# Patient Record
Sex: FEMALE | Race: WHITE | NOT HISPANIC OR LATINO | Employment: OTHER | URBAN - METROPOLITAN AREA
[De-identification: names, ages, dates, MRNs, and addresses within clinical notes are randomized per-mention and may not be internally consistent; named-entity substitution may affect disease eponyms.]

---

## 2023-09-02 PROBLEM — N81.4 CYSTOCELE WITH PROLAPSE: Status: ACTIVE | Noted: 2023-09-02

## 2023-09-02 PROBLEM — M54.50 ACUTE LOW BACK PAIN: Status: ACTIVE | Noted: 2023-09-02

## 2023-09-02 PROBLEM — R07.89 TIGHT CHEST: Status: ACTIVE | Noted: 2023-09-02

## 2023-09-02 PROBLEM — H53.9 DISORDER OF VISION: Status: ACTIVE | Noted: 2023-09-02

## 2023-09-02 PROBLEM — I10 HYPERTENSION: Status: ACTIVE | Noted: 2023-09-02

## 2023-09-02 PROBLEM — R06.00 DYSPNEA: Status: ACTIVE | Noted: 2023-09-02

## 2023-09-02 PROBLEM — R53.1 WEAKNESS: Status: ACTIVE | Noted: 2023-09-02

## 2023-09-02 PROBLEM — R39.15 URINARY URGENCY: Status: ACTIVE | Noted: 2023-09-02

## 2023-09-02 PROBLEM — N81.10 VAGINAL PROLAPSE: Status: ACTIVE | Noted: 2023-09-02

## 2023-09-02 PROBLEM — I73.9 CLAUDICATION (CMS-HCC): Status: ACTIVE | Noted: 2023-09-02

## 2023-09-02 PROBLEM — I25.10 ARTERIOSCLEROSIS OF CORONARY ARTERY: Status: ACTIVE | Noted: 2023-09-02

## 2023-09-02 PROBLEM — J44.1 ACUTE EXACERBATION OF CHRONIC OBSTRUCTIVE AIRWAYS DISEASE (MULTI): Status: ACTIVE | Noted: 2023-09-02

## 2023-09-02 PROBLEM — I83.893 VARICOSE VEINS OF BOTH LEGS WITH EDEMA: Status: ACTIVE | Noted: 2023-09-02

## 2023-09-02 PROBLEM — I73.9 PVD (PERIPHERAL VASCULAR DISEASE) (CMS-HCC): Status: ACTIVE | Noted: 2023-09-02

## 2023-09-02 PROBLEM — M51.36 LUMBAR DEGENERATIVE DISC DISEASE: Status: ACTIVE | Noted: 2023-09-02

## 2023-09-02 PROBLEM — G45.9 TRANSIENT ISCHEMIC ATTACK: Status: ACTIVE | Noted: 2023-09-02

## 2023-09-02 PROBLEM — I25.2 HISTORY OF MYOCARDIAL INFARCTION: Status: ACTIVE | Noted: 2023-09-02

## 2023-09-02 PROBLEM — R53.1 ASTHENIA: Status: ACTIVE | Noted: 2023-09-02

## 2023-09-02 PROBLEM — R56.9 SEIZURE (MULTI): Status: ACTIVE | Noted: 2023-09-02

## 2023-09-02 PROBLEM — R07.9 CHEST PAIN: Status: ACTIVE | Noted: 2023-09-02

## 2023-09-02 PROBLEM — H43.819 VITREOUS DEGENERATION: Status: ACTIVE | Noted: 2023-09-02

## 2023-09-02 PROBLEM — H43.399 FLOATERS IN VISUAL FIELD: Status: ACTIVE | Noted: 2023-09-02

## 2023-09-02 PROBLEM — R53.82 CHRONIC FATIGUE: Status: ACTIVE | Noted: 2023-09-02

## 2023-09-02 PROBLEM — R43.2 DYSGEUSIA: Status: ACTIVE | Noted: 2023-09-02

## 2023-09-02 PROBLEM — I21.3 ACUTE ST SEGMENT ELEVATION MYOCARDIAL INFARCTION (MULTI): Status: ACTIVE | Noted: 2023-09-02

## 2023-09-02 PROBLEM — M79.606 PAIN OF LOWER EXTREMITY: Status: ACTIVE | Noted: 2023-09-02

## 2023-09-02 PROBLEM — I65.22 CAROTID STENOSIS, LEFT: Status: ACTIVE | Noted: 2023-09-02

## 2023-09-02 PROBLEM — I50.32 CHRONIC DIASTOLIC HEART FAILURE (MULTI): Status: ACTIVE | Noted: 2023-09-02

## 2023-09-02 PROBLEM — Z86.73 HISTORY OF CEREBROVASCULAR ACCIDENT: Status: ACTIVE | Noted: 2023-09-02

## 2023-09-02 PROBLEM — K21.9 GASTROESOPHAGEAL REFLUX DISEASE: Status: ACTIVE | Noted: 2023-09-02

## 2023-09-02 PROBLEM — I20.9 ANGINA PECTORIS (CMS-HCC): Status: ACTIVE | Noted: 2023-09-02

## 2023-09-02 PROBLEM — M54.9 BACKACHE: Status: ACTIVE | Noted: 2023-09-02

## 2023-09-02 PROBLEM — M51.369 LUMBAR DEGENERATIVE DISC DISEASE: Status: ACTIVE | Noted: 2023-09-02

## 2023-09-02 PROBLEM — R55 SYNCOPE AND COLLAPSE: Status: ACTIVE | Noted: 2023-09-02
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
## 2023-09-02 PROBLEM — H04.129 TEAR FILM INSUFFICIENCY: Status: ACTIVE | Noted: 2023-09-02

## 2023-09-02 PROBLEM — I50.23 ACUTE ON CHRONIC SYSTOLIC HEART FAILURE (MULTI): Status: ACTIVE | Noted: 2023-09-02

## 2023-09-02 PROBLEM — K92.2 GASTROINTESTINAL HEMORRHAGE: Status: ACTIVE | Noted: 2023-09-02

## 2023-09-02 PROBLEM — H11.159 PINGUECULA: Status: ACTIVE | Noted: 2023-09-02

## 2023-09-02 PROBLEM — E86.0 DEHYDRATION: Status: ACTIVE | Noted: 2023-09-02

## 2023-09-02 PROBLEM — E03.9 HYPOTHYROIDISM (ACQUIRED): Status: ACTIVE | Noted: 2023-09-02

## 2023-09-02 PROBLEM — R04.0 EPISTAXIS: Status: ACTIVE | Noted: 2023-09-02

## 2023-09-02 PROBLEM — R29.810 FACIAL WEAKNESS: Status: ACTIVE | Noted: 2023-09-02

## 2023-09-02 PROBLEM — I95.9 LOW BLOOD PRESSURE: Status: ACTIVE | Noted: 2023-09-02

## 2023-09-02 PROBLEM — I25.5 ISCHEMIC CARDIOMYOPATHY: Status: ACTIVE | Noted: 2023-09-02

## 2023-09-02 PROBLEM — Z86.73 HISTORY OF STROKE WITHOUT RESIDUAL DEFICITS: Status: ACTIVE | Noted: 2023-09-02

## 2023-09-02 PROBLEM — N32.81 OAB (OVERACTIVE BLADDER): Status: ACTIVE | Noted: 2023-09-02

## 2023-09-02 PROBLEM — J44.9 CHRONIC OBSTRUCTIVE PULMONARY DISEASE (MULTI): Status: ACTIVE | Noted: 2023-09-02

## 2023-09-02 PROBLEM — E78.2 MIXED HYPERLIPIDEMIA: Status: ACTIVE | Noted: 2023-09-02

## 2023-09-02 PROBLEM — N17.9 ACUTE RENAL FAILURE SYNDROME (CMS-HCC): Status: ACTIVE | Noted: 2023-09-02

## 2023-09-02 PROBLEM — H53.461 RIGHT HOMONYMOUS HEMIANOPSIA: Status: ACTIVE | Noted: 2023-09-02

## 2023-09-02 PROBLEM — R51.9 HEADACHE: Status: ACTIVE | Noted: 2023-09-02

## 2023-09-02 PROBLEM — D50.0 BLOOD LOSS ANEMIA: Status: ACTIVE | Noted: 2023-09-02

## 2023-09-02 PROBLEM — H02.831 DERMATOCHALASIS OF RIGHT UPPER EYELID: Status: ACTIVE | Noted: 2023-09-02

## 2023-09-02 PROBLEM — R55 NEAR SYNCOPE: Status: ACTIVE | Noted: 2023-09-02

## 2023-09-02 PROBLEM — I63.40 CEREBROVASCULAR ACCIDENT (CVA) DUE TO EMBOLISM OF CEREBRAL ARTERY (MULTI): Status: ACTIVE | Noted: 2023-09-02

## 2023-09-02 PROBLEM — M51.27 LUMBAGO-SCIATICA DUE TO DISPLACEMENT OF LUMBAR INTERVERTEBRAL DISC: Status: ACTIVE | Noted: 2023-09-02

## 2023-09-02 PROBLEM — H02.834 DERMATOCHALASIS OF LEFT UPPER EYELID: Status: ACTIVE | Noted: 2023-09-02

## 2023-09-02 PROBLEM — I10 BENIGN ESSENTIAL HYPERTENSION: Status: ACTIVE | Noted: 2023-09-02

## 2023-09-02 PROBLEM — N18.30 CHRONIC KIDNEY DISEASE, STAGE 3 (MULTI): Status: ACTIVE | Noted: 2023-09-02

## 2023-09-02 PROBLEM — I63.9: Status: ACTIVE | Noted: 2023-09-02

## 2023-09-02 PROBLEM — I71.40 ABDOMINAL AORTIC ANEURYSM (AAA) WITHOUT RUPTURE (CMS-HCC): Status: ACTIVE | Noted: 2023-09-02

## 2023-09-02 PROBLEM — R94.31 ELECTROCARDIOGRAM ABNORMAL: Status: ACTIVE | Noted: 2023-09-02

## 2023-09-02 PROBLEM — I63.81 LACUNAR INFARCT, ACUTE (MULTI): Status: ACTIVE | Noted: 2023-09-02

## 2023-09-02 PROBLEM — Z95.5 STENTED CORONARY ARTERY: Status: ACTIVE | Noted: 2023-09-02

## 2023-09-02 PROBLEM — Z96.1 ARTIFICIAL LENS PRESENT: Status: ACTIVE | Noted: 2023-09-02

## 2023-09-02 PROBLEM — Z95.5 HISTORY OF CORONARY ARTERY STENT PLACEMENT: Status: ACTIVE | Noted: 2023-09-02

## 2023-09-02 PROBLEM — I63.9 CEREBROVASCULAR ACCIDENT (MULTI): Status: ACTIVE | Noted: 2023-09-02

## 2023-09-02 PROBLEM — I50.32 CHRONIC HEART FAILURE WITH PRESERVED EJECTION FRACTION (MULTI): Status: ACTIVE | Noted: 2023-09-02

## 2023-09-02 RX ORDER — LEVOTHYROXINE SODIUM 125 UG/1
TABLET ORAL
COMMUNITY
End: 2023-10-12 | Stop reason: ALTCHOICE

## 2023-09-02 RX ORDER — ALBUTEROL SULFATE 90 UG/1
1 AEROSOL, METERED RESPIRATORY (INHALATION) EVERY 4 HOURS PRN
COMMUNITY
Start: 2021-05-28

## 2023-09-02 RX ORDER — FUROSEMIDE 40 MG/1
TABLET ORAL
COMMUNITY
End: 2023-10-12 | Stop reason: ALTCHOICE

## 2023-09-02 RX ORDER — FLUTICASONE PROPIONATE 50 MCG
SPRAY, SUSPENSION (ML) NASAL
COMMUNITY
End: 2023-11-07 | Stop reason: WASHOUT

## 2023-09-02 RX ORDER — CARVEDILOL 25 MG/1
TABLET ORAL
COMMUNITY
End: 2023-10-07 | Stop reason: SDUPTHER

## 2023-09-02 RX ORDER — LIDOCAINE 50 MG/G
PATCH TOPICAL
COMMUNITY
Start: 2022-06-15 | End: 2023-10-10 | Stop reason: HOSPADM

## 2023-09-02 RX ORDER — LEVOTHYROXINE SODIUM 100 UG/1
TABLET ORAL
COMMUNITY
Start: 2021-05-23 | End: 2023-10-07 | Stop reason: SDUPTHER

## 2023-09-02 RX ORDER — ATORVASTATIN CALCIUM 10 MG/1
TABLET, FILM COATED ORAL
COMMUNITY
End: 2023-10-07 | Stop reason: SDUPTHER

## 2023-09-02 RX ORDER — ASPIRIN 81 MG/1
81 TABLET ORAL ONCE
COMMUNITY
End: 2024-01-02 | Stop reason: WASHOUT

## 2023-09-02 RX ORDER — LORATADINE 10 MG/1
10 TABLET ORAL DAILY
COMMUNITY

## 2023-09-02 RX ORDER — PANTOPRAZOLE SODIUM 40 MG/1
TABLET, DELAYED RELEASE ORAL
COMMUNITY
Start: 2021-07-01 | End: 2023-10-10 | Stop reason: HOSPADM

## 2023-09-02 RX ORDER — MULTIVIT-MIN/IRON/FOLIC ACID/K 18-600-40
1 CAPSULE ORAL DAILY
COMMUNITY

## 2023-09-02 RX ORDER — NITROGLYCERIN 0.4 MG/1
0.4 TABLET SUBLINGUAL EVERY 5 MIN PRN
COMMUNITY
Start: 2021-07-02

## 2023-09-02 RX ORDER — VIT A/VIT C/VIT E/ZINC/COPPER 2148-113
1 TABLET ORAL EVERY 12 HOURS
COMMUNITY
End: 2024-02-08 | Stop reason: WASHOUT

## 2023-09-02 RX ORDER — ATORVASTATIN CALCIUM 40 MG/1
40 TABLET, FILM COATED ORAL EVERY 24 HOURS
COMMUNITY
Start: 2021-07-19

## 2023-09-02 RX ORDER — LANOLIN ALCOHOL/MO/W.PET/CERES
CREAM (GRAM) TOPICAL
COMMUNITY
Start: 2023-10-12 | End: 2024-02-08 | Stop reason: WASHOUT

## 2023-09-02 RX ORDER — TIZANIDINE 2 MG/1
TABLET ORAL
COMMUNITY
End: 2023-10-10 | Stop reason: HOSPADM

## 2023-09-02 RX ORDER — CARVEDILOL 12.5 MG/1
TABLET ORAL
COMMUNITY
Start: 2021-04-14 | End: 2023-10-12 | Stop reason: ALTCHOICE

## 2023-10-07 ENCOUNTER — APPOINTMENT (OUTPATIENT)
Dept: RADIOLOGY | Facility: HOSPITAL | Age: 83
DRG: 280 | End: 2023-10-07
Payer: MEDICARE

## 2023-10-07 ENCOUNTER — HOSPITAL ENCOUNTER (INPATIENT)
Facility: HOSPITAL | Age: 83
LOS: 3 days | Discharge: HOME HEALTH | DRG: 280 | End: 2023-10-10
Attending: STUDENT IN AN ORGANIZED HEALTH CARE EDUCATION/TRAINING PROGRAM | Admitting: HOSPITALIST
Payer: MEDICARE

## 2023-10-07 DIAGNOSIS — R01.2 OTHER CARDIAC SOUNDS: ICD-10-CM

## 2023-10-07 DIAGNOSIS — E03.9 ACQUIRED HYPOTHYROIDISM: ICD-10-CM

## 2023-10-07 DIAGNOSIS — R53.1 ASTHENIA: ICD-10-CM

## 2023-10-07 DIAGNOSIS — I50.32 CHRONIC DIASTOLIC HEART FAILURE (MULTI): ICD-10-CM

## 2023-10-07 DIAGNOSIS — I10 HYPERTENSION, UNSPECIFIED TYPE: ICD-10-CM

## 2023-10-07 DIAGNOSIS — J96.01 ACUTE RESPIRATORY FAILURE WITH HYPOXIA (MULTI): Primary | ICD-10-CM

## 2023-10-07 DIAGNOSIS — R53.1 WEAKNESS: ICD-10-CM

## 2023-10-07 DIAGNOSIS — J81.0 ACUTE PULMONARY EDEMA (MULTI): ICD-10-CM

## 2023-10-07 LAB
ALBUMIN SERPL-MCNC: 4.1 G/DL (ref 3.5–5)
ALP BLD-CCNC: 125 U/L (ref 35–125)
ALT SERPL-CCNC: 103 U/L (ref 5–40)
ANION GAP SERPL CALC-SCNC: 14 MMOL/L
AST SERPL-CCNC: 85 U/L (ref 5–40)
BILIRUB SERPL-MCNC: 1 MG/DL (ref 0.1–1.2)
BUN SERPL-MCNC: 22 MG/DL (ref 8–25)
CALCIUM SERPL-MCNC: 8.7 MG/DL (ref 8.5–10.4)
CHLORIDE SERPL-SCNC: 104 MMOL/L (ref 97–107)
CO2 SERPL-SCNC: 29 MMOL/L (ref 24–31)
CREAT SERPL-MCNC: 0.9 MG/DL (ref 0.4–1.6)
ERYTHROCYTE [DISTWIDTH] IN BLOOD BY AUTOMATED COUNT: 14.1 % (ref 11.5–14.5)
GFR SERPL CREATININE-BSD FRML MDRD: 63 ML/MIN/1.73M*2
GLUCOSE SERPL-MCNC: 220 MG/DL (ref 65–99)
HCT VFR BLD AUTO: 43.6 % (ref 36–46)
HGB BLD-MCNC: 13.7 G/DL (ref 12–16)
LACTATE BLDV-SCNC: 2.3 MMOL/L (ref 0.4–2)
MCH RBC QN AUTO: 29.4 PG (ref 26–34)
MCHC RBC AUTO-ENTMCNC: 31.4 G/DL (ref 32–36)
MCV RBC AUTO: 94 FL (ref 80–100)
NRBC BLD-RTO: 0 /100 WBCS (ref 0–0)
NT-PROBNP SERPL-MCNC: 6316 PG/ML (ref 0–624)
PLATELET # BLD AUTO: 196 X10*3/UL (ref 150–450)
PMV BLD AUTO: 11.6 FL (ref 7.5–11.5)
POTASSIUM SERPL-SCNC: 3.5 MMOL/L (ref 3.4–5.1)
PROT SERPL-MCNC: 6.5 G/DL (ref 5.9–7.9)
RBC # BLD AUTO: 4.66 X10*6/UL (ref 4–5.2)
SODIUM SERPL-SCNC: 147 MMOL/L (ref 133–145)
TROPONIN T SERPL-MCNC: 156 NG/L
TROPONIN T SERPL-MCNC: 88 NG/L
TROPONIN T SERPL-MCNC: 99 NG/L
WBC # BLD AUTO: 13.6 X10*3/UL (ref 4.4–11.3)

## 2023-10-07 PROCEDURE — 5A09357 ASSISTANCE WITH RESPIRATORY VENTILATION, LESS THAN 24 CONSECUTIVE HOURS, CONTINUOUS POSITIVE AIRWAY PRESSURE: ICD-10-PCS | Performed by: INTERNAL MEDICINE

## 2023-10-07 PROCEDURE — 2500000004 HC RX 250 GENERAL PHARMACY W/ HCPCS (ALT 636 FOR OP/ED): Performed by: STUDENT IN AN ORGANIZED HEALTH CARE EDUCATION/TRAINING PROGRAM

## 2023-10-07 PROCEDURE — 2500000001 HC RX 250 WO HCPCS SELF ADMINISTERED DRUGS (ALT 637 FOR MEDICARE OP): Performed by: HOSPITALIST

## 2023-10-07 PROCEDURE — 96374 THER/PROPH/DIAG INJ IV PUSH: CPT

## 2023-10-07 PROCEDURE — 85027 COMPLETE CBC AUTOMATED: CPT | Performed by: STUDENT IN AN ORGANIZED HEALTH CARE EDUCATION/TRAINING PROGRAM

## 2023-10-07 PROCEDURE — 84484 ASSAY OF TROPONIN QUANT: CPT | Performed by: STUDENT IN AN ORGANIZED HEALTH CARE EDUCATION/TRAINING PROGRAM

## 2023-10-07 PROCEDURE — 80053 COMPREHEN METABOLIC PANEL: CPT | Performed by: STUDENT IN AN ORGANIZED HEALTH CARE EDUCATION/TRAINING PROGRAM

## 2023-10-07 PROCEDURE — 36415 COLL VENOUS BLD VENIPUNCTURE: CPT | Performed by: STUDENT IN AN ORGANIZED HEALTH CARE EDUCATION/TRAINING PROGRAM

## 2023-10-07 PROCEDURE — 2020000001 HC ICU ROOM DAILY

## 2023-10-07 PROCEDURE — 71045 X-RAY EXAM CHEST 1 VIEW: CPT

## 2023-10-07 PROCEDURE — 2500000004 HC RX 250 GENERAL PHARMACY W/ HCPCS (ALT 636 FOR OP/ED): Performed by: HOSPITALIST

## 2023-10-07 PROCEDURE — 83880 ASSAY OF NATRIURETIC PEPTIDE: CPT | Performed by: STUDENT IN AN ORGANIZED HEALTH CARE EDUCATION/TRAINING PROGRAM

## 2023-10-07 PROCEDURE — 87040 BLOOD CULTURE FOR BACTERIA: CPT | Mod: CMCLAB,WESLAB | Performed by: STUDENT IN AN ORGANIZED HEALTH CARE EDUCATION/TRAINING PROGRAM

## 2023-10-07 PROCEDURE — 82435 ASSAY OF BLOOD CHLORIDE: CPT | Performed by: HOSPITALIST

## 2023-10-07 PROCEDURE — 99291 CRITICAL CARE FIRST HOUR: CPT | Mod: 25 | Performed by: STUDENT IN AN ORGANIZED HEALTH CARE EDUCATION/TRAINING PROGRAM

## 2023-10-07 PROCEDURE — 93010 ELECTROCARDIOGRAM REPORT: CPT | Performed by: INTERNAL MEDICINE

## 2023-10-07 PROCEDURE — 83605 ASSAY OF LACTIC ACID: CPT | Performed by: STUDENT IN AN ORGANIZED HEALTH CARE EDUCATION/TRAINING PROGRAM

## 2023-10-07 PROCEDURE — 94660 CPAP INITIATION&MGMT: CPT

## 2023-10-07 RX ORDER — LOSARTAN POTASSIUM 25 MG/1
25 TABLET ORAL DAILY
COMMUNITY
End: 2023-10-10 | Stop reason: HOSPADM

## 2023-10-07 RX ORDER — ACETAMINOPHEN 160 MG/5ML
650 SOLUTION ORAL EVERY 6 HOURS PRN
Status: DISCONTINUED | OUTPATIENT
Start: 2023-10-07 | End: 2023-10-10

## 2023-10-07 RX ORDER — ATORVASTATIN CALCIUM 40 MG/1
40 TABLET, FILM COATED ORAL NIGHTLY
Status: DISCONTINUED | OUTPATIENT
Start: 2023-10-07 | End: 2023-10-10 | Stop reason: HOSPADM

## 2023-10-07 RX ORDER — POTASSIUM CHLORIDE 20 MEQ/1
40 TABLET, EXTENDED RELEASE ORAL ONCE
Status: COMPLETED | OUTPATIENT
Start: 2023-10-07 | End: 2023-10-07

## 2023-10-07 RX ORDER — ENOXAPARIN SODIUM 100 MG/ML
40 INJECTION SUBCUTANEOUS EVERY 24 HOURS
Status: DISCONTINUED | OUTPATIENT
Start: 2023-10-07 | End: 2023-10-10 | Stop reason: HOSPADM

## 2023-10-07 RX ORDER — LOSARTAN POTASSIUM 25 MG/1
25 TABLET ORAL DAILY
Status: DISCONTINUED | OUTPATIENT
Start: 2023-10-07 | End: 2023-10-10

## 2023-10-07 RX ORDER — FUROSEMIDE 10 MG/ML
40 INJECTION INTRAMUSCULAR; INTRAVENOUS 2 TIMES DAILY
Status: DISCONTINUED | OUTPATIENT
Start: 2023-10-07 | End: 2023-10-09

## 2023-10-07 RX ORDER — ACETAMINOPHEN 650 MG/1
650 SUPPOSITORY RECTAL EVERY 6 HOURS PRN
Status: DISCONTINUED | OUTPATIENT
Start: 2023-10-07 | End: 2023-10-10

## 2023-10-07 RX ORDER — PANTOPRAZOLE SODIUM 40 MG/1
40 TABLET, DELAYED RELEASE ORAL
Status: DISCONTINUED | OUTPATIENT
Start: 2023-10-08 | End: 2023-10-10 | Stop reason: HOSPADM

## 2023-10-07 RX ORDER — FUROSEMIDE 10 MG/ML
60 INJECTION INTRAMUSCULAR; INTRAVENOUS ONCE
Status: COMPLETED | OUTPATIENT
Start: 2023-10-07 | End: 2023-10-07

## 2023-10-07 RX ORDER — ASPIRIN 81 MG/1
81 TABLET ORAL ONCE
Status: COMPLETED | OUTPATIENT
Start: 2023-10-07 | End: 2023-10-07

## 2023-10-07 RX ORDER — ACETAMINOPHEN 325 MG/1
650 TABLET ORAL EVERY 6 HOURS PRN
Status: DISCONTINUED | OUTPATIENT
Start: 2023-10-07 | End: 2023-10-10 | Stop reason: HOSPADM

## 2023-10-07 RX ORDER — DOCUSATE SODIUM 100 MG/1
100 CAPSULE, LIQUID FILLED ORAL 2 TIMES DAILY
Status: DISCONTINUED | OUTPATIENT
Start: 2023-10-07 | End: 2023-10-10 | Stop reason: HOSPADM

## 2023-10-07 RX ORDER — LEVOTHYROXINE SODIUM 100 UG/1
100 TABLET ORAL
Status: DISCONTINUED | OUTPATIENT
Start: 2023-10-08 | End: 2023-10-10 | Stop reason: HOSPADM

## 2023-10-07 RX ORDER — TIZANIDINE 2 MG/1
2 TABLET ORAL EVERY 8 HOURS PRN
Status: DISCONTINUED | OUTPATIENT
Start: 2023-10-07 | End: 2023-10-10 | Stop reason: HOSPADM

## 2023-10-07 RX ORDER — CARVEDILOL 12.5 MG/1
12.5 TABLET ORAL
Status: DISCONTINUED | OUTPATIENT
Start: 2023-10-07 | End: 2023-10-10

## 2023-10-07 RX ADMIN — CARVEDILOL 12.5 MG: 12.5 TABLET, FILM COATED ORAL at 18:15

## 2023-10-07 RX ADMIN — Medication: at 20:40

## 2023-10-07 RX ADMIN — POTASSIUM CHLORIDE 40 MEQ: 1500 TABLET, EXTENDED RELEASE ORAL at 18:16

## 2023-10-07 RX ADMIN — FUROSEMIDE 40 MG: 10 INJECTION, SOLUTION INTRAMUSCULAR; INTRAVENOUS at 18:16

## 2023-10-07 RX ADMIN — LOSARTAN POTASSIUM 25 MG: 25 TABLET, FILM COATED ORAL at 18:16

## 2023-10-07 RX ADMIN — Medication 40 %: at 14:00

## 2023-10-07 RX ADMIN — ENOXAPARIN SODIUM 40 MG: 40 INJECTION SUBCUTANEOUS at 18:15

## 2023-10-07 RX ADMIN — FUROSEMIDE 60 MG: 10 INJECTION, SOLUTION INTRAMUSCULAR; INTRAVENOUS at 14:07

## 2023-10-07 RX ADMIN — ASPIRIN 81 MG: 81 TABLET, COATED ORAL at 18:14

## 2023-10-07 RX ADMIN — Medication: at 17:40

## 2023-10-07 SDOH — SOCIAL STABILITY: SOCIAL INSECURITY: ABUSE: ADULT

## 2023-10-07 SDOH — SOCIAL STABILITY: SOCIAL INSECURITY: HAVE YOU HAD THOUGHTS OF HARMING ANYONE ELSE?: NO

## 2023-10-07 SDOH — SOCIAL STABILITY: SOCIAL INSECURITY: ARE YOU OR HAVE YOU BEEN THREATENED OR ABUSED PHYSICALLY, EMOTIONALLY, OR SEXUALLY BY ANYONE?: NO

## 2023-10-07 SDOH — SOCIAL STABILITY: SOCIAL INSECURITY: DOES ANYONE TRY TO KEEP YOU FROM HAVING/CONTACTING OTHER FRIENDS OR DOING THINGS OUTSIDE YOUR HOME?: NO

## 2023-10-07 SDOH — SOCIAL STABILITY: SOCIAL INSECURITY: DO YOU FEEL ANYONE HAS EXPLOITED OR TAKEN ADVANTAGE OF YOU FINANCIALLY OR OF YOUR PERSONAL PROPERTY?: NO

## 2023-10-07 SDOH — SOCIAL STABILITY: SOCIAL INSECURITY: ARE THERE ANY APPARENT SIGNS OF INJURIES/BEHAVIORS THAT COULD BE RELATED TO ABUSE/NEGLECT?: NO

## 2023-10-07 SDOH — SOCIAL STABILITY: SOCIAL INSECURITY: DO YOU FEEL UNSAFE GOING BACK TO THE PLACE WHERE YOU ARE LIVING?: NO

## 2023-10-07 SDOH — SOCIAL STABILITY: SOCIAL INSECURITY: HAS ANYONE EVER THREATENED TO HURT YOUR FAMILY OR YOUR PETS?: NO

## 2023-10-07 ASSESSMENT — COLUMBIA-SUICIDE SEVERITY RATING SCALE - C-SSRS
1. IN THE PAST MONTH, HAVE YOU WISHED YOU WERE DEAD OR WISHED YOU COULD GO TO SLEEP AND NOT WAKE UP?: NO
6. HAVE YOU EVER DONE ANYTHING, STARTED TO DO ANYTHING, OR PREPARED TO DO ANYTHING TO END YOUR LIFE?: NO
2. HAVE YOU ACTUALLY HAD ANY THOUGHTS OF KILLING YOURSELF?: NO
2. HAVE YOU ACTUALLY HAD ANY THOUGHTS OF KILLING YOURSELF?: NO
6. HAVE YOU EVER DONE ANYTHING, STARTED TO DO ANYTHING, OR PREPARED TO DO ANYTHING TO END YOUR LIFE?: NO
1. IN THE PAST MONTH, HAVE YOU WISHED YOU WERE DEAD OR WISHED YOU COULD GO TO SLEEP AND NOT WAKE UP?: NO

## 2023-10-07 ASSESSMENT — ACTIVITIES OF DAILY LIVING (ADL)
FEEDING YOURSELF: INDEPENDENT
JUDGMENT_ADEQUATE_SAFELY_COMPLETE_DAILY_ACTIVITIES: YES
FEEDING YOURSELF: INDEPENDENT
GROOMING: NEEDS ASSISTANCE
BATHING: NEEDS ASSISTANCE
DRESSING YOURSELF: NEEDS ASSISTANCE
HEARING - RIGHT EAR: FUNCTIONAL
BATHING: NEEDS ASSISTANCE
JUDGMENT_ADEQUATE_SAFELY_COMPLETE_DAILY_ACTIVITIES: YES
PATIENT'S MEMORY ADEQUATE TO SAFELY COMPLETE DAILY ACTIVITIES?: YES
TOILETING: NEEDS ASSISTANCE
WALKS IN HOME: UNABLE TO ASSESS
HEARING - LEFT EAR: FUNCTIONAL
DRESSING YOURSELF: NEEDS ASSISTANCE
HEARING - LEFT EAR: FUNCTIONAL
ASSISTIVE_DEVICE: OTHER (COMMENT)
WALKS IN HOME: UNABLE TO ASSESS
HEARING - RIGHT EAR: FUNCTIONAL
GROOMING: NEEDS ASSISTANCE

## 2023-10-07 ASSESSMENT — COGNITIVE AND FUNCTIONAL STATUS - GENERAL
CLIMB 3 TO 5 STEPS WITH RAILING: A LOT
TOILETING: A LOT
WALKING IN HOSPITAL ROOM: A LOT
MOVING TO AND FROM BED TO CHAIR: A LOT
STANDING UP FROM CHAIR USING ARMS: A LITTLE
PATIENT BASELINE BEDBOUND: NO
DRESSING REGULAR UPPER BODY CLOTHING: A LOT
MOBILITY SCORE: 17
DAILY ACTIVITIY SCORE: 17
DRESSING REGULAR LOWER BODY CLOTHING: A LOT
PATIENT BASELINE BEDBOUND: NO
HELP NEEDED FOR BATHING: A LITTLE

## 2023-10-07 ASSESSMENT — LIFESTYLE VARIABLES
PRESCIPTION_ABUSE_PAST_12_MONTHS: NO
HOW OFTEN DO YOU HAVE A DRINK CONTAINING ALCOHOL: NEVER
AUDIT-C TOTAL SCORE: 0
HAVE YOU EVER FELT YOU SHOULD CUT DOWN ON YOUR DRINKING: NO
SUBSTANCE_ABUSE_PAST_12_MONTHS: NO
HAVE PEOPLE ANNOYED YOU BY CRITICIZING YOUR DRINKING: NO
EVER HAD A DRINK FIRST THING IN THE MORNING TO STEADY YOUR NERVES TO GET RID OF A HANGOVER: NO
EVER FELT BAD OR GUILTY ABOUT YOUR DRINKING: NO
SKIP TO QUESTIONS 9-10: 1
HOW MANY STANDARD DRINKS CONTAINING ALCOHOL DO YOU HAVE ON A TYPICAL DAY: PATIENT DOES NOT DRINK
AUDIT-C TOTAL SCORE: 0
HOW OFTEN DO YOU HAVE 6 OR MORE DRINKS ON ONE OCCASION: NEVER

## 2023-10-07 ASSESSMENT — PAIN SCALES - GENERAL
PAINLEVEL_OUTOF10: 0 - NO PAIN
PAINLEVEL_OUTOF10: 0 - NO PAIN

## 2023-10-07 ASSESSMENT — PATIENT HEALTH QUESTIONNAIRE - PHQ9
1. LITTLE INTEREST OR PLEASURE IN DOING THINGS: NOT AT ALL
2. FEELING DOWN, DEPRESSED OR HOPELESS: NOT AT ALL
SUM OF ALL RESPONSES TO PHQ9 QUESTIONS 1 & 2: 0

## 2023-10-07 ASSESSMENT — PAIN - FUNCTIONAL ASSESSMENT
PAIN_FUNCTIONAL_ASSESSMENT: 0-10
PAIN_FUNCTIONAL_ASSESSMENT: 0-10

## 2023-10-07 NOTE — ED PROCEDURE NOTE
Procedure  Critical Care    Performed by: Frank Lockhart DO  Authorized by: Frank Lockhart DO    Critical care provider statement:     Critical care time (minutes):  35    Critical care time was exclusive of:  Separately billable procedures and treating other patients    Critical care was necessary to treat or prevent imminent or life-threatening deterioration of the following conditions:  Respiratory failure    Critical care was time spent personally by me on the following activities:  Ordering and performing treatments and interventions, evaluation of patient's response to treatment, examination of patient, ordering and review of laboratory studies, ordering and review of radiographic studies and re-evaluation of patient's condition    Care discussed with: admitting provider                 Frank Lockhart DO  10/07/23 9274

## 2023-10-07 NOTE — ED NOTES
Jenny Marilu (Daughter) - [phone] please call with updates     Krupa Huerta RN  10/07/23 4084

## 2023-10-07 NOTE — ED PROVIDER NOTES
HPI   Chief Complaint   Patient presents with    Shortness of Breath     Pt feels like she couldn't breath       This is an 83-year-old female with past medical history of CHF, HTN, HLD, hypothyroidism, CVA, CKD 3 presenting the ED for evaluation of shortness of breath.  She states that her symptoms started about a day and a half ago and progressively worsened until today, EMS states that she was tachypneic, with intercostal retractions and head-bobbing, 1 word conversational dyspnea on their arrival.  She was placed on CPAP, given 2 DuoNebs as well as IV magnesium on the way to the ED, she does have a documented history of COPD but denies any known history of lung disease.  She does have a history of heart failure.  She denies any fevers, productive cough.  She does admit some chest tightness associated with her symptoms today but denies chest pain, diaphoresis, syncope or near syncope.  Denies palpitations.    REVIEW OF SYSTEMS:   Gen.:  denies fevers, chills  ENT: denies sore throat, nasal congestion  Cardiac: admits chest tightness  Pulmonary: admits shortness of breath  Neuro: denies headache  GI: denies abdominal pain, nausea, vomiting, diarrhea  Musculoskeletal: admits bilateral leg swelling  Skin: denies rash or abrasions  Review of systems is otherwise negative unless stated above or in history of present illness.                          [place] Coma Scale Score: 15                  Patient History   Past Medical History:   Diagnosis Date    Personal history of other diseases of the circulatory system 07/02/2021    History of hypertension    Personal history of other diseases of the respiratory system 07/02/2021    History of chronic obstructive lung disease    Personal history of other endocrine, nutritional and metabolic disease 07/02/2021    History of thyroid disorder     Past Surgical History:   Procedure Laterality Date    MR HEAD ANGIO WO IV CONTRAST  2/24/2017    MR HEAD ANGIO WO IV CONTRAST [facility]PATIENT LEGAC[facility]    MR HEAD ANGIO WO IV CONTRAST  8/11/2017    MR HEAD ANGIO WO IV CONTRAST [facility]    MR HEAD ANGIO WO IV CONTRAST  2/10/2019    MR HEAD ANGIO WO IV CONTRAST [facility]PATIENT [facility]GAC[facility]     Family History   Problem Relation Name Age of Onset    Hyperthyroidism Mother          Treated with radioactive iodine    Hypertension Mother      Diabetes Father's Sister      Hyperthyroidism Sibling       Social History     Tobacco Use    Smoking status: Not on file    Smokeless tobacco: Not on file   Substance Use Topics    Alcohol use: Not on file    Drug use: Not on file       Physical Exam   ED Triage Vitals   Temp Heart Rate Resp BP   10/07/23 1358 10/07/23 1358 10/07/23 1358 10/07/23 1358   36.9 °C (98.4 °F) (!) 117 (!) 40 (!) 209/99      SpO2 Temp Source Heart Rate Source Patient Position   10/07/23 1358 10/07/23 1358 10/07/23 1358 10/07/23 1358   94 % Temporal Monitor Sitting      BP Location FiO2 (%)     10/07/23 1358 10/07/23 1400     Right arm 40 %       Physical Exam  General: well developed, well nourished elderly female who is awake and alert cornerstones for, tachypneic with mild intercostal retractions  HENT: normocephalic, atraumatic.  CV: Tachycardic, regular rhythm, no murmur, no gallops, or rubs. radial and dorsalis pedis pulses +2/4 bilaterally  Resp: Rales bilaterally, mild intercostal retractions, patient is tachypneic.  BiPAP in place for respiratory support.  GI: abdomen soft, nontender without rigidity or guarding, no peritoneal signs, abdomen is nondistended, no masses palpated  MSK: strength +5/5 to upper and lower extremities bilaterally, 1+ pitting edema to lower legs bilaterally.  Psych: appropriate mood and affect, cooperative with exam  Skin: warm, dry, without evidence of rash or abrasions  Labs Reviewed   CBC - Abnormal       Result Value    WBC 13.6 (*)     nRBC 0.0      RBC 4.66      Hemoglobin 13.7      Hematocrit 43.6      MCV 94      MCH 29.4      MCHC 31.4 (*)      RDW 14.1      Platelets 196      MPV 11.6 (*)    COMPREHENSIVE METABOLIC PANEL - Abnormal    Glucose 220 (*)     Sodium 147 (*)     Potassium 3.5      Chloride 104      Bicarbonate 29      Urea Nitrogen 22      Creatinine 0.90      eGFR 63      Calcium 8.7      Albumin 4.1      Alkaline Phosphatase 125      Total Protein 6.5      AST 85 (*)     Bilirubin, Total 1.0       (*)     Anion Gap 14     N-TERMINAL PROBNP - Abnormal    PROBNP 6,316 (*)     Narrative:     Reference ranges are based on clinical submission data. These ranges represent the 95th percentile of normal cut-off points. As NT Pro- BNP values approach 1000 pg/ml, clinical symptoms are more likely associated with CHF.   SERIAL TROPONIN, INITIAL ([facility]) - Abnormal    Troponin T, High Sensitivity 88 (*)    BLOOD GAS LACTIC ACID, VENOUS - Abnormal    POCT Lactate, Venous 2.3 (*)    BLOOD CULTURE   BLOOD CULTURE   TROPONIN T SERIES, HIGH SENSITIVITY (0, 2 HR, 6 HR)    Narrative:     The following orders were created for panel order Troponin T Series, High Sensitivity (0, 2HR, 6HR).  Procedure                               Abnormality         Status                     ---------                               -----------         ------                     Serial Troponin, Initial...[[id number]]  Abnormal            Final result               Serial Troponin, 2 Hour ...[[id number]]                      In process                 Serial Troponin, 6 Hour ...[[id number]]                                                   Please view results for these tests on the individual orders.   SERIAL TROPONIN,  2 HOUR ([facility])   SERIAL TROPONIN, 6 HOUR ([facility])   BLOOD GAS LACTIC ACID, VENOUS       ED Course & MDM   ED Course as of 10/07/23 1649   Sat Oct 07, 2023   1442 PROBNP(!): 6,316 [NT]   1453 Patient feels better on reassessment on BIPAP and has improved work of breathing, improved tachypnea. She is saturating 95% on BIPAP now. Workup consistent with CHF and bedside  ultrasound does show many diffuse B-lines consistent with pulmonary edema.  [NT]   1534 /86, heart rate 98, patient feels much more comfortable to still tachypneic in the high 20s to low 30s on BiPAP 16/8, 40% oxygen.  She will need admitted to the ICU given her continued BiPAP dependence although she is much improved from her initial presentation. [NT]      ED Course User Index  [NT] Frank Lockhart DO         Diagnoses as of 10/07/23 1649   Acute respiratory failure with hypoxia (CMS/HCC)   Acute pulmonary edema (CMS/HCC)       Medical Decision Making  PMH: CHF, HTN, HLD, hypothyroidism, CVA, CKD 3   History obtained: directly from patient   Social factors affecting disposition: none    Patient sent mild respiratory distress arrival, she is tachypneic with heart rate in the 120s, sepsis labs ordered although bedside ultrasound does show profuse B-lines in all lung fields consistent with pulmonary edema.  Blood pressure is 209/99 on arrival.  She was given 60 mg of IV Lasix, placed on BiPAP with significant improvement of her work of breathing.  Blood pressure improved as well to 140/86.  She still tachypneic.  Patient has mildly elevated troponin level, no ongoing chest pain, no acute ischemic changes on EKG, no evidence of STEMI.  I suspect this is likely due to her active CHF exacerbation.  BNP was significantly elevated.  She does have mild leukocytosis and lactate of 2.3.  Presentation is most consistent with CHF exacerbation, I have lower suspicion for pneumonia given her history and clinical findings at bedside and a on her work-up.  She was admitted to the hospital for further medical management on BiPAP for treatment of her respiratory failure secondary to CHF/pulmonary edema.    EKG: Sinus tachycardia with rate of 121 beats minute, left axis deviation.  QTc 474 ms, WA interval 146.  Voltage criteria for LVH.  No ST elevation or depression, no acute ischemic pattern.  No  STEMI.    Procedure  Procedures     Frank Lockhart,   10/07/23 0028

## 2023-10-07 NOTE — H&P
"History Of Present Illness  Anitha Welch is a 83 y.o. female presenting with shortness of breath.  Patient answer some questions but she also repeatedly tells me that she \"cannot talk\" because of the BiPAP mask.  She says her mouth is dry.  She says her breathing is much better but not completely.  Denies pain.  Refuses to answer many of my questions.    Patient came in via EMS.  She apparently told them she had 1-1/2 days of shortness of breath that was progressive as well as leg swelling.  EMS found her to be in respiratory distress using accessory muscles.  They placed her on CPAP and her pulse ox was on 90% when she arrived.  Initial blood pressure was 200/90.  In the emergency room she was transitioned to BiPAP and her pulse ox stayed around 90% and she remains tachypneic.  Her work of breathing is lessens.  She was given IV Lasix 60 mg.     Past Medical History  She has a past medical history of Anemia, CHF (congestive heart failure) (CMS/Formerly [facility]), DVT (deep venous thrombosis) (CMS/Formerly [facility]), Heart disease, Hypertension, Personal history of other diseases of the circulatory system (07/02/2021), Personal history of other diseases of the respiratory system (07/02/2021), and Personal history of other endocrine, nutritional and metabolic disease (07/02/2021).    Surgical History  She has a past surgical history that includes MR angio head wo IV contrast (02/24/2017); MR angio head wo IV contrast (08/11/2017); MR angio head wo IV contrast (02/10/2019); Cholecystectomy; Tonsillectomy; pr arthrp acetblr/prox fem prostc agrft/algrft; Thyroidectomy, partial; and Coronary stent placement.     Social History  She reports that she quit smoking about 8 years ago. Her smoking use included cigarettes. She does not have any smokeless tobacco history on file. No history on file for alcohol use and drug use.    Family History  Family History   Problem Relation Name Age of Onset    Hyperthyroidism Mother          Treated with " radioactive iodine    Hypertension Mother      Diabetes Father's Sister      Hyperthyroidism Sibling          Allergies  Amoxicillin, Ciprofloxacin, Iodinated contrast media, Diphenhydramine, Lisinopril, and Other    Review of Systems  Unable to assess, patient refuses to answer many questions due to her BiPAP mask  Physical Exam  General: alert, no diaphoresis   HENT: mucous membranes moist, external ears normal, no rhinorrhea   Eyes: no icterus or injection, no discharge   Lungs: Managed in tachypneic with bibasilar rales   Heart: Regular and tachycardic, +1 LE edema BL   GI: abdomen soft, nontender, nondistended, BS present   MSK: no joint effusion or deformity   Skin: no rashes, erythema, or ecchymosis   Neuro: grossly normal cognition, motor strength, sensation.      Last Recorded Vitals  /74   Pulse 93   Temp 36.9 °C (98.4 °F) (Temporal)   Resp 26   Wt 61.8 kg (136 lb 3.9 oz)   SpO2 92%     Relevant Results      Results for orders placed or performed during the hospital encounter of 10/07/23 (from the past 24 hour(s))   CBC   Result Value Ref Range    WBC 13.6 (H) 4.4 - 11.3 x10*3/uL    nRBC 0.0 0.0 - 0.0 /100 WBCs    RBC 4.66 4.00 - 5.20 x10*6/uL    Hemoglobin 13.7 12.0 - 16.0 g/dL    Hematocrit 43.6 36.0 - 46.0 %    MCV 94 80 - 100 fL    MCH 29.4 26.0 - 34.0 pg    MCHC 31.4 (L) 32.0 - 36.0 g/dL    RDW 14.1 11.5 - 14.5 %    Platelets 196 150 - 450 x10*3/uL    MPV 11.6 (H) 7.5 - 11.5 fL   Comprehensive metabolic panel   Result Value Ref Range    Glucose 220 (H) 65 - 99 mg/dL    Sodium 147 (H) 133 - 145 mmol/L    Potassium 3.5 3.4 - 5.1 mmol/L    Chloride 104 97 - 107 mmol/L    Bicarbonate 29 24 - 31 mmol/L    Urea Nitrogen 22 8 - 25 mg/dL    Creatinine 0.90 0.40 - 1.60 mg/dL    eGFR 63 >60 mL/min/1.73m*2    Calcium 8.7 8.5 - 10.4 mg/dL    Albumin 4.1 3.5 - 5.0 g/dL    Alkaline Phosphatase 125 35 - 125 U/L    Total Protein 6.5 5.9 - 7.9 g/dL    AST 85 (H) 5 - 40 U/L    Bilirubin, Total 1.0 0.1 - 1.2  mg/dL     (H) 5 - 40 U/L    Anion Gap 14 <=19 mmol/L   NT Pro-BNP   Result Value Ref Range    PROBNP 6,316 (H) 0 - 624 pg/mL   Serial Troponin, Initial ([facility])   Result Value Ref Range    Troponin T, High Sensitivity 88 (H) <=15 ng/L   BLOOD GAS LACTIC ACID, VENOUS   Result Value Ref Range    POCT Lactate, Venous 2.3 (H) 0.4 - 2.0 mmol/L   Serial Troponin, 2 Hour ([facility])   Result Value Ref Range    Troponin T, High Sensitivity 99 (H) <=15 ng/L          Assessment/Plan   Principal Problem:    Acute respiratory failure with hypoxia (CMS/HCC)  Active Problems:    Acute on chronic systolic heart failure (CMS/HCC)    Benign essential hypertension    Mixed hyperlipidemia    Acquired hypothyroidism      Acute respiratory failure with hypoxia  -X-ray consistent with pulmonary edema, proBNP elevated to 6000, symptoms consistent with acute on chronic systolic heart failure.  Patient was admitted in February 2023 with the same problem.  At that time her proBNP was only 1100.  He was requiring 4 L of nasal cannula at that time.  - stat abg requested-- discussed with RT  -Echo ordered  -Consult pulm and cardiology  -Admit to ICU.  If ABG is okay, okay with transitioning to nasal cannula and seeing if patient tolerates.  If she remains short of breath will need to go back on BiPAP.  We will keep n.p.o. for right now.  -Continue IV Lasix twice daily  - continue beta blocker and losartan    NSTEMI  -Likely type II from respiratory distress.  Troponins went from 80-90.  We will cycle third one    Hypernatremia  -Mild.  Will monitor as we will be diuresing her.    Elevated ALT and AST  -Suspect from vascular congestion from acute heart failure.  Will monitor with morning labs.    Leukocytosis  -Suspect reactive.  The patient does not have signs or symptoms of pneumonia.  Will monitor off antibiotics for right now.    Hypertension  -Initially presenting with hypertensive emergency.  Between BiPAP and diuresis, blood pressure is  coming down well.  Continue patient's home beta-blocker    DVT ppx    Cct; 35 min         Shannan Whitley DO

## 2023-10-08 LAB
ALBUMIN SERPL-MCNC: 3.6 G/DL (ref 3.5–5)
ALP BLD-CCNC: 102 U/L (ref 35–125)
ALT SERPL-CCNC: 90 U/L (ref 5–40)
ANION GAP SERPL CALC-SCNC: 14 MMOL/L
ANION GAP SERPL CALC-SCNC: 15 MMOL/L
AST SERPL-CCNC: 49 U/L (ref 5–40)
BASE EXCESS BLDA CALC-SCNC: 13.6 MMOL/L (ref -2–3)
BILIRUB DIRECT SERPL-MCNC: 0.2 MG/DL (ref 0–0.2)
BILIRUB SERPL-MCNC: 0.8 MG/DL (ref 0.1–1.2)
BODY TEMPERATURE: ABNORMAL
BUN SERPL-MCNC: 25 MG/DL (ref 8–25)
BUN SERPL-MCNC: 27 MG/DL (ref 8–25)
CA-I BLDA-SCNC: 1.06 MMOL/L (ref 1.1–1.33)
CALCIUM SERPL-MCNC: 8.4 MG/DL (ref 8.5–10.4)
CALCIUM SERPL-MCNC: 8.7 MG/DL (ref 8.5–10.4)
CHLORIDE BLDA-SCNC: 103 MMOL/L (ref 98–107)
CHLORIDE SERPL-SCNC: 104 MMOL/L (ref 97–107)
CHLORIDE SERPL-SCNC: 107 MMOL/L (ref 97–107)
CO2 SERPL-SCNC: 32 MMOL/L (ref 24–31)
CO2 SERPL-SCNC: 34 MMOL/L (ref 24–31)
CREAT SERPL-MCNC: 0.9 MG/DL (ref 0.4–1.6)
CREAT SERPL-MCNC: 1 MG/DL (ref 0.4–1.6)
EPAP CMH2O: 8 CM H2O
ERYTHROCYTE [DISTWIDTH] IN BLOOD BY AUTOMATED COUNT: 14.2 % (ref 11.5–14.5)
GFR SERPL CREATININE-BSD FRML MDRD: 56 ML/MIN/1.73M*2
GFR SERPL CREATININE-BSD FRML MDRD: 63 ML/MIN/1.73M*2
GLUCOSE BLD MANUAL STRIP-MCNC: 144 MG/DL (ref 74–99)
GLUCOSE BLDA-MCNC: 135 MG/DL (ref 74–99)
GLUCOSE SERPL-MCNC: 131 MG/DL (ref 65–99)
GLUCOSE SERPL-MCNC: 179 MG/DL (ref 65–99)
HCO3 BLDA-SCNC: 39 MMOL/L (ref 22–26)
HCT VFR BLD AUTO: 39.8 % (ref 36–46)
HGB BLD-MCNC: 12.5 G/DL (ref 12–16)
INHALED O2 CONCENTRATION: 40 %
IPAP CMH2O: 16 CM H2O
LACTATE BLDA-SCNC: 0.8 MMOL/L (ref 0.4–2)
MAGNESIUM SERPL-MCNC: 2 MG/DL (ref 1.6–3.1)
MCH RBC QN AUTO: 29.1 PG (ref 26–34)
MCHC RBC AUTO-ENTMCNC: 31.4 G/DL (ref 32–36)
MCV RBC AUTO: 93 FL (ref 80–100)
NRBC BLD-RTO: 0 /100 WBCS (ref 0–0)
OXYHGB MFR BLDA: 96.1 % (ref 94–98)
PH BLDA: 7.5 PH (ref 7.38–7.42)
PHOSPHATE SERPL-MCNC: 4.7 MG/DL (ref 2.5–4.5)
PLATELET # BLD AUTO: 117 X10*3/UL (ref 150–450)
PMV BLD AUTO: 11.4 FL (ref 7.5–11.5)
PO2 BLDA: 90 MM HG (ref 85–95)
POTASSIUM BLDA-SCNC: 3 MMOL/L (ref 3.5–5.3)
POTASSIUM SERPL-SCNC: 3.3 MMOL/L (ref 3.4–5.1)
POTASSIUM SERPL-SCNC: 3.4 MMOL/L (ref 3.4–5.1)
PROT SERPL-MCNC: 5.9 G/DL (ref 5.9–7.9)
RBC # BLD AUTO: 4.3 X10*6/UL (ref 4–5.2)
SAO2 % BLDA: 98 % (ref 94–100)
SODIUM BLDA-SCNC: 142 MMOL/L (ref 136–145)
SODIUM SERPL-SCNC: 150 MMOL/L (ref 133–145)
SODIUM SERPL-SCNC: 156 MMOL/L (ref 133–145)
TSH SERPL DL<=0.05 MIU/L-ACNC: 0.65 MIU/L (ref 0.27–4.2)
VENTILATOR MODE: ABNORMAL
VENTILATOR RATE: 16 BPM
WBC # BLD AUTO: 5.6 X10*3/UL (ref 4.4–11.3)

## 2023-10-08 PROCEDURE — 84443 ASSAY THYROID STIM HORMONE: CPT | Performed by: HOSPITALIST

## 2023-10-08 PROCEDURE — 99253 IP/OBS CNSLTJ NEW/EST LOW 45: CPT | Performed by: NURSE PRACTITIONER

## 2023-10-08 PROCEDURE — 80053 COMPREHEN METABOLIC PANEL: CPT | Performed by: HOSPITALIST

## 2023-10-08 PROCEDURE — 36415 COLL VENOUS BLD VENIPUNCTURE: CPT | Performed by: HOSPITALIST

## 2023-10-08 PROCEDURE — 2500000002 HC RX 250 W HCPCS SELF ADMINISTERED DRUGS (ALT 637 FOR MEDICARE OP, ALT 636 FOR OP/ED): Performed by: HOSPITALIST

## 2023-10-08 PROCEDURE — 96372 THER/PROPH/DIAG INJ SC/IM: CPT | Performed by: HOSPITALIST

## 2023-10-08 PROCEDURE — 83735 ASSAY OF MAGNESIUM: CPT | Performed by: HOSPITALIST

## 2023-10-08 PROCEDURE — 2500000004 HC RX 250 GENERAL PHARMACY W/ HCPCS (ALT 636 FOR OP/ED): Performed by: HOSPITALIST

## 2023-10-08 PROCEDURE — 94660 CPAP INITIATION&MGMT: CPT

## 2023-10-08 PROCEDURE — 84100 ASSAY OF PHOSPHORUS: CPT | Performed by: HOSPITALIST

## 2023-10-08 PROCEDURE — 94760 N-INVAS EAR/PLS OXIMETRY 1: CPT

## 2023-10-08 PROCEDURE — 85027 COMPLETE CBC AUTOMATED: CPT | Performed by: HOSPITALIST

## 2023-10-08 PROCEDURE — 80048 BASIC METABOLIC PNL TOTAL CA: CPT | Mod: CCI | Performed by: HOSPITALIST

## 2023-10-08 PROCEDURE — 94640 AIRWAY INHALATION TREATMENT: CPT

## 2023-10-08 PROCEDURE — 2060000001 HC INTERMEDIATE ICU ROOM DAILY

## 2023-10-08 PROCEDURE — 82947 ASSAY GLUCOSE BLOOD QUANT: CPT

## 2023-10-08 PROCEDURE — 2500000001 HC RX 250 WO HCPCS SELF ADMINISTERED DRUGS (ALT 637 FOR MEDICARE OP): Performed by: HOSPITALIST

## 2023-10-08 PROCEDURE — 94664 DEMO&/EVAL PT USE INHALER: CPT

## 2023-10-08 PROCEDURE — 93010 ELECTROCARDIOGRAM REPORT: CPT | Performed by: INTERNAL MEDICINE

## 2023-10-08 RX ORDER — IPRATROPIUM BROMIDE AND ALBUTEROL SULFATE 2.5; .5 MG/3ML; MG/3ML
3 SOLUTION RESPIRATORY (INHALATION) 4 TIMES DAILY PRN
Status: DISCONTINUED | OUTPATIENT
Start: 2023-10-08 | End: 2023-10-10 | Stop reason: HOSPADM

## 2023-10-08 RX ORDER — LORAZEPAM 2 MG/ML
0.5 INJECTION INTRAMUSCULAR ONCE
Status: COMPLETED | OUTPATIENT
Start: 2023-10-08 | End: 2023-10-08

## 2023-10-08 RX ORDER — DIPHENHYDRAMINE HCL 25 MG
25 CAPSULE ORAL EVERY 6 HOURS PRN
Status: CANCELLED | OUTPATIENT
Start: 2023-10-08

## 2023-10-08 RX ORDER — DEXMEDETOMIDINE HYDROCHLORIDE 4 UG/ML
INJECTION, SOLUTION INTRAVENOUS
Status: CANCELLED | OUTPATIENT
Start: 2023-10-08

## 2023-10-08 RX ADMIN — DOCUSATE SODIUM 100 MG: 100 CAPSULE, LIQUID FILLED ORAL at 08:31

## 2023-10-08 RX ADMIN — CARVEDILOL 12.5 MG: 12.5 TABLET, FILM COATED ORAL at 18:48

## 2023-10-08 RX ADMIN — ATORVASTATIN CALCIUM 40 MG: 40 TABLET, FILM COATED ORAL at 20:28

## 2023-10-08 RX ADMIN — LEVOTHYROXINE SODIUM 100 MCG: 0.1 TABLET ORAL at 06:11

## 2023-10-08 RX ADMIN — Medication: at 03:46

## 2023-10-08 RX ADMIN — CARVEDILOL 12.5 MG: 12.5 TABLET, FILM COATED ORAL at 08:32

## 2023-10-08 RX ADMIN — Medication: at 08:00

## 2023-10-08 RX ADMIN — DOCUSATE SODIUM 100 MG: 100 CAPSULE, LIQUID FILLED ORAL at 20:27

## 2023-10-08 RX ADMIN — Medication: at 19:29

## 2023-10-08 RX ADMIN — Medication: at 00:05

## 2023-10-08 RX ADMIN — IPRATROPIUM BROMIDE AND ALBUTEROL SULFATE 3 ML: 2.5; .5 SOLUTION RESPIRATORY (INHALATION) at 08:50

## 2023-10-08 RX ADMIN — LOSARTAN POTASSIUM 25 MG: 25 TABLET, FILM COATED ORAL at 08:31

## 2023-10-08 RX ADMIN — LORAZEPAM 0.5 MG: 2 INJECTION INTRAMUSCULAR; INTRAVENOUS at 13:31

## 2023-10-08 RX ADMIN — PANTOPRAZOLE SODIUM 40 MG: 40 TABLET, DELAYED RELEASE ORAL at 06:11

## 2023-10-08 RX ADMIN — ENOXAPARIN SODIUM 40 MG: 40 INJECTION SUBCUTANEOUS at 18:48

## 2023-10-08 RX ADMIN — Medication: at 00:00

## 2023-10-08 SDOH — SOCIAL STABILITY: SOCIAL INSECURITY: WERE YOU ABLE TO COMPLETE ALL THE BEHAVIORAL HEALTH SCREENINGS?: YES

## 2023-10-08 SDOH — SOCIAL STABILITY: SOCIAL INSECURITY: ARE THERE ANY APPARENT SIGNS OF INJURIES/BEHAVIORS THAT COULD BE RELATED TO ABUSE/NEGLECT?: NO

## 2023-10-08 SDOH — SOCIAL STABILITY: SOCIAL INSECURITY: DO YOU FEEL ANYONE HAS EXPLOITED OR TAKEN ADVANTAGE OF YOU FINANCIALLY OR OF YOUR PERSONAL PROPERTY?: NO

## 2023-10-08 SDOH — SOCIAL STABILITY: SOCIAL INSECURITY: HAS ANYONE EVER THREATENED TO HURT YOUR FAMILY OR YOUR PETS?: NO

## 2023-10-08 SDOH — SOCIAL STABILITY: SOCIAL INSECURITY: DO YOU FEEL UNSAFE GOING BACK TO THE PLACE WHERE YOU ARE LIVING?: NO

## 2023-10-08 SDOH — SOCIAL STABILITY: SOCIAL INSECURITY: DOES ANYONE TRY TO KEEP YOU FROM HAVING/CONTACTING OTHER FRIENDS OR DOING THINGS OUTSIDE YOUR HOME?: NO

## 2023-10-08 SDOH — SOCIAL STABILITY: SOCIAL INSECURITY: ABUSE: ADULT

## 2023-10-08 SDOH — SOCIAL STABILITY: SOCIAL INSECURITY: HAVE YOU HAD THOUGHTS OF HARMING ANYONE ELSE?: NO

## 2023-10-08 SDOH — SOCIAL STABILITY: SOCIAL INSECURITY: ARE YOU OR HAVE YOU BEEN THREATENED OR ABUSED PHYSICALLY, EMOTIONALLY, OR SEXUALLY BY ANYONE?: NO

## 2023-10-08 ASSESSMENT — PATIENT HEALTH QUESTIONNAIRE - PHQ9
2. FEELING DOWN, DEPRESSED OR HOPELESS: NOT AT ALL
SUM OF ALL RESPONSES TO PHQ9 QUESTIONS 1 & 2: 0
1. LITTLE INTEREST OR PLEASURE IN DOING THINGS: NOT AT ALL

## 2023-10-08 ASSESSMENT — PAIN - FUNCTIONAL ASSESSMENT
PAIN_FUNCTIONAL_ASSESSMENT: FLACC (FACE, LEGS, ACTIVITY, CRY, CONSOLABILITY)
PAIN_FUNCTIONAL_ASSESSMENT: CPOT (CRITICAL CARE PAIN OBSERVATION TOOL)
PAIN_FUNCTIONAL_ASSESSMENT: 0-10
PAIN_FUNCTIONAL_ASSESSMENT: FLACC (FACE, LEGS, ACTIVITY, CRY, CONSOLABILITY)

## 2023-10-08 ASSESSMENT — ENCOUNTER SYMPTOMS
SHORTNESS OF BREATH: 1
FATIGUE: 1

## 2023-10-08 ASSESSMENT — ACTIVITIES OF DAILY LIVING (ADL)
ADEQUATE_TO_COMPLETE_ADL: YES
PATIENT'S MEMORY ADEQUATE TO SAFELY COMPLETE DAILY ACTIVITIES?: YES
HEARING - RIGHT EAR: FUNCTIONAL
BATHING: INDEPENDENT
TOILETING: INDEPENDENT
HEARING - LEFT EAR: FUNCTIONAL
GROOMING: INDEPENDENT
FEEDING YOURSELF: INDEPENDENT
WALKS IN HOME: INDEPENDENT
DRESSING YOURSELF: INDEPENDENT

## 2023-10-08 ASSESSMENT — PAIN SCALES - GENERAL
PAINLEVEL_OUTOF10: 0 - NO PAIN

## 2023-10-08 ASSESSMENT — LIFESTYLE VARIABLES
HOW MANY STANDARD DRINKS CONTAINING ALCOHOL DO YOU HAVE ON A TYPICAL DAY: PATIENT DOES NOT DRINK
HOW OFTEN DO YOU HAVE 6 OR MORE DRINKS ON ONE OCCASION: NEVER
HOW OFTEN DO YOU HAVE A DRINK CONTAINING ALCOHOL: NEVER
AUDIT-C TOTAL SCORE: 0
SUBSTANCE_ABUSE_PAST_12_MONTHS: NO
SKIP TO QUESTIONS 9-10: 1
AUDIT-C TOTAL SCORE: 0
PRESCIPTION_ABUSE_PAST_12_MONTHS: NO

## 2023-10-08 NOTE — NURSING NOTE
"Pt went to bathroom, pt became SOB on 3L NC.  Pt. Assisted back to bed.  Pt. Clear like she may \"pass out\".  Pt. /85, O2 sat of 95%.   RT called, pt. Placed on bipap at this time.  EKG run, read SR with PAC's.  Hospitalist contacted and notified.  Pt. Status upgraded back to ICU.  "

## 2023-10-08 NOTE — CONSULTS
Inpatient consult to Cardiology  Consult performed by: FELA Engle-CNP  Consult ordered by: Shannan Whitley DO  Reason for consult: Congestive heart failure          Reason For Consult  Congestive heart failure    History Of Present Illness  Anitha Welch is a 83 y.o. female presenting with congestive heart failure.  Current with Dr. Foss.  Patient states that her shortness of breath began on day of admission.  Significant acute respiratory change.  Presented to the emergency department with respiratory distress.  Found have a systolic blood pressure over 200.  EKG is sinus tachycardia without acute ST elevation or T wave inversion with evidence of LVH. creatinine 1.0 potassium 3.3.  Sodium 156.  Glucose 131.  Hemoglobin 12.5.  Chest x-ray with evidence of very mild vascular congestion.  proBNP 6316.  High-sensitivity opponent values of 88, 99, and 156.  Patient received IV furosemide as well as oral aspirin in the emergency department and was admitted on telemetry for further testing treatment     Past Medical History  HTN  HLD  COPD  Tobacco Hx  H/o LLE DVT not on OAC - patient reports that the left lower extremity is slightly larger than the right lower extremity for years  OA / DDD  S/p Partial Colectomy   Hypothyroidism  Infrarenal AAA  RLS  H/o Colitis  PVD  CVA  Carotid Stenosis - being monitored by Dr. Mai  Chronic Diastolic CHF  Systolic heart failure  Brain Tumor (states benign) with subsequent left blurry vision  CAD s/p Stent  Intermittent Thrombocytopenia.    Surgical History  She has a past surgical history that includes MR angio head wo IV contrast (02/24/2017); MR angio head wo IV contrast (08/11/2017); MR angio head wo IV contrast (02/10/2019); Cholecystectomy; Tonsillectomy; pr arthrp acetblr/prox fem prostc agrft/algrft; Thyroidectomy, partial; and Coronary stent placement.     Social History  She reports that she quit smoking about 8 years ago. Her smoking use included  cigarettes. She does not have any smokeless tobacco history on file. No history on file for alcohol use and drug use.    Family History  Family History   Problem Relation Name Age of Onset    Hyperthyroidism Mother          Treated with radioactive iodine    Hypertension Mother      Diabetes Father's Sister      Hyperthyroidism Sibling          Allergies  Amoxicillin, Ciprofloxacin, Iodinated contrast media, Diphenhydramine, Lisinopril, and Other    Review of Systems   Constitutional:  Positive for fatigue.   Respiratory:  Positive for shortness of breath.    Cardiovascular:  Negative for chest pain and leg swelling.   All other systems reviewed and are negative.       Physical Exam  Vitals and nursing note reviewed.   Constitutional:       General: She is not in acute distress.     Appearance: She is not ill-appearing or toxic-appearing.   HENT:      Head: Normocephalic and atraumatic.      Mouth/Throat:      Mouth: Mucous membranes are moist.   Eyes:      Pupils: Pupils are equal, round, and reactive to light.   Cardiovascular:      Rate and Rhythm: Normal rate.      Heart sounds: No murmur heard.     No friction rub. No gallop.   Pulmonary:      Effort: Pulmonary effort is normal.      Breath sounds: Normal breath sounds. No wheezing, rhonchi or rales.      Comments: Breathing comfortably nasal cannula oxygen.  No significant conversational dyspnea.  No pain with deep inspiration  Abdominal:      General: Abdomen is flat. Bowel sounds are normal.      Palpations: Abdomen is soft.   Musculoskeletal:         General: No swelling.      Cervical back: Normal range of motion.      Right lower leg: No edema.      Left lower leg: No edema.   Skin:     General: Skin is warm and dry.      Capillary Refill: Capillary refill takes less than 2 seconds.   Neurological:      Mental Status: She is alert and oriented to person, place, and time.   Psychiatric:         Mood and Affect: Mood normal.         Behavior: Behavior  "normal.         Thought Content: Thought content normal.         Judgment: Judgment normal.          Last Recorded Vitals  Blood pressure 136/74, pulse 79, temperature 36.6 °C (97.9 °F), temperature source Temporal, resp. rate 26, height 1.626 m (5' 4\"), weight 62.9 kg (138 lb 10.7 oz), SpO2 97 %.    Relevant Results  Results for orders placed or performed during the hospital encounter of 10/07/23 (from the past 24 hour(s))   CBC   Result Value Ref Range    WBC 13.6 (H) 4.4 - 11.3 x10*3/uL    nRBC 0.0 0.0 - 0.0 /100 WBCs    RBC 4.66 4.00 - 5.20 x10*6/uL    Hemoglobin 13.7 12.0 - 16.0 g/dL    Hematocrit 43.6 36.0 - 46.0 %    MCV 94 80 - 100 fL    MCH 29.4 26.0 - 34.0 pg    MCHC 31.4 (L) 32.0 - 36.0 g/dL    RDW 14.1 11.5 - 14.5 %    Platelets 196 150 - 450 x10*3/uL    MPV 11.6 (H) 7.5 - 11.5 fL   Comprehensive metabolic panel   Result Value Ref Range    Glucose 220 (H) 65 - 99 mg/dL    Sodium 147 (H) 133 - 145 mmol/L    Potassium 3.5 3.4 - 5.1 mmol/L    Chloride 104 97 - 107 mmol/L    Bicarbonate 29 24 - 31 mmol/L    Urea Nitrogen 22 8 - 25 mg/dL    Creatinine 0.90 0.40 - 1.60 mg/dL    eGFR 63 >60 mL/min/1.73m*2    Calcium 8.7 8.5 - 10.4 mg/dL    Albumin 4.1 3.5 - 5.0 g/dL    Alkaline Phosphatase 125 35 - 125 U/L    Total Protein 6.5 5.9 - 7.9 g/dL    AST 85 (H) 5 - 40 U/L    Bilirubin, Total 1.0 0.1 - 1.2 mg/dL     (H) 5 - 40 U/L    Anion Gap 14 <=19 mmol/L   NT Pro-BNP   Result Value Ref Range    PROBNP 6,316 (H) 0 - 624 pg/mL   Serial Troponin, Initial ([facility])   Result Value Ref Range    Troponin T, High Sensitivity 88 (H) <=15 ng/L   BLOOD GAS LACTIC ACID, VENOUS   Result Value Ref Range    POCT Lactate, Venous 2.3 (H) 0.4 - 2.0 mmol/L   Serial Troponin, 2 Hour ([facility])   Result Value Ref Range    Troponin T, High Sensitivity 99 (H) <=15 ng/L   Blood Gas Arterial Full Panel   Result Value Ref Range    Patient Temperature      FiO2 40 %    Ventilator Mode BiPAP     Ventilator Rate 16 bpm    Ipap CMH2O 16.0 cm " H2O    Epap CMH2O 8.0 cm H2O   Serial Troponin, 6 Hour ([facility])   Result Value Ref Range    Troponin T, High Sensitivity 156 (H) <=15 ng/L   POCT GLUCOSE   Result Value Ref Range    POCT Glucose 144 (H) 74 - 99 mg/dL   CBC   Result Value Ref Range    WBC 5.6 4.4 - 11.3 x10*3/uL    nRBC 0.0 0.0 - 0.0 /100 WBCs    RBC 4.30 4.00 - 5.20 x10*6/uL    Hemoglobin 12.5 12.0 - 16.0 g/dL    Hematocrit 39.8 36.0 - 46.0 %    MCV 93 80 - 100 fL    MCH 29.1 26.0 - 34.0 pg    MCHC 31.4 (L) 32.0 - 36.0 g/dL    RDW 14.2 11.5 - 14.5 %    Platelets 117 (L) 150 - 450 x10*3/uL    MPV 11.4 7.5 - 11.5 fL   Magnesium   Result Value Ref Range    Magnesium 2.00 1.60 - 3.10 mg/dL   TSH   Result Value Ref Range    Thyroid Stimulating Hormone 0.65 0.27 - 4.20 mIU/L   Hepatic function panel   Result Value Ref Range    AST 49 (H) 5 - 40 U/L    ALT 90 (H) 5 - 40 U/L    Alkaline Phosphatase 102 35 - 125 U/L    Bilirubin, Total 0.8 0.1 - 1.2 mg/dL    Bilirubin, Direct 0.2 0.0 - 0.2 mg/dL    Total Protein 5.9 5.9 - 7.9 g/dL    Albumin 3.6 3.5 - 5.0 g/dL   Phosphorus   Result Value Ref Range    Phosphorus 4.7 (H) 2.5 - 4.5 mg/dL   Basic Metabolic Panel   Result Value Ref Range    Glucose 131 (H) 65 - 99 mg/dL    Sodium 156 (H) 133 - 145 mmol/L    Potassium 3.3 (L) 3.4 - 5.1 mmol/L    Chloride 107 97 - 107 mmol/L    Bicarbonate 34 (H) 24 - 31 mmol/L    Urea Nitrogen 25 8 - 25 mg/dL    Creatinine 1.00 0.40 - 1.60 mg/dL    eGFR 56 (L) >60 mL/min/1.73m*2    Calcium 8.4 (L) 8.5 - 10.4 mg/dL    Anion Gap 15 <=19 mmol/L           Assessment/Plan     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  Has a known history of diastolic heart failure, as well as a echocardiogram in February of this year which revealed reduced ejection fraction of 40 to 45% as well as COPD.  prior to admission did not  require home O2.  Received BiPAP as well as IV furosemide with significant improvement in symptoms.   shortly after arrival in the ICU patient was transition from BiPAP to nasal cannula oxygen.  She has remained stable from that point.  She continues in a sinus rhythm on telemetry with without significant ectopy.  Her blood pressure is stable with most recent 136/74.  Negative approximately 850 mL over the past 24 hours fluid status.  She is chest pain-free.  We will check echocardiogram.  Diuretics have been placed on hold as patient appears predominant euvolemic with no peripheral edema or Jvd, and she has a new hyponatremia with sodium of 156.  Did have mildly elevated troponins from 88 to a peak of 156.  EKG is nonischemic.  Believe this to be relative to a type II myocardial infarction secondary to tachycardia as well as acute respiratory distress.  Overall patient is improving.  She is asked to be discharged home today.  I have informed the patient that we intend to keep her 1 day further to manage her hyponatremia as well as continue to monitor her cardiac status.  She is agreeable with this.  Will have echocardiogram the morning.  Possible discharge to home tomorrow 10/9/2023.  We will follow with you.    I spent 45 minutes in the professional and overall care of this patient.

## 2023-10-08 NOTE — NURSING NOTE
Dr. Rae and hospitalist in to see pt.  Pt. On 3L NC, O2 sat of 95%.  Pt. Sitting out of bed eating breakfast, denies any needs at this time.

## 2023-10-08 NOTE — CONSULTS
Reason For Consult  Acute respiratory failure.  ICU management    History Of Present Illness  Anitha Welch is a 83 y.o. female presenting with Acute respiratory failure with hypoxia (CMS/Formerly [facility]).  Past medical history of congestive heart failure hypertension presented ER with shortness of breath.  States that her symptoms began the day of admission.  She was on CPAP on arrival.  Initial blood pressure was 200/90.  Transition to BiPAP in the ED.  Given Lasix in the ER.  proBNP was 6000 chest x-ray shows signs of congestion but no overt pulm edema     Past Medical History  She has a past medical history of Anemia, CHF (congestive heart failure) (CMS/HCC), DVT (deep venous thrombosis) (CMS/Formerly [facility]), Heart disease, Hypertension, Personal history of other diseases of the circulatory system (07/02/2021), Personal history of other diseases of the respiratory system (07/02/2021), and Personal history of other endocrine, nutritional and metabolic disease (07/02/2021).    Surgical History  She has a past surgical history that includes MR angio head wo IV contrast (02/24/2017); MR angio head wo IV contrast (08/11/2017); MR angio head wo IV contrast (02/10/2019); Cholecystectomy; Tonsillectomy; pr arthrp acetblr/prox fem prostc agrft/algrft; Thyroidectomy, partial; and Coronary stent placement.     Social History  She reports that she quit smoking about 8 years ago. Her smoking use included cigarettes. She does not have any smokeless tobacco history on file. No history on file for alcohol use and drug use.    Family History  Family History   Problem Relation Name Age of Onset    Hyperthyroidism Mother          Treated with radioactive iodine    Hypertension Mother      Diabetes Father's Sister      Hyperthyroidism Sibling          Allergies  Amoxicillin, Ciprofloxacin, Iodinated contrast media, Diphenhydramine, Lisinopril, and Other    Review of Systems   All other systems reviewed and are negative.        Last Recorded  "Vitals  Blood pressure 136/74, pulse 79, temperature 36.6 °C (97.9 °F), temperature source Temporal, resp. rate 26, height 1.626 m (5' 4\"), weight 62.9 kg (138 lb 10.7 oz), SpO2 97 %.    Intake/Output    Intake/Output Summary (Last 24 hours) at 10/8/2023 0757  Last data filed at 10/8/2023 0600  Gross per 24 hour   Intake 50 ml   Output 900 ml   Net -850 ml       Physical Exam  Vitals reviewed.   Constitutional:       General: She is awake.   HENT:      Head: Normocephalic and atraumatic.   Eyes:      Extraocular Movements: Extraocular movements intact.      Pupils: Pupils are equal, round, and reactive to light.   Cardiovascular:      Rate and Rhythm: Normal rate and regular rhythm.      Pulses: Normal pulses.      Heart sounds: Normal heart sounds.   Pulmonary:      Effort: Pulmonary effort is normal.      Breath sounds: Normal breath sounds. Decreased air movement present.   Abdominal:      General: Abdomen is flat. Bowel sounds are normal.      Palpations: Abdomen is soft.   Musculoskeletal:         General: Normal range of motion.      Cervical back: Normal range of motion and neck supple.   Skin:     General: Skin is warm and dry.   Neurological:      General: No focal deficit present.      Mental Status: She is alert and oriented to person, place, and time. Mental status is at baseline.   Psychiatric:         Attention and Perception: Attention and perception normal.         Behavior: Behavior normal.         Oxygen Therapy  SpO2: 97 %        FiO2 (%):  [0 %-40 %] 0 %    Lines and Tubes:  Peripheral IV 10/07/23 20 G Proximal;Right Forearm (Active)   Placement Date/Time: 10/07/23 1407   Size (Gauge): 20 G  Orientation: Proximal;Right  Location: Forearm   Number of days: 0       Urethral Catheter (Active)   Placement Date: 10/07/23     Number of days: 1         Scheduled medications  atorvastatin, 40 mg, oral, Nightly  carvedilol, 12.5 mg, oral, BID with meals  docusate sodium, 100 mg, oral, BID  enoxaparin, 40 " mg, subcutaneous, q24h  furosemide, 40 mg, intravenous, BID  levothyroxine, 100 mcg, oral, Daily  losartan, 25 mg, oral, Daily  oxygen, , inhalation, q8h  pantoprazole, 40 mg, oral, Daily before breakfast      Continuous medications     PRN medications  PRN medications: acetaminophen **OR** acetaminophen **OR** acetaminophen, oxygen, tiZANidine    Relevant Results  Results from last 7 days   Lab Units 10/08/23  0438 10/07/23  1402   WBC AUTO x10*3/uL 5.6 13.6*   HEMOGLOBIN g/dL 12.5 13.7   HEMATOCRIT % 39.8 43.6   PLATELETS AUTO x10*3/uL 117* 196      Results from last 7 days   Lab Units 10/08/23  0438 10/07/23  1402   SODIUM mmol/L 156* 147*   POTASSIUM mmol/L 3.3* 3.5   CHLORIDE mmol/L 107 104   CO2 mmol/L 34* 29   BUN mg/dL 25 22   CREATININE mg/dL 1.00 0.90   GLUCOSE mg/dL 131* 220*   CALCIUM mg/dL 8.4* 8.7          XR chest 1 view 10/07/2023    Narrative  Interpreted By:  Artem Edmonds,  STUDY:  XR CHEST 1 VIEW; 10/7/2023 3:13 pm    INDICATION:  Signs/Symptoms:short of breath.    COMPARISON:  03/02/2023    ACCESSION NUMBER(S):  [id number]    ORDERING CLINICIAN:  MARILU QUINTANILLA    TECHNIQUE:  1 view of the chest was performed.    FINDINGS:  No significant consolidation. There is some mild ground-glass  opacities in the lung bases which could be due to atelectasis. There  is suggestion of mild congestive change however no overt pulmonary  edema. No pleural effusion. No pneumothorax.  The cardiomediastinal  silhouette is mildly enlarged.    Impression  Mild cardiomegaly. Congestive changes without overt pulmonary edema.  No definitive acute cardiopulmonary disease.    Signed by: Artem Edmonds 10/7/2023 4:53 PM  Dictation workstation:   [id number]       Assessment/Plan   Principal Problem:    Acute respiratory failure with hypoxia (CMS/HCC)  Active Problems:    Acute on chronic systolic heart failure (CMS/HCC)    Benign essential hypertension    Mixed hyperlipidemia    Acquired  hypothyroidism    Stable nasal cannula off  Off BiPAP  Negative fluid balance 850 mL started.  Sodium increased significantly this morning.  Repeat pending to confirm her sodium is 156  Hold diuresis  Echo pending  Continue beta-blocker  Lovenox Protonix    Thank you for consultation      Matti Rae MD  [facility]

## 2023-10-08 NOTE — NURSING NOTE
Pt. Climbed out of bed to use bathroom, ripped out IV.  Pt. Not complying with assistance at this time.  Bed alarm placed.

## 2023-10-08 NOTE — CARE PLAN
Problem: Respiratory  Goal: Clear secretions with interventions this shift  Outcome: Progressing  Goal: Minimize anxiety/maximize coping throughout shift  Outcome: Progressing  Goal: Minimal/no exertional discomfort or dyspnea this shift  Outcome: Progressing  Goal: No signs of respiratory distress (eg. Use of accessory muscles. Peds grunting)  Outcome: Progressing  Goal: Verbalize decreased shortness of breath this shift  Outcome: Progressing  Goal: Wean oxygen to maintain O2 saturation per order/standard this shift  Outcome: Progressing

## 2023-10-08 NOTE — PROGRESS NOTES
Anitha Welch is a 83 y.o. female on day 1 of admission presenting with Acute respiratory failure with hypoxia (CMS/HCC).      Subjective   Patient on 3L NC today. She says that she continues to feel congested. Denies any pain but says still short of breath. Says she is much more comfortable now that she is on NC instead of BIPAP.         Objective     Last Recorded Vitals  /74   Pulse 79   Temp 36.6 °C (97.9 °F) (Temporal)   Resp 26   Wt 62.9 kg (138 lb 10.7 oz)   SpO2 97%   Intake/Output last 3 Shifts:    Intake/Output Summary (Last 24 hours) at 10/8/2023 0826  Last data filed at 10/8/2023 0600  Gross per 24 hour   Intake 50 ml   Output 900 ml   Net -850 ml       Admission Weight  Weight: 61.8 kg (136 lb 3.9 oz) (10/07/23 1358)    Daily Weight  10/08/23 : 62.9 kg (138 lb 10.7 oz)    Image Results  XR chest 1 view  Narrative: Interpreted By:  Artem Edmonds,   STUDY:  XR CHEST 1 VIEW; 10/7/2023 3:13 pm      INDICATION:  Signs/Symptoms:short of breath.      COMPARISON:  03/02/2023      ACCESSION NUMBER(S):  [id number]      ORDERING CLINICIAN:  MARILU QUINTANILLA      TECHNIQUE:  1 view of the chest was performed.      FINDINGS:  No significant consolidation. There is some mild ground-glass  opacities in the lung bases which could be due to atelectasis. There  is suggestion of mild congestive change however no overt pulmonary  edema. No pleural effusion. No pneumothorax.  The cardiomediastinal  silhouette is mildly enlarged.      Impression: Mild cardiomegaly. Congestive changes without overt pulmonary edema.  No definitive acute cardiopulmonary disease.      Signed by: Artem Edmonds 10/7/2023 4:53 PM  Dictation workstation:   [id number]      Physical Exam  General: alert, no diaphoresis   Lungs: bibasilar rales   Heart: RRR, no LE edema BL   GI: abdomen soft, nontender, nondistended, BS present   MSK: no joint effusion or deformity   Skin: no rashes, erythema, or ecchymosis   Neuro: grossly  normal cognition, motor strength, sensation      Relevant Results      Scheduled medications  atorvastatin, 40 mg, oral, Nightly  carvedilol, 12.5 mg, oral, BID with meals  docusate sodium, 100 mg, oral, BID  enoxaparin, 40 mg, subcutaneous, q24h  [Held by provider] furosemide, 40 mg, intravenous, BID  levothyroxine, 100 mcg, oral, Daily  losartan, 25 mg, oral, Daily  oxygen, , inhalation, q8h  pantoprazole, 40 mg, oral, Daily before breakfast      Continuous medications     PRN medications  PRN medications: acetaminophen **OR** acetaminophen **OR** acetaminophen, ipratropium-albuteroL, oxygen, tiZANidine    Results for orders placed or performed during the hospital encounter of 10/07/23 (from the past 24 hour(s))   CBC   Result Value Ref Range    WBC 13.6 (H) 4.4 - 11.3 x10*3/uL    nRBC 0.0 0.0 - 0.0 /100 WBCs    RBC 4.66 4.00 - 5.20 x10*6/uL    Hemoglobin 13.7 12.0 - 16.0 g/dL    Hematocrit 43.6 36.0 - 46.0 %    MCV 94 80 - 100 fL    MCH 29.4 26.0 - 34.0 pg    MCHC 31.4 (L) 32.0 - 36.0 g/dL    RDW 14.1 11.5 - 14.5 %    Platelets 196 150 - 450 x10*3/uL    MPV 11.6 (H) 7.5 - 11.5 fL   Comprehensive metabolic panel   Result Value Ref Range    Glucose 220 (H) 65 - 99 mg/dL    Sodium 147 (H) 133 - 145 mmol/L    Potassium 3.5 3.4 - 5.1 mmol/L    Chloride 104 97 - 107 mmol/L    Bicarbonate 29 24 - 31 mmol/L    Urea Nitrogen 22 8 - 25 mg/dL    Creatinine 0.90 0.40 - 1.60 mg/dL    eGFR 63 >60 mL/min/1.73m*2    Calcium 8.7 8.5 - 10.4 mg/dL    Albumin 4.1 3.5 - 5.0 g/dL    Alkaline Phosphatase 125 35 - 125 U/L    Total Protein 6.5 5.9 - 7.9 g/dL    AST 85 (H) 5 - 40 U/L    Bilirubin, Total 1.0 0.1 - 1.2 mg/dL     (H) 5 - 40 U/L    Anion Gap 14 <=19 mmol/L   NT Pro-BNP   Result Value Ref Range    PROBNP 6,316 (H) 0 - 624 pg/mL   Serial Troponin, Initial ([facility])   Result Value Ref Range    Troponin T, High Sensitivity 88 (H) <=15 ng/L   BLOOD GAS LACTIC ACID, VENOUS   Result Value Ref Range    POCT Lactate, Venous 2.3  (H) 0.4 - 2.0 mmol/L   Serial Troponin, 2 Hour ([facility])   Result Value Ref Range    Troponin T, High Sensitivity 99 (H) <=15 ng/L   Blood Gas Arterial Full Panel   Result Value Ref Range    Patient Temperature      FiO2 40 %    Ventilator Mode BiPAP     Ventilator Rate 16 bpm    Ipap CMH2O 16.0 cm H2O    Epap CMH2O 8.0 cm H2O   Serial Troponin, 6 Hour ([facility])   Result Value Ref Range    Troponin T, High Sensitivity 156 (H) <=15 ng/L   POCT GLUCOSE   Result Value Ref Range    POCT Glucose 144 (H) 74 - 99 mg/dL   CBC   Result Value Ref Range    WBC 5.6 4.4 - 11.3 x10*3/uL    nRBC 0.0 0.0 - 0.0 /100 WBCs    RBC 4.30 4.00 - 5.20 x10*6/uL    Hemoglobin 12.5 12.0 - 16.0 g/dL    Hematocrit 39.8 36.0 - 46.0 %    MCV 93 80 - 100 fL    MCH 29.1 26.0 - 34.0 pg    MCHC 31.4 (L) 32.0 - 36.0 g/dL    RDW 14.2 11.5 - 14.5 %    Platelets 117 (L) 150 - 450 x10*3/uL    MPV 11.4 7.5 - 11.5 fL   Magnesium   Result Value Ref Range    Magnesium 2.00 1.60 - 3.10 mg/dL   TSH   Result Value Ref Range    Thyroid Stimulating Hormone 0.65 0.27 - 4.20 mIU/L   Hepatic function panel   Result Value Ref Range    AST 49 (H) 5 - 40 U/L    ALT 90 (H) 5 - 40 U/L    Alkaline Phosphatase 102 35 - 125 U/L    Bilirubin, Total 0.8 0.1 - 1.2 mg/dL    Bilirubin, Direct 0.2 0.0 - 0.2 mg/dL    Total Protein 5.9 5.9 - 7.9 g/dL    Albumin 3.6 3.5 - 5.0 g/dL   Phosphorus   Result Value Ref Range    Phosphorus 4.7 (H) 2.5 - 4.5 mg/dL   Basic Metabolic Panel   Result Value Ref Range    Glucose 131 (H) 65 - 99 mg/dL    Sodium 156 (H) 133 - 145 mmol/L    Potassium 3.3 (L) 3.4 - 5.1 mmol/L    Chloride 107 97 - 107 mmol/L    Bicarbonate 34 (H) 24 - 31 mmol/L    Urea Nitrogen 25 8 - 25 mg/dL    Creatinine 1.00 0.40 - 1.60 mg/dL    eGFR 56 (L) >60 mL/min/1.73m*2    Calcium 8.4 (L) 8.5 - 10.4 mg/dL    Anion Gap 15 <=19 mmol/L                Assessment/Plan   This patient currently has cardiac telemetry ordered; if you would like to modify or discontinue the telemetry order,  click here to go to the orders activity to modify/discontinue the order.            Principal Problem:    Acute respiratory failure with hypoxia (CMS/HCC)  Active Problems:    Acute on chronic systolic heart failure (CMS/HCC)    Benign essential hypertension    Mixed hyperlipidemia    Acquired hypothyroidism    Acute respiratory failure with hypoxia  Acute on chronic systolic heart failure  -X-ray consistent with pulmonary edema, proBNP elevated to 6000, symptoms consistent with acute on chronic systolic heart failure.  Patient was admitted in February 2023 with the same problem.  At that time her proBNP was only 1100.  He was requiring 4 L of nasal cannula at that time.  - stat abg requested and done, although appears results were never released to the system  -Echo ordered  -Consult pulm and cardiology. Discussed with both  -now on NC though still short of breath. Okay for SDU.   -lasix on hold as her sodium level has worsened, will recheck prior to giving more  - continue beta blocker and losartan  -will start duonebs per patient's request     NSTEMI  -Likely type II from respiratory distress.  Troponins went from 80-90.      Hypernatremia  -worsened today. Rechecking to ensure validity. If sodium is truly this high, we may need to ask nephrology for assistance with diuresis     Elevated ALT and AST  -Suspect from vascular congestion from acute heart failure.  Will monitor with morning labs.     Leukocytosis  -resolved     Hypertension  -Initially presenting with hypertensive emergency.  Between BiPAP and diuresis, blood pressure is coming down well.  Continue patient's home beta-blocker                 Shannan Whitley DO      Addendum: 1253 pm  Patient had an episode of shortness of breath.  Associated chest pain.  Blood pressure increased to systolic 150.  He was placed back on BiPAP.  Starting to feel better and chest pain is resolving.  Sheldon sodium came back at 150.  She still remains in a fluid  overloaded state.  We will consult nephrology to help us with diuretic management.

## 2023-10-08 NOTE — NURSING NOTE
Pt. Refusing to keep bipap on.  RT removing bipap at this time.  Pt. Anxious, stating she will go home if she has to wear it, and the bipap is going to kill her. Hospitalist notified and anxiety medication requested.

## 2023-10-09 ENCOUNTER — APPOINTMENT (OUTPATIENT)
Dept: CARDIOLOGY | Facility: HOSPITAL | Age: 83
DRG: 280 | End: 2023-10-09
Payer: MEDICARE

## 2023-10-09 ENCOUNTER — APPOINTMENT (OUTPATIENT)
Dept: RADIOLOGY | Facility: HOSPITAL | Age: 83
DRG: 280 | End: 2023-10-09
Payer: MEDICARE

## 2023-10-09 LAB
ALBUMIN SERPL-MCNC: 3.4 G/DL (ref 3.5–5)
ANION GAP SERPL CALC-SCNC: 10 MMOL/L
BUN SERPL-MCNC: 28 MG/DL (ref 8–25)
CALCIUM SERPL-MCNC: 8.8 MG/DL (ref 8.5–10.4)
CHLORIDE SERPL-SCNC: 105 MMOL/L (ref 97–107)
CO2 SERPL-SCNC: 34 MMOL/L (ref 24–31)
CREAT SERPL-MCNC: 0.9 MG/DL (ref 0.4–1.6)
EJECTION FRACTION APICAL 4 CHAMBER: 42.7
ERYTHROCYTE [DISTWIDTH] IN BLOOD BY AUTOMATED COUNT: 14.4 % (ref 11.5–14.5)
GFR SERPL CREATININE-BSD FRML MDRD: 63 ML/MIN/1.73M*2
GLUCOSE SERPL-MCNC: 117 MG/DL (ref 65–99)
HCT VFR BLD AUTO: 39.4 % (ref 36–46)
HGB BLD-MCNC: 12.4 G/DL (ref 12–16)
MCH RBC QN AUTO: 29.8 PG (ref 26–34)
MCHC RBC AUTO-ENTMCNC: 31.5 G/DL (ref 32–36)
MCV RBC AUTO: 95 FL (ref 80–100)
NRBC BLD-RTO: 0 /100 WBCS (ref 0–0)
PHOSPHATE SERPL-MCNC: 3.3 MG/DL (ref 2.5–4.5)
PLATELET # BLD AUTO: 122 X10*3/UL (ref 150–450)
PMV BLD AUTO: 11.3 FL (ref 7.5–11.5)
POTASSIUM SERPL-SCNC: 3.4 MMOL/L (ref 3.4–5.1)
RBC # BLD AUTO: 4.16 X10*6/UL (ref 4–5.2)
SODIUM SERPL-SCNC: 149 MMOL/L (ref 133–145)
WBC # BLD AUTO: 9 X10*3/UL (ref 4.4–11.3)

## 2023-10-09 PROCEDURE — 85027 COMPLETE CBC AUTOMATED: CPT | Performed by: HOSPITALIST

## 2023-10-09 PROCEDURE — 2500000001 HC RX 250 WO HCPCS SELF ADMINISTERED DRUGS (ALT 637 FOR MEDICARE OP): Performed by: HOSPITALIST

## 2023-10-09 PROCEDURE — 36415 COLL VENOUS BLD VENIPUNCTURE: CPT | Performed by: HOSPITALIST

## 2023-10-09 PROCEDURE — 97166 OT EVAL MOD COMPLEX 45 MIN: CPT | Mod: GO

## 2023-10-09 PROCEDURE — 99291 CRITICAL CARE FIRST HOUR: CPT | Performed by: NURSE PRACTITIONER

## 2023-10-09 PROCEDURE — 93306 TTE W/DOPPLER COMPLETE: CPT | Performed by: INTERNAL MEDICINE

## 2023-10-09 PROCEDURE — 97162 PT EVAL MOD COMPLEX 30 MIN: CPT | Mod: GP

## 2023-10-09 PROCEDURE — 93306 TTE W/DOPPLER COMPLETE: CPT

## 2023-10-09 PROCEDURE — 97116 GAIT TRAINING THERAPY: CPT | Mod: GP

## 2023-10-09 PROCEDURE — 2060000001 HC INTERMEDIATE ICU ROOM DAILY

## 2023-10-09 PROCEDURE — 94660 CPAP INITIATION&MGMT: CPT

## 2023-10-09 PROCEDURE — 2500000004 HC RX 250 GENERAL PHARMACY W/ HCPCS (ALT 636 FOR OP/ED): Performed by: HOSPITALIST

## 2023-10-09 PROCEDURE — 97530 THERAPEUTIC ACTIVITIES: CPT | Mod: GO

## 2023-10-09 PROCEDURE — 96372 THER/PROPH/DIAG INJ SC/IM: CPT | Performed by: HOSPITALIST

## 2023-10-09 PROCEDURE — 36415 COLL VENOUS BLD VENIPUNCTURE: CPT | Performed by: INTERNAL MEDICINE

## 2023-10-09 PROCEDURE — 71045 X-RAY EXAM CHEST 1 VIEW: CPT

## 2023-10-09 PROCEDURE — 80069 RENAL FUNCTION PANEL: CPT | Performed by: HOSPITALIST

## 2023-10-09 RX ADMIN — ATORVASTATIN CALCIUM 40 MG: 40 TABLET, FILM COATED ORAL at 21:32

## 2023-10-09 RX ADMIN — DOCUSATE SODIUM 100 MG: 100 CAPSULE, LIQUID FILLED ORAL at 09:19

## 2023-10-09 RX ADMIN — PANTOPRAZOLE SODIUM 40 MG: 40 TABLET, DELAYED RELEASE ORAL at 06:18

## 2023-10-09 RX ADMIN — Medication: at 16:00

## 2023-10-09 RX ADMIN — CARVEDILOL 12.5 MG: 12.5 TABLET, FILM COATED ORAL at 09:17

## 2023-10-09 RX ADMIN — Medication: at 08:00

## 2023-10-09 RX ADMIN — DOCUSATE SODIUM 100 MG: 100 CAPSULE, LIQUID FILLED ORAL at 21:32

## 2023-10-09 RX ADMIN — CARVEDILOL 12.5 MG: 12.5 TABLET, FILM COATED ORAL at 17:37

## 2023-10-09 RX ADMIN — LOSARTAN POTASSIUM 25 MG: 25 TABLET, FILM COATED ORAL at 09:19

## 2023-10-09 RX ADMIN — Medication: at 00:00

## 2023-10-09 RX ADMIN — LEVOTHYROXINE SODIUM 100 MCG: 0.1 TABLET ORAL at 06:18

## 2023-10-09 RX ADMIN — ENOXAPARIN SODIUM 40 MG: 40 INJECTION SUBCUTANEOUS at 17:37

## 2023-10-09 ASSESSMENT — COGNITIVE AND FUNCTIONAL STATUS - GENERAL
DRESSING REGULAR UPPER BODY CLOTHING: A LITTLE
WALKING IN HOSPITAL ROOM: A LITTLE
DRESSING REGULAR LOWER BODY CLOTHING: A LITTLE
HELP NEEDED FOR BATHING: A LOT
PERSONAL GROOMING: A LITTLE
DAILY ACTIVITIY SCORE: 17
STANDING UP FROM CHAIR USING ARMS: A LITTLE
DAILY ACTIVITIY SCORE: 21
DRESSING REGULAR LOWER BODY CLOTHING: A LOT
CLIMB 3 TO 5 STEPS WITH RAILING: A LITTLE
MOBILITY SCORE: 16
CLIMB 3 TO 5 STEPS WITH RAILING: A LOT
MOVING TO AND FROM BED TO CHAIR: A LITTLE
HELP NEEDED FOR BATHING: A LITTLE
MOBILITY SCORE: 22
TOILETING: A LITTLE
WALKING IN HOSPITAL ROOM: A LOT
MOVING FROM LYING ON BACK TO SITTING ON SIDE OF FLAT BED WITH BEDRAILS: A LITTLE
TURNING FROM BACK TO SIDE WHILE IN FLAT BAD: A LITTLE
TOILETING: A LITTLE

## 2023-10-09 ASSESSMENT — PAIN - FUNCTIONAL ASSESSMENT
PAIN_FUNCTIONAL_ASSESSMENT: FLACC (FACE, LEGS, ACTIVITY, CRY, CONSOLABILITY)
PAIN_FUNCTIONAL_ASSESSMENT: FLACC (FACE, LEGS, ACTIVITY, CRY, CONSOLABILITY)
PAIN_FUNCTIONAL_ASSESSMENT: 0-10

## 2023-10-09 ASSESSMENT — PAIN SCALES - GENERAL
PAINLEVEL_OUTOF10: 0 - NO PAIN
PAINLEVEL_OUTOF10: 2
PAINLEVEL_OUTOF10: 2

## 2023-10-09 ASSESSMENT — ACTIVITIES OF DAILY LIVING (ADL)
BATHING_ASSISTANCE: MINIMAL
ADL_ASSISTANCE: INDEPENDENT
ADL_ASSISTANCE: INDEPENDENT

## 2023-10-09 NOTE — CARE PLAN
Problem: Bathing  Goal: LTG - Patient will utilize adaptive techniques to bathe body mod ind  Outcome: Progressing     Problem: Dressings Lower Extremities  Goal: LTG - Patient will utilize adaptive techniques/equipment to dress lower body mod ind  10/9/2023 1511 by Nata Morocho OT  Outcome: Progressing  10/9/2023 1510 by Nata Morocho OT  Outcome: Progressing     Problem: Dressing Upper Extremities  Goal: LTG - Patient will utilize adaptive techniques/equipment to dress upper body indep  10/9/2023 1511 by Ntaa Morocho OT  Outcome: Progressing  10/9/2023 1510 by Nata Morocho OT  Outcome: Progressing     Problem: Grooming  Goal: LTG - Patient will utilize adaptive techniques/equipment to complete daily grooming activities indep  10/9/2023 1511 by Nata Morocho OT  Outcome: Progressing  10/9/2023 1510 by Nata Morocho OT  Outcome: Progressing     Problem: Mobility  Goal: LTG - Patient will ambulate household distance indep  10/9/2023 1511 by Nata Morocho OT  Outcome: Progressing  10/9/2023 1510 by Nata Morocho OT  Outcome: Progressing     Problem: Toileting  Goal: LTG - Patient will demonstrate use of appropriate intervention for safe toileting mod ind  10/9/2023 1511 by Nata Morocho OT  Outcome: Progressing  10/9/2023 1510 by Nata Morocho OT  Outcome: Progressing     Problem: Transfers  Goal: LTG - Patient will demonstrate safe transfer techniques mod ind  10/9/2023 1511 by Nata Morocho OT  Outcome: Progressing  10/9/2023 1510 by Nata Morocho OT  Outcome: Progressing

## 2023-10-09 NOTE — PROGRESS NOTES
Anitha Welch is a 83 y.o. female on day 2 of admission presenting with Acute respiratory failure with hypoxia (CMS/HCC).      Subjective   Patient on 3L NC today. She says that she continues to feel congested. Denies any pain but says still short    Stable overnight    Objective     Last Recorded Vitals  /71   Pulse 89   Temp 36.4 °C (97.5 °F) (Temporal)   Resp (!) 34   Wt 62.9 kg (138 lb 10.7 oz)   SpO2 94%   Intake/Output last 3 Shifts:  No intake or output data in the 24 hours ending 10/09/23 1139      Admission Weight  Weight: 61.8 kg (136 lb 3.9 oz) (10/07/23 1358)    Daily Weight  10/08/23 : 62.9 kg (138 lb 10.7 oz)    Image Results  XR chest 1 view  Narrative: Interpreted By:  Artem Edmonds,   STUDY:  XR CHEST 1 VIEW; 10/7/2023 3:13 pm      INDICATION:  Signs/Symptoms:short of breath.      COMPARISON:  03/02/2023      ACCESSION NUMBER(S):  [id number]      ORDERING CLINICIAN:  MARILU QUINTANILLA      TECHNIQUE:  1 view of the chest was performed.      FINDINGS:  No significant consolidation. There is some mild ground-glass  opacities in the lung bases which could be due to atelectasis. There  is suggestion of mild congestive change however no overt pulmonary  edema. No pleural effusion. No pneumothorax.  The cardiomediastinal  silhouette is mildly enlarged.      Impression: Mild cardiomegaly. Congestive changes without overt pulmonary edema.  No definitive acute cardiopulmonary disease.      Signed by: Artem Edmonds 10/7/2023 4:53 PM  Dictation workstation:   [id number]      Physical Exam  General: alert, no diaphoresis   Lungs: bibasilar rales   Heart: RRR, no LE edema BL   GI: abdomen soft, nontender, nondistended, BS present   MSK: no joint effusion or deformity   Skin: no rashes, erythema, or ecchymosis   Neuro: grossly normal cognition, motor strength, sensation      Relevant Results    Results for orders placed or performed during the hospital encounter of 10/07/23 (from the past  24 hour(s))   Renal Function Panel   Result Value Ref Range    Glucose 117 (H) 65 - 99 mg/dL    Sodium 149 (H) 133 - 145 mmol/L    Potassium 3.4 3.4 - 5.1 mmol/L    Chloride 105 97 - 107 mmol/L    Bicarbonate 34 (H) 24 - 31 mmol/L    Urea Nitrogen 28 (H) 8 - 25 mg/dL    Creatinine 0.90 0.40 - 1.60 mg/dL    eGFR 63 >60 mL/min/1.73m*2    Calcium 8.8 8.5 - 10.4 mg/dL    Phosphorus 3.3 2.5 - 4.5 mg/dL    Albumin 3.4 (L) 3.5 - 5.0 g/dL    Anion Gap 10 <=19 mmol/L   CBC   Result Value Ref Range    WBC 9.0 4.4 - 11.3 x10*3/uL    nRBC 0.0 0.0 - 0.0 /100 WBCs    RBC 4.16 4.00 - 5.20 x10*6/uL    Hemoglobin 12.4 12.0 - 16.0 g/dL    Hematocrit 39.4 36.0 - 46.0 %    MCV 95 80 - 100 fL    MCH 29.8 26.0 - 34.0 pg    MCHC 31.5 (L) 32.0 - 36.0 g/dL    RDW 14.4 11.5 - 14.5 %    Platelets 122 (L) 150 - 450 x10*3/uL    MPV 11.3 7.5 - 11.5 fL   Transthoracic Echo (TTE) Complete   Result Value Ref Range    BSA 1.69 m2       Scheduled medications  atorvastatin, 40 mg, oral, Nightly  carvedilol, 12.5 mg, oral, BID with meals  docusate sodium, 100 mg, oral, BID  enoxaparin, 40 mg, subcutaneous, q24h  [Held by provider] furosemide, 40 mg, intravenous, BID  levothyroxine, 100 mcg, oral, Daily  losartan, 25 mg, oral, Daily  oxygen, , inhalation, q8h  pantoprazole, 40 mg, oral, Daily before breakfast      Continuous medications     PRN medications  PRN medications: acetaminophen **OR** acetaminophen **OR** acetaminophen, ipratropium-albuteroL, oxygen, tiZANidine    Results for orders placed or performed during the hospital encounter of 10/07/23 (from the past 24 hour(s))   Renal Function Panel   Result Value Ref Range    Glucose 117 (H) 65 - 99 mg/dL    Sodium 149 (H) 133 - 145 mmol/L    Potassium 3.4 3.4 - 5.1 mmol/L    Chloride 105 97 - 107 mmol/L    Bicarbonate 34 (H) 24 - 31 mmol/L    Urea Nitrogen 28 (H) 8 - 25 mg/dL    Creatinine 0.90 0.40 - 1.60 mg/dL    eGFR 63 >60 mL/min/1.73m*2    Calcium 8.8 8.5 - 10.4 mg/dL    Phosphorus 3.3 2.5 -  4.5 mg/dL    Albumin 3.4 (L) 3.5 - 5.0 g/dL    Anion Gap 10 <=19 mmol/L   CBC   Result Value Ref Range    WBC 9.0 4.4 - 11.3 x10*3/uL    nRBC 0.0 0.0 - 0.0 /100 WBCs    RBC 4.16 4.00 - 5.20 x10*6/uL    Hemoglobin 12.4 12.0 - 16.0 g/dL    Hematocrit 39.4 36.0 - 46.0 %    MCV 95 80 - 100 fL    MCH 29.8 26.0 - 34.0 pg    MCHC 31.5 (L) 32.0 - 36.0 g/dL    RDW 14.4 11.5 - 14.5 %    Platelets 122 (L) 150 - 450 x10*3/uL    MPV 11.3 7.5 - 11.5 fL   Transthoracic Echo (TTE) Complete   Result Value Ref Range    BSA 1.69 m2                Assessment/Plan   This patient currently has cardiac telemetry ordered; if you would like to modify or discontinue the telemetry order, click here to go to the orders activity to modify/discontinue the order.            Principal Problem:    Acute respiratory failure with hypoxia (CMS/HCC)  Active Problems:    Acute on chronic systolic heart failure (CMS/HCC)    Benign essential hypertension    Mixed hyperlipidemia    Acquired hypothyroidism    Acute respiratory failure with hypoxia  Acute on chronic systolic heart failure  -X-ray consistent with pulmonary edema, proBNP elevated to 6000, symptoms consistent with acute on chronic systolic heart failure.  Patient was admitted in February 2023 with the same problem.  At that time her proBNP was only 1100.  He was requiring 4 L of nasal cannula at that time.  - stat abg requested and done, although appears results were never released to the system  -Echo ordered  -Consult pulm and cardiology. Discussed with both  -now on NC though still short of breath. Okay for SDU.   -lasix on hold as her sodium level has worsened, will recheck prior to giving more  - continue beta blocker and losartan  -will start duonebs per patient's request     NSTEMI  -Likely type II from respiratory distress.  Troponins went from 80-90.      Hypernatremia  -worsened today. Rechecking to ensure validity. If sodium is truly this high, we may need to ask nephrology for  assistance with diuresis     Elevated ALT and AST  -Suspect from vascular congestion from acute heart failure.  Will monitor with morning labs.     Leukocytosis  -resolved     Hypertension  -Initially presenting with hypertensive emergency.  Between BiPAP and diuresis, blood pressure is coming down well.  Continue patient's home beta-blocker                 Braeden Arteaga MD      Addendum: 1253 pm  Patient had an episode of shortness of breath.  Associated chest pain.  Blood pressure increased to systolic 150.  He was placed back on BiPAP.  Starting to feel better and chest pain is resolving.  Sheldon sodium came back at 150.  She still remains in a fluid overloaded state.  We will consult nephrology to help us with diuretic management.      10/9  Await Nephrology, Pulmonary, Cardiology recommendations

## 2023-10-09 NOTE — PROGRESS NOTES
Occupational Therapy    Evaluation    Patient Name: Anitha Welch  MRN: [id number]  Today's Date: 10/9/2023  Time Calculation  Start Time: 1400  Stop Time: 1425  Time Calculation (min): 25 min    Assessment  IP OT Assessment  OT Assessment: OT order received, chart reviewed, evaluation completed. Pt demonstrated   deficits in functional mobility, strength, cognition, and ADL function and would benefit from continued skilled OT services.  Prognosis: Good  Barriers to Discharge:  (Spouse is elderly (90) and son has medical issues)  Medical Staff Made Aware: Yes  Plan:  Treatment Interventions: ADL retraining, Functional transfer training, UE strengthening/ROM, Endurance training, Patient/family training, Cognitive reorientation  OT Frequency: 4 times per week  OT Discharge Recommendations: Moderate intensity level of continued care    Subjective     General:  General  Reason for Referral: Acute respiratory failure with hypoxia  Referred By: [place]  Past Medical History Relevant to Rehab: Anemia, CHF, DVT, heart disease, thyroidectomy  Family/Caregiver Present: Yes (daughter in room)  Co-Treatment: PT  Co-Treatment Reason: ICU eval  Prior to Session Communication: Bedside nurse  Patient Position Received: Bed, 2 rail up, Alarm on  Preferred Learning Style: verbal, written  General Comment: Pt is an 84 yo woman admit from home with c/o SOB.  Precautions:  Medical Precautions: Fall precautions  Vital Signs:  Heart Rate: 76  Heart Rate Source: Monitor  SpO2: 97 %  BP: 120/64  Pain:  Pain Assessment  Pain Assessment: 0-10  Pain Score: 2  Pain Type: Acute pain  Pain Location: Wrist  Pain Orientation: Left    Objective   Cognition:  Overall Cognitive Status: Impaired  Arousal/Alertness: Appropriate responses to stimuli  Orientation Level: Oriented X4  Following Commands: Follows one step commands with repetition  Safety Judgment: Decreased awareness of need for assistance  Problem Solving: Assistance required to identify errors  made  Attention: Within Functional Limits  Memory: Exceptions to WFL  Short-Term Memory: Impaired  Problem Solving: Exceptions to WFL (easily overwhelmed)  Safety/Judgement: Exceptions to WFL           Home Living:  Type of Home: House  Lives With: Spouse, Adult children  Home Adaptive Equipment:  (rollator)  Home Layout: Two level  Home Access: Stairs to enter with rails  Entrance Stairs-Rails: Right  Entrance Stairs-Number of Steps: 2  Bathroom Shower/Tub: Walk-in shower  Bathroom Toilet: Standard  Bathroom Equipment: Grab bars in shower, Built-in shower seat   Prior Function:  Level of [place]: Independent with ADLs and functional transfers  Receives Help From: Family  ADL Assistance: Independent  Homemaking Assistance:  (son completes most IADLs)  Ambulatory Assistance: Independent  Vocational: Retired  IADL History:  Homemaking Responsibilities:  (son completes most IADLS)  Current License: No  Mode of Transportation: Family  ADL:  Eating Assistance: Independent  Grooming Assistance: Other (Comment)  Grooming Deficit: Setup  Bathing Assistance: Minimal  Bathing Deficit: Steadying, Increased time to complete   UE Dressing Assistance: Minimal  UE Dressing Deficit: Thread RUE, Thread LUE  LE Dressing Assistance: Minimal  LE Dressing Deficit: Don/doff R sock, Don/doff L sock  Toileting Assistance with Device: Total  Toileting Deficit: Urinary Catheter  Activity Tolerance:  Endurance: Tolerates less than 10 min exercise, no significant change in vital signs  Bed Mobility/Transfers: Bed Mobility  Bed Mobility: Yes (Pt performed bed mobility with close S HOB elevated and use of rail)   and Transfers  Transfer: Yes (Pt stood from bed with min A and hand held assist. Pt performed functional mobility short household distance pt had LOB several times requiring min-mod A to correct. Pt returned to chair in room, transferred to chair min A ,alarm on chair needs in reach)  Modalities:     IADL's:   Homemaking  Responsibilities:  (son completes most IADLS)  Current License: No  Mode of Transportation: Family  Vision: Vision - Basic Assessment  Current Vision: No visual deficits  Sensation:  Light Touch: No apparent deficits  Sharp/Dull: No apparent deficits  Stereognosis: No apparent deficits  Proprioception: No apparent deficits  Strength:  Strength Comments: mild overall deficits  Perception:  Inattention/Neglect: Appears intact  Initiation: Appears intact  Motor Planning: Appears intact  Perseveration: Not present  Coordination:  Movements are Fluid and Coordinated: Yes   Hand Function:  Hand Function  Gross Grasp: Functional  Coordination: Functional  Extremities: RUE   RUE : Within Functional Limits and LUE   LUE: Within Functional Limits    Outcome Measures: [facility] Daily Activity  Putting on and taking off regular lower body clothing: A lot  Bathing (including washing, rinsing, drying): A lot  Putting on and taking off regular upper body clothing: A little  Toileting, which includes using toilet, bedpan or urinal: A little  Taking care of personal grooming such as brushing teeth: A little  Eating Meals: None  Daily Activity - Total Score: 17      Education Documentation  ADL Training, taught by Nata Morocho OT at 10/9/2023  3:14 PM.  Learner: Patient  Readiness: Acceptance  Method: Explanation  Response: Verbalizes Understanding  Comment: Educated on OT POC    Education Comments  No comments found.      Goals:   Encounter Problems       Encounter Problems (Active)       Bathing       LTG - Patient will utilize adaptive techniques to bathe body mod ind (Progressing)       Start:  10/09/23    Expected End:  10/27/23               Dressing Upper Extremities       LTG - Patient will utilize adaptive techniques/equipment to dress upper body indep (Progressing)       Start:  10/09/23    Expected End:  10/27/23               Dressings Lower Extremities       LTG - Patient will utilize adaptive techniques/equipment to  dress lower body mod ind (Progressing)       Start:  10/09/23    Expected End:  10/27/23               Grooming       LTG - Patient will utilize adaptive techniques/equipment to complete daily grooming activities indep (Progressing)       Start:  10/09/23    Expected End:  10/27/23               Mobility       LTG - Patient will ambulate household distance indep (Progressing)       Start:  10/09/23    Expected End:  10/27/23               Toileting       LTG - Patient will demonstrate use of appropriate intervention for safe toileting mod ind (Progressing)       Start:  10/09/23    Expected End:  10/27/23               Transfers       LTG - Patient will demonstrate safe transfer techniques mod ind (Progressing)       Start:  10/09/23    Expected End:  10/27/23

## 2023-10-09 NOTE — PROGRESS NOTES
Spiritual Care Visit    Clinical Encounter Type  Visited With: Patient  Routine Visit: Introduction  Continue Visiting: Yes         Values/Beliefs  Spiritual Requests During Hospitalization: Anointing & Communion    Sacramental Encounters  Communion: Patient wants communion  Communion Given Indicator: Yes  Sacrament of Sick-Anointing: Anointed  Donnell Quintanilla

## 2023-10-09 NOTE — PROGRESS NOTES
10/09/23 1255   Discharge Planning   Living Arrangements Spouse/significant other;Children   Support Systems Spouse/significant other;Children   Type of Residence Private residence   Number of Stairs to Enter Residence 1   Who is requesting discharge planning? Provider   Home or Post Acute Services Other (Comment)  (TBD)   Patient expects to be discharged to: TBD

## 2023-10-09 NOTE — CARE PLAN
The patient's goals for the shift include wean oxygen as tolerated, up with physical therapy.    The clinical goals for the shift include Maintain oxygenation status    Over the shift, the patient did not make progress toward the following goals. Barriers to progression include ***. Recommendations to address these barriers include ***.

## 2023-10-09 NOTE — PROGRESS NOTES
TCC spoke to patient's son, Jose, on the phone. Assessment complete. Patient has not had any falls. Patient does not use an assistive device. Patient wears oxygen, Jose unsure how much she wears and the supplier. At this time there is not a safe discharge plan in place.       Katalina Verma RN

## 2023-10-09 NOTE — PROGRESS NOTES
"Anitha Welch is a 83 y.o. female on day 2 of admission presenting with Acute respiratory failure with hypoxia (CMS/HCC).    Subjective   Follow-up respiratory failure .  off BiPAP.  Did not wear BiPAP last night  Appears stable nasal cannula this morning       Objective     Vital Signs  Blood pressure 130/71, pulse 89, temperature 36.4 °C (97.5 °F), temperature source Temporal, resp. rate (!) 34, height 1.626 m (5' 4\"), weight 62.9 kg (138 lb 10.7 oz), SpO2 94 %.  Oxygen Therapy  SpO2: 94 %             Intake/Output previous 24 hours:  No intake or output data in the 24 hours ending 10/09/23 1018    Physical Exam  Vitals reviewed.   Constitutional:       General: She is awake.   HENT:      Head: Normocephalic and atraumatic.   Eyes:      Extraocular Movements: Extraocular movements intact.      Pupils: Pupils are equal, round, and reactive to light.   Cardiovascular:      Rate and Rhythm: Normal rate and regular rhythm.      Pulses: Normal pulses.      Heart sounds: Normal heart sounds.   Pulmonary:      Effort: Pulmonary effort is normal.      Breath sounds: Normal breath sounds. Decreased air movement present.   Abdominal:      General: Abdomen is flat. Bowel sounds are normal.      Palpations: Abdomen is soft.   Skin:     General: Skin is warm and dry.   Neurological:      General: No focal deficit present.      Mental Status: She is alert    Lines and Tubes:  Peripheral IV 10/08/23 22 G Distal;Ventral;Left Wrist (Active)   Placement Date/Time: 10/08/23 1426   Hand Hygiene Completed: Yes  Size (Gauge): 22 G  Orientation: Distal;Ventral;Left  Location: Wrist  Site Prep: Alcohol  Placed by: Priya Fernandez RN  Insertion attempts: 1  Patient Tolerance: Tolerated well   Number of days: 0       Urethral Catheter (Active)   Placement Date: 10/07/23     Number of days: 2         Scheduled medications  atorvastatin, 40 mg, oral, Nightly  carvedilol, 12.5 mg, oral, BID with meals  docusate sodium, 100 mg, oral, " BID  enoxaparin, 40 mg, subcutaneous, q24h  [Held by provider] furosemide, 40 mg, intravenous, BID  levothyroxine, 100 mcg, oral, Daily  losartan, 25 mg, oral, Daily  oxygen, , inhalation, q8h  pantoprazole, 40 mg, oral, Daily before breakfast      Continuous medications     PRN medications  PRN medications: acetaminophen **OR** acetaminophen **OR** acetaminophen, ipratropium-albuteroL, oxygen, tiZANidine    Relevant Results  Results from last 7 days   Lab Units 10/09/23  0418 10/08/23  0438 10/07/23  1402   WBC AUTO x10*3/uL 9.0 5.6 13.6*   HEMOGLOBIN g/dL 12.4 12.5 13.7   HEMATOCRIT % 39.4 39.8 43.6   PLATELETS AUTO x10*3/uL 122* 117* 196      Results from last 7 days   Lab Units 10/09/23  0418 10/08/23  0853 10/08/23  0438   SODIUM mmol/L 149* 150* 156*   POTASSIUM mmol/L 3.4 3.4 3.3*   CHLORIDE mmol/L 105 104 107   CO2 mmol/L 34* 32* 34*   BUN mg/dL 28* 27* 25   CREATININE mg/dL 0.90 0.90 1.00   GLUCOSE mg/dL 117* 179* 131*   CALCIUM mg/dL 8.8 8.7 8.4*      Results from last 7 days   Lab Units 10/07/23  1735   POCT PH, ARTERIAL pH 7.50*   POCT PO2, ARTERIAL mm Hg 90   POCT HCO3 CALCULATED, ARTERIAL mmol/L 39.0*   POCT BASE EXCESS, ARTERIAL mmol/L 13.6*     XR chest 1 view 10/07/2023    Narrative  Interpreted By:  Artem Edmonds,  STUDY:  XR CHEST 1 VIEW; 10/7/2023 3:13 pm    INDICATION:  Signs/Symptoms:short of breath.    COMPARISON:  03/02/2023    ACCESSION NUMBER(S):  [id number]    ORDERING CLINICIAN:  MARILU QUINTANILLA    TECHNIQUE:  1 view of the chest was performed.    FINDINGS:  No significant consolidation. There is some mild ground-glass  opacities in the lung bases which could be due to atelectasis. There  is suggestion of mild congestive change however no overt pulmonary  edema. No pleural effusion. No pneumothorax.  The cardiomediastinal  silhouette is mildly enlarged.    Impression  Mild cardiomegaly. Congestive changes without overt pulmonary edema.  No definitive acute cardiopulmonary  disease.    Signed by: Artem Edmonds 10/7/2023 4:53 PM  Dictation workstation:   [id number]        Assessment/Plan   Principal Problem:    Acute respiratory failure with hypoxia (CMS/HCC)  Active Problems:    Acute on chronic systolic heart failure (CMS/HCC)    Benign essential hypertension    Mixed hyperlipidemia    Acquired hypothyroidism    Stable nasal cannula   Off BiPAP  I's and O's not recorded past 24 hours.  Sodium stable at 149  Continue to hold diuresis  Echo report pending  Continue beta-blocker  Lovenox Protonix       Matti Rae MD  [facility]

## 2023-10-09 NOTE — CONSULTS
.Reason For Consult   hypernatremia    History Of Present Illness  Anitha Welch is a 83 y.o. female  who initially presented to the emergency room because of increased shortness of breath acute respiratory failure the patient was placed on BiPAP initially diagnosed with congestive heart failure chest x-ray showed pulm edema so the patient was started on diuretics mainly IV Lasix I was asked to see the patient because of developing hypernatremia the patient is feeling better she is less short of breath on nasal cannula no nausea vomiting or diarrhea.     Review of Systems  Review of Systems    10 point review of system was done all negative except was positive for the history of present illness  Past Medical History  She has a past medical history of Anemia, CHF (congestive heart failure) (CMS/HCC), DVT (deep venous thrombosis) (CMS/HCC), Heart disease, Hypertension, Personal history of other diseases of the circulatory system (07/02/2021), Personal history of other diseases of the respiratory system (07/02/2021), and Personal history of other endocrine, nutritional and metabolic disease (07/02/2021).    Surgical History  She has a past surgical history that includes MR angio head wo IV contrast (02/24/2017); MR angio head wo IV contrast (08/11/2017); MR angio head wo IV contrast (02/10/2019); Cholecystectomy; Tonsillectomy; pr arthrp acetblr/prox fem prostc agrft/algrft; Thyroidectomy, partial; and Coronary stent placement.     Social History  She reports that she quit smoking about 8 years ago. Her smoking use included cigarettes. She does not have any smokeless tobacco history on file. No history on file for alcohol use and drug use.    Family History  Family History   Problem Relation Name Age of Onset    Hyperthyroidism Mother          Treated with radioactive iodine    Hypertension Mother      Diabetes Father's Sister      Hyperthyroidism Sibling          Current Facility-Administered Medications:      acetaminophen (Tylenol) tablet 650 mg, 650 mg, oral, q6h PRN **OR** acetaminophen (Tylenol) oral liquid 650 mg, 650 mg, nasogastric tube, q6h PRN **OR** acetaminophen (Tylenol) suppository 650 mg, 650 mg, rectal, q6h PRN, Shannan Whitley DO    atorvastatin (Lipitor) tablet 40 mg, 40 mg, oral, Nightly, Shannan Whitley DO, 40 mg at 10/08/23 2028    carvedilol (Coreg) tablet 12.5 mg, 12.5 mg, oral, BID with meals, Shannan Whitley DO, 12.5 mg at 10/09/23 0917    docusate sodium (Colace) capsule 100 mg, 100 mg, oral, BID, Shannan Whitley DO, 100 mg at 10/09/23 0919    enoxaparin (Lovenox) syringe 40 mg, 40 mg, subcutaneous, q24h, Shannan Whitley DO, 40 mg at 10/08/23 1848    ipratropium-albuteroL (Duo-Neb) 0.5-2.5 mg/3 mL nebulizer solution 3 mL, 3 mL, nebulization, 4x daily PRN, Shannan Whitley DO, 3 mL at 10/08/23 0850    levothyroxine (Synthroid, Levoxyl) tablet 100 mcg, 100 mcg, oral, Daily, Shannan Whitley DO, 100 mcg at 10/09/23 0618    losartan (Cozaar) tablet 25 mg, 25 mg, oral, Daily, Shannan Whitley DO, 25 mg at 10/09/23 0919    oxygen (O2) therapy, , inhalation, Continuous PRN - O2/gases, Shannan Whitley DO, Start at 10/08/23 1929    oxygen (O2) therapy, , inhalation, q8h, Shannan Whitley DO, Start at 10/09/23 1600    pantoprazole (ProtoNix) EC tablet 40 mg, 40 mg, oral, Daily before breakfast, Shannan Whitley DO, 40 mg at 10/09/23 0618    tiZANidine (Zanaflex) tablet 2 mg, 2 mg, oral, q8h PRN, Shannan Whitley,    Allergies  Amoxicillin, Ciprofloxacin, Iodinated contrast media, Diphenhydramine, Lisinopril, and Other         Physical Exam  Physical Exam  Constitutional:       General: She is not in acute distress.     Appearance: She is not toxic-appearing.   HENT:      Head: Normocephalic and atraumatic.   Eyes:      Extraocular Movements: Extraocular movements intact.      Pupils: Pupils are equal, round, and reactive to light.   Neck:      Vascular: No carotid  bruit.   Cardiovascular:      Rate and Rhythm: Normal rate and regular rhythm.   Pulmonary:      Effort: No respiratory distress.      Breath sounds: No stridor. Wheezing present. No rhonchi or rales.      Comments:  diminished breath sounds  Chest:      Chest wall: No tenderness.   Abdominal:      General: There is no distension.      Palpations: There is no mass.      Tenderness: There is no abdominal tenderness. There is no right CVA tenderness, left CVA tenderness or guarding.      Hernia: No hernia is present.   Musculoskeletal:         General: No swelling or tenderness.      Cervical back: No rigidity.      Right lower leg: No edema.      Left lower leg: No edema.   Lymphadenopathy:      Cervical: No cervical adenopathy.   Skin:     General: Skin is warm and dry.      Coloration: Skin is not jaundiced or pale.      Findings: No bruising or erythema.   Neurological:      General: No focal deficit present.      Mental Status: She is alert and oriented to person, place, and time.   Psychiatric:         Mood and Affect: Mood normal.         Behavior: Behavior normal.              I&O 24HR    Intake/Output Summary (Last 24 hours) at 10/9/2023 1723  Last data filed at 10/9/2023 1513  Gross per 24 hour   Intake --   Output 301 ml   Net -301 ml       Vitals 24HR  Heart Rate:  []   Temp:  [36 °C (96.8 °F)-37 °C (98.6 °F)]   Resp:  [15-41]   BP: (110-151)/()   Weight:  [62.1 kg (136 lb 14.5 oz)]   SpO2:  [94 %-100 %]         Relevant Results        Results for orders placed or performed during the hospital encounter of 10/07/23 (from the past 96 hour(s))   CBC   Result Value Ref Range    WBC 13.6 (H) 4.4 - 11.3 x10*3/uL    nRBC 0.0 0.0 - 0.0 /100 WBCs    RBC 4.66 4.00 - 5.20 x10*6/uL    Hemoglobin 13.7 12.0 - 16.0 g/dL    Hematocrit 43.6 36.0 - 46.0 %    MCV 94 80 - 100 fL    MCH 29.4 26.0 - 34.0 pg    MCHC 31.4 (L) 32.0 - 36.0 g/dL    RDW 14.1 11.5 - 14.5 %    Platelets 196 150 - 450 x10*3/uL    MPV 11.6  (H) 7.5 - 11.5 fL   Comprehensive metabolic panel   Result Value Ref Range    Glucose 220 (H) 65 - 99 mg/dL    Sodium 147 (H) 133 - 145 mmol/L    Potassium 3.5 3.4 - 5.1 mmol/L    Chloride 104 97 - 107 mmol/L    Bicarbonate 29 24 - 31 mmol/L    Urea Nitrogen 22 8 - 25 mg/dL    Creatinine 0.90 0.40 - 1.60 mg/dL    eGFR 63 >60 mL/min/1.73m*2    Calcium 8.7 8.5 - 10.4 mg/dL    Albumin 4.1 3.5 - 5.0 g/dL    Alkaline Phosphatase 125 35 - 125 U/L    Total Protein 6.5 5.9 - 7.9 g/dL    AST 85 (H) 5 - 40 U/L    Bilirubin, Total 1.0 0.1 - 1.2 mg/dL     (H) 5 - 40 U/L    Anion Gap 14 <=19 mmol/L   NT Pro-BNP   Result Value Ref Range    PROBNP 6,316 (H) 0 - 624 pg/mL   Serial Troponin, Initial ([facility])   Result Value Ref Range    Troponin T, High Sensitivity 88 (H) <=15 ng/L   BLOOD GAS LACTIC ACID, VENOUS   Result Value Ref Range    POCT Lactate, Venous 2.3 (H) 0.4 - 2.0 mmol/L   Serial Troponin, 2 Hour ([facility])   Result Value Ref Range    Troponin T, High Sensitivity 99 (H) <=15 ng/L   Blood Culture    Specimen: Peripheral Arterial Puncture; Blood culture   Result Value Ref Range    Blood Culture Loaded on Instrument - Culture in progress    Blood Culture    Specimen: Peripheral Arterial Puncture; Blood culture   Result Value Ref Range    Blood Culture Loaded on Instrument - Culture in progress    Blood Gas Arterial Full Panel   Result Value Ref Range    POCT pH, Arterial 7.50 (H) 7.38 - 7.42 pH    POCT pO2, Arterial 90 85 - 95 mm Hg    POCT SO2, Arterial 98 94 - 100 %    POCT Oxy Hemoglobin, Arterial 96.1 94.0 - 98.0 %    POCT Sodium, Arterial 142 136 - 145 mmol/L    POCT Potassium, Arterial 3.0 (L) 3.5 - 5.3 mmol/L    POCT Chloride, Arterial 103 98 - 107 mmol/L    POCT Ionized Calcium, Arterial 1.06 (L) 1.10 - 1.33 mmol/L    POCT Glucose, Arterial 135 (H) 74 - 99 mg/dL    POCT Lactate, Arterial 0.8 0.4 - 2.0 mmol/L    POCT Base Excess, Arterial 13.6 (H) -2.0 - 3.0 mmol/L    POCT HCO3 Calculated, Arterial 39.0 (H) 22.0 -  26.0 mmol/L    Patient Temperature      FiO2 40 %    Ventilator Mode BiPAP     Ventilator Rate 16 bpm    Ipap CMH2O 16.0 cm H2O    Epap CMH2O 8.0 cm H2O   Serial Troponin, 6 Hour ([facility])   Result Value Ref Range    Troponin T, High Sensitivity 156 (H) <=15 ng/L   POCT GLUCOSE   Result Value Ref Range    POCT Glucose 144 (H) 74 - 99 mg/dL   CBC   Result Value Ref Range    WBC 5.6 4.4 - 11.3 x10*3/uL    nRBC 0.0 0.0 - 0.0 /100 WBCs    RBC 4.30 4.00 - 5.20 x10*6/uL    Hemoglobin 12.5 12.0 - 16.0 g/dL    Hematocrit 39.8 36.0 - 46.0 %    MCV 93 80 - 100 fL    MCH 29.1 26.0 - 34.0 pg    MCHC 31.4 (L) 32.0 - 36.0 g/dL    RDW 14.2 11.5 - 14.5 %    Platelets 117 (L) 150 - 450 x10*3/uL    MPV 11.4 7.5 - 11.5 fL   Magnesium   Result Value Ref Range    Magnesium 2.00 1.60 - 3.10 mg/dL   TSH   Result Value Ref Range    Thyroid Stimulating Hormone 0.65 0.27 - 4.20 mIU/L   Hepatic function panel   Result Value Ref Range    AST 49 (H) 5 - 40 U/L    ALT 90 (H) 5 - 40 U/L    Alkaline Phosphatase 102 35 - 125 U/L    Bilirubin, Total 0.8 0.1 - 1.2 mg/dL    Bilirubin, Direct 0.2 0.0 - 0.2 mg/dL    Total Protein 5.9 5.9 - 7.9 g/dL    Albumin 3.6 3.5 - 5.0 g/dL   Phosphorus   Result Value Ref Range    Phosphorus 4.7 (H) 2.5 - 4.5 mg/dL   Basic Metabolic Panel   Result Value Ref Range    Glucose 131 (H) 65 - 99 mg/dL    Sodium 156 (H) 133 - 145 mmol/L    Potassium 3.3 (L) 3.4 - 5.1 mmol/L    Chloride 107 97 - 107 mmol/L    Bicarbonate 34 (H) 24 - 31 mmol/L    Urea Nitrogen 25 8 - 25 mg/dL    Creatinine 1.00 0.40 - 1.60 mg/dL    eGFR 56 (L) >60 mL/min/1.73m*2    Calcium 8.4 (L) 8.5 - 10.4 mg/dL    Anion Gap 15 <=19 mmol/L   Basic Metabolic Panel   Result Value Ref Range    Glucose 179 (H) 65 - 99 mg/dL    Sodium 150 (H) 133 - 145 mmol/L    Potassium 3.4 3.4 - 5.1 mmol/L    Chloride 104 97 - 107 mmol/L    Bicarbonate 32 (H) 24 - 31 mmol/L    Urea Nitrogen 27 (H) 8 - 25 mg/dL    Creatinine 0.90 0.40 - 1.60 mg/dL    eGFR 63 >60 mL/min/1.73m*2     Calcium 8.7 8.5 - 10.4 mg/dL    Anion Gap 14 <=19 mmol/L   Renal Function Panel   Result Value Ref Range    Glucose 117 (H) 65 - 99 mg/dL    Sodium 149 (H) 133 - 145 mmol/L    Potassium 3.4 3.4 - 5.1 mmol/L    Chloride 105 97 - 107 mmol/L    Bicarbonate 34 (H) 24 - 31 mmol/L    Urea Nitrogen 28 (H) 8 - 25 mg/dL    Creatinine 0.90 0.40 - 1.60 mg/dL    eGFR 63 >60 mL/min/1.73m*2    Calcium 8.8 8.5 - 10.4 mg/dL    Phosphorus 3.3 2.5 - 4.5 mg/dL    Albumin 3.4 (L) 3.5 - 5.0 g/dL    Anion Gap 10 <=19 mmol/L   CBC   Result Value Ref Range    WBC 9.0 4.4 - 11.3 x10*3/uL    nRBC 0.0 0.0 - 0.0 /100 WBCs    RBC 4.16 4.00 - 5.20 x10*6/uL    Hemoglobin 12.4 12.0 - 16.0 g/dL    Hematocrit 39.4 36.0 - 46.0 %    MCV 95 80 - 100 fL    MCH 29.8 26.0 - 34.0 pg    MCHC 31.5 (L) 32.0 - 36.0 g/dL    RDW 14.4 11.5 - 14.5 %    Platelets 122 (L) 150 - 450 x10*3/uL    MPV 11.3 7.5 - 11.5 fL   Transthoracic Echo (TTE) Complete   Result Value Ref Range    LV A4C EF 42.7           Assessment/Plan     Transthoracic Echo (TTE) Complete    Result Date: 10/9/2023           [place] [address]            Phone [phone] TRANSTHORACIC ECHOCARDIOGRAM REPORT  Patient Name:     RUDOLPH Carr Physician:  [id number] Derrick Gallardo DO Study Date:       10/9/2023          Ordering Provider:  [id number] JAMES WATTS MRN/PID:          [id number]           Fellow: Accession#:       [id number]       Nurse: Date of           1940 / 83      Sonographer:        Eber Horvath RDCS Birth/Age:        years Gender:           F                  Additional Staff: Height:           162.00 cm          Admit Date: Weight:           62.99 kg           Admission Status:   Inpatient - Routine BSA:              1.67 m2            Department          [facility] ICU                                      Location: Blood Pressure: 132 /55 mmHg Study Type:     TRANSTHORACIC ECHO (TTE) COMPLETE Diagnosis/ICD: Other cardiac sounds-R01.2 Indication:    Murmur / Respiratory failure CPT Codes:     Echo Complete w Full Doppler-[id number] Patient History: Pertinent History: CAD, Chest Pain, CHF, CVA, Dyspnea, HTN, Hyperlipidemia,                    Murmur, LE Edema and Syncope. Renal dx III, MI, PCI, PVD,                    AAA. Study Detail: The following Echo studies were performed: 2D, M-Mode, Doppler and               color flow.  PHYSICIAN INTERPRETATION: Left Ventricle: Left ventricular systolic function is moderately decreased, with an estimated ejection fraction of 40-45%. There are multiple wall motion abnormalities. The left ventricular cavity size is mildly dilated. There is mild concentric left ventricular hypertrophy. Spectral Doppler shows a normal pattern of left ventricular diastolic filling. Inferior wall moderate hypokinesis and apical hypokinesis. Left Atrium: The left atrium is mildly dilated. Right Ventricle: The right ventricle is normal in size. There is normal right ventricular global systolic function. Right Atrium: The right atrium is normal in size. Aortic Valve: The aortic valve appears abnormal. There is moderate aortic valve cusp calcification. There is mild aortic valve regurgitation. The peak instantaneous gradient of the aortic valve is 7.4 mmHg. The mean gradient of the aortic valve is 3.0 mmHg. Mitral Valve: The mitral valve is abnormal. There is moderate to severe mitral valve regurgitation which is posteriorly directed. Tricuspid Valve: The tricuspid valve is structurally normal. There is mild tricuspid regurgitation. The Doppler estimated RVSP is moderately elevated at 44.7 mmHg. Pulmonic Valve: The pulmonic valve is structurally normal. There is no indication of pulmonic valve regurgitation. Pericardium: There is no pericardial effusion noted. Aorta: The aortic root is normal.  CONCLUSIONS:  1. Left ventricular systolic function is moderately  decreased with a 40-45% estimated ejection fraction.  2. Inferior wall moderate hypokinesis and apical hypokinesis.  3. Moderate to severe mitral valve regurgitation.  4. Moderately elevated right ventricular systolic pressure.  5. Aortic valve appears abnormal.  6. There is moderate aortic valve cusp calcification.  7. Mild aortic valve regurgitation.  8. There are multiple wall motion abnormalities. QUANTITATIVE DATA SUMMARY: 2D MEASUREMENTS:                           Normal Ranges: LAs:           4.00 cm    (2.7-4.0cm) IVSd:          1.18 cm    (0.6-1.1cm) LVPWd:         1.12 cm    (0.6-1.1cm) LVIDd:         6.02 cm    (3.9-5.9cm) LVIDs:         4.69 cm LV Mass Index: 178.5 g/m2 LV % FS        22.1 % LA VOLUME:                             Normal Ranges: LA Volume Index: 68.3 ml/m2 RA VOLUME BY A/L METHOD:                       Normal Ranges: RA Area A4C: 21.0 cm2 M-MODE MEASUREMENTS:                  Normal Ranges: Ao Root: 2.90 cm (2.0-3.7cm) LAs:     4.40 cm (2.7-4.0cm) AORTA MEASUREMENTS:                    Normal Ranges: Asc Ao, d: 3.20 cm (2.1-3.4cm) LV SYSTOLIC FUNCTION BY 2D PLANIMETRY (MOD):                     Normal Ranges: EF-A4C View: 42.7 % (>=55%) EF-A2C View: 44.3 % EF-Biplane:  42.9 % LV DIASTOLIC FUNCTION:                        Normal Ranges: MV Peak E:    1.05 m/s (0.7-1.2 m/s) MV Peak A:    0.97 m/s (0.42-0.7 m/s) E/A Ratio:    1.08     (1.0-2.2) MV e'         0.04 m/s (>8.0) MV lateral e' 0.04 m/s MV medial e'  0.04 m/s E/e' Ratio:   26.25    (<8.0) MITRAL VALVE:                        Normal Ranges: MV Vmax:    6.03 m/s   (<=1.3m/s) MV peak P.4 mmHg (<5mmHg) MV mean P.0 mmHg  (<48mmHg) MV DT:      116 msec   (150-240msec) MITRAL INSUFFICIENCY:                           Normal Ranges: PISA Radius:  0.9 cm MR Vmax:      34.70 cm/s MR Alias Jamal: 38.5 cm/s MR Flow Rt:   195.94 ml/s MR EROA:      5.65 cm2 AORTIC VALVE:                                   Normal Ranges: AoV Vmax:                 1.36 m/s (<=1.7m/s) AoV Peak P.4 mmHg (<20mmHg) AoV Mean PG:             3.0 mmHg (1.7-11.5mmHg) LVOT Max Jamal:            0.98 m/s (<=1.1m/s) AoV VTI:                 23.10 cm (18-25cm) LVOT VTI:                18.40 cm LVOT Diameter:           2.00 cm  (1.8-2.4cm) AoV Area, VTI:           2.50 cm2 (2.5-5.5cm2) AoV Area,Vmax:           2.26 cm2 (2.5-4.5cm2) AoV Dimensionless Index: 0.80  RIGHT VENTRICLE: RV Basal 4.35 cm RV Mid   3.10 cm RV Major 6.4 cm TAPSE:   25.4 mm RV s'    0.11 m/s TRICUSPID VALVE/RVSP:                             Normal Ranges: Peak TR Velocity: 3.03 m/s RV Syst Pressure: 44.7 mmHg (< 30mmHg) IVC Diam:         2.51 cm PULMONIC VALVE:                      Normal Ranges: PV Max Jamal: 0.8 m/s  (0.6-0.9m/s) PV Max P.7 mmHg  [id number] Derrick Gallardo DO Electronically signed on 10/9/2023 at 4:59:06 PM  ** Final **     XR chest 1 view    Result Date: 10/9/2023  Interpreted By:  Jose Rodriguez, STUDY: XR CHEST 1 VIEW; 10/9/2023 11:49 am   INDICATION: CLINICAL INFORMATION: Signs/Symptoms:resp failure. Hypoxia   COMPARISON: 10/07/2023   ACCESSION NUMBER(S): [id number]   ORDERING CLINICIAN: DANNI AN   TECHNIQUE: Portable chest.   FINDINGS: The cardiac silhouette is quite prominent suggesting cardiomegaly. The pulmonary vasculature is slightly indistinct suggesting mild pulmonary vascular congestion.  Small bilateral effusions are present. Right basilar atelectasis is present. No alveolar consolidation is noted. Surgical clips are identified at the base of the neck bilaterally.       There is partial clearing of the findings of CHF when compared to the previous study from 10/07/2023.   MACRO: none   Signed by: Jose Rodriguez 10/9/2023 11:58 AM Dictation workstation:   [id number]     Impression:  1. hyponatremia secondary to diuretics  and dehydration.   2.pulmonary edema  3. acute respiratory failure     Recommendations:     like to discontinue the diuretics for the time  being, no need for IV fluids at this point encourage oral intake, will recheck another sodium tomorrow morning.     thank you for your consultation        Rossana Gallagher

## 2023-10-09 NOTE — PROGRESS NOTES
"Anitha Welch is a 83 y.o. female on day 2 of admission presenting with Acute respiratory failure with hypoxia (CMS/HCC).    Subjective   Patient is alert and oriented x2-3 with some confusion about recent details, denies complaints chest pain or pressure, palpitations or feeling of rapid heart rate.  Breathing comfortably on nasal cannula oxygen.        Objective     Physical Exam  Vitals and nursing note reviewed.   Constitutional:       General: She is not in acute distress.     Appearance: She is not ill-appearing or toxic-appearing.   HENT:      Head: Normocephalic and atraumatic.      Nose: Nose normal.      Mouth/Throat:      Mouth: Mucous membranes are moist.   Cardiovascular:      Rate and Rhythm: Normal rate.      Heart sounds: No murmur heard.     No friction rub. No gallop.   Pulmonary:      Effort: Pulmonary effort is normal.      Breath sounds: Normal breath sounds. No wheezing, rhonchi or rales.      Comments: Breathing comfortably nasal cannula oxygen at 2 L.  No conversational dyspnea.  No tachypnea.  No pain with deep inspiration  Abdominal:      General: Abdomen is flat.      Palpations: Abdomen is soft.   Musculoskeletal:         General: No swelling.      Cervical back: Rigidity present.      Right lower leg: No edema.      Left lower leg: No edema.   Skin:     General: Skin is warm and dry.      Capillary Refill: Capillary refill takes less than 2 seconds.   Neurological:      Mental Status: She is alert. Mental status is at baseline.         Last Recorded Vitals  Blood pressure 130/71, pulse 89, temperature 36.4 °C (97.5 °F), temperature source Temporal, resp. rate (!) 34, height 1.626 m (5' 4\"), weight 62.9 kg (138 lb 10.7 oz), SpO2 94 %.  Intake/Output last 3 Shifts:  I/O last 3 completed shifts:  In: 50 (0.8 mL/kg) [P.O.:50]  Out: 900 (14.3 mL/kg) [Urine:900 (0.4 mL/kg/hr)]  Weight: 62.9 kg     Relevant Results  Results for orders placed or performed during the hospital encounter of " 10/07/23 (from the past 24 hour(s))   Renal Function Panel   Result Value Ref Range    Glucose 117 (H) 65 - 99 mg/dL    Sodium 149 (H) 133 - 145 mmol/L    Potassium 3.4 3.4 - 5.1 mmol/L    Chloride 105 97 - 107 mmol/L    Bicarbonate 34 (H) 24 - 31 mmol/L    Urea Nitrogen 28 (H) 8 - 25 mg/dL    Creatinine 0.90 0.40 - 1.60 mg/dL    eGFR 63 >60 mL/min/1.73m*2    Calcium 8.8 8.5 - 10.4 mg/dL    Phosphorus 3.3 2.5 - 4.5 mg/dL    Albumin 3.4 (L) 3.5 - 5.0 g/dL    Anion Gap 10 <=19 mmol/L   CBC   Result Value Ref Range    WBC 9.0 4.4 - 11.3 x10*3/uL    nRBC 0.0 0.0 - 0.0 /100 WBCs    RBC 4.16 4.00 - 5.20 x10*6/uL    Hemoglobin 12.4 12.0 - 16.0 g/dL    Hematocrit 39.4 36.0 - 46.0 %    MCV 95 80 - 100 fL    MCH 29.8 26.0 - 34.0 pg    MCHC 31.5 (L) 32.0 - 36.0 g/dL    RDW 14.4 11.5 - 14.5 %    Platelets 122 (L) 150 - 450 x10*3/uL    MPV 11.3 7.5 - 11.5 fL   Transthoracic Echo (TTE) Complete   Result Value Ref Range    BSA 1.69 m2           Assessment/Plan   Principal Problem:    Acute respiratory failure with hypoxia (CMS/HCC)  Active Problems:    Acute on chronic systolic heart failure (CMS/HCC)    Benign essential hypertension    Mixed hyperlipidemia    Acquired hypothyroidism    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  Has a known history of diastolic heart failure, as well as a echocardiogram in February of this year which revealed reduced ejection fraction of 40 to 45% as well as COPD.  prior to admission did not require home O2.  Received BiPAP as well as IV furosemide with significant improvement in symptoms.   shortly after arrival in the ICU patient was transition from BiPAP to nasal cannula oxygen.  She has remained stable from that point.  She continues in a sinus rhythm on telemetry with without significant ectopy.  Her blood pressure is  stable with most recent 136/74.  Negative approximately 850 mL over the past 24 hours fluid status.  She is chest pain-free.  We will check echocardiogram.  Diuretics have been placed on hold as patient appears predominant euvolemic with no peripheral edema or Jvd, and she has a new hyponatremia with sodium of 156.  Did have mildly elevated troponins from 88 to a peak of 156.  EKG is nonischemic.  Believe this to be relative to a type II myocardial infarction secondary to tachycardia as well as acute respiratory distress.  Overall patient is improving.  She is asked to be discharged home today.  I have informed the patient that we intend to keep her 1 day further to manage her hyponatremia as well as continue to monitor her cardiac status.  She is agreeable with this.  Will have echocardiogram the morning.  Possible discharge to home tomorrow 10/9/2023.  We will follow with you.    10/9: Stable overnight.  Denies complaints chest pain or pressure, palpitations with rapid heart rate.  Is breathing comfortably nasal cannula oxygen at 2 L.  No significant JVD or peripheral edema.  IV diuresis was discontinued as patient was euvolemic as well as secondary to a acute hyponatremia with a sodium of 156 yesterday.  Has improved to 149 today.  Blood pressure stable at 130/71.  Remains in a sinus rhythm on telemetry without significant ectopy.  She will go for an echocardiogram today.  Barring significant findings from echocardiogram patient will be considered stable from a cardiac perspective for discharge to home once cleared by pulmonary.  Patient should follow-up with Dr. Foss in the outpatient setting in the next 2 to 3 weeks.        I spent 30 minutes in the professional and overall care of this patient.      [place], FELA-CNP

## 2023-10-09 NOTE — CARE PLAN
Problem: PT Problem  Goal: Safety  Description: The patient will complete functional mobility with minimal Vcs for safety awareness.   10/9/2023 1609 by Josie Be, PT  Outcome: Progressing  10/9/2023 1604 by Josie Be, PT  Outcome: Progressing  Goal: Ambulation  Description: The patient will be able to ambulate at a mod indep level for >50ftx1 with LRAD.   10/9/2023 1609 by Josie Be, PT  Outcome: Progressing  10/9/2023 1604 by Josie Be, PT  Outcome: Progressing  Goal: Transfers  Description: The patient will be able to complete safe transfer of sit <> stand with minimal VC at a mod indep level.    10/9/2023 1609 by Josie Be, PT  Outcome: Progressing  10/9/2023 1604 by Josie Be, PT  Outcome: Progressing  Goal: Bed Mobility  Description: The patient will be able to complete bed mobility at a mod indep level with use of bed rails.    10/9/2023 1609 by Josie Be, PT  Outcome: Progressing  10/9/2023 1604 by Josie Be, PT  Outcome: Progressing     Problem: PT Problem  Goal: Steps  Description: The patient will be able to ascend/descend 2 and 8 steps with use of 1 rail at a mod indep level by DC.   10/9/2023 1609 by Josie Be, PT  Outcome: Not Progressing  10/9/2023 1604 by Josie Be, PT  Outcome: Not Progressing

## 2023-10-09 NOTE — PROGRESS NOTES
Physical Therapy    Physical Therapy Evaluation    Patient Name: Anitha Welch  MRN: [id number]  Today's Date: 10/9/2023   Time Calculation  Start Time: 1405  Stop Time: 1435  Time Calculation (min): 30 min    Assessment/Plan   PT Assessment  PT Assessment Results: Decreased strength, Decreased endurance, Impaired balance, Decreased mobility, Decreased coordination, Impaired judgement, Decreased safety awareness, Pain  Rehab Prognosis: Good  Evaluation/Treatment Tolerance: Patient tolerated treatment well  Medical Staff Made Aware: Yes  Strengths: Ability to acquire knowledge, Access to adaptive/assistive products, Attitude of self  Barriers to Participation: Comorbidities, Coping skills, Insight into problems  End of Session Communication: Bedside nurse  Assessment Comment: The patient is an 83 y.o. female admitted to the hospital for increasing SOB. The pt currently requires minAx1-2 for transfers and ambulation with HHA. The patient has experienced a functional decline and would continue to benefit from skilled PT services to address deficits in strength, balance, transfers, ambulation, endurance, and safety awareness. The patient would benefit from moderate intensity rehab services on DC.  End of Session Patient Position: Up in chair, Alarm on  IP OR SWING BED PT PLAN  Inpatient or Swing Bed: Inpatient  PT Plan  Treatment/Interventions: Bed mobility, Transfer training, Gait training, Stair training, Balance training, Strengthening, Endurance training, Therapeutic exercise, Therapeutic activity  PT Plan: Skilled PT  PT Frequency: 5 times per week  PT Discharge Recommendations: Moderate intensity level of continued care  PT Recommended Transfer Status: Assist x1      Subjective   General Visit Information:  General  Reason for Referral: Acute respiratory failure with hypoxia  Referred By: Dr. Arteaga  Past Medical History Relevant to Rehab: Anemia, CHF, DVT, heart disease, thyroidectomy  Family/Caregiver Present: Yes  (dtr present)  Co-Treatment: OT  Co-Treatment Reason: ICU eval  Prior to Session Communication: Bedside nurse  Patient Position Received: Bed, 2 rail up, Alarm on  Preferred Learning Style: verbal, written  General Comment: Pt is an 84 yo woman admit from home with c/o SOB.  Home Living:  Home Living  Type of Home: House  Lives With: Spouse, Adult children  Home Adaptive Equipment:  (rollator)  Home Layout: Two level  Home Access: Stairs to enter with rails  Entrance Stairs-Rails: Right  Entrance Stairs-Number of Steps: 2  Bathroom Shower/Tub: Walk-in shower  Bathroom Toilet: Standard  Bathroom Equipment: Grab bars in shower, Built-in shower seat  Prior Level of Function:  Prior Function Per Pt/Caregiver Report  Level of [place]: Independent with ADLs and functional transfers  Receives Help From: Family  ADL Assistance: Independent  Homemaking Assistance:  (son assists)  Ambulatory Assistance: Independent  Vocational: Retired  Precautions:  Precautions  Medical Precautions: Fall precautions  Precautions Comment: VC for safety awareness; bed/chair alarm donned  Vital Signs:  Vital Signs  Heart Rate: 76  Heart Rate Source: Monitor  SpO2: 97 %  BP: 120/64  MAP (mmHg): 81  Patient Position: Sitting    Objective   Pain:  Pain Assessment  Pain Assessment: 0-10  Pain Score: 2  Pain Type: Acute pain  Pain Location: Wrist  Pain Orientation: Left  Cognition:  Cognition  Overall Cognitive Status: Impaired  Arousal/Alertness: Appropriate responses to stimuli  Orientation Level: Oriented X4  Following Commands: Follows one step commands with repetition  Safety Judgment: Decreased awareness of need for assistance    General Assessments:  General Observation  General Observation: Mild confusion throughout eval; dtr present.     Activity Tolerance  Endurance: Tolerates less than 10 min exercise, no significant change in vital signs    Sensation  Sensation Comment: No apparent deficits    Postural Control  Posture Comment:  Rounded shoulders; forward head    Static Sitting Balance  Static Sitting-Balance Support: No upper extremity supported  Static Sitting-Level of Assistance: Contact guard  Static Sitting-Comment/Number of Minutes: >5    Static Standing Balance  Static Standing-Balance Support: Bilateral upper extremity supported  Static Standing-Level of Assistance: Minimum assistance  Static Standing-Comment/Number of Minutes: approx. 2 min with HHA  Functional Assessments:  Bed Mobility  Bed Mobility: Yes  Bed Mobility 1  Bed Mobility 1: Supine to sitting  Level of Assistance 1: Close supervision  Bed Mobility Comments 1: VC for safe sequencing    Transfers  Transfer: Yes  Transfer 1  Transfer From 1: Bed to  Transfer to 1: Stand  Technique 1: Sit to stand  Transfer Level of Assistance 1: Minimum assistance, Arm in arm assistance  Trials/Comments 1: VC for safe sequencing  Transfers 2  Transfer From 2: Stand to  Transfer to 2: Sit  Technique 2: Stand to sit  Transfer Level of Assistance 2: Minimum assistance, Arm in arm assistance    Ambulation/Gait Training  Ambulation/Gait Training Performed: Yes  Ambulation/Gait Training 1  Surface 1: Level tile  Device 1:  (HHA)  Assistance 1: Minimum assistance  Quality of Gait 1: Narrow base of support  Comments/Distance (ft) 1: MinAx2 for ambulation of 10ftx2 with HHA. Forward flexed posture, narrow JAZIEL, and shuffled-like gait with minimal toe clearance. VC for safety awareness during all standing activity.  Extremity/Trunk Assessments:  RLE   RLE : Within Functional Limits  LLE   LLE : Within Functional Limits  Outcome Measures:  [facility] Basic Mobility  Turning from your back to your side while in a flat bed without using bedrails: A little  Moving from lying on your back to sitting on the side of a flat bed without using bedrails: A little  Moving to and from bed to chair (including a wheelchair): A little  Standing up from a chair using your arms (e.g. wheelchair or bedside chair): A  little  To walk in hospital room: A lot  Climbing 3-5 steps with railing: A lot  Basic Mobility - Total Score: 16    Encounter Problems       Encounter Problems (Active)       Mobility       LTG - Patient will ambulate household distance indep (Progressing)       Start:  10/09/23    Expected End:  10/27/23               PT Problem       Safety (Progressing)       Start:  10/09/23    Expected End:  11/09/23       The patient will complete functional mobility with minimal Vcs for safety awareness.          Ambulation (Progressing)       Start:  10/09/23    Expected End:  11/09/23       The patient will be able to ambulate at a mod indep level for >50ftx1 with LRAD.          Transfers (Progressing)       Start:  10/09/23    Expected End:  11/09/23       The patient will be able to complete safe transfer of sit <> stand with minimal VC at a mod indep level.           Steps (Not Progressing)       Start:  10/09/23    Expected End:  11/09/23       The patient will be able to ascend/descend 2 and 8 steps with use of 1 rail at a mod indep level by DC.          Bed Mobility (Progressing)       Start:  10/09/23    Expected End:  11/09/23       The patient will be able to complete bed mobility at a mod indep level with use of bed rails.              Pain - Adult          Transfers       LTG - Patient will demonstrate safe transfer techniques mod ind (Progressing)       Start:  10/09/23    Expected End:  10/27/23                   Education Documentation  Precautions, taught by Josie Be PT at 10/9/2023  4:05 PM.  Learner: Patient  Readiness: Acceptance  Method: Explanation  Response: Verbalizes Understanding    Mobility Training, taught by Josie Be PT at 10/9/2023  4:05 PM.  Learner: Patient  Readiness: Acceptance  Method: Explanation  Response: Verbalizes Understanding    Education Comments  No comments found.

## 2023-10-09 NOTE — CARE PLAN
The patient's goals for the shift include wean oxygen as tolerated/increase activity level.    The clinical goals for the shift include maintain oxygen saturation/increase physical activity.     Over the shift, the patient did make progress toward the following goals. Barriers to progression include periods of confusion. Recommendations to address these barriers include maintain patient safety using bed/chair alarms/frequent rounding.

## 2023-10-10 ENCOUNTER — HOSPITAL ENCOUNTER (OUTPATIENT)
Dept: CARDIOLOGY | Facility: HOSPITAL | Age: 83
Discharge: HOME | DRG: 280 | End: 2023-10-10
Payer: MEDICARE

## 2023-10-10 ENCOUNTER — HOME HEALTH ADMISSION (OUTPATIENT)
Dept: HOME HEALTH SERVICES | Facility: HOME HEALTH | Age: 83
End: 2023-10-10
Payer: MEDICARE

## 2023-10-10 VITALS
HEIGHT: 64 IN | RESPIRATION RATE: 16 BRPM | SYSTOLIC BLOOD PRESSURE: 115 MMHG | HEART RATE: 77 BPM | OXYGEN SATURATION: 96 % | WEIGHT: 140.43 LBS | DIASTOLIC BLOOD PRESSURE: 53 MMHG | TEMPERATURE: 97.9 F | BODY MASS INDEX: 23.98 KG/M2

## 2023-10-10 LAB
ALBUMIN SERPL-MCNC: 3.3 G/DL (ref 3.5–5)
ANION GAP SERPL CALC-SCNC: 10 MMOL/L
ATRIAL RATE: 121 BPM
BUN SERPL-MCNC: 24 MG/DL (ref 8–25)
CALCIUM SERPL-MCNC: 8.4 MG/DL (ref 8.5–10.4)
CHLORIDE SERPL-SCNC: 105 MMOL/L (ref 97–107)
CO2 SERPL-SCNC: 33 MMOL/L (ref 24–31)
CREAT SERPL-MCNC: 0.9 MG/DL (ref 0.4–1.6)
ERYTHROCYTE [DISTWIDTH] IN BLOOD BY AUTOMATED COUNT: 14.6 % (ref 11.5–14.5)
GFR SERPL CREATININE-BSD FRML MDRD: 63 ML/MIN/1.73M*2
GLUCOSE SERPL-MCNC: 110 MG/DL (ref 65–99)
HCT VFR BLD AUTO: 36.6 % (ref 36–46)
HGB BLD-MCNC: 11.2 G/DL (ref 12–16)
MCH RBC QN AUTO: 29.1 PG (ref 26–34)
MCHC RBC AUTO-ENTMCNC: 30.6 G/DL (ref 32–36)
MCV RBC AUTO: 95 FL (ref 80–100)
NRBC BLD-RTO: 0 /100 WBCS (ref 0–0)
P AXIS: 88 DEGREES
P OFFSET: 195 MS
P ONSET: 134 MS
PHOSPHATE SERPL-MCNC: 2.7 MG/DL (ref 2.5–4.5)
PLATELET # BLD AUTO: 130 X10*3/UL (ref 150–450)
PMV BLD AUTO: 11.9 FL (ref 7.5–11.5)
POTASSIUM SERPL-SCNC: 3.5 MMOL/L (ref 3.4–5.1)
PR INTERVAL: 146 MS
Q ONSET: 207 MS
QRS COUNT: 20 BEATS
QRS DURATION: 108 MS
QT INTERVAL: 334 MS
QTC CALCULATION(BAZETT): 474 MS
QTC FREDERICIA: 421 MS
R AXIS: -54 DEGREES
RBC # BLD AUTO: 3.85 X10*6/UL (ref 4–5.2)
SODIUM SERPL-SCNC: 148 MMOL/L (ref 133–145)
T AXIS: 80 DEGREES
T OFFSET: 374 MS
VENTRICULAR RATE: 121 BPM
WBC # BLD AUTO: 6 X10*3/UL (ref 4.4–11.3)

## 2023-10-10 PROCEDURE — 2500000004 HC RX 250 GENERAL PHARMACY W/ HCPCS (ALT 636 FOR OP/ED): Performed by: INTERNAL MEDICINE

## 2023-10-10 PROCEDURE — 80069 RENAL FUNCTION PANEL: CPT | Performed by: HOSPITALIST

## 2023-10-10 PROCEDURE — 36415 COLL VENOUS BLD VENIPUNCTURE: CPT | Performed by: HOSPITALIST

## 2023-10-10 PROCEDURE — 97530 THERAPEUTIC ACTIVITIES: CPT | Mod: GP

## 2023-10-10 PROCEDURE — 2500000001 HC RX 250 WO HCPCS SELF ADMINISTERED DRUGS (ALT 637 FOR MEDICARE OP): Performed by: INTERNAL MEDICINE

## 2023-10-10 PROCEDURE — 93005 ELECTROCARDIOGRAM TRACING: CPT

## 2023-10-10 PROCEDURE — 97116 GAIT TRAINING THERAPY: CPT | Mod: GP

## 2023-10-10 PROCEDURE — 36415 COLL VENOUS BLD VENIPUNCTURE: CPT | Performed by: INTERNAL MEDICINE

## 2023-10-10 PROCEDURE — 85027 COMPLETE CBC AUTOMATED: CPT | Performed by: HOSPITALIST

## 2023-10-10 RX ORDER — LEVOTHYROXINE SODIUM 100 UG/1
100 TABLET ORAL
Start: 2023-10-11 | End: 2024-02-16 | Stop reason: HOSPADM

## 2023-10-10 RX ORDER — PANTOPRAZOLE SODIUM 40 MG/1
40 TABLET, DELAYED RELEASE ORAL
Start: 2023-10-11 | End: 2023-11-27

## 2023-10-10 RX ORDER — LOSARTAN POTASSIUM 25 MG/1
25 TABLET ORAL DAILY
Start: 2023-10-11 | End: 2023-11-27 | Stop reason: HOSPADM

## 2023-10-10 RX ORDER — TIZANIDINE 2 MG/1
2 TABLET ORAL EVERY 8 HOURS PRN
Qty: 30 TABLET | Refills: 0 | Status: SHIPPED | OUTPATIENT
Start: 2023-10-10

## 2023-10-10 RX ORDER — LOSARTAN POTASSIUM 25 MG/1
25 TABLET ORAL DAILY
Status: DISCONTINUED | OUTPATIENT
Start: 2023-10-10 | End: 2023-10-10 | Stop reason: HOSPADM

## 2023-10-10 RX ORDER — CARVEDILOL 12.5 MG/1
12.5 TABLET ORAL
Status: DISCONTINUED | OUTPATIENT
Start: 2023-10-10 | End: 2023-10-10 | Stop reason: HOSPADM

## 2023-10-10 RX ADMIN — LEVOTHYROXINE SODIUM 100 MCG: 0.1 TABLET ORAL at 06:06

## 2023-10-10 RX ADMIN — Medication 2 L/MIN: at 15:47

## 2023-10-10 RX ADMIN — PANTOPRAZOLE SODIUM 40 MG: 40 TABLET, DELAYED RELEASE ORAL at 06:06

## 2023-10-10 RX ADMIN — DOCUSATE SODIUM 100 MG: 100 CAPSULE, LIQUID FILLED ORAL at 09:59

## 2023-10-10 RX ADMIN — LOSARTAN POTASSIUM 25 MG: 25 TABLET, FILM COATED ORAL at 09:59

## 2023-10-10 RX ADMIN — Medication 3 L/MIN: at 00:37

## 2023-10-10 RX ADMIN — CARVEDILOL 12.5 MG: 12.5 TABLET, FILM COATED ORAL at 09:59

## 2023-10-10 RX ADMIN — Medication 2 L/MIN: at 08:33

## 2023-10-10 SDOH — ECONOMIC STABILITY: FOOD INSECURITY: WITHIN THE PAST 12 MONTHS, THE FOOD YOU BOUGHT JUST DIDN'T LAST AND YOU DIDN'T HAVE MONEY TO GET MORE.: NEVER TRUE

## 2023-10-10 SDOH — ECONOMIC STABILITY: INCOME INSECURITY: HOW HARD IS IT FOR YOU TO PAY FOR THE VERY BASICS LIKE FOOD, HOUSING, MEDICAL CARE, AND HEATING?: NOT HARD AT ALL

## 2023-10-10 SDOH — ECONOMIC STABILITY: HOUSING INSECURITY
IN THE LAST 12 MONTHS, WAS THERE A TIME WHEN YOU DID NOT HAVE A STEADY PLACE TO SLEEP OR SLEPT IN A SHELTER (INCLUDING NOW)?: NO

## 2023-10-10 SDOH — SOCIAL STABILITY: SOCIAL NETWORK: HOW OFTEN DO YOU ATTEND CHURCH OR RELIGIOUS SERVICES?: NEVER

## 2023-10-10 SDOH — HEALTH STABILITY: PHYSICAL HEALTH: ON AVERAGE, HOW MANY MINUTES DO YOU ENGAGE IN EXERCISE AT THIS LEVEL?: 0 MIN

## 2023-10-10 SDOH — SOCIAL STABILITY: SOCIAL NETWORK: HOW OFTEN DO YOU GET TOGETHER WITH FRIENDS OR RELATIVES?: MORE THAN THREE TIMES A WEEK

## 2023-10-10 SDOH — SOCIAL STABILITY: SOCIAL INSECURITY: WITHIN THE LAST YEAR, HAVE YOU BEEN HUMILIATED OR EMOTIONALLY ABUSED IN OTHER WAYS BY YOUR PARTNER OR EX-PARTNER?: NO

## 2023-10-10 SDOH — SOCIAL STABILITY: SOCIAL INSECURITY
WITHIN THE LAST YEAR, HAVE YOU BEEN KICKED, HIT, SLAPPED, OR OTHERWISE PHYSICALLY HURT BY YOUR PARTNER OR EX-PARTNER?: NO

## 2023-10-10 SDOH — SOCIAL STABILITY: SOCIAL NETWORK
IN A TYPICAL WEEK, HOW MANY TIMES DO YOU TALK ON THE PHONE WITH FAMILY, FRIENDS, OR NEIGHBORS?: MORE THAN THREE TIMES A WEEK

## 2023-10-10 SDOH — ECONOMIC STABILITY: TRANSPORTATION INSECURITY
IN THE PAST 12 MONTHS, HAS LACK OF TRANSPORTATION KEPT YOU FROM MEETINGS, WORK, OR FROM GETTING THINGS NEEDED FOR DAILY LIVING?: NO

## 2023-10-10 SDOH — ECONOMIC STABILITY: INCOME INSECURITY: IN THE PAST 12 MONTHS, HAS THE ELECTRIC, GAS, OIL, OR WATER COMPANY THREATENED TO SHUT OFF SERVICE IN YOUR HOME?: NO

## 2023-10-10 SDOH — SOCIAL STABILITY: SOCIAL NETWORK
DO YOU BELONG TO ANY CLUBS OR ORGANIZATIONS SUCH AS CHURCH GROUPS UNIONS, FRATERNAL OR ATHLETIC GROUPS, OR SCHOOL GROUPS?: NO

## 2023-10-10 SDOH — ECONOMIC STABILITY: INCOME INSECURITY: IN THE LAST 12 MONTHS, WAS THERE A TIME WHEN YOU WERE NOT ABLE TO PAY THE MORTGAGE OR RENT ON TIME?: NO

## 2023-10-10 SDOH — SOCIAL STABILITY: SOCIAL INSECURITY: WITHIN THE LAST YEAR, HAVE YOU BEEN AFRAID OF YOUR PARTNER OR EX-PARTNER?: NO

## 2023-10-10 SDOH — SOCIAL STABILITY: SOCIAL INSECURITY
WITHIN THE LAST YEAR, HAVE TO BEEN RAPED OR FORCED TO HAVE ANY KIND OF SEXUAL ACTIVITY BY YOUR PARTNER OR EX-PARTNER?: NO

## 2023-10-10 SDOH — HEALTH STABILITY: MENTAL HEALTH: HOW OFTEN DO YOU HAVE 6 OR MORE DRINKS ON ONE OCCASION?: NEVER

## 2023-10-10 SDOH — HEALTH STABILITY: MENTAL HEALTH: HOW OFTEN DO YOU HAVE A DRINK CONTAINING ALCOHOL?: NEVER

## 2023-10-10 SDOH — SOCIAL STABILITY: SOCIAL NETWORK: ARE YOU MARRIED, WIDOWED, DIVORCED, SEPARATED, NEVER MARRIED, OR LIVING WITH A PARTNER?: MARRIED

## 2023-10-10 SDOH — HEALTH STABILITY: MENTAL HEALTH
STRESS IS WHEN SOMEONE FEELS TENSE, NERVOUS, ANXIOUS, OR CAN'T SLEEP AT NIGHT BECAUSE THEIR MIND IS TROUBLED. HOW STRESSED ARE YOU?: ONLY A LITTLE

## 2023-10-10 SDOH — SOCIAL STABILITY: SOCIAL NETWORK: HOW OFTEN DO YOU ATTENT MEETINGS OF THE CLUB OR ORGANIZATION YOU BELONG TO?: NEVER

## 2023-10-10 SDOH — ECONOMIC STABILITY: FOOD INSECURITY: WITHIN THE PAST 12 MONTHS, YOU WORRIED THAT YOUR FOOD WOULD RUN OUT BEFORE YOU GOT MONEY TO BUY MORE.: NEVER TRUE

## 2023-10-10 SDOH — HEALTH STABILITY: MENTAL HEALTH: HOW MANY STANDARD DRINKS CONTAINING ALCOHOL DO YOU HAVE ON A TYPICAL DAY?: PATIENT DOES NOT DRINK

## 2023-10-10 SDOH — HEALTH STABILITY: PHYSICAL HEALTH: ON AVERAGE, HOW MANY DAYS PER WEEK DO YOU ENGAGE IN MODERATE TO STRENUOUS EXERCISE (LIKE A BRISK WALK)?: 0 DAYS

## 2023-10-10 SDOH — ECONOMIC STABILITY: HOUSING INSECURITY: IN THE LAST 12 MONTHS, HOW MANY PLACES HAVE YOU LIVED?: 1

## 2023-10-10 SDOH — ECONOMIC STABILITY: TRANSPORTATION INSECURITY
IN THE PAST 12 MONTHS, HAS THE LACK OF TRANSPORTATION KEPT YOU FROM MEDICAL APPOINTMENTS OR FROM GETTING MEDICATIONS?: NO

## 2023-10-10 ASSESSMENT — PAIN SCALES - GENERAL
PAINLEVEL_OUTOF10: 0 - NO PAIN

## 2023-10-10 ASSESSMENT — COGNITIVE AND FUNCTIONAL STATUS - GENERAL
CLIMB 3 TO 5 STEPS WITH RAILING: A LITTLE
MOBILITY SCORE: 22
MOVING TO AND FROM BED TO CHAIR: A LITTLE
TURNING FROM BACK TO SIDE WHILE IN FLAT BAD: A LITTLE
MOBILITY SCORE: 19
DRESSING REGULAR LOWER BODY CLOTHING: A LITTLE
CLIMB 3 TO 5 STEPS WITH RAILING: A LOT
DAILY ACTIVITIY SCORE: 21
TOILETING: A LITTLE
WALKING IN HOSPITAL ROOM: A LITTLE
WALKING IN HOSPITAL ROOM: A LITTLE
HELP NEEDED FOR BATHING: A LITTLE

## 2023-10-10 ASSESSMENT — LIFESTYLE VARIABLES
AUDIT-C TOTAL SCORE: 0
SKIP TO QUESTIONS 9-10: 1

## 2023-10-10 ASSESSMENT — PAIN - FUNCTIONAL ASSESSMENT
PAIN_FUNCTIONAL_ASSESSMENT: 0-10

## 2023-10-10 NOTE — NURSING NOTE
Patient discharged. Iv out, tele off, o2 tank and belongings sent with family. HHC and oxygen set up for discharge.

## 2023-10-10 NOTE — PROGRESS NOTES
Anitha Díaz is a 83 y.o. female on day 3 of admission presenting with Acute respiratory failure with hypoxia (CMS/HCC).      Subjective   Unhappy about transfer out of ICU but wants to go home to attend to dying son.    Objective     Last Recorded Vitals  /64 (BP Location: Right arm, Patient Position: Lying)   Pulse 79   Temp 36.7 °C (98.1 °F) (Oral)   Resp 16   Wt 63.7 kg (140 lb 6.9 oz)   SpO2 96%   Intake/Output last 3 Shifts:    Intake/Output Summary (Last 24 hours) at 10/10/2023 1124  Last data filed at 10/10/2023 0600  Gross per 24 hour   Intake 462 ml   Output 801 ml   Net -339 ml         Admission Weight  Weight: 61.8 kg (136 lb 3.9 oz) (10/07/23 1358)    Daily Weight  10/10/23 : 63.7 kg (140 lb 6.9 oz)    Image Results  Transthoracic Echo (TTE) [place] [address]             Phone [phone]    TRANSTHORACIC ECHOCARDIOGRAM REPORT       Patient Name:     ANITHA DÍAZ     Reading Physician:  [id number] Derrick Gallardo DO  Study Date:       10/9/2023          Ordering Provider:  [id number] JAMES WATTS  MRN/PID:          [id number]           Fellow:  Accession#:       [id number]       Nurse:  Date of           1940 / 83      Sonographer:        Eber Horvath RDCS  Birth/Age:        years  Gender:           F                  Additional Staff:  Height:           162.00 cm          Admit Date:  Weight:           62.99 kg           Admission Status:   Inpatient - Routine  BSA:              1.67 m2            Department          [facility] ICU                                       Location:  Blood Pressure: 132 /55 mmHg    Study Type:    TRANSTHORACIC ECHO (TTE) COMPLETE  Diagnosis/ICD: Other cardiac sounds-R01.2  Indication:    Murmur / Respiratory failure  CPT Codes:     Echo Complete w Full Doppler-[id number]    Patient History:  Pertinent History: CAD, Chest Pain, CHF, CVA,  Dyspnea, HTN, Hyperlipidemia,                     Murmur, LE Edema and Syncope. Renal dx III, MI, PCI, PVD,                     AAA.    Study Detail: The following Echo studies were performed: 2D, M-Mode, Doppler and                color flow.       PHYSICIAN INTERPRETATION:  Left Ventricle: Left ventricular systolic function is moderately decreased, with an estimated ejection fraction of 40-45%. There are multiple wall motion abnormalities. The left ventricular cavity size is mildly dilated. There is mild concentric left ventricular hypertrophy. Spectral Doppler shows a normal pattern of left ventricular diastolic filling. Inferior wall moderate hypokinesis and apical hypokinesis.  Left Atrium: The left atrium is mildly dilated.  Right Ventricle: The right ventricle is normal in size. There is normal right ventricular global systolic function.  Right Atrium: The right atrium is normal in size.  Aortic Valve: The aortic valve appears abnormal. There is moderate aortic valve cusp calcification. There is mild aortic valve regurgitation. The peak instantaneous gradient of the aortic valve is 7.4 mmHg. The mean gradient of the aortic valve is 3.0 mmHg.  Mitral Valve: The mitral valve is abnormal. There is moderate to severe mitral valve regurgitation which is posteriorly directed.  Tricuspid Valve: The tricuspid valve is structurally normal. There is mild tricuspid regurgitation. The Doppler estimated RVSP is moderately elevated at 44.7 mmHg.  Pulmonic Valve: The pulmonic valve is structurally normal. There is no indication of pulmonic valve regurgitation.  Pericardium: There is no pericardial effusion noted.  Aorta: The aortic root is normal.       CONCLUSIONS:   1. Left ventricular systolic function is moderately decreased with a 40-45% estimated ejection fraction.   2. Inferior wall moderate hypokinesis and apical hypokinesis.   3. Moderate to severe mitral valve regurgitation.   4. Moderately elevated right  ventricular systolic pressure.   5. Aortic valve appears abnormal.   6. There is moderate aortic valve cusp calcification.   7. Mild aortic valve regurgitation.   8. There are multiple wall motion abnormalities.    QUANTITATIVE DATA SUMMARY:  2D MEASUREMENTS:                            Normal Ranges:  LAs:           4.00 cm    (2.7-4.0cm)  IVSd:          1.18 cm    (0.6-1.1cm)  LVPWd:         1.12 cm    (0.6-1.1cm)  LVIDd:         6.02 cm    (3.9-5.9cm)  LVIDs:         4.69 cm  LV Mass Index: 178.5 g/m2  LV % FS        22.1 %    LA VOLUME:                              Normal Ranges:  LA Volume Index: 68.3 ml/m2    RA VOLUME BY A/L METHOD:                        Normal Ranges:  RA Area A4C: 21.0 cm2    M-MODE MEASUREMENTS:                   Normal Ranges:  Ao Root: 2.90 cm (2.0-3.7cm)  LAs:     4.40 cm (2.7-4.0cm)    AORTA MEASUREMENTS:                     Normal Ranges:  Asc Ao, d: 3.20 cm (2.1-3.4cm)    LV SYSTOLIC FUNCTION BY 2D PLANIMETRY (MOD):                      Normal Ranges:  EF-A4C View: 42.7 % (>=55%)  EF-A2C View: 44.3 %  EF-Biplane:  42.9 %    LV DIASTOLIC FUNCTION:                         Normal Ranges:  MV Peak E:    1.05 m/s (0.7-1.2 m/s)  MV Peak A:    0.97 m/s (0.42-0.7 m/s)  E/A Ratio:    1.08     (1.0-2.2)  MV e'         0.04 m/s (>8.0)  MV lateral e' 0.04 m/s  MV medial e'  0.04 m/s  E/e' Ratio:   26.25    (<8.0)    MITRAL VALVE:                         Normal Ranges:  MV Vmax:    6.03 m/s   (<=1.3m/s)  MV peak P.4 mmHg (<5mmHg)  MV mean P.0 mmHg  (<48mmHg)  MV DT:      116 msec   (150-240msec)    MITRAL INSUFFICIENCY:                            Normal Ranges:  PISA Radius:  0.9 cm  MR Vmax:      34.70 cm/s  MR Alias Jamal: 38.5 cm/s  MR Flow Rt:   195.94 ml/s  MR EROA:      5.65 cm2    AORTIC VALVE:                                    Normal Ranges:  AoV Vmax:                1.36 m/s (<=1.7m/s)  AoV Peak P.4 mmHg (<20mmHg)  AoV Mean PG:             3.0 mmHg  (1.7-11.5mmHg)  LVOT Max Jamal:            0.98 m/s (<=1.1m/s)  AoV VTI:                 23.10 cm (18-25cm)  LVOT VTI:                18.40 cm  LVOT Diameter:           2.00 cm  (1.8-2.4cm)  AoV Area, VTI:           2.50 cm2 (2.5-5.5cm2)  AoV Area,Vmax:           2.26 cm2 (2.5-4.5cm2)  AoV Dimensionless Index: 0.80       RIGHT VENTRICLE:  RV Basal 4.35 cm  RV Mid   3.10 cm  RV Major 6.4 cm  TAPSE:   25.4 mm  RV s'    0.11 m/s    TRICUSPID VALVE/RVSP:                              Normal Ranges:  Peak TR Velocity: 3.03 m/s  RV Syst Pressure: 44.7 mmHg (< 30mmHg)  IVC Diam:         2.51 cm    PULMONIC VALVE:                       Normal Ranges:  PV Max Jamal: 0.8 m/s  (0.6-0.9m/s)  PV Max P.7 mmHg       [id number] Derrick Gallardo DO  Electronically signed on 10/9/2023 at 4:59:06 PM       ** Final **  XR chest 1 view  Narrative: Interpreted By:  Jose Rodriguez,   STUDY:  XR CHEST 1 VIEW; 10/9/2023 11:49 am      INDICATION:  CLINICAL INFORMATION: Signs/Symptoms:resp failure. Hypoxia      COMPARISON:  10/07/2023      ACCESSION NUMBER(S):  [id number]      ORDERING CLINICIAN:  DANNI AN      TECHNIQUE:  Portable chest.      FINDINGS:  The cardiac silhouette is quite prominent suggesting cardiomegaly.  The pulmonary vasculature is slightly indistinct suggesting mild  pulmonary vascular congestion.  Small bilateral effusions are  present. Right basilar atelectasis is present. No alveolar  consolidation is noted. Surgical clips are identified at the base of  the neck bilaterally.      Impression: There is partial clearing of the findings of CHF when compared to the  previous study from 10/07/2023.      MACRO:  none      Signed by: Jose Rodriguez 10/9/2023 11:58 AM  Dictation workstation:   [id number]      Physical Exam  General: alert, no diaphoresis   Lungs: bibasilar rales   Heart: RRR, no LE edema BL   GI: abdomen soft, nontender, nondistended, BS present   MSK: no joint effusion or deformity   Skin: no rashes, erythema, or  ecchymosis   Neuro: grossly normal cognition, motor strength, sensation      Relevant Results    Results for orders placed or performed during the hospital encounter of 10/07/23 (from the past 24 hour(s))   Renal Function Panel   Result Value Ref Range    Glucose 110 (H) 65 - 99 mg/dL    Sodium 148 (H) 133 - 145 mmol/L    Potassium 3.5 3.4 - 5.1 mmol/L    Chloride 105 97 - 107 mmol/L    Bicarbonate 33 (H) 24 - 31 mmol/L    Urea Nitrogen 24 8 - 25 mg/dL    Creatinine 0.90 0.40 - 1.60 mg/dL    eGFR 63 >60 mL/min/1.73m*2    Calcium 8.4 (L) 8.5 - 10.4 mg/dL    Phosphorus 2.7 2.5 - 4.5 mg/dL    Albumin 3.3 (L) 3.5 - 5.0 g/dL    Anion Gap 10 <=19 mmol/L   CBC   Result Value Ref Range    WBC 6.0 4.4 - 11.3 x10*3/uL    nRBC 0.0 0.0 - 0.0 /100 WBCs    RBC 3.85 (L) 4.00 - 5.20 x10*6/uL    Hemoglobin 11.2 (L) 12.0 - 16.0 g/dL    Hematocrit 36.6 36.0 - 46.0 %    MCV 95 80 - 100 fL    MCH 29.1 26.0 - 34.0 pg    MCHC 30.6 (L) 32.0 - 36.0 g/dL    RDW 14.6 (H) 11.5 - 14.5 %    Platelets 130 (L) 150 - 450 x10*3/uL    MPV 11.9 (H) 7.5 - 11.5 fL       Scheduled medications  atorvastatin, 40 mg, oral, Nightly  carvedilol, 12.5 mg, oral, BID with meals  docusate sodium, 100 mg, oral, BID  enoxaparin, 40 mg, subcutaneous, q24h  levothyroxine, 100 mcg, oral, Daily  losartan, 25 mg, oral, Daily  oxygen, , inhalation, q8h  pantoprazole, 40 mg, oral, Daily before breakfast      Continuous medications     PRN medications  PRN medications: acetaminophen **OR** [DISCONTINUED] acetaminophen **OR** [DISCONTINUED] acetaminophen, ipratropium-albuteroL, oxygen, tiZANidine    Results for orders placed or performed during the hospital encounter of 10/07/23 (from the past 24 hour(s))   Renal Function Panel   Result Value Ref Range    Glucose 110 (H) 65 - 99 mg/dL    Sodium 148 (H) 133 - 145 mmol/L    Potassium 3.5 3.4 - 5.1 mmol/L    Chloride 105 97 - 107 mmol/L    Bicarbonate 33 (H) 24 - 31 mmol/L    Urea Nitrogen 24 8 - 25 mg/dL    Creatinine 0.90  0.40 - 1.60 mg/dL    eGFR 63 >60 mL/min/1.73m*2    Calcium 8.4 (L) 8.5 - 10.4 mg/dL    Phosphorus 2.7 2.5 - 4.5 mg/dL    Albumin 3.3 (L) 3.5 - 5.0 g/dL    Anion Gap 10 <=19 mmol/L   CBC   Result Value Ref Range    WBC 6.0 4.4 - 11.3 x10*3/uL    nRBC 0.0 0.0 - 0.0 /100 WBCs    RBC 3.85 (L) 4.00 - 5.20 x10*6/uL    Hemoglobin 11.2 (L) 12.0 - 16.0 g/dL    Hematocrit 36.6 36.0 - 46.0 %    MCV 95 80 - 100 fL    MCH 29.1 26.0 - 34.0 pg    MCHC 30.6 (L) 32.0 - 36.0 g/dL    RDW 14.6 (H) 11.5 - 14.5 %    Platelets 130 (L) 150 - 450 x10*3/uL    MPV 11.9 (H) 7.5 - 11.5 fL                Assessment/Plan   This patient currently has cardiac telemetry ordered; if you would like to modify or discontinue the telemetry order, click here to go to the orders activity to modify/discontinue the order.            Principal Problem:    Acute respiratory failure with hypoxia (CMS/HCC)  Active Problems:    Acute on chronic systolic heart failure (CMS/HCC)    Benign essential hypertension    Mixed hyperlipidemia    Acquired hypothyroidism    Acute respiratory failure with hypoxia  Acute on chronic systolic heart failure  -X-ray consistent with pulmonary edema, proBNP elevated to 6000, symptoms consistent with acute on chronic systolic heart failure.  Patient was admitted in February 2023 with the same problem.  At that time her proBNP was only 1100.  He was requiring 4 L of nasal cannula at that time.  - stat abg requested and done, although appears results were never released to the system  -Echo ordered  -Consult pulm and cardiology. Discussed with both  -now on NC though still short of breath. Okay for SDU.   -lasix on hold as her sodium level has worsened, will recheck prior to giving more  - continue beta blocker and losartan  -will start duonebs per patient's request     NSTEMI  -Likely type II from respiratory distress.  Troponins went from 80-90.      Hypernatremia  -worsened today. Rechecking to ensure validity. If sodium is truly  this high, we may need to ask nephrology for assistance with diuresis     Elevated ALT and AST  -Suspect from vascular congestion from acute heart failure.  Will monitor with morning labs.     Leukocytosis  -resolved     Hypertension  -Initially presenting with hypertensive emergency.  Between BiPAP and diuresis, blood pressure is coming down well.  Continue patient's home beta-blocker                 Braeden Arteaga MD      Addendum: 1253 pm  Patient had an episode of shortness of breath.  Associated chest pain.  Blood pressure increased to systolic 150.  He was placed back on BiPAP.  Starting to feel better and chest pain is resolving.  Sheldon sodium came back at 150.  She still remains in a fluid overloaded state.  We will consult nephrology to help us with diuretic management.      10/9  Await Nephrology, Pulmonary, Cardiology recommendations    10/10  Discharge planning  Home O2 certification for discharge

## 2023-10-10 NOTE — CARE PLAN
Problem: PT Problem  Goal: Steps  Description: The patient will be able to ascend/descend 2 and 8 steps with use of 1 rail at a mod indep level by DC.   Outcome: Not Progressing     Problem: PT Problem  Goal: Safety  Description: The patient will complete functional mobility with minimal Vcs for safety awareness.   Outcome: Progressing  Goal: Ambulation  Description: The patient will be able to ambulate at a mod indep level for >50ftx1 with LRAD.   Outcome: Progressing  Goal: Transfers  Description: The patient will be able to complete safe transfer of sit <> stand with minimal VC at a mod indep level.    Outcome: Progressing  Goal: Bed Mobility  Description: The patient will be able to complete bed mobility at a mod indep level with use of bed rails.    Outcome: Progressing     Problem: PT Problem  Goal: Steps  Description: The patient will be able to ascend/descend 2 and 8 steps with use of 1 rail at a mod indep level by DC.   Outcome: Not Progressing

## 2023-10-10 NOTE — PROGRESS NOTES
Spiritual Care Visit    Clinical Encounter Type  Visited With: Patient  Routine Visit: Follow-up  Continue Visiting: Yes         Values/Beliefs  Spiritual Requests During Hospitalization: Communion today    Sacramental Encounters  Communion: Patient wants communion  Communion Given Indicator: Yes     Donnell Quintanilla

## 2023-10-10 NOTE — PROGRESS NOTES
"Anitha Welch is a 83 y.o. female on day 3 of admission presenting with Acute respiratory failure with hypoxia (CMS/HCC).    Subjective   ***       Objective     Physical Exam    Last Recorded Vitals  Blood pressure 131/64, pulse 79, temperature 36.7 °C (98.1 °F), temperature source Oral, resp. rate 16, height 1.626 m (5' 4.02\"), weight 63.7 kg (140 lb 6.9 oz), SpO2 96 %.  Intake/Output last 3 Shifts:  I/O last 3 completed shifts:  In: 702 (11 mL/kg) [P.O.:702]  Out: 801 (12.6 mL/kg) [Urine:800 (0.3 mL/kg/hr); Stool:1]  Weight: 63.7 kg     Relevant Results  {If you would like to pull in Medications, type .meds     If you would like to pull in Lab results for the last 24 hours, type .[id number]    If you would like to pull in Imaging results, type .[id number] :99}    {Link to Stroke Scoring tools - Link :99}     This patient currently has cardiac telemetry ordered; if you would like to modify or discontinue the telemetry order, click here to go to the orders activity to modify/discontinue the order.                 Assessment/Plan   Principal Problem:    Acute respiratory failure with hypoxia (CMS/HCC)  Active Problems:    Acute on chronic systolic heart failure (CMS/HCC)    Benign essential hypertension    Mixed hyperlipidemia    Acquired hypothyroidism    ***     {This patient does not have an ACP note on file for this encounter, please fill one out - Advance Care Planning Activity :99}      Angelica Laguerre RN      "

## 2023-10-10 NOTE — PROGRESS NOTES
Anitha Welch is a 83 y.o. female on day 3 of admission presenting with Acute respiratory failure with hypoxia (CMS/HCC).    Provided a SNF list.  Patient refuses SNF but will do C for PT assist.     Would like [facility] - will inform Dr Arteaga.      Angelica Laguerre RN

## 2023-10-10 NOTE — PROGRESS NOTES
Home O2 Certification per verbal order from Dr. Arteaga:    On RA at rest, pt's SPO2 dropped to 86% and she was placed on NC 2L which increased her SPO2   To 93%.     During exertion on RA, pt's SPO2 remained at 93% or greater, no complications.     Jos Doyle, RRT

## 2023-10-10 NOTE — NURSING NOTE
Report called to SDU nurse Cara. Patient will be transported to SDU bed 1a. Patient updated on move to another unit. All belongings accounted.

## 2023-10-10 NOTE — CARE PLAN
The patient's goals for the shift include proper rest for the shift    The clinical goals for the shift include to get home to her son    Over the shift, the patient did not make progress toward the following goals. Barriers to progression include oxygen demands. Recommendations to address these barriers include continue use of incentive spirometry and wean oxygen as ordered/appropriate.

## 2023-10-10 NOTE — PROGRESS NOTES
Acute Assessment   10/10/23 1053   Discharge Planning   Living Arrangements Spouse/significant other   Support Systems Spouse/significant other;Children;Friends/neighbors   Assistance Needed friend comes to assist x2 days   Type of Residence Private residence   Number of Stairs to Enter Residence 2   Number of Stairs Within Residence 2   Do you have animals or pets at home? No   Who is requesting discharge planning? Patient   Home or Post Acute Services In home services   Type of Home Care Services Home OT;Home PT   Patient expects to be discharged to: home   Does the patient need discharge transport arranged? No     PCP  Dr Dupree  Pharm    [facility] on [address]

## 2023-10-10 NOTE — DISCHARGE SUMMARY
Discharge Diagnosis  Acute respiratory failure with hypoxia (CMS/HCC)    Issues Requiring Follow-Up  Home o2    Discharge Meds     Your medication list        CHANGE how you take these medications        Instructions Last Dose Given Next Dose Due   levothyroxine 125 mcg tablet  Commonly known as: Synthroid, Levoxyl  What changed: Another medication with the same name was added. Make sure you understand how and when to take each.           levothyroxine 100 mcg tablet  Commonly known as: Synthroid, Levoxyl  Start taking on: October 11, 2023  What changed: You were already taking a medication with the same name, and this prescription was added. Make sure you understand how and when to take each.      Take 1 tablet (100 mcg) by mouth early in the morning.. Do not start before October 11, 2023.       pantoprazole 40 mg EC tablet  Commonly known as: ProtoNix  Start taking on: October 11, 2023  What changed: See the new instructions.      Take 1 tablet (40 mg) by mouth once daily in the morning. Take before meals. Do not crush, chew, or split. Do not start before October 11, 2023.       tiZANidine 2 mg tablet  Commonly known as: Zanaflex  What changed: See the new instructions.      Take 1 tablet (2 mg) by mouth every 8 hours if needed for muscle spasms.              CONTINUE taking these medications        Instructions Last Dose Given Next Dose Due   albuterol 90 mcg/actuation inhaler           aspirin 81 mg EC tablet           atorvastatin 40 mg tablet  Commonly known as: Lipitor           carvedilol 12.5 mg tablet  Commonly known as: Coreg           fluticasone 50 mcg/actuation nasal spray  Commonly known as: Flonase           furosemide 40 mg tablet  Commonly known as: Lasix           loratadine 10 mg tablet  Commonly known as: Claritin           losartan 25 mg tablet  Commonly known as: Cozaar  Start taking on: October 11, 2023      Take 1 tablet (25 mg) by mouth once daily. Do not start before October 11, 2023.        Multi Complete with Iron tablet  Generic drug: multivitamin with minerals           nitroglycerin 0.4 mg SL tablet  Commonly known as: Nitrostat           PreserVision AREDS 2,148 mcg-113 mg-45 mg-17.4mg tablet  Generic drug: vitamins A,C,E-zinc-copper           Vitamin B-12 1,000 mcg tablet  Generic drug: cyanocobalamin                  STOP taking these medications      lidocaine 5 % patch  Commonly known as: Lidoderm                  Where to Get Your Medications        You can get these medications from any pharmacy    Bring a paper prescription for each of these medications  tiZANidine 2 mg tablet       Information about where to get these medications is not yet available    Ask your nurse or doctor about these medications  levothyroxine 100 mcg tablet  losartan 25 mg tablet  pantoprazole 40 mg EC tablet         Test Results Pending At Discharge  Pending Labs       Order Current Status    Blood Culture Preliminary result    Blood Culture Preliminary result            Hospital Course  Improvement with Cardiology and Pulmonary management  Refuses SNF  Qualifies for Home O2  Requests early discharge to be with dying son.    Pertinent Physical Exam At Time of Discharge  Physical Exam    Outpatient Follow-Up  Future Appointments   Date Time Provider Department Center   11/16/2023  8:30 AM FELA Devine-CNP [id number] [facility]   12/8/2023  9:30 AM Tuan Foss DO [id number] [facility]         Braeden Arteaga MD

## 2023-10-10 NOTE — PROGRESS NOTES
Physical Therapy    Physical Therapy Treatment    Patient Name: Anitha Welch  MRN: [id number]  Today's Date: 10/10/2023  Time Calculation  Start Time: 1010  Stop Time: 1035  Time Calculation (min): 25 min       Assessment/Plan   PT Assessment  PT Assessment Results: Decreased strength, Decreased endurance, Impaired balance, Decreased mobility, Decreased coordination, Impaired judgement, Decreased safety awareness, Pain  Rehab Prognosis: Good  Evaluation/Treatment Tolerance: Patient tolerated treatment well  Medical Staff Made Aware: Yes  Strengths: Ability to acquire knowledge, Access to adaptive/assistive products, Attitude of self  Barriers to Participation: Insight into problems  End of Session Communication: Bedside nurse  Assessment Comment: The patient is an 83 y.o. female admitted to the hospital for increasing SOB. The pt currently requires minAx1-2 for transfers and ambulation with HHA. The patient has experienced a functional decline and would continue to benefit from skilled PT services to address deficits in strength, balance, transfers, ambulation, endurance, and safety awareness. The patient would benefit from moderate intensity rehab services on DC.  End of Session Patient Position: Bed, 2 rail up, Alarm off, not on at start of session  PT Plan  Inpatient/Swing Bed or Outpatient: Inpatient  PT Plan  Treatment/Interventions: Bed mobility, Transfer training, Gait training, Stair training, Balance training, Strengthening, Endurance training, Therapeutic exercise, Therapeutic activity  PT Plan: Skilled PT  PT Frequency: 5 times per week  PT Discharge Recommendations: Moderate intensity level of continued care  PT Recommended Transfer Status: Stand by assist      General Visit Information:   PT  Visit  PT Received On: 10/10/23  Response to Previous Treatment: Patient with no complaints from previous session.  General  Reason for Referral: Acute respiratory failure with hypoxia  Referred By: Dr. Chandler Hennessy  Medical History Relevant to Rehab: Anemia, CHF, DVT, heart disease, thyroidectomy  Missed Visit: No  Family/Caregiver Present: No  Prior to Session Communication: Bedside nurse  Patient Position Received: Bed, 2 rail up, Alarm off, not on at start of session  Preferred Learning Style: verbal, written  General Comment: Pt very upset with being in small room, anxious, fixated on going home.    Subjective   Precautions:  Precautions  Medical Precautions: Fall precautions  Precautions Comment: VC for safety awareness with medical lines.    Objective   Pain:  Pain Assessment  Pain Assessment: 0-10  Pain Score: 0 - No pain  Cognition:  Cognition  Arousal/Alertness: Appropriate responses to stimuli  Orientation Level: Oriented X4  Safety Judgment: Decreased awareness of need for assistance  Problem Solving: Assistance required to identify errors made  Insight: Moderate (Pt with decreased awareness of medical needs at this time.)    Activity Tolerance:  Activity Tolerance  Endurance: Tolerates less than 10 min exercise, no significant change in vital signs  Rate of Perceived Exertion (RPE): Moderate  Treatments:  Therapeutic Activity  Therapeutic Activity Performed: Yes  Therapeutic Activity 1: Therapeutic breathing for safety and comfort. (Spo2 2LNC: decreased to 86% with ambulation; recovered to 92% with seated break and therapeutic breathing over 1-2minutes time. Education provided for energy conservation techniques as needed.)    Bed Mobility  Bed Mobility: Yes  Bed Mobility 1  Bed Mobility 1: Supine to sitting  Level of Assistance 1: Close supervision  Bed Mobility 2  Bed Mobility  2: Sitting to supine  Level of Assistance 2: Close supervision    Ambulation/Gait Training  Ambulation/Gait Training Performed: Yes  Ambulation/Gait Training 1  Surface 1: Level tile  Device 1: No device  Assistance 1: Minimum assistance  Quality of Gait 1: Narrow base of support  Comments/Distance (ft) 1: Timothy to steady intermittently.  Decreased ezekiel, step length and foot clearance, mild forward flexed posture and bilat lateral sway.  Transfers  Transfer: Yes  Transfer 1  Transfer From 1: Bed to  Transfer to 1: Stand  Technique 1: Sit to stand  Transfer Level of Assistance 1: Close supervision  Transfers 2  Transfer From 2: Stand to  Transfer to 2: Sit  Technique 2: Stand to sit  Transfer Level of Assistance 2: Close supervision    Stairs  Stairs: No    Outcome Measures:  [facility] Basic Mobility  Turning from your back to your side while in a flat bed without using bedrails: None  Moving from lying on your back to sitting on the side of a flat bed without using bedrails: A little  Moving to and from bed to chair (including a wheelchair): A little  Standing up from a chair using your arms (e.g. wheelchair or bedside chair): None  To walk in hospital room: A little  Climbing 3-5 steps with railing: A lot  Basic Mobility - Total Score: 19    Education Documentation  Handouts, taught by Jia Gill PTA at 10/10/2023 12:43 PM.  Learner: Patient  Readiness: Nonacceptance  Method: Explanation  Response: Needs Reinforcement    Precautions, taught by Jia Gill PTA at 10/10/2023 12:43 PM.  Learner: Patient  Readiness: Nonacceptance  Method: Explanation  Response: Needs Reinforcement    Body Mechanics, taught by Jia Gill PTA at 10/10/2023 12:43 PM.  Learner: Patient  Readiness: Nonacceptance  Method: Explanation  Response: Needs Reinforcement    Home Exercise Program, taught by Jia Gill PTA at 10/10/2023 12:43 PM.  Learner: Patient  Readiness: Nonacceptance  Method: Explanation  Response: Needs Reinforcement    Mobility Training, taught by Jia Gill PTA at 10/10/2023 12:43 PM.  Learner: Patient  Readiness: Nonacceptance  Method: Explanation  Response: Needs Reinforcement    Education Comments  No comments found.        OP EDUCATION:  Education  Individual(s) Educated: Patient  Education Provided: Body Mechanics, Fall Risk,  Home Safety, Physiology  Patient/Caregiver Demonstrated Understanding: no    Encounter Problems       Encounter Problems (Active)       Mobility       LTG - Patient will ambulate household distance indep (Progressing)       Start:  10/09/23    Expected End:  10/27/23               PT Problem       Safety (Progressing)       Start:  10/09/23    Expected End:  11/09/23       The patient will complete functional mobility with minimal Vcs for safety awareness.          Ambulation (Progressing)       Start:  10/09/23    Expected End:  11/09/23       The patient will be able to ambulate at a mod indep level for >50ftx1 with LRAD.          Transfers (Progressing)       Start:  10/09/23    Expected End:  11/09/23       The patient will be able to complete safe transfer of sit <> stand with minimal VC at a mod indep level.           Steps (Not Progressing)       Start:  10/09/23    Expected End:  11/09/23       The patient will be able to ascend/descend 2 and 8 steps with use of 1 rail at a mod indep level by DC.          Bed Mobility (Progressing)       Start:  10/09/23    Expected End:  11/09/23       The patient will be able to complete bed mobility at a mod indep level with use of bed rails.              Pain - Adult          Safety       LTG - Patient will demonstrate safety requirements appropriate to situation/environment       Start:  10/09/23            STG - Patient uses call light consistently to request assistance with transfers       Start:  10/09/23               Transfers       LTG - Patient will demonstrate safe transfer techniques mod ind (Progressing)       Start:  10/09/23    Expected End:  10/27/23

## 2023-10-10 NOTE — PROGRESS NOTES
"Anitha Welch is a 83 y.o. female on day 3 of admission presenting with Acute respiratory failure with hypoxia (CMS/HCC).    Subjective   Resting comfortable on 2lpm  Follow up resp failure and pulmonary edema       Objective     Vital Signs  Blood pressure 115/53, pulse 77, temperature 36.6 °C (97.9 °F), temperature source Oral, resp. rate 16, height 1.626 m (5' 4.02\"), weight 63.7 kg (140 lb 6.9 oz), SpO2 98 %.  Oxygen Therapy  SpO2: 98 %  Oxygen Therapy: Supplemental oxygen (3)  O2 Delivery Method: Nasal cannula       Intake/Output previous 24 hours:    Intake/Output Summary (Last 24 hours) at 10/10/2023 1344  Last data filed at 10/10/2023 0600  Gross per 24 hour   Intake 462 ml   Output 501 ml   Net -39 ml       Physical Exam  Vitals reviewed.   Constitutional:       General: She is awake.   HENT:      Head: Normocephalic and atraumatic.   Eyes:      Extraocular Movements: Extraocular movements intact.      Pupils: Pupils are equal, round, and reactive to light.   Cardiovascular:      Rate and Rhythm: Normal rate and regular rhythm.      Pulses: Normal pulses.      Heart sounds: Normal heart sounds.   Pulmonary:      Effort: Pulmonary effort is normal.      Breath sounds: Normal breath sounds. Decreased air movement present.   Abdominal:      General: Abdomen is flat. Bowel sounds are normal.      Palpations: Abdomen is soft.   Skin:     General: Skin is warm and dry.   Neurological:      General: No focal deficit present.      Mental Status: She is alert    Lines and Tubes:         Scheduled medications  atorvastatin, 40 mg, oral, Nightly  carvedilol, 12.5 mg, oral, BID with meals  docusate sodium, 100 mg, oral, BID  enoxaparin, 40 mg, subcutaneous, q24h  levothyroxine, 100 mcg, oral, Daily  losartan, 25 mg, oral, Daily  oxygen, , inhalation, q8h  pantoprazole, 40 mg, oral, Daily before breakfast      Continuous medications     PRN medications  PRN medications: acetaminophen **OR** [DISCONTINUED] acetaminophen " **OR** [DISCONTINUED] acetaminophen, ipratropium-albuteroL, oxygen, tiZANidine    Relevant Results  Results from last 7 days   Lab Units 10/10/23  0438 10/09/23  0418 10/08/23  0438   WBC AUTO x10*3/uL 6.0 9.0 5.6   HEMOGLOBIN g/dL 11.2* 12.4 12.5   HEMATOCRIT % 36.6 39.4 39.8   PLATELETS AUTO x10*3/uL 130* 122* 117*      Results from last 7 days   Lab Units 10/10/23  0438 10/09/23  0418 10/08/23  0853   SODIUM mmol/L 148* 149* 150*   POTASSIUM mmol/L 3.5 3.4 3.4   CHLORIDE mmol/L 105 105 104   CO2 mmol/L 33* 34* 32*   BUN mg/dL 24 28* 27*   CREATININE mg/dL 0.90 0.90 0.90   GLUCOSE mg/dL 110* 117* 179*   CALCIUM mg/dL 8.4* 8.8 8.7      Results from last 7 days   Lab Units 10/07/23  1735   POCT PH, ARTERIAL pH 7.50*   POCT PO2, ARTERIAL mm Hg 90   POCT HCO3 CALCULATED, ARTERIAL mmol/L 39.0*   POCT BASE EXCESS, ARTERIAL mmol/L 13.6*     XR chest 1 view 10/09/2023    Narrative  Interpreted By:  Jose Rodriguez,  STUDY:  XR CHEST 1 VIEW; 10/9/2023 11:49 am    INDICATION:  CLINICAL INFORMATION: Signs/Symptoms:resp failure. Hypoxia    COMPARISON:  10/07/2023    ACCESSION NUMBER(S):  [id number]    ORDERING CLINICIAN:  DANNI AN    TECHNIQUE:  Portable chest.    FINDINGS:  The cardiac silhouette is quite prominent suggesting cardiomegaly.  The pulmonary vasculature is slightly indistinct suggesting mild  pulmonary vascular congestion.  Small bilateral effusions are  present. Right basilar atelectasis is present. No alveolar  consolidation is noted. Surgical clips are identified at the base of  the neck bilaterally.    Impression  There is partial clearing of the findings of CHF when compared to the  previous study from 10/07/2023.    MACRO:  none    Signed by: Jose Rodriguze 10/9/2023 11:58 AM  Dictation workstation:   [id number]        Assessment/Plan   Principal Problem:    Acute respiratory failure with hypoxia (CMS/HCC)  Active Problems:    Acute on chronic systolic heart failure (CMS/HCC)    Benign essential  hypertension    Mixed hyperlipidemia    Acquired hypothyroidism    Stable nasal cannula   Off BiPAP    Sodium improving 148  Chest xray improving  Stable on 2lpm    Echo with EF 40-45%  Mod to severe MR  Mod elevation of RVSP    Continue beta-blocker  Lovenox Protonix    Stable for dc from a pulmonary standpoint      Matti Rae MD  [facility]

## 2023-10-12 ENCOUNTER — HOME CARE VISIT (OUTPATIENT)
Dept: HOME HEALTH SERVICES | Facility: HOME HEALTH | Age: 83
End: 2023-10-12
Payer: MEDICARE

## 2023-10-12 VITALS
DIASTOLIC BLOOD PRESSURE: 80 MMHG | OXYGEN SATURATION: 97 % | HEART RATE: 89 BPM | TEMPERATURE: 97.5 F | RESPIRATION RATE: 18 BRPM | SYSTOLIC BLOOD PRESSURE: 130 MMHG

## 2023-10-12 LAB
BACTERIA BLD CULT: NORMAL
BACTERIA BLD CULT: NORMAL

## 2023-10-12 PROCEDURE — 1090000002 HH PPS REVENUE DEBIT

## 2023-10-12 PROCEDURE — 169592 NO-PAY CLAIM PROCEDURE

## 2023-10-12 PROCEDURE — G0299 HHS/HOSPICE OF RN EA 15 MIN: HCPCS | Mod: HHH

## 2023-10-12 PROCEDURE — 1090000001 HH PPS REVENUE CREDIT

## 2023-10-12 PROCEDURE — 0023 HH SOC

## 2023-10-12 SDOH — ECONOMIC STABILITY: HOUSING INSECURITY: EVIDENCE OF SMOKING MATERIAL: 0

## 2023-10-12 SDOH — HEALTH STABILITY: MENTAL HEALTH: SMOKING IN HOME: 0

## 2023-10-12 ASSESSMENT — ENCOUNTER SYMPTOMS
PAIN: 1
LOWEST PAIN SEVERITY IN PAST 24 HOURS: 3/10
APPETITE LEVEL: GOOD
SUBJECTIVE PAIN PROGRESSION: UNCHANGED
LAST BOWEL MOVEMENT: 66759
PAIN SEVERITY GOAL: 0/10
DYSPNEA ACTIVITY LEVEL: AFTER AMBULATING LESS THAN 10 FT
PERSON REPORTING PAIN: PATIENT
DYSPNEA ON EXERTION: 1
LOWER EXTREMITY EDEMA: 1
HIGHEST PAIN SEVERITY IN PAST 24 HOURS: 5/10
SHORTNESS OF BREATH: 1
MUSCLE WEAKNESS: 1
FATIGUES EASILY: 1
PAIN LOCATION: LEFT LEG

## 2023-10-12 ASSESSMENT — ACTIVITIES OF DAILY LIVING (ADL)
AMBULATION ASSISTANCE: 1
BATHING_CURRENT_FUNCTION: STAND BY ASSIST
ENTERING_EXITING_HOME: NEEDS ASSISTANCE
BATHING_CURRENT_FUNCTION: SUPERVISION
DRESSING_LB_CURRENT_FUNCTION: STAND BY ASSIST
DRESSING_LB_CURRENT_FUNCTION: SUPERVISION
AMBULATION ASSISTANCE: STAND BY ASSIST
OASIS_M1830: 03
BATHING ASSESSED: 1

## 2023-10-13 ENCOUNTER — DOCUMENTATION (OUTPATIENT)
Dept: CARDIAC REHAB | Facility: HOSPITAL | Age: 83
End: 2023-10-13
Payer: MEDICARE

## 2023-10-13 ENCOUNTER — HOME CARE VISIT (OUTPATIENT)
Dept: HOME HEALTH SERVICES | Facility: HOME HEALTH | Age: 83
End: 2023-10-13
Payer: MEDICARE

## 2023-10-13 VITALS — HEART RATE: 75 BPM

## 2023-10-13 PROCEDURE — 1090000001 HH PPS REVENUE CREDIT

## 2023-10-13 PROCEDURE — 1090000002 HH PPS REVENUE DEBIT

## 2023-10-13 PROCEDURE — G0151 HHCP-SERV OF PT,EA 15 MIN: HCPCS | Mod: HHH

## 2023-10-13 SDOH — HEALTH STABILITY: PHYSICAL HEALTH: EXERCISE ACTIVITIES SETS: 1

## 2023-10-13 SDOH — HEALTH STABILITY: PHYSICAL HEALTH: EXERCISE TYPE: SEATED

## 2023-10-13 SDOH — HEALTH STABILITY: PHYSICAL HEALTH: PHYSICAL EXERCISE: SITTING

## 2023-10-13 SDOH — HEALTH STABILITY: PHYSICAL HEALTH: EXERCISE ACTIVITY: MARCHING

## 2023-10-13 SDOH — HEALTH STABILITY: PHYSICAL HEALTH

## 2023-10-13 SDOH — HEALTH STABILITY: PHYSICAL HEALTH: PHYSICAL EXERCISE: 20

## 2023-10-13 SDOH — HEALTH STABILITY: MENTAL HEALTH: SMOKING IN HOME: 0

## 2023-10-13 SDOH — ECONOMIC STABILITY: HOUSING INSECURITY: EVIDENCE OF SMOKING MATERIAL: 0

## 2023-10-13 SDOH — HEALTH STABILITY: PHYSICAL HEALTH
EXERCISE COMMENTS: ANKLE PUMPS  FULL ARC QUADS  SEATED HIP FLEXION OR MARCHING      FOR UP TO 20 REPETITIONS, 3 SESSIONS PER DAY.   PATIENT GIVEN WRITTEN COPY.

## 2023-10-13 SDOH — HEALTH STABILITY: PHYSICAL HEALTH: EXERCISE ACTIVITY: ANKLE PUMPS

## 2023-10-13 SDOH — HEALTH STABILITY: PHYSICAL HEALTH: EXERCISE ACTIVITY: FULL ARC QUADS

## 2023-10-13 ASSESSMENT — ENCOUNTER SYMPTOMS
LOSS OF SENSATION IN FEET: 1
PAIN LOCATION - PAIN DURATION: HOURS
PAIN: 1
PERSON REPORTING PAIN: PATIENT
PAIN LOCATION - RELIEVING FACTORS: REST
PAIN LOCATION - PAIN QUALITY: ACHING
LOWEST PAIN SEVERITY IN PAST 24 HOURS: 0/10
SUBJECTIVE PAIN PROGRESSION: GRADUALLY IMPROVING
PAIN LOCATION - EXACERBATING FACTORS: UPRIGHT ACTIVITY
PAIN LOCATION - PAIN FREQUENCY: INTERMITTENT
MUSCLE WEAKNESS: 1
PAIN LOCATION: LEFT LEG
PAIN SEVERITY GOAL: 0/10
DEPRESSION: 0
PAIN LOCATION - PAIN SEVERITY: 2/10
OCCASIONAL FEELINGS OF UNSTEADINESS: 1
HIGHEST PAIN SEVERITY IN PAST 24 HOURS: 2/10

## 2023-10-13 ASSESSMENT — ACTIVITIES OF DAILY LIVING (ADL)
AMBULATION_DISTANCE/DURATION_TOLERATED: 75
AMBULATION ASSISTANCE: 1
CURRENT_FUNCTION: ONE PERSON
AMBULATION ASSISTANCE ON FLAT SURFACES: 1
AMBULATION ASSISTANCE: ONE PERSON

## 2023-10-13 NOTE — HOME HEALTH
No falls in past 12 months.  Hospitalized last Monday or so.  3 things that affected my breathing.   Concentrator is new from one month ago.  Now constant.   Son is dying of brain cancer.    Resting pulse ox.  96 per cent.  75 BPM.    After walking about 75 feet, pulse ox taken, found to be 86 per cent, 84 bpm.  within a few minutes it was 93 per cent, 77 BPM.

## 2023-10-14 PROCEDURE — 1090000001 HH PPS REVENUE CREDIT

## 2023-10-14 PROCEDURE — 1090000002 HH PPS REVENUE DEBIT

## 2023-10-15 PROCEDURE — 1090000001 HH PPS REVENUE CREDIT

## 2023-10-15 PROCEDURE — 1090000002 HH PPS REVENUE DEBIT

## 2023-10-15 RX ORDER — FLUTICASONE FUROATE, UMECLIDINIUM BROMIDE AND VILANTEROL TRIFENATATE 100; 62.5; 25 UG/1; UG/1; UG/1
1 POWDER RESPIRATORY (INHALATION) DAILY
COMMUNITY
End: 2023-11-07 | Stop reason: WASHOUT

## 2023-10-16 ENCOUNTER — HOME CARE VISIT (OUTPATIENT)
Dept: HOME HEALTH SERVICES | Facility: HOME HEALTH | Age: 83
End: 2023-10-16
Payer: MEDICARE

## 2023-10-16 ENCOUNTER — TELEPHONE (OUTPATIENT)
Dept: PRIMARY CARE | Facility: CLINIC | Age: 83
End: 2023-10-16
Payer: MEDICARE

## 2023-10-16 VITALS
HEART RATE: 74 BPM | OXYGEN SATURATION: 98 % | DIASTOLIC BLOOD PRESSURE: 80 MMHG | TEMPERATURE: 96.2 F | RESPIRATION RATE: 18 BRPM | SYSTOLIC BLOOD PRESSURE: 128 MMHG

## 2023-10-16 VITALS
HEART RATE: 93 BPM | TEMPERATURE: 97.7 F | SYSTOLIC BLOOD PRESSURE: 140 MMHG | OXYGEN SATURATION: 94 % | DIASTOLIC BLOOD PRESSURE: 60 MMHG | RESPIRATION RATE: 18 BRPM

## 2023-10-16 PROCEDURE — G0157 HHC PT ASSISTANT EA 15: HCPCS | Mod: HHH

## 2023-10-16 PROCEDURE — G0299 HHS/HOSPICE OF RN EA 15 MIN: HCPCS | Mod: HHH

## 2023-10-16 PROCEDURE — 1090000001 HH PPS REVENUE CREDIT

## 2023-10-16 PROCEDURE — 1090000002 HH PPS REVENUE DEBIT

## 2023-10-16 SDOH — ECONOMIC STABILITY: HOUSING INSECURITY: HOME SAFETY: PATIENT IS ON 2 LITERS OF OXYGEN CONTINUOUS

## 2023-10-16 SDOH — HEALTH STABILITY: MENTAL HEALTH: SMOKING IN HOME: 0

## 2023-10-16 SDOH — ECONOMIC STABILITY: HOUSING INSECURITY: EVIDENCE OF SMOKING MATERIAL: 0

## 2023-10-16 SDOH — HEALTH STABILITY: PHYSICAL HEALTH
EXERCISE COMMENTS: STANDING THER EX 10 REPS B LE WITH UE SUPPORT:  HEEL RAISES  HIP ABDUCTION  HIP EXTENSION  HAMSTRING CURLS  MARCHING  MINI SQUATS

## 2023-10-16 SDOH — ECONOMIC STABILITY: GENERAL

## 2023-10-16 ASSESSMENT — ACTIVITIES OF DAILY LIVING (ADL)
GROOMING_CURRENT_FUNCTION: SUPERVISION
AMBULATION ASSISTANCE ON FLAT SURFACES: 1
GROOMING_CURRENT_FUNCTION: STAND BY ASSIST
AMBULATION_DISTANCE/DURATION_TOLERATED: 80 FEET
MONEY MANAGEMENT (EXPENSES/BILLS): NEEDS ASSISTANCE
GROOMING ASSESSED: 1
DRESSING_LB_CURRENT_FUNCTION: STAND BY ASSIST
DRESSING_LB_CURRENT_FUNCTION: SUPERVISION

## 2023-10-16 ASSESSMENT — ENCOUNTER SYMPTOMS
APPETITE LEVEL: GOOD
MUSCLE WEAKNESS: 1
FORGETFULNESS: 1
DENIES PAIN: 1
DESCRIPTION OF MEMORY LOSS: SHORT TERM
LAST BOWEL MOVEMENT: 66763
LOWER EXTREMITY EDEMA: 1
FATIGUE: 1

## 2023-10-16 NOTE — TELEPHONE ENCOUNTER
Call placed to patient number as listed, appointment confirmed w/ Anitha via phone.  COVID Monitoring completed.

## 2023-10-17 ENCOUNTER — APPOINTMENT (OUTPATIENT)
Dept: PRIMARY CARE | Facility: CLINIC | Age: 83
End: 2023-10-17
Payer: MEDICARE

## 2023-10-17 PROCEDURE — 1090000002 HH PPS REVENUE DEBIT

## 2023-10-17 PROCEDURE — 1090000001 HH PPS REVENUE CREDIT

## 2023-10-18 ENCOUNTER — HOME CARE VISIT (OUTPATIENT)
Dept: HOME HEALTH SERVICES | Facility: HOME HEALTH | Age: 83
End: 2023-10-18
Payer: MEDICARE

## 2023-10-18 ENCOUNTER — HOSPITAL ENCOUNTER (OUTPATIENT)
Dept: CARDIOLOGY | Facility: HOSPITAL | Age: 83
Discharge: HOME | End: 2023-10-18
Payer: MEDICARE

## 2023-10-18 VITALS
SYSTOLIC BLOOD PRESSURE: 142 MMHG | OXYGEN SATURATION: 90 % | DIASTOLIC BLOOD PRESSURE: 54 MMHG | TEMPERATURE: 98.2 F | HEART RATE: 84 BPM

## 2023-10-18 LAB
ATRIAL RATE: 87 BPM
P AXIS: 72 DEGREES
P OFFSET: 193 MS
P ONSET: 127 MS
PR INTERVAL: 164 MS
Q ONSET: 209 MS
QRS COUNT: 15 BEATS
QRS DURATION: 94 MS
QT INTERVAL: 416 MS
QTC CALCULATION(BAZETT): 500 MS
QTC FREDERICIA: 470 MS
R AXIS: -36 DEGREES
T AXIS: -78 DEGREES
T OFFSET: 417 MS
VENTRICULAR RATE: 87 BPM

## 2023-10-18 PROCEDURE — 1090000002 HH PPS REVENUE DEBIT

## 2023-10-18 PROCEDURE — 1090000001 HH PPS REVENUE CREDIT

## 2023-10-18 PROCEDURE — 93005 ELECTROCARDIOGRAM TRACING: CPT

## 2023-10-18 PROCEDURE — G0152 HHCP-SERV OF OT,EA 15 MIN: HCPCS | Mod: HHH

## 2023-10-18 SDOH — HEALTH STABILITY: MENTAL HEALTH: SMOKING IN HOME: 0

## 2023-10-18 SDOH — ECONOMIC STABILITY: HOUSING INSECURITY: EVIDENCE OF SMOKING MATERIAL: 0

## 2023-10-18 ASSESSMENT — ACTIVITIES OF DAILY LIVING (ADL)
BATHING_CURRENT_FUNCTION: STAND BY ASSIST
DRESSING_UB_CURRENT_FUNCTION: STAND BY ASSIST
PREPARING MEALS: NEEDS ASSISTANCE
DRESSING_LB_CURRENT_FUNCTION: STAND BY ASSIST
LAUNDRY ASSESSED: 1
BATHING ASSESSED: 1
LAUNDRY: NEEDS ASSISTANCE

## 2023-10-18 ASSESSMENT — ENCOUNTER SYMPTOMS: DENIES PAIN: 1

## 2023-10-19 ENCOUNTER — TELEPHONE (OUTPATIENT)
Dept: PRIMARY CARE | Facility: CLINIC | Age: 83
End: 2023-10-19
Payer: MEDICARE

## 2023-10-19 ENCOUNTER — HOME CARE VISIT (OUTPATIENT)
Dept: HOME HEALTH SERVICES | Facility: HOME HEALTH | Age: 83
End: 2023-10-19
Payer: MEDICARE

## 2023-10-19 VITALS
OXYGEN SATURATION: 95 % | BODY MASS INDEX: 23.5 KG/M2 | WEIGHT: 137 LBS | SYSTOLIC BLOOD PRESSURE: 130 MMHG | DIASTOLIC BLOOD PRESSURE: 80 MMHG | TEMPERATURE: 96.7 F

## 2023-10-19 VITALS
OXYGEN SATURATION: 93 % | HEART RATE: 73 BPM | TEMPERATURE: 96.7 F | SYSTOLIC BLOOD PRESSURE: 122 MMHG | DIASTOLIC BLOOD PRESSURE: 74 MMHG | RESPIRATION RATE: 18 BRPM

## 2023-10-19 PROCEDURE — 1090000002 HH PPS REVENUE DEBIT

## 2023-10-19 PROCEDURE — G0300 HHS/HOSPICE OF LPN EA 15 MIN: HCPCS | Mod: HHH

## 2023-10-19 PROCEDURE — G0157 HHC PT ASSISTANT EA 15: HCPCS | Mod: HHH

## 2023-10-19 PROCEDURE — 1090000001 HH PPS REVENUE CREDIT

## 2023-10-19 SDOH — ECONOMIC STABILITY: HOUSING INSECURITY: EVIDENCE OF SMOKING MATERIAL: 0

## 2023-10-19 SDOH — HEALTH STABILITY: MENTAL HEALTH: SMOKING IN HOME: 0

## 2023-10-19 ASSESSMENT — ENCOUNTER SYMPTOMS
BOWEL PATTERN NORMAL: 1
STOOL FREQUENCY: DAILY
FORGETFULNESS: 1
CHANGE IN APPETITE: UNCHANGED
PERSON REPORTING PAIN: PATIENT
LOWER EXTREMITY EDEMA: 1
FATIGUE: 1
APPETITE LEVEL: FAIR
LAST BOWEL MOVEMENT: 66765
DENIES PAIN: 1

## 2023-10-19 NOTE — TELEPHONE ENCOUNTER
Paty, a nurse at  home care, states that she went to visit pt today at her home. Paty state that pt's left leg has been swollen, which has been worse lately in the last week. Paty states that pt feels that the leg is slightly painful, but it is not red or warm to the touch. Paty states that pt's right leg is normal. Paty would like to know what should pt do?

## 2023-10-20 PROCEDURE — 1090000002 HH PPS REVENUE DEBIT

## 2023-10-20 PROCEDURE — 1090000001 HH PPS REVENUE CREDIT

## 2023-10-20 NOTE — TELEPHONE ENCOUNTER
Left voice message for Paty to notify her that I will call pt to scheduled an appt.   Called and spoke with pt, who will be coming in on Tuesday, Oct. 24th, at 11:15am with Dr. Longoria. Pt would like to know if there is anything she needs to do in the meantime? Pt states that her right leg is still swollen, with some pinkness in color, but no pain.

## 2023-10-20 NOTE — TELEPHONE ENCOUNTER
Called and spoke with pt, who state that she will try to go tomorrow to urgent care tomorrow as her  is currently at work, and he is the only one that can take her. Pt states that she feels okay right now and wait for her .

## 2023-10-20 NOTE — TELEPHONE ENCOUNTER
Rachel Baeza, I think if we can't see her until Tuesday, she should be seen at urgent care. I really can't assess the leg without seeing it and I think she needs a provider to take a look at it. Thanks!

## 2023-10-21 PROCEDURE — 1090000001 HH PPS REVENUE CREDIT

## 2023-10-21 PROCEDURE — 1090000002 HH PPS REVENUE DEBIT

## 2023-10-22 PROCEDURE — 1090000001 HH PPS REVENUE CREDIT

## 2023-10-22 PROCEDURE — 1090000002 HH PPS REVENUE DEBIT

## 2023-10-23 ENCOUNTER — HOME CARE VISIT (OUTPATIENT)
Dept: HOME HEALTH SERVICES | Facility: HOME HEALTH | Age: 83
End: 2023-10-23
Payer: MEDICARE

## 2023-10-23 PROCEDURE — 1090000001 HH PPS REVENUE CREDIT

## 2023-10-23 PROCEDURE — 1090000002 HH PPS REVENUE DEBIT

## 2023-10-24 ENCOUNTER — APPOINTMENT (OUTPATIENT)
Dept: PRIMARY CARE | Facility: CLINIC | Age: 83
End: 2023-10-24
Payer: MEDICARE

## 2023-10-24 PROCEDURE — 1090000002 HH PPS REVENUE DEBIT

## 2023-10-24 PROCEDURE — 1090000001 HH PPS REVENUE CREDIT

## 2023-10-25 ENCOUNTER — HOME CARE VISIT (OUTPATIENT)
Dept: HOME HEALTH SERVICES | Facility: HOME HEALTH | Age: 83
End: 2023-10-25
Payer: MEDICARE

## 2023-10-25 PROCEDURE — 1090000001 HH PPS REVENUE CREDIT

## 2023-10-25 PROCEDURE — 1090000002 HH PPS REVENUE DEBIT

## 2023-10-26 ENCOUNTER — APPOINTMENT (OUTPATIENT)
Dept: HOME HEALTH SERVICES | Facility: HOME HEALTH | Age: 83
End: 2023-10-26
Payer: MEDICARE

## 2023-10-26 ENCOUNTER — HOME CARE VISIT (OUTPATIENT)
Dept: HOME HEALTH SERVICES | Facility: HOME HEALTH | Age: 83
End: 2023-10-26
Payer: MEDICARE

## 2023-10-26 PROCEDURE — 1090000002 HH PPS REVENUE DEBIT

## 2023-10-26 PROCEDURE — 1090000001 HH PPS REVENUE CREDIT

## 2023-10-27 ENCOUNTER — HOME CARE VISIT (OUTPATIENT)
Dept: HOME HEALTH SERVICES | Facility: HOME HEALTH | Age: 83
End: 2023-10-27
Payer: MEDICARE

## 2023-10-27 ENCOUNTER — TELEPHONE (OUTPATIENT)
Dept: PRIMARY CARE | Facility: CLINIC | Age: 83
End: 2023-10-27
Payer: MEDICARE

## 2023-10-27 PROCEDURE — 1090000001 HH PPS REVENUE CREDIT

## 2023-10-27 PROCEDURE — 1090000002 HH PPS REVENUE DEBIT

## 2023-10-28 PROCEDURE — 1090000002 HH PPS REVENUE DEBIT

## 2023-10-28 PROCEDURE — 1090000001 HH PPS REVENUE CREDIT

## 2023-10-29 PROBLEM — I65.23 BILATERAL CAROTID ARTERY STENOSIS: Status: ACTIVE | Noted: 2023-10-29

## 2023-10-29 PROCEDURE — 1090000001 HH PPS REVENUE CREDIT

## 2023-10-29 PROCEDURE — 1090000002 HH PPS REVENUE DEBIT

## 2023-10-29 PROCEDURE — G0180 MD CERTIFICATION HHA PATIENT: HCPCS | Performed by: FAMILY MEDICINE

## 2023-10-30 ENCOUNTER — OFFICE VISIT (OUTPATIENT)
Dept: PRIMARY CARE | Facility: CLINIC | Age: 83
End: 2023-10-30
Payer: MEDICARE

## 2023-10-30 ENCOUNTER — APPOINTMENT (OUTPATIENT)
Dept: HOME HEALTH SERVICES | Facility: HOME HEALTH | Age: 83
End: 2023-10-30
Payer: MEDICARE

## 2023-10-30 VITALS
OXYGEN SATURATION: 95 % | DIASTOLIC BLOOD PRESSURE: 62 MMHG | RESPIRATION RATE: 16 BRPM | HEIGHT: 65 IN | BODY MASS INDEX: 23.32 KG/M2 | SYSTOLIC BLOOD PRESSURE: 126 MMHG | TEMPERATURE: 97.3 F | WEIGHT: 140 LBS | HEART RATE: 80 BPM

## 2023-10-30 DIAGNOSIS — J44.9 CHRONIC OBSTRUCTIVE PULMONARY DISEASE, UNSPECIFIED COPD TYPE (MULTI): ICD-10-CM

## 2023-10-30 DIAGNOSIS — J96.21 ACUTE ON CHRONIC RESPIRATORY FAILURE WITH HYPOXIA (MULTI): Primary | ICD-10-CM

## 2023-10-30 DIAGNOSIS — R53.1 WEAKNESS: ICD-10-CM

## 2023-10-30 DIAGNOSIS — M79.662 PAIN AND SWELLING OF LEFT LOWER LEG: ICD-10-CM

## 2023-10-30 DIAGNOSIS — I50.9 ACUTE ON CHRONIC CONGESTIVE HEART FAILURE, UNSPECIFIED HEART FAILURE TYPE (MULTI): ICD-10-CM

## 2023-10-30 DIAGNOSIS — M79.89 PAIN AND SWELLING OF LEFT LOWER LEG: ICD-10-CM

## 2023-10-30 PROBLEM — G25.81 RLS (RESTLESS LEGS SYNDROME): Status: ACTIVE | Noted: 2023-10-30

## 2023-10-30 PROCEDURE — 1036F TOBACCO NON-USER: CPT | Performed by: NURSE PRACTITIONER

## 2023-10-30 PROCEDURE — 1125F AMNT PAIN NOTED PAIN PRSNT: CPT | Performed by: NURSE PRACTITIONER

## 2023-10-30 PROCEDURE — 1159F MED LIST DOCD IN RCRD: CPT | Performed by: NURSE PRACTITIONER

## 2023-10-30 PROCEDURE — 3074F SYST BP LT 130 MM HG: CPT | Performed by: NURSE PRACTITIONER

## 2023-10-30 PROCEDURE — 1160F RVW MEDS BY RX/DR IN RCRD: CPT | Performed by: NURSE PRACTITIONER

## 2023-10-30 PROCEDURE — 3078F DIAST BP <80 MM HG: CPT | Performed by: NURSE PRACTITIONER

## 2023-10-30 PROCEDURE — 1111F DSCHRG MED/CURRENT MED MERGE: CPT | Performed by: NURSE PRACTITIONER

## 2023-10-30 PROCEDURE — 1090000002 HH PPS REVENUE DEBIT

## 2023-10-30 PROCEDURE — 99349 HOME/RES VST EST MOD MDM 40: CPT | Performed by: NURSE PRACTITIONER

## 2023-10-30 PROCEDURE — 1090000001 HH PPS REVENUE CREDIT

## 2023-10-30 RX ORDER — FLUTICASONE FUROATE, UMECLIDINIUM BROMIDE AND VILANTEROL TRIFENATATE 100; 62.5; 25 UG/1; UG/1; UG/1
1 POWDER RESPIRATORY (INHALATION) DAILY
Qty: 1 EACH | Refills: 3 | Status: SHIPPED | OUTPATIENT
Start: 2023-10-30

## 2023-10-30 ASSESSMENT — ENCOUNTER SYMPTOMS
ABDOMINAL PAIN: 0
CHILLS: 0
CHEST TIGHTNESS: 1
DIARRHEA: 0
DIFFICULTY URINATING: 0
VOMITING: 0
NERVOUS/ANXIOUS: 1
APPETITE CHANGE: 0
NAUSEA: 0
COUGH: 0
LIGHT-HEADEDNESS: 0
FEVER: 0
DIZZINESS: 0
CONSTIPATION: 0
ENDOCRINE COMMENTS: POSITIVE FOR THYROID DISEASE
TROUBLE SWALLOWING: 0
SHORTNESS OF BREATH: 1
UNEXPECTED WEIGHT CHANGE: 0
PALPITATIONS: 0
WHEEZING: 0

## 2023-10-30 ASSESSMENT — PAIN SCALES - GENERAL: PAINLEVEL: 4

## 2023-10-30 NOTE — PROGRESS NOTES
Subjective   Patient ID: Anitha Welch is a 83 y.o. female who presents for Hospital Follow-up (Acute on chronic respiratory failure, acute on chronic systolic CHF).    Visit for 82 y/o female seen today in private home, accompanied by spouse Jesús and son Jose for Hospital follow up. She is sitting on couch this afternoon. Her left leg is propped up today on a stool. She reports that her leg has been swollen for the past 1-2 weeks and is worsening. Patient is alert, oriented, able to answer simple questions regarding her health. Her family does supplement HPI as needed. Patients live with her spouse and son. She does not drive and rarely leaves the house. Her son is managing all of her medications. Patient is ambulatory, short distances around the house. She has been sleeping downstairs on the couch because her oxygen concentrator is downstairs. 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 Denies recent fall or injury. Patients son is preparing all meals in the home. Patient denies appetite changes, signs of weight loss, abdominal pain, nausea, vomiting. She denies any current bowel or bladder concerns. She reports difficulty sleeping. Has had increased stress and anxiety as her son recently passed away and all of the family stayed at her house. His  was last Saturday and this has been very difficult for patient. She reports that she has had worsening left leg swelling with pain for 1-2 weeks but she was so focused on everything with her son she has been unable to be seen.     She was recently hospitalized at [facility] from 10/7-10/10 with acute on chronic respiratory failure, acute on chronic CHF exacerbation. She reports that her shortness of breath began on day of admission. She was found to have a systolic blood pressure over 200. EKG showed sinus tachycardia without acute ST elevation or T wave inversion with evidence of LVH. Chest x-ray with evidence of very mild vascular congestion. proBNP 6316.  She was put on bipap, then transitioned to oxygen via NC. She was discharged home early to be with her son who was dying. She is using her oxygen consistently now. Reports that her concentrator started beeping this morning and she has had to shut it off and turn it back on about every 5 minutes. She is uncertain why. She does not feel like she is getting enough air. Denies fever, chills, chest pain, palpitations. Denies cough or wheezing.     Home Visit:          Medically necessary due to: Illness or condition that results in activity lmitation or restriction that impacts the ability to leave home such as:, Illness or condition that results in activity lmitation or restriction that impacts the ability to leave home such as:, unsteady gait/poor balance.         Current Outpatient Medications:     albuterol 90 mcg/actuation inhaler, Inhale 1 puff every 4 hours if needed., Disp: , Rfl:     aspirin 81 mg EC tablet, Take 81 mg by mouth 1 time., Disp: , Rfl:     atorvastatin (Lipitor) 40 mg tablet, once every 24 hours., Disp: , Rfl:     carvedilol (Coreg) 12.5 mg tablet, Take 12.5 mg by mouth 2 times a day with meals. Indications: high blood pressure, Disp: , Rfl:     cyanocobalamin (Vitamin B-12) 1,000 mcg tablet, , Disp: , Rfl:     empagliflozin (Jardiance) 10 mg, Take 1 tablet (10 mg) by mouth once daily., Disp: 90 tablet, Rfl: 3    fluticasone (Flonase) 50 mcg/actuation nasal spray, , Disp: , Rfl:     furosemide (Lasix) 20 mg tablet, Take 40 mg by mouth once daily. Indications: visible water retention, Disp: , Rfl:     levothyroxine (Synthroid, Levoxyl) 100 mcg tablet, Take 1 tablet (100 mcg) by mouth early in the morning.. Do not start before October 11, 2023., Disp: , Rfl:     loratadine (Claritin) 10 mg tablet, Take by mouth., Disp: , Rfl:     losartan (Cozaar) 25 mg tablet, Take 1 tablet (25 mg) by mouth once daily. Do not start before October 11, 2023., Disp: , Rfl:     multivitamin-iron-folic acid (Multi  "Complete with Iron)  mg-mcg tablet tablet, Take by mouth., Disp: , Rfl:     nitroglycerin (Nitrostat) 0.4 mg SL tablet, , Disp: , Rfl:     oxygen (O2) gas therapy, Inhale 2 L/min continuously. Indications: sob, Disp: , Rfl:     pantoprazole (ProtoNix) 40 mg EC tablet, Take 1 tablet (40 mg) by mouth once daily in the morning. Take before meals. Do not crush, chew, or split. Do not start before October 11, 2023., Disp: , Rfl:     tiZANidine (Zanaflex) 2 mg tablet, Take 1 tablet (2 mg) by mouth every 8 hours if needed for muscle spasms., Disp: 30 tablet, Rfl: 0    Trelegy Ellipta 100-62.5-25 mcg blister with device, Inhale 1 puff once daily., Disp: 1 each, Rfl: 3    vitamins A,C,E-zinc-copper (PreserVision AREDS) 2,148 mcg-113 mg-45 mg-17.4mg tablet, every 12 hours., Disp: , Rfl:      Review of Systems   Constitutional:  Negative for appetite change, chills, fever and unexpected weight change.   HENT:  Negative for trouble swallowing.    Respiratory:  Positive for chest tightness (intermittent) and shortness of breath. Negative for cough and wheezing.    Cardiovascular:  Positive for leg swelling (left). Negative for chest pain and palpitations.   Gastrointestinal:  Negative for abdominal pain, constipation, diarrhea, nausea and vomiting.   Endocrine:        Positive for thyroid disease   Genitourinary:  Negative for difficulty urinating.   Musculoskeletal:  Positive for gait problem.   Neurological:  Negative for dizziness and light-headedness.        Positive for prior stroke, left sided neuropathy   Psychiatric/Behavioral:  The patient is nervous/anxious.      Objective   /62 (BP Location: Left arm, Patient Position: Sitting, BP Cuff Size: Adult)   Pulse 80   Temp 36.3 °C (97.3 °F) (Temporal)   Resp 16   Ht 1.651 m (5' 5\")   Wt 63.5 kg (140 lb)   SpO2 95%   BMI 23.30 kg/m²     Physical Exam  Constitutional:       General: She is not in acute distress.     Appearance: Normal appearance.      " Comments: Sitting on couch, left leg elevated   HENT:      Head: Normocephalic and atraumatic.      Nose: Nose normal.      Mouth/Throat:      Mouth: Mucous membranes are moist.      Pharynx: Oropharynx is clear.   Eyes:      Pupils: Pupils are equal, round, and reactive to light.   Cardiovascular:      Rate and Rhythm: Normal rate and regular rhythm.      Heart sounds: No murmur heard.     No friction rub. No gallop.   Pulmonary:      Effort: Pulmonary effort is normal. No respiratory distress.      Breath sounds: Normal breath sounds. No wheezing, rhonchi or rales.      Comments: Oxygen 2L NC  Abdominal:      General: Bowel sounds are normal. There is no distension.      Palpations: Abdomen is soft.      Tenderness: There is no abdominal tenderness.   Musculoskeletal:      Cervical back: Neck supple.      Right lower le+ Edema present.      Left lower le+ Edema present.   Skin:     General: Skin is warm and dry.   Neurological:      Mental Status: She is alert. Mental status is at baseline.   Psychiatric:         Mood and Affect: Mood normal.         Behavior: Behavior normal.       Assessment/Plan   Diagnoses and all orders for this visit:  Acute on chronic respiratory failure with hypoxia (CMS/HCC)  Comments:  s/p hospitalization with improvement. Continue with oxygen continously  Orders:  -     Trelegy Ellipta 100-62.5-25 mcg blister with device; Inhale 1 puff once daily.  Acute on chronic congestive heart failure, unspecified heart failure type (CMS/HCC)  Comments:  chronic, stable. Continue Furosemide. Monitor weight daily  Orders:  -     empagliflozin (Jardiance) 10 mg; Take 1 tablet (10 mg) by mouth once daily.  Chronic obstructive pulmonary disease, unspecified COPD type (CMS/HCC)  Comments:  chronic, stable, continue oxygen, Trelegy inhaler, albuterol as needed  Orders:  -     Trelegy Ellipta 100-62.5-25 mcg blister with device; Inhale 1 puff once daily.  Pain and swelling of left lower  leg  Comments:  acute, worsening per pt. Will get venous doppler of LLE to be done in patients home  Orders:  -     Lower extremity venous duplex left; Future  Weakness  Comments:  chronic, continue PT/OT services for increased strength and conditioning    Patient delicately stable. This provider looked at patients oxygen concentrator.The concentrator was on but the oxygen was not turned up. Spo2 was initially 2L NC. I turned the oxygen up to 2L NC and the concentrator did stop beeping. Spo2 increased to 95% via NC. Pt reported feeling better. She is scheduled to see PCP on 11/20. Will follow up with pt in 6 weeks as she is high risk for rehospitalization     Milana Hernadez, APRN-CNP

## 2023-10-31 ENCOUNTER — HOME CARE VISIT (OUTPATIENT)
Dept: HOME HEALTH SERVICES | Facility: HOME HEALTH | Age: 83
End: 2023-10-31
Payer: MEDICARE

## 2023-10-31 VITALS
SYSTOLIC BLOOD PRESSURE: 130 MMHG | TEMPERATURE: 97.2 F | DIASTOLIC BLOOD PRESSURE: 85 MMHG | RESPIRATION RATE: 18 BRPM | OXYGEN SATURATION: 92 % | HEART RATE: 70 BPM

## 2023-10-31 PROCEDURE — 1090000002 HH PPS REVENUE DEBIT

## 2023-10-31 PROCEDURE — 1090000001 HH PPS REVENUE CREDIT

## 2023-10-31 PROCEDURE — G0299 HHS/HOSPICE OF RN EA 15 MIN: HCPCS | Mod: HHH

## 2023-10-31 SDOH — ECONOMIC STABILITY: GENERAL

## 2023-10-31 ASSESSMENT — ENCOUNTER SYMPTOMS
APPETITE LEVEL: FAIR
LOWER EXTREMITY EDEMA: 1
MUSCLE WEAKNESS: 1
SHORTNESS OF BREATH: 1
DESCRIPTION OF MEMORY LOSS: SHORT TERM
DYSPNEA ACTIVITY LEVEL: AFTER AMBULATING 10 - 20 FT
CHANGE IN APPETITE: DECREASED
FORGETFULNESS: 1

## 2023-10-31 ASSESSMENT — ACTIVITIES OF DAILY LIVING (ADL)
LAUNDRY: NEEDS ASSISTANCE
BATHING_CURRENT_FUNCTION: STAND BY ASSIST
GROOMING_CURRENT_FUNCTION: STAND BY ASSIST
DRESSING_LB_CURRENT_FUNCTION: STAND BY ASSIST
BATHING ASSESSED: 1
GROOMING ASSESSED: 1
HOUSEKEEPING ASSESSED: 1
LIGHT HOUSEKEEPING: NEEDS ASSISTANCE
AMBULATION ASSISTANCE: STAND BY ASSIST
PREPARING MEALS: NEEDS ASSISTANCE
MONEY MANAGEMENT (EXPENSES/BILLS): NEEDS ASSISTANCE
LAUNDRY ASSESSED: 1
AMBULATION ASSISTANCE: 1

## 2023-11-01 ENCOUNTER — HOME CARE VISIT (OUTPATIENT)
Dept: HOME HEALTH SERVICES | Facility: HOME HEALTH | Age: 83
End: 2023-11-01
Payer: MEDICARE

## 2023-11-01 VITALS
SYSTOLIC BLOOD PRESSURE: 136 MMHG | TEMPERATURE: 98.8 F | OXYGEN SATURATION: 97 % | HEART RATE: 79 BPM | DIASTOLIC BLOOD PRESSURE: 80 MMHG

## 2023-11-01 PROCEDURE — 1090000001 HH PPS REVENUE CREDIT

## 2023-11-01 PROCEDURE — G0152 HHCP-SERV OF OT,EA 15 MIN: HCPCS | Mod: HHH

## 2023-11-01 PROCEDURE — 1090000002 HH PPS REVENUE DEBIT

## 2023-11-01 SDOH — HEALTH STABILITY: MENTAL HEALTH: SMOKING IN HOME: 0

## 2023-11-01 ASSESSMENT — ENCOUNTER SYMPTOMS
LOWEST PAIN SEVERITY IN PAST 24 HOURS: 4/10
SUBJECTIVE PAIN PROGRESSION: UNCHANGED
PAIN LOCATION - PAIN SEVERITY: 4/10
PAIN LOCATION: LEFT KNEE
PAIN SEVERITY GOAL: 0/10
PAIN: 1
PERSON REPORTING PAIN: PATIENT

## 2023-11-02 ENCOUNTER — HOME CARE VISIT (OUTPATIENT)
Dept: HOME HEALTH SERVICES | Facility: HOME HEALTH | Age: 83
End: 2023-11-02
Payer: MEDICARE

## 2023-11-02 VITALS
SYSTOLIC BLOOD PRESSURE: 100 MMHG | DIASTOLIC BLOOD PRESSURE: 80 MMHG | OXYGEN SATURATION: 96 % | RESPIRATION RATE: 28 BRPM | TEMPERATURE: 97.2 F | HEART RATE: 60 BPM

## 2023-11-02 PROCEDURE — 1090000001 HH PPS REVENUE CREDIT

## 2023-11-02 PROCEDURE — 1090000002 HH PPS REVENUE DEBIT

## 2023-11-02 PROCEDURE — G0300 HHS/HOSPICE OF LPN EA 15 MIN: HCPCS | Mod: HHH

## 2023-11-02 ASSESSMENT — PAIN SCALES - PAIN ASSESSMENT IN ADVANCED DEMENTIA (PAINAD)
CONSOLABILITY: 0 - NO NEED TO CONSOLE.
NEGVOCALIZATION: 0
FACIALEXPRESSION: 0 - SMILING OR INEXPRESSIVE.
FACIALEXPRESSION: 0
BODYLANGUAGE: 0
BODYLANGUAGE: 0 - RELAXED.
TOTALSCORE: 0
BREATHING: 0
CONSOLABILITY: 0
NEGVOCALIZATION: 0 - NONE.

## 2023-11-02 ASSESSMENT — ENCOUNTER SYMPTOMS
PAIN LOCATION: LEFT LEG
PAIN LOCATION - RELIEVING FACTORS: MEDICATION
LOWER EXTREMITY EDEMA: 1
LAST BOWEL MOVEMENT: 66778
SUBJECTIVE PAIN PROGRESSION: UNCHANGED
PAIN: 1
APPETITE LEVEL: FAIR
PAIN LOCATION - PAIN QUALITY: THROBBING

## 2023-11-03 ENCOUNTER — HOME CARE VISIT (OUTPATIENT)
Dept: HOME HEALTH SERVICES | Facility: HOME HEALTH | Age: 83
End: 2023-11-03
Payer: MEDICARE

## 2023-11-03 PROCEDURE — 1090000001 HH PPS REVENUE CREDIT

## 2023-11-03 PROCEDURE — 1090000002 HH PPS REVENUE DEBIT

## 2023-11-04 PROCEDURE — 1090000001 HH PPS REVENUE CREDIT

## 2023-11-04 PROCEDURE — 1090000002 HH PPS REVENUE DEBIT

## 2023-11-05 PROCEDURE — 1090000002 HH PPS REVENUE DEBIT

## 2023-11-05 PROCEDURE — 1090000001 HH PPS REVENUE CREDIT

## 2023-11-06 ENCOUNTER — HOME CARE VISIT (OUTPATIENT)
Dept: HOME HEALTH SERVICES | Facility: HOME HEALTH | Age: 83
End: 2023-11-06
Payer: MEDICARE

## 2023-11-06 VITALS
HEART RATE: 74 BPM | DIASTOLIC BLOOD PRESSURE: 82 MMHG | SYSTOLIC BLOOD PRESSURE: 132 MMHG | RESPIRATION RATE: 20 BRPM | TEMPERATURE: 97.2 F | OXYGEN SATURATION: 97 %

## 2023-11-06 PROCEDURE — 1090000001 HH PPS REVENUE CREDIT

## 2023-11-06 PROCEDURE — 1090000002 HH PPS REVENUE DEBIT

## 2023-11-06 PROCEDURE — G0157 HHC PT ASSISTANT EA 15: HCPCS | Mod: HHH

## 2023-11-06 ASSESSMENT — ENCOUNTER SYMPTOMS
PAIN LOCATION - PAIN SEVERITY: 3/10
PAIN LOCATION - PAIN FREQUENCY: INTERMITTENT
PAIN SEVERITY GOAL: 0/10
PAIN LOCATION - PAIN QUALITY: SHARP
SUBJECTIVE PAIN PROGRESSION: GRADUALLY WORSENING
HIGHEST PAIN SEVERITY IN PAST 24 HOURS: 3/10
LOWEST PAIN SEVERITY IN PAST 24 HOURS: 1/10
PERSON REPORTING PAIN: PATIENT
PAIN LOCATION - EXACERBATING FACTORS: ACTIVITY
PAIN LOCATION - PAIN DURATION: INTERMITTENT
PAIN: 1
PAIN LOCATION: CHEST

## 2023-11-07 ENCOUNTER — HOME CARE VISIT (OUTPATIENT)
Dept: HOME HEALTH SERVICES | Facility: HOME HEALTH | Age: 83
End: 2023-11-07
Payer: MEDICARE

## 2023-11-07 ENCOUNTER — OFFICE VISIT (OUTPATIENT)
Dept: PRIMARY CARE | Facility: CLINIC | Age: 83
End: 2023-11-07
Payer: MEDICARE

## 2023-11-07 VITALS
DIASTOLIC BLOOD PRESSURE: 60 MMHG | HEART RATE: 83 BPM | SYSTOLIC BLOOD PRESSURE: 118 MMHG | OXYGEN SATURATION: 95 % | RESPIRATION RATE: 18 BRPM | TEMPERATURE: 96.5 F

## 2023-11-07 VITALS
HEART RATE: 89 BPM | DIASTOLIC BLOOD PRESSURE: 82 MMHG | SYSTOLIC BLOOD PRESSURE: 150 MMHG | TEMPERATURE: 99.2 F | OXYGEN SATURATION: 93 %

## 2023-11-07 VITALS
HEIGHT: 65 IN | DIASTOLIC BLOOD PRESSURE: 88 MMHG | BODY MASS INDEX: 23.32 KG/M2 | WEIGHT: 140 LBS | SYSTOLIC BLOOD PRESSURE: 138 MMHG | OXYGEN SATURATION: 93 % | HEART RATE: 81 BPM

## 2023-11-07 DIAGNOSIS — J96.01 ACUTE RESPIRATORY FAILURE WITH HYPOXIA (MULTI): Primary | ICD-10-CM

## 2023-11-07 DIAGNOSIS — I50.23 ACUTE ON CHRONIC SYSTOLIC HEART FAILURE (MULTI): ICD-10-CM

## 2023-11-07 DIAGNOSIS — R60.0 LEG EDEMA, LEFT: ICD-10-CM

## 2023-11-07 PROCEDURE — 1090000001 HH PPS REVENUE CREDIT

## 2023-11-07 PROCEDURE — 3074F SYST BP LT 130 MM HG: CPT | Performed by: FAMILY MEDICINE

## 2023-11-07 PROCEDURE — 1160F RVW MEDS BY RX/DR IN RCRD: CPT | Performed by: FAMILY MEDICINE

## 2023-11-07 PROCEDURE — 99214 OFFICE O/P EST MOD 30 MIN: CPT | Performed by: FAMILY MEDICINE

## 2023-11-07 PROCEDURE — 1090000002 HH PPS REVENUE DEBIT

## 2023-11-07 PROCEDURE — 3078F DIAST BP <80 MM HG: CPT | Performed by: FAMILY MEDICINE

## 2023-11-07 PROCEDURE — 1125F AMNT PAIN NOTED PAIN PRSNT: CPT | Performed by: FAMILY MEDICINE

## 2023-11-07 PROCEDURE — G0299 HHS/HOSPICE OF RN EA 15 MIN: HCPCS | Mod: HHH

## 2023-11-07 PROCEDURE — G0152 HHCP-SERV OF OT,EA 15 MIN: HCPCS | Mod: HHH

## 2023-11-07 PROCEDURE — 1036F TOBACCO NON-USER: CPT | Performed by: FAMILY MEDICINE

## 2023-11-07 PROCEDURE — 1159F MED LIST DOCD IN RCRD: CPT | Performed by: FAMILY MEDICINE

## 2023-11-07 PROCEDURE — 1111F DSCHRG MED/CURRENT MED MERGE: CPT | Performed by: FAMILY MEDICINE

## 2023-11-07 SDOH — ECONOMIC STABILITY: HOUSING INSECURITY: HOME SAFETY: THEY CAN REINFORCE SAFETY.

## 2023-11-07 SDOH — ECONOMIC STABILITY: HOUSING INSECURITY
HOME SAFETY: PT HAS A SHORT TUBING FOR O2 NEBULIZER, IT DOES NOT REACH TO THE SECOND FLOOR. PT REPORTS SHE AMBULATES TO THE SECOND FLOOR AT LEAST 2X A DAY. PT REPORTS TODAY SHE GOT VERY DIZZY ON THE STEPS. INFORMED PT TO NOT GO TO THE SECOND FLOOR SINCE SHE DOES

## 2023-11-07 SDOH — ECONOMIC STABILITY: GENERAL

## 2023-11-07 SDOH — ECONOMIC STABILITY: HOUSING INSECURITY
HOME SAFETY: NOT HAVE SUPPLIMENTAL O2 ON THE UPSTAIRS. INSTRUCTED PT TO USE THE PORTABLE O2 AND INSTRUCTED HER IN HOW TO WEAR THE STRAP CROSS BODY TO KEEP HER ARMS FREE FOR RAILINGS. PT HAS DECREASED STM, PT'S SPOUSE AND SON WERE INSTRUCTED ON THIS SUGGESTION SO

## 2023-11-07 SDOH — HEALTH STABILITY: MENTAL HEALTH: SMOKING IN HOME: 0

## 2023-11-07 SDOH — ECONOMIC STABILITY: HOUSING INSECURITY: EVIDENCE OF SMOKING MATERIAL: 0

## 2023-11-07 ASSESSMENT — ENCOUNTER SYMPTOMS
ABDOMINAL DISTENTION: 0
SHORTNESS OF BREATH: 1
LOWEST PAIN SEVERITY IN PAST 24 HOURS: 0/10
CHANGE IN APPETITE: DECREASED
ABDOMINAL PAIN: 0
PAIN SEVERITY GOAL: 0/10
STRIDOR: 0
DYSPNEA ACTIVITY LEVEL: AFTER AMBULATING LESS THAN 10 FT
WHEEZING: 0
FORGETFULNESS: 1
DIAPHORESIS: 0
DENIES PAIN: 1
SHORTNESS OF BREATH: 1
CHILLS: 0
APPETITE CHANGE: 1
APPETITE LEVEL: FAIR
PALPITATIONS: 0
MUSCLE WEAKNESS: 1
PAIN LOCATION - PAIN SEVERITY: 4/10
HIGHEST PAIN SEVERITY IN PAST 24 HOURS: 4/10
CHEST TIGHTNESS: 0
COUGH: 1
PAIN LOCATION: LEFT FOOT
COUGH: 1
FATIGUE: 1
DEPRESSION: 1
LOWER EXTREMITY EDEMA: 1
ACTIVITY CHANGE: 1
CHOKING: 0
DESCRIPTION OF MEMORY LOSS: SHORT TERM
PAIN: 1
FATIGUE: 0
DYSPHORIC MOOD: 0

## 2023-11-07 ASSESSMENT — ACTIVITIES OF DAILY LIVING (ADL)
DRESSING_LB_CURRENT_FUNCTION: STAND BY ASSIST
AMBULATION ASSISTANCE: STAND BY ASSIST
AMBULATION ASSISTANCE: 1
LAUNDRY: NEEDS ASSISTANCE
LAUNDRY ASSESSED: 1
MONEY MANAGEMENT (EXPENSES/BILLS): NEEDS ASSISTANCE

## 2023-11-07 ASSESSMENT — PATIENT HEALTH QUESTIONNAIRE - PHQ9
1. LITTLE INTEREST OR PLEASURE IN DOING THINGS: SEVERAL DAYS
2. FEELING DOWN, DEPRESSED OR HOPELESS: NOT AT ALL
10. IF YOU CHECKED OFF ANY PROBLEMS, HOW DIFFICULT HAVE THESE PROBLEMS MADE IT FOR YOU TO DO YOUR WORK, TAKE CARE OF THINGS AT HOME, OR GET ALONG WITH OTHER PEOPLE: SOMEWHAT DIFFICULT
1. LITTLE INTEREST OR PLEASURE IN DOING THINGS: NOT AT ALL
SUM OF ALL RESPONSES TO PHQ9 QUESTIONS 1 AND 2: 0
SUM OF ALL RESPONSES TO PHQ9 QUESTIONS 1 & 2: 2
2. FEELING DOWN, DEPRESSED OR HOPELESS: SEVERAL DAYS

## 2023-11-07 ASSESSMENT — PAIN SCALES - GENERAL: PAINLEVEL: 4

## 2023-11-07 NOTE — PROGRESS NOTES
[facility]: [facility]  E/M EVALUATION    Anitha Welch is a 83 y.o. female who presents for Follow-up and Medication Problem (Patient said the RX for her Jardiance is too expensive, would like to discuss different options/dmw/Patient also said the Trelegy is expensive, would like to discuss/dmw).    Subjective   Pt admitted for SOB, h/o of HF.  Pt was ultimately discharged home to spend time with her dying son.  She reports breathing is stable.  Left leg is swollen, non painful.  DVT us neg.  Did follow up with pulmonary.  She would like a different pulmonologist as she feels Dr Rae did not explain anything to them.  She states she was no approved for a portable concentrator.  She is on 2L o2.  She states she was supposed to do a walk test, would still like to get it done.        Review of Systems   Constitutional:  Positive for activity change and appetite change. Negative for chills, diaphoresis and fatigue.   Respiratory:  Positive for cough and shortness of breath. Negative for choking, chest tightness, wheezing and stridor.    Cardiovascular:  Positive for leg swelling. Negative for chest pain and palpitations.   Gastrointestinal:  Negative for abdominal distention and abdominal pain.   Psychiatric/Behavioral:  Negative for dysphoric mood.         Grief but not depressed       Objective   Vitals:    23 1553   BP: 138/88   Pulse: 81   SpO2: 93%     Physical Exam  Constitutional:       Comments: In wheelchair, NAD   Cardiovascular:      Rate and Rhythm: Normal rate and regular rhythm.      Heart sounds: No murmur heard.     Comments: Varicosities. No skin breakdown.    Pulmonary:      Breath sounds: Decreased air movement present.   Musculoskeletal:      Right lower le+ Edema present.      Left lower leg: 3+ Edema present.     Patient ID: Anitha Welch is a 83 y.o. female.    Procedures  Cholesterol   Date Value Ref Range Status   2023 190 133 - 200 MG/DL Final  "    Triglycerides   Date Value Ref Range Status   02/21/2023 64 40 - 150 MG/DL Final     HDL   Date Value Ref Range Status   02/21/2023 52 >50 MG/DL Final     Comment:     National Cholesterol Education Program(NCFP)guidelines:   <40 mg/dl:Low HDL-cholesterol(major risk factor for CHD)   >60 mg/dl:High HDL-cholesterol(\"negative\"risk factor for CHD)   HDL-cholesterol is affected by a number of factors,e.g.,smoking,  exercise,hormones,sex and age.       LDL Calculated   Date Value Ref Range Status   02/21/2023 125 65 - 130 MG/DL Final     Cholesterol/HDL Ratio   Date Value Ref Range Status   02/21/2023 3.7 RATIO Final     Comment:     According to the American Heart Association, the goal is to maintain   the total cholesterol/HDL ratio at 5-to-1 or lower with an optimum   ratio of 3.5-to-1.  Performed at [address] [address]       Glucose   Date Value Ref Range Status   10/10/2023 110 (H) 65 - 99 mg/dL Final     Sodium   Date Value Ref Range Status   10/10/2023 148 (H) 133 - 145 mmol/L Final     Potassium   Date Value Ref Range Status   10/10/2023 3.5 3.4 - 5.1 mmol/L Final     ALT   Date Value Ref Range Status   10/08/2023 90 (H) 5 - 40 U/L Final     eGFR   Date Value Ref Range Status   10/10/2023 63 >60 mL/min/1.73m*2 Final     Comment:     Calculations of estimated GFR are performed using the 2021 CKD-EPI Study Refit equation without the race variable for the IDMS-Traceable creatinine methods.  https://jasn.asnjournals.org/content/early/2021/09/22/ASN.4671786778     INR   Date Value Ref Range Status   01/24/2020 1.0 0.86 - 1.16 Final     Comment:     INR Therapeutic Range: 2.0-3.5  Performed at [address] [address]       Hemoglobin A1C   Date Value Ref Range Status   01/09/2019 5.5 4.0 - 6.0 % Final     Comment:     Hemoglobin A1C levels are related to mean blood glucose during the   preceding 2-3 months. The relationship table below may be used as a   general guide. " Each 1% increase in HGB A1C is a reflection of an   increase in mean glucose of approximately 30 mg/dl.   Reference: Diabetes Care, volume 29, supplement 1 Jan. 2006                        HGB A1C ................. Approx. Mean Glucose   _______________________________________________   6%   ...............................  120 mg/dl   7%   ...............................  150 mg/dl   8%   ...............................  180 mg/dl   9%   ...............................  210 mg/dl   10%  ...............................  240 mg/dl  Performed at [address] [address]       Thyroid Stimulating Hormone   Date Value Ref Range Status   10/08/2023 0.65 0.27 - 4.20 mIU/L Final       Assessment/Plan      Patient Active Problem List   Diagnosis    Epistaxis    Abdominal aortic aneurysm (AAA) without rupture (CMS/HCC)    Acute low back pain    Acute on chronic systolic heart failure (CMS/HCC)    Acute renal failure syndrome (CMS/[facility])    Acute ST segment elevation myocardial infarction (CMS/[facility])    Arteriosclerosis of coronary artery    Artificial lens present    Benign essential hypertension    Blood loss anemia    Carotid stenosis, left    Cerebrovascular accident (CVA) involving left cerebral hemisphere (CMS/[facility])    Cerebrovascular accident (CVA) due to embolism of cerebral artery (CMS/HCC)    Cerebrovascular accident (CMS/HCC)    Transient ischemic attack    Angina pectoris (CMS/[facility])    Backache    Chest pain    Tight chest    Chronic diastolic heart failure (CMS/HCC)    Chronic heart failure with preserved ejection fraction (CMS/[facility])    Chronic kidney disease, stage 3 (CMS/HCC)    Acute exacerbation of chronic obstructive airways disease (CMS/HCC)    Chronic obstructive pulmonary disease (CMS/HCC)    Claudication (CMS/HCC)    Cystocele with prolapse    Vaginal prolapse    Dehydration    Dermatochalasis of left upper eyelid    Dermatochalasis of right upper eyelid    Disorder of vision    Dysgeusia     Dyspnea    Electrocardiogram abnormal    Facial weakness    Fall    Gastroesophageal reflux disease    Gastrointestinal hemorrhage    Headache    History of coronary artery stent placement    History of myocardial infarction    History of stroke without residual deficits    History of cerebrovascular accident    Hypothyroidism (acquired)    Ischemic cardiomyopathy    Lacunar infarct, acute (CMS/HCC)    Lumbago-sciatica due to displacement of lumbar intervertebral disc    Lumbar degenerative disc disease    Mixed hyperlipidemia    OAB (overactive bladder)    Pain of lower extremity    Pinguecula    Hypertension    Low blood pressure    Near syncope    PVD (peripheral vascular disease) (CMS/HCC)    Right homonymous hemianopsia    Seizure (CMS/HCC)    Stented coronary artery    Syncope and collapse    Tear film insufficiency    Urinary urgency    Varicose veins of both legs with edema    Floaters in visual field    Vitreous degeneration    Asthenia    Chronic fatigue    Weakness    Acute respiratory failure with hypoxia (CMS/HCC)    Acquired hypothyroidism    Bilateral carotid artery stenosis    RLS (restless legs syndrome)       Diagnoses and all orders for this visit:  Acute respiratory failure with hypoxia (CMS/HCC)  -     Referral to Pulmonology; Future  -     Pulmonary function testing (Ancillary Performed); Future  Leg edema, left  Acute on chronic systolic heart failure (CMS/HCC)    Left leg unna boot applied, advised leg elevation, remove in 3 days,  15-20 compression sock ( ideal 20-30.  )  The patient was encouraged to ensure that any/all documentation is accurate and up to date, and that our office be provided a copy in the event that anything changes.         Gee Dupree MD

## 2023-11-08 ENCOUNTER — HOME CARE VISIT (OUTPATIENT)
Dept: HOME HEALTH SERVICES | Facility: HOME HEALTH | Age: 83
End: 2023-11-08
Payer: MEDICARE

## 2023-11-08 VITALS — HEART RATE: 98 BPM | OXYGEN SATURATION: 82 %

## 2023-11-08 PROCEDURE — 1090000002 HH PPS REVENUE DEBIT

## 2023-11-08 PROCEDURE — 1090000001 HH PPS REVENUE CREDIT

## 2023-11-08 SDOH — HEALTH STABILITY: PHYSICAL HEALTH
EXERCISE COMMENTS: PULSE OX READINGS TAKEN AT REST:  98 PER CENT, 84 BPM.  AFTER WALKING ABOUT 200 FEET WITHOUT OXYGEN CANNULA IN PLACE (SINCE THE LINE WAS TOO SHORT TO DO SO), PULSE OX READING WAS 82 PER CENT, 95 BPM.  DISCUSSED THAT PATIENT WOULD BENEFIT FROM SUPPLEM

## 2023-11-08 SDOH — HEALTH STABILITY: PHYSICAL HEALTH
EXERCISE COMMENTS: ENTAL OXYGEN FOR ALL UPRIGHT ACTIVITY.  I DID MENTION THAT THE PATIENT AND HER HUSBAND CAN CERTAINLY REASSESS HER ABILITY ON A REGULAR BASIS, SAY WEEKLY, BY PERFORMING THE ABOVE ACTIVITY.

## 2023-11-08 ASSESSMENT — ENCOUNTER SYMPTOMS
DENIES PAIN: 1
PERSON REPORTING PAIN: PATIENT

## 2023-11-08 ASSESSMENT — ACTIVITIES OF DAILY LIVING (ADL)
AMBULATION_DISTANCE/DURATION_TOLERATED: 200
AMBULATION ASSISTANCE ON FLAT SURFACES: 1

## 2023-11-09 ENCOUNTER — HOME CARE VISIT (OUTPATIENT)
Dept: HOME HEALTH SERVICES | Facility: HOME HEALTH | Age: 83
End: 2023-11-09
Payer: MEDICARE

## 2023-11-09 VITALS
OXYGEN SATURATION: 93 % | DIASTOLIC BLOOD PRESSURE: 72 MMHG | HEART RATE: 88 BPM | TEMPERATURE: 98.4 F | SYSTOLIC BLOOD PRESSURE: 140 MMHG

## 2023-11-09 PROCEDURE — G0152 HHCP-SERV OF OT,EA 15 MIN: HCPCS | Mod: HHH

## 2023-11-09 PROCEDURE — 1090000002 HH PPS REVENUE DEBIT

## 2023-11-09 PROCEDURE — 1090000001 HH PPS REVENUE CREDIT

## 2023-11-09 ASSESSMENT — ENCOUNTER SYMPTOMS: DENIES PAIN: 1

## 2023-11-10 PROCEDURE — 1090000002 HH PPS REVENUE DEBIT

## 2023-11-10 PROCEDURE — 1090000001 HH PPS REVENUE CREDIT

## 2023-11-11 PROCEDURE — 1090000001 HH PPS REVENUE CREDIT

## 2023-11-11 PROCEDURE — 1090000002 HH PPS REVENUE DEBIT

## 2023-11-12 PROCEDURE — 1090000001 HH PPS REVENUE CREDIT

## 2023-11-12 PROCEDURE — 1090000002 HH PPS REVENUE DEBIT

## 2023-11-13 PROCEDURE — 1090000002 HH PPS REVENUE DEBIT

## 2023-11-13 PROCEDURE — 1090000001 HH PPS REVENUE CREDIT

## 2023-11-14 ENCOUNTER — HOME CARE VISIT (OUTPATIENT)
Dept: HOME HEALTH SERVICES | Facility: HOME HEALTH | Age: 83
End: 2023-11-14

## 2023-11-14 VITALS
TEMPERATURE: 97.8 F | SYSTOLIC BLOOD PRESSURE: 88 MMHG | HEART RATE: 74 BPM | OXYGEN SATURATION: 95 % | DIASTOLIC BLOOD PRESSURE: 60 MMHG

## 2023-11-14 PROCEDURE — 0023 HH SOC

## 2023-11-14 PROCEDURE — 1090000002 HH PPS REVENUE DEBIT

## 2023-11-14 PROCEDURE — G0152 HHCP-SERV OF OT,EA 15 MIN: HCPCS | Mod: HHH

## 2023-11-14 PROCEDURE — 1090000001 HH PPS REVENUE CREDIT

## 2023-11-14 SDOH — ECONOMIC STABILITY: HOUSING INSECURITY
HOME SAFETY: PT'S SPOUSE ORDERED THE LONGER TUBING, GREEN TO SHOW UP FOR ON LIGHT FLOOR. PT'S SPOUSE IS FOLLOWING THROUGH WITH KEEPING THE O2 ON WHEN WALKING AROUND THE HOME. LONGER TUBING REACHES INTO BATHROOM AND KITCHEN. IT DOESNOT REACH TO SECOND FLOOR WHERE

## 2023-11-14 SDOH — ECONOMIC STABILITY: HOUSING INSECURITY: HOME SAFETY: PT GOES FOR CLOTHES AND TO CHANGE.

## 2023-11-14 ASSESSMENT — ENCOUNTER SYMPTOMS: DENIES PAIN: 1

## 2023-11-15 ENCOUNTER — HOSPITAL ENCOUNTER (OUTPATIENT)
Dept: RESPIRATORY THERAPY | Facility: HOSPITAL | Age: 83
Setting detail: THERAPIES SERIES
Discharge: HOME | End: 2023-11-15
Payer: MEDICARE

## 2023-11-15 ENCOUNTER — HOME CARE VISIT (OUTPATIENT)
Dept: HOME HEALTH SERVICES | Facility: HOME HEALTH | Age: 83
End: 2023-11-15
Payer: MEDICARE

## 2023-11-15 VITALS
RESPIRATION RATE: 18 BRPM | SYSTOLIC BLOOD PRESSURE: 140 MMHG | OXYGEN SATURATION: 94 % | DIASTOLIC BLOOD PRESSURE: 60 MMHG | HEART RATE: 73 BPM

## 2023-11-15 DIAGNOSIS — J96.01 ACUTE RESPIRATORY FAILURE WITH HYPOXIA (MULTI): ICD-10-CM

## 2023-11-15 PROCEDURE — G0299 HHS/HOSPICE OF RN EA 15 MIN: HCPCS | Mod: HHH

## 2023-11-15 PROCEDURE — 1090000002 HH PPS REVENUE DEBIT

## 2023-11-15 PROCEDURE — 1090000001 HH PPS REVENUE CREDIT

## 2023-11-15 PROCEDURE — 94618 PULMONARY STRESS TESTING: CPT

## 2023-11-15 SDOH — ECONOMIC STABILITY: HOUSING INSECURITY: EVIDENCE OF SMOKING MATERIAL: 0

## 2023-11-15 SDOH — HEALTH STABILITY: MENTAL HEALTH: SMOKING IN HOME: 0

## 2023-11-15 ASSESSMENT — ENCOUNTER SYMPTOMS
FORGETFULNESS: 1
DESCRIPTION OF MEMORY LOSS: SHORT TERM
DENIES PAIN: 1
LOWER EXTREMITY EDEMA: 1
CHANGE IN APPETITE: UNCHANGED
APPETITE LEVEL: GOOD

## 2023-11-16 ENCOUNTER — TELEPHONE (OUTPATIENT)
Dept: PRIMARY CARE | Facility: CLINIC | Age: 83
End: 2023-11-16

## 2023-11-16 ENCOUNTER — APPOINTMENT (OUTPATIENT)
Dept: PRIMARY CARE | Facility: CLINIC | Age: 83
End: 2023-11-16
Payer: MEDICARE

## 2023-11-16 ENCOUNTER — HOME CARE VISIT (OUTPATIENT)
Dept: HOME HEALTH SERVICES | Facility: HOME HEALTH | Age: 83
End: 2023-11-16
Payer: MEDICARE

## 2023-11-16 VITALS
OXYGEN SATURATION: 96 % | DIASTOLIC BLOOD PRESSURE: 72 MMHG | HEART RATE: 73 BPM | SYSTOLIC BLOOD PRESSURE: 114 MMHG | TEMPERATURE: 98.3 F

## 2023-11-16 DIAGNOSIS — J96.21 ACUTE ON CHRONIC RESPIRATORY FAILURE WITH HYPOXIA (MULTI): Primary | ICD-10-CM

## 2023-11-16 PROCEDURE — 1090000001 HH PPS REVENUE CREDIT

## 2023-11-16 PROCEDURE — G0152 HHCP-SERV OF OT,EA 15 MIN: HCPCS | Mod: HHH

## 2023-11-16 PROCEDURE — 1090000002 HH PPS REVENUE DEBIT

## 2023-11-16 ASSESSMENT — ENCOUNTER SYMPTOMS
DENIES PAIN: 1
PERSON REPORTING PAIN: PATIENT

## 2023-11-16 ASSESSMENT — ACTIVITIES OF DAILY LIVING (ADL)
HOME_HEALTH_OASIS: 00
OASIS_M1830: 00

## 2023-11-20 ENCOUNTER — APPOINTMENT (OUTPATIENT)
Dept: PRIMARY CARE | Facility: CLINIC | Age: 83
End: 2023-11-20
Payer: MEDICARE

## 2023-11-21 ENCOUNTER — APPOINTMENT (OUTPATIENT)
Dept: RADIOLOGY | Facility: HOSPITAL | Age: 83
DRG: 291 | End: 2023-11-21
Payer: MEDICARE

## 2023-11-21 ENCOUNTER — HOSPITAL ENCOUNTER (INPATIENT)
Facility: HOSPITAL | Age: 83
LOS: 5 days | Discharge: HOME | DRG: 291 | End: 2023-11-27
Attending: STUDENT IN AN ORGANIZED HEALTH CARE EDUCATION/TRAINING PROGRAM | Admitting: INTERNAL MEDICINE
Payer: MEDICARE

## 2023-11-21 DIAGNOSIS — Z74.09 IMPAIRED MOBILITY: ICD-10-CM

## 2023-11-21 DIAGNOSIS — I34.0 SEVERE MITRAL VALVE REGURGITATION: ICD-10-CM

## 2023-11-21 DIAGNOSIS — M79.605 PAIN IN BOTH LOWER EXTREMITIES: ICD-10-CM

## 2023-11-21 DIAGNOSIS — R06.02 SHORTNESS OF BREATH: ICD-10-CM

## 2023-11-21 DIAGNOSIS — I82.499 DEEP VEIN THROMBOSIS (DVT) OF OTHER VEIN OF LOWER EXTREMITY, UNSPECIFIED CHRONICITY, UNSPECIFIED LATERALITY (MULTI): ICD-10-CM

## 2023-11-21 DIAGNOSIS — R06.02 SOB (SHORTNESS OF BREATH): ICD-10-CM

## 2023-11-21 DIAGNOSIS — I25.5 ISCHEMIC CARDIOMYOPATHY: ICD-10-CM

## 2023-11-21 DIAGNOSIS — J18.9 PNEUMONIA OF BOTH LOWER LOBES DUE TO INFECTIOUS ORGANISM: ICD-10-CM

## 2023-11-21 DIAGNOSIS — J90 PLEURAL EFFUSION: ICD-10-CM

## 2023-11-21 DIAGNOSIS — R60.0 LOCALIZED EDEMA: ICD-10-CM

## 2023-11-21 DIAGNOSIS — I10 PRIMARY HYPERTENSION: ICD-10-CM

## 2023-11-21 DIAGNOSIS — I50.20 HFREF (HEART FAILURE WITH REDUCED EJECTION FRACTION) (MULTI): ICD-10-CM

## 2023-11-21 DIAGNOSIS — I50.9 HEART FAILURE (MULTI): Primary | ICD-10-CM

## 2023-11-21 DIAGNOSIS — M79.604 PAIN IN BOTH LOWER EXTREMITIES: ICD-10-CM

## 2023-11-21 DIAGNOSIS — I50.23 ACUTE ON CHRONIC SYSTOLIC HEART FAILURE (MULTI): ICD-10-CM

## 2023-11-21 LAB
ALBUMIN SERPL-MCNC: 3.5 G/DL (ref 3.5–5)
ALP BLD-CCNC: 97 U/L (ref 35–125)
ALT SERPL-CCNC: 28 U/L (ref 5–40)
ANION GAP SERPL CALC-SCNC: 8 MMOL/L
AST SERPL-CCNC: 24 U/L (ref 5–40)
BASOPHILS # BLD AUTO: 0.02 X10*3/UL (ref 0–0.1)
BASOPHILS NFR BLD AUTO: 0.2 %
BILIRUB SERPL-MCNC: 1.5 MG/DL (ref 0.1–1.2)
BUN SERPL-MCNC: 20 MG/DL (ref 8–25)
CALCIUM SERPL-MCNC: 8.6 MG/DL (ref 8.5–10.4)
CHLORIDE SERPL-SCNC: 103 MMOL/L (ref 97–107)
CO2 SERPL-SCNC: 34 MMOL/L (ref 24–31)
CREAT SERPL-MCNC: 0.8 MG/DL (ref 0.4–1.6)
EOSINOPHIL # BLD AUTO: 0.02 X10*3/UL (ref 0–0.4)
EOSINOPHIL NFR BLD AUTO: 0.2 %
ERYTHROCYTE [DISTWIDTH] IN BLOOD BY AUTOMATED COUNT: 14.9 % (ref 11.5–14.5)
FLUAV RNA RESP QL NAA+PROBE: NOT DETECTED
FLUBV RNA RESP QL NAA+PROBE: NOT DETECTED
GFR SERPL CREATININE-BSD FRML MDRD: 73 ML/MIN/1.73M*2
GLUCOSE SERPL-MCNC: 126 MG/DL (ref 65–99)
HCT VFR BLD AUTO: 41.9 % (ref 36–46)
HGB BLD-MCNC: 13.2 G/DL (ref 12–16)
IMM GRANULOCYTES # BLD AUTO: 0.05 X10*3/UL (ref 0–0.5)
IMM GRANULOCYTES NFR BLD AUTO: 0.6 % (ref 0–0.9)
LYMPHOCYTES # BLD AUTO: 0.73 X10*3/UL (ref 0.8–3)
LYMPHOCYTES NFR BLD AUTO: 9.1 %
MCH RBC QN AUTO: 29.7 PG (ref 26–34)
MCHC RBC AUTO-ENTMCNC: 31.5 G/DL (ref 32–36)
MCV RBC AUTO: 94 FL (ref 80–100)
MONOCYTES # BLD AUTO: 0.67 X10*3/UL (ref 0.05–0.8)
MONOCYTES NFR BLD AUTO: 8.4 %
NEUTROPHILS # BLD AUTO: 6.53 X10*3/UL (ref 1.6–5.5)
NEUTROPHILS NFR BLD AUTO: 81.5 %
NRBC BLD-RTO: 0 /100 WBCS (ref 0–0)
NT-PROBNP SERPL-MCNC: ABNORMAL PG/ML (ref 0–624)
PLATELET # BLD AUTO: 145 X10*3/UL (ref 150–450)
POTASSIUM SERPL-SCNC: 3.9 MMOL/L (ref 3.4–5.1)
PROT SERPL-MCNC: 5.7 G/DL (ref 5.9–7.9)
RBC # BLD AUTO: 4.45 X10*6/UL (ref 4–5.2)
SARS-COV-2 RNA RESP QL NAA+PROBE: NOT DETECTED
SODIUM SERPL-SCNC: 145 MMOL/L (ref 133–145)
TROPONIN T SERPL-MCNC: 32 NG/L
TROPONIN T SERPL-MCNC: 36 NG/L
WBC # BLD AUTO: 8 X10*3/UL (ref 4.4–11.3)

## 2023-11-21 PROCEDURE — 2500000004 HC RX 250 GENERAL PHARMACY W/ HCPCS (ALT 636 FOR OP/ED): Performed by: STUDENT IN AN ORGANIZED HEALTH CARE EDUCATION/TRAINING PROGRAM

## 2023-11-21 PROCEDURE — 80053 COMPREHEN METABOLIC PANEL: CPT | Performed by: PHYSICIAN ASSISTANT

## 2023-11-21 PROCEDURE — 71045 X-RAY EXAM CHEST 1 VIEW: CPT | Mod: FY

## 2023-11-21 PROCEDURE — 93010 ELECTROCARDIOGRAM REPORT: CPT | Performed by: INTERNAL MEDICINE

## 2023-11-21 PROCEDURE — 36415 COLL VENOUS BLD VENIPUNCTURE: CPT | Performed by: PHYSICIAN ASSISTANT

## 2023-11-21 PROCEDURE — G0378 HOSPITAL OBSERVATION PER HR: HCPCS

## 2023-11-21 PROCEDURE — 85025 COMPLETE CBC W/AUTO DIFF WBC: CPT | Performed by: PHYSICIAN ASSISTANT

## 2023-11-21 PROCEDURE — 87636 SARSCOV2 & INF A&B AMP PRB: CPT | Performed by: PHYSICIAN ASSISTANT

## 2023-11-21 PROCEDURE — 96365 THER/PROPH/DIAG IV INF INIT: CPT

## 2023-11-21 PROCEDURE — 83880 ASSAY OF NATRIURETIC PEPTIDE: CPT | Performed by: PHYSICIAN ASSISTANT

## 2023-11-21 PROCEDURE — 84484 ASSAY OF TROPONIN QUANT: CPT | Performed by: PHYSICIAN ASSISTANT

## 2023-11-21 PROCEDURE — 94762 N-INVAS EAR/PLS OXIMTRY CONT: CPT

## 2023-11-21 PROCEDURE — 2500000004 HC RX 250 GENERAL PHARMACY W/ HCPCS (ALT 636 FOR OP/ED): Performed by: INTERNAL MEDICINE

## 2023-11-21 PROCEDURE — 2500000005 HC RX 250 GENERAL PHARMACY W/O HCPCS: Performed by: INTERNAL MEDICINE

## 2023-11-21 PROCEDURE — 94760 N-INVAS EAR/PLS OXIMETRY 1: CPT

## 2023-11-21 PROCEDURE — 2500000002 HC RX 250 W HCPCS SELF ADMINISTERED DRUGS (ALT 637 FOR MEDICARE OP, ALT 636 FOR OP/ED): Performed by: PHYSICIAN ASSISTANT

## 2023-11-21 PROCEDURE — 99285 EMERGENCY DEPT VISIT HI MDM: CPT | Mod: 25 | Performed by: STUDENT IN AN ORGANIZED HEALTH CARE EDUCATION/TRAINING PROGRAM

## 2023-11-21 RX ORDER — IPRATROPIUM BROMIDE AND ALBUTEROL SULFATE 2.5; .5 MG/3ML; MG/3ML
3 SOLUTION RESPIRATORY (INHALATION) ONCE
Status: COMPLETED | OUTPATIENT
Start: 2023-11-21 | End: 2023-11-21

## 2023-11-21 RX ORDER — FUROSEMIDE 10 MG/ML
20 INJECTION INTRAMUSCULAR; INTRAVENOUS 2 TIMES DAILY
Status: DISCONTINUED | OUTPATIENT
Start: 2023-11-21 | End: 2023-11-22

## 2023-11-21 RX ADMIN — AZITHROMYCIN MONOHYDRATE 500 MG: 500 INJECTION, POWDER, LYOPHILIZED, FOR SOLUTION INTRAVENOUS at 20:48

## 2023-11-21 RX ADMIN — FUROSEMIDE 20 MG: 10 INJECTION, SOLUTION INTRAMUSCULAR; INTRAVENOUS at 22:31

## 2023-11-21 RX ADMIN — Medication: at 23:00

## 2023-11-21 RX ADMIN — IPRATROPIUM BROMIDE AND ALBUTEROL SULFATE 3 ML: 2.5; .5 SOLUTION RESPIRATORY (INHALATION) at 20:49

## 2023-11-21 SDOH — SOCIAL STABILITY: SOCIAL NETWORK: HOW OFTEN DO YOU ATTENT MEETINGS OF THE CLUB OR ORGANIZATION YOU BELONG TO?: NEVER

## 2023-11-21 SDOH — SOCIAL STABILITY: SOCIAL INSECURITY: ABUSE: ADULT

## 2023-11-21 SDOH — SOCIAL STABILITY: SOCIAL INSECURITY: HAS ANYONE EVER THREATENED TO HURT YOUR FAMILY OR YOUR PETS?: NO

## 2023-11-21 SDOH — ECONOMIC STABILITY: FOOD INSECURITY: WITHIN THE PAST 12 MONTHS, THE FOOD YOU BOUGHT JUST DIDN'T LAST AND YOU DIDN'T HAVE MONEY TO GET MORE.: NEVER TRUE

## 2023-11-21 SDOH — SOCIAL STABILITY: SOCIAL INSECURITY: DO YOU FEEL ANYONE HAS EXPLOITED OR TAKEN ADVANTAGE OF YOU FINANCIALLY OR OF YOUR PERSONAL PROPERTY?: NO

## 2023-11-21 SDOH — SOCIAL STABILITY: SOCIAL NETWORK: HOW OFTEN DO YOU ATTEND CHURCH OR RELIGIOUS SERVICES?: NEVER

## 2023-11-21 SDOH — SOCIAL STABILITY: SOCIAL INSECURITY: WITHIN THE LAST YEAR, HAVE YOU BEEN HUMILIATED OR EMOTIONALLY ABUSED IN OTHER WAYS BY YOUR PARTNER OR EX-PARTNER?: NO

## 2023-11-21 SDOH — SOCIAL STABILITY: SOCIAL INSECURITY: DOES ANYONE TRY TO KEEP YOU FROM HAVING/CONTACTING OTHER FRIENDS OR DOING THINGS OUTSIDE YOUR HOME?: NO

## 2023-11-21 SDOH — SOCIAL STABILITY: SOCIAL INSECURITY: ARE YOU OR HAVE YOU BEEN THREATENED OR ABUSED PHYSICALLY, EMOTIONALLY, OR SEXUALLY BY ANYONE?: NO

## 2023-11-21 SDOH — HEALTH STABILITY: PHYSICAL HEALTH: ON AVERAGE, HOW MANY DAYS PER WEEK DO YOU ENGAGE IN MODERATE TO STRENUOUS EXERCISE (LIKE A BRISK WALK)?: 0 DAYS

## 2023-11-21 SDOH — SOCIAL STABILITY: SOCIAL INSECURITY: DO YOU FEEL UNSAFE GOING BACK TO THE PLACE WHERE YOU ARE LIVING?: NO

## 2023-11-21 SDOH — ECONOMIC STABILITY: INCOME INSECURITY: IN THE PAST 12 MONTHS, HAS THE ELECTRIC, GAS, OIL, OR WATER COMPANY THREATENED TO SHUT OFF SERVICE IN YOUR HOME?: NO

## 2023-11-21 SDOH — SOCIAL STABILITY: SOCIAL INSECURITY: WITHIN THE LAST YEAR, HAVE YOU BEEN AFRAID OF YOUR PARTNER OR EX-PARTNER?: NO

## 2023-11-21 SDOH — SOCIAL STABILITY: SOCIAL NETWORK: HOW OFTEN DO YOU GET TOGETHER WITH FRIENDS OR RELATIVES?: MORE THAN THREE TIMES A WEEK

## 2023-11-21 SDOH — HEALTH STABILITY: MENTAL HEALTH: EXPERIENCED ANY OF THE FOLLOWING LIFE EVENTS: DEATH OF FAMILY/FRIEND;WITNESS TO INJURY TO OR DEATH OF SOMEONE ELSE

## 2023-11-21 SDOH — SOCIAL STABILITY: SOCIAL INSECURITY: HAVE YOU HAD THOUGHTS OF HARMING ANYONE ELSE?: NO

## 2023-11-21 SDOH — ECONOMIC STABILITY: FOOD INSECURITY: WITHIN THE PAST 12 MONTHS, YOU WORRIED THAT YOUR FOOD WOULD RUN OUT BEFORE YOU GOT MONEY TO BUY MORE.: NEVER TRUE

## 2023-11-21 SDOH — SOCIAL STABILITY: SOCIAL INSECURITY: ARE THERE ANY APPARENT SIGNS OF INJURIES/BEHAVIORS THAT COULD BE RELATED TO ABUSE/NEGLECT?: NO

## 2023-11-21 SDOH — SOCIAL STABILITY: SOCIAL NETWORK: ARE YOU MARRIED, WIDOWED, DIVORCED, SEPARATED, NEVER MARRIED, OR LIVING WITH A PARTNER?: MARRIED

## 2023-11-21 SDOH — HEALTH STABILITY: MENTAL HEALTH
STRESS IS WHEN SOMEONE FEELS TENSE, NERVOUS, ANXIOUS, OR CAN'T SLEEP AT NIGHT BECAUSE THEIR MIND IS TROUBLED. HOW STRESSED ARE YOU?: NOT AT ALL

## 2023-11-21 SDOH — HEALTH STABILITY: PHYSICAL HEALTH: ON AVERAGE, HOW MANY MINUTES DO YOU ENGAGE IN EXERCISE AT THIS LEVEL?: 0 MIN

## 2023-11-21 SDOH — SOCIAL STABILITY: SOCIAL INSECURITY: WERE YOU ABLE TO COMPLETE ALL THE BEHAVIORAL HEALTH SCREENINGS?: YES

## 2023-11-21 ASSESSMENT — PAIN SCALES - GENERAL
PAINLEVEL_OUTOF10: 0 - NO PAIN

## 2023-11-21 ASSESSMENT — COLUMBIA-SUICIDE SEVERITY RATING SCALE - C-SSRS
1. IN THE PAST MONTH, HAVE YOU WISHED YOU WERE DEAD OR WISHED YOU COULD GO TO SLEEP AND NOT WAKE UP?: NO
1. IN THE PAST MONTH, HAVE YOU WISHED YOU WERE DEAD OR WISHED YOU COULD GO TO SLEEP AND NOT WAKE UP?: NO
6. HAVE YOU EVER DONE ANYTHING, STARTED TO DO ANYTHING, OR PREPARED TO DO ANYTHING TO END YOUR LIFE?: NO
2. HAVE YOU ACTUALLY HAD ANY THOUGHTS OF KILLING YOURSELF?: NO
6. HAVE YOU EVER DONE ANYTHING, STARTED TO DO ANYTHING, OR PREPARED TO DO ANYTHING TO END YOUR LIFE?: NO
2. HAVE YOU ACTUALLY HAD ANY THOUGHTS OF KILLING YOURSELF?: NO

## 2023-11-21 ASSESSMENT — COGNITIVE AND FUNCTIONAL STATUS - GENERAL
DAILY ACTIVITIY SCORE: 24
STANDING UP FROM CHAIR USING ARMS: A LITTLE
CLIMB 3 TO 5 STEPS WITH RAILING: A LOT
MOBILITY SCORE: 19
MOVING TO AND FROM BED TO CHAIR: A LITTLE
PATIENT BASELINE BEDBOUND: NO
WALKING IN HOSPITAL ROOM: A LITTLE

## 2023-11-21 ASSESSMENT — LIFESTYLE VARIABLES
AUDIT-C TOTAL SCORE: 0
HOW MANY STANDARD DRINKS CONTAINING ALCOHOL DO YOU HAVE ON A TYPICAL DAY: PATIENT DOES NOT DRINK
AUDIT-C TOTAL SCORE: 0
HOW OFTEN DO YOU HAVE A DRINK CONTAINING ALCOHOL: NEVER
HOW OFTEN DO YOU HAVE 6 OR MORE DRINKS ON ONE OCCASION: NEVER
SKIP TO QUESTIONS 9-10: 1
SUBSTANCE_ABUSE_PAST_12_MONTHS: NO
PRESCIPTION_ABUSE_PAST_12_MONTHS: NO

## 2023-11-21 ASSESSMENT — ACTIVITIES OF DAILY LIVING (ADL)
WALKS IN HOME: INDEPENDENT
FEEDING YOURSELF: INDEPENDENT
HEARING - LEFT EAR: FUNCTIONAL
PATIENT'S MEMORY ADEQUATE TO SAFELY COMPLETE DAILY ACTIVITIES?: YES
TOILETING: INDEPENDENT
HEARING - RIGHT EAR: FUNCTIONAL
BATHING: INDEPENDENT
DRESSING YOURSELF: INDEPENDENT
LACK_OF_TRANSPORTATION: NO
JUDGMENT_ADEQUATE_SAFELY_COMPLETE_DAILY_ACTIVITIES: YES
GROOMING: INDEPENDENT
ADEQUATE_TO_COMPLETE_ADL: YES

## 2023-11-21 ASSESSMENT — PAIN DESCRIPTION - PROGRESSION: CLINICAL_PROGRESSION: NOT CHANGED

## 2023-11-21 ASSESSMENT — PAIN - FUNCTIONAL ASSESSMENT: PAIN_FUNCTIONAL_ASSESSMENT: 0-10

## 2023-11-21 ASSESSMENT — PATIENT HEALTH QUESTIONNAIRE - PHQ9
SUM OF ALL RESPONSES TO PHQ9 QUESTIONS 1 & 2: 4
1. LITTLE INTEREST OR PLEASURE IN DOING THINGS: MORE THAN HALF THE DAYS
2. FEELING DOWN, DEPRESSED OR HOPELESS: MORE THAN HALF THE DAYS

## 2023-11-21 NOTE — Clinical Note
ED UM Review Complete - Patient Meets Inpatient Criteria  @[email or web address]@

## 2023-11-22 ENCOUNTER — APPOINTMENT (OUTPATIENT)
Dept: CARDIOLOGY | Facility: HOSPITAL | Age: 83
DRG: 291 | End: 2023-11-22
Payer: MEDICARE

## 2023-11-22 ENCOUNTER — PATIENT OUTREACH (OUTPATIENT)
Dept: CASE MANAGEMENT | Facility: HOSPITAL | Age: 83
End: 2023-11-22

## 2023-11-22 ENCOUNTER — APPOINTMENT (OUTPATIENT)
Dept: RADIOLOGY | Facility: HOSPITAL | Age: 83
DRG: 291 | End: 2023-11-22
Payer: MEDICARE

## 2023-11-22 PROBLEM — I50.9 HEART FAILURE (MULTI): Status: ACTIVE | Noted: 2023-11-22

## 2023-11-22 LAB
ANION GAP SERPL CALC-SCNC: 8 MMOL/L
ANION GAP SERPL CALC-SCNC: 8 MMOL/L
ATRIAL RATE: 96 BPM
BUN SERPL-MCNC: 18 MG/DL (ref 8–25)
BUN SERPL-MCNC: 23 MG/DL (ref 8–25)
CALCIUM SERPL-MCNC: 8.3 MG/DL (ref 8.5–10.4)
CALCIUM SERPL-MCNC: 8.9 MG/DL (ref 8.5–10.4)
CHLORIDE SERPL-SCNC: 103 MMOL/L (ref 97–107)
CHLORIDE SERPL-SCNC: 104 MMOL/L (ref 97–107)
CO2 SERPL-SCNC: 34 MMOL/L (ref 24–31)
CO2 SERPL-SCNC: 36 MMOL/L (ref 24–31)
CREAT SERPL-MCNC: 0.7 MG/DL (ref 0.4–1.6)
CREAT SERPL-MCNC: 1.1 MG/DL (ref 0.4–1.6)
ERYTHROCYTE [DISTWIDTH] IN BLOOD BY AUTOMATED COUNT: 14.7 % (ref 11.5–14.5)
GFR SERPL CREATININE-BSD FRML MDRD: 50 ML/MIN/1.73M*2
GFR SERPL CREATININE-BSD FRML MDRD: 85 ML/MIN/1.73M*2
GLUCOSE SERPL-MCNC: 142 MG/DL (ref 65–99)
GLUCOSE SERPL-MCNC: 93 MG/DL (ref 65–99)
HCT VFR BLD AUTO: 38.1 % (ref 36–46)
HGB BLD-MCNC: 11.9 G/DL (ref 12–16)
MAGNESIUM SERPL-MCNC: 2.1 MG/DL (ref 1.6–3.1)
MAGNESIUM SERPL-MCNC: 2.3 MG/DL (ref 1.6–3.1)
MCH RBC QN AUTO: 29.4 PG (ref 26–34)
MCHC RBC AUTO-ENTMCNC: 31.2 G/DL (ref 32–36)
MCV RBC AUTO: 94 FL (ref 80–100)
NRBC BLD-RTO: 0 /100 WBCS (ref 0–0)
P AXIS: 56 DEGREES
P OFFSET: 193 MS
P ONSET: 138 MS
PLATELET # BLD AUTO: 134 X10*3/UL (ref 150–450)
POTASSIUM SERPL-SCNC: 3 MMOL/L (ref 3.4–5.1)
POTASSIUM SERPL-SCNC: 3.8 MMOL/L (ref 3.4–5.1)
PR INTERVAL: 144 MS
Q ONSET: 210 MS
QRS COUNT: 16 BEATS
QRS DURATION: 102 MS
QT INTERVAL: 376 MS
QTC CALCULATION(BAZETT): 475 MS
QTC FREDERICIA: 439 MS
R AXIS: -30 DEGREES
RBC # BLD AUTO: 4.05 X10*6/UL (ref 4–5.2)
SODIUM SERPL-SCNC: 146 MMOL/L (ref 133–145)
SODIUM SERPL-SCNC: 147 MMOL/L (ref 133–145)
T AXIS: 11 DEGREES
T OFFSET: 398 MS
TROPONIN T SERPL-MCNC: 34 NG/L
VENTRICULAR RATE: 96 BPM
WBC # BLD AUTO: 5.8 X10*3/UL (ref 4.4–11.3)

## 2023-11-22 PROCEDURE — 99222 1ST HOSP IP/OBS MODERATE 55: CPT | Performed by: INTERNAL MEDICINE

## 2023-11-22 PROCEDURE — 2500000004 HC RX 250 GENERAL PHARMACY W/ HCPCS (ALT 636 FOR OP/ED): Performed by: INTERNAL MEDICINE

## 2023-11-22 PROCEDURE — 99223 1ST HOSP IP/OBS HIGH 75: CPT | Performed by: NURSE PRACTITIONER

## 2023-11-22 PROCEDURE — 2500000001 HC RX 250 WO HCPCS SELF ADMINISTERED DRUGS (ALT 637 FOR MEDICARE OP): Performed by: INTERNAL MEDICINE

## 2023-11-22 PROCEDURE — 83735 ASSAY OF MAGNESIUM: CPT | Performed by: INTERNAL MEDICINE

## 2023-11-22 PROCEDURE — 36415 COLL VENOUS BLD VENIPUNCTURE: CPT | Performed by: PHYSICIAN ASSISTANT

## 2023-11-22 PROCEDURE — 9420000001 HC RT PATIENT EDUCATION 5 MIN

## 2023-11-22 PROCEDURE — 97162 PT EVAL MOD COMPLEX 30 MIN: CPT | Mod: GP

## 2023-11-22 PROCEDURE — 2500000002 HC RX 250 W HCPCS SELF ADMINISTERED DRUGS (ALT 637 FOR MEDICARE OP, ALT 636 FOR OP/ED): Performed by: INTERNAL MEDICINE

## 2023-11-22 PROCEDURE — 96372 THER/PROPH/DIAG INJ SC/IM: CPT | Performed by: INTERNAL MEDICINE

## 2023-11-22 PROCEDURE — 80048 BASIC METABOLIC PNL TOTAL CA: CPT | Performed by: INTERNAL MEDICINE

## 2023-11-22 PROCEDURE — 97165 OT EVAL LOW COMPLEX 30 MIN: CPT | Mod: GO

## 2023-11-22 PROCEDURE — 84484 ASSAY OF TROPONIN QUANT: CPT | Performed by: PHYSICIAN ASSISTANT

## 2023-11-22 PROCEDURE — 1200000002 HC GENERAL ROOM WITH TELEMETRY DAILY

## 2023-11-22 PROCEDURE — 94640 AIRWAY INHALATION TREATMENT: CPT

## 2023-11-22 PROCEDURE — 94762 N-INVAS EAR/PLS OXIMTRY CONT: CPT

## 2023-11-22 PROCEDURE — 93005 ELECTROCARDIOGRAM TRACING: CPT

## 2023-11-22 PROCEDURE — 36415 COLL VENOUS BLD VENIPUNCTURE: CPT | Performed by: INTERNAL MEDICINE

## 2023-11-22 PROCEDURE — 71046 X-RAY EXAM CHEST 2 VIEWS: CPT | Mod: FY

## 2023-11-22 PROCEDURE — 85027 COMPLETE CBC AUTOMATED: CPT | Performed by: INTERNAL MEDICINE

## 2023-11-22 RX ORDER — FLUTICASONE FUROATE AND VILANTEROL 100; 25 UG/1; UG/1
1 POWDER RESPIRATORY (INHALATION)
Status: DISCONTINUED | OUTPATIENT
Start: 2023-11-22 | End: 2023-11-24

## 2023-11-22 RX ORDER — LOSARTAN POTASSIUM 50 MG/1
50 TABLET ORAL DAILY
Status: DISCONTINUED | OUTPATIENT
Start: 2023-11-23 | End: 2023-11-25

## 2023-11-22 RX ORDER — ACETAMINOPHEN 650 MG/1
650 SUPPOSITORY RECTAL EVERY 4 HOURS PRN
Status: DISCONTINUED | OUTPATIENT
Start: 2023-11-22 | End: 2023-11-27 | Stop reason: HOSPADM

## 2023-11-22 RX ORDER — MULTIVIT-MIN/IRON FUM/FOLIC AC 7.5 MG-4
1 TABLET ORAL DAILY
Status: DISCONTINUED | OUTPATIENT
Start: 2023-11-22 | End: 2023-11-27 | Stop reason: HOSPADM

## 2023-11-22 RX ORDER — PANTOPRAZOLE SODIUM 40 MG/1
40 TABLET, DELAYED RELEASE ORAL
Status: DISCONTINUED | OUTPATIENT
Start: 2023-11-22 | End: 2023-11-27 | Stop reason: HOSPADM

## 2023-11-22 RX ORDER — MAGNESIUM SULFATE HEPTAHYDRATE 40 MG/ML
2 INJECTION, SOLUTION INTRAVENOUS ONCE
Status: COMPLETED | OUTPATIENT
Start: 2023-11-22 | End: 2023-11-22

## 2023-11-22 RX ORDER — ASPIRIN 81 MG/1
81 TABLET ORAL ONCE
Status: DISCONTINUED | OUTPATIENT
Start: 2023-11-22 | End: 2023-11-27 | Stop reason: HOSPADM

## 2023-11-22 RX ORDER — LOSARTAN POTASSIUM 25 MG/1
25 TABLET ORAL DAILY
Status: DISCONTINUED | OUTPATIENT
Start: 2023-11-22 | End: 2023-11-22

## 2023-11-22 RX ORDER — ACETAMINOPHEN 160 MG/5ML
650 SOLUTION ORAL EVERY 4 HOURS PRN
Status: DISCONTINUED | OUTPATIENT
Start: 2023-11-22 | End: 2023-11-27 | Stop reason: HOSPADM

## 2023-11-22 RX ORDER — ATORVASTATIN CALCIUM 40 MG/1
40 TABLET, FILM COATED ORAL DAILY
Status: DISCONTINUED | OUTPATIENT
Start: 2023-11-22 | End: 2023-11-27 | Stop reason: HOSPADM

## 2023-11-22 RX ORDER — GUAIFENESIN/DEXTROMETHORPHAN 100-10MG/5
5 SYRUP ORAL EVERY 4 HOURS PRN
Status: DISCONTINUED | OUTPATIENT
Start: 2023-11-22 | End: 2023-11-27 | Stop reason: HOSPADM

## 2023-11-22 RX ORDER — ONDANSETRON HYDROCHLORIDE 2 MG/ML
4 INJECTION, SOLUTION INTRAVENOUS EVERY 8 HOURS PRN
Status: DISCONTINUED | OUTPATIENT
Start: 2023-11-22 | End: 2023-11-27 | Stop reason: HOSPADM

## 2023-11-22 RX ORDER — POLYETHYLENE GLYCOL 3350 17 G/17G
17 POWDER, FOR SOLUTION ORAL DAILY PRN
Status: DISCONTINUED | OUTPATIENT
Start: 2023-11-22 | End: 2023-11-27 | Stop reason: HOSPADM

## 2023-11-22 RX ORDER — FUROSEMIDE 10 MG/ML
40 INJECTION INTRAMUSCULAR; INTRAVENOUS 2 TIMES DAILY
Status: DISCONTINUED | OUTPATIENT
Start: 2023-11-22 | End: 2023-11-23

## 2023-11-22 RX ORDER — SPIRONOLACTONE 25 MG/1
12.5 TABLET ORAL DAILY
Status: DISCONTINUED | OUTPATIENT
Start: 2023-11-22 | End: 2023-11-27 | Stop reason: HOSPADM

## 2023-11-22 RX ORDER — ACETAMINOPHEN 325 MG/1
650 TABLET ORAL EVERY 4 HOURS PRN
Status: DISCONTINUED | OUTPATIENT
Start: 2023-11-22 | End: 2023-11-27 | Stop reason: HOSPADM

## 2023-11-22 RX ORDER — ALBUTEROL SULFATE 0.83 MG/ML
2.5 SOLUTION RESPIRATORY (INHALATION) EVERY 4 HOURS PRN
Status: DISCONTINUED | OUTPATIENT
Start: 2023-11-22 | End: 2023-11-24

## 2023-11-22 RX ORDER — HEPARIN SODIUM 5000 [USP'U]/ML
5000 INJECTION, SOLUTION INTRAVENOUS; SUBCUTANEOUS EVERY 8 HOURS SCHEDULED
Status: DISCONTINUED | OUTPATIENT
Start: 2023-11-22 | End: 2023-11-23

## 2023-11-22 RX ORDER — GUAIFENESIN 600 MG/1
600 TABLET, EXTENDED RELEASE ORAL EVERY 12 HOURS PRN
Status: DISCONTINUED | OUTPATIENT
Start: 2023-11-22 | End: 2023-11-27 | Stop reason: HOSPADM

## 2023-11-22 RX ORDER — LEVOTHYROXINE SODIUM 100 UG/1
100 TABLET ORAL
Status: DISCONTINUED | OUTPATIENT
Start: 2023-11-22 | End: 2023-11-27 | Stop reason: HOSPADM

## 2023-11-22 RX ORDER — CARVEDILOL 12.5 MG/1
12.5 TABLET ORAL
Status: DISCONTINUED | OUTPATIENT
Start: 2023-11-22 | End: 2023-11-25

## 2023-11-22 RX ORDER — ONDANSETRON 4 MG/1
4 TABLET, ORALLY DISINTEGRATING ORAL EVERY 8 HOURS PRN
Status: DISCONTINUED | OUTPATIENT
Start: 2023-11-22 | End: 2023-11-27 | Stop reason: HOSPADM

## 2023-11-22 RX ORDER — POTASSIUM CHLORIDE 20 MEQ/1
40 TABLET, EXTENDED RELEASE ORAL ONCE
Status: COMPLETED | OUTPATIENT
Start: 2023-11-22 | End: 2023-11-22

## 2023-11-22 RX ADMIN — FLUTICASONE FUROATE AND VILANTEROL TRIFENATATE 1 PUFF: 100; 25 POWDER RESPIRATORY (INHALATION) at 07:14

## 2023-11-22 RX ADMIN — FUROSEMIDE 40 MG: 10 INJECTION, SOLUTION INTRAMUSCULAR; INTRAVENOUS at 17:36

## 2023-11-22 RX ADMIN — FUROSEMIDE 20 MG: 10 INJECTION, SOLUTION INTRAMUSCULAR; INTRAVENOUS at 05:05

## 2023-11-22 RX ADMIN — HEPARIN SODIUM 5000 UNITS: 5000 INJECTION, SOLUTION INTRAVENOUS; SUBCUTANEOUS at 05:05

## 2023-11-22 RX ADMIN — SPIRONOLACTONE 12.5 MG: 25 TABLET ORAL at 11:47

## 2023-11-22 RX ADMIN — LOSARTAN POTASSIUM 25 MG: 25 TABLET, FILM COATED ORAL at 08:17

## 2023-11-22 RX ADMIN — POTASSIUM CHLORIDE 40 MEQ: 1500 TABLET, EXTENDED RELEASE ORAL at 06:42

## 2023-11-22 RX ADMIN — MULTIPLE VITAMINS W/ MINERALS TAB 1 TABLET: TAB at 08:17

## 2023-11-22 RX ADMIN — ATORVASTATIN CALCIUM 40 MG: 40 TABLET, FILM COATED ORAL at 08:17

## 2023-11-22 RX ADMIN — ACETAMINOPHEN 650 MG: 325 TABLET ORAL at 05:05

## 2023-11-22 RX ADMIN — LEVOTHYROXINE SODIUM 100 MCG: 0.1 TABLET ORAL at 06:20

## 2023-11-22 RX ADMIN — HEPARIN SODIUM 5000 UNITS: 5000 INJECTION, SOLUTION INTRAVENOUS; SUBCUTANEOUS at 15:07

## 2023-11-22 RX ADMIN — CARVEDILOL 12.5 MG: 12.5 TABLET, FILM COATED ORAL at 08:17

## 2023-11-22 RX ADMIN — HEPARIN SODIUM 5000 UNITS: 5000 INJECTION, SOLUTION INTRAVENOUS; SUBCUTANEOUS at 21:59

## 2023-11-22 RX ADMIN — TIOTROPIUM BROMIDE INHALATION SPRAY 2 PUFF: 3.12 SPRAY, METERED RESPIRATORY (INHALATION) at 07:15

## 2023-11-22 RX ADMIN — CARVEDILOL 12.5 MG: 12.5 TABLET, FILM COATED ORAL at 17:13

## 2023-11-22 RX ADMIN — MAGNESIUM SULFATE HEPTAHYDRATE 2 G: 40 INJECTION, SOLUTION INTRAVENOUS at 17:37

## 2023-11-22 RX ADMIN — PANTOPRAZOLE SODIUM 40 MG: 40 TABLET, DELAYED RELEASE ORAL at 06:19

## 2023-11-22 SDOH — SOCIAL STABILITY: SOCIAL NETWORK: HOW OFTEN DO YOU GET TOGETHER WITH FRIENDS OR RELATIVES?: TWICE A WEEK

## 2023-11-22 SDOH — ECONOMIC STABILITY: INCOME INSECURITY: IN THE LAST 12 MONTHS, WAS THERE A TIME WHEN YOU WERE NOT ABLE TO PAY THE MORTGAGE OR RENT ON TIME?: NO

## 2023-11-22 SDOH — HEALTH STABILITY: PHYSICAL HEALTH: ON AVERAGE, HOW MANY MINUTES DO YOU ENGAGE IN EXERCISE AT THIS LEVEL?: 20 MIN

## 2023-11-22 SDOH — ECONOMIC STABILITY: INCOME INSECURITY: IN THE PAST 12 MONTHS, HAS THE ELECTRIC, GAS, OIL, OR WATER COMPANY THREATENED TO SHUT OFF SERVICE IN YOUR HOME?: NO

## 2023-11-22 SDOH — HEALTH STABILITY: PHYSICAL HEALTH: ON AVERAGE, HOW MANY DAYS PER WEEK DO YOU ENGAGE IN MODERATE TO STRENUOUS EXERCISE (LIKE A BRISK WALK)?: 3 DAYS

## 2023-11-22 SDOH — SOCIAL STABILITY: SOCIAL NETWORK: ARE YOU MARRIED, WIDOWED, DIVORCED, SEPARATED, NEVER MARRIED, OR LIVING WITH A PARTNER?: MARRIED

## 2023-11-22 SDOH — SOCIAL STABILITY: SOCIAL INSECURITY: WITHIN THE LAST YEAR, HAVE YOU BEEN HUMILIATED OR EMOTIONALLY ABUSED IN OTHER WAYS BY YOUR PARTNER OR EX-PARTNER?: NO

## 2023-11-22 SDOH — SOCIAL STABILITY: SOCIAL INSECURITY: WITHIN THE LAST YEAR, HAVE YOU BEEN AFRAID OF YOUR PARTNER OR EX-PARTNER?: NO

## 2023-11-22 SDOH — ECONOMIC STABILITY: INCOME INSECURITY: HOW HARD IS IT FOR YOU TO PAY FOR THE VERY BASICS LIKE FOOD, HOUSING, MEDICAL CARE, AND HEATING?: NOT HARD AT ALL

## 2023-11-22 SDOH — ECONOMIC STABILITY: FOOD INSECURITY: WITHIN THE PAST 12 MONTHS, THE FOOD YOU BOUGHT JUST DIDN'T LAST AND YOU DIDN'T HAVE MONEY TO GET MORE.: NEVER TRUE

## 2023-11-22 SDOH — HEALTH STABILITY: MENTAL HEALTH: HOW MANY STANDARD DRINKS CONTAINING ALCOHOL DO YOU HAVE ON A TYPICAL DAY?: PATIENT DOES NOT DRINK

## 2023-11-22 SDOH — SOCIAL STABILITY: SOCIAL NETWORK: IN A TYPICAL WEEK, HOW MANY TIMES DO YOU TALK ON THE PHONE WITH FAMILY, FRIENDS, OR NEIGHBORS?: ONCE A WEEK

## 2023-11-22 SDOH — ECONOMIC STABILITY: FOOD INSECURITY: WITHIN THE PAST 12 MONTHS, YOU WORRIED THAT YOUR FOOD WOULD RUN OUT BEFORE YOU GOT MONEY TO BUY MORE.: NEVER TRUE

## 2023-11-22 SDOH — SOCIAL STABILITY: SOCIAL NETWORK: HOW OFTEN DO YOU ATTEND CHURCH OR RELIGIOUS SERVICES?: NEVER

## 2023-11-22 SDOH — SOCIAL STABILITY: SOCIAL NETWORK: HOW OFTEN DO YOU ATTENT MEETINGS OF THE CLUB OR ORGANIZATION YOU BELONG TO?: NEVER

## 2023-11-22 ASSESSMENT — COGNITIVE AND FUNCTIONAL STATUS - GENERAL
DAILY ACTIVITIY SCORE: 17
TOILETING: A LOT
HELP NEEDED FOR BATHING: A LITTLE
STANDING UP FROM CHAIR USING ARMS: A LITTLE
HELP NEEDED FOR BATHING: A LITTLE
EATING MEALS: A LITTLE
MOVING TO AND FROM BED TO CHAIR: A LITTLE
CLIMB 3 TO 5 STEPS WITH RAILING: A LOT
DRESSING REGULAR LOWER BODY CLOTHING: A LITTLE
TOILETING: A LOT
WALKING IN HOSPITAL ROOM: A LITTLE
EATING MEALS: A LITTLE
TOILETING: A LOT
DAILY ACTIVITIY SCORE: 17
WALKING IN HOSPITAL ROOM: A LITTLE
TURNING FROM BACK TO SIDE WHILE IN FLAT BAD: A LITTLE
MOVING TO AND FROM BED TO CHAIR: A LITTLE
HELP NEEDED FOR BATHING: A LITTLE
DRESSING REGULAR LOWER BODY CLOTHING: A LITTLE
EATING MEALS: A LITTLE
DRESSING REGULAR LOWER BODY CLOTHING: A LITTLE
MOBILITY SCORE: 17
MOBILITY SCORE: 19
DRESSING REGULAR UPPER BODY CLOTHING: A LITTLE
DAILY ACTIVITIY SCORE: 17
CLIMB 3 TO 5 STEPS WITH RAILING: A LOT
PERSONAL GROOMING: A LITTLE
DRESSING REGULAR UPPER BODY CLOTHING: A LITTLE
MOVING TO AND FROM BED TO CHAIR: A LITTLE
CLIMB 3 TO 5 STEPS WITH RAILING: A LOT
MOBILITY SCORE: 19
STANDING UP FROM CHAIR USING ARMS: A LITTLE
PERSONAL GROOMING: A LITTLE
PERSONAL GROOMING: A LITTLE
WALKING IN HOSPITAL ROOM: A LITTLE
MOVING FROM LYING ON BACK TO SITTING ON SIDE OF FLAT BED WITH BEDRAILS: A LITTLE
STANDING UP FROM CHAIR USING ARMS: A LITTLE
DRESSING REGULAR UPPER BODY CLOTHING: A LITTLE

## 2023-11-22 ASSESSMENT — ACTIVITIES OF DAILY LIVING (ADL)
BATHING_ASSISTANCE: MINIMAL
ADL_ASSISTANCE: INDEPENDENT
ADL_ASSISTANCE: INDEPENDENT

## 2023-11-22 ASSESSMENT — PAIN - FUNCTIONAL ASSESSMENT
PAIN_FUNCTIONAL_ASSESSMENT: 0-10

## 2023-11-22 ASSESSMENT — PAIN SCALES - GENERAL
PAINLEVEL_OUTOF10: 0 - NO PAIN

## 2023-11-22 NOTE — ED PROVIDER NOTES
HPI   Chief Complaint   Patient presents with    Shortness of Breath     84 Y/O female presents to the ED via EMS for shortness of breath. The patient normally wears 2L of O2 at all times at home but today when EMS arrived she was in the lower 80's on oxygen.  They placed her on 10 NRB and she recovered to 98%, Titrated to 3L NC upon arrival, patinet 97%.  AOLx3         This is an 83-year-old female with a past medical history of CHF, HTN, HLD, hypothyroidism, CVA, CKD 3, CAD, DM, COPD who presents emergency department with complaints of shortness of breath x 1 day.  Patient states that her shortness of breath started yesterday.  She typically wears 2 L of oxygen at home.  She states that she did not feel like eating today because of shortness of breath.  She denies recent sick contacts.  She denies fevers or chills.  She states over the day, her shortness of breath has increased.  She states it is hard to talk in full sentences because of shortness of breath.        She has a history of chronic leg edema.  She had a ultrasound on November 1, 2023 which showed no DVT.    Her primary care doctor is Dr. Gee Dupree    Patient was at [facility] on October 7, 2023.  She was admitted for acute respiratory failure with hypoxia.  She was discharged on October 10, 2023.    Please see HPI for pertinent positive and negative ROS.                   [place] Coma Scale Score: 15         NIH Stroke Scale: 0          Patient History   Past Medical History:   Diagnosis Date    Anemia     CHF (congestive heart failure) (CMS/HCC)     CVA (cerebral vascular accident) (CMS/HCC)     Deep vein thrombosis (DVT) of calf (CMS/HCC)     left    Diverticulosis     DVT (deep venous thrombosis) (CMS/HCC)     Family history of breast cancer     maternal    Heart disease     History of degenerative disc disease     Hyperlipidemia     Hypertension     Hypothyroidism     Meralgia paresthetica     Osteoarthritis     Osteopenia 2010    hip - DEXA     Personal history of other diseases of the circulatory system     History of hypertension    Personal history of other diseases of the respiratory system     History of chronic obstructive lung disease    Personal history of other endocrine, nutritional and metabolic disease     History of thyroid disorder    Plantar fasciitis     Sciatica     Spinal stenosis     ST elevation (STEMI) myocardial infarction (CMS/HCC)      Past Surgical History:   Procedure Laterality Date    ADENOIDECTOMY      CARDIAC CATHETERIZATION  10/2019    CATARACT EXTRACTION Bilateral 2012    CHOLECYSTECTOMY  1996    laparoscopic    COLONOSCOPY      COLONOSCOPY  10/2018    Dr. Rodriguez    CORONARY STENT PLACEMENT      CYST REMOVAL Right 2013    Excision of Sebaceous cyst right axilla    DILATION AND CURETTAGE OF UTERUS      KNEE ARTHROPLASTY      MR HEAD ANGIO WO IV CONTRAST  2017    MR HEAD ANGIO WO IV CONTRAST [facility]    MR HEAD ANGIO WO IV CONTRAST  2017    MR HEAD ANGIO WO IV CONTRAST [facility]    MR HEAD ANGIO WO IV CONTRAST  02/10/2019    MR HEAD ANGIO WO IV CONTRAST [facility]    OTHER SURGICAL HISTORY  2019    Stent synergy    OTHER SURGICAL HISTORY  2019    Stent synergy    KS ARTHRP ACETBLR/PROX FEM PROSTC AGRFT/ALGRFT      THYROIDECTOMY, PARTIAL Bilateral 2013    subtotal thyroidectomy    TONSILLECTOMY      TOTAL HIP ARTHROPLASTY Right     UPPER GASTROINTESTINAL ENDOSCOPY  2019    Dr. Galan     Family History   Problem Relation Name Age of Onset    Hyperthyroidism Mother          Treated with radioactive iodine    Hypertension Mother      Diabetes Father's Sister      Hyperthyroidism Sibling       Social History     Tobacco Use    Smoking status: Former     Types: Cigarettes     Quit date:      Years since quittin.8     Passive exposure: Never    Smokeless tobacco: Never   Substance Use Topics    Alcohol use: Yes     Comment: monthly or less     Drug use: Never       Physical Exam   ED Triage Vitals   Temp Pulse Resp BP   -- -- -- --      SpO2 Temp src Heart Rate Source Patient Position   -- -- -- --      BP Location FiO2 (%)     -- --       Physical Exam  GENERAL APPEARANCE: Awake and alert.  Patient is talking in full sentences, however, she is short of breath with talking.  She is leaning upright in bed.  VITAL SIGNS: As per the nurses' triage record.  HEENT: Normocephalic, atraumatic. Extraocular muscles are intact. Conjunctiva are pink. Negative scleral icterus. Mucous membranes are dry. Tongue in the midline. Oropharynx clear, uvula midline.   NECK: Soft, nontender and supple, full gross ROM, no meningeal signs.  CHEST: Nontender to palpation.  Diminished breath sounds diffusely bilaterally.  Symmetric rise and fall of chest wall.    HEART: Clear S1 and S2. Regular rate and rhythm. No murmurs appreciated on auscultation.    ABDOMEN: Soft, nontender, nondistended, positive bowel sounds, no palpable masses.  MUSCULOSKELETAL: Edema of left calf and ankle with pitting.  No overlying erythema.  Right calf and ankle without edema or erythema.  Full gross active range of motion.   NEUROLOGICAL: Awake, alert and oriented x 3. Motor power intact in the upper and lower extremities. Sensation is intact to light touch in the upper and lower extremities. Patient answering questions appropriately.   IMMUNOLOGICAL: No lymphatic streaking noted  DERMATOLOGIC: Cool and dry extremities.  She does have broken capillary over her left dorsal aspect of the foot.  She does have swelling of her left calf compared to right calf.  PYSCH: Cooperative with appropriate mood and affect.  ED Course & [facility]   ED Course as of 11/21/23 2117   Tue Nov 21, 2023 2003 EKG presented to me at 8:03 PM     EKG as interpreted by me shows normal sinus rhythm at a rate of 96 bpm, no ST changes to suggest ischemia, left axis deviation.   [DH]   2114 On reassessment, patient is on 2 L nasal  cannula.  Her SPO2 is 93 to 94%.  Heart rate 90.   She is agreeable to overnight observation.  Discussed case with Dr. Jessica.  He will be the admitting physician.  He states to hold Lasix for now.  He will give Lasix on the floor.  He was informed we gave DuoNeb and started azithromycin due to chest x-ray findings. [SC]      ED Course User Index  [DH] Jose Rubio MD  [SC] Nanda Tariq PA-C         Diagnoses as of 11/21/23 2117   Shortness of breath   Pleural effusion   Pneumonia of both lower lobes due to infectious organism       Medical Decision Making  Parts of this chart have been completed using voice recognition software. Please excuse any errors of transcription.  My thought process and reason for plan has been formulated from the time that I saw the patient until the time of disposition and is not specific to one specific moment during their visit and furthermore my MDM encompasses this entire chart and not only this text box.      HPI: Detailed above.    Exam: A medically appropriate exam performed, outlined above, given the known history and presentation.    History obtained from: Patient    EKG: See my supervising physician EKG interpretation    Social Determinants of Health considered during this visit: Lives at home    Medications given during visit:  Medications   azithromycin (Zithromax) in dextrose 5 % in water (D5W) 250 mL  mg (500 mg intravenous New Bag 11/21/23 2048)   ipratropium-albuteroL (Duo-Neb) 0.5-2.5 mg/3 mL nebulizer solution 3 mL (3 mL nebulization Given 11/21/23 2049)        Diagnostic/tests  Labs Reviewed   CBC WITH AUTO DIFFERENTIAL - Abnormal       Result Value    WBC 8.0      nRBC 0.0      RBC 4.45      Hemoglobin 13.2      Hematocrit 41.9      MCV 94      MCH 29.7      MCHC 31.5 (*)     RDW 14.9 (*)     Platelets 145 (*)     Neutrophils % 81.5      Immature Granulocytes %, Automated 0.6      Lymphocytes % 9.1      Monocytes % 8.4      Eosinophils % 0.2      Basophils %  0.2      Neutrophils Absolute 6.53 (*)     Immature Granulocytes Absolute, Automated 0.05      Lymphocytes Absolute 0.73 (*)     Monocytes Absolute 0.67      Eosinophils Absolute 0.02      Basophils Absolute 0.02     COMPREHENSIVE METABOLIC PANEL - Abnormal    Glucose 126 (*)     Sodium 145      Potassium 3.9      Chloride 103      Bicarbonate 34 (*)     Urea Nitrogen 20      Creatinine 0.80      eGFR 73      Calcium 8.6      Albumin 3.5      Alkaline Phosphatase 97      Total Protein 5.7 (*)     AST 24      Bilirubin, Total 1.5 (*)     ALT 28      Anion Gap 8     N-TERMINAL PROBNP - Abnormal    PROBNP 10,735 (*)     Narrative:     Reference ranges are based on clinical submission data. These ranges represent the 95th percentile of normal cut-off points. As NT Pro- BNP values approach 1000 pg/ml, clinical symptoms are more likely associated with CHF.   SERIAL TROPONIN, INITIAL ([facility]) - Abnormal    Troponin T, High Sensitivity 32 (*)    SARS-COV-2 PCR, SYMPTOMATIC - Normal    Coronavirus 2019, PCR Not Detected      Narrative:     This assay has received FDA Emergency Use Authorization (EUA) and is only authorized for the duration of time that circumstances exist to justify the authorization of the emergency use of in vitro diagnostic tests for the detection of SARS-CoV-2 virus and/or diagnosis of COVID-19 infection under section 564(b)(1) of the Act, 21 U.S.C. 360bbb-3(b)(1). This assay is an in vitro diagnostic nucleic acid amplification test for the qualitative detection of SARS-CoV-2 from nasopharyngeal specimens and has been validated for use at [facility]. Negative results do not preclude COVID-19 infections and should not be used as the sole basis for diagnosis, treatment, or other management decisions.     INFLUENZA A AND B PCR - Normal    Flu A Result Not Detected      Flu B Result Not Detected      Narrative:     This assay is an in vitro diagnostic multiplex nucleic acid  amplification test for the detection and discrimination of Influenza A & B from nasopharyngeal specimens, and has been validated for use at [facility]. Negative results do not preclude Influenza A/B infections, and should not be used as the sole basis for diagnosis, treatment, or other management decisions. If Influenza A/B and RSV PCR results are negative, testing for Parainfluenza virus, Adenovirus and Metapneumovirus is routinely performed for [facility] pediatric oncology and intensive care inpatients, and is available on other patients by placing an add-on request.   TROPONIN T SERIES, HIGH SENSITIVITY (0, 2 HR, 6 HR)    Narrative:     The following orders were created for panel order Troponin T Series, High Sensitivity (0, 2HR, 6HR).  Procedure                               Abnormality         Status                     ---------                               -----------         ------                     Serial Troponin, Initial...[[id number]]  Abnormal            Final result               Serial Troponin, 2 Hour ...[[id number]]                                                   Please view results for these tests on the individual orders.   SERIAL TROPONIN,  2 HOUR ([facility])      XR chest 1 view   Final Result   Mild cardiomegaly. Mild bibasilar airspace opacities   left-greater-than-right with a small-moderate left pleural effusion   and probable minimal right pleural effusion. Left lower lobe airspace   opacity most likely atelectasis although pneumonia should be   clinically excluded.        Signed by: Artem Edmonds 11/21/2023 8:14 PM   Dictation workstation:   [id number]           Considerations/further MDM:  Patient was seen in conjucntion with my supervising physician,  Dr. Rubio. Please refer to his note.      The patient was evaluated in the emergency department and an etiology requiring emergent treatment or admission to hospital was identified.  Considered and evaluated for acute  congestive heart failure, acute bronchitis, acute  pneumonia, 19, influenza, acute coronary syndrome, among others.  Due to chest x-ray findings of opacities with effusion in the setting of increased shortness of breath over 24 hours, patient will be admitted for further management.  Second troponin pending at time of admission.  The patient/family was counseled on clinical expression, expectations, and plan along with recommendations for admission.  All questions were answered and involved parties were understanding and agreeable to course of treatment.  Case was discussed with admitting physician, Dr. Jessica.  Bed type ED treatment and further ED work-up decided by joint decision making with admitting team and any consultants.  Patient stable for admission per my assessment and further management of patient will be deferred to the inpatient setting.      Procedure  Procedures     Nanda Tariq PA-C  11/21/23 2113

## 2023-11-22 NOTE — PROGRESS NOTES
Anitha Welch is a 83 y.o. female on day 0 of admission presenting with Acute on chronic systolic heart failure (CMS/HCC).    Subjective   83-year-old  female admitted with acute decompensated systolic congestive heart failure echocardiogram performed October 2023 showed moderate impairment with an ejection fraction of 40 to 45% but with moderately severe to severe mitral regurgitation chest x-ray showing cephalization consistent with pulmonary edema and a moderate size left pleural effusion she normally wears 2 L nasal cannula is requiring 5 L nasal cannula currently he was initiated on IV diuretic therapy theology has been consulted as well.  She is overall a fair to poor historian I suspect an underlying level of dementia.       Objective     Physical Exam  Vitals and nursing note reviewed.   Constitutional:       Appearance: Normal appearance.   HENT:      Head: Normocephalic and atraumatic.      Mouth/Throat:      Mouth: Mucous membranes are moist.   Eyes:      Extraocular Movements: Extraocular movements intact.      Pupils: Pupils are equal, round, and reactive to light.   Neck:      Comments: Jugular venous distention  Cardiovascular:      Rate and Rhythm: Normal rate and regular rhythm.      Pulses: Normal pulses.      Heart sounds: Murmur heard.      Comments: S3-4 gallop 2/6 to 3/6 mitral regurgitation  Pulmonary:      Breath sounds: Rales present.   Abdominal:      Palpations: Abdomen is soft.   Musculoskeletal:         General: Normal range of motion.      Cervical back: Neck supple.   Skin:     General: Skin is warm.   Neurological:      General: No focal deficit present.      Mental Status: She is alert and oriented to person, place, and time. Mental status is at baseline.      Comments: Poor historian SPECT underlying vascular dementia   Psychiatric:         Mood and Affect: Mood normal.         Last Recorded Vitals  Blood pressure 166/87, pulse 87, temperature 36.6 °C (97.9 °F),  "temperature source Oral, resp. rate 18, height 1.651 m (5' 5\"), weight 64.2 kg (141 lb 8.6 oz), SpO2 (!) 89 %.  Intake/Output last 3 Shifts:  I/O last 3 completed shifts:  In: 370 (5.8 mL/kg) [P.O.:120; IV Piggyback:250]  Out: 900 (14 mL/kg) [Urine:900 (0.4 mL/kg/hr)]  Weight: 64.2 kg     Relevant Results         Scheduled medications  aspirin, 81 mg, oral, Once  atorvastatin, 40 mg, oral, Daily  carvedilol, 12.5 mg, oral, BID with meals  tiotropium, 2 Inhalation, inhalation, Daily   And  fluticasone furoate-vilanteroL, 1 puff, inhalation, Daily  furosemide, 20 mg, intravenous, BID  heparin (porcine), 5,000 Units, subcutaneous, q8h GARDENIA  levothyroxine, 100 mcg, oral, Daily  losartan, 25 mg, oral, Daily  multivitamin with minerals, 1 tablet, oral, Daily  pantoprazole, 40 mg, oral, Daily before breakfast      Continuous medications  oxygen,       PRN medications  PRN medications: acetaminophen **OR** acetaminophen **OR** acetaminophen, albuterol, benzocaine-menthol, dextromethorphan-guaifenesin, guaiFENesin, ondansetron ODT **OR** ondansetron, polyethylene glycol  Results for orders placed or performed during the hospital encounter of 11/21/23 (from the past 24 hour(s))   SARS-CoV-2 RT PCR   Result Value Ref Range    Coronavirus 2019, PCR Not Detected Not Detected   Influenza A, and B PCR   Result Value Ref Range    Flu A Result Not Detected Not Detected    Flu B Result Not Detected Not Detected   CBC and Auto Differential   Result Value Ref Range    WBC 8.0 4.4 - 11.3 x10*3/uL    nRBC 0.0 0.0 - 0.0 /100 WBCs    RBC 4.45 4.00 - 5.20 x10*6/uL    Hemoglobin 13.2 12.0 - 16.0 g/dL    Hematocrit 41.9 36.0 - 46.0 %    MCV 94 80 - 100 fL    MCH 29.7 26.0 - 34.0 pg    MCHC 31.5 (L) 32.0 - 36.0 g/dL    RDW 14.9 (H) 11.5 - 14.5 %    Platelets 145 (L) 150 - 450 x10*3/uL    Neutrophils % 81.5 40.0 - 80.0 %    Immature Granulocytes %, Automated 0.6 0.0 - 0.9 %    Lymphocytes % 9.1 13.0 - 44.0 %    Monocytes % 8.4 2.0 - 10.0 %    " Eosinophils % 0.2 0.0 - 6.0 %    Basophils % 0.2 0.0 - 2.0 %    Neutrophils Absolute 6.53 (H) 1.60 - 5.50 x10*3/uL    Immature Granulocytes Absolute, Automated 0.05 0.00 - 0.50 x10*3/uL    Lymphocytes Absolute 0.73 (L) 0.80 - 3.00 x10*3/uL    Monocytes Absolute 0.67 0.05 - 0.80 x10*3/uL    Eosinophils Absolute 0.02 0.00 - 0.40 x10*3/uL    Basophils Absolute 0.02 0.00 - 0.10 x10*3/uL   Comprehensive metabolic panel   Result Value Ref Range    Glucose 126 (H) 65 - 99 mg/dL    Sodium 145 133 - 145 mmol/L    Potassium 3.9 3.4 - 5.1 mmol/L    Chloride 103 97 - 107 mmol/L    Bicarbonate 34 (H) 24 - 31 mmol/L    Urea Nitrogen 20 8 - 25 mg/dL    Creatinine 0.80 0.40 - 1.60 mg/dL    eGFR 73 >60 mL/min/1.73m*2    Calcium 8.6 8.5 - 10.4 mg/dL    Albumin 3.5 3.5 - 5.0 g/dL    Alkaline Phosphatase 97 35 - 125 U/L    Total Protein 5.7 (L) 5.9 - 7.9 g/dL    AST 24 5 - 40 U/L    Bilirubin, Total 1.5 (H) 0.1 - 1.2 mg/dL    ALT 28 5 - 40 U/L    Anion Gap 8 <=19 mmol/L   NT Pro-BNP   Result Value Ref Range    PROBNP 10,735 (H) 0 - 624 pg/mL   Serial Troponin, Initial ([facility])   Result Value Ref Range    Troponin T, High Sensitivity 32 (H) <=15 ng/L   Serial Troponin, 2 Hour ([facility])   Result Value Ref Range    Troponin T, High Sensitivity 36 (H) <=15 ng/L   Serial Troponin, 6 Hour ([facility])   Result Value Ref Range    Troponin T, High Sensitivity 34 (H) <=15 ng/L   CBC   Result Value Ref Range    WBC 5.8 4.4 - 11.3 x10*3/uL    nRBC 0.0 0.0 - 0.0 /100 WBCs    RBC 4.05 4.00 - 5.20 x10*6/uL    Hemoglobin 11.9 (L) 12.0 - 16.0 g/dL    Hematocrit 38.1 36.0 - 46.0 %    MCV 94 80 - 100 fL    MCH 29.4 26.0 - 34.0 pg    MCHC 31.2 (L) 32.0 - 36.0 g/dL    RDW 14.7 (H) 11.5 - 14.5 %    Platelets 134 (L) 150 - 450 x10*3/uL   Basic metabolic panel   Result Value Ref Range    Glucose 93 65 - 99 mg/dL    Sodium 146 (H) 133 - 145 mmol/L    Potassium 3.0 (L) 3.4 - 5.1 mmol/L    Chloride 104 97 - 107 mmol/L    Bicarbonate 34 (H) 24 - 31 mmol/L    Urea Nitrogen  18 8 - 25 mg/dL    Creatinine 0.70 0.40 - 1.60 mg/dL    eGFR 85 >60 mL/min/1.73m*2    Calcium 8.3 (L) 8.5 - 10.4 mg/dL    Anion Gap 8 <=19 mmol/L          Echo 10/2023  CONCLUSIONS:<BR> 1. Left ventricular systolic function is moderately decreased   with a 40-45% estimated ejection fraction.<BR> 2. Inferior wall moderate   hypokinesis and apical hypokinesis.<BR> 3. Moderate to severe mitral valve   regurgitation.<BR> 4. Moderately elevated right ventricular systolic   pressure.<BR> 5. Aortic valve appears abnormal.<BR> 6. There is moderate aortic   valve cusp calcification.<BR> 7. Mild aortic valve regurgitation.<BR> 8. There   are multiple wall motion abnormalities               Assessment/Plan   Principal Problem:    Acute on chronic systolic heart failure (CMS/HCC)  Active Problems:    Chronic obstructive pulmonary disease (CMS/HCC)    History of coronary artery stent placement    History of myocardial infarction    History of stroke without residual deficits    History of cerebrovascular accident    Hypothyroidism (acquired)    HFrEF (heart failure with reduced ejection fraction) (CMS/HCC)    Severe mitral valve regurgitation    Adjust diuretic therapy awaiting cardiology input begin cardiac rehab oxygen as tolerated PT OT assessment the patient's age and comorbidities is uncertain if the patient is a candidate for mitral valve repair possibly catheter directed.  Palliative medicine is consulted as well       I spent 40 minutes in the professional and overall care of this patient.      Phillip Isabel DO

## 2023-11-22 NOTE — NURSING NOTE
Called to patients room for low spo2 by RN. Patient spo2 on continuous monitor showing 92% however patient was on 3Lpm earlier today when admitted to 302 and the spo2 was 98%. The nurse had increased her oxygen to 5Lpm and saturation now 92% I talked to nurse about the lasix order thinking patient may need additional lasix. Nurse to notify hospitalist

## 2023-11-22 NOTE — CARE PLAN
The patient's goals for the shift include pain free    The clinical goals for the shift include improve breathing    Over the shift, the patient did not make progress toward the following goals. Barriers to progression include. Recommendations to address these barriers include   Problem: Fall/Injury  Goal: Not fall by end of shift  Outcome: Progressing  Goal: Be free from injury by end of the shift  Outcome: Progressing  Goal: Verbalize understanding of personal risk factors for fall in the hospital  Outcome: Progressing  Goal: Verbalize understanding of risk factor reduction measures to prevent injury from fall in the home  Outcome: Progressing  Goal: Use assistive devices by end of the shift  Outcome: Progressing  Goal: Pace activities to prevent fatigue by end of the shift  Outcome: Progressing     Problem: Heart Failure  Goal: Improved gas exchange this shift  Outcome: Progressing  Goal: Improved urinary output this shift  Outcome: Progressing  Goal: Reduction in peripheral edema within 24 hours  Outcome: Progressing  Goal: Report improvement of dyspnea/breathlessness this shift  Outcome: Progressing  Goal: Weight from fluid excess reduced over 2-3 days, then stabilize  Outcome: Progressing  Goal: Increase self care and/or family involvement in 24 hours  Outcome: Progressing     Problem: Pain  Goal: Takes deep breaths with improved pain control throughout the shift  Outcome: Progressing  Goal: Turns in bed with improved pain control throughout the shift  Outcome: Progressing  Goal: Performs ADL's with improved pain control throughout shift  Outcome: Progressing  Goal: Participates in PT with improved pain control throughout the shift  Outcome: Progressing  Goal: Free from acute confusion related to pain meds throughout the shift  Outcome: Progressing   .

## 2023-11-22 NOTE — CONSULTS
Consults  History Of Present Illness:    Anitha Welch is a 83 y.o. female presenting with shortness of breath and conversational dyspnea that has been worse over the last 24 hours prior to admission.  Just hospitalized in October 2023 with respiratory failure and congestive heart failure.  She wears chronic O2 at 2 L/min at home which was just started in October 2023.  In the emergency room on this admission she was placed on 3 L of oxygen with O2 sats improving from the 80s to 97%.  She has chronic leg edema with duplex ultrasound that were negative in October 2023.  Her chest x-ray showed a moderate left-sided pleural effusion with mild right pleural effusion and left lower lobe atelectasis versus infiltrate.    Her echocardiogram October 9, 2023 showed mild to moderate overall systolic dysfunction with left ventricular ejection fraction 40 to 45%, as well as localized apical and inferior wall hypokinesis.  He had moderately severe mitral regurgitation and mild aortic regurgitation and moderate pulmonary hypertension with a PA pressure of 45 mmHg    This hospitalization she has menstruated hypertension as she did in October 2023.    Medications at home include carvedilol, losartan, furosemide.    Her lab work showed sodium of 146, potassium of 3.0 which is being supplemented, BUN and creatinine of 18/0.7 with a GFR of 85 and her troponin levels are mildly elevated and flat pattern of 32, 36, 34 that is not consistent with an acute coronary syndrome.     Last Recorded Vitals:  Vitals:    11/22/23 0723 11/22/23 0739 11/22/23 0817 11/22/23 0822   BP: 166/87      BP Location: Left arm      Patient Position: Sitting      Pulse: 87      Resp: 18  16    Temp: 36.6 °C (97.9 °F)      TempSrc: Oral      SpO2: (!) 89% (!) 84%  93%   Weight:       Height:           Last Labs:  CBC - 11/22/2023:  4:16 AM  5.8 11.9 134    38.1      CMP - 11/22/2023:  4:16 AM  8.3 5.7 24 --- 1.5   2.7 3.5 28 97      PTT - No results in last  year.  _   _ _     Hemoglobin A1C   Date/Time Value Ref Range Status   01/09/2019 08:05 PM 5.5 4.0 - 6.0 % Final     Comment:     Hemoglobin A1C levels are related to mean blood glucose during the   preceding 2-3 months. The relationship table below may be used as a   general guide. Each 1% increase in HGB A1C is a reflection of an   increase in mean glucose of approximately 30 mg/dl.   Reference: Diabetes Care, volume 29, supplement 1 Jan. 2006                        HGB A1C ................. Approx. Mean Glucose   _______________________________________________   6%   ...............................  120 mg/dl   7%   ...............................  150 mg/dl   8%   ...............................  180 mg/dl   9%   ...............................  210 mg/dl   10%  ...............................  240 mg/dl  Performed at [address] [address]     05/07/2018 10:43 AM 5.5 4.0 - 6.0 % Final     Comment:     Hemoglobin A1C levels are related to mean blood glucose during the   preceding 2-3 months. The relationship table below may be used as a   general guide. Each 1% increase in HGB A1C is a reflection of an   increase in mean glucose of approximately 30 mg/dl.   Reference: Diabetes Care, volume 29, supplement 1 Jan. 2006                        HGB A1C ................. Approx. Mean Glucose   _______________________________________________   6%   ...............................  120 mg/dl   7%   ...............................  150 mg/dl   8%   ...............................  180 mg/dl   9%   ...............................  210 mg/dl   10%  ...............................  240 mg/dl  Performed at [place] [address] [address] [facility] [address]       LDL Calculated   Date/Time Value Ref Range Status   02/21/2023 04:20  65 - 130 MG/DL Final   06/15/2022 08:39 AM 92 65 - 130 MG/DL Final   11/24/2020 11:08 AM 86 65 - 130 MG/DL Final      Last I/O:  I/O last 3 completed shifts:  In: 370 (5.8  "mL/kg) [P.O.:120; IV Piggyback:250]  Out: 900 (14 mL/kg) [Urine:900 (0.4 mL/kg/hr)]  Weight: 64.2 kg     Past Cardiology Tests (Last 3 Years):  EKG:  Electrocardiogram, 12-lead PRN ACS symptoms 10/18/2023      ECG 12 lead 10/10/2023    Echo:  Transthoracic Echo (TTE) Complete 10/9/2023.  Her left ventricular ejection fraction was 40 to 45% with moderately severe mitral regurgitation and a pulmonary artery systolic pressure of 45 mmHg    Ejection Fractions:  No results found for: \"EF\"  Cath:  No results found for this or any previous visit from the past 1095 days.    Stress Test:  No results found for this or any previous visit from the past 1095 days.    Cardiac Imaging:  No results found for this or any previous visit from the past 1095 days.      Past Medical History:  She has a past medical history of Anemia, CHF (congestive heart failure) (CMS/[facility]), CVA (cerebral vascular accident) (CMS/HCC), Deep vein thrombosis (DVT) of calf (CMS/HCC), Diverticulosis, DVT (deep venous thrombosis) (CMS/HCC), Family history of breast cancer, Heart disease, History of degenerative disc disease, Hyperlipidemia, Hypertension, Hypothyroidism, Meralgia paresthetica, Osteoarthritis, Osteopenia (2010), Personal history of other diseases of the circulatory system, Personal history of other diseases of the respiratory system, Personal history of other endocrine, nutritional and metabolic disease, Plantar fasciitis, Sciatica, Spinal stenosis, and ST elevation (STEMI) myocardial infarction (CMS/[facility]).    Past Surgical History:  She has a past surgical history that includes MR angio head wo IV contrast (02/24/2017); MR angio head wo IV contrast (08/11/2017); MR angio head wo IV contrast (02/10/2019); Cholecystectomy (1996); Tonsillectomy; pr arthrp acetblr/prox fem prostc agrft/algrft; Thyroidectomy, partial (Bilateral, 04/17/2013); Coronary stent placement; Knee Arthroplasty; Total hip arthroplasty (Right, 2006); Dilation and curettage of " uterus; Colonoscopy (2010); Other surgical history (01/09/2019); Upper gastrointestinal endoscopy (03/2019); Cardiac catheterization (10/2019); Cataract extraction (Bilateral, 11/13/2012); Adenoidectomy; Cyst Removal (Right, 06/24/2013); Colonoscopy (10/2018); and Other surgical history (01/09/2019).      Social History:  She reports that she quit smoking about 8 years ago. Her smoking use included cigarettes. She has never been exposed to tobacco smoke. She has never used smokeless tobacco. She reports current alcohol use. She reports that she does not use drugs.    Family History:  Family History   Problem Relation Name Age of Onset    Hyperthyroidism Mother          Treated with radioactive iodine    Hypertension Mother      Diabetes Father's Sister      Hyperthyroidism Sibling          Allergies:  Amoxicillin, Iodinated contrast media, Ciprofloxacin, Influenza virus vaccines, Diphenhydramine, Lisinopril, and Other    Inpatient Medications:  Scheduled medications   Medication Dose Route Frequency    aspirin  81 mg oral Once    atorvastatin  40 mg oral Daily    carvedilol  12.5 mg oral BID with meals    tiotropium  2 Inhalation inhalation Daily    And    fluticasone furoate-vilanteroL  1 puff inhalation Daily    furosemide  40 mg intravenous BID    heparin (porcine)  5,000 Units subcutaneous q8h GARDENIA    levothyroxine  100 mcg oral Daily    losartan  25 mg oral Daily    multivitamin with minerals  1 tablet oral Daily    pantoprazole  40 mg oral Daily before breakfast     PRN medications   Medication    acetaminophen    Or    acetaminophen    Or    acetaminophen    albuterol    benzocaine-menthol    dextromethorphan-guaifenesin    guaiFENesin    ondansetron ODT    Or    ondansetron    polyethylene glycol     Continuous Medications   Medication Dose Last Rate    oxygen         Outpatient Medications:  Current Outpatient Medications   Medication Instructions    albuterol 90 mcg/actuation inhaler 1 puff, inhalation,  Every 4 hours PRN    aspirin 81 mg, oral, Once    atorvastatin (Lipitor) 40 mg tablet Every 24 hours    carvedilol (COREG) 12.5 mg, oral, 2 times daily with meals    cyanocobalamin (Vitamin B-12) 1,000 mcg tablet     empagliflozin (JARDIANCE) 10 mg, oral, Daily    furosemide (LASIX) 40 mg, oral, Daily    levothyroxine (SYNTHROID, LEVOXYL) 100 mcg, oral, Daily (0630)    loratadine (Claritin) 10 mg tablet oral    losartan (COZAAR) 25 mg, oral, Daily    multivitamin-iron-folic acid (Multi Complete with Iron)  mg-mcg tablet tablet oral    nitroglycerin (Nitrostat) 0.4 mg SL tablet     oxygen (O2) 2 L/min, inhalation, Continuous    pantoprazole (PROTONIX) 40 mg, oral, Daily before breakfast, Do not crush, chew, or split.    tiZANidine (ZANAFLEX) 2 mg, oral, Every 8 hours PRN    Trelegy Ellipta 100-62.5-25 mcg blister with device 1 puff, inhalation, Daily    vitamins A,C,E-zinc-copper (PreserVision AREDS) 2,148 mcg-113 mg-45 mg-17.4mg tablet Every 12 hours       Physical Exam:  Neck:  Normal jugular venous pressure, carotid upstrokes brisk with no bruits  Lungs: Left base is dull.  Wearing O2 by nasal cannula  heart:  Regular rate and rhythm normal S1 and S2, crescendo systolic ejection murmur at her apex, no gallops rubs or clicks, PMI normal, no RV lift  Abdomen:  Soft, good bowel sounds, nontender, no hepatosplenomegaly, no pulsatile mass or bruits.  Extremities:  Good radial, femoral, pedal pulses with mild left greater than right pretibial edema  Neurological:  No focal sensory or motor deficits, pupils equal and reactive     Assessment/Plan     Acute and chronic systolic heart failure.  She is already on carvedilol and losartan and we can increase her losartan dose.  She may also benefit from low-dose spironolactone and she will remain on Jardiance.  We will convert her to oral furosemide when euvolemic  Left pleural effusion.  Consider thoracentesis if significant effusion persists.  COPD with chronic O2  therapy  Aspiratory failure  History of myocardial infarction  History of previous coronary stent    11/22: As noted in the above assessment we will focus on optimizing medical management of her heart failure.  She is elderly and frail and may not be a good candidate for MitraClip procedure in the setting of her moderately severe mitral regurgitation, and after hospital discharge she will follow-up with her cardiologist, Dr. Foss.    Code Status:  Full Code    I spent 35 minutes in the professional and overall care of this patient.        Derrick Gallardo, DO

## 2023-11-22 NOTE — PROGRESS NOTES
TCSW met FTF with pt to complete assessment.  Pt admitted to [facility] for heart failure.  Pt states she resides home with her spouse and son in a 2 level home.  Pt resides omn the 1st level and denies utilizing any assistive devcies.  Pt states she is independent with her ADL's.  Pt states her son does the grocery shopping and cooks and cleans.  Pt states she has 2 steps outside the house and 12 steps inside the home.  Pt verifies her pharmacy as [facility] and kristie PCP is Dr Dupree.  Pt denies having any skilled needs upon discharge.

## 2023-11-22 NOTE — PROGRESS NOTES
Physical Therapy    Physical Therapy Evaluation    Patient Name: Anitha Welch  MRN: [id number]  Today's Date: 11/22/2023   Time Calculation  Start Time: 0857  Stop Time: 0916  Time Calculation (min): 19 min    Assessment/Plan   PT Assessment  PT Assessment Results: Decreased strength, Decreased range of motion, Decreased mobility, Impaired balance, Decreased endurance, Decreased cognition  Rehab Prognosis: Good  Evaluation/Treatment Tolerance: Patient tolerated treatment well  Medical Staff Made Aware: Yes  End of Session Communication: Bedside nurse  End of Session Patient Position: Up in chair, Alarm on  IP OR SWING BED PT PLAN  Inpatient or Swing Bed: Inpatient  PT Plan  Treatment/Interventions: Bed mobility, Transfer training, Gait training, Strengthening, Endurance training, Range of motion, Therapeutic exercise, Therapeutic activity, Home exercise program  PT Plan: Skilled PT  PT Frequency: 4 times per week  PT Discharge Recommendations: Low intensity level of continued care, 24 hr supervision due to cognition  PT Recommended Transfer Status: Assist x1  PT - OK to Discharge: Yes      Subjective   General Visit Information:  General  Reason for Referral: Impaired Mobility  Referred By: Dr. Rondon  Family/Caregiver Present: No  Prior to Session Communication: Bedside nurse  Patient Position Received: Up in chair, Alarm on  Preferred Learning Style: verbal  General Comment: 83 year old female admits with Acute on Chronic HF and COPD  Home Living:  Home Living  Type of Home: House  Lives With: Spouse, Adult children  Home Adaptive Equipment: Cane  Home Layout: Multi-level, Able to live on main level with bedroom/bathroom  Home Access: Level entry  Bathroom Shower/Tub: Walk-in shower  Bathroom Toilet: Standard  Bathroom Equipment: Grab bars in shower, Shower chair with back  Prior Level of Function:  Prior Function Per Pt/Caregiver Report  Level of [place]: Independent with ADLs and functional transfers,  Independent with homemaking with ambulation  Receives Help From: Family  ADL Assistance: Independent  Homemaking Assistance: Needs assistance  Ambulatory Assistance: Independent (No AD)  Precautions:  Precautions  Medical Precautions: Fall precautions  Vital Signs:     4L NC--SPO2 98-99% throughout session including with ambulation in room. RN aware  Objective   Pain:  Pain Assessment  Pain Assessment: 0-10  Pain Score: 0 - No pain  Cognition:  Cognition  Overall Cognitive Status: Impaired  Orientation Level: Oriented X4  Memory: Exceptions to WFL  Short-Term Memory: Impaired  Safety/Judgement: Exceptions to WFL  Insight: Moderate  Impulsive: Moderately  Processing Speed: Delayed    General Assessments:  Activity Tolerance  Endurance: Decreased tolerance for upright activites    Sensation  Light Touch: No apparent deficits  Functional Assessments:  Transfers  Transfer: Yes  Transfer 1  Transfer From 1: Chair with arms to  Transfer to 1: Stand  Technique 1: Sit to stand  Transfer Device 1:  (NO ad)  Transfer Level of Assistance 1: Close supervision  Trials/Comments 1: CS for safety    Ambulation/Gait Training  Ambulation/Gait Training Performed: Yes  Ambulation/Gait Training 1  Surface 1: Level tile  Device 1: No device  Assistance 1: Close supervision  Quality of Gait 1:  (slow, kyphotic, mildly unsteady)  Comments/Distance (ft) 1: 30  Extremity/Trunk Assessments:  RLE   RLE : Exceptions to WFL  Strength RLE  RLE Overall Strength: Deficits  R Hip Flexion: 4/5  R Knee Flexion: 4/5  R Knee Extension: 4/5  LLE   LLE : Exceptions to WFL  Strength LLE  LLE Overall Strength: Deficits  L Hip Flexion: 4-/5  L Knee Flexion: 4-/5  L Knee Extension: 4-/5  Outcome Measures:  [facility] Basic Mobility  Turning from your back to your side while in a flat bed without using bedrails: A little  Moving from lying on your back to sitting on the side of a flat bed without using bedrails: A little  Moving to and from bed to chair (including  a wheelchair): A little  Standing up from a chair using your arms (e.g. wheelchair or bedside chair): A little  To walk in hospital room: A little  Climbing 3-5 steps with railing: A lot  Basic Mobility - Total Score: 17    Encounter Problems       Encounter Problems (Active)       Balance       Standing Balance (Progressing)       Start:  11/22/23    Expected End:  12/06/23       Pt will demonstrate good static standing balance to promote safe participation with out of bed activity, transfers, and mobility              Mobility       Ambulation (Progressing)       Start:  11/22/23    Expected End:  12/06/23       Pt will ambulate 50' modified independent assist with LRD to promote safe home mobility              Safety       Safe Mobility Techniques (Progressing)       Start:  11/22/23    Expected End:  12/06/23       Pt will correctly identify and demonstrate safe mobility techniques to reduce their risks for falls during their acute care stay               Transfers       Supine to sit (Progressing)       Start:  11/22/23    Expected End:  12/06/23       Pt will transfer supine to sitting at edge of bed with modified independent assist to promote acute care out of bed activity           Sit to stand (Progressing)       Start:  11/22/23    Expected End:  12/06/23       Pt will transfer sit to standing position with modified independent assist and LRD to promote safe out of bed activity                  Education Documentation  Mobility Training, taught by Uli Mccarthy, PT at 11/22/2023  9:53 AM.  Learner: Patient  Readiness: Acceptance  Method: Explanation  Response: Verbalizes Understanding, Needs Reinforcement  Comment: Safe mobility techniques    Education Comments  No comments found.

## 2023-11-22 NOTE — H&P
History Of Present Illness        Anitha Welch is a 83 y.o. female presenting with Dyspnea.      Patient presented by EMS for shortness of breath.  Per ED records patient only wears 2 L supplemental oxygen via nasal cannula continuous at home.  Developed dyspnea and called EMS.  EMS noted the patient was saturating low 80s despite using her baseline oxygen.  She was placed on nonrebreather at 10 L and she recovered 90%.  In the ED she was titrated down to 3 L nasal cannula.  She saturated at at 97% in the ED.  Symptoms started for 1 day.      She states that she did not feel like eating today because of shortness of breath.  She denies recent sick contacts.  She denies fevers or chills.  She states over the day, her shortness of breath has increased.  She states it is hard to talk in full sentences because of shortness of breath.     She has a history of chronic leg edema.  She had a ultrasound on November 1, 2023 which showed no DVT.     Her primary care doctor is Dr. Gee Dupree     Patient was at [facility] [place] operated by [facility] on October 7, 2023.  She was admitted for acute respiratory failure with hypoxia. She was discharged on October 10, 2023.      Today's ED diagnostic work-up was noted for normal WBC count of 8.0.  Her H&H was stable at 13.2/41.9.  Her platelet count was little low at 145.  Glucose elevated 126.  Patient's blood chemistry noted to be within normal limits except for slightly low with a bicarbonate level of 34.  Bilirubin was 1.5.  Patient's proBNP was elevated 10,735.  Patient's troponin was elevated 32.  Chest x-ray was obtained in the ED and this was noted for mild cardiomegaly.  Mild bibasilar airspace opacities left greater than right with a small moderate left pleural effusion and probable minimal right pleural effusion.  Left lower lobe airspace opacity most likely atelectasis although pneumonia should be clinically excluded.  Off note a 2D echocardiogram from October 9, 2023 was noted for an LV systolic  function which was moderately decreased with a 4045% ejection fraction.  Inferior wall moderate hypokinesis and apical hypokinesis.  Moderate to severe mitral valve regurgitation.  Moderately elevated right ventricular systolic pressure.  Aortic valve appears abnormal.  There is moderate aortic valve cusp calcification.  Mild aortic valve regurgitation.  Multiple wall motion abnormalities.  Pulmonary artery systolic pressure was quantified as 45 mmHg.      Past Medical History        Past Medical History:   Diagnosis Date    Anemia     CHF (congestive heart failure) (CMS/HCC)     CVA (cerebral vascular accident) (CMS/HCC)     Deep vein thrombosis (DVT) of calf (CMS/HCC)     left    Diverticulosis     DVT (deep venous thrombosis) (CMS/HCC)     Family history of breast cancer     maternal    Heart disease     History of degenerative disc disease     Hyperlipidemia     Hypertension     Hypothyroidism     Meralgia paresthetica     Osteoarthritis     Osteopenia 2010    hip - DEXA    Personal history of other diseases of the circulatory system     History of hypertension    Personal history of other diseases of the respiratory system     History of chronic obstructive lung disease    Personal history of other endocrine, nutritional and metabolic disease     History of thyroid disorder    Plantar fasciitis     Sciatica     Spinal stenosis     ST elevation (STEMI) myocardial infarction (CMS/[facility])        Surgical History        Past Surgical History:   Procedure Laterality Date    ADENOIDECTOMY      CARDIAC CATHETERIZATION  10/2019    CATARACT EXTRACTION Bilateral 11/13/2012    CHOLECYSTECTOMY  1996    laparoscopic    COLONOSCOPY  2010    COLONOSCOPY  10/2018    Dr. Rodriguez    CORONARY STENT PLACEMENT      CYST REMOVAL Right 06/24/2013    Excision of Sebaceous cyst right axilla    DILATION AND CURETTAGE OF UTERUS      KNEE ARTHROPLASTY      MR HEAD ANGIO WO IV CONTRAST  02/24/2017    MR HEAD ANGIO WO IV CONTRAST [facility]PATIENT  LEGACY    MR HEAD ANGIO WO IV CONTRAST  08/11/2017    MR HEAD ANGIO WO IV CONTRAST [facility]    MR HEAD ANGIO WO IV CONTRAST  02/10/2019    MR HEAD ANGIO WO IV CONTRAST [facility]GAC[facility]    OTHER SURGICAL HISTORY  01/09/2019    Stent synergy    OTHER SURGICAL HISTORY  01/09/2019    Stent synergy    SC ARTHRP ACETBLR/PROX FEM PROSTC AGRFT/ALGRFT      THYROIDECTOMY, PARTIAL Bilateral 04/17/2013    subtotal thyroidectomy    TONSILLECTOMY      TOTAL HIP ARTHROPLASTY Right 2006    UPPER GASTROINTESTINAL ENDOSCOPY  03/2019    Dr. Galan        Social History        She reports that she quit smoking about 8 years ago. Her smoking use included cigarettes. She has never been exposed to tobacco smoke. She has never used smokeless tobacco. She reports current alcohol use. She reports that she does not use drugs.        Family History        Family History   Problem Relation Name Age of Onset    Hyperthyroidism Mother          Treated with radioactive iodine    Hypertension Mother      Diabetes Father's Sister      Hyperthyroidism Sibling          Allergies      Amoxicillin, Iodinated contrast media, Ciprofloxacin, Influenza virus vaccines, Diphenhydramine, Lisinopril, and Other          Review of Systems      General: No change in weight. No weakenss. No Fevers/Chills/Night Sweats   Skin: No skin/hair/nail changes. No rashes or sores.  Head:  No trauma. No Headache/nasuea/vomitting.   Eyes: No visual changes. No tearing. No itching.   Ears: No hearing loss. No tinnitus. No vertigo. No discharge.  Nose, Sinuses: No rhinorrhea, No nasal congestion. No epistaxis.  Mouth, Throat, Neck: No bleeding gums, hoarseness, sore throat or swollen neck  Cardiac: No palpitations. No MILLER. No PND. No Orthopnea.   Respiratory: Yes Shortness of Breath. No wheezing. No cough. No hemoptysis.   GI: No nausea/vomiting. No indigestion. No diarrhea. No constipation.   Extremities: No numbness or tingling. No paresthesias. LLE  "Swelling  Urinary: No change in urinary frequency. No change in hesitancy. No hematuria. No incontinence.           Physical Exam         Constitutional:  Pleasant  Eyes: PERRL, EOMI,   ENMT: mucous membranes moist  Head/Neck: Neck supple, No JVD,   Respiratory/Thorax: Diminished Breath Sounds left Lung base   Cardiovascular: Regular, rate and rhythm, no murmurs  Gastrointestinal: Soft, non-distended, +BS.  Extremities: peripheral pulses intact; B/L edema. LLE > RLE.  Neurological: Alert and Oriented x 3; no focal deficits; gross motor and sensation intact; CN II-XII intact. No asterixis.  Psychological: Appropriate mood and behavior  Skin: No lesions, No rashes.        Last Recorded Vitals  Blood pressure 152/87, pulse 91, temperature 36.8 °C (98.2 °F), temperature source Oral, resp. rate (!) 27, height 1.651 m (5' 5\"), weight 65.8 kg (145 lb), SpO2 97 %.    Relevant Results    Lab Results   Component Value Date    WBC 8.0 11/21/2023    HGB 13.2 11/21/2023    HCT 41.9 11/21/2023    MCV 94 11/21/2023     (L) 11/21/2023       Lab Results   Component Value Date    GLUCOSE 126 (H) 11/21/2023    CALCIUM 8.6 11/21/2023     11/21/2023    K 3.9 11/21/2023    CO2 34 (H) 11/21/2023     11/21/2023    BUN 20 11/21/2023    CREATININE 0.80 11/21/2023       Lab Results   Component Value Date    HGBA1C 5.5 01/09/2019         CT head wo IV contrast    Result Date: 3/2/2023  PROCEDURE:         BRAIN WO CONTRAST - ICT  3000 REASON FOR EXAM: Headache, new or worsening RESULT: MRN: [id number] Patient Name: RUDOLPH DÍAZ STUDY: BRAIN WO CONTRAST;  3/2/2023 4:33 pm INDICATION: Headache, new or worsening.  . Left arm pain. COMPARISON: CT brain 11/18/2021. ACCESSION NUMBER(S): [id number] ORDERING CLINICIAN: VALENTINE LADD TECHNIQUE: CT axial images through the Brain were obtained without contrast. FINDINGS: There is no mass effect or acute intracranial hemorrhage. Remote left occipital lobe infarct with encephalomalacia. There " is a remote right thalamic infarct. The ventricles appear normal. Gray-white differentiation is maintained. There is cerebral atrophy with extensive nonspecific changes throughout the white matter. The visualized paranasal sinuses appear clear. Orbits unremarkable IMPRESSION: No acute intracranial abnormalities. Cerebral atrophy, chronic microvascular changes of the white matter. Remote left occipital lobe infarct and remote right thalamic infarct noted Dictation workstation:   [id number] This exam is available in DICOM format to non-affiliated healthcare facilities on a secure media free searchable basis with prior patient authorization.  The patient exposure is reported to a radiation dose index registry.  All CT examinations are performed with one or more of the following dose reduction techniques: Automated Exposure Control, Adjustment of mA and/or KV according to patient size, or use of iterative reconstruction techniques. Original Interpreting Physician:   TANMAY GILES MD Original Transcribed by/Date: MMODAL Mar  2 2023  3:33P Original Electronically Signed by/Date: TANMAY GILES MD Mar  2 2023  4:38P Addendum Interpreting Physician: Addendum Transcribed by/Date: NO ADDENDUM Addendum Electronically Signed by/Date:      Scheduled medications  azithromycin, 500 mg, intravenous, Once      Continuous medications     PRN medications      Assessment/Plan   Principal Problem:    Acute on chronic systolic heart failure (CMS/HCC)  Active Problems:    Chronic obstructive pulmonary disease (CMS/HCC)    History of coronary artery stent placement    History of myocardial infarction    History of stroke without residual deficits    History of cerebrovascular accident    Hypothyroidism (acquired)    HFrEF (heart failure with reduced ejection fraction) (CMS/HCC)         Anitha Welch is a 83 y.o. female presenting with Dyspnea.  Patient admitted for further evaluation and management.      1) Acute on Chronic Respiratory  Failure w/Dyspnea/Orthopnea 2/2 HFrEF (LVEF of 45%) 2/2 Dietary Non-compliance:    Admit patient to Telemetry Services.   Will administer diuretics Lasix 20 mg IVP Q 12  Holding home Oral Lasix 20 mg PO Q daily   Cardiology Consult placed. Appreciate Recs   Low Sodium Diet  Fluid Restriction < 1.5 Liter/day  Daily Weights  Strict I's + O's  Patient wears 2 L O2 at baseline   Wean oxygen to baseline as tolerated      2) Dyspnea 2/2 Presumed Severe Mitral Valve Regurgitation and Presumed Pulmonary Arterial HTN (PASP is 45 mmHg):    Mangement as above  Cardiology evaluation requested       3) Moderate Left-sided Pleural Effusion:    Will repeat CXR (2-view) in the AM post IV Lasix  Patient may require Left-sided thoracentesis via IR if no improvement  Doubt Pneumonia and clinically non-febrile and no Leukocytosis       4) H/o CAD/CVA/DMT2    Continue with home Asa therapy and Statin therapy   Monitor glucose levels     5) H/o COPD:    Continue with home Bronchodilator MDI PRN  Continue with home ICS    6) H/o Hypothyroidism:    Continue with home levothyroxine dose 100 mcg PO Q Daily    7) H/o HTN:    Continue with home Losartan dose  Continue with home Coreg dose    8) GI + DVT PPX:    Home PPI PO  Heparin subcutaneous    DISPO:    PT/OT Evaluation  Patient declined SNF last hospitalization           The patient has been Instructed by me upon admission to follow-up with their PCP upon discharge to obtain repeat blood work/CXR and to maintain close medical follow-up for their current disease process.        This Dictation was Transcribed using a Nuance Dragon Voice Recognition System Device (with Compatible Computer + Software) and as such may contain Grammatical Errors and Unintentional Typing Misprints.          I spent 34 minutes in the professional and overall care of this patient.      Jone Jessica MD/MPH

## 2023-11-22 NOTE — PROGRESS NOTES
Occupational Therapy    Evaluation    Patient Name: Anitha Welch  MRN: [id number]  Today's Date: 11/22/2023  Time Calculation  Start Time: 0739  Stop Time: 0755  Time Calculation (min): 16 min    Assessment  IP OT Assessment  OT Assessment: Patient is an 83 year old female admitted with acute on chronic systolic heart failure. Patient is presenting with a decline in strength, balance, activity tolerance, and function, resulting in an increased need for assistance with ADL/IADL tasks. Skilled OT to address the above deficits and increase patient's safety and independence with daily tasks.  Prognosis: Good  Barriers to Discharge: None  Evaluation/Treatment Tolerance: Treatment limited secondary to medical complications (Comment)  End of Session Communication: Bedside nurse  End of Session Patient Position: Up in chair, Alarm on  Plan:  Treatment Interventions: ADL retraining, Functional transfer training, UE strengthening/ROM, Endurance training, Patient/family training, Neuromuscular reeducation, Compensatory technique education  OT Frequency: 2 times per week  OT Discharge Recommendations: Low intensity level of continued care, 24 hr supervision due to cognition  OT Recommended Transfer Status: Minimal assist, Assist of 1  OT - OK to Discharge: Yes    Subjective   Current Problem:  1. Shortness of breath        2. Pleural effusion        3. Pneumonia of both lower lobes due to infectious organism          General:  General  Reason for Referral: decline in ADLs  Referred By: Dr. Isabel  Past Medical History Relevant to Rehab: Patient is an 83 year old female who presented to the ED with c/o of SOB. Patient admitted with acute on chronic systolic heart failure.  Prior to Session Communication: Bedside nurse  Patient Position Received: Bed, 3 rail up  Preferred Learning Style: verbal  General Comment: Patient cleared by nursing for therapy. Patient in bed upon arrival and agreeable to  participate  Precautions:  Medical Precautions: Fall precautions, Oxygen therapy device and L/min (5L O2 via NC)  Vital Signs:  SpO2: (!) 84 % (patient recovers to 91% while seated in the chair)  Pain:  Pain Assessment  Pain Assessment: 0-10  Pain Score: 0 - No pain    Objective   Cognition:  Overall Cognitive Status: Within Functional Limits     Home Living:  Type of Home: House  Lives With: Spouse, Adult children (son)  Home Adaptive Equipment: Cane  Home Layout: Multi-level, Able to live on main level with bedroom/bathroom  Home Access: Level entry  Bathroom Shower/Tub: Walk-in shower  Bathroom Toilet: Standard  Bathroom Equipment: Grab bars in shower, Shower chair with back   Prior Function:  Level of [place]: Independent with ADLs and functional transfers, Needs assistance with homemaking  Receives Help From: Family (son)  ADL Assistance: Independent  Homemaking Assistance: Needs assistance  Ambulatory Assistance: Independent  Vocational: Retired  Prior Function Comments: patient's son completes IADLs  IADL History:  Homemaking Responsibilities: No  IADL Comments: son completes  ADL:  Eating Assistance: Stand by  Eating Deficit: Setup (seated with items within reach)  Grooming Assistance: Minimal  Grooming Deficit: Setup, Supervision/safety, Increased time to complete (per clinical judgement)  Bathing Assistance: Minimal  Bathing Deficit: Steadying, Increased time to complete  (per clinical judgement)  UE Dressing Assistance: Minimal  UE Dressing Deficit: Thread RUE, Thread LUE, Pull around back, Setup, Supervision/safety, Increased time to complete (per clinical judgement)  LE Dressing Assistance: Minimal  LE Dressing Deficit: Don/doff R sock, Don/doff L sock (per clinical judgement)  Toileting Assistance with Device: Total  Toileting Deficit: Perineal hygiene (pure wick)  Activity Tolerance:  Endurance: Decreased tolerance for upright activites  Bed Mobility/Transfers: Bed Mobility  Bed Mobility:  Yes  Bed Mobility 1  Bed Mobility 1: Supine to sitting  Level of Assistance 1: Close supervision  Bed Mobility Comments 1: head of bed elevated, use of bed rails. increased time    Transfers  Transfer: Yes  Transfer 1  Transfer From 1: Bed to  Transfer to 1: Chair with arms  Technique 1: Stand pivot  Transfer Device 1:  (hand held)  Transfer Level of Assistance 1: Hand held assistance, Minimum assistance  Trials/Comments 1: patient;s O2 at 84%. education on PLB. patient recovers to 91% on 5L O2 via NC    IADL's:   Homemaking Responsibilities: No  IADL Comments: son completes    Sensation:  Sensation Comment: patient reports chronic numb/tingling in L UE and L LE  Strength:  Strength Comments: B UE at least >/= 3/5 grossly. observed functionally    Extremities: RUE   RUE : Within Functional Limits (observed functionally) and LUE   LUE: Within Functional Limits (observed functionally)    Outcome Measures: [facility] Daily Activity  Putting on and taking off regular lower body clothing: A little  Bathing (including washing, rinsing, drying): A little  Putting on and taking off regular upper body clothing: A little  Toileting, which includes using toilet, bedpan or urinal: A lot  Taking care of personal grooming such as brushing teeth: A little  Eating Meals: A little  Daily Activity - Total Score: 17      Education Documentation  Body Mechanics, taught by Nisha Liz OT at 11/22/2023  8:55 AM.  Learner: Patient  Readiness: Acceptance  Method: Demonstration, Explanation  Response: Needs Reinforcement    Precautions, taught by Nisha Liz OT at 11/22/2023  8:55 AM.  Learner: Patient  Readiness: Acceptance  Method: Demonstration, Explanation  Response: Needs Reinforcement    Education Comments  No comments found.      Goals:   Encounter Problems       Encounter Problems (Active)       OT Goals       ADLs (Progressing)       Start:  11/22/23    Expected End:  12/07/23       Patient will complete ADL tasks with  [place]eves, in order to increase patient's safety and independence with self-care tasks.         Functional Transfers (Progressing)       Start:  11/22/23    Expected End:  12/07/23       Patient will complete functional transfers with Reeves in order to increase safety and independence with daily tasks.         B UE Strengthening (Progressing)       Start:  11/22/23    Expected End:  12/07/23       Patient will increase B UE strength to 4+/5 for functional transfers.         Functional Mobility (Progressing)       Start:  11/22/23    Expected End:  12/07/23       Patient will demonstrate the ability to complete item retrieval and functional mobility with Reeves in order to increase patient's safety and independence with daily tasks.

## 2023-11-22 NOTE — CONSULTS
"Inpatient consult to Palliative Care  Consult performed by: Destiny Arreaga, APRN-CNP  Consult ordered by: Phillip Isabel DO      Patient Location [facility]    Reason For Consult  Reason for Consult: communication / medical decision making    Physician Requesting Consult Dr. Isabel     History Of Present Illness  Anitha Welch is a 83 y.o. female with past medical history of  HFrEF and severe MR  is presenting with shortness of breath. She  normally wears 2 liters of oxygen at home continuously but in ED spO2 in 80's, improved with NRB and HFNC 10L. Covid and flu negative. CXR cardiomegaly, airspace opacities left > right, moderate left pleural effusion. ECHO 40-45% EF, hypokinesis of inferior wall and apex, severe MR, elevated RVSP. Labs notable in ED - plt 145, WBC 8.0, Bili 1.5, Trop 32, BNP 10,735. She was diuresed in ED IV furosemide. Cardiology was consulted.     She has been weaned to 5 liters of O2 and is seen on stepdown floor. She states her breathing feels better than yesterday, but knows \"my heart is bad\". She denies pain, nausea/vomiting, constipation/diarrhea, voiding w/o issue.       Past Medical History  She has a past medical history of Anemia, CHF (congestive heart failure) (CMS/HCC), CVA (cerebral vascular accident) (CMS/HCC), Deep vein thrombosis (DVT) of calf (CMS/HCC), Diverticulosis, DVT (deep venous thrombosis) (CMS/HCC), Family history of breast cancer, Heart disease, History of degenerative disc disease, Hyperlipidemia, Hypertension, Hypothyroidism, Meralgia paresthetica, Osteoarthritis, Osteopenia (2010), Personal history of other diseases of the circulatory system, Personal history of other diseases of the respiratory system, Personal history of other endocrine, nutritional and metabolic disease, Plantar fasciitis, Sciatica, Spinal stenosis, and ST elevation (STEMI) myocardial infarction (CMS/HCC).    Surgical History  She has a past surgical history that includes MR angio head " wo IV contrast (02/24/2017); MR angio head wo IV contrast (08/11/2017); MR angio head wo IV contrast (02/10/2019); Cholecystectomy (1996); Tonsillectomy; pr arthrp acetblr/prox fem prostc agrft/algrft; Thyroidectomy, partial (Bilateral, 04/17/2013); Coronary stent placement; Knee Arthroplasty; Total hip arthroplasty (Right, 2006); Dilation and curettage of uterus; Colonoscopy (2010); Other surgical history (01/09/2019); Upper gastrointestinal endoscopy (03/2019); Cardiac catheterization (10/2019); Cataract extraction (Bilateral, 11/13/2012); Adenoidectomy; Cyst Removal (Right, 06/24/2013); Colonoscopy (10/2018); and Other surgical history (01/09/2019).     Social History  She reports that she quit smoking about 8 years ago. Her smoking use included cigarettes. She has never been exposed to tobacco smoke. She has never used smokeless tobacco. She reports current alcohol use. She reports that she does not use drugs.    Caregiving/Caregiver Support  Does the patient require assistance in some or all components of his care, including coordination of medical care? Yes  If Yes, which person serves that role?  son Jose  Caregiver emotional or practical needs:  unknown     Family History  Family History   Problem Relation Name Age of Onset    Hyperthyroidism Mother          Treated with radioactive iodine    Hypertension Mother      Diabetes Father's Sister      Hyperthyroidism Sibling         Allergies  Amoxicillin, Iodinated contrast media, Ciprofloxacin, Influenza virus vaccines, Diphenhydramine, Lisinopril, and Other    Scheduled medications  aspirin, 81 mg, oral, Once  atorvastatin, 40 mg, oral, Daily  carvedilol, 12.5 mg, oral, BID with meals  tiotropium, 2 Inhalation, inhalation, Daily   And  fluticasone furoate-vilanteroL, 1 puff, inhalation, Daily  furosemide, 40 mg, intravenous, BID  heparin (porcine), 5,000 Units, subcutaneous, q8h GARDENIA  levothyroxine, 100 mcg, oral, Daily  [START ON 11/23/2023] losartan, 50 mg,  oral, Daily  multivitamin with minerals, 1 tablet, oral, Daily  pantoprazole, 40 mg, oral, Daily before breakfast  spironolactone, 12.5 mg, oral, Daily      Continuous medications  oxygen,       PRN medications  PRN medications: acetaminophen **OR** acetaminophen **OR** acetaminophen, albuterol, benzocaine-menthol, dextromethorphan-guaifenesin, guaiFENesin, ondansetron ODT **OR** ondansetron, polyethylene glycol     Relevant Results  See HPI  Results for orders placed or performed during the hospital encounter of 11/21/23 (from the past 24 hour(s))   SARS-CoV-2 RT PCR   Result Value Ref Range    Coronavirus 2019, PCR Not Detected Not Detected   Influenza A, and B PCR   Result Value Ref Range    Flu A Result Not Detected Not Detected    Flu B Result Not Detected Not Detected   CBC and Auto Differential   Result Value Ref Range    WBC 8.0 4.4 - 11.3 x10*3/uL    nRBC 0.0 0.0 - 0.0 /100 WBCs    RBC 4.45 4.00 - 5.20 x10*6/uL    Hemoglobin 13.2 12.0 - 16.0 g/dL    Hematocrit 41.9 36.0 - 46.0 %    MCV 94 80 - 100 fL    MCH 29.7 26.0 - 34.0 pg    MCHC 31.5 (L) 32.0 - 36.0 g/dL    RDW 14.9 (H) 11.5 - 14.5 %    Platelets 145 (L) 150 - 450 x10*3/uL    Neutrophils % 81.5 40.0 - 80.0 %    Immature Granulocytes %, Automated 0.6 0.0 - 0.9 %    Lymphocytes % 9.1 13.0 - 44.0 %    Monocytes % 8.4 2.0 - 10.0 %    Eosinophils % 0.2 0.0 - 6.0 %    Basophils % 0.2 0.0 - 2.0 %    Neutrophils Absolute 6.53 (H) 1.60 - 5.50 x10*3/uL    Immature Granulocytes Absolute, Automated 0.05 0.00 - 0.50 x10*3/uL    Lymphocytes Absolute 0.73 (L) 0.80 - 3.00 x10*3/uL    Monocytes Absolute 0.67 0.05 - 0.80 x10*3/uL    Eosinophils Absolute 0.02 0.00 - 0.40 x10*3/uL    Basophils Absolute 0.02 0.00 - 0.10 x10*3/uL   Comprehensive metabolic panel   Result Value Ref Range    Glucose 126 (H) 65 - 99 mg/dL    Sodium 145 133 - 145 mmol/L    Potassium 3.9 3.4 - 5.1 mmol/L    Chloride 103 97 - 107 mmol/L    Bicarbonate 34 (H) 24 - 31 mmol/L    Urea Nitrogen 20 8 -  25 mg/dL    Creatinine 0.80 0.40 - 1.60 mg/dL    eGFR 73 >60 mL/min/1.73m*2    Calcium 8.6 8.5 - 10.4 mg/dL    Albumin 3.5 3.5 - 5.0 g/dL    Alkaline Phosphatase 97 35 - 125 U/L    Total Protein 5.7 (L) 5.9 - 7.9 g/dL    AST 24 5 - 40 U/L    Bilirubin, Total 1.5 (H) 0.1 - 1.2 mg/dL    ALT 28 5 - 40 U/L    Anion Gap 8 <=19 mmol/L   NT Pro-BNP   Result Value Ref Range    PROBNP 10,735 (H) 0 - 624 pg/mL   Serial Troponin, Initial ([facility])   Result Value Ref Range    Troponin T, High Sensitivity 32 (H) <=15 ng/L   Serial Troponin, 2 Hour ([facility])   Result Value Ref Range    Troponin T, High Sensitivity 36 (H) <=15 ng/L   Serial Troponin, 6 Hour ([facility])   Result Value Ref Range    Troponin T, High Sensitivity 34 (H) <=15 ng/L   CBC   Result Value Ref Range    WBC 5.8 4.4 - 11.3 x10*3/uL    nRBC 0.0 0.0 - 0.0 /100 WBCs    RBC 4.05 4.00 - 5.20 x10*6/uL    Hemoglobin 11.9 (L) 12.0 - 16.0 g/dL    Hematocrit 38.1 36.0 - 46.0 %    MCV 94 80 - 100 fL    MCH 29.4 26.0 - 34.0 pg    MCHC 31.2 (L) 32.0 - 36.0 g/dL    RDW 14.7 (H) 11.5 - 14.5 %    Platelets 134 (L) 150 - 450 x10*3/uL   Basic metabolic panel   Result Value Ref Range    Glucose 93 65 - 99 mg/dL    Sodium 146 (H) 133 - 145 mmol/L    Potassium 3.0 (L) 3.4 - 5.1 mmol/L    Chloride 104 97 - 107 mmol/L    Bicarbonate 34 (H) 24 - 31 mmol/L    Urea Nitrogen 18 8 - 25 mg/dL    Creatinine 0.70 0.40 - 1.60 mg/dL    eGFR 85 >60 mL/min/1.73m*2    Calcium 8.3 (L) 8.5 - 10.4 mg/dL    Anion Gap 8 <=19 mmol/L   ECG 12 lead   Result Value Ref Range    Ventricular Rate 96 BPM    Atrial Rate 96 BPM    NM Interval 144 ms    QRS Duration 102 ms    QT Interval 376 ms    QTC Calculation(Bazett) 475 ms    P Axis 56 degrees    R Axis -30 degrees    T Axis 11 degrees    QRS Count 16 beats    Q Onset 210 ms    P Onset 138 ms    P Offset 193 ms    T Offset 398 ms    QTC Fredericia 439 ms      [unfilled]     Review of Systems    Functional Status  Activities of Daily Living:  Basic ADLs: (I=  "independent, A= assistance, D= dependent)  Bathing: A, Dressing: A, Toileting: A, Transferring: A, Continence: I, Feeding: I    [place] Index:    Instrumental ADLs: (I= independent, A= assistance, D= dependent)  Ability to use phone: I, Shopping: D, Cooking: D, Housekeeping: D, Laundry: D, Transportation: D, Medications: D, Handle Finances: D   Bar Scale:        Physical Exam    Last Recorded Vitals  Blood pressure 123/53, pulse 76, temperature 36.7 °C (98.1 °F), temperature source Oral, resp. rate 19, height 1.651 m (5' 5\"), weight 64.2 kg (141 lb 8.6 oz), SpO2 97 %.      PALLIATIVE MEDICINE PALLIATIVE PERFORMANCE SCALE     Palliative Performance Scale % (PPS) 40-50%   Oral Intake Moderately reduced (> mouthfuls) (1.0)   Edema Present (1.0)   Dyspnea at Rest Present (3.5)   Delirium Absent (0)   Palliative Prognostic Index (PPI) Total Score 4.5-6   Note: The scores from each prognostic domain are added.  A score of 0 to 2.0 was associated with a median survival of 90 days; score of 2.1 to 4.0 is 61 days, and score of >4.0 is 12 days.        Assessment/Plan   IMP:    Acute on chronic systolic heart failure - diuresis, BB, ARB, spironolactone, cardiology following  Severe mitral regurgitation - see above. Per cardiology notes, may not be good candidate for valve repair but will follow up as outpatient with Dr. Foss  Palliative care - see below    Palliative Care Encounter  Pt is a capable decision maker.  Spouse Jesús \"Bill\"  is hc-POA, first alternate agent is son Saurabh (per pt he is ) and 2nd alternate agent is daughter Tia. There are 3 living children, patient would like son Jose, who lives with them to be involved in decision making and has asked that he be the person we communicate with as her  is also not a great state of health and 90 years old. I did leave a  for Jose Welch and have not received a call back yet. Will place order to  to assist with updating the hc-POA to include " Jose Welch.  Reviewed code status and goals of care with the patient at the bedside. Reviewed current code status and differences between types of DNR. Reviewed risks/benefits of CPR and intubation. She very clearly stated that she knows her health is poor and if she has a treatable condition, would like to have more time, but no CPR. She would be open to intubation only if short term for procedure, but otherwise would not want this.   Code status updated in Epic to DNR-CCA-DNI, if needs intubation for mitral valve procedure this can be revoked.    Code status:   DNR-CCA-DNI, ICU is OK  Patient/proxy preference for information  Prefers full information    Provider estimate of survival: 1-6 months  Patient Prognostic Awareness: Yes - congruent with provider estimation    Is the patient hospice-eligible?   Yes  Was a discussion held re hospice services?   no  Was a decision made re hospice services?  NA    Goals of Care  Would like to follow up with cardiology as outpatient to review options for mitral valve repair.  Symptom Management  Pain: denies  Medications recommended for pain?  NA  Tiredness: yes  Nausea: denies  Depression: denies  Anxiety: calm  Drowsiness: no  Appetite: reduced  Wellbeing: poor  Dyspnea: yes  Intervention recommended for dyspnea?  yes  Other: n/a  Intervention recommended for constipation?  NA    Thank you for this consultation. We will follow.    I spent 90 minutes in the professional and overall care of this patient.    Dr. Isabel updated.    Destiny Arreaga, APRN-CNP

## 2023-11-22 NOTE — CONSULTS
"Nutrition Assessment Note  Met with pt due to diagnosis of CHF. Pt currently on a cardiac diet with a fluid restriction. Pt reports her appetite is per usual, and that she, \"doesn't eat very much.\" Pt wasn't happy that she had gained weight due to fluid retention, but stated that she would just like to maintain her weight. Pt agreeable to Magic Cup BID. Will follow.    Reason for Hospital Admission:  Anitha Welch is a 83 y.o. female who is admitted for Dyspnea and acute on chronic systolic heart failure.    Nutrition Assessment:  Reason for Assessment  Reason for Assessment: Dietitian discretion (CHF day 1)   has a past medical history of Anemia, CHF (congestive heart failure) (CMS/HCC), CVA (cerebral vascular accident) (CMS/HCC), Deep vein thrombosis (DVT) of calf (CMS/HCC), Diverticulosis, DVT (deep venous thrombosis) (CMS/HCC), Family history of breast cancer, Heart disease, History of degenerative disc disease, Hyperlipidemia, Hypertension, Hypothyroidism, Meralgia paresthetica, Osteoarthritis, Osteopenia (2010), Personal history of other diseases of the circulatory system, Personal history of other diseases of the respiratory system, Personal history of other endocrine, nutritional and metabolic disease, Plantar fasciitis, Sciatica, Spinal stenosis, and ST elevation (STEMI) myocardial infarction (CMS/formerly [facility]).     Allergies   Allergen Reactions    Amoxicillin Anaphylaxis and Shortness of breath    Iodinated Contrast Media Dizziness and Other     Increased BP    Increase BP, dizziness    Ciprofloxacin Rash and Swelling     Decreased BP    Influenza Virus Vaccines Other     \"sick\" for 24 hours afterwards    Diphenhydramine Unknown    Lisinopril Rash and Swelling    Other Itching     Fluzone inj high dose        Lab Results   Component Value Date    WBC 5.8 11/22/2023    HGB 11.9 (L) 11/22/2023    HCT 38.1 11/22/2023     (L) 11/22/2023    CHOL 190 02/21/2023    TRIG 64 02/21/2023    HDL 52 02/21/2023    ALT " 28 11/21/2023    AST 24 11/21/2023     (H) 11/22/2023    K 3.0 (L) 11/22/2023     11/22/2023    CREATININE 0.70 11/22/2023    BUN 18 11/22/2023    CO2 34 (H) 11/22/2023    TSH 0.65 10/08/2023    INR 1.0 01/24/2020    HGBA1C 5.5 01/09/2019       Current Facility-Administered Medications:     acetaminophen (Tylenol) tablet 650 mg, 650 mg, oral, q4h PRN, 650 mg at 11/22/23 0505 **OR** acetaminophen (Tylenol) oral liquid 650 mg, 650 mg, nasogastric tube, q4h PRN **OR** acetaminophen (Tylenol) suppository 650 mg, 650 mg, rectal, q4h PRN, Jone Jessica MD    albuterol 2.5 mg /3 mL (0.083 %) nebulizer solution 2.5 mg, 2.5 mg, nebulization, q4h PRN, Jone Jessica MD    aspirin EC tablet 81 mg, 81 mg, oral, Once, Jone Jessica MD    atorvastatin (Lipitor) tablet 40 mg, 40 mg, oral, Daily, Jone Jessica MD, 40 mg at 11/22/23 0817    benzocaine-menthol (Cepastat Sore Throat) 15-3.6 mg lozenge 1 lozenge, 1 lozenge, Mouth/Throat, q2h PRN, Jone Jessica MD    carvedilol (Coreg) tablet 12.5 mg, 12.5 mg, oral, BID with meals, Jone Jessica MD, 12.5 mg at 11/22/23 0817    dextromethorphan-guaifenesin (Robitussin DM)  mg/5 mL oral liquid 5 mL, 5 mL, oral, q4h PRN, Jone Jessica MD    tiotropium (Spiriva Respimat) 2.5 mcg/actuation inhaler 2 puff, 2 Inhalation, inhalation, Daily, 2 puff at 11/22/23 0715 **AND** fluticasone furoate-vilanteroL (Breo Ellipta) 100-25 mcg/dose inhaler 1 puff, 1 puff, inhalation, Daily, Jone Jessica MD, 1 puff at 11/22/23 0714    furosemide (Lasix) injection 40 mg, 40 mg, intravenous, BID, Phillip Isabel DO    guaiFENesin (Mucinex) 12 hr tablet 600 mg, 600 mg, oral, q12h PRN, Jone Jessica MD    heparin (porcine) injection 5,000 Units, 5,000 Units, subcutaneous, q8h GARDENIA, Jone Jessica MD, 5,000 Units at 11/22/23 0505    levothyroxine (Synthroid, Levoxyl) tablet 100 mcg, 100 mcg, oral, Daily, Jone Jessica MD, 100 mcg at 11/22/23 0620    [START ON 11/23/2023]  "losartan (Cozaar) tablet 50 mg, 50 mg, oral, Daily, Derrick Gallardo DO    multivitamin with minerals 1 tablet, 1 tablet, oral, Daily, Jone Jessica MD, 1 tablet at 11/22/23 0817    ondansetron ODT (Zofran-ODT) disintegrating tablet 4 mg, 4 mg, oral, q8h PRN **OR** ondansetron (Zofran) injection 4 mg, 4 mg, intravenous, q8h PRN, Jone Jessica MD    oxygen (O2) therapy, , inhalation, Continuous, Jone Jessica MD, Start at 11/21/23 2300    pantoprazole (ProtoNix) EC tablet 40 mg, 40 mg, oral, Daily before breakfast, Jone Jessica MD, 40 mg at 11/22/23 0619    polyethylene glycol (Glycolax, Miralax) packet 17 g, 17 g, oral, Daily PRN, Jone Jessica MD    spironolactone (Aldactone) tablet 12.5 mg, 12.5 mg, oral, Daily, Derrick Gallardo DO, 12.5 mg at 11/22/23 1147    Dietary Orders (From admission, onward)       Start     Ordered    11/22/23 1220  Oral nutritional supplements  Until discontinued        Comments: vanilla   Question Answer Comment   Deliver with Lunch    Deliver with Dinner    Select supplement: Magic Cup        11/22/23 1220    11/22/23 0132  Adult diet Cardiac; 70 gm fat; 2 - 3 grams Sodium; 1800 mL fluid  Diet effective now        Question Answer Comment   Diet type Cardiac    Fat restriction: 70 gm fat    Sodium restriction: 2 - 3 grams Sodium    Dietary fluid restriction / 24h: 1800 mL fluid        11/22/23 0131    11/21/23 2326  May Participate in Room Service  Once        Question:  .  Answer:  Yes    11/21/23 2326                  History:  Food and Nutrient History  Energy Intake: Fair 50-75 %  Food and Nutrient History: Pt reports that she does not eat a lot. She says that she does eat 3 meals a day, however.    Anthropometrics:  Ht: 165.1 cm (5' 5\"), Wt: 64.2 kg (141 lb 8.6 oz), BMI: 23.55    Weight Change  Weight History / % Weight Change: Pt denies any weight loss, but notes her UBW to be 135. Pt unsure of how long it took to gain the ~5# from her UBW. This difference in wt is about " 5%. Pt states that she would like to maintain her wt and not lose or gain any.  Interpretation of Weight Loss: Other (see comment) (Pt was unsure of time frame she gained ~5#)  Significant Weight Gain: Fluid related    IBW/kg (Dietitian Calculated): 56.82 kg  Percent of IBW: 113 %     Nutrition Prescription  Estimated Energy Needs  Total Energy Estimated Needs (kCal): 1605 kCal  Total Estimated Energy Need per Day (kCal/kg): 25 kCal/kg  Method for Estimating Needs: actual wt    Estimated Protein Needs  Total Protein Estimated Needs (g): 77 g  Total Protein Estimated Needs (g/kg): 1.2 g/kg  Method for Estimating Needs: actual wt    Estimated Fluid Needs  Method for Estimating Needs: per fluid restriction of 1800 mL    Nutrition Focused Physical Findings:   Subcutaneous Fat Loss  Orbital Fat Pads: Mild-Moderate (slight dark circles and slight hollowing)  Buccal Fat Pads: Mild-Moderate (flat cheeks, minimal bounce)    Muscle Wasting  Temporalis: Mild-Moderate (slight depression)  Pectoralis (Clavicular Region): Mild-Moderate (some protrusion of clavicle)  Interosseous: Mild-Moderate (slightly depressed area between thumb and forefinger)    Edema  Edema:  (11/21 Noted in H&P)    Nutrition Diagnosis:  Malnutrition Diagnosis  Patient has Malnutrition Diagnosis: Yes  Diagnosis Status: New  Malnutrition Diagnosis: Moderate malnutrition related to chronic disease or condition  As Evidenced by: fat and muscle deficits    Nutrition Interventions/Recommendations:     Food and/or Nutrient Delivery Interventions  Interventions: Meals and snacks, Medical food supplement    Meals and Snacks: Mineral-modified diet, Fat-modified diet, Fluid-modified diet  Goal: diet as ordered    Medical Food Supplement: Commercial food  Goal: Magic Cup BID    Nutrition Monitoring/Evaluation:  Food and Nutrient Related History  Energy Intake: Estimated energy intake  Criteria: Pt to tolerate diet >/= 75% of PO intake    Amount of Food: Medical food  intake, Estimated amout of food  Criteria: Pt to tolerate supplement >/= 75% of PO intake    RD Recommendations:  Magic Cup BID    Follow Up  Time Spent (min): 35 minutes  Last Date of Nutrition Visit: 11/22/23  Nutrition Follow-Up Needed?: 3-5 days  Follow up Comment: 11/27/2023

## 2023-11-22 NOTE — PROGRESS NOTES
Spiritual Care Visit    Clinical Encounter Type  Visited With: Patient  Routine Visit: Introduction  Continue Visiting: Yes         Values/Beliefs  Spiritual Requests During Hospitalization: Anointing & Communion today    Sacramental Encounters  Communion: Patient wants communion  Communion Given Indicator: Yes  Sacrament of Sick-Anointing: Anointed     Donnell Quintanilla

## 2023-11-22 NOTE — CARE PLAN
The patient's goals for the shift include pain free    The clinical goals for the shift include improve breathing    Over the shift, the patient did not make progress toward the following goals. Barriers to progression include none. Recommendations to address these barriers include none.

## 2023-11-22 NOTE — PROGRESS NOTES
Met with pt @ bedside for information exchange & CHF education. Discussed HF Nurse Navigator role/HF program. Pt agreeable to receiving CHF education & being followed after discharge for CHF management. Pt was recently discharged from [facility] (10/10) Dx Resp. Failure & CHF. Active with [facility] program, Milana Hernadez CNP.  Pt reports she was not aware of CHF Dx. Follows up with her PCP regularly, Dr Dupree. Currently does not see Cardiologist. She does not perform daily weights,tries to follow low Na diet, takes medications as directed & unaware of HF flare up symptoms to report to MD.  Provided CHF booklet/folder & reviewed. Educated on CHF,EF,managing CHF & HF flare up symptoms to call MD with. Stressed importance of performing daily weights/record, following 2000 mg diet, taking medications as directed & following up with PCP & Cardiologist. She verbalized understanding of CHF education. I will continue to follow while IP & after discharge for CHF management.

## 2023-11-23 ENCOUNTER — APPOINTMENT (OUTPATIENT)
Dept: RADIOLOGY | Facility: HOSPITAL | Age: 83
DRG: 291 | End: 2023-11-23
Payer: MEDICARE

## 2023-11-23 DIAGNOSIS — R53.1 ASTHENIA: ICD-10-CM

## 2023-11-23 DIAGNOSIS — I50.23 ACUTE ON CHRONIC SYSTOLIC HEART FAILURE (MULTI): Primary | ICD-10-CM

## 2023-11-23 LAB
ALBUMIN SERPL-MCNC: 3.4 G/DL (ref 3.5–5)
ALP BLD-CCNC: 93 U/L (ref 35–125)
ALT SERPL-CCNC: 18 U/L (ref 5–40)
ANION GAP SERPL CALC-SCNC: 5 MMOL/L
AST SERPL-CCNC: 13 U/L (ref 5–40)
BILIRUB SERPL-MCNC: 1 MG/DL (ref 0.1–1.2)
BUN SERPL-MCNC: 23 MG/DL (ref 8–25)
CALCIUM SERPL-MCNC: 8.5 MG/DL (ref 8.5–10.4)
CHLORIDE SERPL-SCNC: 105 MMOL/L (ref 97–107)
CO2 SERPL-SCNC: 36 MMOL/L (ref 24–31)
CREAT SERPL-MCNC: 0.9 MG/DL (ref 0.4–1.6)
GFR SERPL CREATININE-BSD FRML MDRD: 63 ML/MIN/1.73M*2
GLUCOSE SERPL-MCNC: 123 MG/DL (ref 65–99)
POTASSIUM SERPL-SCNC: 3.8 MMOL/L (ref 3.4–5.1)
PROT SERPL-MCNC: 5.4 G/DL (ref 5.9–7.9)
SODIUM SERPL-SCNC: 146 MMOL/L (ref 133–145)

## 2023-11-23 PROCEDURE — 36415 COLL VENOUS BLD VENIPUNCTURE: CPT | Performed by: INTERNAL MEDICINE

## 2023-11-23 PROCEDURE — 99233 SBSQ HOSP IP/OBS HIGH 50: CPT | Performed by: NURSE PRACTITIONER

## 2023-11-23 PROCEDURE — 2500000001 HC RX 250 WO HCPCS SELF ADMINISTERED DRUGS (ALT 637 FOR MEDICARE OP): Performed by: INTERNAL MEDICINE

## 2023-11-23 PROCEDURE — 94640 AIRWAY INHALATION TREATMENT: CPT

## 2023-11-23 PROCEDURE — 2500000004 HC RX 250 GENERAL PHARMACY W/ HCPCS (ALT 636 FOR OP/ED): Performed by: INTERNAL MEDICINE

## 2023-11-23 PROCEDURE — 96372 THER/PROPH/DIAG INJ SC/IM: CPT | Performed by: INTERNAL MEDICINE

## 2023-11-23 PROCEDURE — 1200000002 HC GENERAL ROOM WITH TELEMETRY DAILY

## 2023-11-23 PROCEDURE — 9420000001 HC RT PATIENT EDUCATION 5 MIN

## 2023-11-23 PROCEDURE — 80053 COMPREHEN METABOLIC PANEL: CPT | Performed by: INTERNAL MEDICINE

## 2023-11-23 PROCEDURE — 99232 SBSQ HOSP IP/OBS MODERATE 35: CPT | Performed by: INTERNAL MEDICINE

## 2023-11-23 PROCEDURE — 93970 EXTREMITY STUDY: CPT

## 2023-11-23 PROCEDURE — 94760 N-INVAS EAR/PLS OXIMETRY 1: CPT

## 2023-11-23 RX ORDER — MORPHINE SULFATE 2 MG/ML
1 INJECTION, SOLUTION INTRAMUSCULAR; INTRAVENOUS
Status: DISCONTINUED | OUTPATIENT
Start: 2023-11-23 | End: 2023-11-27 | Stop reason: HOSPADM

## 2023-11-23 RX ORDER — TORSEMIDE 20 MG/1
20 TABLET ORAL DAILY
Status: DISCONTINUED | OUTPATIENT
Start: 2023-11-23 | End: 2023-11-27 | Stop reason: HOSPADM

## 2023-11-23 RX ADMIN — MULTIPLE VITAMINS W/ MINERALS TAB 1 TABLET: TAB at 09:53

## 2023-11-23 RX ADMIN — LOSARTAN POTASSIUM 50 MG: 50 TABLET, FILM COATED ORAL at 09:00

## 2023-11-23 RX ADMIN — CARVEDILOL 12.5 MG: 12.5 TABLET, FILM COATED ORAL at 09:53

## 2023-11-23 RX ADMIN — TIOTROPIUM BROMIDE INHALATION SPRAY 2 PUFF: 3.12 SPRAY, METERED RESPIRATORY (INHALATION) at 07:37

## 2023-11-23 RX ADMIN — SPIRONOLACTONE 12.5 MG: 25 TABLET ORAL at 09:53

## 2023-11-23 RX ADMIN — FLUTICASONE FUROATE AND VILANTEROL TRIFENATATE 1 PUFF: 100; 25 POWDER RESPIRATORY (INHALATION) at 07:37

## 2023-11-23 RX ADMIN — HEPARIN SODIUM 5000 UNITS: 5000 INJECTION, SOLUTION INTRAVENOUS; SUBCUTANEOUS at 13:10

## 2023-11-23 RX ADMIN — PANTOPRAZOLE SODIUM 40 MG: 40 TABLET, DELAYED RELEASE ORAL at 06:28

## 2023-11-23 RX ADMIN — LEVOTHYROXINE SODIUM 100 MCG: 0.1 TABLET ORAL at 06:39

## 2023-11-23 RX ADMIN — CARVEDILOL 12.5 MG: 12.5 TABLET, FILM COATED ORAL at 16:58

## 2023-11-23 RX ADMIN — HEPARIN SODIUM 5000 UNITS: 5000 INJECTION, SOLUTION INTRAVENOUS; SUBCUTANEOUS at 06:28

## 2023-11-23 RX ADMIN — FUROSEMIDE 40 MG: 10 INJECTION, SOLUTION INTRAMUSCULAR; INTRAVENOUS at 06:28

## 2023-11-23 RX ADMIN — TORSEMIDE 20 MG: 20 TABLET ORAL at 09:53

## 2023-11-23 RX ADMIN — APIXABAN 10 MG: 5 TABLET, FILM COATED ORAL at 20:45

## 2023-11-23 ASSESSMENT — PAIN SCALES - PAIN ASSESSMENT IN ADVANCED DEMENTIA (PAINAD): BREATHING: NORMAL

## 2023-11-23 ASSESSMENT — COGNITIVE AND FUNCTIONAL STATUS - GENERAL
MOVING TO AND FROM BED TO CHAIR: A LITTLE
STANDING UP FROM CHAIR USING ARMS: A LITTLE
WALKING IN HOSPITAL ROOM: A LITTLE
HELP NEEDED FOR BATHING: A LITTLE
DRESSING REGULAR UPPER BODY CLOTHING: A LITTLE
MOVING FROM LYING ON BACK TO SITTING ON SIDE OF FLAT BED WITH BEDRAILS: A LITTLE
MOVING TO AND FROM BED TO CHAIR: A LITTLE
DRESSING REGULAR UPPER BODY CLOTHING: A LITTLE
STANDING UP FROM CHAIR USING ARMS: A LITTLE
TOILETING: A LITTLE
CLIMB 3 TO 5 STEPS WITH RAILING: A LOT
DRESSING REGULAR LOWER BODY CLOTHING: A LITTLE
HELP NEEDED FOR BATHING: A LITTLE
TURNING FROM BACK TO SIDE WHILE IN FLAT BAD: A LITTLE
DAILY ACTIVITIY SCORE: 19
TOILETING: A LITTLE
WALKING IN HOSPITAL ROOM: A LITTLE
DRESSING REGULAR LOWER BODY CLOTHING: A LITTLE
MOVING FROM LYING ON BACK TO SITTING ON SIDE OF FLAT BED WITH BEDRAILS: A LITTLE
MOBILITY SCORE: 17
CLIMB 3 TO 5 STEPS WITH RAILING: A LOT
PERSONAL GROOMING: A LITTLE
DAILY ACTIVITIY SCORE: 19
PERSONAL GROOMING: A LITTLE
TURNING FROM BACK TO SIDE WHILE IN FLAT BAD: A LITTLE
MOBILITY SCORE: 17

## 2023-11-23 ASSESSMENT — PAIN SCALES - GENERAL
PAINLEVEL_OUTOF10: 0 - NO PAIN

## 2023-11-23 ASSESSMENT — PAIN SCALES - WONG BAKER: WONGBAKER_NUMERICALRESPONSE: NO HURT

## 2023-11-23 NOTE — PROGRESS NOTES
"Anitha Welch is a 83 y.o. female on day 1 of admission presenting with Acute on chronic systolic heart failure (CMS/HCC).    Subjective   Mild conversational dyspnea.  Saturating well on 2 L nasal cannula       Objective     Physical Exam   Gen: NAD   Neck: no JVD, carotid upstroke is brisk and without delay   Heart: rrr, s1s2+ no mrg   Lungs: Diminished at the bases bilateral   Ext: warm trace edema  Last Recorded Vitals  Blood pressure 153/74, pulse 86, temperature 36.4 °C (97.5 °F), temperature source Oral, resp. rate 18, height 1.651 m (5' 5\"), weight 64.2 kg (141 lb 8.6 oz), SpO2 99 %.  Intake/Output last 3 Shifts:  I/O last 3 completed shifts:  In: 620 (9.7 mL/kg) [P.O.:320; I.V.:50 (0.8 mL/kg); IV Piggyback:250]  Out: 2200 (34.3 mL/kg) [Urine:2200 (1 mL/kg/hr)]  Weight: 64.2 kg       Assessment/Plan   Principal Problem:    Acute on chronic systolic heart failure (CMS/HCC)  Active Problems:    Chronic obstructive pulmonary disease (CMS/HCC)    History of coronary artery stent placement    History of myocardial infarction    History of stroke without residual deficits    History of cerebrovascular accident    Hypothyroidism (acquired)    HFrEF (heart failure with reduced ejection fraction) (CMS/HCC)    Severe mitral valve regurgitation    Heart failure (CMS/HCC)    11/22: As noted in the above assessment we will focus on optimizing medical management of her heart failure.  She is elderly and frail and may not be a good candidate for MitraClip procedure in the setting of her moderately severe mitral regurgitation, and after hospital discharge she will follow-up with her cardiologist, Dr. Foss.    11/23: We will continue with current guideline directed medical therapy for heart failure.  We will continue IV Lasix for at least 1 more day with tentative plans to convert to oral Lasix tomorrow.  We can have further discussions regarding MitraClip candidacy in my office as an outpatient.       I spent 35 minutes " in the professional and overall care of this patient.      Tuan Foss, DO

## 2023-11-23 NOTE — PROGRESS NOTES
"Anitha Welch is a 83 y.o. female on day 1 of admission presenting with Acute on chronic systolic heart failure (CMS/HCC).    Subjective   Seen and examined good net diuresis O2 saturations are 98% on 5 L we will reduce her to 2 L to her usual baseline and reassess transition from IV Lasix to oral torsemide with possible discharge tomorrow       Objective     Physical Exam  Vitals and nursing note reviewed.   Constitutional:       Appearance: Normal appearance.   HENT:      Head: Normocephalic and atraumatic.      Mouth/Throat:      Mouth: Mucous membranes are moist.   Eyes:      Extraocular Movements: Extraocular movements intact.      Pupils: Pupils are equal, round, and reactive to light.   Cardiovascular:      Rate and Rhythm: Normal rate and regular rhythm.      Pulses: Normal pulses.      Heart sounds: Normal heart sounds.   Pulmonary:      Effort: Pulmonary effort is normal.      Breath sounds: Normal breath sounds.   Abdominal:      General: Abdomen is flat.      Palpations: Abdomen is soft.   Musculoskeletal:         General: Normal range of motion.      Cervical back: Normal range of motion and neck supple.   Skin:     General: Skin is warm.      Capillary Refill: Capillary refill takes less than 2 seconds.   Neurological:      General: No focal deficit present.      Mental Status: She is alert and oriented to person, place, and time. Mental status is at baseline.   Psychiatric:         Mood and Affect: Mood normal.         Last Recorded Vitals  Blood pressure 153/74, pulse 86, temperature 36.4 °C (97.5 °F), temperature source Oral, resp. rate 18, height 1.651 m (5' 5\"), weight 64.2 kg (141 lb 8.6 oz), SpO2 99 %.  Intake/Output last 3 Shifts:  I/O last 3 completed shifts:  In: 620 (9.7 mL/kg) [P.O.:320; I.V.:50 (0.8 mL/kg); IV Piggyback:250]  Out: 2200 (34.3 mL/kg) [Urine:2200 (1 mL/kg/hr)]  Weight: 64.2 kg     Relevant Results         Scheduled medications  aspirin, 81 mg, oral, Once  atorvastatin, 40 " mg, oral, Daily  carvedilol, 12.5 mg, oral, BID with meals  tiotropium, 2 Inhalation, inhalation, Daily   And  fluticasone furoate-vilanteroL, 1 puff, inhalation, Daily  heparin (porcine), 5,000 Units, subcutaneous, q8h G[place]  levothyroxine, 100 mcg, oral, Daily  losartan, 50 mg, oral, Daily  multivitamin with minerals, 1 tablet, oral, Daily  pantoprazole, 40 mg, oral, Daily before breakfast  spironolactone, 12.5 mg, oral, Daily  torsemide, 20 mg, oral, Daily      Continuous medications  oxygen,       PRN medications  PRN medications: acetaminophen **OR** acetaminophen **OR** acetaminophen, albuterol, benzocaine-menthol, dextromethorphan-guaifenesin, guaiFENesin, ondansetron ODT **OR** ondansetron, oxygen, polyethylene glycol  Results for orders placed or performed during the hospital encounter of 11/21/23 (from the past 24 hour(s))   ECG 12 lead   Result Value Ref Range    Ventricular Rate 96 BPM    Atrial Rate 96 BPM    VT Interval 144 ms    QRS Duration 102 ms    QT Interval 376 ms    QTC Calculation(Bazett) 475 ms    P Axis 56 degrees    R Axis -30 degrees    T Axis 11 degrees    QRS Count 16 beats    Q Onset 210 ms    P Onset 138 ms    P Offset 193 ms    T Offset 398 ms    QTC Fredericia 439 ms   Magnesium   Result Value Ref Range    Magnesium 2.10 1.60 - 3.10 mg/dL   Basic metabolic panel   Result Value Ref Range    Glucose 142 (H) 65 - 99 mg/dL    Sodium 147 (H) 133 - 145 mmol/L    Potassium 3.8 3.4 - 5.1 mmol/L    Chloride 103 97 - 107 mmol/L    Bicarbonate 36 (H) 24 - 31 mmol/L    Urea Nitrogen 23 8 - 25 mg/dL    Creatinine 1.10 0.40 - 1.60 mg/dL    eGFR 50 (L) >60 mL/min/1.73m*2    Calcium 8.9 8.5 - 10.4 mg/dL    Anion Gap 8 <=19 mmol/L   Magnesium   Result Value Ref Range    Magnesium 2.30 1.60 - 3.10 mg/dL   Comprehensive Metabolic Panel   Result Value Ref Range    Glucose 123 (H) 65 - 99 mg/dL    Sodium 146 (H) 133 - 145 mmol/L    Potassium 3.8 3.4 - 5.1 mmol/L    Chloride 105 97 - 107 mmol/L     Bicarbonate 36 (H) 24 - 31 mmol/L    Urea Nitrogen 23 8 - 25 mg/dL    Creatinine 0.90 0.40 - 1.60 mg/dL    eGFR 63 >60 mL/min/1.73m*2    Calcium 8.5 8.5 - 10.4 mg/dL    Albumin 3.4 (L) 3.5 - 5.0 g/dL    Alkaline Phosphatase 93 35 - 125 U/L    Total Protein 5.4 (L) 5.9 - 7.9 g/dL    AST 13 5 - 40 U/L    Bilirubin, Total 1.0 0.1 - 1.2 mg/dL    ALT 18 5 - 40 U/L    Anion Gap 5 <=19 mmol/L                    Malnutrition Diagnosis Status: New  Malnutrition Diagnosis: Moderate malnutrition related to chronic disease or condition  As Evidenced by: fat and muscle deficits  I agree with the dietitian's malnutrition diagnosis.      Assessment/Plan   Principal Problem:    Acute on chronic systolic heart failure (CMS/HCC)  Active Problems:    Chronic obstructive pulmonary disease (CMS/HCC)    History of coronary artery stent placement    History of myocardial infarction    History of stroke without residual deficits    History of cerebrovascular accident    Hypothyroidism (acquired)    HFrEF (heart failure with reduced ejection fraction) (CMS/HCC)    Severe mitral valve regurgitation    Heart failure (CMS/HCC)    De-escalate from IV Lasix to oral torsemide reduce O2 to 2 L nasal cannula patient's baseline monitor O2 saturation increase PT OT anticipating discharge tomorrow if she can maintain euvolemia     Ultrasound lower extremity positive for DVT per phone call with radiologist at 1635.  Initiate Eliquis therapy  I spent 40 minutes in the professional and overall care of this patient.      Phillip Isabel DO

## 2023-11-23 NOTE — PROGRESS NOTES
Anitha Welch is a 83 y.o. female on day 1 of admission presenting with Acute on chronic systolic heart failure (CMS/HCC).    Subjective   Symptoms (0 - 10, Best to Worst)  [place] Symptom Assessment System  Pain Score: 0 - No pain  Vacillating 6-8 midsternal chest pain no radiation associated shortness of breath no diaphoresis no shoulder pain no jaw pain no back pain.  Visibly uncomfortable somewhat short of breath patient coughing mostly nonproductive.    Denies headache dizziness lightheadedness no fever chills no nausea or vomiting no abdominal discomfort.  Voiding per purewick.    Objective     Physical Exam  Vitals and nursing note reviewed.   Constitutional:       General: She is not in acute distress.     Appearance: She is ill-appearing. She is not toxic-appearing or diaphoretic.      Comments: Sitting up in bed some shortness of breath complaints of chest pain and leg pain  Visibly uncomfortable   HENT:      Head: Normocephalic and atraumatic.      Nose: No congestion or rhinorrhea.      Mouth/Throat:      Mouth: Mucous membranes are moist.      Pharynx: Oropharynx is clear. No oropharyngeal exudate or posterior oropharyngeal erythema.   Eyes:      General: No scleral icterus.        Right eye: No discharge.         Left eye: No discharge.      Extraocular Movements: Extraocular movements intact.   Neck:      Vascular: No carotid bruit.      Comments: Decreased range of movement  No obvious JVD sitting upright  Trachea appears to be midline  Cardiovascular:      Rate and Rhythm: Normal rate.      Pulses: Normal pulses.      Heart sounds: Murmur (Murmur of mitral regurgitation) heard.      No friction rub. No gallop.      Comments: Slightly irregular  Mitral murmur present  Pulmonary:      Breath sounds: Rales present. No wheezing or rhonchi.      Comments: Short of breath at rest  Chest:      Chest wall: Tenderness present.   Abdominal:      General: There is no distension.      Tenderness: There is no  "abdominal tenderness. There is no right CVA tenderness, left CVA tenderness, guarding or rebound.   Genitourinary:     Comments: Pure wick present, functioning  Musculoskeletal:         General: Swelling and tenderness present. No deformity or signs of injury.      Cervical back: Neck supple. Tenderness (Submandibular and submental adenopathy, no cervical adenopathy) present. No rigidity.      Right lower leg: Edema present.      Left lower leg: Edema present.      Comments: Trace edema right lower extremity slightly more edema left lower extremity  Tenderness and warmth on the left lower extremity  Positive Homans' sign left lower extremity   Lymphadenopathy:      Cervical: No cervical adenopathy.   Skin:     Capillary Refill: Capillary refill takes less than 2 seconds.      Coloration: Skin is not jaundiced.      Findings: No bruising or erythema.   Neurological:      General: No focal deficit present.      Mental Status: She is alert. Mental status is at baseline.      Cranial Nerves: No cranial nerve deficit.      Motor: Weakness present.   Psychiatric:         Behavior: Behavior normal.         Thought Content: Thought content normal.         Judgment: Judgment normal.      Comments: Some anxiety but she attributes this more to her chest pain shortness of breath and generalized discomfort she becameTearful when telling me about her son passing away recently.           Last Recorded Vitals  Blood pressure 153/74, pulse 86, temperature 36.4 °C (97.5 °F), temperature source Oral, resp. rate 18, height 1.651 m (5' 5\"), weight 64.2 kg (141 lb 8.6 oz), SpO2 99 %.  Intake/Output last 3 Shifts:  I/O last 3 completed shifts:  In: 620 (9.7 mL/kg) [P.O.:320; I.V.:50 (0.8 mL/kg); IV Piggyback:250]  Out: 2200 (34.3 mL/kg) [Urine:2200 (1 mL/kg/hr)]  Weight: 64.2 kg     Relevant Results  Results for orders placed or performed during the hospital encounter of 11/21/23 (from the past 24 hour(s))   ECG 12 lead   Result Value Ref " Range    Ventricular Rate 96 BPM    Atrial Rate 96 BPM    AR Interval 144 ms    QRS Duration 102 ms    QT Interval 376 ms    QTC Calculation(Bazett) 475 ms    P Axis 56 degrees    R Axis -30 degrees    T Axis 11 degrees    QRS Count 16 beats    Q Onset 210 ms    P Onset 138 ms    P Offset 193 ms    T Offset 398 ms    QTC Fredericia 439 ms   Magnesium   Result Value Ref Range    Magnesium 2.10 1.60 - 3.10 mg/dL   Basic metabolic panel   Result Value Ref Range    Glucose 142 (H) 65 - 99 mg/dL    Sodium 147 (H) 133 - 145 mmol/L    Potassium 3.8 3.4 - 5.1 mmol/L    Chloride 103 97 - 107 mmol/L    Bicarbonate 36 (H) 24 - 31 mmol/L    Urea Nitrogen 23 8 - 25 mg/dL    Creatinine 1.10 0.40 - 1.60 mg/dL    eGFR 50 (L) >60 mL/min/1.73m*2    Calcium 8.9 8.5 - 10.4 mg/dL    Anion Gap 8 <=19 mmol/L   Magnesium   Result Value Ref Range    Magnesium 2.30 1.60 - 3.10 mg/dL   Comprehensive Metabolic Panel   Result Value Ref Range    Glucose 123 (H) 65 - 99 mg/dL    Sodium 146 (H) 133 - 145 mmol/L    Potassium 3.8 3.4 - 5.1 mmol/L    Chloride 105 97 - 107 mmol/L    Bicarbonate 36 (H) 24 - 31 mmol/L    Urea Nitrogen 23 8 - 25 mg/dL    Creatinine 0.90 0.40 - 1.60 mg/dL    eGFR 63 >60 mL/min/1.73m*2    Calcium 8.5 8.5 - 10.4 mg/dL    Albumin 3.4 (L) 3.5 - 5.0 g/dL    Alkaline Phosphatase 93 35 - 125 U/L    Total Protein 5.4 (L) 5.9 - 7.9 g/dL    AST 13 5 - 40 U/L    Bilirubin, Total 1.0 0.1 - 1.2 mg/dL    ALT 18 5 - 40 U/L    Anion Gap 5 <=19 mmol/L     ECG 12 lead    Result Date: 11/22/2023  Normal sinus rhythm Possible Left atrial enlargement Left axis deviation ST & T wave abnormality, consider lateral ischemia Abnormal ECG When compared with ECG of 21-NOV-2023 19:46, (unconfirmed) Premature atrial complexes are no longer Present Confirmed by Augustin Barakat ([id number]) on 11/22/2023 12:42:03 PM    XR chest 2 views    Result Date: 11/22/2023  Interpreted By:  Jose Nunez, STUDY: XR CHEST 2 VIEWS; 11/22/2023 10:21 am   INDICATION:  Signs/Symptoms:evaluate for left pleural effusion size.   COMPARISON: 21 November 2023.   ACCESSION NUMBER(S): [id number]   ORDERING CLINICIAN: MARCO DAVALOS   TECHNIQUE: AP erect and lateral views of the chest were performed.   FINDINGS: Left pleural effusion is small to moderate and minimally decreased in size. No pneumothorax or pneumo mediastinum. Small right pleural effusion is again seen. Compressive atelectasis at the left-greater-than-right lung base demonstrated. Coarse vascular markings demonstrated. Mild central vascular congestion demonstrated. Cardiomegaly is unchanged. Frontal view of the chest is lordotic.       Very slightly smaller size of the moderate left pleural effusion with compressive atelectasis left lung base. Small right pleural effusion again seen.   Signed by: Jose Nunez 11/22/2023 12:18 PM Dictation workstation:   [id number]    XR chest 1 view    Result Date: 11/21/2023  Interpreted By:  Artem Edmonds, STUDY: XR CHEST 1 VIEW; 11/21/2023 7:53 pm   INDICATION: Signs/Symptoms:sob.   COMPARISON: 10/09/2023   ACCESSION NUMBER(S): [id number]   ORDERING CLINICIAN: PREET MIX   TECHNIQUE: 1 view of the chest was performed.   FINDINGS: Mild bibasilar airspace opacities left-greater-than-right with a small-moderate left pleural effusion and probable minimal right pleural effusion. Left lower lobe airspace opacity most likely atelectasis although pneumonia should be clinically excluded. No pneumothorax.  The cardiomediastinal silhouette is mildly enlarged. Slight prominence of the pulmonary vasculature and interstitium could reflect congestive changes. There may be mild pulmonary interstitial edema present.       Mild cardiomegaly. Mild bibasilar airspace opacities left-greater-than-right with a small-moderate left pleural effusion and probable minimal right pleural effusion. Left lower lobe airspace opacity most likely atelectasis although pneumonia should be clinically excluded.    Signed by: Artem Edmonds 11/21/2023 8:14 PM Dictation workstation:   [id number]       Assessment/Plan   IMP:    A/C HFrEF  Severe MR  Chest pain  Lower extremity edema and pain  Anxiety   Palliative Care    Guideline directed medical treatment for her acute on chronic HFrEF  Now on p.o. diuretics, Aldactone, beta-blockers, angiotensin II receptor blockers    She is now complaining of increased shortness of breath, occasional cough  She had some swelling in the legs left more than right, right lower extremity with only a trace of edema she has some leg pain significantly more on the left compared to the right with positive Homans' sign.    Also complaining of midsternal chest pain without radiation and the shortness of breath.    As needed low-dose morphine for severe dyspnea or chest pain as a trial  Ultrasound lower extremities rule out DVT especially given the chest pain and her shortness of breath coupled with some edema and positive Homans' sign on the left lower extremity.     Wells criteria for DVT 2, moderate risk.  Overall medically complicated and quite frail (unlikely able to absorb any sort of further insult) I would prefer simply to get an ultrasound over checking a D-dimer, at the end of the day, may be too many variables and would like to know definitively that she is without DVT.    Elsie referral placed she can be followed up outpatient at home socially is a bit of a tough situation her son is taking care of her  she is in no shape to care for anybody.  Ultimate plans to follow-up with Dr. Thomason as outpatient for possible MitraClip we did, we discussed hospice which she tells me she is not ready to discuss but is open to it should her condition worsen.    Asking music therapy to see as well for nonpharmacologic management of her anxiety    Total time has been over 54 minutes, this time has not been continues   discussed in detail with Dr. Isabel.     Sagar Camara, APRN-CNP

## 2023-11-24 LAB
ALBUMIN SERPL-MCNC: 3.5 G/DL (ref 3.5–5)
ALP BLD-CCNC: 80 U/L (ref 35–125)
ALT SERPL-CCNC: 14 U/L (ref 5–40)
ANION GAP SERPL CALC-SCNC: 5 MMOL/L
AST SERPL-CCNC: 11 U/L (ref 5–40)
BILIRUB SERPL-MCNC: 1.2 MG/DL (ref 0.1–1.2)
BUN SERPL-MCNC: 19 MG/DL (ref 8–25)
CALCIUM SERPL-MCNC: 8.6 MG/DL (ref 8.5–10.4)
CHLORIDE SERPL-SCNC: 101 MMOL/L (ref 97–107)
CO2 SERPL-SCNC: 40 MMOL/L (ref 24–31)
CREAT SERPL-MCNC: 0.9 MG/DL (ref 0.4–1.6)
GFR SERPL CREATININE-BSD FRML MDRD: 63 ML/MIN/1.73M*2
GLUCOSE SERPL-MCNC: 112 MG/DL (ref 65–99)
MAGNESIUM SERPL-MCNC: 2.1 MG/DL (ref 1.6–3.1)
POTASSIUM SERPL-SCNC: 3.3 MMOL/L (ref 3.4–5.1)
PROT SERPL-MCNC: 5.7 G/DL (ref 5.9–7.9)
SODIUM SERPL-SCNC: 146 MMOL/L (ref 133–145)

## 2023-11-24 PROCEDURE — 83735 ASSAY OF MAGNESIUM: CPT | Performed by: INTERNAL MEDICINE

## 2023-11-24 PROCEDURE — 1200000002 HC GENERAL ROOM WITH TELEMETRY DAILY

## 2023-11-24 PROCEDURE — 94640 AIRWAY INHALATION TREATMENT: CPT

## 2023-11-24 PROCEDURE — 94762 N-INVAS EAR/PLS OXIMTRY CONT: CPT

## 2023-11-24 PROCEDURE — 94760 N-INVAS EAR/PLS OXIMETRY 1: CPT

## 2023-11-24 PROCEDURE — 99232 SBSQ HOSP IP/OBS MODERATE 35: CPT | Performed by: INTERNAL MEDICINE

## 2023-11-24 PROCEDURE — 97116 GAIT TRAINING THERAPY: CPT | Mod: GP

## 2023-11-24 PROCEDURE — 9420000001 HC RT PATIENT EDUCATION 5 MIN

## 2023-11-24 PROCEDURE — 2500000001 HC RX 250 WO HCPCS SELF ADMINISTERED DRUGS (ALT 637 FOR MEDICARE OP): Performed by: INTERNAL MEDICINE

## 2023-11-24 PROCEDURE — 2500000004 HC RX 250 GENERAL PHARMACY W/ HCPCS (ALT 636 FOR OP/ED): Performed by: INTERNAL MEDICINE

## 2023-11-24 PROCEDURE — 2500000005 HC RX 250 GENERAL PHARMACY W/O HCPCS: Performed by: INTERNAL MEDICINE

## 2023-11-24 PROCEDURE — 80053 COMPREHEN METABOLIC PANEL: CPT | Performed by: INTERNAL MEDICINE

## 2023-11-24 PROCEDURE — 99232 SBSQ HOSP IP/OBS MODERATE 35: CPT | Performed by: NURSE PRACTITIONER

## 2023-11-24 PROCEDURE — 94664 DEMO&/EVAL PT USE INHALER: CPT

## 2023-11-24 PROCEDURE — 97530 THERAPEUTIC ACTIVITIES: CPT | Mod: GP

## 2023-11-24 PROCEDURE — 36415 COLL VENOUS BLD VENIPUNCTURE: CPT | Performed by: INTERNAL MEDICINE

## 2023-11-24 RX ORDER — ALBUTEROL SULFATE 0.83 MG/ML
2.5 SOLUTION RESPIRATORY (INHALATION) EVERY 2 HOUR PRN
Status: DISCONTINUED | OUTPATIENT
Start: 2023-11-24 | End: 2023-11-27 | Stop reason: HOSPADM

## 2023-11-24 RX ORDER — FLUTICASONE FUROATE AND VILANTEROL 100; 25 UG/1; UG/1
1 POWDER RESPIRATORY (INHALATION)
Status: DISCONTINUED | OUTPATIENT
Start: 2023-11-24 | End: 2023-11-27 | Stop reason: HOSPADM

## 2023-11-24 RX ORDER — FUROSEMIDE 20 MG/1
40 TABLET ORAL DAILY
Qty: 180 TABLET | Refills: 3 | Status: SHIPPED | OUTPATIENT
Start: 2023-11-24 | End: 2023-11-27 | Stop reason: HOSPADM

## 2023-11-24 RX ADMIN — Medication 4 L/MIN: at 16:00

## 2023-11-24 RX ADMIN — ATORVASTATIN CALCIUM 40 MG: 40 TABLET, FILM COATED ORAL at 21:18

## 2023-11-24 RX ADMIN — PANTOPRAZOLE SODIUM 40 MG: 40 TABLET, DELAYED RELEASE ORAL at 06:31

## 2023-11-24 RX ADMIN — APIXABAN 5 MG: 5 TABLET, FILM COATED ORAL at 21:18

## 2023-11-24 RX ADMIN — CARVEDILOL 12.5 MG: 12.5 TABLET, FILM COATED ORAL at 18:29

## 2023-11-24 RX ADMIN — FLUTICASONE FUROATE AND VILANTEROL 1 PUFF: 100; 25 POWDER RESPIRATORY (INHALATION) at 08:39

## 2023-11-24 RX ADMIN — SPIRONOLACTONE 12.5 MG: 25 TABLET ORAL at 09:38

## 2023-11-24 RX ADMIN — CARVEDILOL 12.5 MG: 12.5 TABLET, FILM COATED ORAL at 09:38

## 2023-11-24 RX ADMIN — LOSARTAN POTASSIUM 50 MG: 50 TABLET, FILM COATED ORAL at 09:38

## 2023-11-24 RX ADMIN — TORSEMIDE 20 MG: 20 TABLET ORAL at 09:38

## 2023-11-24 RX ADMIN — LEVOTHYROXINE SODIUM 100 MCG: 0.1 TABLET ORAL at 06:31

## 2023-11-24 RX ADMIN — APIXABAN 5 MG: 5 TABLET, FILM COATED ORAL at 09:38

## 2023-11-24 RX ADMIN — MULTIPLE VITAMINS W/ MINERALS TAB 1 TABLET: TAB at 09:38

## 2023-11-24 RX ADMIN — Medication 4 L/MIN: at 07:00

## 2023-11-24 ASSESSMENT — COGNITIVE AND FUNCTIONAL STATUS - GENERAL
PERSONAL GROOMING: A LITTLE
MOBILITY SCORE: 17
WALKING IN HOSPITAL ROOM: A LITTLE
DAILY ACTIVITIY SCORE: 19
DRESSING REGULAR LOWER BODY CLOTHING: A LITTLE
CLIMB 3 TO 5 STEPS WITH RAILING: A LOT
CLIMB 3 TO 5 STEPS WITH RAILING: A LOT
MOVING FROM LYING ON BACK TO SITTING ON SIDE OF FLAT BED WITH BEDRAILS: A LITTLE
HELP NEEDED FOR BATHING: A LITTLE
TURNING FROM BACK TO SIDE WHILE IN FLAT BAD: A LITTLE
MOVING TO AND FROM BED TO CHAIR: A LITTLE
WALKING IN HOSPITAL ROOM: A LITTLE
TURNING FROM BACK TO SIDE WHILE IN FLAT BAD: A LITTLE
STANDING UP FROM CHAIR USING ARMS: A LITTLE
MOVING FROM LYING ON BACK TO SITTING ON SIDE OF FLAT BED WITH BEDRAILS: A LITTLE
MOBILITY SCORE: 17
TOILETING: A LITTLE
MOVING TO AND FROM BED TO CHAIR: A LITTLE
STANDING UP FROM CHAIR USING ARMS: A LITTLE
DRESSING REGULAR UPPER BODY CLOTHING: A LITTLE

## 2023-11-24 ASSESSMENT — PAIN SCALES - GENERAL
PAINLEVEL_OUTOF10: 0 - NO PAIN
PAINLEVEL_OUTOF10: 8

## 2023-11-24 ASSESSMENT — PAIN SCALES - WONG BAKER: WONGBAKER_NUMERICALRESPONSE: NO HURT

## 2023-11-24 ASSESSMENT — PAIN - FUNCTIONAL ASSESSMENT: PAIN_FUNCTIONAL_ASSESSMENT: 0-10

## 2023-11-24 NOTE — PROGRESS NOTES
"Anitha Welch is a 83 y.o. female on day 2 of admission presenting with Acute on chronic systolic heart failure (CMS/HCC).    Subjective   Seen and examined had a restful evening ultrasound revealed left lower extremity DVT Eliquis initiated good response to diuresis continue to monitor electrolytes       Objective     Physical Exam  Vitals and nursing note reviewed.   Constitutional:       Appearance: Normal appearance.   HENT:      Head: Normocephalic and atraumatic.      Mouth/Throat:      Mouth: Mucous membranes are moist.   Eyes:      Extraocular Movements: Extraocular movements intact.      Pupils: Pupils are equal, round, and reactive to light.   Cardiovascular:      Rate and Rhythm: Normal rate and regular rhythm.      Pulses: Normal pulses.      Heart sounds: Normal heart sounds.      Comments: 3/6 mitral regurgitation  Pulmonary:      Effort: Pulmonary effort is normal.      Breath sounds: Normal breath sounds.   Abdominal:      Palpations: Abdomen is soft.   Musculoskeletal:         General: Normal range of motion.      Cervical back: Normal range of motion and neck supple.   Skin:     General: Skin is warm.      Capillary Refill: Capillary refill takes less than 2 seconds.   Neurological:      General: No focal deficit present.      Mental Status: She is alert and oriented to person, place, and time. Mental status is at baseline.   Psychiatric:         Mood and Affect: Mood normal.         Last Recorded Vitals  Blood pressure 133/70, pulse 80, temperature 36.2 °C (97.2 °F), temperature source Oral, resp. rate 17, height 1.651 m (5' 5\"), weight 64.2 kg (141 lb 8.6 oz), SpO2 96 %.  Intake/Output last 3 Shifts:  I/O last 3 completed shifts:  In: 410 (6.4 mL/kg) [P.O.:360; I.V.:50 (0.8 mL/kg)]  Out: 1550 (24.1 mL/kg) [Urine:1550 (0.7 mL/kg/hr)]  Weight: 64.2 kg     Relevant Results            Scheduled medications  apixaban, 10 mg, oral, BID   Followed by  [START ON 11/30/2023] apixaban, 5 mg, oral, " BID  aspirin, 81 mg, oral, Once  atorvastatin, 40 mg, oral, Daily  carvedilol, 12.5 mg, oral, BID with meals  tiotropium, 2 Inhalation, inhalation, Daily   And  fluticasone furoate-vilanteroL, 1 puff, inhalation, Daily  levothyroxine, 100 mcg, oral, Daily  losartan, 50 mg, oral, Daily  multivitamin with minerals, 1 tablet, oral, Daily  pantoprazole, 40 mg, oral, Daily before breakfast  spironolactone, 12.5 mg, oral, Daily  torsemide, 20 mg, oral, Daily      Continuous medications  oxygen,       PRN medications  PRN medications: acetaminophen **OR** acetaminophen **OR** acetaminophen, albuterol, benzocaine-menthol, dextromethorphan-guaifenesin, guaiFENesin, morphine, ondansetron ODT **OR** ondansetron, oxygen, polyethylene glycol  Results for orders placed or performed during the hospital encounter of 11/21/23 (from the past 24 hour(s))   Comprehensive Metabolic Panel   Result Value Ref Range    Glucose 112 (H) 65 - 99 mg/dL    Sodium 146 (H) 133 - 145 mmol/L    Potassium 3.3 (L) 3.4 - 5.1 mmol/L    Chloride 101 97 - 107 mmol/L    Bicarbonate 40 (H) 24 - 31 mmol/L    Urea Nitrogen 19 8 - 25 mg/dL    Creatinine 0.90 0.40 - 1.60 mg/dL    eGFR 63 >60 mL/min/1.73m*2    Calcium 8.6 8.5 - 10.4 mg/dL    Albumin 3.5 3.5 - 5.0 g/dL    Alkaline Phosphatase 80 35 - 125 U/L    Total Protein 5.7 (L) 5.9 - 7.9 g/dL    AST 11 5 - 40 U/L    Bilirubin, Total 1.2 0.1 - 1.2 mg/dL    ALT 14 5 - 40 U/L    Anion Gap 5 <=19 mmol/L   Magnesium   Result Value Ref Range    Magnesium 2.10 1.60 - 3.10 mg/dL                 Malnutrition Diagnosis Status: New  Malnutrition Diagnosis: Moderate malnutrition related to chronic disease or condition  As Evidenced by: fat and muscle deficits  I agree with the dietitian's malnutrition diagnosis.      Assessment/Plan   Principal Problem:    Acute on chronic systolic heart failure (CMS/HCC)  Active Problems:    Chronic obstructive pulmonary disease (CMS/HCC)    History of coronary artery stent placement     History of myocardial infarction    History of stroke without residual deficits    History of cerebrovascular accident    Hypothyroidism (acquired)    HFrEF (heart failure with reduced ejection fraction) (CMS/[facility])    Severe mitral valve regurgitation    Heart failure (CMS/HCC)    Lower extremity venous duplex examination revealed left lower extremity DVT patient has been initiated on Eliquis will adjust dose relative to age and weight.  PT OT to assess the patient she is currently on 3 L nasal cannula uncertain if patient would benefit at all from a stay in rehab or if all services could be delivered at home dissipating discharge in approximately 24 to 48 hours continue to monitor electrolytes       I spent 40 minutes in the professional and overall care of this patient.      Phillip Isabel, DO

## 2023-11-24 NOTE — PROGRESS NOTES
"Anitha Welch is a 83 y.o. female on day 2 of admission presenting with Acute on chronic systolic heart failure (CMS/HCC).    Subjective   Patient states that she is feeling better this morning.  Mild cough but shortness of breath has improved significantly.       Objective     Physical Exam  Eyes:      Conjunctiva/sclera: Conjunctivae normal.   Cardiovascular:      Rate and Rhythm: Normal rate and regular rhythm.      Heart sounds:      No gallop.   Pulmonary:      Breath sounds: Normal breath sounds. No wheezing, rhonchi or rales.   Abdominal:      Palpations: Abdomen is soft.   Neurological:      General: No focal deficit present.      Mental Status: She is alert.       Constitutional:       Appearance: Not in distress.   Eyes:      Conjunctiva/sclera: Conjunctivae normal.   Neck:      Vascular: JVD normal.   Pulmonary:      Breath sounds: Normal breath sounds. No wheezing. No rhonchi. No rales.   Cardiovascular:      Normal rate. Regular rhythm.      Murmurs: There is no murmur.      No gallop.  No click. No rub.   Abdominal:      Palpations: Abdomen is soft.   Neurological:      General: No focal deficit present.      Mental Status: Alert.        Last Recorded Vitals  Blood pressure 133/70, pulse 80, temperature 36.2 °C (97.2 °F), temperature source Oral, resp. rate 17, height 1.651 m (5' 5\"), weight 64.2 kg (141 lb 8.6 oz), SpO2 96 %.  Intake/Output last 3 Shifts:  I/O last 3 completed shifts:  In: 410 (6.4 mL/kg) [P.O.:360; I.V.:50 (0.8 mL/kg)]  Out: 1550 (24.1 mL/kg) [Urine:1550 (0.7 mL/kg/hr)]  Weight: 64.2 kg     Relevant Results                              Assessment/Plan   Principal Problem:    Acute on chronic systolic heart failure (CMS/HCC)  Active Problems:    Chronic obstructive pulmonary disease (CMS/HCC)    History of coronary artery stent placement    History of myocardial infarction    History of stroke without residual deficits    History of cerebrovascular accident    Hypothyroidism " (acquired)    HFrEF (heart failure with reduced ejection fraction) (CMS/HCC)    Severe mitral valve regurgitation    Heart failure (CMS/HCC)  Left DVT    Principal Problem:    Acute on chronic systolic heart failure (CMS/HCC)  Active Problems:    Chronic obstructive pulmonary disease (CMS/HCC)    History of coronary artery stent placement    History of myocardial infarction    History of stroke without residual deficits    History of cerebrovascular accident    Hypothyroidism (acquired)    HFrEF (heart failure with reduced ejection fraction) (CMS/HCC)    Severe mitral valve regurgitation    Heart failure (CMS/HCC)    Left DVT     11/22: As noted in the above assessment we will focus on optimizing medical management of her heart failure.  She is elderly and frail and may not be a good candidate for MitraClip procedure in the setting of her moderately severe mitral regurgitation, and after hospital discharge she will follow-up with her cardiologist, Dr. Foss.     11/23: We will continue with current guideline directed medical therapy for heart failure.  We will continue IV Lasix for at least 1 more day with tentative plans to convert to oral Lasix tomorrow.  We can have further discussions regarding MitraClip candidacy in my office as an outpatient.    11/24: Clinically patient appears to be well compensated.  She has been transition from IV diuretic therapy to oral torsemide 20 mg once daily.  Had ultrasound of lower extremities performed yesterday revealing a deep vein thrombosis of the left lower extremity and is been placed on apixaban as anticoagulant.  As noted above patient does have a significant degree of a mitral valve regurgitation and Dr. Foss will be considering transcatheter [id number] repair procedure with MitraClip in the future as an outpatient.          I spent 30 minutes in the professional and overall care of this patient.      Jeison Spaulding,

## 2023-11-24 NOTE — PROGRESS NOTES
PCN was informed by FIDENCIO Velasquez at [facility] that patient is agreeable to [facility] at discharge and was receiving HHC just recently. PCN checked notes and [facility] team was set up a couple of months ago. PCN sent message to  intake Nayana Escoto asking if patient still active. Per  Nayana, patient discharged 11/16/2023 from [facility] services. PCN informed FIDENCIO Velasquez that patient will need a new internal referral placed at discharge. MSW aware.     Omer Mojica

## 2023-11-24 NOTE — CARE PLAN
Problem: Fall/Injury  Goal: Not fall by end of shift  Outcome: Progressing     Problem: Fall/Injury  Goal: Be free from injury by end of the shift  Outcome: Progressing   The patient's goals for the shift include pain free    The clinical goals for the shift include no falls    Over the shift, the patient did not make progress toward the following goals. Barriers to progression include   Problem: Fall/Injury  Goal: Verbalize understanding of risk factor reduction measures to prevent injury from fall in the home  Outcome: Progressing   . Recommendations to address these barriers include   Problem: Fall/Injury  Goal: Verbalize understanding of risk factor reduction measures to prevent injury from fall in the home  Outcome: Progressing   .

## 2023-11-24 NOTE — PROGRESS NOTES
MSW met with pt at bedside to introduce self and role of dc planning care coordination. Pt lives at home with her spouse. She reports  being IPTA with ADL's but their son helps with everything around the house. It appears a referral was placed for [facility]. MSW discuss HHC for PT/OT and provided pt with a list to make a choice. MSW also provided the pt with a private duty HHC list so pt can relieve her son with some of the work. TCC to follow up for HHC choice.    [id number] Omer informed MSW pt was active with [facility] until 11/16. Will need internal HHC order to maintain continuity of care.         Eva Bliss MSSA, LSW

## 2023-11-24 NOTE — PROGRESS NOTES
Spoke to  and son about home oxygen company.  says already has floor tank and mobile tank at home with portable concentrator ordered to arrive soon. Did not do home desaturation screen (walk study) due to supplies and orders already in place.

## 2023-11-24 NOTE — PROGRESS NOTES
Physical Therapy    Physical Therapy Treatment    Patient Name: Anitha Welch  MRN: [id number]  Today's Date: 11/24/2023  Time Calculation  Start Time: 1350  Stop Time: 1425  Time Calculation (min): 35 min       Assessment/Plan   PT Assessment  PT Assessment Results: Decreased strength, Decreased endurance, Impaired balance, Decreased safety awareness, Impaired judgement, Decreased cognition, Pain  Rehab Prognosis: Good  Evaluation/Treatment Tolerance: Patient tolerated treatment well  Medical Staff Made Aware: Yes  Strengths: Support of Caregivers  Barriers to Participation: Comorbidities  End of Session Communication: Bedside nurse  Assessment Comment: Pt cooperative, confused, still requires O2 above baseline, reports high pain level, tolerating being on feet but unsteady; recommend use of RW at home.  PT Plan  Inpatient/Swing Bed or Outpatient: Inpatient  PT Plan  Treatment/Interventions: Transfer training, Gait training, Balance training, Strengthening, Endurance training, Range of motion, Therapeutic exercise, Therapeutic activity, Home exercise program  PT Plan: Skilled PT  PT Frequency: 4 times per week  PT Discharge Recommendations: Low intensity level of continued care, 24 hr supervision due to cognition  Equipment Recommended upon Discharge: Wheeled walker  PT Recommended Transfer Status: Assist x1  PT - OK to Discharge: Yes      General Visit Information:   PT  Visit  PT Received On: 11/24/23  General  Reason for Referral: Impaired Mobility  Referred By: Dr. Rondon  Past Medical History Relevant to Rehab: CHF, DVT, CVA, HTN, spinal stenosis, MI, valvular heart disease, EF 45%  Family/Caregiver Present: Yes (spouse, son)  Prior to Session Communication: Bedside nurse  Patient Position Received: Bed, 2 rail up, Alarm on  Preferred Learning Style: verbal  General Comment: Pt confused, irritated w/ medical issues and restrictions on diet. Pt agreeable to therapy.    Subjective    Precautions:  Precautions  Medical Precautions: Fall precautions  Precautions Comment: Pt was started on Eliquis this AM for LLE DVT.  Vital Signs:  Vital Signs  Heart Rate: 81  Heart Rate Source: Monitor  SpO2: 94 % (on 4 L O2)  Patient Position: Sitting    Objective   Pain:  Pain Assessment  Pain Assessment: 0-10  Pain Score: 8  Pain Type: Chronic pain  Pain Location: Other (Comment) (Lt side)  Cognition:  Cognition  Overall Cognitive Status: Impaired  Safety/Judgement: Exceptions to WFL  Other (Comment): Pt gets fixated on topics such as diet, also has difficulty finding words to explain self.  Insight: Moderate  Impulsive: Mildly    Activity Tolerance:  Activity Tolerance  Activity Tolerance Comments: Fair, requires 4L O2 to maintain  SpO2 in mid 90's.  Treatments:  Therapeutic Activity  Therapeutic Activity Performed: Yes  Therapeutic Activity 1: Pt needing to use bathroom, rushing, unsteady, cues to direct activity, assist for removal of brief, close supervision to min susanna tto don brief due to decreased safety awareness. Pt indep w/ hygiene, supervision at sink.    Bed Mobility  Bed Mobility: Yes  Bed Mobility 1  Bed Mobility 1: Supine to sitting  Level of Assistance 1: Close supervision  Bed Mobility Comments 1: HOB elevated part way    Ambulation/Gait Training  Ambulation/Gait Training Performed: Yes  Ambulation/Gait Training 1  Surface 1: Level tile  Device 1: No device (No AD initially, unsteady, continued w/ RW)  Assistance 1: Minimum assistance, Minimal verbal cues  Comments/Distance (ft) 1: [facility] Continued w/ amb in room at 20' x 1 and 30' x 1 w/ RW, cues to stay closer to and inside RW, keep in use until ready to sit down. Pt used 4L O2 on inital amb, no SOB, SPO2 at 4L. Second trial  (30') at 2 L O2 (baseline) w/ SpO2 dropping to 87%, mild SOB, required a couple of minutes to recovery, O2 increased to 3L after the first minute then reaching 93%. Returned to 4L as  prior to session, nurse was informed of pt performance.  Transfers  Transfer: Yes  Transfer 1  Technique 1: Sit to stand, Stand to sit  Transfer Level of Assistance 1: Close supervision, Minimum assistance, Minimal verbal cues  Trials/Comments 1: Performed from bed, to/from commode and bedside chair. Required cues for safe hand placement, keeping walker close and in use until ready to sit.    Outcome Measures:  [facility] Basic Mobility  Turning from your back to your side while in a flat bed without using bedrails: A little  Moving from lying on your back to sitting on the side of a flat bed without using bedrails: A little  Moving to and from bed to chair (including a wheelchair): A little  Standing up from a chair using your arms (e.g. wheelchair or bedside chair): A little  To walk in hospital room: A little  Climbing 3-5 steps with railing: A lot  Basic Mobility - Total Score: 17    Education Documentation  Mobility Training, taught by Angelica Rodriguez, PT at 11/24/2023  3:06 PM.  Learner: Patient  Readiness: Acceptance  Method: Explanation, Demonstration  Response: Needs Reinforcement    Education Comments  No comments found.        OP EDUCATION:       Encounter Problems       Encounter Problems (Active)       Balance       Standing Balance (Progressing)       Start:  11/22/23    Expected End:  12/06/23       Pt will demonstrate good static standing balance to promote safe participation with out of bed activity, transfers, and mobility              Mobility       Ambulation (Progressing)       Start:  11/22/23    Expected End:  12/06/23       Pt will ambulate 50' modified independent assist with LRD to promote safe home mobility              Pain - Adult          Safety       Safe Mobility Techniques (Progressing)       Start:  11/22/23    Expected End:  12/06/23       Pt will correctly identify and demonstrate safe mobility techniques to reduce their risks for falls during their acute care stay                Transfers       Supine to sit (Progressing)       Start:  11/22/23    Expected End:  12/06/23       Pt will transfer supine to sitting at edge of bed with modified independent assist to promote acute care out of bed activity           Sit to stand (Progressing)       Start:  11/22/23    Expected End:  12/06/23       Pt will transfer sit to standing position with modified independent assist and LRD to promote safe out of bed activity

## 2023-11-24 NOTE — PROGRESS NOTES
Occupational Therapy    OT Treatment    Patient Name: Anitha Welch  MRN: [id number]  Today's Date: 11/24/2023       General:  General  Missed Visit: Yes  Missed Visit Reason: Other (Comment) (Conflict with another service)

## 2023-11-24 NOTE — PROGRESS NOTES
Anitha Welch is a 83 y.o. female on day 2 of admission presenting with Acute on chronic systolic heart failure (CMS/HCC).    Subjective   Symptoms (0 - 10, Best to Worst)  [place] Symptom Assessment System  Pain Score: 0 - No pain  Currently 0-2 chest pain no radiation improved breathing less short of breath  Less discomfort in the left lower extremity no pain in right lower extremity  Denied any headache dizziness lightheadedness no easy bleeding or bruising no nausea or vomiting  Asking about going home    Objective   DVT study yesterday showed DVT of the left lower extremity distal femoral vein popliteal and posterior tibial veins  Started on Eliquis  Physical Exam  Vitals and nursing note reviewed.   Constitutional:       General: No acute distress     Appearance: She is ill-appearing.  Nontoxic.     Comments: Sitting up in bed watching TV improved shortness of breath improved chest pain  HENT:      Head: Normocephalic and atraumatic.      Nose: No congestion or rhinorrhea.      Mouth/Throat:      Mouth: Mucous membranes are moist.      Pharynx: Oropharynx is clear. No oropharyngeal exudate or posterior oropharyngeal erythema.   Eyes:      General: No scleral icterus.        Right eye: No discharge.         Left eye: No discharge.      Extraocular Movements: Extraocular movements intact.   Neck:      Vascular: No carotid bruit.      Comments: No obvious JVD trachea appears to be midline  Cardiovascular:      Rate and Rhythm: Normal rate.      Pulses: Normal pulses.      Heart sounds: Soft murmur (Murmur of mitral regurgitation) heard.      No friction rub. No gallop.      Comments: Seem to be regular today  Mitral murmur present  Pulmonary:      Breath sounds: Somewhat diminished but clear today     Comments: Improved shortness of breath at rest  Chest:      Chest wall: No tenderness present today  Abdominal:      General: There is no distension.      Tenderness: There is no abdominal tenderness. There is no  "right CVA tenderness, left CVA tenderness, guarding or rebound.   Genitourinary:     Comments: Deferred  Musculoskeletal:         General: Swelling and tenderness present also which have decreased compared to yesterday     Cervical back: Neck supple. No rigidity.      Right lower leg: Trace edema     Left lower leg: Edema lightly more compared to the right .      Comments: Discomfort of the left lower extremity present but improved compared to yesterday, still with calf tenderness  Lymphadenopathy:      Cervical: No cervical adenopathy.   Skin:     Capillary Refill: Capillary refill takes less than 2 seconds.      Coloration: Skin is not jaundiced.      Findings: No bruising or erythema.   Neurological:      General: No focal deficit present.      Mental Status: She is alert. Mental status is at baseline.  Seems to be a little bit confused and forgetful today.     Cranial Nerves: No cranial nerve deficit.      Motor: Generalized weakness present.   Psychiatric:         Behavior: Behavior normal.         Thought Content: Thought content normal.         Judgment: Judgment normal.      Comments: Without anxiety today    Last Recorded Vitals  Blood pressure 133/70, pulse 80, temperature 36.2 °C (97.2 °F), temperature source Oral, resp. rate 17, height 1.651 m (5' 5\"), weight 64.2 kg (141 lb 8.6 oz), SpO2 96 %.  Intake/Output last 3 Shifts:  I/O last 3 completed shifts:  In: 410 (6.4 mL/kg) [P.O.:360; I.V.:50 (0.8 mL/kg)]  Out: 1550 (24.1 mL/kg) [Urine:1550 (0.7 mL/kg/hr)]  Weight: 64.2 kg     Relevant Results  Results for orders placed or performed during the hospital encounter of 11/21/23 (from the past 24 hour(s))   Comprehensive Metabolic Panel   Result Value Ref Range    Glucose 112 (H) 65 - 99 mg/dL    Sodium 146 (H) 133 - 145 mmol/L    Potassium 3.3 (L) 3.4 - 5.1 mmol/L    Chloride 101 97 - 107 mmol/L    Bicarbonate 40 (H) 24 - 31 mmol/L    Urea Nitrogen 19 8 - 25 mg/dL    Creatinine 0.90 0.40 - 1.60 mg/dL    eGFR " 63 >60 mL/min/1.73m*2    Calcium 8.6 8.5 - 10.4 mg/dL    Albumin 3.5 3.5 - 5.0 g/dL    Alkaline Phosphatase 80 35 - 125 U/L    Total Protein 5.7 (L) 5.9 - 7.9 g/dL    AST 11 5 - 40 U/L    Bilirubin, Total 1.2 0.1 - 1.2 mg/dL    ALT 14 5 - 40 U/L    Anion Gap 5 <=19 mmol/L   Magnesium   Result Value Ref Range    Magnesium 2.10 1.60 - 3.10 mg/dL     Lower extremity venous duplex bilateral    Result Date: 11/23/2023  Interpreted By:  Ginna Jansen, STUDY: [facility] US LOWER EXTREMITY VENOUS DUPLEX BILATERAL  11/23/2023 2:19 pm   INDICATION: 84 y/o   F with  Signs/Symptoms:Pain swelling of lower extremities and sob. LMP:  Unknown.   COMPARISON: None.   ACCESSION NUMBER(S): [id number]   ORDERING CLINICIAN: JULIO SAMANO   TECHNIQUE: Routine ultrasound of the  bilateral lower extremities was performed with duplex Doppler (color and spectral) evaluation.   Static images were obtained for remote interpretation.   FINDINGS: Heterogeneous filling defects within the distal left femoral, popliteal and posterior tibialis veins which fails to demonstrate color flow. Remaining veins within the bilateral lower extremities appear normal.   CALF VEINS:  The paired peroneal and posterior tibial calf veins are patent.       Deep venous thrombus involving the distal left femoral, popliteal and posterior tibialis veins.   MACRO: Case discussed with  by Dr. Jansen at 3:33 p.m. on 11/23/2023.   Signed by: Ginna Jansen 11/23/2023 4:52 PM Dictation workstation:   [id number]    ECG 12 lead    Result Date: 11/22/2023  Normal sinus rhythm Possible Left atrial enlargement Left axis deviation ST & T wave abnormality, consider lateral ischemia Abnormal ECG When compared with ECG of 21-NOV-2023 19:46, (unconfirmed) Premature atrial complexes are no longer Present Confirmed by Augustin Barakat ([id number]) on 11/22/2023 12:42:03 PM     Lower extremity venous duplex bilateral    Result Date: 11/23/2023  Interpreted By:  Ginna Jansen, STUDY: [facility] US LOWER  EXTREMITY VENOUS DUPLEX BILATERAL  11/23/2023 2:19 pm   INDICATION: 84 y/o   F with  Signs/Symptoms:Pain swelling of lower extremities and sob. LMP:  Unknown.   COMPARISON: None.   ACCESSION NUMBER(S): [id number]   ORDERING CLINICIAN: JULIO SAMANO   TECHNIQUE: Routine ultrasound of the  bilateral lower extremities was performed with duplex Doppler (color and spectral) evaluation.   Static images were obtained for remote interpretation.   FINDINGS: Heterogeneous filling defects within the distal left femoral, popliteal and posterior tibialis veins which fails to demonstrate color flow. Remaining veins within the bilateral lower extremities appear normal.   CALF VEINS:  The paired peroneal and posterior tibial calf veins are patent.       Deep venous thrombus involving the distal left femoral, popliteal and posterior tibialis veins.   MACRO: Case discussed with  by Dr. Jansen at 3:33 p.m. on 11/23/2023.   Signed by: Ginna Jansen 11/23/2023 4:52 PM Dictation workstation:   [id number]    ECG 12 lead    Result Date: 11/22/2023  Normal sinus rhythm Possible Left atrial enlargement Left axis deviation ST & T wave abnormality, consider lateral ischemia Abnormal ECG When compared with ECG of 21-NOV-2023 19:46, (unconfirmed) Premature atrial complexes are no longer Present Confirmed by Augustin Barakat ([id number]) on 11/22/2023 12:42:03 PM       Assessment/Plan   IMP:    A/C HFrEF  -Improved  Moderately severe MR  Chest pain-improved  Lower extremity edema and pain-improved  Anxiety improved  -Music therapy, diversion  DVT left lower extremity  -Started on apixaban  Palliative Care    Guideline directed medication for her acute on chronic systolic heart failure  Ultrasound of the lower extremities yesterday revealed DVT of the left lower extremity  Currently on Eliquis  Empiric treatment for possible PE, unable to do CT angio given her dye allergy.  Needs to follow-up with Dr. Foss for possible MitraClip    [facility]  referral has already been placed.    10:59 AM  Voicemail left for the patient's son Jose over how she is doing plan of care see if he had any questions etc..    Secure message sent to Milana Peoples CNP with housecalls.    Total time 39 minutes.    FELA Arreola-CNP

## 2023-11-25 ENCOUNTER — APPOINTMENT (OUTPATIENT)
Dept: RADIOLOGY | Facility: HOSPITAL | Age: 83
DRG: 291 | End: 2023-11-25
Payer: MEDICARE

## 2023-11-25 LAB
ALBUMIN SERPL-MCNC: 3.3 G/DL (ref 3.5–5)
ALP BLD-CCNC: 69 U/L (ref 35–125)
ALT SERPL-CCNC: 12 U/L (ref 5–40)
ANION GAP SERPL CALC-SCNC: 8 MMOL/L
AST SERPL-CCNC: 10 U/L (ref 5–40)
BILIRUB SERPL-MCNC: 1.8 MG/DL (ref 0.1–1.2)
BUN SERPL-MCNC: 20 MG/DL (ref 8–25)
CALCIUM SERPL-MCNC: 8.7 MG/DL (ref 8.5–10.4)
CHLORIDE SERPL-SCNC: 102 MMOL/L (ref 97–107)
CO2 SERPL-SCNC: 36 MMOL/L (ref 24–31)
CREAT SERPL-MCNC: 0.8 MG/DL (ref 0.4–1.6)
GFR SERPL CREATININE-BSD FRML MDRD: 73 ML/MIN/1.73M*2
GLUCOSE SERPL-MCNC: 139 MG/DL (ref 65–99)
MAGNESIUM SERPL-MCNC: 2.2 MG/DL (ref 1.6–3.1)
NT-PROBNP SERPL-MCNC: 4968 PG/ML (ref 0–624)
POTASSIUM SERPL-SCNC: 3.5 MMOL/L (ref 3.4–5.1)
PROT SERPL-MCNC: 5.4 G/DL (ref 5.9–7.9)
SODIUM SERPL-SCNC: 146 MMOL/L (ref 133–145)

## 2023-11-25 PROCEDURE — 99232 SBSQ HOSP IP/OBS MODERATE 35: CPT | Performed by: INTERNAL MEDICINE

## 2023-11-25 PROCEDURE — 2500000005 HC RX 250 GENERAL PHARMACY W/O HCPCS: Performed by: INTERNAL MEDICINE

## 2023-11-25 PROCEDURE — 94640 AIRWAY INHALATION TREATMENT: CPT

## 2023-11-25 PROCEDURE — 2500000001 HC RX 250 WO HCPCS SELF ADMINISTERED DRUGS (ALT 637 FOR MEDICARE OP): Performed by: INTERNAL MEDICINE

## 2023-11-25 PROCEDURE — 83735 ASSAY OF MAGNESIUM: CPT | Performed by: INTERNAL MEDICINE

## 2023-11-25 PROCEDURE — 36415 COLL VENOUS BLD VENIPUNCTURE: CPT | Performed by: INTERNAL MEDICINE

## 2023-11-25 PROCEDURE — 72131 CT LUMBAR SPINE W/O DYE: CPT

## 2023-11-25 PROCEDURE — 2500000004 HC RX 250 GENERAL PHARMACY W/ HCPCS (ALT 636 FOR OP/ED): Performed by: INTERNAL MEDICINE

## 2023-11-25 PROCEDURE — 9420000001 HC RT PATIENT EDUCATION 5 MIN

## 2023-11-25 PROCEDURE — 83880 ASSAY OF NATRIURETIC PEPTIDE: CPT | Performed by: INTERNAL MEDICINE

## 2023-11-25 PROCEDURE — 1200000002 HC GENERAL ROOM WITH TELEMETRY DAILY

## 2023-11-25 PROCEDURE — 80053 COMPREHEN METABOLIC PANEL: CPT | Performed by: INTERNAL MEDICINE

## 2023-11-25 PROCEDURE — 70450 CT HEAD/BRAIN W/O DYE: CPT

## 2023-11-25 RX ORDER — CARVEDILOL 25 MG/1
25 TABLET ORAL
Status: DISCONTINUED | OUTPATIENT
Start: 2023-11-25 | End: 2023-11-27 | Stop reason: HOSPADM

## 2023-11-25 RX ORDER — LOSARTAN POTASSIUM 50 MG/1
50 TABLET ORAL 2 TIMES DAILY
Status: DISCONTINUED | OUTPATIENT
Start: 2023-11-25 | End: 2023-11-27 | Stop reason: HOSPADM

## 2023-11-25 RX ADMIN — LOSARTAN POTASSIUM 50 MG: 50 TABLET, FILM COATED ORAL at 08:49

## 2023-11-25 RX ADMIN — CARVEDILOL 25 MG: 25 TABLET, FILM COATED ORAL at 17:58

## 2023-11-25 RX ADMIN — SPIRONOLACTONE 12.5 MG: 25 TABLET ORAL at 08:49

## 2023-11-25 RX ADMIN — MULTIPLE VITAMINS W/ MINERALS TAB 1 TABLET: TAB at 08:48

## 2023-11-25 RX ADMIN — ACETAMINOPHEN 650 MG: 325 TABLET ORAL at 08:53

## 2023-11-25 RX ADMIN — Medication 4 L/MIN: at 00:00

## 2023-11-25 RX ADMIN — TORSEMIDE 20 MG: 20 TABLET ORAL at 08:48

## 2023-11-25 RX ADMIN — ATORVASTATIN CALCIUM 40 MG: 40 TABLET, FILM COATED ORAL at 20:23

## 2023-11-25 RX ADMIN — CARVEDILOL 12.5 MG: 12.5 TABLET, FILM COATED ORAL at 08:49

## 2023-11-25 RX ADMIN — APIXABAN 5 MG: 5 TABLET, FILM COATED ORAL at 20:23

## 2023-11-25 RX ADMIN — LEVOTHYROXINE SODIUM 100 MCG: 0.1 TABLET ORAL at 06:32

## 2023-11-25 RX ADMIN — Medication: at 08:00

## 2023-11-25 RX ADMIN — PANTOPRAZOLE SODIUM 40 MG: 40 TABLET, DELAYED RELEASE ORAL at 06:32

## 2023-11-25 RX ADMIN — FLUTICASONE FUROATE AND VILANTEROL 1 PUFF: 100; 25 POWDER RESPIRATORY (INHALATION) at 07:49

## 2023-11-25 RX ADMIN — LOSARTAN POTASSIUM 50 MG: 50 TABLET, FILM COATED ORAL at 20:23

## 2023-11-25 RX ADMIN — Medication 2 L/MIN: at 16:00

## 2023-11-25 RX ADMIN — APIXABAN 5 MG: 5 TABLET, FILM COATED ORAL at 08:48

## 2023-11-25 ASSESSMENT — COGNITIVE AND FUNCTIONAL STATUS - GENERAL
PERSONAL GROOMING: A LITTLE
DAILY ACTIVITIY SCORE: 19
WALKING IN HOSPITAL ROOM: A LITTLE
TOILETING: A LITTLE
DRESSING REGULAR UPPER BODY CLOTHING: A LITTLE
MOVING TO AND FROM BED TO CHAIR: A LITTLE
MOBILITY SCORE: 17
WALKING IN HOSPITAL ROOM: A LITTLE
PERSONAL GROOMING: A LITTLE
HELP NEEDED FOR BATHING: A LITTLE
TURNING FROM BACK TO SIDE WHILE IN FLAT BAD: A LITTLE
CLIMB 3 TO 5 STEPS WITH RAILING: A LOT
TURNING FROM BACK TO SIDE WHILE IN FLAT BAD: A LITTLE
CLIMB 3 TO 5 STEPS WITH RAILING: A LOT
MOVING TO AND FROM BED TO CHAIR: A LITTLE
CLIMB 3 TO 5 STEPS WITH RAILING: A LITTLE
MOBILITY SCORE: 17
WALKING IN HOSPITAL ROOM: A LITTLE
MOVING FROM LYING ON BACK TO SITTING ON SIDE OF FLAT BED WITH BEDRAILS: A LITTLE
MOVING FROM LYING ON BACK TO SITTING ON SIDE OF FLAT BED WITH BEDRAILS: A LITTLE
DRESSING REGULAR LOWER BODY CLOTHING: A LITTLE
TOILETING: A LITTLE
MOBILITY SCORE: 19
DRESSING REGULAR LOWER BODY CLOTHING: A LITTLE
DRESSING REGULAR UPPER BODY CLOTHING: A LITTLE
STANDING UP FROM CHAIR USING ARMS: A LITTLE
DAILY ACTIVITIY SCORE: 19
DRESSING REGULAR UPPER BODY CLOTHING: A LITTLE
MOVING TO AND FROM BED TO CHAIR: A LITTLE
STANDING UP FROM CHAIR USING ARMS: A LITTLE
STANDING UP FROM CHAIR USING ARMS: A LITTLE
PERSONAL GROOMING: A LITTLE
HELP NEEDED FOR BATHING: A LITTLE
DRESSING REGULAR LOWER BODY CLOTHING: A LITTLE
TURNING FROM BACK TO SIDE WHILE IN FLAT BAD: A LITTLE
TOILETING: A LITTLE
HELP NEEDED FOR BATHING: A LITTLE
DAILY ACTIVITIY SCORE: 19

## 2023-11-25 ASSESSMENT — PAIN - FUNCTIONAL ASSESSMENT
PAIN_FUNCTIONAL_ASSESSMENT: FLACC (FACE, LEGS, ACTIVITY, CRY, CONSOLABILITY)
PAIN_FUNCTIONAL_ASSESSMENT: 0-10

## 2023-11-25 ASSESSMENT — PAIN SCALES - GENERAL
PAINLEVEL_OUTOF10: 0 - NO PAIN
PAINLEVEL_OUTOF10: 4
PAINLEVEL_OUTOF10: 0 - NO PAIN
PAINLEVEL_OUTOF10: 1

## 2023-11-25 NOTE — CARE PLAN
Problem: Fall/Injury  Goal: Be free from injury by end of the shift  Outcome: Progressing     Problem: Fall/Injury  Goal: Verbalize understanding of personal risk factors for fall in the hospital  Outcome: Progressing     Problem: Fall/Injury  Goal: Verbalize understanding of risk factor reduction measures to prevent injury from fall in the home  Outcome: Progressing     Problem: Fall/Injury  Goal: Use assistive devices by end of the shift  Outcome: Progressing     Problem: Fall/Injury  Goal: Pace activities to prevent fatigue by end of the shift  Outcome: Progressing   The patient's goals for the shift include pain free    The clinical goals for the shift include no falls    Pt is progressing towards these goal. No fall on this shift. Pt is resting in bed with no c/o pain at this time. Bed alarm is engaged, personal items are within reach, and call light is within reach. Hourly rounding being observed at this time.

## 2023-11-25 NOTE — PROGRESS NOTES
Anitha Welch is a 83 y.o. female on day 3 of admission presenting with Acute on chronic systolic heart failure (CMS/HCC).      Subjective   Taking over from my colleague  Patient complains of low back pain she states she fell out of bed yesterday.  Discussed this with the nurse who confirms that no imaging was done nurse also reports slight confusion probably baseline dementia per her report  Patient is not a good historian but denies any other complaints except for the back pain       Objective     Last Recorded Vitals  /64 (BP Location: Left arm, Patient Position: Lying)   Pulse 80   Temp 37 °C (98.6 °F) (Oral)   Resp 18   Wt 64.2 kg (141 lb 8.6 oz)   SpO2 94%   Intake/Output last 3 Shifts:    Intake/Output Summary (Last 24 hours) at 11/25/2023 1101  Last data filed at 11/25/2023 0800  Gross per 24 hour   Intake 200 ml   Output 350 ml   Net -150 ml       Physical Exam  Alert to self hospital follows commands  Chest bibasilar crackles no wheezes  Heart regular  Abdomen soft nontender  Extremities no edema  Neurologic exam gross nonfocal  Musculoskeletal she appears in pain while moving in the bed there is no bruising on her back  Relevant Results  Reviewed a.m. labs  Assessment/Plan     Principal Problem:    Acute on chronic systolic heart failure (CMS/HCC)  Active Problems:    Chronic obstructive pulmonary disease (CMS/HCC)    History of coronary artery stent placement    History of myocardial infarction    History of stroke without residual deficits    History of cerebrovascular accident    Hypothyroidism (acquired)    HFrEF (heart failure with reduced ejection fraction) (CMS/HCC)    Severe mitral valve regurgitation    Heart failure (CMS/HCC)      In view of reported fall in this patient Eliquis in mild confusion which may be baseline is like to repeat CT of the brain as not sure if the fall was witnessed; check CT of the lumbar spine.  Continue rest of treatment without changes for now check a.m.  labs           Jayro Lea MD

## 2023-11-25 NOTE — CARE PLAN
"The patient's goals for the shift include \"Help my back pain\"    The clinical goals for the shift include  remain safe, pain control    Over the shift, the patient did make progress toward the following goals.     Barriers to progression include forgetfulness, impulsive, fell yesterday causing back pain.  Recommendations to address these barriers include reorientation, bed, chair alarm, side rails x 4, call light in use, frequent rounding, anticipation of needs.. IE bathroom, water, meals, position for comfort, .OOB w/ supervision only, pain medication as needed    "

## 2023-11-25 NOTE — INDIVIDUALIZED OVERALL PLAN OF CARE NOTE
Chart reviewed  Apparently patient fell yesterday  CT of the brain and lumbar spine reviewed, imaging do not show any acute pathology    Patient is going to need home health care, which needs to be reordered.  [facility] I spoke via secure message with Milana Peoples CNP.    No changes from a palliative medicine perspective.  Goals of care established.  Palliative medicine available as needed going forward. please call/haiku us if concerns or questions arise

## 2023-11-25 NOTE — PROGRESS NOTES
Subjective Data:  Denies shortness of breath but feels generally unwell.  As described below.    Overnight Events:    No significant overnight events.  Objective Data:  Last Recorded Vitals:  Vitals:    11/25/23 0745 11/25/23 0751 11/25/23 0752 11/25/23 0753   BP: 141/64      BP Location: Left arm      Patient Position: Lying      Pulse: 80      Resp: 18      Temp: 37 °C (98.6 °F)      TempSrc: Oral      SpO2: 92% 94% 94% 94%   Weight:       Height:           Last Labs:  CBC - 11/22/2023:  4:16 AM  5.8 11.9 134    38.1      CMP - 11/25/2023:  4:26 AM  8.7 5.4 10 --- 1.8   2.7 3.3 12 69      PTT - No results in last year.  _   _ _     HGBA1C   Date/Time Value Ref Range Status   01/09/2019 08:05 PM 5.5 4.0 - 6.0 % Final     Comment:     Hemoglobin A1C levels are related to mean blood glucose during the   preceding 2-3 months. The relationship table below may be used as a   general guide. Each 1% increase in HGB A1C is a reflection of an   increase in mean glucose of approximately 30 mg/dl.   Reference: [employer], volume 29, supplement 1 Jan. 2006                        HGB A1C ................. Approx. Mean Glucose   _______________________________________________   6%   ...............................  120 mg/dl   7%   ...............................  150 mg/dl   8%   ...............................  180 mg/dl   9%   ...............................  210 mg/dl   10%  ...............................  240 mg/dl  Performed at [address] [address]     05/07/2018 10:43 AM 5.5 4.0 - 6.0 % Final     Comment:     Hemoglobin A1C levels are related to mean blood glucose during the   preceding 2-3 months. The relationship table below may be used as a   general guide. Each 1% increase in HGB A1C is a reflection of an   increase in mean glucose of approximately 30 mg/dl.   Reference: Diabetes [employer], volume 29, supplement 1 Jan. 2006                        HGB A1C ................. Approx. Mean  "Glucose   _______________________________________________   6%   ...............................  120 mg/dl   7%   ...............................  150 mg/dl   8%   ...............................  180 mg/dl   9%   ...............................  210 mg/dl   10%  ...............................  240 mg/dl  Performed at [address] [address]       LDLCALC   Date/Time Value Ref Range Status   02/21/2023 04:20  65 - 130 MG/DL Final   06/15/2022 08:39 AM 92 65 - 130 MG/DL Final   11/24/2020 11:08 AM 86 65 - 130 MG/DL Final      Last I/O:  I/O last 3 completed shifts:  In: - (0 mL/kg)   Out: 750 (11.7 mL/kg) [Urine:750 (0.3 mL/kg/hr)]  Weight: 64.2 kg     Past Cardiology Tests (Last 3 Years):  EKG:  ECG 12 lead 11/22/2023      Electrocardiogram, 12-lead PRN ACS symptoms 10/18/2023      ECG 12 lead 10/10/2023    Echo:  Transthoracic Echo (TTE) Complete 10/9/2023    Ejection Fractions:  No results found for: \"EF\"  Cath:  No results found for this or any previous visit from the past 1095 days.    Stress Test:  No results found for this or any previous visit from the past 1095 days.    Cardiac Imaging:  No results found for this or any previous visit from the past 1095 days.      Inpatient Medications:  Scheduled medications   Medication Dose Route Frequency    apixaban  5 mg oral q12h    aspirin  81 mg oral Once    atorvastatin  40 mg oral Daily    carvedilol  12.5 mg oral BID with meals    tiotropium  2 Inhalation inhalation Daily    And    fluticasone furoate-vilanteroL  1 puff inhalation Daily    levothyroxine  100 mcg oral Daily    losartan  50 mg oral Daily    multivitamin with minerals  1 tablet oral Daily    oxygen   inhalation q8h    pantoprazole  40 mg oral Daily before breakfast    spironolactone  12.5 mg oral Daily    torsemide  20 mg oral Daily     PRN medications   Medication    acetaminophen    Or    acetaminophen    Or    acetaminophen    albuterol    benzocaine-menthol    " dextromethorphan-guaifenesin    guaiFENesin    morphine    ondansetron ODT    Or    ondansetron    oxygen    polyethylene glycol     Continuous Medications   Medication Dose Last Rate       Physical Exam:  Upper elderly white female lying in bed no respiratory distress on low-flow O2 nasal cannula  Chest with mild thoracic kyphosis shallow air movement breath sounds clear diminished  Cardiac rhythm is regular with occasional premature beats.  Minimal peripheral edema.      Assessment/Plan   Principal Problem:    Acute on chronic systolic heart failure (CMS/HCC)  Active Problems:    Chronic obstructive pulmonary disease (CMS/HCC)    History of coronary artery stent placement    History of myocardial infarction    History of stroke without residual deficits    History of cerebrovascular accident    Hypothyroidism (acquired)    HFrEF (heart failure with reduced ejection fraction) (CMS/HCC)    Severe mitral valve regurgitation    Heart failure (CMS/HCC)    Left DVT     11/22: As noted in the above assessment we will focus on optimizing medical management of her heart failure.  She is elderly and frail and may not be a good candidate for MitraClip procedure in the setting of her moderately severe mitral regurgitation, and after hospital discharge she will follow-up with her cardiologist, Dr. Foss.     11/23: We will continue with current guideline directed medical therapy for heart failure.  We will continue IV Lasix for at least 1 more day with tentative plans to convert to oral Lasix tomorrow.  We can have further discussions regarding MitraClip candidacy in my office as an outpatient.     11/24: Clinically patient appears to be well compensated.  She has been transition from IV diuretic therapy to oral torsemide 20 mg once daily.  Had ultrasound of lower extremities performed yesterday revealing a deep vein thrombosis of the left lower extremity and is been placed on apixaban as anticoagulant.  As noted above  patient does have a significant degree of a mitral valve regurgitation and Dr. Foss will be considering transcatheter [id number] repair procedure with MitraClip in the future as an outpatient.     11/25: The patient is lying in bed appearing comfortable although stating that she feels generally unwell.  She evidently experienced a fall in the room yesterday.  Chest exhibits diminished breath sounds relatively clear.  O2 saturations are 93% on room air.  She has been transition from IV Lasix to torsemide 20 mg daily.  Will check repeat chest x-ray tomorrow.  She had a lower extremity ultrasound performed yesterday which was positive for DVT involving the left distal femoral popliteal and posterior tibial veins and she was started on Eliquis 5 mg twice daily.  Renal parameters relatively stable creatinine of 0.80 potassium of 3.5.  The proBNP has decreased from [phone].  Magnesium level is 2.20.  Telemetry monitor shows sinus rhythm with PACs.  Systolic blood pressures are in the 140 mmHg range and will uptitrate the dosages of both the losartan and carvedilol.  Peripheral IV 11/22/23 22 G Right;Anterior Forearm (Active)   Site Assessment Clean;Dry;Ecchymotic;Intact 11/25/23 0800   Dressing Status Clean;Dry;Occlusive 11/25/23 0800   Number of days: 3       Code Status:  DNR and No Intubation    I spent 20 minutes in the professional and overall care of this patient.        Dat Jasmine MD

## 2023-11-26 LAB
ALBUMIN SERPL-MCNC: 3.1 G/DL (ref 3.5–5)
ALP BLD-CCNC: 65 U/L (ref 35–125)
ALT SERPL-CCNC: 11 U/L (ref 5–40)
ANION GAP SERPL CALC-SCNC: 6 MMOL/L
AST SERPL-CCNC: 10 U/L (ref 5–40)
BASOPHILS # BLD AUTO: 0.02 X10*3/UL (ref 0–0.1)
BASOPHILS NFR BLD AUTO: 0.2 %
BILIRUB SERPL-MCNC: 1.4 MG/DL (ref 0.1–1.2)
BUN SERPL-MCNC: 20 MG/DL (ref 8–25)
CALCIUM SERPL-MCNC: 8.9 MG/DL (ref 8.5–10.4)
CHLORIDE SERPL-SCNC: 102 MMOL/L (ref 97–107)
CO2 SERPL-SCNC: 37 MMOL/L (ref 24–31)
CREAT SERPL-MCNC: 0.9 MG/DL (ref 0.4–1.6)
EOSINOPHIL # BLD AUTO: 0.01 X10*3/UL (ref 0–0.4)
EOSINOPHIL NFR BLD AUTO: 0.1 %
ERYTHROCYTE [DISTWIDTH] IN BLOOD BY AUTOMATED COUNT: 14.8 % (ref 11.5–14.5)
GFR SERPL CREATININE-BSD FRML MDRD: 63 ML/MIN/1.73M*2
GLUCOSE SERPL-MCNC: 113 MG/DL (ref 65–99)
HCT VFR BLD AUTO: 38.8 % (ref 36–46)
HGB BLD-MCNC: 12 G/DL (ref 12–16)
IMM GRANULOCYTES # BLD AUTO: 0.04 X10*3/UL (ref 0–0.5)
IMM GRANULOCYTES NFR BLD AUTO: 0.4 % (ref 0–0.9)
LYMPHOCYTES # BLD AUTO: 0.88 X10*3/UL (ref 0.8–3)
LYMPHOCYTES NFR BLD AUTO: 9.6 %
MAGNESIUM SERPL-MCNC: 2.2 MG/DL (ref 1.6–3.1)
MCH RBC QN AUTO: 29.6 PG (ref 26–34)
MCHC RBC AUTO-ENTMCNC: 30.9 G/DL (ref 32–36)
MCV RBC AUTO: 96 FL (ref 80–100)
MONOCYTES # BLD AUTO: 0.92 X10*3/UL (ref 0.05–0.8)
MONOCYTES NFR BLD AUTO: 10.1 %
NEUTROPHILS # BLD AUTO: 7.27 X10*3/UL (ref 1.6–5.5)
NEUTROPHILS NFR BLD AUTO: 79.6 %
NRBC BLD-RTO: 0 /100 WBCS (ref 0–0)
PLATELET # BLD AUTO: 119 X10*3/UL (ref 150–450)
POTASSIUM SERPL-SCNC: 3.6 MMOL/L (ref 3.4–5.1)
PROT SERPL-MCNC: 5.8 G/DL (ref 5.9–7.9)
RBC # BLD AUTO: 4.06 X10*6/UL (ref 4–5.2)
SODIUM SERPL-SCNC: 145 MMOL/L (ref 133–145)
WBC # BLD AUTO: 9.1 X10*3/UL (ref 4.4–11.3)

## 2023-11-26 PROCEDURE — 2500000001 HC RX 250 WO HCPCS SELF ADMINISTERED DRUGS (ALT 637 FOR MEDICARE OP): Performed by: INTERNAL MEDICINE

## 2023-11-26 PROCEDURE — 94640 AIRWAY INHALATION TREATMENT: CPT

## 2023-11-26 PROCEDURE — 80053 COMPREHEN METABOLIC PANEL: CPT | Performed by: INTERNAL MEDICINE

## 2023-11-26 PROCEDURE — 1200000002 HC GENERAL ROOM WITH TELEMETRY DAILY

## 2023-11-26 PROCEDURE — 2500000004 HC RX 250 GENERAL PHARMACY W/ HCPCS (ALT 636 FOR OP/ED): Performed by: INTERNAL MEDICINE

## 2023-11-26 PROCEDURE — 85025 COMPLETE CBC W/AUTO DIFF WBC: CPT | Performed by: INTERNAL MEDICINE

## 2023-11-26 PROCEDURE — 36415 COLL VENOUS BLD VENIPUNCTURE: CPT | Performed by: INTERNAL MEDICINE

## 2023-11-26 PROCEDURE — 97110 THERAPEUTIC EXERCISES: CPT | Mod: GP,CQ

## 2023-11-26 PROCEDURE — 2500000005 HC RX 250 GENERAL PHARMACY W/O HCPCS: Performed by: INTERNAL MEDICINE

## 2023-11-26 PROCEDURE — 99232 SBSQ HOSP IP/OBS MODERATE 35: CPT | Performed by: INTERNAL MEDICINE

## 2023-11-26 PROCEDURE — 83735 ASSAY OF MAGNESIUM: CPT | Performed by: INTERNAL MEDICINE

## 2023-11-26 PROCEDURE — 97116 GAIT TRAINING THERAPY: CPT | Mod: GP,CQ

## 2023-11-26 RX ADMIN — MULTIPLE VITAMINS W/ MINERALS TAB 1 TABLET: TAB at 08:34

## 2023-11-26 RX ADMIN — SPIRONOLACTONE 12.5 MG: 25 TABLET ORAL at 08:34

## 2023-11-26 RX ADMIN — Medication: at 08:00

## 2023-11-26 RX ADMIN — TORSEMIDE 20 MG: 20 TABLET ORAL at 08:34

## 2023-11-26 RX ADMIN — CARVEDILOL 25 MG: 25 TABLET, FILM COATED ORAL at 17:11

## 2023-11-26 RX ADMIN — APIXABAN 5 MG: 5 TABLET, FILM COATED ORAL at 08:34

## 2023-11-26 RX ADMIN — LEVOTHYROXINE SODIUM 100 MCG: 0.1 TABLET ORAL at 06:19

## 2023-11-26 RX ADMIN — ATORVASTATIN CALCIUM 40 MG: 40 TABLET, FILM COATED ORAL at 20:42

## 2023-11-26 RX ADMIN — FLUTICASONE FUROATE AND VILANTEROL 1 PUFF: 100; 25 POWDER RESPIRATORY (INHALATION) at 08:03

## 2023-11-26 RX ADMIN — CARVEDILOL 25 MG: 25 TABLET, FILM COATED ORAL at 08:34

## 2023-11-26 RX ADMIN — PANTOPRAZOLE SODIUM 40 MG: 40 TABLET, DELAYED RELEASE ORAL at 06:19

## 2023-11-26 RX ADMIN — APIXABAN 5 MG: 5 TABLET, FILM COATED ORAL at 20:42

## 2023-11-26 RX ADMIN — LOSARTAN POTASSIUM 50 MG: 50 TABLET, FILM COATED ORAL at 08:34

## 2023-11-26 RX ADMIN — LOSARTAN POTASSIUM 50 MG: 50 TABLET, FILM COATED ORAL at 20:42

## 2023-11-26 RX ADMIN — BENZOCAINE AND MENTHOL 1 LOZENGE: 15; 3.6 LOZENGE ORAL at 19:32

## 2023-11-26 ASSESSMENT — COGNITIVE AND FUNCTIONAL STATUS - GENERAL
TURNING FROM BACK TO SIDE WHILE IN FLAT BAD: A LITTLE
TURNING FROM BACK TO SIDE WHILE IN FLAT BAD: A LITTLE
MOVING TO AND FROM BED TO CHAIR: A LITTLE
MOBILITY SCORE: 19
WALKING IN HOSPITAL ROOM: A LITTLE
PERSONAL GROOMING: A LOT
CLIMB 3 TO 5 STEPS WITH RAILING: A LOT
TOILETING: A LITTLE
DRESSING REGULAR LOWER BODY CLOTHING: A LITTLE
HELP NEEDED FOR BATHING: A LOT
MOBILITY SCORE: 19
WALKING IN HOSPITAL ROOM: A LITTLE
CLIMB 3 TO 5 STEPS WITH RAILING: A LOT
DAILY ACTIVITIY SCORE: 17
DRESSING REGULAR UPPER BODY CLOTHING: A LITTLE
MOVING TO AND FROM BED TO CHAIR: A LITTLE

## 2023-11-26 ASSESSMENT — PAIN SCALES - GENERAL
PAINLEVEL_OUTOF10: 0 - NO PAIN

## 2023-11-26 ASSESSMENT — PAIN SCALES - WONG BAKER: WONGBAKER_NUMERICALRESPONSE: NO HURT

## 2023-11-26 NOTE — PROGRESS NOTES
"Anitha Welch is a 83 y.o. female on day 4 of admission presenting with Acute on chronic systolic heart failure (CMS/HCC).    Subjective   Doing okay today, questioning she may be able to go home.       Objective     Physical Exam  Generally no acute distress  HEENT PERRL  Cardiovascular S1-S2 heard no murmurs rubs gallops  Lungs slight basilar crackles otherwise diminished but clear  Abdomen nontender nondistended bowel sounds present  Extremities no clubbing cyanosis edema  Last Recorded Vitals  Blood pressure (!) 123/41, pulse 78, temperature 36.9 °C (98.4 °F), temperature source Oral, resp. rate 18, height 1.651 m (5' 5\"), weight 64.2 kg (141 lb 8.6 oz), SpO2 98 %.  Intake/Output last 3 Shifts:  I/O last 3 completed shifts:  In: 880 (13.7 mL/kg) [P.O.:880]  Out: 575 (9 mL/kg) [Urine:575 (0.2 mL/kg/hr)]  Weight: 64.2 kg     Relevant Results         Scheduled medications  apixaban, 5 mg, oral, q12h  aspirin, 81 mg, oral, Once  atorvastatin, 40 mg, oral, Daily  carvedilol, 25 mg, oral, BID with meals  tiotropium, 2 Inhalation, inhalation, Daily   And  fluticasone furoate-vilanteroL, 1 puff, inhalation, Daily  levothyroxine, 100 mcg, oral, Daily  losartan, 50 mg, oral, BID  multivitamin with minerals, 1 tablet, oral, Daily  oxygen, , inhalation, q8h  pantoprazole, 40 mg, oral, Daily before breakfast  spironolactone, 12.5 mg, oral, Daily  torsemide, 20 mg, oral, Daily      Continuous medications     PRN medications  PRN medications: acetaminophen **OR** acetaminophen **OR** acetaminophen, albuterol, benzocaine-menthol, dextromethorphan-guaifenesin, guaiFENesin, morphine, ondansetron ODT **OR** ondansetron, oxygen, polyethylene glycol  Results for orders placed or performed during the hospital encounter of 11/21/23 (from the past 24 hour(s))   Comprehensive Metabolic Panel   Result Value Ref Range    Glucose 113 (H) 65 - 99 mg/dL    Sodium 145 133 - 145 mmol/L    Potassium 3.6 3.4 - 5.1 mmol/L    Chloride 102 97 - " 107 mmol/L    Bicarbonate 37 (H) 24 - 31 mmol/L    Urea Nitrogen 20 8 - 25 mg/dL    Creatinine 0.90 0.40 - 1.60 mg/dL    eGFR 63 >60 mL/min/1.73m*2    Calcium 8.9 8.5 - 10.4 mg/dL    Albumin 3.1 (L) 3.5 - 5.0 g/dL    Alkaline Phosphatase 65 35 - 125 U/L    Total Protein 5.8 (L) 5.9 - 7.9 g/dL    AST 10 5 - 40 U/L    Bilirubin, Total 1.4 (H) 0.1 - 1.2 mg/dL    ALT 11 5 - 40 U/L    Anion Gap 6 <=19 mmol/L   Magnesium   Result Value Ref Range    Magnesium 2.20 1.60 - 3.10 mg/dL   CBC and Auto Differential   Result Value Ref Range    WBC 9.1 4.4 - 11.3 x10*3/uL    nRBC 0.0 0.0 - 0.0 /100 WBCs    RBC 4.06 4.00 - 5.20 x10*6/uL    Hemoglobin 12.0 12.0 - 16.0 g/dL    Hematocrit 38.8 36.0 - 46.0 %    MCV 96 80 - 100 fL    MCH 29.6 26.0 - 34.0 pg    MCHC 30.9 (L) 32.0 - 36.0 g/dL    RDW 14.8 (H) 11.5 - 14.5 %    Platelets 119 (L) 150 - 450 x10*3/uL    Neutrophils % 79.6 40.0 - 80.0 %    Immature Granulocytes %, Automated 0.4 0.0 - 0.9 %    Lymphocytes % 9.6 13.0 - 44.0 %    Monocytes % 10.1 2.0 - 10.0 %    Eosinophils % 0.1 0.0 - 6.0 %    Basophils % 0.2 0.0 - 2.0 %    Neutrophils Absolute 7.27 (H) 1.60 - 5.50 x10*3/uL    Immature Granulocytes Absolute, Automated 0.04 0.00 - 0.50 x10*3/uL    Lymphocytes Absolute 0.88 0.80 - 3.00 x10*3/uL    Monocytes Absolute 0.92 (H) 0.05 - 0.80 x10*3/uL    Eosinophils Absolute 0.01 0.00 - 0.40 x10*3/uL    Basophils Absolute 0.02 0.00 - 0.10 x10*3/uL                    Malnutrition Diagnosis Status: New  Malnutrition Diagnosis: Moderate malnutrition related to chronic disease or condition  As Evidenced by: fat and muscle deficits  I agree with the dietitian's malnutrition diagnosis.      Assessment/Plan   Principal Problem:  1.  Acute on chronic systolic heart failure (CMS/HCC)  -Appreciate cardiology input, continue to monitor, response to medication management has been successful at this point.  Discussed with the patient that hopeful discharge home the next 24 hours to follow-up with her  cardiologist regarding mitral valve clipping.    2.  DVT  -Continue with apixaban             I spent 25 minutes in the professional and overall care of this patient.      Milind Matthew MD

## 2023-11-26 NOTE — CARE PLAN
The patient's goals for the shift include pain free    The clinical goals for the shift include safety    Over the shift, the patient did not make progress toward the following goals. Barriers to progression include weakness. Recommendations to address these barriers include activity with safety.

## 2023-11-26 NOTE — CARE PLAN
Problem: Fall/Injury  Goal: Verbalize understanding of risk factor reduction measures to prevent injury from fall in the home  Outcome: Progressing     Problem: Heart Failure  Goal: Improved urinary output this shift  Outcome: Progressing  Goal: Increase self care and/or family involvement in 24 hours  Outcome: Progressing     Problem: Pain  Goal: Performs ADL's with improved pain control throughout shift  Outcome: Progressing     Problem: Discharge Planning  Goal: Discharge to home or other facility with appropriate resources  Outcome: Progressing     Problem: Chronic Conditions and Co-morbidities  Goal: Patient's chronic conditions and co-morbidity symptoms are monitored and maintained or improved  Outcome: Progressing

## 2023-11-26 NOTE — PROGRESS NOTES
Physical Therapy    Physical Therapy Treatment    Patient Name: Anitha Welch  MRN: [id number]  Today's Date: 11/26/2023  Time Calculation  Start Time: 1036  Stop Time: 1100  Time Calculation (min): 24 min       Assessment/Plan   PT Assessment  PT Assessment Results: Decreased strength, Decreased endurance, Impaired balance, Decreased safety awareness, Impaired judgement, Decreased cognition, Pain  Rehab Prognosis: Good  Evaluation/Treatment Tolerance: Patient limited by fatigue  End of Session Communication: Bedside nurse  End of Session Patient Position: Up in chair, Alarm on  PT Plan  Inpatient/Swing Bed or Outpatient: Inpatient  PT Plan  Treatment/Interventions: Bed mobility, Transfer training, Gait training, Balance training, Strengthening, Endurance training, Range of motion, Therapeutic exercise, Therapeutic activity  PT Plan: Skilled PT  PT Frequency: 4 times per week  PT Discharge Recommendations: Low intensity level of continued care  Equipment Recommended upon Discharge: Wheeled walker  PT Recommended Transfer Status: Assist x1  PT - OK to Discharge: Yes      General Visit Information:   PT  Visit  PT Received On: 11/26/23  Response to Previous Treatment: Patient with no complaints from previous session.  General  Reason for Referral: Impaired Mobility  Referred By: Dr. Rondon  Past Medical History Relevant to Rehab: CHF, DVT, CVA, HTN, spinal stenosis, MI, valvular heart disease, EF 45%  Missed Visit: No  Prior to Session Communication: Bedside nurse  Patient Position Received: Up in chair, Alarm on  Preferred Learning Style: verbal    Subjective   Precautions:  Precautions  Medical Precautions: Fall precautions, Oxygen therapy device and L/min    Objective   Pain:  Pain Assessment  Pain Score: 0 - No pain  Cognition:  Cognition  Orientation Level: Disoriented to time  Impulsive: Moderately    Activity Tolerance:  Activity Tolerance  Endurance: Tolerates 10 - 20 min exercise with multiple  "rests  Treatments:  Therapeutic Exercise  Therapeutic Exercise Performed: Yes  Therapeutic Exercise Activity 1: Bilat AP 20x  Therapeutic Exercise Activity 2: Bilat LAQ 15x  Therapeutic Exercise Activity 3: Bilat Hip Flex 15x  Therapeutic Exercise Activity 4: Bilat Glut Sets 15x    Bed Mobility  Bed Mobility: No    Ambulation/Gait Training  Ambulation/Gait Training Performed: Yes  Ambulation/Gait Training 1  Surface 1: Level tile  Device 1: Rolling walker  Assistance 1: Minimum assistance, Minimal verbal cues  Quality of Gait 1: Decreased step length, Forward flexed posture  Comments/Distance (ft) 1: 75\"x2  Transfers  Transfer: Yes  Transfer 1  Transfer From 1: Chair with arms to  Transfer to 1: Stand, Sit, Chair with arms  Technique 1: Sit to stand, Stand to sit  Transfer Device 1: Walker  Transfer Level of Assistance 1: Close supervision, Minimum assistance, Minimal verbal cues  Trials/Comments 1: Cueing for hand placment and AD managment.    Stairs  Stairs: No    Outcome Measures:  [facility] Basic Mobility  Turning from your back to your side while in a flat bed without using bedrails: None  Moving from lying on your back to sitting on the side of a flat bed without using bedrails: A little  Moving to and from bed to chair (including a wheelchair): A little  Standing up from a chair using your arms (e.g. wheelchair or bedside chair): None  To walk in hospital room: A little  Climbing 3-5 steps with railing: A lot  Basic Mobility - Total Score: 19    Education Documentation  Handouts, taught by Jia Gill PTA at 11/26/2023  2:22 PM.  Learner: Patient  Readiness: Acceptance  Method: Explanation, Demonstration  Response: Verbalizes Understanding, Needs Reinforcement    Precautions, taught by Jia Gill PTA at 11/26/2023  2:22 PM.  Learner: Patient  Readiness: Acceptance  Method: Explanation, Demonstration  Response: Verbalizes Understanding, Needs Reinforcement    Body Mechanics, taught by Jia Gill" WALLACE at 11/26/2023  2:22 PM.  Learner: Patient  Readiness: Acceptance  Method: Explanation, Demonstration  Response: Verbalizes Understanding, Needs Reinforcement    Home Exercise Program, taught by Jia Gill PTA at 11/26/2023  2:22 PM.  Learner: Patient  Readiness: Acceptance  Method: Explanation, Demonstration  Response: Verbalizes Understanding, Needs Reinforcement    Mobility Training, taught by Jia Gill PTA at 11/26/2023  2:22 PM.  Learner: Patient  Readiness: Acceptance  Method: Explanation, Demonstration  Response: Verbalizes Understanding, Needs Reinforcement    Education Comments  No comments found.        OP EDUCATION:  Outpatient Education  Individual(s) Educated: Patient  Education Provided: Body Mechanics, Fall Risk, Posture  Patient Response to Education: Patient/Caregiver Verbalized Understanding of Information  Education Comment: Needs reinforcement    Encounter Problems       Encounter Problems (Active)       Balance       Standing Balance (Progressing)       Start:  11/22/23    Expected End:  12/06/23       Pt will demonstrate good static standing balance to promote safe participation with out of bed activity, transfers, and mobility              Mobility       Ambulation (Progressing)       Start:  11/22/23    Expected End:  12/06/23       Pt will ambulate 50' modified independent assist with LRD to promote safe home mobility              Safety       Safe Mobility Techniques (Progressing)       Start:  11/22/23    Expected End:  12/06/23       Pt will correctly identify and demonstrate safe mobility techniques to reduce their risks for falls during their acute care stay               Transfers       Supine to sit (Progressing)       Start:  11/22/23    Expected End:  12/06/23       Pt will transfer supine to sitting at edge of bed with modified independent assist to promote acute care out of bed activity           Sit to stand (Progressing)       Start:  11/22/23    Expected End:   12/06/23       Pt will transfer sit to standing position with modified independent assist and LRD to promote safe out of bed activity

## 2023-11-26 NOTE — PROGRESS NOTES
Subjective Data:  Patient without any new complaints breathing comfortable    Overnight Events:    No significant new overnight events.     Objective Data:  Last Recorded Vitals:  Vitals:    11/26/23 0748 11/26/23 0800 11/26/23 0805 11/26/23 0806   BP: 148/66      BP Location: Left arm      Patient Position: Sitting      Pulse: 86      Resp: 16      Temp: 36.9 °C (98.4 °F)      TempSrc: Oral      SpO2: 96% 96% 96% 96%   Weight:       Height:           Last Labs:  CBC - 11/26/2023:  4:36 AM  9.1 12.0 119    38.8      CMP - 11/26/2023:  4:35 AM  8.9 5.8 10 --- 1.4   2.7 3.1 11 65      PTT - No results in last year.  _   _ _     HGBA1C   Date/Time Value Ref Range Status   01/09/2019 08:05 PM 5.5 4.0 - 6.0 % Final     Comment:     Hemoglobin A1C levels are related to mean blood glucose during the   preceding 2-3 months. The relationship table below may be used as a   general guide. Each 1% increase in HGB A1C is a reflection of an   increase in mean glucose of approximately 30 mg/dl.   Reference: Diabetes Care, volume 29, supplement 1 Jan. 2006                        HGB A1C ................. Approx. Mean Glucose   _______________________________________________   6%   ...............................  120 mg/dl   7%   ...............................  150 mg/dl   8%   ...............................  180 mg/dl   9%   ...............................  210 mg/dl   10%  ...............................  240 mg/dl  Performed at [address] [address]     05/07/2018 10:43 AM 5.5 4.0 - 6.0 % Final     Comment:     Hemoglobin A1C levels are related to mean blood glucose during the   preceding 2-3 months. The relationship table below may be used as a   general guide. Each 1% increase in HGB A1C is a reflection of an   increase in mean glucose of approximately 30 mg/dl.   Reference: Diabetes Care, volume 29, supplement 1 Jan. 2006                        HGB A1C ................. Approx. Mean Glucose    "_______________________________________________   6%   ...............................  120 mg/dl   7%   ...............................  150 mg/dl   8%   ...............................  180 mg/dl   9%   ...............................  210 mg/dl   10%  ...............................  240 mg/dl  Performed at [address] [address]       LDLCALC   Date/Time Value Ref Range Status   02/21/2023 04:20  65 - 130 MG/DL Final   06/15/2022 08:39 AM 92 65 - 130 MG/DL Final   11/24/2020 11:08 AM 86 65 - 130 MG/DL Final      Last I/O:  I/O last 3 completed shifts:  In: 880 (13.7 mL/kg) [P.O.:880]  Out: 575 (9 mL/kg) [Urine:575 (0.2 mL/kg/hr)]  Weight: 64.2 kg     Past Cardiology Tests (Last 3 Years):  EKG:  ECG 12 lead 11/22/2023      Electrocardiogram, 12-lead PRN ACS symptoms 10/18/2023      ECG 12 lead 10/10/2023    Echo:  Transthoracic Echo (TTE) Complete 10/9/2023    Ejection Fractions:  No results found for: \"EF\"  Cath:  No results found for this or any previous visit from the past 1095 days.    Stress Test:  No results found for this or any previous visit from the past 1095 days.    Cardiac Imaging:  No results found for this or any previous visit from the past 1095 days.      Inpatient Medications:  Scheduled medications   Medication Dose Route Frequency    apixaban  5 mg oral q12h    aspirin  81 mg oral Once    atorvastatin  40 mg oral Daily    carvedilol  25 mg oral BID with meals    tiotropium  2 Inhalation inhalation Daily    And    fluticasone furoate-vilanteroL  1 puff inhalation Daily    levothyroxine  100 mcg oral Daily    losartan  50 mg oral BID    multivitamin with minerals  1 tablet oral Daily    oxygen   inhalation q8h    pantoprazole  40 mg oral Daily before breakfast    spironolactone  12.5 mg oral Daily    torsemide  20 mg oral Daily     PRN medications   Medication    acetaminophen    Or    acetaminophen    Or    acetaminophen    albuterol    benzocaine-menthol    " dextromethorphan-guaifenesin    guaiFENesin    morphine    ondansetron ODT    Or    ondansetron    oxygen    polyethylene glycol     Continuous Medications   Medication Dose Last Rate       Physical Exam:  Upper elderly white female lying in bed no respiratory distress on low-flow O2 nasal cannula  Chest with mild thoracic kyphosis shallow air movement breath sounds clear diminished  Cardiac rhythm is regular with occasional premature beats.  Minimal peripheral edema.      Assessment/Plan   Principal Problem:    Acute on chronic systolic heart failure (CMS/HCC)  Active Problems:    Chronic obstructive pulmonary disease (CMS/HCC)    History of coronary artery stent placement    History of myocardial infarction    History of stroke without residual deficits    History of cerebrovascular accident    Hypothyroidism (acquired)    HFrEF (heart failure with reduced ejection fraction) (CMS/HCC)    Severe mitral valve regurgitation    Heart failure (CMS/HCC)    Left DVT     11/22: As noted in the above assessment we will focus on optimizing medical management of her heart failure.  She is elderly and frail and may not be a good candidate for MitraClip procedure in the setting of her moderately severe mitral regurgitation, and after hospital discharge she will follow-up with her cardiologist, Dr. Foss.     11/23: We will continue with current guideline directed medical therapy for heart failure.  We will continue IV Lasix for at least 1 more day with tentative plans to convert to oral Lasix tomorrow.  We can have further discussions regarding MitraClip candidacy in my office as an outpatient.     11/24: Clinically patient appears to be well compensated.  She has been transition from IV diuretic therapy to oral torsemide 20 mg once daily.  Had ultrasound of lower extremities performed yesterday revealing a deep vein thrombosis of the left lower extremity and is been placed on apixaban as anticoagulant.  As noted above  patient does have a significant degree of a mitral valve regurgitation and Dr. Foss will be considering transcatheter [id number] repair procedure with MitraClip in the future as an outpatient.     11/25: The patient is lying in bed appearing comfortable although stating that she feels generally unwell.  She evidently experienced a fall in the room yesterday.  Chest exhibits diminished breath sounds relatively clear.  O2 saturations are 93% on room air.  She has been transition from IV Lasix to torsemide 20 mg daily.  Will check repeat chest x-ray tomorrow.  She had a lower extremity ultrasound performed yesterday which was positive for DVT involving the left distal femoral popliteal and posterior tibial veins and she was started on Eliquis 5 mg twice daily.  Renal parameters relatively stable creatinine of 0.80 potassium of 3.5.  The proBNP has decreased from [phone].  Magnesium level is 2.20.  Telemetry monitor shows sinus rhythm with PACs.  Systolic blood pressures are in the 140 mmHg range and will uptitrate the dosages of both the losartan and carvedilol.    11/26: Patient appears clinically improved sitting at edge of bed no respiratory distress on low-flow O2 nasal cannula.  Input and output relatively even yesterday.  Patient tolerating uptitrated dosages of the carvedilol and losartan.  Systolic blood pressures have been greater than 120 mmHg.  Renal parameters are stable creatinine of 0.90 potassium 3.6.  Patient currently on torsemide 20 mg daily.  CBC in normal range.  Will most likely be ready for discharge tomorrow and then will be evaluated as an outpatient for possible eligibility for mitral valve clip.  Will check repeat chest x-ray in the morning.  Of note the patient did have a fall in her room yesterday and a head CT was done that showed no acute abnormality.  CT scan of lumbar spine was negative for fracture or spondylolisthesis with moderate multilevel lumbar spondylosis.  Peripheral IV  11/22/23 22 G Right;Anterior Forearm (Active)   Site Assessment Clean;Dry;Intact 11/26/23 0800   Dressing Status Dry;Clean 11/26/23 0800   Number of days: 4       Code Status:  DNR and No Intubation    I spent 20 minutes in the professional and overall care of this patient.        Dat Jasmine MD

## 2023-11-26 NOTE — NURSING NOTE
Assumed care of pt, pt resting in bed, bed alarm on, no needs at this time, call light and possessions within reach

## 2023-11-27 ENCOUNTER — APPOINTMENT (OUTPATIENT)
Dept: RADIOLOGY | Facility: HOSPITAL | Age: 83
DRG: 291 | End: 2023-11-27
Payer: MEDICARE

## 2023-11-27 VITALS
HEIGHT: 65 IN | RESPIRATION RATE: 18 BRPM | WEIGHT: 141.54 LBS | TEMPERATURE: 97.3 F | SYSTOLIC BLOOD PRESSURE: 124 MMHG | HEART RATE: 79 BPM | BODY MASS INDEX: 23.58 KG/M2 | OXYGEN SATURATION: 80 % | DIASTOLIC BLOOD PRESSURE: 48 MMHG

## 2023-11-27 LAB
ALBUMIN SERPL-MCNC: 2.9 G/DL (ref 3.5–5)
ALP BLD-CCNC: 67 U/L (ref 35–125)
ALT SERPL-CCNC: 12 U/L (ref 5–40)
ANION GAP SERPL CALC-SCNC: 10 MMOL/L
AST SERPL-CCNC: 11 U/L (ref 5–40)
BASOPHILS # BLD AUTO: 0.02 X10*3/UL (ref 0–0.1)
BASOPHILS NFR BLD AUTO: 0.3 %
BILIRUB SERPL-MCNC: 1.1 MG/DL (ref 0.1–1.2)
BUN SERPL-MCNC: 22 MG/DL (ref 8–25)
CALCIUM SERPL-MCNC: 8.6 MG/DL (ref 8.5–10.4)
CHLORIDE SERPL-SCNC: 101 MMOL/L (ref 97–107)
CO2 SERPL-SCNC: 33 MMOL/L (ref 24–31)
CREAT SERPL-MCNC: 0.9 MG/DL (ref 0.4–1.6)
EOSINOPHIL # BLD AUTO: 0.03 X10*3/UL (ref 0–0.4)
EOSINOPHIL NFR BLD AUTO: 0.4 %
ERYTHROCYTE [DISTWIDTH] IN BLOOD BY AUTOMATED COUNT: 14.8 % (ref 11.5–14.5)
GFR SERPL CREATININE-BSD FRML MDRD: 63 ML/MIN/1.73M*2
GLUCOSE SERPL-MCNC: 129 MG/DL (ref 65–99)
HCT VFR BLD AUTO: 36.8 % (ref 36–46)
HGB BLD-MCNC: 11.3 G/DL (ref 12–16)
IMM GRANULOCYTES # BLD AUTO: 0.05 X10*3/UL (ref 0–0.5)
IMM GRANULOCYTES NFR BLD AUTO: 0.6 % (ref 0–0.9)
LYMPHOCYTES # BLD AUTO: 0.88 X10*3/UL (ref 0.8–3)
LYMPHOCYTES NFR BLD AUTO: 11.4 %
MCH RBC QN AUTO: 29.7 PG (ref 26–34)
MCHC RBC AUTO-ENTMCNC: 30.7 G/DL (ref 32–36)
MCV RBC AUTO: 97 FL (ref 80–100)
MONOCYTES # BLD AUTO: 0.88 X10*3/UL (ref 0.05–0.8)
MONOCYTES NFR BLD AUTO: 11.4 %
NEUTROPHILS # BLD AUTO: 5.84 X10*3/UL (ref 1.6–5.5)
NEUTROPHILS NFR BLD AUTO: 75.9 %
NRBC BLD-RTO: 0 /100 WBCS (ref 0–0)
PLATELET # BLD AUTO: 131 X10*3/UL (ref 150–450)
POTASSIUM SERPL-SCNC: 3.5 MMOL/L (ref 3.4–5.1)
PROT SERPL-MCNC: 5.6 G/DL (ref 5.9–7.9)
RBC # BLD AUTO: 3.81 X10*6/UL (ref 4–5.2)
RBC MORPH BLD: NORMAL
SODIUM SERPL-SCNC: 144 MMOL/L (ref 133–145)
WBC # BLD AUTO: 7.7 X10*3/UL (ref 4.4–11.3)

## 2023-11-27 PROCEDURE — 94640 AIRWAY INHALATION TREATMENT: CPT

## 2023-11-27 PROCEDURE — 2500000001 HC RX 250 WO HCPCS SELF ADMINISTERED DRUGS (ALT 637 FOR MEDICARE OP): Performed by: INTERNAL MEDICINE

## 2023-11-27 PROCEDURE — 99232 SBSQ HOSP IP/OBS MODERATE 35: CPT | Performed by: INTERNAL MEDICINE

## 2023-11-27 PROCEDURE — 2500000005 HC RX 250 GENERAL PHARMACY W/O HCPCS: Performed by: INTERNAL MEDICINE

## 2023-11-27 PROCEDURE — 9420000001 HC RT PATIENT EDUCATION 5 MIN

## 2023-11-27 PROCEDURE — 97110 THERAPEUTIC EXERCISES: CPT | Mod: GP,CQ

## 2023-11-27 PROCEDURE — 36415 COLL VENOUS BLD VENIPUNCTURE: CPT | Performed by: INTERNAL MEDICINE

## 2023-11-27 PROCEDURE — 97116 GAIT TRAINING THERAPY: CPT | Mod: GP,CQ

## 2023-11-27 PROCEDURE — 80053 COMPREHEN METABOLIC PANEL: CPT | Performed by: INTERNAL MEDICINE

## 2023-11-27 PROCEDURE — 2500000004 HC RX 250 GENERAL PHARMACY W/ HCPCS (ALT 636 FOR OP/ED): Performed by: INTERNAL MEDICINE

## 2023-11-27 PROCEDURE — 94762 N-INVAS EAR/PLS OXIMTRY CONT: CPT

## 2023-11-27 PROCEDURE — 71045 X-RAY EXAM CHEST 1 VIEW: CPT

## 2023-11-27 PROCEDURE — 97110 THERAPEUTIC EXERCISES: CPT | Mod: GO

## 2023-11-27 PROCEDURE — 85025 COMPLETE CBC W/AUTO DIFF WBC: CPT | Performed by: INTERNAL MEDICINE

## 2023-11-27 RX ORDER — TORSEMIDE 20 MG/1
20 TABLET ORAL DAILY
Qty: 30 TABLET | Refills: 0 | Status: SHIPPED | OUTPATIENT
Start: 2023-11-27 | End: 2024-01-02 | Stop reason: SDUPTHER

## 2023-11-27 RX ORDER — PANTOPRAZOLE SODIUM 40 MG/1
40 TABLET, DELAYED RELEASE ORAL 2 TIMES DAILY
Qty: 180 TABLET | Refills: 4 | Status: SHIPPED | OUTPATIENT
Start: 2023-11-27

## 2023-11-27 RX ORDER — LOSARTAN POTASSIUM 50 MG/1
50 TABLET ORAL 2 TIMES DAILY
Qty: 60 TABLET | Refills: 0 | Status: SHIPPED | OUTPATIENT
Start: 2023-11-27 | End: 2024-04-26 | Stop reason: HOSPADM

## 2023-11-27 RX ORDER — CARVEDILOL 25 MG/1
25 TABLET ORAL
Qty: 60 TABLET | Refills: 0 | Status: SHIPPED | OUTPATIENT
Start: 2023-11-27 | End: 2024-04-15 | Stop reason: WASHOUT

## 2023-11-27 RX ORDER — SPIRONOLACTONE 25 MG/1
12.5 TABLET ORAL DAILY
Qty: 15 TABLET | Refills: 0 | Status: SHIPPED | OUTPATIENT
Start: 2023-11-27 | End: 2024-01-02 | Stop reason: SDUPTHER

## 2023-11-27 RX ADMIN — FLUTICASONE FUROATE AND VILANTEROL 1 PUFF: 100; 25 POWDER RESPIRATORY (INHALATION) at 08:01

## 2023-11-27 RX ADMIN — SPIRONOLACTONE 12.5 MG: 25 TABLET ORAL at 08:59

## 2023-11-27 RX ADMIN — Medication 3 L/MIN: at 08:00

## 2023-11-27 RX ADMIN — PANTOPRAZOLE SODIUM 40 MG: 40 TABLET, DELAYED RELEASE ORAL at 06:15

## 2023-11-27 RX ADMIN — MULTIPLE VITAMINS W/ MINERALS TAB 1 TABLET: TAB at 08:59

## 2023-11-27 RX ADMIN — APIXABAN 5 MG: 5 TABLET, FILM COATED ORAL at 09:00

## 2023-11-27 RX ADMIN — LEVOTHYROXINE SODIUM 100 MCG: 0.1 TABLET ORAL at 06:15

## 2023-11-27 RX ADMIN — TORSEMIDE 20 MG: 20 TABLET ORAL at 08:59

## 2023-11-27 ASSESSMENT — COGNITIVE AND FUNCTIONAL STATUS - GENERAL
DAILY ACTIVITIY SCORE: 19
TOILETING: A LITTLE
PERSONAL GROOMING: A LITTLE
MOBILITY SCORE: 22
HELP NEEDED FOR BATHING: A LITTLE
DAILY ACTIVITIY SCORE: 19
WALKING IN HOSPITAL ROOM: A LITTLE
DRESSING REGULAR UPPER BODY CLOTHING: A LITTLE
STANDING UP FROM CHAIR USING ARMS: A LITTLE
DRESSING REGULAR LOWER BODY CLOTHING: A LITTLE
DRESSING REGULAR UPPER BODY CLOTHING: A LITTLE
DRESSING REGULAR LOWER BODY CLOTHING: A LITTLE
CLIMB 3 TO 5 STEPS WITH RAILING: A LITTLE
CLIMB 3 TO 5 STEPS WITH RAILING: A LITTLE
MOBILITY SCORE: 18
MOVING TO AND FROM BED TO CHAIR: A LITTLE
TOILETING: A LITTLE
TURNING FROM BACK TO SIDE WHILE IN FLAT BAD: A LITTLE
MOVING FROM LYING ON BACK TO SITTING ON SIDE OF FLAT BED WITH BEDRAILS: A LITTLE
HELP NEEDED FOR BATHING: A LITTLE
WALKING IN HOSPITAL ROOM: A LITTLE
PERSONAL GROOMING: A LITTLE

## 2023-11-27 ASSESSMENT — PAIN SCALES - GENERAL
PAINLEVEL_OUTOF10: 0 - NO PAIN

## 2023-11-27 ASSESSMENT — PAIN - FUNCTIONAL ASSESSMENT
PAIN_FUNCTIONAL_ASSESSMENT: 0-10
PAIN_FUNCTIONAL_ASSESSMENT: 0-10

## 2023-11-27 NOTE — PROGRESS NOTES
Occupational Therapy    Occupational Therapy Treatment    Name: Anitha Welch  MRN: [id number]  : 1940  Date: 23  Time Calculation  Start Time: 1012  Stop Time: 1025  Time Calculation (min): 13 min    Assessment:  Prognosis: Good  Barriers to Discharge: None  End of Session Communication: Bedside nurse  End of Session Patient Position: Bed, 3 rail up (PT arriving to patient's room)  Plan:  Treatment Interventions: ADL retraining, UE strengthening/ROM, Endurance training, Patient/family training, Compensatory technique education  OT Frequency: 2 times per week  OT Discharge Recommendations: Low intensity level of continued care, 24 hr supervision due to cognition  OT Recommended Transfer Status: Stand by assist, Assist of 1  OT - OK to Discharge: Yes    Subjective   Previous Visit Info:  OT Last Visit  OT Received On: 23  General:  General  Prior to Session Communication: Bedside nurse  Patient Position Received: Bed, 2 rail up  Preferred Learning Style: verbal  General Comment: Patient cleared by nursing for therapy. In bed upon arrival and agreeable to participate  Precautions:  Medical Precautions: Fall precautions, Oxygen therapy device and L/min (3L O2 via NC)  Vitals:  Vital Signs  SpO2: (!) 80 % (patient on room air upon arrival at 80%. patient educated to don O2 that was set at 3L, PLB and she recovers to 90%)  Pain Assessment:  Pain Assessment  Pain Assessment: 0-10  Pain Score: 0 - No pain     Objective   Activities of Daily Living: Grooming  Grooming Comments: patient reports she completed all ADLs earlier    Functional Standing Tolerance:  Functional Standing Tolerance  Time: ~2 minutes  Activity: functional mobility  Functional Standing Tolerance Comments: with use of 2WW, CGA for safety < household distances  Bed Mobility/Transfers: Bed Mobility  Bed Mobility: Yes  Bed Mobility 1  Bed Mobility 1: Supine to sitting  Level of Assistance 1: Close supervision  Bed Mobility Comments 1: head  of bed elevated  Bed Mobility 2  Bed Mobility  2: Sitting to supine  Level of Assistance 2: Close supervision  Bed Mobility Comments 2: head of bed elevated    Transfers  Transfer: Yes  Transfer 1  Transfer From 1: Bed to  Transfer to 1: Stand  Technique 1: Sit to stand  Transfer Device 1: Walker  Transfer Level of Assistance 1: Close supervision, Minimum assistance, Minimal verbal cues  Trials/Comments 1: x3 trials    Therapy/Activity: Therapeutic Exercise  Therapeutic Exercise Performed: Yes  Therapeutic Exercise Activity 1: seated EOB, patient completes 1x15 B UE exercises including bicep curls, chest press, shoulder flex/ext, shoulder horizontal abduction/adduction, forearm supination/pronation, wrist flex/ext. Fair form and tolerance noted. completed to increase B UE strength and endurance for daily tasks    Therapeutic Activity  Therapeutic Activity Performed: Yes  Therapeutic Activity 1: patient completes functional mobility < household distances using 2WW with close supervision for safety. she is seated EOB post functional mobility and O2 is reading 87% on 3L O2 via NC. education on PLB and patient recovers to 90%    Outcome Measures:  [facility] Daily Activity  Putting on and taking off regular lower body clothing: A little  Bathing (including washing, rinsing, drying): A little  Putting on and taking off regular upper body clothing: A little  Toileting, which includes using toilet, bedpan or urinal: A little  Taking care of personal grooming such as brushing teeth: A little  Eating Meals: None  Daily Activity - Total Score: 19    Education Documentation  Body Mechanics, taught by Nisha Liz OT at 11/27/2023 11:20 AM.  Learner: Patient  Readiness: Acceptance  Method: Explanation, Demonstration  Response: Needs Reinforcement, Verbalizes Understanding    Precautions, taught by Nisha Liz OT at 11/27/2023 11:20 AM.  Learner: Patient  Readiness: Acceptance  Method: Explanation, Demonstration  Response:  Needs Reinforcement, Verbalizes Understanding    Education Comments  No comments found.      Goals:  Encounter Problems       Encounter Problems (Active)       OT Goals       ADLs (Progressing)       Start:  11/22/23    Expected End:  12/07/23       Patient will complete ADL tasks with [place], in order to increase patient's safety and independence with self-care tasks.         Functional Transfers (Progressing)       Start:  11/22/23    Expected End:  12/07/23       Patient will complete functional transfers with [place] in order to increase safety and independence with daily tasks.         B UE Strengthening (Progressing)       Start:  11/22/23    Expected End:  12/07/23       Patient will increase B UE strength to 4+/5 for functional transfers.         Functional Mobility (Progressing)       Start:  11/22/23    Expected End:  12/07/23       Patient will demonstrate the ability to complete item retrieval and functional mobility with [place] in order to increase patient's safety and independence with daily tasks.

## 2023-11-27 NOTE — PROGRESS NOTES
Subjective Data:  Breathing comfortably    Overnight Events:    No significant new overnight events.     Objective Data:  Last Recorded Vitals:  Vitals:    11/26/23 2100 11/26/23 2300 11/27/23 0800 11/27/23 0812   BP: 140/68 122/51  (!) 124/48   BP Location: Left arm Left arm  Left arm   Patient Position: Lying Lying  Sitting   Pulse: 88 84  79   Resp: 18 18  18   Temp: 36.9 °C (98.4 °F) 37 °C (98.6 °F)  36.3 °C (97.3 °F)   TempSrc: Oral Oral  Oral   SpO2: 97% 92% 94% 95%   Weight:       Height:           Last Labs:  CBC - 11/27/2023:  4:24 AM  7.7 11.3 131    36.8      CMP - 11/27/2023:  4:24 AM  8.6 5.6 11 --- 1.1   2.7 2.9 12 67      PTT - No results in last year.  _   _ _     HGBA1C   Date/Time Value Ref Range Status   01/09/2019 08:05 PM 5.5 4.0 - 6.0 % Final     Comment:     Hemoglobin A1C levels are related to mean blood glucose during the   preceding 2-3 months. The relationship table below may be used as a   general guide. Each 1% increase in HGB A1C is a reflection of an   increase in mean glucose of approximately 30 mg/dl.   Reference: Diabetes Care, volume 29, supplement 1 Jan. 2006                        HGB A1C ................. Approx. Mean Glucose   _______________________________________________   6%   ...............................  120 mg/dl   7%   ...............................  150 mg/dl   8%   ...............................  180 mg/dl   9%   ...............................  210 mg/dl   10%  ...............................  240 mg/dl  Performed at [address] [facility] [address]     05/07/2018 10:43 AM 5.5 4.0 - 6.0 % Final     Comment:     Hemoglobin A1C levels are related to mean blood glucose during the   preceding 2-3 months. The relationship table below may be used as a   general guide. Each 1% increase in HGB A1C is a reflection of an   increase in mean glucose of approximately 30 mg/dl.   Reference: Diabetes Care, volume 29, supplement 1 Jan. 2006                         "HGB A1C ................. Approx. Mean Glucose   _______________________________________________   6%   ...............................  120 mg/dl   7%   ...............................  150 mg/dl   8%   ...............................  180 mg/dl   9%   ...............................  210 mg/dl   10%  ...............................  240 mg/dl  Performed at [address] [address]       LDLCALC   Date/Time Value Ref Range Status   02/21/2023 04:20  65 - 130 MG/DL Final   06/15/2022 08:39 AM 92 65 - 130 MG/DL Final   11/24/2020 11:08 AM 86 65 - 130 MG/DL Final      Last I/O:  I/O last 3 completed shifts:  In: 460 (7.2 mL/kg) [P.O.:460]  Out: 400 (6.2 mL/kg) [Urine:400 (0.2 mL/kg/hr)]  Weight: 64.2 kg     Past Cardiology Tests (Last 3 Years):  EKG:  ECG 12 lead 11/22/2023      Electrocardiogram, 12-lead PRN ACS symptoms 10/18/2023      ECG 12 lead 10/10/2023    Echo:  Transthoracic Echo (TTE) Complete 10/9/2023    Ejection Fractions:  No results found for: \"EF\"  Cath:  No results found for this or any previous visit from the past 1095 days.    Stress Test:  No results found for this or any previous visit from the past 1095 days.    Cardiac Imaging:  No results found for this or any previous visit from the past 1095 days.      Inpatient Medications:  Scheduled medications   Medication Dose Route Frequency    apixaban  5 mg oral q12h    aspirin  81 mg oral Once    atorvastatin  40 mg oral Daily    carvedilol  25 mg oral BID with meals    tiotropium  2 Inhalation inhalation Daily    And    fluticasone furoate-vilanteroL  1 puff inhalation Daily    levothyroxine  100 mcg oral Daily    losartan  50 mg oral BID    multivitamin with minerals  1 tablet oral Daily    oxygen   inhalation q8h    pantoprazole  40 mg oral Daily before breakfast    spironolactone  12.5 mg oral Daily    torsemide  20 mg oral Daily     PRN medications   Medication    acetaminophen    Or    acetaminophen    Or    " acetaminophen    albuterol    benzocaine-menthol    dextromethorphan-guaifenesin    guaiFENesin    morphine    ondansetron ODT    Or    ondansetron    oxygen    polyethylene glycol     Continuous Medications   Medication Dose Last Rate       Physical Exam:   Gen: NAD   Neck: no JVD, carotid upstroke is brisk and without delay   Heart: rrr, s1s2+ no mrg   Lungs: slightly diminished at the bases b/l   Ext: warm no edema       Assessment/Plan   Principal Problem:    Acute on chronic systolic heart failure (CMS/HCC)  Active Problems:    Chronic obstructive pulmonary disease (CMS/HCC)    History of coronary artery stent placement    History of myocardial infarction    History of stroke without residual deficits    History of cerebrovascular accident    Hypothyroidism (acquired)    HFrEF (heart failure with reduced ejection fraction) (CMS/[facility])    Severe mitral valve regurgitation    Heart failure (CMS/HCC)    Left DVT     11/22: As noted in the above assessment we will focus on optimizing medical management of her heart failure.  She is elderly and frail and may not be a good candidate for MitraClip procedure in the setting of her moderately severe mitral regurgitation, and after hospital discharge she will follow-up with her cardiologist, Dr. Foss.     11/23: We will continue with current guideline directed medical therapy for heart failure.  We will continue IV Lasix for at least 1 more day with tentative plans to convert to oral Lasix tomorrow.  We can have further discussions regarding MitraClip candidacy in my office as an outpatient.     11/24: Clinically patient appears to be well compensated.  She has been transition from IV diuretic therapy to oral torsemide 20 mg once daily.  Had ultrasound of lower extremities performed yesterday revealing a deep vein thrombosis of the left lower extremity and is been placed on apixaban as anticoagulant.  As noted above patient does have a significant degree of a mitral  valve regurgitation and Dr. Foss will be considering transcatheter [id number] repair procedure with MitraClip in the future as an outpatient.     11/25: The patient is lying in bed appearing comfortable although stating that she feels generally unwell.  She evidently experienced a fall in the room yesterday.  Chest exhibits diminished breath sounds relatively clear.  O2 saturations are 93% on room air.  She has been transition from IV Lasix to torsemide 20 mg daily.  Will check repeat chest x-ray tomorrow.  She had a lower extremity ultrasound performed yesterday which was positive for DVT involving the left distal femoral popliteal and posterior tibial veins and she was started on Eliquis 5 mg twice daily.  Renal parameters relatively stable creatinine of 0.80 potassium of 3.5.  The proBNP has decreased from [phone].  Magnesium level is 2.20.  Telemetry monitor shows sinus rhythm with PACs.  Systolic blood pressures are in the 140 mmHg range and will uptitrate the dosages of both the losartan and carvedilol.    11/26: Patient appears clinically improved sitting at edge of bed no respiratory distress on low-flow O2 nasal cannula.  Input and output relatively even yesterday.  Patient tolerating uptitrated dosages of the carvedilol and losartan.  Systolic blood pressures have been greater than 120 mmHg.  Renal parameters are stable creatinine of 0.90 potassium 3.6.  Patient currently on torsemide 20 mg daily.  CBC in normal range.  Will most likely be ready for discharge tomorrow and then will be evaluated as an outpatient for possible eligibility for mitral valve clip.  Will check repeat chest x-ray in the morning.  Of note the patient did have a fall in her room yesterday and a head CT was done that showed no acute abnormality.  CT scan of lumbar spine was negative for fracture or spondylolisthesis with moderate multilevel lumbar spondylosis.    11/27: Euvolemic on exam today. Would continue current medical  therapy. Plan for BMP in 1 week. Would like to see her in the office in 1-2 weeks    Peripheral IV 11/22/23 22 G Right;Anterior Forearm (Active)   Site Assessment Clean;Dry;Intact 11/26/23 0800   Dressing Status Dry;Clean 11/26/23 0800   Number of days: 4       Code Status:  DNR and No Intubation    I spent 20 minutes in the professional and overall care of this patient.        Janelle Calvillo is a 83 y.o. female on day 5 of admission presenting with Acute on chronic systolic heart failure (CMS/HCC).

## 2023-11-27 NOTE — NURSING NOTE
Patient medically cleared for discharge home at this time, this RN reviewed discharge paperwork with patient and son. Son transport home.

## 2023-11-27 NOTE — PROGRESS NOTES
PCN informed per Care Coordinator Solo that patient to be discharged today 11/27u. PCN checked and no HHC orders at this time. PCN spoke with Dr. Matthew and the doctor stated he does not think patient will want HHC. PCN met with the patient at the bedside and patient is pleasantly declining HHC services at this time. Patient just finished and discharged from [facility] team on 11/16. PCN informed patient if patient gets home and decides that she wants HHC then the patient can reach out to PCP Dr. Dupree. Care Coordinator, bedside nurse and attending aware.       Omer Mojica

## 2023-11-27 NOTE — PROGRESS NOTES
Physical Therapy    Physical Therapy Treatment    Patient Name: Anitha Welch  MRN: [id number]  Today's Date: 11/27/2023  Time Calculation  Start Time: 1022  Stop Time: 1047  Time Calculation (min): 25 min       Assessment/Plan   PT Assessment  PT Assessment Results: Decreased strength, Decreased endurance, Impaired balance, Decreased safety awareness, Impaired judgement, Decreased cognition, Pain  Rehab Prognosis: Good  End of Session Patient Position: Bed, 3 rail up  PT Plan  Inpatient/Swing Bed or Outpatient: Inpatient  PT Plan  Treatment/Interventions: Bed mobility, Transfer training, Gait training, Therapeutic exercise  PT Plan: Skilled PT  PT Frequency: 4 times per week  PT Discharge Recommendations: Low intensity level of continued care  Equipment Recommended upon Discharge: Wheeled walker  PT Recommended Transfer Status: Assist x1  PT - OK to Discharge: Yes      General Visit Information:   PT  Visit  PT Received On: 11/27/23  Response to Previous Treatment: Patient with no complaints from previous session.  General  Patient Position Received: Bed, 3 rail up  Preferred Learning Style: verbal  General Comment: Pt agreeable to therapy. Pt is on 3L oxygen.    Subjective   Precautions:     Vital Signs:       Objective   Pain:  Pain Assessment  Pain Assessment: 0-10  Pain Score: 0 - No pain  Cognition:     Postural Control:     Extremity/Trunk Assessments:    Activity Tolerance:     Treatments:  Therapeutic Exercise  Therapeutic Exercise Performed: Yes  Therapeutic Exercise Activity 1: Supine ankle pumps, heel slides, hip abduction slides and SAQ x15 reps each. rest breaks throughout due to fatigue.    Bed Mobility  Bed Mobility: Yes  Bed Mobility 1  Bed Mobility 1: Supine to sitting  Level of Assistance 1: Close supervision  Bed Mobility 2  Bed Mobility  2: Sitting to supine  Level of Assistance 2: Close supervision    Ambulation/Gait Training  Ambulation/Gait Training Performed: Yes  Ambulation/Gait Training  1  Surface 1: Level tile  Device 1: Rolling walker  Assistance 1: Minimum assistance, Minimal verbal cues  Comments/Distance (ft) 1: Pt ambulated with RW and 3L oxygen ~75' x1 min assist of 1 to steady. Pt ambulated at slow pace, decreased bilat step length.  Transfers  Transfer: Yes  Transfer 1  Transfer From 1: Sit to  Transfer to 1: Stand  Technique 1: Sit to stand  Transfer Device 1: Walker  Transfer Level of Assistance 1: Close supervision  Transfers 2  Transfer From 2: Stand to  Transfer to 2: Sit  Technique 2: Stand to sit  Transfer Level of Assistance 2: Close supervision    Outcome Measures:  [facility] Basic Mobility  Turning from your back to your side while in a flat bed without using bedrails: A little  Moving from lying on your back to sitting on the side of a flat bed without using bedrails: A little  Moving to and from bed to chair (including a wheelchair): A little  Standing up from a chair using your arms (e.g. wheelchair or bedside chair): A little  To walk in hospital room: A little  Climbing 3-5 steps with railing: A little  Basic Mobility - Total Score: 18    Education Documentation  No documentation found.  Education Comments  No comments found.        OP EDUCATION:       Encounter Problems       Encounter Problems (Active)       Balance       Standing Balance (Progressing)       Start:  11/22/23    Expected End:  12/06/23       Pt will demonstrate good static standing balance to promote safe participation with out of bed activity, transfers, and mobility              Mobility       Ambulation (Progressing)       Start:  11/22/23    Expected End:  12/06/23       Pt will ambulate 50' modified independent assist with LRD to promote safe home mobility              Safety       Safe Mobility Techniques (Progressing)       Start:  11/22/23    Expected End:  12/06/23       Pt will correctly identify and demonstrate safe mobility techniques to reduce their risks for falls during their acute care stay                Transfers       Supine to sit (Progressing)       Start:  11/22/23    Expected End:  12/06/23       Pt will transfer supine to sitting at edge of bed with modified independent assist to promote acute care out of bed activity           Sit to stand (Progressing)       Start:  11/22/23    Expected End:  12/06/23       Pt will transfer sit to standing position with modified independent assist and LRD to promote safe out of bed activity

## 2023-11-27 NOTE — DISCHARGE SUMMARY
Discharge Diagnosis  Acute on chronic systolic heart failure (CMS/[facility])    Issues Requiring Follow-Up  Cardiology follow-up    Test Results Pending At Discharge  Pending Labs       No current pending labs.            Hospital Course  Patient presented to the hospital after having increased shortness of breath.  EMS was called due to patient saturating around 80% at home on home baseline O2 of 2 L.  Patient was admitted to the hospital for acute systolic heart failure with a BNP elevated 10,735.  Patient was seen by cardiology and diuresis was initiated, medication changes were made to the patient's Coreg and losartan and additionally patient Lasix was discontinued and torsemide was started.  Just her prescriptions were sent to her pharmacy.    Patient also was found to have an acute DVT and was started on apixaban.  This prescription was also sent to her pharmacy    Patient tolerated hospital course well, diuresed and blood pressure was monitored and stabilized within acceptable ranges.  Patient will follow-up with her cardiologist as an outpatient was also given information to follow-up with Dr. Foss if needed.    Pertinent Physical Exam At Time of Discharge  Physical Exam  Generally no acute distress  HEENT PERRL EOMI  Cardiovascular S1-S2 heard regular rate rhythm  Lungs clear to auscultation bilaterally  Abdomen nontender nondistended bowel sounds present  Extremities no clubbing cyanosis  Home Medications     Medication List      START taking these medications     apixaban 5 mg tablet; Commonly known as: Eliquis; Take 1 tablet (5 mg)   by mouth every 12 hours.   spironolactone 25 mg tablet; Commonly known as: Aldactone; Take 0.5   tablets (12.5 mg) by mouth once daily.   torsemide 20 mg tablet; Commonly known as: Demadex; Take 1 tablet (20   mg) by mouth once daily.     CHANGE how you take these medications     carvedilol 25 mg tablet; Commonly known as: Coreg; Take 1 tablet (25 mg)   by mouth 2 times a day  with meals.; What changed: medication strength, how   much to take   losartan 50 mg tablet; Commonly known as: Cozaar; Take 1 tablet (50 mg)   by mouth 2 times a day.; What changed: medication strength, how much to   take, when to take this     CONTINUE taking these medications     albuterol 90 mcg/actuation inhaler   aspirin 81 mg EC tablet   atorvastatin 40 mg tablet; Commonly known as: Lipitor   empagliflozin 10 mg; Commonly known as: Jardiance; Take 1 tablet (10 mg)   by mouth once daily.   levothyroxine 100 mcg tablet; Commonly known as: Synthroid, Levoxyl;   Take 1 tablet (100 mcg) by mouth early in the morning.. Do not start   before October 11, 2023.   loratadine 10 mg tablet; Commonly known as: Claritin   Multi Complete with Iron tablet; Generic drug: multivitamin with   minerals   nitroglycerin 0.4 mg SL tablet; Commonly known as: Nitrostat   oxygen gas therapy; Commonly known as: O2   pantoprazole 40 mg EC tablet; Commonly known as: ProtoNix; Take 1 tablet   (40 mg) by mouth once daily in the morning. Take before meals. Do not   crush, chew, or split. Do not start before October 11, 2023.   PreserVision AREDS 2,148 mcg-113 mg-45 mg-17.4mg tablet; Generic drug:   vitamins A,C,E-zinc-copper   tiZANidine 2 mg tablet; Commonly known as: Zanaflex; Take 1 tablet (2   mg) by mouth every 8 hours if needed for muscle spasms.   Trelegy Ellipta 100-62.5-25 mcg blister with device; Generic drug:   fluticasone-umeclidin-vilanter; Inhale 1 puff once daily.   Vitamin B-12 1,000 mcg tablet; Generic drug: cyanocobalamin     STOP taking these medications     furosemide 20 mg tablet; Commonly known as: Lasix       Outpatient Follow-Up  Future Appointments   Date Time Provider Department [place]   12/8/2023  9:30 AM Tuan Foss DO [id number] [facility]   12/14/2023  9:30 AM FELA Devine-CNP [id number] None       Milind Matthew MD

## 2023-11-28 ENCOUNTER — TELEPHONE (OUTPATIENT)
Dept: PRIMARY CARE | Facility: CLINIC | Age: 83
End: 2023-11-28
Payer: MEDICARE

## 2023-11-28 NOTE — TELEPHONE ENCOUNTER
Patient discharged from [facility] to home 11/27/23. Phoned patient in follow up and scheduled a [facility] visit with Milana Hernadez NP 11/29/23 at 2:00 pm. COVID screening completed, no acute concerns voiced.

## 2023-11-29 ENCOUNTER — OFFICE VISIT (OUTPATIENT)
Dept: PRIMARY CARE | Facility: CLINIC | Age: 83
End: 2023-11-29
Payer: MEDICARE

## 2023-11-29 VITALS
BODY MASS INDEX: 21.92 KG/M2 | RESPIRATION RATE: 18 BRPM | DIASTOLIC BLOOD PRESSURE: 64 MMHG | HEART RATE: 86 BPM | OXYGEN SATURATION: 92 % | WEIGHT: 131.6 LBS | TEMPERATURE: 97.3 F | SYSTOLIC BLOOD PRESSURE: 118 MMHG | HEIGHT: 65 IN

## 2023-11-29 DIAGNOSIS — I82.402 LEG DVT (DEEP VENOUS THROMBOEMBOLISM), ACUTE, LEFT (MULTI): ICD-10-CM

## 2023-11-29 DIAGNOSIS — I50.20 HFREF (HEART FAILURE WITH REDUCED EJECTION FRACTION) (MULTI): ICD-10-CM

## 2023-11-29 DIAGNOSIS — R04.0 EPISTAXIS: ICD-10-CM

## 2023-11-29 DIAGNOSIS — R53.1 WEAKNESS: ICD-10-CM

## 2023-11-29 DIAGNOSIS — Z86.73 HISTORY OF CEREBROVASCULAR ACCIDENT: ICD-10-CM

## 2023-11-29 DIAGNOSIS — I50.23 ACUTE ON CHRONIC SYSTOLIC HEART FAILURE (MULTI): Primary | ICD-10-CM

## 2023-11-29 DIAGNOSIS — J44.9 CHRONIC OBSTRUCTIVE PULMONARY DISEASE, UNSPECIFIED COPD TYPE (MULTI): ICD-10-CM

## 2023-11-29 PROCEDURE — 3078F DIAST BP <80 MM HG: CPT | Performed by: NURSE PRACTITIONER

## 2023-11-29 PROCEDURE — 1160F RVW MEDS BY RX/DR IN RCRD: CPT | Performed by: NURSE PRACTITIONER

## 2023-11-29 PROCEDURE — 1036F TOBACCO NON-USER: CPT | Performed by: NURSE PRACTITIONER

## 2023-11-29 PROCEDURE — 1111F DSCHRG MED/CURRENT MED MERGE: CPT | Performed by: NURSE PRACTITIONER

## 2023-11-29 PROCEDURE — 1159F MED LIST DOCD IN RCRD: CPT | Performed by: NURSE PRACTITIONER

## 2023-11-29 PROCEDURE — 1126F AMNT PAIN NOTED NONE PRSNT: CPT | Performed by: NURSE PRACTITIONER

## 2023-11-29 PROCEDURE — 99349 HOME/RES VST EST MOD MDM 40: CPT | Performed by: NURSE PRACTITIONER

## 2023-11-29 PROCEDURE — 3074F SYST BP LT 130 MM HG: CPT | Performed by: NURSE PRACTITIONER

## 2023-11-29 ASSESSMENT — PAIN SCALES - GENERAL: PAINLEVEL: 0-NO PAIN

## 2023-11-29 NOTE — PROGRESS NOTES
Subjective   Patient ID: Anitha Welch is a 83 y.o. female who presents for Hospital Follow-up (CHF exacerbation, acute DVT ).    Visit for 84 y/o female seen today in private home, accompanied by spouse Jesús and son Jose for post acute follow up. Patient is sitting at kitchen table this afternoon. She is alert, able to answer simple questions regarding her health. Her family supplements HPI as needed. Her son Jose manages all of her medications although he does allow patient to manage her own Jardiance and Trelegy inhaler as she becomes anxious if she does not handle these specific medications.     Patient was hospitalized at [facility] from 11/21-11/27 with acute on chronic systolic heart failure. Per ED H/P: This is an 83-year-old female with a past medical history of CHF, HTN, HLD, hypothyroidism, CVA, CKD 3, CAD, DM, COPD who presents to the emergency department with complaints of shortness of breath x 1 day.  Patient states that her shortness of breath started yesterday.  She typically wears 2 L of oxygen at home.  She states that she did not feel like eating today because of shortness of breath.  She denies recent sick contacts.  She denies fevers or chills.  She states over the day, her shortness of breath has increased.  She states it is hard to talk in full sentences because of shortness of breath. She has a history of chronic leg edema.  She had a ultrasound on November 1, 2023 which showed no DVT. Her BNP was elevated 10,735. Hospital stay: Patient was seen by cardiology and diuresis was initiated, medication changes were made to the patient's Coreg and losartan and additionally patient Lasix was discontinued and torsemide was started. She was also was found to have an acute DVT and was started on apixaban.     Patient is scheduled to follow up with cardiologist Dr. Foss on 12/8/23. She also reports that she will be switching her pulmonologist and is scheduled to see Dr. Parra on 12/22/23. She  is currently on oxygen 2L NC. Reports that she has had a few nose bleeds and has had issues with dry nose. She reports a decreased appetite with weight loss. She also tells me that she had a fall while she was in the hospital. She was in the chair and attempted to get into the bed without assistance. She ended up falling onto her stomach. She denies any known injury from the fall. Denies head injury or LOC.    Home Visit:          Medically necessary due to: Illness or condition that results in activity lmitation or restriction that impacts the ability to leave home such as:, Illness or condition that results in activity lmitation or restriction that impacts the ability to leave home such as:, unsteady gait/poor balance.        Current Outpatient Medications:     albuterol 90 mcg/actuation inhaler, Inhale 1 puff every 4 hours if needed., Disp: , Rfl:     apixaban (Eliquis) 5 mg tablet, Take 1 tablet (5 mg) by mouth every 12 hours., Disp: 60 tablet, Rfl: 1    aspirin 81 mg EC tablet, Take 1 tablet (81 mg) by mouth 1 time., Disp: , Rfl:     atorvastatin (Lipitor) 40 mg tablet, once every 24 hours., Disp: , Rfl:     carvedilol (Coreg) 25 mg tablet, Take 1 tablet (25 mg) by mouth 2 times a day with meals., Disp: 60 tablet, Rfl: 0    cyanocobalamin (Vitamin B-12) 1,000 mcg tablet, , Disp: , Rfl:     empagliflozin (Jardiance) 10 mg, Take 1 tablet (10 mg) by mouth once daily., Disp: 90 tablet, Rfl: 3    levothyroxine (Synthroid, Levoxyl) 100 mcg tablet, Take 1 tablet (100 mcg) by mouth early in the morning.. Do not start before October 11, 2023., Disp: , Rfl:     loratadine (Claritin) 10 mg tablet, Take by mouth., Disp: , Rfl:     losartan (Cozaar) 50 mg tablet, Take 1 tablet (50 mg) by mouth 2 times a day., Disp: 60 tablet, Rfl: 0    multivitamin-iron-folic acid (Multi Complete with Iron)  mg-mcg tablet tablet, Take by mouth., Disp: , Rfl:     nitroglycerin (Nitrostat) 0.4 mg SL tablet, , Disp: , Rfl:     oxygen  "(O2) gas therapy, Inhale 2 L/min continuously. Indications: sob, Disp: , Rfl:     pantoprazole (ProtoNix) 40 mg EC tablet, TAKE 1 TABLET BY MOUTH TWICE A DAY, Disp: 180 tablet, Rfl: 4    spironolactone (Aldactone) 25 mg tablet, Take 0.5 tablets (12.5 mg) by mouth once daily., Disp: 15 tablet, Rfl: 0    tiZANidine (Zanaflex) 2 mg tablet, Take 1 tablet (2 mg) by mouth every 8 hours if needed for muscle spasms., Disp: 30 tablet, Rfl: 0    torsemide (Demadex) 20 mg tablet, Take 1 tablet (20 mg) by mouth once daily., Disp: 30 tablet, Rfl: 0    Trelegy Ellipta 100-62.5-25 mcg blister with device, Inhale 1 puff once daily., Disp: 1 each, Rfl: 3    vitamins A,C,E-zinc-copper (PreserVision AREDS) 2,148 mcg-113 mg-45 mg-17.4mg tablet, every 12 hours., Disp: , Rfl:      Review of Systems  Constitutional: Positive for decreased appetite, weight loss. Negative for fever, chills.   HENT:  Negative for trouble swallowing.    Respiratory:  Positive for shortness of breath, oxygen dependent. Negative for cough and wheezing.    Cardiovascular:  Positive for left leg edema. Negative for chest pain and palpitations.   Gastrointestinal:  Negative for abdominal pain, constipation, diarrhea, nausea and vomiting.   Endocrine: Positive for thyroid disease  Genitourinary:  Negative for difficulty urinating.   Musculoskeletal:  Positive for gait problem  Neurological:  Positive for prior stroke, left sided neuropathy. Negative for dizziness and light-headedness.   Psychiatric/Behavioral: Positive for anxiety       Objective   /64 (BP Location: Right arm, Patient Position: Sitting, BP Cuff Size: Adult)   Pulse 86   Temp 36.3 °C (97.3 °F) (Temporal)   Resp 18   Ht 1.651 m (5' 5\")   Wt 59.7 kg (131 lb 9.6 oz)   SpO2 92%   BMI 21.90 kg/m²  Oxygen 2L NC    Physical Exam     General: No acute distress     Appearance: She is alert, chronically ill. Nontoxic.     Comments: Sitting at dining room table.   HENT:      Head: Normocephalic " and atraumatic.      Nose: No congestion or rhinorrhea.      Mouth/Throat:      Mouth: Mucous membranes are moist.      Pharynx: Oropharynx is clear.   Eyes:      General: No scleral icterus.        Right eye: No discharge.         Left eye: No discharge.      Extraocular Movements: Extraocular movements intact.   Neck:      Vascular: No carotid bruit.      Comments: No obvious JVD  Cardiovascular:      Rate and Rhythm: Normal rate.      Pulses: Normal pulses.      Heart sounds: Soft murmur heard.      No friction rub. No gallop.   Pulmonary:      Breath sounds: Somewhat diminished but clear today     Comments: No wheezing, rales or rhonchi. Oxygen 2L NC  Chest:      Chest wall: No tenderness present today  Abdominal:      General: There is no distension.      Tenderness: There is no abdominal tenderness. There is no right CVA tenderness, left CVA tenderness, guarding or rebound.   Musculoskeletal:      Cervical back Neck supple. No rigidity.      Right lower leg: Trace edema     Left lower le+ LE edema, mild calf tenderness  Lymphadenopathy:      Cervical: No cervical adenopathy.   Skin:     Capillary Refill: Capillary refill takes less than 2 seconds.      Coloration: Skin is not jaundiced.      Findings: Scattered ecchymosis BUE   Neurological:      General: No focal deficit present.      Mental Status: She is alert. Mental status is at baseline.     Cranial Nerves: No cranial nerve deficit.      Motor: generalized weakness  Psychiatric:         Behavior: Behavior normal.         Thought Content: Thought content normal.         Judgment: Judgment normal.      Comments: Intermittent anxiety     Assessment/Plan   Diagnoses and all orders for this visit:  Acute on chronic systolic heart failure (CMS/[facility])  Comments:  s/p hospitalization with improvement. Continue Torsemide, Spironolactone. Follow up with cardiology as scheduled  HFrEF (heart failure with reduced ejection fraction) (CMS/[facility])  Comments:  chronic,  last echo showing EF of 40-45%  Chronic obstructive pulmonary disease, unspecified COPD type (CMS/[facility])  Comments:  chronic, oxygen dependent, continue Trelegy Ellipta  Epistaxis  Comments:  acute, intermittent. Easily stopped per patient. Recommend nasal saline/gel.  Leg DVT (deep venous thromboembolism), acute, left (CMS/[facility])  Comments:  acute, pain to calf improving, continue Eliquis  History of cerebrovascular accident  Comments:  chronic, continue aspirin. Will refer to [facility] for PT/OT services  Weakness  Comments:  will refer to [facility]    Patient stable. Her son Jose has picked up all new medications from pharmacy. We discussed importance of taking medications daily as prescribed. Discussed the importance of reducing sodium in diet and monitoring her weight daily. We did go through some of the paperwork in her heart failure folder. She is concerned that her scale is not accurate. Discussed this with her son Jose who was present for exam. I called [employer] who supplies her oxygen about adding humidification. They suggested using a humidifier in the room and trying nasal saline or nasal gel since she is currently only using 2L via NC. Advised pt to contact house calls office with any acute concerns or medication needs. Will follow up with pt in home in 4 weeks as she is high risk for rehospitalization.        Milana Hernadez, APRN-CNP

## 2023-11-30 ENCOUNTER — PATIENT OUTREACH (OUTPATIENT)
Dept: CASE MANAGEMENT | Facility: HOSPITAL | Age: 83
End: 2023-11-30
Payer: MEDICARE

## 2023-11-30 ENCOUNTER — HOME HEALTH ADMISSION (OUTPATIENT)
Dept: HOME HEALTH SERVICES | Facility: HOME HEALTH | Age: 83
End: 2023-11-30
Payer: MEDICARE

## 2023-12-01 ENCOUNTER — HOME CARE VISIT (OUTPATIENT)
Dept: HOME HEALTH SERVICES | Facility: HOME HEALTH | Age: 83
End: 2023-12-01

## 2023-12-01 ENCOUNTER — APPOINTMENT (OUTPATIENT)
Dept: RADIOLOGY | Facility: HOSPITAL | Age: 83
End: 2023-12-01
Payer: MEDICARE

## 2023-12-01 ENCOUNTER — HOSPITAL ENCOUNTER (EMERGENCY)
Facility: HOSPITAL | Age: 83
Discharge: HOME | End: 2023-12-01
Attending: STUDENT IN AN ORGANIZED HEALTH CARE EDUCATION/TRAINING PROGRAM
Payer: MEDICARE

## 2023-12-01 VITALS
WEIGHT: 130 LBS | DIASTOLIC BLOOD PRESSURE: 72 MMHG | OXYGEN SATURATION: 99 % | HEART RATE: 78 BPM | RESPIRATION RATE: 18 BRPM | SYSTOLIC BLOOD PRESSURE: 121 MMHG | TEMPERATURE: 97.7 F | BODY MASS INDEX: 21.63 KG/M2

## 2023-12-01 VITALS
RESPIRATION RATE: 22 BRPM | HEART RATE: 70 BPM | DIASTOLIC BLOOD PRESSURE: 36 MMHG | TEMPERATURE: 98.1 F | OXYGEN SATURATION: 97 % | SYSTOLIC BLOOD PRESSURE: 64 MMHG

## 2023-12-01 DIAGNOSIS — K64.4 EXTERNAL HEMORRHOID: ICD-10-CM

## 2023-12-01 DIAGNOSIS — K59.00 CONSTIPATION, UNSPECIFIED CONSTIPATION TYPE: ICD-10-CM

## 2023-12-01 DIAGNOSIS — K92.1 HEMATOCHEZIA: Primary | ICD-10-CM

## 2023-12-01 LAB
ABO GROUP (TYPE) IN BLOOD: NORMAL
ALBUMIN SERPL-MCNC: 3.4 G/DL (ref 3.5–5)
ALP BLD-CCNC: 79 U/L (ref 35–125)
ALT SERPL-CCNC: 15 U/L (ref 5–40)
ANION GAP SERPL CALC-SCNC: 7 MMOL/L
ANTIBODY SCREEN: NORMAL
APTT PPP: 28.2 SECONDS (ref 22–32.5)
AST SERPL-CCNC: 15 U/L (ref 5–40)
BASOPHILS # BLD AUTO: 0.02 X10*3/UL (ref 0–0.1)
BASOPHILS NFR BLD AUTO: 0.3 %
BILIRUB SERPL-MCNC: 1.1 MG/DL (ref 0.1–1.2)
BUN SERPL-MCNC: 20 MG/DL (ref 8–25)
CALCIUM SERPL-MCNC: 8.9 MG/DL (ref 8.5–10.4)
CHLORIDE SERPL-SCNC: 100 MMOL/L (ref 97–107)
CO2 SERPL-SCNC: 38 MMOL/L (ref 24–31)
CREAT SERPL-MCNC: 1 MG/DL (ref 0.4–1.6)
EOSINOPHIL # BLD AUTO: 0.01 X10*3/UL (ref 0–0.4)
EOSINOPHIL NFR BLD AUTO: 0.2 %
ERYTHROCYTE [DISTWIDTH] IN BLOOD BY AUTOMATED COUNT: 14 % (ref 11.5–14.5)
GFR SERPL CREATININE-BSD FRML MDRD: 56 ML/MIN/1.73M*2
GLUCOSE SERPL-MCNC: 116 MG/DL (ref 65–99)
HCT VFR BLD AUTO: 41.9 % (ref 36–46)
HGB BLD-MCNC: 12.8 G/DL (ref 12–16)
IMM GRANULOCYTES # BLD AUTO: 0.04 X10*3/UL (ref 0–0.5)
IMM GRANULOCYTES NFR BLD AUTO: 0.6 % (ref 0–0.9)
INR PPP: 1.2 (ref 0.9–1.2)
LYMPHOCYTES # BLD AUTO: 0.65 X10*3/UL (ref 0.8–3)
LYMPHOCYTES NFR BLD AUTO: 10.2 %
MAGNESIUM SERPL-MCNC: 2.3 MG/DL (ref 1.6–3.1)
MCH RBC QN AUTO: 29.2 PG (ref 26–34)
MCHC RBC AUTO-ENTMCNC: 30.5 G/DL (ref 32–36)
MCV RBC AUTO: 95 FL (ref 80–100)
MONOCYTES # BLD AUTO: 0.45 X10*3/UL (ref 0.05–0.8)
MONOCYTES NFR BLD AUTO: 7.1 %
NEUTROPHILS # BLD AUTO: 5.2 X10*3/UL (ref 1.6–5.5)
NEUTROPHILS NFR BLD AUTO: 81.6 %
NRBC BLD-RTO: 0 /100 WBCS (ref 0–0)
NT-PROBNP SERPL-MCNC: 3734 PG/ML (ref 0–624)
PLATELET # BLD AUTO: 178 X10*3/UL (ref 150–450)
POTASSIUM SERPL-SCNC: 3.6 MMOL/L (ref 3.4–5.1)
PROT SERPL-MCNC: 6.5 G/DL (ref 5.9–7.9)
PROTHROMBIN TIME: 12.5 SECONDS (ref 9.3–12.7)
RBC # BLD AUTO: 4.39 X10*6/UL (ref 4–5.2)
RH FACTOR (ANTIGEN D): NORMAL
SARS-COV-2 RNA RESP QL NAA+PROBE: NOT DETECTED
SODIUM SERPL-SCNC: 145 MMOL/L (ref 133–145)
TROPONIN T SERPL-MCNC: 25 NG/L
TROPONIN T SERPL-MCNC: 28 NG/L
WBC # BLD AUTO: 6.4 X10*3/UL (ref 4.4–11.3)

## 2023-12-01 PROCEDURE — 99284 EMERGENCY DEPT VISIT MOD MDM: CPT | Mod: 25,CS

## 2023-12-01 PROCEDURE — 85610 PROTHROMBIN TIME: CPT | Performed by: STUDENT IN AN ORGANIZED HEALTH CARE EDUCATION/TRAINING PROGRAM

## 2023-12-01 PROCEDURE — 83735 ASSAY OF MAGNESIUM: CPT | Performed by: STUDENT IN AN ORGANIZED HEALTH CARE EDUCATION/TRAINING PROGRAM

## 2023-12-01 PROCEDURE — 84075 ASSAY ALKALINE PHOSPHATASE: CPT | Performed by: STUDENT IN AN ORGANIZED HEALTH CARE EDUCATION/TRAINING PROGRAM

## 2023-12-01 PROCEDURE — 84484 ASSAY OF TROPONIN QUANT: CPT | Performed by: STUDENT IN AN ORGANIZED HEALTH CARE EDUCATION/TRAINING PROGRAM

## 2023-12-01 PROCEDURE — 36415 COLL VENOUS BLD VENIPUNCTURE: CPT | Performed by: STUDENT IN AN ORGANIZED HEALTH CARE EDUCATION/TRAINING PROGRAM

## 2023-12-01 PROCEDURE — 86901 BLOOD TYPING SEROLOGIC RH(D): CPT | Performed by: STUDENT IN AN ORGANIZED HEALTH CARE EDUCATION/TRAINING PROGRAM

## 2023-12-01 PROCEDURE — 83880 ASSAY OF NATRIURETIC PEPTIDE: CPT | Performed by: STUDENT IN AN ORGANIZED HEALTH CARE EDUCATION/TRAINING PROGRAM

## 2023-12-01 PROCEDURE — 85730 THROMBOPLASTIN TIME PARTIAL: CPT | Performed by: STUDENT IN AN ORGANIZED HEALTH CARE EDUCATION/TRAINING PROGRAM

## 2023-12-01 PROCEDURE — 87635 SARS-COV-2 COVID-19 AMP PRB: CPT | Performed by: STUDENT IN AN ORGANIZED HEALTH CARE EDUCATION/TRAINING PROGRAM

## 2023-12-01 PROCEDURE — 85025 COMPLETE CBC W/AUTO DIFF WBC: CPT | Performed by: STUDENT IN AN ORGANIZED HEALTH CARE EDUCATION/TRAINING PROGRAM

## 2023-12-01 PROCEDURE — 99285 EMERGENCY DEPT VISIT HI MDM: CPT | Mod: CS | Performed by: STUDENT IN AN ORGANIZED HEALTH CARE EDUCATION/TRAINING PROGRAM

## 2023-12-01 PROCEDURE — 71045 X-RAY EXAM CHEST 1 VIEW: CPT

## 2023-12-01 PROCEDURE — 86900 BLOOD TYPING SEROLOGIC ABO: CPT | Performed by: STUDENT IN AN ORGANIZED HEALTH CARE EDUCATION/TRAINING PROGRAM

## 2023-12-01 ASSESSMENT — PAIN - FUNCTIONAL ASSESSMENT: PAIN_FUNCTIONAL_ASSESSMENT: 0-10

## 2023-12-01 ASSESSMENT — PAIN SCALES - GENERAL
PAINLEVEL_OUTOF10: 0 - NO PAIN

## 2023-12-01 ASSESSMENT — COLUMBIA-SUICIDE SEVERITY RATING SCALE - C-SSRS
1. IN THE PAST MONTH, HAVE YOU WISHED YOU WERE DEAD OR WISHED YOU COULD GO TO SLEEP AND NOT WAKE UP?: NO
6. HAVE YOU EVER DONE ANYTHING, STARTED TO DO ANYTHING, OR PREPARED TO DO ANYTHING TO END YOUR LIFE?: NO
2. HAVE YOU ACTUALLY HAD ANY THOUGHTS OF KILLING YOURSELF?: NO

## 2023-12-01 NOTE — ED PROVIDER NOTES
I saw this patient very briefly as the Physician in Triage for rapid assessment, to establish acuity and develop basic plan of care. A more thorough history and physical exam will be documented by treating physician and/or MAC in the emergency department.    History: 83-year-old female with recent hospitalization and found to have DVT, heart failure with exacerbation and started on apixaban presented emergency room for what she reports was shortness of breath and hematochezia.  She reports that she has had approximately 3 days of constipation.  She reports that she did take a single pill form of a laxative earlier today and then had a bowel movement that had bright red blood in it.  She denies any fevers, chills, urinary discomfort.  Denies any increased urinary frequency or urgency.  She reports that her  manages her medications for you.  She states that she never had a colonoscopy.    Exam: Abdomen is soft nontender.  Heart is regular rate and rhythm and lungs are clear to auscultation bilaterally.  Patient in no acute distress.  She is alert and oriented x2 and confused.  Unclear if this is patient baseline.  She states that she does not normally manage her medications and her /son assist her.  She does not appear to be severely anemic and no sign of hemorrhagic shock.  Rectal exam not performed by triage physician.    Plan: Obtain basic laboratory studies including CBC, type and screen and coags.  Patient is a known user of factor Xa inhibitor.  Us for recently diagnosed DVT.  Patient has a unclear specified iodine allergy listed in chart and given that she is hemodynamically stable and not hypoxic on her home O2 will defer to the ED treatment team regarding need for further diagnostic work-up for possible pulm embolism the setting of shortness of breath and recent DVT diagnosis.  EKG, and serial troponins to be obtained.  Patient does not have any significant wheezing or sign of COPD exacerbation.   Will obtain BNP and chest x-ray as part of remainder of work-up for shortness of breath.  Abdomen is soft nontender and will defer to the main ED team regarding need for CT imaging the abdomen.    DO Nicko Diaz DO  12/01/23 1248

## 2023-12-01 NOTE — DISCHARGE INSTRUCTIONS
Be sure to take all medications, over the counter medications or prescription medications only as directed.     Be sure to follow up as directed in 1-2 days.  All of the details of your follow up instructions are detailed in the follow up section of this packet.      If you are being discharged with any pains medications or muscle relaxers (norco, Vicodin, hydrocodone products, Percocet, oxycodone products, flexeril, cyclobenzaprine, robaxin, norflex, brand or generic, or any other pain controlling medications with the exception of Ibuprofen and regular Tylenol, do not drive or operate machinery, climb ladders or participate in any activity that could potentially put yourself or others at risk should you get dizzy, or be/feel impaired at all.        It is important to remember that your care does not end here and you must continue to monitor your condition closely. Please return to the emergency department for any worsening or concerning signs or symptoms as directed by our conversations and the discharge instructions. Otherwise please follow up with your doctor in 2 days if no better or worse. If you do not have a doctor please contact the referral number on your discharge instructions. Please contact any physician specialists provided in your discharge notes as it is very important to follow up with them regarding your condition. If you are unable to reach the physicians provided, please come back to the Emergency Department at any time.       As always, please take medications as directed. If you have any questions at all regarding your medications, please contact the pharmacist, the emergency department, or your doctor. Before taking any medication prescribed in the Emergency Department, please review the medication side effects and drug interactions (<http://www.rxlist.com/script/main/hp.asp>) as they may interact with your home medications.     Having trouble affording medications? Try [employer]  <http://[employer]/>! (This is not a hospital endorsed website, merely a recommendation based on my own personal experiences with [employer])      Return to emergency room without delay for ANY new or worsening pains or for any other symptoms or concerns.      Return with worsening pains, nausea, vomiting, trouble breathing, palpitations, shortness of breath, inability to pass stool or urine, loss of control of stool or urine, any numbness or tingling (that is not normal for you), uncontrolled fevers, the passing of blood or other material in stool or urine, rashes, pains or for any other symptoms or concerns you may have.  You are always welcome to return to the ER at any time for any reason or for any other concerns you may have.    Call to make an appointment with PCP or specialist listed to be seen in 1-2 days for further evaluation. Or return here to the ER with any new or added concerns at any time.

## 2023-12-01 NOTE — ED TRIAGE NOTES
Presents to ED via EMS for rectal bleeding and shortness of breath.  States she has been constipated for the past 2 days and this am, while attempting to have a BM, she noticed bright red blood.  States she wears oxygen at home, but is unsure how many liters.  84% on room air upon arrival.  4 liters NC applied with an increase to 95%.

## 2023-12-01 NOTE — ED PROVIDER NOTES
HPI   Chief Complaint   Patient presents with    Rectal Bleeding    Shortness of Breath       Patient is a 83-year-old female presenting to the emergency department for evaluation of shortness of breath and hematochezia.  Patient has been complaining of constipation for the past 3 days.  She states she took 1 laxative today and after a bowel movement noticed some bright red blood on her stool and therefore was concerned prompting her visit to the emergency department.  She was recently hospitalized for congestive heart failure as well as DVT and is on apixaban.  She has concern for GI bleed as this has happened to her in the past.  She denies chest pain, fever, chills, nausea, vomiting, abdominal pain, recent travel, recent illness, cough, congestion, headaches, hematuria, dysuria.                          [place] Coma Scale Score: 14                  Patient History   Past Medical History:   Diagnosis Date    Anemia     CHF (congestive heart failure) (CMS/HCC)     CVA (cerebral vascular accident) (CMS/HCC)     Deep vein thrombosis (DVT) of calf (CMS/HCC)     left    Diverticulosis     DVT (deep venous thrombosis) (CMS/[facility])     Family history of breast cancer     maternal    Heart disease     History of degenerative disc disease     Hyperlipidemia     Hypertension     Hypothyroidism     Meralgia paresthetica     Osteoarthritis     Osteopenia 2010    hip - DEXA    Personal history of other diseases of the circulatory system     History of hypertension    Personal history of other diseases of the respiratory system     History of chronic obstructive lung disease    Personal history of other endocrine, nutritional and metabolic disease     History of thyroid disorder    Plantar fasciitis     Sciatica     Spinal stenosis     ST elevation (STEMI) myocardial infarction (CMS/HCC)      Past Surgical History:   Procedure Laterality Date    ADENOIDECTOMY      CARDIAC CATHETERIZATION  10/2019    CATARACT EXTRACTION Bilateral  2012    CHOLECYSTECTOMY  1996    laparoscopic    COLONOSCOPY      COLONOSCOPY  10/2018    Dr. Rodriguez    CORONARY STENT PLACEMENT      CYST REMOVAL Right 2013    Excision of Sebaceous cyst right axilla    DILATION AND CURETTAGE OF UTERUS      KNEE ARTHROPLASTY      MR HEAD ANGIO WO IV CONTRAST  2017    MR HEAD ANGIO WO IV CONTRAST [facility]    MR HEAD ANGIO WO IV CONTRAST  2017    MR HEAD ANGIO WO IV CONTRAST [facility]    MR HEAD ANGIO WO IV CONTRAST  02/10/2019    MR HEAD ANGIO WO IV CONTRAST [facility]    OTHER SURGICAL HISTORY  2019    Stent synergy    OTHER SURGICAL HISTORY  2019    Stent synergy    [place] ARTHRP ACETBLR/PROX FEM PROSTC AGRFT/ALGRFT      THYROIDECTOMY, PARTIAL Bilateral 2013    subtotal thyroidectomy    TONSILLECTOMY      TOTAL HIP ARTHROPLASTY Right     UPPER GASTROINTESTINAL ENDOSCOPY  2019    Dr. Galan     Family History   Problem Relation Name Age of Onset    Hyperthyroidism Mother          Treated with radioactive iodine    Hypertension Mother      Diabetes Father's Sister      Hyperthyroidism Sibling       Social History     Tobacco Use    Smoking status: Former     Types: Cigarettes     Quit date:      Years since quittin.9     Passive exposure: Never    Smokeless tobacco: Never   Substance Use Topics    Alcohol use: Yes     Comment: monthly or less    Drug use: Never       Physical Exam   ED Triage Vitals [23 1241]   Temp Heart Rate Resp BP   36.5 °C (97.7 °F) 79 18 113/54      SpO2 Temp Source Heart Rate Source Patient Position   (!) 84 % Oral Monitor --      BP Location FiO2 (%)     -- --       Physical Exam  Vitals and nursing note reviewed.   Constitutional:       General: She is not in acute distress.     Appearance: She is well-developed and normal weight. She is not ill-appearing or toxic-appearing.   HENT:      Head: Normocephalic and atraumatic.      Mouth/Throat:      Mouth: Mucous  membranes are moist.   Eyes:      Extraocular Movements: Extraocular movements intact.      Pupils: Pupils are equal, round, and reactive to light.   Cardiovascular:      Rate and Rhythm: Normal rate and regular rhythm.      Heart sounds: No murmur heard.     No friction rub. No gallop.   Pulmonary:      Effort: Pulmonary effort is normal.      Breath sounds: Normal breath sounds. No wheezing, rhonchi or rales.   Abdominal:      General: Bowel sounds are normal.      Palpations: Abdomen is soft.      Tenderness: There is no abdominal tenderness. There is no guarding or rebound.   Genitourinary:     Rectum: Guaiac result negative.   Musculoskeletal:         General: Normal range of motion.      Cervical back: Normal range of motion.   Skin:     General: Skin is warm and dry.   Neurological:      General: No focal deficit present.      Mental Status: She is alert. She is disoriented.      Comments: Patient is ANOx2 at baseline per son   Psychiatric:         Mood and Affect: Mood normal.         Behavior: Behavior normal.         ED Course & MDM   ED Course as of 12/01/23 1614   Fri Dec 01, 2023   1313 EKG performed at 13: 10 and independently reviewed by provider: Reveals NSR with a rate of 67 bpm, leftward axis, normal intervals, no ST changes, significant T wave inversions noted throughout the precordium from V3 to V6 as well as in the inferior leads of 2, 3, aVF, no ectopy. No STEMI.  Prior T wave inversions noted and EKG from 11/21/2023 along with left axis deviation. [TL]   1533 PROBNP(!): 3,734  Downtrending when compared to previous labs [AJ]      ED Course User Index  [AJ] Tram Doyle PA-C  [TL] Nicko Elaine DO         Diagnoses as of 12/01/23 1614   Hematochezia   External hemorrhoid   Constipation, unspecified constipation type       Medical Decision Making  Parts of this chart have been completed using voice recognition software. Please excuse any errors of transcription. Despite the medical decision  making time stamp above-my medical decision making has taken place during the patient's entire visit. My thought process and reason for plan has been formulated from the time that I saw the patient until the time of disposition and is not specific to one specific moment during their visit and furthermore my MDM encompasses this entire chart and not only this text box.    Patient seen in conjunction with attending physician .     HPI: Detailed above.    Exam: A medically appropriate exam performed, outlined above, given the known history and presentation.    History obtained from: Patient and son at bedside    EKG: Reviewed and interpreted by my attending physician    Social Determinants of Health considered during this visit: Lives at home    Labs/Diagnostics:  Labs Reviewed   CBC WITH AUTO DIFFERENTIAL - Abnormal       Result Value    WBC 6.4      nRBC 0.0      RBC 4.39      Hemoglobin 12.8      Hematocrit 41.9      MCV 95      MCH 29.2      MCHC 30.5 (*)     RDW 14.0      Platelets 178      Neutrophils % 81.6      Immature Granulocytes %, Automated 0.6      Lymphocytes % 10.2      Monocytes % 7.1      Eosinophils % 0.2      Basophils % 0.3      Neutrophils Absolute 5.20      Immature Granulocytes Absolute, Automated 0.04      Lymphocytes Absolute 0.65 (*)     Monocytes Absolute 0.45      Eosinophils Absolute 0.01      Basophils Absolute 0.02     COMPREHENSIVE METABOLIC PANEL - Abnormal    Glucose 116 (*)     Sodium 145      Potassium 3.6      Chloride 100      Bicarbonate 38 (*)     Urea Nitrogen 20      Creatinine 1.00      eGFR 56 (*)     Calcium 8.9      Albumin 3.4 (*)     Alkaline Phosphatase 79      Total Protein 6.5      AST 15      Bilirubin, Total 1.1      ALT 15      Anion Gap 7     N-TERMINAL PROBNP - Abnormal    PROBNP 3,734 (*)     Narrative:     Reference ranges are based on clinical submission data. These ranges represent the 95th percentile of normal cut-off points. As NT Pro- BNP values  approach 1000 pg/ml, clinical symptoms are more likely associated with CHF.   SERIAL TROPONIN, INITIAL ([facility]) - Abnormal    Troponin T, High Sensitivity 28 (*)    SERIAL TROPONIN,  2 HOUR ([facility]) - Abnormal    Troponin T, High Sensitivity 25 (*)    MAGNESIUM - Normal    Magnesium 2.30     SARS-COV-2 PCR, SYMPTOMATIC - Normal    Coronavirus 2019, PCR Not Detected      Narrative:     This assay has received FDA Emergency Use Authorization (EUA) and is only authorized for the duration of time that circumstances exist to justify the authorization of the emergency use of in vitro diagnostic tests for the detection of SARS-CoV-2 virus and/or diagnosis of COVID-19 infection under section 564(b)(1) of the Act, 21 U.S.C. 360bbb-3(b)(1). This assay is an in vitro diagnostic nucleic acid amplification test for the qualitative detection of SARS-CoV-2 from nasopharyngeal specimens and has been validated for use at [facility]. Negative results do not preclude COVID-19 infections and should not be used as the sole basis for diagnosis, treatment, or other management decisions.     COAGULATION SCREEN - Normal    Protime 12.5      INR 1.2      aPTT 28.2      Narrative:     INR Therapeutic Range: 2.0-3.5   TYPE AND SCREEN    ABO TYPE O      Rh TYPE POS      ANTIBODY SCREEN NEG     TROPONIN T SERIES, HIGH SENSITIVITY (0, 2 HR, 6 HR)    Narrative:     The following orders were created for panel order Troponin T Series, High Sensitivity (0, 2HR, 6HR).  Procedure                               Abnormality         Status                     ---------                               -----------         ------                     Serial Troponin, Initial...[[id number]]  Abnormal            Final result               Serial Troponin, 2 Hour ...[[id number]]  Abnormal            Final result               Serial Troponin, 6 Hour ...[[id number]]                                                   Please view results for these  tests on the individual orders.   SERIAL TROPONIN, 6 HOUR ([facility])     XR chest 1 view   Final Result   No acute cardiopulmonary process.        MACRO:   None        Signed by: Cheikh Ruiz 12/1/2023 1:55 PM   Dictation workstation:   [id number]          Considerations/further MDM:  Patient is a 83-year-old female presenting to the emergency department for evaluation of shortness of breath and hematochezia.  On physical exam vital signs stable and patient is in no acute distress.  Diagnostic labs and imaging ordered.  Fecal occult negative.  Patient does have hemorrhoids present which we suspect is causing the bright red blood in her stool especially due to her recent constipation and straining.  Troponins stable at 28 and 25 consistent with patient's previous troponin of 32.  proBNP 3734 however this is down from previous BNP's last week which were in the 10,000s and 4000s.  CMP showed no electrolyte abnormalities.  Magnesium normal.  Coagulation screen normal.  CBC showed no evidence of leukocytosis or anemia.  Chest x-ray showed no acute cardiopulmonary process.  Suspect patient's hematochezia is from straining on her bowel movements and her constipation over the past 3 days causing her hemorrhoids to bleed.  Patient is on her baseline oxygen requirement at 2 L nasal cannula and has no signs of airway compromise or respiratory distress.  Her lungs are clear to auscultation and her proBNP is downtrending as well as her troponins therefore I feel discharge disposition is reasonable.    I estimate there is LOW risk for EPIGLOTTITIS, PNEUMONIA, MENINGITIS, OR URINARY TRACT INFECTION, thus I consider the discharge disposition reasonable. Also, there is no evidence for peritonitis, sepsis, or toxicity. We have discussed the diagnosis and risks, and we agree with discharging home to follow-up with their primary doctor. We also discussed returning to the Emergency Department immediately if new or worsening symptoms  occur. We have discussed the symptoms which are most concerning (e.g., changing or worsening pain, trouble swallowing or breathing, neck stiffness, fever) that necessitate immediate return.      Procedure  Procedures        Tram Doyle PA-C  12/01/23 6814

## 2023-12-04 ENCOUNTER — HOSPITAL ENCOUNTER (OUTPATIENT)
Dept: CARDIOLOGY | Facility: HOSPITAL | Age: 83
Discharge: HOME | End: 2023-12-04
Payer: MEDICARE

## 2023-12-04 PROCEDURE — 93005 ELECTROCARDIOGRAM TRACING: CPT

## 2023-12-07 ENCOUNTER — APPOINTMENT (OUTPATIENT)
Dept: HOME HEALTH SERVICES | Facility: HOME HEALTH | Age: 83
End: 2023-12-07
Payer: MEDICARE

## 2023-12-07 LAB
ATRIAL RATE: 67 BPM
P AXIS: 61 DEGREES
P OFFSET: 190 MS
P ONSET: 137 MS
PR INTERVAL: 148 MS
Q ONSET: 211 MS
QRS COUNT: 11 BEATS
QRS DURATION: 102 MS
QT INTERVAL: 424 MS
QTC CALCULATION(BAZETT): 448 MS
QTC FREDERICIA: 440 MS
R AXIS: -40 DEGREES
T AXIS: -54 DEGREES
T OFFSET: 423 MS
VENTRICULAR RATE: 67 BPM

## 2023-12-08 ENCOUNTER — OFFICE VISIT (OUTPATIENT)
Dept: CARDIOLOGY | Facility: CLINIC | Age: 83
End: 2023-12-08
Payer: MEDICARE

## 2023-12-08 VITALS
BODY MASS INDEX: 21.63 KG/M2 | HEART RATE: 93 BPM | OXYGEN SATURATION: 96 % | SYSTOLIC BLOOD PRESSURE: 108 MMHG | WEIGHT: 130 LBS | DIASTOLIC BLOOD PRESSURE: 58 MMHG

## 2023-12-08 DIAGNOSIS — I34.0 SEVERE MITRAL VALVE REGURGITATION: ICD-10-CM

## 2023-12-08 DIAGNOSIS — I82.4Y2 ACUTE DEEP VEIN THROMBOSIS (DVT) OF PROXIMAL VEIN OF LEFT LOWER EXTREMITY (MULTI): ICD-10-CM

## 2023-12-08 DIAGNOSIS — I50.23 ACUTE ON CHRONIC SYSTOLIC HEART FAILURE (MULTI): Primary | ICD-10-CM

## 2023-12-08 DIAGNOSIS — I25.10 ARTERIOSCLEROSIS OF CORONARY ARTERY: ICD-10-CM

## 2023-12-08 PROCEDURE — 1111F DSCHRG MED/CURRENT MED MERGE: CPT | Performed by: INTERNAL MEDICINE

## 2023-12-08 PROCEDURE — 1036F TOBACCO NON-USER: CPT | Performed by: INTERNAL MEDICINE

## 2023-12-08 PROCEDURE — 3078F DIAST BP <80 MM HG: CPT | Performed by: INTERNAL MEDICINE

## 2023-12-08 PROCEDURE — 1159F MED LIST DOCD IN RCRD: CPT | Performed by: INTERNAL MEDICINE

## 2023-12-08 PROCEDURE — 99214 OFFICE O/P EST MOD 30 MIN: CPT | Performed by: INTERNAL MEDICINE

## 2023-12-08 PROCEDURE — 3074F SYST BP LT 130 MM HG: CPT | Performed by: INTERNAL MEDICINE

## 2023-12-08 PROCEDURE — 1125F AMNT PAIN NOTED PAIN PRSNT: CPT | Performed by: INTERNAL MEDICINE

## 2023-12-08 PROCEDURE — 1160F RVW MEDS BY RX/DR IN RCRD: CPT | Performed by: INTERNAL MEDICINE

## 2023-12-08 ASSESSMENT — ENCOUNTER SYMPTOMS
ORTHOPNEA: 0
WHEEZING: 0
PND: 0
IRREGULAR HEARTBEAT: 0
DYSPNEA ON EXERTION: 1
WEAKNESS: 0
WEIGHT GAIN: 0
DIAPHORESIS: 0
MYALGIAS: 0
FEVER: 0
SHORTNESS OF BREATH: 0
WEIGHT LOSS: 0
CLAUDICATION: 0
DIZZINESS: 0
PALPITATIONS: 0
SYNCOPE: 0
NEAR-SYNCOPE: 0
COUGH: 0

## 2023-12-08 ASSESSMENT — PAIN SCALES - GENERAL: PAINLEVEL: 4

## 2023-12-08 NOTE — PROGRESS NOTES
Subjective      Chief Complaint   Patient presents with    6 month f/u        83-year-old female well-known to me.  She has a history of atherosclerotic heart disease with remote inferior wall myocardial infarction and drug-eluting stent placement.  She has an ischemic cardiomyopathy with an ejection fraction of about 40 to 45% and inferior wall motion abnormalities.  She had an echocardiogram in October showing again an EF of 40 to 45% however her mitral valve regurgitation has progressed to severe from mild in February.  She was hospitalized subsequently in November with heart failure and pna, stay was complicated by LLE DVT she is now on Eliquis.  She was effectively diuresed and we discussed possibly referring her for mitral valve ARMANI.          Review of Systems   Constitutional: Negative for diaphoresis, fever, weight gain and weight loss.   Eyes:  Negative for visual disturbance.   Cardiovascular:  Positive for dyspnea on exertion and leg swelling. Negative for chest pain, claudication, irregular heartbeat, near-syncope, orthopnea, palpitations, paroxysmal nocturnal dyspnea and syncope.   Respiratory:  Negative for cough, shortness of breath and wheezing.    Musculoskeletal:  Negative for muscle weakness and myalgias.   Neurological:  Negative for dizziness and weakness.   All other systems reviewed and are negative.       Past Medical History:   Diagnosis Date    Anemia     CHF (congestive heart failure) (CMS/HCC)     CVA (cerebral vascular accident) (CMS/HCC)     Deep vein thrombosis (DVT) of calf (CMS/HCC)     left    Diverticulosis     DVT (deep venous thrombosis) (CMS/HCC)     Family history of breast cancer     maternal    Heart disease     History of degenerative disc disease     Hyperlipidemia     Hypertension     Hypothyroidism     Meralgia paresthetica     Osteoarthritis     Osteopenia 2010    hip - DEXA    Personal history of other diseases of the circulatory system     History of hypertension     Personal history of other diseases of the respiratory system     History of chronic obstructive lung disease    Personal history of other endocrine, nutritional and metabolic disease     History of thyroid disorder    Plantar fasciitis     Sciatica     Spinal stenosis     ST elevation (STEMI) myocardial infarction (CMS/HCC)         Past Surgical History:   Procedure Laterality Date    ADENOIDECTOMY      CARDIAC CATHETERIZATION  10/2019    CATARACT EXTRACTION Bilateral 11/13/2012    CHOLECYSTECTOMY  1996    laparoscopic    COLONOSCOPY  2010    COLONOSCOPY  10/2018    Dr. Rodriguez    CORONARY STENT PLACEMENT      CYST REMOVAL Right 06/24/2013    Excision of Sebaceous cyst right axilla    DILATION AND CURETTAGE OF UTERUS      KNEE ARTHROPLASTY      MR HEAD ANGIO WO IV CONTRAST  02/24/2017    MR HEAD ANGIO WO IV CONTRAST [facility]    MR HEAD ANGIO WO IV CONTRAST  08/11/2017    MR HEAD ANGIO WO IV CONTRAST [facility]    MR HEAD ANGIO WO IV CONTRAST  02/10/2019    MR HEAD ANGIO WO IV CONTRAST [facility]    OTHER SURGICAL HISTORY  01/09/2019    Stent synergy    OTHER SURGICAL HISTORY  01/09/2019    Stent synergy    [place] ARTHRP ACETBLR/PROX FEM PROSTC AGRFT/ALGRFT      THYROIDECTOMY, PARTIAL Bilateral 04/17/2013    subtotal thyroidectomy    TONSILLECTOMY      TOTAL HIP ARTHROPLASTY Right 2006    UPPER GASTROINTESTINAL ENDOSCOPY  03/2019    Dr. Galan        Social History     Socioeconomic History    Marital status:      Spouse name: Not on file    Number of children: Not on file    Years of education: Not on file    Highest education level: Not on file   Occupational History    Not on file   Tobacco Use    Smoking status: Former     Types: Cigarettes     Passive exposure: Never    Smokeless tobacco: Never   Substance and Sexual Activity    Alcohol use: Not Currently    Drug use: Never    Sexual activity: Not on file   Other Topics Concern    Not on file   Social History Narrative    Not  on file     Social Determinants of Health     Financial Resource Strain: Low Risk  (11/22/2023)    Overall Financial Resource Strain (CARDIA)     Difficulty of Paying Living Expenses: Not hard at all   Food Insecurity: No Food Insecurity (11/22/2023)    Hunger Vital Sign     Worried About Running Out of Food in the Last Year: Never true     Ran Out of Food in the Last Year: Never true   Transportation Needs: No Transportation Needs (11/22/2023)    PRAPARE - Transportation     Lack of Transportation (Medical): No     Lack of Transportation (Non-Medical): No   Physical Activity: Insufficiently Active (11/22/2023)    Exercise Vital Sign     Days of Exercise per Week: 3 days     Minutes of Exercise per Session: 20 min   Stress: No Stress Concern Present (11/22/2023)    Citizen of [place] of Occupational Health - Occupational Stress Questionnaire     Feeling of Stress : Not at all   Social Connections: Moderately Isolated (11/22/2023)    Social Connection and Isolation Panel [NHANES]     Frequency of Communication with Friends and Family: Once a week     Frequency of Social Gatherings with Friends and Family: Twice a week     Attends Protestant Services: Never     Active Member of Clubs or Organizations: No     Attends Club or Organization Meetings: Never     Marital Status:    Intimate Partner Violence: Not At Risk (11/22/2023)    Humiliation, Afraid, Rape, and Kick questionnaire     Fear of Current or Ex-Partner: No     Emotionally Abused: No     Physically Abused: No     Sexually Abused: No   Housing Stability: Low Risk  (11/22/2023)    Housing Stability Vital Sign     Unable to Pay for Housing in the Last Year: No     Number of Places Lived in the Last Year: 1     Unstable Housing in the Last Year: No        Family History   Problem Relation Name Age of Onset    Hyperthyroidism Mother          Treated with radioactive iodine    Hypertension Mother      Diabetes Father's Sister      Hyperthyroidism Sibling           OBJECTIVE:    Vitals:    12/08/23 0930   BP: 108/58   Pulse: 93   SpO2: 96%        Vitals reviewed.   Constitutional:       Appearance: Normal and healthy appearance. Not in distress.   Pulmonary:      Effort: Pulmonary effort is normal.      Breath sounds: Normal breath sounds.   Cardiovascular:      Normal rate. Regular rhythm. Normal S1. Normal S2.       Murmurs: There is no murmur.      No gallop.  No click.   Pulses:     Intact distal pulses.   Edema:     Pretibial: 1+ pitting edema of the left pretibial area.     Ankle: 1+ pitting edema of the left ankle.     Feet: 1+ pitting edema of the left foot.  Skin:     General: Skin is warm and dry.   Neurological:      General: No focal deficit present.          Lab Review:   Lab Results   Component Value Date     12/01/2023    K 3.6 12/01/2023     12/01/2023    CO2 38 (H) 12/01/2023    BUN 20 12/01/2023    CREATININE 1.00 12/01/2023    GLUCOSE 116 (H) 12/01/2023    CALCIUM 8.9 12/01/2023     Lab Results   Component Value Date    CHOL 190 02/21/2023    TRIG 64 02/21/2023    HDL 52 02/21/2023       Lab Results   Component Value Date    LDLCALC 125 02/21/2023        Acute deep vein thrombosis (DVT) of proximal vein of left lower extremity (CMS/HCC)  On eliquis    Acute on chronic systolic heart failure (CMS/HCC)  Euvolemic today, would continue diuretic regimen as is. Continue BB, losartan, aldactone, Jardiance.     Arteriosclerosis of coronary artery  Stbale without angina. Continue statin, no need for asa while on eliquis    Severe mitral valve regurgitation  Severe symptomatic. Will discuss ARMANI candidacy with our structural heart team.

## 2023-12-08 NOTE — ASSESSMENT & PLAN NOTE
Euvolemic today, would continue diuretic regimen as is. Continue BB, losartan, aldactone, Jardiance.

## 2023-12-14 ENCOUNTER — APPOINTMENT (OUTPATIENT)
Dept: PRIMARY CARE | Facility: CLINIC | Age: 83
End: 2023-12-14
Payer: MEDICARE

## 2023-12-15 ENCOUNTER — TELEPHONE (OUTPATIENT)
Dept: CARDIOLOGY | Facility: HOSPITAL | Age: 83
End: 2023-12-15
Payer: MEDICARE

## 2023-12-15 DIAGNOSIS — I34.0 NONRHEUMATIC MITRAL VALVE REGURGITATION: Primary | ICD-10-CM

## 2023-12-15 NOTE — TELEPHONE ENCOUNTER
Called her to explain transesophageal echocardiography and to arrange 1 for her to further assess her mitral valve.  She wishes to wait until after the holidays we will set 1 up for the week of January 8.

## 2023-12-18 ENCOUNTER — TELEPHONE (OUTPATIENT)
Dept: CARDIOLOGY | Facility: CLINIC | Age: 83
End: 2023-12-18
Payer: MEDICARE

## 2023-12-18 NOTE — TELEPHONE ENCOUNTER
Called patient to let her know she has her SAMMY scheduled for 01/08/24 at 8am at [facility]. Per Nanda in scheduling, no auth needed as patient has medicare/medicaid.

## 2023-12-28 ENCOUNTER — APPOINTMENT (OUTPATIENT)
Dept: PRIMARY CARE | Facility: CLINIC | Age: 83
End: 2023-12-28
Payer: MEDICARE

## 2023-12-28 ENCOUNTER — PATIENT OUTREACH (OUTPATIENT)
Dept: CASE MANAGEMENT | Facility: HOSPITAL | Age: 83
End: 2023-12-28

## 2023-12-28 NOTE — PROGRESS NOTES
Follow up phone call made by CHF Clinical Nurse Navigator. Spoke with Anitha's son,Jose (main caregiver), reports that she is doing well. Continues to perform daily weights,follows low Na diet, watches fluid intake, takes medications as directed. Reviewed HF flare up symptoms to report to MD. Completed follow up appts with PCP & Cardiologist. HF goals met. Closed HF case today.

## 2023-12-29 ENCOUNTER — TELEPHONE (OUTPATIENT)
Dept: PRIMARY CARE | Facility: CLINIC | Age: 83
End: 2023-12-29
Payer: MEDICARE

## 2024-01-02 ENCOUNTER — OFFICE VISIT (OUTPATIENT)
Dept: PRIMARY CARE | Facility: CLINIC | Age: 84
End: 2024-01-02
Payer: MEDICARE

## 2024-01-02 ENCOUNTER — HOME HEALTH ADMISSION (OUTPATIENT)
Dept: HOME HEALTH SERVICES | Facility: HOME HEALTH | Age: 84
End: 2024-01-02
Payer: MEDICARE

## 2024-01-02 VITALS
SYSTOLIC BLOOD PRESSURE: 100 MMHG | OXYGEN SATURATION: 98 % | RESPIRATION RATE: 20 BRPM | HEART RATE: 57 BPM | DIASTOLIC BLOOD PRESSURE: 52 MMHG | TEMPERATURE: 97.7 F | BODY MASS INDEX: 21.97 KG/M2 | WEIGHT: 132 LBS

## 2024-01-02 DIAGNOSIS — I25.5 ISCHEMIC CARDIOMYOPATHY: ICD-10-CM

## 2024-01-02 DIAGNOSIS — E03.9 HYPOTHYROIDISM (ACQUIRED): ICD-10-CM

## 2024-01-02 DIAGNOSIS — R04.0 EPISTAXIS: ICD-10-CM

## 2024-01-02 DIAGNOSIS — I50.9 HEART FAILURE (MULTI): Primary | ICD-10-CM

## 2024-01-02 DIAGNOSIS — R53.1 WEAKNESS: ICD-10-CM

## 2024-01-02 DIAGNOSIS — I10 PRIMARY HYPERTENSION: ICD-10-CM

## 2024-01-02 DIAGNOSIS — I82.4Y2 ACUTE DEEP VEIN THROMBOSIS (DVT) OF PROXIMAL VEIN OF LEFT LOWER EXTREMITY (MULTI): ICD-10-CM

## 2024-01-02 DIAGNOSIS — I50.23 ACUTE ON CHRONIC SYSTOLIC HEART FAILURE (MULTI): ICD-10-CM

## 2024-01-02 DIAGNOSIS — R53.1 ASTHENIA DUE TO DISEASE: ICD-10-CM

## 2024-01-02 PROBLEM — I65.22 STENOSIS OF LEFT CAROTID ARTERY: Status: ACTIVE | Noted: 2023-02-21

## 2024-01-02 PROBLEM — J01.90 ACUTE SINUSITIS: Status: RESOLVED | Noted: 2024-01-02 | Resolved: 2024-01-02

## 2024-01-02 PROBLEM — I73.9 PERIPHERAL VASCULAR DISEASE (CMS-HCC): Status: ACTIVE | Noted: 2023-02-21

## 2024-01-02 PROBLEM — N39.0 ACUTE URINARY TRACT INFECTION: Status: RESOLVED | Noted: 2023-04-20 | Resolved: 2024-01-02

## 2024-01-02 PROBLEM — K59.00 CONSTIPATION: Status: ACTIVE | Noted: 2024-01-02

## 2024-01-02 PROBLEM — G25.81 RESTLESS LEGS SYNDROME: Status: ACTIVE | Noted: 2023-02-21

## 2024-01-02 PROBLEM — G93.32 CHRONIC FATIGUE SYNDROME: Status: ACTIVE | Noted: 2023-09-02

## 2024-01-02 PROBLEM — H53.9 VISION DISORDER: Status: ACTIVE | Noted: 2021-07-02

## 2024-01-02 PROBLEM — I82.90 VENOUS THROMBOEMBOLISM (VTE): Status: ACTIVE | Noted: 2023-02-21

## 2024-01-02 PROBLEM — Z20.822 CONTACT WITH AND (SUSPECTED) EXPOSURE TO COVID-19: Status: RESOLVED | Noted: 2023-03-02 | Resolved: 2024-01-02

## 2024-01-02 PROCEDURE — 3078F DIAST BP <80 MM HG: CPT | Performed by: NURSE PRACTITIONER

## 2024-01-02 PROCEDURE — 99349 HOME/RES VST EST MOD MDM 40: CPT | Performed by: NURSE PRACTITIONER

## 2024-01-02 PROCEDURE — 3074F SYST BP LT 130 MM HG: CPT | Performed by: NURSE PRACTITIONER

## 2024-01-02 PROCEDURE — 1159F MED LIST DOCD IN RCRD: CPT | Performed by: NURSE PRACTITIONER

## 2024-01-02 PROCEDURE — 1126F AMNT PAIN NOTED NONE PRSNT: CPT | Performed by: NURSE PRACTITIONER

## 2024-01-02 PROCEDURE — 1160F RVW MEDS BY RX/DR IN RCRD: CPT | Performed by: NURSE PRACTITIONER

## 2024-01-02 PROCEDURE — 1036F TOBACCO NON-USER: CPT | Performed by: NURSE PRACTITIONER

## 2024-01-02 RX ORDER — SPIRONOLACTONE 25 MG/1
12.5 TABLET ORAL DAILY
Qty: 45 TABLET | Refills: 3 | Status: SHIPPED | OUTPATIENT
Start: 2024-01-02 | End: 2024-02-06 | Stop reason: SDUPTHER

## 2024-01-02 RX ORDER — TORSEMIDE 20 MG/1
20 TABLET ORAL DAILY
Qty: 90 TABLET | Refills: 3 | Status: SHIPPED | OUTPATIENT
Start: 2024-01-02 | End: 2024-02-06 | Stop reason: SDUPTHER

## 2024-01-02 ASSESSMENT — PAIN SCALES - GENERAL: PAINLEVEL: 0-NO PAIN

## 2024-01-02 ASSESSMENT — ENCOUNTER SYMPTOMS
ENDOCRINE NEGATIVE: 1
SHORTNESS OF BREATH: 1
HEMATOLOGIC/LYMPHATIC NEGATIVE: 1
RHINORRHEA: 1
WEAKNESS: 1
BACK PAIN: 1
ALLERGIC/IMMUNOLOGIC NEGATIVE: 1
FATIGUE: 1
ARTHRALGIAS: 1
CHEST TIGHTNESS: 1
PSYCHIATRIC NEGATIVE: 1
CHILLS: 1
DIARRHEA: 1
EYES NEGATIVE: 1

## 2024-01-02 NOTE — PROGRESS NOTES
"Subjective   Patient ID: Anitha Welch is a 83 y.o. female who is being seen for [facility] follow up..    HPI Pt seen in private single family home accompanied by son and . PMHx: Altered mobility, weakness, CHF, Angina, HTN, Anemia, CVA, CKD, GERD, DVT,  Hypothyroid. Pt seen sitting on couch wearing her oxygen at 3 liters per nasal cannula. Pt alert and oriented and cooperative with examination. Pt with c/o bloody nose for two weeks. States that she gets  \"dried blood clots\" out of nose twice daily. Denies blood running out of nose, just accumulates in the nose causing the \"blood clot\". Pt with humidification on 02. Discussed cauterization of the nose which pt thinks she would like to do and informed that she would need to follow with an ENT for that procedure. Pt with c/o fatigue but still trying to do as much as she can around the house. Pt wash clothes and does her own medications, son does cooking.  Pt only leaves the house for medical appts which her son drives her to. Pt uses rollator when out of the house, has a cane for inside but rarely uses it. Pt reports fall last week when getting off the couch she twisted her ankle and fell between the couch and coffee table. Injury to left ankle that was making it difficult to ambulate. Pt took tylenol and used ice on it for about a week.  Pt feels as though her mobility is decreasing and she is getting weaker and would like to be able to do more around the house. Pt requesting physical therapy to try to increase her mobility and decrease her fall risk.   Pt denies fevers, night sweats headaches or dizziness, reports having chills. Pt with c/o SOB and chest tightness, denies cough or wheezing. Pt denies N/V or constipation, admits to diarrhea. Denies dysuria, frequency, or hematuria. Pt state appetite has been good over the holidays. States sleep is good and does not usually nap during the day. Pt with no other complaints or concerns at this time. " Home Visit medically necessary due to: pt has chronic condition that makes access to a traditional office visit very difficult, illness or condition that results in activity limitation or restriction that impacts the ability to leave home such as; unsteady gait/ poor condition.  F2F for PT due to altered mobility, weakness, COPD Pt will require home health care services of Physical Therapy in order to improve strength, balance and endurance all while decreasing pain and fall risk.       Review of Systems   Constitutional:  Positive for chills and fatigue.   HENT:  Positive for nosebleeds and rhinorrhea.    Eyes: Negative.    Respiratory:  Positive for chest tightness and shortness of breath.    Gastrointestinal:  Positive for diarrhea.   Endocrine: Negative.    Genitourinary: Negative.    Musculoskeletal:  Positive for arthralgias, back pain and gait problem.   Skin: Negative.    Allergic/Immunologic: Negative.    Neurological:  Positive for weakness.   Hematological: Negative.    Psychiatric/Behavioral: Negative.         Objective   /52 (BP Location: Left arm, Patient Position: Sitting)   Pulse 57   Temp 36.5 °C (97.7 °F) (Temporal)   Resp 20   Wt 59.9 kg (132 lb)   SpO2 98%   BMI 21.97 kg/m²     Current Outpatient Medications:     albuterol 90 mcg/actuation inhaler, Inhale 1 puff every 4 hours if needed., Disp: , Rfl:     apixaban (Eliquis) 5 mg tablet, Take 1 tablet (5 mg) by mouth every 12 hours., Disp: 60 tablet, Rfl: 1    atorvastatin (Lipitor) 40 mg tablet, once every 24 hours., Disp: , Rfl:     carvedilol (Coreg) 25 mg tablet, Take 1 tablet (25 mg) by mouth 2 times a day with meals., Disp: 60 tablet, Rfl: 0    cyanocobalamin (Vitamin B-12) 1,000 mcg tablet, , Disp: , Rfl:     empagliflozin (Jardiance) 10 mg, Take 1 tablet (10 mg) by mouth once daily., Disp: 90 tablet, Rfl: 3    levothyroxine (Synthroid, Levoxyl) 100 mcg tablet, Take 1 tablet (100 mcg) by mouth early in the morning.. Do not start  before October 11, 2023., Disp: , Rfl:     loratadine (Claritin) 10 mg tablet, Take by mouth., Disp: , Rfl:     losartan (Cozaar) 50 mg tablet, Take 1 tablet (50 mg) by mouth 2 times a day., Disp: 60 tablet, Rfl: 0    multivitamin-iron-folic acid (Multi Complete with Iron)  mg-mcg tablet tablet, Take by mouth., Disp: , Rfl:     nitroglycerin (Nitrostat) 0.4 mg SL tablet, , Disp: , Rfl:     oxygen (O2) gas therapy, Inhale 2 L/min continuously. Indications: sob, Disp: , Rfl:     pantoprazole (ProtoNix) 40 mg EC tablet, TAKE 1 TABLET BY MOUTH TWICE A DAY, Disp: 180 tablet, Rfl: 4    spironolactone (Aldactone) 25 mg tablet, Take 0.5 tablets (12.5 mg) by mouth once daily., Disp: 45 tablet, Rfl: 3    tiZANidine (Zanaflex) 2 mg tablet, Take 1 tablet (2 mg) by mouth every 8 hours if needed for muscle spasms., Disp: 30 tablet, Rfl: 0    torsemide (Demadex) 20 mg tablet, Take 1 tablet (20 mg) by mouth once daily., Disp: 90 tablet, Rfl: 3    Trelegy Ellipta 100-62.5-25 mcg blister with device, Inhale 1 puff once daily., Disp: 1 each, Rfl: 3    vitamins A,C,E-zinc-copper (PreserVision AREDS) 2,148 mcg-113 mg-45 mg-17.4mg tablet, every 12 hours., Disp: , Rfl:     Physical Exam  Constitutional:       Appearance: She is normal weight. She is ill-appearing.   HENT:      Head: Normocephalic and atraumatic.      Nose: Nose normal.      Mouth/Throat:      Mouth: Mucous membranes are dry.   Eyes:      Conjunctiva/sclera: Conjunctivae normal.      Pupils: Pupils are equal, round, and reactive to light.   Cardiovascular:      Rate and Rhythm: Normal rate and regular rhythm.      Pulses: Normal pulses.      Heart sounds: Normal heart sounds.   Pulmonary:      Breath sounds: Decreased air movement present. Examination of the right-upper field reveals decreased breath sounds. Examination of the left-upper field reveals decreased breath sounds. Examination of the right-middle field reveals decreased breath sounds. Examination of the  left-middle field reveals decreased breath sounds. Examination of the right-lower field reveals decreased breath sounds. Examination of the left-lower field reveals decreased breath sounds. Decreased breath sounds present.   Abdominal:      General: Bowel sounds are normal.      Palpations: Abdomen is soft.   Musculoskeletal:         General: Swelling present.      Cervical back: Normal range of motion and neck supple.      Right lower le+ Edema present.      Left lower le+ Edema present.      Left ankle: Swelling present.   Skin:     General: Skin is warm and dry.      Capillary Refill: Capillary refill takes 2 to 3 seconds.      Coloration: Skin is pale.      Findings: Bruising present.      Comments: Scattered ecchymosis upper extremities.    Dry flaky skin to left lower leg   Neurological:      General: No focal deficit present.      Mental Status: She is alert and oriented to person, place, and time.      Motor: Weakness present.      Gait: Gait abnormal.   Psychiatric:         Mood and Affect: Mood normal.         Behavior: Behavior normal.         Thought Content: Thought content normal.         Judgment: Judgment normal.       Patient Active Problem List   Diagnosis    Epistaxis    Abdominal aortic aneurysm (AAA) without rupture (CMS/HCC)    Acute low back pain    Acute on chronic systolic heart failure (CMS/HCC)    Acute renal failure syndrome (CMS/HCC)    Acute ST segment elevation myocardial infarction (CMS/HCC)    Arteriosclerosis of coronary artery    Artificial lens present    Benign essential hypertension    Anemia due to blood loss    Stenosis of left carotid artery    Cerebrovascular accident (CVA) involving left cerebral hemisphere (CMS/HCC)    Cerebrovascular accident (CVA) due to embolism of cerebral artery (CMS/HCC)    Cerebrovascular accident (CMS/HCC)    Transient ischemic attack    Angina pectoris (CMS/HCC)    Backache    Chest pain    Tight chest    Chronic diastolic heart failure  (CMS/[facility])    Chronic heart failure with preserved ejection fraction (CMS/[facility])    Stage 3 chronic kidney disease (CMS/[facility])    Acute exacerbation of chronic obstructive airways disease (CMS/[facility])    Chronic obstructive pulmonary disease (CMS/[facility])    Claudication (CMS/[facility])    Cystocele with prolapse    Vaginal prolapse    Dehydration    Dermatochalasis of left upper eyelid    Dermatochalasis of right upper eyelid    Vision disorder    Dysgeusia    Dyspnea    Electrocardiogram abnormal    Facial weakness    Fall    Gastroesophageal reflux disease    Gastrointestinal hemorrhage    Headache    History of coronary artery stent placement    History of myocardial infarction    History of stroke without residual deficits    History of cerebrovascular accident    Hypothyroidism (acquired)    Ischemic cardiomyopathy    Lacunar infarct, acute (CMS/[facility])    Lumbago-sciatica due to displacement of lumbar intervertebral disc    Lumbar degenerative disc disease    Mixed hyperlipidemia    Overactive bladder    Pain of lower extremity    Pinguecula    Hypertension    Low blood pressure    Near syncope    Peripheral vascular disease (CMS/[facility])    Right homonymous hemianopsia    Seizure (CMS/[facility])    Stented coronary artery    Syncope and collapse    Tear film insufficiency    Urinary urgency    Varicose veins of both legs with edema    Floaters in visual field    Vitreous degeneration    Asthenia    Chronic fatigue syndrome    Weakness    Acute respiratory failure with hypoxia (CMS/[facility])    Acquired hypothyroidism    Bilateral carotid artery stenosis    Restless legs syndrome    HFrEF (heart failure with reduced ejection fraction) (CMS/[facility])    Severe mitral valve regurgitation    Heart failure (CMS/[facility])    Acute deep vein thrombosis (DVT) of proximal vein of left lower extremity (CMS/[facility])    Constipation    Venous thromboembolism (VTE)         Assessment/Plan   Diagnoses and all orders for this visit:  Heart failure (CMS/[facility])  -      spironolactone (Aldactone) 25 mg tablet; Take 0.5 tablets (12.5 mg) by mouth once daily.  -     torsemide (Demadex) 20 mg tablet; Take 1 tablet (20 mg) by mouth once daily.  Primary hypertension  Comments:  Continue Coreg and Cozaar  Orders:  -     spironolactone (Aldactone) 25 mg tablet; Take 0.5 tablets (12.5 mg) by mouth once daily.  -     torsemide (Demadex) 20 mg tablet; Take 1 tablet (20 mg) by mouth once daily.  Acute on chronic systolic heart failure (CMS/HCC)  Comments:  Continue Aldactone and Demadex  Low sodium diet  Orders:  -     spironolactone (Aldactone) 25 mg tablet; Take 0.5 tablets (12.5 mg) by mouth once daily.  -     torsemide (Demadex) 20 mg tablet; Take 1 tablet (20 mg) by mouth once daily.  Ischemic cardiomyopathy  Comments:  Continue Jardiance  Weakness  Comments:  Physical therapy evaluationa and treatment  Acute deep vein thrombosis (DVT) of proximal vein of left lower extremity (CMS/HCC)  Comments:  Continue Eliquis  Epistaxis  Comments:  Nasal Saline spray every hour to nasal passages and prn  Hypothyroidism (acquired)  Comments:  Continue Synthroid  Follow up in 2 months  Elham Reeves, APRN-CNP

## 2024-01-03 ENCOUNTER — DOCUMENTATION (OUTPATIENT)
Dept: HOME HEALTH SERVICES | Facility: HOME HEALTH | Age: 84
End: 2024-01-03
Payer: MEDICARE

## 2024-01-03 NOTE — HH CARE COORDINATION
Home Care received a Referral for Physical Therapy. We have processed the referral for a Start of Care on 01/04-01/05.     If you have any questions or concerns, please feel free to contact us at [phone]. Follow the prompts, enter your five digit zip code, and you will be directed to your care team on [facility] 1.

## 2024-01-06 ENCOUNTER — HOME CARE VISIT (OUTPATIENT)
Dept: HOME HEALTH SERVICES | Facility: HOME HEALTH | Age: 84
End: 2024-01-06
Payer: MEDICARE

## 2024-01-06 VITALS
DIASTOLIC BLOOD PRESSURE: 61 MMHG | OXYGEN SATURATION: 98 % | SYSTOLIC BLOOD PRESSURE: 113 MMHG | RESPIRATION RATE: 22 BRPM | HEART RATE: 66 BPM | TEMPERATURE: 97.8 F

## 2024-01-06 PROCEDURE — 169592 NO-PAY CLAIM PROCEDURE

## 2024-01-06 PROCEDURE — 1090000002 HH PPS REVENUE DEBIT

## 2024-01-06 PROCEDURE — 1090000001 HH PPS REVENUE CREDIT

## 2024-01-06 PROCEDURE — G0151 HHCP-SERV OF PT,EA 15 MIN: HCPCS | Mod: HHH

## 2024-01-06 PROCEDURE — 0023 HH SOC

## 2024-01-06 SDOH — HEALTH STABILITY: MENTAL HEALTH: SMOKING IN HOME: 0

## 2024-01-06 SDOH — HEALTH STABILITY: PHYSICAL HEALTH: PHYSICAL EXERCISE: 10

## 2024-01-06 SDOH — ECONOMIC STABILITY: HOUSING INSECURITY: EVIDENCE OF SMOKING MATERIAL: 0

## 2024-01-06 SDOH — HEALTH STABILITY: PHYSICAL HEALTH: EXERCISE TYPE: WRITTEN

## 2024-01-06 ASSESSMENT — BALANCE ASSESSMENTS
SITTING BALANCE: 1 - STEADY, SAFE
TURNING 360 DEGREES STEPS: 1 - CONTINUOUS STEPS
NUDGED: 1 - STAGGERS, GRABS, CATCHES SELF
SITTING DOWN: 1 - USES ARMS OR NOT SMOOTH MOTION
NUDGED SCORE: 1
ATTEMPTS TO ARISE: 1 - ABLE, REQUIRES MORE THAN ONE ATTEMPT
EYES CLOSED AT MAXIMUM POSITION NUDGED: 1 - STEADY
STANDING BALANCE: 1 - STEADY BUT WIDE STANCE AND USES CANE OR OTHER SUPPORT
BALANCE SCORE: 10
IMMEDIATE STANDING BALANCE FIRST 5 SECONDS: 1 - STEADY BUT USES WALKER OR OTHER SUPPORT
ARISING SCORE: 1
ARISES: 1 - ABLE, USES ARMS TO HELP

## 2024-01-06 ASSESSMENT — ENCOUNTER SYMPTOMS
MUSCLE WEAKNESS: 1
PAIN LOCATION - PAIN SEVERITY: 4/10
PAIN SEVERITY GOAL: 3/10
LOWEST PAIN SEVERITY IN PAST 24 HOURS: 0/10
PERSON REPORTING PAIN: PATIENT
ARTHRALGIAS: 1
PAIN LOCATION - PAIN QUALITY: ACHING
HIGHEST PAIN SEVERITY IN PAST 24 HOURS: 7/10
PAIN LOCATION - PAIN FREQUENCY: INTERMITTENT
SUBJECTIVE PAIN PROGRESSION: GRADUALLY IMPROVING
PAIN LOCATION: LEFT LEG
PAIN: 1
DYSPNEA ON EXERTION: 1
PAIN LOCATION - EXACERBATING FACTORS: MVMT
SHORTNESS OF BREATH: T
PAIN LOCATION - PAIN DURATION: VARIES
PAIN LOCATION - RELIEVING FACTORS: REST

## 2024-01-06 ASSESSMENT — ACTIVITIES OF DAILY LIVING (ADL)
CURRENT_FUNCTION: STAND BY ASSIST
AMBULATION_DISTANCE/DURATION_TOLERATED: 50'
AMBULATION ASSISTANCE: STAND BY ASSIST
ENTERING_EXITING_HOME: MINIMUM ASSIST
AMBULATION ASSISTANCE: 1
PHYSICAL TRANSFERS ASSESSED: 1
OASIS_M1830: 03
AMBULATION ASSISTANCE ON FLAT SURFACES: 1

## 2024-01-06 ASSESSMENT — GAIT ASSESSMENTS
TRUNK: 1 - NO SWAY BUT FLEXION OF KNEES OR BACK OR SPREADS ARMS WHILE WALKING
PATH SCORE: 1
INITIATION OF GAIT IMMEDIATELY AFTER GO: 0 - ANY HESITANCY OR MULTIPLE ATTEMPTS TO START
STEP SYMMETRY: 0 - RIGHT AND LEFT STEP LENGTH NOT EQUAL
BALANCE AND GAIT SCORE: 16
WALKING STANCE: 0 - HEELS APART
PATH: 1 - MILD/MODERATE DEVIATION OR USES WALKING AID
TRUNK SCORE: 1
STEP CONTINUITY: 0 - STOPPING OR DISCONTINUITY BETWEEN STEPS
GAIT SCORE: 6

## 2024-01-07 PROCEDURE — 1090000002 HH PPS REVENUE DEBIT

## 2024-01-07 PROCEDURE — 1090000001 HH PPS REVENUE CREDIT

## 2024-01-07 NOTE — HOME HEALTH
"S: \"I have to get stronger\"  O: Pt went sit to stand from couch without AD and SBA. Pt ambulated to kitchen 50' without O2 on, desaturated to high 80's. Reconnected to inogen on 2L and resated quickly to 95%. Pt did standing(squats, toe raises, HS curls), seated(LAQ,abdom, UEs), supine(heel slides, hip abduct, bridges) x 10 reps. Pt went sit to and from supine on couch where she is sleeping with SBA.  A: Pt had hospital admission with CHF and subsequent readmit with GI bleed. Pt had a fall at home a few days ago and was able to get herself off the floor, EMS was not called and spouse said she didn't tell him she fell. Pt has decreased strength, balance, endurance, mobility and pain. Pt has fair potential to increase strength, balance, endurance, mobility and decrease pain.  P: PT 1wk1 and 2wk4 to address ther ex, gait, balance and stairs"

## 2024-01-08 ENCOUNTER — TELEPHONE (OUTPATIENT)
Dept: CARDIOLOGY | Facility: HOSPITAL | Age: 84
End: 2024-01-08

## 2024-01-08 DIAGNOSIS — I34.0 NONRHEUMATIC MITRAL VALVE REGURGITATION: Primary | ICD-10-CM

## 2024-01-08 PROCEDURE — 1090000001 HH PPS REVENUE CREDIT

## 2024-01-08 PROCEDURE — 1090000002 HH PPS REVENUE DEBIT

## 2024-01-09 PROCEDURE — 1090000002 HH PPS REVENUE DEBIT

## 2024-01-09 PROCEDURE — 1090000001 HH PPS REVENUE CREDIT

## 2024-01-10 ENCOUNTER — HOME CARE VISIT (OUTPATIENT)
Dept: HOME HEALTH SERVICES | Facility: HOME HEALTH | Age: 84
End: 2024-01-10
Payer: MEDICARE

## 2024-01-10 PROCEDURE — 1090000002 HH PPS REVENUE DEBIT

## 2024-01-10 PROCEDURE — 1090000001 HH PPS REVENUE CREDIT

## 2024-01-11 PROCEDURE — 1090000001 HH PPS REVENUE CREDIT

## 2024-01-11 PROCEDURE — 1090000002 HH PPS REVENUE DEBIT

## 2024-01-12 ENCOUNTER — TELEPHONE (OUTPATIENT)
Dept: CARDIOLOGY | Facility: HOSPITAL | Age: 84
End: 2024-01-12
Payer: MEDICARE

## 2024-01-12 ENCOUNTER — HOME CARE VISIT (OUTPATIENT)
Dept: HOME HEALTH SERVICES | Facility: HOME HEALTH | Age: 84
End: 2024-01-12
Payer: MEDICARE

## 2024-01-12 PROCEDURE — 1090000001 HH PPS REVENUE CREDIT

## 2024-01-12 PROCEDURE — 1090000002 HH PPS REVENUE DEBIT

## 2024-01-12 NOTE — TELEPHONE ENCOUNTER
No return call from previous message regarding Structural Heart referral. Called again & left message with patient & son (Jose) to call office back regarding referral.

## 2024-01-13 PROCEDURE — 1090000001 HH PPS REVENUE CREDIT

## 2024-01-13 PROCEDURE — 1090000002 HH PPS REVENUE DEBIT

## 2024-01-14 PROCEDURE — 1090000001 HH PPS REVENUE CREDIT

## 2024-01-14 PROCEDURE — 1090000002 HH PPS REVENUE DEBIT

## 2024-01-15 ENCOUNTER — TELEMEDICINE (OUTPATIENT)
Dept: CARDIOLOGY | Facility: HOSPITAL | Age: 84
End: 2024-01-15
Payer: MEDICARE

## 2024-01-15 DIAGNOSIS — I35.0 NONRHEUMATIC AORTIC VALVE STENOSIS: Primary | ICD-10-CM

## 2024-01-15 PROCEDURE — 1090000001 HH PPS REVENUE CREDIT

## 2024-01-15 PROCEDURE — 99214 OFFICE O/P EST MOD 30 MIN: CPT | Performed by: INTERNAL MEDICINE

## 2024-01-15 PROCEDURE — 1090000002 HH PPS REVENUE DEBIT

## 2024-01-15 NOTE — PROGRESS NOTES
Cardiologist: Prudence       HPI: 83-year-old female with PMH of COPD, atherosclerotic heart disease with remote inferior wall myocardial infarction and drug-eluting stent placement.  She has an ischemic cardiomyopathy with an ejection fraction of about 40 to 45% and inferior wall motion abnormalities.  She had an echocardiogram in October showing again an EF of 40 to 45% however her mitral valve regurgitation has progressed to severe from mild in February.  She was hospitalized subsequently in November with heart failure and pna, stay was complicated by LLE DVT she is now on Eliquis.   Patient was scheduled to get a transesophageal echocardiogram on January 8, 2024, which was not done because the probe could not be passed (as per the patient)    In interview today patient states that she is able to walk around the house and try to go up the steps 1-2 times a day, she gets tired but symptoms have not progressed over 1 year or so.  She is she has started using oxygen over the last year, uses 2 L oxygen throughout the day.  She has chronic dyspnea and fatigue.   Denies any syncope or loss of consciousness      ROS:    Constitutional: fatigue  Eyes: no eyesight problems, no blurred vision, no diplopia, no eye pain, no purulent discharge from the eyes, eyes not red, no dryness of the eyes and no itching of the eyes.   ENT: no nosebleeds, no hearing loss, no tinnitus, no earache, no sore throat, no hoarseness, no swollen glands in the neck and no nasal discharge.   Cardiovascular: dyspnea on exertion, no chest pain  Respiratory: no chronic cough, not coughing up sputum, no wheezing that is consistent with asthma  Gastrointestinal: no change in bowel habits, no blood in stools, no diarrhea, no constipation, no nausea, no vomiting, no abdominal pain, no signs and symptoms of ulcer disease, no douglas colored stools and no intolerance to fatty foods.   Genitourinary: no hematuria,  no urinary frequency, no dysuria, no  incontinence, no burning sensation during urination, no urinary hesitancy, no nocturia, no genital lesion, no testicular pain, urinary stream is not smaller and urinary stream does not start and stop.   Musculoskeletal: no arthralgias, no myalgias, no joint swelling, no joint stiffness, no muscle weakness, no back pain, no limb pain, no limb swelling and no difficulty walking.   Skin: no skin rashes, no change in skin color and pigmentation, no skin lesions and no skin lumps.   Neurological: no seizures, no frequent falls, no headaches, no dizziness, no tingling, no fainting and no limb weakness.   Psychiatric: no depression, not suicidal, no confusion, no memory lapses or loss, no anxiety, no personality change and no emotional problems.   Endocrine: no goiter, no thyroid disorder, no diabetes mellitus, no excessive thirst, no dry skin, no cold intolerance, no heat intolerance, no erectile dysfunction, no increased urinary frequency, no proptosis and no deepening of the voice.   Hematologic/Lymphatic: no bleeding issues.   All other systems have been reviewed and are negative for complaint.       Virtual visit    ARMANI Workup:  - NYHA: II  - Frailty:2/5  - EKG: NS    Clinic Notes  - TTE: 10/9/23 -EF 40 to 45%, moderate to severe mitral regurgitation with posteriorly directed jet  - SAMMY: Not done  - RLHC: needs repeat, last cath in 2019  - CPM (anesthesia clearance): pending   - dental clearance: Last appointment 3 months ago, needs clearance    - STS  Procedure Type: Isolated MVR  Perioperative Outcome Estimate %  Operative Mortality 5.41%  Morbidity & Mortality 14.5%  Stroke 2.33%  Renal Failure 1.93%  Reoperation 4.47%  Prolonged Ventilation 6.05%  Deep Sternal Wound Infection 0.029%  Long Hospital Stay (>14 days) 8.84%  Short Hospital Stay (<6 days)* 18.3%              1/8/2024     7:38 AM 1/8/2024     8:10 AM 1/8/2024     8:25 AM 1/8/2024     8:32 AM 1/8/2024     8:53 AM 1/8/2024     9:08 AM 1/8/2024     9:23 AM    Vitals   Systolic 177 167 159 161 130 141 144   Diastolic 95 83 81 83 66 71 67   Heart Rate 87 80 87 74 71 71 76   Temp 36.5 °C (97.7 °F)         Resp 20 9 8 7 18 16 18        Current Outpatient Medications   Medication Instructions    albuterol 90 mcg/actuation inhaler 1 puff, inhalation, Every 4 hours PRN    atorvastatin (Lipitor) 40 mg tablet Every 24 hours    carvedilol (COREG) 25 mg, oral, 2 times daily with meals    cyanocobalamin (Vitamin B-12) 1,000 mcg tablet     empagliflozin (JARDIANCE) 10 mg, oral, Daily    levothyroxine (SYNTHROID, LEVOXYL) 100 mcg, oral, Daily (0630)    loratadine (Claritin) 10 mg tablet oral    losartan (COZAAR) 50 mg, oral, 2 times daily    multivitamin-iron-folic acid (Multi Complete with Iron)  mg-mcg tablet tablet 1 tablet, oral, Daily    nitroglycerin (Nitrostat) 0.4 mg SL tablet     oxygen (O2) 2 L/min, inhalation, Continuous    pantoprazole (PROTONIX) 40 mg, oral, 2 times daily    spironolactone (ALDACTONE) 12.5 mg, oral, Daily    tiZANidine (ZANAFLEX) 2 mg, oral, Every 8 hours PRN    torsemide (DEMADEX) 20 mg, oral, Daily    Trelegy Ellipta 100-62.5-25 mcg blister with device 1 puff, inhalation, Daily    vitamins A,C,E-zinc-copper (PreserVision AREDS) 2,148 mcg-113 mg-45 mg-17.4mg tablet 1 capsule, oral, Every 12 hours              Impression:  This is a 83-year-old female with past medical history of COPD, CAD with PCI with moderate to severe mitral regurgitation on transthoracic echocardiogram, and is symptomatic with NYHA class II symptoms.       Plan:  -Patient had a failed attempt at SAMMY.  We will plan for another SAMMY possibly under general anesthesia to better assess the mitral valvular disease   -Depending upon SAMMY patient will also require a left heart cath.       All the risks of the procedures including but not limited to groin complications, CVA, MI, pericardial tamponade, transesophageal echocardiography related complications, anesthesia related  complications and death were discussed with the patient and family in detail .The patient verbalized understanding and decided to proceed with the procedure.    Comprehensive heart team discussion will be done to before proceeding with the final therapy.

## 2024-01-16 ENCOUNTER — HOME CARE VISIT (OUTPATIENT)
Dept: HOME HEALTH SERVICES | Facility: HOME HEALTH | Age: 84
End: 2024-01-16
Payer: MEDICARE

## 2024-01-16 VITALS
DIASTOLIC BLOOD PRESSURE: 90 MMHG | TEMPERATURE: 97.4 F | HEART RATE: 83 BPM | SYSTOLIC BLOOD PRESSURE: 144 MMHG | OXYGEN SATURATION: 98 % | RESPIRATION RATE: 18 BRPM

## 2024-01-16 DIAGNOSIS — I34.0 NONRHEUMATIC MITRAL VALVE REGURGITATION: Primary | ICD-10-CM

## 2024-01-16 PROCEDURE — 1090000001 HH PPS REVENUE CREDIT

## 2024-01-16 PROCEDURE — 1090000002 HH PPS REVENUE DEBIT

## 2024-01-16 PROCEDURE — G0157 HHC PT ASSISTANT EA 15: HCPCS | Mod: HHH

## 2024-01-16 ASSESSMENT — ENCOUNTER SYMPTOMS
PERSON REPORTING PAIN: PATIENT
DENIES PAIN: 1

## 2024-01-17 PROCEDURE — 1090000001 HH PPS REVENUE CREDIT

## 2024-01-17 PROCEDURE — 1090000002 HH PPS REVENUE DEBIT

## 2024-01-18 PROCEDURE — 1090000001 HH PPS REVENUE CREDIT

## 2024-01-18 PROCEDURE — 1090000002 HH PPS REVENUE DEBIT

## 2024-01-19 ENCOUNTER — HOME CARE VISIT (OUTPATIENT)
Dept: HOME HEALTH SERVICES | Facility: HOME HEALTH | Age: 84
End: 2024-01-19
Payer: MEDICARE

## 2024-01-19 PROCEDURE — 1090000002 HH PPS REVENUE DEBIT

## 2024-01-19 PROCEDURE — 1090000001 HH PPS REVENUE CREDIT

## 2024-01-20 PROCEDURE — 1090000001 HH PPS REVENUE CREDIT

## 2024-01-20 PROCEDURE — 1090000002 HH PPS REVENUE DEBIT

## 2024-01-21 PROCEDURE — 1090000002 HH PPS REVENUE DEBIT

## 2024-01-21 PROCEDURE — 1090000001 HH PPS REVENUE CREDIT

## 2024-01-22 PROCEDURE — 1090000001 HH PPS REVENUE CREDIT

## 2024-01-22 PROCEDURE — 1090000002 HH PPS REVENUE DEBIT

## 2024-01-23 ENCOUNTER — HOME CARE VISIT (OUTPATIENT)
Dept: HOME HEALTH SERVICES | Facility: HOME HEALTH | Age: 84
End: 2024-01-23
Payer: MEDICARE

## 2024-01-23 VITALS
HEART RATE: 72 BPM | DIASTOLIC BLOOD PRESSURE: 64 MMHG | SYSTOLIC BLOOD PRESSURE: 128 MMHG | TEMPERATURE: 97.2 F | OXYGEN SATURATION: 99 %

## 2024-01-23 PROCEDURE — G0157 HHC PT ASSISTANT EA 15: HCPCS | Mod: HHH

## 2024-01-23 PROCEDURE — 1090000001 HH PPS REVENUE CREDIT

## 2024-01-23 PROCEDURE — 1090000002 HH PPS REVENUE DEBIT

## 2024-01-23 SDOH — HEALTH STABILITY: MENTAL HEALTH: SMOKING IN HOME: 0

## 2024-01-23 SDOH — ECONOMIC STABILITY: HOUSING INSECURITY: EVIDENCE OF SMOKING MATERIAL: 0

## 2024-01-23 ASSESSMENT — ENCOUNTER SYMPTOMS
PAIN LOCATION - PAIN DURATION: INTERMITTENT
PAIN LOCATION - PAIN QUALITY: PRESSURE
PAIN LOCATION - EXACERBATING FACTORS: ACTIVITY
PAIN SEVERITY GOAL: 0/10
PAIN: 1
PAIN LOCATION: LEFT FOOT
SUBJECTIVE PAIN PROGRESSION: GRADUALLY WORSENING
PAIN LOCATION - PAIN FREQUENCY: INTERMITTENT
PAIN LOCATION - PAIN SEVERITY: 4/10
HIGHEST PAIN SEVERITY IN PAST 24 HOURS: 5/10
PAIN LOCATION - RELIEVING FACTORS: REST, ELEVATION
LOWEST PAIN SEVERITY IN PAST 24 HOURS: 1/10
PERSON REPORTING PAIN: PATIENT

## 2024-01-24 ENCOUNTER — TELEPHONE (OUTPATIENT)
Dept: PRIMARY CARE | Facility: CLINIC | Age: 84
End: 2024-01-24
Payer: MEDICARE

## 2024-01-24 PROCEDURE — 1090000002 HH PPS REVENUE DEBIT

## 2024-01-24 PROCEDURE — 1090000001 HH PPS REVENUE CREDIT

## 2024-01-24 NOTE — TELEPHONE ENCOUNTER
Call placed to Jose, patient son for scheduling at [phone], appointment scheduled for 2/29/24 2:30 pm.

## 2024-01-25 PROCEDURE — 1090000001 HH PPS REVENUE CREDIT

## 2024-01-25 PROCEDURE — 1090000002 HH PPS REVENUE DEBIT

## 2024-01-26 ENCOUNTER — HOME CARE VISIT (OUTPATIENT)
Dept: HOME HEALTH SERVICES | Facility: HOME HEALTH | Age: 84
End: 2024-01-26
Payer: MEDICARE

## 2024-01-26 PROCEDURE — 1090000002 HH PPS REVENUE DEBIT

## 2024-01-26 PROCEDURE — 1090000001 HH PPS REVENUE CREDIT

## 2024-01-27 PROCEDURE — 1090000002 HH PPS REVENUE DEBIT

## 2024-01-27 PROCEDURE — 1090000001 HH PPS REVENUE CREDIT

## 2024-01-28 PROCEDURE — 1090000001 HH PPS REVENUE CREDIT

## 2024-01-28 PROCEDURE — 1090000002 HH PPS REVENUE DEBIT

## 2024-01-29 ENCOUNTER — HOME CARE VISIT (OUTPATIENT)
Dept: HOME HEALTH SERVICES | Facility: HOME HEALTH | Age: 84
End: 2024-01-29
Payer: MEDICARE

## 2024-01-29 PROCEDURE — 1090000002 HH PPS REVENUE DEBIT

## 2024-01-29 PROCEDURE — 1090000001 HH PPS REVENUE CREDIT

## 2024-01-30 PROCEDURE — 1090000002 HH PPS REVENUE DEBIT

## 2024-01-30 PROCEDURE — 1090000001 HH PPS REVENUE CREDIT

## 2024-01-30 ASSESSMENT — ACTIVITIES OF DAILY LIVING (ADL)
OASIS_M1830: 00
HOME_HEALTH_OASIS: 01

## 2024-01-31 ENCOUNTER — PREP FOR PROCEDURE (OUTPATIENT)
Dept: CARDIOLOGY | Facility: HOSPITAL | Age: 84
End: 2024-01-31

## 2024-01-31 ENCOUNTER — ANESTHESIA (OUTPATIENT)
Dept: CARDIOLOGY | Facility: HOSPITAL | Age: 84
End: 2024-01-31
Payer: MEDICARE

## 2024-01-31 ENCOUNTER — HOSPITAL ENCOUNTER (OUTPATIENT)
Dept: CARDIOLOGY | Facility: HOSPITAL | Age: 84
Discharge: HOME | End: 2024-01-31
Payer: MEDICARE

## 2024-01-31 ENCOUNTER — ANESTHESIA EVENT (OUTPATIENT)
Dept: CARDIOLOGY | Facility: HOSPITAL | Age: 84
End: 2024-01-31
Payer: MEDICARE

## 2024-01-31 VITALS
DIASTOLIC BLOOD PRESSURE: 78 MMHG | OXYGEN SATURATION: 98 % | HEART RATE: 87 BPM | SYSTOLIC BLOOD PRESSURE: 161 MMHG | RESPIRATION RATE: 28 BRPM

## 2024-01-31 DIAGNOSIS — I34.0 NONRHEUMATIC MITRAL VALVE REGURGITATION: Primary | ICD-10-CM

## 2024-01-31 DIAGNOSIS — I34.0 NONRHEUMATIC MITRAL VALVE REGURGITATION: ICD-10-CM

## 2024-01-31 PROBLEM — I11.0 HYPERTENSIVE HEART DISEASE WITH HEART FAILURE (MULTI): Status: ACTIVE | Noted: 2023-02-21

## 2024-01-31 PROBLEM — Z66 DO NOT RESUSCITATE: Status: ACTIVE | Noted: 2023-02-21

## 2024-01-31 PROBLEM — R41.3 AMNESIA: Status: ACTIVE | Noted: 2024-01-31

## 2024-01-31 PROCEDURE — 3700000001 HC GENERAL ANESTHESIA TIME - INITIAL BASE CHARGE

## 2024-01-31 PROCEDURE — 2500000001 HC RX 250 WO HCPCS SELF ADMINISTERED DRUGS (ALT 637 FOR MEDICARE OP): Performed by: INTERNAL MEDICINE

## 2024-01-31 PROCEDURE — 93320 DOPPLER ECHO COMPLETE: CPT

## 2024-01-31 PROCEDURE — 2500000001 HC RX 250 WO HCPCS SELF ADMINISTERED DRUGS (ALT 637 FOR MEDICARE OP)

## 2024-01-31 PROCEDURE — 99152 MOD SED SAME PHYS/QHP 5/>YRS: CPT | Performed by: INTERNAL MEDICINE

## 2024-01-31 PROCEDURE — A93312 PR ECHO HEART,TRANSESOPHAGEAL,COMPLETE: Performed by: ANESTHESIOLOGY

## 2024-01-31 PROCEDURE — 93312 ECHO TRANSESOPHAGEAL: CPT | Performed by: INTERNAL MEDICINE

## 2024-01-31 PROCEDURE — 2500000004 HC RX 250 GENERAL PHARMACY W/ HCPCS (ALT 636 FOR OP/ED): Performed by: NURSE ANESTHETIST, CERTIFIED REGISTERED

## 2024-01-31 PROCEDURE — 99152 MOD SED SAME PHYS/QHP 5/>YRS: CPT

## 2024-01-31 PROCEDURE — 99100 ANES PT EXTEME AGE<1 YR&>70: CPT | Performed by: ANESTHESIOLOGY

## 2024-01-31 PROCEDURE — A93312 PR ECHO HEART,TRANSESOPHAGEAL,COMPLETE: Performed by: NURSE ANESTHETIST, CERTIFIED REGISTERED

## 2024-01-31 PROCEDURE — 3700000002 HC GENERAL ANESTHESIA TIME - EACH INCREMENTAL 1 MINUTE

## 2024-01-31 PROCEDURE — 93325 DOPPLER ECHO COLOR FLOW MAPG: CPT | Performed by: INTERNAL MEDICINE

## 2024-01-31 PROCEDURE — 93320 DOPPLER ECHO COMPLETE: CPT | Performed by: INTERNAL MEDICINE

## 2024-01-31 RX ORDER — PROPOFOL 10 MG/ML
INJECTION, EMULSION INTRAVENOUS
Status: COMPLETED
Start: 2024-01-31 | End: 2024-01-31

## 2024-01-31 RX ORDER — PROPOFOL 10 MG/ML
INJECTION, EMULSION INTRAVENOUS AS NEEDED
Status: DISCONTINUED | OUTPATIENT
Start: 2024-01-31 | End: 2024-01-31

## 2024-01-31 RX ORDER — LIDOCAINE HYDROCHLORIDE 20 MG/ML
SOLUTION OROPHARYNGEAL AS NEEDED
Status: DISCONTINUED | OUTPATIENT
Start: 2024-01-31 | End: 2024-02-01 | Stop reason: HOSPADM

## 2024-01-31 RX ORDER — LIDOCAINE HYDROCHLORIDE 20 MG/ML
SOLUTION OROPHARYNGEAL
Status: COMPLETED
Start: 2024-01-31 | End: 2024-01-31

## 2024-01-31 RX ORDER — SODIUM CHLORIDE, SODIUM LACTATE, POTASSIUM CHLORIDE, CALCIUM CHLORIDE 600; 310; 30; 20 MG/100ML; MG/100ML; MG/100ML; MG/100ML
INJECTION, SOLUTION INTRAVENOUS CONTINUOUS PRN
Status: DISCONTINUED | OUTPATIENT
Start: 2024-01-31 | End: 2024-01-31

## 2024-01-31 RX ADMIN — PROPOFOL 20 MG: 10 INJECTION, EMULSION INTRAVENOUS at 14:09

## 2024-01-31 RX ADMIN — PROPOFOL 5 MG: 10 INJECTION, EMULSION INTRAVENOUS at 14:15

## 2024-01-31 RX ADMIN — LIDOCAINE HYDROCHLORIDE 15 ML: 20 SOLUTION OROPHARYNGEAL at 14:01

## 2024-01-31 RX ADMIN — PROPOFOL 30 MG: 10 INJECTION, EMULSION INTRAVENOUS at 14:06

## 2024-01-31 RX ADMIN — LIDOCAINE HYDROCHLORIDE 40 MG: 20 SOLUTION OROPHARYNGEAL at 14:05

## 2024-01-31 RX ADMIN — PROPOFOL 5 MG: 10 INJECTION, EMULSION INTRAVENOUS at 14:19

## 2024-01-31 RX ADMIN — BENZOCAINE 4 SPRAY: 200 SPRAY DENTAL; ORAL; PERIODONTAL at 14:00

## 2024-01-31 RX ADMIN — SODIUM CHLORIDE, SODIUM LACTATE, POTASSIUM CHLORIDE, AND CALCIUM CHLORIDE: .6; .31; .03; .02 INJECTION, SOLUTION INTRAVENOUS at 14:06

## 2024-01-31 SDOH — HEALTH STABILITY: MENTAL HEALTH: CURRENT SMOKER: 0

## 2024-01-31 ASSESSMENT — PAIN SCALES - GENERAL: PAIN_LEVEL: 0

## 2024-01-31 NOTE — ANESTHESIA POSTPROCEDURE EVALUATION
Patient: Anitha Welch    Procedure Summary       Date: 01/31/24 Room / Location: [facility]    Anesthesia Start: 1348 Anesthesia Stop: 1427    Procedure: TRANSESOPHAGEAL ECHO (SAMMY) Diagnosis:       Nonrheumatic mitral valve regurgitation      (mitral regurgitation)    Scheduled Providers: Lora Baldwin MD; FELA Butler-CRNA Responsible Provider: Lora Baldwin MD    Anesthesia Type: MAC ASA Status: 3            Anesthesia Type: MAC    Vitals Value Taken Time   /76 01/31/24 1425   Temp 36.2 01/31/24 1427   Pulse 92 01/31/24 1425   Resp 26 01/31/24 1425   SpO2 100 % 01/31/24 1425       Anesthesia Post Evaluation    Patient location during evaluation: PACU  Patient participation: complete - patient participated  Level of consciousness: awake  Pain score: 0  Pain management: adequate  Airway patency: patent  Cardiovascular status: acceptable and stable  Respiratory status: acceptable and nasal cannula  Hydration status: acceptable  Postoperative Nausea and Vomiting: none        No notable events documented.

## 2024-02-05 ENCOUNTER — OFFICE VISIT (OUTPATIENT)
Dept: CARDIAC SURGERY | Facility: HOSPITAL | Age: 84
End: 2024-02-05
Payer: MEDICARE

## 2024-02-05 VITALS
DIASTOLIC BLOOD PRESSURE: 62 MMHG | OXYGEN SATURATION: 92 % | SYSTOLIC BLOOD PRESSURE: 104 MMHG | WEIGHT: 139 LBS | HEART RATE: 76 BPM | BODY MASS INDEX: 23.73 KG/M2 | HEIGHT: 64 IN

## 2024-02-05 DIAGNOSIS — I34.0 NONRHEUMATIC MITRAL VALVE REGURGITATION: ICD-10-CM

## 2024-02-05 PROCEDURE — 1160F RVW MEDS BY RX/DR IN RCRD: CPT | Performed by: THORACIC SURGERY (CARDIOTHORACIC VASCULAR SURGERY)

## 2024-02-05 PROCEDURE — 1159F MED LIST DOCD IN RCRD: CPT | Performed by: THORACIC SURGERY (CARDIOTHORACIC VASCULAR SURGERY)

## 2024-02-05 PROCEDURE — 1036F TOBACCO NON-USER: CPT | Performed by: THORACIC SURGERY (CARDIOTHORACIC VASCULAR SURGERY)

## 2024-02-05 PROCEDURE — 1157F ADVNC CARE PLAN IN RCRD: CPT | Performed by: THORACIC SURGERY (CARDIOTHORACIC VASCULAR SURGERY)

## 2024-02-05 PROCEDURE — 99205 OFFICE O/P NEW HI 60 MIN: CPT | Performed by: THORACIC SURGERY (CARDIOTHORACIC VASCULAR SURGERY)

## 2024-02-05 PROCEDURE — 1126F AMNT PAIN NOTED NONE PRSNT: CPT | Performed by: THORACIC SURGERY (CARDIOTHORACIC VASCULAR SURGERY)

## 2024-02-05 PROCEDURE — 99215 OFFICE O/P EST HI 40 MIN: CPT | Performed by: THORACIC SURGERY (CARDIOTHORACIC VASCULAR SURGERY)

## 2024-02-05 PROCEDURE — 3074F SYST BP LT 130 MM HG: CPT | Performed by: THORACIC SURGERY (CARDIOTHORACIC VASCULAR SURGERY)

## 2024-02-05 PROCEDURE — 3078F DIAST BP <80 MM HG: CPT | Performed by: THORACIC SURGERY (CARDIOTHORACIC VASCULAR SURGERY)

## 2024-02-05 ASSESSMENT — PAIN SCALES - GENERAL: PAINLEVEL: 0-NO PAIN

## 2024-02-05 NOTE — PROGRESS NOTES
Chief Complaint  Dyspnea    HPI:   Ms. Anitha Welch is an 83 y.o. female, who is a patient of Dr.Daniel CROW Dupree MD   I have been asked to see her by Dr. Tuan Foss to evaluate options for her mitral regurgitation.  She was seen by my colleague Dr Bernardo on 15 January through a virtual visit.      Anitha Welch was seen in person in the clinic, she was accompanied by her son.  She states that she has been much worse in the past few months with significant shortness of breath now with minimal activity.  She was placed on home oxygen at 2 or 3 L/min about 6 or 8 months ago.  She lives independently with her son and her  who is 6 years older.  She does not drive, she rarely leaves the house.  She does minimal household chores but most of this is done by her son.  Her level of physical activity appears to be minimal.    She has significant shortness of breath with minimal activity.  She is fatigued with low energy levels.  She has no chest pain or chest tightness.  She has had no dizziness or syncope.    Her history is consistent with coronary artery disease with a previous stent.  She has an ischemic cardiomyopathy with reduced ejection fraction, a history of hypertension and COPD.  She has a history of a TIA and CVA in the past, stage III renal insufficiency and pulmonary vascular disease.      Past Medical History:   Diagnosis Date    Acute sinusitis 01/02/2024    Acute urinary tract infection 04/20/2023    Anemia     CHF (congestive heart failure) (CMS/HCC)     Contact with and (suspected) exposure to covid-19 03/02/2023    COPD (chronic obstructive pulmonary disease) (CMS/[facility])     CVA (cerebral vascular accident) (CMS/[facility])     Deep vein thrombosis (DVT) of calf (CMS/HCC)     left    Diverticulosis     DVT (deep venous thrombosis) (CMS/[facility])     Epistaxis 01/02/2024    Family history of breast cancer     maternal    H/O degenerative disc disease     Heart disease     History of degenerative disc  disease     Hyperlipidemia     Hypertension     Hypothyroidism     Meralgia paresthetica     Osteoarthritis     Osteopenia 2010    hip - DEXA    Personal history of other diseases of the circulatory system     History of hypertension    Personal history of other diseases of the respiratory system     History of chronic obstructive lung disease    Personal history of other endocrine, nutritional and metabolic disease     History of thyroid disorder    Plantar fasciitis     Postoperative deep vein thrombosis (DVT) (CMS/HCC)     Sciatica     Spinal stenosis     ST elevation (STEMI) myocardial infarction (CMS/HCC)        Past Surgical History:   Procedure Laterality Date    ADENOIDECTOMY      CARDIAC CATHETERIZATION  10/2019    CATARACT EXTRACTION Bilateral 11/13/2012    CHOLECYSTECTOMY  1996    laparoscopic    COLONOSCOPY  2010    COLONOSCOPY  10/2018    Dr. Rodriguez    CORONARY STENT PLACEMENT      CYST REMOVAL Right 06/24/2013    Excision of Sebaceous cyst right axilla    DILATION AND CURETTAGE OF UTERUS      KNEE ARTHROPLASTY Left     MR HEAD ANGIO WO IV CONTRAST  02/24/2017    MR HEAD ANGIO WO IV CONTRAST [facility]    MR HEAD ANGIO WO IV CONTRAST  08/11/2017    MR HEAD ANGIO WO IV CONTRAST [facility]    MR HEAD ANGIO WO IV CONTRAST  02/10/2019    MR HEAD ANGIO WO IV CONTRAST [facility]    OTHER SURGICAL HISTORY  01/09/2019    Stent synergy    OTHER SURGICAL HISTORY  01/09/2019    Stent synergy    VT ARTHRP ACETBLR/PROX FEM PROSTC AGRFT/ALGRFT      THYROIDECTOMY, PARTIAL Bilateral 04/17/2013    subtotal thyroidectomy    TONSILLECTOMY      TOTAL HIP ARTHROPLASTY Right 2006    UPPER GASTROINTESTINAL ENDOSCOPY  03/2019    Dr. Galan       Family History   Problem Relation Name Age of Onset    Hyperthyroidism Mother          Treated with radioactive iodine    Hypertension Mother      Diabetes Father's Sister      Hyperthyroidism Sibling         Social History     Socioeconomic History     Marital status:      Spouse name: Not on file    Number of children: Not on file    Years of education: Not on file    Highest education level: Not on file   Occupational History    Not on file   Tobacco Use    Smoking status: Former     Types: Cigarettes     Passive exposure: Never    Smokeless tobacco: Never   Substance and Sexual Activity    Alcohol use: Not Currently    Drug use: Never    Sexual activity: Not on file   Other Topics Concern    Not on file   Social History Narrative    Not on file     Social Determinants of Health     Financial Resource Strain: Low Risk  (11/22/2023)    Overall Financial Resource Strain (CARDIA)     Difficulty of Paying Living Expenses: Not hard at all   Food Insecurity: No Food Insecurity (11/22/2023)    Hunger Vital Sign     Worried About Running Out of Food in the Last Year: Never true     Ran Out of Food in the Last Year: Never true   Transportation Needs: No Transportation Needs (1/29/2024)    OASIS : Transportation     Lack of Transportation (Medical): No     Lack of Transportation (Non-Medical): No     Patient Unable or Declines to Respond: No   Physical Activity: Insufficiently Active (11/22/2023)    Exercise Vital Sign     Days of Exercise per Week: 3 days     Minutes of Exercise per Session: 20 min   Stress: No Stress Concern Present (11/22/2023)    [employer] [place] [employer] - Occupational Stress Questionnaire     Feeling of Stress : Not at all   Social Connections: Feeling Socially Integrated (1/29/2024)    OASIS : Social Isolation     Frequency of experiencing loneliness or isolation: Never   Recent Concern: Social Connections - Moderately Isolated (11/22/2023)    Social Connection and Isolation Panel [NHANES]     Frequency of Communication with Friends and Family: Once a week     Frequency of Social Gatherings with Friends and Family: Twice a week     Attends Yazidi Services: Never     Active Member of Clubs or Organizations: No      "Attends Club or Organization Meetings: Never     Marital Status:    Intimate Partner Violence: Not At Risk (11/22/2023)    Humiliation, Afraid, Rape, and Kick questionnaire     Fear of Current or Ex-Partner: No     Emotionally Abused: No     Physically Abused: No     Sexually Abused: No   Housing Stability: Low Risk  (11/22/2023)    Housing Stability Vital Sign     Unable to Pay for Housing in the Last Year: No     Number of Places Lived in the Last Year: 1     Unstable Housing in the Last Year: No       Allergies   Allergen Reactions    Amoxicillin Anaphylaxis and Shortness of breath    Iodinated Contrast Media Dizziness and Other     Increased BP    Increase BP, dizziness    Ciprofloxacin Rash and Swelling     Decreased BP    Influenza Virus Vaccines Other     \"sick\" for 24 hours afterwards    Diphenhydramine Unknown    Flu Vac 2023 [id number](Pf) Unknown    Lisinopril Rash and Swelling    Other Itching     Fluzone inj high dose       Outpatient Encounter Medications as of 2/5/2024   Medication Sig Dispense Refill    albuterol 90 mcg/actuation inhaler Inhale 1 puff every 4 hours if needed.      atorvastatin (Lipitor) 40 mg tablet once every 24 hours.      cyanocobalamin (Vitamin B-12) 1,000 mcg tablet       cyanocobalamin (Vitamin B-12) 1,000 mcg tablet Take 100 mcg by mouth once daily. Indications: prevention of vitamin B12 deficiency      empagliflozin (Jardiance) 10 mg Take 1 tablet (10 mg) by mouth once daily. 90 tablet 3    levothyroxine (Synthroid, Levoxyl) 100 mcg tablet Take 1 tablet (100 mcg) by mouth early in the morning.. Do not start before October 11, 2023.      loratadine (Claritin) 10 mg tablet Take by mouth.      multivitamin-iron-folic acid (Multi Complete with Iron)  mg-mcg tablet tablet Take 1 tablet by mouth once daily.      nitroglycerin (Nitrostat) 0.4 mg SL tablet       oxygen (O2) gas therapy Inhale 2 L/min continuously. Indications: sob      pantoprazole (ProtoNix) 40 mg EC " tablet TAKE 1 TABLET BY MOUTH TWICE A  tablet 4    spironolactone (Aldactone) 25 mg tablet Take 0.5 tablets (12.5 mg) by mouth once daily. 45 tablet 3    tiZANidine (Zanaflex) 2 mg tablet Take 1 tablet (2 mg) by mouth every 8 hours if needed for muscle spasms. 30 tablet 0    torsemide (Demadex) 20 mg tablet Take 1 tablet (20 mg) by mouth once daily. 90 tablet 3    Trelegy Ellipta 100-62.5-25 mcg blister with device Inhale 1 puff once daily. 1 each 3    vitamins A,C,E-zinc-copper (PreserVision AREDS) 2,148 mcg-113 mg-45 mg-17.4mg tablet Take 1 capsule by mouth every 12 hours.      carvedilol (Coreg) 25 mg tablet Take 1 tablet (25 mg) by mouth 2 times a day with meals. 60 tablet 0    losartan (Cozaar) 50 mg tablet Take 1 tablet (50 mg) by mouth 2 times a day. 60 tablet 0    [] lidocaine (Xylocaine) mouth solution  - Omnicell Override Pull       [] propofol (Diprivan) injection  - Omnicell Override Pull       [] propofol (Diprivan) injection  - Omnicell Override Pull       [DISCONTINUED] benzocaine (Hurricaine) 20 % mouth spray       [DISCONTINUED] lactated Ringer's infusion       [DISCONTINUED] lidocaine (Xylocaine) 2 % mouth solution       [DISCONTINUED] propofol (Diprivan) injection        No facility-administered encounter medications on file as of 2024.       Physical Exam  Cardiovascular:      Rate and Rhythm: Normal rate.      Heart sounds: Murmur heard.   Pulmonary:      Effort: Pulmonary effort is normal.      Breath sounds: Stridor present. Wheezing present.   Abdominal:      Palpations: Abdomen is soft.   Skin:     General: Skin is warm and dry.   Neurological:      General: No focal deficit present.      Mental Status: She is alert and oriented to person, place, and time.   Psychiatric:         Mood and Affect: Mood normal.         Behavior: Behavior normal.         Encounter Date: 23   ECG 12 lead   Result Value    Ventricular Rate 67    Atrial Rate 67    HI Interval  148    QRS Duration 102    QT Interval 424    QTC Calculation(Bazett) 448    P Axis 61    R Axis -40    T Axis -54    QRS Count 11    Q Onset 211    P Onset 137    P Offset 190    T Offset 423    QTC Fredericia 440    Narrative    Normal sinus rhythm  Possible Left atrial enlargement  Left axis deviation  ST & T wave abnormality, consider inferior ischemia  ST & T wave abnormality, consider anterolateral ischemia  Abnormal ECG  When compared with ECG of 21-NOV-2023 19:47,  T wave inversion more evident in Inferior leads  T wave inversion now evident in Anterior leads  T wave inversion less evident in Lateral leads  Confirmed by Tuan Foss ([id number]) on 12/7/2023 8:45:27 AM       Lab Results   Component Value Date    WBC 6.4 12/01/2023    HGB 12.8 12/01/2023    HCT 41.9 12/01/2023    MCV 95 12/01/2023     12/01/2023     Lab Results   Component Value Date    GLUCOSE 116 (H) 12/01/2023    CALCIUM 8.9 12/01/2023     12/01/2023    K 3.6 12/01/2023    CO2 38 (H) 12/01/2023     12/01/2023    BUN 20 12/01/2023    CREATININE 1.00 12/01/2023     Transesophageal Echo (SAMMY)    Result Date: 1/31/2024   [facility], [address] [place] [address]                Tel [phone] and Fax [phone] TRANSESOPHAGEAL ECHOCARDIOGRAM REPORT  Patient Name:      RUDOLPH DÍAZ      Reading Physician:    [id number] Tiesha Contreras MD Study Date:        1/31/2024           Ordering Provider:    [id number] MITCHELL NUR MRN/PID:           [id number]            Fellow:               [id number] Aleksander Mitchell MD Accession#:        [id number]        Nurse:                Ellen Smalls RN Date of Birth/Age: 1940 / 83 years Sonographer: Gender:            F                   Additional Staff:     Chantale Connelly                                                               RN Height:            165.10 cm           Admit Date: Weight:            59.90 kg            Admission Status:     Outpatient BSA:               1.66 m2             Encounter#:           [id number]                                        Department Location:  [facility]                                                              Invasive Blood Pressure: 169 /75 mmHg Study Type:    TRANSESOPHAGEAL ECHO (SAMMY) Diagnosis/ICD: Nonrheumatic mitral (valve) insufficiency-I34.0 Indication:    mitral valve regurgitation CPT Code:      SAMMY Complete-05241; Doppler Limited-18647; Color Doppler-45124 Patient History: Allergies:         Amoxicillin, flu vaccine, iodine contrast, ciprofloxacin,                    diphenhydramine, lisinopril. Pertinent History: CHF, CAD, HTN, CVA, PVD, COPD, Syncope and TIA. AAA without                    rupture, STEMI, carotid arterial stenosis, DVT/VTE, severe                    MR, GERD, GI bleed, anemia, CKD3, home O2 - 2L NC, MILLER/AHRF,                    hypothyroid and seizures.  PHYSICIAN INTERPRETATION: SAMMY Details: Technically adequate omniplane transesophageal echocardiogram performed. Agitated saline contrast echo was performed to assess for the presence of a patent foramen ovale. SAMMY Medication: The pharynx was anesthetized with viscous lidocaine and topex spray. The patient was sedated by Anesthesia; please refer to anesthesia flow sheet for medications used. SAMMY Procedure: The probe was passed without difficulty. Left Ventricle: The left ventricular systolic function is mildly decreased, with an estimated ejection fraction of 40-45%. Wall motion is abnormal. The left ventricular cavity size is normal. Left ventricular diastolic filling was not assessed. Overall mildly reduced LV systolic function with an inferior and inferolateral wall motion abnormality. LV Wall Scoring: The basal and mid inferior wall is akinetic. The basal and mid  "inferolateral wall is hypokinetic. Left Atrium: The left atrium is enlarged. A bubble study using agitated saline was performed. Bubble study is negative. The left atrial appendage Doppler velocities are normal and there is no thrombus visualized in the left atrial appendage. Color Doppler demonstrated a small PFO with left to right shunting, but no intermitent right to left shunt by agitated saline echocontrast ( even with valsalva). Right Ventricle: The right ventricle is normal in size. There is normal right ventricular global systolic function. Right Atrium: The right atrium grossly normal. Aortic Valve: The aortic valve is trileaflet. There is no evidence of aortic valve regurgitation. Mitral Valve: The mitral valve is abnormal. There is moderate to severe mitral valve regurgitation which is posteriorly directed. There is blunted systolic flow in the pulmonary veins. The mitral leaflets appears\"stiff\" with restricted leaflet closure with posterior leaflet restriction greater than the anterior leaflet restriciton causing moderate to severre (3+) central to posteriorly directed MR. The EROA was measured at ~0.3cm2 , and there is also systolic blunting of the pulmonary vein flow on PW Doppler. Tricuspid Valve: The tricuspid valve is structurally normal. There is mild tricuspid regurgitation. Pulmonic Valve: The pulmonic valve is not well visualized. There is trace pulmonic valve regurgitation. Pericardium: There is a trivial to small pericardial effusion. Aorta: The aortic root is normal. The aortic root appears normal in size and measures 2.50 cm. There is no dilatation of the ascending aorta. There is plaque visualized in the descending aorta which is classified as a Grade 3 [moderate atheroma >3-5 mm (no mobile/ulcerated component)] atherosclerosis. In comparison to the previous echocardiogram(s): Compared with study from 10/9/2023,. Compared with the prior exam TTE from 10/9/2023, the MV leaflets were better " "visualized today by SAMMY, and the moderate to severe MR was confirmed today. There was already an inferior wall motion abnormality with mildlyreduced funciton on the prior study. OVerall no significant changes.  CONCLUSIONS:  1. Left ventricular systolic function is mildly decreased with a 40-45% estimated ejection fraction.  2. Basal and mid inferior wall and basal and mid inferolateral wall are abnormal.  3. Overall mildly reduced LV systolic function with an inferior and inferolateral wall motion abnormality.  4. The left atrium is enlarged.  5. Color Doppler demonstrated a small PFO with left to right shunting, but no intermitent right to left shunt by agitated saline echocontrast ( even with valsalva).  6. The mitral leaflets appears\"stiff\" with restricted leaflet closure with posterior leaflet restriction greater than the anterior leaflet restriciton causing moderate to severre (3+) central to posteriorly directed MR. The EROA was measured at ~0.3cm2 , and there is also systolic blunting of the pulmonary vein flow on PW Doppler.  7. Moderate to severe mitral valve regurgitation.  8. Normal aortic root.  9. Compared with the prior exam TTE from 10/9/2023, the MV leaflets were better visualized today by SAMMY, and the moderate to severe MR was confirmed today. There was already an inferior wall motion abnormality with mildlyreduced funciton on the prior study. OVerall no significant changes. 10. There is plaque visualized in the descending aorta. QUANTITATIVE DATA SUMMARY: 2D MEASUREMENTS:                    Normal Ranges: Ao Root d: 2.50 cm (2.0-3.7cm) MITRAL INSUFFICIENCY:                     Normal Ranges: PISA Radius: 0.7 cm  [id number] Tiesha Contreras MD Electronically signed on 1/31/2024 at 4:45:15 PM  Wall Scoring  ** Final **     Transthoracic Echo (TTE) Complete    Result Date: 10/9/2023           [facility] [id number] [place] [id number]            Phone [phone] TRANSTHORACIC " ECHOCARDIOGRAM REPORT  Patient Name:     RUDOLPH DÍAZ     Reading Physician:  [id number] Derrick Gallardo DO Study Date:       10/9/2023          Ordering Provider:  [id number] JAMES WATTS MRN/PID:          [id number]           Fellow: Accession#:       [id number]       Nurse: Date of           1940 / 83      Sonographer:        Eber Horvath RDCS Birth/Age:        years Gender:           F                  Additional Staff: Height:           162.00 cm          Admit Date: Weight:           62.99 kg           Admission Status:   Inpatient - Routine BSA:              1.67 m2            Department          [facility]                                      Location: Blood Pressure: 132 /55 mmHg Study Type:    TRANSTHORACIC ECHO (TTE) COMPLETE Diagnosis/ICD: Other cardiac sounds-R01.2 Indication:    Murmur / Respiratory failure CPT Codes:     Echo Complete w Full Doppler-29577 Patient History: Pertinent History: CAD, Chest Pain, CHF, CVA, Dyspnea, HTN, Hyperlipidemia,                    Murmur, LE Edema and Syncope. Renal dx III, MI, PCI, PVD,                    AAA. Study Detail: The following Echo studies were performed: 2D, M-Mode, Doppler and               color flow.  PHYSICIAN INTERPRETATION: Left Ventricle: Left ventricular systolic function is moderately decreased, with an estimated ejection fraction of 40-45%. There are multiple wall motion abnormalities. The left ventricular cavity size is mildly dilated. There is mild concentric left ventricular hypertrophy. Spectral Doppler shows a normal pattern of left ventricular diastolic filling. Inferior wall moderate hypokinesis and apical hypokinesis. Left Atrium: The left atrium is mildly dilated. Right Ventricle: The right ventricle is normal in size. There is normal right ventricular global systolic function. Right Atrium: The right atrium is normal in size. Aortic Valve: The aortic valve appears abnormal.  There is moderate aortic valve cusp calcification. There is mild aortic valve regurgitation. The peak instantaneous gradient of the aortic valve is 7.4 mmHg. The mean gradient of the aortic valve is 3.0 mmHg. Mitral Valve: The mitral valve is abnormal. There is moderate to severe mitral valve regurgitation which is posteriorly directed. Tricuspid Valve: The tricuspid valve is structurally normal. There is mild tricuspid regurgitation. The Doppler estimated RVSP is moderately elevated at 44.7 mmHg. Pulmonic Valve: The pulmonic valve is structurally normal. There is no indication of pulmonic valve regurgitation. Pericardium: There is no pericardial effusion noted. Aorta: The aortic root is normal.  CONCLUSIONS:  1. Left ventricular systolic function is moderately decreased with a 40-45% estimated ejection fraction.  2. Inferior wall moderate hypokinesis and apical hypokinesis.  3. Moderate to severe mitral valve regurgitation.  4. Moderately elevated right ventricular systolic pressure.  5. Aortic valve appears abnormal.  6. There is moderate aortic valve cusp calcification.  7. Mild aortic valve regurgitation.  8. There are multiple wall motion abnormalities. QUANTITATIVE DATA SUMMARY: 2D MEASUREMENTS:                           Normal Ranges: LAs:           4.00 cm    (2.7-4.0cm) IVSd:          1.18 cm    (0.6-1.1cm) LVPWd:         1.12 cm    (0.6-1.1cm) LVIDd:         6.02 cm    (3.9-5.9cm) LVIDs:         4.69 cm LV Mass Index: 178.5 g/m2 LV % FS        22.1 % LA VOLUME:                             Normal Ranges: LA Volume Index: 68.3 ml/m2 RA VOLUME BY A/L METHOD:                       Normal Ranges: RA Area A4C: 21.0 cm2 M-MODE MEASUREMENTS:                  Normal Ranges: Ao Root: 2.90 cm (2.0-3.7cm) LAs:     4.40 cm (2.7-4.0cm) AORTA MEASUREMENTS:                    Normal Ranges: Asc Ao, d: 3.20 cm (2.1-3.4cm) LV SYSTOLIC FUNCTION BY 2D PLANIMETRY (MOD):                     Normal Ranges: EF-A4C View: 42.7 %  (>=55%) EF-A2C View: 44.3 % EF-Biplane:  42.9 % LV DIASTOLIC FUNCTION:                        Normal Ranges: MV Peak E:    1.05 m/s (0.7-1.2 m/s) MV Peak A:    0.97 m/s (0.42-0.7 m/s) E/A Ratio:    1.08     (1.0-2.2) MV e'         0.04 m/s (>8.0) MV lateral e' 0.04 m/s MV medial e'  0.04 m/s E/e' Ratio:   26.25    (<8.0) MITRAL VALVE:                        Normal Ranges: MV Vmax:    6.03 m/s   (<=1.3m/s) MV peak P.4 mmHg (<5mmHg) MV mean P.0 mmHg  (<48mmHg) MV DT:      116 msec   (150-240msec) MITRAL INSUFFICIENCY:                           Normal Ranges: PISA Radius:  0.9 cm MR Vmax:      34.70 cm/s MR Alias Jamal: 38.5 cm/s MR Flow Rt:   195.94 ml/s MR EROA:      5.65 cm2 AORTIC VALVE:                                   Normal Ranges: AoV Vmax:                1.36 m/s (<=1.7m/s) AoV Peak P.4 mmHg (<20mmHg) AoV Mean PG:             3.0 mmHg (1.7-11.5mmHg) LVOT Max Jamal:            0.98 m/s (<=1.1m/s) AoV VTI:                 23.10 cm (18-25cm) LVOT VTI:                18.40 cm LVOT Diameter:           2.00 cm  (1.8-2.4cm) AoV Area, VTI:           2.50 cm2 (2.5-5.5cm2) AoV Area,Vmax:           2.26 cm2 (2.5-4.5cm2) AoV Dimensionless Index: 0.80  RIGHT VENTRICLE: RV Basal 4.35 cm RV Mid   3.10 cm RV Major 6.4 cm TAPSE:   25.4 mm RV s'    0.11 m/s TRICUSPID VALVE/RVSP:                             Normal Ranges: Peak TR Velocity: 3.03 m/s RV Syst Pressure: 44.7 mmHg (< 30mmHg) IVC Diam:         2.51 cm PULMONIC VALVE:                      Normal Ranges: PV Max Jamal: 0.8 m/s  (0.6-0.9m/s) PV Max P.7 mmHg  [id number] Derrick Gallardo DO Electronically signed on 10/9/2023 at 4:59:06 PM  ** Final **       Assessment and Plan:   Ms. Aintha Welch is an 83 y.o. female who has been referred with congestive heart failure, NYHA class III-IV dyspnea and severe mitral regurgitation.      I had a chance to review all available, pertinent investigations.  My interpretation of these studies is as follows:    The  SAMMY shows moderate to severe mitral regurgitation.  The posterior leaflet appears to be partly restricted.  Ejection fraction is 40 to 45%.  There is a small PFO.  The posterior leaflet appears to be reasonable in size.  There is no significant tricuspid regurgitation.    A coronary angiogram has not been performed yet.    Based on a review of her symptoms and investigations, she would meet class I indications typically for mitral valve repair or replacement.  Having said that, her operative risk would be extremely high.  Her STS risk of mortality has been estimated at 5.4% which I believe underestimates the risk significantly.  The high risk is due to her overall frail nature, limited mobility, and multiple comorbidities including stage III renal insufficiency, previous cerebrovascular disease, COPD, home oxygen use and reduced ejection fraction.  While not entirely prohibitive, I am doubtful she could recover from surgery even with a minimally invasive approach.    I believe she could potentially benefit from having her mitral valve addressed, which may improve her symptoms and her quality of life.  She will be presented at the structural heart meeting, and if her anatomy is suitable will be worked up for a MitraClip procedure.  She would need a diagnostic coronary angiogram prior to her MitraClip.

## 2024-02-06 ENCOUNTER — TELEPHONE (OUTPATIENT)
Dept: CARDIOLOGY | Facility: CLINIC | Age: 84
End: 2024-02-06
Payer: MEDICARE

## 2024-02-06 DIAGNOSIS — I10 PRIMARY HYPERTENSION: ICD-10-CM

## 2024-02-06 DIAGNOSIS — I50.9 HEART FAILURE (MULTI): ICD-10-CM

## 2024-02-06 DIAGNOSIS — I50.23 ACUTE ON CHRONIC SYSTOLIC HEART FAILURE (MULTI): ICD-10-CM

## 2024-02-06 RX ORDER — SPIRONOLACTONE 25 MG/1
12.5 TABLET ORAL DAILY
Qty: 45 TABLET | Refills: 3 | Status: SHIPPED | OUTPATIENT
Start: 2024-02-06 | End: 2024-04-01 | Stop reason: SDUPTHER

## 2024-02-06 RX ORDER — TORSEMIDE 20 MG/1
20 TABLET ORAL DAILY
Qty: 90 TABLET | Refills: 3 | Status: ON HOLD | OUTPATIENT
Start: 2024-02-06 | End: 2024-05-01

## 2024-02-07 ENCOUNTER — TELEPHONE (OUTPATIENT)
Dept: CARDIOLOGY | Facility: CLINIC | Age: 84
End: 2024-02-07
Payer: MEDICARE

## 2024-02-07 DIAGNOSIS — I63.40 CEREBROVASCULAR ACCIDENT (CVA) DUE TO EMBOLISM OF CEREBRAL ARTERY (MULTI): Primary | ICD-10-CM

## 2024-02-07 RX ORDER — SODIUM CHLORIDE 9 MG/ML
100 INJECTION, SOLUTION INTRAVENOUS CONTINUOUS
Status: CANCELLED | OUTPATIENT
Start: 2024-02-07

## 2024-02-07 NOTE — TELEPHONE ENCOUNTER
Will use xarelto for 30 days while son fills out assistance paperwork for eliquis, was mailed to him

## 2024-02-08 ENCOUNTER — OFFICE VISIT (OUTPATIENT)
Dept: PRIMARY CARE | Facility: CLINIC | Age: 84
End: 2024-02-08
Payer: MEDICARE

## 2024-02-08 VITALS
DIASTOLIC BLOOD PRESSURE: 82 MMHG | WEIGHT: 131 LBS | HEART RATE: 80 BPM | HEIGHT: 63 IN | SYSTOLIC BLOOD PRESSURE: 138 MMHG | BODY MASS INDEX: 23.21 KG/M2 | OXYGEN SATURATION: 95 %

## 2024-02-08 DIAGNOSIS — N30.00 ACUTE CYSTITIS WITHOUT HEMATURIA: Primary | ICD-10-CM

## 2024-02-08 LAB
POC APPEARANCE, URINE: CLEAR
POC BILIRUBIN, URINE: NEGATIVE
POC BLOOD, URINE: NEGATIVE
POC COLOR, URINE: YELLOW
POC GLUCOSE, URINE: ABNORMAL MG/DL
POC KETONES, URINE: NEGATIVE MG/DL
POC LEUKOCYTES, URINE: ABNORMAL
POC NITRITE,URINE: NEGATIVE
POC PH, URINE: 5.5 PH
POC PROTEIN, URINE: NEGATIVE MG/DL
POC SPECIFIC GRAVITY, URINE: 1.02
POC UROBILINOGEN, URINE: 0.2 EU/DL

## 2024-02-08 PROCEDURE — 3075F SYST BP GE 130 - 139MM HG: CPT | Performed by: FAMILY MEDICINE

## 2024-02-08 PROCEDURE — 81003 URINALYSIS AUTO W/O SCOPE: CPT | Mod: 91 | Performed by: FAMILY MEDICINE

## 2024-02-08 PROCEDURE — 87086 URINE CULTURE/COLONY COUNT: CPT | Mod: WESLAB | Performed by: FAMILY MEDICINE

## 2024-02-08 PROCEDURE — 1126F AMNT PAIN NOTED NONE PRSNT: CPT | Performed by: FAMILY MEDICINE

## 2024-02-08 PROCEDURE — 99213 OFFICE O/P EST LOW 20 MIN: CPT | Performed by: FAMILY MEDICINE

## 2024-02-08 PROCEDURE — 1160F RVW MEDS BY RX/DR IN RCRD: CPT | Performed by: FAMILY MEDICINE

## 2024-02-08 PROCEDURE — 3079F DIAST BP 80-89 MM HG: CPT | Performed by: FAMILY MEDICINE

## 2024-02-08 PROCEDURE — 1157F ADVNC CARE PLAN IN RCRD: CPT | Performed by: FAMILY MEDICINE

## 2024-02-08 PROCEDURE — 1036F TOBACCO NON-USER: CPT | Performed by: FAMILY MEDICINE

## 2024-02-08 PROCEDURE — 1159F MED LIST DOCD IN RCRD: CPT | Performed by: FAMILY MEDICINE

## 2024-02-08 ASSESSMENT — ENCOUNTER SYMPTOMS
DYSURIA: 0
FREQUENCY: 1

## 2024-02-08 ASSESSMENT — PAIN SCALES - GENERAL: PAINLEVEL: 0-NO PAIN

## 2024-02-08 ASSESSMENT — PATIENT HEALTH QUESTIONNAIRE - PHQ9
2. FEELING DOWN, DEPRESSED OR HOPELESS: NOT AT ALL
1. LITTLE INTEREST OR PLEASURE IN DOING THINGS: NOT AT ALL
SUM OF ALL RESPONSES TO PHQ9 QUESTIONS 1 AND 2: 0

## 2024-02-08 NOTE — PROGRESS NOTES
[facility]: [facility]  PHYSICAL EXAM      Anitha Welch is a 83 y.o. female that is presenting today for Urinary Problem (Patient started having UTI symptoms of urgency this morning/dd).     Subjective   Pt has urinary urgency, she is concerned about UTI,  states urine is clear.        Review of Systems   Genitourinary:  Positive for frequency and urgency. Negative for dysuria.       History    Past Medical History:   Diagnosis Date    Acute sinusitis 01/02/2024    Acute urinary tract infection 04/20/2023    Anemia     CHF (congestive heart failure) (CMS/[facility])     Contact with and (suspected) exposure to covid-19 03/02/2023    COPD (chronic obstructive pulmonary disease) (CMS/[facility])     CVA (cerebral vascular accident) (CMS/[facility])     Deep vein thrombosis (DVT) of calf (CMS/[facility])     left    Diverticulosis     DVT (deep venous thrombosis) (CMS/[facility])     Epistaxis 01/02/2024    Family history of breast cancer     maternal    H/O degenerative disc disease     Heart disease     History of degenerative disc disease     Hyperlipidemia     Hypertension     Hypothyroidism     Meralgia paresthetica     Osteoarthritis     Osteopenia 2010    hip - DEXA    Personal history of other diseases of the circulatory system     History of hypertension    Personal history of other diseases of the respiratory system     History of chronic obstructive lung disease    Personal history of other endocrine, nutritional and metabolic disease     History of thyroid disorder    Plantar fasciitis     Postoperative deep vein thrombosis (DVT) (CMS/[facility])     Sciatica     Spinal stenosis     ST elevation (STEMI) myocardial infarction (CMS/[facility])      Past Surgical History:   Procedure Laterality Date    ADENOIDECTOMY      CARDIAC CATHETERIZATION  10/2019    CATARACT EXTRACTION Bilateral 11/13/2012    CHOLECYSTECTOMY  1996    laparoscopic    COLONOSCOPY  2010    COLONOSCOPY  10/2018    Dr. Rodriguez    CORONARY STENT PLACEMENT      CYST  "REMOVAL Right 06/24/2013    Excision of Sebaceous cyst right axilla    DILATION AND CURETTAGE OF UTERUS      KNEE ARTHROPLASTY Left     MR HEAD ANGIO WO IV CONTRAST  02/24/2017    MR HEAD ANGIO WO IV CONTRAST [facility]    MR HEAD ANGIO WO IV CONTRAST  08/11/2017    MR HEAD ANGIO WO IV CONTRAST [facility]    MR HEAD ANGIO WO IV CONTRAST  02/10/2019    MR HEAD ANGIO WO IV CONTRAST [facility]    OTHER SURGICAL HISTORY  01/09/2019    Stent synergy    OTHER SURGICAL HISTORY  01/09/2019    Stent synergy    [place] ARTHRP ACETBLR/PROX FEM PROSTC AGRFT/ALGRFT      SURGICAL SAMMY MONITORING      THYROIDECTOMY, PARTIAL Bilateral 04/17/2013    subtotal thyroidectomy    TONSILLECTOMY      TOTAL HIP ARTHROPLASTY Right 2006    UPPER GASTROINTESTINAL ENDOSCOPY  03/2019    Dr. Galan     Family History   Problem Relation Name Age of Onset    Hyperthyroidism Mother          Treated with radioactive iodine    Hypertension Mother      Diabetes Father's Sister      Hyperthyroidism Sibling       Allergies   Allergen Reactions    Amoxicillin Anaphylaxis and Shortness of breath    Iodinated Contrast Media Dizziness and Other     Increased BP    Increase BP, dizziness    Ciprofloxacin Rash and Swelling     Decreased BP    Influenza Virus Vaccines Other     \"sick\" for 24 hours afterwards    Diphenhydramine Unknown    Flu Vac 2023 [id number](Pf) Unknown    Lisinopril Rash and Swelling    Other Itching     Fluzone inj high dose     Current Outpatient Medications on File Prior to Visit   Medication Sig Dispense Refill    albuterol 90 mcg/actuation inhaler Inhale 1 puff every 4 hours if needed.      atorvastatin (Lipitor) 40 mg tablet once every 24 hours.      empagliflozin (Jardiance) 10 mg Take 1 tablet (10 mg) by mouth once daily. 90 tablet 3    levothyroxine (Synthroid, Levoxyl) 100 mcg tablet Take 1 tablet (100 mcg) by mouth early in the morning.. Do not start before October 11, 2023.      loratadine (Claritin) " 10 mg tablet Take by mouth.      multivitamin-iron-folic acid (Multi Complete with Iron)  mg-mcg tablet tablet Take 1 tablet by mouth once daily.      nitroglycerin (Nitrostat) 0.4 mg SL tablet       oxygen (O2) gas therapy Inhale 2 L/min continuously. Indications: sob      pantoprazole (ProtoNix) 40 mg EC tablet TAKE 1 TABLET BY MOUTH TWICE A  tablet 4    rivaroxaban (Xarelto) 20 mg tablet Take 1 tablet (20 mg) by mouth once daily in the evening. Take with meals. Take with food. 30 tablet 11    spironolactone (Aldactone) 25 mg tablet Take 0.5 tablets (12.5 mg) by mouth once daily. 45 tablet 3    tiZANidine (Zanaflex) 2 mg tablet Take 1 tablet (2 mg) by mouth every 8 hours if needed for muscle spasms. 30 tablet 0    torsemide (Demadex) 20 mg tablet Take 1 tablet (20 mg) by mouth once daily. 90 tablet 3    Trelegy Ellipta 100-62.5-25 mcg blister with device Inhale 1 puff once daily. 1 each 3    carvedilol (Coreg) 25 mg tablet Take 1 tablet (25 mg) by mouth 2 times a day with meals. 60 tablet 0    losartan (Cozaar) 50 mg tablet Take 1 tablet (50 mg) by mouth 2 times a day. 60 tablet 0    [DISCONTINUED] cyanocobalamin (Vitamin B-12) 1,000 mcg tablet       [DISCONTINUED] cyanocobalamin (Vitamin B-12) 1,000 mcg tablet Take 100 mcg by mouth once daily. Indications: prevention of vitamin B12 deficiency      [DISCONTINUED] spironolactone (Aldactone) 25 mg tablet Take 0.5 tablets (12.5 mg) by mouth once daily. 45 tablet 3    [DISCONTINUED] torsemide (Demadex) 20 mg tablet Take 1 tablet (20 mg) by mouth once daily. 90 tablet 3    [DISCONTINUED] vitamins A,C,E-zinc-copper (PreserVision AREDS) 2,148 mcg-113 mg-45 mg-17.4mg tablet Take 1 capsule by mouth every 12 hours.       No current facility-administered medications on file prior to visit.     Immunization History   Administered Date(s) Administered    Flu vaccine (IIV4), preservative free *Check age/dose* 10/03/2016    Flu vaccine, quadrivalent, high-dose,  preservative free, age 65y+ (FLUZONE) 10/22/2021, 09/19/2022, 09/29/2023    Influenza, High Dose Seasonal, Preservative Free 10/05/2013, 09/26/2017, 12/03/2019    Influenza, injectable, quadrivalent 10/03/2016, 01/08/2019    Influenza, seasonal, injectable 10/03/2011, 10/05/2013, 10/23/2014    Pfizer COVID-19 vaccine, Fall 2023, 12 years and older, (30mcg/0.3mL) 12/07/2023    Pfizer COVID-19 vaccine, bivalent, age 12 years and older (30 mcg/0.3 mL) 10/09/2022, 08/03/2023    Pfizer Gray Cap SARS-CoV-2 04/14/2022    Pfizer Purple Cap SARS-CoV-2 01/18/2021, 01/25/2021, 02/15/2021, 09/25/2021    Pneumococcal conjugate vaccine, 13-valent (PREVNAR 13) 04/24/2018    Pneumococcal polysaccharide vaccine, 23-valent, age 2 years and older (PNEUMOVAX 23) 10/25/1999    Tdap vaccine, age 7 year and older (BOOSTRIX, ADACEL) 03/24/2016    Zoster, live 08/23/2011     Patient's medical history was reviewed and updated either before or during this encounter.    Objective   Vitals:    02/08/24 1458   BP: 138/82   Pulse: 80   SpO2: 95%      Physical Exam  Constitutional:       Appearance: Normal appearance.   Cardiovascular:      Rate and Rhythm: Normal rate and regular rhythm.      Heart sounds: No murmur heard.  Pulmonary:      Effort: Pulmonary effort is normal.      Breath sounds: Normal breath sounds.   Musculoskeletal:      Right lower leg: No edema.      Left lower leg: No edema.   Neurological:      Mental Status: She is alert.           Assessment/Plan      Patient Active Problem List   Diagnosis    Epistaxis    Abdominal aortic aneurysm (AAA) without rupture (CMS/HCC)    Acute low back pain    Acute on chronic systolic heart failure (CMS/HCC)    Acute renal failure syndrome (CMS/HCC)    Acute ST segment elevation myocardial infarction (CMS/HCC)    Arteriosclerosis of coronary artery    Artificial lens present    Benign essential hypertension    Anemia due to blood loss    Stenosis of left carotid artery    Cerebrovascular  accident (CVA) involving left cerebral hemisphere (CMS/HCC)    Cerebrovascular accident (CVA) due to embolism of cerebral artery (CMS/HCC)    Cerebrovascular accident (CMS/HCC)    Transient ischemic attack    Angina pectoris (CMS/HCC)    Backache    Chest pain    Tight chest    Chronic diastolic heart failure (CMS/HCC)    Chronic heart failure with preserved ejection fraction (CMS/HCC)    Stage 3 chronic kidney disease (CMS/HCC)    Acute exacerbation of chronic obstructive airways disease (CMS/HCC)    Chronic obstructive pulmonary disease (CMS/HCC)    Claudication (CMS/HCC)    Cystocele with prolapse    Vaginal prolapse    Dehydration    Dermatochalasis of left upper eyelid    Dermatochalasis of right upper eyelid    Vision disorder    Dysgeusia    Dyspnea    Electrocardiogram abnormal    Facial weakness    Fall    Gastroesophageal reflux disease    Gastrointestinal hemorrhage    Headache    History of coronary artery stent placement    History of myocardial infarction    History of stroke without residual deficits    History of cerebrovascular accident    Hypothyroidism (acquired)    Ischemic cardiomyopathy    Lacunar infarct, acute (CMS/HCC)    Lumbago-sciatica due to displacement of lumbar intervertebral disc    Lumbar degenerative disc disease    Mixed hyperlipidemia    Overactive bladder    Pain of lower extremity    Pinguecula    Hypertension    Low blood pressure    Near syncope    Peripheral vascular disease (CMS/HCC)    Right homonymous hemianopsia    Seizure (CMS/HCC)    Stented coronary artery    Syncope and collapse    Tear film insufficiency    Urinary urgency    Varicose veins of both legs with edema    Floaters in visual field    Vitreous degeneration    Asthenia    Chronic fatigue syndrome    Weakness    Acute respiratory failure with hypoxia (CMS/HCC)    Acquired hypothyroidism    Bilateral carotid artery stenosis    Restless legs syndrome    HFrEF (heart failure with reduced ejection fraction)  (CMS/[facility])    Severe mitral valve regurgitation    Heart failure (CMS/[facility])    Acute deep vein thrombosis (DVT) of proximal vein of left lower extremity (CMS/[facility])    Constipation    Venous thromboembolism (VTE)    Do not resuscitate    Amnesia    Hypertensive heart disease with heart failure (CMS/[facility])     Diagnoses and all orders for this visit:  Acute cystitis without hematuria  -     POCT UA Automated manually resulted  -     Urine Culture; Future  Abx if culture warrented.     The patient was encouraged to ensure that any/all documentation is accurate and up to date, and that our office be provided a copy in the event that anything changes.    Gee Dupree MD

## 2024-02-09 LAB — BACTERIA UR CULT: NO GROWTH

## 2024-02-13 ENCOUNTER — TELEPHONE (OUTPATIENT)
Dept: PRIMARY CARE | Facility: CLINIC | Age: 84
End: 2024-02-13
Payer: MEDICARE

## 2024-02-13 PROBLEM — I34.0 NONRHEUMATIC MITRAL VALVE REGURGITATION: Status: ACTIVE | Noted: 2024-01-31

## 2024-02-13 NOTE — TELEPHONE ENCOUNTER
Pt son states jardiance is too expensive d/t pt not meeting her copay deductible for the year yet. He is asking if an alternative med can be prescribed at this one will be $600. I can submit PA for tier exception but would need to know what patient has tried in the past and why it was not effective. Thank you

## 2024-02-16 ENCOUNTER — HOSPITAL ENCOUNTER (OUTPATIENT)
Facility: HOSPITAL | Age: 84
Setting detail: OUTPATIENT SURGERY
Discharge: HOME | End: 2024-02-16
Attending: INTERNAL MEDICINE | Admitting: INTERNAL MEDICINE
Payer: MEDICARE

## 2024-02-16 VITALS
SYSTOLIC BLOOD PRESSURE: 131 MMHG | DIASTOLIC BLOOD PRESSURE: 54 MMHG | BODY MASS INDEX: 23.57 KG/M2 | HEIGHT: 63 IN | OXYGEN SATURATION: 99 % | RESPIRATION RATE: 20 BRPM | HEART RATE: 80 BPM | TEMPERATURE: 97.7 F | WEIGHT: 133 LBS

## 2024-02-16 DIAGNOSIS — I34.89 OTHER NONRHEUMATIC MITRAL VALVE DISORDERS: ICD-10-CM

## 2024-02-16 DIAGNOSIS — I34.0 NONRHEUMATIC MITRAL VALVE REGURGITATION: ICD-10-CM

## 2024-02-16 LAB
ANION GAP SERPL CALC-SCNC: 10 MMOL/L
BASE EXCESS BLDA CALC-SCNC: 7.4 MMOL/L (ref -2–3)
BASE EXCESS BLDV CALC-SCNC: 8.7 MMOL/L (ref -2–3)
BODY TEMPERATURE: 37 DEGREES CELSIUS
BODY TEMPERATURE: 37 DEGREES CELSIUS
BUN SERPL-MCNC: 31 MG/DL (ref 8–25)
CALCIUM SERPL-MCNC: 9 MG/DL (ref 8.5–10.4)
CHLORIDE SERPL-SCNC: 102 MMOL/L (ref 97–107)
CO2 SERPL-SCNC: 33 MMOL/L (ref 24–31)
CREAT SERPL-MCNC: 1 MG/DL (ref 0.4–1.6)
EGFRCR SERPLBLD CKD-EPI 2021: 56 ML/MIN/1.73M*2
ERYTHROCYTE [DISTWIDTH] IN BLOOD BY AUTOMATED COUNT: 13.7 % (ref 11.5–14.5)
GLUCOSE SERPL-MCNC: 108 MG/DL (ref 65–99)
HCO3 BLDA-SCNC: 34.7 MMOL/L (ref 22–26)
HCO3 BLDV-SCNC: 37.2 MMOL/L (ref 22–26)
HCT VFR BLD AUTO: 40.1 % (ref 36–46)
HGB BLD-MCNC: 12.7 G/DL (ref 12–16)
INHALED O2 CONCENTRATION: 21 %
MCH RBC QN AUTO: 28.9 PG (ref 26–34)
MCHC RBC AUTO-ENTMCNC: 31.7 G/DL (ref 32–36)
MCV RBC AUTO: 91 FL (ref 80–100)
NRBC BLD-RTO: 0 /100 WBCS (ref 0–0)
OXYHGB MFR BLDA: 97.6 % (ref 94–98)
OXYHGB MFR BLDV: 78.5 % (ref 45–75)
PCO2 BLDA: 60 MM HG (ref 38–42)
PCO2 BLDV: 69 MM HG (ref 41–51)
PH BLDA: 7.37 PH (ref 7.38–7.42)
PH BLDV: 7.34 PH (ref 7.33–7.43)
PLATELET # BLD AUTO: 151 X10*3/UL (ref 150–450)
PO2 BLDA: 133 MM HG (ref 85–95)
PO2 BLDV: 53 MM HG (ref 35–45)
POTASSIUM SERPL-SCNC: 3.7 MMOL/L (ref 3.4–5.1)
RBC # BLD AUTO: 4.4 X10*6/UL (ref 4–5.2)
SAO2 % BLDA: 100 % (ref 94–100)
SAO2 % BLDV: 80 % (ref 45–75)
SODIUM SERPL-SCNC: 145 MMOL/L (ref 133–145)
WBC # BLD AUTO: 6.4 X10*3/UL (ref 4.4–11.3)

## 2024-02-16 PROCEDURE — G0269 OCCLUSIVE DEVICE IN VEIN ART: HCPCS | Mod: TC | Performed by: INTERNAL MEDICINE

## 2024-02-16 PROCEDURE — 85027 COMPLETE CBC AUTOMATED: CPT | Performed by: INTERNAL MEDICINE

## 2024-02-16 PROCEDURE — 93460 R&L HRT ART/VENTRICLE ANGIO: CPT | Performed by: INTERNAL MEDICINE

## 2024-02-16 PROCEDURE — C1751 CATH, INF, PER/CENT/MIDLINE: HCPCS | Performed by: INTERNAL MEDICINE

## 2024-02-16 PROCEDURE — 2780000003 HC OR 278 NO HCPCS: Performed by: INTERNAL MEDICINE

## 2024-02-16 PROCEDURE — C1760 CLOSURE DEV, VASC: HCPCS | Performed by: INTERNAL MEDICINE

## 2024-02-16 PROCEDURE — C1769 GUIDE WIRE: HCPCS | Performed by: INTERNAL MEDICINE

## 2024-02-16 PROCEDURE — 2720000007 HC OR 272 NO HCPCS: Performed by: INTERNAL MEDICINE

## 2024-02-16 PROCEDURE — C1894 INTRO/SHEATH, NON-LASER: HCPCS | Performed by: INTERNAL MEDICINE

## 2024-02-16 PROCEDURE — 82805 BLOOD GASES W/O2 SATURATION: CPT | Performed by: INTERNAL MEDICINE

## 2024-02-16 PROCEDURE — 7100000009 HC PHASE TWO TIME - INITIAL BASE CHARGE: Performed by: INTERNAL MEDICINE

## 2024-02-16 PROCEDURE — 80048 BASIC METABOLIC PNL TOTAL CA: CPT | Performed by: INTERNAL MEDICINE

## 2024-02-16 PROCEDURE — 7100000010 HC PHASE TWO TIME - EACH INCREMENTAL 1 MINUTE: Performed by: INTERNAL MEDICINE

## 2024-02-16 PROCEDURE — 2500000004 HC RX 250 GENERAL PHARMACY W/ HCPCS (ALT 636 FOR OP/ED): Performed by: INTERNAL MEDICINE

## 2024-02-16 PROCEDURE — 99153 MOD SED SAME PHYS/QHP EA: CPT | Performed by: INTERNAL MEDICINE

## 2024-02-16 PROCEDURE — 99152 MOD SED SAME PHYS/QHP 5/>YRS: CPT | Performed by: INTERNAL MEDICINE

## 2024-02-16 PROCEDURE — 36415 COLL VENOUS BLD VENIPUNCTURE: CPT | Performed by: INTERNAL MEDICINE

## 2024-02-16 PROCEDURE — 2500000005 HC RX 250 GENERAL PHARMACY W/O HCPCS: Performed by: INTERNAL MEDICINE

## 2024-02-16 PROCEDURE — 99222 1ST HOSP IP/OBS MODERATE 55: CPT | Performed by: INTERNAL MEDICINE

## 2024-02-16 PROCEDURE — 2550000001 HC RX 255 CONTRASTS: Performed by: INTERNAL MEDICINE

## 2024-02-16 RX ORDER — IODIXANOL 320 MG/ML
INJECTION, SOLUTION INTRAVASCULAR AS NEEDED
Status: DISCONTINUED | OUTPATIENT
Start: 2024-02-16 | End: 2024-02-16 | Stop reason: HOSPADM

## 2024-02-16 RX ORDER — FENTANYL CITRATE 50 UG/ML
INJECTION, SOLUTION INTRAMUSCULAR; INTRAVENOUS AS NEEDED
Status: DISCONTINUED | OUTPATIENT
Start: 2024-02-16 | End: 2024-02-16 | Stop reason: HOSPADM

## 2024-02-16 RX ORDER — MIDAZOLAM HYDROCHLORIDE 1 MG/ML
INJECTION INTRAMUSCULAR; INTRAVENOUS AS NEEDED
Status: DISCONTINUED | OUTPATIENT
Start: 2024-02-16 | End: 2024-02-16 | Stop reason: HOSPADM

## 2024-02-16 RX ORDER — LIDOCAINE HYDROCHLORIDE 10 MG/ML
INJECTION, SOLUTION EPIDURAL; INFILTRATION; INTRACAUDAL; PERINEURAL AS NEEDED
Status: DISCONTINUED | OUTPATIENT
Start: 2024-02-16 | End: 2024-02-16 | Stop reason: HOSPADM

## 2024-02-16 RX ORDER — SODIUM CHLORIDE 9 MG/ML
100 INJECTION, SOLUTION INTRAVENOUS CONTINUOUS
Status: DISCONTINUED | OUTPATIENT
Start: 2024-02-16 | End: 2024-02-16 | Stop reason: HOSPADM

## 2024-02-16 RX ADMIN — METHYLPREDNISOLONE SODIUM SUCCINATE 50 MG: 125 INJECTION, POWDER, FOR SOLUTION INTRAMUSCULAR; INTRAVENOUS at 08:29

## 2024-02-16 ASSESSMENT — ENCOUNTER SYMPTOMS
SHORTNESS OF BREATH: 0
SYNCOPE: 0
WEIGHT GAIN: 0
DIAPHORESIS: 0
NEAR-SYNCOPE: 0
WHEEZING: 0
ORTHOPNEA: 0
MYALGIAS: 0
FEVER: 0
DIZZINESS: 0
PALPITATIONS: 0
WEIGHT LOSS: 0
DYSPNEA ON EXERTION: 0
CLAUDICATION: 0
COUGH: 0
IRREGULAR HEARTBEAT: 0
WEAKNESS: 0
PND: 0

## 2024-02-16 ASSESSMENT — PAIN - FUNCTIONAL ASSESSMENT
PAIN_FUNCTIONAL_ASSESSMENT: 0-10

## 2024-02-16 ASSESSMENT — PAIN SCALES - GENERAL
PAINLEVEL_OUTOF10: 0 - NO PAIN

## 2024-02-16 ASSESSMENT — COLUMBIA-SUICIDE SEVERITY RATING SCALE - C-SSRS
2. HAVE YOU ACTUALLY HAD ANY THOUGHTS OF KILLING YOURSELF?: NO
1. IN THE PAST MONTH, HAVE YOU WISHED YOU WERE DEAD OR WISHED YOU COULD GO TO SLEEP AND NOT WAKE UP?: NO
6. HAVE YOU EVER DONE ANYTHING, STARTED TO DO ANYTHING, OR PREPARED TO DO ANYTHING TO END YOUR LIFE?: NO

## 2024-02-16 NOTE — H&P
History Of Present Illness  Anitha Welch is a 83 y.o. female presenting with severe mitral regurgitation. She is here for right and left heart catheterization in preparation for mitraclip ARMANI.      Past Medical History  Past Medical History:   Diagnosis Date    Acute sinusitis 01/02/2024    Acute urinary tract infection 04/20/2023    Anemia     CHF (congestive heart failure) (CMS/HCC)     Contact with and (suspected) exposure to covid-19 03/02/2023    COPD (chronic obstructive pulmonary disease) (CMS/HCC)     CVA (cerebral vascular accident) (CMS/HCC)     Deep vein thrombosis (DVT) of calf (CMS/HCC)     left    Diverticulosis     DVT (deep venous thrombosis) (CMS/[facility])     Epistaxis 01/02/2024    Family history of breast cancer     maternal    H/O degenerative disc disease     Heart disease     History of degenerative disc disease     Hyperlipidemia     Hypertension     Hypothyroidism     Meralgia paresthetica     Osteoarthritis     Osteopenia 2010    hip - DEXA    Personal history of other diseases of the circulatory system     History of hypertension    Personal history of other diseases of the respiratory system     History of chronic obstructive lung disease    Personal history of other endocrine, nutritional and metabolic disease     History of thyroid disorder    Plantar fasciitis     Postoperative deep vein thrombosis (DVT) (CMS/HCC)     Sciatica     Spinal stenosis     ST elevation (STEMI) myocardial infarction (CMS/[facility])        Surgical History  Past Surgical History:   Procedure Laterality Date    ADENOIDECTOMY      CARDIAC CATHETERIZATION  10/2019    CATARACT EXTRACTION Bilateral 11/13/2012    CHOLECYSTECTOMY  1996    laparoscopic    COLONOSCOPY  2010    COLONOSCOPY  10/2018    Dr. Rodriguez    CORONARY STENT PLACEMENT      CYST REMOVAL Right 06/24/2013    Excision of Sebaceous cyst right axilla    DILATION AND CURETTAGE OF UTERUS      KNEE ARTHROPLASTY Left     MR HEAD ANGIO WO IV CONTRAST  02/24/2017     MR HEAD ANGIO WO IV CONTRAST [facility]    MR HEAD ANGIO WO IV CONTRAST  08/11/2017    MR HEAD ANGIO WO IV CONTRAST [facility]    MR HEAD ANGIO WO IV CONTRAST  02/10/2019    MR HEAD ANGIO WO IV CONTRAST [facility]    OTHER SURGICAL HISTORY  01/09/2019    Stent synergy    OTHER SURGICAL HISTORY  01/09/2019    Stent synergy    AL ARTHRP ACETBLR/PROX FEM PROSTC AGRFT/ALGRFT      SURGICAL SAMMY MONITORING      THYROIDECTOMY, PARTIAL Bilateral 04/17/2013    subtotal thyroidectomy    TONSILLECTOMY      TOTAL HIP ARTHROPLASTY Right 2006    UPPER GASTROINTESTINAL ENDOSCOPY  03/2019    Dr. Galan        Social History  She reports that she has quit smoking. Her smoking use included cigarettes. She has never been exposed to tobacco smoke. She has never used smokeless tobacco. She reports that she does not currently use alcohol. She reports that she does not use drugs.    Family History  Family History   Problem Relation Name Age of Onset    Hyperthyroidism Mother          Treated with radioactive iodine    Hypertension Mother      Diabetes Father's Sister      Hyperthyroidism Sibling          Allergies  Amoxicillin, Iodinated contrast media, Ciprofloxacin, Influenza virus vaccines, Diphenhydramine, Flu vac 2023 [id number](pf), Lisinopril, and Other    Review of Systems   Constitutional:  Negative for diaphoresis, fever, weight gain and weight loss.   Eyes:  Negative for visual disturbance.   Respiratory:  Negative for cough, shortness of breath and wheezing.    Cardiovascular:  Negative for chest pain, dyspnea on exertion, palpitations, orthopnea, claudication, leg swelling, syncope, PND and near-syncope.   Musculoskeletal:  Negative for myalgias and muscle weakness.   Neurological:  Negative for dizziness and weakness.   All other systems reviewed and are negative.     Review of Systems   Constitutional: Negative for diaphoresis, fever, weight gain and weight loss.   Eyes:  Negative for visual  disturbance.   Cardiovascular:  Negative for chest pain, claudication, dyspnea on exertion, irregular heartbeat, leg swelling, near-syncope, orthopnea, palpitations, paroxysmal nocturnal dyspnea and syncope.   Respiratory:  Negative for cough, shortness of breath and wheezing.    Musculoskeletal:  Negative for muscle weakness and myalgias.   Neurological:  Negative for dizziness and weakness.   All other systems reviewed and are negative.     Physical Exam   Gen: NAD   Neck: no JVD, carotid upstroke is brisk and without delay   Heart: rrr, s1s2+ 2/6 apical holosystolic murmur   Lungs: CTA   Ext: warm no edema             Assessment/Plan   Principal Problem:    Nonrheumatic mitral valve regurgitation  ASHD  Severe Mitral regurgitation     Here for left and right heart catheterization. Details of the procedure as well as the risks, benefits, alternatives discussed. She wishes to proceed         I spent 35 minutes in the professional and overall care of this patient.      Tuan Foss, DO

## 2024-02-16 NOTE — POST-PROCEDURE NOTE
Physician Transition of Care Summary  Invasive Cardiovascular Lab    Procedure Date: 2/16/2024  Attending:    * Tuan Foss - Primary  Resident/Fellow/Other Assistant: Surgeon(s) and Role:    Indications:   Pre-op Diagnosis     * Nonrheumatic mitral valve regurgitation [I34.0]    Post-procedure diagnosis:   Post-op Diagnosis     * Nonrheumatic mitral valve regurgitation [I34.0]    Procedure(s):   Left And Right Heart Cath, With LV  89311 - WA R & L HRT CATH W/NJX L VENTRICULOG IMG S&I        Procedure Findings:   Stable mild nonobstructive coronary artery disease with a widely patent RCA stent  Normal left and right heart filling pressures    Description of the Procedure:   The patient was prepped and draped in sterile fashion particular attention to the right groin.  Informed consent was performed and patient signature obtained per the patient in the lab.  Once in the lab and prepped and draped a formal timeout was performed to ensure proper patient as well as site indication procedure.  1% lidocaine solution was used anesthetize the area overlying the right common femoral artery and femoral vein.  A micropuncture needle was used for access and upsized to a 6 Welsh arterial sheath.  We then obtained venous access with a micropuncture needle attached to a 10 cc syringe, we upsized this to a 7 Welsh sheath.  We then proceeded with a right heart cath portion of the exam advancing a [place] catheter with the balloon inflated to the level of the pulmonary artery and ultimately wedge position.  Wedge and PA pressures were obtained.  We then performed thermal dilution.  We then obtained PA and FA saturations.  We took the remaining pressures retrograde and removed the [place].  We then proceeded with coronary angiography advancing 6 Welsh JL 4 diagnostic catheter over an 0350 to level the coronary cusp.  The wire was withdrawn and the catheter was aspirated and flushed.  We engage the left main perform angiography  and disengaged we then exchanged in standard fashion for a 6 [place] JR4 diagnostic catheter.  We prepped the catheter in same fashion and engaged the right coronary artery.  We performed angiography and disengaged.  It was then exchanged in standard fashion for a 6 [place] angled pigtail catheter was prolapsed in the ventricle.  We obtained pressures and pullback measurements.  We then remove the catheter.  Femoral angiography shows a site suitable for closure and Mynx device was used for arterial closure manual pressure for venous hemostasis.    LMCA-large-caliber vessel that bifurcates in the LAD and left circumflex arteries.  There are luminal irregularities.    LAD-also large caliber vessel giving rise to 2 diagonal branches and a recurrent apical branch.  The LAD has mild luminal irregularities.    Circumflex-large-caliber vessel though nondominant for posterior circulation.  It gives rise to 2 obtuse marginals and posterolateral branches.  There is mild nonobstructive disease seen.    RCA-dominant vessel giving rise to the right posterior descending artery and a right posterolateral branch.  There are widely patent mid RCA stents.  Mild nonobstructive disease otherwise.    Please see formal report for right heart cath pressures saturations and outputs and indices.    Complications:   None    Stents/Implants:   None    Anticoagulation/Antiplatelet Plan:   None    Estimated Blood Loss:   10 mL    Anesthesia: Moderate Sedation Anesthesia Staff: No anesthesia staff entered.    Any Specimen(s) Removed:   Order Name Source Comment Collection Info Order Time   BASIC METABOLIC PANEL Blood, Venous  Collected By: Morena Myrick RN 2/16/2024  7:48 AM     Release result to [place]   Immediate        CBC Blood, Venous  Collected By: Morena Myrick RN 2/16/2024  7:48 AM     Release result to MyChart   Immediate        BLOOD GAS VENOUS Blood, Venous Pre-op diagnosis:  Nonrheumatic mitral valve regurgitation [I34.0]  Collected By: Tuan Foss DO 2/16/2024  9:17 AM     Release result to MyChart   Immediate        BLOOD GAS ARTERIAL Blood, Arterial  Collected By: Lorena Lenz RN 2/16/2024  9:27 AM     Release result to MyChart   Immediate            Disposition:   [facility]      Electronically signed by: Tuan Foss DO, 2/16/2024 10:04 AM

## 2024-02-19 ENCOUNTER — CARDIOLOGY CONFERENCE (OUTPATIENT)
Dept: CARDIOLOGY | Facility: HOSPITAL | Age: 84
End: 2024-02-19
Payer: MEDICARE

## 2024-02-28 ENCOUNTER — TELEPHONE (OUTPATIENT)
Dept: PRIMARY CARE | Facility: CLINIC | Age: 84
End: 2024-02-28
Payer: MEDICARE

## 2024-02-28 ENCOUNTER — TELEPHONE (OUTPATIENT)
Dept: CARDIOLOGY | Facility: HOSPITAL | Age: 84
End: 2024-02-28
Payer: MEDICARE

## 2024-02-28 NOTE — TELEPHONE ENCOUNTER
Called patient & left message to call office back regarding need for dental clearance. Previous report dentist was seen approximately 3 months prior visit.

## 2024-02-29 ENCOUNTER — OFFICE VISIT (OUTPATIENT)
Dept: PRIMARY CARE | Facility: CLINIC | Age: 84
End: 2024-02-29
Payer: MEDICARE

## 2024-02-29 VITALS
OXYGEN SATURATION: 97 % | WEIGHT: 133.2 LBS | SYSTOLIC BLOOD PRESSURE: 116 MMHG | RESPIRATION RATE: 18 BRPM | DIASTOLIC BLOOD PRESSURE: 62 MMHG | BODY MASS INDEX: 23.6 KG/M2 | TEMPERATURE: 96.9 F | HEIGHT: 63 IN | HEART RATE: 68 BPM

## 2024-02-29 DIAGNOSIS — K21.9 GASTROESOPHAGEAL REFLUX DISEASE WITHOUT ESOPHAGITIS: ICD-10-CM

## 2024-02-29 DIAGNOSIS — I25.5 ISCHEMIC CARDIOMYOPATHY: ICD-10-CM

## 2024-02-29 DIAGNOSIS — I11.0 HYPERTENSIVE HEART DISEASE WITH HEART FAILURE (MULTI): ICD-10-CM

## 2024-02-29 DIAGNOSIS — J44.9 CHRONIC OBSTRUCTIVE PULMONARY DISEASE, UNSPECIFIED COPD TYPE (MULTI): ICD-10-CM

## 2024-02-29 DIAGNOSIS — I25.10 ARTERIOSCLEROSIS OF CORONARY ARTERY: Primary | ICD-10-CM

## 2024-02-29 PROCEDURE — 1036F TOBACCO NON-USER: CPT | Performed by: NURSE PRACTITIONER

## 2024-02-29 PROCEDURE — 1160F RVW MEDS BY RX/DR IN RCRD: CPT | Performed by: NURSE PRACTITIONER

## 2024-02-29 PROCEDURE — 1157F ADVNC CARE PLAN IN RCRD: CPT | Performed by: NURSE PRACTITIONER

## 2024-02-29 PROCEDURE — 1126F AMNT PAIN NOTED NONE PRSNT: CPT | Performed by: NURSE PRACTITIONER

## 2024-02-29 PROCEDURE — 3078F DIAST BP <80 MM HG: CPT | Performed by: NURSE PRACTITIONER

## 2024-02-29 PROCEDURE — 1159F MED LIST DOCD IN RCRD: CPT | Performed by: NURSE PRACTITIONER

## 2024-02-29 PROCEDURE — 99349 HOME/RES VST EST MOD MDM 40: CPT | Performed by: NURSE PRACTITIONER

## 2024-02-29 PROCEDURE — 3074F SYST BP LT 130 MM HG: CPT | Performed by: NURSE PRACTITIONER

## 2024-02-29 ASSESSMENT — PAIN SCALES - GENERAL: PAINLEVEL: 0-NO PAIN

## 2024-02-29 NOTE — PROGRESS NOTES
"Subjective   Patient ID: Anitha Welch is a 83 y.o. female who presents for Follow-up (Routine follow up, chronic medical issues ).    Visit for 82 y/o female seen today in private home, accompanied by her spouse Jesús and Son Jose for routine follow up. Patient is sitting on couch this afternoon. She is alert, able to answer simple questions regarding her health. Her family supplements HPI as needed. Patient lives with her spouse and son. She does not drive and rarely leaves the house. She will typically only leave for medical appointments. Patients son has been managing her medications. Patient is able to do her own dressing, toileting and showering. Son prepares all meals in the home. She is able to do some laundry and minimal household chores but son does most of this as patient has significant shortness of breath with minimal activity. She is oxygen dependent at 2L/min. She will go short periods without the oxygen, mostly when going to the bathroom. She reports \"bad bathroom issues\". Alternates between constipation and diarrhea. She did follow up with her PCP Dr. Dupree earlier this month for concerns of UTI. A urinalysis was obtained and culture came back negative. Pt denies any further urinary concerns. She has no chest pain or chest tightness. She has had no dizziness or syncope. Her history is consistent with coronary artery disease with a previous stent.  She has an ischemic cardiomyopathy with reduced ejection fraction, a history of hypertension and COPD. She has a history of a TIA. She underwent a heart cath on 2/16 in preparation for mitraclip ARMANI. She is weighing herself daily. Tells me that she keeps a close eye on her weight because she does not want to go over 130#. She has chronic leg edema, mostly on the left. She is wearing a compression stocking. Remains ambulatory. No longer working with therapy services. Denies recent fall or injury.     Home Visit:          Medically necessary due to: " Illness or condition that results in activity lmitation or restriction that impacts the ability to leave home such as:, Illness or condition that results in activity lmitation or restriction that impacts the ability to leave home such as:, unsteady gait/poor balance.         Current Outpatient Medications:     albuterol 90 mcg/actuation inhaler, Inhale 1 puff every 4 hours if needed., Disp: , Rfl:     atorvastatin (Lipitor) 40 mg tablet, once every 24 hours., Disp: , Rfl:     carvedilol (Coreg) 25 mg tablet, Take 1 tablet (25 mg) by mouth 2 times a day with meals., Disp: 60 tablet, Rfl: 0    empagliflozin (Jardiance) 10 mg, Take 1 tablet (10 mg) by mouth once daily., Disp: 90 tablet, Rfl: 3    empagliflozin (Jardiance) 10 mg, Take 1 tablet (10 mg) by mouth once daily., Disp: 90 tablet, Rfl: 3    loratadine (Claritin) 10 mg tablet, Take by mouth., Disp: , Rfl:     losartan (Cozaar) 50 mg tablet, Take 1 tablet (50 mg) by mouth 2 times a day., Disp: 60 tablet, Rfl: 0    multivitamin-iron-folic acid (Multi Complete with Iron)  mg-mcg tablet tablet, Take 1 tablet by mouth once daily., Disp: , Rfl:     nitroglycerin (Nitrostat) 0.4 mg SL tablet, , Disp: , Rfl:     oxygen (O2) gas therapy, Inhale 2 L/min continuously. Indications: sob, Disp: , Rfl:     pantoprazole (ProtoNix) 40 mg EC tablet, TAKE 1 TABLET BY MOUTH TWICE A DAY, Disp: 180 tablet, Rfl: 4    rivaroxaban (Xarelto) 20 mg tablet, Take 1 tablet (20 mg) by mouth once daily in the evening. Take with meals. Take with food., Disp: 30 tablet, Rfl: 11    spironolactone (Aldactone) 25 mg tablet, Take 0.5 tablets (12.5 mg) by mouth once daily., Disp: 45 tablet, Rfl: 3    tiZANidine (Zanaflex) 2 mg tablet, Take 1 tablet (2 mg) by mouth every 8 hours if needed for muscle spasms., Disp: 30 tablet, Rfl: 0    torsemide (Demadex) 20 mg tablet, Take 1 tablet (20 mg) by mouth once daily., Disp: 90 tablet, Rfl: 3    Trelegy Ellipta 100-62.5-25 mcg blister with device,  "Inhale 1 puff once daily., Disp: 1 each, Rfl: 3     Review of Systems  Constitutional: Negative for appetite changes, fever, chills.   HENT:  Negative for trouble swallowing.    Respiratory:  Positive for shortness of breath, oxygen dependent. Negative for cough and wheezing.    Cardiovascular: Positive for edema, left leg. Negative for chest pain and palpitations.   Gastrointestinal: Positive for constipation alternating with diarrhea. Negative for abdominal pain, nausea and vomiting.   Endocrine: Positive for thyroid disease  Genitourinary:  Negative for difficulty urinating.   Musculoskeletal:  Positive for gait problem  Neurological:  Positive for prior stroke, left sided neuropathy. Negative for dizziness and light-headedness.   Psychiatric/Behavioral: Positive for anxiety       Objective   /62 (BP Location: Left arm, Patient Position: Sitting, BP Cuff Size: Adult)   Pulse 68   Temp 36.1 °C (96.9 °F) (Temporal)   Resp 18   Ht 1.6 m (5' 3\")   Wt 60.4 kg (133 lb 3.2 oz)   SpO2 97%   BMI 23.60 kg/m²     Physical Exam  General: No acute distress     Appearance: She is alert, chronically ill. Nontoxic.     Comments: Sitting on couch  HENT:      Head: Normocephalic and atraumatic.      Nose: No congestion or rhinorrhea.      Mouth/Throat:      Mouth: Mucous membranes are moist.      Pharynx: Oropharynx is clear.   Eyes:      General: No scleral icterus.        Right eye: No discharge.         Left eye: No discharge.      Extraocular Movements: Extraocular movements intact.   Neck:      Vascular: No carotid bruit.      Comments: No obvious JVD  Cardiovascular:      Rate and Rhythm: Normal rate.      Pulses: Normal pulses.      Heart sounds: Soft murmur heard.      No friction rub. No gallop.   Pulmonary:      Breath sounds: Diminished.      Comments: No wheezing, rales or rhonchi. Oxygen 2L NC  Chest:      Chest wall: No tenderness present today  Abdominal:      General: There is no distension.      " Tenderness: There is no abdominal tenderness.   Musculoskeletal:      Cervical back Neck supple. No rigidity.      Right lower leg: Trace edema     Left lower le+ LE edema, wearing compression stocking  Lymphadenopathy:      Cervical: No cervical adenopathy.   Skin:     Capillary Refill: Capillary refill takes less than 2 seconds.      Coloration: Skin is not jaundiced.   Neurological:      General: No focal deficit present.      Mental Status: She is alert. Mental status is at baseline.     Cranial Nerves: No cranial nerve deficit.      Motor: generalized weakness  Psychiatric:         Behavior: Behavior normal.         Thought Content: Thought content normal.         Judgment: Judgment normal.     Labs:  Lab Results   Component Value Date    WBC 6.4 2024    HGB 12.7 2024    HCT 40.1 2024    MCV 91 2024     2024        Chemistry    Lab Results   Component Value Date/Time     2024 0749    K 3.7 2024 0749     2024 0749    CO2 33 (H) 2024 0749    BUN 31 (H) 2024 0749    CREATININE 1.00 2024 0749    Lab Results   Component Value Date/Time    CALCIUM 9.0 2024 0749    ALKPHOS 79 2023 1253    AST 15 2023 1253    ALT 15 2023 1253    BILITOT 1.1 2023 1253           Assessment/Plan   Diagnoses and all orders for this visit:  Arteriosclerosis of coronary artery  Hypertensive heart disease with heart failure (CMS/HCC)  Ischemic cardiomyopathy  -chronic, active with cardiology  -awaiting appointment for mitraclip ARMANI  -continue Atorvastatin, Carvedilol, Losartan, Spironolactone, Torsemide, Jardiance   Gastroesophageal reflux disease without esophagitis  -chronic, stable  -continue Pantoprazole  Chronic obstructive pulmonary disease, unspecified COPD type (CMS/HCC)  -chronic, oxygen dependent   -continue Albuterol inhaler as needed  -continue Trelegy Ellipta     Patient stable. Advised patient/son to contact  house calls office with any acute concerns or medication needs     Milana Hernadez, APRN-CNP

## 2024-03-08 ENCOUNTER — TELEPHONE (OUTPATIENT)
Dept: CARDIOLOGY | Facility: HOSPITAL | Age: 84
End: 2024-03-08
Payer: MEDICARE

## 2024-03-08 DIAGNOSIS — I34.0 NONRHEUMATIC MITRAL VALVE REGURGITATION: Primary | ICD-10-CM

## 2024-03-08 NOTE — TELEPHONE ENCOUNTER
Called & left message to call office back to go over prep details for Mitraclip procedure scheduled for 5-2-24. Additionally, left message to expect a call from pre-admission testing to schedule prior to procedure on 5-2-24.

## 2024-03-15 ENCOUNTER — TELEPHONE (OUTPATIENT)
Dept: CARDIOLOGY | Facility: HOSPITAL | Age: 84
End: 2024-03-15
Payer: MEDICARE

## 2024-03-15 NOTE — TELEPHONE ENCOUNTER
Will have inperson appointment on 3/18 at 8a per son request. [facility] at [facility].

## 2024-03-18 ENCOUNTER — OFFICE VISIT (OUTPATIENT)
Dept: CARDIOLOGY | Facility: HOSPITAL | Age: 84
End: 2024-03-18
Payer: MEDICARE

## 2024-03-18 VITALS
BODY MASS INDEX: 22.19 KG/M2 | OXYGEN SATURATION: 96 % | HEIGHT: 65 IN | DIASTOLIC BLOOD PRESSURE: 65 MMHG | RESPIRATION RATE: 18 BRPM | SYSTOLIC BLOOD PRESSURE: 127 MMHG | WEIGHT: 133.2 LBS | HEART RATE: 75 BPM

## 2024-03-18 DIAGNOSIS — I34.0 NONRHEUMATIC MITRAL VALVE REGURGITATION: Primary | ICD-10-CM

## 2024-03-18 PROCEDURE — 1159F MED LIST DOCD IN RCRD: CPT | Performed by: INTERNAL MEDICINE

## 2024-03-18 PROCEDURE — 1157F ADVNC CARE PLAN IN RCRD: CPT | Performed by: INTERNAL MEDICINE

## 2024-03-18 PROCEDURE — 3078F DIAST BP <80 MM HG: CPT | Performed by: INTERNAL MEDICINE

## 2024-03-18 PROCEDURE — 99214 OFFICE O/P EST MOD 30 MIN: CPT | Performed by: INTERNAL MEDICINE

## 2024-03-18 PROCEDURE — 1036F TOBACCO NON-USER: CPT | Performed by: INTERNAL MEDICINE

## 2024-03-18 PROCEDURE — 1160F RVW MEDS BY RX/DR IN RCRD: CPT | Performed by: INTERNAL MEDICINE

## 2024-03-18 PROCEDURE — 3074F SYST BP LT 130 MM HG: CPT | Performed by: INTERNAL MEDICINE

## 2024-03-18 PROCEDURE — 1126F AMNT PAIN NOTED NONE PRSNT: CPT | Performed by: INTERNAL MEDICINE

## 2024-03-18 ASSESSMENT — PATIENT HEALTH QUESTIONNAIRE - PHQ9
1. LITTLE INTEREST OR PLEASURE IN DOING THINGS: NOT AT ALL
2. FEELING DOWN, DEPRESSED OR HOPELESS: NOT AT ALL
SUM OF ALL RESPONSES TO PHQ9 QUESTIONS 1 AND 2: 0

## 2024-03-18 ASSESSMENT — ENCOUNTER SYMPTOMS
OCCASIONAL FEELINGS OF UNSTEADINESS: 0
LOSS OF SENSATION IN FEET: 0
DEPRESSION: 0

## 2024-03-18 ASSESSMENT — COLUMBIA-SUICIDE SEVERITY RATING SCALE - C-SSRS
6. HAVE YOU EVER DONE ANYTHING, STARTED TO DO ANYTHING, OR PREPARED TO DO ANYTHING TO END YOUR LIFE?: NO
1. IN THE PAST MONTH, HAVE YOU WISHED YOU WERE DEAD OR WISHED YOU COULD GO TO SLEEP AND NOT WAKE UP?: NO
2. HAVE YOU ACTUALLY HAD ANY THOUGHTS OF KILLING YOURSELF?: NO

## 2024-03-18 ASSESSMENT — PAIN SCALES - GENERAL: PAINLEVEL: 0-NO PAIN

## 2024-03-18 NOTE — PROGRESS NOTES
Cardiologist: Prudence    PCP: Joon    HPI:   83-year-old lady with past medical history of nonobstructive CAD ([facility] 2/2024--30-40% ostial LAD stenosis), COPD on home oxygen 2 L/min for 2 years, CVA with left lower extremity weakness, history of left lower extremity DVT, bilateral carotid stenosis, dyslipidemia, mild cognitive impairment, paroxysmal atrial fibrillation, CKD stage III baseline 1.0    The patient has worsening heart failure symptoms for last several months.  About 6 months ago she was admitted with heart failure.  The patient reports shortness of breath with activities within the house.  She walks by herself although she does hold onto things.  When she goes outside the house she uses a walker.  However she feels limited by her shortness of breath.  She also reports leg edema.  She had transthoracic echocardiogram in 10/2023 which showed EF 40-45%, moderate to severe MR, TMG 3 mmHg.  She is some sequently underwent transesophageal echocardiogram which shows EF 40 to 45%, severe posteriorly directed MR, posterior leaflet 0.8 cm.    The patient progressive heart failure symptoms and severe mitral regurgitation in the setting of low ejection fraction, she sent to our clinic for further evaluation and treatment planning.      ROS:    Constitutional: fatigue  Eyes: no acute eye problems, no blurred vision, no diplopia, no eye pain  ENT: no nosebleeds, no acute hearing loss, no earache, no sore throat  Cardiovascular: dyspnea on exertion, no chest pain  Respiratory: no chronic cough, not coughing up sputum, no wheezing that is consistent with asthma  Gastrointestinal: no acute bowel complaints  Musculoskeletal: no acute arthralgias, no acute myalgias, no acute joint swelling  Skin: no skin rashes, no change in skin color and pigmentation, no skin lesions and no skin lumps.   Neurological: no headaches, no dizziness, no tingling, no fainting and no limb weakness.   Psychiatric:  no suicidal ideation, no  "confusion, no personality change and no emotional problems.   Hematologic/Lymphatic: no bleeding issues.   All other systems have been reviewed and are negative for complaint.         Gen: Alert, awake, O x 3  Head: No evidence of trauma  Neck: No JVD  Heart: RRR, 3/6 holosystolic murmur apex  Lungs: Bilateral CTA  Abd: Soft, NT, ND  Ext: No edema, no cellulitis  Neuro: CN II-XII intact, 5/5 bilateral     ARMANI Workup:  - NYHA: 3  - Frailty: 3/5 (mobility, albumin, cognition)      Clinic Notes  - TTE: EF 40-45% with mod to severe MR, TMG 3  - SAMMY: EF 40-4 5%, severe MR, restricted posterior leaflet, posterior leaflet length 0.8 cm  - LHC: Right dominant system, 30-40% ostial LAD stenosis (focal)  - CPM (anesthesia clearance): Pending  - dental clearance: Pending  - STS  Procedure Type: Isolated MVR  PERIOPERATIVE OUTCOME ESTIMATE %  Operative Mortality 8.05%  Morbidity & Mortality 16.8%  Stroke 2.1%  Renal Failure 2.06%  Reoperation 3.86%  Prolonged Ventilation 9.03%  Deep Sternal Wound Infection 0.045%  Long Hospital Stay (>14 days) 12.9%  Short Hospital Stay (<6 days)* 14.1%  *higher values reflect a better outcome          2/16/2024     8:40 AM 2/16/2024     9:58 AM 2/16/2024    10:00 AM 2/16/2024    11:30 AM 2/16/2024    12:00 PM 2/29/2024     3:33 PM 3/18/2024     8:17 AM   Vitals   Systolic 170 164 169 131  116 127   Diastolic 92 85 75 54  62 65   Heart Rate 80 78 83 75 80 68 75   Temp      36.1 °C (96.9 °F)    Resp 16 20 20   18 18   Height (in)      1.6 m (5' 3\") 1.651 m (5' 5\")   Weight (lb)      133.2 133.2   BMI      23.6 kg/m2 22.17 kg/m2   BSA (m2)      1.64 m2 1.66 m2   Visit Report      Report Report        Current Outpatient Medications   Medication Instructions    albuterol 90 mcg/actuation inhaler 1 puff, inhalation, Every 4 hours PRN    atorvastatin (Lipitor) 40 mg tablet Every 24 hours    carvedilol (COREG) 25 mg, oral, 2 times daily with meals    empagliflozin (JARDIANCE) 10 mg, oral, Daily    " Jardiance 10 mg, oral, Daily    loratadine (Claritin) 10 mg tablet oral    losartan (COZAAR) 50 mg, oral, 2 times daily    multivitamin-iron-folic acid (Multi Complete with Iron)  mg-mcg tablet tablet 1 tablet, oral, Daily    nitroglycerin (Nitrostat) 0.4 mg SL tablet     oxygen (O2) 2 L/min, inhalation, Continuous    pantoprazole (PROTONIX) 40 mg, oral, 2 times daily    rivaroxaban (XARELTO) 20 mg, oral, Daily with evening meal, Take with food.    spironolactone (ALDACTONE) 12.5 mg, oral, Daily    tiZANidine (ZANAFLEX) 2 mg, oral, Every 8 hours PRN    torsemide (DEMADEX) 20 mg, oral, Daily    Trelegy Ellipta 100-62.5-25 mcg blister with device 1 puff, inhalation, Daily              Impression:  83-year-old lady with past medical history of nonobstructive CAD ([facility] 2/2024--30-40% ostial LAD stenosis), COPD on home oxygen 2 L/min for 2 years, CVA with left lower extremity weakness, history of left lower extremity DVT, bilateral carotid stenosis, dyslipidemia, mild cognitive impairment, paroxysmal atrial fibrillation, CKD stage III baseline 1.0    The patient has severe symptomatic functional (combination of atrial/ventricular function --predominantly ventricular with restricted posterior leaflet) mitral regurgitation with class III heart failure symptoms.  Her posterior leaflet length is 0.8 cm.  Transmitral gradient on transthoracic echocardiogram is 3 mmHg.    Patient has mild cognitive impairment and some mobility issues.  However she is independent in ADLs and IADLs at home.  She still does laundry and other household chores as well.    We do think that patient is a reasonable candidate for mitral transcatheter [id number] repair.      All the risks of the procedures including but not limited to groin complications, CVA, MI, pericardial tamponade, transesophageal echocardiography related complications, anesthesia related complications and death were discussed with the patient and family in detail .The  patient verbalized understanding and decided to proceed with the procedure.    Comprehensive heart team discussion will be done to before proceeding with the final therapy.        KCCQ Questionnaire      1  Heart failure affects different people in different ways. Some feel shortness of breath while others feel fatigue. Please indicate how much you are limited by heart failure (shortness of breath or fatigue) in your ability to do the following activities over the past 2 weeks. PRE PROCEDURE    A.) Showering/bathing  4. Slightly  B.) Walking 1 block on level ground 3. Moderately  C.) Hurrying or Jogging   6. Limited for other reastons    2.  Over the past 2 weeks, how many times did you have swelling in your feet, ankles or legs when you woke up in the morning? 1. Every morning    3.  Over the past 2 weeks, on average, how many times has fatigue limited your ability to do what you wanted? 2. Several times a day    4.  Over the past 2 weeks, on average, how many times has shortness of breath limited your ability to do what you wanted? 2. Several times a day    5.  Over the past 2 weeks, on average, how many times have you been forced to sleep sitting up in a chair or with at least 3 pillows to prop you up because of shortness of breath? Less than once a week    6. Over the past 2 weeks, how much has your heart failure limited your enjoyment of life? It has extremely limited my enjoyment of life    7. If you had to spend the rest of your life with your heart failure the way it is right now, how would you feel about this? 1. Not at all     8. How much does your heart failure affect your lifestyle? Please indicate how your heart failure may have limited yourparticipation in the following activities over the past 2 weeks    A.)  Hobbies, recreational activities  1. Severely limited    B.) Working or doing household chores  1. Severely limited    C.) Visiting family or friends out of your home  1. Severely limited    5  Meter Walk unable to walk seconds

## 2024-03-25 ENCOUNTER — APPOINTMENT (OUTPATIENT)
Dept: CARDIOLOGY | Facility: CLINIC | Age: 84
End: 2024-03-25
Payer: MEDICARE

## 2024-04-01 DIAGNOSIS — I10 PRIMARY HYPERTENSION: ICD-10-CM

## 2024-04-01 DIAGNOSIS — I50.23 ACUTE ON CHRONIC SYSTOLIC HEART FAILURE (MULTI): ICD-10-CM

## 2024-04-01 DIAGNOSIS — I50.9 HEART FAILURE (MULTI): ICD-10-CM

## 2024-04-01 RX ORDER — SPIRONOLACTONE 25 MG/1
12.5 TABLET ORAL DAILY
Qty: 45 TABLET | Refills: 3 | Status: SHIPPED | OUTPATIENT
Start: 2024-04-01 | End: 2024-04-02 | Stop reason: SDUPTHER

## 2024-04-01 NOTE — TELEPHONE ENCOUNTER
4//2/24 House Calls visit with Milana Hernadez NP confirmed via phone with Jose Welch/ son. Son requested a refill on Spirolactone to [facility] in [place].

## 2024-04-02 ENCOUNTER — OFFICE VISIT (OUTPATIENT)
Dept: PRIMARY CARE | Facility: CLINIC | Age: 84
End: 2024-04-02
Payer: MEDICARE

## 2024-04-02 VITALS
HEART RATE: 66 BPM | BODY MASS INDEX: 22.16 KG/M2 | DIASTOLIC BLOOD PRESSURE: 60 MMHG | WEIGHT: 133 LBS | OXYGEN SATURATION: 99 % | RESPIRATION RATE: 18 BRPM | TEMPERATURE: 97.8 F | HEIGHT: 65 IN | SYSTOLIC BLOOD PRESSURE: 116 MMHG

## 2024-04-02 DIAGNOSIS — I50.23 ACUTE ON CHRONIC SYSTOLIC HEART FAILURE (MULTI): ICD-10-CM

## 2024-04-02 DIAGNOSIS — I10 PRIMARY HYPERTENSION: ICD-10-CM

## 2024-04-02 DIAGNOSIS — I25.10 ARTERIOSCLEROSIS OF CORONARY ARTERY: Primary | ICD-10-CM

## 2024-04-02 DIAGNOSIS — I82.412 DEEP VEIN THROMBOSIS (DVT) OF FEMORAL VEIN OF LEFT LOWER EXTREMITY, UNSPECIFIED CHRONICITY (MULTI): ICD-10-CM

## 2024-04-02 DIAGNOSIS — J44.9 CHRONIC OBSTRUCTIVE PULMONARY DISEASE, UNSPECIFIED COPD TYPE (MULTI): ICD-10-CM

## 2024-04-02 PROCEDURE — 3078F DIAST BP <80 MM HG: CPT | Performed by: NURSE PRACTITIONER

## 2024-04-02 PROCEDURE — 1160F RVW MEDS BY RX/DR IN RCRD: CPT | Performed by: NURSE PRACTITIONER

## 2024-04-02 PROCEDURE — 3074F SYST BP LT 130 MM HG: CPT | Performed by: NURSE PRACTITIONER

## 2024-04-02 PROCEDURE — 99349 HOME/RES VST EST MOD MDM 40: CPT | Performed by: NURSE PRACTITIONER

## 2024-04-02 PROCEDURE — 1157F ADVNC CARE PLAN IN RCRD: CPT | Performed by: NURSE PRACTITIONER

## 2024-04-02 PROCEDURE — 1126F AMNT PAIN NOTED NONE PRSNT: CPT | Performed by: NURSE PRACTITIONER

## 2024-04-02 PROCEDURE — 1159F MED LIST DOCD IN RCRD: CPT | Performed by: NURSE PRACTITIONER

## 2024-04-02 RX ORDER — SPIRONOLACTONE 25 MG/1
12.5 TABLET ORAL DAILY
Qty: 45 TABLET | Refills: 3 | Status: SHIPPED | OUTPATIENT
Start: 2024-04-02 | End: 2024-05-01 | Stop reason: HOSPADM

## 2024-04-02 ASSESSMENT — PAIN SCALES - GENERAL: PAINLEVEL: 0-NO PAIN

## 2024-04-02 NOTE — PROGRESS NOTES
Subjective   Patient ID: Anitha Welch is a 83 y.o. female who presents for Follow-up (Multiple medical issues ).    Visit for 84 y/o female seen today in private home, accompanied by her spouse Jesús and Son Jose for follow up of multiple medical concerns. Patient is sitting up on her couch this afternoon. She lives with her spouse and son and rarely leaves the house unless she is going out for a medical appointment. Patient does not drive anymore. Her son provides transportation to any necessary medical appointments. Patient is alert, able to answer simple questions regarding her health. Her family supplements HPI as needed. Patients son manages her medications and prepares all meals in the home. Pt denies any appetite changes but reports that she is upset because she gained a few lbs and she is trying to stay under 130#. She denies any abdominal pain, nausea, vomiting. She does endorse constipation alternating with diarrhea. She does not routinely take any stool softeners. She is able to do her own dressing, toileting and showering. She is able to do some laundry and light housekeeping but admits that she becomes very short of breath with exertion. She tells me that she was trying to do some cleaning today because she wanted the house to be clean for her follow up appointment and she became dizzy and sat down on the bottom of the stairs. She didn't have anything to eat prior to this so her son made a smoothie that she is drinking now and she is starting to feel better. Patient is oxygen dependent at 2L/min. She will go short periods without the oxygen, mostly when going to the bathroom. She denies chest pain, palpitations. She is scheduled to have a mitraclip ARMANI on 5/2 with pre-admission testing on 4/15 and will be seeing her cardiologist on 4/22. She has chronic leg edema, mostly on the left but reports that it has overall improved. She is wearing a compression stocking today.     Home Visit:           Medically necessary due to: Illness or condition that results in activity lmitation or restriction that impacts the ability to leave home such as:, Illness or condition that results in activity lmitation or restriction that impacts the ability to leave home such as:, unsteady gait/poor balance.         Current Outpatient Medications:     albuterol 90 mcg/actuation inhaler, Inhale 1 puff every 4 hours if needed., Disp: , Rfl:     atorvastatin (Lipitor) 40 mg tablet, once every 24 hours., Disp: , Rfl:     carvedilol (Coreg) 25 mg tablet, Take 1 tablet (25 mg) by mouth 2 times a day with meals., Disp: 60 tablet, Rfl: 0    empagliflozin (Jardiance) 10 mg, Take 1 tablet (10 mg) by mouth once daily., Disp: 90 tablet, Rfl: 3    empagliflozin (Jardiance) 10 mg, Take 1 tablet (10 mg) by mouth once daily., Disp: 90 tablet, Rfl: 3    loratadine (Claritin) 10 mg tablet, Take by mouth., Disp: , Rfl:     losartan (Cozaar) 50 mg tablet, Take 1 tablet (50 mg) by mouth 2 times a day., Disp: 60 tablet, Rfl: 0    multivitamin-iron-folic acid (Multi Complete with Iron)  mg-mcg tablet tablet, Take 1 tablet by mouth once daily., Disp: , Rfl:     nitroglycerin (Nitrostat) 0.4 mg SL tablet, , Disp: , Rfl:     oxygen (O2) gas therapy, Inhale 2 L/min continuously. Indications: sob, Disp: , Rfl:     pantoprazole (ProtoNix) 40 mg EC tablet, TAKE 1 TABLET BY MOUTH TWICE A DAY, Disp: 180 tablet, Rfl: 4    rivaroxaban (Xarelto) 20 mg tablet, Take 1 tablet (20 mg) by mouth once daily in the evening. Take with meals. Take with food., Disp: 30 tablet, Rfl: 11    spironolactone (Aldactone) 25 mg tablet, Take 0.5 tablets (12.5 mg) by mouth once daily., Disp: 45 tablet, Rfl: 3    tiZANidine (Zanaflex) 2 mg tablet, Take 1 tablet (2 mg) by mouth every 8 hours if needed for muscle spasms., Disp: 30 tablet, Rfl: 0    torsemide (Demadex) 20 mg tablet, Take 1 tablet (20 mg) by mouth once daily., Disp: 90 tablet, Rfl: 3    Trelegy Ellipta 100-62.5-25  "mcg blister with device, Inhale 1 puff once daily., Disp: 1 each, Rfl: 3     Review of Systems  Constitutional: Negative for appetite changes, fever, chills, weight loss.   HENT:  Negative for trouble swallowing.    Respiratory:  Positive for shortness of breath with exertion, oxygen dependent. Negative for cough and wheezing.    Cardiovascular: Positive for edema, left leg, improved. Negative for chest pain and palpitations.   Gastrointestinal: Positive for constipation alternating with diarrhea. Negative for abdominal pain, nausea and vomiting.   Endocrine: Positive for thyroid disease  Genitourinary:  Negative for difficulty urinating.   Musculoskeletal:  Positive for gait problem  Neurological:  Positive for prior stroke, left sided neuropathy. Negative for dizziness and light-headedness.   Psychiatric/Behavioral: Positive for anxiety    Objective   /60 (BP Location: Left arm, Patient Position: Sitting, BP Cuff Size: Adult)   Pulse 66   Temp 36.6 °C (97.8 °F) (Temporal)   Resp 18   Ht 1.651 m (5' 5\")   Wt 60.3 kg (133 lb)   SpO2 99%   BMI 22.13 kg/m²   Oxygen 2L NC     Physical Exam     General: No acute distress     Appearance: She is alert, chronically ill. Nontoxic.     Comments: Sitting on couch  HENT:      Head: Normocephalic and atraumatic.      Nose: No congestion or rhinorrhea.      Mouth/Throat:      Mouth: Mucous membranes are moist.      Pharynx: Oropharynx is clear.   Eyes:      General: No scleral icterus.        Right eye: No discharge.         Left eye: No discharge.      Extraocular Movements: Extraocular movements intact.   Neck:      Vascular: No carotid bruit.      Comments: No obvious JVD  Cardiovascular:      Rate and Rhythm: Normal rate.      Pulses: Normal pulses.      Heart sounds: Soft murmur heard.      No friction rub. No gallop.   Pulmonary:      Breath sounds: Diminished.      Comments: No wheezing, rales or rhonchi. Oxygen 2L NC  Abdominal:      General: There is no " distension.      Tenderness: There is no abdominal tenderness.   Musculoskeletal:      Cervical back Neck supple. No rigidity.      Right lower leg: Trace edema     Left lower le+ LE edema, wearing compression stocking  Lymphadenopathy:      Cervical: No cervical adenopathy.   Skin:     Capillary Refill: Capillary refill takes less than 2 seconds.   Neurological:      General: No focal deficit present.      Mental Status: She is alert. Mental status is at baseline.     Cranial Nerves: No cranial nerve deficit.      Motor: generalized weakness  Psychiatric:         Behavior: Behavior normal.         Thought Content: Thought content normal.         Judgment: Judgment normal.     Assessment/Plan   Diagnoses and all orders for this visit:  Arteriosclerosis of coronary artery  Primary hypertension  Acute on chronic systolic heart failure (CMS/HCC)  -     spironolactone (Aldactone) 25 mg tablet; Take 0.5 tablets (12.5 mg) by mouth once daily.  -chronic, active with cardiology. Follow up on  as scheduled   -mitraclip ARMANI scheduled 24  -continue Atorvastatin, Carvedilol, Losartan, Spironolactone, Torsemide, Jardiance     Deep vein thrombosis (DVT) of femoral vein of left lower extremity, unspecified chronicity (CMS/HCC)  -left leg edema with improvement  -continue xarelto     Chronic obstructive pulmonary disease, unspecified COPD type (CMS/HCC)  -chronic, oxygen dependent   -continue Albuterol inhaler as needed  -continue Trelegy Ellipta   -continue to follow with pulmonology    Patient stable. She is scheduled for mitraclip on  with virtual follow up on . Will see patient in home in 6 weeks for follow up.        FELA Devine-CNP

## 2024-04-12 ENCOUNTER — TELEPHONE (OUTPATIENT)
Dept: CARDIOLOGY | Facility: HOSPITAL | Age: 84
End: 2024-04-12
Payer: MEDICARE

## 2024-04-12 NOTE — TELEPHONE ENCOUNTER
Called patient & left message need to change procedure date from 5-2-24 to 4-25-24. Request to call office back.

## 2024-04-13 DIAGNOSIS — I10 ESSENTIAL (PRIMARY) HYPERTENSION: ICD-10-CM

## 2024-04-15 ENCOUNTER — PRE-ADMISSION TESTING (OUTPATIENT)
Dept: PREADMISSION TESTING | Facility: HOSPITAL | Age: 84
End: 2024-04-15
Payer: MEDICARE

## 2024-04-15 VITALS
BODY MASS INDEX: 23.73 KG/M2 | DIASTOLIC BLOOD PRESSURE: 53 MMHG | SYSTOLIC BLOOD PRESSURE: 121 MMHG | TEMPERATURE: 96.3 F | WEIGHT: 139 LBS | RESPIRATION RATE: 18 BRPM | HEIGHT: 64 IN | HEART RATE: 69 BPM

## 2024-04-15 DIAGNOSIS — I34.0 NONRHEUMATIC MITRAL VALVE REGURGITATION: ICD-10-CM

## 2024-04-15 DIAGNOSIS — R79.1 ABNORMAL COAGULATION PROFILE: ICD-10-CM

## 2024-04-15 DIAGNOSIS — I65.22 STENOSIS OF LEFT CAROTID ARTERY: Primary | ICD-10-CM

## 2024-04-15 LAB
ABO GROUP (TYPE) IN BLOOD: NORMAL
ANION GAP SERPL CALC-SCNC: 14 MMOL/L (ref 10–20)
ANTIBODY SCREEN: NORMAL
APTT PPP: 40 SECONDS (ref 27–38)
BUN SERPL-MCNC: 37 MG/DL (ref 6–23)
CALCIUM SERPL-MCNC: 9.4 MG/DL (ref 8.6–10.6)
CHLORIDE SERPL-SCNC: 101 MMOL/L (ref 98–107)
CO2 SERPL-SCNC: 34 MMOL/L (ref 21–32)
CREAT SERPL-MCNC: 1.37 MG/DL (ref 0.5–1.05)
EGFRCR SERPLBLD CKD-EPI 2021: 38 ML/MIN/1.73M*2
ERYTHROCYTE [DISTWIDTH] IN BLOOD BY AUTOMATED COUNT: 14.8 % (ref 11.5–14.5)
GLUCOSE SERPL-MCNC: 90 MG/DL (ref 74–99)
HCT VFR BLD AUTO: 39.1 % (ref 36–46)
HGB BLD-MCNC: 11.6 G/DL (ref 12–16)
INR PPP: 1.4 (ref 0.9–1.1)
MCH RBC QN AUTO: 27.9 PG (ref 26–34)
MCHC RBC AUTO-ENTMCNC: 29.7 G/DL (ref 32–36)
MCV RBC AUTO: 94 FL (ref 80–100)
NRBC BLD-RTO: 0 /100 WBCS (ref 0–0)
PLATELET # BLD AUTO: 199 X10*3/UL (ref 150–450)
POTASSIUM SERPL-SCNC: 4.1 MMOL/L (ref 3.5–5.3)
PROTHROMBIN TIME: 15.3 SECONDS (ref 9.8–12.8)
RBC # BLD AUTO: 4.16 X10*6/UL (ref 4–5.2)
RH FACTOR (ANTIGEN D): NORMAL
SODIUM SERPL-SCNC: 145 MMOL/L (ref 136–145)
WBC # BLD AUTO: 7.4 X10*3/UL (ref 4.4–11.3)

## 2024-04-15 PROCEDURE — 85610 PROTHROMBIN TIME: CPT | Performed by: NURSE PRACTITIONER

## 2024-04-15 PROCEDURE — 85027 COMPLETE CBC AUTOMATED: CPT | Performed by: NURSE PRACTITIONER

## 2024-04-15 PROCEDURE — 99205 OFFICE O/P NEW HI 60 MIN: CPT | Performed by: NURSE PRACTITIONER

## 2024-04-15 PROCEDURE — 80048 BASIC METABOLIC PNL TOTAL CA: CPT | Performed by: NURSE PRACTITIONER

## 2024-04-15 PROCEDURE — 87081 CULTURE SCREEN ONLY: CPT | Performed by: NURSE PRACTITIONER

## 2024-04-15 PROCEDURE — 86901 BLOOD TYPING SEROLOGIC RH(D): CPT | Performed by: NURSE PRACTITIONER

## 2024-04-15 RX ORDER — CHLORHEXIDINE GLUCONATE ORAL RINSE 1.2 MG/ML
15 SOLUTION DENTAL AS NEEDED
Qty: 473 ML | Refills: 0 | Status: SHIPPED | OUTPATIENT
Start: 2024-04-15 | End: 2024-04-17

## 2024-04-15 RX ORDER — CARVEDILOL 12.5 MG/1
TABLET ORAL
Qty: 180 TABLET | Refills: 3 | Status: ON HOLD | OUTPATIENT
Start: 2024-04-15 | End: 2024-04-26

## 2024-04-15 RX ORDER — CHLORHEXIDINE GLUCONATE 40 MG/ML
SOLUTION TOPICAL DAILY PRN
Qty: 473 ML | Refills: 0 | Status: SHIPPED | OUTPATIENT
Start: 2024-04-15 | End: 2024-04-26 | Stop reason: HOSPADM

## 2024-04-15 ASSESSMENT — ENCOUNTER SYMPTOMS
ENDOCRINE NEGATIVE: 1
NECK NEGATIVE: 1
WEAKNESS: 1
RESPIRATORY NEGATIVE: 1
NUMBNESS: 1
ARTHRALGIAS: 1
CONSTIPATION: 1
CARDIOVASCULAR NEGATIVE: 1
BRUISES/BLEEDS EASILY: 1
CONSTITUTIONAL NEGATIVE: 1

## 2024-04-15 ASSESSMENT — DUKE ACTIVITY SCORE INDEX (DASI)
CAN YOU DO LIGHT WORK AROUND THE HOUSE LIKE DUSTING OR WASHING DISHES: YES
CAN YOU DO HEAVY WORK AROUND THE HOUSE LIKE SCRUBBING FLOORS OR LIFTING AND MOVING HEAVY FURNITURE: NO
CAN YOU TAKE CARE OF YOURSELF (EAT, DRESS, BATHE, OR USE TOILET): YES
CAN YOU WALK A BLOCK OR TWO ON LEVEL GROUND: NO
CAN YOU DO YARD WORK LIKE RAKING LEAVES, WEEDING OR PUSHING A MOWER: NO
CAN YOU DO MODERATE WORK AROUND THE HOUSE LIKE VACUUMING, SWEEPING FLOORS OR CARRYING GROCERIES: NO
DASI METS SCORE: 4.3
CAN YOU HAVE SEXUAL RELATIONS: NO
CAN YOU CLIMB A FLIGHT OF STAIRS OR WALK UP A HILL: YES
CAN YOU PARTICIPATE IN STRENOUS SPORTS LIKE SWIMMING, SINGLES TENNIS, FOOTBALL, BASKETBALL, OR SKIING: NO
CAN YOU WALK INDOORS, SUCH AS AROUND YOUR HOUSE: YES
CAN YOU PARTICIPATE IN MODERATE RECREATIONAL ACTIVITIES LIKE GOLF, BOWLING, DANCING, DOUBLES TENNIS OR THROWING A BASEBALL OR FOOTBALL: NO
CAN YOU RUN A SHORT DISTANCE: NO
TOTAL_SCORE: 12.7

## 2024-04-15 ASSESSMENT — CHADS2 SCORE
DIABETES: NO
HYPERTENSION: YES
AGE GREATER THAN OR EQUAL TO 75: YES
CHADS2 SCORE: 5
CHF: YES
PRIOR STROKE OR TIA OR THROMBOEMBOLISM: YES

## 2024-04-15 ASSESSMENT — LIFESTYLE VARIABLES: SMOKING_STATUS: NONSMOKER

## 2024-04-15 NOTE — PREPROCEDURE INSTRUCTIONS
Fasting Guidelines    NPO Instructions:    Do not eat any food after midnight the night before your surgery/procedure.  You may have up to TEN ounces of clear liquids until TWO hours before your instructed arrival time to the hospital. This includes water, black tea/coffee, (no milk or cream), apple juice, and/or electrolyte drinks (Gatorade).  You may chew gum up to TWO hours before your surgery/procedure.    Additional Instructions:     We have sent a prescription for Hibiclens soap and Peridex mouth wash to your preferred pharmacy.  If you have not already, Please  your prescription and start using as directed before surgery.  Follow the instruction sheet provided to you at your CPM/PAT appointment.    Avoid herbal supplements, multivitamins and NSAIDS (non-steroidal anti-inflammatory drugs) such as Advil, Aleve, Ibuprofen, Naproxen, Excedrin, Meloxicam or Celebrex for at least 7 days prior to surgery. May take Tylenol as needed.    Avoid tobacco and alcohol products for 24 hours prior to surgery.    CONTACT SURGEON'S OFFICE IF YOU DEVELOP:  * Fever = 100.4 F   * New respiratory symptoms (e.g. cough, shortness of breath, respiratory distress, sore throat)  * Recent loss of taste or smell  *Flu like symptoms such as headache, fatigue or gastrointestinal symptoms  * You develop any open sores, shingles, burning or painful urination   AND/OR:  * You no longer wish to have the surgery.  * Any other personal circumstances change that may lead to the need to cancel or defer this surgery.  *You were admitted to any hospital within one week of your planned procedure.    Seven/Six Days before Surgery:  Review your medication instructions, stop indicated medications    Day of Surgery:  Review your medication instructions, take indicated medications  Wear comfortable loose fitting clothing  Do not use moisturizers, creams, lotions or perfume  All jewelry and valuables should be left at home      [facility]  [phone]           Patient Information: Pre-Operative Infection Prevention Measures     Why did I have my nose, under my arms, and groin swabbed?  The purpose of the swab is to identify Staphylococcus aureus inside your nose or on your skin.  The swab was sent to the laboratory for culture.  A positive swab/culture for Staphylococcus aureus is called colonization or carriage.      What is Staphylococcus aureus?  Staphylococcus aureus, also known as “staph”, is a germ found on the skin or in the nose of healthy people.  Sometimes Staphylococcus aureus can get into the body and cause an infection.  This can be minor (such as pimples, boils, or other skin problems).  It might also be serious (such as a blood infection, pneumonia, or a surgical site infection).    What is Staphylococcus aureus colonization or carriage?  Colonization or carriage means that a person has the germ but is not sick from it.  These bacteria can be spread on the hands or when breathing or sneezing.    How is Staphylococcus aureus spread?  It is most often spread by close contact with a person or item that carries it.    What happens if my culture is positive for Staphylococcus aureus?  Your doctor/medical team will use this information to guide any antibiotic treatment which may be necessary.  Regardless of the culture results, we will clean the inside of your nose with a betadine swab just before you have your surgery.      Will I get an infection if I have Staphylococcus aureus in my nose or on my skin?  Anyone can get an infection with Staphylococcus aureus.  However, the best way to reduce your risk of infection is to follow the instructions provided to you for the use of your CHG soap and dental rinse.        Patient Information: Oral/Dental Rinse    What is oral/dental rinse?   It is a mouthwash. It is a way of cleaning the mouth with a germ-killing solution before your surgery.  The solution contains  chlorhexidine, commonly known as CHG.   It is used inside the mouth to kill a bacteria known as Staphylococcus aureus.  Let your doctor know if you are allergic to Chlorhexidine.    Why do I need to use CHG oral/dental rinse?  The CHG oral/dental rinse helps to kill a bacteria in your mouth known as Staphylococcus aureus.     This reduces the risk of infection at the surgical site.      Using your CHG oral/dental rinse  STEPS:  Use your CHG oral/dental rinse after you brush your teeth the night before (at bedtime) and the morning of your surgery.  Follow all directions on your prescription label.    Use the cap on the container to measure 15ml   Swish (gargle if you can) the mouthwash in your mouth for at least 30 seconds, (do not swallow) and spit out  After you use your CHG rinse, do not rinse your mouth with water, drink or eat.  Please refer to the prescription label for the appropriate time to resume oral intake      What side effects might I have using the CHG oral/dental rinse?  CHG rinse will stick to plaque on the teeth.  Brush and floss just before use.  Teeth brushing will help avoid staining of plaque during use.      Patient Information: Home Preoperative Antibacterial Shower      What is a home preoperative antibacterial shower?  This shower is a way of cleaning the skin with a germ-killing solution before surgery.  The solution contains chlorhexidine, commonly known as CHG.  CHG is a skin cleanser with germ-killing ability.  Let your doctor know if you are allergic to chlorhexidine.    Why do I need to take a preoperative antibacterial shower?  Skin is not sterile.  It is best to try to make your skin as free of germs as possible before surgery.  Proper cleansing with a germ-killing soap before surgery can lower the number of germs on your skin.  This helps to reduce the risk of infection at the surgical site.  Following the instructions listed below will help you prepare your skin for surgery.       How do I use the solution?  Steps:  Begin using your CHG soap 5 days before your scheduled surgery on ________________________.    First, wash and rinse your hair using the CHG soap. Keep CHG soap away from ear canals and eyes.  Rinse completely, do not condition.  Hair extensions should be removed.  Wash your face with your normal soap and rinse.    Apply the CHG solution to a clean wet washcloth.  Turn the water off or move away from the water spray to avoid premature rinsing of the CHG soap as you are applying.   Firmly lather your entire body from the neck down.  Do not use on your face.  Pay special attention to the area(s) where your incision(s) will be located unless they are on your face.  Avoid scrubbing your skin too hard.  The important point is to have the CHG soap sit on your skin for 3 minutes.    When the 3 minutes are up, turn on the water and rinse the CHG solution off your body completely.   DO NOT wash with regular soap after you have used the CHG soap solution  Pat yourself dry with a clean, freshly-laundered towel.  DO NOT apply powders, deodorants, or lotions.  Dress in clean, freshly laundered nightclothes.    Be sure to sleep with clean, freshly laundered sheets.  Be aware that CHG will cause stains on fabrics; if you wash them with bleach after use.  Rinse your washcloth and other linens that have contact with CHG completely.  Use only non-chlorine detergents to launder the items used.   The morning of surgery is the fifth day.  Repeat the above steps and dress in clean comfortable clothing     Whom should I contact if I have any questions regarding the use of CHG soap?  Call the [facility], [facility] at [phone] if you have any questions.               Preoperative Brain Exercises    What are brain exercises?  A brain exercise is any activity that engages your thinking (cognitive) skills.    What types of activities are  considered brain exercises?  Jigsaw puzzles, crossword puzzles, word jumble, memory games, word search, and many more.  Many can be found free online or on your phone via a mobile félix.    Why should I do brain exercises before my surgery?  More recent research has shown brain exercise before surgery can lower the risk of postoperative delirium (confusion) which can be especially important for older adults.  Patients who did brain exercises for 5 to 10 hours the days before surgery, cut their risk of postoperative delirium in half up to 1 week after surgery.         [facility]    Preoperative Deep Breathing Exercises    Why it is important to do deep breathing exercises before my surgery?  Deep breathing exercises strengthen your breathing muscles.  This helps you to recover after your surgery and decreases the chance of breathing complications.      How are the deep breathing exercises done?  Sit straight with your back supported.  Breathe in deeply and slowly through your nose. Your lower rib cage should expand and your abdomen may move forward.  Hold that breath for 3 to 5 seconds.  Breathe out through pursed lips, slowly and completely.  Rest and repeat 10 times every hour while awake.  Rest longer if you become dizzy or lightheaded.         Patient and Family Education             Ways You Can Help Prevent Blood Clots             This handout explains some simple things you can do to help prevent blood clots.      Blood clots are blockages that can form in the body's veins. When a blood clot forms in your deep veins, it may be called a deep vein thrombosis, or DVT for short. Blood clots can happen in any part of the body where blood flows, but they are most common in the arms and legs. If a piece of a blood clot breaks free and travels to the lungs, it is called a pulmonary embolus (PE). A PE can be a very serious problem.         Being in the hospital or having surgery can raise your  chances of getting a blood clot because you may not be well enough to move around as much as you normally do.         Ways you can help prevent blood clots in the hospital         Wearing SCDs. SCDs stands for Sequential Compression Devices.   SCDs are special sleeves that wrap around your legs  They attach to a pump that fills them with air to gently squeeze your legs every few minutes.   This helps return the blood in your legs to your heart.   SCDs should only be taken off when walking or bathing.   SCDs may not be comfortable, but they can help save your life.               Wearing compression stockings - if your doctor orders them. These special snug fitting stockings gently squeeze your legs to help blood flow.       Walking. Walking helps move the blood in your legs.   If your doctor says it is ok, try walking the halls at least   5 times a day. Ask us to help you get up, so you don't fall.      Taking any blood thinning medicines your doctor orders.        Page 1 of 2     [facility]; 3/23   Ways you can help prevent blood clots at home       Wearing compression stockings - if your doctor orders them. ? Walking - to help move the blood in your legs.       Taking any blood thinning medicines your doctor orders.      Signs of a blood clot or PE      Tell your doctor or nurse know right away if you have of the problems listed below.    If you are at home, seek medical care right away. Call 911 for chest pain or problems breathing.               Signs of a blood clot (DVT) - such as pain,  swelling, redness or warmth in your arm or leg      Signs of a pulmonary embolism (PE) - such as chest     pain or feeling short of breath           No

## 2024-04-15 NOTE — CPM/PAT H&P
CPM/PAT Evaluation       Name: Anitha Welch (Anitha Welch)  /Age: 1940/84 y.o.     Visit Type:   In-Person       Chief Complaint: Nonrheumatic mitral valve regurgitation    HPI  Pt is a 84 year old female with a PMHx significant for MI, CVA with left-sided weakness, systolic heart failure, HTN, HLD, CAD s/p stent, COPD, GERD, bilateral carotid stenosis, hypothyroidism, epistasis, history of left lower extremity DVT, paroxysmal afib, cognitive decline, diverticulosis, CKD stage III, anemia, anxiety, depression, osteoarthritis, spinal stenosis and severe mitral angela regurgitation.  Pt is being evaluated in CPM in anticipation of a Mitral-ARMANI with Dr. Bernardo on 24.  Past Medical History:   Diagnosis Date    AAA (abdominal aortic aneurysm) (CMS-[facility])     Acute urinary tract infection 2023    Anemia     Anxiety     Arthritis     CHF (congestive heart failure) (Multi)     Chronic pain disorder     back pain    CKD (chronic kidney disease)     COPD (chronic obstructive pulmonary disease) (Multi)     oxygen dependent- wears 2L NC continuously    Coronary artery disease     s/p stent    CVA (cerebral vascular accident) (Multi)     weaker on the left side    Deep vein thrombosis (DVT) of calf (Multi)     left    Dementia (Multi)     Depression     Disease of thyroid gland     Diverticulosis     Dizziness     DVT (deep venous thrombosis) (Multi)     left leg, on xarelto    Easy bruising     Epistaxis     GERD (gastroesophageal reflux disease)     managed with protonix    History of blood transfusion     History of degenerative disc disease     Hyperlipidemia     Hypertension     Hypothyroidism     Ischemic cardiomyopathy     Meralgia paresthetica     Nonrheumatic mitral valve regurgitation     Osteoarthritis     Osteopenia 2010    hip - DEXA    Plantar fasciitis     RLS (restless legs syndrome)     Sciatica     Spinal stenosis     ST elevation (STEMI) myocardial infarction (Multi)        Past  "Surgical History:   Procedure Laterality Date    ADENOIDECTOMY      CARDIAC CATHETERIZATION  10/2019    CARDIAC CATHETERIZATION N/A 02/16/2024    Procedure: Left And Right Heart Cath, With LV;  Surgeon: Tuan Foss DO;  Location: [facility] Cardiac Cath Lab;  Service: Cardiovascular;  Laterality: N/A;  no pre auth needed    CATARACT EXTRACTION Bilateral 11/13/2012    CHOLECYSTECTOMY  1996    laparoscopic    COLONOSCOPY      Dr. Rodriguez    CORONARY STENT PLACEMENT      CYST REMOVAL Right 06/24/2013    Excision of Sebaceous cyst right axilla    DILATION AND CURETTAGE OF UTERUS      ESOPHAGOGASTRODUODENOSCOPY      KNEE ARTHROPLASTY Left     MR HEAD ANGIO WO IV CONTRAST  02/24/2017    MR HEAD ANGIO WO IV CONTRAST [facility]    MR HEAD ANGIO WO IV CONTRAST  08/11/2017    MR HEAD ANGIO WO IV CONTRAST [facility]    MR HEAD ANGIO WO IV CONTRAST  02/10/2019    MR HEAD ANGIO WO IV CONTRAST [facility]    OTHER SURGICAL HISTORY  01/09/2019    Stent synergy    OTHER SURGICAL HISTORY  01/09/2019    Stent synergy    [place] ARTHRP ACETBLR/PROX FEM PROSTC AGRFT/ALGRFT      SURGICAL SAMMY MONITORING      THYROIDECTOMY, PARTIAL Bilateral 04/17/2013    subtotal thyroidectomy    TONSILLECTOMY      TOTAL HIP ARTHROPLASTY Right 2006    UPPER GASTROINTESTINAL ENDOSCOPY  03/2019    Dr. Galan       Patient Sexual activity questions deferred to the physician.    Family History   Problem Relation Name Age of Onset    Hyperthyroidism Mother          Treated with radioactive iodine    Hypertension Mother      Heart disease Mother      Hyperthyroidism Sibling      Diabetes Father's Sister         Allergies   Allergen Reactions    Amoxicillin Anaphylaxis and Shortness of breath    Iodinated Contrast Media Dizziness and Other     Increased BP    Increase BP, dizziness    Ciprofloxacin Rash and Swelling     Decreased BP    Influenza Virus Vaccines Other     \"sick\" for 24 hours afterwards    Diphenhydramine Unknown    Flu Vac " 2023 [id number](Pf) Unknown    Other Itching     Fluzone inj high dose       Prior to Admission medications    Medication Sig Start Date End Date Taking? Authorizing Provider   albuterol 90 mcg/actuation inhaler Inhale 1 puff every 4 hours if needed. 5/28/21   Historical Provider, MD   atorvastatin (Lipitor) 40 mg tablet Take 1 tablet (40 mg) by mouth once every 24 hours. 7/19/21   Historical Provider, MD   carvedilol (Coreg) 25 mg tablet Take 1 tablet (25 mg) by mouth 2 times a day with meals. 11/27/23 4/8/24  Milind Matthew MD   empagliflozin (Jardiance) 10 mg Take 1 tablet (10 mg) by mouth once daily. 10/30/23   STANISLAV Devine   empagliflozin (Jardiance) 10 mg Take 1 tablet (10 mg) by mouth once daily. 10/30/23   STANISLAV Devine   ibuprofen 200 mg tablet Take 1 tablet (200 mg) by mouth every 6 hours if needed for mild pain (1 - 3).    Historical Provider, MD   loratadine (Claritin) 10 mg tablet Take 1 tablet (10 mg) by mouth once daily.    Historical Provider, MD   losartan (Cozaar) 50 mg tablet Take 1 tablet (50 mg) by mouth 2 times a day. 11/27/23 4/8/24  Milind Matthew MD   multivitamin-iron-folic acid (Multi Complete with Iron)  mg-mcg tablet tablet Take 1 tablet by mouth once daily.    Historical Provider, MD   nitroglycerin (Nitrostat) 0.4 mg SL tablet Place 1 tablet (0.4 mg) under the tongue every 5 minutes if needed. 7/2/21   Historical Provider, MD   oxygen (O2) gas therapy Inhale 2 L/min continuously. Indications: sob    Historical Provider, MD   pantoprazole (ProtoNix) 40 mg EC tablet TAKE 1 TABLET BY MOUTH TWICE A DAY 11/27/23   Tuan Foss DO   rivaroxaban (Xarelto) 20 mg tablet Take 1 tablet (20 mg) by mouth once daily in the evening. Take with meals. Take with food. 2/7/24   Tuan Foss DO   spironolactone (Aldactone) 25 mg tablet Take 0.5 tablets (12.5 mg) by mouth once daily. 4/2/24 4/2/25  FELA Devine-CNP   tiZANidine (Zanaflex) 2 mg  tablet Take 1 tablet (2 mg) by mouth every 8 hours if needed for muscle spasms. 10/10/23   Braeden Arteaga MD   torsemide (Demadex) 20 mg tablet Take 1 tablet (20 mg) by mouth once daily. 2/6/24 2/5/25  Gee Dupree MD   Trelekaren Ellipta 100-62.5-25 mcg blister with device Inhale 1 puff once daily. 10/30/23   Milana Hernadez, APRN-CNP        PAT ROS:   Constitutional:   neg    Neuro/Psych:    numbness   weakness  Eyes:    use of corrective lenses  Ears:    tinnitus  Nose:    epistaxis  Mouth:   Throat:   neg    Neck:   neg    Cardio:   neg    Respiratory:   neg    Endocrine:   neg    GI:    constipation  :   neg    Musculoskeletal:    arthralgias  Hematologic:    bruises/bleeds easily  Skin:  neg        Physical Exam  HENT:      Head: Normocephalic and atraumatic.      Nose: Nose normal.      Mouth/Throat:      Mouth: Mucous membranes are moist.   Eyes:      Pupils: Pupils are equal, round, and reactive to light.   Cardiovascular:      Rate and Rhythm: Normal rate and regular rhythm.      Pulses: Normal pulses.      Heart sounds: Normal heart sounds.   Pulmonary:      Effort: Pulmonary effort is normal.      Breath sounds: Normal breath sounds.      Comments: 2L O2 chronic  Abdominal:      Palpations: Abdomen is soft.   Musculoskeletal:         General: Tenderness present.      Cervical back: Normal range of motion.      Left lower leg: Edema present.   Skin:     General: Skin is warm.   Neurological:      General: No focal deficit present.      Mental Status: She is alert and oriented to person, place, and time.   Psychiatric:         Mood and Affect: Mood normal.         Behavior: Behavior normal.          PAT AIRWAY:   Airway:     Mallampati::  II    TM distance::  >3 FB    Neck ROM::  Full  normal        Visit Vitals  /53   Pulse 69   Temp 35.7 °C (96.3 °F)   Resp 18       DASI Risk Score      Flowsheet Row Most Recent Value   DASI SCORE 12.7   METS Score (Will be calculated only when all the questions  are answered) 4.3          Caprini DVT Assessment      Flowsheet Row Most Recent Value   DVT Score 15   Current Status COPD, Major surgery planned, lasting over 3 hours   History Congestive heart failure, SVT, DVT/PE, Prior major surgery   Age Over 75 years   BMI 30 or less          Modified Frailty Index      Flowsheet Row Most Recent Value   Modified Frailty Index Calculator .5454          CHADS2 Stroke Risk  Current as of 32 minutes ago        N/A 3 to 100%: High Risk   2 to < 3%: Medium Risk   0 to < 2%: Low Risk     Last Change: N/A          This score determines the patient's risk of having a stroke if the patient has atrial fibrillation.        This score is not applicable to this patient. Components are not calculated.          Revised Cardiac Risk Index      Flowsheet Row Most Recent Value   Revised Cardiac Risk Calculator 4          Apfel Simplified Score      Flowsheet Row Most Recent Value   Apfel Simplified Score Calculator 3          Risk Analysis Index Results This Encounter    No data found in the last 1 encounters.       Stop Bang Score      Flowsheet Row Most Recent Value   Do you snore loudly? 0   Do you often feel tired or fatigued after your sleep? 0   Has anyone ever observed you stop breathing in your sleep? 0   Do you have or are you being treated for high blood pressure? 1   Recent BMI (Calculated) 23.9   Is BMI greater than 35 kg/m2? 0=No   Age older than 50 years old? 1=Yes   Is your neck circumference greater than 17 inches (Male) or 16 inches (Female)? 0   Gender - Male 0=No   STOP-BANG Total Score 2            Assessment and Plan:     Anesthesia:  The patient denies problems with anesthesia in the past such as PONV, prolonged sedation, awareness, dental damage, aspiration, cardiac arrest, difficult intubation, or unexpected hospital admissions.     Neuro:   The patient has diagnoses or significant findings on chart review or clinical presentation and evaluation significant for cognitive  decline. The patient is at increased risk for postoperative delirium secondary to age 65 or older, depression, renal Insufficiency.    HEENT/Airway  No diagnoses, significant findings on chart review, clinical presentation, or evaluation.    Cardiovascular  The patient is scheduled for cardiac surgery.  Preoperative evaluation is complete pending results of lab work and cardiology consult  RCRI  The patient meets 3 or more RCRI criteria and therefore is at high risk for major adverse cardiac complications.  METS  The patient's functional capacity capacity is greater than 4 METS.  EKG  The patient has no EKG or echocardiographic changes concerning for myocardial ischemia.   Heart Failure  The patient has a known history of heart failure, which is currently compensated, due to systolic dysfunction  Hypertension Evaluation  The patient has a known history of hypertension that is controlled.  Patient's hypertension is most consistent with stage 1.  Heart Rhythm Evaluation  Patient has a history of afib which is paroxysmal and is treated by anticoagulation  Heart Valve Evaluation  The patient has a known history of valvular heart disease.  Per patient's prior studies, the patient has severe MVR  Cardiology Evaluation  The patient follows with cardiology, Dr. Foss. Patient was last seen 4-22-24.     The patient has a 30-day risk for MACE of 3 or greater predictors, 15% risk for cardiac death, nonfatal myocardial infarction, and nonfatal cardiac arrest.  LUZ score which indicates a 2.1% risk of intraoperative or 30-day postoperative.    Pulmonary   The patient has findings on chart review, clinical presentation and evaluation significant for COPD. The patient is at increased risk of perioperative pulmonary complications secondary to thoracic surgery, advanced age greater than 60, steroid oxygen dependent lung disease.    The patient has a stop bang score of 2, which places patient at low risk for having TUNG.    ARISCAT  63, High, 42.1% risk of in-hospital postoperative pulmonary complications  PRODIGY 23, high risk of respiratory depression episode. Patient given PI sheet for preoperative deep breathing exercises.    Hematology  No diagnoses or significant findings on chart review or clinical presentation and evaluation.  Antiplatelet management   The patient is not currently receiving antiplatelet therapy.  Anticoagulation management  The patient is currently receiving anticoagulation therapy for afib.    Caprini score 15, high risk of perioperative VTE.     Patient instructed to ambulate as soon as possible postoperatively to decrease thromboembolic risk. Initiate mechanical DVT prophylaxis as soon as possible and initiate chemical prophylaxis when deemed safe from a bleeding standpoint post surgery.     Transfusion Evaluation  A type and screen was obtained given the likelihood for perioperative transfusion of blood or blood products.    Gastrointestinal  The patient has diagnoses or significant findings on chart review or clinical presentation and evaluation significant for GERD.  Eat 10- 0,  self-perceived oropharyngeal dysphagia scale (0-40)     Genitourinary  No diagnoses or significant findings on chart review or clinical presentation and evaluation.    Renal  The patient has a history of chronic kidney disease most consistent with stage 3.  Patient's renal function appears unchanged when compared to prior labs.    Musculoskeletal  The patient has diagnoses or significant findings on chart review or clinical presentation and evaluation significant for osteoarthritis    Endocrine  Diabetes Evaluation  The patient has no history of diabetes mellitus.  Thyroid Disease Evaluation  The patient has a history of thyroid disease that appears controlled.    ID  No diagnoses or significant findings on chart review or clinical presentation and evaluation. MRSA screening obtained. Prescriptions and instructions given for Hibiclens and  Peridex.    -Preoperative medication instructions were provided and reviewed with the patient.  Any additional testing or evaluation was explained to the patient.  NPO Instructions were discussed, and the patient's questions were answered prior to conclusion of this encounter.

## 2024-04-16 LAB — STAPHYLOCOCCUS SPEC CULT: NORMAL

## 2024-04-22 ENCOUNTER — OFFICE VISIT (OUTPATIENT)
Dept: CARDIOLOGY | Facility: CLINIC | Age: 84
End: 2024-04-22
Payer: MEDICARE

## 2024-04-22 ENCOUNTER — APPOINTMENT (OUTPATIENT)
Dept: VASCULAR MEDICINE | Facility: CLINIC | Age: 84
End: 2024-04-22
Payer: MEDICARE

## 2024-04-22 ENCOUNTER — HOSPITAL ENCOUNTER (OUTPATIENT)
Dept: VASCULAR MEDICINE | Facility: CLINIC | Age: 84
Discharge: HOME | DRG: 267 | End: 2024-04-22
Payer: MEDICARE

## 2024-04-22 VITALS
HEART RATE: 80 BPM | SYSTOLIC BLOOD PRESSURE: 110 MMHG | DIASTOLIC BLOOD PRESSURE: 58 MMHG | BODY MASS INDEX: 23.34 KG/M2 | WEIGHT: 136 LBS | OXYGEN SATURATION: 94 %

## 2024-04-22 DIAGNOSIS — I34.0 NONRHEUMATIC MITRAL VALVE REGURGITATION: ICD-10-CM

## 2024-04-22 DIAGNOSIS — I25.10 ARTERIOSCLEROSIS OF CORONARY ARTERY: Primary | ICD-10-CM

## 2024-04-22 DIAGNOSIS — I50.32 CHRONIC HEART FAILURE WITH PRESERVED EJECTION FRACTION (MULTI): ICD-10-CM

## 2024-04-22 DIAGNOSIS — I65.22 STENOSIS OF LEFT CAROTID ARTERY: ICD-10-CM

## 2024-04-22 DIAGNOSIS — I10 BENIGN ESSENTIAL HYPERTENSION: ICD-10-CM

## 2024-04-22 PROCEDURE — 3074F SYST BP LT 130 MM HG: CPT | Performed by: INTERNAL MEDICINE

## 2024-04-22 PROCEDURE — 1160F RVW MEDS BY RX/DR IN RCRD: CPT | Performed by: INTERNAL MEDICINE

## 2024-04-22 PROCEDURE — 3078F DIAST BP <80 MM HG: CPT | Performed by: INTERNAL MEDICINE

## 2024-04-22 PROCEDURE — 1126F AMNT PAIN NOTED NONE PRSNT: CPT | Performed by: INTERNAL MEDICINE

## 2024-04-22 PROCEDURE — 1159F MED LIST DOCD IN RCRD: CPT | Performed by: INTERNAL MEDICINE

## 2024-04-22 PROCEDURE — 1157F ADVNC CARE PLAN IN RCRD: CPT | Performed by: INTERNAL MEDICINE

## 2024-04-22 PROCEDURE — 1036F TOBACCO NON-USER: CPT | Performed by: INTERNAL MEDICINE

## 2024-04-22 PROCEDURE — 99214 OFFICE O/P EST MOD 30 MIN: CPT | Performed by: INTERNAL MEDICINE

## 2024-04-22 PROCEDURE — 93880 EXTRACRANIAL BILAT STUDY: CPT | Performed by: INTERNAL MEDICINE

## 2024-04-22 PROCEDURE — 93880 EXTRACRANIAL BILAT STUDY: CPT

## 2024-04-22 ASSESSMENT — ENCOUNTER SYMPTOMS
WHEEZING: 0
FEVER: 0
IRREGULAR HEARTBEAT: 0
ORTHOPNEA: 0
CLAUDICATION: 0
MYALGIAS: 0
DYSPNEA ON EXERTION: 0
WEIGHT GAIN: 0
WEIGHT LOSS: 0
PND: 0
PALPITATIONS: 0
SYNCOPE: 0
WEAKNESS: 0
SHORTNESS OF BREATH: 0
COUGH: 0
NEAR-SYNCOPE: 0
DIAPHORESIS: 0
DIZZINESS: 0

## 2024-04-22 ASSESSMENT — PAIN SCALES - GENERAL: PAINLEVEL: 0-NO PAIN

## 2024-04-22 NOTE — PROGRESS NOTES
Subjective      Chief Complaint   Patient presents with    Follow-up     Transcatheter edge to edge repair this week with           84-year-old female well-known to me.  She has a history of atherosclerotic heart disease with remote inferior wall myocardial infarction and drug-eluting stent placement.  She has an ischemic cardiomyopathy with an ejection fraction of about 40 to 45% and inferior wall motion abnormalities.  She had an echocardiogram in October showing again an EF of 40 to 45% however her mitral valve regurgitation has progressed to severe. She was catheterized in February in preparation for Mitraclip to find stable non-obstructive CAD with a widely patent RCA stent. Right and left heart filling pressures were normal. She has no chest pain, dyspnea is stable. She has no LE swelling. She has mitraclip scheduled for this week.          Review of Systems   Constitutional: Negative for diaphoresis, fever, weight gain and weight loss.   Eyes:  Negative for visual disturbance.   Cardiovascular:  Negative for chest pain, claudication, dyspnea on exertion, irregular heartbeat, leg swelling, near-syncope, orthopnea, palpitations, paroxysmal nocturnal dyspnea and syncope.   Respiratory:  Negative for cough, shortness of breath and wheezing.    Musculoskeletal:  Negative for muscle weakness and myalgias.   Neurological:  Negative for dizziness and weakness.   All other systems reviewed and are negative.       Past Medical History:   Diagnosis Date    AAA (abdominal aortic aneurysm) (CMS-[facility])     Acute urinary tract infection 04/20/2023    Anemia     Anxiety     Arthritis     CHF (congestive heart failure) (Multi)     Chronic pain disorder     back pain    CKD (chronic kidney disease)     Cognitive decline     COPD (chronic obstructive pulmonary disease) (Multi)     oxygen dependent- wears 2L NC continuously    Coronary artery disease     s/p stent    CVA (cerebral vascular accident) (Multi)     weaker  on the left side    Deep vein thrombosis (DVT) of calf (Multi)     left    Depression     Disease of thyroid gland     Diverticulosis     DVT (deep venous thrombosis) (Multi)     left leg, on xarelto    Easy bruising     Epistaxis     GERD (gastroesophageal reflux disease)     managed with protonix    History of blood transfusion 2023    History of degenerative disc disease     Hyperlipidemia     Hypertension     Hypothyroidism     Ischemic cardiomyopathy     Meralgia paresthetica     Nonrheumatic mitral valve regurgitation     Osteoarthritis     Osteopenia 2010    hip - DEXA    Plantar fasciitis     RLS (restless legs syndrome)     Sciatica     Spinal stenosis     ST elevation (STEMI) myocardial infarction (Multi)         Past Surgical History:   Procedure Laterality Date    ADENOIDECTOMY      CARDIAC CATHETERIZATION  10/2019    CARDIAC CATHETERIZATION N/A 02/16/2024    Procedure: Left And Right Heart Cath, With LV;  Surgeon: Tuan Foss DO;  Location: [facility] Cardiac Cath Lab;  Service: Cardiovascular;  Laterality: N/A;  no pre auth needed    CATARACT EXTRACTION Bilateral 11/13/2012    CHOLECYSTECTOMY  1996    laparoscopic    COLONOSCOPY      Dr. Rodriguez    CORONARY STENT PLACEMENT      CYST REMOVAL Right 06/24/2013    Excision of Sebaceous cyst right axilla    DILATION AND CURETTAGE OF UTERUS      ESOPHAGOGASTRODUODENOSCOPY      KNEE ARTHROPLASTY Left     MR HEAD ANGIO WO IV CONTRAST  02/24/2017    MR HEAD ANGIO WO IV CONTRAST [facility]    MR HEAD ANGIO WO IV CONTRAST  08/11/2017    MR HEAD ANGIO WO IV CONTRAST [facility]    MR HEAD ANGIO WO IV CONTRAST  02/10/2019    MR HEAD ANGIO WO IV CONTRAST [facility]    OTHER SURGICAL HISTORY  01/09/2019    Stent synergy    OTHER SURGICAL HISTORY  01/09/2019    Stent synergy    [place] ARTHRP ACETBLR/PROX FEM PROSTC AGRFT/ALGRFT      SURGICAL SAMMY MONITORING      THYROIDECTOMY, PARTIAL Bilateral 04/17/2013    subtotal thyroidectomy    TONSILLECTOMY       TOTAL HIP ARTHROPLASTY Right 2006    UPPER GASTROINTESTINAL ENDOSCOPY  03/2019    Dr. Galan        Social History     Socioeconomic History    Marital status:      Spouse name: Not on file    Number of children: Not on file    Years of education: Not on file    Highest education level: Not on file   Occupational History    Not on file   Tobacco Use    Smoking status: Former     Current packs/day: 0.00     Types: Cigarettes     Quit date: 2014     Years since quitting: 10.3     Passive exposure: Never    Smokeless tobacco: Never   Vaping Use    Vaping status: Never Used   Substance and Sexual Activity    Alcohol use: Not Currently     Comment: glass wine at holiday    Drug use: Never    Sexual activity: Defer   Other Topics Concern    Not on file   Social History Narrative    Not on file     Social Determinants of Health     Financial Resource Strain: Low Risk  (11/22/2023)    Overall Financial Resource Strain (CARDIA)     Difficulty of Paying Living Expenses: Not hard at all   Food Insecurity: No Food Insecurity (11/22/2023)    Hunger Vital Sign     Worried About Running Out of Food in the Last Year: Never true     Ran Out of Food in the Last Year: Never true   Transportation Needs: No Transportation Needs (1/29/2024)    OASIS : Transportation     Lack of Transportation (Medical): No     Lack of Transportation (Non-Medical): No     Patient Unable or Declines to Respond: No   Physical Activity: Insufficiently Active (11/22/2023)    Exercise Vital Sign     Days of Exercise per Week: 3 days     Minutes of Exercise per Session: 20 min   Stress: No Stress Concern Present (11/22/2023)    [employer] - Occupational Stress Questionnaire     Feeling of Stress : Not at all   Social Connections: Feeling Socially Integrated (1/29/2024)    OASIS : Social Isolation     Frequency of experiencing loneliness or isolation: Never   Recent Concern: Social Connections - Moderately  Isolated (11/22/2023)    Social Connection and Isolation Panel [NHANES]     Frequency of Communication with Friends and Family: Once a week     Frequency of Social Gatherings with Friends and Family: Twice a week     Attends Confucianism Services: Never     Active Member of Clubs or Organizations: No     Attends Club or Organization Meetings: Never     Marital Status:    Intimate Partner Violence: Not At Risk (11/22/2023)    Humiliation, Afraid, Rape, and Kick questionnaire     Fear of Current or Ex-Partner: No     Emotionally Abused: No     Physically Abused: No     Sexually Abused: No   Housing Stability: Low Risk  (11/22/2023)    Housing Stability Vital Sign     Unable to Pay for Housing in the Last Year: No     Number of Places Lived in the Last Year: 1     Unstable Housing in the Last Year: No        Family History   Problem Relation Name Age of Onset    Hyperthyroidism Mother          Treated with radioactive iodine    Hypertension Mother      Heart disease Mother      Hyperthyroidism Sibling      Diabetes Father's Sister          OBJECTIVE:    Vitals:    04/22/24 1036   BP: 110/58   Pulse: 80   SpO2: 94%        Vitals reviewed.   Constitutional:       Appearance: Normal and healthy appearance. Not in distress.   Pulmonary:      Effort: Pulmonary effort is normal.      Breath sounds: Normal breath sounds.   Cardiovascular:      Normal rate. Regular rhythm. Normal S1. Normal S2.       Murmurs: There is no murmur.      No gallop.  No click.   Pulses:     Intact distal pulses.   Edema:     Peripheral edema absent.   Skin:     General: Skin is warm and dry.   Neurological:      General: No focal deficit present.          Lab Review:   Lab Results   Component Value Date    CHOL 190 02/21/2023    TRIG 64 02/21/2023    HDL 52 02/21/2023       Lab Results   Component Value Date    LDLCALC 125 02/21/2023        Arteriosclerosis of coronary artery  Stable without angina. Continue xarelto, no need for asa while on  AC. Continue statin. Checking lipids    Benign essential hypertension  Controlled. Continue current medical therapy    Chronic heart failure with preserved ejection fraction (Multi)  Euvolemic today    Nonrheumatic mitral valve regurgitation  Mitraclip ARMANI this week

## 2024-04-22 NOTE — ASSESSMENT & PLAN NOTE
Stable without angina. Continue xarelto, no need for asa while on AC. Continue statin. Checking lipids

## 2024-04-23 PROBLEM — Z95.818 S/P MITRAL VALVE CLIP IMPLANTATION: Status: ACTIVE | Noted: 2024-04-23

## 2024-04-23 PROBLEM — Z98.890 S/P MITRAL VALVE CLIP IMPLANTATION: Status: ACTIVE | Noted: 2024-04-23

## 2024-04-23 NOTE — DISCHARGE INSTRUCTIONS
Home meds  - cont Xarelto, Torsemide and Spironolactone current dose   - if pt stops xarelto, pt will need to be on Aspirin for life.   - STOP losartan due to patient's blood pressure were 103/41, HR 94   - Decrease Carvedilol dose 3.25 mg BID (Home dose 12.5 mg BID)   - Start Carvedilol and Torsemide on Monday 04/29/2024  - cont home Jardiance   - cont home meds: Albuterol, Trelegy ellipta   - instructed pt to buy pulse oxymetry and check oxygen level at rest, during and after ambulation, if pulse oxymetry is >92% with rest, ambulation wean o2 by 1L and reassess as needed. Still wear 2 litter at night.     R Arm hematoma  - apply cold compress at home on the area for 10-20 minutes at a time  - keep arm elevated as needed when at rest   - Wrapping the bruised area with ACE wrap may decrease swelling, Do not wrap too tight.             ####  POST VALVE PROCEDURE DISCHARGE INSTRUCTIONS   ####    - Please weigh yourself daily (first thing in the morning) and record reading  - Please take and record your blood pressure 2 times a day (at least 60-90 mins AFTER you take your meds)  PLEASE HAVE THESE READINGS AVAILABLE DURING YOUR FOLLOW-UP APPOINTMENTS!!    - NO DRIVING OR LIFTING/PULLING/PUSHING GREATER THAN 10 POUNDS FOR 1 WEEK FROM YOUR PROCEDURE DATE.    - A lab requisition has been provided for you to draw a CBC, BMP, and PT/INR.  Please take this rec to your local lab to have your labs drawn between 4-7 days post discharge.     - You have been given the order requisition for the 1 month ECHO at the time of your discharge.  Please call and schedule this ECHO at your LOCAL center (no sooner than 23 days after your procedure date).    - You will have 3 appointments that are vital to your post procedure care, these will be scheduled for you IF YOU NEED TO CHANGE YOUR APPOINTMENT TIME/DATE, PLEASE CALL [phone]   - Please follow up with your PCP within 7-14 days of discharge  - You will follow up with your primary  cardiologist in 6-10 weeks    **** No elective dental procedures or cleanings for 3 months post procedure - You will need dental prophylaxis (oral antibiotic) prior to dental work/cleanings for life *****    Please call the STRUCTURAL HEART TEAM LINE if you have any questions or concerns - [phone]     ****   CALL PROVIDER IF:   ****  - Breathing faster than normal.   - Breathing harder than normal or having retractions.   - Fever of 100.4 F (38 C) or higher.   - Chills.   - Drinking less than normal.   - Urinating less than normal, over 1 day.   - Acting very sleepy and difficult to awaken.   - Vomiting (throwing up) and not able to eat or drink for 12 hours.   - 3 or more loose, watery bowel movements in 24 hours (diarrhea).    -Any new concerning symptoms.    - If you develop difficulty breathing, rash, hives, severe nausea, vomiting, light-headedness or any signs of infection, immediately contact your doctor and go to the nearest emergency room.      #####   MISC. HOME-GOING INFO   #####  - DO NOT drink any alcoholic drinks or take any non-prescriptive medications that contain alcohol for the first 24 hours.   - DO NOT make any important decisions for the first 24 hours.      ACTIVITY:  - You are advised to go directly home from the hospital.   - DO NOT lift anything heavier than 10 pounds for one week, this allows for proper healing of the groin.   - No excessive exercise or treadmill use for one week. You may walk and do stairs, slowly.   - No sexual activities for 24 hours after you arrive home.      WOUND CARE:  - If slight bleeding should occur at site, lie down and have someone apply firm pressure just above the puncture site for 5 minutes.  If it continues or is profuse, call 911. Always notify your doctor if bleeding occurs.   - Keep site clean and dry. Let air dry or you may use a simple bandaid.   - Gently cleanse the puncture site in your groin with soap and water only.   - You may experience some  tenderness, bruising or minimal inflammation.  If you have any concerns, you may contact the Cath Lab or if any of these symptoms become excessive, contact your cardiologist or go to the emergency room.   - No tub baths, soaking, or swimming for one week.   - May shower the next day after your procedure.      DIET:  - You may resume your normal diet. However, be mindful of your sodium intake.  Ideally you should try to limit your daily intake of sodium to 2-3g a day      HEART FAILURE SPECIFIC INSTRUCTIONS:   - CALL 911 IF YOU HAVE ANY OF THE SIGNS AND SYMPTOMS OF HEART FAILURE:   1. Chest pain   2. Significant Shortness of breath   3. Fainting.   - Notify your physician immediately if you have shortness of breath; weight gain of 3 lbs. or more; fatigue and loss of energy; swelling of lower extremities or abdomen; dizziness or fainting; change of appetite; and frequent coughing.   - Daily weight on the same scale, same time after voiding and before eating.   - Maintain daily weight log.

## 2024-04-25 ENCOUNTER — APPOINTMENT (OUTPATIENT)
Dept: CARDIOLOGY | Facility: HOSPITAL | Age: 84
DRG: 267 | End: 2024-04-25
Payer: MEDICARE

## 2024-04-25 ENCOUNTER — APPOINTMENT (OUTPATIENT)
Dept: RADIOLOGY | Facility: HOSPITAL | Age: 84
DRG: 267 | End: 2024-04-25
Payer: MEDICARE

## 2024-04-25 ENCOUNTER — ANESTHESIA (OUTPATIENT)
Dept: CARDIOLOGY | Facility: HOSPITAL | Age: 84
DRG: 267 | End: 2024-04-25
Payer: MEDICARE

## 2024-04-25 ENCOUNTER — HOSPITAL ENCOUNTER (INPATIENT)
Facility: HOSPITAL | Age: 84
LOS: 1 days | Discharge: HOME | DRG: 267 | End: 2024-04-26
Attending: INTERNAL MEDICINE | Admitting: INTERNAL MEDICINE
Payer: MEDICARE

## 2024-04-25 ENCOUNTER — ANESTHESIA EVENT (OUTPATIENT)
Dept: CARDIOLOGY | Facility: HOSPITAL | Age: 84
DRG: 267 | End: 2024-04-25
Payer: MEDICARE

## 2024-04-25 DIAGNOSIS — I34.0 NONRHEUMATIC MITRAL VALVE REGURGITATION: ICD-10-CM

## 2024-04-25 DIAGNOSIS — Z98.890 S/P MITRAL VALVE CLIP IMPLANTATION: Primary | ICD-10-CM

## 2024-04-25 DIAGNOSIS — I10 ESSENTIAL (PRIMARY) HYPERTENSION: ICD-10-CM

## 2024-04-25 DIAGNOSIS — I34.0 SEVERE MITRAL VALVE REGURGITATION: ICD-10-CM

## 2024-04-25 DIAGNOSIS — Z95.818 S/P MITRAL VALVE CLIP IMPLANTATION: Primary | ICD-10-CM

## 2024-04-25 LAB
ACT BLD: 237 SEC (ref 83–199)
ACT BLD: 269 SEC (ref 83–199)
ACT BLD: 295 SEC (ref 83–199)
ACT BLD: 306 SEC (ref 83–199)
ACT BLD: 353 SEC (ref 83–199)
ACT BLD: 360 SEC (ref 83–199)
ACT BLD: NORMAL S
ANION GAP BLDA CALCULATED.4IONS-SCNC: 8 MMO/L (ref 10–25)
ANION GAP BLDA CALCULATED.4IONS-SCNC: 9 MMO/L (ref 10–25)
ANION GAP SERPL CALC-SCNC: 14 MMOL/L (ref 10–20)
BASE EXCESS BLDA CALC-SCNC: 2.8 MMOL/L (ref -2–3)
BASE EXCESS BLDA CALC-SCNC: 4.6 MMOL/L (ref -2–3)
BASOPHILS # BLD AUTO: 0.03 X10*3/UL (ref 0–0.1)
BASOPHILS NFR BLD AUTO: 0.3 %
BODY TEMPERATURE: 37 DEGREES CELSIUS
BODY TEMPERATURE: 37 DEGREES CELSIUS
BUN SERPL-MCNC: 36 MG/DL (ref 6–23)
CA-I BLDA-SCNC: 1.08 MMOL/L (ref 1.1–1.33)
CA-I BLDA-SCNC: 1.15 MMOL/L (ref 1.1–1.33)
CALCIUM SERPL-MCNC: 8.2 MG/DL (ref 8.6–10.6)
CHLORIDE BLDA-SCNC: 100 MMOL/L (ref 98–107)
CHLORIDE BLDA-SCNC: 104 MMOL/L (ref 98–107)
CHLORIDE SERPL-SCNC: 104 MMOL/L (ref 98–107)
CO2 SERPL-SCNC: 28 MMOL/L (ref 21–32)
CREAT SERPL-MCNC: 1.14 MG/DL (ref 0.5–1.05)
EGFRCR SERPLBLD CKD-EPI 2021: 47 ML/MIN/1.73M*2
EOSINOPHIL # BLD AUTO: 0.03 X10*3/UL (ref 0–0.4)
EOSINOPHIL NFR BLD AUTO: 0.3 %
ERYTHROCYTE [DISTWIDTH] IN BLOOD BY AUTOMATED COUNT: 14.2 % (ref 11.5–14.5)
GLUCOSE BLD MANUAL STRIP-MCNC: 114 MG/DL (ref 74–99)
GLUCOSE BLD MANUAL STRIP-MCNC: 159 MG/DL (ref 74–99)
GLUCOSE BLDA-MCNC: 120 MG/DL (ref 74–99)
GLUCOSE BLDA-MCNC: 142 MG/DL (ref 74–99)
GLUCOSE SERPL-MCNC: 140 MG/DL (ref 74–99)
HCO3 BLDA-SCNC: 28.9 MMOL/L (ref 22–26)
HCO3 BLDA-SCNC: 31 MMOL/L (ref 22–26)
HCT VFR BLD AUTO: 33 % (ref 36–46)
HCT VFR BLD EST: 37 % (ref 36–46)
HCT VFR BLD EST: 54 % (ref 36–46)
HGB BLD-MCNC: 10.4 G/DL (ref 12–16)
HGB BLDA-MCNC: 12.2 G/DL (ref 12–16)
HGB BLDA-MCNC: 18 G/DL (ref 12–16)
IMM GRANULOCYTES # BLD AUTO: 0.17 X10*3/UL (ref 0–0.5)
IMM GRANULOCYTES NFR BLD AUTO: 1.9 % (ref 0–0.9)
INHALED O2 CONCENTRATION: 21 %
INHALED O2 CONCENTRATION: 50 %
INR PPP: 1.1 (ref 0.9–1.1)
LACTATE BLDA-SCNC: 0.6 MMOL/L (ref 0.4–2)
LACTATE BLDA-SCNC: 0.7 MMOL/L (ref 0.4–2)
LYMPHOCYTES # BLD AUTO: 0.46 X10*3/UL (ref 0.8–3)
LYMPHOCYTES NFR BLD AUTO: 5.2 %
MAGNESIUM SERPL-MCNC: 2.01 MG/DL (ref 1.6–2.4)
MCH RBC QN AUTO: 27.9 PG (ref 26–34)
MCHC RBC AUTO-ENTMCNC: 31.5 G/DL (ref 32–36)
MCV RBC AUTO: 89 FL (ref 80–100)
MONOCYTES # BLD AUTO: 0.16 X10*3/UL (ref 0.05–0.8)
MONOCYTES NFR BLD AUTO: 1.8 %
NEUTROPHILS # BLD AUTO: 8.04 X10*3/UL (ref 1.6–5.5)
NEUTROPHILS NFR BLD AUTO: 90.5 %
NRBC BLD-RTO: 0 /100 WBCS (ref 0–0)
OXYHGB MFR BLDA: 95 % (ref 94–98)
OXYHGB MFR BLDA: 97.6 % (ref 94–98)
PCO2 BLDA: 50 MM HG (ref 38–42)
PCO2 BLDA: 50 MM HG (ref 38–42)
PH BLDA: 7.37 PH (ref 7.38–7.42)
PH BLDA: 7.4 PH (ref 7.38–7.42)
PLATELET # BLD AUTO: 157 X10*3/UL (ref 150–450)
PO2 BLDA: 213 MM HG (ref 85–95)
PO2 BLDA: 85 MM HG (ref 85–95)
POTASSIUM BLDA-SCNC: 3.6 MMOL/L (ref 3.5–5.3)
POTASSIUM BLDA-SCNC: 4.3 MMOL/L (ref 3.5–5.3)
POTASSIUM SERPL-SCNC: 4.5 MMOL/L (ref 3.5–5.3)
PROTHROMBIN TIME: 12.9 SECONDS (ref 9.8–12.8)
RBC # BLD AUTO: 3.73 X10*6/UL (ref 4–5.2)
SAO2 % BLDA: 100 % (ref 94–100)
SAO2 % BLDA: 98 % (ref 94–100)
SODIUM BLDA-SCNC: 136 MMOL/L (ref 136–145)
SODIUM BLDA-SCNC: 137 MMOL/L (ref 136–145)
SODIUM SERPL-SCNC: 141 MMOL/L (ref 136–145)
WBC # BLD AUTO: 8.9 X10*3/UL (ref 4.4–11.3)

## 2024-04-25 PROCEDURE — 2720000007 HC OR 272 NO HCPCS: Performed by: INTERNAL MEDICINE

## 2024-04-25 PROCEDURE — 84132 ASSAY OF SERUM POTASSIUM: CPT | Mod: 91 | Performed by: ANESTHESIOLOGIST ASSISTANT

## 2024-04-25 PROCEDURE — 02UG3JZ SUPPLEMENT MITRAL VALVE WITH SYNTHETIC SUBSTITUTE, PERCUTANEOUS APPROACH: ICD-10-PCS | Performed by: INTERNAL MEDICINE

## 2024-04-25 PROCEDURE — 3700000001 HC GENERAL ANESTHESIA TIME - INITIAL BASE CHARGE: Performed by: INTERNAL MEDICINE

## 2024-04-25 PROCEDURE — 33418 REPAIR TCAT MITRAL VALVE: CPT | Performed by: INTERNAL MEDICINE

## 2024-04-25 PROCEDURE — 84132 ASSAY OF SERUM POTASSIUM: CPT | Performed by: NURSE PRACTITIONER

## 2024-04-25 PROCEDURE — C1769 GUIDE WIRE: HCPCS | Performed by: INTERNAL MEDICINE

## 2024-04-25 PROCEDURE — 99100 ANES PT EXTEME AGE<1 YR&>70: CPT | Performed by: ANESTHESIOLOGY

## 2024-04-25 PROCEDURE — 2020000001 HC ICU ROOM DAILY

## 2024-04-25 PROCEDURE — 85610 PROTHROMBIN TIME: CPT | Performed by: INTERNAL MEDICINE

## 2024-04-25 PROCEDURE — 93355 ECHO TRANSESOPHAGEAL (TEE): CPT | Performed by: SPECIALIST

## 2024-04-25 PROCEDURE — 82947 ASSAY GLUCOSE BLOOD QUANT: CPT

## 2024-04-25 PROCEDURE — 2500000004 HC RX 250 GENERAL PHARMACY W/ HCPCS (ALT 636 FOR OP/ED): Performed by: ANESTHESIOLOGIST ASSISTANT

## 2024-04-25 PROCEDURE — 33419 REPAIR TCAT MITRAL VALVE: CPT | Performed by: INTERNAL MEDICINE

## 2024-04-25 PROCEDURE — 71045 X-RAY EXAM CHEST 1 VIEW: CPT

## 2024-04-25 PROCEDURE — 2500000001 HC RX 250 WO HCPCS SELF ADMINISTERED DRUGS (ALT 637 FOR MEDICARE OP): Performed by: NURSE PRACTITIONER

## 2024-04-25 PROCEDURE — 83735 ASSAY OF MAGNESIUM: CPT | Performed by: NURSE PRACTITIONER

## 2024-04-25 PROCEDURE — 2500000004 HC RX 250 GENERAL PHARMACY W/ HCPCS (ALT 636 FOR OP/ED): Performed by: NURSE PRACTITIONER

## 2024-04-25 PROCEDURE — 85025 COMPLETE CBC W/AUTO DIFF WBC: CPT | Performed by: NURSE PRACTITIONER

## 2024-04-25 PROCEDURE — A33430 PR REPLACEMENT OF MITRAL VALVE: Performed by: ANESTHESIOLOGIST ASSISTANT

## 2024-04-25 PROCEDURE — 93005 ELECTROCARDIOGRAM TRACING: CPT

## 2024-04-25 PROCEDURE — C1894 INTRO/SHEATH, NON-LASER: HCPCS | Performed by: INTERNAL MEDICINE

## 2024-04-25 PROCEDURE — 99222 1ST HOSP IP/OBS MODERATE 55: CPT

## 2024-04-25 PROCEDURE — 85347 COAGULATION TIME ACTIVATED: CPT

## 2024-04-25 PROCEDURE — 37799 UNLISTED PX VASCULAR SURGERY: CPT | Performed by: NURSE PRACTITIONER

## 2024-04-25 PROCEDURE — C1893 INTRO/SHEATH, FIXED,NON-PEEL: HCPCS | Performed by: INTERNAL MEDICINE

## 2024-04-25 PROCEDURE — 84132 ASSAY OF SERUM POTASSIUM: CPT | Mod: 91 | Performed by: INTERNAL MEDICINE

## 2024-04-25 PROCEDURE — 51701 INSERT BLADDER CATHETER: CPT

## 2024-04-25 PROCEDURE — 36620 INSERTION CATHETER ARTERY: CPT | Performed by: ANESTHESIOLOGY

## 2024-04-25 PROCEDURE — 2500000005 HC RX 250 GENERAL PHARMACY W/O HCPCS: Performed by: NURSE PRACTITIONER

## 2024-04-25 PROCEDURE — 2780000003 HC OR 278 NO HCPCS: Performed by: INTERNAL MEDICINE

## 2024-04-25 PROCEDURE — 71045 X-RAY EXAM CHEST 1 VIEW: CPT | Performed by: STUDENT IN AN ORGANIZED HEALTH CARE EDUCATION/TRAINING PROGRAM

## 2024-04-25 PROCEDURE — 85347 COAGULATION TIME ACTIVATED: CPT | Performed by: INTERNAL MEDICINE

## 2024-04-25 PROCEDURE — A33430 PR REPLACEMENT OF MITRAL VALVE: Performed by: ANESTHESIOLOGY

## 2024-04-25 PROCEDURE — 3700000002 HC GENERAL ANESTHESIA TIME - EACH INCREMENTAL 1 MINUTE: Performed by: INTERNAL MEDICINE

## 2024-04-25 PROCEDURE — 93355 ECHO TRANSESOPHAGEAL (TEE): CPT

## 2024-04-25 PROCEDURE — 2500000004 HC RX 250 GENERAL PHARMACY W/ HCPCS (ALT 636 FOR OP/ED): Performed by: INTERNAL MEDICINE

## 2024-04-25 PROCEDURE — 2500000001 HC RX 250 WO HCPCS SELF ADMINISTERED DRUGS (ALT 637 FOR MEDICARE OP)

## 2024-04-25 PROCEDURE — 2500000005 HC RX 250 GENERAL PHARMACY W/O HCPCS: Performed by: ANESTHESIOLOGIST ASSISTANT

## 2024-04-25 DEVICE — G4 CLIP DELIVERY SYSTEM
Type: IMPLANTABLE DEVICE | Site: HEART | Status: NON-FUNCTIONAL
Brand: MITRACLIP™

## 2024-04-25 DEVICE — G4 CLIP DELIVERY SYSTEM
Type: IMPLANTABLE DEVICE | Site: HEART | Status: FUNCTIONAL
Brand: MITRACLIP™

## 2024-04-25 DEVICE — STEERABLE GUIDE CATHETER
Type: IMPLANTABLE DEVICE | Site: HEART | Status: NON-FUNCTIONAL
Brand: MITRACLIP

## 2024-04-25 RX ORDER — LOSARTAN POTASSIUM 25 MG/1
25 TABLET ORAL DAILY
COMMUNITY
End: 2024-04-26 | Stop reason: HOSPADM

## 2024-04-25 RX ORDER — ONDANSETRON HYDROCHLORIDE 2 MG/ML
INJECTION, SOLUTION INTRAVENOUS AS NEEDED
Status: DISCONTINUED | OUTPATIENT
Start: 2024-04-25 | End: 2024-04-25

## 2024-04-25 RX ORDER — PANTOPRAZOLE SODIUM 40 MG/1
40 TABLET, DELAYED RELEASE ORAL
Status: DISCONTINUED | OUTPATIENT
Start: 2024-04-26 | End: 2024-04-25

## 2024-04-25 RX ORDER — METOPROLOL TARTRATE 1 MG/ML
INJECTION, SOLUTION INTRAVENOUS AS NEEDED
Status: DISCONTINUED | OUTPATIENT
Start: 2024-04-25 | End: 2024-04-25

## 2024-04-25 RX ORDER — VANCOMYCIN HYDROCHLORIDE 1 G/200ML
1000 INJECTION, SOLUTION INTRAVENOUS ONCE
Status: COMPLETED | OUTPATIENT
Start: 2024-04-25 | End: 2024-04-25

## 2024-04-25 RX ORDER — PROTAMINE SULFATE 10 MG/ML
INJECTION, SOLUTION INTRAVENOUS AS NEEDED
Status: DISCONTINUED | OUTPATIENT
Start: 2024-04-25 | End: 2024-04-25

## 2024-04-25 RX ORDER — TRAMADOL HYDROCHLORIDE 50 MG/1
50 TABLET ORAL EVERY 6 HOURS PRN
Status: DISCONTINUED | OUTPATIENT
Start: 2024-04-25 | End: 2024-04-26 | Stop reason: HOSPADM

## 2024-04-25 RX ORDER — ACETAMINOPHEN 325 MG/1
650 TABLET ORAL EVERY 6 HOURS PRN
Status: DISCONTINUED | OUTPATIENT
Start: 2024-04-25 | End: 2024-04-26 | Stop reason: HOSPADM

## 2024-04-25 RX ORDER — LIDOCAINE HYDROCHLORIDE 20 MG/ML
INJECTION, SOLUTION INFILTRATION; PERINEURAL AS NEEDED
Status: DISCONTINUED | OUTPATIENT
Start: 2024-04-25 | End: 2024-04-25

## 2024-04-25 RX ORDER — FLUTICASONE FUROATE AND VILANTEROL 100; 25 UG/1; UG/1
1 POWDER RESPIRATORY (INHALATION)
Status: DISCONTINUED | OUTPATIENT
Start: 2024-04-25 | End: 2024-04-26 | Stop reason: HOSPADM

## 2024-04-25 RX ORDER — PANTOPRAZOLE SODIUM 40 MG/1
40 TABLET, DELAYED RELEASE ORAL 2 TIMES DAILY
Status: DISCONTINUED | OUTPATIENT
Start: 2024-04-25 | End: 2024-04-25 | Stop reason: SDUPTHER

## 2024-04-25 RX ORDER — SODIUM CHLORIDE, SODIUM LACTATE, POTASSIUM CHLORIDE, CALCIUM CHLORIDE 600; 310; 30; 20 MG/100ML; MG/100ML; MG/100ML; MG/100ML
75 INJECTION, SOLUTION INTRAVENOUS CONTINUOUS
Status: ACTIVE | OUTPATIENT
Start: 2024-04-25 | End: 2024-04-25

## 2024-04-25 RX ORDER — FENTANYL CITRATE 50 UG/ML
INJECTION, SOLUTION INTRAMUSCULAR; INTRAVENOUS AS NEEDED
Status: DISCONTINUED | OUTPATIENT
Start: 2024-04-25 | End: 2024-04-25

## 2024-04-25 RX ORDER — ATORVASTATIN CALCIUM 40 MG/1
40 TABLET, FILM COATED ORAL NIGHTLY
Status: DISCONTINUED | OUTPATIENT
Start: 2024-04-25 | End: 2024-04-26 | Stop reason: HOSPADM

## 2024-04-25 RX ORDER — PANTOPRAZOLE SODIUM 40 MG/10ML
40 INJECTION, POWDER, LYOPHILIZED, FOR SOLUTION INTRAVENOUS
Status: DISCONTINUED | OUTPATIENT
Start: 2024-04-26 | End: 2024-04-25

## 2024-04-25 RX ORDER — ONDANSETRON 4 MG/1
4 TABLET, FILM COATED ORAL EVERY 8 HOURS PRN
Status: DISCONTINUED | OUTPATIENT
Start: 2024-04-25 | End: 2024-04-26 | Stop reason: HOSPADM

## 2024-04-25 RX ORDER — ALBUTEROL SULFATE 0.83 MG/ML
2.5 SOLUTION RESPIRATORY (INHALATION) EVERY 6 HOURS PRN
Status: DISCONTINUED | OUTPATIENT
Start: 2024-04-25 | End: 2024-04-26 | Stop reason: HOSPADM

## 2024-04-25 RX ORDER — ESMOLOL HYDROCHLORIDE 10 MG/ML
INJECTION INTRAVENOUS AS NEEDED
Status: DISCONTINUED | OUTPATIENT
Start: 2024-04-25 | End: 2024-04-25

## 2024-04-25 RX ORDER — NAPROXEN SODIUM 220 MG/1
81 TABLET, FILM COATED ORAL DAILY
Status: DISCONTINUED | OUTPATIENT
Start: 2024-04-25 | End: 2024-04-26 | Stop reason: HOSPADM

## 2024-04-25 RX ORDER — ETOMIDATE 2 MG/ML
INJECTION INTRAVENOUS AS NEEDED
Status: DISCONTINUED | OUTPATIENT
Start: 2024-04-25 | End: 2024-04-25

## 2024-04-25 RX ORDER — ONDANSETRON HYDROCHLORIDE 2 MG/ML
4 INJECTION, SOLUTION INTRAVENOUS EVERY 8 HOURS PRN
Status: DISCONTINUED | OUTPATIENT
Start: 2024-04-25 | End: 2024-04-26 | Stop reason: HOSPADM

## 2024-04-25 RX ORDER — PHENYLEPHRINE HCL IN 0.9% NACL 0.4MG/10ML
SYRINGE (ML) INTRAVENOUS AS NEEDED
Status: DISCONTINUED | OUTPATIENT
Start: 2024-04-25 | End: 2024-04-25

## 2024-04-25 RX ORDER — ALBUTEROL SULFATE 90 UG/1
1 AEROSOL, METERED RESPIRATORY (INHALATION) EVERY 4 HOURS PRN
Status: DISCONTINUED | OUTPATIENT
Start: 2024-04-25 | End: 2024-04-26 | Stop reason: HOSPADM

## 2024-04-25 RX ORDER — NAPROXEN SODIUM 220 MG/1
324 TABLET, FILM COATED ORAL ONCE
Status: COMPLETED | OUTPATIENT
Start: 2024-04-25 | End: 2024-04-25

## 2024-04-25 RX ORDER — PANTOPRAZOLE SODIUM 40 MG/1
40 TABLET, DELAYED RELEASE ORAL
Status: DISCONTINUED | OUTPATIENT
Start: 2024-04-25 | End: 2024-04-26 | Stop reason: HOSPADM

## 2024-04-25 RX ORDER — PANTOPRAZOLE SODIUM 40 MG/10ML
40 INJECTION, POWDER, LYOPHILIZED, FOR SOLUTION INTRAVENOUS
Status: DISCONTINUED | OUTPATIENT
Start: 2024-04-25 | End: 2024-04-26 | Stop reason: HOSPADM

## 2024-04-25 RX ORDER — HEPARIN SODIUM 1000 [USP'U]/ML
INJECTION, SOLUTION INTRAVENOUS; SUBCUTANEOUS AS NEEDED
Status: DISCONTINUED | OUTPATIENT
Start: 2024-04-25 | End: 2024-04-25

## 2024-04-25 RX ORDER — ROCURONIUM BROMIDE 10 MG/ML
INJECTION, SOLUTION INTRAVENOUS AS NEEDED
Status: DISCONTINUED | OUTPATIENT
Start: 2024-04-25 | End: 2024-04-25

## 2024-04-25 RX ORDER — SENNOSIDES 8.6 MG/1
2 TABLET ORAL 2 TIMES DAILY
Status: DISCONTINUED | OUTPATIENT
Start: 2024-04-25 | End: 2024-04-26 | Stop reason: HOSPADM

## 2024-04-25 RX ADMIN — PROTAMINE SULFATE 50 MG: 10 INJECTION, SOLUTION INTRAVENOUS at 12:26

## 2024-04-25 RX ADMIN — SODIUM CHLORIDE, POTASSIUM CHLORIDE, SODIUM LACTATE AND CALCIUM CHLORIDE: 600; 310; 30; 20 INJECTION, SOLUTION INTRAVENOUS at 07:59

## 2024-04-25 RX ADMIN — ASPIRIN 81 MG CHEWABLE TABLET 324 MG: 81 TABLET CHEWABLE at 07:42

## 2024-04-25 RX ADMIN — FENTANYL CITRATE 25 MCG: 50 INJECTION, SOLUTION INTRAMUSCULAR; INTRAVENOUS at 08:37

## 2024-04-25 RX ADMIN — METOPROLOL TARTRATE 15 MG: 1 INJECTION, SOLUTION INTRAVENOUS at 10:43

## 2024-04-25 RX ADMIN — Medication 80 MCG: at 09:06

## 2024-04-25 RX ADMIN — ESMOLOL HYDROCHLORIDE 100 MG: 10 INJECTION, SOLUTION INTRAVENOUS at 12:00

## 2024-04-25 RX ADMIN — ESMOLOL HYDROCHLORIDE 200 MG: 10 INJECTION, SOLUTION INTRAVENOUS at 12:10

## 2024-04-25 RX ADMIN — SODIUM CHLORIDE, SODIUM LACTATE, POTASSIUM CHLORIDE, AND CALCIUM CHLORIDE: 600; 310; 30; 20 INJECTION, SOLUTION INTRAVENOUS at 07:59

## 2024-04-25 RX ADMIN — Medication 180 MCG: at 10:51

## 2024-04-25 RX ADMIN — ROCURONIUM 30 MG: 50 INJECTION, SOLUTION INTRAVENOUS at 08:37

## 2024-04-25 RX ADMIN — FENTANYL CITRATE 25 MCG: 50 INJECTION, SOLUTION INTRAMUSCULAR; INTRAVENOUS at 09:12

## 2024-04-25 RX ADMIN — ETOMIDATE 12 MG: 2 INJECTION, SOLUTION INTRAVENOUS at 08:37

## 2024-04-25 RX ADMIN — Medication 40 MCG: at 10:50

## 2024-04-25 RX ADMIN — ONDANSETRON 4 MG: 2 INJECTION, SOLUTION INTRAMUSCULAR; INTRAVENOUS at 12:31

## 2024-04-25 RX ADMIN — FENTANYL CITRATE 25 MCG: 50 INJECTION, SOLUTION INTRAMUSCULAR; INTRAVENOUS at 10:43

## 2024-04-25 RX ADMIN — SUGAMMADEX 200 MG: 100 INJECTION, SOLUTION INTRAVENOUS at 12:35

## 2024-04-25 RX ADMIN — HEPARIN SODIUM 4000 UNITS: 1000 INJECTION INTRAVENOUS; SUBCUTANEOUS at 11:52

## 2024-04-25 RX ADMIN — HEPARIN SODIUM 6000 UNITS: 1000 INJECTION INTRAVENOUS; SUBCUTANEOUS at 09:13

## 2024-04-25 RX ADMIN — HEPARIN SODIUM 3500 UNITS: 1000 INJECTION INTRAVENOUS; SUBCUTANEOUS at 09:30

## 2024-04-25 RX ADMIN — ROCURONIUM 10 MG: 50 INJECTION, SOLUTION INTRAVENOUS at 10:43

## 2024-04-25 RX ADMIN — Medication 4 MCG: at 11:01

## 2024-04-25 RX ADMIN — LIDOCAINE HYDROCHLORIDE 60 MG: 20 INJECTION, SOLUTION INFILTRATION; PERINEURAL at 08:37

## 2024-04-25 RX ADMIN — Medication 4 MCG: at 10:54

## 2024-04-25 RX ADMIN — METOPROLOL TARTRATE 2.5 MG: 1 INJECTION, SOLUTION INTRAVENOUS at 12:14

## 2024-04-25 RX ADMIN — Medication 4 MCG: at 11:06

## 2024-04-25 RX ADMIN — METHYLPREDNISOLONE SODIUM SUCCINATE 125 MG: 125 INJECTION, POWDER, FOR SOLUTION INTRAMUSCULAR; INTRAVENOUS at 07:34

## 2024-04-25 RX ADMIN — Medication 4 MCG: at 11:54

## 2024-04-25 RX ADMIN — Medication 6 L/MIN: at 13:45

## 2024-04-25 RX ADMIN — Medication 40 MCG: at 10:22

## 2024-04-25 RX ADMIN — Medication 20 MCG: at 10:49

## 2024-04-25 RX ADMIN — VANCOMYCIN HYDROCHLORIDE 1000 MG: 1 INJECTION, SOLUTION INTRAVENOUS at 08:03

## 2024-04-25 RX ADMIN — HEPARIN SODIUM 3000 UNITS: 1000 INJECTION INTRAVENOUS; SUBCUTANEOUS at 09:40

## 2024-04-25 RX ADMIN — SENNOSIDES 17.2 MG: 8.6 TABLET, FILM COATED ORAL at 17:16

## 2024-04-25 RX ADMIN — Medication 40 MCG: at 10:25

## 2024-04-25 RX ADMIN — SENNOSIDES 17.2 MG: 8.6 TABLET, FILM COATED ORAL at 20:06

## 2024-04-25 RX ADMIN — Medication 160 MCG: at 10:53

## 2024-04-25 RX ADMIN — PANTOPRAZOLE SODIUM 40 MG: 40 TABLET, DELAYED RELEASE ORAL at 20:05

## 2024-04-25 RX ADMIN — Medication 80 MCG: at 08:55

## 2024-04-25 SDOH — SOCIAL STABILITY: SOCIAL INSECURITY: DOES ANYONE TRY TO KEEP YOU FROM HAVING/CONTACTING OTHER FRIENDS OR DOING THINGS OUTSIDE YOUR HOME?: NO

## 2024-04-25 SDOH — SOCIAL STABILITY: SOCIAL INSECURITY: ARE YOU OR HAVE YOU BEEN THREATENED OR ABUSED PHYSICALLY, EMOTIONALLY, OR SEXUALLY BY ANYONE?: NO

## 2024-04-25 SDOH — SOCIAL STABILITY: SOCIAL NETWORK: HOW OFTEN DO YOU ATTEND CHURCH OR RELIGIOUS SERVICES?: NEVER

## 2024-04-25 SDOH — SOCIAL STABILITY: SOCIAL NETWORK: ARE YOU MARRIED, WIDOWED, DIVORCED, SEPARATED, NEVER MARRIED, OR LIVING WITH A PARTNER?: MARRIED

## 2024-04-25 SDOH — HEALTH STABILITY: MENTAL HEALTH: HOW OFTEN DO YOU HAVE A DRINK CONTAINING ALCOHOL?: NEVER

## 2024-04-25 SDOH — SOCIAL STABILITY: SOCIAL INSECURITY: ABUSE: ADULT

## 2024-04-25 SDOH — SOCIAL STABILITY: SOCIAL INSECURITY: WITHIN THE LAST YEAR, HAVE YOU BEEN AFRAID OF YOUR PARTNER OR EX-PARTNER?: NO

## 2024-04-25 SDOH — HEALTH STABILITY: MENTAL HEALTH: HOW OFTEN DO YOU HAVE 6 OR MORE DRINKS ON ONE OCCASION?: NEVER

## 2024-04-25 SDOH — HEALTH STABILITY: MENTAL HEALTH: HOW MANY STANDARD DRINKS CONTAINING ALCOHOL DO YOU HAVE ON A TYPICAL DAY?: PATIENT DOES NOT DRINK

## 2024-04-25 SDOH — SOCIAL STABILITY: SOCIAL INSECURITY: HAVE YOU HAD ANY THOUGHTS OF HARMING ANYONE ELSE?: NO

## 2024-04-25 SDOH — SOCIAL STABILITY: SOCIAL NETWORK: HOW OFTEN DO YOU ATTENT MEETINGS OF THE CLUB OR ORGANIZATION YOU BELONG TO?: NEVER

## 2024-04-25 SDOH — SOCIAL STABILITY: SOCIAL NETWORK: HOW OFTEN DO YOU GET TOGETHER WITH FRIENDS OR RELATIVES?: TWICE A WEEK

## 2024-04-25 SDOH — ECONOMIC STABILITY: INCOME INSECURITY: IN THE PAST 12 MONTHS, HAS THE ELECTRIC, GAS, OIL, OR WATER COMPANY THREATENED TO SHUT OFF SERVICE IN YOUR HOME?: NO

## 2024-04-25 SDOH — SOCIAL STABILITY: SOCIAL INSECURITY: WITHIN THE LAST YEAR, HAVE YOU BEEN HUMILIATED OR EMOTIONALLY ABUSED IN OTHER WAYS BY YOUR PARTNER OR EX-PARTNER?: NO

## 2024-04-25 SDOH — SOCIAL STABILITY: SOCIAL INSECURITY: DO YOU FEEL ANYONE HAS EXPLOITED OR TAKEN ADVANTAGE OF YOU FINANCIALLY OR OF YOUR PERSONAL PROPERTY?: NO

## 2024-04-25 SDOH — HEALTH STABILITY: PHYSICAL HEALTH: ON AVERAGE, HOW MANY DAYS PER WEEK DO YOU ENGAGE IN MODERATE TO STRENUOUS EXERCISE (LIKE A BRISK WALK)?: 0 DAYS

## 2024-04-25 SDOH — SOCIAL STABILITY: SOCIAL INSECURITY: DO YOU FEEL UNSAFE GOING BACK TO THE PLACE WHERE YOU ARE LIVING?: NO

## 2024-04-25 SDOH — SOCIAL STABILITY: SOCIAL INSECURITY: HAVE YOU HAD THOUGHTS OF HARMING ANYONE ELSE?: NO

## 2024-04-25 SDOH — SOCIAL STABILITY: SOCIAL INSECURITY: ARE THERE ANY APPARENT SIGNS OF INJURIES/BEHAVIORS THAT COULD BE RELATED TO ABUSE/NEGLECT?: NO

## 2024-04-25 SDOH — HEALTH STABILITY: PHYSICAL HEALTH: ON AVERAGE, HOW MANY MINUTES DO YOU ENGAGE IN EXERCISE AT THIS LEVEL?: 0 MIN

## 2024-04-25 SDOH — HEALTH STABILITY: MENTAL HEALTH
HOW OFTEN DO YOU NEED TO HAVE SOMEONE HELP YOU WHEN YOU READ INSTRUCTIONS, PAMPHLETS, OR OTHER WRITTEN MATERIAL FROM YOUR DOCTOR OR PHARMACY?: RARELY

## 2024-04-25 SDOH — SOCIAL STABILITY: SOCIAL INSECURITY: WERE YOU ABLE TO COMPLETE ALL THE BEHAVIORAL HEALTH SCREENINGS?: YES

## 2024-04-25 SDOH — HEALTH STABILITY: MENTAL HEALTH: CURRENT SMOKER: 0

## 2024-04-25 SDOH — SOCIAL STABILITY: SOCIAL INSECURITY: HAS ANYONE EVER THREATENED TO HURT YOUR FAMILY OR YOUR PETS?: NO

## 2024-04-25 ASSESSMENT — COGNITIVE AND FUNCTIONAL STATUS - GENERAL
STANDING UP FROM CHAIR USING ARMS: A LITTLE
DAILY ACTIVITIY SCORE: 18
WALKING IN HOSPITAL ROOM: A LITTLE
MOVING TO AND FROM BED TO CHAIR: A LITTLE
HELP NEEDED FOR BATHING: A LITTLE
MOBILITY SCORE: 18
EATING MEALS: A LITTLE
DRESSING REGULAR LOWER BODY CLOTHING: A LITTLE
TOILETING: A LITTLE
TURNING FROM BACK TO SIDE WHILE IN FLAT BAD: A LITTLE
DRESSING REGULAR UPPER BODY CLOTHING: A LITTLE
PATIENT BASELINE BEDBOUND: NO
MOVING FROM LYING ON BACK TO SITTING ON SIDE OF FLAT BED WITH BEDRAILS: A LITTLE
PERSONAL GROOMING: A LITTLE
CLIMB 3 TO 5 STEPS WITH RAILING: A LITTLE

## 2024-04-25 ASSESSMENT — PATIENT HEALTH QUESTIONNAIRE - PHQ9
2. FEELING DOWN, DEPRESSED OR HOPELESS: NOT AT ALL
1. LITTLE INTEREST OR PLEASURE IN DOING THINGS: NOT AT ALL
SUM OF ALL RESPONSES TO PHQ9 QUESTIONS 1 & 2: 0

## 2024-04-25 ASSESSMENT — LIFESTYLE VARIABLES
SKIP TO QUESTIONS 9-10: 1
AUDIT-C TOTAL SCORE: 0
SKIP TO QUESTIONS 9-10: 1
PRESCIPTION_ABUSE_PAST_12_MONTHS: NO
AUDIT-C TOTAL SCORE: 0
HOW MANY STANDARD DRINKS CONTAINING ALCOHOL DO YOU HAVE ON A TYPICAL DAY: PATIENT DOES NOT DRINK
SUBSTANCE_ABUSE_PAST_12_MONTHS: NO
HOW OFTEN DO YOU HAVE A DRINK CONTAINING ALCOHOL: NEVER
HOW OFTEN DO YOU HAVE 6 OR MORE DRINKS ON ONE OCCASION: NEVER
AUDIT-C TOTAL SCORE: 0

## 2024-04-25 ASSESSMENT — COLUMBIA-SUICIDE SEVERITY RATING SCALE - C-SSRS
6. HAVE YOU EVER DONE ANYTHING, STARTED TO DO ANYTHING, OR PREPARED TO DO ANYTHING TO END YOUR LIFE?: NO
1. IN THE PAST MONTH, HAVE YOU WISHED YOU WERE DEAD OR WISHED YOU COULD GO TO SLEEP AND NOT WAKE UP?: NO
2. HAVE YOU ACTUALLY HAD ANY THOUGHTS OF KILLING YOURSELF?: NO
6. HAVE YOU EVER DONE ANYTHING, STARTED TO DO ANYTHING, OR PREPARED TO DO ANYTHING TO END YOUR LIFE?: NO
1. IN THE PAST MONTH, HAVE YOU WISHED YOU WERE DEAD OR WISHED YOU COULD GO TO SLEEP AND NOT WAKE UP?: NO
2. HAVE YOU ACTUALLY HAD ANY THOUGHTS OF KILLING YOURSELF?: NO

## 2024-04-25 ASSESSMENT — PAIN SCALES - GENERAL: PAINLEVEL_OUTOF10: 0 - NO PAIN

## 2024-04-25 ASSESSMENT — ACTIVITIES OF DAILY LIVING (ADL): LACK_OF_TRANSPORTATION: NO

## 2024-04-25 ASSESSMENT — PAIN - FUNCTIONAL ASSESSMENT: PAIN_FUNCTIONAL_ASSESSMENT: 0-10

## 2024-04-25 NOTE — ANESTHESIA PROCEDURE NOTES
Peripheral IV  Date/Time: 4/25/2024 8:36 AM  Inserted by: Bettie Mancilla    Placement  Needle size: 16 G  Laterality: left  Location: wrist  Local anesthetic: none  Site prep: alcohol  Technique: anatomical landmarks  Attempts: 1

## 2024-04-25 NOTE — ANESTHESIA POSTPROCEDURE EVALUATION
Patient: Anitha Welch    Procedure Summary       Date: 04/25/24 [facility] / [facility] Lab    Anesthesia Start: 0751 Anesthesia Stop: 1314    Procedure: Mitral-ARMANI (Transcatheter Edge to Edge Repair) Diagnosis:       Nonrheumatic mitral valve regurgitation      (Nonrheumatic mitral valve regurgitation [I34.0])    Providers: Kyle Bernardo MD Responsible Provider: Ari Bobby MD    Anesthesia Type: general ASA Status: 4            Anesthesia Type: general    Vitals Value Taken Time   /72 04/25/24 1312   Temp 36.3 04/25/24 1316   Pulse 87 04/25/24 1314   Resp 22 04/25/24 1314   SpO2 100 % 04/25/24 1311   Vitals shown include unfiled device data.    Anesthesia Post Evaluation    Patient location during evaluation: ICU  Patient participation: complete - patient participated  Level of consciousness: awake and responsive to verbal stimuli  Pain management: satisfactory to patient  Airway patency: patent  Cardiovascular status: acceptable and hemodynamically stable  Respiratory status: acceptable, face mask, nonlabored ventilation and spontaneous ventilation  Hydration status: acceptable  Postoperative Nausea and Vomiting: none  Comments: Patient transported to CICU with transport monitors in place, VSS. SV SM 8L O2/min. Report to RN.        There were no known notable events for this encounter.

## 2024-04-25 NOTE — PROGRESS NOTES
1/1 CICU    STRUCTURAL HEART  4/25 from the CATH lab post mitral clip procedure (Mitral- ARMANI)     DC PLAN  TBD - Care Transitions is following to develop a safe & supportive discharge plan in collaboration with multidisciplinary team (&) patient/family/significant others.      PAYOR   Medicare A/B    PT/OT   ( ) Awaiting    COMPLETED  (X) Daily ongoing review of patient via chart and/or (M-F) IDT rounds  (X) 04/25 - DC/SDOH Assessments.     NOTE  Patient home IPTA, home O2, first floor bed/bath. Son (Jose) stef (53) lives with her and her spouse (age 91).  Patient and spouse retired and no longer drive relying on Jose. Jose support both with driving to appointments, grocery shopping, and cooking.  Patient and her spouse able to do ADLs.  Discussed dc planning process and how daily evals by team to anticipate needs of tentative loc - HHC / SNF / AR.. etc.  Wife notes that if SNF needed one near home but per Google Map search from their address - many near home & pt. Could not recollect names. Examples: [facility] ([facility]) / [facility] ([facility] [place]) / [employer] / [employer], etc.   Wife noted that son will be taking a trip in June to [place]. Advised patient to plan ahead should they need home health aides and to secure a preferred agency in advance or check if they have long term care policy that might have hha as benefit.  Educated that normally an oop cost. Patient notes supportive neighbors and at this time does not think it necessary.      Evy López (LSW, MSW)

## 2024-04-25 NOTE — ANESTHESIA PREPROCEDURE EVALUATION
Patient: Anitha Welch    Procedure Information       Anesthesia Start Date/Time: 04/25/24 0751    Procedure: Mitral-ARMANI (Transcatheter Edge to Edge Repair) - SAMMY Que.Labs with Aleyda Mota,Schedule at 7;30am    Location: [facility]ID[facility] 2F Cardiac Cath Lab    Providers: Kyle Bernardo MD            Relevant Problems   Cardiac   (+) Abdominal aortic aneurysm (AAA) without rupture (CMS-HCC)   (+) Acute ST segment elevation myocardial infarction (Multi)   (+) Angina pectoris (CMS-HCC)   (+) Arteriosclerosis of coronary artery   (+) Benign essential hypertension   (+) Chest pain   (+) Electrocardiogram abnormal   (+) Hypertension   (+) Mixed hyperlipidemia   (+) Nonrheumatic mitral valve regurgitation   (+) Peripheral vascular disease (CMS-HCC)   (+) Severe mitral valve regurgitation   (+) Stented coronary artery      Pulmonary   (+) Acute exacerbation of chronic obstructive airways disease (Multi)   (+) Chronic obstructive pulmonary disease (Multi)      Neuro   (+) Bilateral carotid artery stenosis   (+) Cerebrovascular accident (CVA) due to embolism of cerebral artery (Multi)   (+) Cerebrovascular accident (CVA) involving left cerebral hemisphere (Multi)   (+) Cerebrovascular accident (Multi)   (+) Lumbago-sciatica due to displacement of lumbar intervertebral disc   (+) Seizure (Multi)   (+) Stenosis of left carotid artery      GI   (+) Gastroesophageal reflux disease   (+) Gastrointestinal hemorrhage      Endocrine   (+) Acquired hypothyroidism   (+) Hypothyroidism (acquired)      Hematology   (+) Acute deep vein thrombosis (DVT) of proximal vein of left lower extremity (Multi)   (+) Anemia due to blood loss   (+) Venous thromboembolism (VTE)      Musculoskeletal   (+) Lumbago-sciatica due to displacement of lumbar intervertebral disc   (+) Lumbar degenerative disc disease       Clinical information reviewed:   Tobacco  Allergies  Meds   Med Hx  Surg Hx   Fam Hx  Soc Hx         NPO Detail:  NPO/Void Status  Date of Last Liquid: 04/25/24  Time of Last Liquid: 0000  Date of Last Solid: 04/25/24  Time of Last Solid: 0000  Last Intake Type: Clear fluids  Time of Last Void: 0723         Physical Exam    Airway  Mallampati: II  TM distance: >3 FB  Neck ROM: full     Cardiovascular   Rhythm: regular     Dental   Comments: Chipped left upper incisor, multiple molar caps.   Pulmonary   (+) decreased breath sounds     Abdominal        Anesthesia Plan    History of general anesthesia?: yes  History of complications of general anesthesia?: no    ASA 4     general     The patient is not a current smoker.    intravenous induction   Trial extubation is planned.  Anesthetic plan and risks discussed with patient.  Use of blood products discussed with patient who consented to blood products.    Plan discussed with CAA.

## 2024-04-25 NOTE — ANESTHESIA PROCEDURE NOTES
Airway  Date/Time: 4/25/2024 8:44 AM  Urgency: elective    Airway not difficult    Staffing  Performed: PELON   Authorized by: Ari Bobby MD    Performed by: VICENTE Russell  Patient location during procedure: OR    Indications and Patient Condition  Indications for airway management: anesthesia  Spontaneous Ventilation: absent  Sedation level: deep  Preoxygenated: yes  Patient position: sniffing  MILS not maintained throughout  Mask difficulty assessment: 2 - vent by mask + OA or adjuvant +/- NMBA  Planned trial extubation    Final Airway Details  Final airway type: endotracheal airway      Successful airway: ETT  Cuffed: yes   Successful intubation technique: direct laryngoscopy  Endotracheal tube insertion site: oral  Blade: Bijal  Blade size: #3  ETT size (mm): 7.0  Cormack-Lehane Classification: grade I - full view of glottis  Placement verified by: chest auscultation and capnometry   Measured from: lips  ETT to lips (cm): 20  Number of attempts at approach: 1

## 2024-04-25 NOTE — CARE PLAN
"  Problem: Pain  Goal: My pain/discomfort is manageable  Outcome: Progressing     Problem: Safety  Goal: Patient will be injury free during hospitalization  Outcome: Progressing  Goal: I will remain free of falls  Outcome: Progressing     Problem: Daily Care  Goal: Daily care needs are met  Outcome: Progressing     Problem: Psychosocial Needs  Goal: Demonstrates ability to cope with hospitalization/illness  Outcome: Progressing  Goal: Collaborate with me, my family, and caregiver to identify my specific goals  Outcome: Progressing  Flowsheets (Taken 4/25/2024 0160)  Cultural Requests During Hospitalization: none per patient  Spiritual Requests During Hospitalization: patient states, \"I am Hindu\"     Problem: Discharge Barriers  Goal: My discharge needs are met  Outcome: Progressing     Problem: Fall/Injury  Goal: Not fall by end of shift  Outcome: Progressing  Goal: Be free from injury by end of the shift  Outcome: Progressing  Goal: Verbalize understanding of personal risk factors for fall in the hospital  Outcome: Progressing  Goal: Verbalize understanding of risk factor reduction measures to prevent injury from fall in the home  Outcome: Progressing  Goal: Use assistive devices by end of the shift  Outcome: Progressing  Goal: Pace activities to prevent fatigue by end of the shift  Outcome: Progressing   The patient's goals for the shift include Patient did not express any goals for shift.    The clinical goals for the shift include To remain hemodynamically stable, keep groin sites clean and intact without hematoma and bleeding complication        "

## 2024-04-25 NOTE — NURSING NOTE
This RN removed the patient's arterial line on the RUE due to increased hematoma and continuous dampened wave form after flushing, pulling back, and checking tubing. Pressure was held for 15 minutes by this RN and an occlusive dressing was placed on the site. Distal pulses are palpable, extremity is cool to touch, and capillary refill remains less than 3 seconds.

## 2024-04-25 NOTE — H&P
"  CICU ADMISSION NOTE    History of Present Illness  Anitha Welch is a 84 y.o. female with PMHx of COPD, GERD, severe MR, HTN, CAD (with remote inferior wall myocardial infarction and drug-eluting stent placement), hypothyroidism  admitted to the CICU from the CATH lab post mitral clip procedure (Mitral- ARMANI) for close monitoring.    Prior to her scheduled mitraclip procedure today 4/25, patient endorsed worsening in SOB on mild exertion requiring use of her home oxygen through out the day (uses 2L baseline). She follows up closely with cardiology at [facility] and was scheduled for her outpatient procedure today. She denies any syncope or loss of consciousness.     On arrival in the CICU, she denies any headache, fever, chest pain, n/v. Review of systems negative except otherwise stated above.    Vitals   HR 91, /97, RR 12, SpO2 95% on 4L NC    Labs:  WBC 8.9, Hgb 10.4, plt 157  Ca 8.2, Mg 2, PT 12.9, INR 1.1, lactate 0.7    Cardiac History    SAMMY 1/16/24  1. Left ventricular systolic function is mildly decreased with a 40-45% estimated ejection fraction.   2. Basal and mid inferior wall and basal and mid inferolateral wall are abnormal.   3. Overall mildly reduced LV systolic function with an inferior and inferolateral wall motion abnormality.   4. The left atrium is enlarged.   5. Color Doppler demonstrated a small PFO with left to right shunting, but no intermitent right to left shunt by agitated saline echocontrast ( even with valsalva).   6. The mitral leaflets appears\"stiff\" with restricted leaflet closure with posterior leaflet restriction greater than the anterior leaflet restriciton causing moderate to severre (3+) central to posteriorly directed MR. The EROA was measured at ~0.3cm2 , and there is also systolic blunting of the pulmonary vein flow on PW Doppler.   7. Moderate to severe mitral valve regurgitation.   8. Normal aortic root.   9. Compared with the prior exam TTE from 10/9/2023, the MV " leaflets were better visualized today by SAMMY, and the moderate to severe MR was confirmed today. There was already an inferior wall motion abnormality with mildlyreduced funciton on the prior study. OVerall no significant changes.  10. There is plaque visualized in the descending aorta.    TTE 10/08/23  1. Left ventricular systolic function is moderately decreased with a 40-45% estimated ejection fraction.   2. Inferior wall moderate hypokinesis and apical hypokinesis.   3. Moderate to severe mitral valve regurgitation.   4. Moderately elevated right ventricular systolic pressure.   5. Aortic valve appears abnormal.   6. There is moderate aortic valve cusp calcification.   7. Mild aortic valve regurgitation.   8. There are multiple wall motion abnormalities.    TTE 01/10/2019    - The left ventricular chamber size is normal.  - There is normal left ventricular systolic function.  - Estimated ejection fraction is 55-59%.  - The inferoposterior wall is mildly hypokinetic.  - Mild mitral regurgitation.  -The mitral regurgitant jet is posteriorly directed.  - Mild tricuspid regurgitation.  -There is evidence of mild pulmonary hypertension.    [facility] 2/13/24  - Stable mild non-obstructive CAD.  - Widely patent RCA stent.  - Normal Left and Right hear tfilling pressures.  - Normal Cardiac Output and Index.    Past Medical History  - As above    Social History  - She does not smoke (quit smoking 10 years ago) or drink alcohol  - She lives with her  and son and independent with her ADL    Allergies  Amoxicillin, Iodinated contrast media, Ciprofloxacin, Influenza virus vaccines, Diphenhydramine, Flu vac 2023 [id number](pf), and Other    Home Medications  - Albuterol 90mcg inhaler  - Aspirin 81  - Atorvastatin 40mg daily  - Carvedilol 12.5mg BID  - Empagliflozin (Jardiance) 10mg daily  - Levothyroxine 100mcg daily  - Losartan 25mg dly  - Pantoprazole 40 mg BID  - Spironolactone 12.5 mg daily  - Tizanidine 2mg q8 PRN  -  Torsemide 20mg daily (muscle spasms)  - Tiotropim (Spiriva) and Fluticasone (breo-ellipta)  - Xeralto 20mg daily    Physical Exam  Constitutional: No acute distress, resting comfortably in bed, cooperative  HEENT: Normocephalic, atraumatic, PERRLA, EOMI, moist mucous membranes, no pharyngeal erythema  Cardiovascular: RRR, normal S1/S2, Holosystolic murmur at the cardiac apex  Pulmonary: Clear to auscultation b/l, no wheezes/crackles/rhonchi, increased work of breathing on 4L supplemental oxygen  GI: Soft, non-tender, non-distended, normoactive bowel sounds  MSK: hematoma of right arm (A-line site)  : No heath catheter  Lower extremities: Warm and well perfused, no lower extremity edema  Neuro: A&O x3, able to move all 4 extremities spontaneously,   Psych: Appropriate mood and affect     Assessment/Plan     84 y.o. female with PMHx of COPD, GERD, severe MR, HTN, CAD (with remote inferior wall myocardial infarction and drug-eluting stent placement), hypothyroidism admitted to the CICU from the CATH lab post mitral clip procedure (Mitral- ARMANI) for close monitoring. HDS on arrival to the CICU.    NEUROLOGIC  JOE    CARDIOVASCULAR  #Severe Mitral regurgitation s/p ARMANI (mitral clip)   - symptomatic with NYHA class II symptoms.  - Severe progression of mitral valve regurgitation from mild in February per TTE   - s/p ARMANI (mitral clip) 4/25  - follows closely with cardiology; Cardiologist??  Plan  - TTE  ordered  - Monitor access sites for bleeding  - most likely discharge home tomorrow   - Figure of 8 suture removal 4 hours post procedure:   - Patient can sit up in bed after suture removal if no bleeding. Patient can ambulate 1 hour post suture removal if no bleeding.    #HFmrEF  #HTN  #CAD  - TTE 10/08/23: EF 40-45%, Moderate to severe mitral valve regurgitation, inferior wall and apical hypokinesis  - Hold home medications for now  - Hold Carvedilol 12.5mg BID  - Hold Empagliflozin (Jardiance) 10mg daily  - Hold  Losartan 25mg daily  - Hold Spironolactone 12.5 mg daily  - Continue aspirin 81mg daily  - Holding home torsemide 20mg for now  - Hold home Atorvastatin 40mg daily    #DVT of YESICA 11/23/23  - LE Venous Duplex (11/23/23): Deep venous thrombus involving the distal left femoral, popliteal and posterior tibialis veins.  - Holding home xarelto for now    PULMONARY  #COPD   - chronic, oxygen dependent, on 2L baseline  - multiple ED visits in the past for worsening SOB  - continue home Albuterol inhaler PRN  - continue Trelegy Ellipta     RENAL/  #Non-Oliguric AYSE  - Cr. 1.14 (baseline 0.9) on presentation   - Likely prerenal  - we will continue to monitor for now     GASTROINTESTINAL  #GERD  - Continue Pantoprazole 40 mg BID    ENDOCRINE  #Hypothyroidism  -  continue home Levothyroxine 100mcg daily    HEME/ONC  JOE    INFECTIOUS DISEASE  JOE    F : as needed  E: replete lytes prn, K>4, Mg >2  N: Cardiac diet  A: PIV    DVT prophylaxis: SCD, Holding Xeralto for now  GI prophylaxis: PPI    Code Status: Full Code (discussed with patient at bedside upon admission)   NOK: Jose Welch (Son)    Jefferson Garduno MD  Internal Medicine, PGY-2

## 2024-04-25 NOTE — H&P
HPI: 83-year-old female with PMH of COPD, atherosclerotic heart disease with remote inferior wall myocardial infarction and drug-eluting stent placement.  She has an ischemic cardiomyopathy with an ejection fraction of about 40 to 45% and inferior wall motion abnormalities.  She had an echocardiogram in October showing again an EF of 40 to 45% however her mitral valve regurgitation has progressed to severe from mild in February.  She was hospitalized subsequently in November with heart failure and pna, stay was complicated by LLE DVT she is now on Eliquis.    In interview today patient states that she is able to walk around the house and try to go up the steps 1-2 times a day, she gets tired but symptoms have not progressed over 1 year or so.  She is she has started using oxygen over the last year, uses 2 L oxygen throughout the day.  She has chronic dyspnea and fatigue.   Denies any syncope or loss of consciousness        ROS:     Constitutional: fatigue  Eyes: no eyesight problems, no blurred vision, no diplopia, no eye pain, no purulent discharge from the eyes, eyes not red, no dryness of the eyes and no itching of the eyes.   ENT: no nosebleeds, no hearing loss, no tinnitus, no earache, no sore throat, no hoarseness, no swollen glands in the neck and no nasal discharge.   Cardiovascular: dyspnea on exertion, no chest pain  Respiratory: no chronic cough, not coughing up sputum, no wheezing that is consistent with asthma  Gastrointestinal: no change in bowel habits, no blood in stools, no diarrhea, no constipation, no nausea, no vomiting, no abdominal pain, no signs and symptoms of ulcer disease, no douglas colored stools and no intolerance to fatty foods.   Genitourinary: no hematuria,  no urinary frequency, no dysuria, no incontinence, no burning sensation during urination, no urinary hesitancy, no nocturia, no genital lesion, no testicular pain, urinary stream is not smaller and urinary stream does not start and  stop.   Musculoskeletal: no arthralgias, no myalgias, no joint swelling, no joint stiffness, no muscle weakness, no back pain, no limb pain, no limb swelling and no difficulty walking.   Skin: no skin rashes, no change in skin color and pigmentation, no skin lesions and no skin lumps.   Neurological: no seizures, no frequent falls, no headaches, no dizziness, no tingling, no fainting and no limb weakness.   Psychiatric: no depression, not suicidal, no confusion, no memory lapses or loss, no anxiety, no personality change and no emotional problems.   Endocrine: no goiter, no thyroid disorder, no diabetes mellitus, no excessive thirst, no dry skin, no cold intolerance, no heat intolerance, no erectile dysfunction, no increased urinary frequency, no proptosis and no deepening of the voice.   Hematologic/Lymphatic: no bleeding issues.   All other systems have been reviewed and are negative for complaint.         Virtual visit     ARMANI Workup:  - NYHA: II  - Frailty:2/5  - EKG: NS     Clinic Notes  - TTE: 10/9/23 -EF 40 to 45%, moderate to severe mitral regurgitation with posteriorly directed jet  - SAMMY: Not done  - RLHC: needs repeat, last cath in 2019  - CPM (anesthesia clearance): pending   - dental clearance: Last appointment 3 months ago, needs clearance     - [facility]  Procedure Type: Isolated MVR  Perioperative Outcome           Estimate %  Operative Mortality     5.41%  Morbidity & Mortality 14.5%  Stroke  2.33%  Renal Failure    1.93%  Reoperation     4.47%  Prolonged Ventilation 6.05%  Deep Sternal Wound Infection            0.029%  Long Hospital Stay (>14 days)            8.84%  Short Hospital Stay (<6 days)*            18.3%                   1/8/2024     7:38 AM 1/8/2024     8:10 AM 1/8/2024     8:25 AM 1/8/2024     8:32 AM 1/8/2024     8:53 AM 1/8/2024     9:08 AM 1/8/2024     9:23 AM   Vitals   Systolic 177 167 159 161 130 141 144   Diastolic 95 83 81 83 66 71 67   Heart Rate 87 80 87 74 71 71 76   Temp 36.5 °C  (97.7 °F)               Resp 20 9 8 7 18 16 18              Current Outpatient Medications   Medication Instructions    albuterol 90 mcg/actuation inhaler 1 puff, inhalation, Every 4 hours PRN    atorvastatin (Lipitor) 40 mg tablet Every 24 hours    carvedilol (COREG) 25 mg, oral, 2 times daily with meals    cyanocobalamin (Vitamin B-12) 1,000 mcg tablet      empagliflozin (JARDIANCE) 10 mg, oral, Daily    levothyroxine (SYNTHROID, LEVOXYL) 100 mcg, oral, Daily (0630)    loratadine (Claritin) 10 mg tablet oral    losartan (COZAAR) 50 mg, oral, 2 times daily    multivitamin-iron-folic acid (Multi Complete with Iron)  mg-mcg tablet tablet 1 tablet, oral, Daily    nitroglycerin (Nitrostat) 0.4 mg SL tablet      oxygen (O2) 2 L/min, inhalation, Continuous    pantoprazole (PROTONIX) 40 mg, oral, 2 times daily    spironolactone (ALDACTONE) 12.5 mg, oral, Daily    tiZANidine (ZANAFLEX) 2 mg, oral, Every 8 hours PRN    torsemide (DEMADEX) 20 mg, oral, Daily    Trelegy Ellipta 100-62.5-25 mcg blister with device 1 puff, inhalation, Daily    vitamins A,C,E-zinc-copper (PreserVision AREDS) 2,148 mcg-113 mg-45 mg-17.4mg tablet 1 capsule, oral, Every 12 hours                  Impression:  This is a 83-year-old female with past medical history of COPD, CAD with PCI with moderate to severe mitral regurgitation on transthoracic echocardiogram, and is symptomatic with NYHA class II symptoms.      TE reviewed   Plan for ARMANI today      All the risks of the procedures including but not limited to groin complications, CVA, MI, pericardial tamponade, transesophageal echocardiography related complications, anesthesia related complications and death were discussed with the patient and family in detail .The patient verbalized understanding and decided to proceed with the procedure.

## 2024-04-25 NOTE — PROGRESS NOTES
Pharmacy Medication History Review    Anitha Welch is a 84 y.o. female admitted for Nonrheumatic mitral valve regurgitation. Pharmacy reviewed the patient's [id number] medications and allergies for accuracy.    The list below reflects the updated PTA list. Comments regarding how patient may be taking medications differently can be found in the Admit Orders Activity  Prior to Admission Medications   Prescriptions Last Dose Informant   Dayron Ellipta 100-62.5-25 mcg blister with device 4/24/2024 Child   Sig: Inhale 1 puff once daily.   albuterol 90 mcg/actuation inhaler Unknown Child   Sig: Inhale 1 puff every 4 hours if needed.   atorvastatin (Lipitor) 40 mg tablet 4/24/2024 Child   Sig: Take 1 tablet (40 mg) by mouth once every 24 hours.   carvedilol (Coreg) 12.5 mg tablet 4/24/2024 Child   Sig: TAKE 1 TABLET BY MOUTH TWICE A DAY WITH OR AFTER A MEAL   chlorhexidine (Hibiclens) 4 % external liquid     Sig: Apply topically once daily as needed for wound care for up to 5 days.   empagliflozin (Jardiance) 10 mg 4/21/2024 Child   Sig: Take 1 tablet (10 mg) by mouth once daily.   ibuprofen 200 mg tablet Past Week Child   Sig: Take 1 tablet (200 mg) by mouth every 6 hours if needed for mild pain (1 - 3).   loratadine (Claritin) 10 mg tablet 4/24/2024 Child   Sig: Take 1 tablet (10 mg) by mouth once daily.   losartan (Cozaar) 25 mg tablet 4/24/2024 Child   Sig: Take 1 tablet (25 mg) by mouth once daily.   losartan (Cozaar) 50 mg tablet Not Taking    Sig: Take 1 tablet (50 mg) by mouth 2 times a day.   Patient not taking: Reported on 4/25/2024   multivitamin-iron-folic acid (Multi Complete with Iron)  mg-mcg tablet tablet Past Week Child   Sig: Take 1 tablet by mouth once daily.   nitroglycerin (Nitrostat) 0.4 mg SL tablet Unknown Child   Sig: Place 1 tablet (0.4 mg) under the tongue every 5 minutes if needed.   oxygen (O2) gas therapy  Child   Sig: Inhale 2 L/min continuously. Indications: sob  "  pantoprazole (ProtoNix) 40 mg EC tablet 4/24/2024 Child   Sig: TAKE 1 TABLET BY MOUTH TWICE A DAY   rivaroxaban (Xarelto) 20 mg tablet Past Week Child   Sig: Take 1 tablet (20 mg) by mouth once daily in the evening. Take with meals. Take with food.   spironolactone (Aldactone) 25 mg tablet Past Week Child   Sig: Take 0.5 tablets (12.5 mg) by mouth once daily.   tiZANidine (Zanaflex) 2 mg tablet 4/24/2024 Child   Sig: Take 1 tablet (2 mg) by mouth every 8 hours if needed for muscle spasms.   torsemide (Demadex) 20 mg tablet 4/24/2024 Child   Sig: Take 1 tablet (20 mg) by mouth once daily.      Facility-Administered Medications: None       The list below reflects the updated allergy list. Please review each documented allergy for additional clarification and justification.  Allergies  Reviewed by Bertha Nuñez RN on 4/25/2024        Severity Reactions Comments    Amoxicillin High Anaphylaxis, Shortness of breath     Iodinated Contrast Media High Dizziness, Other Increased BP Increase BP, dizziness    Ciprofloxacin Medium Rash, Swelling Decreased BP    Influenza Virus Vaccines Medium Other \"sick\" for 24 hours afterwards    Diphenhydramine Not Specified Unknown     Flu Vac 2023 [id number](pf) Not Specified Unknown     Other Low Itching Fluzone inj high dose            M2B service not offered prior to surgery, please reassess prior to patient discharge if Meds to Beds is desired. Pharmacy has been updated to [facility] in [place] on [address]    Sources used to complete the med history include out patient fill history, OARRS, and patient interview   Son at bedside for clarification    Below are additional concerns with the patient's PTA list.  N/A    Bandar Mo, PharmD  Transitions of Care Pharmacist   [facility]  Please reach out via Secure Chat for questions, or if no response call [employer] or [employer]   "

## 2024-04-25 NOTE — POST-PROCEDURE NOTE
Physician Transition of Care Summary  Invasive Cardiovascular Lab    Procedure Date: 4/25/2024  Attending:    * Kyle Bernardo - Primary  Resident/Fellow/Other Assistant: Surgeons and Role:     * Obdulio Shen MD - Fellow     * Chencho Riggins MD - Fellow    Indications:   Pre-op Diagnosis     * Nonrheumatic mitral valve regurgitation [I34.0]    Post-procedure diagnosis:   Post-op Diagnosis     * Nonrheumatic mitral valve regurgitation [I34.0]    Procedure(s):   Mitral-ARMANI (Transcatheter Edge to Edge Repair)  00289 - RI TCAT MITRAL VALVE REPAIR INITIAL PROSTHESIS    RI TCAT MITRAL VALVE REPAIR INITIAL PROSTHESIS [23426]      Description of the Procedure:   Procedure: ARMANI using MitraClip x 2 ( NTW and NT)  Indication: Severe non-rheumatic MR     Access: RFV s/p Figure of 8 suture     No new conduction disturbances or hemodynamic issues noted     Intraop echo: residual moderate MR, no PC effusion, mean gradient 9 mmHg at HR 71      Patient extubated in cath lab without issues     Structural Heart Plan:   - Admit to CICU  - Monitor access sites for bleeding  - TTE tomorrow ordered  - most likely discharge home tomorrow   - Figure of 8 suture removal 4 hours post procedure: patient can sit up in bed after suture removal if no bleeding. Patient can ambulate 1 hour post suture removal if no bleeding.      Complications:   None     Stents/Implants:   Implants       Other Cardiac Implant    Catheter Guide, Mitraclip G4 - [id number] - Used, Not Implanted        Inventory item: CATHETER GUIDE, MITRACLIP G4 Model/Cat number: [id number]    : [employer] Lot number: [id number]    Device identifier: [id number]        As of 4/25/2024       Status: Used, Not Implanted                      Mitraclip G4, Ntw - [id number] - Used, Not Implanted        Inventory item: MITRACLIP G4, NTW Model/Cat number: [id number]    : [employer] Lot number: [id number]    Device identifier: [id number]         As of 4/25/2024       Status: Used, Not Implanted                      Mitraclip G4, Ntw - [id number] - Implanted        Inventory item: MITRACLIP G4, NTW Model/Cat number: [id number]    : [employer] Lot number: [id number]    Device identifier: [id number]        As of 4/25/2024       Status: Implanted                      Mitraclip G4, Nt - [id number] - Implanted        Inventory item: MITRACLIP G4, NT Model/Cat number: [id number]    : [employer] Lot number: [id number]    Device identifier: [id number]        As of 4/25/2024       Status: Implanted                            Estimated Blood Loss:   20 mL    Anesthesia: General Anesthesia Staff: Anesthesiologist: Ari Bobby MD  C-AA: VICENTE Russell    Any Specimen(s) Removed:   Order Name Source Comment Collection Info Order Time   BLOOD GAS ARTERIAL FULL PANEL Blood, Arterial  Collected By: Bertha Nuñez RN 4/25/2024  8:33 AM     Release result to MyChart   Immediate        BASIC METABOLIC PANEL Blood, Venous  Collected By: Shanelle Doyle RN 4/25/2024  1:21 PM     Release result to MyChart   Immediate        MAGNESIUM Blood, Venous  Collected By: Shanelle Doyle RN 4/25/2024  1:21 PM     Release result to MyChart   Immediate        CBC WITH AUTO DIFFERENTIAL Blood, Venous  Collected By: Shanelle Doyle RN 4/25/2024  1:21 PM     Release result to MyChart   Immediate              Electronically signed by: Obdulio Shen MD, 4/25/2024 1:27 PM

## 2024-04-25 NOTE — ANESTHESIA PROCEDURE NOTES
Arterial Line:    Date/Time: 4/25/2024 7:56 AM    Staffing  Performed: attending and CAA   Authorized by: Ari Bobby MD    Performed by: VICENTE Russell    An arterial line was placed. Procedure performed using ultrasound guidance.in the OR for the following indication(s): continuous blood pressure monitoring and blood sampling needed.    A 20 gauge (size), 1 and 3/4 inch (length), Angiocath (type) catheter was placed into the Right radial artery, secured by Tegaderm,   Seldinger technique used.  Events:  patient tolerated procedure well with no complications.

## 2024-04-26 ENCOUNTER — PHARMACY VISIT (OUTPATIENT)
Dept: PHARMACY | Facility: CLINIC | Age: 84
End: 2024-04-26
Payer: MEDICARE

## 2024-04-26 ENCOUNTER — APPOINTMENT (OUTPATIENT)
Dept: CARDIOLOGY | Facility: HOSPITAL | Age: 84
DRG: 267 | End: 2024-04-26
Payer: MEDICARE

## 2024-04-26 ENCOUNTER — APPOINTMENT (OUTPATIENT)
Dept: RADIOLOGY | Facility: HOSPITAL | Age: 84
DRG: 267 | End: 2024-04-26
Payer: MEDICARE

## 2024-04-26 VITALS
DIASTOLIC BLOOD PRESSURE: 56 MMHG | TEMPERATURE: 98.2 F | HEART RATE: 108 BPM | SYSTOLIC BLOOD PRESSURE: 103 MMHG | RESPIRATION RATE: 14 BRPM | OXYGEN SATURATION: 98 %

## 2024-04-26 LAB
ANION GAP SERPL CALC-SCNC: 12 MMOL/L (ref 10–20)
ATRIAL RATE: 88 BPM
BASOPHILS # BLD AUTO: 0.02 X10*3/UL (ref 0–0.1)
BASOPHILS NFR BLD AUTO: 0.2 %
BUN SERPL-MCNC: 34 MG/DL (ref 6–23)
CALCIUM SERPL-MCNC: 8.6 MG/DL (ref 8.6–10.6)
CHLORIDE SERPL-SCNC: 104 MMOL/L (ref 98–107)
CO2 SERPL-SCNC: 32 MMOL/L (ref 21–32)
CREAT SERPL-MCNC: 1.06 MG/DL (ref 0.5–1.05)
EGFRCR SERPLBLD CKD-EPI 2021: 52 ML/MIN/1.73M*2
EJECTION FRACTION APICAL 4 CHAMBER: 60
EOSINOPHIL # BLD AUTO: 0.03 X10*3/UL (ref 0–0.4)
EOSINOPHIL NFR BLD AUTO: 0.4 %
ERYTHROCYTE [DISTWIDTH] IN BLOOD BY AUTOMATED COUNT: 14.5 % (ref 11.5–14.5)
GLUCOSE BLD MANUAL STRIP-MCNC: 100 MG/DL (ref 74–99)
GLUCOSE BLD MANUAL STRIP-MCNC: 150 MG/DL (ref 74–99)
GLUCOSE SERPL-MCNC: 98 MG/DL (ref 74–99)
HCT VFR BLD AUTO: 28.9 % (ref 36–46)
HGB BLD-MCNC: 9.5 G/DL (ref 12–16)
IMM GRANULOCYTES # BLD AUTO: 0.06 X10*3/UL (ref 0–0.5)
IMM GRANULOCYTES NFR BLD AUTO: 0.7 % (ref 0–0.9)
LEFT VENTRICLE INTERNAL DIMENSION DIASTOLE: 5.1 CM (ref 3.5–6)
LV EJECTION FRACTION BIPLANE: 48 %
LYMPHOCYTES # BLD AUTO: 1.08 X10*3/UL (ref 0.8–3)
LYMPHOCYTES NFR BLD AUTO: 13.3 %
MAGNESIUM SERPL-MCNC: 2.16 MG/DL (ref 1.6–2.4)
MCH RBC QN AUTO: 28.4 PG (ref 26–34)
MCHC RBC AUTO-ENTMCNC: 32.9 G/DL (ref 32–36)
MCV RBC AUTO: 87 FL (ref 80–100)
MITRAL VALVE E/A RATIO: 0.91
MITRAL VALVE E/E' RATIO: 48.67
MONOCYTES # BLD AUTO: 1.1 X10*3/UL (ref 0.05–0.8)
MONOCYTES NFR BLD AUTO: 13.6 %
NEUTROPHILS # BLD AUTO: 5.8 X10*3/UL (ref 1.6–5.5)
NEUTROPHILS NFR BLD AUTO: 71.8 %
NRBC BLD-RTO: 0 /100 WBCS (ref 0–0)
P AXIS: 68 DEGREES
P OFFSET: 183 MS
P ONSET: 125 MS
PLATELET # BLD AUTO: 157 X10*3/UL (ref 150–450)
POTASSIUM SERPL-SCNC: 4.2 MMOL/L (ref 3.5–5.3)
PR INTERVAL: 170 MS
Q ONSET: 210 MS
QRS COUNT: 15 BEATS
QRS DURATION: 98 MS
QT INTERVAL: 376 MS
QTC CALCULATION(BAZETT): 454 MS
QTC FREDERICIA: 427 MS
R AXIS: -45 DEGREES
RBC # BLD AUTO: 3.34 X10*6/UL (ref 4–5.2)
RIGHT VENTRICLE FREE WALL PEAK S': 11 CM/S
RIGHT VENTRICLE PEAK SYSTOLIC PRESSURE: 33.3 MMHG
SODIUM SERPL-SCNC: 144 MMOL/L (ref 136–145)
T AXIS: 49 DEGREES
T OFFSET: 398 MS
TRICUSPID ANNULAR PLANE SYSTOLIC EXCURSION: 1.8 CM
VENTRICULAR RATE: 88 BPM
WBC # BLD AUTO: 8.1 X10*3/UL (ref 4.4–11.3)

## 2024-04-26 PROCEDURE — 97530 THERAPEUTIC ACTIVITIES: CPT | Mod: GP

## 2024-04-26 PROCEDURE — 97165 OT EVAL LOW COMPLEX 30 MIN: CPT | Mod: GO

## 2024-04-26 PROCEDURE — RXMED WILLOW AMBULATORY MEDICATION CHARGE

## 2024-04-26 PROCEDURE — 85025 COMPLETE CBC W/AUTO DIFF WBC: CPT | Performed by: NURSE PRACTITIONER

## 2024-04-26 PROCEDURE — 93005 ELECTROCARDIOGRAM TRACING: CPT

## 2024-04-26 PROCEDURE — 94640 AIRWAY INHALATION TREATMENT: CPT

## 2024-04-26 PROCEDURE — 93325 DOPPLER ECHO COLOR FLOW MAPG: CPT

## 2024-04-26 PROCEDURE — 2500000001 HC RX 250 WO HCPCS SELF ADMINISTERED DRUGS (ALT 637 FOR MEDICARE OP): Performed by: NURSE PRACTITIONER

## 2024-04-26 PROCEDURE — 2500000004 HC RX 250 GENERAL PHARMACY W/ HCPCS (ALT 636 FOR OP/ED)

## 2024-04-26 PROCEDURE — 97161 PT EVAL LOW COMPLEX 20 MIN: CPT | Mod: GP

## 2024-04-26 PROCEDURE — 2500000001 HC RX 250 WO HCPCS SELF ADMINISTERED DRUGS (ALT 637 FOR MEDICARE OP)

## 2024-04-26 PROCEDURE — 93010 ELECTROCARDIOGRAM REPORT: CPT | Performed by: INTERNAL MEDICINE

## 2024-04-26 PROCEDURE — 82947 ASSAY GLUCOSE BLOOD QUANT: CPT

## 2024-04-26 PROCEDURE — 99232 SBSQ HOSP IP/OBS MODERATE 35: CPT | Performed by: INTERNAL MEDICINE

## 2024-04-26 PROCEDURE — 83735 ASSAY OF MAGNESIUM: CPT | Performed by: NURSE PRACTITIONER

## 2024-04-26 PROCEDURE — 71045 X-RAY EXAM CHEST 1 VIEW: CPT | Performed by: STUDENT IN AN ORGANIZED HEALTH CARE EDUCATION/TRAINING PROGRAM

## 2024-04-26 PROCEDURE — 37799 UNLISTED PX VASCULAR SURGERY: CPT | Performed by: NURSE PRACTITIONER

## 2024-04-26 PROCEDURE — 80048 BASIC METABOLIC PNL TOTAL CA: CPT | Performed by: NURSE PRACTITIONER

## 2024-04-26 PROCEDURE — 93325 DOPPLER ECHO COLOR FLOW MAPG: CPT | Performed by: INTERNAL MEDICINE

## 2024-04-26 PROCEDURE — 93308 TTE F-UP OR LMTD: CPT | Performed by: INTERNAL MEDICINE

## 2024-04-26 PROCEDURE — 2500000005 HC RX 250 GENERAL PHARMACY W/O HCPCS: Performed by: NURSE PRACTITIONER

## 2024-04-26 PROCEDURE — 71045 X-RAY EXAM CHEST 1 VIEW: CPT

## 2024-04-26 PROCEDURE — 93321 DOPPLER ECHO F-UP/LMTD STD: CPT | Performed by: INTERNAL MEDICINE

## 2024-04-26 RX ORDER — CARVEDILOL 3.12 MG/1
3.25 TABLET ORAL 2 TIMES DAILY
Qty: 60 TABLET | Refills: 2 | Status: SHIPPED | OUTPATIENT
Start: 2024-04-26 | End: 2024-05-17 | Stop reason: SDUPTHER

## 2024-04-26 RX ADMIN — TIOTROPIUM BROMIDE INHALATION SPRAY 2 PUFF: 3.12 SPRAY, METERED RESPIRATORY (INHALATION) at 09:39

## 2024-04-26 RX ADMIN — SODIUM CHLORIDE, POTASSIUM CHLORIDE, SODIUM LACTATE AND CALCIUM CHLORIDE 500 ML: 600; 310; 30; 20 INJECTION, SOLUTION INTRAVENOUS at 10:37

## 2024-04-26 RX ADMIN — FLUTICASONE FUROATE AND VILANTEROL TRIFENATATE 1 PUFF: 100; 25 POWDER RESPIRATORY (INHALATION) at 09:39

## 2024-04-26 RX ADMIN — SENNOSIDES 17.2 MG: 8.6 TABLET, FILM COATED ORAL at 08:10

## 2024-04-26 RX ADMIN — Medication 2 L/MIN: at 08:00

## 2024-04-26 RX ADMIN — PANTOPRAZOLE SODIUM 40 MG: 40 TABLET, DELAYED RELEASE ORAL at 06:33

## 2024-04-26 RX ADMIN — ASPIRIN 81 MG 81 MG: 81 TABLET ORAL at 08:09

## 2024-04-26 ASSESSMENT — PAIN - FUNCTIONAL ASSESSMENT
PAIN_FUNCTIONAL_ASSESSMENT: 0-10

## 2024-04-26 ASSESSMENT — COGNITIVE AND FUNCTIONAL STATUS - GENERAL
DRESSING REGULAR LOWER BODY CLOTHING: A LITTLE
WALKING IN HOSPITAL ROOM: A LITTLE
CLIMB 3 TO 5 STEPS WITH RAILING: TOTAL
MOVING TO AND FROM BED TO CHAIR: A LITTLE
MOBILITY SCORE: 16
MOVING FROM LYING ON BACK TO SITTING ON SIDE OF FLAT BED WITH BEDRAILS: A LITTLE
HELP NEEDED FOR BATHING: A LITTLE
TOILETING: A LITTLE
TURNING FROM BACK TO SIDE WHILE IN FLAT BAD: A LITTLE
DAILY ACTIVITIY SCORE: 21
STANDING UP FROM CHAIR USING ARMS: A LITTLE

## 2024-04-26 ASSESSMENT — PAIN SCALES - GENERAL
PAINLEVEL_OUTOF10: 0 - NO PAIN

## 2024-04-26 ASSESSMENT — ACTIVITIES OF DAILY LIVING (ADL)
ADL_ASSISTANCE: INDEPENDENT
BATHING_ASSISTANCE: MINIMAL

## 2024-04-26 NOTE — PROGRESS NOTES
Anitha Welhc is a 84 y.o. female on day 1 of admission presenting with Nonrheumatic mitral valve regurgitation.        CICU Daily Progress Note    Subjective    No acute events overnight. Has no complaints this morning. Denies any SOB, chest pain, fever, chills.     Objective     Vitals:  Visit Vitals  BP (!) 103/41   Pulse 94   Temp 35.7 °C (96.3 °F) (Temporal)   Resp 15       Intake/Output Summary (Last 24 hours) at 4/26/2024 0935  Last data filed at 4/26/2024 0200  Gross per 24 hour   Intake 1880 ml   Output 440 ml   Net 1440 ml     Results for orders placed or performed during the hospital encounter of 04/25/24 (from the past 24 hour(s))   ACTIVATED CLOTTING TIME LOW   Result Value Ref Range    POCT Activated Clotting Time Low Range     ACTIVATED CLOTTING TIME LOW   Result Value Ref Range    POCT Activated Clotting Time Low Range 360 (H) 83 - 199 sec   ACTIVATED CLOTTING TIME LOW   Result Value Ref Range    POCT Activated Clotting Time Low Range 306 (H) 83 - 199 sec   ACTIVATED CLOTTING TIME LOW   Result Value Ref Range    POCT Activated Clotting Time Low Range 269 (H) 83 - 199 sec   ACTIVATED CLOTTING TIME LOW   Result Value Ref Range    POCT Activated Clotting Time Low Range 353 (H) 83 - 199 sec   Basic metabolic panel   Result Value Ref Range    Glucose 140 (H) 74 - 99 mg/dL    Sodium 141 136 - 145 mmol/L    Potassium 4.5 3.5 - 5.3 mmol/L    Chloride 104 98 - 107 mmol/L    Bicarbonate 28 21 - 32 mmol/L    Anion Gap 14 10 - 20 mmol/L    Urea Nitrogen 36 (H) 6 - 23 mg/dL    Creatinine 1.14 (H) 0.50 - 1.05 mg/dL    eGFR 47 (L) >60 mL/min/1.73m*2    Calcium 8.2 (L) 8.6 - 10.6 mg/dL   Magnesium   Result Value Ref Range    Magnesium 2.01 1.60 - 2.40 mg/dL   CBC and Auto Differential   Result Value Ref Range    WBC 8.9 4.4 - 11.3 x10*3/uL    nRBC 0.0 0.0 - 0.0 /100 WBCs    RBC 3.73 (L) 4.00 - 5.20 x10*6/uL    Hemoglobin 10.4 (L) 12.0 - 16.0 g/dL    Hematocrit 33.0 (L) 36.0 - 46.0 %    MCV 89 80 - 100 fL    MCH  27.9 26.0 - 34.0 pg    MCHC 31.5 (L) 32.0 - 36.0 g/dL    RDW 14.2 11.5 - 14.5 %    Platelets 157 150 - 450 x10*3/uL    Neutrophils % 90.5 40.0 - 80.0 %    Immature Granulocytes %, Automated 1.9 (H) 0.0 - 0.9 %    Lymphocytes % 5.2 13.0 - 44.0 %    Monocytes % 1.8 2.0 - 10.0 %    Eosinophils % 0.3 0.0 - 6.0 %    Basophils % 0.3 0.0 - 2.0 %    Neutrophils Absolute 8.04 (H) 1.60 - 5.50 x10*3/uL    Immature Granulocytes Absolute, Automated 0.17 0.00 - 0.50 x10*3/uL    Lymphocytes Absolute 0.46 (L) 0.80 - 3.00 x10*3/uL    Monocytes Absolute 0.16 0.05 - 0.80 x10*3/uL    Eosinophils Absolute 0.03 0.00 - 0.40 x10*3/uL    Basophils Absolute 0.03 0.00 - 0.10 x10*3/uL   Protime-INR   Result Value Ref Range    Protime 12.9 (H) 9.8 - 12.8 seconds    INR 1.1 0.9 - 1.1   Blood Gas Arterial Full Panel   Result Value Ref Range    POCT pH, Arterial 7.37 (L) 7.38 - 7.42 pH    POCT pCO2, Arterial 50 (H) 38 - 42 mm Hg    POCT pO2, Arterial 213 (H) 85 - 95 mm Hg    POCT SO2, Arterial 100 94 - 100 %    POCT Oxy Hemoglobin, Arterial 97.6 94.0 - 98.0 %    POCT Hematocrit Calculated, Arterial 37.0 36.0 - 46.0 %    POCT Sodium, Arterial 137 136 - 145 mmol/L    POCT Potassium, Arterial 4.3 3.5 - 5.3 mmol/L    POCT Chloride, Arterial 104 98 - 107 mmol/L    POCT Ionized Calcium, Arterial 1.15 1.10 - 1.33 mmol/L    POCT Glucose, Arterial 142 (H) 74 - 99 mg/dL    POCT Lactate, Arterial 0.7 0.4 - 2.0 mmol/L    POCT Base Excess, Arterial 2.8 -2.0 - 3.0 mmol/L    POCT HCO3 Calculated, Arterial 28.9 (H) 22.0 - 26.0 mmol/L    POCT Hemoglobin, Arterial 12.2 12.0 - 16.0 g/dL    POCT Anion Gap, Arterial 8 (L) 10 - 25 mmo/L    Patient Temperature 37.0 degrees Celsius    FiO2 50 %   ECG 12 lead daily   Result Value Ref Range    Ventricular Rate 86 BPM    Atrial Rate 86 BPM    MI Interval 152 ms    QRS Duration 108 ms    QT Interval 398 ms    QTC Calculation(Bazett) 476 ms    P Axis 65 degrees    R Axis -41 degrees    T Axis 35 degrees    QRS Count 15 beats     Q Onset 209 ms    P Onset 133 ms    P Offset 189 ms    T Offset 408 ms    QTC Fredericia 449 ms   POCT GLUCOSE   Result Value Ref Range    POCT Glucose 114 (H) 74 - 99 mg/dL   POCT GLUCOSE   Result Value Ref Range    POCT Glucose 159 (H) 74 - 99 mg/dL   CBC and Auto Differential   Result Value Ref Range    WBC 8.1 4.4 - 11.3 x10*3/uL    nRBC 0.0 0.0 - 0.0 /100 WBCs    RBC 3.34 (L) 4.00 - 5.20 x10*6/uL    Hemoglobin 9.5 (L) 12.0 - 16.0 g/dL    Hematocrit 28.9 (L) 36.0 - 46.0 %    MCV 87 80 - 100 fL    MCH 28.4 26.0 - 34.0 pg    MCHC 32.9 32.0 - 36.0 g/dL    RDW 14.5 11.5 - 14.5 %    Platelets 157 150 - 450 x10*3/uL    Neutrophils % 71.8 40.0 - 80.0 %    Immature Granulocytes %, Automated 0.7 0.0 - 0.9 %    Lymphocytes % 13.3 13.0 - 44.0 %    Monocytes % 13.6 2.0 - 10.0 %    Eosinophils % 0.4 0.0 - 6.0 %    Basophils % 0.2 0.0 - 2.0 %    Neutrophils Absolute 5.80 (H) 1.60 - 5.50 x10*3/uL    Immature Granulocytes Absolute, Automated 0.06 0.00 - 0.50 x10*3/uL    Lymphocytes Absolute 1.08 0.80 - 3.00 x10*3/uL    Monocytes Absolute 1.10 (H) 0.05 - 0.80 x10*3/uL    Eosinophils Absolute 0.03 0.00 - 0.40 x10*3/uL    Basophils Absolute 0.02 0.00 - 0.10 x10*3/uL   Basic Metabolic Panel   Result Value Ref Range    Glucose 98 74 - 99 mg/dL    Sodium 144 136 - 145 mmol/L    Potassium 4.2 3.5 - 5.3 mmol/L    Chloride 104 98 - 107 mmol/L    Bicarbonate 32 21 - 32 mmol/L    Anion Gap 12 10 - 20 mmol/L    Urea Nitrogen 34 (H) 6 - 23 mg/dL    Creatinine 1.06 (H) 0.50 - 1.05 mg/dL    eGFR 52 (L) >60 mL/min/1.73m*2    Calcium 8.6 8.6 - 10.6 mg/dL   Magnesium   Result Value Ref Range    Magnesium 2.16 1.60 - 2.40 mg/dL   POCT GLUCOSE   Result Value Ref Range    POCT Glucose 100 (H) 74 - 99 mg/dL   Transthoracic Echo (TTE) Limited   Result Value Ref Range    LV Biplane EF 48 %    MV E/A ratio 0.91     Tricuspid annular plane systolic excursion 1.8 cm    MV avg E/e' ratio 48.67     RV free wall pk S' 11.00 cm/s    LVIDd 5.10 cm    RVSP  33.3 mmHg    LV A4C EF 60.0       Imaging   CXR 4/26  1. Overall improvement in bilateral lung aeration, likely due to better inspiratory effort. No matilde pulmonary edema on present study.  2. Mild bibasilar atelectasis without sizable pleural effusions.    Physical exam:  Constitutional: Well-developed female in no acute distress, on 2L NC (baseline)  HEENT: Normocephalic, atraumatic. PERRL. EOMI. No cervical lymphadenopathy.  Respiratory: CTA bilaterally. No wheezes, rales, or rhonchi. Normal respiratory effort.  Cardiovascular: RRR. No murmurs, gallops, or rubs. No JVD. Radial pulses 2+.  Abdominal: Soft, nondistended, nontender to palpation. Bowel sounds present. No hepatosplenomegaly or masses. No CVA tenderness.  Neuro:Alert and oriented. No focal neurological deficit  MSK: No LE edema bilaterally.  Skin: Warm, dry. No rashes or wounds.  Psych: Appropriate mood and affect.    Medications:  aspirin, 81 mg, oral, Daily  [Transfer Hold] atorvastatin, 40 mg, oral, Nightly  [Transfer Hold] tiotropium, 2 puff, inhalation, Daily   And  [Transfer Hold] fluticasone furoate-vilanteroL, 1 puff, inhalation, Daily  oxygen, , inhalation, Continuous - Inhalation  pantoprazole, 40 mg, oral, BID AC   Or  pantoprazole, 40 mg, intravenous, BID AC  perflutren lipid microspheres, 0.5-10 mL of dilution, intravenous, Once in imaging  perflutren lipid microspheres, 0.5-10 mL of dilution, intravenous, Once in imaging  perflutren lipid microspheres, 0.5-10 mL of dilution, intravenous, Once in imaging  sennosides, 2 tablet, oral, BID    oxygen, 2 L/min    PRN medications: acetaminophen, albuterol, albuterol, ondansetron **OR** ondansetron, traMADol    Assessment and Plan:  Anitha Welch is a 84 y.o. female with PMHx of COPD, GERD, severe MR, HTN, CAD (with remote inferior wall myocardial infarction and drug-eluting stent placement), hypothyroidism admitted to the CICU from the CATH lab s/p ARMANI with mitral clip (X2) for close  monitoring. Doing well this morning with plan for discharge home.     Updates 04/26/24:  - IVF 500cc LR (hypotensive and symptomatic)  - Follow up with structural recs    - Discharge home today  - Resume home GDMT per structural recs    NEUROLOGIC  JOE     CARDIOVASCULAR  #Severe Mitral regurgitation s/p ARMANI with mitral clip 4/25  - symptomatic with NYHA class II symptoms pre procedure   - Severe progression of mitral valve regurgitation from mild in February per TTE   - follows closely with cardiology;   - figure 8 suture removed.   Plan  - Follow up post procedure TTE    - Monitor access sites for bleeding  - discharge home today    #HFmrEF  #HTN  #CAD  - TTE 10/08/23: EF 40-45%, Moderate to severe mitral valve regurgitation, inferior wall and apical hypokinesis  - Can resume home GDMT per structural recs  - Carvedilol 12.5mg BID  - Empagliflozin (Jardiance) 10mg daily  - Losartan 25mg daily  - Spironolactone 12.5 mg daily  - Continue aspirin 81mg daily  - Holding home torsemide 20mg for now  - Resume home Atorvastatin 40mg daily     #DVT of YESICA 11/23/23  - LE Venous Duplex (11/23/23): Deep venous thrombus involving the distal left femoral, popliteal and posterior tibialis veins.  - Can resume prior to discharge home xarelto     PULMONARY  #COPD   - chronic, oxygen dependent, on 2L baseline  - multiple ED visits in the past for worsening SOB  - continue home Albuterol inhaler PRN  - continue Trelegy Ellipta      RENAL/  #Non-Oliguric AYSE (trending down)  - Cr. 1.14 (baseline 0.9) on presentation   - Likely prerenal  - we will continue to monitor for now      GASTROINTESTINAL  #GERD  - Continue Pantoprazole 40 mg BID     ENDOCRINE  #Hypothyroidism  -  continue home Levothyroxine 100mcg daily     HEME/ONC  JOE     INFECTIOUS DISEASE  JOE     F : as needed  E: replete lytes prn, K>4, Mg >2  N: Cardiac diet  A: PIV     DVT prophylaxis: SCD, Holding Xeralto for now  GI prophylaxis: PPI     Code Status: Full Code  (discussed with patient at bedside upon admission)   NOK: Jose Welch (Son)    Patient and plan discussed with attending physician.    Viktoriya Garduno MD MPH  PGY-2 Internal Medicine

## 2024-04-26 NOTE — PROGRESS NOTES
Occupational Therapy    Evaluation    Patient Name: Anitha Welch  MRN: [id number]  Today's Date: 4/26/2024  Room: 11/11  Time Calculation  Start Time: 1246  Stop Time: 1301  Time Calculation (min): 15 min    Assessment  IP OT Assessment  OT Assessment: Overall decreased strength and endurance limiting full Indep with functional task completion.  Prognosis: Good  Barriers to Discharge: None  Evaluation/Treatment Tolerance: Patient tolerated treatment well  Medical Staff Made Aware: Yes  End of Session Communication: Bedside nurse  End of Session Patient Position: Up in chair, Alarm off, not on at start of session  Plan:  Treatment Interventions: ADL retraining, Functional transfer training, UE strengthening/ROM, Endurance training, Neuromuscular reeducation  OT Frequency: 2 times per week  OT Discharge Recommendations: Low intensity level of continued care  Equipment Recommended upon Discharge: Wheeled walker  OT Recommended Transfer Status: Stand by assist  OT - OK to Discharge: Yes    Subjective   Current Problem:  1. S/P mitral valve clip implantation  Transthoracic echo (TTE) complete    Transthoracic Echo (TTE) Limited    Transthoracic Echo (TTE) Limited      2. Nonrheumatic mitral valve regurgitation  Cardiac Catheterization Procedure    Cardiac Catheterization Procedure    CBC    Basic metabolic panel    Transthoracic echo (TTE) complete      3. Severe mitral valve regurgitation  Transthoracic Echo (TTE) Limited    Intraoperative SAMMY (Anesthesia please do not use)    Intraoperative SAMMY (Anesthesia please do not use)    Transthoracic Echo (TTE) Limited    CANCELED: Transthoracic Echo (TTE) Limited    CANCELED: Transthoracic Echo (TTE) Limited      4. Essential (primary) hypertension  carvedilol (Coreg) 3.125 mg tablet        General:  Reason for Referral: 83 y/o female admitted for MVR. Now s/p uzma-clip repair.  Past Medical History Relevant to Rehab: COPD, GERD, severe MR, HTN, CAD, hypothyroidism  Prior  to Session Communication: Bedside nurse  Patient Position Received: Alarm off, caregiver present (Seated EOB. RN present.)  General Comment: Pt is pleasant and cooperative. She needs somedirection to task, tends to perseverate on having IV removed and going home.   Precautions:  Medical Precautions: Cardiac precautions, Fall precautions  Vital Signs:  Heart Rate: 90  Resp: 20  BP: 117/55  MAP (mmHg): 74  BP Location: Left arm  BP Method: Automatic  Pain:  Pain Assessment  Pain Score: 0 - No pain  Lines/Tubes/Drains:  Arterial Sheath 6 Fr. Right (Active)   Number of days: 70       External Urinary Catheter Female (Active)   Number of days: 0         Objective   Cognition:  Overall Cognitive Status: Impaired  Arousal/Alertness: Appropriate responses to stimuli  Orientation Level: Disoriented to situation  Following Commands: Follows one step commands with repetition     Confusion Assessment Method (CAM)  Acute Onset and Fluctuating Course (1A): No  Infante Agitation Sedation Scale  Infante Agitation Sedation Scale (RASS): Alert and calm  Home Living:  Type of Home: House  Lives With:  ( and adult son)  Home Adaptive Equipment:  (Rollator)  Home Layout: One level, Able to live on main level with bedroom/bathroom  Home Access: Stairs to enter with rails  Entrance Stairs-Number of Steps: 4  Bathroom Shower/Tub: Tub/shower unit   Prior Function:  Level of [place]: Independent with ADLs and functional transfers, Needs assistance with homemaking  Receives Help From: Family  ADL Assistance: Independent  Homemaking Assistance:  (Son assists at [place] with IADL tasks. Pt states that she likes to do the laundry.)  Ambulatory Assistance: Independent (Reports FWW use for distances)  Prior Function Comments: Admits to 2 falls in the past few months d/t dizziness and weakness.  IADL History:     ADL:  Grooming Assistance: Stand by  Grooming Deficit: Setup, Increased time to complete  Bathing Assistance:  Minimal  Bathing Deficit: Setup, Steadying, Increased time to complete   UE Dressing Assistance: Modified independent (Device)  LE Dressing Assistance: Minimal  Toileting Assistance with Device: Minimal  Activity Tolerance:  Endurance: Decreased tolerance for upright activites  Balance:  Dynamic Sitting Balance  Dynamic Sitting-Comments: Good while seated EOB and reaching to manage socks  Static Sitting Balance  Static Sitting-Level of Assistance: Close supervision  Static Standing Balance  Static Standing-Level of Assistance: Close supervision  Bed Mobility/Transfers: Bed Mobility  Bed Mobility: No  Functional Mobility  Functional Mobility Performed: Yes  Functional Mobility 1  Device 1: No device  Assistance 1: Close supervision  Comments 1: Pt ambualted through room and hallway approx 75'. No instability or LOB.   and Transfers  Transfer: Yes  Transfer 1  Transfer From 1: Sit to  Transfer to 1: Stand  Transfer Level of Assistance 1: Contact guard  IADL's:      Vision:     and Vision - Complex Assessment  Ocular Range of Motion: Within Functional Limits  Sensation:  Light Touch: No apparent deficits  Strength:  Strength Comments: BUE strength overall 3+/5  Perception:  Inattention/Neglect: Appears intact  Coordination:      Hand Function:  Hand Function  Gross Grasp: Functional  Coordination: Functional  Extremities:  ,  ,  , and      Outcome Measures: [facility] Daily Activity  Putting on and taking off regular lower body clothing: A little  Bathing (including washing, rinsing, drying): A little  Putting on and taking off regular upper body clothing: None  Toileting, which includes using toilet, bedpan or urinal: A little  Taking care of personal grooming such as brushing teeth: None  Eating Meals: None  Daily Activity - Total Score: 21    Confusion Assessment Method-ICU (CAM-ICU)  Feature 3: Altered Level of Consciousness: Negative    ,   Infante Agitation Sedation Scale  Infante Agitation Sedation Scale (RASS):  Alert and calm      Education Documentation  Body Mechanics, taught by Eva Urbina OT at 4/26/2024  2:12 PM.  Learner: Patient  Readiness: Acceptance  Method: Explanation  Response: Needs Reinforcement    Precautions, taught by Eva Urbina OT at 4/26/2024  2:12 PM.  Learner: Patient  Readiness: Acceptance  Method: Explanation  Response: Needs Reinforcement    ADL Training, taught by Eva Urbina OT at 4/26/2024  2:12 PM.  Learner: Patient  Readiness: Acceptance  Method: Explanation  Response: Needs Reinforcement    Education Comments  No comments found.        Goals:     Encounter Problems       Encounter Problems (Active)       ADLs       Patient will complete toileting, including clothing management and hygiene, with Sup in order to maximize functional Indep with task completion.        Start:  04/26/24    Expected End:  05/10/24               COGNITION/SAFETY       Pt will identify and incorporate 3 EC/WS strategies into daily routines for increased safety and Indep.        Start:  04/26/24    Expected End:  05/10/24               EXERCISE/STRENGTHENING       Pt will increase endurance to tolerate 15-20min of activity with no more than 1 rest break in order to increase ability to engage in ADL completion.        Start:  04/26/24    Expected End:  05/10/24               MOBILITY       Pt will demo increased functional mobility to tolerate tasks necessary to complete ADL routine with Sup and while observing good safety.       Start:  04/26/24    Expected End:  05/10/24                     04/26/24 at 2:13 PM   Eva Urbina OT   Rehab Office: [phone]

## 2024-04-26 NOTE — PROGRESS NOTES
Physical Therapy    Physical Therapy Evaluation & Treatment    Patient Name: Anitha Welch  MRN: [id number]  Today's Date: 4/26/2024   Time Calculation  Start Time: 0905  Stop Time: 0930  Time Calculation (min): 25 min    Assessment/Plan   PT Assessment  PT Assessment Results: Decreased strength, Decreased endurance, Impaired balance, Decreased mobility, Decreased cognition  Rehab Prognosis: Excellent  End of Session Communication: Bedside nurse (RN informed of pt's drop in BP with sitting EOB. Pt also perseverating on urinating constantly, but was not- RN notified.)  End of Session Patient Position: Bed, 3 rail up, Alarm off, not on at start of session   IP OR SWING BED PT PLAN  Inpatient or Swing Bed: Inpatient  PT Plan  Treatment/Interventions: Bed mobility, Transfer training, Stair training, Gait training, Balance training, Strengthening, Therapeutic exercise, Therapeutic activity, Endurance training  PT Plan: Skilled PT  PT Frequency: 4 times per week  PT Discharge Recommendations: Low intensity level of continued care, 24 hr supervision due to cognition  PT Recommended Transfer Status: Assist x1, Contact guard  PT - OK to Discharge: Yes    Subjective     General Visit Information:  General  Reason for Referral: s/p mitral clip  Past Medical History Relevant to Rehab: COPD, GERD, severe MR, HTN, CAD, hypothyroidism  Family/Caregiver Present: No  Prior to Session Communication: Bedside nurse  Patient Position Received: Bed, 3 rail up, Alarm off, not on at start of session  General Comment: Pt pleasant and cooperative with therapy. Telemetry, 2 L O2 NC, external catheter.    Home Living:  Home Living  Type of Home: House  Lives With:  ( and son (son is the caregiver for both of them))  Home Adaptive Equipment:  (rollator)  Home Layout: One level, Able to live on main level with bedroom/bathroom  Home Access: Stairs to enter with rails  Entrance Stairs-Rails:  (one handrail)  Entrance Stairs-Number of Steps:  4    Prior Level of Function:  Prior Function Per Pt/Caregiver Report  Receives Help From: Family  Homemaking Assistance:  (son does the cooking and cleaning; pt normally does the laundry)  Ambulatory Assistance:  (Indep ambulator inside home without device, Reports using rollator for the past few months when leaving the house. Has been falling more over the last few months. One fall few weeks back on the stairs, one fall this past week outside [facility].)    Precautions:  Precautions  Medical Precautions: Cardiac precautions, Fall precautions    Vital Signs:  Vital Signs  Heart Rate:  (PRE: 102, POST: 101)  SpO2:  (PRE: 94%; POST: 97%)  BP:  (PRE: 103/41, Sittin/38; POST: 97/51)    Objective     Pain:  Pain Assessment  Pain Assessment: 0-10  Pain Score: 0 - No pain    Cognition:  Cognition  Overall Cognitive Status: Impaired (Throughout session, pt asking repeated questions (asking if she would be going home today, asking if she could walk to toilet after PT explaining BP too low to do so))  Arousal/Alertness: Delayed responses to stimuli  Orientation Level:  (oriented to self, place, month and year.)  Following Commands: Follows one step commands without difficulty    General Assessments:     Activity Tolerance  Early Mobility/Exercise Safety Screen: Proceed with mobilization - No exclusion criteria met    Sensation  Light Touch: No apparent deficits    Strength  Strength Comments: B LE strength >/= 3+/5 throughout via mobility  Strength  Strength Comments: B LE strength >/= 3+/5 throughout via mobility    Static Sitting Balance  Static Sitting-Balance Support: No upper extremity supported, Feet supported  Static Sitting-Level of Assistance: Close supervision  Dynamic Sitting Balance  Dynamic Sitting-Balance Support: No upper extremity supported, Feet supported  Dynamic Sitting-Comments: Supervision    Static Standing Balance  Static Standing-Balance Support: Bilateral upper extremity supported  Static  Standing-Level of Assistance: Contact guard  Dynamic Standing Balance  Dynamic Standing-Balance Support: Bilateral upper extremity supported  Dynamic Standing-Comments: MinAx1    Functional Assessments:  Bed Mobility  Bed Mobility: Yes  Bed Mobility 1  Bed Mobility 1: Supine to sitting  Level of Assistance 1: Contact guard (cues for sequencing)  Bed Mobility Comments 1: HOB elevated; increased time to complete transfer 2/2 weakness  Bed Mobility 2  Bed Mobility  2: Sitting to supine  Level of Assistance 2: Contact guard (cues for sequencing)  Bed Mobility Comments 2: HOB flat    Transfers  Transfer: Yes  Transfer 1  Transfer From 1: Sit to  Transfer to 1: Stand  Technique 1: Sit to stand  Transfer Level of Assistance 1: Minimum assistance (x1 person; cues for sequencing)  Trials/Comments 1: increased effort to stand  Transfers 2  Transfer From 2: Stand to  Transfer to 2: Sit  Technique 2: Stand to sit  Transfer Level of Assistance 2: Contact guard  Trials/Comments 2: cues for hand placement    Ambulation/Gait Training  Ambulation/Gait Training Performed: Yes  Ambulation/Gait Training 1  Surface 1: Level tile  Device 1: No device  Assistance 1: Minimum assistance (x1 person; pt reaching for IV pole for stability; cues for sequencing)  Quality of Gait 1: Wide base of support, Forward flexed posture, Shuffling gait  Comments/Distance (ft) 1: x 5 side steps towards the L    Extremity/Trunk Assessments:  RLE   RLE : Within Functional Limits  LLE   LLE : Within Functional Limits    Treatments:  Therapeutic Activity  Therapeutic Activity Performed: Yes  Therapeutic Activity 1: Pt sat EOB total of 8 minutes with close supervision.Pt with forward flexed posture.  Therapeutic Activity 2: Pt performed seated B LE LAQs and APs and alternating marches x 20 reps.    Outcome Measures:  [facility] Basic Mobility  Turning from your back to your side while in a flat bed without using bedrails: A little  Moving from lying on your back to  sitting on the side of a flat bed without using bedrails: A little  Moving to and from bed to chair (including a wheelchair): A little  Standing up from a chair using your arms (e.g. wheelchair or bedside chair): A little  To walk in hospital room: A little  Climbing 3-5 steps with railing: Total  Basic Mobility - Total Score: 16    Confusion Assessment Method-ICU (CAM-ICU)  Feature 1: Acute Onset or Fluctuating Course: Negative  Overall CAM-ICU: Negative    FSS-ICU  Ambulation: Unable to attempt due to weakness  Rolling: Supervision or set-up only  Sitting: Supervision or set-up only  Transfer Sit-to-Stand: Minimal assistance (performs 75% or more of task)  Transfer Supine-to-Sit: Minimal assistance (performs 75% or more of task)  Total Score: 18    ICU Mobility Screen  Early Mobility/Exercise Safety Screen: Proceed with mobilization - No exclusion criteria met  E = Exercise and Early Mobility  Early Mobility/Exercise Safety Screen: Proceed with mobilization - No exclusion criteria met  Current Activity: Standing    Encounter Problems       Encounter Problems (Active)       Balance       Pt will score >24 on [place] for low risk of falls (Progressing)       Start:  04/26/24    Expected End:  05/10/24               Mobility       Patient will ascend and descend 4 stairs with one handrail and LRAD with supervision (Progressing)       Start:  04/26/24    Expected End:  05/10/24               PT Transfers       Patient will perform bed mobility indep (Progressing)       Start:  04/26/24    Expected End:  05/10/24            Patient will transfer sit to and from stand with LRAD and supervision (Progressing)       Start:  04/26/24    Expected End:  05/10/24               Pain - Adult            Education Documentation  Mobility Training, taught by Suad Rivas, PT at 4/26/2024 12:28 PM.  Learner: Patient  Readiness: Acceptance  Method: Explanation  Response: Needs Reinforcement    Education Comments  No comments  found.    Signed by Suad Rivas DPT

## 2024-04-26 NOTE — PROGRESS NOTES
Spiritual Care Visit    Clinical Encounter Type  Visited With: Patient  Routine Visit: Introduction  Continue Visiting: Yes  Surgical Visit: Post-op    Orthodox Encounters  Orthodox Needs: Prayer         Sacramental Encounters  Other Sacrament: P&B    Patient received a prayer and blessing by Fr. Carlitos Avila,  Restoration[facility] .

## 2024-04-26 NOTE — DISCHARGE SUMMARY
[facility] INPATIENT DISCHARGE SUMMARY    BRIEF OVERVIEW  Admitting Provider: Kyle Bernardo MD  Discharge Provider: Phillip Hadley DO  Primary Care Physician at Discharge: Gee Dupree [phone]   Admission Date: 4/25/2024     Discharge Date: 4/26/2024    Primary Discharge Diagnosis  Nonrheumatic mitral valve regurgitation    Discharge Disposition  Home  Code Status at Discharge: FULL CODE     Active Issues Requiring Follow-up  none    Outpatient Follow-Up  Future Appointments   Date Time Provider Department Center   5/6/2024  2:30 PM FELA Devine-CNP [id number] [facility]   5/24/2024  1:00 PM  MAC [id number] CARD1 [id number] [facility]   7/16/2024  1:45 PM Tuan Foss DO [id number] [facility]   4/25/2025  8:00 AM  MAC [id number] CARD1 [id number] [facility]       Referrals and Follow-ups to Schedule       Basic metabolic panel      Please have blood drawn 4-7 days after your procedure. You do NOT have to fast.    Release result to [facility]: Immediate    CBC      Please have blood drawn 4-7 days after your procedure. You do NOT have to fast.    Release result to [facility]: Immediate            Test Results Pending at Discharge  Pending Labs       No current pending labs.                 Your medication list        CHANGE how you take these medications        Instructions Last Dose Given Next Dose Due   carvedilol 3.125 mg tablet  Commonly known as: Coreg  What changed:   medication strength  See the new instructions.      Take 1 tablet (3.125 mg) by mouth 2 times a day.              CONTINUE taking these medications        Instructions Last Dose Given Next Dose Due   albuterol 90 mcg/actuation inhaler           atorvastatin 40 mg tablet  Commonly known as: Lipitor           empagliflozin 10 mg  Commonly known as: Jardiance      Take 1 tablet (10 mg) by mouth once daily.       loratadine 10 mg tablet  Commonly known as: Claritin           Multi Complete with Iron  mg-mcg  tablet  Generic drug: multivitamin with minerals           nitroglycerin 0.4 mg SL tablet  Commonly known as: Nitrostat           oxygen gas therapy  Commonly known as: O2           pantoprazole 40 mg EC tablet  Commonly known as: ProtoNix      TAKE 1 TABLET BY MOUTH TWICE A DAY       rivaroxaban 20 mg tablet  Commonly known as: Xarelto      Take 1 tablet (20 mg) by mouth once daily in the evening. Take with meals. Take with food.       spironolactone 25 mg tablet  Commonly known as: Aldactone      Take 0.5 tablets (12.5 mg) by mouth once daily.       tiZANidine 2 mg tablet  Commonly known as: Zanaflex      Take 1 tablet (2 mg) by mouth every 8 hours if needed for muscle spasms.       torsemide 20 mg tablet  Commonly known as: Demadex      Take 1 tablet (20 mg) by mouth once daily.       Trelegy Ellipta 100-62.5-25 mcg blister with device  Generic drug: fluticasone-umeclidin-vilanter      Inhale 1 puff once daily.              STOP taking these medications      chlorhexidine 4 % external liquid  Commonly known as: Hibiclens        ibuprofen 200 mg tablet        losartan 25 mg tablet  Commonly known as: Cozaar        losartan 50 mg tablet  Commonly known as: Cozaar                  Where to Get Your Medications        These medications were sent to [facility]  [address], [address], [place] [address]      Hours: 8AM to 6PM Mon-Fri, 8AM to 4PM Sat, 9AM to 1PM Sun Phone: [phone]   carvedilol 3.125 mg tablet            DETAILS OF HOSPITAL STAY    Presenting Problem  Patient Active Problem List    Diagnosis Date Noted    Amnesia 01/31/2024    Do not resuscitate 02/21/2023    Hypertensive heart disease with heart failure (Multi) 02/21/2023    S/P mitral valve clip implantation 04/23/2024    Constipation 01/02/2024    Acute deep vein thrombosis (DVT) of proximal vein of left lower extremity (Multi) 12/08/2023    Heart failure (Multi) 11/22/2023    HFrEF (heart failure with reduced ejection  fraction) (Multi) 11/21/2023    Severe mitral valve regurgitation 11/21/2023    Bilateral carotid artery stenosis 10/29/2023    Acute respiratory failure with hypoxia (Multi) 10/07/2023    Acquired hypothyroidism 10/07/2023    Epistaxis 09/02/2023    Abdominal aortic aneurysm (AAA) without rupture (CMS-HCC) 09/02/2023    Acute low back pain 09/02/2023    Acute on chronic systolic heart failure (Multi) 09/02/2023    Acute renal failure syndrome (CMS-HCC) 09/02/2023    Acute ST segment elevation myocardial infarction (Multi) 09/02/2023    Arteriosclerosis of coronary artery 09/02/2023    Artificial lens present 09/02/2023    Benign essential hypertension 09/02/2023    Anemia due to blood loss 09/02/2023    Cerebrovascular accident (CVA) involving left cerebral hemisphere (Multi) 09/02/2023    Cerebrovascular accident (CVA) due to embolism of cerebral artery (Multi) 09/02/2023    Cerebrovascular accident (Multi) 09/02/2023    Transient ischemic attack 09/02/2023    Angina pectoris (CMS-HCC) 09/02/2023    Backache 09/02/2023    Chest pain 09/02/2023    Tight chest 09/02/2023    Chronic diastolic heart failure (Multi) 09/02/2023    Chronic heart failure with preserved ejection fraction (Multi) 09/02/2023    Stage 3 chronic kidney disease (Multi) 09/02/2023    Acute exacerbation of chronic obstructive airways disease (Multi) 09/02/2023    Chronic obstructive pulmonary disease (Multi) 09/02/2023    Claudication (CMS-HCC) 09/02/2023    Cystocele with prolapse 09/02/2023    Vaginal prolapse 09/02/2023    Dehydration 09/02/2023    Dermatochalasis of left upper eyelid 09/02/2023    Dermatochalasis of right upper eyelid 09/02/2023    Dysgeusia 09/02/2023    Dyspnea 09/02/2023    Electrocardiogram abnormal 09/02/2023    Facial weakness 09/02/2023    Fall 09/02/2023    Gastroesophageal reflux disease 09/02/2023    Gastrointestinal hemorrhage 09/02/2023    Headache 09/02/2023    History of coronary artery stent placement  09/02/2023    History of myocardial infarction 09/02/2023    History of stroke without residual deficits 09/02/2023    History of cerebrovascular accident 09/02/2023    Hypothyroidism (acquired) 09/02/2023    Ischemic cardiomyopathy 09/02/2023    Lacunar infarct, acute (Multi) 09/02/2023    Lumbago-sciatica due to displacement of lumbar intervertebral disc 09/02/2023    Lumbar degenerative disc disease 09/02/2023    Mixed hyperlipidemia 09/02/2023    Overactive bladder 09/02/2023    Pain of lower extremity 09/02/2023    Pinguecula 09/02/2023    Hypertension 09/02/2023    Low blood pressure 09/02/2023    Near syncope 09/02/2023    Right homonymous hemianopsia 09/02/2023    Seizure (Multi) 09/02/2023    Stented coronary artery 09/02/2023    Syncope and collapse 09/02/2023    Tear film insufficiency 09/02/2023    Urinary urgency 09/02/2023    Varicose veins of both legs with edema 09/02/2023    Floaters in visual field 09/02/2023    Vitreous degeneration 09/02/2023    Asthenia 09/02/2023    Chronic fatigue syndrome 09/02/2023    Weakness 09/02/2023    Stenosis of left carotid artery 02/21/2023    Peripheral vascular disease (CMS-HCC) 02/21/2023    Restless legs syndrome 02/21/2023    Venous thromboembolism (VTE) 02/21/2023    Vision disorder 07/02/2021    Nonrheumatic mitral valve regurgitation 01/31/2024        LOS: 1 day     Objective     Vital signs in last 24 hours:  Temp:  [35.7 °C (96.3 °F)-36.5 °C (97.7 °F)] 35.7 °C (96.3 °F)  Heart Rate:  [] 94  Resp:  [14-28] 15  BP: ()/(36-97) 103/41  Arterial Line BP 1: (129)/(60) 129/60    Physical Exam at Discharge  Discharge Condition: good  Heart Rate: 94  Resp: 15  BP: (!) 103/41  Temp: 35.7 °C (96.3 °F)  Weight:      Physical Exam:  Constitutional: awake/alert/oriented x3, no distress, cooperative  Eyes: PERR  ENMT: mucous membranes moist  Head/Neck: Neck supple, No JVD  Respiratory/Thorax: Clear upper lobe diminished BB no wheezing or rales or rhonchi    Cardiovascular: RRR s1s2 no s3s4 no gmr   Gastrointestinal: Nondistended, soft, non-tender, no rebound tenderness +BS  Extremities: + edema BLE, tender to L leg, + scab to R knee, R ARM Hematoma, NO hematoma to bilateral groins, no oozing   Neurological: alert and oriented x3, intact senses, motor, response and reflexes,3/3 BUE and BLE strength  Psychological: Appropriate mood and behavior   Skin: Warm and dry, intact.       HPI  Anitha Welch is a 84 y.o. female with a PMH of COPD, atherosclerotic heart disease with remote inferior wall myocardial infarction and drug-eluting stent placement.  She has an ischemic cardiomyopathy with an ejection fraction of about 40 to 45% and inferior wall motion abnormalitie, and severe non-rheumatic mitral regurgitation.  Pt presented on 04/25/2024 for elective ARMANI - MitraClip x2 (NTW and NT clip).    Assessment/Plan   Anitha Welch is now s/p ARMANI - MitraClip x2 (NTW and NT clip)  via Right Femoral Vein on 04/25/2024 with Dr. Bernardo.     Patient without any acute complaints.  NO intraoperative complications. Intraop echo: residual moderate MR, no PC effusion, mean gradient 9 mmHg at HR 71 Patient was extubated without any issues. Transfer to ICU and no acute events overnight.   Vitals 35.7, HR 94, RR 15, 103/41 SPO2 98% on 2 L NC     POD 1 - No issues overnight.  Vitals, labs, neuro assessment, and groins remained stable.  Pt denies CP, SOB, abd/groin pain, numbness or tingling of BLE.  No oozing or hematoma from Bilateral groins. Noted R arm hematoma, + Distal pulses.     CXR showed good lung expansion, no acute findings, hyperinflated lungs   POD 1 EKG - Normal sinus rhythm (, ).  No events observed on Tele:    POD 1 ECHO - EF 45%, no pericardial effusion, moderate MR, MV mean gradient 7.0 mmhg.     Pt discharged home on POD 1 after ambulating around the unit.     Patient with low blood pressure 96/54, mild blurred vision with movement, s/p 500 ml LR bolus,  repeat /72. Pt symptoms improved.     Plan:  Severe MR s/p MTEER clip  - D/C home today 4/26/2024  - cont Xarelto  - if pt stops xarelto, pt will need to be on Aspirin for life.   - cont Torsemide and Spironolactone current dose   - STOP losartan due to patient's blood pressure were 103/41, HR 94   - Decrease Carvedilol dose 3.25 mg BID (Home dose 12.5 mg BID)   - cont home Jardiance   - follow up with [facility] in one week, 1 month, and 1 year  - f/w Dr. Foss (Primary Cards) as scheduled or in 6-10 weeks  - f/w Gee Dupree MD in 1-2 weeks  - pt will be given Lab recs (1 week) and ECHO rec (1 month) by RN at discharge (orders have been placed)    Hx of COPD pt on 2 L NC of oxygen at home   - cont home meds: Albuterol, Trelegy ellipta   - this morning pt back on home o2  - wean o2 per tolerance  - instructed pt to buy pulse oxymetry and check oxygen level at rest, during and after ambulation, if pulse oxymetry is >92% with rest, ambulation wean o2 by 1L and reassess as needed. Still wear 2 litter at night.     Hx of atherosclerotic heart disease with remote inferior wall myocardial infarction and drug-eluting stent placement.  HFpEF 40-45   - cont Xarelto and Statin   - cont Jardiance, Spironolactone and Torsemide  - decrease Carvedilol dose     Hx of GERD  - PPI     R Arm hematoma  - apply cold compress at home on the area for 10-20 minutes at a time  - keep arm elevated as needed when at rest   - Wrapping the bruised area with ACE wrap may decrease swelling, Do not wrap too tight.     D/w Dr. Bernrado     I spent 45 minutes in the professional and overall care of this patient.     FELA Burnette-CNP

## 2024-04-27 ENCOUNTER — APPOINTMENT (OUTPATIENT)
Dept: RADIOLOGY | Facility: HOSPITAL | Age: 84
DRG: 314 | End: 2024-04-27
Payer: MEDICARE

## 2024-04-27 ENCOUNTER — HOSPITAL ENCOUNTER (INPATIENT)
Facility: HOSPITAL | Age: 84
LOS: 4 days | Discharge: HOME | DRG: 314 | End: 2024-05-01
Attending: EMERGENCY MEDICINE | Admitting: INTERNAL MEDICINE
Payer: MEDICARE

## 2024-04-27 ENCOUNTER — APPOINTMENT (OUTPATIENT)
Dept: CARDIOLOGY | Facility: HOSPITAL | Age: 84
DRG: 314 | End: 2024-04-27
Payer: MEDICARE

## 2024-04-27 DIAGNOSIS — Z95.818 S/P MITRAL VALVE CLIP IMPLANTATION: ICD-10-CM

## 2024-04-27 DIAGNOSIS — J96.11 CHRONIC RESPIRATORY FAILURE WITH HYPOXIA (MULTI): ICD-10-CM

## 2024-04-27 DIAGNOSIS — I95.89 OTHER SPECIFIED HYPOTENSION: ICD-10-CM

## 2024-04-27 DIAGNOSIS — Z98.890 S/P MITRAL VALVE CLIP IMPLANTATION: ICD-10-CM

## 2024-04-27 DIAGNOSIS — R53.81 PHYSICAL DECONDITIONING: ICD-10-CM

## 2024-04-27 DIAGNOSIS — R55 SYNCOPE AND COLLAPSE: Primary | ICD-10-CM

## 2024-04-27 DIAGNOSIS — I50.9 HEART FAILURE (MULTI): ICD-10-CM

## 2024-04-27 DIAGNOSIS — R53.1 WEAKNESS: ICD-10-CM

## 2024-04-27 DIAGNOSIS — I65.23 BILATERAL CAROTID ARTERY STENOSIS: ICD-10-CM

## 2024-04-27 DIAGNOSIS — I50.9 HEART FAILURE, UNSPECIFIED HF CHRONICITY, UNSPECIFIED HEART FAILURE TYPE (MULTI): ICD-10-CM

## 2024-04-27 DIAGNOSIS — I50.23 ACUTE ON CHRONIC SYSTOLIC HEART FAILURE (MULTI): ICD-10-CM

## 2024-04-27 DIAGNOSIS — I10 PRIMARY HYPERTENSION: ICD-10-CM

## 2024-04-27 LAB
ANION GAP BLDA CALCULATED.4IONS-SCNC: 8 MMO/L (ref 10–25)
ANION GAP SERPL CALC-SCNC: 11 MMOL/L
AORTIC VALVE MEAN GRADIENT: 5 MMHG
AORTIC VALVE PEAK VELOCITY: 1.49 M/S
APPARATUS: ABNORMAL
ARTERIAL PATENCY WRIST A: POSITIVE
AV PEAK GRADIENT: 8.9 MMHG
BASE EXCESS BLDA CALC-SCNC: 2.8 MMOL/L (ref -2–3)
BASOPHILS # BLD AUTO: 0.05 X10*3/UL (ref 0–0.1)
BASOPHILS NFR BLD AUTO: 0.5 %
BODY TEMPERATURE: 37 DEGREES CELSIUS
BUN SERPL-MCNC: 33 MG/DL (ref 8–25)
CA-I BLDA-SCNC: 1.11 MMOL/L (ref 1.1–1.33)
CALCIUM SERPL-MCNC: 8.9 MG/DL (ref 8.5–10.4)
CHLORIDE BLDA-SCNC: 109 MMOL/L (ref 98–107)
CHLORIDE SERPL-SCNC: 106 MMOL/L (ref 97–107)
CO2 SERPL-SCNC: 26 MMOL/L (ref 24–31)
CREAT SERPL-MCNC: 1.1 MG/DL (ref 0.4–1.6)
EGFRCR SERPLBLD CKD-EPI 2021: 49 ML/MIN/1.73M*2
EJECTION FRACTION APICAL 4 CHAMBER: 62.5
EOSINOPHIL # BLD AUTO: 0.07 X10*3/UL (ref 0–0.4)
EOSINOPHIL NFR BLD AUTO: 0.7 %
ERYTHROCYTE [DISTWIDTH] IN BLOOD BY AUTOMATED COUNT: 14.9 % (ref 11.5–14.5)
FLOW: 2 LPM
FLUAV RNA RESP QL NAA+PROBE: NOT DETECTED
FLUBV RNA RESP QL NAA+PROBE: NOT DETECTED
GLUCOSE BLDA-MCNC: 128 MG/DL (ref 74–99)
GLUCOSE SERPL-MCNC: 118 MG/DL (ref 65–99)
HCO3 BLDA-SCNC: 28.4 MMOL/L (ref 22–26)
HCT VFR BLD AUTO: 32.3 % (ref 36–46)
HCT VFR BLD EST: 28 % (ref 36–46)
HGB BLD-MCNC: 9.8 G/DL (ref 12–16)
HGB BLDA-MCNC: 9.2 G/DL (ref 12–16)
HOLD SPECIMEN: NORMAL
IMM GRANULOCYTES # BLD AUTO: 0.12 X10*3/UL (ref 0–0.5)
IMM GRANULOCYTES NFR BLD AUTO: 1.2 % (ref 0–0.9)
INHALED O2 CONCENTRATION: 28 %
LACTATE BLDA-SCNC: 0.8 MMOL/L (ref 0.4–2)
LEFT VENTRICLE INTERNAL DIMENSION DIASTOLE: 5.31 CM (ref 3.5–6)
LYMPHOCYTES # BLD AUTO: 1.01 X10*3/UL (ref 0.8–3)
LYMPHOCYTES NFR BLD AUTO: 10.5 %
MAGNESIUM SERPL-MCNC: 2.5 MG/DL (ref 1.6–3.1)
MCH RBC QN AUTO: 28 PG (ref 26–34)
MCHC RBC AUTO-ENTMCNC: 30.3 G/DL (ref 32–36)
MCV RBC AUTO: 92 FL (ref 80–100)
MITRAL VALVE E/A RATIO: 0.59
MONOCYTES # BLD AUTO: 0.82 X10*3/UL (ref 0.05–0.8)
MONOCYTES NFR BLD AUTO: 8.5 %
NEUTROPHILS # BLD AUTO: 7.56 X10*3/UL (ref 1.6–5.5)
NEUTROPHILS NFR BLD AUTO: 78.6 %
NRBC BLD-RTO: 0 /100 WBCS (ref 0–0)
NT-PROBNP SERPL-MCNC: 4374 PG/ML (ref 0–624)
OXYHGB MFR BLDA: 90.9 % (ref 94–98)
PCO2 BLDA: 48 MM HG (ref 38–42)
PH BLDA: 7.38 PH (ref 7.38–7.42)
PLATELET # BLD AUTO: 145 X10*3/UL (ref 150–450)
PO2 BLDA: 60 MM HG (ref 85–95)
POTASSIUM BLDA-SCNC: 4 MMOL/L (ref 3.5–5.3)
POTASSIUM SERPL-SCNC: 4.4 MMOL/L (ref 3.4–5.1)
RBC # BLD AUTO: 3.5 X10*6/UL (ref 4–5.2)
RIGHT VENTRICLE PEAK SYSTOLIC PRESSURE: 36.9 MMHG
SAO2 % BLDA: 93 % (ref 94–100)
SARS-COV-2 RNA RESP QL NAA+PROBE: NOT DETECTED
SODIUM BLDA-SCNC: 141 MMOL/L (ref 136–145)
SODIUM SERPL-SCNC: 143 MMOL/L (ref 133–145)
SPECIMEN DRAWN FROM PATIENT: ABNORMAL
TROPONIN T SERPL-MCNC: 58 NG/L
TROPONIN T SERPL-MCNC: 67 NG/L
TROPONIN T SERPL-MCNC: 70 NG/L
WBC # BLD AUTO: 9.6 X10*3/UL (ref 4.4–11.3)

## 2024-04-27 PROCEDURE — 93325 DOPPLER ECHO COLOR FLOW MAPG: CPT

## 2024-04-27 PROCEDURE — 2500000001 HC RX 250 WO HCPCS SELF ADMINISTERED DRUGS (ALT 637 FOR MEDICARE OP): Performed by: INTERNAL MEDICINE

## 2024-04-27 PROCEDURE — 36600 WITHDRAWAL OF ARTERIAL BLOOD: CPT

## 2024-04-27 PROCEDURE — 93005 ELECTROCARDIOGRAM TRACING: CPT

## 2024-04-27 PROCEDURE — 85025 COMPLETE CBC W/AUTO DIFF WBC: CPT | Performed by: EMERGENCY MEDICINE

## 2024-04-27 PROCEDURE — 2060000001 HC INTERMEDIATE ICU ROOM DAILY

## 2024-04-27 PROCEDURE — 2500000002 HC RX 250 W HCPCS SELF ADMINISTERED DRUGS (ALT 637 FOR MEDICARE OP, ALT 636 FOR OP/ED): Performed by: INTERNAL MEDICINE

## 2024-04-27 PROCEDURE — 94640 AIRWAY INHALATION TREATMENT: CPT

## 2024-04-27 PROCEDURE — 2500000004 HC RX 250 GENERAL PHARMACY W/ HCPCS (ALT 636 FOR OP/ED): Performed by: EMERGENCY MEDICINE

## 2024-04-27 PROCEDURE — 80048 BASIC METABOLIC PNL TOTAL CA: CPT | Performed by: EMERGENCY MEDICINE

## 2024-04-27 PROCEDURE — 96360 HYDRATION IV INFUSION INIT: CPT

## 2024-04-27 PROCEDURE — 2500000002 HC RX 250 W HCPCS SELF ADMINISTERED DRUGS (ALT 637 FOR MEDICARE OP, ALT 636 FOR OP/ED): Performed by: EMERGENCY MEDICINE

## 2024-04-27 PROCEDURE — 93010 ELECTROCARDIOGRAM REPORT: CPT | Performed by: INTERNAL MEDICINE

## 2024-04-27 PROCEDURE — 99221 1ST HOSP IP/OBS SF/LOW 40: CPT | Performed by: INTERNAL MEDICINE

## 2024-04-27 PROCEDURE — 9420000001 HC RT PATIENT EDUCATION 5 MIN

## 2024-04-27 PROCEDURE — 83880 ASSAY OF NATRIURETIC PEPTIDE: CPT | Performed by: EMERGENCY MEDICINE

## 2024-04-27 PROCEDURE — 93325 DOPPLER ECHO COLOR FLOW MAPG: CPT | Performed by: INTERNAL MEDICINE

## 2024-04-27 PROCEDURE — 87636 SARSCOV2 & INF A&B AMP PRB: CPT | Performed by: EMERGENCY MEDICINE

## 2024-04-27 PROCEDURE — 84132 ASSAY OF SERUM POTASSIUM: CPT | Mod: 91 | Performed by: INTERNAL MEDICINE

## 2024-04-27 PROCEDURE — 71045 X-RAY EXAM CHEST 1 VIEW: CPT | Performed by: RADIOLOGY

## 2024-04-27 PROCEDURE — 93321 DOPPLER ECHO F-UP/LMTD STD: CPT | Performed by: INTERNAL MEDICINE

## 2024-04-27 PROCEDURE — 84484 ASSAY OF TROPONIN QUANT: CPT | Performed by: EMERGENCY MEDICINE

## 2024-04-27 PROCEDURE — 96361 HYDRATE IV INFUSION ADD-ON: CPT

## 2024-04-27 PROCEDURE — 93308 TTE F-UP OR LMTD: CPT | Performed by: INTERNAL MEDICINE

## 2024-04-27 PROCEDURE — 99291 CRITICAL CARE FIRST HOUR: CPT

## 2024-04-27 PROCEDURE — 2500000004 HC RX 250 GENERAL PHARMACY W/ HCPCS (ALT 636 FOR OP/ED): Performed by: INTERNAL MEDICINE

## 2024-04-27 PROCEDURE — 2500000006 HC RX 250 W HCPCS SELF ADMINISTERED DRUGS (ALT 637 FOR ALL PAYERS): Mod: MUE | Performed by: INTERNAL MEDICINE

## 2024-04-27 PROCEDURE — 71045 X-RAY EXAM CHEST 1 VIEW: CPT

## 2024-04-27 PROCEDURE — 83735 ASSAY OF MAGNESIUM: CPT | Performed by: EMERGENCY MEDICINE

## 2024-04-27 RX ORDER — ATORVASTATIN CALCIUM 40 MG/1
40 TABLET, FILM COATED ORAL NIGHTLY
Status: DISCONTINUED | OUTPATIENT
Start: 2024-04-27 | End: 2024-05-01 | Stop reason: HOSPADM

## 2024-04-27 RX ORDER — IPRATROPIUM BROMIDE AND ALBUTEROL SULFATE 2.5; .5 MG/3ML; MG/3ML
3 SOLUTION RESPIRATORY (INHALATION)
Status: DISCONTINUED | OUTPATIENT
Start: 2024-04-27 | End: 2024-04-30

## 2024-04-27 RX ORDER — PANTOPRAZOLE SODIUM 40 MG/1
40 TABLET, DELAYED RELEASE ORAL 2 TIMES DAILY
Status: DISCONTINUED | OUTPATIENT
Start: 2024-04-27 | End: 2024-05-01 | Stop reason: HOSPADM

## 2024-04-27 RX ORDER — IPRATROPIUM BROMIDE AND ALBUTEROL SULFATE 2.5; .5 MG/3ML; MG/3ML
3 SOLUTION RESPIRATORY (INHALATION) ONCE
Status: COMPLETED | OUTPATIENT
Start: 2024-04-27 | End: 2024-04-27

## 2024-04-27 RX ORDER — POLYETHYLENE GLYCOL 3350 17 G/17G
17 POWDER, FOR SOLUTION ORAL 2 TIMES DAILY
Status: DISCONTINUED | OUTPATIENT
Start: 2024-04-27 | End: 2024-05-01 | Stop reason: HOSPADM

## 2024-04-27 RX ORDER — ACETAMINOPHEN 325 MG/1
975 TABLET ORAL ONCE
Status: COMPLETED | OUTPATIENT
Start: 2024-04-27 | End: 2024-04-27

## 2024-04-27 RX ORDER — SODIUM CHLORIDE 9 MG/ML
50 INJECTION, SOLUTION INTRAVENOUS CONTINUOUS
Status: DISCONTINUED | OUTPATIENT
Start: 2024-04-27 | End: 2024-04-28

## 2024-04-27 RX ORDER — BUDESONIDE 0.5 MG/2ML
0.5 INHALANT ORAL
Status: DISCONTINUED | OUTPATIENT
Start: 2024-04-27 | End: 2024-05-01 | Stop reason: HOSPADM

## 2024-04-27 RX ORDER — CARVEDILOL 3.12 MG/1
3.25 TABLET ORAL 2 TIMES DAILY
Status: DISCONTINUED | OUTPATIENT
Start: 2024-04-27 | End: 2024-05-01 | Stop reason: HOSPADM

## 2024-04-27 RX ADMIN — ACETAMINOPHEN 975 MG: 325 TABLET ORAL at 03:09

## 2024-04-27 RX ADMIN — POLYETHYLENE GLYCOL 3350 17 G: 17 POWDER, FOR SOLUTION ORAL at 20:56

## 2024-04-27 RX ADMIN — BUDESONIDE INHALATION 0.5 MG: 0.5 SUSPENSION RESPIRATORY (INHALATION) at 20:22

## 2024-04-27 RX ADMIN — RIVAROXABAN 20 MG: 20 TABLET, FILM COATED ORAL at 16:58

## 2024-04-27 RX ADMIN — ATORVASTATIN CALCIUM 40 MG: 40 TABLET, FILM COATED ORAL at 20:56

## 2024-04-27 RX ADMIN — POLYETHYLENE GLYCOL 3350 17 G: 17 POWDER, FOR SOLUTION ORAL at 10:32

## 2024-04-27 RX ADMIN — IPRATROPIUM BROMIDE AND ALBUTEROL SULFATE 3 ML: 2.5; .5 SOLUTION RESPIRATORY (INHALATION) at 20:22

## 2024-04-27 RX ADMIN — SODIUM CHLORIDE 500 ML: 900 INJECTION, SOLUTION INTRAVENOUS at 01:17

## 2024-04-27 RX ADMIN — PANTOPRAZOLE SODIUM 40 MG: 40 TABLET, DELAYED RELEASE ORAL at 10:31

## 2024-04-27 RX ADMIN — IPRATROPIUM BROMIDE AND ALBUTEROL SULFATE 3 ML: 2.5; .5 SOLUTION RESPIRATORY (INHALATION) at 12:27

## 2024-04-27 RX ADMIN — SODIUM CHLORIDE 125 ML/HR: 900 INJECTION, SOLUTION INTRAVENOUS at 04:57

## 2024-04-27 RX ADMIN — IPRATROPIUM BROMIDE AND ALBUTEROL SULFATE 3 ML: 2.5; .5 SOLUTION RESPIRATORY (INHALATION) at 01:17

## 2024-04-27 RX ADMIN — BUDESONIDE INHALATION 0.5 MG: 0.5 SUSPENSION RESPIRATORY (INHALATION) at 12:27

## 2024-04-27 RX ADMIN — CARVEDILOL 3.12 MG: 3.12 TABLET, FILM COATED ORAL at 10:32

## 2024-04-27 RX ADMIN — CARVEDILOL 3.12 MG: 3.12 TABLET, FILM COATED ORAL at 20:56

## 2024-04-27 RX ADMIN — PANTOPRAZOLE SODIUM 40 MG: 40 TABLET, DELAYED RELEASE ORAL at 20:56

## 2024-04-27 SDOH — SOCIAL STABILITY: SOCIAL INSECURITY: DOES ANYONE TRY TO KEEP YOU FROM HAVING/CONTACTING OTHER FRIENDS OR DOING THINGS OUTSIDE YOUR HOME?: NO

## 2024-04-27 SDOH — SOCIAL STABILITY: SOCIAL INSECURITY: ARE THERE ANY APPARENT SIGNS OF INJURIES/BEHAVIORS THAT COULD BE RELATED TO ABUSE/NEGLECT?: NO

## 2024-04-27 SDOH — HEALTH STABILITY: PHYSICAL HEALTH: ON AVERAGE, HOW MANY DAYS PER WEEK DO YOU ENGAGE IN MODERATE TO STRENUOUS EXERCISE (LIKE A BRISK WALK)?: 2 DAYS

## 2024-04-27 SDOH — ECONOMIC STABILITY: INCOME INSECURITY: HOW HARD IS IT FOR YOU TO PAY FOR THE VERY BASICS LIKE FOOD, HOUSING, MEDICAL CARE, AND HEATING?: NOT HARD AT ALL

## 2024-04-27 SDOH — ECONOMIC STABILITY: HOUSING INSECURITY: IN THE LAST 12 MONTHS, HOW MANY PLACES HAVE YOU LIVED?: 1

## 2024-04-27 SDOH — SOCIAL STABILITY: SOCIAL INSECURITY: HAVE YOU HAD THOUGHTS OF HARMING ANYONE ELSE?: NO

## 2024-04-27 SDOH — SOCIAL STABILITY: SOCIAL INSECURITY: HAVE YOU HAD ANY THOUGHTS OF HARMING ANYONE ELSE?: NO

## 2024-04-27 SDOH — ECONOMIC STABILITY: INCOME INSECURITY: IN THE LAST 12 MONTHS, WAS THERE A TIME WHEN YOU WERE NOT ABLE TO PAY THE MORTGAGE OR RENT ON TIME?: NO

## 2024-04-27 SDOH — SOCIAL STABILITY: SOCIAL INSECURITY: DO YOU FEEL UNSAFE GOING BACK TO THE PLACE WHERE YOU ARE LIVING?: NO

## 2024-04-27 SDOH — SOCIAL STABILITY: SOCIAL INSECURITY: ABUSE: ADULT

## 2024-04-27 SDOH — SOCIAL STABILITY: SOCIAL INSECURITY: DO YOU FEEL ANYONE HAS EXPLOITED OR TAKEN ADVANTAGE OF YOU FINANCIALLY OR OF YOUR PERSONAL PROPERTY?: NO

## 2024-04-27 SDOH — HEALTH STABILITY: PHYSICAL HEALTH: ON AVERAGE, HOW MANY MINUTES DO YOU ENGAGE IN EXERCISE AT THIS LEVEL?: 20 MIN

## 2024-04-27 SDOH — SOCIAL STABILITY: SOCIAL INSECURITY: ARE YOU OR HAVE YOU BEEN THREATENED OR ABUSED PHYSICALLY, EMOTIONALLY, OR SEXUALLY BY ANYONE?: NO

## 2024-04-27 SDOH — SOCIAL STABILITY: SOCIAL INSECURITY: HAS ANYONE EVER THREATENED TO HURT YOUR FAMILY OR YOUR PETS?: NO

## 2024-04-27 ASSESSMENT — PAIN SCALES - GENERAL
PAINLEVEL_OUTOF10: 0 - NO PAIN
PAINLEVEL_OUTOF10: 0 - NO PAIN

## 2024-04-27 ASSESSMENT — COGNITIVE AND FUNCTIONAL STATUS - GENERAL
EATING MEALS: A LITTLE
EATING MEALS: A LITTLE
DRESSING REGULAR LOWER BODY CLOTHING: A LITTLE
CLIMB 3 TO 5 STEPS WITH RAILING: A LITTLE
TOILETING: A LITTLE
PERSONAL GROOMING: A LITTLE
MOVING TO AND FROM BED TO CHAIR: A LITTLE
DRESSING REGULAR UPPER BODY CLOTHING: A LITTLE
DAILY ACTIVITIY SCORE: 18
STANDING UP FROM CHAIR USING ARMS: A LITTLE
TOILETING: A LITTLE
TURNING FROM BACK TO SIDE WHILE IN FLAT BAD: A LITTLE
STANDING UP FROM CHAIR USING ARMS: A LITTLE
MOVING FROM LYING ON BACK TO SITTING ON SIDE OF FLAT BED WITH BEDRAILS: A LITTLE
DRESSING REGULAR LOWER BODY CLOTHING: A LITTLE
CLIMB 3 TO 5 STEPS WITH RAILING: A LITTLE
WALKING IN HOSPITAL ROOM: A LITTLE
PATIENT BASELINE BEDBOUND: NO
MOBILITY SCORE: 20
DAILY ACTIVITIY SCORE: 18
HELP NEEDED FOR BATHING: A LITTLE
PERSONAL GROOMING: A LITTLE
DRESSING REGULAR UPPER BODY CLOTHING: A LITTLE
HELP NEEDED FOR BATHING: A LITTLE
MOVING TO AND FROM BED TO CHAIR: A LITTLE
WALKING IN HOSPITAL ROOM: A LITTLE
MOBILITY SCORE: 18

## 2024-04-27 ASSESSMENT — LIFESTYLE VARIABLES
SKIP TO QUESTIONS 9-10: 1
AUDIT-C TOTAL SCORE: 0
HOW OFTEN DO YOU HAVE A DRINK CONTAINING ALCOHOL: NEVER
AUDIT-C TOTAL SCORE: 0
SUBSTANCE_ABUSE_PAST_12_MONTHS: NO
HOW OFTEN DO YOU HAVE 6 OR MORE DRINKS ON ONE OCCASION: NEVER
HOW MANY STANDARD DRINKS CONTAINING ALCOHOL DO YOU HAVE ON A TYPICAL DAY: PATIENT DOES NOT DRINK
PRESCIPTION_ABUSE_PAST_12_MONTHS: NO

## 2024-04-27 ASSESSMENT — ACTIVITIES OF DAILY LIVING (ADL)
HEARING - RIGHT EAR: FUNCTIONAL
WALKS IN HOME: NEEDS ASSISTANCE
DRESSING YOURSELF: NEEDS ASSISTANCE
PATIENT'S MEMORY ADEQUATE TO SAFELY COMPLETE DAILY ACTIVITIES?: YES
HEARING - LEFT EAR: FUNCTIONAL
JUDGMENT_ADEQUATE_SAFELY_COMPLETE_DAILY_ACTIVITIES: YES
TOILETING: NEEDS ASSISTANCE
ADEQUATE_TO_COMPLETE_ADL: YES
BATHING: NEEDS ASSISTANCE
FEEDING YOURSELF: NEEDS ASSISTANCE
GROOMING: NEEDS ASSISTANCE

## 2024-04-27 ASSESSMENT — PATIENT HEALTH QUESTIONNAIRE - PHQ9
SUM OF ALL RESPONSES TO PHQ9 QUESTIONS 1 & 2: 0
1. LITTLE INTEREST OR PLEASURE IN DOING THINGS: NOT AT ALL
2. FEELING DOWN, DEPRESSED OR HOPELESS: NOT AT ALL

## 2024-04-27 ASSESSMENT — COLUMBIA-SUICIDE SEVERITY RATING SCALE - C-SSRS
1. IN THE PAST MONTH, HAVE YOU WISHED YOU WERE DEAD OR WISHED YOU COULD GO TO SLEEP AND NOT WAKE UP?: NO
2. HAVE YOU ACTUALLY HAD ANY THOUGHTS OF KILLING YOURSELF?: NO
6. HAVE YOU EVER DONE ANYTHING, STARTED TO DO ANYTHING, OR PREPARED TO DO ANYTHING TO END YOUR LIFE?: NO

## 2024-04-27 ASSESSMENT — PAIN DESCRIPTION - PROGRESSION: CLINICAL_PROGRESSION: NOT CHANGED

## 2024-04-27 ASSESSMENT — PAIN - FUNCTIONAL ASSESSMENT
PAIN_FUNCTIONAL_ASSESSMENT: 0-10

## 2024-04-27 ASSESSMENT — PAIN DESCRIPTION - FREQUENCY: FREQUENCY: CONSTANT/CONTINUOUS

## 2024-04-27 ASSESSMENT — ENCOUNTER SYMPTOMS: ALTERED MENTAL STATUS: 1

## 2024-04-27 ASSESSMENT — PAIN DESCRIPTION - DESCRIPTORS: DESCRIPTORS: ACHING

## 2024-04-27 ASSESSMENT — PAIN DESCRIPTION - ONSET: ONSET: AWAKENED FROM SLEEP

## 2024-04-27 ASSESSMENT — PAIN DESCRIPTION - LOCATION: LOCATION: HEAD

## 2024-04-27 NOTE — H&P
History Of Present Illness  Anitha Welch is a 84 y.o. female presenting with fall.    She was just discharged from [facility] less than 48 hours ago after having a mitral valve procedure.  This was a minimally invasive procedure with no major incision.  She has COPD and has had on home O2.  At home she has been sleepy weak confused.  She has been constipated and at some point she ended up on the floor.  Her son is here with her and it was clear to him after her arrival at home that she was too tired and weak to function independently in the home setting     Past Medical History  She has a past medical history of AAA (abdominal aortic aneurysm) (CMS-HCC), Acute urinary tract infection (04/20/2023), Anemia, Anxiety, Arthritis, CHF (congestive heart failure) (Multi), Chronic pain disorder, CKD (chronic kidney disease), Cognitive decline, COPD (chronic obstructive pulmonary disease) (Multi), Coronary artery disease, CVA (cerebral vascular accident) (Multi), Deep vein thrombosis (DVT) of calf (Multi), Depression, Disease of thyroid gland, Diverticulosis, DVT (deep venous thrombosis) (Multi), Easy bruising, Epistaxis, GERD (gastroesophageal reflux disease), History of blood transfusion (2023), History of degenerative disc disease, Hyperlipidemia, Hypertension, Hypothyroidism, Ischemic cardiomyopathy, Meralgia paresthetica, Nonrheumatic mitral valve regurgitation, Osteoarthritis, Osteopenia (2010), Plantar fasciitis, RLS (restless legs syndrome), Sciatica, Spinal stenosis, and ST elevation (STEMI) myocardial infarction (Multi).    Surgical History  She has a past surgical history that includes MR angio head wo IV contrast (02/24/2017); MR angio head wo IV contrast (08/11/2017); MR angio head wo IV contrast (02/10/2019); Cholecystectomy (1996); Tonsillectomy; pr arthrp acetblr/prox fem prostc agrft/algrft; Thyroidectomy, partial (Bilateral, 04/17/2013); Coronary stent placement; Knee Arthroplasty  (Left); Total hip arthroplasty (Right, 2006); Dilation and curettage of uterus; Other surgical history (01/09/2019); Upper gastrointestinal endoscopy (03/2019); Cardiac catheterization (10/2019); Cataract extraction (Bilateral, 11/13/2012); Adenoidectomy; Cyst Removal (Right, 06/24/2013); Colonoscopy; Other surgical history (01/09/2019); surgical laverne monitoring; Cardiac catheterization (N/A, 02/16/2024); and Esophagogastroduodenoscopy.     Social History  She reports that she quit smoking about 10 years ago. Her smoking use included cigarettes. She has never been exposed to tobacco smoke. She has never used smokeless tobacco. She reports that she does not currently use alcohol. She reports that she does not use drugs.    Family History  Family History   Problem Relation Name Age of Onset    Hyperthyroidism Mother          Treated with radioactive iodine    Hypertension Mother      Heart disease Mother      Hyperthyroidism Sibling      Diabetes Father's Sister          Allergies  Amoxicillin, Iodinated contrast media, Ciprofloxacin, Influenza virus vaccines, Diphenhydramine, Flu vac 2023 [id number](pf), and Other    Review of Systems-Limited review of systems.  Patient is an exceedingly poor historian.  She does not have any acute shortness of breath nausea vomiting diarrhea abdominal pain chest pain fevers or chills.     Physical Exam groggy and a very poor historian.  Slurred speech  Head atraumatic normocephalic  Pupils equal round reactive light extraocular motion intact  Mouth normal-appearing tongue and oropharynx  Neck supple without thyromegaly  Lymph nodes no cervical or axillary lymphadenopathy  Heart regular rate and rhythm without murmurs rubs or gallops  Lungs clear to auscultation normal to percussion  Abdomen soft nontender nondistended  Extremities no significant edema  Neuro cranial nerves grossly intact good tone and strength in arms she can lift both legs up off the bed but they are both  weak  Skin no rashes or ulcerations.  Musculoskeletal normal passive range of motion shoulders elbows hips and knees     Last Recorded Vitals  BP (!) 104/49   Pulse 90   Temp 36.7 °C (98.1 °F) (Oral)   Resp (!) 31   Wt 61.7 kg (136 lb)   SpO2 97%     Relevant Results  Chest x-ray clear.  Labs unremarkable     Assessment/Plan   #1 syncope and collapse-she is on pretty good doses of strong diuretics.  Her H&H have actually gone up significantly in the last few days suggesting dehydration.  She came in with low blood pressure.  This is responded to IV fluids.  Continue IV fluids for now.  Hold diuretics.  Put hold parameters on carvedilol.  Telemetry monitoring.  Consult her cardiologist Dr. Foss    2-sleepiness and somewhat impaired mental status.  She has significant COPD.  She is on 2 L at home.  Blood gas from a couple days ago showed some degree of respiratory acidosis.  Will get a new blood gas right now.  Continue 2 L O2 for now.  Ordering DuoNebs and Pulmicort    3 weakness and debilitation.  She needs PT OT eval and she may need to go to rehab rather than straight home.    Jose Rodriguez MD

## 2024-04-27 NOTE — NURSING NOTE
Anitha up to side of bed eating breakfast at this time  Anitha has been seen by Dr Dempsey call light inr each

## 2024-04-27 NOTE — SIGNIFICANT EVENT
Clinical update to history and physical done by my partner a few hours ago.    Patient seen in the emergency department boarding.  Blood pressure has been somewhat low, but when she wakes up in and moves herself up in her bed, most recent has a systolic 114.  No new dizziness.  She is okay with physical therapy Occupational Therapy, discussed rehab after discharge and she is certainly open to this.  Reviewed ABG ordered by my partner, and it shows a chronic hypercapnic failure with compensated pH, and no need for BiPAP currently.  I have held the Jardiance.  Continue to hold diuresis.  I will drop back to fluids from 125 mL/h to 50 mL/h.  Cardiology consult pending.

## 2024-04-27 NOTE — PROGRESS NOTES
Anitha Welch is a 84 y.o. female on day 0 of admission presenting with Syncope and collapse.    Patient is a re-admit.   She was admitted to [facility] 04/25 - 04/26/2024 for mitral valve procedure. AMPAC score was 21. She was discharged home with no services.   Once home, she had a sycopal episode, fall and c/o weakness.   RNCC discussed rehab with patient. List of area facilities provided. RNCC will follow up for choices.   ADOD 04/30/2024      Ele Parrish RN

## 2024-04-27 NOTE — ED PROVIDER NOTES
HPI   No chief complaint on file.        History provided by:  Relative    85-year-old female history of COPD, CAD, mitral valve regurgitation presents with complaint of shortness of breath.  Patient was just discharged from the hospital yesterday status post mitral valve clip and now developing weakness at home.  Son states that she is just been sleeping since she got home and he was downstairs and heard her fall upstairs..  No fevers or chills.  No chest pain.  No nausea or vomiting.  Patient's not a great historian.  No other complaints.                  Darnell Coma Scale Score: 15                     Patient History   Past Medical History:   Diagnosis Date    AAA (abdominal aortic aneurysm) (CMS-[facility])     Acute urinary tract infection 04/20/2023    Anemia     Anxiety     Arthritis     CHF (congestive heart failure) (Multi)     Chronic pain disorder     back pain    CKD (chronic kidney disease)     Cognitive decline     COPD (chronic obstructive pulmonary disease) (Multi)     oxygen dependent- wears 2L NC continuously    Coronary artery disease     s/p stent    CVA (cerebral vascular accident) (Multi)     weaker on the left side    Deep vein thrombosis (DVT) of calf (Multi)     left    Depression     Disease of thyroid gland     Diverticulosis     DVT (deep venous thrombosis) (Multi)     left leg, on xarelto    Easy bruising     Epistaxis     GERD (gastroesophageal reflux disease)     managed with protonix    History of blood transfusion 2023    History of degenerative disc disease     Hyperlipidemia     Hypertension     Hypothyroidism     Ischemic cardiomyopathy     Meralgia paresthetica     Nonrheumatic mitral valve regurgitation     Osteoarthritis     Osteopenia 2010    hip - DEXA    Plantar fasciitis     RLS (restless legs syndrome)     Sciatica     Spinal stenosis     ST elevation (STEMI) myocardial infarction (Multi)      Past Surgical History:   Procedure Laterality Date    ADENOIDECTOMY      CARDIAC  CATHETERIZATION  10/2019    CARDIAC CATHETERIZATION N/A 02/16/2024    Procedure: Left And Right Heart Cath, With LV;  Surgeon: Tuan Foss DO;  Location: [facility] Cardiac Cath Lab;  Service: Cardiovascular;  Laterality: N/A;  no pre auth needed    CATARACT EXTRACTION Bilateral 11/13/2012    CHOLECYSTECTOMY  1996    laparoscopic    COLONOSCOPY      Dr. Rodriguez    CORONARY STENT PLACEMENT      CYST REMOVAL Right 06/24/2013    Excision of Sebaceous cyst right axilla    DILATION AND CURETTAGE OF UTERUS      ESOPHAGOGASTRODUODENOSCOPY      KNEE ARTHROPLASTY Left     MR HEAD ANGIO WO IV CONTRAST  02/24/2017    MR HEAD ANGIO WO IV CONTRAST [facility]    MR HEAD ANGIO WO IV CONTRAST  08/11/2017    MR HEAD ANGIO WO IV CONTRAST [facility]    MR HEAD ANGIO WO IV CONTRAST  02/10/2019    MR HEAD ANGIO WO IV CONTRAST [facility]    OTHER SURGICAL HISTORY  01/09/2019    Stent synergy    OTHER SURGICAL HISTORY  01/09/2019    Stent synergy    IL ARTHRP ACETBLR/PROX FEM PROSTC AGRFT/ALGRFT      SURGICAL SAMMY MONITORING      THYROIDECTOMY, PARTIAL Bilateral 04/17/2013    subtotal thyroidectomy    TONSILLECTOMY      TOTAL HIP ARTHROPLASTY Right 2006    UPPER GASTROINTESTINAL ENDOSCOPY  03/2019    Dr. Galan     Family History   Problem Relation Name Age of Onset    Hyperthyroidism Mother          Treated with radioactive iodine    Hypertension Mother      Heart disease Mother      Hyperthyroidism Sibling      Diabetes Father's Sister       Social History     Tobacco Use    Smoking status: Former     Current packs/day: 0.00     Types: Cigarettes     Quit date: 2014     Years since quitting: 10.3     Passive exposure: Never    Smokeless tobacco: Never   Vaping Use    Vaping status: Never Used   Substance Use Topics    Alcohol use: Not Currently     Comment: glass wine at holiday    Drug use: Never       Physical Exam   ED Triage Vitals   Temperature Heart Rate Respirations BP   04/27/24 0043 04/27/24 0038  04/27/24 0038 04/27/24 0038   36.7 °C (98.1 °F) 91 16 (!) 77/39      Pulse Ox Temp Source Heart Rate Source Patient Position   04/27/24 0038 04/27/24 0043 -- --   94 % Oral        BP Location FiO2 (%)     -- --             Physical Exam  General:  Awake, alert, no acute distress.  Head: Normocephalic, Atraumatic  Neck: Supple, trachea midline, no stridor  Skin: Warm and dry, no rashes   Lungs: Clear to auscultation bilaterally no acute respiratory distress, speaking in full sentences without difficulty  CV: Regular Rate Rhythm with no obvious murmurs gallops rubs noted, no jugular venous distention, no pedal edema   Abdomen: Soft, nontender, nondistended, positive bowel sounds, no peritoneal signs  Neuro:  No gross focal neurologic deficits, NIH is 0  Musculoskeletal:  Full range of motion in all 4 extremities  Psychiatric:  Alert   ED Course & MDM   ED Course as of 04/27/24 0423   Sat Apr 27, 2024   0122 My EKG interpretation:  Sinus rhythm 89 bpm normal axis PA interval 142 QTc 464 no ectopy or acute ischemic changes noted [KW]      ED Course User Index  [KW] Angelo ARGUETA DO Alex         Diagnoses as of 04/27/24 0423   Syncope and collapse   Weakness   Other specified hypotension       Medical Decision Making  Patient was hypotensive when she came in with a systolic blood pressure in the 70s she did respond to a 500 cc bolus.  Her workup is unremarkable.  Her troponin was flat with an initial of 70 1 repeat 67 which I suspect is secondary to the heart procedure that she had done earlier.  She was treated with a DuoNeb that she does have a history of COPD.  She is on oxygen chronically.  Patient's blood pressures are still soft which I suspect was a contributing factor to her falling.  Given her age, comorbidities, recent procedure, and borderline blood pressures I feel she requires admission to the hospital.  Discussed the findings and plan with the patient and son at bedside they were in agreement.  Contacted the  hospitalist for admission.    32 minutes of total critical care time includes review of laboratory data, radiology results, discussion with consultants, and monitoring for potential decompensation.  Interventions performed as documented above.  Total care time is exclusive of any separately billable procedures    Procedure  Procedures     Angelo Johnson DO  04/27/24 0425

## 2024-04-27 NOTE — CONSULTS
Inpatient consult to Cardiology  Consult performed by: FELA Baez-CNP  Consult ordered by: Jose Rodriguez MD        History Of Present Illness:    Anitha Welch is a 84 y.o. female presenting with fall at home. Patient follows with Dr. Foss for cardiology. She was discharged from [facility] less than 48 hours ago after undergoing a mitral transcatheter [id number] repair on 4/25/2024. Past medical history include mitral regurgitation, heart failure with mildly reduced ejection fraction, hypertensive disorder, coronary artery disease, chronic obstructive pulmonary disease on chronic oxygen, gastroesophageal reflux disease and hypothyroidism.  Patient reportedly has been fatigued, weak and slightly confused at home.  Evidently patient has been constipated and was attempting to go to the bathroom and somehow ended up on the floor.  Patient denies losing consciousness.  Denies chest pain, pressure or palpitations.  States that she is short of breath but no different than her usual.  Denies nausea or vomiting.  Patient presented to the emergency department at which time lab work was completed showing sodium 143, potassium 4.4, creatinine 1.10 and hemoglobin of 9.8.  proBNP is slightly elevated at 4374.  Troponins elevated at 70, 67 and 58.  EKG in the emergency department shows sinus rhythm with no evidence of ischemia.  Chest x-ray with enlarged cardiac silhouette, emphysema or COPD, no focal consolidation identified.  Patient was found to be hypotensive in the emergency department with a blood pressure of 77/39 on admission.  She was started on IV fluids and was admitted to the hospital on telemetry for further assessment and management.    Review of Systems   Constitutional: Positive for malaise/fatigue.   Psychiatric/Behavioral:  Positive for altered mental status.          Last Recorded Vitals:  Vitals:    04/27/24 0230 04/27/24 0330 04/27/24 0600 04/27/24 0829   BP: (!) 99/43 (!) 104/49  (!) 90/40 (!) 83/38   BP Location:    Left arm   Patient Position:    Lying   Pulse: 88 90 83 86   Resp: (!) 26 (!) 31 (!) 21 (!) 25   Temp:       TempSrc:       SpO2: 96% 97% (!) 90% 99%   Weight:       Height:           Last Labs:  CBC - 4/27/2024:  1:04 AM  9.6 9.8 145    32.3      CMP - 4/27/2024:  1:04 AM  8.9 6.5 15 --- 1.1   2.7 3.4 15 79      PTT - 4/15/2024: 11:29 AM  1.1   12.9 40     Hemoglobin A1C   Date/Time Value Ref Range Status   01/09/2019 08:05 PM 5.5 4.0 - 6.0 % Final     Comment:     Hemoglobin A1C levels are related to mean blood glucose during the   preceding 2-3 months. The relationship table below may be used as a   general guide. Each 1% increase in HGB A1C is a reflection of an   increase in mean glucose of approximately 30 mg/dl.   Reference: Diabetes Care, volume 29, supplement 1 Jan. 2006                        HGB A1C ................. Approx. Mean Glucose   _______________________________________________   6%   ...............................  120 mg/dl   7%   ...............................  150 mg/dl   8%   ...............................  180 mg/dl   9%   ...............................  210 mg/dl   10%  ...............................  240 mg/dl  Performed at [address] [place] [address]     05/07/2018 10:43 AM 5.5 4.0 - 6.0 % Final     Comment:     Hemoglobin A1C levels are related to mean blood glucose during the   preceding 2-3 months. The relationship table below may be used as a   general guide. Each 1% increase in HGB A1C is a reflection of an   increase in mean glucose of approximately 30 mg/dl.   Reference: Diabetes Care, volume 29, supplement 1 Jan. 2006                        HGB A1C ................. Approx. Mean Glucose   _______________________________________________   6%   ...............................  120 mg/dl   7%   ...............................  150 mg/dl   8%   ...............................  180 mg/dl   9%    "...............................  210 mg/dl   10%  ...............................  240 mg/dl  Performed at [address] [place] [address]       LDL Calculated   Date/Time Value Ref Range Status   02/21/2023 04:20  65 - 130 MG/DL Final   06/15/2022 08:39 AM 92 65 - 130 MG/DL Final   11/24/2020 11:08 AM 86 65 - 130 MG/DL Final      Last I/O:  I/O last 3 completed shifts:  In: 758.3 (12.3 mL/kg) [IV Piggyback:758.3]  Out: - (0 mL/kg)   Weight: 61.7 kg     Past Cardiology Tests (Last 3 Years):  EKG:  ECG 12 lead daily 04/25/2024 (Preliminary)      ECG 12 lead 04/25/2024      ECG 12 lead 12/04/2023      ECG 12 lead 11/22/2023      Electrocardiogram, 12-lead PRN ACS symptoms 10/18/2023      ECG 12 lead 10/10/2023    Echo:  Transthoracic Echo (TTE) Limited 04/26/2024      Intraoperative SAMMY (Anesthesia please do not use) 04/25/2024      Transesophageal Echo (SAMMY) 01/31/2024      Transthoracic Echo (TTE) Complete 10/09/2023    Ejection Fractions:  No results found for: \"EF\"  Cath:  Cardiac catheterization - coronary 02/16/2024    Stress Test:  No results found for this or any previous visit from the past 1095 days.    Cardiac Imaging:  No results found for this or any previous visit from the past 1095 days.      Past Medical History:  She has a past medical history of AAA (abdominal aortic aneurysm) (CMS-[facility]), Acute urinary tract infection (04/20/2023), Anemia, Anxiety, Arthritis, CHF (congestive heart failure) (Multi), Chronic pain disorder, CKD (chronic kidney disease), Cognitive decline, COPD (chronic obstructive pulmonary disease) (Multi), Coronary artery disease, CVA (cerebral vascular accident) (Multi), Deep vein thrombosis (DVT) of calf (Multi), Depression, Disease of thyroid gland, Diverticulosis, DVT (deep venous thrombosis) (Multi), Easy bruising, Epistaxis, GERD (gastroesophageal reflux disease), History of blood transfusion (2023), History of degenerative disc disease, Hyperlipidemia, " Hypertension, Hypothyroidism, Ischemic cardiomyopathy, Meralgia paresthetica, Nonrheumatic mitral valve regurgitation, Osteoarthritis, Osteopenia (2010), Plantar fasciitis, RLS (restless legs syndrome), Sciatica, Spinal stenosis, and ST elevation (STEMI) myocardial infarction (Multi).    Past Surgical History:  She has a past surgical history that includes MR angio head wo IV contrast (02/24/2017); MR angio head wo IV contrast (08/11/2017); MR angio head wo IV contrast (02/10/2019); Cholecystectomy (1996); Tonsillectomy; pr arthrp acetblr/prox fem prostc agrft/algrft; Thyroidectomy, partial (Bilateral, 04/17/2013); Coronary stent placement; Knee Arthroplasty (Left); Total hip arthroplasty (Right, 2006); Dilation and curettage of uterus; Other surgical history (01/09/2019); Upper gastrointestinal endoscopy (03/2019); Cardiac catheterization (10/2019); Cataract extraction (Bilateral, 11/13/2012); Adenoidectomy; Cyst Removal (Right, 06/24/2013); Colonoscopy; Other surgical history (01/09/2019); surgical laverne monitoring; Cardiac catheterization (N/A, 02/16/2024); and Esophagogastroduodenoscopy.      Social History:  She reports that she quit smoking about 10 years ago. Her smoking use included cigarettes. She has never been exposed to tobacco smoke. She has never used smokeless tobacco. She reports that she does not currently use alcohol. She reports that she does not use drugs.    Family History:  Family History   Problem Relation Name Age of Onset    Hyperthyroidism Mother          Treated with radioactive iodine    Hypertension Mother      Heart disease Mother      Hyperthyroidism Sibling      Diabetes Father's Sister          Allergies:  Amoxicillin, Iodinated contrast media, Ciprofloxacin, Influenza virus vaccines, Diphenhydramine, Flu vac 2023 [id number](pf), and Other    Inpatient Medications:  Scheduled medications   Medication Dose Route Frequency    atorvastatin  40 mg oral q24h    budesonide  0.5 mg  nebulization BID    carvedilol  3.125 mg oral BID    empagliflozin  10 mg oral Daily    ipratropium-albuteroL  3 mL nebulization q6h    pantoprazole  40 mg oral BID    polyethylene glycol  17 g oral BID    rivaroxaban  20 mg oral Daily with evening meal     PRN medications   Medication     Continuous Medications   Medication Dose Last Rate    sodium chloride 0.9%  125 mL/hr 125 mL/hr (04/27/24 5188)     Outpatient Medications:  Current Outpatient Medications   Medication Instructions    albuterol 90 mcg/actuation inhaler 1 puff, inhalation, Every 4 hours PRN    atorvastatin (LIPITOR) 40 mg, oral, Every 24 hours    carvedilol (COREG) 3.125 mg, oral, 2 times daily    empagliflozin (JARDIANCE) 10 mg, oral, Daily    loratadine (CLARITIN) 10 mg, oral, Daily    multivitamin-iron-folic acid (Multi Complete with Iron)  mg-mcg tablet tablet 1 tablet, oral, Daily    nitroglycerin (Nitrostat) 0.4 mg SL tablet Place 1 tablet (0.4 mg) under the tongue every 5 minutes if needed.    oxygen (O2) 2 L/min, inhalation, Continuous    pantoprazole (PROTONIX) 40 mg, oral, 2 times daily    rivaroxaban (XARELTO) 20 mg, oral, Daily with evening meal, Take with food.    spironolactone (ALDACTONE) 12.5 mg, oral, Daily    tiZANidine (ZANAFLEX) 2 mg, oral, Every 8 hours PRN    torsemide (DEMADEX) 20 mg, oral, Daily    Trelegy Ellipta 100-62.5-25 mcg blister with device 1 puff, inhalation, Daily       Physical Exam  Constitutional:       Appearance: She is ill-appearing.   Cardiovascular:      Rate and Rhythm: Normal rate and regular rhythm.      Heart sounds: No murmur heard.     No friction rub. No gallop.   Pulmonary:      Effort: Pulmonary effort is normal.      Breath sounds: No wheezing, rhonchi or rales.      Comments: Diminished lung sounds throughout.   Musculoskeletal:      Right lower leg: No edema.      Left lower leg: No edema.           Assessment/Plan   Near-Syncope  Altered Mental Status  Chronic Heart Failure with Mildly  Reduced EF 45%  Mitral Valve Regurgitation s/p ARMANI with Mitral Clip 4/25  Atherosclerotic Heart Disease s/p Stenting  Chronic Obstructive Pulmonary Disease on 2L Home O2    Impression and Plan:    4/27: As above. Patient into the hospital after near syncope at home.  Patient does states she never lost consciousness.  Patient recently underwent a minimally invasive ARMANI with MitraClip procedure.  Echocardiogram completed on April 25 shows a mildly decreased ejection fraction of 45%, inferolateral segment and basal inferior segment abnormal, abnormal pattern of left ventricular diastolic filling, elevated left ventricular end-diastolic pressure, moderately dilated left atrium. Patient was discharged home yesterday. Denies chest pain, pressure or palpitations. Denies worsening shortness of breath.  Patient evidently did have some mild hypotension while admitted to [facility] and they placed losartan on hold as well as decreased carvedilol dose. Agree with parameters for home carvedilol. On my exam patient is answering questions appropriately but seems to have poor recall of recent events. Blood have been hypotensive but improved this morning with last recorded at 114/56 after receiving IV fluids. She appears rather euvolemic on examination with no edema to her lower extremities.  I am going to order a stat echocardiogram to reassess the mitral valve. Will give further recommendations following these results. We will follow with you.        Code Status:  Full Code    I spent 45 minutes in the professional and overall care of this patient.        Chacha Flanagan, APRN-CNP    This is a shared visit. I have reviewed the Advanced Practice Provider's encounter note, approve the Advanced Practice Provider's documentation, and provide the following additional information from my personal encounter.      Gen: NAD   Neck: no JVD, carotid upstroke is brisk and without delay   Heart: rrr, s1s2+ 3 out of 6 apical holosystolic murmur    Lungs: CTA   Ext: warm no edema    Blood pressure seem to improve with a small fluid bolus.  Will work on optimizing her medications.  She does well overnight we can tentatively plan to discharge her tomorrow.    Tuan Foss, DO

## 2024-04-27 NOTE — NURSING NOTE
"Pt up to bathroom   frustrated \"Am I getting up bed upstairs today?\"  Support given reassured pt that when pts are discharged upstairs she will get a bed   She is in a hospital bed for comfort call light in reach Visitors at bedside   "

## 2024-04-27 NOTE — NURSING NOTE
Patient has removed IV x 2 and is becoming confused  Pt to room stepdown 3  telemetry initiated, IV fluids hung and vitals in progress  nurse and pca at bedside  bed alarm on for safety

## 2024-04-28 ENCOUNTER — APPOINTMENT (OUTPATIENT)
Dept: RADIOLOGY | Facility: HOSPITAL | Age: 84
DRG: 314 | End: 2024-04-28
Payer: MEDICARE

## 2024-04-28 LAB
ALBUMIN SERPL-MCNC: 3 G/DL (ref 3.5–5)
ALP BLD-CCNC: 69 U/L (ref 35–125)
ALT SERPL-CCNC: 10 U/L (ref 5–40)
ANION GAP SERPL CALC-SCNC: 10 MMOL/L
APPEARANCE UR: CLEAR
AST SERPL-CCNC: 15 U/L (ref 5–40)
BACTERIA #/AREA URNS AUTO: ABNORMAL /HPF
BILIRUB SERPL-MCNC: 0.9 MG/DL (ref 0.1–1.2)
BILIRUB UR STRIP.AUTO-MCNC: NEGATIVE MG/DL
BUN SERPL-MCNC: 16 MG/DL (ref 8–25)
CALCIUM SERPL-MCNC: 8.1 MG/DL (ref 8.5–10.4)
CHLORIDE SERPL-SCNC: 109 MMOL/L (ref 97–107)
CO2 SERPL-SCNC: 23 MMOL/L (ref 24–31)
COLOR UR: ABNORMAL
CREAT SERPL-MCNC: 0.8 MG/DL (ref 0.4–1.6)
EGFRCR SERPLBLD CKD-EPI 2021: 72 ML/MIN/1.73M*2
ERYTHROCYTE [DISTWIDTH] IN BLOOD BY AUTOMATED COUNT: 15.1 % (ref 11.5–14.5)
GLUCOSE SERPL-MCNC: 171 MG/DL (ref 65–99)
GLUCOSE UR STRIP.AUTO-MCNC: ABNORMAL MG/DL
HCT VFR BLD AUTO: 28.7 % (ref 36–46)
HGB BLD-MCNC: 8.8 G/DL (ref 12–16)
KETONES UR STRIP.AUTO-MCNC: NEGATIVE MG/DL
LEUKOCYTE ESTERASE UR QL STRIP.AUTO: ABNORMAL
MCH RBC QN AUTO: 28.7 PG (ref 26–34)
MCHC RBC AUTO-ENTMCNC: 30.7 G/DL (ref 32–36)
MCV RBC AUTO: 94 FL (ref 80–100)
MUCOUS THREADS #/AREA URNS AUTO: ABNORMAL /LPF
NITRITE UR QL STRIP.AUTO: NEGATIVE
NRBC BLD-RTO: 0 /100 WBCS (ref 0–0)
PH UR STRIP.AUTO: 5.5 [PH]
PLATELET # BLD AUTO: 125 X10*3/UL (ref 150–450)
POTASSIUM SERPL-SCNC: 4.1 MMOL/L (ref 3.4–5.1)
PROT SERPL-MCNC: 5.4 G/DL (ref 5.9–7.9)
PROT UR STRIP.AUTO-MCNC: ABNORMAL MG/DL
RBC # BLD AUTO: 3.07 X10*6/UL (ref 4–5.2)
RBC # UR STRIP.AUTO: NEGATIVE /UL
RBC #/AREA URNS AUTO: ABNORMAL /HPF
SODIUM SERPL-SCNC: 142 MMOL/L (ref 133–145)
SP GR UR STRIP.AUTO: 1.02
SQUAMOUS #/AREA URNS AUTO: ABNORMAL /HPF
UROBILINOGEN UR STRIP.AUTO-MCNC: NORMAL MG/DL
WBC # BLD AUTO: 6.7 X10*3/UL (ref 4.4–11.3)
WBC #/AREA URNS AUTO: ABNORMAL /HPF

## 2024-04-28 PROCEDURE — 2500000002 HC RX 250 W HCPCS SELF ADMINISTERED DRUGS (ALT 637 FOR MEDICARE OP, ALT 636 FOR OP/ED): Performed by: INTERNAL MEDICINE

## 2024-04-28 PROCEDURE — 9420000001 HC RT PATIENT EDUCATION 5 MIN

## 2024-04-28 PROCEDURE — 80053 COMPREHEN METABOLIC PANEL: CPT

## 2024-04-28 PROCEDURE — 71045 X-RAY EXAM CHEST 1 VIEW: CPT

## 2024-04-28 PROCEDURE — 85027 COMPLETE CBC AUTOMATED: CPT

## 2024-04-28 PROCEDURE — 99232 SBSQ HOSP IP/OBS MODERATE 35: CPT | Performed by: INTERNAL MEDICINE

## 2024-04-28 PROCEDURE — 2500000001 HC RX 250 WO HCPCS SELF ADMINISTERED DRUGS (ALT 637 FOR MEDICARE OP): Performed by: INTERNAL MEDICINE

## 2024-04-28 PROCEDURE — 97162 PT EVAL MOD COMPLEX 30 MIN: CPT | Mod: GP

## 2024-04-28 PROCEDURE — 97530 THERAPEUTIC ACTIVITIES: CPT | Mod: GP

## 2024-04-28 PROCEDURE — 94640 AIRWAY INHALATION TREATMENT: CPT

## 2024-04-28 PROCEDURE — 2500000006 HC RX 250 W HCPCS SELF ADMINISTERED DRUGS (ALT 637 FOR ALL PAYERS): Performed by: INTERNAL MEDICINE

## 2024-04-28 PROCEDURE — 87086 URINE CULTURE/COLONY COUNT: CPT | Mod: WESLAB | Performed by: INTERNAL MEDICINE

## 2024-04-28 PROCEDURE — 71045 X-RAY EXAM CHEST 1 VIEW: CPT | Performed by: RADIOLOGY

## 2024-04-28 PROCEDURE — 2060000001 HC INTERMEDIATE ICU ROOM DAILY

## 2024-04-28 PROCEDURE — 2500000004 HC RX 250 GENERAL PHARMACY W/ HCPCS (ALT 636 FOR OP/ED): Performed by: INTERNAL MEDICINE

## 2024-04-28 PROCEDURE — 81001 URINALYSIS AUTO W/SCOPE: CPT | Performed by: INTERNAL MEDICINE

## 2024-04-28 PROCEDURE — 36415 COLL VENOUS BLD VENIPUNCTURE: CPT

## 2024-04-28 RX ORDER — MULTIVIT-MIN/IRON FUM/FOLIC AC 7.5 MG-4
1 TABLET ORAL DAILY
Status: DISCONTINUED | OUTPATIENT
Start: 2024-04-28 | End: 2024-05-01 | Stop reason: HOSPADM

## 2024-04-28 RX ORDER — TORSEMIDE 20 MG/1
20 TABLET ORAL DAILY
Status: DISCONTINUED | OUTPATIENT
Start: 2024-04-28 | End: 2024-05-01 | Stop reason: HOSPADM

## 2024-04-28 RX ORDER — LORATADINE 10 MG/1
10 TABLET ORAL DAILY
Status: DISCONTINUED | OUTPATIENT
Start: 2024-04-28 | End: 2024-05-01 | Stop reason: HOSPADM

## 2024-04-28 RX ADMIN — BUDESONIDE INHALATION 0.5 MG: 0.5 SUSPENSION RESPIRATORY (INHALATION) at 07:30

## 2024-04-28 RX ADMIN — RIVAROXABAN 20 MG: 20 TABLET, FILM COATED ORAL at 17:00

## 2024-04-28 RX ADMIN — TORSEMIDE 20 MG: 20 TABLET ORAL at 13:58

## 2024-04-28 RX ADMIN — IPRATROPIUM BROMIDE AND ALBUTEROL SULFATE 3 ML: 2.5; .5 SOLUTION RESPIRATORY (INHALATION) at 03:30

## 2024-04-28 RX ADMIN — PANTOPRAZOLE SODIUM 40 MG: 40 TABLET, DELAYED RELEASE ORAL at 09:12

## 2024-04-28 RX ADMIN — BUDESONIDE INHALATION 0.5 MG: 0.5 SUSPENSION RESPIRATORY (INHALATION) at 19:03

## 2024-04-28 RX ADMIN — ATORVASTATIN CALCIUM 40 MG: 40 TABLET, FILM COATED ORAL at 20:39

## 2024-04-28 RX ADMIN — PANTOPRAZOLE SODIUM 40 MG: 40 TABLET, DELAYED RELEASE ORAL at 20:39

## 2024-04-28 RX ADMIN — IPRATROPIUM BROMIDE AND ALBUTEROL SULFATE 3 ML: 2.5; .5 SOLUTION RESPIRATORY (INHALATION) at 11:20

## 2024-04-28 RX ADMIN — CARVEDILOL 3.12 MG: 3.12 TABLET, FILM COATED ORAL at 09:12

## 2024-04-28 RX ADMIN — IPRATROPIUM BROMIDE AND ALBUTEROL SULFATE 3 ML: 2.5; .5 SOLUTION RESPIRATORY (INHALATION) at 07:30

## 2024-04-28 RX ADMIN — IPRATROPIUM BROMIDE AND ALBUTEROL SULFATE 3 ML: 2.5; .5 SOLUTION RESPIRATORY (INHALATION) at 23:48

## 2024-04-28 RX ADMIN — IPRATROPIUM BROMIDE AND ALBUTEROL SULFATE 3 ML: 2.5; .5 SOLUTION RESPIRATORY (INHALATION) at 19:03

## 2024-04-28 RX ADMIN — SODIUM CHLORIDE 50 ML/HR: 900 INJECTION, SOLUTION INTRAVENOUS at 06:44

## 2024-04-28 ASSESSMENT — COGNITIVE AND FUNCTIONAL STATUS - GENERAL
STANDING UP FROM CHAIR USING ARMS: A LITTLE
DRESSING REGULAR UPPER BODY CLOTHING: A LITTLE
CLIMB 3 TO 5 STEPS WITH RAILING: A LOT
MOBILITY SCORE: 16
WALKING IN HOSPITAL ROOM: A LITTLE
CLIMB 3 TO 5 STEPS WITH RAILING: A LOT
MOVING TO AND FROM BED TO CHAIR: A LITTLE
PERSONAL GROOMING: A LITTLE
EATING MEALS: A LITTLE
MOVING TO AND FROM BED TO CHAIR: A LITTLE
TOILETING: A LITTLE
HELP NEEDED FOR BATHING: A LITTLE
STANDING UP FROM CHAIR USING ARMS: A LITTLE
WALKING IN HOSPITAL ROOM: A LOT
HELP NEEDED FOR BATHING: A LITTLE
WALKING IN HOSPITAL ROOM: A LOT
TOILETING: A LITTLE
CLIMB 3 TO 5 STEPS WITH RAILING: A LITTLE
DRESSING REGULAR LOWER BODY CLOTHING: A LITTLE
TURNING FROM BACK TO SIDE WHILE IN FLAT BAD: A LITTLE
DRESSING REGULAR LOWER BODY CLOTHING: A LITTLE
MOVING FROM LYING ON BACK TO SITTING ON SIDE OF FLAT BED WITH BEDRAILS: A LITTLE
MOVING TO AND FROM BED TO CHAIR: A LITTLE
STANDING UP FROM CHAIR USING ARMS: A LITTLE
MOVING FROM LYING ON BACK TO SITTING ON SIDE OF FLAT BED WITH BEDRAILS: A LITTLE
TURNING FROM BACK TO SIDE WHILE IN FLAT BAD: A LITTLE
PERSONAL GROOMING: A LITTLE
DAILY ACTIVITIY SCORE: 19
DRESSING REGULAR UPPER BODY CLOTHING: A LITTLE
MOBILITY SCORE: 16

## 2024-04-28 ASSESSMENT — PAIN - FUNCTIONAL ASSESSMENT
PAIN_FUNCTIONAL_ASSESSMENT: 0-10

## 2024-04-28 ASSESSMENT — PAIN SCALES - GENERAL
PAINLEVEL_OUTOF10: 0 - NO PAIN
PAINLEVEL_OUTOF10: 5 - MODERATE PAIN
PAINLEVEL_OUTOF10: 0 - NO PAIN

## 2024-04-28 ASSESSMENT — ACTIVITIES OF DAILY LIVING (ADL): ADL_ASSISTANCE: INDEPENDENT

## 2024-04-28 NOTE — PROGRESS NOTES
Physical Therapy    Physical Therapy Evaluation & Treatment    Patient Name: Anitha Welch  MRN: [id number]  Today's Date: 4/28/2024   Time Calculation  Start Time: 0937  Stop Time: 0959  Time Calculation (min): 22 min    Assessment/Plan   PT Assessment  PT Assessment Results: Decreased endurance, Decreased strength, Impaired balance, Decreased mobility, Decreased coordination, Decreased cognition, Impaired judgement, Decreased safety awareness  Rehab Prognosis: Good  Evaluation/Treatment Tolerance: Patient limited by fatigue  Medical Staff Made Aware: Yes  Strengths: Premorbid level of function  Barriers to Participation: Comorbidities, Ability to acquire knowledge, Insight into problems  End of Session Communication: Bedside nurse  Assessment Comment: pt with decreased functional mobility, increased weakness, impaired tolerance to activity, and poor safety awareness. pt is a high risk for falls and will benefit from continued skilled therapy services to improve functional performance and maximize safety.  End of Session Patient Position: Up in chair, Alarm on (needs in reach)   IP OR SWING BED PT PLAN  Inpatient or Swing Bed: Inpatient  PT Plan  Treatment/Interventions: Bed mobility, Transfer training, Gait training, Stair training, Balance training, Strengthening, Endurance training, Therapeutic exercise, Therapeutic activity  PT Plan: Skilled PT  PT Frequency: 4 times per week  PT Discharge Recommendations: Moderate intensity level of continued care  Equipment Recommended upon Discharge: Wheeled walker  PT Recommended Transfer Status: Assist x1, Assistive device (rolling walker)  PT - OK to Discharge: Yes      Subjective     General Visit Information:  General  Reason for Referral: Impaired Mobility  Referred By: Jose Rodriguez MD  Past Medical History Relevant to Rehab: Mitral Clip, CHF, AAA, HTN, MI, CAD, CVA, carotid stenosis, CKD, GERD, GI Bleed, PVD, Seizure  Family/Caregiver Present: No  Prior to Session  Communication: Bedside nurse  Patient Position Received: Bed, 2 rail up, Alarm on  Preferred Learning Style: verbal  General Comment: 84 y.o. female admitted from home after a syncopal episode. pt had been home <48 hours from [facility] [place] valve [place].  Home Living:  Home Living  Type of Home: House  Lives With: Spouse, Adult children (son)  Home Adaptive Equipment: Walker rolling or standard (rollator)  Home Layout: One level  Home Access: Stairs to enter with rails  Entrance Stairs-Rails:  (unilateral)  Entrance Stairs-Number of Steps: 4  Bathroom Shower/Tub: Tub/shower unit  Bathroom Toilet: Standard  Prior Level of Function:  Prior Function Per Pt/Caregiver Report  Level of [place]: Independent with ADLs and functional transfers, Needs assistance with homemaking  Receives Help From: Family (son is the primary caregiver for pt and spouse)  ADL Assistance: Independent  Homemaking Assistance:  (son does the cooking, cleaning, some laundry, and provides all transportation)  Ambulatory Assistance: Independent (no device in the home, rollator outside)  Vocational: Retired  Prior Function Comments: multiple falls at home  Precautions:  Precautions  Hearing/Visual Limitations: age appropriate  Medical Precautions: Fall precautions, Oxygen therapy device and L/min (4L O2 via NC)  Vital Signs:  Vital Signs  Heart Rate: (!) 111 (125 with activity)  Heart Rate Source: Monitor  SpO2: 100 %    Objective   Pain:  Pain Assessment  Pain Assessment: 0-10  Pain Score: 5 - Moderate pain  Pain Type: Chronic pain  Pain Location: Back  Pain Interventions: Repositioned  Response to Interventions: pt more comfortable sitting in the chair  Cognition:  Cognition  Overall Cognitive Status: Impaired  Orientation Level: Disoriented to time, Disoriented to place  Cognition Comments: pt confused, lacks insight into current medical condition    General Assessments:  General Observation  General Observation: visible skin intact, pt  is confused    Activity Tolerance  Endurance: Decreased tolerance for upright activites  Activity Tolerance Comments: fair  Rate of Perceived Exertion (RPE): 4    Sensation  Sensation Comment: denies paresthesias    Strength  Strength Comments: LE strength appears equal bilaterally, 4-/5 based on function  Coordination  Coordination Comment: grossly WFL    Postural Control  Posture Comment: forward head posture    Static Sitting Balance  Static Sitting-Balance Support: Bilateral upper extremity supported, Feet supported  Static Sitting-Level of Assistance: Close supervision  Static Sitting-Comment/Number of Minutes: good balance seated on EOB    Static Standing Balance  Static Standing-Balance Support: No upper extremity supported  Static Standing-Level of Assistance: Minimum assistance  Static Standing-Comment/Number of Minutes: fair+ balance without assistive device  Functional Assessments:  ADL  ADL's Addressed:  (pt independent with hygiene, did not use call cord, pt standing at the sink washing hands)    Bed Mobility  Bed Mobility: Yes  Bed Mobility 1  Bed Mobility 1: Supine to sitting  Level of Assistance 1: Minimum assistance  Bed Mobility Comments 1: HOB elevated and use of bed rail to transfer to sitting on EOB, min A to complete trunk elevation    Transfers  Transfer: Yes  Transfer 1  Transfer From 1: Bed to  Transfer to 1: Commode-standard  Technique 1: Sit to stand, Stand to sit  Transfer Level of Assistance 1: Minimum assistance  Trials/Comments 1: min A for balance and safety upon initial stand, decreased eccentric control in sitting on commode.  Transfers 2  Transfer From 2: Commode-standard to  Transfer to 2: Chair with arms  Technique 2: Sit to stand, Stand to sit  Transfer Level of Assistance 2: Minimum assistance  Trials/Comments 2: verbal cues for safe hand placement prior to sitting in chair.    Ambulation/Gait Training  Ambulation/Gait Training Performed: Yes  Ambulation/Gait Training  1  Surface 1: Level tile  Device 1: No device  Assistance 1: Hand held assistance, Minimum assistance  Quality of Gait 1: Forward flexed posture, Shuffling gait  Comments/Distance (ft) 1: 10'x2, no assistive device, 4L O2 via NC in tow, unsteady with lateral trunk sway when stepping.    Stairs  Stairs: No  Extremity/Trunk Assessments:  RLE   RLE : Within Functional Limits  LLE   LLE : Within Functional Limits  Treatments:  Therapeutic Activity  Therapeutic Activity Performed:  (see bed mobility, transfers, and gait training for details.)  Outcome Measures:  [facility] Basic Mobility  Turning from your back to your side while in a flat bed without using bedrails: A little  Moving from lying on your back to sitting on the side of a flat bed without using bedrails: A little  Moving to and from bed to chair (including a wheelchair): A little  Standing up from a chair using your arms (e.g. wheelchair or bedside chair): A little  To walk in hospital room: A lot  Climbing 3-5 steps with railing: A lot  Basic Mobility - Total Score: 16    Encounter Problems       Encounter Problems (Active)       Balance       LTG - Patient will maintain static/dynamic standing and sitting balance with LRD to allow for safe completion of functional mobility. (Progressing)       Start:  04/28/24    Expected End:  05/26/24               Mobility       STG - Patient will ambulate 100' with rolling walker MOD I (Progressing)       Start:  04/28/24    Expected End:  05/26/24            STG - Patient will ascend and descend four to stairs with one rails and close supervision to simulate home environment (Not Progressing)       Start:  04/28/24    Expected End:  05/26/24               PT Transfers       STG - Transfer from bed to chair MOD I with LRD (Progressing)       Start:  04/28/24    Expected End:  05/26/24            STG - Patient to transfer to and from sit to supine IND (Progressing)       Start:  04/28/24    Expected End:  05/26/24             STG - Patient will transfer sit to and from stand IND (Progressing)       Start:  04/28/24    Expected End:  05/26/24                   Education Documentation  Mobility Training, taught by Katalina Baker PT at 4/28/2024  1:11 PM.  Learner: Patient  Readiness: Acceptance  Method: Explanation  Response: Verbalizes Understanding, Needs Reinforcement    Education Comments  No comments found.

## 2024-04-28 NOTE — PROGRESS NOTES
Anitha Díaz is a 84 y.o. female on day 1 of admission presenting with Syncope and collapse.      Subjective     Overnight, desaturating placed on 10 L.  She is mildly confused, measuring short of breath, feels much improved now.  She is currently on 4 L.  Tells me that she does wear oxygen around-the-clock.          Objective     Last Recorded Vitals  /70 (BP Location: Left arm, Patient Position: Lying)   Pulse 98   Temp 36.7 °C (98.1 °F) (Oral)   Resp 18   Wt 65.7 kg (144 lb 13.5 oz)   SpO2 96%   Intake/Output last 3 Shifts:    Intake/Output Summary (Last 24 hours) at 4/28/2024 0930  Last data filed at 4/28/2024 0203  Gross per 24 hour   Intake 631.25 ml   Output 350 ml   Net 281.25 ml       Admission Weight  Weight: 61.7 kg (136 lb) (04/27/24 0038)    Daily Weight  04/28/24 : 65.7 kg (144 lb 13.5 oz)    Image Results  Transthoracic Echo (TTE) [place] [address]             Phone [phone]    TRANSTHORACIC ECHOCARDIOGRAM REPORT       Patient Name:     ANITHA DÍAZ       Reading Physician:   [id number] Foss DO  Study Date:       4/27/2024            Ordering Provider:   [id number] MADELYN FERMIN  MRN/PID:          [id number]             Fellow:  Accession#:       [id number]         Nurse:  Date of           1940 / 84 years  Sonographer:         Altagracia Joseph RDCS  Birth/Age:  Gender:           F                    Additional Staff:  Height:           165.00 cm            Admit Date:  Weight:           61.00 kg             Admission Status:    Inpatient - Routine  BSA / BMI:        1.67 m2 / 22.41      Department Location: [facility] ER                    kg/m2  Blood Pressure: 90 /40 mmHg    Study Type:    TRANSTHORACIC ECHO (TTE) LIMITED  Diagnosis/ICD: Presence of other cardiac implants and grafts-Z95.818  Indication:    stat ltd s/p mv clip  CPT Codes:     Echo  Limited-44922; Color Doppler-38471; Doppler Limited-95160    Patient History:  Pertinent History: HTN. sob, stat ltd s/p mv clip,assess mv,stemi, aaa,                     cva/multi,heart failure.    Study Detail: The following Echo studies were performed: 2D, M-Mode, Doppler and                color flow. Technically challenging study due to prominent lung                artifact.       PHYSICIAN INTERPRETATION:  Left Ventricle: Left ventricular systolic function is normal. There are no regional wall motion abnormalities. The left ventricular cavity size is normal. Spectral Doppler shows an impaired relaxation pattern of left ventricular diastolic filling.  Left Atrium: The left atrium is moderately dilated.  Right Ventricle: The right ventricle is normal in size. There is normal right ventricular global systolic function.  Right Atrium: The right atrium is normal in size.  Aortic Valve: The aortic valve is trileaflet. There is no evidence of aortic valve regurgitation. The peak instantaneous gradient of the aortic valve is 8.9 mmHg. The mean gradient of the aortic valve is 5.0 mmHg.  Mitral Valve: The mitral valve is abnormal. There is moderate mitral valve regurgitation. Mitraclip in situ.  Tricuspid Valve: The tricuspid valve is structurally normal. There is mild tricuspid regurgitation.  Pulmonic Valve: The pulmonic valve is not well visualized. The pulmonic valve regurgitation was not well visualized.  Pericardium: There is a trivial pericardial effusion posterior to the left ventricle. There is no evidence of cardiac tamponade.  Aorta: The aortic root is normal.       CONCLUSIONS:   1. Left ventricular systolic function is normal.   2. Spectral Doppler shows an impaired relaxation pattern of left ventricular diastolic filling.   3. The left atrium is moderately dilated.   4. There is no evidence of cardiac tamponade.   5. Moderate mitral valve regurgitation.    QUANTITATIVE DATA SUMMARY:  2D MEASUREMENTS:                             Normal Ranges:  IVSd:          1.08 cm    (0.6-1.1cm)  LVPWd:         0.74 cm    (0.6-1.1cm)  LVIDd:         5.31 cm    (3.9-5.9cm)  LV Mass Index: 106.5 g/m2    LA VOLUME:                               Normal Ranges:  LA Vol A4C:        41.8 ml   (22+/-6mL/m2)  LA Vol Index A4C:  25.0ml/m2  LA Area A4C:       17.3 cm2  LA Major Axis A4C: 6.1 cm  LA Vol A4C:        37.8 ml    LV SYSTOLIC FUNCTION BY 2D PLANIMETRY (MOD):                      Normal Ranges:  EF-A4C View: 62.5 % (>=55%)    LV DIASTOLIC FUNCTION:                      Normal Ranges:  MV Peak E: 0.98 m/s (0.7-1.2 m/s)  MV Peak A: 1.66 m/s (0.42-0.7 m/s)  E/A Ratio: 0.59     (1.0-2.2)    MITRAL VALVE:                        Normal Ranges:  MV Vmax:    1.89 m/s  (<=1.3m/s)  MV peak P.3 mmHg (<5mmHg)  MV mean P.0 mmHg  (<48mmHg)  MV DT:      295 msec  (150-240msec)    AORTIC VALVE:                                    Normal Ranges:  AoV Vmax:                1.49 m/s (<=1.7m/s)  AoV Peak P.9 mmHg (<20mmHg)  AoV Mean P.0 mmHg (1.7-11.5mmHg)  LVOT Max Jamal:            1.06 m/s (<=1.1m/s)  AoV VTI:                 24.40 cm (18-25cm)  LVOT VTI:                17.60 cm  AoV Dimensionless Index: 0.72    TRICUSPID VALVE/RVSP:                              Normal Ranges:  Peak TR Velocity: 2.91 m/s  RV Syst Pressure: 36.9 mmHg (< 30mmHg)       [id number] Tuan Foss DO  Electronically signed on 2024 at 2:18:48 PM       ** Final (Updated) **  XR chest 1 view  Narrative: Interpreted By:  Mattie Burr,   STUDY:  XR CHEST 1 VIEW;  2024 1:26 am      INDICATION:  Signs/Symptoms:Shortness of breath.      COMPARISON:  2024      ACCESSION NUMBER(S):  [id number]      ORDERING CLINICIAN:  ARACELI CHANEY      FINDINGS:  AP radiograph of the chest was provided.              CARDIOMEDIASTINAL SILHOUETTE:  Cardiomediastinal silhouette is stable in size and configuration.  Enlarged calcifications of the  aortic arch.      LUNGS:  Lungs are hyperinflated with prominent interstitial markings. No  focal consolidation, pleural effusion or sizable pneumothorax seen.      ABDOMEN:  No remarkable upper abdominal findings.      BONES:  Degenerative changes present. Surgical clips near the thoracic inlet.      Impression: 1.  Enlarged cardiac silhouette. Emphysema or COPD. No focal  consolidation identified.              MACRO:  None      Signed by: Mattie Burr 4/27/2024 2:07 AM  Dictation workstation:   [id number]      Physical Exam  Constitutional:       Appearance: She is not ill-appearing.   HENT:      Head: Normocephalic.   Eyes:      Conjunctiva/sclera: Conjunctivae normal.   Pulmonary:      Effort: No respiratory distress.      Comments: Mildly diminished and slightly prolonged on expiration.  Abdominal:      Palpations: Abdomen is soft.   Musculoskeletal:         General: Normal range of motion.      Cervical back: Normal range of motion.   Skin:     General: Skin is warm.   Neurological:      General: No focal deficit present.      Mental Status: She is alert.   Psychiatric:         Mood and Affect: Mood normal.         Relevant Results               Assessment/Plan                  Principal Problem:    Syncope and collapse    Syncope and collapse  -Suspect that this was secondary to overdiuresis causing hypotension, and this has improved with gentle fluids running at normal saline 50 mL/h and holding her diuretics  -Will defer to cardiology on restarting diuresis and other cardiac meds.  -Stop IV fluids.    Acute on chronic hypoxic respiratory failure  -Patient reports being on oxygen chronically, though unknown amount  -Acute worsening overnight to 10 L, now back 4 L breathing comfortably clear.  -Do not appreciate any major wheezing.  Will not start steroids for COPD  -Will get chest x-ray to assess fluid status.    Status post MitraClip  -Management per cardiology    Physical therapy Occupational Therapy.   Discussed placement with the patient and she is still on the fence.  I have reached out to son Jose and number went right to voicemail, I left a message and am available until 8 PM today to discuss in person.              Herman Dempsey, DO

## 2024-04-28 NOTE — PROGRESS NOTES
Anitha Díaz is a 84 y.o. female on day 1 of admission presenting with Syncope and collapse.      Subjective     Overnight, desaturating placed on 10 L.  She is mildly confused, measuring short of breath, feels much improved now.  She is currently on 4 L.  Tells me that she does wear oxygen around-the-clock.          Objective     Last Recorded Vitals  /70 (BP Location: Left arm, Patient Position: Lying)   Pulse 98   Temp 36.7 °C (98.1 °F) (Oral)   Resp 18   Wt 65.7 kg (144 lb 13.5 oz)   SpO2 96%   Intake/Output last 3 Shifts:    Intake/Output Summary (Last 24 hours) at 4/28/2024 0934  Last data filed at 4/28/2024 0203  Gross per 24 hour   Intake 631.25 ml   Output 350 ml   Net 281.25 ml       Admission Weight  Weight: 61.7 kg (136 lb) (04/27/24 0038)    Daily Weight  04/28/24 : 65.7 kg (144 lb 13.5 oz)    Image Results  Transthoracic Echo (TTE) [place] [address]             Phone [phone]    TRANSTHORACIC ECHOCARDIOGRAM REPORT       Patient Name:     ANITHA DÍAZ       Reading Physician:   [id number] Foss DO  Study Date:       4/27/2024            Ordering Provider:   [id number] MADELYN FERMIN  MRN/PID:          [id number]             Fellow:  Accession#:       [id number]         Nurse:  Date of           1940 / 84 years  Sonographer:         Altagracia Joseph RDCS  Birth/Age:  Gender:           F                    Additional Staff:  Height:           165.00 cm            Admit Date:  Weight:           61.00 kg             Admission Status:    Inpatient - Routine  BSA / BMI:        1.67 m2 / 22.41      Department Location: [facility] ER                    kg/m2  Blood Pressure: 90 /40 mmHg    Study Type:    TRANSTHORACIC ECHO (TTE) LIMITED  Diagnosis/ICD: Presence of other cardiac implants and grafts-Z95.818  Indication:    stat ltd s/p mv clip  CPT Codes:     Echo  Limited-54863; Color Doppler-00614; Doppler Limited-31553    Patient History:  Pertinent History: HTN. sob, stat ltd s/p mv clip,assess mv,stemi, aaa,                     cva/multi,heart failure.    Study Detail: The following Echo studies were performed: 2D, M-Mode, Doppler and                color flow. Technically challenging study due to prominent lung                artifact.       PHYSICIAN INTERPRETATION:  Left Ventricle: Left ventricular systolic function is normal. There are no regional wall motion abnormalities. The left ventricular cavity size is normal. Spectral Doppler shows an impaired relaxation pattern of left ventricular diastolic filling.  Left Atrium: The left atrium is moderately dilated.  Right Ventricle: The right ventricle is normal in size. There is normal right ventricular global systolic function.  Right Atrium: The right atrium is normal in size.  Aortic Valve: The aortic valve is trileaflet. There is no evidence of aortic valve regurgitation. The peak instantaneous gradient of the aortic valve is 8.9 mmHg. The mean gradient of the aortic valve is 5.0 mmHg.  Mitral Valve: The mitral valve is abnormal. There is moderate mitral valve regurgitation. Mitraclip in situ.  Tricuspid Valve: The tricuspid valve is structurally normal. There is mild tricuspid regurgitation.  Pulmonic Valve: The pulmonic valve is not well visualized. The pulmonic valve regurgitation was not well visualized.  Pericardium: There is a trivial pericardial effusion posterior to the left ventricle. There is no evidence of cardiac tamponade.  Aorta: The aortic root is normal.       CONCLUSIONS:   1. Left ventricular systolic function is normal.   2. Spectral Doppler shows an impaired relaxation pattern of left ventricular diastolic filling.   3. The left atrium is moderately dilated.   4. There is no evidence of cardiac tamponade.   5. Moderate mitral valve regurgitation.    QUANTITATIVE DATA SUMMARY:  2D MEASUREMENTS:                             Normal Ranges:  IVSd:          1.08 cm    (0.6-1.1cm)  LVPWd:         0.74 cm    (0.6-1.1cm)  LVIDd:         5.31 cm    (3.9-5.9cm)  LV Mass Index: 106.5 g/m2    LA VOLUME:                               Normal Ranges:  LA Vol A4C:        41.8 ml   (22+/-6mL/m2)  LA Vol Index A4C:  25.0ml/m2  LA Area A4C:       17.3 cm2  LA Major Axis A4C: 6.1 cm  LA Vol A4C:        37.8 ml    LV SYSTOLIC FUNCTION BY 2D PLANIMETRY (MOD):                      Normal Ranges:  EF-A4C View: 62.5 % (>=55%)    LV DIASTOLIC FUNCTION:                      Normal Ranges:  MV Peak E: 0.98 m/s (0.7-1.2 m/s)  MV Peak A: 1.66 m/s (0.42-0.7 m/s)  E/A Ratio: 0.59     (1.0-2.2)    MITRAL VALVE:                        Normal Ranges:  MV Vmax:    1.89 m/s  (<=1.3m/s)  MV peak P.3 mmHg (<5mmHg)  MV mean P.0 mmHg  (<48mmHg)  MV DT:      295 msec  (150-240msec)    AORTIC VALVE:                                    Normal Ranges:  AoV Vmax:                1.49 m/s (<=1.7m/s)  AoV Peak P.9 mmHg (<20mmHg)  AoV Mean P.0 mmHg (1.7-11.5mmHg)  LVOT Max Jamal:            1.06 m/s (<=1.1m/s)  AoV VTI:                 24.40 cm (18-25cm)  LVOT VTI:                17.60 cm  AoV Dimensionless Index: 0.72    TRICUSPID VALVE/RVSP:                              Normal Ranges:  Peak TR Velocity: 2.91 m/s  RV Syst Pressure: 36.9 mmHg (< 30mmHg)       [id number] Tuan Foss DO  Electronically signed on 2024 at 2:18:48 PM       ** Final (Updated) **  XR chest 1 view  Narrative: Interpreted By:  Mattie Burr,   STUDY:  XR CHEST 1 VIEW;  2024 1:26 am      INDICATION:  Signs/Symptoms:Shortness of breath.      COMPARISON:  2024      ACCESSION NUMBER(S):  [id number]      ORDERING CLINICIAN:  ARACELI CAHNEY      FINDINGS:  AP radiograph of the chest was provided.              CARDIOMEDIASTINAL SILHOUETTE:  Cardiomediastinal silhouette is stable in size and configuration.  Enlarged calcifications of the  aortic arch.      LUNGS:  Lungs are hyperinflated with prominent interstitial markings. No  focal consolidation, pleural effusion or sizable pneumothorax seen.      ABDOMEN:  No remarkable upper abdominal findings.      BONES:  Degenerative changes present. Surgical clips near the thoracic inlet.      Impression: 1.  Enlarged cardiac silhouette. Emphysema or COPD. No focal  consolidation identified.              MACRO:  None      Signed by: Mattie Burr 4/27/2024 2:07 AM  Dictation workstation:   [id number]      Physical Exam  Constitutional:       Appearance: She is not ill-appearing.   HENT:      Head: Normocephalic.   Eyes:      Conjunctiva/sclera: Conjunctivae normal.   Pulmonary:      Effort: No respiratory distress.      Comments: Mildly diminished and slightly prolonged on expiration.  Abdominal:      Palpations: Abdomen is soft.   Musculoskeletal:         General: Normal range of motion.      Cervical back: Normal range of motion.   Skin:     General: Skin is warm.   Neurological:      General: No focal deficit present.      Mental Status: She is alert.   Psychiatric:         Mood and Affect: Mood normal.         Relevant Results               Assessment/Plan                  Principal Problem:    Syncope and collapse    Syncope and collapse  -Suspect that this was secondary to overdiuresis causing hypotension, and this has improved with gentle fluids running at normal saline 50 mL/h and holding her diuretics  -Will defer to cardiology on restarting diuresis and other cardiac meds.  -Stop IV fluids.    Acute on chronic hypoxic respiratory failure  -Patient reports being on oxygen chronically, though unknown amount  -Acute worsening overnight to 10 L, now back 4 L breathing comfortably clear.  -Do not appreciate any major wheezing.  Will not start steroids for COPD  -Will get chest x-ray to assess fluid status.    Status post MitraClip  -Management per cardiology    COPD  -As above, no wheezing.  No  current need for steroids  -Continue scheduled DuoNeb, inhaled corticosteroid.  -Anticipate going back onto [facility] upon discharge.    Physical therapy Occupational Therapy.  Discussed placement with the patient and she is still on the fence.  I have reached out to son Jose and number went right to voicemail, I left a message and am available until 8 PM today to discuss in person.              Herman Dempsey, DO

## 2024-04-28 NOTE — NURSING NOTE
Assumed care of pateint at 0700.  At bedside report pt had no complaints of pain vss and afebrile.    Bed low and locked    Pt has no needs that require immediate nursing intervention

## 2024-04-28 NOTE — NURSING NOTE
Assumed patient care. Bed alarm going off. Patient had to go to the bathroom. SOB after. Sat back in bed and replaced oxygen. Patient was removing and refused to bring it to the bathroom. No tele leads on patient refused. Patient says she's feeling better with the oxygen on.   JANETTE RUSSELL RN

## 2024-04-28 NOTE — PROGRESS NOTES
"Anitha Welch is a 84 y.o. female on day 1 of admission presenting with Syncope and collapse.    Subjective   Patient resting comfortably in bed. Oxygen requirements increased overnight.        Objective     Physical Exam  Constitutional:       Appearance: She is ill-appearing.   Cardiovascular:      Rate and Rhythm: Regular rhythm. Tachycardia present.      Heart sounds: No murmur heard.     No friction rub. No gallop.   Pulmonary:      Breath sounds: No wheezing, rhonchi or rales.      Comments: Mild conversational dyspnea noted. Diminished breath sounds in bilateral lower lobes.  Abdominal:      General: Bowel sounds are normal.   Musculoskeletal:      Right lower leg: No edema.      Left lower leg: No edema.   Skin:     General: Skin is warm and dry.      Capillary Refill: Capillary refill takes less than 2 seconds.   Neurological:      Mental Status: She is alert and oriented to person, place, and time.   Psychiatric:         Mood and Affect: Mood normal.         Behavior: Behavior normal.         Last Recorded Vitals  Blood pressure 142/70, pulse 98, temperature 36.7 °C (98.1 °F), temperature source Oral, resp. rate 18, height 1.651 m (5' 5\"), weight 65.7 kg (144 lb 13.5 oz), SpO2 96%.  Intake/Output last 3 Shifts:  I/O last 3 completed shifts:  In: 1389.6 (21.2 mL/kg) [I.V.:631.3 (9.6 mL/kg); IV Piggyback:758.3]  Out: 350 (5.3 mL/kg) [Urine:350 (0.1 mL/kg/hr)]  Weight: 65.7 kg     Relevant Results  Results for orders placed or performed during the hospital encounter of 04/27/24 (from the past 24 hour(s))   Transthoracic Echo (TTE) Limited   Result Value Ref Range    AV pk mable 1.49 m/s    AV mn grad 5.0 mmHg    MV E/A ratio 0.59     RVSP 36.9 mmHg    LVIDd 5.31 cm    AV pk grad 8.9 mmHg    LV A4C EF 62.5    Urinalysis with Reflex Microscopic   Result Value Ref Range    Color, Urine Light-Yellow Light-Yellow, Yellow, Dark-Yellow    Appearance, Urine Clear Clear    Specific Gravity, Urine 1.018 1.005 - 1.035    " pH, Urine 5.5 5.0, 5.5, 6.0, 6.5, 7.0, 7.5, 8.0    Protein, Urine 10 (TRACE) NEGATIVE, 10 (TRACE), 20 (TRACE) mg/dL    Glucose, Urine OVER (4+) (A) Normal mg/dL    Blood, Urine NEGATIVE NEGATIVE    Ketones, Urine NEGATIVE NEGATIVE mg/dL    Bilirubin, Urine NEGATIVE NEGATIVE    Urobilinogen, Urine Normal Normal mg/dL    Nitrite, Urine NEGATIVE NEGATIVE    Leukocyte Esterase, Urine 25 [place]/µL (A) NEGATIVE   Microscopic Only, Urine   Result Value Ref Range    WBC, Urine 1-5 1-5, NONE /HPF    RBC, Urine NONE NONE, 1-2, 3-5 /HPF    Squamous Epithelial Cells, Urine 1-9 (SPARSE) Reference range not established. /HPF    Bacteria, Urine 1+ (A) NONE SEEN /HPF    Mucus, Urine FEW Reference range not established. /LPF                     Assessment/Plan   Principal Problem:    Syncope and collapse    4/27: As above. Patient into the hospital after near syncope at home.  Patient does states she never lost consciousness.  Patient recently underwent a minimally invasive ARMANI with MitraClip procedure.  Echocardiogram completed on April 25 shows a mildly decreased ejection fraction of 45%, inferolateral segment and basal inferior segment abnormal, abnormal pattern of left ventricular diastolic filling, elevated left ventricular end-diastolic pressure, moderately dilated left atrium. Patient was discharged home yesterday. Denies chest pain, pressure or palpitations. Denies worsening shortness of breath.  Patient evidently did have some mild hypotension while admitted to [facility] and they placed losartan on hold as well as decreased carvedilol dose. Agree with parameters for home carvedilol. On my exam patient is answering questions appropriately but seems to have poor recall of recent events. Blood have been hypotensive but improved this morning with last recorded at 114/56 after receiving IV fluids. She appears rather euvolemic on examination with no edema to her lower extremities.  I am going to order a stat echocardiogram to reassess  the mitral valve. Will give further recommendations following these results. We will follow with you.     4/28: As above. Blood pressures are improved this morning with last recorded at 142/70.  Patient is in a sinus tachycardia on telemetry with occasional PVCs. Evidently oxygen saturations dropped overnight and oxygen delivery had to be increased to 10 L. On my exam, patient is on 4L NC and breathing comfortably. Patient was on torsemide 20 mg and spironolactone 12.5 mg once daily at home for diuretic therapy. Chest x-ray has been ordered to be completed this morning.  I have also ordered a CBC and CMP to be completed this morning. Will give further recommendations pending these results.         Chacha Flanagan, APRN-CNP    This is a shared visit. I have reviewed the Advanced Practice Provider's encounter note, approve the Advanced Practice Provider's documentation, and provide the following additional information from my personal encounter.      Gen: NAD   Neck: no JVD, carotid upstroke is brisk and without delay   Heart: rrr, s1s2+ 2 out of 6 apical holosystolic murmur   Lungs: CTA   Ext: warm no edema    She looks well today.  She wishes to go home.  Apparently she had increased oxygen requirements overnight and it has been documented that she was on 10 L.  She is on 4 L currently.  Will obtain an updated set of vitals.  A chest x-ray this morning is reasonable.  I would resume her torsemide but hold her spironolactone home-going.  She needs a metabolic panel 1 week following discharge and should see me in the office 2 weeks following discharge.    Tuan Foss, DO

## 2024-04-28 NOTE — NURSING NOTE
Pt found to be at  84% spo2, RT called to assess the pt as pt having SOB. RT Adjusted the O2 and pt responding to intervention, decreased SOB and stable Spo2. See flowsheet. Pt resting in bed, no distress noted. No SOB, no further needs expressed. Will continue to monitor the pt.

## 2024-04-29 ENCOUNTER — TELEPHONE (OUTPATIENT)
Dept: PRIMARY CARE | Facility: CLINIC | Age: 84
End: 2024-04-29
Payer: MEDICARE

## 2024-04-29 ENCOUNTER — APPOINTMENT (OUTPATIENT)
Dept: RESPIRATORY THERAPY | Facility: HOSPITAL | Age: 84
End: 2024-04-29
Payer: MEDICARE

## 2024-04-29 LAB
ALBUMIN SERPL-MCNC: 3.1 G/DL (ref 3.5–5)
ALP BLD-CCNC: 65 U/L (ref 35–125)
ALT SERPL-CCNC: 10 U/L (ref 5–40)
ANION GAP SERPL CALC-SCNC: 8 MMOL/L
AST SERPL-CCNC: 15 U/L (ref 5–40)
BILIRUB SERPL-MCNC: 1.3 MG/DL (ref 0.1–1.2)
BUN SERPL-MCNC: 12 MG/DL (ref 8–25)
CALCIUM SERPL-MCNC: 8.3 MG/DL (ref 8.5–10.4)
CHLORIDE SERPL-SCNC: 109 MMOL/L (ref 97–107)
CO2 SERPL-SCNC: 26 MMOL/L (ref 24–31)
CREAT SERPL-MCNC: 0.8 MG/DL (ref 0.4–1.6)
EGFRCR SERPLBLD CKD-EPI 2021: 72 ML/MIN/1.73M*2
ERYTHROCYTE [DISTWIDTH] IN BLOOD BY AUTOMATED COUNT: 15.1 % (ref 11.5–14.5)
GLUCOSE SERPL-MCNC: 101 MG/DL (ref 65–99)
HCT VFR BLD AUTO: 29.8 % (ref 36–46)
HGB BLD-MCNC: 8.9 G/DL (ref 12–16)
MCH RBC QN AUTO: 27.8 PG (ref 26–34)
MCHC RBC AUTO-ENTMCNC: 29.9 G/DL (ref 32–36)
MCV RBC AUTO: 93 FL (ref 80–100)
NRBC BLD-RTO: 0 /100 WBCS (ref 0–0)
PLATELET # BLD AUTO: 124 X10*3/UL (ref 150–450)
POTASSIUM SERPL-SCNC: 3.8 MMOL/L (ref 3.4–5.1)
PROT SERPL-MCNC: 5.5 G/DL (ref 5.9–7.9)
RBC # BLD AUTO: 3.2 X10*6/UL (ref 4–5.2)
SODIUM SERPL-SCNC: 143 MMOL/L (ref 133–145)
WBC # BLD AUTO: 7.7 X10*3/UL (ref 4.4–11.3)

## 2024-04-29 PROCEDURE — 2500000002 HC RX 250 W HCPCS SELF ADMINISTERED DRUGS (ALT 637 FOR MEDICARE OP, ALT 636 FOR OP/ED): Mod: MUE | Performed by: INTERNAL MEDICINE

## 2024-04-29 PROCEDURE — 85027 COMPLETE CBC AUTOMATED: CPT

## 2024-04-29 PROCEDURE — 94640 AIRWAY INHALATION TREATMENT: CPT

## 2024-04-29 PROCEDURE — 2060000001 HC INTERMEDIATE ICU ROOM DAILY

## 2024-04-29 PROCEDURE — 80053 COMPREHEN METABOLIC PANEL: CPT

## 2024-04-29 PROCEDURE — 2500000001 HC RX 250 WO HCPCS SELF ADMINISTERED DRUGS (ALT 637 FOR MEDICARE OP): Performed by: INTERNAL MEDICINE

## 2024-04-29 PROCEDURE — 94660 CPAP INITIATION&MGMT: CPT

## 2024-04-29 PROCEDURE — 94010 BREATHING CAPACITY TEST: CPT

## 2024-04-29 PROCEDURE — 2500000006 HC RX 250 W HCPCS SELF ADMINISTERED DRUGS (ALT 637 FOR ALL PAYERS): Performed by: INTERNAL MEDICINE

## 2024-04-29 PROCEDURE — 36415 COLL VENOUS BLD VENIPUNCTURE: CPT

## 2024-04-29 PROCEDURE — 99232 SBSQ HOSP IP/OBS MODERATE 35: CPT | Performed by: INTERNAL MEDICINE

## 2024-04-29 PROCEDURE — 94664 DEMO&/EVAL PT USE INHALER: CPT

## 2024-04-29 PROCEDURE — 2500000004 HC RX 250 GENERAL PHARMACY W/ HCPCS (ALT 636 FOR OP/ED): Performed by: INTERNAL MEDICINE

## 2024-04-29 PROCEDURE — 2500000005 HC RX 250 GENERAL PHARMACY W/O HCPCS: Performed by: INTERNAL MEDICINE

## 2024-04-29 PROCEDURE — 97166 OT EVAL MOD COMPLEX 45 MIN: CPT | Mod: GO

## 2024-04-29 PROCEDURE — 9420000001 HC RT PATIENT EDUCATION 5 MIN

## 2024-04-29 PROCEDURE — 97110 THERAPEUTIC EXERCISES: CPT | Mod: GP

## 2024-04-29 RX ORDER — IPRATROPIUM BROMIDE AND ALBUTEROL SULFATE 2.5; .5 MG/3ML; MG/3ML
3 SOLUTION RESPIRATORY (INHALATION) EVERY 2 HOUR PRN
Status: DISCONTINUED | OUTPATIENT
Start: 2024-04-29 | End: 2024-05-01 | Stop reason: HOSPADM

## 2024-04-29 RX ADMIN — Medication 4 L/MIN: at 15:00

## 2024-04-29 RX ADMIN — ATORVASTATIN CALCIUM 40 MG: 40 TABLET, FILM COATED ORAL at 20:42

## 2024-04-29 RX ADMIN — Medication 1 TABLET: at 10:57

## 2024-04-29 RX ADMIN — PANTOPRAZOLE SODIUM 40 MG: 40 TABLET, DELAYED RELEASE ORAL at 10:57

## 2024-04-29 RX ADMIN — BUDESONIDE INHALATION 0.5 MG: 0.5 SUSPENSION RESPIRATORY (INHALATION) at 07:42

## 2024-04-29 RX ADMIN — Medication 3 L/MIN: at 23:00

## 2024-04-29 RX ADMIN — RIVAROXABAN 20 MG: 20 TABLET, FILM COATED ORAL at 17:00

## 2024-04-29 RX ADMIN — PANTOPRAZOLE SODIUM 40 MG: 40 TABLET, DELAYED RELEASE ORAL at 20:43

## 2024-04-29 RX ADMIN — TORSEMIDE 20 MG: 20 TABLET ORAL at 10:57

## 2024-04-29 RX ADMIN — BUDESONIDE INHALATION 0.5 MG: 0.5 SUSPENSION RESPIRATORY (INHALATION) at 19:17

## 2024-04-29 RX ADMIN — IPRATROPIUM BROMIDE AND ALBUTEROL SULFATE 3 ML: 2.5; .5 SOLUTION RESPIRATORY (INHALATION) at 07:42

## 2024-04-29 RX ADMIN — LORATADINE 10 MG: 10 TABLET ORAL at 10:57

## 2024-04-29 RX ADMIN — CARVEDILOL 3.12 MG: 3.12 TABLET, FILM COATED ORAL at 20:43

## 2024-04-29 RX ADMIN — IPRATROPIUM BROMIDE AND ALBUTEROL SULFATE 3 ML: 2.5; .5 SOLUTION RESPIRATORY (INHALATION) at 19:17

## 2024-04-29 RX ADMIN — CARVEDILOL 3.12 MG: 3.12 TABLET, FILM COATED ORAL at 10:57

## 2024-04-29 RX ADMIN — IPRATROPIUM BROMIDE AND ALBUTEROL SULFATE 3 ML: 2.5; .5 SOLUTION RESPIRATORY (INHALATION) at 12:02

## 2024-04-29 ASSESSMENT — COGNITIVE AND FUNCTIONAL STATUS - GENERAL
WALKING IN HOSPITAL ROOM: A LOT
DRESSING REGULAR LOWER BODY CLOTHING: A LOT
STANDING UP FROM CHAIR USING ARMS: A LITTLE
PERSONAL GROOMING: A LITTLE
MOVING FROM LYING ON BACK TO SITTING ON SIDE OF FLAT BED WITH BEDRAILS: TOTAL
HELP NEEDED FOR BATHING: A LITTLE
MOVING TO AND FROM BED TO CHAIR: TOTAL
DRESSING REGULAR UPPER BODY CLOTHING: A LITTLE
CLIMB 3 TO 5 STEPS WITH RAILING: TOTAL
CLIMB 3 TO 5 STEPS WITH RAILING: A LOT
TOILETING: A LITTLE
HELP NEEDED FOR BATHING: A LOT
DRESSING REGULAR LOWER BODY CLOTHING: A LITTLE
STANDING UP FROM CHAIR USING ARMS: TOTAL
MOBILITY SCORE: 6
TOILETING: A LITTLE
WALKING IN HOSPITAL ROOM: TOTAL
PERSONAL GROOMING: A LITTLE
TURNING FROM BACK TO SIDE WHILE IN FLAT BAD: A LITTLE
DAILY ACTIVITIY SCORE: 17
MOVING FROM LYING ON BACK TO SITTING ON SIDE OF FLAT BED WITH BEDRAILS: A LITTLE
MOVING TO AND FROM BED TO CHAIR: A LITTLE
TURNING FROM BACK TO SIDE WHILE IN FLAT BAD: TOTAL
DRESSING REGULAR UPPER BODY CLOTHING: A LITTLE

## 2024-04-29 ASSESSMENT — ENCOUNTER SYMPTOMS
MUSCULOSKELETAL NEGATIVE: 1
EYES NEGATIVE: 1
SHORTNESS OF BREATH: 1
HEMATOLOGIC/LYMPHATIC NEGATIVE: 1
PSYCHIATRIC NEGATIVE: 1
ALLERGIC/IMMUNOLOGIC NEGATIVE: 1
CONSTITUTIONAL NEGATIVE: 1
GASTROINTESTINAL NEGATIVE: 1
CARDIOVASCULAR NEGATIVE: 1
ENDOCRINE NEGATIVE: 1

## 2024-04-29 ASSESSMENT — PAIN - FUNCTIONAL ASSESSMENT
PAIN_FUNCTIONAL_ASSESSMENT: 0-10

## 2024-04-29 ASSESSMENT — PAIN SCALES - GENERAL
PAINLEVEL_OUTOF10: 0 - NO PAIN
PAINLEVEL_OUTOF10: 4
PAINLEVEL_OUTOF10: 0 - NO PAIN

## 2024-04-29 ASSESSMENT — ACTIVITIES OF DAILY LIVING (ADL)
BATHING_ASSISTANCE: MODERATE
ADL_ASSISTANCE: INDEPENDENT

## 2024-04-29 NOTE — PROGRESS NOTES
04/29/24 1002   Discharge Planning   Patient expects to be discharged to: List provided     TCC spoke to patient, updated regarding therapy recommendations. Patient unsure if she wants to go to SNF at this time. List provide to review. TCC will follow for discharge planning.

## 2024-04-29 NOTE — PROGRESS NOTES
"Anitha Welch is a 84 y.o. female on day 2 of admission presenting with Syncope and collapse. She underwent mitral valve repair at [facility] on 4/25.     Subjective   She was sitting in a chair, awake, alert, no acute complaints. She was on nasal oxygen at 3 L/min.; She desaturated last night to 88%   She stated to me that she does not use home oxygen.   Per Dr Dempsey's progress note of 4/28, \"Patient reports being on oxygen chronically, though unknown amount\".     Objective     Physical Exam  General: awake, alert, oriented, responsive  Cardiovascular: regular, normal S1 and S2  Lungs: good air entry bilaterally, clear to auscultation  Abdomen: soft, nontender, bowel sounds present, normoactive  Extremities: no peripheral cyanosis, no pedal edema  Neuro: alert, oriented x 3, no focal weakness      Last Recorded Vitals  Blood pressure 127/58, pulse 105, temperature 36.8 °C (98.2 °F), temperature source Oral, resp. rate 17, height 1.651 m (5' 5\"), weight 66 kg (145 lb 8.1 oz), SpO2 97%.  Intake/Output last 3 Shifts:  I/O last 3 completed shifts:  In: 240 (3.6 mL/kg) [P.O.:240]  Out: 350 (5.3 mL/kg) [Urine:350 (0.1 mL/kg/hr)]  Weight: 66 kg     Relevant Results  Lab Results   Component Value Date    WBC 7.7 04/29/2024    HGB 8.9 (L) 04/29/2024    HCT 29.8 (L) 04/29/2024    MCV 93 04/29/2024     (L) 04/29/2024     Lab Results   Component Value Date    GLUCOSE 101 (H) 04/29/2024    CALCIUM 8.3 (L) 04/29/2024     04/29/2024    K 3.8 04/29/2024    CO2 26 04/29/2024     (H) 04/29/2024    BUN 12 04/29/2024    CREATININE 0.80 04/29/2024     Scheduled medications  atorvastatin, 40 mg, oral, Nightly  budesonide, 0.5 mg, nebulization, BID  carvedilol, 3.125 mg, oral, BID  [Held by provider] empagliflozin, 10 mg, oral, Daily  ipratropium-albuteroL, 3 mL, nebulization, q6h  loratadine, 10 mg, oral, Daily  multivitamin with minerals, 1 tablet, oral, Daily  oxygen, , inhalation, q8h  pantoprazole, 40 mg, oral, " BID  polyethylene glycol, 17 g, oral, BID  rivaroxaban, 20 mg, oral, Daily with evening meal  torsemide, 20 mg, oral, Daily      Continuous medications     PRN medications        Assessment/Plan   Syncope  Probably secondary to overdiuresis resulting in hypotension which has improved.     Acute on chronic hypoxic respiratory failure  Desaturated overnight x 2. Pulmonary medicine consulted for further recommendation. She reports no history of obstructive sleep apnea    Mitral regurgitation  S/p transcatheter edge to edge mitral valve repair done 4/25/24  Restarted on torsemide by cardiology    COPD  Stable    History of DVT of LLE  On xarelto    PT recommended moderate intensity rehab at discharge.   Await pulmonary input    Vishnu Ferguson MD

## 2024-04-29 NOTE — PROGRESS NOTES
"Anitha Welch is a 84 y.o. female on day 2 of admission presenting with Syncope and collapse.    Subjective   Had another desaturation event overnight.  She is currently on 3 L nasal cannula but is significantly conversationally dyspneic upon returning from the bathroom.  Sinus rhythm on telemetry to sinus tachycardia, rates in the high 90s to low 100s       Objective     Physical Exam   Gen: NAD   Neck: no JVD, carotid upstroke is brisk and without delay   Heart: rrr, s1s2+ no mrg   Lungs: Diminished left base otherwise clear   Ext: warm no edema  Last Recorded Vitals  Blood pressure (!) 139/49, pulse 98, temperature 37.1 °C (98.8 °F), temperature source Temporal, resp. rate 16, height 1.651 m (5' 5\"), weight 66 kg (145 lb 8.1 oz), SpO2 (S) 96%.  Intake/Output last 3 Shifts:  I/O last 3 completed shifts:  In: 240 (3.6 mL/kg) [P.O.:240]  Out: 350 (5.3 mL/kg) [Urine:350 (0.1 mL/kg/hr)]  Weight: 66 kg       Assessment/Plan   Principal Problem:    Syncope and collapse    Presyncope  Severe mitral regurgitation status post ARMANI reduced to moderate  Acute on chronic hypoxic respiratory failure  Atherosclerotic heart disease      4/27: As above. Patient into the hospital after near syncope at home.  Patient does states she never lost consciousness.  Patient recently underwent a minimally invasive ARMANI with MitraClip procedure.  Echocardiogram completed on April 25 shows a mildly decreased ejection fraction of 45%, inferolateral segment and basal inferior segment abnormal, abnormal pattern of left ventricular diastolic filling, elevated left ventricular end-diastolic pressure, moderately dilated left atrium. Patient was discharged home yesterday. Denies chest pain, pressure or palpitations. Denies worsening shortness of breath.  Patient evidently did have some mild hypotension while admitted to [facility] and they placed losartan on hold as well as decreased carvedilol dose. Agree with parameters for home carvedilol. On my exam " patient is answering questions appropriately but seems to have poor recall of recent events. Blood have been hypotensive but improved this morning with last recorded at 114/56 after receiving IV fluids. She appears rather euvolemic on examination with no edema to her lower extremities.  I am going to order a stat echocardiogram to reassess the mitral valve. Will give further recommendations following these results. We will follow with you.      4/28: As above. Blood pressures are improved this morning with last recorded at 142/70.  Patient is in a sinus tachycardia on telemetry with occasional PVCs. Evidently oxygen saturations dropped overnight and oxygen delivery had to be increased to 10 L. On my exam, patient is on 4L NC and breathing comfortably. Patient was on torsemide 20 mg and spironolactone 12.5 mg once daily at home for diuretic therapy. Chest x-ray has been ordered to be completed this morning.  I have also ordered a CBC and CMP to be completed this morning. Will give further recommendations pending these results.     4/29: Shortness of breath and hypoxia out of proportion to her heart disease, would be reasonable to get pulmonary involved.  I have resumed her oral torsemide, no changes to her cardiac meds this morning.  Placement evaluation is ongoing.       I spent 25 minutes in the professional and overall care of this patient.      Tuan Foss, DO

## 2024-04-29 NOTE — CONSULTS
"Nutrition Assessement Note    Nutrition Assessment       Hx of mitral valve repair 4/25/24. Admitted with syncope/collapse. Pt w/SOB on 3L NC. Off floor for pulmonary function test at time of visit. RN states poor po intake. Will provide Ensure Compact TID for added nutrition.    Reason for Hospital Admission:  Anitha Welch is a 84 y.o. female who is admitted for hypotension, syncope    Nutrition History:     Energy Intake: Poor < 50 %  Food Allergies/Intolerances:  None  GI Symptoms: None  Oral Problems: None    Anthropometrics:  Ht: 165.1 cm (5' 5\"), Wt: 66 kg (145 lb 8.1 oz), BMI: 24.21  IBW/kg (Dietitian Calculated): 56.82 kg  Percent of IBW: 116 %     Weight Change:  Daily Weight  04/29/24 : 66 kg (145 lb 8.1 oz)  04/22/24 : 61.7 kg (136 lb)  04/15/24 : 63 kg (139 lb)  04/02/24 : 60.3 kg (133 lb)  03/18/24 : 60.4 kg (133 lb 3.2 oz)  02/29/24 : 60.4 kg (133 lb 3.2 oz)  02/16/24 : 60.3 kg (133 lb)  02/08/24 : 59.4 kg (131 lb)  02/05/24 : 63 kg (139 lb)  01/02/24 : 59.9 kg (132 lb)     Nutrition Focused Physical Exam Findings:      Nutrition Significant Labs:  Lab Results   Component Value Date    WBC 7.7 04/29/2024    HGB 8.9 (L) 04/29/2024    HCT 29.8 (L) 04/29/2024     (L) 04/29/2024    CHOL 190 02/21/2023    TRIG 64 02/21/2023    HDL 52 02/21/2023    ALT 10 04/29/2024    AST 15 04/29/2024     04/29/2024    K 3.8 04/29/2024     (H) 04/29/2024    CREATININE 0.80 04/29/2024    BUN 12 04/29/2024    CO2 26 04/29/2024    TSH 0.65 10/08/2023    INR 1.1 04/25/2024    HGBA1C 5.5 01/09/2019     Nutrition Specific Medications:  atorvastatin, 40 mg, oral, Nightly  budesonide, 0.5 mg, nebulization, BID  carvedilol, 3.125 mg, oral, BID  [Held by provider] empagliflozin, 10 mg, oral, Daily  ipratropium-albuteroL, 3 mL, nebulization, q6h  loratadine, 10 mg, oral, Daily  multivitamin with minerals, 1 tablet, oral, Daily  oxygen, , inhalation, q8h  pantoprazole, 40 mg, oral, BID  polyethylene glycol, 17 g, " oral, BID  rivaroxaban, 20 mg, oral, Daily with evening meal  torsemide, 20 mg, oral, Daily         Dietary Orders (From admission, onward)       Start     Ordered    04/29/24 1425  Oral nutritional supplements  Until discontinued        Comments: Any flavor   Question Answer Comment   Deliver with All meals    Select supplement: Ensure Compact        04/29/24 1424    04/27/24 1530  May Participate in Room Service  Once        Question:  .  Answer:  Yes    04/27/24 1530    04/27/24 0528  Adult diet Regular  Diet effective now        Question:  Diet type  Answer:  Regular    04/27/24 0527                   Estimated Needs:   Estimated Energy Needs  Total Energy Estimated Needs (kCal): 1650 kCal  Total Estimated Energy Need per Day (kCal/kg): 25 kCal/kg  Method for Estimating Needs: Actual Wt    Estimated Protein Needs  Total Protein Estimated Needs (g): 66 g  Total Protein Estimated Needs (g/kg): 1 g/kg  Method for Estimating Needs: Actual Wt    Estimated Fluid Needs  Total Fluid Estimated Needs (mL): 1650 mL  Method for Estimating Needs: 1 mL/kcal      Nutrition Diagnosis   Nutrition Diagnosis:     Nutrition Diagnosis  Patient has Nutrition Diagnosis: Yes  Diagnosis Status (1): New  Nutrition Diagnosis 1: Inadequate energy intake  Related to (1): decreased ability to consume sufficient energy  As Evidenced by (1): poor po intake       Nutrition Interventions/Recommendations   Nutrition Interventions and Recommendations:    Nutrition Prescription:  Individualized Nutrition Prescription Provided for : 1650 calories, 66 gm protein to be provided via diet and supplements    Nutrition Interventions:   Food and/or Nutrient Delivery Interventions  Interventions: Meals and snacks, Medical food supplement  Meals and Snacks: General healthful diet  Goal: provide as ordered  Medical Food Supplement: Commercial beverage  Goal: ensure compact TID to provide 220 kcals and 9g protein each    Education Documentation  No  documentation found.         Nutrition Monitoring and Evaluation   Monitoring/Evaluation:   Food/Nutrient Related History Monitoring  Monitoring and Evaluation Plan: Energy intake  Energy Intake: Estimated energy intake  Criteria: pt to consume >/= 75% estimated needs    Body Composition/Growth/Weight History  Monitoring and Evaluation Plan: Weight  Weight: Measured weight  Criteria: pt will maintain wt at this time       Time Spent/Follow-up:   Follow Up  Time Spent (min): 30 minutes  Last Date of Nutrition Visit: 04/29/24  Nutrition Follow-Up Needed?: 5-7 days  Follow up Comment: 5/3/24

## 2024-04-29 NOTE — TELEPHONE ENCOUNTER
Patient is active with [facility], followed by Milana Hernadez NP. Admitted to [facility] 4/27/2 with syncope and collapse. Presenting s/p fall at home. She was discharged from [facility] less than 48 hours ago after undergoing a mitral transcatheter [id number] repair on 4/25/2024. Will monitor hospitalization and discharge plan.

## 2024-04-29 NOTE — PROGRESS NOTES
Spiritual Care Visit    Clinical Encounter Type  Visited With: Patient  Continue Visiting: Yes         Values/Beliefs  Spiritual Requests During Hospitalization: Communion today    Sacramental Encounters  Communion: Patient wants communion  Communion Given Indicator: Yes     Donnell Quintanilla

## 2024-04-29 NOTE — CONSULTS
Consults    Reason For Consult  Hypoxic respiratory failure    History Of Present Illness  Anitha Welch is a 84 y.o. female past medical history of COPD, atherosclerotic heart disease, remote inferior wall myocardial infarction; status post drug-eluting stent placement and cardiomyopathy who presented to [facility] Emergency Department via squad after syncope and collapse.  She is now postop day #4 after transcatheter [id number] repair via Dr. Al.  She had a 6-minute walk test with Dr. Barros in November '23 and required 2 L nasal cannula oxygen continuously and 4 with exertion however patient is vague when asked about home oxygen requirements.     Past Medical History  Past Medical History:   Diagnosis Date    AAA (abdominal aortic aneurysm) (CMS-[facility])     Acute urinary tract infection 04/20/2023    Anemia     Anxiety     Arthritis     CHF (congestive heart failure) (Multi)     Chronic pain disorder     back pain    CKD (chronic kidney disease)     Cognitive decline     COPD (chronic obstructive pulmonary disease) (Multi)     oxygen dependent- wears 2L NC continuously    Coronary artery disease     s/p stent    CVA (cerebral vascular accident) (Multi)     weaker on the left side    Deep vein thrombosis (DVT) of calf (Multi)     left    Depression     Disease of thyroid gland     Diverticulosis     DVT (deep venous thrombosis) (Multi)     left leg, on xarelto    Easy bruising     Epistaxis     GERD (gastroesophageal reflux disease)     managed with protonix    History of blood transfusion 2023    History of degenerative disc disease     Hyperlipidemia     Hypertension     Hypothyroidism     Ischemic cardiomyopathy     Meralgia paresthetica     Nonrheumatic mitral valve regurgitation     Osteoarthritis     Osteopenia 2010    hip - DEXA    Plantar fasciitis     RLS (restless legs syndrome)     Sciatica     Spinal stenosis     ST elevation (STEMI) myocardial infarction (Multi)        Surgical  History  Past Surgical History:   Procedure Laterality Date    ADENOIDECTOMY      CARDIAC CATHETERIZATION  10/2019    CARDIAC CATHETERIZATION N/A 02/16/2024    Procedure: Left And Right Heart Cath, With LV;  Surgeon: Tuan Foss DO;  Location: [facility] Cardiac Cath Lab;  Service: Cardiovascular;  Laterality: N/A;  no pre auth needed    CATARACT EXTRACTION Bilateral 11/13/2012    CHOLECYSTECTOMY  1996    laparoscopic    COLONOSCOPY      Dr. Rodriguez    CORONARY STENT PLACEMENT      CYST REMOVAL Right 06/24/2013    Excision of Sebaceous cyst right axilla    DILATION AND CURETTAGE OF UTERUS      ESOPHAGOGASTRODUODENOSCOPY      KNEE ARTHROPLASTY Left     MR HEAD ANGIO WO IV CONTRAST  02/24/2017    MR HEAD ANGIO WO IV CONTRAST [facility]    MR HEAD ANGIO WO IV CONTRAST  08/11/2017    MR HEAD ANGIO WO IV CONTRAST [facility]    MR HEAD ANGIO WO IV CONTRAST  02/10/2019    MR HEAD ANGIO WO IV CONTRAST [facility]    OTHER SURGICAL HISTORY  01/09/2019    Stent synergy    OTHER SURGICAL HISTORY  01/09/2019    Stent synergy    CA ARTHRP ACETBLR/PROX FEM PROSTC AGRFT/ALGRFT      SURGICAL SAMMY MONITORING      THYROIDECTOMY, PARTIAL Bilateral 04/17/2013    subtotal thyroidectomy    TONSILLECTOMY      TOTAL HIP ARTHROPLASTY Right 2006    UPPER GASTROINTESTINAL ENDOSCOPY  03/2019    Dr. Galan        Social History  Social History     Tobacco Use    Smoking status: Former     Current packs/day: 0.00     Types: Cigarettes     Quit date: 2014     Years since quitting: 10.3     Passive exposure: Never    Smokeless tobacco: Never   Vaping Use    Vaping status: Never Used   Substance Use Topics    Alcohol use: Not Currently     Comment: glass wine at holiday    Drug use: Never       Family History  Family History   Problem Relation Name Age of Onset    Hyperthyroidism Mother          Treated with radioactive iodine    Hypertension Mother      Heart disease Mother      Hyperthyroidism Sibling      Diabetes  "Father's Sister              Allergies  Amoxicillin, Iodinated contrast media, Ciprofloxacin, Influenza virus vaccines, Flu vac 2023 [id number](pf), Diphenhydramine, and Other    Review of Systems   Constitutional: Negative.    HENT: Negative.     Eyes: Negative.    Respiratory:  Positive for shortness of breath.    Cardiovascular: Negative.    Gastrointestinal: Negative.    Endocrine: Negative.    Genitourinary: Negative.    Musculoskeletal: Negative.    Allergic/Immunologic: Negative.    Neurological:  Positive for syncope.   Hematological: Negative.    Psychiatric/Behavioral: Negative.          Physical Exam  Vitals and nursing note reviewed.   Constitutional:       Appearance: Normal appearance.   HENT:      Head: Normocephalic and atraumatic.      Nose: Nose normal.      Mouth/Throat:      Mouth: Mucous membranes are moist.   Eyes:      Extraocular Movements: Extraocular movements intact.      Conjunctiva/sclera: Conjunctivae normal.      Pupils: Pupils are equal, round, and reactive to light.   Cardiovascular:      Rate and Rhythm: Normal rate and regular rhythm.      Pulses: Normal pulses.      Heart sounds: Normal heart sounds.   Pulmonary:      Effort: Pulmonary effort is normal.      Comments: Lungs diminished but clear.  Abdominal:      General: Bowel sounds are normal.      Palpations: Abdomen is soft.   Musculoskeletal:         General: Normal range of motion.   Skin:     General: Skin is warm and dry.      Capillary Refill: Capillary refill takes less than 2 seconds.   Neurological:      General: No focal deficit present.      Mental Status: She is alert and oriented to person, place, and time.   Psychiatric:         Mood and Affect: Mood normal.         Behavior: Behavior normal.          Vital Signs  Blood pressure 127/58, pulse 105, temperature 36.8 °C (98.2 °F), temperature source Oral, resp. rate 17, height 1.651 m (5' 5\"), weight 66 kg (145 lb 8.1 oz), SpO2 97%.  Oxygen Therapy  SpO2: 97 " %  Medical Gas Therapy: Supplemental oxygen (3l)  O2 Delivery Method: Nasal cannula  FiO2 (%): 40 %    atorvastatin, 40 mg, oral, Nightly  budesonide, 0.5 mg, nebulization, BID  carvedilol, 3.125 mg, oral, BID  [Held by provider] empagliflozin, 10 mg, oral, Daily  ipratropium-albuteroL, 3 mL, nebulization, q6h  loratadine, 10 mg, oral, Daily  multivitamin with minerals, 1 tablet, oral, Daily  oxygen, , inhalation, q8h  pantoprazole, 40 mg, oral, BID  polyethylene glycol, 17 g, oral, BID  rivaroxaban, 20 mg, oral, Daily with evening meal  torsemide, 20 mg, oral, Daily       Scheduled medications  atorvastatin, 40 mg, oral, Nightly  budesonide, 0.5 mg, nebulization, BID  carvedilol, 3.125 mg, oral, BID  [Held by provider] empagliflozin, 10 mg, oral, Daily  ipratropium-albuteroL, 3 mL, nebulization, q6h  loratadine, 10 mg, oral, Daily  multivitamin with minerals, 1 tablet, oral, Daily  oxygen, , inhalation, q8h  pantoprazole, 40 mg, oral, BID  polyethylene glycol, 17 g, oral, BID  rivaroxaban, 20 mg, oral, Daily with evening meal  torsemide, 20 mg, oral, Daily      PRN medications: ipratropium-albuteroL          PRN medications  PRN medications: ipratropium-albuteroL        Relevant Results  Results for orders placed or performed during the hospital encounter of 04/27/24 (from the past 24 hour(s))   Comprehensive metabolic panel   Result Value Ref Range    Glucose 101 (H) 65 - 99 mg/dL    Sodium 143 133 - 145 mmol/L    Potassium 3.8 3.4 - 5.1 mmol/L    Chloride 109 (H) 97 - 107 mmol/L    Bicarbonate 26 24 - 31 mmol/L    Urea Nitrogen 12 8 - 25 mg/dL    Creatinine 0.80 0.40 - 1.60 mg/dL    eGFR 72 >60 mL/min/1.73m*2    Calcium 8.3 (L) 8.5 - 10.4 mg/dL    Albumin 3.1 (L) 3.5 - 5.0 g/dL    Alkaline Phosphatase 65 35 - 125 U/L    Total Protein 5.5 (L) 5.9 - 7.9 g/dL    AST 15 5 - 40 U/L    Bilirubin, Total 1.3 (H) 0.1 - 1.2 mg/dL    ALT 10 5 - 40 U/L    Anion Gap 8 <=19 mmol/L   CBC   Result Value Ref Range    WBC 7.7 4.4  - 11.3 x10*3/uL    nRBC 0.0 0.0 - 0.0 /100 WBCs    RBC 3.20 (L) 4.00 - 5.20 x10*6/uL    Hemoglobin 8.9 (L) 12.0 - 16.0 g/dL    Hematocrit 29.8 (L) 36.0 - 46.0 %    MCV 93 80 - 100 fL    MCH 27.8 26.0 - 34.0 pg    MCHC 29.9 (L) 32.0 - 36.0 g/dL    RDW 15.1 (H) 11.5 - 14.5 %    Platelets 124 (L) 150 - 450 x10*3/uL        XR chest 1 view  Result Date: 4/29/2024  FINDINGS: The cardiac silhouette is enlarged.   The lungs are hyperinflated. There is interstitial lung disease. The costophrenic angles are not well defined.   There are atherosclerotic changes of the aorta.   There are numerous surgical clips in the neck. Clips are noted over the lower left chest. The location is uncertain.   COMPARISON OF FINDING: The chest is similar.  Findings suggestive of chronic obstructive lung disease. Blunting of the left costophrenic angle.    Transthoracic Echo (TTE) Limited  Result Date: 4/27/2024  CONCLUSIONS:  1. Left ventricular systolic function is normal.  2. Spectral Doppler shows an impaired relaxation pattern of left ventricular diastolic filling.  3. The left atrium is moderately dilated.  4. There is no evidence of cardiac tamponade.  5. Moderate mitral valve regurgitation.     XR chest 1 view  Result Date: 4/27/2024  FINDINGS: AP radiograph of the chest was provided.       CARDIOMEDIASTINAL SILHOUETTE: Cardiomediastinal silhouette is stable in size and configuration. Enlarged calcifications of the aortic arch.   LUNGS: Lungs are hyperinflated with prominent interstitial markings. No focal consolidation, pleural effusion or sizable pneumothorax seen.   ABDOMEN: No remarkable upper abdominal findings.   BONES: Degenerative changes present. Surgical clips near the thoracic inlet.  1.  Enlarged cardiac silhouette. Emphysema or COPD. No focal consolidation identified.             Transthoracic Echo (TTE) Limited  Result Date: 4/26/2024  CONCLUSIONS:  1. Left ventricular systolic function is mildly decreased with a 45%  estimated ejection fraction.  2. Basal inferolateral segment and basal inferior segment are abnormal.  3. Spectral Doppler shows an abnormal pattern of left ventricular diastolic filling.  4. There is an elevated left ventricular end diastolic pressure.  5. The left atrium is moderately dilated.  6. Moderate to severe mitral valve regurgitation.  7. RVSP within normal limits.     XR chest 1 view  Result Date: 4/26/2024  FINDINGS: AP radiograph of the chest Patient is status post mitraClip placement. Multiple surgical clips again seen projecting over visualized neck   CARDIOMEDIASTINAL SILHOUETTE: The cardiomediastinal silhouette is stable in size and configuration. Aortic knob calcifications.   LUNGS: Slightly better inspiratory effort on present examination resulting in improvement of bronchovascular crowding. No matilde pulmonary edema. There is mild bibasilar atelectasis without sizable pleural effusions or pneumothorax.   ABDOMEN: No remarkable upper abdominal findings.   BONES: No acute osseous abnormality.  1. Overall improvement in bilateral lung aeration, likely due to better inspiratory effort. No matilde pulmonary edema on present study. 2. Mild bibasilar atelectasis without sizable pleural effusions.       XR chest 1 view  Result Date: 4/26/2024  FINDINGS: AP radiograph of the chest was provided.   Patient is status post MitraClip placement. Surgical clips again seen overlying the neck.   CARDIOMEDIASTINAL SILHOUETTE: Cardiomediastinal silhouette is stable in size and configuration. Aortic knob calcifications.   LUNGS: Mild haziness in the left lung base likely secondary to atelectasis with or without small pleural effusion. Right basilar opacities likely reflecting atelectasis. Component of mild background pulmonary edema not excluded. No right pleural effusion. No pneumothorax.   ABDOMEN: No remarkable upper abdominal findings.   BONES: No acute osseous changes.  1. Similar bibasilar presumed atelectasis  with or without small left pleural effusion. Component of mild background pulmonary edema not excluded. 2. No pneumothorax.     I personally reviewed the images/study and I agree with the findings as stated by Nicolasa Gilliland MD. This study was interpreted at [facility], [place], [place].      Intraoperative SAMMY (Anesthesia please do not use)  Result Date: 4/25/2024  CONCLUSIONS:  1. Left ventricular systolic function is mildly decreased with a 40-45% estimated ejection fraction.  2. Basal and mid inferior wall and basal and mid inferolateral wall are abnormal.  3. There is mildly reduced right ventricular systolic function.  4. The left atrium is severely dilated.  5. The right atrium is mild to moderately dilated.  6. Severe mitral valve regurgitation related to posterior leaflet tethering at baselibe.  7. Following deployment of two MitraClips, mitral regurgitation remained severe, likely related to mechanical issues with the second MitraClip. Residual mean mitral gradient was 10 mmHg at a heart rate of 79/min.  8. No left atrial thrombus.  9. There is plaque visualized in the descending aorta. 10. Expected post transseptal puncture associated left to right interatrial shunt. 11. Compared with the prior study dated 1/31/2024, the current study was a procedural SAMMY for MitraClip placement.       Vascular US carotid artery duplex bilateral  Result Date: 4/22/2024  Findings are consistent with less than 50% stenosis of the right proximal internal carotid artery. Right external carotid artery appears patent with no evidence of stenosis. No evidence of hemodynamically significant stenosis of the right common carotid artery. The right vertebral artery is patent with antegrade flow. No evidence of hemodynamically significant stenosis in the right subclavian artery. Left Carotid: Findings are consistent with less than 50% stenosis of the left proximal internal carotid artery. Degree  of stenosis may be under estimated due to calcified shadowing plaque. There are elevated velocities in the left ECA that are suggestive of disease. No evidence of hemodynamically significant stenosis of the left common carotid artery. The left vertebral artery is patent with antegrade flow. Parvus et tardus waveform. Can not exclude more proximal stenosis. There are elevated velocities in the left subclavian artery that are suggestive of disease.  Imaging & Doppler Findings: Right Plaque Morph: The proximal right internal carotid artery demonstrates irregular and heterogenous plaque. The proximal right external carotid artery demonstrates irregular and heterogenous plaque. The proximal right common carotid artery demonstrates irregular, heterogenous and calcified plaque. The mid right common carotid artery demonstrates heterogenous, irregular and calcified plaque. The distal right common carotid artery demonstrates irregular, heterogenous and calcified plaque. Left Plaque Morph: The proximal left internal carotid artery demonstrates irregular, heterogenous and calcified plaque. The proximal left external carotid artery demonstrates irregular, heterogenous and calcified plaque. The proximal left common carotid artery demonstrates irregular and heterogenous plaque. The mid left common carotid artery demonstrates irregular, heterogenous and calcified plaque. The distal left common carotid artery demonstrates irregular, heterogenous and calcified plaque.       Impression  Acute on chronic hypoxic respiratory failure  Syncope  Status post transcatheter [id number] repair with MitraClip  History of DVT left lower extremity; on Xarelto  COPD    Plan  Wean oxygen as sats allow-baseline is 2 L nasal cannula continuously and 4 with exertion  Continue IBD/ICS  Incentive/pulmonary hygiene  FEV1  Polysomnogram as outpatient  Torsemide  Xarelto  GI prophylaxis  PT/OT/out of bed  Reviewed with collaborating physician   Boo  Thank you for this consultation    Sabiha Lane APRN-CNP  [facility]

## 2024-04-29 NOTE — PROGRESS NOTES
"Physical Therapy    Physical Therapy Treatment    Patient Name: Anitha Welch  MRN: [id number]  Today's Date: 4/29/2024  Time Calculation  Start Time: 1435  Stop Time: 1449  Time Calculation (min): 14 min       Assessment/Plan   PT Assessment  End of Session Communication: Bedside nurse  Assessment Comment: pt refusing out of bed activities due to \" I just got back to bed and have done plenty of walking.\" PT educated pt in , out of bed with FWW and min assist of 1 staff for safety.  End of Session Patient Position: Bed, 2 rail up, Alarm on (call button in reach)  PT Plan  Inpatient/Swing Bed or Outpatient: Inpatient  PT Plan  Treatment/Interventions: Bed mobility, Transfer training, Gait training, Stair training, Balance training, Strengthening, Endurance training, Therapeutic exercise, Therapeutic activity  PT Plan: Skilled PT  PT Frequency: 4 times per week  PT Discharge Recommendations: Moderate intensity level of continued care  Equipment Recommended upon Discharge: Wheeled walker  PT Recommended Transfer Status: Assist x1  PT - OK to Discharge: Yes      General Visit Information:   PT  Visit  PT Received On: 04/29/24  General  Prior to Session Communication: Bedside nurse  Patient Position Received: Bed, 2 rail up, Alarm off, not on at start of session  Preferred Learning Style: verbal, visual  General Comment: cleared by nurse for therapy; pt agreeable to bedside richard LE exercises only due to \" I just got back into bed from going to the bathroom and I am way too tired.\" PT provided encouragement and education but pt continued to refuse out of bed activities. pt on room air ( supposed to be on 2 liters o2 but cannul laying on bed; o2 sat 88%; PT placed 2 liters o2 on pt----O2 sat increased to 97% with pursed lip breathing x 30 secs; + telemetry    Subjective   Precautions:  Precautions  Hearing/Visual Limitations: age appropriate  Medical Precautions: Fall precautions, Oxygen therapy device and L/min (2 liters o2 " via nc)  Vital Signs:  Vital Signs  Heart Rate: 99  SpO2: 97 %  Patient Position: Lying    Objective   Pain:  Pain Assessment  Pain Assessment: 0-10  Pain Score: 0 - No pain  Cognition:  Cognition  Orientation Level: Disoriented to time  Safety Judgment: Decreased awareness of need for assistance  Safety/Judgement:  (decreased safety insight)  Insight: Moderate  Postural Control:  Postural Control  Posture Comment: forward head posture  Extremity/Trunk Assessments:    Activity Tolerance:     Treatments:  Therapeutic Exercise  Therapeutic Exercise Performed: Yes  Therapeutic Exercise Activity 1: supine richard LE AP, QS x 30 reps  Therapeutic Exercise Activity 2: supine right LE heel slides x 20 reps; refused left LE  Therapeutic Exercise Activity 3: supine right hip abd/add x 20 reps; refused left LE  Therapeutic Exercise Activity 4: supine right SAQ's x 25 reps; refused left LE  Therapeutic Exercise Activity 5: supine  right hip IR/ER x 15 reps; refused left LE    Outcome Measures:  [facility] Basic Mobility  Turning from your back to your side while in a flat bed without using bedrails: Total  Moving from lying on your back to sitting on the side of a flat bed without using bedrails: Total  Moving to and from bed to chair (including a wheelchair): Total  Standing up from a chair using your arms (e.g. wheelchair or bedside chair): Total  To walk in hospital room: Total  Climbing 3-5 steps with railing: Total  Basic Mobility - Total Score: 6    Education Documentation  Mobility Training, taught by Roma Villarreal PT at 4/29/2024  3:05 PM.  Learner: Patient  Readiness: Acceptance  Method: Explanation, Demonstration  Response: Needs Reinforcement    Education Comments  No comments found.        OP EDUCATION:       Encounter Problems       Encounter Problems (Active)       Balance       LTG - Patient will maintain static/dynamic standing and sitting balance with LRD to allow for safe completion of functional mobility. (Not  Progressing)       Start:  04/28/24    Expected End:  05/26/24               Mobility       STG - Patient will ambulate 100' with rolling walker MOD I (Not Progressing)       Start:  04/28/24    Expected End:  05/26/24            STG - Patient will ascend and descend four to stairs with one rails and close supervision to simulate home environment (Not Progressing)       Start:  04/28/24    Expected End:  05/26/24               PT Transfers       STG - Transfer from bed to chair MOD I with LRD (Not Progressing)       Start:  04/28/24    Expected End:  05/26/24            STG - Patient to transfer to and from sit to supine IND (Not Progressing)       Start:  04/28/24    Expected End:  05/26/24            STG - Patient will transfer sit to and from stand IND (Not Progressing)       Start:  04/28/24    Expected End:  05/26/24

## 2024-04-29 NOTE — NURSING NOTE
Patient turned off bed alarm and went to the bathroom. Called out after and was SOB. Allowed RN's to put on tele and pulse ox. Spo2 91% 5L bubbler. .  HOB 45.   Will continue to monitor. Patient feeling a little better.

## 2024-04-29 NOTE — PROGRESS NOTES
Occupational Therapy    Evaluation    Patient Name: Anitha Welch  MRN: [id number]  Today's Date: 4/29/2024  Time Calculation  Start Time: 0815  Stop Time: 0830  Time Calculation (min): 15 min    Assessment  IP OT Assessment  OT Assessment: OT order received, chart reviewed, evalaution compelted. Pt demonstrated impaired safety awareness, required min A for functional mobility and ADLs and would benefit from acute OT services.  Prognosis: Good  Barriers to Discharge: None  Evaluation/Treatment Tolerance: Patient limited by fatigue, Patient limited by pain  Medical Staff Made Aware: Yes  End of Session Communication: Bedside nurse  End of Session Patient Position: Up in chair, Alarm on  Plan:  Treatment Interventions: ADL retraining, Functional transfer training, UE strengthening/ROM, Endurance training, Cognitive reorientation, Patient/family training, Equipment evaluation/education  OT Frequency: 3 times per week  OT Discharge Recommendations: Moderate intensity level of continued care  Equipment Recommended upon Discharge: Wheeled walker  OT - OK to Discharge: Yes    Subjective     General:  General  Reason for Referral: activities of daily living  Referred By: Jose Rodriguez MD  Past Medical History Relevant to Rehab: Mitral Clip, CHF, AAA, HTN, MI, CAD, CVA, carotid stenosis, CKD, GERD, GI Bleed, PVD, Seizure  Family/Caregiver Present: No  Prior to Session Communication: Bedside nurse  Patient Position Received:  (Pt up in bathroom with PCA, telemetry was unplugged on bed, handoff from PCA and telemetry redonned by therapist.)  Preferred Learning Style: verbal  General Comment: 84 y.o. female admitted from home after a syncopal episode. [place] [facility] [place].  Precautions:  Hearing/Visual Limitations: age appropriate  Medical Precautions: Fall precautions, Oxygen therapy device and L/min (3L O2)  Vital Signs:  Heart Rate:  (telemetry unplugged on arrival and unable to  "read HR once replugged in, Cardiology in to assess at this time)  SpO2: 90 % (post mobility on 3 L, increased to 96  post rest break)  Pain:  Pain Assessment  Pain Assessment: 0-10  Pain Score: 4  Pain Type: Chronic pain  Pain Location: Back  Pain Interventions: Repositioned    Objective   Cognition:  Overall Cognitive Status: Impaired  Orientation Level: Disoriented to time (\"2040\")  Following Commands: Follows one step commands with repetition  Safety Judgment: Decreased awareness of need for assistance  Safety/Judgement:  (impaired)  Insight: Moderate  Impulsive: Moderately           Home Living:  Type of Home: House  Lives With: Spouse, Adult children (son)  Home Adaptive Equipment: Walker rolling or standard (rollator)  Home Layout: One level  Home Access: Stairs to enter with rails  Entrance Stairs-Rails:  (unilateral)  Entrance Stairs-Number of Steps: 4  Bathroom Shower/Tub: Tub/shower unit  Home Living Comments: Pt reports multiple falls most recently outside of [facility]   Prior Function:  Level of [place]: Independent with ADLs and functional transfers, Needs assistance with homemaking  Receives Help From: Family  ADL Assistance: Independent  Homemaking Assistance: Needs assistance (son does the cooking and cleaning; pt normally does the laundry)  Ambulatory Assistance: Independent (Indep ambulator inside home without device, Reports using rollator for the past few months when leaving the house. Has been falling more over the last few months. One fall few weeks back on the stairs, one fall this past week outside [facility].)  Prior Function Comments: multiple falls at home  IADL History:     ADL:  Eating Assistance: Independent  Grooming Assistance: Stand by  Grooming Deficit: Setup, Supervision/safety, Brushing hair, Wash/dry hands, Standing with assistive device, Increased time to complete (Pt stood at sink for hand hygiene, performed hair combing seated due to back pain and fatigue)  Bathing Assistance: " Moderate  UE Dressing Assistance: Minimal  LE Dressing Assistance: Moderate  Toileting Assistance with Device: Not performed  Toileting Deficit:  (pt performed just prior to session)  Functional Assistance:  (limited by back pain, SOB)  Activity Tolerance:  Endurance: Decreased tolerance for upright activites  Activity Tolerance Comments: desaturated to 90% with mobility on 3L O2  Bed Mobility/Transfers: Bed Mobility  Bed Mobility: No    Transfers  Transfer: Yes  Transfer 1  Transfer From 1: Stand to  Transfer to 1: Chair with arms  Technique 1: Stand to sit  Transfer Device 1: Walker  Transfer Level of Assistance 1: Minimum assistance  Trials/Comments 1: Pt returning from bathroom, Cues for safe hand placement and min A to control descent into chair chair alarm on      Functional Mobility:  Functional Mobility  Functional Mobility Performed: Yes  Functional Mobility 1  Surface 1: Level tile  Device 1: Rolling walker  Assistance 1: Minimum assistance  Comments 1: After washing hands at sink, Pt performed functional mobility short household distance frrom bathroom in room to bedside chair, pt abandons RW, cues for safety, not heeded    Sensation:  Light Touch: No apparent deficits  Strength:  Strength Comments: 3/5 BUEs observed functionally  Perception:     Coordination:  Movements are Fluid and Coordinated: Yes   Hand Function:  Hand Function  Gross Grasp: Functional  Coordination: Functional  Extremities: RUE   RUE : Within Functional Limits and LUE   LUE: Within Functional Limits    Outcome Measures: [facility] Daily Activity  Putting on and taking off regular lower body clothing: A lot  Bathing (including washing, rinsing, drying): A lot  Putting on and taking off regular upper body clothing: A little  Toileting, which includes using toilet, bedpan or urinal: A little  Taking care of personal grooming such as brushing teeth: A little  Eating Meals: None  Daily Activity - Total Score: 17      Education  Documentation  Body Mechanics, taught by Nata Morocho OT at 4/29/2024 10:43 AM.  Learner: Patient  Readiness: Acceptance  Method: Explanation  Response: Verbalizes Understanding  Comment: Pt edu on OT POC    Precautions, taught by Nata Morocho OT at 4/29/2024 10:43 AM.  Learner: Patient  Readiness: Acceptance  Method: Explanation  Response: Verbalizes Understanding  Comment: Pt edu on OT POC    ADL Training, taught by Nata Morocho OT at 4/29/2024 10:43 AM.  Learner: Patient  Readiness: Acceptance  Method: Explanation  Response: Verbalizes Understanding  Comment: Pt edu on OT POC    Education Comments  No comments found.      Goals:   Encounter Problems       Encounter Problems (Active)       OT Goals       ADL (Progressing)       Start:  04/29/24    Expected End:  05/10/24       Pt will complete ADL tasks with Mod I, using AE as needed, in order to complete self-care tasks.           Functional Mobility (Progressing)       Start:  04/29/24    Expected End:  05/10/24       Pt will perform functional mobility household distance at mod ind level with RW           Functional transfers (Progressing)       Start:  04/29/24    Expected End:  05/10/24       Pt will perform functional transfers at mod ind level with RW

## 2024-04-30 LAB
ALBUMIN SERPL-MCNC: 3 G/DL (ref 3.5–5)
ALP BLD-CCNC: 60 U/L (ref 35–125)
ALT SERPL-CCNC: 8 U/L (ref 5–40)
ANION GAP SERPL CALC-SCNC: 6 MMOL/L
AST SERPL-CCNC: 13 U/L (ref 5–40)
BACTERIA UR CULT: ABNORMAL
BILIRUB SERPL-MCNC: 1.3 MG/DL (ref 0.1–1.2)
BUN SERPL-MCNC: 17 MG/DL (ref 8–25)
CALCIUM SERPL-MCNC: 8.2 MG/DL (ref 8.5–10.4)
CHLORIDE SERPL-SCNC: 105 MMOL/L (ref 97–107)
CO2 SERPL-SCNC: 31 MMOL/L (ref 24–31)
CREAT SERPL-MCNC: 1 MG/DL (ref 0.4–1.6)
EGFRCR SERPLBLD CKD-EPI 2021: 55 ML/MIN/1.73M*2
ERYTHROCYTE [DISTWIDTH] IN BLOOD BY AUTOMATED COUNT: 14.9 % (ref 11.5–14.5)
GLUCOSE SERPL-MCNC: 118 MG/DL (ref 65–99)
HCT VFR BLD AUTO: 26.4 % (ref 36–46)
HGB BLD-MCNC: 8.1 G/DL (ref 12–16)
MCH RBC QN AUTO: 27.8 PG (ref 26–34)
MCHC RBC AUTO-ENTMCNC: 30.7 G/DL (ref 32–36)
MCV RBC AUTO: 91 FL (ref 80–100)
NRBC BLD-RTO: 0 /100 WBCS (ref 0–0)
PLATELET # BLD AUTO: 130 X10*3/UL (ref 150–450)
POTASSIUM SERPL-SCNC: 3.7 MMOL/L (ref 3.4–5.1)
PROT SERPL-MCNC: 5.4 G/DL (ref 5.9–7.9)
RBC # BLD AUTO: 2.91 X10*6/UL (ref 4–5.2)
SODIUM SERPL-SCNC: 142 MMOL/L (ref 133–145)
WBC # BLD AUTO: 6.9 X10*3/UL (ref 4.4–11.3)

## 2024-04-30 PROCEDURE — 94640 AIRWAY INHALATION TREATMENT: CPT

## 2024-04-30 PROCEDURE — 2500000001 HC RX 250 WO HCPCS SELF ADMINISTERED DRUGS (ALT 637 FOR MEDICARE OP): Performed by: INTERNAL MEDICINE

## 2024-04-30 PROCEDURE — 9420000001 HC RT PATIENT EDUCATION 5 MIN

## 2024-04-30 PROCEDURE — 36415 COLL VENOUS BLD VENIPUNCTURE: CPT

## 2024-04-30 PROCEDURE — 85027 COMPLETE CBC AUTOMATED: CPT

## 2024-04-30 PROCEDURE — 84075 ASSAY ALKALINE PHOSPHATASE: CPT

## 2024-04-30 PROCEDURE — 2500000002 HC RX 250 W HCPCS SELF ADMINISTERED DRUGS (ALT 637 FOR MEDICARE OP, ALT 636 FOR OP/ED): Mod: MUE | Performed by: INTERNAL MEDICINE

## 2024-04-30 PROCEDURE — 2500000004 HC RX 250 GENERAL PHARMACY W/ HCPCS (ALT 636 FOR OP/ED): Performed by: INTERNAL MEDICINE

## 2024-04-30 PROCEDURE — 2060000001 HC INTERMEDIATE ICU ROOM DAILY

## 2024-04-30 PROCEDURE — 2500000002 HC RX 250 W HCPCS SELF ADMINISTERED DRUGS (ALT 637 FOR MEDICARE OP, ALT 636 FOR OP/ED): Performed by: INTERNAL MEDICINE

## 2024-04-30 PROCEDURE — 99232 SBSQ HOSP IP/OBS MODERATE 35: CPT

## 2024-04-30 PROCEDURE — 2500000006 HC RX 250 W HCPCS SELF ADMINISTERED DRUGS (ALT 637 FOR ALL PAYERS): Performed by: INTERNAL MEDICINE

## 2024-04-30 PROCEDURE — 97535 SELF CARE MNGMENT TRAINING: CPT | Mod: GO,CO

## 2024-04-30 PROCEDURE — 2500000005 HC RX 250 GENERAL PHARMACY W/O HCPCS: Performed by: INTERNAL MEDICINE

## 2024-04-30 RX ORDER — ACETAMINOPHEN 325 MG/1
650 TABLET ORAL EVERY 6 HOURS PRN
Status: DISCONTINUED | OUTPATIENT
Start: 2024-04-30 | End: 2024-05-01 | Stop reason: HOSPADM

## 2024-04-30 RX ORDER — IPRATROPIUM BROMIDE AND ALBUTEROL SULFATE 2.5; .5 MG/3ML; MG/3ML
3 SOLUTION RESPIRATORY (INHALATION)
Status: DISCONTINUED | OUTPATIENT
Start: 2024-04-30 | End: 2024-05-01 | Stop reason: HOSPADM

## 2024-04-30 RX ADMIN — BUDESONIDE INHALATION 0.5 MG: 0.5 SUSPENSION RESPIRATORY (INHALATION) at 07:17

## 2024-04-30 RX ADMIN — ATORVASTATIN CALCIUM 40 MG: 40 TABLET, FILM COATED ORAL at 21:29

## 2024-04-30 RX ADMIN — IPRATROPIUM BROMIDE AND ALBUTEROL SULFATE 3 ML: 2.5; .5 SOLUTION RESPIRATORY (INHALATION) at 07:17

## 2024-04-30 RX ADMIN — Medication 2 L/MIN: at 23:00

## 2024-04-30 RX ADMIN — PANTOPRAZOLE SODIUM 40 MG: 40 TABLET, DELAYED RELEASE ORAL at 21:29

## 2024-04-30 RX ADMIN — POLYETHYLENE GLYCOL 3350 17 G: 17 POWDER, FOR SOLUTION ORAL at 08:57

## 2024-04-30 RX ADMIN — Medication 3 L/MIN: at 07:00

## 2024-04-30 RX ADMIN — POLYETHYLENE GLYCOL 3350 17 G: 17 POWDER, FOR SOLUTION ORAL at 21:30

## 2024-04-30 RX ADMIN — ACETAMINOPHEN 650 MG: 325 TABLET ORAL at 16:06

## 2024-04-30 RX ADMIN — TORSEMIDE 20 MG: 20 TABLET ORAL at 08:57

## 2024-04-30 RX ADMIN — IPRATROPIUM BROMIDE AND ALBUTEROL SULFATE 3 ML: 2.5; .5 SOLUTION RESPIRATORY (INHALATION) at 12:07

## 2024-04-30 RX ADMIN — IPRATROPIUM BROMIDE AND ALBUTEROL SULFATE 3 ML: 2.5; .5 SOLUTION RESPIRATORY (INHALATION) at 19:20

## 2024-04-30 RX ADMIN — BUDESONIDE INHALATION 0.5 MG: 0.5 SUSPENSION RESPIRATORY (INHALATION) at 19:17

## 2024-04-30 RX ADMIN — LORATADINE 10 MG: 10 TABLET ORAL at 08:57

## 2024-04-30 RX ADMIN — RIVAROXABAN 20 MG: 20 TABLET, FILM COATED ORAL at 16:34

## 2024-04-30 RX ADMIN — PANTOPRAZOLE SODIUM 40 MG: 40 TABLET, DELAYED RELEASE ORAL at 08:57

## 2024-04-30 RX ADMIN — CARVEDILOL 3.12 MG: 3.12 TABLET, FILM COATED ORAL at 08:57

## 2024-04-30 RX ADMIN — Medication 1 TABLET: at 08:57

## 2024-04-30 ASSESSMENT — PAIN - FUNCTIONAL ASSESSMENT
PAIN_FUNCTIONAL_ASSESSMENT: 0-10

## 2024-04-30 ASSESSMENT — COGNITIVE AND FUNCTIONAL STATUS - GENERAL
HELP NEEDED FOR BATHING: A LITTLE
MOVING TO AND FROM BED TO CHAIR: A LITTLE
MOBILITY SCORE: 19
HELP NEEDED FOR BATHING: A LOT
DRESSING REGULAR LOWER BODY CLOTHING: A LITTLE
STANDING UP FROM CHAIR USING ARMS: A LITTLE
PERSONAL GROOMING: A LITTLE
DRESSING REGULAR UPPER BODY CLOTHING: A LITTLE
PERSONAL GROOMING: A LITTLE
EATING MEALS: A LITTLE
TOILETING: A LITTLE
DRESSING REGULAR LOWER BODY CLOTHING: A LOT
TURNING FROM BACK TO SIDE WHILE IN FLAT BAD: A LITTLE
EATING MEALS: A LITTLE
WALKING IN HOSPITAL ROOM: A LITTLE
DAILY ACTIVITIY SCORE: 15
DRESSING REGULAR UPPER BODY CLOTHING: A LITTLE
DAILY ACTIVITIY SCORE: 18
TOILETING: A LOT
CLIMB 3 TO 5 STEPS WITH RAILING: A LITTLE

## 2024-04-30 ASSESSMENT — PAIN DESCRIPTION - LOCATION: LOCATION: GENERALIZED

## 2024-04-30 ASSESSMENT — PAIN SCALES - WONG BAKER: WONGBAKER_NUMERICALRESPONSE: HURTS LITTLE BIT

## 2024-04-30 ASSESSMENT — PAIN SCALES - GENERAL
PAINLEVEL_OUTOF10: 5 - MODERATE PAIN
PAINLEVEL_OUTOF10: 0 - NO PAIN
PAINLEVEL_OUTOF10: 0 - NO PAIN
PAINLEVEL_OUTOF10: 3

## 2024-04-30 ASSESSMENT — ACTIVITIES OF DAILY LIVING (ADL): HOME_MANAGEMENT_TIME_ENTRY: 31

## 2024-04-30 NOTE — CARE PLAN
The clinical goals for the shift include No Shortness of Breath at Rest  Over night, no episodes of shortness of breath were identified. The patient's assessment remained unchanged.  Over the shift, the patient did  make progress toward the following goals:    Problem: Respiratory  Goal: Clear secretions with interventions this shift  Outcome: Progressing  Goal: Minimize anxiety/maximize coping throughout shift  Outcome: Progressing  Goal: Minimal/no exertional discomfort or dyspnea this shift  Outcome: Progressing

## 2024-04-30 NOTE — PROGRESS NOTES
"Anitha Welch is a 84 y.o. female on day 3 of admission presenting with Syncope and collapse.    Subjective   Patient resting comfortably in bed. Denies chest pain, pressure or palpitations.        Objective     Physical Exam  Cardiovascular:      Rate and Rhythm: Normal rate and regular rhythm.      Heart sounds: No murmur heard.     No friction rub. No gallop.   Pulmonary:      Effort: Pulmonary effort is normal. No respiratory distress.      Breath sounds: No wheezing, rhonchi or rales.      Comments: Diminished breath sounds throughout.   Musculoskeletal:      Right lower leg: No edema.      Left lower leg: No edema.   Skin:     General: Skin is warm and dry.   Neurological:      Mental Status: She is alert and oriented to person, place, and time.   Psychiatric:         Mood and Affect: Mood normal.         Behavior: Behavior normal.         Last Recorded Vitals  Blood pressure (!) 118/49, pulse 89, temperature 36.7 °C (98.1 °F), temperature source Oral, resp. rate 17, height 1.651 m (5' 5\"), weight 65.1 kg (143 lb 8.3 oz), SpO2 100%.  Intake/Output last 3 Shifts:  I/O last 3 completed shifts:  In: 490 (7.5 mL/kg) [P.O.:490]  Out: 0 (0 mL/kg)   Weight: 65.1 kg     Relevant Results  Results for orders placed or performed during the hospital encounter of 04/27/24 (from the past 24 hour(s))   Comprehensive metabolic panel   Result Value Ref Range    Glucose 118 (H) 65 - 99 mg/dL    Sodium 142 133 - 145 mmol/L    Potassium 3.7 3.4 - 5.1 mmol/L    Chloride 105 97 - 107 mmol/L    Bicarbonate 31 24 - 31 mmol/L    Urea Nitrogen 17 8 - 25 mg/dL    Creatinine 1.00 0.40 - 1.60 mg/dL    eGFR 55 (L) >60 mL/min/1.73m*2    Calcium 8.2 (L) 8.5 - 10.4 mg/dL    Albumin 3.0 (L) 3.5 - 5.0 g/dL    Alkaline Phosphatase 60 35 - 125 U/L    Total Protein 5.4 (L) 5.9 - 7.9 g/dL    AST 13 5 - 40 U/L    Bilirubin, Total 1.3 (H) 0.1 - 1.2 mg/dL    ALT 8 5 - 40 U/L    Anion Gap 6 <=19 mmol/L   CBC   Result Value Ref Range    WBC 6.9 4.4 - " 11.3 x10*3/uL    nRBC 0.0 0.0 - 0.0 /100 WBCs    RBC 2.91 (L) 4.00 - 5.20 x10*6/uL    Hemoglobin 8.1 (L) 12.0 - 16.0 g/dL    Hematocrit 26.4 (L) 36.0 - 46.0 %    MCV 91 80 - 100 fL    MCH 27.8 26.0 - 34.0 pg    MCHC 30.7 (L) 32.0 - 36.0 g/dL    RDW 14.9 (H) 11.5 - 14.5 %    Platelets 130 (L) 150 - 450 x10*3/uL               Assessment/Plan   Principal Problem:    Syncope and collapse    Presyncope  Severe mitral regurgitation status post ARMANI reduced to moderate  Acute on chronic hypoxic respiratory failure  Atherosclerotic heart disease        4/27: As above. Patient into the hospital after near syncope at home.  Patient does states she never lost consciousness.  Patient recently underwent a minimally invasive ARMANI with MitraClip procedure.  Echocardiogram completed on April 25 shows a mildly decreased ejection fraction of 45%, inferolateral segment and basal inferior segment abnormal, abnormal pattern of left ventricular diastolic filling, elevated left ventricular end-diastolic pressure, moderately dilated left atrium. Patient was discharged home yesterday. Denies chest pain, pressure or palpitations. Denies worsening shortness of breath.  Patient evidently did have some mild hypotension while admitted to [facility] and they placed losartan on hold as well as decreased carvedilol dose. Agree with parameters for home carvedilol. On my exam patient is answering questions appropriately but seems to have poor recall of recent events. Blood have been hypotensive but improved this morning with last recorded at 114/56 after receiving IV fluids. She appears rather euvolemic on examination with no edema to her lower extremities.  I am going to order a stat echocardiogram to reassess the mitral valve. Will give further recommendations following these results. We will follow with you.      4/28: As above. Blood pressures are improved this morning with last recorded at 142/70.  Patient is in a sinus tachycardia on telemetry with  occasional PVCs. Evidently oxygen saturations dropped overnight and oxygen delivery had to be increased to 10 L. On my exam, patient is on 4L NC and breathing comfortably. Patient was on torsemide 20 mg and spironolactone 12.5 mg once daily at home for diuretic therapy. Chest x-ray has been ordered to be completed this morning.  I have also ordered a CBC and CMP to be completed this morning. Will give further recommendations pending these results.      4/29: Shortness of breath and hypoxia out of proportion to her heart disease, would be reasonable to get pulmonary involved.  I have resumed her oral torsemide, no changes to her cardiac meds this morning.  Placement evaluation is ongoing.     4/30: Patient resting comfortably in bed reports her breathing feels improved this morning.  She is on 3 L nasal cannula with symmetry of 100%.  Blood pressures stable overnight with last recorded at 118/49.  Currently in sinus rhythm on telemetry with occasional PVCs. Appears euvolemic on examination. Continue torsemide 20 mg daily and carvedilol 3.25 mg twice daily. Remains rather stable from the cardiac perspective. Discharge planning for rehab faciity is ongoing.            Chacha Flanagan, APRN-CNP

## 2024-04-30 NOTE — SIGNIFICANT EVENT
At rest on RA SpO2 84% HR 99 bpm. Pt placed on 1L at rest SpO2 was 94%  bpm. Ambulation pt had to be increased to 2L NC for SpO2 95%  bpm. Pt states she has home Oxygen but does not know how much she wears or who the DME is who supplies it

## 2024-04-30 NOTE — PROGRESS NOTES
"Anitha Welch is a 84 y.o. female on day 3 of admission presenting with Syncope and collapse. She underwent mitral valve repair at [facility] on 4/25.     Subjective   She was sitting in a chair, awake, alert, no acute complaints. She was on nasal oxygen at 3 L/min saturating at 100%.   She stated to me that she does not use home oxygen.   Per Dr Dempsey's progress note of 4/28, \"Patient reports being on oxygen chronically, though unknown amount\".     Objective     Physical Exam  General: awake, alert, oriented, responsive  Cardiovascular: regular, normal S1 and S2  Lungs: good air entry bilaterally, clear to auscultation  Abdomen: soft, nontender, bowel sounds present, normoactive  Extremities: no peripheral cyanosis, no pedal edema  Neuro: alert, oriented x 3, no focal weakness      Last Recorded Vitals  Blood pressure (!) 118/49, pulse 89, temperature 36.7 °C (98.1 °F), temperature source Oral, resp. rate 17, height 1.651 m (5' 5\"), weight 65.1 kg (143 lb 8.3 oz), SpO2 100%.  Intake/Output last 3 Shifts:  I/O last 3 completed shifts:  In: 490 (7.5 mL/kg) [P.O.:490]  Out: 0 (0 mL/kg)   Weight: 65.1 kg     Relevant Results  Lab Results   Component Value Date    WBC 6.9 04/30/2024    HGB 8.1 (L) 04/30/2024    HCT 26.4 (L) 04/30/2024    MCV 91 04/30/2024     (L) 04/30/2024     Lab Results   Component Value Date    GLUCOSE 118 (H) 04/30/2024    CALCIUM 8.2 (L) 04/30/2024     04/30/2024    K 3.7 04/30/2024    CO2 31 04/30/2024     04/30/2024    BUN 17 04/30/2024    CREATININE 1.00 04/30/2024     Scheduled medications  atorvastatin, 40 mg, oral, Nightly  budesonide, 0.5 mg, nebulization, BID  carvedilol, 3.125 mg, oral, BID  [Held by provider] empagliflozin, 10 mg, oral, Daily  ipratropium-albuteroL, 3 mL, nebulization, TID  loratadine, 10 mg, oral, Daily  multivitamin with minerals, 1 tablet, oral, Daily  oxygen, , inhalation, q8h  pantoprazole, 40 mg, oral, BID  polyethylene glycol, 17 g, oral, " BID  rivaroxaban, 20 mg, oral, Daily with evening meal  torsemide, 20 mg, oral, Daily      Continuous medications     PRN medications  PRN medications: ipratropium-albuteroL      Assessment/Plan   Syncope  Probably secondary to overdiuresis resulting in hypotension which has improved.     Acute on chronic hypoxic respiratory failure  Desaturated overnight x 2. Pulmonary medicine recommended an outpatient sleep study.     Mitral regurgitation  S/p transcatheter edge to edge mitral valve repair done 4/25/24  Restarted on torsemide by cardiology    COPD  Stable    History of DVT of LLE  On xarelto    PT recommended moderate intensity rehab at discharge. To SNF vs home with [facility]    Vishnu Ferguson MD

## 2024-04-30 NOTE — CARE PLAN
The patient's goals for the shift include      The clinical goals for the shift include No Shortness of Breath at Rest

## 2024-04-30 NOTE — PROGRESS NOTES
Occupational Therapy    OT Treatment    Patient Name: Anitha Welch  MRN: [id number]  Today's Date: 4/30/2024  Time Calculation  Start Time: 1439  Stop Time: 1510  Time Calculation (min): 31 min         Assessment:  OT Assessment: Gradual progress observed towards OT goals. Continue with current OT POC to increase strength, balance and functional tolerance to maximize safety and independence during ADLs. Pt may benefit from further cognitive testing prior to discharge to assess for additional needs/recommendations.  Evaluation/Treatment Tolerance: Patient limited by fatigue, Patient limited by pain  End of Session Communication: Bedside nurse  End of Session Patient Position: Bed, 2 rail up, Alarm off, not on at start of session (all needs in reach, family present in room)  OT Assessment Results: Decreased ADL status, Decreased upper extremity strength, Decreased safe judgment during ADL, Decreased cognition, Decreased endurance, Decreased functional mobility, Decreased IADLs  Evaluation/Treatment Tolerance: Patient limited by fatigue, Patient limited by pain  Plan:  Treatment Interventions: ADL retraining, Functional transfer training, UE strengthening/ROM, Endurance training, Cognitive reorientation, Patient/family training, Equipment evaluation/education  OT Frequency: 3 times per week  OT Discharge Recommendations: Moderate intensity level of continued care  Equipment Recommended upon Discharge: Wheeled walker  OT - OK to Discharge: Yes  Treatment Interventions: ADL retraining, Functional transfer training, UE strengthening/ROM, Endurance training, Cognitive reorientation, Patient/family training, Equipment evaluation/education    Subjective   Previous Visit Info:  OT Last Visit  OT Received On: 04/30/24  General:  General  Reason for Referral: impaired ADLs s/p syncopal episode and fall  Past Medical History Relevant to Rehab: recent Mitral Clip, CHF, AAA, HTN, MI, CAD, CVA, carotid stenosis, CKD, GERD, GI  "Bleed, PVD, Seizure  Family/Caregiver Present: Yes  Caregiver Feedback: son present- questions, comments and concerns addressed regarding OT.  Prior to Session Communication: Bedside nurse  Patient Position Received: Bed, 2 rail up, Alarm off, not on at start of session  General Comment: Pt cleared for OT session per nursing, cooperative with encouragement.  Precautions:  Hearing/Visual Limitations: WFL  Medical Precautions: Fall precautions, Oxygen therapy device and L/min (3L O2 via NC)  Precautions Comment: telemetry  Vital Signs:  Vital Signs  Heart Rate: 104  Heart Rate Source: Monitor  Resp: 19  SpO2: 97 %  BP: 120/56  Pain:  Pain Assessment  Pain Assessment: 0-10  Pain Score: 5 - Moderate pain  Pain Location: Back (chest- RN made aware and vitals assessed)  Clinical Progression: Not changed    Objective    Cognition:  Cognition  Overall Cognitive Status: Impaired  Orientation Level: Disoriented to time  Following Commands: Follows one step commands with repetition  Safety Judgment: Decreased awareness of need for assistance  Cognition Comments: Pt reporting vision changes while seated on commode however when prompted further pt reports \"I never said that\". Pt reported chest pains and SOB while supine in bed- vitals assessed WNL, when prompted pt reports \"I think it's my back pain\" and then proceeded to perseverate on the purewick- unable to assess pt pain location or type. RN made aware of vitals taken throughout session, family present in room. Pt encouraged to use call light as needed if new pain arouse.  Safety/Judgement: Exceptions to WFL  Insight: Moderate  Impulsive: Mildly  Task Initiation: Initiates with cues  Organization: Moderately disorganized  Processing Speed: Delayed  Coordination:  Movements are Fluid and Coordinated: Yes  Activities of Daily Living: UE Dressing  UE Dressing Comments: Vaishali required to don front-opening garment from seated position, min verbal cues required for " sequencing.    LE Dressing  LE Dressing: Yes  LE Dressing Comments: maxA required to don/doff socks while seated EOB- pt reluctant to trial task independently requiring increased time and encouragement for task completion.    Toileting  Toileting Comments: pt able to complete anterior hygiene tasks with CGA in supported standing with FWW. Poor awareness observed with LB clothing management requiring maxA to don pants over hips in supported standing to decrease risk of falls.  Functional Standing Tolerance:     Bed Mobility/Transfers: Bed Mobility  Bed Mobility: Yes  Bed Mobility 1  Bed Mobility 1: Supine to sitting, Sitting to supine  Level of Assistance 1: Close supervision  Bed Mobility Comments 1: HOB moderately elevated and use of bed rail required for task completion.    Transfers  Transfer: Yes  Transfer 1  Trials/Comments 1: sit<>stands completed from standard EOB and toilet heights with FWW and CGA- min verbal/ tactile cues required for proper hand placement to increase safety and decrease risk of falls.    Toilet Transfers  Toilet Transfer From: Bed  Toilet Transfer Type: To and from  Toilet Transfer to: Standard toilet  Toilet Transfer Technique: Ambulating  Toilet Transfers: Contact guard  Toilet Transfers Comments: with FWW, decreased safety and insight observed requiring max verbal cues to decrease risk of falls.    Functional Mobility:  Functional Mobility  Functional Mobility Performed: Yes  Functional Mobility 1  Surface 1: Level tile  Device 1: Rolling walker  Assistance 1: Contact guard  Comments 1: Functional mobility trial completed <>bathroom with FWW and CGA to increase functional tolerance and assess safety awareness. Decreased insight and safety awareness observed with O2 line management and for donning of pants over hips prior to task completion to decrease risk of falls.      Outcome Measures:[facility] Daily Activity  Putting on and taking off regular lower body clothing: A lot  Bathing  (including washing, rinsing, drying): A lot  Putting on and taking off regular upper body clothing: A little  Toileting, which includes using toilet, bedpan or urinal: A lot  Taking care of personal grooming such as brushing teeth: A little  Eating Meals: A little  Daily Activity - Total Score: 15        Education Documentation  Body Mechanics, taught by OLVIN Sun at 4/30/2024  5:47 PM.  Learner: Family, Patient  Readiness: Acceptance  Method: Explanation, Demonstration  Response: Needs Reinforcement    ADL Training, taught by OLVIN Sun at 4/30/2024  5:47 PM.  Learner: Family, Patient  Readiness: Acceptance  Method: Explanation, Demonstration  Response: Needs Reinforcement    Education Comments  Education provided on role of OT/POC, safety awareness throughout functional tasks/transfers, importance of activity/ rest routine, EC/WS techniques, and use of call light for assistance. Questions, comments and concerns addressed regarding OT.      Goals:  Encounter Problems       Encounter Problems (Active)       OT Goals       ADL (Progressing)       Start:  04/29/24    Expected End:  05/10/24       Pt will complete ADL tasks with Mod I, using AE as needed, in order to complete self-care tasks.           Functional Mobility (Progressing)       Start:  04/29/24    Expected End:  05/10/24       Pt will perform functional mobility household distance at mod ind level with RW           Functional transfers (Progressing)       Start:  04/29/24    Expected End:  05/10/24       Pt will perform functional transfers at mod ind level with RW

## 2024-04-30 NOTE — PROGRESS NOTES
FOLLOWUP FOR: Hypoxic respiratory failure and COPD    SUBJECTIVE  Currently on 3 L nasal cannula sat 100%.  Breathing is significantly improved.  Fluid balance not recorded  PHYSICAL EXAM        4/29/2024    12:35 PM 4/29/2024     2:35 PM 4/29/2024     4:39 PM 4/29/2024     7:29 PM 4/29/2024    11:53 PM 4/30/2024     4:45 AM 4/30/2024     8:46 AM   Vitals   Systolic 117  123 139 110 109 118   Diastolic 66  55 55 50 55 49   Heart Rate 112 99 95 109 95 84 89   Temp 36.8 °C (98.2 °F)  36.7 °C (98.1 °F) 36.4 °C (97.5 °F) 36.7 °C (98.1 °F) 36.7 °C (98.1 °F) 36.7 °C (98.1 °F)   Resp 18  17 18 16 16 17   Weight (lb)      143.52    BMI      23.88 kg/m2    BSA (m2)      1.73 m2        Vital signs reviewed   NAD, awake and alert, chronically ill-appearing, O2   Lungs Symmetric excursions.  Auscultation - diminished but clear, no wheezing/rales/rhonchi.     Cor RSR No murmur, rub, gallop   Abd soft and nontender, no rebound or distention, no HS megaly   Ext no CCE   Neuro no focal deficits.  moves all extremities symmetrically.  speech normal.  affect normal    LABS    Serum chemistries are unremarkable.  CBC shows hemoglobin down to 8.1 g.  Platelet count 130    ASSESSMENT:    .  Chronic hypoxia  .  Syncope  .  History of DVT  .  COPD    PLAN    .  Breo/Spiriva  .  Await FEV1  .  Check room air oxygenation and nocturnal pulse oximetry  .  Xarelto  .  Torsemide  .  Consider outpatient polysomnography    Discussed with attending hospitalist    David Haddad MD, [facility]

## 2024-04-30 NOTE — NURSING NOTE
Report received from off-going RN, assumed patient care at this time. Patient resting quietly in bed, no signs of acute distress identified. Call light in reach, no needs expressed at this time. Continuing to monitor.

## 2024-04-30 NOTE — DISCHARGE INSTR - OTHER ORDERS
[facility]  [address], [place] [address] [phone]

## 2024-04-30 NOTE — PROGRESS NOTES
Attempted to call report to Saurabh YUSUF   told he is discharging a pt and will call  declining me to bring pt and do bedside report    Topical Sulfur Applications Counseling: Topical Sulfur Counseling: Patient counseled that this medication may cause skin irritation or allergic reactions.  In the event of skin irritation, the patient was advised to reduce the amount of the drug applied or use it less frequently.   The patient verbalized understanding of the proper use and possible adverse effects of topical sulfur application.  All of the patient's questions and concerns were addressed. Include Pregnancy/Lactation Warning?: No Tetracycline Counseling: Patient counseled regarding possible photosensitivity and increased risk for sunburn.  Patient instructed to avoid sunlight, if possible.  When exposed to sunlight, patients should wear protective clothing, sunglasses, and sunscreen.  The patient was instructed to call the office immediately if the following severe adverse effects occur:  hearing changes, easy bruising/bleeding, severe headache, or vision changes.  The patient verbalized understanding of the proper use and possible adverse effects of tetracycline.  All of the patient's questions and concerns were addressed. Patient understands to avoid pregnancy while on therapy due to potential birth defects. Minocycline Counseling: Patient advised regarding possible photosensitivity and discoloration of the teeth, skin, lips, tongue and gums.  Patient instructed to avoid sunlight, if possible.  When exposed to sunlight, patients should wear protective clothing, sunglasses, and sunscreen.  The patient was instructed to call the office immediately if the following severe adverse effects occur:  hearing changes, easy bruising/bleeding, severe headache, or vision changes.  The patient verbalized understanding of the proper use and possible adverse effects of minocycline.  All of the patient's questions and concerns were addressed. Topical Retinoid Pregnancy And Lactation Text: This medication is Pregnancy Category C. It is unknown if this medication is excreted in breast milk. Spironolactone Counseling: Patient advised regarding risks of diarrhea, abdominal pain, hyperkalemia, birth defects (for female patients), liver toxicity and renal toxicity. The patient may need blood work to monitor liver and kidney function and potassium levels while on therapy. The patient verbalized understanding of the proper use and possible adverse effects of spironolactone.  All of the patient's questions and concerns were addressed. Doxycycline Pregnancy And Lactation Text: This medication is Pregnancy Category D and not consider safe during pregnancy. It is also excreted in breast milk but is considered safe for shorter treatment courses. Azelaic Acid Pregnancy And Lactation Text: This medication is considered safe during pregnancy and breast feeding. Azelaic Acid Counseling: Patient counseled that medicine may cause skin irritation and to avoid applying near the eyes.  In the event of skin irritation, the patient was advised to reduce the amount of the drug applied or use it less frequently.   The patient verbalized understanding of the proper use and possible adverse effects of azelaic acid.  All of the patient's questions and concerns were addressed. Bactrim Counseling:  I discussed with the patient the risks of sulfa antibiotics including but not limited to GI upset, allergic reaction, drug rash, diarrhea, dizziness, photosensitivity, and yeast infections.  Rarely, more serious reactions can occur including but not limited to aplastic anemia, agranulocytosis, methemoglobinemia, blood dyscrasias, liver or kidney failure, lung infiltrates or desquamative/blistering drug rashes. Detail Level: Zone Tazorac Counseling:  Patient advised that medication is irritating and drying.  Patient may need to apply sparingly and wash off after an hour before eventually leaving it on overnight.  The patient verbalized understanding of the proper use and possible adverse effects of tazorac.  All of the patient's questions and concerns were addressed. Sarecycline Pregnancy And Lactation Text: This medication is Pregnancy Category D and not consider safe during pregnancy. It is also excreted in breast milk. High Dose Vitamin A Pregnancy And Lactation Text: High dose vitamin A therapy is contraindicated during pregnancy and breast feeding. Aklief counseling:  Patient advised to apply a pea-sized amount only at bedtime and wait 30 minutes after washing their face before applying.  If too drying, patient may add a non-comedogenic moisturizer.  The most commonly reported side effects including irritation, redness, scaling, dryness, stinging, burning, itching, and increased risk of sunburn.  The patient verbalized understanding of the proper use and possible adverse effects of retinoids.  All of the patient's questions and concerns were addressed. Dapsone Counseling: I discussed with the patient the risks of dapsone including but not limited to hemolytic anemia, agranulocytosis, rashes, methemoglobinemia, kidney failure, peripheral neuropathy, headaches, GI upset, and liver toxicity.  Patients who start dapsone require monitoring including baseline LFTs and weekly CBCs for the first month, then every month thereafter.  The patient verbalized understanding of the proper use and possible adverse effects of dapsone.  All of the patient's questions and concerns were addressed. Dapsone Pregnancy And Lactation Text: This medication is Pregnancy Category C and is not considered safe during pregnancy or breast feeding. Azithromycin Counseling:  I discussed with the patient the risks of azithromycin including but not limited to GI upset, allergic reaction, drug rash, diarrhea, and yeast infections. Winlevi Counseling:  I discussed with the patient the risks of topical clascoterone including but not limited to erythema, scaling, itching, and stinging. Patient voiced their understanding. Erythromycin Counseling:  I discussed with the patient the risks of erythromycin including but not limited to GI upset, allergic reaction, drug rash, diarrhea, increase in liver enzymes, and yeast infections. Isotretinoin Pregnancy And Lactation Text: This medication is Pregnancy Category X and is considered extremely dangerous during pregnancy. It is unknown if it is excreted in breast milk. Benzoyl Peroxide Counseling: Patient counseled that medicine may cause skin irritation and bleach clothing.  In the event of skin irritation, the patient was advised to reduce the amount of the drug applied or use it less frequently.   The patient verbalized understanding of the proper use and possible adverse effects of benzoyl peroxide.  All of the patient's questions and concerns were addressed. Erythromycin Pregnancy And Lactation Text: This medication is Pregnancy Category B and is considered safe during pregnancy. It is also excreted in breast milk. Sarecycline Counseling: Patient advised regarding possible photosensitivity and discoloration of the teeth, skin, lips, tongue and gums.  Patient instructed to avoid sunlight, if possible.  When exposed to sunlight, patients should wear protective clothing, sunglasses, and sunscreen.  The patient was instructed to call the office immediately if the following severe adverse effects occur:  hearing changes, easy bruising/bleeding, severe headache, or vision changes.  The patient verbalized understanding of the proper use and possible adverse effects of sarecycline.  All of the patient's questions and concerns were addressed. Spironolactone Pregnancy And Lactation Text: This medication can cause feminization of the male fetus and should be avoided during pregnancy. The active metabolite is also found in breast milk. Topical Clindamycin Pregnancy And Lactation Text: This medication is Pregnancy Category B and is considered safe during pregnancy. It is unknown if it is excreted in breast milk. Aklief Pregnancy And Lactation Text: It is unknown if this medication is safe to use during pregnancy.  It is unknown if this medication is excreted in breast milk.  Breastfeeding women should use the topical cream on the smallest area of the skin for the shortest time needed while breastfeeding.  Do not apply to nipple and areola. Topical Sulfur Applications Pregnancy And Lactation Text: This medication is Pregnancy Category C and has an unknown safety profile during pregnancy. It is unknown if this topical medication is excreted in breast milk. Birth Control Pills Counseling: Birth Control Pill Counseling: I discussed with the patient the potential side effects of OCPs including but not limited to increased risk of stroke, heart attack, thrombophlebitis, deep venous thrombosis, hepatic adenomas, breast changes, GI upset, headaches, and depression.  The patient verbalized understanding of the proper use and possible adverse effects of OCPs. All of the patient's questions and concerns were addressed. Benzoyl Peroxide Pregnancy And Lactation Text: This medication is Pregnancy Category C. It is unknown if benzoyl peroxide is excreted in breast milk. High Dose Vitamin A Counseling: Side effects reviewed, pt to contact office should one occur. Tazorac Pregnancy And Lactation Text: This medication is not safe during pregnancy. It is unknown if this medication is excreted in breast milk. Isotretinoin Counseling: Patient should get monthly blood tests, not donate blood, not drive at night if vision affected, not share medication, and not undergo elective surgery for 6 months after tx completed. Side effects reviewed, pt to contact office should one occur. Winlevi Pregnancy And Lactation Text: This medication is considered safe during pregnancy and breastfeeding. Doxycycline Counseling:  Patient counseled regarding possible photosensitivity and increased risk for sunburn.  Patient instructed to avoid sunlight, if possible.  When exposed to sunlight, patients should wear protective clothing, sunglasses, and sunscreen.  The patient was instructed to call the office immediately if the following severe adverse effects occur:  hearing changes, easy bruising/bleeding, severe headache, or vision changes.  The patient verbalized understanding of the proper use and possible adverse effects of doxycycline.  All of the patient's questions and concerns were addressed. Topical Clindamycin Counseling: Patient counseled that this medication may cause skin irritation or allergic reactions.  In the event of skin irritation, the patient was advised to reduce the amount of the drug applied or use it less frequently.   The patient verbalized understanding of the proper use and possible adverse effects of clindamycin.  All of the patient's questions and concerns were addressed. Bactrim Pregnancy And Lactation Text: This medication is Pregnancy Category D and is known to cause fetal risk.  It is also excreted in breast milk. Topical Retinoid counseling:  Patient advised to apply a pea-sized amount only at bedtime and wait 30 minutes after washing their face before applying.  If too drying, patient may add a non-comedogenic moisturizer. The patient verbalized understanding of the proper use and possible adverse effects of retinoids.  All of the patient's questions and concerns were addressed. Azithromycin Pregnancy And Lactation Text: This medication is considered safe during pregnancy and is also secreted in breast milk. Birth Control Pills Pregnancy And Lactation Text: This medication should be avoided if pregnant and for the first 30 days post-partum. Detail Level: Simple

## 2024-04-30 NOTE — PROGRESS NOTES
04/30/24 1014   Discharge Planning   Patient expects to be discharged to: Home with [facility]   Does the patient need discharge transport arranged? No     TCC spoke to patient and son via phone, updated regarding therapy recommendations. Patient agreeable to [facility] at this time. List provided Son to transport home upon discharge

## 2024-05-01 ENCOUNTER — HOME HEALTH ADMISSION (OUTPATIENT)
Dept: HOME HEALTH SERVICES | Facility: HOME HEALTH | Age: 84
End: 2024-05-01
Payer: MEDICARE

## 2024-05-01 ENCOUNTER — DOCUMENTATION (OUTPATIENT)
Dept: HOME HEALTH SERVICES | Facility: HOME HEALTH | Age: 84
End: 2024-05-01
Payer: MEDICARE

## 2024-05-01 VITALS
TEMPERATURE: 97.9 F | DIASTOLIC BLOOD PRESSURE: 50 MMHG | BODY MASS INDEX: 23.62 KG/M2 | SYSTOLIC BLOOD PRESSURE: 107 MMHG | WEIGHT: 141.76 LBS | RESPIRATION RATE: 17 BRPM | HEIGHT: 65 IN | HEART RATE: 86 BPM | OXYGEN SATURATION: 96 %

## 2024-05-01 LAB
ANION GAP SERPL CALC-SCNC: 7 MMOL/L
ANION GAP SERPL CALC-SCNC: 8 MMOL/L
BUN SERPL-MCNC: 18 MG/DL (ref 8–25)
BUN SERPL-MCNC: 20 MG/DL (ref 8–25)
CALCIUM SERPL-MCNC: 8.3 MG/DL (ref 8.5–10.4)
CALCIUM SERPL-MCNC: 8.5 MG/DL (ref 8.5–10.4)
CHLORIDE SERPL-SCNC: 106 MMOL/L (ref 97–107)
CHLORIDE SERPL-SCNC: 107 MMOL/L (ref 97–107)
CO2 SERPL-SCNC: 32 MMOL/L (ref 24–31)
CO2 SERPL-SCNC: 33 MMOL/L (ref 24–31)
CREAT SERPL-MCNC: 1 MG/DL (ref 0.4–1.6)
CREAT SERPL-MCNC: 1.1 MG/DL (ref 0.4–1.6)
EGFRCR SERPLBLD CKD-EPI 2021: 49 ML/MIN/1.73M*2
EGFRCR SERPLBLD CKD-EPI 2021: 55 ML/MIN/1.73M*2
GLUCOSE SERPL-MCNC: 106 MG/DL (ref 65–99)
GLUCOSE SERPL-MCNC: 141 MG/DL (ref 65–99)
HOLD SPECIMEN: NORMAL
POTASSIUM SERPL-SCNC: 3.6 MMOL/L (ref 3.4–5.1)
POTASSIUM SERPL-SCNC: 3.7 MMOL/L (ref 3.4–5.1)
SODIUM SERPL-SCNC: 145 MMOL/L (ref 133–145)
SODIUM SERPL-SCNC: 148 MMOL/L (ref 133–145)

## 2024-05-01 PROCEDURE — 94640 AIRWAY INHALATION TREATMENT: CPT

## 2024-05-01 PROCEDURE — 80048 BASIC METABOLIC PNL TOTAL CA: CPT | Mod: 91 | Performed by: INTERNAL MEDICINE

## 2024-05-01 PROCEDURE — 2500000002 HC RX 250 W HCPCS SELF ADMINISTERED DRUGS (ALT 637 FOR MEDICARE OP, ALT 636 FOR OP/ED): Performed by: INTERNAL MEDICINE

## 2024-05-01 PROCEDURE — 97535 SELF CARE MNGMENT TRAINING: CPT | Mod: GO

## 2024-05-01 PROCEDURE — 2500000001 HC RX 250 WO HCPCS SELF ADMINISTERED DRUGS (ALT 637 FOR MEDICARE OP): Performed by: INTERNAL MEDICINE

## 2024-05-01 PROCEDURE — 80048 BASIC METABOLIC PNL TOTAL CA: CPT | Performed by: INTERNAL MEDICINE

## 2024-05-01 PROCEDURE — 2500000004 HC RX 250 GENERAL PHARMACY W/ HCPCS (ALT 636 FOR OP/ED): Performed by: INTERNAL MEDICINE

## 2024-05-01 PROCEDURE — 9420000001 HC RT PATIENT EDUCATION 5 MIN

## 2024-05-01 PROCEDURE — 2500000005 HC RX 250 GENERAL PHARMACY W/O HCPCS: Performed by: INTERNAL MEDICINE

## 2024-05-01 PROCEDURE — 36415 COLL VENOUS BLD VENIPUNCTURE: CPT | Performed by: INTERNAL MEDICINE

## 2024-05-01 RX ORDER — TORSEMIDE 20 MG/1
20 TABLET ORAL DAILY
Start: 2024-05-02

## 2024-05-01 RX ADMIN — PANTOPRAZOLE SODIUM 40 MG: 40 TABLET, DELAYED RELEASE ORAL at 08:49

## 2024-05-01 RX ADMIN — Medication 2 L/MIN: at 07:00

## 2024-05-01 RX ADMIN — CARVEDILOL 3.12 MG: 3.12 TABLET, FILM COATED ORAL at 08:49

## 2024-05-01 RX ADMIN — Medication 1 TABLET: at 08:49

## 2024-05-01 RX ADMIN — LORATADINE 10 MG: 10 TABLET ORAL at 08:49

## 2024-05-01 RX ADMIN — IPRATROPIUM BROMIDE AND ALBUTEROL SULFATE 3 ML: 2.5; .5 SOLUTION RESPIRATORY (INHALATION) at 07:46

## 2024-05-01 RX ADMIN — BUDESONIDE INHALATION 0.5 MG: 0.5 SUSPENSION RESPIRATORY (INHALATION) at 07:46

## 2024-05-01 ASSESSMENT — COGNITIVE AND FUNCTIONAL STATUS - GENERAL
DAILY ACTIVITIY SCORE: 18
HELP NEEDED FOR BATHING: A LITTLE
PERSONAL GROOMING: A LITTLE
WALKING IN HOSPITAL ROOM: A LITTLE
CLIMB 3 TO 5 STEPS WITH RAILING: A LITTLE
STANDING UP FROM CHAIR USING ARMS: A LITTLE
DAILY ACTIVITIY SCORE: 24
DRESSING REGULAR UPPER BODY CLOTHING: A LITTLE
MOBILITY SCORE: 19
EATING MEALS: A LITTLE
TURNING FROM BACK TO SIDE WHILE IN FLAT BAD: A LITTLE
DRESSING REGULAR LOWER BODY CLOTHING: A LITTLE
TOILETING: A LITTLE
MOVING TO AND FROM BED TO CHAIR: A LITTLE

## 2024-05-01 ASSESSMENT — PAIN SCALES - WONG BAKER: WONGBAKER_NUMERICALRESPONSE: NO HURT

## 2024-05-01 ASSESSMENT — PAIN SCALES - GENERAL
PAINLEVEL_OUTOF10: 0 - NO PAIN
PAINLEVEL_OUTOF10: 3

## 2024-05-01 ASSESSMENT — ACTIVITIES OF DAILY LIVING (ADL): HOME_MANAGEMENT_TIME_ENTRY: 19

## 2024-05-01 ASSESSMENT — PAIN - FUNCTIONAL ASSESSMENT: PAIN_FUNCTIONAL_ASSESSMENT: 0-10

## 2024-05-01 NOTE — DISCHARGE SUMMARY
Discharge Diagnosis  Syncope  Acute on chronic hypoxic respiratory failure  Mitral regurgitation status post transcatheter [id number] mitral valve repair  Chronic obstructive pulmonary disease  History of deep vein thrombosis of the left lower extremity  Hypernatremia    Issues Requiring Follow-Up      Discharge Meds     Your medication list        CONTINUE taking these medications        Instructions Last Dose Given Next Dose Due   albuterol 90 mcg/actuation inhaler           atorvastatin 40 mg tablet  Commonly known as: Lipitor           carvedilol 3.125 mg tablet  Commonly known as: Coreg      Take 1 tablet (3.125 mg) by mouth 2 times a day.       loratadine 10 mg tablet  Commonly known as: Claritin           Multi Complete with Iron  mg-mcg tablet  Generic drug: multivitamin with minerals           nitroglycerin 0.4 mg SL tablet  Commonly known as: Nitrostat           oxygen gas therapy  Commonly known as: O2           pantoprazole 40 mg EC tablet  Commonly known as: ProtoNix      TAKE 1 TABLET BY MOUTH TWICE A DAY       rivaroxaban 20 mg tablet  Commonly known as: Xarelto      Take 1 tablet (20 mg) by mouth once daily in the evening. Take with meals. Take with food.       tiZANidine 2 mg tablet  Commonly known as: Zanaflex      Take 1 tablet (2 mg) by mouth every 8 hours if needed for muscle spasms.       torsemide 20 mg tablet  Commonly known as: Demadex  Start taking on: May 2, 2024      Take 1 tablet (20 mg) by mouth once daily. Do not fill before May 2, 2024.       Trelegy Ellipta 100-62.5-25 mcg blister with device  Generic drug: fluticasone-umeclidin-vilanter      Inhale 1 puff once daily.              STOP taking these medications      empagliflozin 10 mg  Commonly known as: Jardiance        spironolactone 25 mg tablet  Commonly known as: Aldactone                  Where to Get Your Medications        Information about where to get these medications is not yet available    Ask your nurse or  doctor about these medications  torsemide 20 mg tablet         Test Results Pending At Discharge  Pending Labs       No current pending labs.            Hospital Course   84-year-old female patient, presented to the [facility], operated by [facility] emergency department after a fall.  She had recently undergone mitral valve repair on 4/25/2024.  At home she had an episode in which she was attempting to go to the bathroom and ended up on the floor.  She had blood pressures as low as 77/39.  She was admitted and started on intravenous fluids.  Her torsemide, spironolactone and Jardiance were put on hold.  She clinically improved.  After blood pressure improved, she was restarted on torsemide.  She remained off Jardiance and spironolactone.  She had reportedly been on home oxygen prior to admission although it was unclear how much.  She was evaluated again for home oxygen.  She was saturating at 84% on room air and 94% on 1 L/min at rest.  While ambulating, oxygen had to be increased to 2 L/min and her oxygen saturation was then 95%.  Therefore she required home oxygen.  We did come to find out that her previous prescription was for oxygen at nighttime only, so a new prescription was provided to arrange for home oxygen-continuous oxygen at 1 L/min and 2 pulse dose with exertion with portable oxygen concentrator  She will follow-up with her primary care physician.  She will continue torsemide.  Time spent arranging for discharge was 35 minutes.      Outpatient Follow-Up  Future Appointments   Date Time Provider Department [facility]   5/6/2024  2:30 PM FELA Devine-CNP [id number] [facility]   5/17/2024  2:20 PM Gee Dupree MD [id number] [facility]   5/24/2024  1:00 PM  MAC [id number] CARD1 [id number] [facility]   7/16/2024  1:45 PM Tuan Foss DO [id number] [facility]   4/25/2025  8:00 AM  MAC [id number] CARD1 [id number] [facility]         Vishnu Ferguson MD

## 2024-05-01 NOTE — DOCUMENTATION CLARIFICATION NOTE
"    PATIENT:               RUDOLPH DÍAZ  ACCT #:                  [id number]  MRN:                       [id number]  :                       1940  ADMIT DATE:       2024 6:25 AM  DISCH DATE:        2024 3:32 PM  RESPONDING PROVIDER #:        [id number]          PROVIDER RESPONSE TEXT:    Acute Kidney Injury ruled in as evidenced by Elevated creatinine levels compared to her baseline    CDI QUERY TEXT:    Clarification    Instruction:    Based on your assessment of the patient and the clinical information, please provide the requested documentation by clicking on the appropriate radio button and enter any additional information if prompted.    Question: Please clarify the diagnosis of Acute Kidney Injury    When answering this query, please exercise your independent professional judgment. The fact that a question is being asked, does not imply that any particular answer is desired or expected.    The patient's clinical indicators include:  Clinical Information: From H&P on 24- \"Rudolph Díaz is a 84 y.o. female with PMHx of COPD, GERD, severe MR, HTN, CAD -with remote inferior wall myocardial infarction and drug-eluting stent placement, hypothyroidism  admitted to the CICU from the CATH lab post mitral clip procedure for close monitoring    Documented Diagnosis:    From H&P on 4/15/24-  \"Non-Oliguric AYSE  - Cr. 1.14 (baseline 0.9) on presentation  - Likely prerenal  - we will continue to monitor for now\"      Clinical Indicators and Documentation:  -Vital Signs: HR 91, /97, RR 12, SpO2 95% on 4L NC  -Baseline Creatinine: 0.9  -SCr lab values: 1.14, 1.06  -Urine Output:  Intake/Output Summary (Last 24 hours) at 2024 0935  Last data filed at 2024 0200  Gross per 24 hour  Intake 1880 ml  Output 440 ml  Net 1440 ml    Treatment: Lactated Ringer Bolus during procedure    Risk Factors: Mitraclip Placement  Options provided:  -- Acute Kidney Injury is ruled out  -- Acute Kidney Injury ruled " in as evidenced by, Please specify additional information below  -- Other - I will add my own diagnosis  -- Refer to Clinical Documentation Reviewer    Query created by: Jennifer Mccarthy on 4/26/2024 11:54 AM      Electronically signed by:  LEXIE BORDEN MD 5/1/2024 3:20 PM

## 2024-05-01 NOTE — DOCUMENTATION CLARIFICATION NOTE
"    PATIENT:               RUDOLPH DÍAZ  ACCT #:                  [id number]  MRN:                       [id number]  :                       1940  ADMIT DATE:       2024 6:25 AM  DISCH DATE:        2024 3:32 PM  RESPONDING PROVIDER #:        [id number]          PROVIDER RESPONSE TEXT:    Chronic Hypoxemic and Hypercapnic Respiratory Failure    CDI QUERY TEXT:    Clarification    Instruction:    Based on your assessment of the patient and the clinical information, please provide the requested documentation by clicking on the appropriate radio button and enter any additional information if prompted.    Question: Is there a diagnosis indicative of the clinical information    When answering this query, please exercise your independent professional judgment. The fact that a question is being asked, does not imply that any particular answer is desired or expected.    The patient's clinical indicators include:  Clinical Information: From h7P on 4/15/24-\"84 y.o. female with PMHx of COPD, GERD, severe MR, HTN, CAD -with remote inferior wall myocardial infarction and drug-eluting stent placement, hypothyroidism admitted to the CICU from the CATH lab post mitral clip procedure for close monitoring. HDS on arrival to the CICU\"    Clinical Indicators:  From H&P on 24-  \"PULMONARY  COPD  chronic, oxygen dependent, on 2L baseline  multiple ED visits in the past for worsening SOB  continue home Albuterol inhaler PRN  continue Trelegy Ellipta\"    ABG on 24 - 7.    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

## 2024-05-01 NOTE — CARE PLAN
The patient's goals for the shift include      The clinical goals for the shift include monitor for SOB

## 2024-05-01 NOTE — PROGRESS NOTES
"Anitha Welch is a 84 y.o. female on day 4 of admission presenting with Syncope and collapse. She underwent mitral valve repair at [facility] on 4/25.     Subjective   She was sitting in a chair, awake, alert, no acute complaints. She was on nasal oxygen.  She reported     Objective     Physical Exam  General: awake, alert, oriented, responsive  Cardiovascular: regular, normal S1 and S2  Lungs: good air entry bilaterally, clear to auscultation  Abdomen: soft, nontender, bowel sounds present, normoactive  Extremities: no peripheral cyanosis, no pedal edema  Neuro: alert, oriented x 3, no focal weakness      Last Recorded Vitals  Blood pressure 107/50, pulse 86, temperature 36.6 °C (97.9 °F), temperature source Oral, resp. rate 17, height 1.651 m (5' 5\"), weight 64.3 kg (141 lb 12.1 oz), SpO2 96%.  Intake/Output last 3 Shifts:  I/O last 3 completed shifts:  In: 730 (11.4 mL/kg) [P.O.:730]  Out: 300 (4.7 mL/kg) [Urine:300 (0.1 mL/kg/hr)]  Weight: 64.3 kg     Relevant Results  Lab Results   Component Value Date    WBC 6.9 04/30/2024    HGB 8.1 (L) 04/30/2024    HCT 26.4 (L) 04/30/2024    MCV 91 04/30/2024     (L) 04/30/2024     Lab Results   Component Value Date    GLUCOSE 141 (H) 05/01/2024    CALCIUM 8.5 05/01/2024     05/01/2024    K 3.6 05/01/2024    CO2 32 (H) 05/01/2024     05/01/2024    BUN 18 05/01/2024    CREATININE 1.00 05/01/2024     Scheduled medications  atorvastatin, 40 mg, oral, Nightly  budesonide, 0.5 mg, nebulization, BID  carvedilol, 3.125 mg, oral, BID  [Held by provider] empagliflozin, 10 mg, oral, Daily  ipratropium-albuteroL, 3 mL, nebulization, TID  loratadine, 10 mg, oral, Daily  multivitamin with minerals, 1 tablet, oral, Daily  oxygen, , inhalation, q8h  pantoprazole, 40 mg, oral, BID  polyethylene glycol, 17 g, oral, BID  rivaroxaban, 20 mg, oral, Daily with evening meal  torsemide, 20 mg, oral, Daily      Continuous medications     PRN medications  PRN medications: " acetaminophen, ipratropium-albuteroL      Assessment/Plan   Syncope  Probably secondary to overdiuresis resulting in hypotension which has improved.     Acute on chronic hypoxic respiratory failure  Improved.     Hypernatremia  Sodium was up to 148 this morning, up from 142 yesterday, a repeat sodium was 145. Torsemide was held today.     Mitral regurgitation  S/p transcatheter edge to edge mitral valve repair done 4/25/24  Restarted on torsemide by cardiology    COPD  Stable    History of DVT of LLE  On xarelto    PT recommended moderate intensity rehab at discharge. She will go home with [facility].   She was evaluated for home oxygen yesterday.  She was 84% on room air at rest and 94% on 1 L/min at rest.  Up ambulation, oxygen had to be increased to 2 L/min on her oxygen saturation was then 95%.. Therefore, she needs home oxygen. Overnight oximetry was not required as she already has a daytime oxygen requirement, so not done. On further discussion with the patient today, she stated that she does have oxygen at home.  Medically stable for discharge, may resume torsemide tomorrow.     Vishnu Ferguson MD

## 2024-05-01 NOTE — HH CARE COORDINATION
Home Care received a referral for Physical Therapy and Occupational Therapy. Unfortunately, we are unable to accept and process the referral at this time.    Patients, please reach out to the referring provider or your PCP to assist in obtaining an alternative home care agency and/or guidance to meet your needs.    Providers, please reach out to  Home Care with any questions regarding the declined referral.

## 2024-05-01 NOTE — PROGRESS NOTES
05/01/24 0820   Discharge Planning   Patient expects to be discharged to: Home with [facility]   Does the patient need discharge transport arranged? No     Will need external referral for home health upon discharge

## 2024-05-01 NOTE — TELEPHONE ENCOUNTER
Patient discharged from [facility] today, [facility] to follow. Patient has a [facility] Call[facility] visit scheduled with Milana Hernadez NP 5/6/24 at 2:30 pm.

## 2024-05-01 NOTE — PROGRESS NOTES
Occupational Therapy    OT Treatment    Patient Name: Anitha Welch  MRN: [id number]  Today's Date: 5/1/2024  Time Calculation  Start Time: 1049  Stop Time: 1108  Time Calculation (min): 19 min         Assessment:  OT Assessment: Good progress made this date towards ADL completion, functional mobility, and transfer goals stated in the POC. The pt required distanst supervisision for all task besides functional mobility . The pt required close supervison  due to oxygen line management . The pt would be a good canidate for discharge from acute OT .  End of Session Communication: Bedside nurse  End of Session Patient Position: Alarm off, not on at start of session, Up in chair     Plan:  Treatment Interventions: ADL retraining, Functional transfer training, UE strengthening/ROM, Endurance training, Cognitive reorientation, Patient/family training, Equipment evaluation/education  OT Frequency: 3 times per week  OT Discharge Recommendations: No further acute OT  Equipment Recommended upon Discharge: Wheeled walker  OT - OK to Discharge: Yes  Treatment Interventions: ADL retraining, Functional transfer training, UE strengthening/ROM, Endurance training, Cognitive reorientation, Patient/family training, Equipment evaluation/education    Subjective   Previous Visit Info:  OT Last Visit  OT Received On: 05/01/24  General:  General  Reason for Referral: impaired ADLs s/p syncopal episode and fall  Past Medical History Relevant to Rehab: recent Mitral Clip, CHF, AAA, HTN, MI, CAD, CVA, carotid stenosis, CKD, GERD, GI Bleed, PVD, Seizure  Prior to Session Communication: Bedside nurse  Patient Position Received: Bed, 1 rail up, Alarm off, not on at start of session  Preferred Learning Style: verbal, visual  General Comment: Nursing cleared pt for therapy treatment. Pt was agreeable and cooperative throughout session  Precautions:  Hearing/Visual Limitations: WFL  Medical Precautions: Fall precautions, Oxygen therapy device and  L/min (2L02)  Vital Signs:     Pain:  Pain Assessment  Pain Assessment: 0-10  Pain Score: 3    Objective    Cognition:  Cognition  Orientation Level: Disoriented to time  Safety/Judgement: Within Functional Limits  Insight: Within function limits  Impulsive: Within functional limits  Processing Speed: Within funtional limits  Coordination:  Movements are Fluid and Coordinated: Yes  Activities of Daily Living: Grooming  Grooming Level of Assistance: Distant supervision  Grooming Where Assessed: Standing sinkside  Grooming Comments: The pt completed Oral  hygiene , and hair combing task while standing sinkside. This task required distant supervision to complete .    UE Dressing  UE Dressing Level of Assistance: Modified independent  UE Dressing Where Assessed: Chair (Bedside chair)  UE Dressing Comments: The pt completed UB donning of a shirt and sweater , while seated in a bedside chair . The pt  was able to complete the task with MOD Williams.    LE Dressing  Pants Level of Assistance: Modified independent  LE Dressing Where Assessed: Chair (Bedside chair)  LE Dressing Comments: The pt completed the donning of LB pants while at the bedside chair. The pt demonstrated the ability to insert legs and stand up from the chair to pull pants up with MOD I .    Toileting  Toileting Level of Assistance: Modified independent  Where Assessed: Toilet  Toileting Comments: The pt demonstrated the abilty to sit and stand from the toilet , and conduct perineal hygiene with distant supervision .  Functional Standing Tolerance:     Bed Mobility/Transfers: Transfers  Transfer: Yes  Transfer 1  Transfer From 1: Sit to, Chair with arms to  Transfer to 1: Chair with arms, Stand  Technique 1: Stand to sit, Sit to stand  Transfer Level of Assistance 1: Distant supervision  Trials/Comments 1: The pt demonstrated the ability to stand to and from a bedside chair with distant supervision .      Functional Mobility:  Functional  Mobility  Functional Mobility Performed: Yes  Functional Mobility 1  Surface 1: Level tile  Assistance 1: Close supervision  Comments 1: The pt demonstrated the ability to complete functional mobility to the bathroom, and back to the bedside chair. This task was completed with close supervision due to 02 line .      Outcome Measures:[facility] Daily Activity  Putting on and taking off regular lower body clothing: None  Bathing (including washing, rinsing, drying): None  Putting on and taking off regular upper body clothing: None  Toileting, which includes using toilet, bedpan or urinal: None  Taking care of personal grooming such as brushing teeth: None  Eating Meals: None  Daily Activity - Total Score: 24        Education Documentation  ADL Training, taught by RYAN Madrid at 5/1/2024  1:11 PM.  Learner: Patient  Readiness: Acceptance  Method: Explanation, Demonstration  Response: Demonstrated Understanding, Verbalizes Understanding    Education Comments  Education provided on OT POC, importance of participation in OT, safety awareness throughout functional tasks/ transfers and use of call light for assistance.           Goals:  Encounter Problems       Encounter Problems (Active)       OT Goals       ADL (Progressing)       Start:  04/29/24    Expected End:  05/10/24       Pt will complete ADL tasks with Mod I, using AE as needed, in order to complete self-care tasks.           Functional Mobility (Progressing)       Start:  04/29/24    Expected End:  05/10/24       Pt will perform functional mobility household distance at mod ind level with RW           Functional transfers (Progressing)       Start:  04/29/24    Expected End:  05/10/24       Pt will perform functional transfers at mod ind level with RW                  RYAN MADRID

## 2024-05-01 NOTE — PROGRESS NOTES
FOLLOWUP FOR: Hypoxic respiratory failure and COPD    SUBJECTIVE    Currently on 3 L nasal cannula sat 100%.  Breathing is significantly improved.  Fluid balance not recorded    PHYSICAL EXAM        4/30/2024    12:22 PM 4/30/2024     2:39 PM 4/30/2024     2:59 PM 4/30/2024     7:45 PM 4/30/2024    11:15 PM 5/1/2024     3:29 AM 5/1/2024     6:02 AM   Vitals   Systolic 97 120 120 109 107 107    Diastolic 55 56 56 49 45 50    Heart Rate 99 104 97 92 86 86    Temp 37.2 °C (99 °F)  36.8 °C (98.2 °F) 36.7 °C (98.1 °F) 36.6 °C (97.9 °F) 36.6 °C (97.9 °F)    Resp 18 19 17 18 17 17    Weight (lb)       141.76   BMI       23.59 kg/m2   BSA (m2)       1.72 m2       Vital signs reviewed   NAD, awake and alert, chronically ill-appearing, O2   Lungs Symmetric excursions.  Auscultation - diminished but clear, no wheezing/rales/rhonchi.     Cor RSR No murmur, rub, gallop   Abd soft and nontender, no rebound or distention, no HS megaly   Ext no CCE   Neuro no focal deficits.  moves all extremities symmetrically.  speech normal.  affect normal    LABS    Serum chemistries are unremarkable.  CBC shows hemoglobin down to 8.1 g.  Platelet count 130  Spirometry shows a severe obstructive ventilatory defect    ASSESSMENT:    .  Chronic hypoxia  .  Syncope  .  History of DVT  .  COPD    PLAN    .  Breo/Spiriva  .  Patient qualified for home oxygen at 1 L at rest and 2 L with exertion  .  Xarelto  .  Torsemide  .  Consider outpatient polysomnography  .  Okay to discharge from pulmonary standpoint.  Will see patient in office 3 weeks.  .  Discussed with attending hospitalist    Thank you      David Haddad MD, [facility]

## 2024-05-02 LAB
ATRIAL RATE: 86 BPM
P AXIS: 65 DEGREES
P OFFSET: 189 MS
P ONSET: 133 MS
PR INTERVAL: 152 MS
Q ONSET: 209 MS
QRS COUNT: 15 BEATS
QRS DURATION: 108 MS
QT INTERVAL: 398 MS
QTC CALCULATION(BAZETT): 476 MS
QTC FREDERICIA: 449 MS
R AXIS: -41 DEGREES
T AXIS: 35 DEGREES
T OFFSET: 408 MS
VENTRICULAR RATE: 86 BPM

## 2024-05-03 ENCOUNTER — TELEPHONE (OUTPATIENT)
Dept: PRIMARY CARE | Facility: CLINIC | Age: 84
End: 2024-05-03
Payer: MEDICARE

## 2024-05-06 ENCOUNTER — OFFICE VISIT (OUTPATIENT)
Dept: PRIMARY CARE | Facility: CLINIC | Age: 84
End: 2024-05-06
Payer: MEDICARE

## 2024-05-06 VITALS
TEMPERATURE: 97.3 F | HEIGHT: 65 IN | BODY MASS INDEX: 23.32 KG/M2 | RESPIRATION RATE: 18 BRPM | DIASTOLIC BLOOD PRESSURE: 54 MMHG | WEIGHT: 140 LBS | HEART RATE: 75 BPM | OXYGEN SATURATION: 97 % | SYSTOLIC BLOOD PRESSURE: 104 MMHG

## 2024-05-06 DIAGNOSIS — Z95.818 S/P MITRAL VALVE CLIP IMPLANTATION: ICD-10-CM

## 2024-05-06 DIAGNOSIS — R55 SYNCOPE, UNSPECIFIED SYNCOPE TYPE: ICD-10-CM

## 2024-05-06 DIAGNOSIS — I34.0 NONRHEUMATIC MITRAL VALVE REGURGITATION: ICD-10-CM

## 2024-05-06 DIAGNOSIS — J96.21 ACUTE ON CHRONIC RESPIRATORY FAILURE WITH HYPOXIA (MULTI): Primary | ICD-10-CM

## 2024-05-06 DIAGNOSIS — J44.9 CHRONIC OBSTRUCTIVE PULMONARY DISEASE, UNSPECIFIED COPD TYPE (MULTI): ICD-10-CM

## 2024-05-06 DIAGNOSIS — Z98.890 S/P MITRAL VALVE CLIP IMPLANTATION: ICD-10-CM

## 2024-05-06 LAB
ATRIAL RATE: 88 BPM
ATRIAL RATE: 89 BPM
P AXIS: 68 DEGREES
P AXIS: 80 DEGREES
P OFFSET: 211 MS
P OFFSET: 219 MS
P ONSET: 152 MS
P ONSET: 158 MS
PR INTERVAL: 142 MS
PR INTERVAL: 148 MS
Q ONSET: 226 MS
Q ONSET: 229 MS
QRS COUNT: 14 BEATS
QRS COUNT: 15 BEATS
QRS DURATION: 102 MS
QRS DURATION: 102 MS
QT INTERVAL: 374 MS
QT INTERVAL: 382 MS
QTC CALCULATION(BAZETT): 452 MS
QTC CALCULATION(BAZETT): 464 MS
QTC FREDERICIA: 425 MS
QTC FREDERICIA: 435 MS
R AXIS: -17 DEGREES
R AXIS: -32 DEGREES
T AXIS: 64 DEGREES
T AXIS: 67 DEGREES
T OFFSET: 413 MS
T OFFSET: 420 MS
VENTRICULAR RATE: 88 BPM
VENTRICULAR RATE: 89 BPM

## 2024-05-06 PROCEDURE — 1111F DSCHRG MED/CURRENT MED MERGE: CPT | Performed by: NURSE PRACTITIONER

## 2024-05-06 PROCEDURE — 99349 HOME/RES VST EST MOD MDM 40: CPT | Performed by: NURSE PRACTITIONER

## 2024-05-06 PROCEDURE — 3078F DIAST BP <80 MM HG: CPT | Performed by: NURSE PRACTITIONER

## 2024-05-06 PROCEDURE — 1159F MED LIST DOCD IN RCRD: CPT | Performed by: NURSE PRACTITIONER

## 2024-05-06 PROCEDURE — 3074F SYST BP LT 130 MM HG: CPT | Performed by: NURSE PRACTITIONER

## 2024-05-06 PROCEDURE — 1160F RVW MEDS BY RX/DR IN RCRD: CPT | Performed by: NURSE PRACTITIONER

## 2024-05-06 PROCEDURE — 1126F AMNT PAIN NOTED NONE PRSNT: CPT | Performed by: NURSE PRACTITIONER

## 2024-05-06 PROCEDURE — 1157F ADVNC CARE PLAN IN RCRD: CPT | Performed by: NURSE PRACTITIONER

## 2024-05-06 ASSESSMENT — PAIN SCALES - GENERAL: PAINLEVEL: 0-NO PAIN

## 2024-05-06 NOTE — PROGRESS NOTES
"Subjective   Patient ID: Anitha Welch is a 84 y.o. female who presents for Hospital Follow-up (Syncope, status post Mitral ARMANI ).    Visit for 85 y/o female seen today in private home, accompanied by her spouse and son for Hospital follow up.     Patient is sitting on couch this afternoon. She is alert, oriented, able to answer simple questions regarding her health. Her family will also assist with the HPI. Patient underwent a mitral ARMANI on 4/25/24. She was discharged home on 4/26. She reports being home for several hours and passed out so her family called 911. Per hospital course: 84-year-old female patient, presented to the [facility] emergency department after a fall.  She had recently undergone mitral valve repair on 4/25/2024.  At home she had an episode in which she was attempting to go to the bathroom and ended up on the floor.  She had blood pressures as low as 77/39. She was admitted and started on intravenous fluids.  Her torsemide, spironolactone and Jardiance were put on hold.  She clinically improved.  After blood pressure improved, she was restarted on torsemide.  She remained off Jardiance and spironolactone. She had reportedly been on home oxygen prior to admission although it was unclear how much.  She was evaluated again for home oxygen.  She was saturating at 84% on room air and 94% on 1 L/min at rest.  While ambulating, oxygen had to be increased to 2 L/min and her oxygen saturation was then 95%.  Therefore she required home oxygen.  We did come to find out that her previous prescription was for oxygen at nighttime only, so a new prescription was provided to arrange for home oxygen-continuous oxygen at 1 L/min and 2 pulse dose with exertion with portable oxygen concentrator.     Patient reports feeling very tired today. She had physical therapy this morning and reports that she had to do \"36\" exercises. She continuously repeats this. She is scheduled for a follow up with her cardiac surgeon " for a virtual appointment on 5/9. Pt reports that she has been getting nose bleeds daily. She has nasal saline on her table but has not used it. She denies any appetite changes. She feels she is eating and drinking an adequate amount. She denies abdominal pain, nausea, vomiting. Denies bowel or bladder concerns today.     Home Visit:         Medically necessary due to: Illness or condition that results in activity lmitation or restriction that impacts the ability to leave home such as:, Illness or condition that results in activity lmitation or restriction that impacts the ability to leave home such as:, unsteady gait/poor balance.         Current Outpatient Medications:     albuterol 90 mcg/actuation inhaler, Inhale 1 puff every 4 hours if needed., Disp: , Rfl:     atorvastatin (Lipitor) 40 mg tablet, Take 1 tablet (40 mg) by mouth once every 24 hours., Disp: , Rfl:     carvedilol (Coreg) 3.125 mg tablet, Take 1 tablet (3.125 mg) by mouth 2 times a day., Disp: 60 tablet, Rfl: 2    loratadine (Claritin) 10 mg tablet, Take 1 tablet (10 mg) by mouth once daily., Disp: , Rfl:     multivitamin-iron-folic acid (Multi Complete with Iron)  mg-mcg tablet tablet, Take 1 tablet by mouth once daily., Disp: , Rfl:     nitroglycerin (Nitrostat) 0.4 mg SL tablet, Place 1 tablet (0.4 mg) under the tongue every 5 minutes if needed., Disp: , Rfl:     oxygen (O2) gas therapy, Inhale 2 L/min continuously. Indications: sob, Disp: , Rfl:     oxygen (O2) gas therapy, Inhale 1 each continuously. Continuous via nasal cannula at 1 L/min and 2 pulse dose with exertion with portable oxygen concentrator, Disp: 1 each, Rfl: 0    pantoprazole (ProtoNix) 40 mg EC tablet, TAKE 1 TABLET BY MOUTH TWICE A DAY, Disp: 180 tablet, Rfl: 4    rivaroxaban (Xarelto) 20 mg tablet, Take 1 tablet (20 mg) by mouth once daily in the evening. Take with meals. Take with food., Disp: 30 tablet, Rfl: 11    tiZANidine (Zanaflex) 2 mg tablet, Take 1 tablet (2  "mg) by mouth every 8 hours if needed for muscle spasms., Disp: 30 tablet, Rfl: 0    torsemide (Demadex) 20 mg tablet, Take 1 tablet (20 mg) by mouth once daily. Do not fill before May 2, 2024., Disp: , Rfl:     Dayron Humphreysta 100-62.5-25 mcg blister with device, Inhale 1 puff once daily., Disp: 1 each, Rfl: 3     Review of Systems  Constitutional: Positive for fatigue. Negative for appetite changes, fever, chills, weight loss.   HENT:  Negative for trouble swallowing.    Respiratory:  Positive for shortness of breath with exertion, oxygen dependent, cough. Negative for wheezing.    Cardiovascular: Positive for occasional chest discomfort. Negative for palpitations.   Gastrointestinal: Negative for abdominal for pain, diarrhea, constipation, nausea and vomiting.   Endocrine: Positive for thyroid disease  Genitourinary:  Negative for difficulty urinating.   Musculoskeletal:  Positive for gait problem  Neurological:  Positive for prior stroke, left sided neuropathy. Negative for dizziness and light-headedness.   Psychiatric/Behavioral: Positive for anxiety    Objective   /54 (BP Location: Left arm, Patient Position: Sitting, BP Cuff Size: Adult)   Pulse 75   Temp 36.3 °C (97.3 °F) (Temporal)   Resp 18   Ht 1.651 m (5' 5\")   Wt 63.5 kg (140 lb)   SpO2 97%   BMI 23.30 kg/m²     Physical Exam     General: No acute distress     Appearance: She is alert, chronically ill. Nontoxic.     Comments: Sitting on couch  HENT:      Head: Normocephalic and atraumatic.      Nose: No congestion or rhinorrhea.      Mouth/Throat:      Mouth: Mucous membranes are moist.      Pharynx: Oropharynx is clear.   Eyes:      General: No scleral icterus.        Right eye: No discharge.         Left eye: No discharge.      Extraocular Movements: Extraocular movements intact.   Neck:      Vascular: No carotid bruit.      Comments: No obvious JVD  Cardiovascular:      Rate and Rhythm: Normal rate.      Pulses: Normal pulses.      No " friction rub. No gallop.   Pulmonary:      Breath sounds: Diminished.      Comments: No wheezing, rales or rhonchi. Oxygen 2L NC  Abdominal:      General: There is no distension.      Tenderness: There is no abdominal tenderness.   Musculoskeletal:      Cervical back Neck supple. No rigidity.      Right lower leg: Trace edema     Left lower le+ LE edema  Lymphadenopathy:      Cervical: No cervical adenopathy.   Skin:     Capillary Refill: Capillary refill takes less than 2 seconds.   Neurological:      General: No focal deficit present.      Mental Status: She is alert. Mental status is at baseline.     Cranial Nerves: No cranial nerve deficit.      Motor: generalized weakness  Psychiatric:         Behavior: Behavior normal.         Thought Content: Thought content normal.         Judgment: Judgment normal.     Assessment/Plan   Diagnoses and all orders for this visit:  Acute on chronic respiratory failure with hypoxia (Multi)  Chronic obstructive pulmonary disease, unspecified COPD type (Multi)  -chronic, oxygen dependent   -continue trelegy ellipta, albuterol inhaler as needed  -followed by pulmonology    Syncope, unspecified syncope type  -resolved  -status post hospitalization with improvement  -continue therapy services for increased strength and conditioning     Nonrheumatic mitral valve regurgitation  S/P mitral valve clip implantation  -patient underwent mitral ARMANI on   -follow up with surgeon for virtual visit this week as scheduled    Patient delicately stable. Advised pt/family to contact house calls office with any acute concerns or medication needs.        FELA Devine-CNP

## 2024-05-09 ENCOUNTER — APPOINTMENT (OUTPATIENT)
Dept: CARDIOLOGY | Facility: CLINIC | Age: 84
End: 2024-05-09
Payer: MEDICARE

## 2024-05-17 ENCOUNTER — OFFICE VISIT (OUTPATIENT)
Dept: PRIMARY CARE | Facility: CLINIC | Age: 84
End: 2024-05-17
Payer: MEDICARE

## 2024-05-17 VITALS
BODY MASS INDEX: 23.49 KG/M2 | HEART RATE: 95 BPM | OXYGEN SATURATION: 98 % | SYSTOLIC BLOOD PRESSURE: 118 MMHG | WEIGHT: 141 LBS | HEIGHT: 65 IN | DIASTOLIC BLOOD PRESSURE: 70 MMHG

## 2024-05-17 DIAGNOSIS — I65.23 BILATERAL CAROTID ARTERY STENOSIS: ICD-10-CM

## 2024-05-17 DIAGNOSIS — J96.11 CHRONIC RESPIRATORY FAILURE WITH HYPOXIA (MULTI): ICD-10-CM

## 2024-05-17 DIAGNOSIS — I10 ESSENTIAL (PRIMARY) HYPERTENSION: ICD-10-CM

## 2024-05-17 DIAGNOSIS — M70.50 PES ANSERINE BURSITIS: Primary | ICD-10-CM

## 2024-05-17 PROCEDURE — 1159F MED LIST DOCD IN RCRD: CPT | Performed by: FAMILY MEDICINE

## 2024-05-17 PROCEDURE — 99214 OFFICE O/P EST MOD 30 MIN: CPT | Performed by: FAMILY MEDICINE

## 2024-05-17 PROCEDURE — 1123F ACP DISCUSS/DSCN MKR DOCD: CPT | Performed by: FAMILY MEDICINE

## 2024-05-17 PROCEDURE — 1036F TOBACCO NON-USER: CPT | Performed by: FAMILY MEDICINE

## 2024-05-17 PROCEDURE — 20610 DRAIN/INJ JOINT/BURSA W/O US: CPT | Performed by: FAMILY MEDICINE

## 2024-05-17 PROCEDURE — 1125F AMNT PAIN NOTED PAIN PRSNT: CPT | Performed by: FAMILY MEDICINE

## 2024-05-17 PROCEDURE — 1157F ADVNC CARE PLAN IN RCRD: CPT | Performed by: FAMILY MEDICINE

## 2024-05-17 PROCEDURE — 1111F DSCHRG MED/CURRENT MED MERGE: CPT | Performed by: FAMILY MEDICINE

## 2024-05-17 PROCEDURE — 3078F DIAST BP <80 MM HG: CPT | Performed by: FAMILY MEDICINE

## 2024-05-17 PROCEDURE — 99214 OFFICE O/P EST MOD 30 MIN: CPT | Mod: 25 | Performed by: FAMILY MEDICINE

## 2024-05-17 PROCEDURE — 2500000005 HC RX 250 GENERAL PHARMACY W/O HCPCS: Performed by: FAMILY MEDICINE

## 2024-05-17 PROCEDURE — 3074F SYST BP LT 130 MM HG: CPT | Performed by: FAMILY MEDICINE

## 2024-05-17 PROCEDURE — 1158F ADVNC CARE PLAN TLK DOCD: CPT | Performed by: FAMILY MEDICINE

## 2024-05-17 PROCEDURE — 1160F RVW MEDS BY RX/DR IN RCRD: CPT | Performed by: FAMILY MEDICINE

## 2024-05-17 PROCEDURE — 2500000004 HC RX 250 GENERAL PHARMACY W/ HCPCS (ALT 636 FOR OP/ED): Performed by: FAMILY MEDICINE

## 2024-05-17 RX ORDER — LIDOCAINE HYDROCHLORIDE 10 MG/ML
0.5 INJECTION INFILTRATION; PERINEURAL
Status: COMPLETED | OUTPATIENT
Start: 2024-05-17 | End: 2024-05-17

## 2024-05-17 RX ORDER — TRIAMCINOLONE ACETONIDE 40 MG/ML
40 INJECTION, SUSPENSION INTRA-ARTICULAR; INTRAMUSCULAR
Status: COMPLETED | OUTPATIENT
Start: 2024-05-17 | End: 2024-05-17

## 2024-05-17 RX ORDER — IBUPROFEN 100 MG/1
100 TABLET, CHEWABLE ORAL EVERY 8 HOURS PRN
COMMUNITY

## 2024-05-17 RX ORDER — CARVEDILOL 3.12 MG/1
3.25 TABLET ORAL 2 TIMES DAILY
Qty: 180 TABLET | Refills: 0 | Status: SHIPPED | OUTPATIENT
Start: 2024-05-17 | End: 2024-08-15

## 2024-05-17 RX ADMIN — TRIAMCINOLONE ACETONIDE 40 MG: 40 INJECTION, SUSPENSION INTRA-ARTICULAR; INTRAMUSCULAR at 16:22

## 2024-05-17 RX ADMIN — LIDOCAINE HYDROCHLORIDE 0.5 ML: 10 INJECTION, SOLUTION INFILTRATION; PERINEURAL at 16:22

## 2024-05-17 ASSESSMENT — ENCOUNTER SYMPTOMS
LOSS OF SENSATION IN FEET: 1
SHORTNESS OF BREATH: 0
JOINT SWELLING: 0
DEPRESSION: 0
CHEST TIGHTNESS: 0
ARTHRALGIAS: 1
OCCASIONAL FEELINGS OF UNSTEADINESS: 1
LEG PAIN: 1

## 2024-05-17 ASSESSMENT — PAIN SCALES - GENERAL: PAINLEVEL: 7

## 2024-05-17 NOTE — PROGRESS NOTES
[facility]: [facility]  E/M EVALUATION    Anitha Welch is a 84 y.o. female who presents for Leg Pain (Patient requesting something for leg pain/dd), Hospital Followup, and Med Refill (Patient needs refills on Carvedilol/dd).    Subjective   Pt here for hospital follow up    Mitral valve repair 4/24,  day of discharge, synope re-admit disharge 5/1/24.  Chronic hypoxia 2L. Having left medial knee pain since fall.  Limiting home PT.  She doesn't know what to take due to her meds.     Leg Pain     Med Refill  Associated symptoms include arthralgias. Pertinent negatives include no chest pain or joint swelling.     Review of Systems   Respiratory:  Negative for chest tightness and shortness of breath.    Cardiovascular:  Negative for chest pain.   Musculoskeletal:  Positive for arthralgias and gait problem. Negative for joint swelling.       Objective   Vitals:    05/17/24 1421   BP: 118/70   Pulse: 95   SpO2: 98%     Physical Exam  Constitutional:       Appearance: Normal appearance.      Comments: Chronically ill appearing, in wheelchair, nasal canal present.    Cardiovascular:      Rate and Rhythm: Normal rate and regular rhythm.      Heart sounds: No murmur heard.  Pulmonary:      Effort: Pulmonary effort is normal.      Breath sounds: Normal breath sounds.   Musculoskeletal:      Right lower leg: No edema.      Left lower leg: No edema.      Comments: Focal tenderness left pes anserine.  Mcl/lcl mcmurrys neg.     Neurological:      Mental Status: She is alert.     Patient ID: Anitha Welch is a 84 y.o. female.    Joint Injection Large/Arthrocentesis: L anserine bursa on 5/17/2024 4:22 PM  Indications: pain and diagnostic evaluation  Details: 25 G needle, medial approach  Medications: 40 mg triamcinolone acetonide 40 mg/mL; 0.5 mL lidocaine 10 mg/mL (1 %)  Outcome: tolerated well, no immediate complications    Pain 100% resolved with injection.   Procedure, treatment alternatives, risks and  "benefits explained, specific risks discussed. Immediately prior to procedure a time out was called to verify the correct patient, procedure, equipment, support staff and site/side marked as required. Patient was prepped and draped in the usual sterile fashion.         Cholesterol   Date Value Ref Range Status   02/21/2023 190 133 - 200 MG/DL Final     Triglycerides   Date Value Ref Range Status   02/21/2023 64 40 - 150 MG/DL Final     HDL   Date Value Ref Range Status   02/21/2023 52 >50 MG/DL Final     Comment:     National Cholesterol Education Program(NCFP)guidelines:   <40 mg/dl:Low HDL-cholesterol(major risk factor for CHD)   >60 mg/dl:High HDL-cholesterol(\"negative\"risk factor for CHD)   HDL-cholesterol is affected by a number of factors,e.g.,smoking,  exercise,hormones,sex and age.       LDL Calculated   Date Value Ref Range Status   02/21/2023 125 65 - 130 MG/DL Final     Cholesterol/HDL Ratio   Date Value Ref Range Status   02/21/2023 3.7 RATIO Final     Comment:     According to the American Heart Association, the goal is to maintain   the total cholesterol/HDL ratio at 5-to-1 or lower with an optimum   ratio of 3.5-to-1.  Performed at [address] [address]       Glucose   Date Value Ref Range Status   05/01/2024 141 (H) 65 - 99 mg/dL Final     Sodium   Date Value Ref Range Status   05/01/2024 145 133 - 145 mmol/L Final     Potassium   Date Value Ref Range Status   05/01/2024 3.6 3.4 - 5.1 mmol/L Final     ALT   Date Value Ref Range Status   04/30/2024 8 5 - 40 U/L Final     eGFR   Date Value Ref Range Status   05/01/2024 55 (L) >60 mL/min/1.73m*2 Final     Comment:     Calculations of estimated GFR are performed using the 2021 CKD-EPI Study Refit equation without the race variable for the IDMS-Traceable creatinine methods.  https://jasn.asnjournals.org/content/early/2021/09/22/ASN.9423749105     INR   Date Value Ref Range Status   04/25/2024 1.1 0.9 - 1.1 Final     Hemoglobin A1C "   Date Value Ref Range Status   01/09/2019 5.5 4.0 - 6.0 % Final     Comment:     Hemoglobin A1C levels are related to mean blood glucose during the   preceding 2-3 months. The relationship table below may be used as a   general guide. Each 1% increase in HGB A1C is a reflection of an   increase in mean glucose of approximately 30 mg/dl.   Reference: Diabetes Care, volume 29, supplement 1 Jan. 2006                        HGB A1C ................. Approx. Mean Glucose   _______________________________________________   6%   ...............................  120 mg/dl   7%   ...............................  150 mg/dl   8%   ...............................  180 mg/dl   9%   ...............................  210 mg/dl   10%  ...............................  240 mg/dl  Performed at [address] [address]       Thyroid Stimulating Hormone   Date Value Ref Range Status   10/08/2023 0.65 0.27 - 4.20 mIU/L Final       Assessment/Plan      Patient Active Problem List   Diagnosis    Epistaxis    Abdominal aortic aneurysm (AAA) without rupture (CMS-HCC)    Acute low back pain    Acute on chronic systolic heart failure (Multi)    Acute renal failure syndrome (CMS-HCC)    Acute ST segment elevation myocardial infarction (Multi)    Arteriosclerosis of coronary artery    Artificial lens present    Benign essential hypertension    Anemia due to blood loss    Stenosis of left carotid artery    Cerebrovascular accident (CVA) involving left cerebral hemisphere (Multi)    Cerebrovascular accident (CVA) due to embolism of cerebral artery (Multi)    Cerebrovascular accident (Multi)    Transient ischemic attack    Angina pectoris (CMS-HCC)    Backache    Chest pain    Tight chest    Chronic diastolic heart failure (Multi)    Chronic heart failure with preserved ejection fraction (Multi)    Stage 3 chronic kidney disease (Multi)    Acute exacerbation of chronic obstructive airways disease (Multi)    Chronic obstructive  pulmonary disease (Multi)    Claudication (CMS-HCC)    Cystocele with prolapse    Vaginal prolapse    Dehydration    Dermatochalasis of left upper eyelid    Dermatochalasis of right upper eyelid    Vision disorder    Dysgeusia    Dyspnea    Electrocardiogram abnormal    Facial weakness    Fall    Gastroesophageal reflux disease    Gastrointestinal hemorrhage    Headache    History of coronary artery stent placement    History of myocardial infarction    History of stroke without residual deficits    History of cerebrovascular accident    Hypothyroidism (acquired)    Ischemic cardiomyopathy    Lacunar infarct, acute (Multi)    Lumbago-sciatica due to displacement of lumbar intervertebral disc    Lumbar degenerative disc disease    Mixed hyperlipidemia    Overactive bladder    Pain of lower extremity    Pinguecula    Hypertension    Low blood pressure    Near syncope    Peripheral vascular disease (CMS-HCC)    Right homonymous hemianopsia    Seizure (Multi)    Stented coronary artery    Syncope and collapse    Tear film insufficiency    Urinary urgency    Varicose veins of both legs with edema    Floaters in visual field    Vitreous degeneration    Asthenia    Chronic fatigue syndrome    Weakness    Acute respiratory failure with hypoxia (Multi)    Acquired hypothyroidism    Bilateral carotid artery stenosis    Restless legs syndrome    HFrEF (heart failure with reduced ejection fraction) (Multi)    Severe mitral valve regurgitation    Heart failure (Multi)    Acute deep vein thrombosis (DVT) of proximal vein of left lower extremity (Multi)    Constipation    Venous thromboembolism (VTE)    Do not resuscitate    Amnesia    Hypertensive heart disease with heart failure (Multi)    Nonrheumatic mitral valve regurgitation    S/P mitral valve clip implantation       Diagnoses and all orders for this visit:  Pes anserine bursitis  Chronic respiratory failure with hypoxia (Multi)  -     Referral to Primary Care  Bilateral  carotid artery stenosis  -     Referral to Primary Care  Essential (primary) hypertension  -     carvedilol (Coreg) 3.125 mg tablet; Take 1 tablet (3.125 mg) by mouth 2 times a day.  Other orders  -     Joint Injection Large/Arthrocentesis      The patient was encouraged to ensure that any/all documentation is accurate and up to date, and that our office be provided a copy in the event that anything changes.         Gee Dupree MD

## 2024-05-31 ENCOUNTER — APPOINTMENT (OUTPATIENT)
Dept: CARDIOLOGY | Facility: HOSPITAL | Age: 84
End: 2024-05-31
Payer: MEDICARE

## 2024-06-17 ENCOUNTER — TELEPHONE (OUTPATIENT)
Dept: PRIMARY CARE | Facility: CLINIC | Age: 84
End: 2024-06-17
Payer: MEDICARE

## 2024-06-18 ENCOUNTER — OFFICE VISIT (OUTPATIENT)
Dept: PRIMARY CARE | Facility: CLINIC | Age: 84
End: 2024-06-18
Payer: MEDICARE

## 2024-06-18 VITALS
RESPIRATION RATE: 18 BRPM | OXYGEN SATURATION: 99 % | TEMPERATURE: 97.3 F | DIASTOLIC BLOOD PRESSURE: 70 MMHG | BODY MASS INDEX: 23.16 KG/M2 | HEIGHT: 65 IN | HEART RATE: 79 BPM | SYSTOLIC BLOOD PRESSURE: 130 MMHG | WEIGHT: 139 LBS

## 2024-06-18 DIAGNOSIS — M79.605 LEFT LEG PAIN: ICD-10-CM

## 2024-06-18 DIAGNOSIS — Z95.818 S/P MITRAL VALVE CLIP IMPLANTATION: ICD-10-CM

## 2024-06-18 DIAGNOSIS — J96.11 CHRONIC RESPIRATORY FAILURE WITH HYPOXIA (MULTI): ICD-10-CM

## 2024-06-18 DIAGNOSIS — R53.1 WEAKNESS: ICD-10-CM

## 2024-06-18 DIAGNOSIS — Z98.890 S/P MITRAL VALVE CLIP IMPLANTATION: ICD-10-CM

## 2024-06-18 DIAGNOSIS — J44.9 CHRONIC OBSTRUCTIVE PULMONARY DISEASE, UNSPECIFIED COPD TYPE (MULTI): Primary | ICD-10-CM

## 2024-06-18 PROCEDURE — 3075F SYST BP GE 130 - 139MM HG: CPT | Performed by: NURSE PRACTITIONER

## 2024-06-18 PROCEDURE — 3078F DIAST BP <80 MM HG: CPT | Performed by: NURSE PRACTITIONER

## 2024-06-18 PROCEDURE — 1160F RVW MEDS BY RX/DR IN RCRD: CPT | Performed by: NURSE PRACTITIONER

## 2024-06-18 PROCEDURE — 1123F ACP DISCUSS/DSCN MKR DOCD: CPT | Performed by: NURSE PRACTITIONER

## 2024-06-18 PROCEDURE — 1159F MED LIST DOCD IN RCRD: CPT | Performed by: NURSE PRACTITIONER

## 2024-06-18 PROCEDURE — 1157F ADVNC CARE PLAN IN RCRD: CPT | Performed by: NURSE PRACTITIONER

## 2024-06-18 PROCEDURE — 99349 HOME/RES VST EST MOD MDM 40: CPT | Performed by: NURSE PRACTITIONER

## 2024-06-18 PROCEDURE — 1125F AMNT PAIN NOTED PAIN PRSNT: CPT | Performed by: NURSE PRACTITIONER

## 2024-06-18 ASSESSMENT — PAIN SCALES - GENERAL: PAINLEVEL: 10-WORST PAIN EVER

## 2024-06-18 NOTE — PROGRESS NOTES
"Subjective   Patient ID: Anitha Welch is a 84 y.o. female who presents for Follow-up (Routine 6 week follow up).    Visit for 85 y/o female seen today in private home, accompanied by son Jose and spouse for routine follow up. Patient answered the door upon provider arrival. She was able to ambulate into the living room and is sitting on her couch now. She is alert, able to answer most questions regarding her health. Her family does supplement the health history. Pt reports that she is doing \"ok\". She recently went to see her PCP Dr. Dupree for left leg pain and received an injection which she does not feel is helpful. She felt it only lasted 1-2 weeks and then worse off. She has been taking Ibuprofen and Tizanidine twice daily. She feels the leg pain is a 65 on a scale from 0/10. She is ambulatory without assistive device. No longer receiving physical therapy in the home. Denies recent fall or injury. Pt is able to perform her ADLs but does require assistance with her medications and with meal preparation. She has been helping more around the house with laundry and house keeping tasks. Patients family reports that she takes a lot of naps throughout the day. Pt is oxygen dependent, 2L NC. She endorses shortness of breath on exertion but denies feeling short of breath at rest. She denies fever, chills, cough wheezing, chest pain, palpitations. Denies abdominal pain, nausea, vomiting. No further constipation issues. No bladder concerns. Pt reports having a rash on her stomach recently but that has since resolved. She will be seeing her cardiologist Dr. Foss on 7/16.     Home Visit:         Medically necessary due to: Illness or condition that results in activity lmitation or restriction that impacts the ability to leave home such as:, Illness or condition that results in activity lmitation or restriction that impacts the ability to leave home such as:, unsteady gait/poor balance.         Current Outpatient " Medications:     albuterol 90 mcg/actuation inhaler, Inhale 1 puff every 4 hours if needed., Disp: , Rfl:     atorvastatin (Lipitor) 40 mg tablet, Take 1 tablet (40 mg) by mouth once every 24 hours., Disp: , Rfl:     carvedilol (Coreg) 3.125 mg tablet, Take 1 tablet (3.125 mg) by mouth 2 times a day., Disp: 180 tablet, Rfl: 0    ibuprofen 100 mg chewable tablet, Chew 1 tablet (100 mg) every 8 hours if needed for mild pain (1 - 3)., Disp: , Rfl:     loratadine (Claritin) 10 mg tablet, Take 1 tablet (10 mg) by mouth once daily., Disp: , Rfl:     multivitamin-iron-folic acid (Multi Complete with Iron)  mg-mcg tablet tablet, Take 1 tablet by mouth once daily., Disp: , Rfl:     nitroglycerin (Nitrostat) 0.4 mg SL tablet, Place 1 tablet (0.4 mg) under the tongue every 5 minutes if needed., Disp: , Rfl:     oxygen (O2) gas therapy, Inhale 2 L/min continuously. Indications: sob, Disp: , Rfl:     pantoprazole (ProtoNix) 40 mg EC tablet, TAKE 1 TABLET BY MOUTH TWICE A DAY, Disp: 180 tablet, Rfl: 4    rivaroxaban (Xarelto) 20 mg tablet, Take 1 tablet (20 mg) by mouth once daily in the evening. Take with meals. Take with food., Disp: 30 tablet, Rfl: 11    tiZANidine (Zanaflex) 2 mg tablet, Take 1 tablet (2 mg) by mouth every 8 hours if needed for muscle spasms., Disp: 30 tablet, Rfl: 0    torsemide (Demadex) 20 mg tablet, Take 1 tablet (20 mg) by mouth once daily. Do not fill before May 2, 2024., Disp: , Rfl:     Trelegy Ellipta 100-62.5-25 mcg blister with device, Inhale 1 puff once daily., Disp: 1 each, Rfl: 3     Review of Systems  Constitutional: Negative for appetite changes, fever, chills, weight loss.   HENT:  Negative for trouble swallowing.    Respiratory:  Positive for shortness of breath with exertion, oxygen dependent. Negative for cough, wheezing.    Cardiovascular: Negative for chest pain, palpitations.   Gastrointestinal: Negative for abdominal for pain, diarrhea, constipation, nausea and vomiting.  "  Endocrine: Positive for thyroid disease  Genitourinary:  Negative for difficulty urinating.   Musculoskeletal:  Positive for gait problem, left leg pain.   Neurological:  Positive for prior stroke, left sided neuropathy. Negative for dizziness and light-headedness.   Psychiatric/Behavioral: Positive for anxiety    Objective   /70 (BP Location: Left arm, Patient Position: Sitting, BP Cuff Size: Adult)   Pulse 79   Temp 36.3 °C (97.3 °F) (Temporal)   Resp 18   Ht 1.651 m (5' 5\")   Wt 63 kg (139 lb)   SpO2 99%   BMI 23.13 kg/m²     Physical Exam  Physical Exam     General: No acute distress     Appearance: She is alert, chronically ill. Nontoxic.     Comments: Sitting on couch  HENT:      Head: Normocephalic and atraumatic.      Nose: No congestion or rhinorrhea.      Mouth/Throat:      Mouth: Mucous membranes are moist.      Pharynx: Oropharynx is clear.   Eyes:      General: No scleral icterus.        Right eye: No discharge.         Left eye: No discharge.      Extraocular Movements: Extraocular movements intact.   Neck:      Vascular: No carotid bruit.      Comments: No obvious JVD  Cardiovascular:      Rate and Rhythm: Normal rate.      Pulses: Normal pulses.      No friction rub. No gallop.   Pulmonary:      Breath sounds: Lungs clear.      Comments: No wheezing, rales or rhonchi. Oxygen 2L NC  Abdominal:      General: There is no distension.      Tenderness: There is no abdominal tenderness.   Musculoskeletal:      Cervical back Neck supple. No rigidity.      Right lower leg: Trace edema     Left lower le+ LE edema  Lymphadenopathy:      Cervical: No cervical adenopathy.   Skin:     Capillary Refill: Capillary refill takes less than 2 seconds.   Neurological:      General: No focal deficit present.      Mental Status: She is alert. Mental status is at baseline.     Cranial Nerves: No cranial nerve deficit.      Motor: generalized weakness  Psychiatric:         Behavior: Behavior normal.        "  Thought Content: Thought content normal.         Judgment: Judgment normal.      Assessment/Plan   Diagnoses and all orders for this visit:  Chronic obstructive pulmonary disease, unspecified COPD type (Multi)  Chronic respiratory failure with hypoxia (Multi)  -chronic, oxygen dependent, no acute respiratory concerns   -continue trelegy ellipta, albuterol inhaler as needed  -recommend pulmonology follow up     S/P mitral valve clip implantation  -underwent mitral ARMANI on 4/25  -seems to be doing better in the home setting     Left leg pain  -chronic, had injection for bursitis 5/17  -minimal improvement  -instructed pt to minimize use of ibuprofen due to anticoagulation, high risk for bleeding  -advised her to take tylenol as needed   -continue with Tizanidine    Weakness  -chronic, improved with physical therapy services   -continue safety measures and supportive care       FELA Devine-CNP

## 2024-06-25 ENCOUNTER — OFFICE VISIT (OUTPATIENT)
Dept: PRIMARY CARE | Facility: CLINIC | Age: 84
End: 2024-06-25
Payer: MEDICARE

## 2024-06-25 VITALS
SYSTOLIC BLOOD PRESSURE: 118 MMHG | HEART RATE: 80 BPM | OXYGEN SATURATION: 99 % | HEIGHT: 65 IN | WEIGHT: 143.2 LBS | BODY MASS INDEX: 23.86 KG/M2 | DIASTOLIC BLOOD PRESSURE: 60 MMHG

## 2024-06-25 DIAGNOSIS — B36.9 FUNGAL DERMATITIS: Primary | ICD-10-CM

## 2024-06-25 PROCEDURE — 1123F ACP DISCUSS/DSCN MKR DOCD: CPT | Performed by: STUDENT IN AN ORGANIZED HEALTH CARE EDUCATION/TRAINING PROGRAM

## 2024-06-25 PROCEDURE — 1036F TOBACCO NON-USER: CPT | Performed by: STUDENT IN AN ORGANIZED HEALTH CARE EDUCATION/TRAINING PROGRAM

## 2024-06-25 PROCEDURE — 3074F SYST BP LT 130 MM HG: CPT | Performed by: STUDENT IN AN ORGANIZED HEALTH CARE EDUCATION/TRAINING PROGRAM

## 2024-06-25 PROCEDURE — 1159F MED LIST DOCD IN RCRD: CPT | Performed by: STUDENT IN AN ORGANIZED HEALTH CARE EDUCATION/TRAINING PROGRAM

## 2024-06-25 PROCEDURE — 99213 OFFICE O/P EST LOW 20 MIN: CPT | Performed by: STUDENT IN AN ORGANIZED HEALTH CARE EDUCATION/TRAINING PROGRAM

## 2024-06-25 PROCEDURE — 1125F AMNT PAIN NOTED PAIN PRSNT: CPT | Performed by: STUDENT IN AN ORGANIZED HEALTH CARE EDUCATION/TRAINING PROGRAM

## 2024-06-25 PROCEDURE — 3078F DIAST BP <80 MM HG: CPT | Performed by: STUDENT IN AN ORGANIZED HEALTH CARE EDUCATION/TRAINING PROGRAM

## 2024-06-25 PROCEDURE — 1157F ADVNC CARE PLAN IN RCRD: CPT | Performed by: STUDENT IN AN ORGANIZED HEALTH CARE EDUCATION/TRAINING PROGRAM

## 2024-06-25 RX ORDER — NYSTATIN 100000 [USP'U]/G
1 POWDER TOPICAL 2 TIMES DAILY
Qty: 30 G | Refills: 0 | Status: SHIPPED | OUTPATIENT
Start: 2024-06-25 | End: 2025-06-25

## 2024-06-25 ASSESSMENT — PATIENT HEALTH QUESTIONNAIRE - PHQ9
SUM OF ALL RESPONSES TO PHQ9 QUESTIONS 1 AND 2: 0
2. FEELING DOWN, DEPRESSED OR HOPELESS: NOT AT ALL
1. LITTLE INTEREST OR PLEASURE IN DOING THINGS: NOT AT ALL

## 2024-06-25 ASSESSMENT — PAIN SCALES - GENERAL: PAINLEVEL: 6

## 2024-06-25 NOTE — PROGRESS NOTES
"Subjective   Patient ID: Anitha Welch is a 84 y.o. female who presents for sore on groin/ inner thigh.    HPI  83 yo female here for sore on inner thigh/groin  Dermatitis L groin area  Started about 1 week ago, getting worse  Painful  Used aspirin, which helped with pain    Review of Systems  All pertinent positive symptoms are included in the history of present illness.    All other systems have been reviewed and are negative and noncontributory to this patient's current ailments.     Allergies   Allergen Reactions    Amoxicillin Anaphylaxis and Shortness of breath    Iodinated Contrast Media Dizziness and Other     Increased BP    Increase BP, dizziness    Ciprofloxacin Rash and Swelling     Decreased BP    Influenza Virus Vaccines Other     \"sick\" for 24 hours afterwards    Flu Vac 2023 [id number](Pf) Unknown    Diphenhydramine Rash    Other Itching     Fluzone inj high dose        Immunization History   Administered Date(s) Administered    Flu vaccine (IIV4), preservative free *Check age/dose* 10/03/2016    Flu vaccine, quadrivalent, high-dose, preservative free, age 65y+ (FLUZONE) 10/22/2021, 09/19/2022, 09/29/2023    Influenza, High Dose Seasonal, Preservative Free 10/05/2013, 09/26/2017, 12/03/2019    Influenza, injectable, quadrivalent 10/03/2016, 01/08/2019    Influenza, seasonal, injectable 10/03/2011, 10/05/2013, 10/23/2014    Pfizer COVID-19 vaccine, Fall 2023, 12 years and older, (30mcg/0.3mL) 12/07/2023    Pfizer COVID-19 vaccine, bivalent, age 12 years and older (30 mcg/0.3 mL) 10/09/2022, 08/03/2023    Pfizer Gray Cap SARS-CoV-2 04/14/2022    Pfizer Purple Cap SARS-CoV-2 01/18/2021, 01/25/2021, 02/15/2021, 09/25/2021    Pneumococcal conjugate vaccine, 13-valent (PREVNAR 13) 04/24/2018    Pneumococcal polysaccharide vaccine, 23-valent, age 2 years and older (PNEUMOVAX 23) 10/25/1999    Tdap vaccine, age 7 year and older (BOOSTRIX, ADACEL) 03/24/2016    Zoster, live 08/23/2011       Objective " "  Vitals:    06/25/24 1357   BP: 118/60   Pulse: 80   SpO2: 99%   Weight: 65 kg (143 lb 3.2 oz)   Height: 1.651 m (5' 5\")       Physical Exam  CONSTITUTIONAL - looks like stated age, in no acute distress  SKIN - L groin: ~4 cm erythematous lesion  HEAD - no trauma, normocephalic  EYES - extraocular muscles are intact  ENT - atraumatic  NECK - no neck mass was observed  ABDOMEN - nondistended  EXTREMITIES - no edema, no deformities  MSK - moves limbs equally  NEUROLOGICAL - alert, oriented and no focal signs  PSYCHIATRIC - alert, pleasant and cordial, age-appropriate  IMMUNOLOGIC - no cervical lymphadenopathy     Assessment/Plan   83 yo female here for sore on inner thigh/groin  Fungal dermatitis L groin area  Start nystatin powder    RTC as needed  "

## 2024-07-11 DIAGNOSIS — I25.10 ARTERIOSCLEROSIS OF CORONARY ARTERY: Primary | ICD-10-CM

## 2024-07-12 RX ORDER — ATORVASTATIN CALCIUM 40 MG/1
40 TABLET, FILM COATED ORAL NIGHTLY
Qty: 90 TABLET | Refills: 3 | Status: SHIPPED | OUTPATIENT
Start: 2024-07-12

## 2024-07-15 ENCOUNTER — OFFICE VISIT (OUTPATIENT)
Dept: PRIMARY CARE | Facility: CLINIC | Age: 84
End: 2024-07-15
Payer: MEDICARE

## 2024-07-15 VITALS
HEART RATE: 67 BPM | SYSTOLIC BLOOD PRESSURE: 118 MMHG | BODY MASS INDEX: 23.53 KG/M2 | HEIGHT: 65 IN | WEIGHT: 141.2 LBS | DIASTOLIC BLOOD PRESSURE: 64 MMHG | OXYGEN SATURATION: 96 %

## 2024-07-15 DIAGNOSIS — J96.11 CHRONIC RESPIRATORY FAILURE WITH HYPOXIA (MULTI): ICD-10-CM

## 2024-07-15 DIAGNOSIS — K62.5 RECTAL BLEEDING: ICD-10-CM

## 2024-07-15 DIAGNOSIS — M79.605 LEFT LEG PAIN: ICD-10-CM

## 2024-07-15 DIAGNOSIS — R04.0 EPISTAXIS: Primary | ICD-10-CM

## 2024-07-15 PROCEDURE — 1157F ADVNC CARE PLAN IN RCRD: CPT | Performed by: STUDENT IN AN ORGANIZED HEALTH CARE EDUCATION/TRAINING PROGRAM

## 2024-07-15 PROCEDURE — 99214 OFFICE O/P EST MOD 30 MIN: CPT | Performed by: STUDENT IN AN ORGANIZED HEALTH CARE EDUCATION/TRAINING PROGRAM

## 2024-07-15 PROCEDURE — 3074F SYST BP LT 130 MM HG: CPT | Performed by: STUDENT IN AN ORGANIZED HEALTH CARE EDUCATION/TRAINING PROGRAM

## 2024-07-15 PROCEDURE — 1036F TOBACCO NON-USER: CPT | Performed by: STUDENT IN AN ORGANIZED HEALTH CARE EDUCATION/TRAINING PROGRAM

## 2024-07-15 PROCEDURE — 1123F ACP DISCUSS/DSCN MKR DOCD: CPT | Performed by: STUDENT IN AN ORGANIZED HEALTH CARE EDUCATION/TRAINING PROGRAM

## 2024-07-15 PROCEDURE — 3078F DIAST BP <80 MM HG: CPT | Performed by: STUDENT IN AN ORGANIZED HEALTH CARE EDUCATION/TRAINING PROGRAM

## 2024-07-15 PROCEDURE — 1125F AMNT PAIN NOTED PAIN PRSNT: CPT | Performed by: STUDENT IN AN ORGANIZED HEALTH CARE EDUCATION/TRAINING PROGRAM

## 2024-07-15 PROCEDURE — 1159F MED LIST DOCD IN RCRD: CPT | Performed by: STUDENT IN AN ORGANIZED HEALTH CARE EDUCATION/TRAINING PROGRAM

## 2024-07-15 RX ORDER — DICLOFENAC SODIUM 10 MG/G
4 GEL TOPICAL 4 TIMES DAILY
Qty: 100 G | Refills: 1 | Status: SHIPPED | OUTPATIENT
Start: 2024-07-15

## 2024-07-15 ASSESSMENT — PATIENT HEALTH QUESTIONNAIRE - PHQ9
2. FEELING DOWN, DEPRESSED OR HOPELESS: NOT AT ALL
SUM OF ALL RESPONSES TO PHQ9 QUESTIONS 1 AND 2: 0
1. LITTLE INTEREST OR PLEASURE IN DOING THINGS: NOT AT ALL

## 2024-07-15 ASSESSMENT — PAIN SCALES - GENERAL: PAINLEVEL: 8

## 2024-07-15 NOTE — PROGRESS NOTES
"Subjective   Patient ID: Anitha Welch is a 84 y.o. female who presents for Epistaxis (Nose Bleed), Leg Pain, Rectal Bleeding, and Oxygen saturation (Decreasing rapidly when off oxygen therapy).    HPI  83 yo female her for nose bleed  Epistaxis  Has been having nose bleeds  Uses ayr gel  2. L leg pain  Has lidocaines patches  Has recent joint injection  3. Respiratory failure  On oxygen 24/7  On 2 L  4. Blood in stool  On xarelto    Review of Systems  All pertinent positive symptoms are included in the history of present illness.    All other systems have been reviewed and are negative and noncontributory to this patient's current ailments.     Allergies   Allergen Reactions    Amoxicillin Anaphylaxis and Shortness of breath    Iodinated Contrast Media Dizziness and Other     Increased BP    Increase BP, dizziness    Ciprofloxacin Rash and Swelling     Decreased BP    Influenza Virus Vaccines Other     \"sick\" for 24 hours afterwards    Flu Vac 2023 [id number](Pf) Unknown    Diphenhydramine Rash    Other Itching     Fluzone inj high dose        Immunization History   Administered Date(s) Administered    Flu vaccine (IIV4), preservative free *Check age/dose* 10/03/2016    Flu vaccine, quadrivalent, high-dose, preservative free, age 65y+ (FLUZONE) 10/22/2021, 09/19/2022, 09/29/2023    Influenza, High Dose Seasonal, Preservative Free 10/05/2013, 09/26/2017, 12/03/2019    Influenza, injectable, quadrivalent 10/03/2016, 01/08/2019    Influenza, seasonal, injectable 10/03/2011, 10/05/2013, 10/23/2014    Pfizer COVID-19 vaccine, Fall 2023, 12 years and older, (30mcg/0.3mL) 12/07/2023    Pfizer COVID-19 vaccine, bivalent, age 12 years and older (30 mcg/0.3 mL) 10/09/2022, 08/03/2023    Pfizer Gray Cap SARS-CoV-2 04/14/2022    Pfizer Purple Cap SARS-CoV-2 01/18/2021, 01/25/2021, 02/15/2021, 09/25/2021    Pneumococcal conjugate vaccine, 13-valent (PREVNAR 13) 04/24/2018    Pneumococcal polysaccharide vaccine, 23-valent, " "age 2 years and older (PNEUMOVAX 23) 10/25/1999    Tdap vaccine, age 7 year and older (BOOSTRIX, ADACEL) 03/24/2016    Zoster, live 08/23/2011       Objective   Vitals:    07/15/24 1522   BP: 118/64   Pulse: 67   SpO2: 96%   Weight: 64 kg (141 lb 3.2 oz)   Height: 1.651 m (5' 5\")       Physical Exam  CONSTITUTIONAL - in no acute distress. In wheelchair, oxygen cannula in place  SKIN - normal skin color and pigmentation. No rash, lesions, or nodules visualized  HEAD - no trauma, normocephalic  EYES - extraocular muscles are intact  ENT - atraumatic  NECK - no neck mass was observed  LUNGS - CTA B/L  CARDIAC - regular rate and regular rhythm  ABDOMEN - no organomegaly, soft, nontender, nondistended  EXTREMITIES - L LE: mild edema, pain to palpation  MSK - moves limbs equally  NEUROLOGICAL - alert, oriented and no focal signs  PSYCHIATRIC - alert, pleasant and cordial, age-appropriate  IMMUNOLOGIC - no cervical lymphadenopathy     Assessment/Plan   83 yo female her for nose bleed  Epistaxis  Continue ayr gel  ENT referral  2. L leg pain  Lidocaine patches  Start voltaren  Venous US ordered  3. Respiratory failure  Continue O2, monitor if oxygen level continues to decline  Follow up with Pulm  4. Blood in stool  CBC ordered  "

## 2024-07-16 ENCOUNTER — OFFICE VISIT (OUTPATIENT)
Dept: CARDIOLOGY | Facility: CLINIC | Age: 84
End: 2024-07-16
Payer: MEDICARE

## 2024-07-16 VITALS
OXYGEN SATURATION: 93 % | BODY MASS INDEX: 23.3 KG/M2 | HEART RATE: 90 BPM | WEIGHT: 140 LBS | DIASTOLIC BLOOD PRESSURE: 72 MMHG | SYSTOLIC BLOOD PRESSURE: 130 MMHG

## 2024-07-16 DIAGNOSIS — I10 BENIGN ESSENTIAL HYPERTENSION: ICD-10-CM

## 2024-07-16 DIAGNOSIS — I50.23 ACUTE ON CHRONIC SYSTOLIC HEART FAILURE (MULTI): Primary | ICD-10-CM

## 2024-07-16 PROCEDURE — 99213 OFFICE O/P EST LOW 20 MIN: CPT | Performed by: INTERNAL MEDICINE

## 2024-07-16 PROCEDURE — 1123F ACP DISCUSS/DSCN MKR DOCD: CPT | Performed by: INTERNAL MEDICINE

## 2024-07-16 PROCEDURE — 3078F DIAST BP <80 MM HG: CPT | Performed by: INTERNAL MEDICINE

## 2024-07-16 PROCEDURE — 1036F TOBACCO NON-USER: CPT | Performed by: INTERNAL MEDICINE

## 2024-07-16 PROCEDURE — 3075F SYST BP GE 130 - 139MM HG: CPT | Performed by: INTERNAL MEDICINE

## 2024-07-16 PROCEDURE — 1159F MED LIST DOCD IN RCRD: CPT | Performed by: INTERNAL MEDICINE

## 2024-07-16 PROCEDURE — 1157F ADVNC CARE PLAN IN RCRD: CPT | Performed by: INTERNAL MEDICINE

## 2024-07-16 ASSESSMENT — ENCOUNTER SYMPTOMS
IRREGULAR HEARTBEAT: 0
DEPRESSION: 0
WEIGHT GAIN: 0
SYNCOPE: 0
DYSPNEA ON EXERTION: 0
LOSS OF SENSATION IN FEET: 0
DIZZINESS: 0
WHEEZING: 0
PALPITATIONS: 0
NEAR-SYNCOPE: 0
WEIGHT LOSS: 0
ORTHOPNEA: 0
WEAKNESS: 0
OCCASIONAL FEELINGS OF UNSTEADINESS: 1
CLAUDICATION: 0
FEVER: 0
DIAPHORESIS: 0
MYALGIAS: 0
PND: 0
SHORTNESS OF BREATH: 0
COUGH: 0

## 2024-07-16 ASSESSMENT — COLUMBIA-SUICIDE SEVERITY RATING SCALE - C-SSRS: 1. IN THE PAST MONTH, HAVE YOU WISHED YOU WERE DEAD OR WISHED YOU COULD GO TO SLEEP AND NOT WAKE UP?: NO

## 2024-07-16 NOTE — ASSESSMENT & PLAN NOTE
Advised to also check blood pressures at home, diligent control in the setting of severe mitral regurgitation is imperative.

## 2024-07-16 NOTE — PROGRESS NOTES
Subjective      Chief Complaint   Patient presents with    Follow-up        84-year-old female well-known to me.  She has a history of atherosclerotic heart disease with remote inferior wall myocardial infarction and drug-eluting stent placement.  She has an ischemic cardiomyopathy with an ejection fraction of about 40 to 45% and inferior wall motion abnormalities.  She had an echocardiogram in October showing again an EF of 40 to 45% however her mitral valve regurgitation has progressed to severe. She was catheterized in February in preparation for Mitraclip to find stable non-obstructive CAD with a widely patent RCA stent. Right and left heart filling pressures were normal.  She had of having a MitraClip procedure in April 2024 this was marginally successful.  She was hospitalized shortly thereafter with a syncopal event and hypotension.  She was on torsemide, Jardiance, spironolactone these were initially held then reconciled she she was discharged to the beginning of Mayis I have not seen her since.  No longer on Aldactone or Jardiance.     She is on continuous O2 2L NC. Her son mentions that sats will drop into the low 80s without it. She has no chest pain or LE swelling. Weight has been stable         Review of Systems   Constitutional: Negative for diaphoresis, fever, weight gain and weight loss.   Eyes:  Negative for visual disturbance.   Cardiovascular:  Negative for chest pain, claudication, dyspnea on exertion, irregular heartbeat, leg swelling, near-syncope, orthopnea, palpitations, paroxysmal nocturnal dyspnea and syncope.   Respiratory:  Negative for cough, shortness of breath and wheezing.    Musculoskeletal:  Negative for muscle weakness and myalgias.   Neurological:  Negative for dizziness and weakness.   All other systems reviewed and are negative.       Past Medical History:   Diagnosis Date    AAA (abdominal aortic aneurysm) (CMS-[facility])     Acute urinary tract infection 04/20/2023    Anemia      Anxiety     Arthritis     CHF (congestive heart failure) (Multi)     Chronic pain disorder     back pain    CKD (chronic kidney disease)     Cognitive decline     COPD (chronic obstructive pulmonary disease) (Multi)     oxygen dependent- wears 2L NC continuously    Coronary artery disease     s/p stent    CVA (cerebral vascular accident) (Multi)     weaker on the left side    Deep vein thrombosis (DVT) of calf (Multi)     left    Depression     Disease of thyroid gland     Diverticulosis     DVT (deep venous thrombosis) (Multi)     left leg, on xarelto    Easy bruising     Epistaxis     GERD (gastroesophageal reflux disease)     managed with protonix    History of blood transfusion 2023    History of degenerative disc disease     Hyperlipidemia     Hypertension     Hypothyroidism     Ischemic cardiomyopathy     Meralgia paresthetica     Nonrheumatic mitral valve regurgitation     Osteoarthritis     Osteopenia 2010    hip - DEXA    Plantar fasciitis     RLS (restless legs syndrome)     Sciatica     Spinal stenosis     ST elevation (STEMI) myocardial infarction (Multi)         Past Surgical History:   Procedure Laterality Date    ADENOIDECTOMY      ANOMALOUS PULMONARY VENOUS RETURN REPAIR, 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    Procedure: Mitral-ARMANI (Transcatheter Edge to Edge Repair);  Surgeon: Kyle Bernardo MD;  Location: [address] Cardiac Cath Lab;  Service: Cardiovascular;  Laterality: N/A;  SAMMY Elgendy.Labs with cPM,Maynard,Schedule at 7;30am    CARDIAC CATHETERIZATION  10/2019    CARDIAC CATHETERIZATION N/A 02/16/2024    Procedure: Left And Right Heart Cath, With LV;  Surgeon: Tuan Foss DO;  Location: [facility] Cardiac Cath Lab;  Service: Cardiovascular;  Laterality: N/A;  no pre auth needed    CATARACT EXTRACTION Bilateral 11/13/2012    CHOLECYSTECTOMY  1996    laparoscopic    COLONOSCOPY      Dr. Rodriguez    CORONARY STENT PLACEMENT      CYST REMOVAL Right 06/24/2013    Excision of Sebaceous cyst right axilla     DILATION AND CURETTAGE OF UTERUS      ESOPHAGOGASTRODUODENOSCOPY      KNEE ARTHROPLASTY Left     MR HEAD ANGIO WO IV CONTRAST  02/24/2017    MR HEAD ANGIO WO IV CONTRAST [facility]    MR HEAD ANGIO WO IV CONTRAST  08/11/2017    MR HEAD ANGIO WO IV CONTRAST [facility]    MR HEAD ANGIO WO IV CONTRAST  02/10/2019    MR HEAD ANGIO WO IV CONTRAST [facility]    OTHER SURGICAL HISTORY  01/09/2019    Stent synergy    OTHER SURGICAL HISTORY  01/09/2019    Stent synergy    NV ARTHRP ACETBLR/PROX FEM PROSTC AGRFT/ALGRFT      SURGICAL SAMMY MONITORING      THYROIDECTOMY, PARTIAL Bilateral 04/17/2013    subtotal thyroidectomy    TONSILLECTOMY      TOTAL HIP ARTHROPLASTY Right 2006    UPPER GASTROINTESTINAL ENDOSCOPY  03/2019    Dr. Galan        Social History     Socioeconomic History    Marital status:      Spouse name: Not on file    Number of children: Not on file    Years of education: Not on file    Highest education level: Not on file   Occupational History    Not on file   Tobacco Use    Smoking status: Former     Current packs/day: 0.00     Types: Cigarettes     Quit date: 2014     Years since quitting: 10.5     Passive exposure: Never    Smokeless tobacco: Never   Vaping Use    Vaping status: Never Used   Substance and Sexual Activity    Alcohol use: Not Currently     Comment: glass wine at holiday    Drug use: Never    Sexual activity: Defer   Other Topics Concern    Not on file   Social History Narrative    Not on file     Social Determinants of Health     Financial Resource Strain: Low Risk  (4/27/2024)    Overall Financial Resource Strain (CARDIA)     Difficulty of Paying Living Expenses: Not hard at all   Food Insecurity: No Food Insecurity (11/22/2023)    Hunger Vital Sign     Worried About Running Out of Food in the Last Year: Never true     Ran Out of Food in the Last Year: Never true   Transportation Needs: No Transportation Needs (4/27/2024)    PRAPARE - Transportation      Lack of Transportation (Medical): No     Lack of Transportation (Non-Medical): No   Physical Activity: Insufficiently Active (4/27/2024)    Exercise Vital Sign     Days of Exercise per Week: 2 days     Minutes of Exercise per Session: 20 min   Stress: No Stress Concern Present (4/25/2024)    [employer] [place] [employer] - Occupational Stress Questionnaire     Feeling of Stress : Not at all   Social Connections: Moderately Isolated (4/25/2024)    Social Connection and Isolation Panel [NHANES]     Frequency of Communication with Friends and Family: More than three times a week     Frequency of Social Gatherings with Friends and Family: Twice a week     Attends [employer] Services: Never     Active Member of Clubs or Organizations: No     Attends Club or Organization Meetings: Never     Marital Status:    Intimate Partner Violence: Not At Risk (4/25/2024)    Humiliation, Afraid, Rape, and Kick questionnaire     Fear of Current or Ex-Partner: No     Emotionally Abused: No     Physically Abused: No     Sexually Abused: No   Housing Stability: Low Risk  (4/27/2024)    Housing Stability Vital Sign     Unable to Pay for Housing in the Last Year: No     Number of Places Lived in the Last Year: 1     Unstable Housing in the Last Year: No        Family History   Problem Relation Name Age of Onset    Hyperthyroidism Mother          Treated with radioactive iodine    Hypertension Mother      Heart disease Mother      Hyperthyroidism Sibling      Diabetes Father's Sister          OBJECTIVE:    Vitals:    07/16/24 1343   BP: 130/72   Pulse: 90   SpO2: 93%        Vitals reviewed.   Constitutional:       Appearance: Normal and healthy appearance. Not in distress.   Pulmonary:      Effort: Pulmonary effort is normal.      Breath sounds: Normal breath sounds.   Cardiovascular:      Normal rate. Regular rhythm. Normal S1. Normal S2.       Murmurs: There is a grade 2/6 holosystolic murmur, radiating to the back.       No gallop.  No click.   Pulses:     Intact distal pulses.   Edema:     Peripheral edema absent.   Skin:     General: Skin is warm and dry.   Neurological:      General: No focal deficit present.          Lab Review:   Lab Results   Component Value Date     05/01/2024    K 3.6 05/01/2024     05/01/2024    CO2 32 (H) 05/01/2024    BUN 18 05/01/2024    CREATININE 1.00 05/01/2024    GLUCOSE 141 (H) 05/01/2024    CALCIUM 8.5 05/01/2024     Lab Results   Component Value Date    CHOL 190 02/21/2023    TRIG 64 02/21/2023    HDL 52 02/21/2023       Lab Results   Component Value Date    LDLCALC 125 02/21/2023        Acute on chronic systolic heart failure (Multi)  Well compensated all things considered.  Will continue carvedilol and torsemide.    Benign essential hypertension  Advised to also check blood pressures at home, diligent control in the setting of severe mitral regurgitation is imperative.

## 2024-07-17 ENCOUNTER — TELEPHONE (OUTPATIENT)
Dept: PRIMARY CARE | Facility: CLINIC | Age: 84
End: 2024-07-17
Payer: MEDICARE

## 2024-07-17 NOTE — TELEPHONE ENCOUNTER
Pt's  called stating patient has had a nosebleed for x2 days --   >Relayed Dr. Gee Dupree's instructions for patient to try Afrin nasal spray in nostril of nosebleed< If does not help, or gets worse; advised to seek Urgent Care per Dr. Dupree

## 2024-07-20 ENCOUNTER — OFFICE VISIT (OUTPATIENT)
Dept: OTOLARYNGOLOGY | Facility: CLINIC | Age: 84
End: 2024-07-20
Payer: MEDICARE

## 2024-07-20 DIAGNOSIS — R04.0 EPISTAXIS: ICD-10-CM

## 2024-07-20 PROCEDURE — 1159F MED LIST DOCD IN RCRD: CPT | Performed by: OTOLARYNGOLOGY

## 2024-07-20 PROCEDURE — 1123F ACP DISCUSS/DSCN MKR DOCD: CPT | Performed by: OTOLARYNGOLOGY

## 2024-07-20 PROCEDURE — 99203 OFFICE O/P NEW LOW 30 MIN: CPT | Performed by: OTOLARYNGOLOGY

## 2024-07-20 PROCEDURE — 31231 NASAL ENDOSCOPY DX: CPT | Performed by: OTOLARYNGOLOGY

## 2024-07-20 PROCEDURE — 1160F RVW MEDS BY RX/DR IN RCRD: CPT | Performed by: OTOLARYNGOLOGY

## 2024-07-20 PROCEDURE — 1157F ADVNC CARE PLAN IN RCRD: CPT | Performed by: OTOLARYNGOLOGY

## 2024-07-20 PROCEDURE — 1036F TOBACCO NON-USER: CPT | Performed by: OTOLARYNGOLOGY

## 2024-07-20 NOTE — PROGRESS NOTES
Chief Complaint:  Epistaxis    Referring Provider: Payal Longoria DO    History of Present Illness:    Anitha Welch is a 84 y.o. female, presenting for evaluation of epistaxis.  She has been experiencing bleeding from both sides of the nose for the last 4 to 6 weeks.  She has been using continuous oxygen for the last year.  She currently uses high flow nasal cannula at 2 L.  She does not humidify this oxygen.  She does not have a humidifier at home.  She has had several episodes of epistaxis which have been difficult to control.  Most recently she bled this morning.  Afrin seems to control the bleeding.  They have utilized tissues in the nose and other means to stop the bleeding.  No trauma or previous surgery.  She is taking Eliquis.    ?  Review of Systems:    Review of symptoms was negative except for those stated including Cardiopulmonary, Genitourinary, Gastrointestinal, Psychological, Sleep pattern, Endocrine, Eyes, Neurologic, Musculoskeletal, Skin, Hematologic/Lymphatic and Allergic/Immunologic.     Medical History:    I have reviewed the patient's updated past medical history, surgical history, family history, social history, as well as current medications and allergies as of 7/20/2024. Changes to these items have been updated and marked as reviewed in the electronic medical record.    Physical Exam:    Vitals:  vitals were not taken for this visit.   General: Patient doing well overall and is in no apparent distress.  Psych: Pleasant affect, and answers questions appropriately.  Head & Face: Symmetric facial movements  Eyes: Pupils equal, round, reactive.  Extraocular movements intact without gaze restrictions or nystagmus. No epiphora.  Ears:  External auditory canals are normal.  Tympanic membranes are clear.  No middle ear effusion is seen.  All middle ear landmarks are normal.  Nose: Anterior rhinoscopy revealed normal sinonasal mucosa. More posterior areas of the nasal cavity could not be  completely examined.  Oral Cavity/Oropharynx:  Without lesions or masses to visual exam.  Neck: Supple without lymphadenopathy.  Lungs: Non-labored, and without evidence of stridor.  Cardiac: Pulses are strong, well-perfused.  Extremities: Without gross evidence of clubbing, cyanosis, or edema.  Neuro: Cranial nerves II-XII grossly intact; Intact facial movements.    Procedure:  Rigid nasal endoscopy (74571)  Pre-procedure diagnosis/Indication for procedure:  To evaluate areas not visualized on anterior rhinoscopy   Anesthesia:  None  Description:  A flexible disposable nasal endoscope was used to examine the left and right nasal cavities.  The nasal valve areas were examined for abnormalities or collapse.  The inferior and middle turbinates were evaluated.  The middle and superior meatuses, and the sphenoethmoid recesses were examined and inspected for mucopurulence and polyps. Once the endoscope was withdrawn, the patient was noted to have tolerated the procedure well without complications and was returned to ambulatory status.    Findings:    There is traumatized mucosa particularly on the right side of the nasal septum.  No active bleeding.  There is a left-sided septal deviation.  No telangiectasias or masses or lesions.  The middle meatus and sphenoethmoidal recess are clear.      Assessment:     Anitha Welch is a 84 y.o. female with epistaxis secondary to chronic oxygen use.    Plan:      I recommend Anitha start utilizing saline gel at least 3 times daily.  I instructed her to use the pad of her finger rather than a Q-tip or other apparatus.  I will send a message to her pulmonologist to see if we can have her utilize humidified oxygen at home.  I also recommended a in room humidifier during sleep.  I suspect that her epistaxis will be self-limited and will have her follow-up with me as needed if things are not improving.      Makenna Freeman MD, M.Eng.   of Otolaryngology - Head &  Neck Surgery  Division of Rhinology and Endoscopic Skull Base Surgery  [facility]

## 2024-07-22 ENCOUNTER — HOSPITAL ENCOUNTER (OUTPATIENT)
Dept: RADIOLOGY | Facility: CLINIC | Age: 84
Discharge: HOME | End: 2024-07-22
Payer: MEDICARE

## 2024-07-22 ENCOUNTER — TELEPHONE (OUTPATIENT)
Dept: PRIMARY CARE | Facility: CLINIC | Age: 84
End: 2024-07-22
Payer: MEDICARE

## 2024-07-22 DIAGNOSIS — M79.604 RIGHT LEG PAIN: ICD-10-CM

## 2024-07-22 DIAGNOSIS — M79.605 LEFT LEG PAIN: ICD-10-CM

## 2024-07-22 DIAGNOSIS — M79.605 LEFT LEG PAIN: Primary | ICD-10-CM

## 2024-07-22 PROCEDURE — 93971 EXTREMITY STUDY: CPT | Performed by: RADIOLOGY

## 2024-07-22 PROCEDURE — 93970 EXTREMITY STUDY: CPT

## 2024-07-22 NOTE — TELEPHONE ENCOUNTER
Patient was seen on 07/15/2024 for left leg pain, she went to get the ultrasound done (she is there now) and she told the tech that she is now having worsening right leg pain instead of left, please advise.

## 2024-07-23 ENCOUNTER — TELEPHONE (OUTPATIENT)
Dept: PRIMARY CARE | Facility: CLINIC | Age: 84
End: 2024-07-23
Payer: MEDICARE

## 2024-07-23 NOTE — TELEPHONE ENCOUNTER
Patients son, Jose, called following up on patiens ultra sound results. Please call jose back at [phone] when results have been acknowledged by provider/ care team.

## 2024-07-31 ENCOUNTER — TELEPHONE (OUTPATIENT)
Dept: PRIMARY CARE | Facility: CLINIC | Age: 84
End: 2024-07-31
Payer: MEDICARE

## 2024-07-31 DIAGNOSIS — M79.604 RIGHT LEG PAIN: ICD-10-CM

## 2024-07-31 DIAGNOSIS — M79.605 LEFT LEG PAIN: Primary | ICD-10-CM

## 2024-07-31 NOTE — TELEPHONE ENCOUNTER
Pt's son's called stating that pt is still having leg pain. Jose would like to know if pt could get a referral to someone for the leg pain, either a vascular doctor or someone? Jose would also like to know what to do for the pain in the meantime? Pt last seen on 7/15/2024.

## 2024-07-31 NOTE — TELEPHONE ENCOUNTER
Spoke to patient's son regarding referral to pain medicine.  He will call to schedule an appointment.

## 2024-08-08 ENCOUNTER — APPOINTMENT (OUTPATIENT)
Dept: RADIOLOGY | Facility: HOSPITAL | Age: 84
End: 2024-08-08
Payer: MEDICARE

## 2024-08-08 ENCOUNTER — HOSPITAL ENCOUNTER (INPATIENT)
Facility: HOSPITAL | Age: 84
End: 2024-08-08
Attending: STUDENT IN AN ORGANIZED HEALTH CARE EDUCATION/TRAINING PROGRAM | Admitting: INTERNAL MEDICINE
Payer: MEDICARE

## 2024-08-08 ENCOUNTER — APPOINTMENT (OUTPATIENT)
Dept: CARDIOLOGY | Facility: HOSPITAL | Age: 84
End: 2024-08-08
Payer: MEDICARE

## 2024-08-08 ENCOUNTER — OFFICE VISIT (OUTPATIENT)
Dept: PRIMARY CARE | Facility: CLINIC | Age: 84
End: 2024-08-08
Payer: MEDICARE

## 2024-08-08 ENCOUNTER — TELEPHONE (OUTPATIENT)
Dept: PRIMARY CARE | Facility: CLINIC | Age: 84
End: 2024-08-08

## 2024-08-08 VITALS
DIASTOLIC BLOOD PRESSURE: 82 MMHG | HEART RATE: 83 BPM | BODY MASS INDEX: 23.32 KG/M2 | WEIGHT: 140 LBS | OXYGEN SATURATION: 90 % | SYSTOLIC BLOOD PRESSURE: 144 MMHG | HEIGHT: 65 IN

## 2024-08-08 DIAGNOSIS — Z66 DO NOT RESUSCITATE: ICD-10-CM

## 2024-08-08 DIAGNOSIS — K92.1 GASTROINTESTINAL HEMORRHAGE WITH MELENA: ICD-10-CM

## 2024-08-08 DIAGNOSIS — G93.32 CHRONIC FATIGUE SYNDROME: ICD-10-CM

## 2024-08-08 DIAGNOSIS — R06.02 SHORTNESS OF BREATH: ICD-10-CM

## 2024-08-08 DIAGNOSIS — R29.898 MUSCULAR DECONDITIONING: ICD-10-CM

## 2024-08-08 DIAGNOSIS — I50.20 HFREF (HEART FAILURE WITH REDUCED EJECTION FRACTION) (MULTI): ICD-10-CM

## 2024-08-08 DIAGNOSIS — E03.9 HYPOTHYROIDISM, UNSPECIFIED TYPE: ICD-10-CM

## 2024-08-08 DIAGNOSIS — J81.0 ACUTE PULMONARY EDEMA (MULTI): ICD-10-CM

## 2024-08-08 DIAGNOSIS — M79.606 PAIN OF LOWER EXTREMITY, UNSPECIFIED LATERALITY: ICD-10-CM

## 2024-08-08 DIAGNOSIS — J96.21 ACUTE ON CHRONIC RESPIRATORY FAILURE WITH HYPOXIA (MULTI): Primary | ICD-10-CM

## 2024-08-08 DIAGNOSIS — R06.00 DYSPNEA, UNSPECIFIED TYPE: ICD-10-CM

## 2024-08-08 DIAGNOSIS — K92.2 GASTROINTESTINAL BLEED: ICD-10-CM

## 2024-08-08 DIAGNOSIS — R53.1 ASTHENIA: ICD-10-CM

## 2024-08-08 DIAGNOSIS — D64.9 ANEMIA, UNSPECIFIED TYPE: Primary | ICD-10-CM

## 2024-08-08 DIAGNOSIS — R53.1 WEAKNESS: ICD-10-CM

## 2024-08-08 DIAGNOSIS — K92.1 BLOODY STOOL: ICD-10-CM

## 2024-08-08 LAB
ABO GROUP (TYPE) IN BLOOD: NORMAL
ABO GROUP (TYPE) IN BLOOD: NORMAL
ALBUMIN SERPL-MCNC: 4.1 G/DL (ref 3.5–5)
ALP BLD-CCNC: 49 U/L (ref 35–125)
ALT SERPL-CCNC: 12 U/L (ref 5–40)
ANION GAP SERPL CALC-SCNC: 11 MMOL/L
ANTIBODY SCREEN: NORMAL
AST SERPL-CCNC: 15 U/L (ref 5–40)
BASOPHILS # BLD AUTO: 0.01 X10*3/UL (ref 0–0.1)
BASOPHILS NFR BLD AUTO: 0.2 %
BILIRUB SERPL-MCNC: 1.9 MG/DL (ref 0.1–1.2)
BUN SERPL-MCNC: 27 MG/DL (ref 8–25)
BURR CELLS BLD QL SMEAR: NORMAL
CALCIUM SERPL-MCNC: 9.1 MG/DL (ref 8.5–10.4)
CHLORIDE SERPL-SCNC: 107 MMOL/L (ref 97–107)
CO2 SERPL-SCNC: 32 MMOL/L (ref 24–31)
CREAT SERPL-MCNC: 1.3 MG/DL (ref 0.4–1.6)
EGFRCR SERPLBLD CKD-EPI 2021: 40 ML/MIN/1.73M*2
EOSINOPHIL # BLD AUTO: 0.01 X10*3/UL (ref 0–0.4)
EOSINOPHIL NFR BLD AUTO: 0.2 %
ERYTHROCYTE [DISTWIDTH] IN BLOOD BY AUTOMATED COUNT: 16.7 % (ref 11.5–14.5)
GLUCOSE SERPL-MCNC: 92 MG/DL (ref 65–99)
HCT VFR BLD AUTO: 19.1 % (ref 36–46)
HGB BLD-MCNC: 5.3 G/DL (ref 12–16)
HYPOCHROMIA BLD QL SMEAR: NORMAL
IMM GRANULOCYTES # BLD AUTO: 0.04 X10*3/UL (ref 0–0.5)
IMM GRANULOCYTES NFR BLD AUTO: 0.7 % (ref 0–0.9)
LYMPHOCYTES # BLD AUTO: 0.58 X10*3/UL (ref 0.8–3)
LYMPHOCYTES NFR BLD AUTO: 9.6 %
MCH RBC QN AUTO: 22.4 PG (ref 26–34)
MCHC RBC AUTO-ENTMCNC: 27.7 G/DL (ref 32–36)
MCV RBC AUTO: 81 FL (ref 80–100)
MONOCYTES # BLD AUTO: 0.3 X10*3/UL (ref 0.05–0.8)
MONOCYTES NFR BLD AUTO: 5 %
NEUTROPHILS # BLD AUTO: 5.11 X10*3/UL (ref 1.6–5.5)
NEUTROPHILS NFR BLD AUTO: 84.3 %
NRBC BLD-RTO: 0 /100 WBCS (ref 0–0)
NT-PROBNP SERPL-MCNC: 5394 PG/ML (ref 0–624)
OVALOCYTES BLD QL SMEAR: NORMAL
PLATELET # BLD AUTO: 129 X10*3/UL (ref 150–450)
POLYCHROMASIA BLD QL SMEAR: NORMAL
POTASSIUM SERPL-SCNC: 3.8 MMOL/L (ref 3.4–5.1)
PROT SERPL-MCNC: 6.4 G/DL (ref 5.9–7.9)
RBC # BLD AUTO: 2.37 X10*6/UL (ref 4–5.2)
RBC MORPH BLD: NORMAL
RH FACTOR (ANTIGEN D): NORMAL
RH FACTOR (ANTIGEN D): NORMAL
SCHISTOCYTES BLD QL SMEAR: NORMAL
SODIUM SERPL-SCNC: 150 MMOL/L (ref 133–145)
TARGETS BLD QL SMEAR: NORMAL
TROPONIN T SERPL-MCNC: 43 NG/L
TROPONIN T SERPL-MCNC: 44 NG/L
WBC # BLD AUTO: 6.1 X10*3/UL (ref 4.4–11.3)

## 2024-08-08 PROCEDURE — 71045 X-RAY EXAM CHEST 1 VIEW: CPT | Performed by: RADIOLOGY

## 2024-08-08 PROCEDURE — 84075 ASSAY ALKALINE PHOSPHATASE: CPT | Performed by: PHYSICIAN ASSISTANT

## 2024-08-08 PROCEDURE — 83880 ASSAY OF NATRIURETIC PEPTIDE: CPT | Performed by: PHYSICIAN ASSISTANT

## 2024-08-08 PROCEDURE — 99222 1ST HOSP IP/OBS MODERATE 55: CPT | Performed by: INTERNAL MEDICINE

## 2024-08-08 PROCEDURE — 99214 OFFICE O/P EST MOD 30 MIN: CPT | Performed by: FAMILY MEDICINE

## 2024-08-08 PROCEDURE — C9113 INJ PANTOPRAZOLE SODIUM, VIA: HCPCS | Performed by: PHYSICIAN ASSISTANT

## 2024-08-08 PROCEDURE — 36430 TRANSFUSION BLD/BLD COMPNT: CPT

## 2024-08-08 PROCEDURE — 2500000001 HC RX 250 WO HCPCS SELF ADMINISTERED DRUGS (ALT 637 FOR MEDICARE OP): Performed by: INTERNAL MEDICINE

## 2024-08-08 PROCEDURE — 36415 COLL VENOUS BLD VENIPUNCTURE: CPT | Performed by: PHYSICIAN ASSISTANT

## 2024-08-08 PROCEDURE — 1036F TOBACCO NON-USER: CPT | Performed by: FAMILY MEDICINE

## 2024-08-08 PROCEDURE — C9113 INJ PANTOPRAZOLE SODIUM, VIA: HCPCS | Performed by: INTERNAL MEDICINE

## 2024-08-08 PROCEDURE — 99285 EMERGENCY DEPT VISIT HI MDM: CPT | Mod: 25

## 2024-08-08 PROCEDURE — 86920 COMPATIBILITY TEST SPIN: CPT

## 2024-08-08 PROCEDURE — 1123F ACP DISCUSS/DSCN MKR DOCD: CPT | Performed by: FAMILY MEDICINE

## 2024-08-08 PROCEDURE — 2500000004 HC RX 250 GENERAL PHARMACY W/ HCPCS (ALT 636 FOR OP/ED): Performed by: INTERNAL MEDICINE

## 2024-08-08 PROCEDURE — 84484 ASSAY OF TROPONIN QUANT: CPT | Performed by: PHYSICIAN ASSISTANT

## 2024-08-08 PROCEDURE — 2500000004 HC RX 250 GENERAL PHARMACY W/ HCPCS (ALT 636 FOR OP/ED)

## 2024-08-08 PROCEDURE — 3077F SYST BP >= 140 MM HG: CPT | Performed by: FAMILY MEDICINE

## 2024-08-08 PROCEDURE — 3079F DIAST BP 80-89 MM HG: CPT | Performed by: FAMILY MEDICINE

## 2024-08-08 PROCEDURE — 1159F MED LIST DOCD IN RCRD: CPT | Performed by: FAMILY MEDICINE

## 2024-08-08 PROCEDURE — 2500000004 HC RX 250 GENERAL PHARMACY W/ HCPCS (ALT 636 FOR OP/ED): Performed by: PHYSICIAN ASSISTANT

## 2024-08-08 PROCEDURE — 1157F ADVNC CARE PLAN IN RCRD: CPT | Performed by: FAMILY MEDICINE

## 2024-08-08 PROCEDURE — 93005 ELECTROCARDIOGRAM TRACING: CPT

## 2024-08-08 PROCEDURE — 71045 X-RAY EXAM CHEST 1 VIEW: CPT

## 2024-08-08 PROCEDURE — P9016 RBC LEUKOCYTES REDUCED: HCPCS

## 2024-08-08 PROCEDURE — 1125F AMNT PAIN NOTED PAIN PRSNT: CPT | Performed by: FAMILY MEDICINE

## 2024-08-08 PROCEDURE — 86901 BLOOD TYPING SEROLOGIC RH(D): CPT | Performed by: PHYSICIAN ASSISTANT

## 2024-08-08 PROCEDURE — 2500000004 HC RX 250 GENERAL PHARMACY W/ HCPCS (ALT 636 FOR OP/ED): Performed by: HOSPITALIST

## 2024-08-08 PROCEDURE — 2060000001 HC INTERMEDIATE ICU ROOM DAILY

## 2024-08-08 PROCEDURE — 2500000005 HC RX 250 GENERAL PHARMACY W/O HCPCS: Performed by: INTERNAL MEDICINE

## 2024-08-08 PROCEDURE — 85025 COMPLETE CBC W/AUTO DIFF WBC: CPT | Performed by: PHYSICIAN ASSISTANT

## 2024-08-08 RX ORDER — NYSTATIN 100000 [USP'U]/G
1 POWDER TOPICAL 2 TIMES DAILY
Status: DISPENSED | OUTPATIENT
Start: 2024-08-08

## 2024-08-08 RX ORDER — NITROGLYCERIN 0.4 MG/1
0.4 TABLET SUBLINGUAL EVERY 5 MIN PRN
Status: ACTIVE | OUTPATIENT
Start: 2024-08-08

## 2024-08-08 RX ORDER — ALBUTEROL SULFATE 0.83 MG/ML
2.5 SOLUTION RESPIRATORY (INHALATION) EVERY 4 HOURS PRN
Status: DISCONTINUED | OUTPATIENT
Start: 2024-08-08 | End: 2024-08-09

## 2024-08-08 RX ORDER — FLUTICASONE FUROATE AND VILANTEROL 100; 25 UG/1; UG/1
1 POWDER RESPIRATORY (INHALATION)
Status: DISCONTINUED | OUTPATIENT
Start: 2024-08-09 | End: 2024-08-10

## 2024-08-08 RX ORDER — CETIRIZINE HYDROCHLORIDE 10 MG/1
10 TABLET ORAL DAILY
Status: DISPENSED | OUTPATIENT
Start: 2024-08-08

## 2024-08-08 RX ORDER — PANTOPRAZOLE SODIUM 40 MG/1
40 TABLET, DELAYED RELEASE ORAL 2 TIMES DAILY
Status: DISCONTINUED | OUTPATIENT
Start: 2024-08-08 | End: 2024-08-08

## 2024-08-08 RX ORDER — TIZANIDINE 4 MG/1
2 TABLET ORAL EVERY 8 HOURS PRN
Status: DISPENSED | OUTPATIENT
Start: 2024-08-08

## 2024-08-08 RX ORDER — FUROSEMIDE 10 MG/ML
40 INJECTION INTRAMUSCULAR; INTRAVENOUS ONCE
Status: COMPLETED | OUTPATIENT
Start: 2024-08-08 | End: 2024-08-08

## 2024-08-08 RX ORDER — CARVEDILOL 3.12 MG/1
3.25 TABLET ORAL
Status: DISPENSED | OUTPATIENT
Start: 2024-08-08

## 2024-08-08 RX ORDER — ATORVASTATIN CALCIUM 40 MG/1
40 TABLET, FILM COATED ORAL NIGHTLY
Status: DISPENSED | OUTPATIENT
Start: 2024-08-08

## 2024-08-08 RX ORDER — PANTOPRAZOLE SODIUM 40 MG/10ML
80 INJECTION, POWDER, LYOPHILIZED, FOR SOLUTION INTRAVENOUS ONCE
Status: COMPLETED | OUTPATIENT
Start: 2024-08-08 | End: 2024-08-08

## 2024-08-08 RX ORDER — PANTOPRAZOLE SODIUM 40 MG/10ML
40 INJECTION, POWDER, LYOPHILIZED, FOR SOLUTION INTRAVENOUS 2 TIMES DAILY
Status: DISPENSED | OUTPATIENT
Start: 2024-08-08

## 2024-08-08 RX ORDER — TORSEMIDE 20 MG/1
20 TABLET ORAL DAILY
Status: ACTIVE | OUTPATIENT
Start: 2024-08-08

## 2024-08-08 RX ORDER — DICLOFENAC SODIUM 10 MG/G
4 GEL TOPICAL 3 TIMES DAILY PRN
Status: DISPENSED | OUTPATIENT
Start: 2024-08-08

## 2024-08-08 SDOH — SOCIAL STABILITY: SOCIAL INSECURITY: ABUSE: ADULT

## 2024-08-08 SDOH — SOCIAL STABILITY: SOCIAL INSECURITY: HAVE YOU HAD THOUGHTS OF HARMING ANYONE ELSE?: NO

## 2024-08-08 SDOH — SOCIAL STABILITY: SOCIAL INSECURITY: ARE YOU OR HAVE YOU BEEN THREATENED OR ABUSED PHYSICALLY, EMOTIONALLY, OR SEXUALLY BY ANYONE?: NO

## 2024-08-08 SDOH — SOCIAL STABILITY: SOCIAL INSECURITY: WERE YOU ABLE TO COMPLETE ALL THE BEHAVIORAL HEALTH SCREENINGS?: YES

## 2024-08-08 SDOH — SOCIAL STABILITY: SOCIAL INSECURITY: DOES ANYONE TRY TO KEEP YOU FROM HAVING/CONTACTING OTHER FRIENDS OR DOING THINGS OUTSIDE YOUR HOME?: NO

## 2024-08-08 SDOH — SOCIAL STABILITY: SOCIAL INSECURITY: ARE THERE ANY APPARENT SIGNS OF INJURIES/BEHAVIORS THAT COULD BE RELATED TO ABUSE/NEGLECT?: NO

## 2024-08-08 SDOH — SOCIAL STABILITY: SOCIAL INSECURITY: DO YOU FEEL UNSAFE GOING BACK TO THE PLACE WHERE YOU ARE LIVING?: NO

## 2024-08-08 SDOH — SOCIAL STABILITY: SOCIAL INSECURITY: DO YOU FEEL ANYONE HAS EXPLOITED OR TAKEN ADVANTAGE OF YOU FINANCIALLY OR OF YOUR PERSONAL PROPERTY?: NO

## 2024-08-08 SDOH — SOCIAL STABILITY: SOCIAL INSECURITY: HAVE YOU HAD ANY THOUGHTS OF HARMING ANYONE ELSE?: NO

## 2024-08-08 SDOH — SOCIAL STABILITY: SOCIAL INSECURITY: HAS ANYONE EVER THREATENED TO HURT YOUR FAMILY OR YOUR PETS?: NO

## 2024-08-08 ASSESSMENT — ENCOUNTER SYMPTOMS
WHEEZING: 0
LEG PAIN: 1
EYE DISCHARGE: 0
BLOOD IN STOOL: 1
SHORTNESS OF BREATH: 1
STRIDOR: 0
DIARRHEA: 0
COUGH: 0
NAUSEA: 0
WEAKNESS: 1
DIZZINESS: 1
FATIGUE: 1
DIFFICULTY URINATING: 0
VOMITING: 0
DYSURIA: 0
ARTHRALGIAS: 0
ACTIVITY CHANGE: 1
ABDOMINAL DISTENTION: 0
EYE ITCHING: 0
ABDOMINAL PAIN: 0
SHORTNESS OF BREATH: 1
PSYCHIATRIC NEGATIVE: 1

## 2024-08-08 ASSESSMENT — LIFESTYLE VARIABLES
EVER FELT BAD OR GUILTY ABOUT YOUR DRINKING: NO
SKIP TO QUESTIONS 9-10: 1
HAVE YOU EVER FELT YOU SHOULD CUT DOWN ON YOUR DRINKING: NO
TOTAL SCORE: 0
HAVE PEOPLE ANNOYED YOU BY CRITICIZING YOUR DRINKING: NO
HOW OFTEN DO YOU HAVE 6 OR MORE DRINKS ON ONE OCCASION: NEVER
EVER HAD A DRINK FIRST THING IN THE MORNING TO STEADY YOUR NERVES TO GET RID OF A HANGOVER: NO
AUDIT-C TOTAL SCORE: 0
HOW OFTEN DO YOU HAVE A DRINK CONTAINING ALCOHOL: NEVER
AUDIT-C TOTAL SCORE: 0
HOW MANY STANDARD DRINKS CONTAINING ALCOHOL DO YOU HAVE ON A TYPICAL DAY: PATIENT DOES NOT DRINK

## 2024-08-08 ASSESSMENT — COLUMBIA-SUICIDE SEVERITY RATING SCALE - C-SSRS
6. HAVE YOU EVER DONE ANYTHING, STARTED TO DO ANYTHING, OR PREPARED TO DO ANYTHING TO END YOUR LIFE?: NO
2. HAVE YOU ACTUALLY HAD ANY THOUGHTS OF KILLING YOURSELF?: NO
1. IN THE PAST MONTH, HAVE YOU WISHED YOU WERE DEAD OR WISHED YOU COULD GO TO SLEEP AND NOT WAKE UP?: NO

## 2024-08-08 ASSESSMENT — COGNITIVE AND FUNCTIONAL STATUS - GENERAL
CLIMB 3 TO 5 STEPS WITH RAILING: A LOT
PERSONAL GROOMING: A LOT
MOBILITY SCORE: 16
MOVING FROM LYING ON BACK TO SITTING ON SIDE OF FLAT BED WITH BEDRAILS: A LITTLE
WALKING IN HOSPITAL ROOM: A LOT
MOVING TO AND FROM BED TO CHAIR: A LITTLE
HELP NEEDED FOR BATHING: A LOT
STANDING UP FROM CHAIR USING ARMS: A LITTLE
DRESSING REGULAR LOWER BODY CLOTHING: A LOT
DAILY ACTIVITIY SCORE: 12
EATING MEALS: A LOT
DRESSING REGULAR UPPER BODY CLOTHING: A LOT
TURNING FROM BACK TO SIDE WHILE IN FLAT BAD: A LITTLE
PATIENT BASELINE BEDBOUND: NO
TOILETING: A LOT

## 2024-08-08 ASSESSMENT — ACTIVITIES OF DAILY LIVING (ADL)
HEARING - LEFT EAR: FUNCTIONAL
GROOMING: NEEDS ASSISTANCE
ADEQUATE_TO_COMPLETE_ADL: YES
DRESSING YOURSELF: NEEDS ASSISTANCE
BATHING: NEEDS ASSISTANCE
FEEDING YOURSELF: NEEDS ASSISTANCE
LACK_OF_TRANSPORTATION: NO
PATIENT'S MEMORY ADEQUATE TO SAFELY COMPLETE DAILY ACTIVITIES?: YES
HEARING - RIGHT EAR: FUNCTIONAL
WALKS IN HOME: NEEDS ASSISTANCE
TOILETING: NEEDS ASSISTANCE
JUDGMENT_ADEQUATE_SAFELY_COMPLETE_DAILY_ACTIVITIES: YES

## 2024-08-08 ASSESSMENT — PATIENT HEALTH QUESTIONNAIRE - PHQ9
SUM OF ALL RESPONSES TO PHQ9 QUESTIONS 1 AND 2: 0
2. FEELING DOWN, DEPRESSED OR HOPELESS: NOT AT ALL
SUM OF ALL RESPONSES TO PHQ9 QUESTIONS 1 & 2: 0
1. LITTLE INTEREST OR PLEASURE IN DOING THINGS: NOT AT ALL
1. LITTLE INTEREST OR PLEASURE IN DOING THINGS: NOT AT ALL
2. FEELING DOWN, DEPRESSED OR HOPELESS: NOT AT ALL

## 2024-08-08 ASSESSMENT — PAIN SCALES - GENERAL
PAINLEVEL: 7
PAINLEVEL_OUTOF10: 0 - NO PAIN

## 2024-08-08 ASSESSMENT — PAIN - FUNCTIONAL ASSESSMENT: PAIN_FUNCTIONAL_ASSESSMENT: 0-10

## 2024-08-08 NOTE — PROGRESS NOTES
Pharmacy Medication History Review    Anitha Welch is a 84 y.o. female admitted for Gastrointestinal bleed. Pharmacy reviewed the patient's [id number] medications and allergies for accuracy.    Medications ADDED:  none  Medications CHANGED:  none  Medications REMOVED:   none     The list below reflects the updated PTA list. Comments regarding how patient may be taking medications differently can be found in the Admit Orders Activity  Prior to Admission Medications   Prescriptions Last Dose Informant Patient Reported? Taking?   Trelegy Ellipta 100-62.5-25 mcg blister with device  Family Member No No   Sig: Inhale 1 puff once daily.   albuterol 90 mcg/actuation inhaler  Family Member Yes No   Sig: Inhale 1 puff every 4 hours if needed.   atorvastatin (Lipitor) 40 mg tablet 8/7/2024 Family Member No Yes   Sig: TAKE 1 TABLET BY MOUTH EVERY DAY AT BEDTIME   carvedilol (Coreg) 3.125 mg tablet 8/8/2024 Family Member No Yes   Sig: Take 1 tablet (3.125 mg) by mouth 2 times a day.   diclofenac sodium (Voltaren) 1 % gel Unknown Family Member No No   Sig: Apply 4.5 inches (4 g) topically 4 times a day.   loratadine (Claritin) 10 mg tablet 8/8/2024 Family Member Yes Yes   Sig: Take 1 tablet (10 mg) by mouth once daily.   multivitamin-iron-folic acid (Multi Complete with Iron)  mg-mcg tablet tablet 8/8/2024 Family Member Yes Yes   Sig: Take 1 tablet by mouth once daily.   nitroglycerin (Nitrostat) 0.4 mg SL tablet Unknown Family Member Yes No   Sig: Place 1 tablet (0.4 mg) under the tongue every 5 minutes if needed.   nystatin (Mycostatin) 100,000 unit/gram powder Unknown Family Member No No   Sig: Apply 1 Application topically 2 times a day.   oxygen (O2) gas therapy 8/8/2024 Family Member Yes Yes   Sig: Inhale 2 L/min continuously. Indications: sob   pantoprazole (ProtoNix) 40 mg EC tablet 8/8/2024 Family Member No Yes   Sig: TAKE 1 TABLET BY MOUTH TWICE A DAY   rivaroxaban (Xarelto) 20 mg tablet 8/8/2024 Family  "Member No Yes   Sig: Take 1 tablet (20 mg) by mouth once daily in the evening. Take with meals. Take with food.   tiZANidine (Zanaflex) 2 mg tablet Unknown Family Member No No   Sig: Take 1 tablet (2 mg) by mouth every 8 hours if needed for muscle spasms.   torsemide (Demadex) 20 mg tablet 8/8/2024 Family Member No Yes   Sig: Take 1 tablet (20 mg) by mouth once daily. Do not fill before May 2, 2024.      Facility-Administered Medications: None        The list below reflects the updated allergy list. Please review each documented allergy for additional clarification and justification.  Allergies  Reviewed by Ese Skelton RN on 8/8/2024        Severity Reactions Comments    Amoxicillin High Anaphylaxis, Shortness of breath     Iodinated Contrast Media High Dizziness, Other Increased BP Increase BP, dizziness    Ciprofloxacin Medium Rash, Swelling Decreased BP    Influenza Virus Vaccines Medium Other \"sick\" for 24 hours afterwards    Flu Vac 2023 [id number](pf) Not Specified Unknown     Diphenhydramine Low Rash     Other Low Itching Fluzone inj high dose            Pharmacy has been updated to [facility].    Sources used to complete the med history include dispense history, PTA medication list, patient/family interview. Family is a fair historian.    Below are additional concerns with the patient's PTA list.  none    Nelda Coppola, Curtis-ADV  Please reach out via [employer] Secure Chat for questions    "

## 2024-08-08 NOTE — ED PROVIDER NOTES
HPI   Chief Complaint   Patient presents with    Shortness of Breath       HPI    Patient is an 84-year-old female with a history of congestive heart failure, COPD, coronary artery disease, CVA and STEMI presenting for evaluation of shortness of breath.  Son is at the bedside today with history taking, stating that the patient has been short of breath for the past 2-1/2 weeks.  She chronically wears 2 L nasal cannula oxygen at home.  Son states that the patient cannot walk more than approximately 10 feet before becoming very short of breath and laboring for respirations.  The patient denies any shortness of breath at rest.  She does admit to some minor weight gain.  She denies associated chest pain.  No abdominal pain.  No nausea, vomiting, diarrhea, constipation, dysuria or hematuria.  No fever or chills.  No recent illness.  No cough.    Patient History   Past Medical History:   Diagnosis Date    AAA (abdominal aortic aneurysm) (CMS-[facility])     Acute urinary tract infection 04/20/2023    Anemia     Anxiety     Arthritis     CHF (congestive heart failure) (Multi)     Chronic pain disorder     back pain    CKD (chronic kidney disease)     Cognitive decline     COPD (chronic obstructive pulmonary disease) (Multi)     oxygen dependent- wears 2L NC continuously    Coronary artery disease     s/p stent    CVA (cerebral vascular accident) (Multi)     weaker on the left side    Deep vein thrombosis (DVT) of calf (Multi)     left    Depression     Disease of thyroid gland     Diverticulosis     DVT (deep venous thrombosis) (Multi)     left leg, on xarelto    Easy bruising     Epistaxis     GERD (gastroesophageal reflux disease)     managed with protonix    History of blood transfusion 2023    History of degenerative disc disease     Hyperlipidemia     Hypertension     Hypothyroidism     Ischemic cardiomyopathy     Meralgia paresthetica     Nonrheumatic mitral valve regurgitation     Osteoarthritis     Osteopenia 2010    hip -  DEXA    Plantar fasciitis     RLS (restless legs syndrome)     Sciatica     Spinal stenosis     ST elevation (STEMI) myocardial infarction (Multi)      Past Surgical History:   Procedure Laterality Date    ADENOIDECTOMY      ANOMALOUS PULMONARY VENOUS RETURN REPAIR, 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 2F Cardiac Cath Lab;  Service: Cardiovascular;  Laterality: N/A;  SAMMY Elgendy.Labs with cPM,Maynard,Schedule at 7;30am    CARDIAC CATHETERIZATION  10/2019    CARDIAC CATHETERIZATION N/A 02/16/2024    Procedure: Left And Right Heart Cath, With LV;  Surgeon: Tuan Foss DO;  Location: [facility] Cardiac Cath Lab;  Service: Cardiovascular;  Laterality: N/A;  no pre auth needed    CATARACT EXTRACTION Bilateral 11/13/2012    CHOLECYSTECTOMY  1996    laparoscopic    COLONOSCOPY      Dr. Rodriguez    CORONARY STENT PLACEMENT      CYST REMOVAL Right 06/24/2013    Excision of Sebaceous cyst right axilla    DILATION AND CURETTAGE OF UTERUS      ESOPHAGOGASTRODUODENOSCOPY      KNEE ARTHROPLASTY Left     MR HEAD ANGIO WO IV CONTRAST  02/24/2017    MR HEAD ANGIO WO IV CONTRAST [facility]    MR HEAD ANGIO WO IV CONTRAST  08/11/2017    MR HEAD ANGIO WO IV CONTRAST [facility]    MR HEAD ANGIO WO IV CONTRAST  02/10/2019    MR HEAD ANGIO WO IV CONTRAST [facility]    OTHER SURGICAL HISTORY  01/09/2019    Stent synergy    OTHER SURGICAL HISTORY  01/09/2019    Stent synergy    DC ARTHRP ACETBLR/PROX FEM PROSTC AGRFT/ALGRFT      SURGICAL SAMMY MONITORING      THYROIDECTOMY, PARTIAL Bilateral 04/17/2013    subtotal thyroidectomy    TONSILLECTOMY      TOTAL HIP ARTHROPLASTY Right 2006    UPPER GASTROINTESTINAL ENDOSCOPY  03/2019    Dr. Galan     Family History   Problem Relation Name Age of Onset    Hyperthyroidism Mother          Treated with radioactive iodine    Hypertension Mother      Heart disease Mother       Hyperthyroidism Sibling      Diabetes Father's Sister       Social History     Tobacco Use    Smoking status: Former     Current packs/day: 0.00     Types: Cigarettes     Quit date: 2014     Years since quitting: 10.6     Passive exposure: Never    Smokeless tobacco: Never   Vaping Use    Vaping status: Never Used   Substance Use Topics    Alcohol use: Not Currently     Comment: glass wine at holiday    Drug use: Never       Physical Exam   ED Triage Vitals [08/08/24 1236]   Temperature Heart Rate Respirations BP   36.8 °C (98.2 °F) 88 20 102/55      Pulse Ox Temp Source Heart Rate Source Patient Position   95 % Oral Monitor --      BP Location FiO2 (%)     -- --       Physical Exam  Vitals and nursing note reviewed.   Constitutional:       Appearance: Normal appearance.   HENT:      Head: Normocephalic and atraumatic.   Eyes:      Extraocular Movements: Extraocular movements intact.      Conjunctiva/sclera: Conjunctivae normal.      Pupils: Pupils are equal, round, and reactive to light.   Cardiovascular:      Rate and Rhythm: Normal rate and regular rhythm.   Pulmonary:      Effort: Pulmonary effort is normal.      Breath sounds: Normal breath sounds.   Abdominal:      General: Abdomen is flat. Bowel sounds are normal.      Palpations: Abdomen is soft.   Skin:     General: Skin is warm and dry.   Neurological:      Mental Status: She is alert.           ED Course & MDM   Diagnoses as of 08/08/24 1456   Anemia, unspecified type   Bloody stool   Dyspnea, unspecified type   Gastrointestinal bleed                 No data recorded     Edwards Coma Scale Score: 15 (08/08/24 1230 : Roma Faust RN)                           Medical Decision Making  Parts of this chart have been completed using voice recognition software. Please excuse any errors of transcription. Despite the medical decision making time stamp above-my medical decision making has taken place during the patient's entire visit. My thought process and  reason for plan has been formulated from the time that I saw the patient until the time of disposition and is not specific to one specific moment during their visit and furthermore my MDM encompasses this entire chart and not only this text box.      HPI: Detailed above.    Exam: A medically appropriate exam performed, outlined above, given the known history and presentation.    History obtained from: Patient, son    Social Determinants of Health considered during this visit: Lives at home    EKG interpreted by my attending physician, reviewed by myself.    Labs Reviewed   CBC WITH AUTO DIFFERENTIAL - Abnormal       Result Value    WBC 6.1      nRBC 0.0      RBC 2.37 (*)     Hemoglobin 5.3 (*)     Hematocrit 19.1 (*)     MCV 81      MCH 22.4 (*)     MCHC 27.7 (*)     RDW 16.7 (*)     Platelets 129 (*)     Neutrophils % 84.3      Immature Granulocytes %, Automated 0.7      Lymphocytes % 9.6      Monocytes % 5.0      Eosinophils % 0.2      Basophils % 0.2      Neutrophils Absolute 5.11      Immature Granulocytes Absolute, Automated 0.04      Lymphocytes Absolute 0.58 (*)     Monocytes Absolute 0.30      Eosinophils Absolute 0.01      Basophils Absolute 0.01     COMPREHENSIVE METABOLIC PANEL - Abnormal    Glucose 92      Sodium 150 (*)     Potassium 3.8      Chloride 107      Bicarbonate 32 (*)     Urea Nitrogen 27 (*)     Creatinine 1.30      eGFR 40 (*)     Calcium 9.1      Albumin 4.1      Alkaline Phosphatase 49      Total Protein 6.4      AST 15      Bilirubin, Total 1.9 (*)     ALT 12      Anion Gap 11     N-TERMINAL PROBNP - Abnormal    PROBNP 5,394 (*)     Narrative:     Reference ranges are based on clinical submission data. These ranges represent the 95th percentile of normal cut-off points. As NT Pro- BNP values approach 1000 pg/ml, clinical symptoms are more likely associated with CHF.   SERIAL TROPONIN, INITIAL ([facility]) - Abnormal    Troponin T, High Sensitivity 44 (*)    TROPONIN T SERIES, HIGH SENSITIVITY  (0, 2 HR, 6 HR)    Narrative:     The following orders were created for panel order Troponin T Series, High Sensitivity (0, 2HR, 6HR).  Procedure                               Abnormality         Status                     ---------                               -----------         ------                     Serial Troponin, Initial...[[id number]]  Abnormal            Final result               Serial Troponin, 2 Hour ...[[id number]]                                                   Please view results for these tests on the individual orders.   SERIAL TROPONIN,  2 HOUR ([facility])   TYPE AND SCREEN   VERAB/VERIFY ABORH   PREPARE RBC   MORPHOLOGY    RBC Morphology See Below      Polychromasia Mild      Hypochromia Mild      RBC Fragments Few      Target Cells Few      Ovalocytes Many      [place] Cells Few       XR chest 1 view   Final Result   Probable cardiomegaly with bilateral infiltrates and effusions   suggesting CHF.        MACRO:   none        Signed by: Jose Rodriguez 8/8/2024 2:20 PM   Dictation workstation:   [id number]        Medications   pantoprazole (ProtoNix) injection 80 mg (has no administration in time range)       Differential diagnoses considered for this visit include: Acute coronary syndrome versus STEMI versus NSTEMI versus pneumonia versus fluid overload    Considerations/further MDM:    Patient is an 84-year-old female presenting for evaluation of dyspnea and dyspnea on exertion for approximately 2-1/2 weeks.  Vital signs were hemodynamically stable within normal limits.  Patient was saturating well on her 2 L nasal cannula oxygen that she normally wears at home.  Physical exam demonstrated well-nourished well-developed female in no acute distress.  Heart and lung sounds were normal with a soft abdomen.  Cardiac workup was initiated given patient's complaints of shortness of breath and cardiac history.    CBC demonstrated anemia with a hemoglobin of 5.3.  CMP demonstrated a sodium of 150.  Initial  troponin of 44.  proBNP elevated at 5394.  Consent was obtained for blood transfusion.  Given patient's hypernatremia with low hemoglobin levels, this would require admittance to the hospital.  I discussed this with the patient, who states that she was willing to stay in the hospital today for further workup.  I spoke to the on-call hospitalist, and the patient was admitted to the stepdown unit in good condition under Dr. Sal Cordova.    The patient/family was counseled on clinical impression, expectations, and plan along with recommendations to admission. All questions were answered and involved parties were understanding and agreeable to course of treatment. Case was discussed with admitting physician and any consultants. Bed type, ED treatment and further ED workup decided by joint decision making with admitting team and any consultants. Patient stable for admission per my assessment and further management of patient will be deferred to the inpatient setting.    Patient was seen in conjunction with attending physician Dr. Mark Blackwell  Patient's history, physical exam, diagnostic studies, and treatment plan were discussed thoroughly.    Procedure  Procedures     Melonie Desouza PA-C  08/08/24 4874

## 2024-08-08 NOTE — ED TRIAGE NOTES
Pt from [facility] office via EMS for low O2 sats. States she went to her appointment due to feeling short of breath and her oxygen was in the low 80's upon arrival on 2L. Wears 2L baseline. EMS gave 1 breathing tx and O2 came up to 94%.

## 2024-08-08 NOTE — TELEPHONE ENCOUNTER
Patient is active with [facility], followed by Milana Hernadez NP. Patient presented to PCP Dr. Dupree's office today with c/o leg pain and SOB. Patient reported feeling near syncopal with resting pulse ox on 2 L concentrator 81-83%. ED eval advised, sent to [facility] via ambulance transfer. ED work up noted: VSS, WNL. Patient was saturating well on her 2 L 02 via n/c that she normally wears at home. CBC demonstrated anemia with Hgb of 5.3. CMP noted a sodium of 150. Initial troponin 44. proBNP elevated at 5394. Consent was obtained for blood transfusion. Plan to admit for further work up. Will monitor hospitalization and discharge plan.

## 2024-08-08 NOTE — NURSING NOTE
UPDATE 1915: Patient admitted to SD-4 A, Patient alert and oriented at bedside with son. No complaints of pain at this time.     Update 2050: Patient repositioned in bed, moving left to right patient became dyspneic, labored breathing, desating to 80%, O2 increased to 5 liters from 3 liters on admission while assessing skin and placing pur wick. Patient  boosted to upright positioned, instructed to take nice deep breaths through nose and out through mouth, patient sating back to 94%, asked to watch [place] on TV. Call bell within reach, bed alarm activated, educated on when janeth call for assistance.     UPDATE 2217: Messaged Dr. Whitley concerning H/H redraw, patient getting 1 unit PRBC in Ed. Edwige Sadler order confirmation.    UPDATE 2240: Dr. Thornton at bedside, Chest XRAY and Lasix IV to be ordered.     UPDATE 0000: H&H, troponin labs collected and sent. Patient educated on Plan Of Care with Lasix, and Chest XRAY.    UPDATE 0054: Patient sounding moist, coughing, difficult breathing, sating 78%, Dr. Luna at bedside. Respiratory notified. Bladder scan being performed at bedside. Lopez catheter ordered. Updated on latest H&H level. Additional 40mg  IV Lasix being ordered by Dr. Choudhary. Patient BP elevated. Nitroglycerin gtt ordered. Patient placed on High Flow.     UPDATE 0123: Patient stating she wants Airvo off, educated patient on importance of keeping on Sating >95% at this time. BP remain elevated 173/86, Dr. Whitley ordered for Nitroglycerin gtt to increase to 10mcg/min. Patient to transfer to ICU    UPDATE 0145: Lopez placed    UPDATE 0205: Patient transferred to ICU.

## 2024-08-08 NOTE — H&P
History Of Present Illness  Anitha Welch is a 84 y.o. female with PMH of COPD on 2 liters oxygen, CAD s/p stent, Ischemic CMP, CVA with mild left sided weakness, LLE DVT on xarelto, and recent Mitral valve repair ARMANI on 4/25/24 who is presenting to [facility] ED with c/o general weakness and SOB.    She is reporting feeling unusual SOB for about 2 weeks.   At base line she uses wheel walker, and now feels SOB and too weak to walk.   Reports occasional dizziness, no syncope.   No c/o chest pain.     Reports dark stool about a week ago.   Reports no intake of NSAIDS, currently taking Tylenol PRN for pain.     No c/o hematemesis, reported dark stool and also maroon color stool about a week ago.   No abdominal pain,   No c/o nausea, vomitings or diarrhea.     In the ED admission vitals showed HR 83/min, /82, Sats 95% on 2 liters oxygen, RR 20/min and Temp of 36.8. Labs significant for HB of 5.3, hematocrit 19. ( Base line HB 8.1 and Hematocrit 30.7) . Urea elevated to 27, and creatinine is 1.3. Sodium is elevated to 150.   She is admitted to medicine, step down unit for Acute anemia of blood loss. 2 units of PRBC transfusion are planned in the ED.        Past Medical History  She has a past medical history of AAA (abdominal aortic aneurysm) (CMS-[facility]), Acute urinary tract infection (04/20/2023), Anemia, Anxiety, Arthritis, CHF (congestive heart failure) (Multi), Chronic pain disorder, CKD (chronic kidney disease), Cognitive decline, COPD (chronic obstructive pulmonary disease) (Multi), Coronary artery disease, CVA (cerebral vascular accident) (Multi), Deep vein thrombosis (DVT) of calf (Multi), Depression, Disease of thyroid gland, Diverticulosis, DVT (deep venous thrombosis) (Multi), Easy bruising, Epistaxis, GERD (gastroesophageal reflux disease), History of blood transfusion (2023), History of degenerative disc disease, Hyperlipidemia, Hypertension, Hypothyroidism, Ischemic cardiomyopathy, Meralgia paresthetica,  Nonrheumatic mitral valve regurgitation, Osteoarthritis, Osteopenia (2010), Plantar fasciitis, RLS (restless legs syndrome), Sciatica, Spinal stenosis, and ST elevation (STEMI) myocardial infarction (Multi).    Surgical History  She has a past surgical history that includes MR angio head wo IV contrast (02/24/2017); MR angio head wo IV contrast (08/11/2017); MR angio head wo IV contrast (02/10/2019); Cholecystectomy (1996); Tonsillectomy; pr arthrp acetblr/prox fem prostc agrft/algrft; Thyroidectomy, partial (Bilateral, 04/17/2013); Coronary stent placement; Knee Arthroplasty (Left); Total hip arthroplasty (Right, 2006); Dilation and curettage of uterus; Other surgical history (01/09/2019); Upper gastrointestinal endoscopy (03/2019); Cardiac catheterization (10/2019); Cataract extraction (Bilateral, 11/13/2012); Adenoidectomy; Cyst Removal (Right, 06/24/2013); Colonoscopy; Other surgical history (01/09/2019); surgical laverne monitoring; Cardiac catheterization (N/A, 02/16/2024); Esophagogastroduodenoscopy; and Anomalous pulmonary venous return repair, total (N/A, 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     Social History  She reports that she quit smoking about 10 years ago. Her smoking use included cigarettes. She has never been exposed to tobacco smoke. She has never used smokeless tobacco. She reports that she does not currently use alcohol. She reports that she does not use drugs.  She lives with her , walks with walker.     Family History  Family History   Problem Relation Name Age of Onset    Hyperthyroidism Mother          Treated with radioactive iodine    Hypertension Mother      Heart disease Mother      Hyperthyroidism Sibling      Diabetes Father's Sister          Allergies  Amoxicillin, Iodinated contrast media, Ciprofloxacin, Influenza virus vaccines, Flu vac 2023 [id number](pf), Diphenhydramine, and Other    Review of Systems   Constitutional:  Positive for activity change and fatigue.   HENT:  Negative for congestion.     Eyes:  Negative for discharge and itching.   Respiratory:  Positive for shortness of breath. Negative for cough, wheezing and stridor.    Cardiovascular:  Negative for chest pain and leg swelling.   Gastrointestinal:  Positive for blood in stool. Negative for abdominal distention, abdominal pain, diarrhea, nausea and vomiting.   Endocrine: Negative for cold intolerance.   Genitourinary:  Negative for difficulty urinating and dysuria.   Musculoskeletal:  Negative for arthralgias.   Neurological:  Positive for dizziness and weakness.        Chronic left sided weakness,    Psychiatric/Behavioral: Negative.          Physical Exam  Constitutional:       Appearance: Normal appearance.      Comments: Elderly lady, looks comfortable at rest on 2 liters oxygen,   Able to complete sentences,      HENT:      Head: Normocephalic.      Nose: Nose normal.   Eyes:      Extraocular Movements: Extraocular movements intact.      Pupils: Pupils are equal, round, and reactive to light.   Cardiovascular:      Rate and Rhythm: Normal rate and regular rhythm.      Pulses: Normal pulses.      Heart sounds: Normal heart sounds. No murmur heard.  Pulmonary:      Effort: Pulmonary effort is normal. No respiratory distress.      Breath sounds: Normal breath sounds. No wheezing.   Abdominal:      General: There is no distension.      Palpations: Abdomen is soft.      Tenderness: There is no abdominal tenderness.   Musculoskeletal:         General: Normal range of motion.      Cervical back: Normal range of motion.   Skin:     General: Skin is warm.   Neurological:      General: No focal deficit present.      Mental Status: She is alert and oriented to person, place, and time.      Comments: H/o Mild left sided weakness from previous CVA    Psychiatric:         Mood and Affect: Mood normal.          Last Recorded Vitals  /55   Pulse 88   Temp 36.8 °C (98.2 °F) (Oral)   Resp 20   Wt 66.4 kg (146 lb 6.2 oz)   SpO2 95%     Relevant  Results  Scheduled medications  pantoprazole, 80 mg, intravenous, Once      Continuous medications     PRN medications      Results for orders placed or performed during the hospital encounter of 08/08/24 (from the past 24 hour(s))   ECG 12 lead   Result Value Ref Range    Ventricular Rate 85 BPM    Atrial Rate 85 BPM    IA Interval 162 ms    QRS Duration 98 ms    QT Interval 392 ms    QTC Calculation(Bazett) 466 ms    P Axis 71 degrees    R Axis -18 degrees    T Axis 73 degrees    QRS Count 14 beats    Q Onset 227 ms    P Onset 146 ms    P Offset 211 ms    T Offset 423 ms    QTC Fredericia 440 ms   CBC and Auto Differential   Result Value Ref Range    WBC 6.1 4.4 - 11.3 x10*3/uL    nRBC 0.0 0.0 - 0.0 /100 WBCs    RBC 2.37 (L) 4.00 - 5.20 x10*6/uL    Hemoglobin 5.3 (LL) 12.0 - 16.0 g/dL    Hematocrit 19.1 (L) 36.0 - 46.0 %    MCV 81 80 - 100 fL    MCH 22.4 (L) 26.0 - 34.0 pg    MCHC 27.7 (L) 32.0 - 36.0 g/dL    RDW 16.7 (H) 11.5 - 14.5 %    Platelets 129 (L) 150 - 450 x10*3/uL    Neutrophils % 84.3 40.0 - 80.0 %    Immature Granulocytes %, Automated 0.7 0.0 - 0.9 %    Lymphocytes % 9.6 13.0 - 44.0 %    Monocytes % 5.0 2.0 - 10.0 %    Eosinophils % 0.2 0.0 - 6.0 %    Basophils % 0.2 0.0 - 2.0 %    Neutrophils Absolute 5.11 1.60 - 5.50 x10*3/uL    Immature Granulocytes Absolute, Automated 0.04 0.00 - 0.50 x10*3/uL    Lymphocytes Absolute 0.58 (L) 0.80 - 3.00 x10*3/uL    Monocytes Absolute 0.30 0.05 - 0.80 x10*3/uL    Eosinophils Absolute 0.01 0.00 - 0.40 x10*3/uL    Basophils Absolute 0.01 0.00 - 0.10 x10*3/uL   Comprehensive metabolic panel   Result Value Ref Range    Glucose 92 65 - 99 mg/dL    Sodium 150 (H) 133 - 145 mmol/L    Potassium 3.8 3.4 - 5.1 mmol/L    Chloride 107 97 - 107 mmol/L    Bicarbonate 32 (H) 24 - 31 mmol/L    Urea Nitrogen 27 (H) 8 - 25 mg/dL    Creatinine 1.30 0.40 - 1.60 mg/dL    eGFR 40 (L) >60 mL/min/1.73m*2    Calcium 9.1 8.5 - 10.4 mg/dL    Albumin 4.1 3.5 - 5.0 g/dL    Alkaline  Phosphatase 49 35 - 125 U/L    Total Protein 6.4 5.9 - 7.9 g/dL    AST 15 5 - 40 U/L    Bilirubin, Total 1.9 (H) 0.1 - 1.2 mg/dL    ALT 12 5 - 40 U/L    Anion Gap 11 <=19 mmol/L   NT Pro-BNP   Result Value Ref Range    PROBNP 5,394 (H) 0 - 624 pg/mL   Serial Troponin, Initial ([facility])   Result Value Ref Range    Troponin T, High Sensitivity 44 (HH) <=14 ng/L   Morphology   Result Value Ref Range    RBC Morphology See Below     Polychromasia Mild     Hypochromia Mild     RBC Fragments Few     Target Cells Few     Ovalocytes Many     [place] Cells Few    Type And Screen   Result Value Ref Range    ABO TYPE O     Rh TYPE POS     ANTIBODY SCREEN NEG           Assessment/Plan   Principal Problem:    Gastrointestinal bleed    Anitha Welch is a 84 y.o. female with PMH of COPD on 2 liters oxygen, CAD s/p stent, Ischemic CMP, CVA with mild left sided weakness, LLE DVT on xarelto, and recent Mitral valve repair ARMANI on 4/25/24 who is presenting to [facility] ED with c/o general weakness, SOB and dark stools. She is found to have Low HB of 5.3, which is likely due to GI bleeding. Home xarelto is held. Started on IV PPI BID. Planned for 2 units of PRBC transfusion.     Acute anemia due to Blood loss:  Admission HB 5.3  Hemodynamically stable,   ED planning for 2 units PRBC transfusion  IV PPI BID  GI consult  Hold Xarelto  Monitor CBC   Telemetry    H/o COPD:  On 2 liters oxygen  Continue home inhalers Albuterol  Trilegy  Monitor     H/o DVT in Nov 2023:  Holding Xarelto    Ischemic CMP, H/o Mitral valve repair with ARMANI:  On torsemide 20 mg    Prophylaxis: on IV PPI,  no DVT prophylaxis due to GI bleed,     Code: Full code.     Dispo: Being investigated for source of GI bleeding.         Sal Cordova MD

## 2024-08-08 NOTE — PROGRESS NOTES
[facility]: [facility]  E/M EVALUATION    Anitha Welch is a 84 y.o. female who presents for Leg Pain (Patient is having ongoing leg pain/dd) and Shortness of Breath (Patient is having labored breathing when walking short distances/dd/).    Subjective   Pt here for progressive fatigue and SOB.  She feels near syncopal today.  Resting POX on 2L concentrator 81-83% h/o of heart surgery several months ago.   Son states they were not sure if they should come here or ER.  We advised ER since she looks very pale and ill.  They requested an ambulance transfer since its difficult for her son to manipulate her.        Heart valve surgery 4 months ago.      Leg Pain     Shortness of Breath  Associated symptoms include leg pain.     Review of Systems   Respiratory:  Positive for shortness of breath.        Objective   Vitals:    08/08/24 1133   BP: 144/82   Pulse: 83   SpO2: 90%     Physical Exam  Constitutional:       General: She is not in acute distress.     Appearance: She is ill-appearing.      Comments: Very pale appearing.  In wheelchair.  Mild tachypnea but no accessory muscles.     Cardiovascular:      Rate and Rhythm: Normal rate and regular rhythm.   Pulmonary:      Breath sounds: Decreased air movement present.   Neurological:      Mental Status: She is alert.           Assessment/Plan      Patient Active Problem List   Diagnosis    Epistaxis    Abdominal aortic aneurysm (AAA) without rupture (CMS-HCC)    Acute low back pain    Acute on chronic systolic heart failure (Multi)    Acute renal failure syndrome (CMS-HCC)    Acute ST segment elevation myocardial infarction (Multi)    Arteriosclerosis of coronary artery    Artificial lens present    Benign essential hypertension    Anemia due to blood loss    Stenosis of left carotid artery    Cerebrovascular accident (CVA) involving left cerebral hemisphere (Multi)    Cerebrovascular accident (CVA) due to embolism of cerebral artery (Multi)     Cerebrovascular accident (Multi)    Transient ischemic attack    Angina pectoris (CMS-[facility])    Backache    Chest pain    Tight chest    Chronic diastolic heart failure (Multi)    Chronic heart failure with preserved ejection fraction (Multi)    Stage 3 chronic kidney disease (Multi)    Acute exacerbation of chronic obstructive airways disease (Multi)    Chronic obstructive pulmonary disease (Multi)    Claudication (CMS-[facility])    Cystocele with prolapse    Vaginal prolapse    Dehydration    Dermatochalasis of left upper eyelid    Dermatochalasis of right upper eyelid    Vision disorder    Dysgeusia    Dyspnea    Electrocardiogram abnormal    Facial weakness    Fall    Gastroesophageal reflux disease    Gastrointestinal hemorrhage    Headache    History of coronary artery stent placement    History of myocardial infarction    History of stroke without residual deficits    History of cerebrovascular accident    Hypothyroidism (acquired)    Ischemic cardiomyopathy    Lacunar infarct, acute (Multi)    Lumbago-sciatica due to displacement of lumbar intervertebral disc    Lumbar degenerative disc disease    Mixed hyperlipidemia    Overactive bladder    Pain of lower extremity    Pinguecula    Hypertension    Low blood pressure    Near syncope    Peripheral vascular disease (CMS-[facility])    Right homonymous hemianopsia    Seizure (Multi)    Stented coronary artery    Syncope and collapse    Tear film insufficiency    Urinary urgency    Varicose veins of both legs with edema    Floaters in visual field    Vitreous degeneration    Asthenia    Chronic fatigue syndrome    Weakness    Acute respiratory failure with hypoxia (Multi)    Acquired hypothyroidism    Bilateral carotid artery stenosis    Restless legs syndrome    HFrEF (heart failure with reduced ejection fraction) (Multi)    Severe mitral valve regurgitation    Heart failure (Multi)    Acute deep vein thrombosis (DVT) of proximal vein of left lower extremity (Multi)     Constipation    Venous thromboembolism (VTE)    Do not resuscitate    Amnesia    Hypertensive heart disease with heart failure (Multi)    Nonrheumatic mitral valve regurgitation    S/P mitral valve clip implantation       Diagnoses and all orders for this visit:  Acute on chronic respiratory failure with hypoxia (Multi)  Patient transferred by ambulation to closest ER for evaluation    The patient was encouraged to ensure that any/all documentation is accurate and up to date, and that our office be provided a copy in the event that anything changes.         Gee Dupree MD

## 2024-08-09 ENCOUNTER — APPOINTMENT (OUTPATIENT)
Dept: CARDIOLOGY | Facility: HOSPITAL | Age: 84
End: 2024-08-09
Payer: MEDICARE

## 2024-08-09 ENCOUNTER — APPOINTMENT (OUTPATIENT)
Dept: RADIOLOGY | Facility: HOSPITAL | Age: 84
End: 2024-08-09
Payer: MEDICARE

## 2024-08-09 LAB
ACANTHOCYTES BLD QL SMEAR: ABNORMAL
ALBUMIN SERPL-MCNC: 3.8 G/DL (ref 3.5–5)
ALBUMIN SERPL-MCNC: 4.3 G/DL (ref 3.5–5)
ANION GAP BLDV CALCULATED.4IONS-SCNC: 11 MMOL/L (ref 10–25)
ANION GAP SERPL CALC-SCNC: 12 MMOL/L
ANION GAP SERPL CALC-SCNC: 15 MMOL/L
APTT PPP: 25.8 SECONDS (ref 22–32.5)
BASE EXCESS BLDV CALC-SCNC: 5.6 MMOL/L (ref -2–3)
BASOPHILS # BLD AUTO: 0.02 X10*3/UL (ref 0–0.1)
BASOPHILS # BLD MANUAL: 0 X10*3/UL (ref 0–0.1)
BASOPHILS NFR BLD AUTO: 0.2 %
BASOPHILS NFR BLD MANUAL: 0 %
BLOOD EXPIRATION DATE: NORMAL
BLOOD EXPIRATION DATE: NORMAL
BODY TEMPERATURE: 37 DEGREES CELSIUS
BUN SERPL-MCNC: 32 MG/DL (ref 8–25)
BUN SERPL-MCNC: 32 MG/DL (ref 8–25)
CA-I BLD-SCNC: 1 MMOL/L (ref 1.1–1.33)
CA-I BLDV-SCNC: 1.16 MMOL/L (ref 1.1–1.33)
CALCIUM SERPL-MCNC: 8.6 MG/DL (ref 8.5–10.4)
CALCIUM SERPL-MCNC: 8.8 MG/DL (ref 8.5–10.4)
CHLORIDE BLDV-SCNC: 103 MMOL/L (ref 98–107)
CHLORIDE SERPL-SCNC: 101 MMOL/L (ref 97–107)
CHLORIDE SERPL-SCNC: 102 MMOL/L (ref 97–107)
CO2 SERPL-SCNC: 28 MMOL/L (ref 24–31)
CO2 SERPL-SCNC: 33 MMOL/L (ref 24–31)
CREAT SERPL-MCNC: 1.4 MG/DL (ref 0.4–1.6)
CREAT SERPL-MCNC: 1.7 MG/DL (ref 0.4–1.6)
DISPENSE STATUS: NORMAL
DISPENSE STATUS: NORMAL
EGFRCR SERPLBLD CKD-EPI 2021: 29 ML/MIN/1.73M*2
EGFRCR SERPLBLD CKD-EPI 2021: 37 ML/MIN/1.73M*2
EOSINOPHIL # BLD AUTO: 0.01 X10*3/UL (ref 0–0.4)
EOSINOPHIL # BLD MANUAL: 0 X10*3/UL (ref 0–0.4)
EOSINOPHIL NFR BLD AUTO: 0.1 %
EOSINOPHIL NFR BLD MANUAL: 0 %
ERYTHROCYTE [DISTWIDTH] IN BLOOD BY AUTOMATED COUNT: 16.1 % (ref 11.5–14.5)
ERYTHROCYTE [DISTWIDTH] IN BLOOD BY AUTOMATED COUNT: 16.1 % (ref 11.5–14.5)
ERYTHROCYTE [DISTWIDTH] IN BLOOD BY AUTOMATED COUNT: 16.7 % (ref 11.5–14.5)
ERYTHROCYTE [DISTWIDTH] IN BLOOD BY AUTOMATED COUNT: 16.7 % (ref 11.5–14.5)
GIANT PLATELETS BLD QL SMEAR: ABNORMAL
GLUCOSE BLDV-MCNC: 218 MG/DL (ref 74–99)
GLUCOSE SERPL-MCNC: 221 MG/DL (ref 65–99)
GLUCOSE SERPL-MCNC: 92 MG/DL (ref 65–99)
HCO3 BLDV-SCNC: 32.7 MMOL/L (ref 22–26)
HCT VFR BLD AUTO: 24.1 % (ref 36–46)
HCT VFR BLD AUTO: 24.5 % (ref 36–46)
HCT VFR BLD AUTO: 25.8 % (ref 36–46)
HCT VFR BLD AUTO: 27.3 % (ref 36–46)
HCT VFR BLD AUTO: 27.3 % (ref 36–46)
HCT VFR BLD EST: 22 % (ref 36–46)
HGB BLD-MCNC: 6.9 G/DL (ref 12–16)
HGB BLD-MCNC: 7.1 G/DL (ref 12–16)
HGB BLD-MCNC: 7.2 G/DL (ref 12–16)
HGB BLD-MCNC: 8 G/DL (ref 12–16)
HGB BLD-MCNC: 8 G/DL (ref 12–16)
HGB BLDV-MCNC: 7.4 G/DL (ref 12–16)
HYPOCHROMIA BLD QL SMEAR: ABNORMAL
IMM GRANULOCYTES # BLD AUTO: 0.06 X10*3/UL (ref 0–0.5)
IMM GRANULOCYTES # BLD AUTO: 0.12 X10*3/UL (ref 0–0.5)
IMM GRANULOCYTES NFR BLD AUTO: 0.7 % (ref 0–0.9)
IMM GRANULOCYTES NFR BLD AUTO: 1.3 % (ref 0–0.9)
INHALED O2 CONCENTRATION: 60 %
INR PPP: 1.2 (ref 0.9–1.2)
LACTATE BLDV-SCNC: 1.5 MMOL/L (ref 0.4–2)
LACTATE BLDV-SCNC: 2.3 MMOL/L (ref 0.4–2)
LACTATE BLDV-SCNC: 2.7 MMOL/L (ref 0.4–2)
LYMPHOCYTES # BLD AUTO: 0.88 X10*3/UL (ref 0.8–3)
LYMPHOCYTES # BLD MANUAL: 0.92 X10*3/UL (ref 0.8–3)
LYMPHOCYTES NFR BLD AUTO: 9.9 %
LYMPHOCYTES NFR BLD MANUAL: 10 %
MAGNESIUM SERPL-MCNC: 2 MG/DL (ref 1.6–3.1)
MCH RBC QN AUTO: 22.9 PG (ref 26–34)
MCH RBC QN AUTO: 23.1 PG (ref 26–34)
MCH RBC QN AUTO: 23.7 PG (ref 26–34)
MCH RBC QN AUTO: 23.7 PG (ref 26–34)
MCHC RBC AUTO-ENTMCNC: 27.9 G/DL (ref 32–36)
MCHC RBC AUTO-ENTMCNC: 28.6 G/DL (ref 32–36)
MCHC RBC AUTO-ENTMCNC: 29.3 G/DL (ref 32–36)
MCHC RBC AUTO-ENTMCNC: 29.3 G/DL (ref 32–36)
MCV RBC AUTO: 81 FL (ref 80–100)
MCV RBC AUTO: 82 FL (ref 80–100)
METAMYELOCYTES # BLD MANUAL: 0.09 X10*3/UL
METAMYELOCYTES NFR BLD MANUAL: 1 %
MONOCYTES # BLD AUTO: 0.78 X10*3/UL (ref 0.05–0.8)
MONOCYTES # BLD MANUAL: 0.37 X10*3/UL (ref 0.05–0.8)
MONOCYTES NFR BLD AUTO: 8.8 %
MONOCYTES NFR BLD MANUAL: 4 %
MYELOCYTES # BLD MANUAL: 0.09 X10*3/UL
MYELOCYTES NFR BLD MANUAL: 1 %
NEUTROPHILS # BLD AUTO: 7.14 X10*3/UL (ref 1.6–5.5)
NEUTROPHILS # BLD MANUAL: 7.73 X10*3/UL (ref 1.6–5.5)
NEUTROPHILS NFR BLD AUTO: 80.3 %
NEUTS BAND # BLD MANUAL: 0.55 X10*3/UL (ref 0–0.5)
NEUTS BAND NFR BLD MANUAL: 6 %
NEUTS SEG # BLD MANUAL: 7.18 X10*3/UL (ref 1.6–5)
NEUTS SEG NFR BLD MANUAL: 78 %
NRBC BLD-RTO: 0 /100 WBCS (ref 0–0)
NRBC BLD-RTO: 0.2 /100 WBCS (ref 0–0)
OVALOCYTES BLD QL SMEAR: ABNORMAL
OXYHGB MFR BLDV: 87 % (ref 45–75)
PCO2 BLDV: 65 MM HG (ref 41–51)
PH BLDV: 7.31 PH (ref 7.33–7.43)
PHOSPHATE SERPL-MCNC: 3.9 MG/DL (ref 2.5–4.5)
PHOSPHATE SERPL-MCNC: 6.2 MG/DL (ref 2.5–4.5)
PLATELET # BLD AUTO: 141 X10*3/UL (ref 150–450)
PLATELET # BLD AUTO: 141 X10*3/UL (ref 150–450)
PLATELET # BLD AUTO: 147 X10*3/UL (ref 150–450)
PLATELET # BLD AUTO: 201 X10*3/UL (ref 150–450)
PO2 BLDV: 64 MM HG (ref 35–45)
POLYCHROMASIA BLD QL SMEAR: ABNORMAL
POTASSIUM BLDV-SCNC: 4.4 MMOL/L (ref 3.5–5.3)
POTASSIUM SERPL-SCNC: 3.5 MMOL/L (ref 3.4–5.1)
POTASSIUM SERPL-SCNC: 4.3 MMOL/L (ref 3.4–5.1)
PRODUCT BLOOD TYPE: 5100
PRODUCT BLOOD TYPE: 5100
PRODUCT CODE: NORMAL
PRODUCT CODE: NORMAL
PROTHROMBIN TIME: 13 SECONDS (ref 9.3–12.7)
RBC # BLD AUTO: 2.99 X10*6/UL (ref 4–5.2)
RBC # BLD AUTO: 3.15 X10*6/UL (ref 4–5.2)
RBC # BLD AUTO: 3.38 X10*6/UL (ref 4–5.2)
RBC # BLD AUTO: 3.38 X10*6/UL (ref 4–5.2)
RBC MORPH BLD: ABNORMAL
SAO2 % BLDV: 90 % (ref 45–75)
SODIUM BLDV-SCNC: 142 MMOL/L (ref 136–145)
SODIUM SERPL-SCNC: 145 MMOL/L (ref 133–145)
SODIUM SERPL-SCNC: 146 MMOL/L (ref 133–145)
TOTAL CELLS COUNTED BLD: 100
TROPONIN T SERPL-MCNC: 53 NG/L
UNIT ABO: NORMAL
UNIT ABO: NORMAL
UNIT NUMBER: NORMAL
UNIT NUMBER: NORMAL
UNIT RH: NORMAL
UNIT RH: NORMAL
UNIT VOLUME: 283
UNIT VOLUME: 350
WBC # BLD AUTO: 11.2 X10*3/UL (ref 4.4–11.3)
WBC # BLD AUTO: 8.9 X10*3/UL (ref 4.4–11.3)
WBC # BLD AUTO: 8.9 X10*3/UL (ref 4.4–11.3)
WBC # BLD AUTO: 9.2 X10*3/UL (ref 4.4–11.3)
XM INTEP: NORMAL
XM INTEP: NORMAL

## 2024-08-09 PROCEDURE — C9113 INJ PANTOPRAZOLE SODIUM, VIA: HCPCS | Performed by: HOSPITALIST

## 2024-08-09 PROCEDURE — 93306 TTE W/DOPPLER COMPLETE: CPT

## 2024-08-09 PROCEDURE — 84484 ASSAY OF TROPONIN QUANT: CPT | Performed by: PHYSICIAN ASSISTANT

## 2024-08-09 PROCEDURE — 2500000004 HC RX 250 GENERAL PHARMACY W/ HCPCS (ALT 636 FOR OP/ED): Performed by: INTERNAL MEDICINE

## 2024-08-09 PROCEDURE — 2500000005 HC RX 250 GENERAL PHARMACY W/O HCPCS: Performed by: INTERNAL MEDICINE

## 2024-08-09 PROCEDURE — 93306 TTE W/DOPPLER COMPLETE: CPT | Performed by: INTERNAL MEDICINE

## 2024-08-09 PROCEDURE — 2500000002 HC RX 250 W HCPCS SELF ADMINISTERED DRUGS (ALT 637 FOR MEDICARE OP, ALT 636 FOR OP/ED)

## 2024-08-09 PROCEDURE — 83605 ASSAY OF LACTIC ACID: CPT

## 2024-08-09 PROCEDURE — 2500000004 HC RX 250 GENERAL PHARMACY W/ HCPCS (ALT 636 FOR OP/ED)

## 2024-08-09 PROCEDURE — 85027 COMPLETE CBC AUTOMATED: CPT | Performed by: INTERNAL MEDICINE

## 2024-08-09 PROCEDURE — 99292 CRITICAL CARE ADDL 30 MIN: CPT | Performed by: INTERNAL MEDICINE

## 2024-08-09 PROCEDURE — 5A0935A ASSISTANCE WITH RESPIRATORY VENTILATION, LESS THAN 24 CONSECUTIVE HOURS, HIGH NASAL FLOW/VELOCITY: ICD-10-PCS | Performed by: INTERNAL MEDICINE

## 2024-08-09 PROCEDURE — 84132 ASSAY OF SERUM POTASSIUM: CPT

## 2024-08-09 PROCEDURE — 85610 PROTHROMBIN TIME: CPT

## 2024-08-09 PROCEDURE — 71045 X-RAY EXAM CHEST 1 VIEW: CPT | Performed by: RADIOLOGY

## 2024-08-09 PROCEDURE — 82330 ASSAY OF CALCIUM: CPT

## 2024-08-09 PROCEDURE — 83735 ASSAY OF MAGNESIUM: CPT

## 2024-08-09 PROCEDURE — 2500000004 HC RX 250 GENERAL PHARMACY W/ HCPCS (ALT 636 FOR OP/ED): Mod: JZ

## 2024-08-09 PROCEDURE — 85014 HEMATOCRIT: CPT | Performed by: REGISTERED NURSE

## 2024-08-09 PROCEDURE — 36430 TRANSFUSION BLD/BLD COMPNT: CPT

## 2024-08-09 PROCEDURE — 84132 ASSAY OF SERUM POTASSIUM: CPT | Performed by: INTERNAL MEDICINE

## 2024-08-09 PROCEDURE — 99291 CRITICAL CARE FIRST HOUR: CPT

## 2024-08-09 PROCEDURE — 9420000001 HC RT PATIENT EDUCATION 5 MIN

## 2024-08-09 PROCEDURE — 2500000002 HC RX 250 W HCPCS SELF ADMINISTERED DRUGS (ALT 637 FOR MEDICARE OP, ALT 636 FOR OP/ED): Performed by: INTERNAL MEDICINE

## 2024-08-09 PROCEDURE — 2500000005 HC RX 250 GENERAL PHARMACY W/O HCPCS

## 2024-08-09 PROCEDURE — 2020000001 HC ICU ROOM DAILY

## 2024-08-09 PROCEDURE — 36415 COLL VENOUS BLD VENIPUNCTURE: CPT | Performed by: REGISTERED NURSE

## 2024-08-09 PROCEDURE — 94660 CPAP INITIATION&MGMT: CPT

## 2024-08-09 PROCEDURE — 71045 X-RAY EXAM CHEST 1 VIEW: CPT

## 2024-08-09 PROCEDURE — 2500000004 HC RX 250 GENERAL PHARMACY W/ HCPCS (ALT 636 FOR OP/ED): Performed by: HOSPITALIST

## 2024-08-09 PROCEDURE — 36415 COLL VENOUS BLD VENIPUNCTURE: CPT

## 2024-08-09 PROCEDURE — 85025 COMPLETE CBC W/AUTO DIFF WBC: CPT | Performed by: INTERNAL MEDICINE

## 2024-08-09 PROCEDURE — 5A0945A ASSISTANCE WITH RESPIRATORY VENTILATION, 24-96 CONSECUTIVE HOURS, HIGH NASAL FLOW/VELOCITY: ICD-10-PCS | Performed by: INTERNAL MEDICINE

## 2024-08-09 PROCEDURE — 2500000001 HC RX 250 WO HCPCS SELF ADMINISTERED DRUGS (ALT 637 FOR MEDICARE OP): Performed by: HOSPITALIST

## 2024-08-09 PROCEDURE — 94640 AIRWAY INHALATION TREATMENT: CPT

## 2024-08-09 PROCEDURE — 5A09357 ASSISTANCE WITH RESPIRATORY VENTILATION, LESS THAN 24 CONSECUTIVE HOURS, CONTINUOUS POSITIVE AIRWAY PRESSURE: ICD-10-PCS | Performed by: INTERNAL MEDICINE

## 2024-08-09 PROCEDURE — 85027 COMPLETE CBC AUTOMATED: CPT

## 2024-08-09 PROCEDURE — 85007 BL SMEAR W/DIFF WBC COUNT: CPT

## 2024-08-09 PROCEDURE — P9016 RBC LEUKOCYTES REDUCED: HCPCS

## 2024-08-09 RX ORDER — POLYETHYLENE GLYCOL 3350 17 G/17G
17 POWDER, FOR SOLUTION ORAL DAILY PRN
Status: ACTIVE | OUTPATIENT
Start: 2024-08-09

## 2024-08-09 RX ORDER — NITROGLYCERIN 20 MG/100ML
10 INJECTION INTRAVENOUS CONTINUOUS
Status: DISCONTINUED | OUTPATIENT
Start: 2024-08-09 | End: 2024-08-09

## 2024-08-09 RX ORDER — FUROSEMIDE 10 MG/ML
40 INJECTION INTRAMUSCULAR; INTRAVENOUS ONCE
Status: COMPLETED | OUTPATIENT
Start: 2024-08-09 | End: 2024-08-09

## 2024-08-09 RX ORDER — CALCIUM GLUCONATE 20 MG/ML
1 INJECTION, SOLUTION INTRAVENOUS ONCE
Status: COMPLETED | OUTPATIENT
Start: 2024-08-09 | End: 2024-08-09

## 2024-08-09 RX ORDER — ONDANSETRON 4 MG/1
4 TABLET, ORALLY DISINTEGRATING ORAL EVERY 8 HOURS PRN
Status: ACTIVE | OUTPATIENT
Start: 2024-08-09

## 2024-08-09 RX ORDER — NITROGLYCERIN 20 MG/100ML
5-200 INJECTION INTRAVENOUS CONTINUOUS
Status: ACTIVE | OUTPATIENT
Start: 2024-08-09

## 2024-08-09 RX ORDER — LORAZEPAM 2 MG/ML
0.5 INJECTION INTRAMUSCULAR ONCE
Status: COMPLETED | OUTPATIENT
Start: 2024-08-09 | End: 2024-08-09

## 2024-08-09 RX ORDER — IPRATROPIUM BROMIDE AND ALBUTEROL SULFATE 2.5; .5 MG/3ML; MG/3ML
3 SOLUTION RESPIRATORY (INHALATION)
Status: DISPENSED | OUTPATIENT
Start: 2024-08-09

## 2024-08-09 RX ORDER — CALCIUM GLUCONATE 98 MG/ML
1 INJECTION, SOLUTION INTRAVENOUS ONCE
Status: DISCONTINUED | OUTPATIENT
Start: 2024-08-09 | End: 2024-08-09

## 2024-08-09 RX ORDER — ONDANSETRON HYDROCHLORIDE 2 MG/ML
4 INJECTION, SOLUTION INTRAVENOUS EVERY 8 HOURS PRN
Status: ACTIVE | OUTPATIENT
Start: 2024-08-09

## 2024-08-09 RX ORDER — ACETAMINOPHEN 325 MG/1
650 TABLET ORAL EVERY 4 HOURS PRN
Status: DISPENSED | OUTPATIENT
Start: 2024-08-09

## 2024-08-09 RX ORDER — POTASSIUM CHLORIDE 14.9 MG/ML
20 INJECTION INTRAVENOUS
Status: COMPLETED | OUTPATIENT
Start: 2024-08-10 | End: 2024-08-10

## 2024-08-09 RX ORDER — ACETAMINOPHEN 160 MG/5ML
650 SOLUTION ORAL EVERY 4 HOURS PRN
Status: ACTIVE | OUTPATIENT
Start: 2024-08-09

## 2024-08-09 RX ORDER — BUDESONIDE 0.5 MG/2ML
0.5 INHALANT ORAL
Status: DISPENSED | OUTPATIENT
Start: 2024-08-09

## 2024-08-09 SDOH — ECONOMIC STABILITY: INCOME INSECURITY: IN THE LAST 12 MONTHS, WAS THERE A TIME WHEN YOU WERE NOT ABLE TO PAY THE MORTGAGE OR RENT ON TIME?: NO

## 2024-08-09 SDOH — SOCIAL STABILITY: SOCIAL NETWORK: ARE YOU MARRIED, WIDOWED, DIVORCED, SEPARATED, NEVER MARRIED, OR LIVING WITH A PARTNER?: MARRIED

## 2024-08-09 SDOH — SOCIAL STABILITY: SOCIAL INSECURITY: WITHIN THE LAST YEAR, HAVE YOU BEEN AFRAID OF YOUR PARTNER OR EX-PARTNER?: PATIENT UNABLE TO ANSWER

## 2024-08-09 SDOH — SOCIAL STABILITY: SOCIAL NETWORK: HOW OFTEN DO YOU ATTEND CHURCH OR RELIGIOUS SERVICES?: NEVER

## 2024-08-09 SDOH — HEALTH STABILITY: MENTAL HEALTH: HOW MANY STANDARD DRINKS CONTAINING ALCOHOL DO YOU HAVE ON A TYPICAL DAY?: PATIENT DOES NOT DRINK

## 2024-08-09 SDOH — ECONOMIC STABILITY: HOUSING INSECURITY: AT ANY TIME IN THE PAST 12 MONTHS, WERE YOU HOMELESS OR LIVING IN A SHELTER (INCLUDING NOW)?: NO

## 2024-08-09 SDOH — ECONOMIC STABILITY: FOOD INSECURITY: WITHIN THE PAST 12 MONTHS, THE FOOD YOU BOUGHT JUST DIDN'T LAST AND YOU DIDN'T HAVE MONEY TO GET MORE.: NEVER TRUE

## 2024-08-09 SDOH — ECONOMIC STABILITY: INCOME INSECURITY: HOW HARD IS IT FOR YOU TO PAY FOR THE VERY BASICS LIKE FOOD, HOUSING, MEDICAL CARE, AND HEATING?: NOT VERY HARD

## 2024-08-09 SDOH — HEALTH STABILITY: MENTAL HEALTH
HOW OFTEN DO YOU NEED TO HAVE SOMEONE HELP YOU WHEN YOU READ INSTRUCTIONS, PAMPHLETS, OR OTHER WRITTEN MATERIAL FROM YOUR DOCTOR OR PHARMACY?: OFTEN

## 2024-08-09 SDOH — ECONOMIC STABILITY: HOUSING INSECURITY: IN THE PAST 12 MONTHS, HOW MANY TIMES HAVE YOU MOVED WHERE YOU WERE LIVING?: 0

## 2024-08-09 SDOH — ECONOMIC STABILITY: FOOD INSECURITY: WITHIN THE PAST 12 MONTHS, YOU WORRIED THAT YOUR FOOD WOULD RUN OUT BEFORE YOU GOT MONEY TO BUY MORE.: NEVER TRUE

## 2024-08-09 SDOH — HEALTH STABILITY: PHYSICAL HEALTH: ON AVERAGE, HOW MANY DAYS PER WEEK DO YOU ENGAGE IN MODERATE TO STRENUOUS EXERCISE (LIKE A BRISK WALK)?: 0 DAYS

## 2024-08-09 SDOH — HEALTH STABILITY: MENTAL HEALTH: HOW OFTEN DO YOU HAVE 6 OR MORE DRINKS ON ONE OCCASION?: NEVER

## 2024-08-09 SDOH — ECONOMIC STABILITY: INCOME INSECURITY: IN THE PAST 12 MONTHS, HAS THE ELECTRIC, GAS, OIL, OR WATER COMPANY THREATENED TO SHUT OFF SERVICE IN YOUR HOME?: NO

## 2024-08-09 SDOH — HEALTH STABILITY: MENTAL HEALTH: HOW OFTEN DO YOU HAVE A DRINK CONTAINING ALCOHOL?: NEVER

## 2024-08-09 SDOH — SOCIAL STABILITY: SOCIAL INSECURITY
WITHIN THE LAST YEAR, HAVE TO BEEN RAPED OR FORCED TO HAVE ANY KIND OF SEXUAL ACTIVITY BY YOUR PARTNER OR EX-PARTNER?: PATIENT UNABLE TO ANSWER

## 2024-08-09 SDOH — SOCIAL STABILITY: SOCIAL NETWORK: HOW OFTEN DO YOU ATTENT MEETINGS OF THE CLUB OR ORGANIZATION YOU BELONG TO?: NEVER

## 2024-08-09 SDOH — HEALTH STABILITY: PHYSICAL HEALTH: ON AVERAGE, HOW MANY MINUTES DO YOU ENGAGE IN EXERCISE AT THIS LEVEL?: 0 MIN

## 2024-08-09 SDOH — SOCIAL STABILITY: SOCIAL INSECURITY
WITHIN THE LAST YEAR, HAVE YOU BEEN HUMILIATED OR EMOTIONALLY ABUSED IN OTHER WAYS BY YOUR PARTNER OR EX-PARTNER?: PATIENT UNABLE TO ANSWER

## 2024-08-09 SDOH — SOCIAL STABILITY: SOCIAL INSECURITY
WITHIN THE LAST YEAR, HAVE YOU BEEN KICKED, HIT, SLAPPED, OR OTHERWISE PHYSICALLY HURT BY YOUR PARTNER OR EX-PARTNER?: PATIENT UNABLE TO ANSWER

## 2024-08-09 SDOH — SOCIAL STABILITY: SOCIAL NETWORK: HOW OFTEN DO YOU GET TOGETHER WITH FRIENDS OR RELATIVES?: MORE THAN THREE TIMES A WEEK

## 2024-08-09 ASSESSMENT — PAIN - FUNCTIONAL ASSESSMENT
PAIN_FUNCTIONAL_ASSESSMENT: 0-10

## 2024-08-09 ASSESSMENT — ENCOUNTER SYMPTOMS
BLOOD IN STOOL: 1
WEAKNESS: 1
FEVER: 0
VOMITING: 0
DIARRHEA: 0
ABDOMINAL PAIN: 0
FATIGUE: 1
NAUSEA: 0
SHORTNESS OF BREATH: 1

## 2024-08-09 ASSESSMENT — LIFESTYLE VARIABLES
SKIP TO QUESTIONS 9-10: 1
AUDIT-C TOTAL SCORE: 0

## 2024-08-09 ASSESSMENT — PAIN SCALES - GENERAL
PAINLEVEL_OUTOF10: 0 - NO PAIN

## 2024-08-09 ASSESSMENT — ACTIVITIES OF DAILY LIVING (ADL): LACK_OF_TRANSPORTATION: NO

## 2024-08-09 NOTE — CARE PLAN
Problem: Skin  Goal: Participates in plan/prevention/treatment measures  Outcome: Progressing  Flowsheets (Taken 8/9/2024 1301)  Participates in plan/prevention/treatment measures:   Increase activity/out of bed for meals   Elevate heels  Goal: Prevent/manage excess moisture  Outcome: Progressing  Flowsheets (Taken 8/9/2024 1301)  Prevent/manage excess moisture:   Moisturize dry skin   Follow provider orders for dressing changes   Cleanse incontinence/protect with barrier cream   Monitor for/manage infection if present  Goal: Prevent/minimize sheer/friction injuries  Outcome: Progressing  Flowsheets (Taken 8/9/2024 1301)  Prevent/minimize sheer/friction injuries:   Complete micro-shifts as needed if patient unable. Adjust patient position to relieve pressure points, not a full turn   HOB 30 degrees or less   Increase activity/out of bed for meals   Turn/reposition every 2 hours/use positioning/transfer devices   Use pull sheet   Utilize specialty bed per algorithm  Goal: Promote/optimize nutrition  Outcome: Progressing  Flowsheets (Taken 8/9/2024 1301)  Promote/optimize nutrition:   Discuss with provider if NPO > 2 days   Monitor/record intake including meals  Goal: Promote skin healing  Outcome: Progressing  Flowsheets (Taken 8/9/2024 1301)  Promote skin healing:   Assess skin/pad under line(s)/device(s)   Ensure correct size (line/device) and apply per  instructions   Protective dressings over bony prominences   Rotate device position/do not position patient on device   Turn/reposition every 2 hours/use positioning/transfer devices     Problem: Pain  Goal: Turns in bed with improved pain control throughout the shift  Outcome: Progressing  Goal: Performs ADL's with improved pain control throughout shift  Outcome: Progressing     Problem: Respiratory  Goal: Clear secretions with interventions this shift  Outcome: Progressing  Flowsheets (Taken 8/9/2024 1301)  Clear secretions with interventions this  shift:   Encourage/provide pulmonary hygiene/secretion clearance   Incentive spirometry   Med administration/monitoring of effect  Goal: Minimize anxiety/maximize coping throughout shift  Outcome: Progressing  Flowsheets (Taken 8/9/2024 1301)  Minimize anxiety/maximize coping throughout shift: Monitor pain/anxiety level  Goal: Minimal/no exertional discomfort or dyspnea this shift  Outcome: Progressing  Flowsheets (Taken 8/9/2024 1301)  Minimal/no exertional discomfort or dyspnea this shift: Positioning to promote ventilation/comfort  Goal: No signs of respiratory distress (eg. Use of accessory muscles. Peds grunting)  Outcome: Progressing  Goal: Patent airway maintained this shift  Outcome: Progressing  Goal: Tolerate pulmonary toileting this shift  Outcome: Progressing  Flowsheets (Taken 8/9/2024 1301)  Tolerate pulmonary toileting this shift: Positioning to promote ventilation/comfort  Goal: Verbalize decreased shortness of breath this shift  Outcome: Progressing  Flowsheets (Taken 8/9/2024 1301)  Verbalize decreased shortness of breath this shift:   Encourage/provide pulmonary hygiene/secretion clearance   Incentive spirometry  Goal: Wean oxygen to maintain O2 saturation per order/standard this shift  Outcome: Progressing  Flowsheets (Taken 8/9/2024 1301)  Wean oxygen to maintain O2 saturation per order/standard this shift:   Encourage activity/mobility   Incentive spirometry  Goal: Increase self care and/or family involvement in next 24 hours  Outcome: Progressing  Flowsheets (Taken 8/9/2024 1301)  Increase self care and/or family involvement in next 24 hours: Encourage activity/mobility     Problem: Pain - Adult  Goal: Verbalizes/displays adequate comfort level or baseline comfort level  Outcome: Progressing  Flowsheets (Taken 8/9/2024 1301)  Verbalizes/displays adequate comfort level or baseline comfort level:   Encourage patient to monitor pain and request assistance   Assess pain using appropriate pain  scale   Administer analgesics based on type and severity of pain and evaluate response   Implement non-pharmacological measures as appropriate and evaluate response   Consider cultural and social influences on pain and pain management   Notify Licensed Independent Practitioner if interventions unsuccessful or patient reports new pain     Problem: Safety - Adult  Goal: Free from fall injury  Outcome: Progressing  Flowsheets (Taken 8/9/2024 1301)  Free from fall injury: Instruct family/caregiver on patient safety     Problem: Discharge Planning  Goal: Discharge to home or other facility with appropriate resources  Outcome: Progressing  Flowsheets (Taken 8/9/2024 1301)  Discharge to home or other facility with appropriate resources:   Identify barriers to discharge with patient and caregiver   Arrange for needed discharge resources and transportation as appropriate   Identify discharge learning needs (meds, wound care, etc)   Arrange for interpreters to assist at discharge as needed   Refer to discharge planning if patient needs post-hospital services based on physician order or complex needs related to functional status, cognitive ability or social support system     Problem: Chronic Conditions and Co-morbidities  Goal: Patient's chronic conditions and co-morbidity symptoms are monitored and maintained or improved  Outcome: Progressing  Flowsheets (Taken 8/9/2024 1301)  Care Plan - Patient's Chronic Conditions and Co-Morbidity Symptoms are Monitored and Maintained or Improved:   Monitor and assess patient's chronic conditions and comorbid symptoms for stability, deterioration, or improvement   Collaborate with multidisciplinary team to address chronic and comorbid conditions and prevent exacerbation or deterioration   Update acute care plan with appropriate goals if chronic or comorbid symptoms are exacerbated and prevent overall improvement and discharge     Problem: Fall/Injury  Goal: Not fall by end of  shift  Outcome: Progressing  Goal: Be free from injury by end of the shift  Outcome: Progressing  Goal: Verbalize understanding of personal risk factors for fall in the hospital  Outcome: Progressing  Goal: Verbalize understanding of risk factor reduction measures to prevent injury from fall in the home  Outcome: Progressing  Goal: Use assistive devices by end of the shift  Outcome: Progressing  Goal: Pace activities to prevent fatigue by end of the shift  Outcome: Progressing   The patient's goals for the shift include      The clinical goals for the shift include Patient will have decreased oxygen requirements    Over the shift, the patient did make progress toward the following goals.

## 2024-08-09 NOTE — PROGRESS NOTES
Anitha Welch is a 84 y.o. female on day 1 of admission presenting with Gastrointestinal bleed.  Patient with h/o COPD with chronic hypoxic respiratory failure on 2L home O2, CAD  s/p stents, ischemic CMP/CHF, CVA with mild L sided weakness on Xarelto,  COPD on 2 liters oxygen, CAD s/p stent, Ischemic CMP, recent mitral valve repair, CVA with mild left sided weakness, LLE DVT on xarelto, who p/w generalized weakness associated with weakness x 2 weeks. In the ED found to have Hb 5.3 (BL 8.1), received 2 Units of PRBC and admitted to SDU for further management. However, developed increased WOB and hypoxia associated with tachypnea. Subsequently placed on BiPAP and admitted to the ICU for further management.   Subjective   No acute events since arrival, oxygenation improved after receiving 80 mg of Lasix with 1200 of urine output and transitioned to Vapotherm. Hb continues to down trend.    Overall is feeling better. Abdominal pain and SOB improved. Denies CP, cough, sputum, N/V. No other complaints.   Objective   Scheduled medications  atorvastatin, 40 mg, oral, Nightly  carvedilol, 3.125 mg, oral, BID  cetirizine, 10 mg, oral, Daily  [Held by provider] tiotropium, 2 puff, inhalation, Daily   And  [Held by provider] fluticasone furoate-vilanteroL, 1 puff, inhalation, Daily  furosemide, 40 mg, intravenous, Once  ipratropium-albuteroL, 3 mL, nebulization, q4h  nystatin, 1 Application, Topical, BID  oxygen, , inhalation, Continuous - Inhalation  pantoprazole, 40 mg, intravenous, BID  [Held by provider] torsemide, 20 mg, oral, Daily    Continuous medications  nitroglycerin, 5-200 mcg/min, Last Rate: Stopped (08/09/24 5477)    PRN medications  PRN medications: acetaminophen **OR** acetaminophen, diclofenac sodium, nitroglycerin, ondansetron ODT **OR** ondansetron, polyethylene glycol, tiZANidine   Physical Exam  Constitutional:       General: She is not in acute distress.     Appearance: She is normal weight. She is  "ill-appearing. She is not toxic-appearing.   HENT:      Head: Normocephalic and atraumatic.      Nose:      Comments: On Vapotherm 40L/35%     Mouth/Throat:      Mouth: Mucous membranes are moist.   Eyes:      General: No scleral icterus.     Extraocular Movements: Extraocular movements intact.      Pupils: Pupils are equal, round, and reactive to light.   Cardiovascular:      Rate and Rhythm: Normal rate and regular rhythm.      Heart sounds: No murmur heard.     No friction rub. No gallop.   Pulmonary:      Effort: Pulmonary effort is normal. No respiratory distress.      Breath sounds: Normal breath sounds. No wheezing or rales.   Abdominal:      General: There is no distension.      Palpations: Abdomen is soft.      Tenderness: There is no abdominal tenderness.   Musculoskeletal:      Cervical back: Normal range of motion and neck supple. No rigidity or tenderness.      Right lower leg: No edema.      Left lower leg: No edema.   Lymphadenopathy:      Cervical: No cervical adenopathy.   Skin:     General: Skin is warm and dry.      Coloration: Skin is pale. Skin is not jaundiced.   Neurological:      General: No focal deficit present.      Mental Status: She is alert and oriented to person, place, and time.      Cranial Nerves: No cranial nerve deficit.      Motor: No weakness.   Psychiatric:         Mood and Affect: Mood normal.         Behavior: Behavior normal.     Last Recorded Vitals  Blood pressure 119/54, pulse 87, temperature 36.7 °C (98.1 °F), temperature source Oral, resp. rate 25, height 1.651 m (5' 5\"), weight 65.3 kg (143 lb 15.4 oz), SpO2 92%.  Intake/Output last 3 Shifts:  I/O last 3 completed shifts:  In: 360.2 (5.4 mL/kg) [I.V.:10.2 (0.2 mL/kg); Blood:300; IV Piggyback:50]  Out: 1100 (16.3 mL/kg) [Urine:1100 (0.5 mL/kg/hr)]  Weight: 67.3 kg   Relevant Results  Results for orders placed or performed during the hospital encounter of 08/08/24 (from the past 24 hour(s))   Serial Troponin, 6 Hour " ([facility])   Result Value Ref Range    Troponin T, High Sensitivity 53 (HH) <=14 ng/L   Hemoglobin and hematocrit, blood   Result Value Ref Range    Hemoglobin 7.1 (L) 12.0 - 16.0 g/dL    Hematocrit 24.5 (L) 36.0 - 46.0 %   Blood Gas Venous Full Panel   Result Value Ref Range    POCT pH, Venous 7.31 (L) 7.33 - 7.43 pH    POCT pCO2, Venous 65 (H) 41 - 51 mm Hg    POCT pO2, Venous 64 (H) 35 - 45 mm Hg    POCT SO2, Venous 90 (H) 45 - 75 %    POCT Oxy Hemoglobin, Venous 87.0 (H) 45.0 - 75.0 %    POCT Hematocrit Calculated, Venous 22.0 (L) 36.0 - 46.0 %    POCT Sodium, Venous 142 136 - 145 mmol/L    POCT Potassium, Venous 4.4 3.5 - 5.3 mmol/L    POCT Chloride, Venous 103 98 - 107 mmol/L    POCT Ionized Calicum, Venous 1.16 1.10 - 1.33 mmol/L    POCT Glucose, Venous 218 (H) 74 - 99 mg/dL    POCT Lactate, Venous 2.3 (H) 0.4 - 2.0 mmol/L    POCT Base Excess, Venous 5.6 (H) -2.0 - 3.0 mmol/L    POCT HCO3 Calculated, Venous 32.7 (H) 22.0 - 26.0 mmol/L    POCT Hemoglobin, Venous 7.4 (L) 12.0 - 16.0 g/dL    POCT Anion Gap, Venous 11.0 10.0 - 25.0 mmol/L    Patient Temperature 37.0 degrees Celsius    FiO2 60 %   Coagulation Screen   Result Value Ref Range    Protime 13.0 (H) 9.3 - 12.7 seconds    INR 1.2 0.9 - 1.2    aPTT 25.8 22.0 - 32.5 seconds   Renal function panel   Result Value Ref Range    Glucose 221 (H) 65 - 99 mg/dL    Sodium 145 133 - 145 mmol/L    Potassium 4.3 3.4 - 5.1 mmol/L    Chloride 102 97 - 107 mmol/L    Bicarbonate 28 24 - 31 mmol/L    Urea Nitrogen 32 (H) 8 - 25 mg/dL    Creatinine 1.70 (H) 0.40 - 1.60 mg/dL    eGFR 29 (L) >60 mL/min/1.73m*2    Calcium 8.8 8.5 - 10.4 mg/dL    Phosphorus 6.2 (H) 2.5 - 4.5 mg/dL    Albumin 4.3 3.5 - 5.0 g/dL    Anion Gap 15 <=19 mmol/L   Magnesium   Result Value Ref Range    Magnesium 2.00 1.60 - 3.10 mg/dL   CBC and Auto Differential   Result Value Ref Range    WBC 9.2 4.4 - 11.3 x10*3/uL    nRBC 0.2 (H) 0.0 - 0.0 /100 WBCs    RBC 3.15 (L) 4.00 - 5.20 x10*6/uL    Hemoglobin 7.2  (L) 12.0 - 16.0 g/dL    Hematocrit 25.8 (L) 36.0 - 46.0 %    MCV 82 80 - 100 fL    MCH 22.9 (L) 26.0 - 34.0 pg    MCHC 27.9 (L) 32.0 - 36.0 g/dL    RDW 16.7 (H) 11.5 - 14.5 %    Platelets 201 150 - 450 x10*3/uL    Immature Granulocytes %, Automated 1.3 (H) 0.0 - 0.9 %    Immature Granulocytes Absolute, Automated 0.12 0.00 - 0.50 x10*3/uL   Calcium, ionized   Result Value Ref Range    POCT Calcium, Ionized 1.00 (L) 1.1 - 1.33 mmol/L   Manual Differential   Result Value Ref Range    Neutrophils %, Manual 78.0 40.0 - 80.0 %    Bands %, Manual 6.0 0.0 - 5.0 %    Lymphocytes %, Manual 10.0 13.0 - 44.0 %    Monocytes %, Manual 4.0 2.0 - 10.0 %    Eosinophils %, Manual 0.0 0.0 - 6.0 %    Basophils %, Manual 0.0 0.0 - 2.0 %    Metamyelocytes %, Manual 1.0 0.0 - 0.0 %    Myelocytes %, Manual 1.0 0.0 - 0.0 %    Seg Neutrophils Absolute, Manual 7.18 (H) 1.60 - 5.00 x10*3/uL    Bands Absolute, Manual 0.55 (H) 0.00 - 0.50 x10*3/uL    Lymphocytes Absolute, Manual 0.92 0.80 - 3.00 x10*3/uL    Monocytes Absolute, Manual 0.37 0.05 - 0.80 x10*3/uL    Eosinophils Absolute, Manual 0.00 0.00 - 0.40 x10*3/uL    Basophils Absolute, Manual 0.00 0.00 - 0.10 x10*3/uL    Metamyelocytes Absolute, Manual 0.09 0.00 - 0.00 x10*3/uL    Myelocytes Absolute, Manual 0.09 0.00 - 0.00 x10*3/uL    Total Cells Counted 100     Neutrophils Absolute, Manual 7.73 (H) 1.60 - 5.50 x10*3/uL    RBC Morphology See Below     Polychromasia Mild     Hypochromia Mild     Ovalocytes Few     Acanthocytes Few     Giant Platelets Few    Transthoracic Echo (TTE) Complete   Result Value Ref Range    BSA 1.73 m2   Blood Gas Lactic Acid, Venous   Result Value Ref Range    POCT Lactate, Venous 2.7 (H) 0.4 - 2.0 mmol/L   CBC   Result Value Ref Range    WBC 11.2 4.4 - 11.3 x10*3/uL    nRBC 0.0 0.0 - 0.0 /100 WBCs    RBC 2.99 (L) 4.00 - 5.20 x10*6/uL    Hemoglobin 6.9 (L) 12.0 - 16.0 g/dL    Hematocrit 24.1 (L) 36.0 - 46.0 %    MCV 81 80 - 100 fL    MCH 23.1 (L) 26.0 - 34.0 pg     MCHC 28.6 (L) 32.0 - 36.0 g/dL    RDW 16.7 (H) 11.5 - 14.5 %    Platelets 147 (L) 150 - 450 x10*3/uL   Prepare RBC: 1 Units   Result Value Ref Range    PRODUCT CODE M1824M57     Unit Number [id number]     Unit ABO O     Unit RH POS     XM INTEP COMP     Dispense Status XM     Blood Expiration Date 8/22/2024 11:59:00 PM EDT     PRODUCT BLOOD TYPE 5100     UNIT VOLUME 289    XR chest 1 view    Result Date: 8/9/2024  Interpreted By:  Indiana Perea, STUDY: XR CHEST 1 VIEW;  8/9/2024 5:22 am   INDICATION: Signs/Symptoms:Evaluate CHF.   COMPARISON: 08/08/2024   ACCESSION NUMBER(S): [id number]   ORDERING CLINICIAN: AXEL SILVER   FINDINGS: The cardiac silhouette is enlarged. There are atherosclerotic changes of the aorta.   The lungs are hyperinflated. There is extensive right-sided infiltration. The left lung markings appear coarse.   The patient is scoliotic. There are surgical clips in the neck.   COMPARISON OF FINDING: The chest is similar.       Enlarged cardiac silhouette. Parenchymal infiltration.   MACRO: none   Signed by: Indiana Perea 8/9/2024 8:09 AM Dictation workstation:   [id number]    XR chest 1 view    Result Date: 8/8/2024  Interpreted By:  Khadijah Allen, STUDY: XR CHEST 1 VIEW;  8/8/2024 11:06 pm   INDICATION: Signs/Symptoms:SOB.   COMPARISON: 08/08/2024 at 1:44 p.m.   ACCESSION NUMBER(S): [id number]   ORDERING CLINICIAN: ABELINO MCKEON   FINDINGS: Surgical clips at the neck base.   CARDIOMEDIASTINAL SILHOUETTE: Stable cardiomegaly.   LUNGS: Increased diffuse interstitial edema. Increasing right pulmonary infiltrates. Suspected small bilateral pleural effusions. No pneumothorax.   ABDOMEN: No remarkable upper abdominal findings.   BONES: No acute osseous abnormality.       Worsening CHF with pulmonary edema.   MACRO: None   Signed by: Khadijah Allen 8/8/2024 11:32 PM Dictation workstation:   [id number]    ECG 12 lead    Result Date: 8/8/2024  Normal sinus rhythm Incomplete right bundle  branch block Septal infarct (cited on or before 27-APR-2024) Abnormal ECG When compared with ECG of 27-APR-2024 01:22, Questionable change in initial forces of Septal leads    XR chest 1 view    Result Date: 8/8/2024  Interpreted By:  Jose Rodriguez, STUDY: XR CHEST 1 VIEW; 8/8/2024 1:57 pm   INDICATION: CLINICAL INFORMATION: Signs/Symptoms:shortness of breath.   COMPARISON: 04/28/2024   ACCESSION NUMBER(S): [id number]   ORDERING CLINICIAN: NESTOR CERVANTES   TECHNIQUE: Portable chest one view.   FINDINGS: The cardiac silhouette is quite prominent suggesting cardiomegaly. The aorta is tortuous. Surgical clips are identified at the base of the neck bilaterally. Hazy diffuse bilateral infiltrates are identified more marked at the right apex. Small bilateral effusions are suspected. No alveolar consolidation is noted.       Probable cardiomegaly with bilateral infiltrates and effusions suggesting CHF.   MACRO: none   Signed by: Jose Rodriguez 8/8/2024 2:20 PM Dictation workstation:   [id number]    Assessment/Plan   Principal Problem:    Gastrointestinal bleed  Active Problems:    Anemia, unspecified type  84 YOF with h/o COPD with chronic hypoxic respiratory failure on 2L home O2, CAD  s/p stents, ischemic CMP/CHF, CVA with mild L sided weakness on Xarelto,  COPD on 2 liters oxygen, CAD s/p stent, Ischemic CMP, recent mitral valve repair, CVA with mild left sided weakness, LLE DVT on xarelto, who p/w generalized weakness associated with weakness x 2 weeks. In the ED found to have Hb 5.3 (BL 8.1), received 2 Units of PRBC and admitted to SDU for further management. However, developed increased WOB and hypoxia associated with tachypnea. Subsequently placed on BiPAP and admitted to the ICU for further management.     Neuro: no acute issues       Supportive measures       Pain control    CV: h/o MV repair, CAD, DLP, combined CHF, now with acute decompensation due to volume overload.        Continue home Coreg and  Atorvastatin       Hold Bumex, diuresis as needed       Hold home Xarelto given concern for GI bleed       Repeat echo done, showed EF 45%    Pulmonary: acute hypoxic respiratory failure due to pulmonary edema/volume overload from transfusion, now is improved with diuresis, BiPAP. Baseline COPD on 2L NC, also on trelegy       Will hold home Trelegy       Continue Duo-Neb       Will add Budesonide BID       Continue Vapotherm +/- NIPPV as needed.        Diuresis as above.    : AYSE on presentation likely pre-renal now worse this am. Also very mild lactic acidosis.       Continue to monitor LA, RFP       Is/Os       Diuresis as needed       Treat electrolyte abnormalities as needed    GI: patient with melena and drop of H/H, consistent with GI bleed. No mtailde bleeding since the arrival to the ICU       NPO for now       GI is consulted, input appreciated       Continue PPI BID       Endo: no acute issues       FS every 4 hours       SSI       Hypoglycemic protocol    Hem/Onc: acute blood loss anemia, likely due to GI bleed. Hb only partially incremented after 2 U and now is down-trending. H/o LLE DVT on Xarelto       Hold Xarelto for now       CBC every 8 hours       Transfuse to keep Hb > 7       Monitor for signs of bleeding    ID: no acute issues        Will monitor for S&S infection.     Full Code  DVT Prophylaxis: SCD.   GI Prophylaxis: PPI BID  Bowel Regimen: miralax  prn  Diet: NPO  CVC: None  [place]: None  [place]: Yes 8/9  Restraints: None    This was a critical care visit for GI bleed and hypoxic respiratory failure. Total time 50 min.     Nivia Jewell MD

## 2024-08-09 NOTE — CARE PLAN
"  Problem: Skin  Goal: Participates in plan/prevention/treatment measures  Outcome: Progressing  Goal: Prevent/manage excess moisture  Outcome: Progressing  Goal: Prevent/minimize sheer/friction injuries  Outcome: Progressing  Goal: Promote/optimize nutrition  Outcome: Progressing  Goal: Promote skin healing  Outcome: Progressing     Problem: Pain  Goal: Turns in bed with improved pain control throughout the shift  Outcome: Progressing  Goal: Performs ADL's with improved pain control throughout shift  Outcome: Progressing     Problem: Respiratory  Goal: Clear secretions with interventions this shift  Outcome: Progressing  Goal: Minimize anxiety/maximize coping throughout shift  Outcome: Progressing  Goal: Minimal/no exertional discomfort or dyspnea this shift  Outcome: Progressing  Goal: No signs of respiratory distress (eg. Use of accessory muscles. Peds grunting)  Outcome: Progressing  Goal: Patent airway maintained this shift  Outcome: Progressing  Goal: Tolerate pulmonary toileting this shift  Outcome: Progressing  Goal: Verbalize decreased shortness of breath this shift  Outcome: Progressing  Goal: Wean oxygen to maintain O2 saturation per order/standard this shift  Outcome: Progressing  Goal: Increase self care and/or family involvement in next 24 hours  Outcome: Progressing   The patient's goals for the shift include \"feel less short of breathe\"    The clinical goals for the shift include prevent fall/injury, maintain patent airway, decrease SOB, tolerate HFNC & bipap, minimize anxiety while on appropriate breathing treatments, maintain hemodynamic stability    Over the shift, the patient did make progress toward the following goals.     "

## 2024-08-09 NOTE — CARE PLAN
Problem: Respiratory  Goal: Clear secretions with interventions this shift  Outcome: Progressing  Goal: Minimize anxiety/maximize coping throughout shift  Outcome: Progressing  Goal: Minimal/no exertional discomfort or dyspnea this shift  Outcome: Progressing  Goal: No signs of respiratory distress (eg. Use of accessory muscles. Peds grunting)  Outcome: Progressing  Goal: Patent airway maintained this shift  Outcome: Progressing

## 2024-08-09 NOTE — PROGRESS NOTES
TCC spoke to patient's son, Jose. Assessment complete. Patient lives with her spouse and son. They reside in a house. Patient is fairly independent in the home. Jose reports that she has been weaker the past 2 weeks. Patient does not use an assistive device. Patient has not had any falls. Patient wears 2 liters of oxygen at all times, [employer] is the supplier. Patient also has [employer]. Patient does not drive and her son does the shopping and cooking. Patient follows Dr. Gee Dupree and Milana Hernadez, CNP sees her in the home every few months. Patient fills prescriptions at [facility] in [place]. At this time there is not a safe discharge plan in place. Jose did mention that he would at least like [facility] set up for patient. She would benefit from therapy evaluation. Will follow.      08/09/24 1550   Discharge Planning   Living Arrangements Spouse/significant other;Children   Support Systems Children;Spouse/significant other   Type of Residence Private residence   Number of Stairs to Enter Residence 3   Number of Stairs Within Residence 18   Home or Post Acute Services Other (Comment)  (TBD)   Expected Discharge Disposition Other  (TBD)   Does the patient need discharge transport arranged? No   Financial Resource Strain   How hard is it for you to pay for the very basics like food, housing, medical care, and heating? Not very   Housing Stability   In the last 12 months, was there a time when you were not able to pay the mortgage or rent on time? N   In the past 12 months, how many times have you moved where you were living? 0   At any time in the past 12 months, were you homeless or living in a shelter (including now)? N   Transportation Needs   In the past 12 months, has lack of transportation kept you from medical appointments or from getting medications? no   In the past 12 months, has lack of transportation kept you from meetings, work, or from getting things needed for daily living? No

## 2024-08-09 NOTE — CONSULTS
"Consults    Reason For Consult  GI Bleed    History Of Present Illness  Anitha Welch is a 84 y.o. female presenting with weakness and shortness of breath. Found evidence normocytic anemia with hgb 5.3. She mentions black and maroon stools last week \"it just stopped\" No active bleeding per nursing staff. BUN is 27. Send to ICU overnight for resp distress, fluid overload. Now on high flow. She does take Xarelto at home. When asked about NSAID use \"I take all of the above.\" She denies hx prior PUD. Last EGD per Dr Morse in 2020 with small duodenal AVM, no active bleeding. No recent colonoscopy      Past Medical History  She has a past medical history of AAA (abdominal aortic aneurysm) (CMS-[facility]), Acute urinary tract infection (04/20/2023), Anemia, Anxiety, Arthritis, CHF (congestive heart failure) (Multi), Chronic pain disorder, CKD (chronic kidney disease), Cognitive decline, COPD (chronic obstructive pulmonary disease) (Multi), Coronary artery disease, CVA (cerebral vascular accident) (Multi), Deep vein thrombosis (DVT) of calf (Multi), Depression, Disease of thyroid gland, Diverticulosis, DVT (deep venous thrombosis) (Multi), Easy bruising, Epistaxis, GERD (gastroesophageal reflux disease), History of blood transfusion (2023), History of degenerative disc disease, Hyperlipidemia, Hypertension, Hypothyroidism, Ischemic cardiomyopathy, Meralgia paresthetica, Nonrheumatic mitral valve regurgitation, Osteoarthritis, Osteopenia (2010), Plantar fasciitis, RLS (restless legs syndrome), Sciatica, Spinal stenosis, and ST elevation (STEMI) myocardial infarction (Multi).    Surgical History  She has a past surgical history that includes MR angio head wo IV contrast (02/24/2017); MR angio head wo IV contrast (08/11/2017); MR angio head wo IV contrast (02/10/2019); Cholecystectomy (1996); Tonsillectomy; pr arthrp acetblr/prox fem prostc agrft/algrft; Thyroidectomy, partial (Bilateral, 04/17/2013); Coronary stent placement; " Knee Arthroplasty (Left); Total hip arthroplasty (Right, 2006); Dilation and curettage of uterus; Other surgical history (01/09/2019); Upper gastrointestinal endoscopy (03/2019); Cardiac catheterization (10/2019); Cataract extraction (Bilateral, 11/13/2012); Adenoidectomy; Cyst Removal (Right, 06/24/2013); Colonoscopy; Other surgical history (01/09/2019); surgical laverne monitoring; Cardiac catheterization (N/A, 02/16/2024); Esophagogastroduodenoscopy; and Anomalous pulmonary venous return repair, total (N/A, 4/25/2024).     Social History  She reports that she quit smoking about 10 years ago. Her smoking use included cigarettes. She has never been exposed to tobacco smoke. She has never used smokeless tobacco. She reports that she does not currently use alcohol. She reports that she does not use drugs.    Family History  Family History   Problem Relation Name Age of Onset    Hyperthyroidism Mother          Treated with radioactive iodine    Hypertension Mother      Heart disease Mother      Hyperthyroidism Sibling      Diabetes Father's Sister          Allergies  Amoxicillin, Iodinated contrast media, Ciprofloxacin, Influenza virus vaccines, Flu vac 2023 [id number](pf), Diphenhydramine, and Other    Review of Systems   Constitutional:  Positive for fatigue. Negative for fever.   Respiratory:  Positive for shortness of breath.    Gastrointestinal:  Positive for blood in stool. Negative for abdominal pain, diarrhea, nausea and vomiting.   Neurological:  Positive for weakness.        Physical Exam  Constitutional:       Appearance: Normal appearance.   HENT:      Head: Normocephalic and atraumatic.      Mouth/Throat:      Mouth: Mucous membranes are moist.   Pulmonary:      Effort: Pulmonary effort is normal.      Comments: On high flow   Abdominal:      General: There is no distension.      Palpations: Abdomen is soft.      Tenderness: There is no abdominal tenderness. There is no guarding.   Musculoskeletal:          "General: Normal range of motion.      Cervical back: Normal range of motion.   Skin:     General: Skin is warm and dry.   Neurological:      General: No focal deficit present.      Mental Status: She is alert. Mental status is at baseline.   Psychiatric:         Mood and Affect: Mood normal.          Last Recorded Vitals  Blood pressure (!) 130/113, pulse 94, temperature 36.7 °C (98.1 °F), temperature source Oral, resp. rate (!) 31, height 1.651 m (5' 5\"), weight 65.3 kg (143 lb 15.4 oz), SpO2 (!) 74%.    Relevant Results  Results for orders placed or performed during the hospital encounter of 08/08/24 (from the past 24 hour(s))   ECG 12 lead   Result Value Ref Range    Ventricular Rate 85 BPM    Atrial Rate 85 BPM    CA Interval 162 ms    QRS Duration 98 ms    QT Interval 392 ms    QTC Calculation(Bazett) 466 ms    P Axis 71 degrees    R Axis -18 degrees    T Axis 73 degrees    QRS Count 14 beats    Q Onset 227 ms    P Onset 146 ms    P Offset 211 ms    T Offset 423 ms    QTC Fredericia 440 ms   CBC and Auto Differential   Result Value Ref Range    WBC 6.1 4.4 - 11.3 x10*3/uL    nRBC 0.0 0.0 - 0.0 /100 WBCs    RBC 2.37 (L) 4.00 - 5.20 x10*6/uL    Hemoglobin 5.3 (LL) 12.0 - 16.0 g/dL    Hematocrit 19.1 (L) 36.0 - 46.0 %    MCV 81 80 - 100 fL    MCH 22.4 (L) 26.0 - 34.0 pg    MCHC 27.7 (L) 32.0 - 36.0 g/dL    RDW 16.7 (H) 11.5 - 14.5 %    Platelets 129 (L) 150 - 450 x10*3/uL    Neutrophils % 84.3 40.0 - 80.0 %    Immature Granulocytes %, Automated 0.7 0.0 - 0.9 %    Lymphocytes % 9.6 13.0 - 44.0 %    Monocytes % 5.0 2.0 - 10.0 %    Eosinophils % 0.2 0.0 - 6.0 %    Basophils % 0.2 0.0 - 2.0 %    Neutrophils Absolute 5.11 1.60 - 5.50 x10*3/uL    Immature Granulocytes Absolute, Automated 0.04 0.00 - 0.50 x10*3/uL    Lymphocytes Absolute 0.58 (L) 0.80 - 3.00 x10*3/uL    Monocytes Absolute 0.30 0.05 - 0.80 x10*3/uL    Eosinophils Absolute 0.01 0.00 - 0.40 x10*3/uL    Basophils Absolute 0.01 0.00 - 0.10 x10*3/uL "   Comprehensive metabolic panel   Result Value Ref Range    Glucose 92 65 - 99 mg/dL    Sodium 150 (H) 133 - 145 mmol/L    Potassium 3.8 3.4 - 5.1 mmol/L    Chloride 107 97 - 107 mmol/L    Bicarbonate 32 (H) 24 - 31 mmol/L    Urea Nitrogen 27 (H) 8 - 25 mg/dL    Creatinine 1.30 0.40 - 1.60 mg/dL    eGFR 40 (L) >60 mL/min/1.73m*2    Calcium 9.1 8.5 - 10.4 mg/dL    Albumin 4.1 3.5 - 5.0 g/dL    Alkaline Phosphatase 49 35 - 125 U/L    Total Protein 6.4 5.9 - 7.9 g/dL    AST 15 5 - 40 U/L    Bilirubin, Total 1.9 (H) 0.1 - 1.2 mg/dL    ALT 12 5 - 40 U/L    Anion Gap 11 <=19 mmol/L   NT Pro-BNP   Result Value Ref Range    PROBNP 5,394 (H) 0 - 624 pg/mL   Serial Troponin, Initial ([facility])   Result Value Ref Range    Troponin T, High Sensitivity 44 (HH) <=14 ng/L   Morphology   Result Value Ref Range    RBC Morphology See Below     Polychromasia Mild     Hypochromia Mild     RBC Fragments Few     Target Cells Few     Ovalocytes Many     Havana Cells Few    Prepare RBC: 2 Units   Result Value Ref Range    PRODUCT CODE F4970R65     Unit Number [id number]     Unit ABO O     Unit RH POS     XM INTEP COMP     Dispense Status TR     Blood Expiration Date 8/12/2024 11:59:00 PM EDT     PRODUCT BLOOD TYPE 5100     UNIT VOLUME 350     PRODUCT CODE P1160X23     Unit Number [id number]     Unit ABO O     Unit RH POS     XM INTEP COMP     Dispense Status XM     Blood Expiration Date 8/22/2024 11:59:00 PM EDT     PRODUCT BLOOD TYPE 5100     UNIT VOLUME 283    Type And Screen   Result Value Ref Range    ABO TYPE O     Rh TYPE POS     ANTIBODY SCREEN NEG    Serial Troponin, 2 Hour ([facility])   Result Value Ref Range    Troponin T, High Sensitivity 43 (HH) <=14 ng/L   VERIFY ABO/Rh Group Test   Result Value Ref Range    ABO TYPE O     Rh TYPE POS    Serial Troponin, 6 Hour ([facility])   Result Value Ref Range    Troponin T, High Sensitivity 53 (HH) <=14 ng/L   Hemoglobin and hematocrit, blood   Result Value Ref Range    Hemoglobin 7.1 (L) 12.0  - 16.0 g/dL    Hematocrit 24.5 (L) 36.0 - 46.0 %   Blood Gas Venous Full Panel   Result Value Ref Range    POCT pH, Venous 7.31 (L) 7.33 - 7.43 pH    POCT pCO2, Venous 65 (H) 41 - 51 mm Hg    POCT pO2, Venous 64 (H) 35 - 45 mm Hg    POCT SO2, Venous 90 (H) 45 - 75 %    POCT Oxy Hemoglobin, Venous 87.0 (H) 45.0 - 75.0 %    POCT Hematocrit Calculated, Venous 22.0 (L) 36.0 - 46.0 %    POCT Sodium, Venous 142 136 - 145 mmol/L    POCT Potassium, Venous 4.4 3.5 - 5.3 mmol/L    POCT Chloride, Venous 103 98 - 107 mmol/L    POCT Ionized Calicum, Venous 1.16 1.10 - 1.33 mmol/L    POCT Glucose, Venous 218 (H) 74 - 99 mg/dL    POCT Lactate, Venous 2.3 (H) 0.4 - 2.0 mmol/L    POCT Base Excess, Venous 5.6 (H) -2.0 - 3.0 mmol/L    POCT HCO3 Calculated, Venous 32.7 (H) 22.0 - 26.0 mmol/L    POCT Hemoglobin, Venous 7.4 (L) 12.0 - 16.0 g/dL    POCT Anion Gap, Venous 11.0 10.0 - 25.0 mmol/L    Patient Temperature 37.0 degrees Celsius    FiO2 60 %   Coagulation Screen   Result Value Ref Range    Protime 13.0 (H) 9.3 - 12.7 seconds    INR 1.2 0.9 - 1.2    aPTT 25.8 22.0 - 32.5 seconds   Renal function panel   Result Value Ref Range    Glucose 221 (H) 65 - 99 mg/dL    Sodium 145 133 - 145 mmol/L    Potassium 4.3 3.4 - 5.1 mmol/L    Chloride 102 97 - 107 mmol/L    Bicarbonate 28 24 - 31 mmol/L    Urea Nitrogen 32 (H) 8 - 25 mg/dL    Creatinine 1.70 (H) 0.40 - 1.60 mg/dL    eGFR 29 (L) >60 mL/min/1.73m*2    Calcium 8.8 8.5 - 10.4 mg/dL    Phosphorus 6.2 (H) 2.5 - 4.5 mg/dL    Albumin 4.3 3.5 - 5.0 g/dL    Anion Gap 15 <=19 mmol/L   Magnesium   Result Value Ref Range    Magnesium 2.00 1.60 - 3.10 mg/dL   CBC and Auto Differential   Result Value Ref Range    WBC 9.2 4.4 - 11.3 x10*3/uL    nRBC 0.2 (H) 0.0 - 0.0 /100 WBCs    RBC 3.15 (L) 4.00 - 5.20 x10*6/uL    Hemoglobin 7.2 (L) 12.0 - 16.0 g/dL    Hematocrit 25.8 (L) 36.0 - 46.0 %    MCV 82 80 - 100 fL    MCH 22.9 (L) 26.0 - 34.0 pg    MCHC 27.9 (L) 32.0 - 36.0 g/dL    RDW 16.7 (H) 11.5 -  14.5 %    Platelets 201 150 - 450 x10*3/uL    Immature Granulocytes %, Automated 1.3 (H) 0.0 - 0.9 %    Immature Granulocytes Absolute, Automated 0.12 0.00 - 0.50 x10*3/uL   Calcium, ionized   Result Value Ref Range    POCT Calcium, Ionized 1.00 (L) 1.1 - 1.33 mmol/L   Manual Differential   Result Value Ref Range    Neutrophils %, Manual 78.0 40.0 - 80.0 %    Bands %, Manual 6.0 0.0 - 5.0 %    Lymphocytes %, Manual 10.0 13.0 - 44.0 %    Monocytes %, Manual 4.0 2.0 - 10.0 %    Eosinophils %, Manual 0.0 0.0 - 6.0 %    Basophils %, Manual 0.0 0.0 - 2.0 %    Metamyelocytes %, Manual 1.0 0.0 - 0.0 %    Myelocytes %, Manual 1.0 0.0 - 0.0 %    Seg Neutrophils Absolute, Manual 7.18 (H) 1.60 - 5.00 x10*3/uL    Bands Absolute, Manual 0.55 (H) 0.00 - 0.50 x10*3/uL    Lymphocytes Absolute, Manual 0.92 0.80 - 3.00 x10*3/uL    Monocytes Absolute, Manual 0.37 0.05 - 0.80 x10*3/uL    Eosinophils Absolute, Manual 0.00 0.00 - 0.40 x10*3/uL    Basophils Absolute, Manual 0.00 0.00 - 0.10 x10*3/uL    Metamyelocytes Absolute, Manual 0.09 0.00 - 0.00 x10*3/uL    Myelocytes Absolute, Manual 0.09 0.00 - 0.00 x10*3/uL    Total Cells Counted 100     Neutrophils Absolute, Manual 7.73 (H) 1.60 - 5.50 x10*3/uL    RBC Morphology See Below     Polychromasia Mild     Hypochromia Mild     Ovalocytes Few     Acanthocytes Few     Giant Platelets Few    Transthoracic Echo (TTE) Complete   Result Value Ref Range    BSA 1.73 m2     XR chest 1 view    Result Date: 8/9/2024  Interpreted By:  Indiana Perea, STUDY: XR CHEST 1 VIEW;  8/9/2024 5:22 am   INDICATION: Signs/Symptoms:Evaluate CHF.   COMPARISON: 08/08/2024   ACCESSION NUMBER(S): [id number]   ORDERING CLINICIAN: AXEL SILVER   FINDINGS: The cardiac silhouette is enlarged. There are atherosclerotic changes of the aorta.   The lungs are hyperinflated. There is extensive right-sided infiltration. The left lung markings appear coarse.   The patient is scoliotic. There are surgical clips in the neck.    COMPARISON OF FINDING: The chest is similar.       Enlarged cardiac silhouette. Parenchymal infiltration.   MACRO: none   Signed by: Indiana Perea 8/9/2024 8:09 AM Dictation workstation:   [id number]    XR chest 1 view    Result Date: 8/8/2024  Interpreted By:  Khadijah Allen, STUDY: XR CHEST 1 VIEW;  8/8/2024 11:06 pm   INDICATION: Signs/Symptoms:SOB.   COMPARISON: 08/08/2024 at 1:44 p.m.   ACCESSION NUMBER(S): [id number]   ORDERING CLINICIAN: ABELINO MCKEON   FINDINGS: Surgical clips at the neck base.   CARDIOMEDIASTINAL SILHOUETTE: Stable cardiomegaly.   LUNGS: Increased diffuse interstitial edema. Increasing right pulmonary infiltrates. Suspected small bilateral pleural effusions. No pneumothorax.   ABDOMEN: No remarkable upper abdominal findings.   BONES: No acute osseous abnormality.       Worsening CHF with pulmonary edema.   MACRO: None   Signed by: Khadijah Allen 8/8/2024 11:32 PM Dictation workstation:   [id number]    ECG 12 lead    Result Date: 8/8/2024  Normal sinus rhythm Incomplete right bundle branch block Septal infarct (cited on or before 27-APR-2024) Abnormal ECG When compared with ECG of 27-APR-2024 01:22, Questionable change in initial forces of Septal leads    XR chest 1 view    Result Date: 8/8/2024  Interpreted By:  Jose Rodriguez, STUDY: XR CHEST 1 VIEW; 8/8/2024 1:57 pm   INDICATION: CLINICAL INFORMATION: Signs/Symptoms:shortness of breath.   COMPARISON: 04/28/2024   ACCESSION NUMBER(S): [id number]   ORDERING CLINICIAN: NESTOR CERVANTES   TECHNIQUE: Portable chest one view.   FINDINGS: The cardiac silhouette is quite prominent suggesting cardiomegaly. The aorta is tortuous. Surgical clips are identified at the base of the neck bilaterally. Hazy diffuse bilateral infiltrates are identified more marked at the right apex. Small bilateral effusions are suspected. No alveolar consolidation is noted.       Probable cardiomegaly with bilateral infiltrates and effusions suggesting CHF.    MACRO: none   Signed by: Jose Rodriguez 8/8/2024 2:20 PM Dictation workstation:   [id number]    Vascular US Lower Extremity Venous Duplex Bilateral    Result Date: 7/23/2024  Interpreted By:  Orestes Robles, STUDY: [facility] US LOWER EXTREMITY VENOUS DUPLEX BILATERAL;  7/22/2024 4:27 pm   INDICATION: Signs/Symptoms:B/L leg pain.   COMPARISON: 11/23/2023   ACCESSION NUMBER(S): [id number]   ORDERING CLINICIAN: GRUPO LUCIO   TECHNIQUE: Vascular ultrasound of the bilateral lower extremities was performed. Real-time compression views as well as Gray scale, color Doppler and spectral Doppler waveform analysis was performed.   FINDINGS: Evaluation of the visualized portions of the bilateral common femoral, proximal, mid, and distal femoral, and popliteal veins was performed.  Evaluation of the visualized portions of the posterior tibial and peroneal veins was also performed.   Limitations: None   The evaluated veins demonstrate normal compressibility. There is intact venous flow demonstrating normal respiratory variability and normal augmentation of flow with calf compression.         No sonographic evidence for deep vein thrombosis within the evaluated veins of the bilateral lower extremities.   MACRO: None   Signed by: Orestes Robles 7/23/2024 6:05 AM Dictation workstation:   [id number]        Assessment/Plan     Melena, Anemia, CHF    -Seemingly had some GI bleed last week that stopped bleeding spontaneously. She is at risk for PUD in setting NSAIDs and anticoagulation. If recurrent signs of bleeding, we can consider more urgent endoscopy while on high flow. For now, will treat with high dose PPI and consider EGD on Monday    -Full liquid diet    -PPI BID   -Would hold Xarelto from GI standpoint today     Note, no abdominal imaging completed. Consider CT a/p with contrast     I spent 30 minutes in the professional and overall care of this patient.

## 2024-08-09 NOTE — H&P
Critical Care Medicine       Date:  8/9/2024  Patient:  Anitha Welch  YOB: 1940  MRN:  [id number]   Admit Date:  8/8/2024      Chief Complaint   Patient presents with    Shortness of Breath         History of Present Illness:  Anitha Welch is a 84 y.o. year old female patient with Past Medical History of COPD on 2 liters oxygen, CAD s/p stent, Ischemic CMP, CVA with mild left sided weakness, LLE DVT on xarelto, and recent Mitral valve repair ARMANI on 4/25/24 who is presenting to [facility] ED with c/o general weakness and SOB that has been going on the past 2 weeks.     ER workup: HR 83/min, /82, Sats 95% on 2 liters oxygen, RR 20/min and Temp of 36.8. Labs significant for HB of 5.3, hematocrit 19. ( Base line HB 8.1 and Hematocrit 30.7) . Urea elevated to 27, and creatinine is 1.3. Sodium is elevated to 150.   She is admitted to medicine, step down unit for Acute anemia of blood loss. 2 units of PRBC transfusion are planned in the ED    Patient was then admitted to the step down unit. While on the unit she started having increasing WOB. He O2 dropped to 78% and she went from 2L to 6L O2. She was still tachypneic and increased WOB so she was transitioned to high jose j and 80 of lasix. Due to decrease in resp status patient will transfer to ICU.    Interval ICU Events:  8/9: Patient arrived to ICU on high jose j 40L/60%. She is anxious with RR around 35. She was started on a NTG gtt. Patient transitioned to Bipap for WOB.     Objective     Past Medical History:   Diagnosis Date    AAA (abdominal aortic aneurysm) (CMS-HCC)     Acute urinary tract infection 04/20/2023    Anemia     Anxiety     Arthritis     CHF (congestive heart failure) (Multi)     Chronic pain disorder     back pain    CKD (chronic kidney disease)     Cognitive decline     COPD (chronic obstructive pulmonary disease) (Multi)     oxygen dependent- wears 2L NC continuously    Coronary artery disease     s/p stent    CVA (cerebral vascular  accident) (Multi)     weaker on the left side    Deep vein thrombosis (DVT) of calf (Multi)     left    Depression     Disease of thyroid gland     Diverticulosis     DVT (deep venous thrombosis) (Multi)     left leg, on xarelto    Easy bruising     Epistaxis     GERD (gastroesophageal reflux disease)     managed with protonix    History of blood transfusion 2023    History of degenerative disc disease     Hyperlipidemia     Hypertension     Hypothyroidism     Ischemic cardiomyopathy     Meralgia paresthetica     Nonrheumatic mitral valve regurgitation     Osteoarthritis     Osteopenia 2010    hip - DEXA    Plantar fasciitis     RLS (restless legs syndrome)     Sciatica     Spinal stenosis     ST elevation (STEMI) myocardial infarction (Multi)      Past Surgical History:   Procedure Laterality Date    ADENOIDECTOMY      ANOMALOUS PULMONARY VENOUS RETURN REPAIR, 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    Procedure: Mitral-ARMANI (Transcatheter Edge to Edge Repair);  Surgeon: Kyle Bernardo MD;  Location: [address] Cardiac Cath Lab;  Service: Cardiovascular;  Laterality: N/A;  SAMMY Elgendy.Labs with cPM,Maynard,Schedule at 7;30am    CARDIAC CATHETERIZATION  10/2019    CARDIAC CATHETERIZATION N/A 02/16/2024    Procedure: Left And Right Heart Cath, With LV;  Surgeon: Tuan Foss DO;  Location: [facility] Cardiac Cath Lab;  Service: Cardiovascular;  Laterality: N/A;  no pre auth needed    CATARACT EXTRACTION Bilateral 11/13/2012    CHOLECYSTECTOMY  1996    laparoscopic    COLONOSCOPY      Dr. Rodriguez    CORONARY STENT PLACEMENT      CYST REMOVAL Right 06/24/2013    Excision of Sebaceous cyst right axilla    DILATION AND CURETTAGE OF UTERUS      ESOPHAGOGASTRODUODENOSCOPY      KNEE ARTHROPLASTY Left     MR HEAD ANGIO WO IV CONTRAST  02/24/2017    MR HEAD ANGIO WO IV CONTRAST [facility]    MR HEAD ANGIO WO IV CONTRAST  08/11/2017    MR HEAD ANGIO WO IV CONTRAST [facility]    MR HEAD ANGIO WO IV CONTRAST  02/10/2019     MR HEAD ANGIO WO IV CONTRAST [facility]    OTHER SURGICAL HISTORY  01/09/2019    Stent synergy    OTHER SURGICAL HISTORY  01/09/2019    Stent synergy    RI ARTHRP ACETBLR/PROX FEM PROSTC AGRFT/ALGRFT      SURGICAL SAMMY MONITORING      THYROIDECTOMY, PARTIAL Bilateral 04/17/2013    subtotal thyroidectomy    TONSILLECTOMY      TOTAL HIP ARTHROPLASTY Right 2006    UPPER GASTROINTESTINAL ENDOSCOPY  03/2019    Dr. Galan     Medications Prior to Admission   Medication Sig Dispense Refill Last Dose    atorvastatin (Lipitor) 40 mg tablet TAKE 1 TABLET BY MOUTH EVERY DAY AT BEDTIME 90 tablet 3 8/7/2024    carvedilol (Coreg) 3.125 mg tablet Take 1 tablet (3.125 mg) by mouth 2 times a day. 180 tablet 0 8/8/2024    loratadine (Claritin) 10 mg tablet Take 1 tablet (10 mg) by mouth once daily.   8/8/2024    multivitamin-iron-folic acid (Multi Complete with Iron)  mg-mcg tablet tablet Take 1 tablet by mouth once daily.   8/8/2024    oxygen (O2) gas therapy Inhale 2 L/min continuously. Indications: sob   8/8/2024    pantoprazole (ProtoNix) 40 mg EC tablet TAKE 1 TABLET BY MOUTH TWICE A  tablet 4 8/8/2024    rivaroxaban (Xarelto) 20 mg tablet Take 1 tablet (20 mg) by mouth once daily in the evening. Take with meals. Take with food. 30 tablet 11 8/8/2024    torsemide (Demadex) 20 mg tablet Take 1 tablet (20 mg) by mouth once daily. Do not fill before May 2, 2024.   8/8/2024    albuterol 90 mcg/actuation inhaler Inhale 1 puff every 4 hours if needed.       diclofenac sodium (Voltaren) 1 % gel Apply 4.5 inches (4 g) topically 4 times a day. 100 g 1 Unknown    nitroglycerin (Nitrostat) 0.4 mg SL tablet Place 1 tablet (0.4 mg) under the tongue every 5 minutes if needed.   Unknown    nystatin (Mycostatin) 100,000 unit/gram powder Apply 1 Application topically 2 times a day. 30 g 0 Unknown    tiZANidine (Zanaflex) 2 mg tablet Take 1 tablet (2 mg) by mouth every 8 hours if needed for muscle spasms. 30 tablet 0  Unknown    Trelegy Ellipta 100-62.5-25 mcg blister with device Inhale 1 puff once daily. 1 each 3      Amoxicillin, Iodinated contrast media, Ciprofloxacin, Influenza virus vaccines, Flu vac 2023 [id number](pf), Diphenhydramine, and Other  Social History     Tobacco Use    Smoking status: Former     Current packs/day: 0.00     Types: Cigarettes     Quit date: 2014     Years since quitting: 10.6     Passive exposure: Never    Smokeless tobacco: Never   Vaping Use    Vaping status: Never Used   Substance Use Topics    Alcohol use: Not Currently     Comment: glass wine at holiday    Drug use: Never     Family History   Problem Relation Name Age of Onset    Hyperthyroidism Mother          Treated with radioactive iodine    Hypertension Mother      Heart disease Mother      Hyperthyroidism Sibling      Diabetes Father's Sister         Hospital Medications:    nitroglycerin, 10 mcg/min, Last Rate: 5 mcg/min (08/09/24 0117)  oxygen, 2 L/min, Last Rate: 4 L/min (08/08/24 1655)          Current Facility-Administered Medications:     albuterol 2.5 mg /3 mL (0.083 %) nebulizer solution 2.5 mg, 2.5 mg, nebulization, q4h PRN, Sal Cordova MD    atorvastatin (Lipitor) tablet 40 mg, 40 mg, oral, Nightly, Sal Cordova MD, 40 mg at 08/08/24 2040    carvedilol (Coreg) tablet 3.125 mg, 3.125 mg, oral, BID, Sal Cordova MD    cetirizine (ZyrTEC) tablet 10 mg, 10 mg, oral, Daily, Sal Cordova MD    diclofenac sodium (Voltaren) 1 % gel 4 g, 4 g, Topical, TID PRN, Sal Cordova MD    tiotropium (Spiriva Respimat) 2.5 mcg/actuation inhaler 2 puff, 2 puff, inhalation, Daily **AND** fluticasone furoate-vilanteroL (Breo Ellipta) 100-25 mcg/dose inhaler 1 puff, 1 puff, inhalation, Daily, Sal Cordova MD    nitroglycerin (Nitrostat) SL tablet 0.4 mg, 0.4 mg, sublingual, q5 min PRN, Sal Cordova MD    nitroglycerin (Tridil) 50 mg in dextrose 5% 250 mL (0.2 mg/mL) infusion (premix), 10 mcg/min, intravenous,  "Continuous, Shannan Whitley DO, Last Rate: 1.5 mL/hr at 08/09/24 0117, 5 mcg/min at 08/09/24 0117    nystatin (Mycostatin) 100,000 unit/gram powder 1 Application, 1 Application, Topical, BID, Sal Cordova MD, 1 Application at 08/08/24 2040    oxygen (O2) therapy, 2 L/min, inhalation, Continuous, Sal Cordova MD, Last Rate: 240,000 mL/hr at 08/08/24 1655, 4 L/min at 08/08/24 1655    pantoprazole (ProtoNix) injection 40 mg, 40 mg, intravenous, BID, Sal Cordova MD, 40 mg at 08/08/24 2040    tiZANidine (Zanaflex) tablet 2 mg, 2 mg, oral, q8h PRN, Sal Cordova MD    [Held by provider] torsemide (Demadex) tablet 20 mg, 20 mg, oral, Daily, Sal Cordova MD    Physical Exam:    Heart Rate:  []   Temp:  [36.5 °C (97.7 °F)-36.8 °C (98.2 °F)]   Resp:  [19-28]   BP: (102-168)/(55-96)   Height:  [165.1 cm (5' 5\")]   Weight:  [63.5 kg (140 lb)-66.4 kg (146 lb 6.2 oz)]   SpO2:  [90 %-100 %]     Physical Exam  Constitutional:       General: She is in acute distress.   HENT:      Head: Normocephalic and atraumatic.      Mouth/Throat:      Mouth: Mucous membranes are dry.   Eyes:      Pupils: Pupils are equal, round, and reactive to light.   Cardiovascular:      Rate and Rhythm: Regular rhythm. Tachycardia present.      Pulses: Normal pulses.      Heart sounds: Normal heart sounds.   Pulmonary:      Effort: Tachypnea and respiratory distress present.      Breath sounds: Decreased breath sounds, wheezing and rhonchi present.   Abdominal:      General: Abdomen is flat.      Tenderness: There is no abdominal tenderness.   Musculoskeletal:         General: No swelling.   Skin:     General: Skin is warm.      Capillary Refill: Capillary refill takes less than 2 seconds.   Neurological:      General: No focal deficit present.       Review of Systems:  14 point review of systems was completed and negative except for those specially mention in my HPI    I have reviewed all medications, laboratory results, " and imaging pertinent for today's encounter.           Intake/Output Summary (Last 24 hours) at 8/9/2024 0152  Last data filed at 8/8/2024 1858  Gross per 24 hour   Intake 300 ml   Output --   Net 300 ml            Assessment/Plan:    I am currently managing this critically ill patient for the following problems:    Neuro/Psych/Pain Ctrl/Sedation:  - Pain Management: tylenol  - CAM ICU    Respiratory/ENT:  Flash pulmonary edema 2/2 blood transfusion  COPD (2L NC at home)   - Maintain SPO2 >92%  - Continuous pulse ox monitoring   - Pulm hygiene  - Hold home Spiriva and breo  - Duoneb q4h  - Will transition to Bipap for WOB and help with dieresis     Cardiovascular:  CAD  HLD  MV repair in 4/2024  - Continuous cardiac monitoring per ICU protocol  - Maintain MAPS >65  - Daily EKGs  - Echo ordered  - Echo in 4/2024 normal EF  - Cont home carvedilol, lipitor  - NTG gtt with goal of SBP < 160  - BNP: 5,394    GI:  GI bleed  - NPO  - BR with miralax prn  - PPI BID  - GI consulted    Renal/Volume Status (Intra & Extravascular):  AYSE  - Maintain heath catheter  - Maintain urine output 0.5-1.0cc/kg/hr  - Monitor I/O's  - Replete electrolytes to maintain K >4.0 and Mg >2.0  - Daily BMP, Mg  - Cr: 1.3 -> 1.7  - 80 lasix given 8/8    Endocrine  No hx of DM or thyroid concerns  - SSI q4hrs while NPO  - Monitor for hyper/hypoglycemia     Infectious Disease:  No fever or leukocytosis  - Monitor SIRS criteria  - WBC: 6.1    Heme/Onc:  Hx of DVT LLE  - Monitor for s/sx of anemia such as bleeding and bruising   - Transfuse if Hgb <7.0   - Xarelto on hold for GI bleed  - Daily CBC  - Hgb: 7.1  - 2 PRBC's on 8/8    MSK:  - Padded pressure points   - Ambulatory at baseline    Skin  - ICU skin protocol    Ethics/Code Status:  Full Code    :  DVT Prophylaxis: Xarelto on hold  GI Prophylaxis: PPI BID  Bowel Regimen: miralax  prn  Diet: NPO  CVC: None  Kaity: None  Heath: Yes 8/9  Restraints: None  Dispo: Admit to  ICU    Critical Care Time:  75 minutes spent in preparing to see patient (I.e.labs,imaging, etc.), documentation, discussion plan of care with patient/family/caregiver, and/ or coordination of care with multidisciplinary team including the attending. Time does not include completion of procedure time.     Dian Jules PA-C  Pulmonology & Critical Care Medicine   [facility]

## 2024-08-09 NOTE — SIGNIFICANT EVENT
Called and spoke to christy Garcia at 0315 and updated on patient's worsening clinical status and transfer to ICU. Provided new bed location and answered questions.

## 2024-08-09 NOTE — PROGRESS NOTES
08/09/24 1549   Physical Activity   On average, how many days per week do you engage in moderate to strenuous exercise (like a brisk walk)? 0 days   On average, how many minutes do you engage in exercise at this level? 0 min   Financial Resource Strain   How hard is it for you to pay for the very basics like food, housing, medical care, and heating? Not very   Housing Stability   In the last 12 months, was there a time when you were not able to pay the mortgage or rent on time? N   In the past 12 months, how many times have you moved where you were living? 0   At any time in the past 12 months, were you homeless or living in a shelter (including now)? N   Transportation Needs   In the past 12 months, has lack of transportation kept you from medical appointments or from getting medications? no   In the past 12 months, has lack of transportation kept you from meetings, work, or from getting things needed for daily living? No   Food Insecurity   Within the past 12 months, you worried that your food would run out before you got the money to buy more. Never true   Within the past 12 months, the food you bought just didn't last and you didn't have money to get more. Never true   Stress   Do you feel stress - tense, restless, nervous, or anxious, or unable to sleep at night because your mind is troubled all the time - these days? Only a littl   Social Connections   In a typical week, how many times do you talk on the phone with family, friends, or neighbors? More than 3   How often do you get together with friends or relatives? More than 3   How often do you attend [employer] or Sikh services? Never   Do you belong to any clubs or organizations such as [employer] groups, unions, fraternal or athletic groups, or school groups? No   How often do you attend meetings of the clubs or organizations you belong to? Never   Are you , , , , never , or living with a partner?    Intimate  Partner Violence   Within the last year, have you been afraid of your partner or ex-partner? Pt Unable   Within the last year, have you been humiliated or emotionally abused in other ways by your partner or ex-partner? Pt Unable   Within the last year, have you been kicked, hit, slapped, or otherwise physically hurt by your partner or ex-partner? Pt Unable   Within the last year, have you been raped or forced to have any kind of sexual activity by your partner or ex-partner? Pt Unable   Alcohol Use   Q1: How often do you have a drink containing alcohol? Never   Q2: How many drinks containing alcohol do you have on a typical day when you are drinking? None   Q3: How often do you have six or more drinks on one occasion? Never   Utilities   In the past 12 months has the electric, gas, oil, or water company threatened to shut off services in your home? No   Health Literacy   How often do you need to have someone help you when you read instructions, pamphlets, or other written material from your doctor or pharmacy? Often

## 2024-08-09 NOTE — SIGNIFICANT EVENT
Worsening respiratory distress. Pulsox dropped to 78%. RN switched to 6L. Her pulsox recovered but she remains tachypnic with gurgling respirations.   Diffuse rhonchi and crackles on exam. SBP in the 170s. Echo from April with normal EF.      Flash pulmonary edema vs transfusion related lung injury  Repeat CXR from earlier showed worsening CHF.  Urinary incontinence after first dose of lasix. She normally takes 20 mg of torsemide at home. Will give a second dose of lasix.  HF NC-- if no improvement, BIPAP.  Start nitroglycerin drip.  Low threshold to transfer to ICU  Place heath catheter.      Patient increasingly anxious on HF. Requesting it off. Pulsox 94%,  systolic, and RR 20. She's on 40/60%. nitroglycerin running at 5. Will increase nitroglycerin to 10.   Transfer to ICU for further management.  Discussed with ICU team.      CCT: 35 min

## 2024-08-09 NOTE — SIGNIFICANT EVENT
Patient complaining of feeling short of breath.  This occurred mostly with movement.  The nurse initially increased oxygen.    X-ray in the ER showed bilateral infiltrates and effusions suggestive of CHF.  She then received 1 unit of blood in the ER.    Acute on chronic hypoxic respiratory failure  Acute on chronic diastolic heart failure  Acute GI bleed with acute posthemorrhagic anemia  Repeat counts are pending. Also checking third trop  Stat chest x-ray is ordered.  On exam lung sounds are very diminished although patient is breathing rapidly and shallowly.  She is a little anxious.  IV Lasix 40 x 1.  She is normally on torsemide 20 mg.  This has been held due to concerns for bleeding.  Will likely need to restart tomorrow.      CCT: 31 min

## 2024-08-10 LAB
ALBUMIN SERPL-MCNC: 3.8 G/DL (ref 3.5–5)
ANION GAP SERPL CALC-SCNC: 11 MMOL/L
ATRIAL RATE: 85 BPM
BASOPHILS # BLD AUTO: 0.02 X10*3/UL (ref 0–0.1)
BASOPHILS # BLD AUTO: 0.03 X10*3/UL (ref 0–0.1)
BASOPHILS NFR BLD AUTO: 0.2 %
BASOPHILS NFR BLD AUTO: 0.4 %
BUN SERPL-MCNC: 29 MG/DL (ref 8–25)
CALCIUM SERPL-MCNC: 8.3 MG/DL (ref 8.5–10.4)
CHLORIDE SERPL-SCNC: 104 MMOL/L (ref 97–107)
CO2 SERPL-SCNC: 32 MMOL/L (ref 24–31)
CREAT SERPL-MCNC: 1.4 MG/DL (ref 0.4–1.6)
EGFRCR SERPLBLD CKD-EPI 2021: 37 ML/MIN/1.73M*2
EOSINOPHIL # BLD AUTO: 0.02 X10*3/UL (ref 0–0.4)
EOSINOPHIL # BLD AUTO: 0.02 X10*3/UL (ref 0–0.4)
EOSINOPHIL NFR BLD AUTO: 0.2 %
EOSINOPHIL NFR BLD AUTO: 0.3 %
ERYTHROCYTE [DISTWIDTH] IN BLOOD BY AUTOMATED COUNT: 16.4 % (ref 11.5–14.5)
ERYTHROCYTE [DISTWIDTH] IN BLOOD BY AUTOMATED COUNT: 16.5 % (ref 11.5–14.5)
GLUCOSE BLD MANUAL STRIP-MCNC: 143 MG/DL (ref 74–99)
GLUCOSE BLD MANUAL STRIP-MCNC: 144 MG/DL (ref 74–99)
GLUCOSE SERPL-MCNC: 98 MG/DL (ref 65–99)
HCT VFR BLD AUTO: 24.8 % (ref 36–46)
HCT VFR BLD AUTO: 25.1 % (ref 36–46)
HGB BLD-MCNC: 7.4 G/DL (ref 12–16)
HGB BLD-MCNC: 7.4 G/DL (ref 12–16)
IMM GRANULOCYTES # BLD AUTO: 0.05 X10*3/UL (ref 0–0.5)
IMM GRANULOCYTES # BLD AUTO: 0.07 X10*3/UL (ref 0–0.5)
IMM GRANULOCYTES NFR BLD AUTO: 0.6 % (ref 0–0.9)
IMM GRANULOCYTES NFR BLD AUTO: 1 % (ref 0–0.9)
LYMPHOCYTES # BLD AUTO: 0.88 X10*3/UL (ref 0.8–3)
LYMPHOCYTES # BLD AUTO: 1.09 X10*3/UL (ref 0.8–3)
LYMPHOCYTES NFR BLD AUTO: 12.4 %
LYMPHOCYTES NFR BLD AUTO: 13.6 %
MAGNESIUM SERPL-MCNC: 1.9 MG/DL (ref 1.6–3.1)
MCH RBC QN AUTO: 23.9 PG (ref 26–34)
MCH RBC QN AUTO: 24 PG (ref 26–34)
MCHC RBC AUTO-ENTMCNC: 29.5 G/DL (ref 32–36)
MCHC RBC AUTO-ENTMCNC: 29.8 G/DL (ref 32–36)
MCV RBC AUTO: 80 FL (ref 80–100)
MCV RBC AUTO: 82 FL (ref 80–100)
MONOCYTES # BLD AUTO: 0.82 X10*3/UL (ref 0.05–0.8)
MONOCYTES # BLD AUTO: 0.82 X10*3/UL (ref 0.05–0.8)
MONOCYTES NFR BLD AUTO: 10.2 %
MONOCYTES NFR BLD AUTO: 11.6 %
NEUTROPHILS # BLD AUTO: 5.26 X10*3/UL (ref 1.6–5.5)
NEUTROPHILS # BLD AUTO: 6.03 X10*3/UL (ref 1.6–5.5)
NEUTROPHILS NFR BLD AUTO: 74.3 %
NEUTROPHILS NFR BLD AUTO: 75.2 %
NRBC BLD-RTO: 0 /100 WBCS (ref 0–0)
NRBC BLD-RTO: 0 /100 WBCS (ref 0–0)
P AXIS: 71 DEGREES
P OFFSET: 211 MS
P ONSET: 146 MS
PHOSPHATE SERPL-MCNC: 3.2 MG/DL (ref 2.5–4.5)
PLATELET # BLD AUTO: 115 X10*3/UL (ref 150–450)
PLATELET # BLD AUTO: 123 X10*3/UL (ref 150–450)
POTASSIUM SERPL-SCNC: 4 MMOL/L (ref 3.4–5.1)
PR INTERVAL: 162 MS
Q ONSET: 227 MS
QRS COUNT: 14 BEATS
QRS DURATION: 98 MS
QT INTERVAL: 392 MS
QTC CALCULATION(BAZETT): 466 MS
QTC FREDERICIA: 440 MS
R AXIS: -18 DEGREES
RBC # BLD AUTO: 3.08 X10*6/UL (ref 4–5.2)
RBC # BLD AUTO: 3.09 X10*6/UL (ref 4–5.2)
SODIUM SERPL-SCNC: 147 MMOL/L (ref 133–145)
T AXIS: 73 DEGREES
T OFFSET: 423 MS
VENTRICULAR RATE: 85 BPM
WBC # BLD AUTO: 7.1 X10*3/UL (ref 4.4–11.3)
WBC # BLD AUTO: 8 X10*3/UL (ref 4.4–11.3)

## 2024-08-10 PROCEDURE — 9420000001 HC RT PATIENT EDUCATION 5 MIN

## 2024-08-10 PROCEDURE — 2020000001 HC ICU ROOM DAILY

## 2024-08-10 PROCEDURE — 36415 COLL VENOUS BLD VENIPUNCTURE: CPT

## 2024-08-10 PROCEDURE — 85025 COMPLETE CBC W/AUTO DIFF WBC: CPT

## 2024-08-10 PROCEDURE — 80069 RENAL FUNCTION PANEL: CPT

## 2024-08-10 PROCEDURE — 2500000002 HC RX 250 W HCPCS SELF ADMINISTERED DRUGS (ALT 637 FOR MEDICARE OP, ALT 636 FOR OP/ED): Performed by: INTERNAL MEDICINE

## 2024-08-10 PROCEDURE — 2500000001 HC RX 250 WO HCPCS SELF ADMINISTERED DRUGS (ALT 637 FOR MEDICARE OP): Performed by: HOSPITALIST

## 2024-08-10 PROCEDURE — 2500000004 HC RX 250 GENERAL PHARMACY W/ HCPCS (ALT 636 FOR OP/ED)

## 2024-08-10 PROCEDURE — 83735 ASSAY OF MAGNESIUM: CPT

## 2024-08-10 PROCEDURE — C9113 INJ PANTOPRAZOLE SODIUM, VIA: HCPCS | Performed by: HOSPITALIST

## 2024-08-10 PROCEDURE — 2500000004 HC RX 250 GENERAL PHARMACY W/ HCPCS (ALT 636 FOR OP/ED): Performed by: HOSPITALIST

## 2024-08-10 PROCEDURE — 82947 ASSAY GLUCOSE BLOOD QUANT: CPT

## 2024-08-10 PROCEDURE — 94660 CPAP INITIATION&MGMT: CPT

## 2024-08-10 PROCEDURE — 2500000002 HC RX 250 W HCPCS SELF ADMINISTERED DRUGS (ALT 637 FOR MEDICARE OP, ALT 636 FOR OP/ED): Performed by: HOSPITALIST

## 2024-08-10 PROCEDURE — 94640 AIRWAY INHALATION TREATMENT: CPT

## 2024-08-10 PROCEDURE — 2500000005 HC RX 250 GENERAL PHARMACY W/O HCPCS: Performed by: INTERNAL MEDICINE

## 2024-08-10 PROCEDURE — 99291 CRITICAL CARE FIRST HOUR: CPT | Performed by: INTERNAL MEDICINE

## 2024-08-10 PROCEDURE — 2500000002 HC RX 250 W HCPCS SELF ADMINISTERED DRUGS (ALT 637 FOR MEDICARE OP, ALT 636 FOR OP/ED)

## 2024-08-10 RX ORDER — INSULIN LISPRO 100 [IU]/ML
0-5 INJECTION, SOLUTION INTRAVENOUS; SUBCUTANEOUS
Status: DISPENSED | OUTPATIENT
Start: 2024-08-10

## 2024-08-10 RX ORDER — MAGNESIUM SULFATE HEPTAHYDRATE 40 MG/ML
2 INJECTION, SOLUTION INTRAVENOUS ONCE
Status: COMPLETED | OUTPATIENT
Start: 2024-08-10 | End: 2024-08-10

## 2024-08-10 RX ORDER — DEXTROSE 50 % IN WATER (D50W) INTRAVENOUS SYRINGE
12.5
Status: ACTIVE | OUTPATIENT
Start: 2024-08-10

## 2024-08-10 RX ORDER — DEXTROSE 50 % IN WATER (D50W) INTRAVENOUS SYRINGE
25
Status: ACTIVE | OUTPATIENT
Start: 2024-08-10

## 2024-08-10 ASSESSMENT — PAIN DESCRIPTION - LOCATION: LOCATION: LEG

## 2024-08-10 ASSESSMENT — PAIN SCALES - GENERAL
PAINLEVEL_OUTOF10: 0 - NO PAIN
PAINLEVEL_OUTOF10: 2
PAINLEVEL_OUTOF10: 0 - NO PAIN

## 2024-08-10 ASSESSMENT — PAIN DESCRIPTION - ORIENTATION: ORIENTATION: LEFT

## 2024-08-10 ASSESSMENT — PAIN - FUNCTIONAL ASSESSMENT
PAIN_FUNCTIONAL_ASSESSMENT: 0-10
PAIN_FUNCTIONAL_ASSESSMENT: 0-10
PAIN_FUNCTIONAL_ASSESSMENT: CPOT (CRITICAL CARE PAIN OBSERVATION TOOL)
PAIN_FUNCTIONAL_ASSESSMENT: 0-10

## 2024-08-10 NOTE — PROGRESS NOTES
Spiritual Care Visit    Clinical Encounter Type  Visited With: Patient and family together  Routine Visit: Introduction  Continue Visiting: Yes         Values/Beliefs  Spiritual Requests During Hospitalization: Anointing & Communion today with her son cece    Sacramental Encounters  Communion: Patient wants communion  Communion Given Indicator: Yes  Sacrament of Sick-Anointing: Anointed     Son=Jose Enamoradout

## 2024-08-10 NOTE — CARE PLAN
The patient's goals for the shift include  n/a    The clinical goals for the shift include Patient will have decreased oxygen requirements      Problem: Skin  Goal: Participates in plan/prevention/treatment measures  Outcome: Progressing  Flowsheets (Taken 8/9/2024 2156)  Participates in plan/prevention/treatment measures:   Discuss with provider PT/OT consult   Elevate heels   Increase activity/out of bed for meals  Goal: Prevent/manage excess moisture  Outcome: Progressing  Flowsheets (Taken 8/9/2024 2156)  Prevent/manage excess moisture:   Monitor for/manage infection if present   Cleanse incontinence/protect with barrier cream   Use wicking fabric (obtain order)   Follow provider orders for dressing changes   Moisturize dry skin  Goal: Prevent/minimize sheer/friction injuries  Outcome: Progressing  Flowsheets (Taken 8/9/2024 2156)  Prevent/minimize sheer/friction injuries:   Increase activity/out of bed for meals   Use pull sheet   Complete micro-shifts as needed if patient unable. Adjust patient position to relieve pressure points, not a full turn   HOB 30 degrees or less   Turn/reposition every 2 hours/use positioning/transfer devices   Utilize specialty bed per algorithm  Goal: Promote/optimize nutrition  Outcome: Progressing  Flowsheets (Taken 8/9/2024 2156)  Promote/optimize nutrition:   Monitor/record intake including meals   Assist with feeding   Consume > 50% meals/supplements   Offer water/supplements/favorite foods   Reassess MST if dietician not consulted   Discuss with provider if NPO > 2 days  Goal: Promote skin healing  Outcome: Progressing  Flowsheets (Taken 8/9/2024 2156)  Promote skin healing:   Assess skin/pad under line(s)/device(s)   Protective dressings over bony prominences   Turn/reposition every 2 hours/use positioning/transfer devices   Rotate device position/do not position patient on device   Ensure correct size (line/device) and apply per  instructions     Problem:  Pain  Goal: Turns in bed with improved pain control throughout the shift  Outcome: Progressing  Goal: Performs ADL's with improved pain control throughout shift  Outcome: Progressing     Problem: Respiratory  Goal: Clear secretions with interventions this shift  Outcome: Progressing  Goal: Minimize anxiety/maximize coping throughout shift  Outcome: Progressing  Goal: Minimal/no exertional discomfort or dyspnea this shift  Outcome: Progressing  Goal: No signs of respiratory distress (eg. Use of accessory muscles. Peds grunting)  Outcome: Progressing  Goal: Patent airway maintained this shift  Outcome: Progressing  Goal: Tolerate pulmonary toileting this shift  Outcome: Progressing  Goal: Verbalize decreased shortness of breath this shift  Outcome: Progressing  Goal: Wean oxygen to maintain O2 saturation per order/standard this shift  Outcome: Progressing  Goal: Increase self care and/or family involvement in next 24 hours  Outcome: Progressing     Problem: Pain - Adult  Goal: Verbalizes/displays adequate comfort level or baseline comfort level  Outcome: Progressing     Problem: Safety - Adult  Goal: Free from fall injury  Outcome: Progressing     Problem: Discharge Planning  Goal: Discharge to home or other facility with appropriate resources  Outcome: Progressing     Problem: Chronic Conditions and Co-morbidities  Goal: Patient's chronic conditions and co-morbidity symptoms are monitored and maintained or improved  Outcome: Progressing     Problem: Fall/Injury  Goal: Not fall by end of shift  Outcome: Progressing  Goal: Be free from injury by end of the shift  Outcome: Progressing  Goal: Verbalize understanding of personal risk factors for fall in the hospital  Outcome: Progressing  Goal: Verbalize understanding of risk factor reduction measures to prevent injury from fall in the home  Outcome: Progressing  Goal: Use assistive devices by end of the shift  Outcome: Progressing  Goal: Pace activities to prevent  fatigue by end of the shift  Outcome: Progressing

## 2024-08-10 NOTE — PROGRESS NOTES
Anitha Welch is a 84 y.o. female on day 2 of admission presenting with Gastrointestinal bleed.  Patient with h/o COPD with chronic hypoxic respiratory failure on 2L home O2, CAD  s/p stents, ischemic CMP/CHF, CVA with mild L sided weakness on Xarelto,  COPD on 2 liters oxygen, CAD s/p stent, Ischemic CMP, recent mitral valve repair, CVA with mild left sided weakness, LLE DVT on xarelto, who p/w generalized weakness associated with weakness x 2 weeks. In the ED found to have Hb 5.3 (BL 8.1), received 2 Units of PRBC and admitted to SDU for further management. However, developed increased WOB and hypoxia associated with tachypnea. Subsequently placed on BiPAP and admitted to the ICU for further management.   Subjective   No acute events since arrival, remained on Vapotherm. Hb incremented well with 1 U of PRBC, but down-trended again this am. Renal function improving.     Overall continues to better. Abdominal pain and SOB improved/resolved. No rectal bleed/melena. Denies CP, cough, sputum, N/V. No other complaints.   Objective   Scheduled medications  atorvastatin, 40 mg, oral, Nightly  budesonide, 0.5 mg, nebulization, BID  carvedilol, 3.125 mg, oral, BID  cetirizine, 10 mg, oral, Daily  [Held by provider] tiotropium, 2 puff, inhalation, Daily   And  [Held by provider] fluticasone furoate-vilanteroL, 1 puff, inhalation, Daily  ipratropium-albuteroL, 3 mL, nebulization, q4h  nystatin, 1 Application, Topical, BID  oxygen, , inhalation, Continuous - Inhalation  pantoprazole, 40 mg, intravenous, BID  [Held by provider] torsemide, 20 mg, oral, Daily    Continuous medications  nitroglycerin, 5-200 mcg/min, Last Rate: Stopped (08/09/24 7263)    PRN medications  PRN medications: acetaminophen **OR** acetaminophen, diclofenac sodium, nitroglycerin, ondansetron ODT **OR** ondansetron, polyethylene glycol, tiZANidine   Physical Exam  Constitutional:       General: She is not in acute distress.     Appearance: She is normal  "weight. She is ill-appearing. She is not toxic-appearing.   HENT:      Head: Normocephalic and atraumatic.      Nose:      Comments: On Vapotherm 35L/30%     Mouth/Throat:      Mouth: Mucous membranes are moist.   Eyes:      General: No scleral icterus.     Extraocular Movements: Extraocular movements intact.      Pupils: Pupils are equal, round, and reactive to light.   Cardiovascular:      Rate and Rhythm: Normal rate and regular rhythm.      Heart sounds: No murmur heard.     No friction rub. No gallop.   Pulmonary:      Effort: Pulmonary effort is normal. No respiratory distress.      Breath sounds: Wheezing (mild b/l expiratory) present. No rales.      Comments: Prolonged expiration with mild wheezing and poor air entry.   Abdominal:      General: There is no distension.      Palpations: Abdomen is soft.      Tenderness: There is no abdominal tenderness.   Musculoskeletal:      Cervical back: Normal range of motion and neck supple. No rigidity or tenderness.      Right lower leg: No edema.      Left lower leg: No edema.   Lymphadenopathy:      Cervical: No cervical adenopathy.   Skin:     General: Skin is warm and dry.      Coloration: Skin is pale. Skin is not jaundiced.   Neurological:      General: No focal deficit present.      Mental Status: She is alert and oriented to person, place, and time.      Cranial Nerves: No cranial nerve deficit.      Motor: No weakness.   Psychiatric:         Mood and Affect: Mood normal.         Behavior: Behavior normal.   Last Recorded Vitals  Blood pressure 117/62, pulse 87, temperature 36.2 °C (97.2 °F), temperature source Oral, resp. rate 18, height 1.651 m (5' 5\"), weight 64 kg (141 lb 1.5 oz), SpO2 97%.  Intake/Output last 3 Shifts:  I/O last 3 completed shifts:  In: 1331 (20.8 mL/kg) [P.O.:685; I.V.:10.2 (0.2 mL/kg); Blood:385.8; IV Piggyback:250]  Out: 2535 (39.6 mL/kg) [Urine:2535 (1.1 mL/kg/hr)]  Weight: 64 kg   Relevant Results  Results for orders placed or " performed during the hospital encounter of 08/08/24 (from the past 24 hour(s))   Blood Gas Lactic Acid, Venous   Result Value Ref Range    POCT Lactate, Venous 2.7 (H) 0.4 - 2.0 mmol/L   CBC   Result Value Ref Range    WBC 11.2 4.4 - 11.3 x10*3/uL    nRBC 0.0 0.0 - 0.0 /100 WBCs    RBC 2.99 (L) 4.00 - 5.20 x10*6/uL    Hemoglobin 6.9 (L) 12.0 - 16.0 g/dL    Hematocrit 24.1 (L) 36.0 - 46.0 %    MCV 81 80 - 100 fL    MCH 23.1 (L) 26.0 - 34.0 pg    MCHC 28.6 (L) 32.0 - 36.0 g/dL    RDW 16.7 (H) 11.5 - 14.5 %    Platelets 147 (L) 150 - 450 x10*3/uL   Prepare RBC: 1 Units   Result Value Ref Range    PRODUCT CODE [id number]     Unit Number [id number]     Unit ABO O     Unit RH POS     XM INTEP COMP     Dispense Status XM     Blood Expiration Date 8/22/2024 11:59:00 PM EDT     PRODUCT BLOOD TYPE 5100     UNIT VOLUME 289    Renal function panel   Result Value Ref Range    Glucose 92 65 - 99 mg/dL    Sodium 146 (H) 133 - 145 mmol/L    Potassium 3.5 3.4 - 5.1 mmol/L    Chloride 101 97 - 107 mmol/L    Bicarbonate 33 (H) 24 - 31 mmol/L    Urea Nitrogen 32 (H) 8 - 25 mg/dL    Creatinine 1.40 0.40 - 1.60 mg/dL    eGFR 37 (L) >60 mL/min/1.73m*2    Calcium 8.6 8.5 - 10.4 mg/dL    Phosphorus 3.9 2.5 - 4.5 mg/dL    Albumin 3.8 3.5 - 5.0 g/dL    Anion Gap 12 <=19 mmol/L   CBC   Result Value Ref Range    WBC 8.9 4.4 - 11.3 x10*3/uL    nRBC 0.2 (H) 0.0 - 0.0 /100 WBCs    RBC 3.38 (L) 4.00 - 5.20 x10*6/uL    Hemoglobin 8.0 (L) 12.0 - 16.0 g/dL    Hematocrit 27.3 (L) 36.0 - 46.0 %    MCV 81 80 - 100 fL    MCH 23.7 (L) 26.0 - 34.0 pg    MCHC 29.3 (L) 32.0 - 36.0 g/dL    RDW 16.1 (H) 11.5 - 14.5 %    Platelets 141 (L) 150 - 450 x10*3/uL   BLOOD GAS LACTIC ACID, VENOUS   Result Value Ref Range    POCT Lactate, Venous 1.5 0.4 - 2.0 mmol/L   CBC and Auto Differential   Result Value Ref Range    WBC 8.9 4.4 - 11.3 x10*3/uL    nRBC 0.2 (H) 0.0 - 0.0 /100 WBCs    RBC 3.38 (L) 4.00 - 5.20 x10*6/uL    Hemoglobin 8.0 (L) 12.0 - 16.0 g/dL     Hematocrit 27.3 (L) 36.0 - 46.0 %    MCV 81 80 - 100 fL    MCH 23.7 (L) 26.0 - 34.0 pg    MCHC 29.3 (L) 32.0 - 36.0 g/dL    RDW 16.1 (H) 11.5 - 14.5 %    Platelets 141 (L) 150 - 450 x10*3/uL    Neutrophils % 80.3 40.0 - 80.0 %    Immature Granulocytes %, Automated 0.7 0.0 - 0.9 %    Lymphocytes % 9.9 13.0 - 44.0 %    Monocytes % 8.8 2.0 - 10.0 %    Eosinophils % 0.1 0.0 - 6.0 %    Basophils % 0.2 0.0 - 2.0 %    Neutrophils Absolute 7.14 (H) 1.60 - 5.50 x10*3/uL    Immature Granulocytes Absolute, Automated 0.06 0.00 - 0.50 x10*3/uL    Lymphocytes Absolute 0.88 0.80 - 3.00 x10*3/uL    Monocytes Absolute 0.78 0.05 - 0.80 x10*3/uL    Eosinophils Absolute 0.01 0.00 - 0.40 x10*3/uL    Basophils Absolute 0.02 0.00 - 0.10 x10*3/uL   CBC and Auto Differential   Result Value Ref Range    WBC 8.0 4.4 - 11.3 x10*3/uL    nRBC 0.0 0.0 - 0.0 /100 WBCs    RBC 3.09 (L) 4.00 - 5.20 x10*6/uL    Hemoglobin 7.4 (L) 12.0 - 16.0 g/dL    Hematocrit 24.8 (L) 36.0 - 46.0 %    MCV 80 80 - 100 fL    MCH 23.9 (L) 26.0 - 34.0 pg    MCHC 29.8 (L) 32.0 - 36.0 g/dL    RDW 16.4 (H) 11.5 - 14.5 %    Platelets 123 (L) 150 - 450 x10*3/uL    Neutrophils % 75.2 40.0 - 80.0 %    Immature Granulocytes %, Automated 0.6 0.0 - 0.9 %    Lymphocytes % 13.6 13.0 - 44.0 %    Monocytes % 10.2 2.0 - 10.0 %    Eosinophils % 0.2 0.0 - 6.0 %    Basophils % 0.2 0.0 - 2.0 %    Neutrophils Absolute 6.03 (H) 1.60 - 5.50 x10*3/uL    Immature Granulocytes Absolute, Automated 0.05 0.00 - 0.50 x10*3/uL    Lymphocytes Absolute 1.09 0.80 - 3.00 x10*3/uL    Monocytes Absolute 0.82 (H) 0.05 - 0.80 x10*3/uL    Eosinophils Absolute 0.02 0.00 - 0.40 x10*3/uL    Basophils Absolute 0.02 0.00 - 0.10 x10*3/uL   Renal function panel   Result Value Ref Range    Glucose 98 65 - 99 mg/dL    Sodium 147 (H) 133 - 145 mmol/L    Potassium 4.0 3.4 - 5.1 mmol/L    Chloride 104 97 - 107 mmol/L    Bicarbonate 32 (H) 24 - 31 mmol/L    Urea Nitrogen 29 (H) 8 - 25 mg/dL    Creatinine 1.40 0.40 - 1.60  mg/dL    eGFR 37 (L) >60 mL/min/1.73m*2    Calcium 8.3 (L) 8.5 - 10.4 mg/dL    Phosphorus 3.2 2.5 - 4.5 mg/dL    Albumin 3.8 3.5 - 5.0 g/dL    Anion Gap 11 <=19 mmol/L   Magnesium   Result Value Ref Range    Magnesium 1.90 1.60 - 3.10 mg/dL   CBC and Auto Differential   Result Value Ref Range    WBC 7.1 4.4 - 11.3 x10*3/uL    nRBC 0.0 0.0 - 0.0 /100 WBCs    RBC 3.08 (L) 4.00 - 5.20 x10*6/uL    Hemoglobin 7.4 (L) 12.0 - 16.0 g/dL    Hematocrit 25.1 (L) 36.0 - 46.0 %    MCV 82 80 - 100 fL    MCH 24.0 (L) 26.0 - 34.0 pg    MCHC 29.5 (L) 32.0 - 36.0 g/dL    RDW 16.5 (H) 11.5 - 14.5 %    Platelets 115 (L) 150 - 450 x10*3/uL    Neutrophils % 74.3 40.0 - 80.0 %    Immature Granulocytes %, Automated 1.0 (H) 0.0 - 0.9 %    Lymphocytes % 12.4 13.0 - 44.0 %    Monocytes % 11.6 2.0 - 10.0 %    Eosinophils % 0.3 0.0 - 6.0 %    Basophils % 0.4 0.0 - 2.0 %    Neutrophils Absolute 5.26 1.60 - 5.50 x10*3/uL    Immature Granulocytes Absolute, Automated 0.07 0.00 - 0.50 x10*3/uL    Lymphocytes Absolute 0.88 0.80 - 3.00 x10*3/uL    Monocytes Absolute 0.82 (H) 0.05 - 0.80 x10*3/uL    Eosinophils Absolute 0.02 0.00 - 0.40 x10*3/uL    Basophils Absolute 0.03 0.00 - 0.10 x10*3/uL   XR chest 1 view    Result Date: 8/9/2024  Interpreted By:  Indiana Perea, STUDY: XR CHEST 1 VIEW;  8/9/2024 5:22 am   INDICATION: Signs/Symptoms:Evaluate CHF.   COMPARISON: 08/08/2024   ACCESSION NUMBER(S): [id number]   ORDERING CLINICIAN: AXEL SILVER   FINDINGS: The cardiac silhouette is enlarged. There are atherosclerotic changes of the aorta.   The lungs are hyperinflated. There is extensive right-sided infiltration. The left lung markings appear coarse.   The patient is scoliotic. There are surgical clips in the neck.   COMPARISON OF FINDING: The chest is similar.       Enlarged cardiac silhouette. Parenchymal infiltration.   MACRO: none   Signed by: Indiana Perea 8/9/2024 8:09 AM Dictation workstation:   [id number]    XR chest 1 view    Result Date:  8/8/2024  Interpreted By:  Khadijah Allen, STUDY: XR CHEST 1 VIEW;  8/8/2024 11:06 pm   INDICATION: Signs/Symptoms:SOB.   COMPARISON: 08/08/2024 at 1:44 p.m.   ACCESSION NUMBER(S): [id number]   ORDERING CLINICIAN: ABELINO MCKEON   FINDINGS: Surgical clips at the neck base.   CARDIOMEDIASTINAL SILHOUETTE: Stable cardiomegaly.   LUNGS: Increased diffuse interstitial edema. Increasing right pulmonary infiltrates. Suspected small bilateral pleural effusions. No pneumothorax.   ABDOMEN: No remarkable upper abdominal findings.   BONES: No acute osseous abnormality.       Worsening CHF with pulmonary edema.   MACRO: None   Signed by: Khadijah Allen 8/8/2024 11:32 PM Dictation workstation:   [id number]    ECG 12 lead    Result Date: 8/8/2024  Normal sinus rhythm Incomplete right bundle branch block Septal infarct (cited on or before 27-APR-2024) Abnormal ECG When compared with ECG of 27-APR-2024 01:22, Questionable change in initial forces of Septal leads    XR chest 1 view    Result Date: 8/8/2024  Interpreted By:  Jose Rodriguez, STUDY: XR CHEST 1 VIEW; 8/8/2024 1:57 pm   INDICATION: CLINICAL INFORMATION: Signs/Symptoms:shortness of breath.   COMPARISON: 04/28/2024   ACCESSION NUMBER(S): [id number]   ORDERING CLINICIAN: NESTOR CERVANTES   TECHNIQUE: Portable chest one view.   FINDINGS: The cardiac silhouette is quite prominent suggesting cardiomegaly. The aorta is tortuous. Surgical clips are identified at the base of the neck bilaterally. Hazy diffuse bilateral infiltrates are identified more marked at the right apex. Small bilateral effusions are suspected. No alveolar consolidation is noted.       Probable cardiomegaly with bilateral infiltrates and effusions suggesting CHF.   MACRO: none   Signed by: Jose Rodriguez 8/8/2024 2:20 PM Dictation workstation:   [id number]    Assessment/Plan   Principal Problem:    Gastrointestinal bleed  Active Problems:    Anemia, unspecified type  84 YOF with h/o COPD with chronic  hypoxic respiratory failure on 2L home O2, CAD  s/p stents, ischemic CMP/CHF, CVA with mild L sided weakness on Xarelto,  COPD on 2 liters oxygen, CAD s/p stent, Ischemic CMP, recent mitral valve repair, CVA with mild left sided weakness, LLE DVT on xarelto, who p/w generalized weakness associated with weakness x 2 weeks. In the ED found to have Hb 5.3 (BL 8.1), received 2 Units of PRBC and admitted to SDU for further management. However, developed increased WOB and hypoxia associated with tachypnea. Subsequently placed on BiPAP and admitted to the ICU for further management.     Neuro: no acute issues       Supportive measures       Pain control    CV: h/o MV repair, CAD, DLP, combined CHF, now with acute decompensation due to volume overload. Now improved.        Continue home Coreg and Atorvastatin       Hold Bumex, diuresis as needed       Hold home Xarelto given concern for GI bleed       Repeat echo done, showed EF 45%    Pulmonary: acute hypoxic respiratory failure due to pulmonary edema/volume overload from transfusion, now is improved with diuresis, BiPAP. Baseline COPD on 2L NC, also on trelegy       Will hold home Trelegy       Continue Duo-Neb       Continue Budesonide BID       Continue Vapotherm +/- NIPPV as needed.        Diuresis as above.    : AYSE on presentation likely pre-renal initially worse, but now is improving. Lactic acidosis resolved.        Daily RFP       Is/Os       Diuresis as needed       Treat electrolyte abnormalities as needed    GI: patient with melena and drop of H/H, consistent with GI bleed. No matilde bleeding since the arrival to the ICU.        GI is consulted, input appreciated, per their note no EGD until Monday.        Started on a diet       Continue PPI BID       Endo: no acute issues       FS Q AC + HS       SSI       Hypoglycemic protocol    Hem/Onc: acute blood loss anemia, likely due to GI bleed. Hb only partially incremented after 2 U and now is down-trending. H/o  LLE DVT on Xarelto       Hold Xarelto for now       CBC every 8 hours       Transfuse to keep Hb > 7       Monitor for signs of bleeding    ID: no acute issues        Will monitor for S&S infection.     Full Code  DVT Prophylaxis: SCD.   GI Prophylaxis: PPI BID  Bowel Regimen: miralax  prn  Diet: started on a diet  CVC: None  Kaity: None  Lopez: Yes 8/9  Restraints: None    This was a critical care visit for GI bleed and hypoxic respiratory failure. Total time 60 min.     Nivia Jewell MD

## 2024-08-10 NOTE — PROGRESS NOTES
Patient is having formed brown stools. HH stable. Will trial regular diet. No plans for urgent endoscopy at this time. Please continue PPI BID for now. Given need for anticoagulation, we can consider endoscopy once respiratory status has improved

## 2024-08-10 NOTE — CARE PLAN
Problem: Skin  Goal: Participates in plan/prevention/treatment measures  Outcome: Progressing  Flowsheets (Taken 8/10/2024 1404)  Participates in plan/prevention/treatment measures:   Increase activity/out of bed for meals   Elevate heels  Goal: Prevent/manage excess moisture  Outcome: Progressing  Flowsheets (Taken 8/10/2024 1404)  Prevent/manage excess moisture:   Cleanse incontinence/protect with barrier cream   Moisturize dry skin   Follow provider orders for dressing changes   Monitor for/manage infection if present  Goal: Prevent/minimize sheer/friction injuries  Outcome: Progressing  Flowsheets (Taken 8/10/2024 1404)  Prevent/minimize sheer/friction injuries:   Complete micro-shifts as needed if patient unable. Adjust patient position to relieve pressure points, not a full turn   HOB 30 degrees or less   Increase activity/out of bed for meals   Turn/reposition every 2 hours/use positioning/transfer devices   Use pull sheet   Utilize specialty bed per algorithm  Goal: Promote/optimize nutrition  Outcome: Progressing  Flowsheets (Taken 8/10/2024 1404)  Promote/optimize nutrition:   Offer water/supplements/favorite foods   Consume > 50% meals/supplements   Monitor/record intake including meals  Goal: Promote skin healing  Outcome: Progressing  Flowsheets (Taken 8/10/2024 1404)  Promote skin healing:   Assess skin/pad under line(s)/device(s)   Ensure correct size (line/device) and apply per  instructions   Protective dressings over bony prominences   Rotate device position/do not position patient on device   Turn/reposition every 2 hours/use positioning/transfer devices     Problem: Pain  Goal: Turns in bed with improved pain control throughout the shift  Outcome: Progressing  Goal: Performs ADL's with improved pain control throughout shift  Outcome: Progressing     Problem: Respiratory  Goal: Clear secretions with interventions this shift  Outcome: Progressing  Flowsheets (Taken 8/10/2024  1404)  Clear secretions with interventions this shift:   Encourage/provide pulmonary hygiene/secretion clearance   Med administration/monitoring of effect   Incentive spirometry  Goal: Minimize anxiety/maximize coping throughout shift  Outcome: Progressing  Flowsheets (Taken 8/10/2024 1404)  Minimize anxiety/maximize coping throughout shift: Monitor pain/anxiety level  Goal: Minimal/no exertional discomfort or dyspnea this shift  Outcome: Progressing  Flowsheets (Taken 8/10/2024 1404)  Minimal/no exertional discomfort or dyspnea this shift: Positioning to promote ventilation/comfort  Goal: No signs of respiratory distress (eg. Use of accessory muscles. Peds grunting)  Outcome: Progressing  Goal: Patent airway maintained this shift  Outcome: Progressing  Goal: Tolerate pulmonary toileting this shift  Outcome: Progressing  Flowsheets (Taken 8/10/2024 1404)  Tolerate pulmonary toileting this shift: Positioning to promote ventilation/comfort  Goal: Verbalize decreased shortness of breath this shift  Outcome: Progressing  Flowsheets (Taken 8/10/2024 1404)  Verbalize decreased shortness of breath this shift:   Encourage/provide pulmonary hygiene/secretion clearance   Incentive spirometry  Goal: Wean oxygen to maintain O2 saturation per order/standard this shift  Outcome: Progressing  Flowsheets (Taken 8/10/2024 1404)  Wean oxygen to maintain O2 saturation per order/standard this shift:   Encourage activity/mobility   Incentive spirometry  Goal: Increase self care and/or family involvement in next 24 hours  Outcome: Progressing  Flowsheets (Taken 8/10/2024 1404)  Increase self care and/or family involvement in next 24 hours: Encourage activity/mobility     Problem: Pain - Adult  Goal: Verbalizes/displays adequate comfort level or baseline comfort level  Outcome: Progressing  Flowsheets (Taken 8/10/2024 1404)  Verbalizes/displays adequate comfort level or baseline comfort level:   Assess pain using appropriate pain scale    Encourage patient to monitor pain and request assistance   Administer analgesics based on type and severity of pain and evaluate response   Implement non-pharmacological measures as appropriate and evaluate response   Consider cultural and social influences on pain and pain management   Notify Licensed Independent Practitioner if interventions unsuccessful or patient reports new pain     Problem: Safety - Adult  Goal: Free from fall injury  Outcome: Progressing  Flowsheets (Taken 8/10/2024 1404)  Free from fall injury: Instruct family/caregiver on patient safety     Problem: Discharge Planning  Goal: Discharge to home or other facility with appropriate resources  Outcome: Progressing  Flowsheets (Taken 8/10/2024 1404)  Discharge to home or other facility with appropriate resources:   Arrange for needed discharge resources and transportation as appropriate   Identify barriers to discharge with patient and caregiver   Identify discharge learning needs (meds, wound care, etc)   Arrange for interpreters to assist at discharge as needed   Refer to discharge planning if patient needs post-hospital services based on physician order or complex needs related to functional status, cognitive ability or social support system     Problem: Chronic Conditions and Co-morbidities  Goal: Patient's chronic conditions and co-morbidity symptoms are monitored and maintained or improved  Outcome: Progressing  Flowsheets (Taken 8/9/2024 1301)  Care Plan - Patient's Chronic Conditions and Co-Morbidity Symptoms are Monitored and Maintained or Improved:   Monitor and assess patient's chronic conditions and comorbid symptoms for stability, deterioration, or improvement   Collaborate with multidisciplinary team to address chronic and comorbid conditions and prevent exacerbation or deterioration   Update acute care plan with appropriate goals if chronic or comorbid symptoms are exacerbated and prevent overall improvement and discharge      Problem: Fall/Injury  Goal: Not fall by end of shift  Outcome: Progressing  Goal: Be free from injury by end of the shift  Outcome: Progressing  Goal: Verbalize understanding of personal risk factors for fall in the hospital  Outcome: Progressing  Goal: Verbalize understanding of risk factor reduction measures to prevent injury from fall in the home  Outcome: Progressing  Goal: Use assistive devices by end of the shift  Outcome: Progressing  Goal: Pace activities to prevent fatigue by end of the shift  Outcome: Progressing   The patient's goals for the shift include      The clinical goals for the shift include Patient will have decreased oxygen requirements    Over the shift, the patient did make progress toward the following goals.

## 2024-08-11 LAB
ALBUMIN SERPL-MCNC: 3.4 G/DL (ref 3.5–5)
ANION GAP SERPL CALC-SCNC: 6 MMOL/L
AORTIC VALVE PEAK VELOCITY: 1.36 M/S
AV PEAK GRADIENT: 7.4 MMHG
AVA (PEAK VEL): 2.08 CM2
BASOPHILS # BLD AUTO: 0.01 X10*3/UL (ref 0–0.1)
BASOPHILS # BLD AUTO: 0.02 X10*3/UL (ref 0–0.1)
BASOPHILS # BLD AUTO: 0.03 X10*3/UL (ref 0–0.1)
BASOPHILS NFR BLD AUTO: 0.2 %
BASOPHILS NFR BLD AUTO: 0.3 %
BASOPHILS NFR BLD AUTO: 0.4 %
BLOOD EXPIRATION DATE: NORMAL
BLOOD EXPIRATION DATE: NORMAL
BUN SERPL-MCNC: 26 MG/DL (ref 8–25)
CALCIUM SERPL-MCNC: 8.2 MG/DL (ref 8.5–10.4)
CHLORIDE SERPL-SCNC: 105 MMOL/L (ref 97–107)
CO2 SERPL-SCNC: 35 MMOL/L (ref 24–31)
CREAT SERPL-MCNC: 1.4 MG/DL (ref 0.4–1.6)
DISPENSE STATUS: NORMAL
DISPENSE STATUS: NORMAL
EGFRCR SERPLBLD CKD-EPI 2021: 37 ML/MIN/1.73M*2
EJECTION FRACTION: 63 %
EOSINOPHIL # BLD AUTO: 0.04 X10*3/UL (ref 0–0.4)
EOSINOPHIL # BLD AUTO: 0.05 X10*3/UL (ref 0–0.4)
EOSINOPHIL # BLD AUTO: 0.06 X10*3/UL (ref 0–0.4)
EOSINOPHIL NFR BLD AUTO: 0.5 %
EOSINOPHIL NFR BLD AUTO: 1 %
EOSINOPHIL NFR BLD AUTO: 1 %
ERYTHROCYTE [DISTWIDTH] IN BLOOD BY AUTOMATED COUNT: 16.6 % (ref 11.5–14.5)
ERYTHROCYTE [DISTWIDTH] IN BLOOD BY AUTOMATED COUNT: 17.2 % (ref 11.5–14.5)
ERYTHROCYTE [DISTWIDTH] IN BLOOD BY AUTOMATED COUNT: 17.6 % (ref 11.5–14.5)
GLUCOSE BLD MANUAL STRIP-MCNC: 114 MG/DL (ref 74–99)
GLUCOSE BLD MANUAL STRIP-MCNC: 115 MG/DL (ref 74–99)
GLUCOSE BLD MANUAL STRIP-MCNC: 140 MG/DL (ref 74–99)
GLUCOSE BLD MANUAL STRIP-MCNC: 96 MG/DL (ref 74–99)
GLUCOSE SERPL-MCNC: 118 MG/DL (ref 65–99)
HCT VFR BLD AUTO: 22.9 % (ref 36–46)
HCT VFR BLD AUTO: 28.4 % (ref 36–46)
HCT VFR BLD AUTO: 31.2 % (ref 36–46)
HGB BLD-MCNC: 6.7 G/DL (ref 12–16)
HGB BLD-MCNC: 8.4 G/DL (ref 12–16)
HGB BLD-MCNC: 9.1 G/DL (ref 12–16)
IMM GRANULOCYTES # BLD AUTO: 0.04 X10*3/UL (ref 0–0.5)
IMM GRANULOCYTES # BLD AUTO: 0.06 X10*3/UL (ref 0–0.5)
IMM GRANULOCYTES # BLD AUTO: 0.07 X10*3/UL (ref 0–0.5)
IMM GRANULOCYTES NFR BLD AUTO: 0.8 % (ref 0–0.9)
IMM GRANULOCYTES NFR BLD AUTO: 0.8 % (ref 0–0.9)
IMM GRANULOCYTES NFR BLD AUTO: 1.1 % (ref 0–0.9)
LEFT VENTRICLE INTERNAL DIMENSION DIASTOLE: 5.01 CM (ref 3.5–6)
LEFT VENTRICULAR OUTFLOW TRACT DIAMETER: 1.9 CM
LYMPHOCYTES # BLD AUTO: 1.04 X10*3/UL (ref 0.8–3)
LYMPHOCYTES # BLD AUTO: 1.05 X10*3/UL (ref 0.8–3)
LYMPHOCYTES # BLD AUTO: 1.06 X10*3/UL (ref 0.8–3)
LYMPHOCYTES NFR BLD AUTO: 14.4 %
LYMPHOCYTES NFR BLD AUTO: 17.3 %
LYMPHOCYTES NFR BLD AUTO: 20 %
MAGNESIUM SERPL-MCNC: 2.5 MG/DL (ref 1.6–3.1)
MCH RBC QN AUTO: 24.3 PG (ref 26–34)
MCH RBC QN AUTO: 24.4 PG (ref 26–34)
MCH RBC QN AUTO: 24.5 PG (ref 26–34)
MCHC RBC AUTO-ENTMCNC: 29.2 G/DL (ref 32–36)
MCHC RBC AUTO-ENTMCNC: 29.3 G/DL (ref 32–36)
MCHC RBC AUTO-ENTMCNC: 29.6 G/DL (ref 32–36)
MCV RBC AUTO: 83 FL (ref 80–100)
MCV RBC AUTO: 83 FL (ref 80–100)
MCV RBC AUTO: 84 FL (ref 80–100)
MITRAL VALVE E/A RATIO: 1.12
MONOCYTES # BLD AUTO: 0.53 X10*3/UL (ref 0.05–0.8)
MONOCYTES # BLD AUTO: 0.63 X10*3/UL (ref 0.05–0.8)
MONOCYTES # BLD AUTO: 0.67 X10*3/UL (ref 0.05–0.8)
MONOCYTES NFR BLD AUTO: 10.2 %
MONOCYTES NFR BLD AUTO: 10.3 %
MONOCYTES NFR BLD AUTO: 9.2 %
NEUTROPHILS # BLD AUTO: 3.52 X10*3/UL (ref 1.6–5.5)
NEUTROPHILS # BLD AUTO: 4.27 X10*3/UL (ref 1.6–5.5)
NEUTROPHILS # BLD AUTO: 5.43 X10*3/UL (ref 1.6–5.5)
NEUTROPHILS NFR BLD AUTO: 67.8 %
NEUTROPHILS NFR BLD AUTO: 70 %
NEUTROPHILS NFR BLD AUTO: 74.7 %
NRBC BLD-RTO: 0 /100 WBCS (ref 0–0)
PHOSPHATE SERPL-MCNC: 2.6 MG/DL (ref 2.5–4.5)
PLATELET # BLD AUTO: 112 X10*3/UL (ref 150–450)
PLATELET # BLD AUTO: 121 X10*3/UL (ref 150–450)
PLATELET # BLD AUTO: 129 X10*3/UL (ref 150–450)
POTASSIUM SERPL-SCNC: 3.7 MMOL/L (ref 3.4–5.1)
PRODUCT BLOOD TYPE: 5100
PRODUCT BLOOD TYPE: 5100
PRODUCT CODE: NORMAL
PRODUCT CODE: NORMAL
RBC # BLD AUTO: 2.76 X10*6/UL (ref 4–5.2)
RBC # BLD AUTO: 3.44 X10*6/UL (ref 4–5.2)
RBC # BLD AUTO: 3.72 X10*6/UL (ref 4–5.2)
RIGHT VENTRICLE PEAK SYSTOLIC PRESSURE: 59.3 MMHG
SODIUM SERPL-SCNC: 146 MMOL/L (ref 133–145)
UNIT ABO: NORMAL
UNIT ABO: NORMAL
UNIT NUMBER: NORMAL
UNIT NUMBER: NORMAL
UNIT RH: NORMAL
UNIT RH: NORMAL
UNIT VOLUME: 283
UNIT VOLUME: 289
WBC # BLD AUTO: 5.2 X10*3/UL (ref 4.4–11.3)
WBC # BLD AUTO: 6.1 X10*3/UL (ref 4.4–11.3)
WBC # BLD AUTO: 7.3 X10*3/UL (ref 4.4–11.3)
XM INTEP: NORMAL
XM INTEP: NORMAL

## 2024-08-11 PROCEDURE — 94660 CPAP INITIATION&MGMT: CPT

## 2024-08-11 PROCEDURE — 2500000001 HC RX 250 WO HCPCS SELF ADMINISTERED DRUGS (ALT 637 FOR MEDICARE OP): Performed by: HOSPITALIST

## 2024-08-11 PROCEDURE — 2500000004 HC RX 250 GENERAL PHARMACY W/ HCPCS (ALT 636 FOR OP/ED)

## 2024-08-11 PROCEDURE — 2020000001 HC ICU ROOM DAILY

## 2024-08-11 PROCEDURE — 85025 COMPLETE CBC W/AUTO DIFF WBC: CPT

## 2024-08-11 PROCEDURE — 99291 CRITICAL CARE FIRST HOUR: CPT | Performed by: INTERNAL MEDICINE

## 2024-08-11 PROCEDURE — 2500000004 HC RX 250 GENERAL PHARMACY W/ HCPCS (ALT 636 FOR OP/ED): Performed by: INTERNAL MEDICINE

## 2024-08-11 PROCEDURE — 36430 TRANSFUSION BLD/BLD COMPNT: CPT

## 2024-08-11 PROCEDURE — 94640 AIRWAY INHALATION TREATMENT: CPT

## 2024-08-11 PROCEDURE — P9016 RBC LEUKOCYTES REDUCED: HCPCS

## 2024-08-11 PROCEDURE — C9113 INJ PANTOPRAZOLE SODIUM, VIA: HCPCS | Performed by: HOSPITALIST

## 2024-08-11 PROCEDURE — 9420000001 HC RT PATIENT EDUCATION 5 MIN

## 2024-08-11 PROCEDURE — 83735 ASSAY OF MAGNESIUM: CPT

## 2024-08-11 PROCEDURE — 97161 PT EVAL LOW COMPLEX 20 MIN: CPT | Mod: GP

## 2024-08-11 PROCEDURE — 2500000004 HC RX 250 GENERAL PHARMACY W/ HCPCS (ALT 636 FOR OP/ED): Performed by: HOSPITALIST

## 2024-08-11 PROCEDURE — 2500000005 HC RX 250 GENERAL PHARMACY W/O HCPCS: Performed by: INTERNAL MEDICINE

## 2024-08-11 PROCEDURE — 97530 THERAPEUTIC ACTIVITIES: CPT | Mod: GP

## 2024-08-11 PROCEDURE — 84100 ASSAY OF PHOSPHORUS: CPT

## 2024-08-11 PROCEDURE — 2500000002 HC RX 250 W HCPCS SELF ADMINISTERED DRUGS (ALT 637 FOR MEDICARE OP, ALT 636 FOR OP/ED)

## 2024-08-11 PROCEDURE — 36415 COLL VENOUS BLD VENIPUNCTURE: CPT

## 2024-08-11 PROCEDURE — 82947 ASSAY GLUCOSE BLOOD QUANT: CPT

## 2024-08-11 PROCEDURE — 2500000002 HC RX 250 W HCPCS SELF ADMINISTERED DRUGS (ALT 637 FOR MEDICARE OP, ALT 636 FOR OP/ED): Performed by: INTERNAL MEDICINE

## 2024-08-11 RX ORDER — FUROSEMIDE 10 MG/ML
40 INJECTION INTRAMUSCULAR; INTRAVENOUS ONCE
Status: COMPLETED | OUTPATIENT
Start: 2024-08-11 | End: 2024-08-11

## 2024-08-11 RX ORDER — POTASSIUM CHLORIDE 20 MEQ/1
40 TABLET, EXTENDED RELEASE ORAL ONCE
Status: COMPLETED | OUTPATIENT
Start: 2024-08-11 | End: 2024-08-11

## 2024-08-11 RX ORDER — POTASSIUM CHLORIDE 14.9 MG/ML
20 INJECTION INTRAVENOUS
Status: DISCONTINUED | OUTPATIENT
Start: 2024-08-11 | End: 2024-08-11

## 2024-08-11 ASSESSMENT — PAIN - FUNCTIONAL ASSESSMENT
PAIN_FUNCTIONAL_ASSESSMENT: 0-10
PAIN_FUNCTIONAL_ASSESSMENT: FLACC (FACE, LEGS, ACTIVITY, CRY, CONSOLABILITY)
PAIN_FUNCTIONAL_ASSESSMENT: 0-10
PAIN_FUNCTIONAL_ASSESSMENT: 0-10
PAIN_FUNCTIONAL_ASSESSMENT: FLACC (FACE, LEGS, ACTIVITY, CRY, CONSOLABILITY)
PAIN_FUNCTIONAL_ASSESSMENT: 0-10
PAIN_FUNCTIONAL_ASSESSMENT: 0-10

## 2024-08-11 ASSESSMENT — PAIN SCALES - GENERAL
PAINLEVEL_OUTOF10: 0 - NO PAIN

## 2024-08-11 ASSESSMENT — COGNITIVE AND FUNCTIONAL STATUS - GENERAL
MOVING FROM LYING ON BACK TO SITTING ON SIDE OF FLAT BED WITH BEDRAILS: A LOT
MOVING TO AND FROM BED TO CHAIR: A LITTLE
CLIMB 3 TO 5 STEPS WITH RAILING: A LOT
TURNING FROM BACK TO SIDE WHILE IN FLAT BAD: A LOT
MOBILITY SCORE: 14
WALKING IN HOSPITAL ROOM: A LOT
STANDING UP FROM CHAIR USING ARMS: A LITTLE

## 2024-08-11 NOTE — CARE PLAN
Problem: Skin  Goal: Participates in plan/prevention/treatment measures  Outcome: Progressing  Flowsheets (Taken 8/10/2024 2124)  Participates in plan/prevention/treatment measures:   Discuss with provider PT/OT consult   Elevate heels   Increase activity/out of bed for meals  Goal: Prevent/manage excess moisture  Outcome: Progressing  Flowsheets (Taken 8/10/2024 2124)  Prevent/manage excess moisture:   Cleanse incontinence/protect with barrier cream   Monitor for/manage infection if present   Moisturize dry skin  Goal: Prevent/minimize sheer/friction injuries  Outcome: Progressing  Flowsheets (Taken 8/10/2024 2124)  Prevent/minimize sheer/friction injuries:   Increase activity/out of bed for meals   Use pull sheet   HOB 30 degrees or less   Turn/reposition every 2 hours/use positioning/transfer devices   Utilize specialty bed per algorithm  Goal: Promote/optimize nutrition  Outcome: Progressing  Flowsheets (Taken 8/10/2024 2124)  Promote/optimize nutrition: Monitor/record intake including meals  Goal: Promote skin healing  Outcome: Progressing  Flowsheets (Taken 8/10/2024 2124)  Promote skin healing:   Assess skin/pad under line(s)/device(s)   Protective dressings over bony prominences   Turn/reposition every 2 hours/use positioning/transfer devices   Rotate device position/do not position patient on device     Problem: Respiratory  Goal: Clear secretions with interventions this shift  Outcome: Progressing  Goal: Minimize anxiety/maximize coping throughout shift  Outcome: Progressing  Goal: Minimal/no exertional discomfort or dyspnea this shift  Outcome: Progressing  Goal: No signs of respiratory distress (eg. Use of accessory muscles. Peds grunting)  Outcome: Progressing  Goal: Patent airway maintained this shift  Outcome: Progressing  Goal: Tolerate pulmonary toileting this shift  Outcome: Progressing  Goal: Verbalize decreased shortness of breath this shift  Outcome: Progressing  Goal: Wean oxygen to maintain  O2 saturation per order/standard this shift  Outcome: Progressing  Goal: Increase self care and/or family involvement in next 24 hours  Outcome: Progressing     Problem: Safety - Adult  Goal: Free from fall injury  Outcome: Progressing     Problem: Fall/Injury  Goal: Not fall by end of shift  Outcome: Progressing  Goal: Be free from injury by end of the shift  Outcome: Progressing  Goal: Verbalize understanding of personal risk factors for fall in the hospital  Outcome: Progressing  Goal: Verbalize understanding of risk factor reduction measures to prevent injury from fall in the home  Outcome: Progressing  Goal: Use assistive devices by end of the shift  Outcome: Progressing  Goal: Pace activities to prevent fatigue by end of the shift  Outcome: Progressing   The patient's goals for the shift include      The clinical goals for the shift include no signs of respiratory distress    Over the shift, the patient did not make progress toward the following goals. Barriers to progression include   . Recommendations to address these barriers include   .

## 2024-08-11 NOTE — PROGRESS NOTES
"Anitha Welch is a 84 y.o. female on day 3 of admission presenting with Gastrointestinal bleed.    Subjective   No black or bloody stools since arrival        Objective     Physical Exam  Constitutional:       Appearance: Normal appearance.   HENT:      Head: Normocephalic and atraumatic.      Mouth/Throat:      Mouth: Mucous membranes are moist.   Pulmonary:      Effort: Pulmonary effort is normal.   Abdominal:      General: There is no distension.      Palpations: Abdomen is soft.      Tenderness: There is no abdominal tenderness. There is no guarding.   Musculoskeletal:         General: Normal range of motion.      Cervical back: Normal range of motion.   Skin:     General: Skin is warm and dry.   Neurological:      General: No focal deficit present.      Mental Status: She is alert. Mental status is at baseline.   Psychiatric:         Mood and Affect: Mood normal.         Last Recorded Vitals  Blood pressure 110/51, pulse 85, temperature 36.7 °C (98.1 °F), temperature source Oral, resp. rate (!) 27, height 1.651 m (5' 5\"), weight 64.7 kg (142 lb 10.2 oz), SpO2 97%.  Intake/Output last 3 Shifts:  I/O last 3 completed shifts:  In: 860.8 (13.3 mL/kg) [P.O.:225; I.V.:50 (0.8 mL/kg); Blood:385.8; IV Piggyback:200]  Out: 1635 (25.3 mL/kg) [Urine:1635 (0.7 mL/kg/hr)]  Weight: 64.7 kg     Relevant Results                This patient has a urinary catheter   Reason for the urinary catheter remaining today? critically ill patient who need accurate urinary output measurements    Results for orders placed or performed during the hospital encounter of 08/08/24 (from the past 24 hour(s))   CBC and Auto Differential   Result Value Ref Range    WBC 7.1 4.4 - 11.3 x10*3/uL    nRBC 0.0 0.0 - 0.0 /100 WBCs    RBC 3.08 (L) 4.00 - 5.20 x10*6/uL    Hemoglobin 7.4 (L) 12.0 - 16.0 g/dL    Hematocrit 25.1 (L) 36.0 - 46.0 %    MCV 82 80 - 100 fL    MCH 24.0 (L) 26.0 - 34.0 pg    MCHC 29.5 (L) 32.0 - 36.0 g/dL    RDW 16.5 (H) 11.5 - 14.5 " %    Platelets 115 (L) 150 - 450 x10*3/uL    Neutrophils % 74.3 40.0 - 80.0 %    Immature Granulocytes %, Automated 1.0 (H) 0.0 - 0.9 %    Lymphocytes % 12.4 13.0 - 44.0 %    Monocytes % 11.6 2.0 - 10.0 %    Eosinophils % 0.3 0.0 - 6.0 %    Basophils % 0.4 0.0 - 2.0 %    Neutrophils Absolute 5.26 1.60 - 5.50 x10*3/uL    Immature Granulocytes Absolute, Automated 0.07 0.00 - 0.50 x10*3/uL    Lymphocytes Absolute 0.88 0.80 - 3.00 x10*3/uL    Monocytes Absolute 0.82 (H) 0.05 - 0.80 x10*3/uL    Eosinophils Absolute 0.02 0.00 - 0.40 x10*3/uL    Basophils Absolute 0.03 0.00 - 0.10 x10*3/uL   POCT GLUCOSE   Result Value Ref Range    POCT Glucose 143 (H) 74 - 99 mg/dL   POCT GLUCOSE   Result Value Ref Range    POCT Glucose 144 (H) 74 - 99 mg/dL   CBC and Auto Differential   Result Value Ref Range    WBC 5.2 4.4 - 11.3 x10*3/uL    nRBC 0.0 0.0 - 0.0 /100 WBCs    RBC 2.76 (L) 4.00 - 5.20 x10*6/uL    Hemoglobin 6.7 (L) 12.0 - 16.0 g/dL    Hematocrit 22.9 (L) 36.0 - 46.0 %    MCV 83 80 - 100 fL    MCH 24.3 (L) 26.0 - 34.0 pg    MCHC 29.3 (L) 32.0 - 36.0 g/dL    RDW 17.2 (H) 11.5 - 14.5 %    Platelets 112 (L) 150 - 450 x10*3/uL    Neutrophils % 67.8 40.0 - 80.0 %    Immature Granulocytes %, Automated 0.8 0.0 - 0.9 %    Lymphocytes % 20.0 13.0 - 44.0 %    Monocytes % 10.2 2.0 - 10.0 %    Eosinophils % 1.0 0.0 - 6.0 %    Basophils % 0.2 0.0 - 2.0 %    Neutrophils Absolute 3.52 1.60 - 5.50 x10*3/uL    Immature Granulocytes Absolute, Automated 0.04 0.00 - 0.50 x10*3/uL    Lymphocytes Absolute 1.04 0.80 - 3.00 x10*3/uL    Monocytes Absolute 0.53 0.05 - 0.80 x10*3/uL    Eosinophils Absolute 0.05 0.00 - 0.40 x10*3/uL    Basophils Absolute 0.01 0.00 - 0.10 x10*3/uL   Renal function panel   Result Value Ref Range    Glucose 118 (H) 65 - 99 mg/dL    Sodium 146 (H) 133 - 145 mmol/L    Potassium 3.7 3.4 - 5.1 mmol/L    Chloride 105 97 - 107 mmol/L    Bicarbonate 35 (H) 24 - 31 mmol/L    Urea Nitrogen 26 (H) 8 - 25 mg/dL    Creatinine 1.40  0.40 - 1.60 mg/dL    eGFR 37 (L) >60 mL/min/1.73m*2    Calcium 8.2 (L) 8.5 - 10.4 mg/dL    Phosphorus 2.6 2.5 - 4.5 mg/dL    Albumin 3.4 (L) 3.5 - 5.0 g/dL    Anion Gap 6 <=19 mmol/L   Magnesium   Result Value Ref Range    Magnesium 2.50 1.60 - 3.10 mg/dL   Prepare RBC: 1 Units   Result Value Ref Range    PRODUCT CODE J8635U54     Unit Number [id number]     Unit ABO O     Unit RH POS     XM INTEP COMP     Dispense Status XM     Blood Expiration Date 8/22/2024 11:59:00 PM EDT     PRODUCT BLOOD TYPE 5100     UNIT VOLUME 283    POCT GLUCOSE   Result Value Ref Range    POCT Glucose 114 (H) 74 - 99 mg/dL   CBC and Auto Differential   Result Value Ref Range    WBC 6.1 4.4 - 11.3 x10*3/uL    nRBC 0.0 0.0 - 0.0 /100 WBCs    RBC 3.44 (L) 4.00 - 5.20 x10*6/uL    Hemoglobin 8.4 (L) 12.0 - 16.0 g/dL    Hematocrit 28.4 (L) 36.0 - 46.0 %    MCV 83 80 - 100 fL    MCH 24.4 (L) 26.0 - 34.0 pg    MCHC 29.6 (L) 32.0 - 36.0 g/dL    RDW 16.6 (H) 11.5 - 14.5 %    Platelets 121 (L) 150 - 450 x10*3/uL    Neutrophils % 70.0 40.0 - 80.0 %    Immature Granulocytes %, Automated 1.1 (H) 0.0 - 0.9 %    Lymphocytes % 17.3 13.0 - 44.0 %    Monocytes % 10.3 2.0 - 10.0 %    Eosinophils % 1.0 0.0 - 6.0 %    Basophils % 0.3 0.0 - 2.0 %    Neutrophils Absolute 4.27 1.60 - 5.50 x10*3/uL    Immature Granulocytes Absolute, Automated 0.07 0.00 - 0.50 x10*3/uL    Lymphocytes Absolute 1.06 0.80 - 3.00 x10*3/uL    Monocytes Absolute 0.63 0.05 - 0.80 x10*3/uL    Eosinophils Absolute 0.06 0.00 - 0.40 x10*3/uL    Basophils Absolute 0.02 0.00 - 0.10 x10*3/uL     Transthoracic Echo (TTE) Complete    Result Date: 8/11/2024           [address] [address]            Phone [phone] TRANSTHORACIC ECHOCARDIOGRAM REPORT Patient Name:     RUDOLPH Carr Physician:   [id number] Leticia Tan MD Study Date:       8/9/2024             Ordering Provider:    [id number] JULIO VARELA MRN/PID:          [id number]             Fellow: Accession#:       [id number]         Nurse: Date of           1940 / 84 years  Sonographer:         Dick Mitchell [facility] Birth/Age: Gender:           F                    Additional Staff: Height:           165.10 cm            Admit Date: Weight:           67.13 kg             Admission Status:    Inpatient - Routine BSA / BMI:        1.74 m2 / 24.63      Department Location: [facility]                   kg/m2 Blood Pressure: 142 /70 mmHg Study Type:    TRANSTHORACIC ECHO (TTE) COMPLETE Diagnosis/ICD: Shortness of breath-R06.02 Indication:    pulm edema CPT Codes:     Echo Complete w Full Doppler-[id number] Patient History: Valve Disorders:   Mitral Regurgitation. Pertinent History: AAA, CHF, CAD, Angina, TIA, PVD, COPD, Cardiomyopathy, CVA                    and HTN. MV clip, STEMI, PCI-stents, pericardial effusion. Study Detail: The following Echo studies were performed: 2D, M-Mode, Doppler and               color flow. Technically challenging study due to body habitus and               patient lying in supine position.  PHYSICIAN INTERPRETATION: Left Ventricle: The left ventricular systolic function is normal, with a visually estimated ejection fraction of 60-65%. There are no regional wall motion abnormalities. The left ventricular cavity size is normal. There is mild concentric left ventricular hypertrophy. Spectral Doppler shows a normal pattern of left ventricular diastolic filling. Left Atrium: The left atrium is normal in size. Right Ventricle: The right ventricle is normal in size. There is normal right ventricular global systolic function. Right Atrium: The right atrium is moderately dilated. Aortic Valve: The aortic valve appears structurally normal. There is no evidence of aortic valve regurgitation. The peak instantaneous gradient of the aortic valve is 7.4 mmHg. Mitral  Valve: The mitral valve is abnormal. There is mild to moderate mitral valve regurgitation which is centrally directed. There is at least 1 mitral valve clip seen at the leaflets with mild to moderate mitral valve regurgitation. There is moderate gradient across the mitral valve with mean gradient of 10 mmHg, peak gradient of 19 mmHg. Tricuspid Valve: The tricuspid valve is structurally normal. There is mild tricuspid regurgitation. The Doppler estimated RVSP is moderately elevated at 59.2 mmHg. Pulmonic Valve: The pulmonic valve is structurally normal. There is no indication of pulmonic valve regurgitation. Pericardium: There is a small pericardial effusion. Aorta: The aortic root is normal. Systemic Veins: The inferior vena cava appears to be of normal size. There is IVC inspiratory collapse greater than 50%.  CONCLUSIONS:  1. The left ventricular systolic function is normal, with a visually estimated ejection fraction of 60-65%.  2. There is normal right ventricular global systolic function.  3. The right atrium is moderately dilated.  4. There is at least 1 mitral valve clip seen at the leaflets with mild to moderate mitral valve regurgitation. There is moderate gradient across the mitral valve with mean gradient of 10 mmHg, peak gradient of 19 mmHg.  5. Mild to moderate mitral valve regurgitation.  6. Mild tricuspid regurgitation is visualized.  7. Moderately elevated right ventricular systolic pressure.  8. Moderate pulmonary hypertension seen. QUANTITATIVE DATA SUMMARY: 2D MEASUREMENTS:                           Normal Ranges: LAs:           4.10 cm    (2.7-4.0cm) IVSd:          1.25 cm    (0.6-1.1cm) LVPWd:         1.26 cm    (0.6-1.1cm) LVIDd:         5.01 cm    (3.9-5.9cm) LVIDs:         2.94 cm LV Mass Index: 143.5 g/m2 LV % FS        41.3 % LA VOLUME:                             Normal Ranges: LA Volume Index: 56.0 ml/m2 RA VOLUME BY A/L METHOD:                       Normal Ranges: RA Area A4C: 21.0 cm2  M-MODE MEASUREMENTS:                  Normal Ranges: Ao Root: 2.80 cm (2.0-3.7cm) LAs:     5.30 cm (2.7-4.0cm) AORTA MEASUREMENTS:                    Normal Ranges: Asc Ao, d: 3.00 cm (2.1-3.4cm) LV SYSTOLIC FUNCTION BY 2D PLANIMETRY (MOD):                      Normal Ranges: EF-Visual:      63 % LV EF Reported: 63 % LV DIASTOLIC FUNCTION:                     Normal Ranges: MV Peak E: 1.80 m/s (0.7-1.2 m/s) MV Peak A: 1.61 m/s (0.42-0.7 m/s) E/A Ratio: 1.12     (1.0-2.2) MITRAL VALVE:                       Normal Ranges: MV Vmax:    2.15 m/s  (<=1.3m/s) MV peak P.5 mmHg (<5mmHg) MV mean PG: 10.0 mmHg (<48mmHg) MV DT:      146 msec  (150-240msec) AORTIC VALVE:                         Normal Ranges: AoV Vmax:      1.36 m/s (<=1.7m/s) AoV Peak P.4 mmHg (<20mmHg) LVOT Max Jamal:  1.00 m/s (<=1.1m/s) LVOT VTI:      18.40 cm LVOT Diameter: 1.90 cm  (1.8-2.4cm) AoV Area,Vmax: 2.08 cm2 (2.5-4.5cm2) TRICUSPID VALVE/RVSP:                             Normal Ranges: Peak TR Velocity: 3.75 m/s RV Syst Pressure: 59.2 mmHg (< 30mmHg) PULMONIC VALVE:                      Normal Ranges: PV Max Jamal: 0.8 m/s  (0.6-0.9m/s) PV Max P.5 mmHg  [id number] Leticia Tan MD Electronically signed on 2024 at 8:43:55 AM  ** Final **     ECG 12 lead    Result Date: 8/10/2024  Normal sinus rhythm RSR' or QR pattern in V1 suggests right ventricular conduction delay Abnormal ECG When compared with ECG of 2024 01:22, Questionable change in initial forces of Septal leads Confirmed by Dat Jasmine ([id number]) on 8/10/2024 1:02:20 PM    XR chest 1 view    Result Date: 2024  Interpreted By:  Indiana Perea, STUDY: XR CHEST 1 VIEW;  2024 5:22 am   INDICATION: Signs/Symptoms:Evaluate CHF.   COMPARISON: 2024   ACCESSION NUMBER(S): [id number]   ORDERING CLINICIAN: AXEL SILVER   FINDINGS: The cardiac silhouette is enlarged. There are atherosclerotic changes of the aorta.   The lungs are hyperinflated. There is extensive  right-sided infiltration. The left lung markings appear coarse.   The patient is scoliotic. There are surgical clips in the neck.   COMPARISON OF FINDING: The chest is similar.       Enlarged cardiac silhouette. Parenchymal infiltration.   MACRO: none   Signed by: Indiana Perea 8/9/2024 8:09 AM Dictation workstation:   [id number]    XR chest 1 view    Result Date: 8/8/2024  Interpreted By:  Khadijah Allen, STUDY: XR CHEST 1 VIEW;  8/8/2024 11:06 pm   INDICATION: Signs/Symptoms:SOB.   COMPARISON: 08/08/2024 at 1:44 p.m.   ACCESSION NUMBER(S): [id number]   ORDERING CLINICIAN: ABELINO MCKEON   FINDINGS: Surgical clips at the neck base.   CARDIOMEDIASTINAL SILHOUETTE: Stable cardiomegaly.   LUNGS: Increased diffuse interstitial edema. Increasing right pulmonary infiltrates. Suspected small bilateral pleural effusions. No pneumothorax.   ABDOMEN: No remarkable upper abdominal findings.   BONES: No acute osseous abnormality.       Worsening CHF with pulmonary edema.   MACRO: None   Signed by: Khadijah Allen 8/8/2024 11:32 PM Dictation workstation:   [id number]    XR chest 1 view    Result Date: 8/8/2024  Interpreted By:  Jose Rodriguez, STUDY: XR CHEST 1 VIEW; 8/8/2024 1:57 pm   INDICATION: CLINICAL INFORMATION: Signs/Symptoms:shortness of breath.   COMPARISON: 04/28/2024   ACCESSION NUMBER(S): [id number]   ORDERING CLINICIAN: NESTOR CERVANTES   TECHNIQUE: Portable chest one view.   FINDINGS: The cardiac silhouette is quite prominent suggesting cardiomegaly. The aorta is tortuous. Surgical clips are identified at the base of the neck bilaterally. Hazy diffuse bilateral infiltrates are identified more marked at the right apex. Small bilateral effusions are suspected. No alveolar consolidation is noted.       Probable cardiomegaly with bilateral infiltrates and effusions suggesting CHF.   MACRO: none   Signed by: Jose Rodriguez 8/8/2024 2:20 PM Dictation workstation:   [id number]    Vascular US Lower Extremity Venous  Duplex Bilateral    Result Date: 7/23/2024  Interpreted By:  Orestes Robles, STUDY: [facility] US LOWER EXTREMITY VENOUS DUPLEX BILATERAL;  7/22/2024 4:27 pm   INDICATION: Signs/Symptoms:B/L leg pain.   COMPARISON: 11/23/2023   ACCESSION NUMBER(S): [id number]   ORDERING CLINICIAN: GRUPO LUCIO   TECHNIQUE: Vascular ultrasound of the bilateral lower extremities was performed. Real-time compression views as well as Gray scale, color Doppler and spectral Doppler waveform analysis was performed.   FINDINGS: Evaluation of the visualized portions of the bilateral common femoral, proximal, mid, and distal femoral, and popliteal veins was performed.  Evaluation of the visualized portions of the posterior tibial and peroneal veins was also performed.   Limitations: None   The evaluated veins demonstrate normal compressibility. There is intact venous flow demonstrating normal respiratory variability and normal augmentation of flow with calf compression.         No sonographic evidence for deep vein thrombosis within the evaluated veins of the bilateral lower extremities.   MACRO: None   Signed by: Orestes Robles 7/23/2024 6:05 AM Dictation workstation:   [id number]              Assessment/Plan   Principal Problem:    Gastrointestinal bleed  Active Problems:    Anemia, unspecified type    Melena, Anemia, CHF               -Seemingly had some GI bleed last week that stopped bleeding spontaneously. She is at risk for PUD in setting NSAIDs and anticoagulation. Given fluctuating HH and need for anticoagulation, will plan EGD tomorrow    -NPO after midnight    -Continue PPI BID        I spent 20 minutes in the professional and overall care of this patient.      Sharon Urbina, APRN-CNP

## 2024-08-11 NOTE — CARE PLAN
Problem: Mobility  Goal: Bed mobility including supine to sit and sit to supine with supervision.  Outcome: Progressing     Problem: PT Transfers  Goal: Transfers including sit to supine and stand to sit with supervision.  Outcome: Progressing

## 2024-08-11 NOTE — PROGRESS NOTES
Physical Therapy    Physical Therapy Evaluation & Treatment    Patient Name: Anitha Welch  MRN: [id number]  Today's Date: 8/11/2024   Time Calculation  Start Time: 1056  Stop Time: 1113  Time Calculation (min): 17 min    Assessment/Plan   PT Assessment  PT Assessment Results: Decreased strength, Decreased endurance, Impaired balance, Decreased mobility, Decreased safety awareness  Rehab Prognosis: Good  Evaluation/Treatment Tolerance: Patient tolerated treatment well  End of Session Communication: Bedside nurse  Assessment Comment: 84 year old female presents with decline from baseline functional mobility, impaired balance, and decreased tolerance to activity.  End of Session Patient Position: Bed, 3 rail up, Alarm on   IP OR SWING BED PT PLAN  Inpatient or Swing Bed: Inpatient  PT Plan  Treatment/Interventions: Bed mobility, Transfer training, Gait training, Stair training, Balance training, Strengthening, Endurance training, Therapeutic exercise, Therapeutic activity  PT Plan: Ongoing PT  PT Frequency: 5 times per week  PT Discharge Recommendations: Moderate intensity level of continued care  PT Recommended Transfer Status: Assist x1  PT - OK to Discharge: Yes (with skilled physical therapy services at next level of care.)      Subjective     General Visit Information:  General  Reason for Referral: Impaired mobility with GI bleed  Past Medical History Relevant to Rehab: COPD, home O2 at 2 liters, CAD stents, CHF, CVA, recent mitral valve repair, DVT left LE, AAA, UTI, anxiety, depression, arthritis, diverticulosis, HTN, hypothyroid, ischemic cardiomyopathy, OA, osteopenia, plantar fasciitis, thyroidectomy, left TKR, right THR, RLS, sciatica, spinal stenosis, MI  Family/Caregiver Present: Yes  Prior to Session Communication: Bedside nurse  Patient Position Received: Up in chair, Alarm off, caregiver present  General Comment: 84 year old female admit from home with weakness x 2 weeks;  Hgb 5.3 on admit requiring  blood transfusion.  Home Living:  Home Living  Type of Home: House  Lives With: Spouse, Adult children  Home Adaptive Equipment: Walker rolling or standard  Home Layout: Two level (patient can stay on main level of home)  Home Access: Stairs to enter with rails  Entrance Stairs-Rails:  (single handrail)  Entrance Stairs-Number of Steps: 3  Bathroom Shower/Tub: Walk-in shower  Bathroom Equipment: Grab bars in shower (shower chair (patient does not use))  Home Living Comments: Spouse assisted with information  Prior Level of Function:  Prior Function Per Pt/Caregiver Report  Ambulatory Assistance: Independent  Prior Function Comments: Spouse assisted with information  Precautions:  Precautions  Medical Precautions: Fall precautions  Vital Signs:  Vital Signs  SpO2: 96 % (with O2 via high flow nasal cannula 30 lpm at 30% FiO2)    Objective   Pain:  Pain Assessment  Pain Assessment: 0-10  0-10 (Numeric) Pain Score: 0 - No pain  Cognition:  Cognition  Overall Cognitive Status: Impaired (memory deficits;  oriented x 2)    General Assessments:                  Activity Tolerance  Endurance: Decreased tolerance for upright activites  Activity Tolerance Comments: Fatigue    Sensation  Sensation Comment: denies numbness richard LE    Strength  Strength Comments: Strength assessed via function  Coordination  Movements are Fluid and Coordinated: No  Lower Body Coordination: Slower rate of movement richard LE    Static Sitting Balance  Static Sitting-Balance Support: Bilateral upper extremity supported  Static Sitting-Level of Assistance: Close supervision  Static Sitting-Comment/Number of Minutes: Supervision for balance while sitting on side of bed    Static Standing Balance  Static Standing-Balance Support: Bilateral upper extremity supported  Static Standing-Level of Assistance: Minimum assistance  Static Standing-Comment/Number of Minutes: Assist with balance during static standing with rolling walker  Functional Assessments:  Bed  Mobility  Bed Mobility: Yes  Bed Mobility 1  Bed Mobility 1: Sitting to supine  Level of Assistance 1: Moderate assistance  Bed Mobility Comments 1: Assist with richard LE    Transfers  Transfer: Yes  Transfer 1  Transfer From 1: Sit to  Transfer to 1: Stand  Technique 1: Sit to stand  Transfer Device 1: Walker  Transfer Level of Assistance 1: Minimum assistance  Trials/Comments 1: Assist with trunk support and balance;  verbal cues for hand placement  Transfers 2  Transfer From 2: Stand to  Transfer to 2: Sit  Technique 2: Stand to sit  Transfer Device 2: Walker  Transfer Level of Assistance 2: Minimum assistance  Trials/Comments 2: Assist with trunk support and balance;  verbal cues for hand placement  Transfers 3  Transfer From 3: Chair with arms to  Transfer to 3: Bed  Technique 3: Stand pivot  Transfer Device 3: Walker  Transfer Level of Assistance 3: Minimum assistance  Trials/Comments 3: Assist with trunk support and balance    Ambulation/Gait Training  Ambulation/Gait Training Performed: No    Stairs  Stairs: No  Extremity/Trunk Assessments:  RLE   RLE : Exceptions to WFL  Strength RLE  R Hip Flexion: 3+/5  R Knee Extension: 3+/5  R Ankle Dorsiflexion: 3+/5  LLE   LLE : Exceptions to WFL  Strength LLE  L Hip Flexion: 3-/5  L Knee Extension: 3/5  L Ankle Dorsiflexion: 3/5  Treatments:                 Bed Mobility  Bed Mobility: Yes  Bed Mobility 1  Bed Mobility 1: Sitting to supine  Level of Assistance 1: Moderate assistance  Bed Mobility Comments 1: Assist with richard LE    Ambulation/Gait Training  Ambulation/Gait Training Performed: No  Transfers  Transfer: Yes  Transfer 1  Transfer From 1: Sit to  Transfer to 1: Stand  Technique 1: Sit to stand  Transfer Device 1: Walker  Transfer Level of Assistance 1: Minimum assistance  Trials/Comments 1: Assist with trunk support and balance;  verbal cues for hand placement  Transfers 2  Transfer From 2: Stand to  Transfer to 2: Sit  Technique 2: Stand to sit  Transfer  Device 2: Walker  Transfer Level of Assistance 2: Minimum assistance  Trials/Comments 2: Assist with trunk support and balance;  verbal cues for hand placement  Transfers 3  Transfer From 3: Chair with arms to  Transfer to 3: Bed  Technique 3: Stand pivot  Transfer Device 3: Walker  Transfer Level of Assistance 3: Minimum assistance  Trials/Comments 3: Assist with trunk support and balance    Stairs  Stairs: No       Outcome Measures:  [facility] Basic Mobility  Turning from your back to your side while in a flat bed without using bedrails: A lot  Moving from lying on your back to sitting on the side of a flat bed without using bedrails: A lot  Moving to and from bed to chair (including a wheelchair): A little  Standing up from a chair using your arms (e.g. wheelchair or bedside chair): A little  To walk in hospital room: A lot  Climbing 3-5 steps with railing: A lot  Basic Mobility - Total Score: 14    Encounter Problems       Encounter Problems (Active)       Mobility       Bed mobility including supine to sit and sit to supine with supervision. (Progressing)       Start:  08/11/24    Expected End:  08/25/24            Ambulate 60 feet with rolling walker and supervision.       Start:  08/11/24    Expected End:  08/25/24            Negotiate 3 steps with single handrail and mod assist.       Start:  08/11/24    Expected End:  08/25/24               PT Transfers       Transfers including sit to supine and stand to sit with supervision. (Progressing)       Start:  08/11/24    Expected End:  08/25/24                   Education Documentation  Mobility Training, taught by Jose Bajwa, PT at 8/11/2024 11:29 AM.  Learner: Patient  Readiness: Acceptance  Method: Demonstration, Explanation  Response: Needs Reinforcement    Education Comments  No comments found.

## 2024-08-11 NOTE — CARE PLAN
The patient's goals for the shift include      The clinical goals for the shift include Patient will have decreased oxygen requirements    Over the shift, the patient did make progress toward the following goals and is on home O2 regimen.   Problem: Skin  Goal: Participates in plan/prevention/treatment measures  Outcome: Progressing  Flowsheets (Taken 8/11/2024 1614)  Participates in plan/prevention/treatment measures:   Discuss with provider PT/OT consult   Increase activity/out of bed for meals   Elevate heels  Goal: Prevent/manage excess moisture  Outcome: Progressing  Flowsheets (Taken 8/11/2024 1614)  Prevent/manage excess moisture:   Cleanse incontinence/protect with barrier cream   Moisturize dry skin   Follow provider orders for dressing changes   Monitor for/manage infection if present  Goal: Prevent/minimize sheer/friction injuries  Outcome: Progressing  Flowsheets (Taken 8/11/2024 1614)  Prevent/minimize sheer/friction injuries:   Complete micro-shifts as needed if patient unable. Adjust patient position to relieve pressure points, not a full turn   HOB 30 degrees or less   Increase activity/out of bed for meals   Turn/reposition every 2 hours/use positioning/transfer devices   Use pull sheet   Utilize specialty bed per algorithm  Goal: Promote/optimize nutrition  Outcome: Progressing  Flowsheets (Taken 8/11/2024 1614)  Promote/optimize nutrition:   Offer water/supplements/favorite foods   Consume > 50% meals/supplements   Monitor/record intake including meals   Reassess MST if dietician not consulted  Goal: Promote skin healing  Outcome: Progressing  Flowsheets (Taken 8/11/2024 1614)  Promote skin healing:   Assess skin/pad under line(s)/device(s)   Ensure correct size (line/device) and apply per  instructions   Protective dressings over bony prominences   Rotate device position/do not position patient on device   Turn/reposition every 2 hours/use positioning/transfer devices     Problem:  Pain  Goal: Turns in bed with improved pain control throughout the shift  Outcome: Progressing  Goal: Performs ADL's with improved pain control throughout shift  Outcome: Progressing     Problem: Respiratory  Goal: Clear secretions with interventions this shift  Outcome: Progressing  Flowsheets (Taken 8/11/2024 1614)  Clear secretions with interventions this shift:   Encourage/provide pulmonary hygiene/secretion clearance   Incentive spirometry   Med administration/monitoring of effect  Goal: Minimize anxiety/maximize coping throughout shift  Outcome: Progressing  Flowsheets (Taken 8/11/2024 1614)  Minimize anxiety/maximize coping throughout shift:   Med administration/monitoring of effect   Monitor pain/anxiety level  Goal: Minimal/no exertional discomfort or dyspnea this shift  Outcome: Progressing  Flowsheets (Taken 8/11/2024 1614)  Minimal/no exertional discomfort or dyspnea this shift: Positioning to promote ventilation/comfort  Goal: No signs of respiratory distress (eg. Use of accessory muscles. Peds grunting)  Outcome: Progressing  Goal: Patent airway maintained this shift  Outcome: Progressing  Goal: Tolerate pulmonary toileting this shift  Outcome: Progressing  Flowsheets (Taken 8/11/2024 1614)  Tolerate pulmonary toileting this shift: Positioning to promote ventilation/comfort  Goal: Verbalize decreased shortness of breath this shift  Outcome: Progressing  Flowsheets (Taken 8/11/2024 1614)  Verbalize decreased shortness of breath this shift:   Encourage/provide pulmonary hygiene/secretion clearance   Incentive spirometry  Goal: Wean oxygen to maintain O2 saturation per order/standard this shift  Outcome: Progressing  Flowsheets (Taken 8/11/2024 1614)  Wean oxygen to maintain O2 saturation per order/standard this shift:   Encourage activity/mobility   Incentive spirometry  Goal: Increase self care and/or family involvement in next 24 hours  Outcome: Progressing  Flowsheets (Taken 8/11/2024 1614)  Increase  self care and/or family involvement in next 24 hours: Encourage activity/mobility     Problem: Safety - Adult  Goal: Free from fall injury  Outcome: Progressing  Flowsheets (Taken 8/11/2024 1614)  Free from fall injury: Instruct family/caregiver on patient safety     Problem: Discharge Planning  Goal: Discharge to home or other facility with appropriate resources  Outcome: Progressing  Flowsheets (Taken 8/11/2024 1614)  Discharge to home or other facility with appropriate resources:   Identify barriers to discharge with patient and caregiver   Arrange for needed discharge resources and transportation as appropriate   Identify discharge learning needs (meds, wound care, etc)   Arrange for interpreters to assist at discharge as needed   Refer to discharge planning if patient needs post-hospital services based on physician order or complex needs related to functional status, cognitive ability or social support system     Problem: Chronic Conditions and Co-morbidities  Goal: Patient's chronic conditions and co-morbidity symptoms are monitored and maintained or improved  Outcome: Progressing  Flowsheets (Taken 8/9/2024 1301)  Care Plan - Patient's Chronic Conditions and Co-Morbidity Symptoms are Monitored and Maintained or Improved:   Monitor and assess patient's chronic conditions and comorbid symptoms for stability, deterioration, or improvement   Collaborate with multidisciplinary team to address chronic and comorbid conditions and prevent exacerbation or deterioration   Update acute care plan with appropriate goals if chronic or comorbid symptoms are exacerbated and prevent overall improvement and discharge     Problem: Fall/Injury  Goal: Not fall by end of shift  Outcome: Progressing  Goal: Be free from injury by end of the shift  Outcome: Progressing  Goal: Verbalize understanding of personal risk factors for fall in the hospital  Outcome: Progressing  Goal: Verbalize understanding of risk factor reduction measures  to prevent injury from fall in the home  Outcome: Progressing  Goal: Use assistive devices by end of the shift  Outcome: Progressing  Goal: Pace activities to prevent fatigue by end of the shift  Outcome: Progressing

## 2024-08-11 NOTE — PROGRESS NOTES
Anitha Welch is a 84 y.o. female on day 3 of admission presenting with Gastrointestinal bleed.  Patient with h/o COPD with chronic hypoxic respiratory failure on 2L home O2, CAD  s/p stents, ischemic CMP/CHF, CVA with mild L sided weakness on Xarelto,  COPD on 2 liters oxygen, CAD s/p stent, Ischemic CMP, recent mitral valve repair, CVA with mild left sided weakness, LLE DVT on xarelto, who p/w generalized weakness associated with weakness x 2 weeks. In the ED found to have Hb 5.3 (BL 8.1), received 2 Units of PRBC and admitted to SDU for further management. However, developed increased WOB and hypoxia associated with tachypnea. Subsequently placed on BiPAP and admitted to the ICU for further management.   Subjective   No acute overnight events. However drop of Hb to 6.7 this am.     Overall continues to better. Abdominal pain and SOB improved/resolved. No rectal bleed/melena. Denies CP, cough, sputum, N/V. No other complaints.   Objective   Scheduled medications  atorvastatin, 40 mg, oral, Nightly  budesonide, 0.5 mg, nebulization, BID  carvedilol, 3.125 mg, oral, BID  cetirizine, 10 mg, oral, Daily  insulin lispro, 0-5 Units, subcutaneous, TID  ipratropium-albuteroL, 3 mL, nebulization, q4h  nystatin, 1 Application, Topical, BID  oxygen, , inhalation, Continuous - Inhalation  pantoprazole, 40 mg, intravenous, BID  potassium chloride, 20 mEq, intravenous, q2h  [Held by provider] torsemide, 20 mg, oral, Daily    Continuous medications  nitroglycerin, 5-200 mcg/min, Last Rate: Stopped (08/09/24 0004)    PRN medications  PRN medications: acetaminophen **OR** acetaminophen, dextrose, dextrose, diclofenac sodium, glucagon, glucagon, nitroglycerin, ondansetron ODT **OR** ondansetron, polyethylene glycol, tiZANidine   Physical Exam  Constitutional:       General: She is not in acute distress.     Appearance: She is normal weight. She is ill-appearing. She is not toxic-appearing.   HENT:      Head: Normocephalic and  "atraumatic.      Nose:      Comments: On 2L NC.      Mouth/Throat:      Mouth: Mucous membranes are moist.   Eyes:      General: No scleral icterus.     Extraocular Movements: Extraocular movements intact.      Pupils: Pupils are equal, round, and reactive to light.   Cardiovascular:      Rate and Rhythm: Normal rate and regular rhythm.      Heart sounds: No murmur heard.     No friction rub. No gallop.   Pulmonary:      Effort: Pulmonary effort is normal. No respiratory distress.      Breath sounds: No wheezing or rales.      Comments: Poor air entry.   Abdominal:      General: There is no distension.      Palpations: Abdomen is soft.      Tenderness: There is no abdominal tenderness.   Musculoskeletal:      Cervical back: Normal range of motion and neck supple. No rigidity or tenderness.      Right lower leg: No edema.      Left lower leg: No edema.   Lymphadenopathy:      Cervical: No cervical adenopathy.   Skin:     General: Skin is warm and dry.      Coloration: Skin is pale. Skin is not jaundiced.   Neurological:      General: No focal deficit present.      Mental Status: She is alert and oriented to person, place, and time.      Cranial Nerves: No cranial nerve deficit.      Motor: No weakness.   Psychiatric:         Mood and Affect: Mood normal.         Behavior: Behavior normal.     Last Recorded Vitals  Blood pressure 121/59, pulse 88, temperature 36.5 °C (97.7 °F), temperature source Temporal, resp. rate (!) 29, height 1.651 m (5' 5\"), weight 64.7 kg (142 lb 10.2 oz), SpO2 96%.  Intake/Output last 3 Shifts:  I/O last 3 completed shifts:  In: 860.8 (13.3 mL/kg) [P.O.:225; I.V.:50 (0.8 mL/kg); Blood:385.8; IV Piggyback:200]  Out: 1635 (25.3 mL/kg) [Urine:1635 (0.7 mL/kg/hr)]  Weight: 64.7 kg   Relevant Results  Results for orders placed or performed during the hospital encounter of 08/08/24 (from the past 24 hour(s))   CBC and Auto Differential   Result Value Ref Range    WBC 7.1 4.4 - 11.3 x10*3/uL    nRBC " 0.0 0.0 - 0.0 /100 WBCs    RBC 3.08 (L) 4.00 - 5.20 x10*6/uL    Hemoglobin 7.4 (L) 12.0 - 16.0 g/dL    Hematocrit 25.1 (L) 36.0 - 46.0 %    MCV 82 80 - 100 fL    MCH 24.0 (L) 26.0 - 34.0 pg    MCHC 29.5 (L) 32.0 - 36.0 g/dL    RDW 16.5 (H) 11.5 - 14.5 %    Platelets 115 (L) 150 - 450 x10*3/uL    Neutrophils % 74.3 40.0 - 80.0 %    Immature Granulocytes %, Automated 1.0 (H) 0.0 - 0.9 %    Lymphocytes % 12.4 13.0 - 44.0 %    Monocytes % 11.6 2.0 - 10.0 %    Eosinophils % 0.3 0.0 - 6.0 %    Basophils % 0.4 0.0 - 2.0 %    Neutrophils Absolute 5.26 1.60 - 5.50 x10*3/uL    Immature Granulocytes Absolute, Automated 0.07 0.00 - 0.50 x10*3/uL    Lymphocytes Absolute 0.88 0.80 - 3.00 x10*3/uL    Monocytes Absolute 0.82 (H) 0.05 - 0.80 x10*3/uL    Eosinophils Absolute 0.02 0.00 - 0.40 x10*3/uL    Basophils Absolute 0.03 0.00 - 0.10 x10*3/uL   POCT GLUCOSE   Result Value Ref Range    POCT Glucose 143 (H) 74 - 99 mg/dL   POCT GLUCOSE   Result Value Ref Range    POCT Glucose 144 (H) 74 - 99 mg/dL   CBC and Auto Differential   Result Value Ref Range    WBC 5.2 4.4 - 11.3 x10*3/uL    nRBC 0.0 0.0 - 0.0 /100 WBCs    RBC 2.76 (L) 4.00 - 5.20 x10*6/uL    Hemoglobin 6.7 (L) 12.0 - 16.0 g/dL    Hematocrit 22.9 (L) 36.0 - 46.0 %    MCV 83 80 - 100 fL    MCH 24.3 (L) 26.0 - 34.0 pg    MCHC 29.3 (L) 32.0 - 36.0 g/dL    RDW 17.2 (H) 11.5 - 14.5 %    Platelets 112 (L) 150 - 450 x10*3/uL    Neutrophils % 67.8 40.0 - 80.0 %    Immature Granulocytes %, Automated 0.8 0.0 - 0.9 %    Lymphocytes % 20.0 13.0 - 44.0 %    Monocytes % 10.2 2.0 - 10.0 %    Eosinophils % 1.0 0.0 - 6.0 %    Basophils % 0.2 0.0 - 2.0 %    Neutrophils Absolute 3.52 1.60 - 5.50 x10*3/uL    Immature Granulocytes Absolute, Automated 0.04 0.00 - 0.50 x10*3/uL    Lymphocytes Absolute 1.04 0.80 - 3.00 x10*3/uL    Monocytes Absolute 0.53 0.05 - 0.80 x10*3/uL    Eosinophils Absolute 0.05 0.00 - 0.40 x10*3/uL    Basophils Absolute 0.01 0.00 - 0.10 x10*3/uL   Renal function panel    Result Value Ref Range    Glucose 118 (H) 65 - 99 mg/dL    Sodium 146 (H) 133 - 145 mmol/L    Potassium 3.7 3.4 - 5.1 mmol/L    Chloride 105 97 - 107 mmol/L    Bicarbonate 35 (H) 24 - 31 mmol/L    Urea Nitrogen 26 (H) 8 - 25 mg/dL    Creatinine 1.40 0.40 - 1.60 mg/dL    eGFR 37 (L) >60 mL/min/1.73m*2    Calcium 8.2 (L) 8.5 - 10.4 mg/dL    Phosphorus 2.6 2.5 - 4.5 mg/dL    Albumin 3.4 (L) 3.5 - 5.0 g/dL    Anion Gap 6 <=19 mmol/L   Magnesium   Result Value Ref Range    Magnesium 2.50 1.60 - 3.10 mg/dL   Prepare RBC: 1 Units   Result Value Ref Range    PRODUCT CODE A4831H74     Unit Number [id number]     Unit ABO O     Unit RH POS     XM INTEP COMP     Dispense Status XM     Blood Expiration Date 8/22/2024 11:59:00 PM EDT     PRODUCT BLOOD TYPE 5100     UNIT VOLUME 283    No results found.   Assessment/Plan   Principal Problem:    Gastrointestinal bleed  Active Problems:    Anemia, unspecified type  84 YOF with h/o COPD with chronic hypoxic respiratory failure on 2L home O2, CAD  s/p stents, ischemic CMP/CHF, CVA with mild L sided weakness on Xarelto,  COPD on 2 liters oxygen, CAD s/p stent, Ischemic CMP, recent mitral valve repair, CVA with mild left sided weakness, LLE DVT on xarelto, who p/w generalized weakness associated with weakness x 2 weeks. In the ED found to have Hb 5.3 (BL 8.1), received 2 Units of PRBC and admitted to SDU for further management. However, developed increased WOB and hypoxia associated with tachypnea. Subsequently placed on BiPAP and admitted to the ICU for further management.     Neuro: no acute issues       Supportive measures       Pain control    CV: h/o MV repair, CAD, DLP, combined CHF, now with acute decompensation due to volume overload. Now markedly improved.        Continue home Coreg and Atorvastatin       Hold Bumex, diuresis as needed       Hold home Xarelto given concern for GI bleed       Repeat echo done, showed EF 45%    Pulmonary: acute hypoxic respiratory failure  due to pulmonary edema/volume overload from transfusion, now is improved with diuresis and BiPAP and back to baseline. Chronic COPD on 2L NC, also on trelegy       Will hold home Trelegy       Continue Duo-Neb       Continue Budesonide BID       Continue supplemental O2 +/- NIPPV as needed.        Diuresis as above.    : AYSE on presentation likely pre-renal initially worse, but now is improving. Lactic acidosis resolved.        Daily RFP       Is/Os       Diuresis as needed       Treat electrolyte abnormalities as needed    GI: patient with melena and drop of H/H, consistent with GI bleed. No matilde bleeding since the arrival to the ICU.        GI is consulted, input appreciated, per their note EGD on Monday.        NPO after MN       Started on a diet       Continue PPI BID       Endo: no acute issues       FS Q AC + HS       SSI       Hypoglycemic protocol    Hem/Onc: acute blood loss anemia, likely due to GI bleed. Hb only partially incremented after 2 U and now is down-trending. H/o LLE DVT on Xarelto       Hold Xarelto for now       CBC every 8 hours       Transfuse to keep Hb > 7       Monitor for signs of bleeding    ID: no acute issues        Will monitor for S&S infection.     Full Code  DVT Prophylaxis: SCD.   GI Prophylaxis: PPI BID  Bowel Regimen: miralax  prn  Diet: cardiac diet  CVC: None  Kaity: None  Lopez: Yes 8/9  Restraints: None    This was a critical care visit for GI bleed and hypoxic respiratory failure. Total time 50 min.     Nivia Jewell MD

## 2024-08-12 ENCOUNTER — ANESTHESIA (OUTPATIENT)
Dept: GASTROENTEROLOGY | Facility: HOSPITAL | Age: 84
End: 2024-08-12
Payer: MEDICARE

## 2024-08-12 ENCOUNTER — ANESTHESIA EVENT (OUTPATIENT)
Dept: GASTROENTEROLOGY | Facility: HOSPITAL | Age: 84
End: 2024-08-12
Payer: MEDICARE

## 2024-08-12 ENCOUNTER — APPOINTMENT (OUTPATIENT)
Dept: GASTROENTEROLOGY | Facility: HOSPITAL | Age: 84
End: 2024-08-12
Payer: MEDICARE

## 2024-08-12 VITALS
BODY MASS INDEX: 23.76 KG/M2 | HEART RATE: 89 BPM | TEMPERATURE: 97.5 F | SYSTOLIC BLOOD PRESSURE: 136 MMHG | RESPIRATION RATE: 24 BRPM | DIASTOLIC BLOOD PRESSURE: 64 MMHG | OXYGEN SATURATION: 93 % | WEIGHT: 142.64 LBS | HEIGHT: 65 IN

## 2024-08-12 LAB
ACANTHOCYTES BLD QL SMEAR: NORMAL
ALBUMIN SERPL-MCNC: 3.7 G/DL (ref 3.5–5)
ANION GAP SERPL CALC-SCNC: 10 MMOL/L
BASOPHILS # BLD AUTO: 0.02 X10*3/UL (ref 0–0.1)
BASOPHILS NFR BLD AUTO: 0.3 %
BLOOD EXPIRATION DATE: NORMAL
BUN SERPL-MCNC: 20 MG/DL (ref 8–25)
BURR CELLS BLD QL SMEAR: NORMAL
CALCIUM SERPL-MCNC: 8.5 MG/DL (ref 8.5–10.4)
CHLORIDE SERPL-SCNC: 103 MMOL/L (ref 97–107)
CO2 SERPL-SCNC: 31 MMOL/L (ref 24–31)
CREAT SERPL-MCNC: 1.1 MG/DL (ref 0.4–1.6)
DISPENSE STATUS: NORMAL
EGFRCR SERPLBLD CKD-EPI 2021: 49 ML/MIN/1.73M*2
EOSINOPHIL # BLD AUTO: 0.03 X10*3/UL (ref 0–0.4)
EOSINOPHIL NFR BLD AUTO: 0.4 %
ERYTHROCYTE [DISTWIDTH] IN BLOOD BY AUTOMATED COUNT: 17.7 % (ref 11.5–14.5)
GLUCOSE BLD MANUAL STRIP-MCNC: 106 MG/DL (ref 74–99)
GLUCOSE BLD MANUAL STRIP-MCNC: 122 MG/DL (ref 74–99)
GLUCOSE BLD MANUAL STRIP-MCNC: 125 MG/DL (ref 74–99)
GLUCOSE BLD MANUAL STRIP-MCNC: 180 MG/DL (ref 74–99)
GLUCOSE SERPL-MCNC: 97 MG/DL (ref 65–99)
HCT VFR BLD AUTO: 31.1 % (ref 36–46)
HGB BLD-MCNC: 9.2 G/DL (ref 12–16)
IMM GRANULOCYTES # BLD AUTO: 0.07 X10*3/UL (ref 0–0.5)
IMM GRANULOCYTES NFR BLD AUTO: 0.9 % (ref 0–0.9)
LYMPHOCYTES # BLD AUTO: 0.96 X10*3/UL (ref 0.8–3)
LYMPHOCYTES NFR BLD AUTO: 12.7 %
MAGNESIUM SERPL-MCNC: 2.4 MG/DL (ref 1.6–3.1)
MCH RBC QN AUTO: 24.9 PG (ref 26–34)
MCHC RBC AUTO-ENTMCNC: 29.6 G/DL (ref 32–36)
MCV RBC AUTO: 84 FL (ref 80–100)
MONOCYTES # BLD AUTO: 0.67 X10*3/UL (ref 0.05–0.8)
MONOCYTES NFR BLD AUTO: 8.9 %
NEUTROPHILS # BLD AUTO: 5.78 X10*3/UL (ref 1.6–5.5)
NEUTROPHILS NFR BLD AUTO: 76.8 %
NRBC BLD-RTO: 0 /100 WBCS (ref 0–0)
OVALOCYTES BLD QL SMEAR: NORMAL
PHOSPHATE SERPL-MCNC: 2.8 MG/DL (ref 2.5–4.5)
PLATELET # BLD AUTO: 108 X10*3/UL (ref 150–450)
POLYCHROMASIA BLD QL SMEAR: NORMAL
POTASSIUM SERPL-SCNC: 4 MMOL/L (ref 3.4–5.1)
PRODUCT BLOOD TYPE: 5100
PRODUCT CODE: NORMAL
RBC # BLD AUTO: 3.7 X10*6/UL (ref 4–5.2)
RBC MORPH BLD: NORMAL
SODIUM SERPL-SCNC: 144 MMOL/L (ref 133–145)
UNIT ABO: NORMAL
UNIT NUMBER: NORMAL
UNIT RH: NORMAL
UNIT VOLUME: 283
WBC # BLD AUTO: 7.5 X10*3/UL (ref 4.4–11.3)
XM INTEP: NORMAL

## 2024-08-12 PROCEDURE — 3700000002 HC GENERAL ANESTHESIA TIME - EACH INCREMENTAL 1 MINUTE

## 2024-08-12 PROCEDURE — 2500000004 HC RX 250 GENERAL PHARMACY W/ HCPCS (ALT 636 FOR OP/ED)

## 2024-08-12 PROCEDURE — 94640 AIRWAY INHALATION TREATMENT: CPT

## 2024-08-12 PROCEDURE — 2060000001 HC INTERMEDIATE ICU ROOM DAILY

## 2024-08-12 PROCEDURE — 82947 ASSAY GLUCOSE BLOOD QUANT: CPT

## 2024-08-12 PROCEDURE — 2500000002 HC RX 250 W HCPCS SELF ADMINISTERED DRUGS (ALT 637 FOR MEDICARE OP, ALT 636 FOR OP/ED)

## 2024-08-12 PROCEDURE — 9420000001 HC RT PATIENT EDUCATION 5 MIN

## 2024-08-12 PROCEDURE — 99291 CRITICAL CARE FIRST HOUR: CPT

## 2024-08-12 PROCEDURE — A43239 PR EDG TRANSORAL BIOPSY SINGLE/MULTIPLE

## 2024-08-12 PROCEDURE — A43239 PR EDG TRANSORAL BIOPSY SINGLE/MULTIPLE: Performed by: ANESTHESIOLOGY

## 2024-08-12 PROCEDURE — 43235 EGD DIAGNOSTIC BRUSH WASH: CPT | Performed by: INTERNAL MEDICINE

## 2024-08-12 PROCEDURE — 2500000004 HC RX 250 GENERAL PHARMACY W/ HCPCS (ALT 636 FOR OP/ED): Performed by: HOSPITALIST

## 2024-08-12 PROCEDURE — 2500000002 HC RX 250 W HCPCS SELF ADMINISTERED DRUGS (ALT 637 FOR MEDICARE OP, ALT 636 FOR OP/ED): Performed by: INTERNAL MEDICINE

## 2024-08-12 PROCEDURE — 0DJ08ZZ INSPECTION OF UPPER INTESTINAL TRACT, VIA NATURAL OR ARTIFICIAL OPENING ENDOSCOPIC: ICD-10-PCS | Performed by: INTERNAL MEDICINE

## 2024-08-12 PROCEDURE — 2500000001 HC RX 250 WO HCPCS SELF ADMINISTERED DRUGS (ALT 637 FOR MEDICARE OP): Performed by: INTERNAL MEDICINE

## 2024-08-12 PROCEDURE — 82565 ASSAY OF CREATININE: CPT

## 2024-08-12 PROCEDURE — 2500000005 HC RX 250 GENERAL PHARMACY W/O HCPCS: Performed by: INTERNAL MEDICINE

## 2024-08-12 PROCEDURE — 36415 COLL VENOUS BLD VENIPUNCTURE: CPT

## 2024-08-12 PROCEDURE — 83735 ASSAY OF MAGNESIUM: CPT

## 2024-08-12 PROCEDURE — 3700000001 HC GENERAL ANESTHESIA TIME - INITIAL BASE CHARGE

## 2024-08-12 PROCEDURE — 99100 ANES PT EXTEME AGE<1 YR&>70: CPT | Performed by: ANESTHESIOLOGY

## 2024-08-12 PROCEDURE — 2500000001 HC RX 250 WO HCPCS SELF ADMINISTERED DRUGS (ALT 637 FOR MEDICARE OP)

## 2024-08-12 PROCEDURE — 2500000005 HC RX 250 GENERAL PHARMACY W/O HCPCS

## 2024-08-12 PROCEDURE — C9113 INJ PANTOPRAZOLE SODIUM, VIA: HCPCS | Performed by: HOSPITALIST

## 2024-08-12 PROCEDURE — 85025 COMPLETE CBC W/AUTO DIFF WBC: CPT

## 2024-08-12 PROCEDURE — 2500000001 HC RX 250 WO HCPCS SELF ADMINISTERED DRUGS (ALT 637 FOR MEDICARE OP): Performed by: HOSPITALIST

## 2024-08-12 PROCEDURE — 94660 CPAP INITIATION&MGMT: CPT

## 2024-08-12 RX ORDER — ALBUTEROL SULFATE 0.83 MG/ML
2.5 SOLUTION RESPIRATORY (INHALATION) EVERY 2 HOUR PRN
Status: DISCONTINUED | OUTPATIENT
Start: 2024-08-12 | End: 2024-08-15 | Stop reason: HOSPADM

## 2024-08-12 RX ORDER — IPRATROPIUM BROMIDE AND ALBUTEROL SULFATE 2.5; .5 MG/3ML; MG/3ML
3 SOLUTION RESPIRATORY (INHALATION)
Status: DISCONTINUED | OUTPATIENT
Start: 2024-08-13 | End: 2024-08-15 | Stop reason: HOSPADM

## 2024-08-12 RX ORDER — PROPOFOL 10 MG/ML
INJECTION, EMULSION INTRAVENOUS AS NEEDED
Status: DISCONTINUED | OUTPATIENT
Start: 2024-08-12 | End: 2024-08-12

## 2024-08-12 SDOH — HEALTH STABILITY: MENTAL HEALTH: CURRENT SMOKER: 0

## 2024-08-12 ASSESSMENT — PAIN - FUNCTIONAL ASSESSMENT
PAIN_FUNCTIONAL_ASSESSMENT: 0-10

## 2024-08-12 ASSESSMENT — COGNITIVE AND FUNCTIONAL STATUS - GENERAL
PERSONAL GROOMING: A LITTLE
MOBILITY SCORE: 21
STANDING UP FROM CHAIR USING ARMS: A LITTLE
DRESSING REGULAR LOWER BODY CLOTHING: A LITTLE
DAILY ACTIVITIY SCORE: 20
CLIMB 3 TO 5 STEPS WITH RAILING: A LITTLE
TOILETING: A LITTLE
WALKING IN HOSPITAL ROOM: A LITTLE
HELP NEEDED FOR BATHING: A LITTLE

## 2024-08-12 ASSESSMENT — PAIN SCALES - GENERAL
PAINLEVEL_OUTOF10: 0 - NO PAIN
PAINLEVEL_OUTOF10: 0 - NO PAIN
PAIN_LEVEL: 0
PAINLEVEL_OUTOF10: 0 - NO PAIN

## 2024-08-12 NOTE — CARE PLAN
The patient's goals for the shift include      The clinical goals for the shift include No signs of respiratory distress and EGD.      Problem: Skin  Goal: Participates in plan/prevention/treatment measures  Outcome: Progressing  Flowsheets (Taken 8/12/2024 1019)  Participates in plan/prevention/treatment measures:   Discuss with provider PT/OT consult   Increase activity/out of bed for meals  Goal: Prevent/manage excess moisture  Outcome: Progressing  Flowsheets (Taken 8/12/2024 1019)  Prevent/manage excess moisture:   Cleanse incontinence/protect with barrier cream   Monitor for/manage infection if present   Follow provider orders for dressing changes  Goal: Prevent/minimize sheer/friction injuries  Outcome: Progressing  Flowsheets (Taken 8/12/2024 1019)  Prevent/minimize sheer/friction injuries:   Increase activity/out of bed for meals   Use pull sheet   Turn/reposition every 2 hours/use positioning/transfer devices   HOB 30 degrees or less  Goal: Promote/optimize nutrition  Outcome: Progressing  Flowsheets (Taken 8/12/2024 1019)  Promote/optimize nutrition:   Assist with feeding   Monitor/record intake including meals   Consume > 50% meals/supplements   Offer water/supplements/favorite foods  Goal: Promote skin healing  Outcome: Progressing  Flowsheets (Taken 8/12/2024 1019)  Promote skin healing: Turn/reposition every 2 hours/use positioning/transfer devices     Problem: Pain  Goal: Turns in bed with improved pain control throughout the shift  Outcome: Progressing  Goal: Performs ADL's with improved pain control throughout shift  Outcome: Progressing     Problem: Respiratory  Goal: Clear secretions with interventions this shift  Outcome: Progressing  Goal: Minimize anxiety/maximize coping throughout shift  Outcome: Progressing  Goal: Minimal/no exertional discomfort or dyspnea this shift  Outcome: Progressing  Goal: No signs of respiratory distress (eg. Use of accessory muscles. Peds grunting)  Outcome:  Progressing  Goal: Patent airway maintained this shift  Outcome: Progressing  Goal: Tolerate pulmonary toileting this shift  Outcome: Progressing  Goal: Verbalize decreased shortness of breath this shift  Outcome: Progressing  Goal: Wean oxygen to maintain O2 saturation per order/standard this shift  Outcome: Progressing  Goal: Increase self care and/or family involvement in next 24 hours  Outcome: Progressing     Problem: Safety - Adult  Goal: Free from fall injury  Outcome: Progressing     Problem: Discharge Planning  Goal: Discharge to home or other facility with appropriate resources  Outcome: Progressing     Problem: Chronic Conditions and Co-morbidities  Goal: Patient's chronic conditions and co-morbidity symptoms are monitored and maintained or improved  Outcome: Progressing     Problem: Fall/Injury  Goal: Not fall by end of shift  Outcome: Progressing  Goal: Be free from injury by end of the shift  Outcome: Progressing  Goal: Verbalize understanding of personal risk factors for fall in the hospital  Outcome: Progressing  Goal: Verbalize understanding of risk factor reduction measures to prevent injury from fall in the home  Outcome: Progressing  Goal: Use assistive devices by end of the shift  Outcome: Progressing  Goal: Pace activities to prevent fatigue by end of the shift  Outcome: Progressing

## 2024-08-12 NOTE — PROGRESS NOTES
[facility] Critical Care Medicine       Date:  8/12/2024  Patient:  Antiha Welch  YOB: 1940  MRN:  [id number]   Admit Date:  8/8/2024      Chief Complaint   Patient presents with    Shortness of Breath         History of Present Illness:  Anitha Welch is a 84 y.o. year old female patient with Past Medical History of  COPD with chronic hypoxic respiratory failure on 2L home O2, CAD s/p stents, ischemic CMP/CHF, mitral valve repair (4/25/24), CVA with mild left sided weakness, LLE DVT on xarelto, who p/w generalized weakness associated with weakness x 2 weeks. In the ED found to have Hb 5.3 (BL 8.1), received 2 Units of PRBC and admitted to SDU for further management. However, developed increased WOB and hypoxia associated with tachypnea. Subsequently placed on BiPAP and admitted to the ICU for further management.        Interval ICU Events:  8/9: Patient admitted to SDU but became tachypnic and hypoxic requiring high jose j 40L/60%. Concern for flash pulmonary edema vs taco.    Medical History:  Past Medical History:   Diagnosis Date    AAA (abdominal aortic aneurysm) (CMS-[facility])     Acute urinary tract infection 04/20/2023    Anemia     Anxiety     Arthritis     CHF (congestive heart failure) (Multi)     Chronic pain disorder     back pain    CKD (chronic kidney disease)     Cognitive decline     COPD (chronic obstructive pulmonary disease) (Multi)     oxygen dependent- wears 2L NC continuously    Coronary artery disease     s/p stent    CVA (cerebral vascular accident) (Multi)     weaker on the left side    Deep vein thrombosis (DVT) of calf (Multi)     left    Depression     Disease of thyroid gland     Diverticulosis     DVT (deep venous thrombosis) (Multi)     left leg, on xarelto    Easy bruising     Epistaxis     GERD (gastroesophageal reflux disease)     managed with protonix    History of blood transfusion 2023    History of degenerative disc disease     Hyperlipidemia     Hypertension      Hypothyroidism     Ischemic cardiomyopathy     Meralgia paresthetica     Nonrheumatic mitral valve regurgitation     Osteoarthritis     Osteopenia 2010    hip - DEXA    Plantar fasciitis     RLS (restless legs syndrome)     Sciatica     Spinal stenosis     ST elevation (STEMI) myocardial infarction (Multi)      Past Surgical History:   Procedure Laterality Date    ADENOIDECTOMY      ANOMALOUS PULMONARY VENOUS RETURN REPAIR, 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 Cardiac Cath Lab;  Service: Cardiovascular;  Laterality: N/A;  no pre auth needed    CATARACT EXTRACTION Bilateral 11/13/2012    CHOLECYSTECTOMY  1996    laparoscopic    COLONOSCOPY      Dr. Rodriguez    CORONARY STENT PLACEMENT      CYST REMOVAL Right 06/24/2013    Excision of Sebaceous cyst right axilla    DILATION AND CURETTAGE OF UTERUS      ESOPHAGOGASTRODUODENOSCOPY      KNEE ARTHROPLASTY Left     MR HEAD ANGIO WO IV CONTRAST  02/24/2017    MR HEAD ANGIO WO IV CONTRAST [facility]    MR HEAD ANGIO WO IV CONTRAST  08/11/2017    MR HEAD ANGIO WO IV CONTRAST [facility]    MR HEAD ANGIO WO IV CONTRAST  02/10/2019    MR HEAD ANGIO WO IV CONTRAST [facility]    OTHER SURGICAL HISTORY  01/09/2019    Stent synergy    OTHER SURGICAL HISTORY  01/09/2019    Stent synergy    AL ARTHRP ACETBLR/PROX FEM PROSTC AGRFT/ALGRFT      SURGICAL SAMMY MONITORING      THYROIDECTOMY, PARTIAL Bilateral 04/17/2013    subtotal thyroidectomy    TONSILLECTOMY      TOTAL HIP ARTHROPLASTY Right 2006    UPPER GASTROINTESTINAL ENDOSCOPY  03/2019    Dr. Angelia Ruiz  Prior to Admission   Medication Sig Dispense Refill Last Dose    atorvastatin (Lipitor) 40 mg tablet TAKE 1 TABLET BY MOUTH EVERY DAY AT BEDTIME 90 tablet 3 8/7/2024    carvedilol (Coreg) 3.125 mg tablet Take 1 tablet (3.125 mg) by mouth 2 times a day. 180 tablet 0 8/8/2024    loratadine (Claritin) 10 mg tablet Take 1 tablet (10 mg) by mouth once daily.   8/8/2024    multivitamin-iron-folic acid (Multi Complete with Iron)  mg-mcg tablet tablet Take 1 tablet by mouth once daily.   8/8/2024    oxygen (O2) gas therapy Inhale 2 L/min continuously. Indications: sob   8/8/2024    pantoprazole (ProtoNix) 40 mg EC tablet TAKE 1 TABLET BY MOUTH TWICE A  tablet 4 8/8/2024    rivaroxaban (Xarelto) 20 mg tablet Take 1 tablet (20 mg) by mouth once daily in the evening. Take with meals. Take with food. 30 tablet 11 8/8/2024    torsemide (Demadex) 20 mg tablet Take 1 tablet (20 mg) by mouth once daily. Do not fill before May 2, 2024.   8/8/2024    albuterol 90 mcg/actuation inhaler Inhale 1 puff every 4 hours if needed.       diclofenac sodium (Voltaren) 1 % gel Apply 4.5 inches (4 g) topically 4 times a day. 100 g 1 Unknown    nitroglycerin (Nitrostat) 0.4 mg SL tablet Place 1 tablet (0.4 mg) under the tongue every 5 minutes if needed.   Unknown    nystatin (Mycostatin) 100,000 unit/gram powder Apply 1 Application topically 2 times a day. 30 g 0 Unknown    tiZANidine (Zanaflex) 2 mg tablet Take 1 tablet (2 mg) by mouth every 8 hours if needed for muscle spasms. 30 tablet 0 Unknown    Trelegy Ellipta 100-62.5-25 mcg blister with device Inhale 1 puff once daily. 1 each 3      Amoxicillin, Iodinated contrast media, Ciprofloxacin, Influenza virus vaccines, Flu vac 2023 [id number](pf), Diphenhydramine, and Other  Social History     Tobacco Use    Smoking status: Former     Current packs/day: 0.00     Types: Cigarettes     Quit date: 2014     Years since quitting: 10.6     Passive exposure: Never    Smokeless tobacco:  Never   Vaping Use    Vaping status: Never Used   Substance Use Topics    Alcohol use: Not Currently     Comment: glass wine at holiday    Drug use: Never     Family History   Problem Relation Name Age of Onset    Hyperthyroidism Mother          Treated with radioactive iodine    Hypertension Mother      Heart disease Mother      Hyperthyroidism Sibling      Diabetes Father's Sister         Hospital Medications:    nitroglycerin, 5-200 mcg/min, Last Rate: Stopped (08/09/24 0433)          Current Facility-Administered Medications:     acetaminophen (Tylenol) oral liquid 650 mg, 650 mg, oral, q4h PRN **OR** acetaminophen (Tylenol) tablet 650 mg, 650 mg, oral, q4h PRN, Sagar Glass PA-C, 650 mg at 08/10/24 2115    atorvastatin (Lipitor) tablet 40 mg, 40 mg, oral, Nightly, Shannan Whitley DO, 40 mg at 08/11/24 2043    budesonide (Pulmicort) 0.5 mg/2 mL nebulizer solution 0.5 mg, 0.5 mg, nebulization, BID, Nivia Jewell MD, 0.5 mg at 08/12/24 0715    carvedilol (Coreg) tablet 3.125 mg, 3.125 mg, oral, BID, Shannan Whitley DO, 3.125 mg at 08/11/24 1707    cetirizine (ZyrTEC) tablet 10 mg, 10 mg, oral, Daily, Shannan Whitley DO, 10 mg at 08/11/24 0806    dextrose 50 % injection 12.5 g, 12.5 g, intravenous, q15 min PRN, Nivia Jewell MD    dextrose 50 % injection 25 g, 25 g, intravenous, q15 min PRN, Nivia Jewell MD    diclofenac sodium (Voltaren) 1 % gel 4 g, 4 g, Topical, TID PRN, Shannan Whitley DO    glucagon (Glucagen) injection 1 mg, 1 mg, intramuscular, q15 min PRN, Nivia Jewell MD    glucagon (Glucagen) injection 1 mg, 1 mg, intramuscular, q15 min PRN, Nivia Jewell MD    insulin lispro (HumaLOG) injection 0-5 Units, 0-5 Units, subcutaneous, TID, Nivia Jewell MD    ipratropium-albuteroL (Duo-Neb) 0.5-2.5 mg/3 mL nebulizer solution 3 mL, 3 mL, nebulization, q4h, Dian Jules PA-C, 3 mL at 08/12/24 0716    nitroglycerin (Nitrostat) SL tablet 0.4 mg, 0.4 mg,  sublingual, q5 min PRN, Shannan Whitley DO    nitroglycerin (Tridil) 50 mg in dextrose 5% 250 mL (0.2 mg/mL) infusion (premix), 5-200 mcg/min, intravenous, Continuous, Sagar Glass PA-C, Stopped at 08/09/24 0433    nystatin (Mycostatin) 100,000 unit/gram powder 1 Application, 1 Application, Topical, BID, Shannan Whitley DO, 1 Application at 08/11/24 2043    ondansetron ODT (Zofran-ODT) disintegrating tablet 4 mg, 4 mg, oral, q8h PRN **OR** ondansetron (Zofran) injection 4 mg, 4 mg, intravenous, q8h PRN, Dian Jules PA-C    oxygen (O2) therapy, , inhalation, Continuous - Inhalation, Nivia Jewell MD, 2 L/min at 08/12/24 0716    pantoprazole (ProtoNix) injection 40 mg, 40 mg, intravenous, BID, Shannan Whitely DO, 40 mg at 08/11/24 2043    polyethylene glycol (Glycolax, Miralax) packet 17 g, 17 g, oral, Daily PRN, Dian Jules PA-C    tiZANidine (Zanaflex) tablet 2 mg, 2 mg, oral, q8h PRN, Shannan Whitley DO, 2 mg at 08/10/24 2210    [Held by provider] torsemide (Demadex) tablet 20 mg, 20 mg, oral, Daily, Shannan Whitley DO    Review of Systems:  14 point review of systems was completed and negative except for those specially mention in my HPI    Physical Exam:    Heart Rate:  [77-97]   Temp:  [36.3 °C (97.3 °F)-36.5 °C (97.7 °F)]   Resp:  [17-29]   BP: ()/(46-98)   Weight:  [64.9 kg (143 lb 1.3 oz)]   SpO2:  [90 %-100 %]     Physical Exam  HENT:      Mouth/Throat:      Mouth: Mucous membranes are moist.      Pharynx: Oropharynx is clear.   Eyes:      Pupils: Pupils are equal, round, and reactive to light.   Cardiovascular:      Rate and Rhythm: Normal rate and regular rhythm.      Pulses: Normal pulses.   Pulmonary:      Effort: Pulmonary effort is normal.      Comments: On home 2L of O2  Abdominal:      General: Abdomen is flat.      Palpations: Abdomen is soft.   Musculoskeletal:         General: Normal range of motion.   Skin:     General: Skin is warm and dry.       Capillary Refill: Capillary refill takes less than 2 seconds.   Neurological:      General: No focal deficit present.      Mental Status: She is alert and oriented to person, place, and time. Mental status is at baseline.   Psychiatric:         Mood and Affect: Mood normal.         Objective:    I have reviewed all medications, laboratory results, and imaging pertinent for today's encounter.    FiO2 (%):  [28 %-32 %] 28 %      Intake/Output Summary (Last 24 hours) at 8/12/2024 0816  Last data filed at 8/12/2024 0400  Gross per 24 hour   Intake 840 ml   Output 2275 ml   Net -1435 ml         Assessment/Plan:    I am currently managing this critically ill patient for the following problems:    Plan:  Neuro/Psych/Pain Ctrl/Sedation:  - CAM ICU score q-shift  - Sleep/wake cycle hygiene  - Delirium precaution   - Pain Management: Tylenol, home zanaflex  - Q4 Neuro assessments    Respiratory/ENT:  Acute on chronic hypoxic respiratory failure due to pulmonary edema/volume overload from transfusion, resolved, BiPAP.   Pmhx COPD on 2L NC @ home  - Will hold home Trelegy  - Continue Duo-Neb  - Continue Budesonide BID  - Bipap at night  - Continuous Pulse oximetry  - Wean Fio2 to maintain Spo2>92%  - Bronchial hygiene and IS    Cardiovascular:  Pmhx MV repair, CAD, DLP, combined CHF, now with acute decompensation due to volume overload. Now improved.   - Continuous Telemetry  - Systolic goals >90 and < 160  - Continue home Coreg and Atorvastatin  - Hold Bumex, diuresis as needed  - Hold home Xarelto given concern for GI bleed, will disucss with GI post EGD about restarting  - Repeat echo done, showed EF 45%, mild to moderate mitral valve regurgitation    Renal/Volume Status (Intra & Extravascular):  AYSE on presentation likely pre-renal initially worse, resolved. Bl creatinine 1-1.1  Lactic acidosis resolved.   - Maintain urine output 0.5-1.0cc/kg/hr  - Monitor I/O's  - Replete electrolytes to maintain K >4.0 and Mg >2.0  -  Daily BMP, Mg   - Fluid Net:  -1L/24hours    GI:  Gi bleed/melena  - Diet:NPO  - PPI: BID Protonix  - GI consulted, plan for EGD today    Infectious Disease:  - No s/s of infectious process  - Daily CBC    Heme/Onc:  - DVT Prophylaxis: SCDs  - Transfuse for hemoglobin < 7  - Holding home xeralto in setting of possible GI bleed  - Received total of 3 Units PRBC  - Hemoglobin stable today in 9's    Endocrine:  - POCT and SSI     OBGYN/Ortho:  - PT/OT when stable    Ethics/Code Status:  Full Code    :  DVT Prophylaxis: SCDs  GI Prophylaxis: PPI BID  Bowel Regimen: Miralax PRN  Diet: NPO pending EGD  CVC: None  [place]: None  Lopez: Will remove today  Restraints: None  Dispo: Possible transfer post EGD    Critical Care Time:  45 minutes spent in preparing to see patient (I.e. review of medical records), evaluation of diagnostics (I.e. labs, imaging, etc.), documentation, discussing plan of care with patient/ family/ caregiver, and/ or coordination of care with multidisciplinary team. Time does not include completion of procedure time.       Ganesh Lopez, APRN-CNP

## 2024-08-12 NOTE — ANESTHESIA PREPROCEDURE EVALUATION
Patient: Anitha Welch    Procedure Information       Date/Time: 08/12/24 1500    Scheduled providers: Rai Xie MD; Cara Orr MD    Procedure: EGD    Location: [facility]            Relevant Problems   Cardiac   (+) Abdominal aortic aneurysm (AAA) without rupture (CMS-HCC)   (+) Arteriosclerosis of coronary artery   (+) Hypertension   (+) Mixed hyperlipidemia   (+) Nonrheumatic mitral valve regurgitation   (+) Peripheral vascular disease (CMS-HCC)   (+) Severe mitral valve regurgitation      Pulmonary   (+) Acute exacerbation of chronic obstructive airways disease (Multi)   (+) Chronic obstructive pulmonary disease (Multi)      Neuro   (+) Bilateral carotid artery stenosis   (+) Cerebrovascular accident (CVA) involving left cerebral hemisphere (Multi)   (+) Seizure (Multi)      GI   (+) Gastroesophageal reflux disease   (+) Gastrointestinal bleed      Endocrine   (+) Hypothyroidism (acquired)      Hematology   (+) Anemia, unspecified type   (+) Venous thromboembolism (VTE)      Circulatory   (+) Chronic diastolic heart failure (Multi)   (+) History of coronary artery stent placement   (+) S/P mitral valve clip implantation      Genitourinary   (+) Stage 3 chronic kidney disease (Multi)     Past Surgical History:   Procedure Laterality Date    ADENOIDECTOMY      ANOMALOUS PULMONARY VENOUS RETURN REPAIR, 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    Procedure: Mitral-ARMANI (Transcatheter Edge to Edge Repair);  Surgeon: Kyle Bernardo MD;  Location: [address] Cardiac Cath Lab;  Service: Cardiovascular;  Laterality: N/A;  SAMMY Elgendy.Labs with cPM,Maynard,Schedule at 7;30am    CARDIAC CATHETERIZATION  10/2019    CARDIAC CATHETERIZATION N/A 02/16/2024    Procedure: Left And Right Heart Cath, With LV;  Surgeon: Tuan Foss DO;  Location: [facility] Cardiac Cath Lab;  Service: Cardiovascular;  Laterality: N/A;  no pre auth needed    CATARACT EXTRACTION Bilateral 11/13/2012    CHOLECYSTECTOMY  1996     laparoscopic    COLONOSCOPY      Dr. Rodriguez    CORONARY STENT PLACEMENT      CYST REMOVAL Right 06/24/2013    Excision of Sebaceous cyst right axilla    DILATION AND CURETTAGE OF UTERUS      ESOPHAGOGASTRODUODENOSCOPY      KNEE ARTHROPLASTY Left     MR HEAD ANGIO WO IV CONTRAST  02/24/2017    MR HEAD ANGIO WO IV CONTRAST [facility]    MR HEAD ANGIO WO IV CONTRAST  08/11/2017    MR HEAD ANGIO WO IV CONTRAST [facility]    MR HEAD ANGIO WO IV CONTRAST  02/10/2019    MR HEAD ANGIO WO IV CONTRAST [facility]    OTHER SURGICAL HISTORY  01/09/2019    Stent synergy    OTHER SURGICAL HISTORY  01/09/2019    Stent synergy    ID ARTHRP ACETBLR/PROX FEM PROSTC AGRFT/ALGRFT      SURGICAL SAMMY MONITORING      THYROIDECTOMY, PARTIAL Bilateral 04/17/2013    subtotal thyroidectomy    TONSILLECTOMY      TOTAL HIP ARTHROPLASTY Right 2006    UPPER GASTROINTESTINAL ENDOSCOPY  03/2019    Dr. Galan       Clinical information reviewed:   Tobacco  Allergies  Meds   Med Hx  Surg Hx   Fam Hx  Soc Hx        NPO Detail:  No data recorded     Physical Exam    Airway  Mallampati: III  TM distance: >3 FB  Neck ROM: limited     Cardiovascular    Dental    Pulmonary    Abdominal          Anesthesia Plan    History of general anesthesia?: yes  History of complications of general anesthesia?: no    ASA 4     MAC     The patient is not a current smoker.  Education provided regarding risk of obstructive sleep apnea.  intravenous induction   Anesthetic plan and risks discussed with patient.  Use of blood products discussed with patient who consented to blood products.    Plan discussed with CRNA.

## 2024-08-12 NOTE — PROGRESS NOTES
Spiritual Care Visit    Clinical Encounter Type  Visited With: Patient  Routine Visit: Follow-up  Continue Visiting: Yes         Values/Beliefs  Spiritual Requests During Hospitalization: Dell today     Donnell Quintanilla

## 2024-08-12 NOTE — TELEPHONE ENCOUNTER
Patient transferred to ICU 8/9/24 for respiratory failure r/t pulmonary edema/ volume overload from transfusion. Received a total of 3 Units PRBC since admission. Per today's GI progress note: Respiratory status improved. H&H stable. Plan for EGD today.

## 2024-08-12 NOTE — PROGRESS NOTES
"Anitha Welch is a 84 y.o. female on day 4 of admission presenting with Gastrointestinal bleed.    Subjective   No black or bloody stools since arrival. Resp status improved. Updated on plan for endoscopy today         Objective     Physical Exam  Constitutional:       Appearance: Normal appearance.   HENT:      Head: Normocephalic and atraumatic.      Mouth/Throat:      Mouth: Mucous membranes are moist.   Pulmonary:      Effort: Pulmonary effort is normal.   Abdominal:      General: There is no distension.      Palpations: Abdomen is soft.      Tenderness: There is no abdominal tenderness. There is no guarding.   Musculoskeletal:         General: Normal range of motion.      Cervical back: Normal range of motion.   Skin:     General: Skin is warm and dry.   Neurological:      General: No focal deficit present.      Mental Status: She is alert. Mental status is at baseline.   Psychiatric:         Mood and Affect: Mood normal.         Last Recorded Vitals  Blood pressure 124/60, pulse 79, temperature 36.4 °C (97.5 °F), temperature source Oral, resp. rate 24, height 1.651 m (5' 5\"), weight 64.9 kg (143 lb 1.3 oz), SpO2 97%.  Intake/Output last 3 Shifts:  I/O last 3 completed shifts:  In: 1229 (18.9 mL/kg) [P.O.:840; Blood:289; IV Piggyback:100]  Out: 2725 (42 mL/kg) [Urine:2725 (1.2 mL/kg/hr)]  Weight: 64.9 kg     Relevant Results                This patient has a urinary catheter   Reason for the urinary catheter remaining today? critically ill patient who need accurate urinary output measurements    Results for orders placed or performed during the hospital encounter of 08/08/24 (from the past 24 hour(s))   POCT GLUCOSE   Result Value Ref Range    POCT Glucose 140 (H) 74 - 99 mg/dL   POCT GLUCOSE   Result Value Ref Range    POCT Glucose 96 74 - 99 mg/dL   POCT GLUCOSE   Result Value Ref Range    POCT Glucose 115 (H) 74 - 99 mg/dL   CBC and Auto Differential   Result Value Ref Range    WBC 7.3 4.4 - 11.3 x10*3/uL "    nRBC 0.0 0.0 - 0.0 /100 WBCs    RBC 3.72 (L) 4.00 - 5.20 x10*6/uL    Hemoglobin 9.1 (L) 12.0 - 16.0 g/dL    Hematocrit 31.2 (L) 36.0 - 46.0 %    MCV 84 80 - 100 fL    MCH 24.5 (L) 26.0 - 34.0 pg    MCHC 29.2 (L) 32.0 - 36.0 g/dL    RDW 17.6 (H) 11.5 - 14.5 %    Platelets 129 (L) 150 - 450 x10*3/uL    Neutrophils % 74.7 40.0 - 80.0 %    Immature Granulocytes %, Automated 0.8 0.0 - 0.9 %    Lymphocytes % 14.4 13.0 - 44.0 %    Monocytes % 9.2 2.0 - 10.0 %    Eosinophils % 0.5 0.0 - 6.0 %    Basophils % 0.4 0.0 - 2.0 %    Neutrophils Absolute 5.43 1.60 - 5.50 x10*3/uL    Immature Granulocytes Absolute, Automated 0.06 0.00 - 0.50 x10*3/uL    Lymphocytes Absolute 1.05 0.80 - 3.00 x10*3/uL    Monocytes Absolute 0.67 0.05 - 0.80 x10*3/uL    Eosinophils Absolute 0.04 0.00 - 0.40 x10*3/uL    Basophils Absolute 0.03 0.00 - 0.10 x10*3/uL   CBC and Auto Differential   Result Value Ref Range    WBC 7.5 4.4 - 11.3 x10*3/uL    nRBC 0.0 0.0 - 0.0 /100 WBCs    RBC 3.70 (L) 4.00 - 5.20 x10*6/uL    Hemoglobin 9.2 (L) 12.0 - 16.0 g/dL    Hematocrit 31.1 (L) 36.0 - 46.0 %    MCV 84 80 - 100 fL    MCH 24.9 (L) 26.0 - 34.0 pg    MCHC 29.6 (L) 32.0 - 36.0 g/dL    RDW 17.7 (H) 11.5 - 14.5 %    Platelets 108 (L) 150 - 450 x10*3/uL    Neutrophils % 76.8 40.0 - 80.0 %    Immature Granulocytes %, Automated 0.9 0.0 - 0.9 %    Lymphocytes % 12.7 13.0 - 44.0 %    Monocytes % 8.9 2.0 - 10.0 %    Eosinophils % 0.4 0.0 - 6.0 %    Basophils % 0.3 0.0 - 2.0 %    Neutrophils Absolute 5.78 (H) 1.60 - 5.50 x10*3/uL    Immature Granulocytes Absolute, Automated 0.07 0.00 - 0.50 x10*3/uL    Lymphocytes Absolute 0.96 0.80 - 3.00 x10*3/uL    Monocytes Absolute 0.67 0.05 - 0.80 x10*3/uL    Eosinophils Absolute 0.03 0.00 - 0.40 x10*3/uL    Basophils Absolute 0.02 0.00 - 0.10 x10*3/uL   Renal function panel   Result Value Ref Range    Glucose 97 65 - 99 mg/dL    Sodium 144 133 - 145 mmol/L    Potassium 4.0 3.4 - 5.1 mmol/L    Chloride 103 97 - 107 mmol/L     Bicarbonate 31 24 - 31 mmol/L    Urea Nitrogen 20 8 - 25 mg/dL    Creatinine 1.10 0.40 - 1.60 mg/dL    eGFR 49 (L) >60 mL/min/1.73m*2    Calcium 8.5 8.5 - 10.4 mg/dL    Phosphorus 2.8 2.5 - 4.5 mg/dL    Albumin 3.7 3.5 - 5.0 g/dL    Anion Gap 10 <=19 mmol/L   Magnesium   Result Value Ref Range    Magnesium 2.40 1.60 - 3.10 mg/dL   Morphology   Result Value Ref Range    RBC Morphology See Below     Polychromasia Mild     Ovalocytes Few     Madonna Cells Few     Acanthocytes Few    POCT GLUCOSE   Result Value Ref Range    POCT Glucose 106 (H) 74 - 99 mg/dL     Transthoracic Echo (TTE) Complete    Result Date: 8/11/2024           [place] [address]            Phone [phone] TRANSTHORACIC ECHOCARDIOGRAM REPORT Patient Name:     RUDOLPH BELLE BRE       Reading Physician:   [id number] Leticia Tan MD Study Date:       8/9/2024             Ordering Provider:   [id number] JULIO VARELA MRN/PID:          [id number]             Fellow: Accession#:       [id number]         Nurse: Date of           1940 / 84 years  Sonographer:         Dick Mitchell RDCS Birth/Age: Gender:           F                    Additional Staff: Height:           165.10 cm            Admit Date: Weight:           67.13 kg             Admission Status:    Inpatient - Routine BSA / BMI:        1.74 m2 / 24.63      Department Location: [facility]                   kg/m2 Blood Pressure: 142 /70 mmHg Study Type:    TRANSTHORACIC ECHO (TTE) COMPLETE Diagnosis/ICD: Shortness of breath-R06.02 Indication:    pulm edema CPT Codes:     Echo Complete w Full Doppler-[id number] Patient History: Valve Disorders:   Mitral Regurgitation. Pertinent History: AAA, CHF, CAD, Angina, TIA, PVD, COPD, Cardiomyopathy, CVA                    and HTN. MV clip, STEMI, PCI-stents, pericardial effusion. Study Detail: The  following Echo studies were performed: 2D, M-Mode, Doppler and               color flow. Technically challenging study due to body habitus and               patient lying in supine position.  PHYSICIAN INTERPRETATION: Left Ventricle: The left ventricular systolic function is normal, with a visually estimated ejection fraction of 60-65%. There are no regional wall motion abnormalities. The left ventricular cavity size is normal. There is mild concentric left ventricular hypertrophy. Spectral Doppler shows a normal pattern of left ventricular diastolic filling. Left Atrium: The left atrium is normal in size. Right Ventricle: The right ventricle is normal in size. There is normal right ventricular global systolic function. Right Atrium: The right atrium is moderately dilated. Aortic Valve: The aortic valve appears structurally normal. There is no evidence of aortic valve regurgitation. The peak instantaneous gradient of the aortic valve is 7.4 mmHg. Mitral Valve: The mitral valve is abnormal. There is mild to moderate mitral valve regurgitation which is centrally directed. There is at least 1 mitral valve clip seen at the leaflets with mild to moderate mitral valve regurgitation. There is moderate gradient across the mitral valve with mean gradient of 10 mmHg, peak gradient of 19 mmHg. Tricuspid Valve: The tricuspid valve is structurally normal. There is mild tricuspid regurgitation. The Doppler estimated RVSP is moderately elevated at 59.2 mmHg. Pulmonic Valve: The pulmonic valve is structurally normal. There is no indication of pulmonic valve regurgitation. Pericardium: There is a small pericardial effusion. Aorta: The aortic root is normal. Systemic Veins: The inferior vena cava appears to be of normal size. There is IVC inspiratory collapse greater than 50%.  CONCLUSIONS:  1. The left ventricular systolic function is normal, with a visually estimated ejection fraction of 60-65%.  2. There is normal right  ventricular global systolic function.  3. The right atrium is moderately dilated.  4. There is at least 1 mitral valve clip seen at the leaflets with mild to moderate mitral valve regurgitation. There is moderate gradient across the mitral valve with mean gradient of 10 mmHg, peak gradient of 19 mmHg.  5. Mild to moderate mitral valve regurgitation.  6. Mild tricuspid regurgitation is visualized.  7. Moderately elevated right ventricular systolic pressure.  8. Moderate pulmonary hypertension seen. QUANTITATIVE DATA SUMMARY: 2D MEASUREMENTS:                           Normal Ranges: LAs:           4.10 cm    (2.7-4.0cm) IVSd:          1.25 cm    (0.6-1.1cm) LVPWd:         1.26 cm    (0.6-1.1cm) LVIDd:         5.01 cm    (3.9-5.9cm) LVIDs:         2.94 cm LV Mass Index: 143.5 g/m2 LV % FS        41.3 % LA VOLUME:                             Normal Ranges: LA Volume Index: 56.0 ml/m2 RA VOLUME BY A/L METHOD:                       Normal Ranges: RA Area A4C: 21.0 cm2 M-MODE MEASUREMENTS:                  Normal Ranges: Ao Root: 2.80 cm (2.0-3.7cm) LAs:     5.30 cm (2.7-4.0cm) AORTA MEASUREMENTS:                    Normal Ranges: Asc Ao, d: 3.00 cm (2.1-3.4cm) LV SYSTOLIC FUNCTION BY 2D PLANIMETRY (MOD):                      Normal Ranges: EF-Visual:      63 % LV EF Reported: 63 % LV DIASTOLIC FUNCTION:                     Normal Ranges: MV Peak E: 1.80 m/s (0.7-1.2 m/s) MV Peak A: 1.61 m/s (0.42-0.7 m/s) E/A Ratio: 1.12     (1.0-2.2) MITRAL VALVE:                       Normal Ranges: MV Vmax:    2.15 m/s  (<=1.3m/s) MV peak P.5 mmHg (<5mmHg) MV mean PG: 10.0 mmHg (<48mmHg) MV DT:      146 msec  (150-240msec) AORTIC VALVE:                         Normal Ranges: AoV Vmax:      1.36 m/s (<=1.7m/s) AoV Peak P.4 mmHg (<20mmHg) LVOT Max Jamal:  1.00 m/s (<=1.1m/s) LVOT VTI:      18.40 cm LVOT Diameter: 1.90 cm  (1.8-2.4cm) AoV Area,Vmax: 2.08 cm2 (2.5-4.5cm2) TRICUSPID VALVE/RVSP:                             Normal  Ranges: Peak TR Velocity: 3.75 m/s RV Syst Pressure: 59.2 mmHg (< 30mmHg) PULMONIC VALVE:                      Normal Ranges: PV Max Jamal: 0.8 m/s  (0.6-0.9m/s) PV Max P.5 mmHg  [id number] Leticia Tan MD Electronically signed on 2024 at 8:43:55 AM  ** Final **     ECG 12 lead    Result Date: 8/10/2024  Normal sinus rhythm RSR' or QR pattern in V1 suggests right ventricular conduction delay Abnormal ECG When compared with ECG of 2024 01:22, Questionable change in initial forces of Septal leads Confirmed by Dat Jasmine ([id number]) on 8/10/2024 1:02:20 PM    XR chest 1 view    Result Date: 2024  Interpreted By:  Indiana Perea, STUDY: XR CHEST 1 VIEW;  2024 5:22 am   INDICATION: Signs/Symptoms:Evaluate CHF.   COMPARISON: 2024   ACCESSION NUMBER(S): [id number]   ORDERING CLINICIAN: AXEL SILVER   FINDINGS: The cardiac silhouette is enlarged. There are atherosclerotic changes of the aorta.   The lungs are hyperinflated. There is extensive right-sided infiltration. The left lung markings appear coarse.   The patient is scoliotic. There are surgical clips in the neck.   COMPARISON OF FINDING: The chest is similar.       Enlarged cardiac silhouette. Parenchymal infiltration.   MACRO: none   Signed by: Indiana Perea 2024 8:09 AM Dictation workstation:   [id number]    XR chest 1 view    Result Date: 2024  Interpreted By:  Khadijah Allen, STUDY: XR CHEST 1 VIEW;  2024 11:06 pm   INDICATION: Signs/Symptoms:SOB.   COMPARISON: 2024 at 1:44 p.m.   ACCESSION NUMBER(S): [id number]   ORDERING CLINICIAN: ABELINO MCKEON   FINDINGS: Surgical clips at the neck base.   CARDIOMEDIASTINAL SILHOUETTE: Stable cardiomegaly.   LUNGS: Increased diffuse interstitial edema. Increasing right pulmonary infiltrates. Suspected small bilateral pleural effusions. No pneumothorax.   ABDOMEN: No remarkable upper abdominal findings.   BONES: No acute osseous abnormality.       Worsening CHF with pulmonary  edema.   MACRO: None   Signed by: Khadijah Allen 8/8/2024 11:32 PM Dictation workstation:   [id number]    XR chest 1 view    Result Date: 8/8/2024  Interpreted By:  Jose Rodriguez, STUDY: XR CHEST 1 VIEW; 8/8/2024 1:57 pm   INDICATION: CLINICAL INFORMATION: Signs/Symptoms:shortness of breath.   COMPARISON: 04/28/2024   ACCESSION NUMBER(S): [id number]   ORDERING CLINICIAN: NESTOR CERVANTES   TECHNIQUE: Portable chest one view.   FINDINGS: The cardiac silhouette is quite prominent suggesting cardiomegaly. The aorta is tortuous. Surgical clips are identified at the base of the neck bilaterally. Hazy diffuse bilateral infiltrates are identified more marked at the right apex. Small bilateral effusions are suspected. No alveolar consolidation is noted.       Probable cardiomegaly with bilateral infiltrates and effusions suggesting CHF.   MACRO: none   Signed by: Jose Rodriguez 8/8/2024 2:20 PM Dictation workstation:   [id number]    Vascular US Lower Extremity Venous Duplex Bilateral    Result Date: 7/23/2024  Interpreted By:  Orestes Robles, STUDY: VASC US LOWER EXTREMITY VENOUS DUPLEX BILATERAL;  7/22/2024 4:27 pm   INDICATION: Signs/Symptoms:B/L leg pain.   COMPARISON: 11/23/2023   ACCESSION NUMBER(S): [id number]   ORDERING CLINICIAN: GRUPO LUCIO   TECHNIQUE: Vascular ultrasound of the bilateral lower extremities was performed. Real-time compression views as well as Gray scale, color Doppler and spectral Doppler waveform analysis was performed.   FINDINGS: Evaluation of the visualized portions of the bilateral common femoral, proximal, mid, and distal femoral, and popliteal veins was performed.  Evaluation of the visualized portions of the posterior tibial and peroneal veins was also performed.   Limitations: None   The evaluated veins demonstrate normal compressibility. There is intact venous flow demonstrating normal respiratory variability and normal augmentation of flow with calf compression.         No  sonographic evidence for deep vein thrombosis within the evaluated veins of the bilateral lower extremities.   MACRO: None   Signed by: Orestes Robles 7/23/2024 6:05 AM Dictation workstation:   [id number]              Assessment/Plan   Principal Problem:    Gastrointestinal bleed  Active Problems:    Anemia, unspecified type    Melena, Anemia, CHF               -Seemingly had some GI bleed last week that stopped bleeding spontaneously. She is at risk for PUD in setting NSAIDs and anticoagulation. Given fluctuating HH and need for anticoagulation, will plan EGD this afternoon   -Continue PPI BID     Resp status improved. HH stable. Plan for EGD this afternoon. Further recs to follow Dr Xie assessment        I spent 20 minutes in the professional and overall care of this patient.      Sharon Urbina, FELA-CNP       Male

## 2024-08-12 NOTE — PROGRESS NOTES
Occupational Therapy                 Therapy Communication Note    Patient Name: Anitha Welch  MRN: [id number]  Today's Date: 8/12/2024     Discipline: Occupational Therapy    Missed Visit Reason: Missed Visit Reason: Other (Comment) (Cx per RN hold therapy until after EGD due to active bleeding. EGD scheduled for this afternoon.)    Missed Time: Cancel    Comment:

## 2024-08-12 NOTE — PROGRESS NOTES
Physical Therapy                 Therapy Communication Note    Patient Name: Anitha Welch  MRN: [id number]  Today's Date: 8/12/2024     Discipline: Physical Therapy    Missed Visit Reason: Missed Visit Reason: Other (Comment) (Cx per RN hold therapy until after EGD due to active bleeding. EGD scheduled for this afternoon.)    Missed Time: Attempt

## 2024-08-12 NOTE — PROGRESS NOTES
Patient is not medically clear. Patient having EGD today. TCC met with patient, spouse and son to discuss discharge plans. Patient and family declining SNF and agreeable to HHC. Patient chose [facility]. Prior to discharge, patient will need an internal [facility] referral placed. Will follow.      08/12/24 7515   Discharge Planning   Home or Post Acute Services In home services   Type of Home Care Services Home nursing visits;Home OT;Home PT   Expected Discharge Disposition  Services  (HC-internal HHC referral needed)   Does the patient need discharge transport arranged? No

## 2024-08-12 NOTE — ANESTHESIA POSTPROCEDURE EVALUATION
Patient: Anitha Welch    Procedure Summary       Date: 08/12/24 Room / Location: [facility]    Anesthesia Start: 1452 Anesthesia Stop: 1509    Procedure: EGD Diagnosis: Anemia, unspecified type    Scheduled Providers: Rai Xie MD; Cara Orr MD Responsible Provider: Cara Orr MD    Anesthesia Type: MAC ASA Status: 4            Anesthesia Type: MAC    Vitals Value Taken Time   BP 90/78 08/12/24 1531   Temp 36.6 08/12/24 1542   Pulse 85 08/12/24 1541   Resp 29 08/12/24 1541   SpO2 98 % 08/12/24 1541   Vitals shown include unfiled device data.    Anesthesia Post Evaluation    Patient location during evaluation: bedside  Patient participation: complete - patient participated  Level of consciousness: awake and alert  Pain score: 0  Pain management: adequate  Multimodal analgesia pain management approach  Airway patency: patent  Two or more strategies used to mitigate risk of obstructive sleep apnea  Cardiovascular status: acceptable  Respiratory status: acceptable  Hydration status: acceptable  Postoperative Nausea and Vomiting: none      No notable events documented.

## 2024-08-13 LAB
ABO GROUP (TYPE) IN BLOOD: NORMAL
ALBUMIN SERPL-MCNC: 3.7 G/DL (ref 3.5–5)
ANION GAP SERPL CALC-SCNC: 7 MMOL/L
ANTIBODY SCREEN: NORMAL
BASOPHILS # BLD AUTO: 0.02 X10*3/UL (ref 0–0.1)
BASOPHILS NFR BLD AUTO: 0.4 %
BUN SERPL-MCNC: 17 MG/DL (ref 8–25)
CALCIUM SERPL-MCNC: 8.8 MG/DL (ref 8.5–10.4)
CHLORIDE SERPL-SCNC: 104 MMOL/L (ref 97–107)
CO2 SERPL-SCNC: 32 MMOL/L (ref 24–31)
CREAT SERPL-MCNC: 1.1 MG/DL (ref 0.4–1.6)
EGFRCR SERPLBLD CKD-EPI 2021: 49 ML/MIN/1.73M*2
EOSINOPHIL # BLD AUTO: 0.05 X10*3/UL (ref 0–0.4)
EOSINOPHIL NFR BLD AUTO: 0.9 %
ERYTHROCYTE [DISTWIDTH] IN BLOOD BY AUTOMATED COUNT: 18.5 % (ref 11.5–14.5)
GLUCOSE BLD MANUAL STRIP-MCNC: 102 MG/DL (ref 74–99)
GLUCOSE BLD MANUAL STRIP-MCNC: 108 MG/DL (ref 74–99)
GLUCOSE BLD MANUAL STRIP-MCNC: 117 MG/DL (ref 74–99)
GLUCOSE BLD MANUAL STRIP-MCNC: 120 MG/DL (ref 74–99)
GLUCOSE SERPL-MCNC: 92 MG/DL (ref 65–99)
HCT VFR BLD AUTO: 31.2 % (ref 36–46)
HGB BLD-MCNC: 9.2 G/DL (ref 12–16)
IMM GRANULOCYTES # BLD AUTO: 0.04 X10*3/UL (ref 0–0.5)
IMM GRANULOCYTES NFR BLD AUTO: 0.7 % (ref 0–0.9)
LYMPHOCYTES # BLD AUTO: 0.92 X10*3/UL (ref 0.8–3)
LYMPHOCYTES NFR BLD AUTO: 16.8 %
MAGNESIUM SERPL-MCNC: 2.3 MG/DL (ref 1.6–3.1)
MCH RBC QN AUTO: 25.2 PG (ref 26–34)
MCHC RBC AUTO-ENTMCNC: 29.5 G/DL (ref 32–36)
MCV RBC AUTO: 86 FL (ref 80–100)
MONOCYTES # BLD AUTO: 0.49 X10*3/UL (ref 0.05–0.8)
MONOCYTES NFR BLD AUTO: 8.9 %
NEUTROPHILS # BLD AUTO: 3.97 X10*3/UL (ref 1.6–5.5)
NEUTROPHILS NFR BLD AUTO: 72.3 %
NRBC BLD-RTO: 0 /100 WBCS (ref 0–0)
PHOSPHATE SERPL-MCNC: 3.6 MG/DL (ref 2.5–4.5)
PLATELET # BLD AUTO: 116 X10*3/UL (ref 150–450)
POTASSIUM SERPL-SCNC: 4.2 MMOL/L (ref 3.4–5.1)
RBC # BLD AUTO: 3.65 X10*6/UL (ref 4–5.2)
RH FACTOR (ANTIGEN D): NORMAL
SODIUM SERPL-SCNC: 143 MMOL/L (ref 133–145)
WBC # BLD AUTO: 5.5 X10*3/UL (ref 4.4–11.3)

## 2024-08-13 PROCEDURE — 2500000001 HC RX 250 WO HCPCS SELF ADMINISTERED DRUGS (ALT 637 FOR MEDICARE OP)

## 2024-08-13 PROCEDURE — 9420000001 HC RT PATIENT EDUCATION 5 MIN

## 2024-08-13 PROCEDURE — 94660 CPAP INITIATION&MGMT: CPT

## 2024-08-13 PROCEDURE — 2500000001 HC RX 250 WO HCPCS SELF ADMINISTERED DRUGS (ALT 637 FOR MEDICARE OP): Performed by: INTERNAL MEDICINE

## 2024-08-13 PROCEDURE — 97116 GAIT TRAINING THERAPY: CPT | Mod: GP,CQ

## 2024-08-13 PROCEDURE — 86901 BLOOD TYPING SEROLOGIC RH(D): CPT

## 2024-08-13 PROCEDURE — 2500000002 HC RX 250 W HCPCS SELF ADMINISTERED DRUGS (ALT 637 FOR MEDICARE OP, ALT 636 FOR OP/ED)

## 2024-08-13 PROCEDURE — 80069 RENAL FUNCTION PANEL: CPT

## 2024-08-13 PROCEDURE — 2500000005 HC RX 250 GENERAL PHARMACY W/O HCPCS

## 2024-08-13 PROCEDURE — 83735 ASSAY OF MAGNESIUM: CPT

## 2024-08-13 PROCEDURE — 97166 OT EVAL MOD COMPLEX 45 MIN: CPT | Mod: GO

## 2024-08-13 PROCEDURE — 99232 SBSQ HOSP IP/OBS MODERATE 35: CPT | Performed by: INTERNAL MEDICINE

## 2024-08-13 PROCEDURE — 82947 ASSAY GLUCOSE BLOOD QUANT: CPT

## 2024-08-13 PROCEDURE — 2500000004 HC RX 250 GENERAL PHARMACY W/ HCPCS (ALT 636 FOR OP/ED)

## 2024-08-13 PROCEDURE — 84439 ASSAY OF FREE THYROXINE: CPT | Performed by: INTERNAL MEDICINE

## 2024-08-13 PROCEDURE — 36415 COLL VENOUS BLD VENIPUNCTURE: CPT

## 2024-08-13 PROCEDURE — 2500000002 HC RX 250 W HCPCS SELF ADMINISTERED DRUGS (ALT 637 FOR MEDICARE OP, ALT 636 FOR OP/ED): Performed by: INTERNAL MEDICINE

## 2024-08-13 PROCEDURE — 97110 THERAPEUTIC EXERCISES: CPT | Mod: GP,CQ

## 2024-08-13 PROCEDURE — 2060000001 HC INTERMEDIATE ICU ROOM DAILY

## 2024-08-13 PROCEDURE — 85025 COMPLETE CBC W/AUTO DIFF WBC: CPT

## 2024-08-13 PROCEDURE — 2500000005 HC RX 250 GENERAL PHARMACY W/O HCPCS: Performed by: INTERNAL MEDICINE

## 2024-08-13 PROCEDURE — 94640 AIRWAY INHALATION TREATMENT: CPT

## 2024-08-13 PROCEDURE — 94664 DEMO&/EVAL PT USE INHALER: CPT

## 2024-08-13 PROCEDURE — 97535 SELF CARE MNGMENT TRAINING: CPT | Mod: GO

## 2024-08-13 RX ORDER — PANTOPRAZOLE SODIUM 40 MG/1
40 TABLET, DELAYED RELEASE ORAL
Status: DISCONTINUED | OUTPATIENT
Start: 2024-08-13 | End: 2024-08-15 | Stop reason: HOSPADM

## 2024-08-13 ASSESSMENT — COGNITIVE AND FUNCTIONAL STATUS - GENERAL
WALKING IN HOSPITAL ROOM: A LITTLE
PERSONAL GROOMING: A LITTLE
STANDING UP FROM CHAIR USING ARMS: A LITTLE
TOILETING: A LITTLE
DRESSING REGULAR UPPER BODY CLOTHING: A LITTLE
WALKING IN HOSPITAL ROOM: A LITTLE
DAILY ACTIVITIY SCORE: 17
STANDING UP FROM CHAIR USING ARMS: A LITTLE
DRESSING REGULAR LOWER BODY CLOTHING: A LOT
MOBILITY SCORE: 18
DRESSING REGULAR LOWER BODY CLOTHING: A LOT
MOBILITY SCORE: 18
DRESSING REGULAR UPPER BODY CLOTHING: A LITTLE
MOBILITY SCORE: 18
TURNING FROM BACK TO SIDE WHILE IN FLAT BAD: A LITTLE
TURNING FROM BACK TO SIDE WHILE IN FLAT BAD: A LITTLE
DAILY ACTIVITIY SCORE: 17
DAILY ACTIVITIY SCORE: 17
HELP NEEDED FOR BATHING: A LOT
TURNING FROM BACK TO SIDE WHILE IN FLAT BAD: A LITTLE
DRESSING REGULAR UPPER BODY CLOTHING: A LITTLE
MOVING TO AND FROM BED TO CHAIR: A LITTLE
HELP NEEDED FOR BATHING: A LOT
CLIMB 3 TO 5 STEPS WITH RAILING: A LOT
TOILETING: A LITTLE
CLIMB 3 TO 5 STEPS WITH RAILING: A LOT
MOVING TO AND FROM BED TO CHAIR: A LITTLE
PERSONAL GROOMING: A LITTLE
DRESSING REGULAR LOWER BODY CLOTHING: A LOT
CLIMB 3 TO 5 STEPS WITH RAILING: A LOT
STANDING UP FROM CHAIR USING ARMS: A LITTLE
HELP NEEDED FOR BATHING: A LOT
MOVING TO AND FROM BED TO CHAIR: A LITTLE
WALKING IN HOSPITAL ROOM: A LITTLE
TOILETING: A LITTLE
PERSONAL GROOMING: A LITTLE

## 2024-08-13 ASSESSMENT — PAIN SCALES - GENERAL
PAINLEVEL_OUTOF10: 0 - NO PAIN
PAINLEVEL_OUTOF10: 2

## 2024-08-13 ASSESSMENT — PAIN - FUNCTIONAL ASSESSMENT
PAIN_FUNCTIONAL_ASSESSMENT: 0-10

## 2024-08-13 ASSESSMENT — ACTIVITIES OF DAILY LIVING (ADL)
BATHING_ASSISTANCE: MODERATE
ADL_ASSISTANCE: INDEPENDENT
HOME_MANAGEMENT_TIME_ENTRY: 8

## 2024-08-13 NOTE — PROGRESS NOTES
Occupational Therapy    Evaluation/Treatment    Patient Name: Anitha Welch  MRN: [id number]  : 1940  Today's Date: 24  Time Calculation  Start Time: 825  Stop Time: 848  Time Calculation (min): 23 min       Assessment:  OT Assessment: OT order received, chart reviewed, evaluation completed. Pt demonstrated impaired aerobic capacity, strength, ADLs and functional transfers. Pt would benefit from acute OT services to facilitate return to OF.  Prognosis: Good  Barriers to Discharge: Decreased caregiver support  Evaluation/Treatment Tolerance: Patient limited by fatigue  Medical Staff Made Aware: Yes  End of Session Communication: Bedside nurse  End of Session Patient Position: Up in chair, Alarm on  OT Assessment Results: Decreased ADL status, Decreased upper extremity strength, Decreased endurance, Decreased functional mobility, Decreased IADLs  Prognosis: Good  Barriers to Discharge: Decreased caregiver support  Evaluation/Treatment Tolerance: Patient limited by fatigue  Medical Staff Made Aware: Yes  Strengths: Ability to acquire knowledge  Barriers to Participation: Comorbidities  Plan:  Treatment Interventions: ADL retraining, Functional transfer training, UE strengthening/ROM, Endurance training, Cognitive reorientation, Patient/family training, Equipment evaluation/education  OT Frequency: 3 times per week  OT Discharge Recommendations: Moderate intensity level of continued care  Equipment Recommended upon Discharge: Wheeled walker  OT Recommended Transfer Status: Minimal assist  OT - OK to Discharge: Yes  Treatment Interventions: ADL retraining, Functional transfer training, UE strengthening/ROM, Endurance training, Cognitive reorientation, Patient/family training, Equipment evaluation/education    Subjective     General:   OT Received On: 24  General  Reason for Referral: activities of daily living, GI bleed  Past Medical History Relevant to Rehab: COPD, home O2 at 2 liters, CAD stents,  CHF, CVA, recent mitral valve repair, DVT left LE, AAA, UTI, anxiety, depression, arthritis, diverticulosis, HTN, hypothyroid, ischemic cardiomyopathy, OA, osteopenia, plantar fasciitis, thyroidectomy, left TKR, right THR, RLS, sciatica, spinal stenosis, MI  Missed Visit: No  Missed Visit Reason: Other (Comment)  Family/Caregiver Present: No  Co-Treatment: PT  Co-Treatment Reason: partial co treat due to medical complexity in ICU and limited pt tolerance to activity  Prior to Session Communication: Bedside nurse  Patient Position Received: Bed, 3 rail up, Alarm on  Preferred Learning Style: auditory  General Comment: 84 year old female admit from home with weakness x 2 weeks;  Hgb 5.3 on admit requiring blood transfusion. Cleared by RN, pt agreeable to evaluation.  Precautions:  Hearing/Visual Limitations: appears WFL  Medical Precautions: Fall precautions, Oxygen therapy device and L/min (2L O2)  Vital Signs:  Heart Rate: 83  Heart Rate Source: Monitor  SpO2: 93 % (SpO2 85-93% throughout tx on 2L, RN notified.)  BP: 139/65  Pain:  Pain Assessment  Pain Assessment: 0-10  0-10 (Numeric) Pain Score: 0 - No pain    Objective   Cognition:  Overall Cognitive Status: Within Functional Limits  Orientation Level: Oriented X4     Home Living:  Type of Home: House  Lives With: Spouse, Adult children  Home Adaptive Equipment: Walker rolling or standard  Home Layout: Two level, Able to live on main level with bedroom/bathroom  Home Access: Stairs to enter with rails  Entrance Stairs-Rails: Right  Entrance Stairs-Number of Steps: 3  Bathroom Shower/Tub: Walk-in shower  Bathroom Equipment: Grab bars in shower (shower chair)  Prior Function:  Level of [place]: Independent with ADLs and functional transfers, Needs assistance with homemaking  Receives Help From: Family  ADL Assistance: Independent  Homemaking Assistance: Needs assistance  Driving/Transportation:  (son drives)  Ambulatory Assistance: Independent  IADL History:      ADL:  Eating Deficit: Setup  Grooming Deficit: Setup, Wash/dry hands, Teeth care, Brushing hair, Increased time to complete, Verbal cueing (seated in chair with cues and extended time)  Bathing Assistance: Moderate  Bathing Deficit:  (projected, not completed due to Spo2 levels)  UE Dressing Assistance: Minimal  UE Dressing Deficit: Thread RUE, Pull over head, Pull around back  LE Dressing Assistance: Moderate  LE Dressing Deficit: Don/doff L sock, Don/doff R sock, Thread RLE into pants, Thread LLE into pants, Pull up over hips (assist to don socks with max A, min A to don pants and pull up in standing with cues)  Toileting Assistance with Device: Not performed (projected as pt declined)    Activity Tolerance:  Endurance: Decreased tolerance for upright activites  Activity Tolerance Comments: SPO2 dropped to 85 % with simple transfer and required extended rest break and cues for pursed lip breathing to recover.  Functional Standing Tolerance:     Bed Mobility/Transfers: Bed Mobility  Bed Mobility: Yes  Bed Mobility 1  Bed Mobility 1: Supine to sitting  Level of Assistance 1: Close supervision  Bed Mobility Comments 1: HOB elevated and use of rial, extended time to complete    Transfers  Transfer: Yes  Transfer 1  Transfer From 1: Sit to  Transfer to 1: Stand  Technique 1: Sit to stand, Stand to sit  Transfer Device 1: Walker  Transfer Level of Assistance 1: Minimum assistance  Trials/Comments 1: cues for safe hand placement and transfer tehnique  Transfers 2  Transfer From 2: Stand to  Transfer to 2: Chair with arms  Technique 2: Stand to sit  Transfer Device 2: Walker  Transfer Level of Assistance 2: Minimum assistance  Trials/Comments 2: cues for safe hand placement min A for balance    Functional Mobility:  Functional Mobility  Functional Mobility Performed: Yes  Functional Mobility 1  Surface 1: Level tile  Device 1: Rolling walker  Assistance 1: Minimum assistance  Comments 1: Min A for balance and RW  support. Pt SPO2 not recovering so minimal mobiltiy to chair only.    Sensation:  Light Touch: No apparent deficits  Strength:  Strength Comments: 3/5 B UES    Coordination:  Movements are Fluid and Coordinated: No  Upper Body Coordination: delayed rate and accuracy of movements   Hand Function:  Hand Function  Gross Grasp: Functional  Coordination: Functional  Extremities: RUE   RUE : Within Functional Limits and LUE   LUE: Within Functional Limits    Outcome Measures: [facility] Daily Activity  Putting on and taking off regular lower body clothing: A lot  Bathing (including washing, rinsing, drying): A lot  Putting on and taking off regular upper body clothing: A little  Toileting, which includes using toilet, bedpan or urinal: A little  Taking care of personal grooming such as brushing teeth: A little  Eating Meals: None  Daily Activity - Total Score: 17        Education Documentation  Home Exercise Program, taught by Nata Morocho OT at 8/13/2024 10:50 AM.  Learner: Patient  Readiness: Acceptance  Method: Explanation  Response: Verbalizes Understanding  Comment: Pt edu on OT POC    Body Mechanics, taught by Nata Morocho OT at 8/13/2024 10:50 AM.  Learner: Patient  Readiness: Acceptance  Method: Explanation  Response: Verbalizes Understanding  Comment: Pt edu on OT POC    Precautions, taught by Nata Morocho OT at 8/13/2024 10:50 AM.  Learner: Patient  Readiness: Acceptance  Method: Explanation  Response: Verbalizes Understanding  Comment: Pt edu on OT POC    ADL Training, taught by Nata Morocho OT at 8/13/2024 10:50 AM.  Learner: Patient  Readiness: Acceptance  Method: Explanation  Response: Verbalizes Understanding  Comment: Pt edu on OT POC      Goals:  Encounter Problems       Encounter Problems (Active)       OT Goals       ADLs (Progressing)       Start:  08/13/24    Expected End:  09/06/24       Pt will complete ADL tasks with Mod I, using AE as needed, in order to complete self-care tasks.            Functional transfers (Progressing)       Start:  08/13/24    Expected End:  09/06/24       Pt will perform functional transfers at mod ind level with RW           Functional mobility (Progressing)       Start:  08/13/24    Expected End:  09/06/24       Pt will perform functional mobility household distance at mod ind level with RW

## 2024-08-13 NOTE — PROGRESS NOTES
Physical Therapy    Physical Therapy Treatment    Patient Name: Anitha Welch  MRN: [id number]  Today's Date: 8/13/2024  Time Calculation  Start Time: 0823  Stop Time: 0846  Time Calculation (min): 23 min    Assessment/Plan   PT Assessment  End of Session Communication: Bedside nurse  Assessment Comment: Pt presents with increased SOB/dizziness during initial stand, SpO2 decreased 85% on 2L, encouraged proper breathing techniques. SpO2 increased 93% RN notified.  End of Session Patient Position: Up in chair, Alarm on  PT Plan  Inpatient/Swing Bed or Outpatient: Inpatient  PT Plan  Treatment/Interventions: Bed mobility, Transfer training, Gait training, Stair training, Balance training, Strengthening, Endurance training, Therapeutic exercise, Therapeutic activity  PT Plan: Ongoing PT  PT Frequency: 5 times per week  PT Discharge Recommendations: Moderate intensity level of continued care  PT Recommended Transfer Status: Assist x1  PT - OK to Discharge: Yes (with skilled physical therapy services at next level of care.)      General Visit Information:   PT  Visit  PT Received On: 08/13/24  General  Missed Visit: Yes  Missed Visit Reason: Other (Comment) (Cx per RN hold therapy until after EGD due to active bleeding. EGD scheduled for this afternoon.)  Co-Treatment: OT  Co-Treatment Reason: partial co treat due to medical complexity in ICU and limited pt tolerance to acticity  Prior to Session Communication: Bedside nurse  Patient Position Received: Bed, 3 rail up, Alarm off, not on at start of session  General Comment: Cleared by nursing to be seen for therapy, pt agreeable with tx, supine in bed upon arrival.    Subjective     Vital Signs:  Vital Signs  SpO2: 93 % (SpO2 85-93% throughout tx on 2L, RN notified.)  BP: 139/65    Objective   Pain:  Pain Assessment  Pain Assessment: 0-10  0-10 (Numeric) Pain Score: 0 - No pain    Cognition:  Cognition  Overall Cognitive Status: Within Functional Limits    Postural  Control:  Static Sitting Balance  Static Sitting-Balance Support: Feet supported  Static Sitting-Level of Assistance: Close supervision  Static Sitting-Comment/Number of Minutes: Fair seated static balance.  Static Standing Balance  Static Standing-Balance Support: Bilateral upper extremity supported  Static Standing-Level of Assistance: Minimum assistance  Static Standing-Comment/Number of Minutes: Fair- static standing balance with bilateral UE support on walker.    Treatments:  Therapeutic Exercise  Therapeutic Exercise Performed: Yes  Therapeutic Exercise Activity 1: Bilateral ankle pumps x15  Therapeutic Exercise Activity 2: Bilateral hip flexion x15  Therapeutic Exercise Activity 3: Bilateral knee extension x15  Therapeutic Exercise Activity 4: Resisted hip abd/add 15    Bed Mobility  Bed Mobility: Yes  Bed Mobility 1  Bed Mobility 1: Supine to sitting  Level of Assistance 1: Close supervision  Bed Mobility Comments 1: Improved bed mobility.    Ambulation/Gait Training  Ambulation/Gait Training Performed: Yes  Ambulation/Gait Training 1  Surface 1: Level tile  Device 1: Rolling walker  Assistance 1: Minimum assistance  Quality of Gait 1: Narrow base of support, Diminished heel strike, Decreased step length  Comments/Distance (ft) 1: 4-5 steps (bed to chair) with wheeled walker, min assist for balance.    Transfers  Transfer: Yes  Transfer 1  Transfer From 1: Sit to  Transfer to 1: Stand  Technique 1: Sit to stand, Stand to sit  Transfer Device 1: Walker  Transfer Level of Assistance 1: Minimum assistance  Trials/Comments 1: Cues for proper hand placement, cues for safety during eccentric control in sitting.    Outcome Measures:  [facility] Basic Mobility  Turning from your back to your side while in a flat bed without using bedrails: None  Moving from lying on your back to sitting on the side of a flat bed without using bedrails: A little  Moving to and from bed to chair (including a wheelchair): A little  Standing  up from a chair using your arms (e.g. wheelchair or bedside chair): A little  To walk in hospital room: A little  Climbing 3-5 steps with railing: A lot  Basic Mobility - Total Score: 18    Encounter Problems       Encounter Problems (Active)       Mobility       Bed mobility including supine to sit and sit to supine with supervision. (Progressing)       Start:  08/11/24    Expected End:  08/25/24            Ambulate 60 feet with rolling walker and supervision. (Progressing)       Start:  08/11/24    Expected End:  08/25/24            Negotiate 3 steps with single handrail and mod assist. (Not Progressing)       Start:  08/11/24    Expected End:  08/25/24               PT Transfers       Transfers including sit to supine and stand to sit with supervision. (Progressing)       Start:  08/11/24    Expected End:  08/25/24

## 2024-08-13 NOTE — PROGRESS NOTES
Spiritual Care Visit    Clinical Encounter Type  Visited With: Patient and family together  Routine Visit: Follow-up  Continue Visiting: Yes         Values/Beliefs  Spiritual Requests During Hospitalization: Communion today    Sacramental Encounters  Communion: Patient wants communion  Communion Given Indicator: Yes     Donnell Quintanilla

## 2024-08-13 NOTE — CARE PLAN
Turning and positioning Q2 hours. Frequent rounding more often than Q1 as pt. Is forgetful and needs frequent reorientation.   Problem: Skin  Goal: Participates in plan/prevention/treatment measures  Outcome: Progressing  Flowsheets (Taken 8/12/2024 2017)  Participates in plan/prevention/treatment measures:   Elevate heels   Increase activity/out of bed for meals  Goal: Prevent/manage excess moisture  Outcome: Progressing  Flowsheets (Taken 8/12/2024 2017)  Prevent/manage excess moisture:   Moisturize dry skin   Cleanse incontinence/protect with barrier cream

## 2024-08-13 NOTE — PROGRESS NOTES
Anticipate discharge soon. Patient will return home with [facility]. Internal [facility] referral needed, Dr. Stanton aware. Patient and family declining SNF. Will continue to follow.      08/13/24 1330   Discharge Planning   Home or Post Acute Services In home services   Type of Home Care Services Home nursing visits;Home OT;Home PT   Expected Discharge Disposition  Services  ([facility]-internal [facility] referral needed)   Does the patient need discharge transport arranged? No   RoundTrip coordination needed? No

## 2024-08-13 NOTE — NURSING NOTE
Assumed patient care. Patient is sitting up in the chair alarm is set. 2L NC (baseline) Only complaint is a burning sensation in nasima area while sitting. Call light in reach.

## 2024-08-13 NOTE — PROGRESS NOTES
Anitha Welch is a 84 y.o. female on day 5 of admission presenting with Gastrointestinal bleed.  History of Present Illness:  Anitha Welch is a 84 y.o. year old female patient with Past Medical History of  COPD with chronic hypoxic respiratory failure on 2L home O2, CAD s/p stents, ischemic CMP/CHF, mitral valve repair (4/25/24), CVA with mild left sided weakness, LLE DVT on xarelto, who p/w generalized weakness associated with weakness x 2 weeks. In the ED found to have Hb 5.3 (BL 8.1), received 2 Units of PRBC and admitted to SDU for further management. However, developed increased WOB and hypoxia associated with tachypnea. Subsequently placed on BiPAP and admitted to the ICU for further management.          Interval ICU Events:  8/9: Patient admitted to SDU but became tachypnic and hypoxic requiring high jose j 40L/60%. Concern for flash pulmonary edema vs taco.  Patient had 3 units of blood transfused.  EGD was performed yesterday, no acute findings.  Xarelto was restarted at 20 mg daily last night.    Subjective   Patient denies any abdominal pain or shortness of breath.  She denies melena or maroon stools.  Denies chest pain as well.       Objective     Last Recorded Vitals  /69 (BP Location: Left arm, Patient Position: Lying)   Pulse 82   Temp 37.1 °C (98.8 °F) (Temporal)   Resp 17   Wt 65 kg (143 lb 4.8 oz)   SpO2 97%   Intake/Output last 3 Shifts:    Intake/Output Summary (Last 24 hours) at 8/13/2024 1229  Last data filed at 8/13/2024 1025  Gross per 24 hour   Intake 104 ml   Output 925 ml   Net -821 ml       Admission Weight  Weight: 66.4 kg (146 lb 6.2 oz) (08/08/24 1236)    Daily Weight  08/13/24 : 65 kg (143 lb 4.8 oz)    Image Results  Esophagogastroduodenoscopy (EGD)  Table formatting from the original result was not included.  Impression  The esophagus appeared normal.  The cardia, fundus of the stomach, body of the stomach and antrum appeared   normal.  The duodenal bulb, 1st part of the  duodenum and 2nd part of the duodenum   appeared normal.    Findings  The esophagus appeared normal.  The cardia, fundus of the stomach, body of the stomach and antrum appeared   normal.  The duodenal bulb, 1st part of the duodenum and 2nd part of the duodenum   appeared normal.    Recommendation  No signs of active bleeding. If stools are grossly positive and blood   count dropping will consider colonoscopy       Indication  Anemia, unspecified type    Staff  Staff Role   Rai Xie MD Proceduralist     Medications  No administrations occurring from 1444 to 1505 on 08/12/24     Preprocedure  A history and physical has been performed, and patient medication   allergies have been reviewed. The patient's tolerance of previous   anesthesia has been reviewed. The risks and benefits of the procedure and   the sedation options and risks were discussed with the patient. All   questions were answered and informed consent obtained.    Details of the Procedure  The patient underwent monitored anesthesia care, which was administered by   an anesthesia professional. The patient's blood pressure, ECG, ETCO2,   heart rate, level of consciousness, oxygen and respirations were monitored   throughout the procedure. The scope was introduced through the mouth and   advanced to the second part of the duodenum. Retroflexion was performed in   the cardia. Prior to the procedure, the patient's H. Pylori status was   unknown. The patient experienced no blood loss. The procedure was not   difficult. The patient tolerated the procedure well. There were no   apparent adverse events.     Events  Procedure Events   Event Event Time   ENDO SCOPE IN TIME 8/12/2024  2:58 PM   ENDO SCOPE OUT TIME 8/12/2024  3:01 PM     Specimens  No specimens collected    Procedure Location  [address] Cardiac Intensive Care  [address] [facility] [id number] [phone]    Referring Provider  Sharon FARR  FELA Urbina-CNP    Procedure Provider  Rai Xie MD      Physical Exam  Pt is NAD.  Cooperative with exam.  In no distress at rest.  On 2 L of oxygen per nasal cannula which is her baseline  A, Ox3.  Face is symmetrical.  Skin - no lesions.  Lungs: clear to auscultations B/L. No wheezes, rales, rhonchi.  Heart: regular S1S2.  Abdomen: soft, NT, ND. BS positive.  Extr.: no edema, cords, cyanosis.  Moves all extr.   Relevant Results               Assessment/Plan                  Principal Problem:    Gastrointestinal bleed  Active Problems:    Anemia, unspecified type    GI bleeding.  Patient had EGD yesterday, no source of bleeding.  Hemoglobin remained stable after 3 units of blood.  Restarted Xarelto.  Plan is to monitor overnight.  If no evidence of bleeding will discharge home tomorrow.  If there is any evidence of bleeding will need inpatient colonoscopy.  Otherwise plan for outpatient colonoscopy according to GI recommendations.  History of DVT, restarted Xarelto  Coronary artery disease, stable no anginal symptoms  Acute on chronic respiratory failure due to pulmonary edema from severe anemia, resolved.  Chronic respiratory failure, on 2 L of oxygen.  Ischemic CHF, systolic.  EF 45% with mild to moderate mitral valve regurgitation.  Status post mitral valve repair in April 2024.  Currently hemodynamically stable, euvolemic.              Rosa Stanton MD

## 2024-08-13 NOTE — PROGRESS NOTES
"Anitha Welch is a 84 y.o. female on day 5 of admission presenting with Gastrointestinal bleed.    Subjective   No reported melena/bloody stools. Hgb remains stable. S/P EGD yesterday.        Objective     Physical Exam  HENT:      Head: Normocephalic.      Nose: Nose normal.      Mouth/Throat:      Mouth: Mucous membranes are moist.   Eyes:      Pupils: Pupils are equal, round, and reactive to light.   Cardiovascular:      Rate and Rhythm: Normal rate.   Abdominal:      Palpations: Abdomen is soft.   Musculoskeletal:         General: Normal range of motion.      Cervical back: Normal range of motion.   Skin:     General: Skin is warm.   Neurological:      General: No focal deficit present.      Mental Status: She is alert.   Psychiatric:         Mood and Affect: Mood normal.         Last Recorded Vitals  Blood pressure 131/70, pulse 79, temperature 36.4 °C (97.5 °F), temperature source Oral, resp. rate 17, height 1.651 m (5' 5\"), weight 65 kg (143 lb 4.8 oz), SpO2 99%.  Intake/Output last 3 Shifts:  I/O last 3 completed shifts:  In: 104 (1.6 mL/kg) [I.V.:4 (0.1 mL/kg); IV Piggyback:100]  Out: 1275 (19.6 mL/kg) [Urine:1275 (0.5 mL/kg/hr)]  Weight: 65 kg     Relevant Results  Esophagogastroduodenoscopy (EGD)    Result Date: 8/12/2024  Table formatting from the original result was not included. Impression The esophagus appeared normal. The cardia, fundus of the stomach, body of the stomach and antrum appeared normal. The duodenal bulb, 1st part of the duodenum and 2nd part of the duodenum appeared normal. Findings The esophagus appeared normal. The cardia, fundus of the stomach, body of the stomach and antrum appeared normal. The duodenal bulb, 1st part of the duodenum and 2nd part of the duodenum appeared normal. Recommendation No signs of active bleeding. If stools are grossly positive and blood count dropping will consider colonoscopy  Indication Anemia, unspecified type Staff Staff Role Rai Xie MD " Proceduralist Medications No administrations occurring from 1444 to 1505 on 08/12/24 Preprocedure A history and physical has been performed, and patient medication allergies have been reviewed. The patient's tolerance of previous anesthesia has been reviewed. The risks and benefits of the procedure and the sedation options and risks were discussed with the patient. All questions were answered and informed consent obtained. Details of the Procedure The patient underwent monitored anesthesia care, which was administered by an anesthesia professional. The patient's blood pressure, ECG, ETCO2, heart rate, level of consciousness, oxygen and respirations were monitored throughout the procedure. The scope was introduced through the mouth and advanced to the second part of the duodenum. Retroflexion was performed in the cardia. Prior to the procedure, the patient's H. Pylori status was unknown. The patient experienced no blood loss. The procedure was not difficult. The patient tolerated the procedure well. There were no apparent adverse events. Events Procedure Events Event Event Time ENDO SCOPE IN TIME 8/12/2024  2:58 PM ENDO SCOPE OUT TIME 8/12/2024  3:01 PM Specimens No specimens collected Procedure Location [address] Cardiac Intensive Care [address] [facility] [address] [phone] Referring Provider FELA Woodard-CNP Procedure Provider Rai Xie MD       Scheduled medications  atorvastatin, 40 mg, oral, Nightly  budesonide, 0.5 mg, nebulization, BID  carvedilol, 3.125 mg, oral, BID  cetirizine, 10 mg, oral, Daily  insulin lispro, 0-5 Units, subcutaneous, TID  ipratropium-albuteroL, 3 mL, nebulization, TID  nystatin, 1 Application, Topical, BID  oxygen, , inhalation, Continuous - Inhalation  pantoprazole, 40 mg, intravenous, BID  rivaroxaban, 20 mg, oral, Daily with evening meal  [Held by provider] torsemide, 20 mg, oral, Daily      Continuous  medications     PRN medications  PRN medications: acetaminophen **OR** acetaminophen, albuterol, dextrose, dextrose, diclofenac sodium, glucagon, glucagon, nitroglycerin, ondansetron ODT **OR** ondansetron, polyethylene glycol, tiZANidine  Results for orders placed or performed during the hospital encounter of 08/08/24 (from the past 24 hour(s))   POCT GLUCOSE   Result Value Ref Range    POCT Glucose 125 (H) 74 - 99 mg/dL   POCT GLUCOSE   Result Value Ref Range    POCT Glucose 122 (H) 74 - 99 mg/dL   POCT GLUCOSE   Result Value Ref Range    POCT Glucose 180 (H) 74 - 99 mg/dL   CBC and Auto Differential   Result Value Ref Range    WBC 5.5 4.4 - 11.3 x10*3/uL    nRBC 0.0 0.0 - 0.0 /100 WBCs    RBC 3.65 (L) 4.00 - 5.20 x10*6/uL    Hemoglobin 9.2 (L) 12.0 - 16.0 g/dL    Hematocrit 31.2 (L) 36.0 - 46.0 %    MCV 86 80 - 100 fL    MCH 25.2 (L) 26.0 - 34.0 pg    MCHC 29.5 (L) 32.0 - 36.0 g/dL    RDW 18.5 (H) 11.5 - 14.5 %    Platelets 116 (L) 150 - 450 x10*3/uL    Neutrophils % 72.3 40.0 - 80.0 %    Immature Granulocytes %, Automated 0.7 0.0 - 0.9 %    Lymphocytes % 16.8 13.0 - 44.0 %    Monocytes % 8.9 2.0 - 10.0 %    Eosinophils % 0.9 0.0 - 6.0 %    Basophils % 0.4 0.0 - 2.0 %    Neutrophils Absolute 3.97 1.60 - 5.50 x10*3/uL    Immature Granulocytes Absolute, Automated 0.04 0.00 - 0.50 x10*3/uL    Lymphocytes Absolute 0.92 0.80 - 3.00 x10*3/uL    Monocytes Absolute 0.49 0.05 - 0.80 x10*3/uL    Eosinophils Absolute 0.05 0.00 - 0.40 x10*3/uL    Basophils Absolute 0.02 0.00 - 0.10 x10*3/uL   Renal function panel   Result Value Ref Range    Glucose 92 65 - 99 mg/dL    Sodium 143 133 - 145 mmol/L    Potassium 4.2 3.4 - 5.1 mmol/L    Chloride 104 97 - 107 mmol/L    Bicarbonate 32 (H) 24 - 31 mmol/L    Urea Nitrogen 17 8 - 25 mg/dL    Creatinine 1.10 0.40 - 1.60 mg/dL    eGFR 49 (L) >60 mL/min/1.73m*2    Calcium 8.8 8.5 - 10.4 mg/dL    Phosphorus 3.6 2.5 - 4.5 mg/dL    Albumin 3.7 3.5 - 5.0 g/dL    Anion Gap 7 <=19 mmol/L    Magnesium   Result Value Ref Range    Magnesium 2.30 1.60 - 3.10 mg/dL   Type and screen   Result Value Ref Range    ABO TYPE O     Rh TYPE POS     ANTIBODY SCREEN NEG    POCT GLUCOSE   Result Value Ref Range    POCT Glucose 108 (H) 74 - 99 mg/dL                        Assessment/Plan   Principal Problem:    Gastrointestinal bleed  Active Problems:    Anemia, unspecified type    Melena, Anemia, CHF               - S/P EGD yesterday, normal.  Patient's hemoglobin remains stable at 9.2.  There is no obvious signs or symptoms of active bleeding at this time.  Instructed patient to have outpatient colonoscopy.  If patient has a significant drop in H&H or develops active bleeding please re-consult.  GI will sign off at this time.          Melonie Larsen, APRN-CNP

## 2024-08-13 NOTE — CARE PLAN
Problem: Respiratory  Goal: No signs of respiratory distress (eg. Use of accessory muscles. Peds grunting)  Outcome: Progressing  Goal: Wean oxygen to maintain O2 saturation per order/standard this shift  Outcome: Met

## 2024-08-14 LAB
ALBUMIN SERPL-MCNC: 3.7 G/DL (ref 3.5–5)
ANION GAP SERPL CALC-SCNC: 8 MMOL/L
BASOPHILS # BLD AUTO: 0.02 X10*3/UL (ref 0–0.1)
BASOPHILS NFR BLD AUTO: 0.3 %
BUN SERPL-MCNC: 17 MG/DL (ref 8–25)
CALCIUM SERPL-MCNC: 8.5 MG/DL (ref 8.5–10.4)
CHLORIDE SERPL-SCNC: 102 MMOL/L (ref 97–107)
CO2 SERPL-SCNC: 31 MMOL/L (ref 24–31)
CREAT SERPL-MCNC: 1.2 MG/DL (ref 0.4–1.6)
EGFRCR SERPLBLD CKD-EPI 2021: 44 ML/MIN/1.73M*2
EOSINOPHIL # BLD AUTO: 0.04 X10*3/UL (ref 0–0.4)
EOSINOPHIL NFR BLD AUTO: 0.6 %
ERYTHROCYTE [DISTWIDTH] IN BLOOD BY AUTOMATED COUNT: 18.6 % (ref 11.5–14.5)
GLUCOSE BLD MANUAL STRIP-MCNC: 100 MG/DL (ref 74–99)
GLUCOSE BLD MANUAL STRIP-MCNC: 109 MG/DL (ref 74–99)
GLUCOSE BLD MANUAL STRIP-MCNC: 113 MG/DL (ref 74–99)
GLUCOSE BLD MANUAL STRIP-MCNC: 118 MG/DL (ref 74–99)
GLUCOSE SERPL-MCNC: 110 MG/DL (ref 65–99)
HCT VFR BLD AUTO: 30.4 % (ref 36–46)
HGB BLD-MCNC: 9.1 G/DL (ref 12–16)
IMM GRANULOCYTES # BLD AUTO: 0.06 X10*3/UL (ref 0–0.5)
IMM GRANULOCYTES NFR BLD AUTO: 0.9 % (ref 0–0.9)
LYMPHOCYTES # BLD AUTO: 1.08 X10*3/UL (ref 0.8–3)
LYMPHOCYTES NFR BLD AUTO: 15.4 %
MAGNESIUM SERPL-MCNC: 2.3 MG/DL (ref 1.6–3.1)
MCH RBC QN AUTO: 24.4 PG (ref 26–34)
MCHC RBC AUTO-ENTMCNC: 29.9 G/DL (ref 32–36)
MCV RBC AUTO: 82 FL (ref 80–100)
MONOCYTES # BLD AUTO: 0.61 X10*3/UL (ref 0.05–0.8)
MONOCYTES NFR BLD AUTO: 8.7 %
NEUTROPHILS # BLD AUTO: 5.21 X10*3/UL (ref 1.6–5.5)
NEUTROPHILS NFR BLD AUTO: 74.1 %
NRBC BLD-RTO: 0 /100 WBCS (ref 0–0)
PHOSPHATE SERPL-MCNC: 3.3 MG/DL (ref 2.5–4.5)
PLATELET # BLD AUTO: 105 X10*3/UL (ref 150–450)
POTASSIUM SERPL-SCNC: 4.1 MMOL/L (ref 3.4–5.1)
RBC # BLD AUTO: 3.73 X10*6/UL (ref 4–5.2)
SODIUM SERPL-SCNC: 141 MMOL/L (ref 133–145)
T4 FREE SERPL-MCNC: 0.6 NG/DL (ref 0.9–1.7)
T4 FREE SERPL-MCNC: 0.6 NG/DL (ref 0.9–1.7)
TSH SERPL DL<=0.05 MIU/L-ACNC: 42.18 MIU/L (ref 0.27–4.2)
WBC # BLD AUTO: 7 X10*3/UL (ref 4.4–11.3)

## 2024-08-14 PROCEDURE — 84439 ASSAY OF FREE THYROXINE: CPT

## 2024-08-14 PROCEDURE — 9420000001 HC RT PATIENT EDUCATION 5 MIN

## 2024-08-14 PROCEDURE — 83735 ASSAY OF MAGNESIUM: CPT

## 2024-08-14 PROCEDURE — 2500000001 HC RX 250 WO HCPCS SELF ADMINISTERED DRUGS (ALT 637 FOR MEDICARE OP)

## 2024-08-14 PROCEDURE — 94640 AIRWAY INHALATION TREATMENT: CPT

## 2024-08-14 PROCEDURE — 2500000002 HC RX 250 W HCPCS SELF ADMINISTERED DRUGS (ALT 637 FOR MEDICARE OP, ALT 636 FOR OP/ED): Performed by: INTERNAL MEDICINE

## 2024-08-14 PROCEDURE — 2500000001 HC RX 250 WO HCPCS SELF ADMINISTERED DRUGS (ALT 637 FOR MEDICARE OP): Performed by: INTERNAL MEDICINE

## 2024-08-14 PROCEDURE — 2500000002 HC RX 250 W HCPCS SELF ADMINISTERED DRUGS (ALT 637 FOR MEDICARE OP, ALT 636 FOR OP/ED)

## 2024-08-14 PROCEDURE — 85025 COMPLETE CBC W/AUTO DIFF WBC: CPT

## 2024-08-14 PROCEDURE — 99222 1ST HOSP IP/OBS MODERATE 55: CPT

## 2024-08-14 PROCEDURE — 84443 ASSAY THYROID STIM HORMONE: CPT

## 2024-08-14 PROCEDURE — 2500000005 HC RX 250 GENERAL PHARMACY W/O HCPCS: Performed by: INTERNAL MEDICINE

## 2024-08-14 PROCEDURE — 82947 ASSAY GLUCOSE BLOOD QUANT: CPT

## 2024-08-14 PROCEDURE — 97110 THERAPEUTIC EXERCISES: CPT | Mod: GP

## 2024-08-14 PROCEDURE — 99221 1ST HOSP IP/OBS SF/LOW 40: CPT | Performed by: INTERNAL MEDICINE

## 2024-08-14 PROCEDURE — 99233 SBSQ HOSP IP/OBS HIGH 50: CPT | Performed by: INTERNAL MEDICINE

## 2024-08-14 PROCEDURE — 80069 RENAL FUNCTION PANEL: CPT

## 2024-08-14 PROCEDURE — 97530 THERAPEUTIC ACTIVITIES: CPT | Mod: GP

## 2024-08-14 PROCEDURE — 94660 CPAP INITIATION&MGMT: CPT

## 2024-08-14 PROCEDURE — 2060000001 HC INTERMEDIATE ICU ROOM DAILY

## 2024-08-14 PROCEDURE — 36415 COLL VENOUS BLD VENIPUNCTURE: CPT

## 2024-08-14 RX ORDER — LEVOTHYROXINE SODIUM 25 UG/1
25 TABLET ORAL DAILY
Status: DISCONTINUED | OUTPATIENT
Start: 2024-08-15 | End: 2024-08-15 | Stop reason: HOSPADM

## 2024-08-14 ASSESSMENT — COGNITIVE AND FUNCTIONAL STATUS - GENERAL
MOVING TO AND FROM BED TO CHAIR: A LITTLE
MOVING TO AND FROM BED TO CHAIR: A LITTLE
TOILETING: A LITTLE
DAILY ACTIVITIY SCORE: 17
MOBILITY SCORE: 18
WALKING IN HOSPITAL ROOM: A LITTLE
CLIMB 3 TO 5 STEPS WITH RAILING: A LOT
STANDING UP FROM CHAIR USING ARMS: A LITTLE
PERSONAL GROOMING: A LITTLE
DRESSING REGULAR UPPER BODY CLOTHING: A LITTLE
STANDING UP FROM CHAIR USING ARMS: A LITTLE
HELP NEEDED FOR BATHING: A LOT
TURNING FROM BACK TO SIDE WHILE IN FLAT BAD: A LITTLE
WALKING IN HOSPITAL ROOM: A LITTLE
CLIMB 3 TO 5 STEPS WITH RAILING: A LOT
DRESSING REGULAR LOWER BODY CLOTHING: A LOT
TURNING FROM BACK TO SIDE WHILE IN FLAT BAD: A LITTLE
MOBILITY SCORE: 18

## 2024-08-14 ASSESSMENT — PAIN - FUNCTIONAL ASSESSMENT
PAIN_FUNCTIONAL_ASSESSMENT: 0-10
PAIN_FUNCTIONAL_ASSESSMENT: 0-10

## 2024-08-14 ASSESSMENT — PAIN SCALES - GENERAL
PAINLEVEL_OUTOF10: 0 - NO PAIN

## 2024-08-14 ASSESSMENT — ENCOUNTER SYMPTOMS
SYNCOPE: 0
SHORTNESS OF BREATH: 1
PALPITATIONS: 0

## 2024-08-14 NOTE — PROGRESS NOTES
Anitha Welch is a 84 y.o. female on day 6 of admission presenting with Gastrointestinal bleed.  History of Present Illness:  Anitha Welch is a 84 y.o. year old female patient with Past Medical History of  COPD with chronic hypoxic respiratory failure on 2L home O2, CAD s/p stents, ischemic CMP/CHF, mitral valve repair (4/25/24), CVA with mild left sided weakness, LLE DVT on xarelto, who p/w generalized weakness associated with weakness x 2 weeks. In the ED found to have Hb 5.3 (BL 8.1), received 2 Units of PRBC and admitted to SDU for further management. However, developed increased WOB and hypoxia associated with tachypnea. Subsequently placed on BiPAP and admitted to the ICU for further management.          Interval ICU Events:  8/9: Patient admitted to SDU but became tachypnic and hypoxic requiring high jose j 40L/60%. Concern for flash pulmonary edema vs taco.  Patient had 3 units of blood transfused.  EGD was performed yesterday, no acute findings.  Xarelto was restarted at 20 mg daily last night.    Subjective   Patient denies any abdominal pain or shortness of breath.  She denies melena or maroon stools.  Denies chest pain as well.   I was informed by nurse that patient had a short run of nonsustained V. tach, 13 beats on the monitor.  Patient was asymptomatic.  During exam, patient denies chest pain or shortness of breath.    Objective     Last Recorded Vitals  /63   Pulse 85   Temp 36.1 °C (97 °F) (Temporal)   Resp 18   Wt 70.4 kg (155 lb 3.3 oz)   SpO2 98%   Intake/Output last 3 Shifts:    Intake/Output Summary (Last 24 hours) at 8/14/2024 1508  Last data filed at 8/14/2024 1121  Gross per 24 hour   Intake 240 ml   Output --   Net 240 ml       Admission Weight  Weight: 66.4 kg (146 lb 6.2 oz) (08/08/24 1236)    Daily Weight  08/14/24 : 70.4 kg (155 lb 3.3 oz)    Image Results  Esophagogastroduodenoscopy (EGD)  Table formatting from the original result was not included.  Impression  The  esophagus appeared normal.  The cardia, fundus of the stomach, body of the stomach and antrum appeared   normal.  The duodenal bulb, 1st part of the duodenum and 2nd part of the duodenum   appeared normal.    Findings  The esophagus appeared normal.  The cardia, fundus of the stomach, body of the stomach and antrum appeared   normal.  The duodenal bulb, 1st part of the duodenum and 2nd part of the duodenum   appeared normal.    Recommendation  No signs of active bleeding. If stools are grossly positive and blood   count dropping will consider colonoscopy       Indication  Anemia, unspecified type    Staff  Staff Role   Rai Xie MD Proceduralist     Medications  No administrations occurring from 1444 to 1505 on 08/12/24     Preprocedure  A history and physical has been performed, and patient medication   allergies have been reviewed. The patient's tolerance of previous   anesthesia has been reviewed. The risks and benefits of the procedure and   the sedation options and risks were discussed with the patient. All   questions were answered and informed consent obtained.    Details of the Procedure  The patient underwent monitored anesthesia care, which was administered by   an anesthesia professional. The patient's blood pressure, ECG, ETCO2,   heart rate, level of consciousness, oxygen and respirations were monitored   throughout the procedure. The scope was introduced through the mouth and   advanced to the second part of the duodenum. Retroflexion was performed in   the cardia. Prior to the procedure, the patient's H. Pylori status was   unknown. The patient experienced no blood loss. The procedure was not   difficult. The patient tolerated the procedure well. There were no   apparent adverse events.     Events  Procedure Events   Event Event Time   ENDO SCOPE IN TIME 8/12/2024  2:58 PM   ENDO SCOPE OUT TIME 8/12/2024  3:01 PM     Specimens  No specimens collected    Procedure Location  [facility]  [address] Cardiac Intensive Care  [address] [place] [address] [phone]    Referring Provider  FELA Woodard-CNP    Procedure Provider  Rai Xie MD      Physical Exam  Her  is at bedside.  Pt is NAD.  Cooperative with exam.  In no distress at rest.  On 2 L of oxygen per nasal cannula which is her baseline  A, Ox3.  Face is symmetrical.  Skin - no lesions.  Lungs: clear to auscultations B/L. No wheezes, rales, rhonchi.  Heart: regular S1S2.  Abdomen: soft, NT, ND. BS positive.  Extr.: no edema, cords, cyanosis.  Moves all extr.   Relevant Results           Monitor demonstrates normal sinus rhythm    Assessment/Plan                  Principal Problem:    Gastrointestinal bleed  Active Problems:    Anemia, unspecified type    GI bleeding.  Patient had EGD yesterday, no source of bleeding.  Hemoglobin remained stable after 3 units of blood.  Restarted Xarelto.  Plan is to monitor overnight.  If no evidence of bleeding will discharge home tomorrow.  If there is any evidence of bleeding will need inpatient colonoscopy.  Otherwise plan for outpatient colonoscopy according to GI recommendations.  History of DVT, restarted Xarelto  Coronary artery disease, stable no anginal symptoms  Acute on chronic respiratory failure due to pulmonary edema from severe anemia, resolved.  Chronic respiratory failure, on 2 L of oxygen.  Ischemic CHF, systolic.  EF 45% with mild to moderate mitral valve regurgitation.  Status post mitral valve repair in April 2024.  Currently hemodynamically stable, euvolemic       8/14/24:  Hemoglobin remained stable.  It looks like bleeding had stopped.  Continue PPI twice a day.  Patient was restarted on Xarelto  Nonsustained V. tach.  Patient has an extensive cardiac history.  Your potassium and magnesium levels are good this morning.  She is on Coreg.  We consulted cardiologist, Dr. Gallardo.  They are planning to consult  electrophysiology.  Will monitor overnight.  If no plans from cardiology, will discharge home tomorrow.   Hypothyroidism.  Patient has TSH 42.  She is not on any thyroid replacement therapy.  Will check T4 and start on Synthroid 25 mcg daily  Anemia due to acute blood loss, hemoglobin remained stable.  Rosa Stanton MD

## 2024-08-14 NOTE — PROGRESS NOTES
Spiritual Care Visit    Clinical Encounter Type  Visited With: Patient  Routine Visit: Follow-up  Continue Visiting: Yes         Values/Beliefs  Spiritual Requests During Hospitalization: Communion today    Sacramental Encounters  Communion: Patient wants communion  Communion Given Indicator: Yes                             Taxonomy  Intended Effects: Build relationship of care and support, Demonstrate caring and concern    Donnell Quintanilla

## 2024-08-14 NOTE — PROGRESS NOTES
Subjective Data:  None none    Overnight Events:    None     Objective Data:  Last Recorded Vitals:  Vitals:    08/14/24 1127 08/14/24 1246 08/14/24 1500 08/14/24 1507   BP: (!) 92/42 111/63  123/53   BP Location: Left arm   Right arm   Patient Position: Sitting   Lying   Pulse:       Resp:    17   Temp:    36 °C (96.8 °F)   TempSrc:    Temporal   SpO2:   98% 97%   Weight:       Height:           Last Labs:  CBC - 8/14/2024:  5:09 AM  7.0 9.1 105    30.4      CMP - 8/14/2024:  5:09 AM  8.5 6.4 15 --- 1.9   3.3 3.7 12 49      PTT - 8/9/2024:  2:33 AM  1.2   13.0 25.8     HGBA1C   Date/Time Value Ref Range Status   01/09/2019 08:05 PM 5.5 4.0 - 6.0 % Final     Comment:     Hemoglobin A1C levels are related to mean blood glucose during the   preceding 2-3 months. The relationship table below may be used as a   general guide. Each 1% increase in HGB A1C is a reflection of an   increase in mean glucose of approximately 30 mg/dl.   Reference: Diabetes Care, volume 29, supplement 1 Jan. 2006                        HGB A1C ................. Approx. Mean Glucose   _______________________________________________   6%   ...............................  120 mg/dl   7%   ...............................  150 mg/dl   8%   ...............................  180 mg/dl   9%   ...............................  210 mg/dl   10%  ...............................  240 mg/dl  Performed at [address] [place] [address]     05/07/2018 10:43 AM 5.5 4.0 - 6.0 % Final     Comment:     Hemoglobin A1C levels are related to mean blood glucose during the   preceding 2-3 months. The relationship table below may be used as a   general guide. Each 1% increase in HGB A1C is a reflection of an   increase in mean glucose of approximately 30 mg/dl.   Reference: Diabetes Care, volume 29, supplement 1 Jan. 2006                        HGB A1C ................. Approx. Mean Glucose   _______________________________________________   6%    ...............................  120 mg/dl   7%   ...............................  150 mg/dl   8%   ...............................  180 mg/dl   9%   ...............................  210 mg/dl   10%  ...............................  240 mg/dl  Performed at [address] [address]       LDLCALC   Date/Time Value Ref Range Status   02/21/2023 04:20  65 - 130 MG/DL Final   06/15/2022 08:39 AM 92 65 - 130 MG/DL Final   11/24/2020 11:08 AM 86 65 - 130 MG/DL Final      Last I/O:  I/O last 3 completed shifts:  In: 120 (1.7 mL/kg) [P.O.:120]  Out: 500 (7.1 mL/kg) [Urine:500 (0.2 mL/kg/hr)]  Weight: 70.4 kg     Past Cardiology Tests (Last 3 Years):  EKG:  ECG 12 lead 08/08/2024      ECG 12 lead 04/27/2024      ECG 12 lead daily 04/26/2024      ECG 12 lead daily 04/25/2024      ECG 12 lead 04/25/2024      ECG 12 lead 12/04/2023      ECG 12 lead 11/22/2023      Electrocardiogram, 12-lead PRN ACS symptoms 10/18/2023      ECG 12 lead 10/10/2023    Echo:  Transthoracic Echo (TTE) Complete 08/09/2024      Transthoracic Echo (TTE) Limited 04/27/2024      Transthoracic Echo (TTE) Limited 04/26/2024      Intraoperative SAMMY (Anesthesia please do not use) 04/25/2024      Transesophageal Echo (SAMMY) 01/31/2024      Transthoracic Echo (TTE) Complete 10/09/2023    Ejection Fractions:  EF   Date/Time Value Ref Range Status   08/09/2024 09:51 AM 63 %      Cath:  Cardiac Catheterization Procedure 04/25/2024      Cardiac catheterization - coronary 02/16/2024    Stress Test:  No results found for this or any previous visit from the past 1095 days.    Cardiac Imaging:  No results found for this or any previous visit from the past 1095 days.      Inpatient Medications:  Scheduled medications   Medication Dose Route Frequency    atorvastatin  40 mg oral Nightly    budesonide  0.5 mg nebulization BID    carvedilol  3.125 mg oral BID    cetirizine  10 mg oral Daily    insulin lispro  0-5 Units subcutaneous TID     ipratropium-albuteroL  3 mL nebulization TID    [START ON 8/15/2024] levothyroxine  25 mcg oral Daily    nystatin  1 Application Topical BID    oxygen   inhalation q8h    pantoprazole  40 mg oral BID AC    rivaroxaban  20 mg oral Daily with evening meal    [Held by provider] torsemide  20 mg oral Daily     PRN medications   Medication    acetaminophen    Or    acetaminophen    albuterol    dextrose    dextrose    diclofenac sodium    glucagon    glucagon    nitroglycerin    ondansetron ODT    Or    ondansetron    polyethylene glycol    tiZANidine     Continuous Medications   Medication Dose Last Rate       Physical Exam:       Assessment/Plan   Atrial tachycardia with Aberrancy   GI Bleed  Acute on Chronic Respiratory Failure  History of Deep Vein Thrombosis on Xarelto   Coronary Artery Disease s/p RCA stenting  Mitral Valve Regurgitation s/p ARMANI 4/2024  Chronic Heart Failure with Mildly Reduced Ejection Fraction     Impression and Plan:     8/14: As described above.  Patient admitted with shortness of breath and fatigue found to have a hemoglobin of 5.3.  Initially she was admitted to the stepdown unit but after receiving 3 units of blood developed significant shortness of breath and was thought to have flash pulmonary edema from the transfusions. She was admitted and treated in the intensive care unit.  She underwent an EGD on 8/12/2024 revealing no obvious signs of active bleeding.  GI recommended an outpatient colonoscopy. In February of this year she had a cardiac catheterization in preparation for her MitraClip procedure which revealed stable nonobstructive coronary artery disease with a widely patent RCA stent.  The patient underwent a MitraClip procedure in April of this year which was marginally successful.  She underwent an echocardiogram this admission revealing a preserved ejection fraction of 60 to 65%, normal right ventricular global systolic function, moderately dilated right atrium, mild to moderate  mitral valve regurgitation, mild tricuspid regurgitation, moderately elevated RVSP and moderate pulmonary hypertension. On my exam the patient is alert and oriented with poor recall of recent events.  We were consulted with what was thought to be ventricular tachycardia on the patient's telemetry monitor.  On my review around 1237 today the patient went into an irregular tachyarrhythmia, which to my review appears to be atrial fibrillation with aberrancy.  The patient is already anticoagulated with Xarelto given her history of DVT.  She evidently was asymptomatic of this episode and denies chest pain, pressure or palpitations.  She denies any lightheadedness or dizziness.  She is breathing comfortably on 2 L nasal cannula which is her baseline with pulse oximetry is around 98%.  Last blood pressure normotensive at 111/63.  Potassium normal at 4.1, magnesium is 2.30.  I added on a TSH level which was quite elevated at 42.18 with a thyroxine level of 0.60.  Of note the patient does have a history of hypothyroidism and was previously on levothyroxine though it does not appear to be on her home medication list.  Have notified the admitting team of this. Will order an electrophysiology consultation to get their recommendations regarding this patient's new rhythm. We will follow with you.     8/14: Addendum: Electrophysiology consult supports the diagnosis of paroxysmal atrial tachycardia with aberrant conduction, not ventricular tachycardia.  No other cardiac issues were testing is necessary at this time and she is stable for discharge from a cardiac standpoint.  We will follow her with you as needed and call with questions if necessary.      Code Status:  Full Code    I spent 30 minutes in the professional and overall care of this patient.        Derrick Gallardo, DO

## 2024-08-14 NOTE — PROGRESS NOTES
08/14/24 1312   Discharge Planning   Expected Discharge Disposition  Services  ([facility] - internal referral needed)

## 2024-08-14 NOTE — TELEPHONE ENCOUNTER
Per 8/13/24 IM MD progress note: EGD was performed yesterday, no acute findings. Xarelto restarted.   Bedside visit today at [facility] with patient and .  reported they are waiting on a cardiology visit and that she may potentially be discharged home today. He also reported they are receptive to C at time of hospital discharge. House Calls will follow. Patient has a House Calls visit scheduled with Milana Hernadez NP 8/22/24 at 2:30 pm.

## 2024-08-14 NOTE — CONSULTS
Inpatient consult to Cardiology  Consult performed by: FELA Baez-CNP  Consult ordered by: Rosa Stanton MD  Reason for consult: Nonsustained ventricular tachycardia        History Of Present Illness:    Anitha Welch is a 84 y.o. female presenting with shortness of breath. She follows with Dr. Foss for cardiology. She has a past medical history of mitral valve regurgitation s/p mitral transcatheter [id number]-pt-[id number] repair on 4/25/2024, heart failure with mildly reduced ejection fraction, hypertensive disorder, coronary artery disease, chronic obstructive pulmonary disease, cerebrovascular accident and hypothyroidism. Patient was reportedly short of breath for approximately 2 weeks prior to admission. Denies any chest pain, pressure or palpitations. No nausea or vomiting. Chest x-ray on arrival to the emergency department revealed enlarged cardiac silhouette and parenchymal infiltration. EKG in the emergency department shows normal sinus rhythm with no evidence of acute ischemia. Lab work significant for sodium of 150, potassium 3.8, creatinine 1.3, and low hemoglobin of 5.3. Patient was given 2 units of blood in the emergency department and admitted to the intensive care unit for further assessment and management. Cardiology was consulted for nonsustained ventricular tachycardia.     Review of Systems   Cardiovascular:  Negative for chest pain, leg swelling, palpitations and syncope.   Respiratory:  Positive for shortness of breath.    All other systems reviewed and are negative.         Last Recorded Vitals:  Vitals:    08/14/24 1121 08/14/24 1125 08/14/24 1127 08/14/24 1246   BP: (!) 103/37  (!) 92/42 111/63   BP Location: Right arm  Left arm    Patient Position: Sitting  Sitting    Pulse:       Resp: 18      Temp: 36.1 °C (97 °F)      TempSrc: Temporal      SpO2: 98% 98%     Weight:       Height:           Last Labs:  CBC - 8/14/2024:  5:09 AM  7.0 9.1 105    30.4      CMP - 8/14/2024:  5:09  AM  8.5 6.4 15 --- 1.9   3.3 3.7 12 49      PTT - 8/9/2024:  2:33 AM  1.2   13.0 25.8     Hemoglobin A1C   Date/Time Value Ref Range Status   01/09/2019 08:05 PM 5.5 4.0 - 6.0 % Final     Comment:     Hemoglobin A1C levels are related to mean blood glucose during the   preceding 2-3 months. The relationship table below may be used as a   general guide. Each 1% increase in HGB A1C is a reflection of an   increase in mean glucose of approximately 30 mg/dl.   Reference: Diabetes Care, volume 29, supplement 1 Jan. 2006                        HGB A1C ................. Approx. Mean Glucose   _______________________________________________   6%   ...............................  120 mg/dl   7%   ...............................  150 mg/dl   8%   ...............................  180 mg/dl   9%   ...............................  210 mg/dl   10%  ...............................  240 mg/dl  Performed at [address] [address]     05/07/2018 10:43 AM 5.5 4.0 - 6.0 % Final     Comment:     Hemoglobin A1C levels are related to mean blood glucose during the   preceding 2-3 months. The relationship table below may be used as a   general guide. Each 1% increase in HGB A1C is a reflection of an   increase in mean glucose of approximately 30 mg/dl.   Reference: Diabetes Care, volume 29, supplement 1 Jan. 2006                        HGB A1C ................. Approx. Mean Glucose   _______________________________________________   6%   ...............................  120 mg/dl   7%   ...............................  150 mg/dl   8%   ...............................  180 mg/dl   9%   ...............................  210 mg/dl   10%  ...............................  240 mg/dl  Performed at [address] [address]       LDL Calculated   Date/Time Value Ref Range Status   02/21/2023 04:20  65 - 130 MG/DL Final   06/15/2022 08:39 AM 92 65 - 130 MG/DL Final   11/24/2020 11:08 AM 86 65 - 130  MG/DL Final      Last I/O:  I/O last 3 completed shifts:  In: 120 (1.7 mL/kg) [P.O.:120]  Out: 500 (7.1 mL/kg) [Urine:500 (0.2 mL/kg/hr)]  Weight: 70.4 kg     Past Cardiology Tests (Last 3 Years):  EKG:  ECG 12 lead 08/08/2024      ECG 12 lead 04/27/2024      ECG 12 lead daily 04/26/2024      ECG 12 lead daily 04/25/2024      ECG 12 lead 04/25/2024      ECG 12 lead 12/04/2023      ECG 12 lead 11/22/2023      Electrocardiogram, 12-lead PRN ACS symptoms 10/18/2023      ECG 12 lead 10/10/2023    Echo:  Transthoracic Echo (TTE) Complete 08/09/2024      Transthoracic Echo (TTE) Limited 04/27/2024      Transthoracic Echo (TTE) Limited 04/26/2024      Intraoperative SAMMY (Anesthesia please do not use) 04/25/2024      Transesophageal Echo (SAMMY) 01/31/2024      Transthoracic Echo (TTE) Complete 10/09/2023    Ejection Fractions:  EF   Date/Time Value Ref Range Status   08/09/2024 09:51 AM 63 %      Cath:  Cardiac Catheterization Procedure 04/25/2024      Cardiac catheterization - coronary 02/16/2024    Stress Test:  No results found for this or any previous visit from the past 1095 days.    Cardiac Imaging:  No results found for this or any previous visit from the past 1095 days.      Past Medical History:  She has a past medical history of AAA (abdominal aortic aneurysm) (CMS-[facility]), Acute urinary tract infection (04/20/2023), Anemia, Anxiety, Arthritis, CHF (congestive heart failure) (Multi), Chronic pain disorder, CKD (chronic kidney disease), Cognitive decline, COPD (chronic obstructive pulmonary disease) (Multi), Coronary artery disease, CVA (cerebral vascular accident) (Multi), Deep vein thrombosis (DVT) of calf (Multi), Depression, Disease of thyroid gland, Diverticulosis, DVT (deep venous thrombosis) (Multi), Easy bruising, Epistaxis, GERD (gastroesophageal reflux disease), History of blood transfusion (2023), History of degenerative disc disease, Hyperlipidemia, Hypertension, Hypothyroidism, Ischemic cardiomyopathy,  Meralgia paresthetica, Nonrheumatic mitral valve regurgitation, Osteoarthritis, Osteopenia (2010), Plantar fasciitis, RLS (restless legs syndrome), Sciatica, Spinal stenosis, and ST elevation (STEMI) myocardial infarction (Multi).    Past Surgical History:  She has a past surgical history that includes MR angio head wo IV contrast (02/24/2017); MR angio head wo IV contrast (08/11/2017); MR angio head wo IV contrast (02/10/2019); Cholecystectomy (1996); Tonsillectomy; pr arthrp acetblr/prox fem prostc agrft/algrft; Thyroidectomy, partial (Bilateral, 04/17/2013); Coronary stent placement; Knee Arthroplasty (Left); Total hip arthroplasty (Right, 2006); Dilation and curettage of uterus; Other surgical history (01/09/2019); Upper gastrointestinal endoscopy (03/2019); Cardiac catheterization (10/2019); Cataract extraction (Bilateral, 11/13/2012); Adenoidectomy; Cyst Removal (Right, 06/24/2013); Colonoscopy; Other surgical history (01/09/2019); surgical laverne monitoring; Cardiac catheterization (N/A, 02/16/2024); Esophagogastroduodenoscopy; and Anomalous pulmonary venous return repair, total (N/A, 4/25/2024).      Social History:  She reports that she quit smoking about 10 years ago. Her smoking use included cigarettes. She has never been exposed to tobacco smoke. She has never used smokeless tobacco. She reports that she does not currently use alcohol. She reports that she does not use drugs.    Family History:  Family History   Problem Relation Name Age of Onset    Hyperthyroidism Mother          Treated with radioactive iodine    Hypertension Mother      Heart disease Mother      Hyperthyroidism Sibling      Diabetes Father's Sister          Allergies:  Amoxicillin, Iodinated contrast media, Ciprofloxacin, Influenza virus vaccines, Flu vac 2023 [id number](pf), Diphenhydramine, and Other    Inpatient Medications:  Scheduled medications   Medication Dose Route Frequency    atorvastatin  40 mg oral Nightly    budesonide   0.5 mg nebulization BID    carvedilol  3.125 mg oral BID    cetirizine  10 mg oral Daily    insulin lispro  0-5 Units subcutaneous TID    ipratropium-albuteroL  3 mL nebulization TID    nystatin  1 Application Topical BID    oxygen   inhalation q8h    pantoprazole  40 mg oral BID AC    rivaroxaban  20 mg oral Daily with evening meal    [Held by provider] torsemide  20 mg oral Daily     PRN medications   Medication    acetaminophen    Or    acetaminophen    albuterol    dextrose    dextrose    diclofenac sodium    glucagon    glucagon    nitroglycerin    ondansetron ODT    Or    ondansetron    polyethylene glycol    tiZANidine     Continuous Medications   Medication Dose Last Rate     Outpatient Medications:  Current Outpatient Medications   Medication Instructions    albuterol 90 mcg/actuation inhaler 1 puff, inhalation, Every 4 hours PRN    atorvastatin (LIPITOR) 40 mg, oral, Nightly    carvedilol (COREG) 3.125 mg, oral, 2 times daily    diclofenac sodium (VOLTAREN) 4 g, Topical, 4 times daily    loratadine (CLARITIN) 10 mg, oral, Daily    multivitamin-iron-folic acid (Multi Complete with Iron)  mg-mcg tablet tablet 1 tablet, oral, Daily    nitroglycerin (Nitrostat) 0.4 mg SL tablet Place 1 tablet (0.4 mg) under the tongue every 5 minutes if needed.    nystatin (Mycostatin) 100,000 unit/gram powder 1 Application, Topical, 2 times daily    oxygen (O2) 2 L/min, inhalation, Continuous    pantoprazole (PROTONIX) 40 mg, oral, 2 times daily    rivaroxaban (XARELTO) 20 mg, oral, Daily with evening meal, Take with food.    tiZANidine (ZANAFLEX) 2 mg, oral, Every 8 hours PRN    torsemide (DEMADEX) 20 mg, oral, Daily    Trelegy Ellipta 100-62.5-25 mcg blister with device 1 puff, inhalation, Daily     Physical Exam  Cardiovascular:      Rate and Rhythm: Normal rate and regular rhythm.      Heart sounds: Murmur heard.      No friction rub. No gallop.   Pulmonary:      Effort: Pulmonary effort is normal.      Breath  sounds: Normal breath sounds. No wheezing, rhonchi or rales.      Comments: Diminished breath sounds throughout  Abdominal:      General: Bowel sounds are normal.      Palpations: Abdomen is soft.   Musculoskeletal:      Right lower leg: No edema.      Left lower leg: No edema.   Skin:     General: Skin is warm and dry.      Capillary Refill: Capillary refill takes less than 2 seconds.   Neurological:      Mental Status: She is alert. Mental status is at baseline.   Psychiatric:         Mood and Affect: Mood normal.         Behavior: Behavior normal.           Assessment/Plan   Possible Atrial Fibrillation with Aberrancy   GI Bleed  Acute on Chronic Respiratory Failure  History of Deep Vein Thrombosis on Xarelto   Coronary Artery Disease s/p RCA stenting  Mitral Valve Regurgitation s/p ARMANI 4/2024  Chronic Heart Failure with Mildly Reduced Ejection Fraction    Impression and Plan:    8/14: As described above.  Patient admitted with shortness of breath and fatigue found to have a hemoglobin of 5.3.  Initially she was admitted to the stepdown unit but after receiving 3 units of blood developed significant shortness of breath and was thought to have flash pulmonary edema from the transfusions. She was admitted and treated in the intensive care unit.  She underwent an EGD on 8/12/2024 revealing no obvious signs of active bleeding.  GI recommended an outpatient colonoscopy. In February of this year she had a cardiac catheterization in preparation for her MitraClip procedure which revealed stable nonobstructive coronary artery disease with a widely patent RCA stent.  The patient underwent a MitraClip procedure in April of this year which was marginally successful.  She underwent an echocardiogram this admission revealing a preserved ejection fraction of 60 to 65%, normal right ventricular global systolic function, moderately dilated right atrium, mild to moderate mitral valve regurgitation, mild tricuspid regurgitation,  moderately elevated RVSP and moderate pulmonary hypertension. On my exam the patient is alert and oriented with poor recall of recent events.  We were consulted with what was thought to be ventricular tachycardia on the patient's telemetry monitor.  On my review around 1237 today the patient went into an irregular tachyarrhythmia, which to my review appears to be atrial fibrillation with aberrancy.  The patient is already anticoagulated with Xarelto given her history of DVT.  She evidently was asymptomatic of this episode and denies chest pain, pressure or palpitations.  She denies any lightheadedness or dizziness.  She is breathing comfortably on 2 L nasal cannula which is her baseline with pulse oximetry is around 98%.  Last blood pressure normotensive at 111/63.  Potassium normal at 4.1, magnesium is 2.30.  I added on a TSH level which was quite elevated at 42.18 with a thyroxine level of 0.60.  Of note the patient does have a history of hypothyroidism and was previously on levothyroxine though it does not appear to be on her home medication list.  Have notified the admitting team of this. Will order an electrophysiology consultation to get their recommendations regarding this patient's new rhythm. We will follow with you.       Site Assessment Clean;Dry;Intact 08/14/24 0752   Dressing Status Clean;Dry 08/14/24 0752   Number of days: 3       Code Status:  Full Code    I spent 60 minutes in the professional and overall care of this patient.        Chacha Flanagan, FELA-CNP

## 2024-08-14 NOTE — CONSULTS
Consults  History Of Present Illness:    Anitha Welch is a 84 y.o. female with a complex past medical history including coronary artery disease status post RCA PCI and post mitral valve clipping.  She also has a history of DVTs chronically treated with Xarelto.  She was admitted with profound anemia and is post transfusion.  She developed short run of likely atrial tachycardia with aberrant conduction.  She has been completely asymptomatic in that regard did not feel palpitations and has not felt palpitations in the past.  He has not had any syncopal events.  Repeat echocardiogram actually showed improved LV systolic function.     Last Recorded Vitals:  Vitals:    08/14/24 1125 08/14/24 1127 08/14/24 1246 08/14/24 1507   BP:  (!) 92/42 111/63 123/53   BP Location:  Left arm  Right arm   Patient Position:  Sitting  Lying   Pulse:       Resp:    17   Temp:    36 °C (96.8 °F)   TempSrc:    Temporal   SpO2: 98%   97%   Weight:       Height:           Last Labs:  CBC - 8/14/2024:  5:09 AM  7.0 9.1 105    30.4      CMP - 8/14/2024:  5:09 AM  8.5 6.4 15 --- 1.9   3.3 3.7 12 49      PTT - 8/9/2024:  2:33 AM  1.2   13.0 25.8     Hemoglobin A1C   Date/Time Value Ref Range Status   01/09/2019 08:05 PM 5.5 4.0 - 6.0 % Final     Comment:     Hemoglobin A1C levels are related to mean blood glucose during the   preceding 2-3 months. The relationship table below may be used as a   general guide. Each 1% increase in HGB A1C is a reflection of an   increase in mean glucose of approximately 30 mg/dl.   Reference: Diabetes Care, volume 29, supplement 1 Jan. 2006                        HGB A1C ................. Approx. Mean Glucose   _______________________________________________   6%   ...............................  120 mg/dl   7%   ...............................  150 mg/dl   8%   ...............................  180 mg/dl   9%   ...............................  210 mg/dl   10%  ...............................  240  mg/dl  Performed at [address] [address]     05/07/2018 10:43 AM 5.5 4.0 - 6.0 % Final     Comment:     Hemoglobin A1C levels are related to mean blood glucose during the   preceding 2-3 months. The relationship table below may be used as a   general guide. Each 1% increase in HGB A1C is a reflection of an   increase in mean glucose of approximately 30 mg/dl.   Reference: Diabetes Care, volume 29, supplement 1 Jan. 2006                        HGB A1C ................. Approx. Mean Glucose   _______________________________________________   6%   ...............................  120 mg/dl   7%   ...............................  150 mg/dl   8%   ...............................  180 mg/dl   9%   ...............................  210 mg/dl   10%  ...............................  240 mg/dl  Performed at [address] [address]       LDL Calculated   Date/Time Value Ref Range Status   02/21/2023 04:20  65 - 130 MG/DL Final   06/15/2022 08:39 AM 92 65 - 130 MG/DL Final   11/24/2020 11:08 AM 86 65 - 130 MG/DL Final      Last I/O:  I/O last 3 completed shifts:  In: 120 (1.7 mL/kg) [P.O.:120]  Out: 500 (7.1 mL/kg) [Urine:500 (0.2 mL/kg/hr)]  Weight: 70.4 kg     Past Cardiology Tests (Last 3 Years):  EKG:  ECG 12 lead 08/08/2024      ECG 12 lead 04/27/2024      ECG 12 lead daily 04/26/2024      ECG 12 lead daily 04/25/2024      ECG 12 lead 04/25/2024      ECG 12 lead 12/04/2023      ECG 12 lead 11/22/2023      Electrocardiogram, 12-lead PRN ACS symptoms 10/18/2023      ECG 12 lead 10/10/2023    Echo:  Transthoracic Echo (TTE) Complete 08/09/2024      Transthoracic Echo (TTE) Limited 04/27/2024      Transthoracic Echo (TTE) Limited 04/26/2024      Intraoperative SAMMY (Anesthesia please do not use) 04/25/2024      Transesophageal Echo (SAMMY) 01/31/2024      Transthoracic Echo (TTE) Complete 10/09/2023    Ejection Fractions:  EF   Date/Time Value Ref Range Status   08/09/2024  09:51 AM 63 %      Cath:  Cardiac Catheterization Procedure 04/25/2024      Cardiac catheterization - coronary 02/16/2024    Stress Test:  No results found for this or any previous visit from the past 1095 days.    Cardiac Imaging:  No results found for this or any previous visit from the past 1095 days.      Past Medical History:  She has a past medical history of AAA (abdominal aortic aneurysm) (CMS-[facility]), Acute urinary tract infection (04/20/2023), Anemia, Anxiety, Arthritis, CHF (congestive heart failure) (Multi), Chronic pain disorder, CKD (chronic kidney disease), Cognitive decline, COPD (chronic obstructive pulmonary disease) (Multi), Coronary artery disease, CVA (cerebral vascular accident) (Multi), Deep vein thrombosis (DVT) of calf (Multi), Depression, Disease of thyroid gland, Diverticulosis, DVT (deep venous thrombosis) (Multi), Easy bruising, Epistaxis, GERD (gastroesophageal reflux disease), History of blood transfusion (2023), History of degenerative disc disease, Hyperlipidemia, Hypertension, Hypothyroidism, Ischemic cardiomyopathy, Meralgia paresthetica, Nonrheumatic mitral valve regurgitation, Osteoarthritis, Osteopenia (2010), Plantar fasciitis, RLS (restless legs syndrome), Sciatica, Spinal stenosis, and ST elevation (STEMI) myocardial infarction (Multi).    Past Surgical History:  She has a past surgical history that includes MR angio head wo IV contrast (02/24/2017); MR angio head wo IV contrast (08/11/2017); MR angio head wo IV contrast (02/10/2019); Cholecystectomy (1996); Tonsillectomy; pr arthrp acetblr/prox fem prostc agrft/algrft; Thyroidectomy, partial (Bilateral, 04/17/2013); Coronary stent placement; Knee Arthroplasty (Left); Total hip arthroplasty (Right, 2006); Dilation and curettage of uterus; Other surgical history (01/09/2019); Upper gastrointestinal endoscopy (03/2019); Cardiac catheterization (10/2019); Cataract extraction (Bilateral, 11/13/2012); Adenoidectomy; Cyst Removal  (Right, 06/24/2013); Colonoscopy; Other surgical history (01/09/2019); surgical laverne monitoring; Cardiac catheterization (N/A, 02/16/2024); Esophagogastroduodenoscopy; and Anomalous pulmonary venous return repair, total (N/A, 4/25/2024).      Social History:  She reports that she quit smoking about 10 years ago. Her smoking use included cigarettes. She has never been exposed to tobacco smoke. She has never used smokeless tobacco. She reports that she does not currently use alcohol. She reports that she does not use drugs.    Family History:  Family History   Problem Relation Name Age of Onset    Hyperthyroidism Mother          Treated with radioactive iodine    Hypertension Mother      Heart disease Mother      Hyperthyroidism Sibling      Diabetes Father's Sister          Allergies:  Amoxicillin, Iodinated contrast media, Ciprofloxacin, Influenza virus vaccines, Flu vac 2023 [id number](pf), Diphenhydramine, and Other    Inpatient Medications:  Scheduled medications   Medication Dose Route Frequency    atorvastatin  40 mg oral Nightly    budesonide  0.5 mg nebulization BID    carvedilol  3.125 mg oral BID    cetirizine  10 mg oral Daily    insulin lispro  0-5 Units subcutaneous TID    ipratropium-albuteroL  3 mL nebulization TID    [START ON 8/15/2024] levothyroxine  25 mcg oral Daily    nystatin  1 Application Topical BID    oxygen   inhalation q8h    pantoprazole  40 mg oral BID AC    rivaroxaban  20 mg oral Daily with evening meal    [Held by provider] torsemide  20 mg oral Daily     PRN medications   Medication    acetaminophen    Or    acetaminophen    albuterol    dextrose    dextrose    diclofenac sodium    glucagon    glucagon    nitroglycerin    ondansetron ODT    Or    ondansetron    polyethylene glycol    tiZANidine     Continuous Medications   Medication Dose Last Rate     Outpatient Medications:  Current Outpatient Medications   Medication Instructions    albuterol 90 mcg/actuation inhaler 1 puff,  inhalation, Every 4 hours PRN    atorvastatin (LIPITOR) 40 mg, oral, Nightly    carvedilol (COREG) 3.125 mg, oral, 2 times daily    diclofenac sodium (VOLTAREN) 4 g, Topical, 4 times daily    loratadine (CLARITIN) 10 mg, oral, Daily    multivitamin-iron-folic acid (Multi Complete with Iron)  mg-mcg tablet tablet 1 tablet, oral, Daily    nitroglycerin (Nitrostat) 0.4 mg SL tablet Place 1 tablet (0.4 mg) under the tongue every 5 minutes if needed.    nystatin (Mycostatin) 100,000 unit/gram powder 1 Application, Topical, 2 times daily    oxygen (O2) 2 L/min, inhalation, Continuous    pantoprazole (PROTONIX) 40 mg, oral, 2 times daily    rivaroxaban (XARELTO) 20 mg, oral, Daily with evening meal, Take with food.    tiZANidine (ZANAFLEX) 2 mg, oral, Every 8 hours PRN    torsemide (DEMADEX) 20 mg, oral, Daily    Trelegy Ellipta 100-62.5-25 mcg blister with device 1 puff, inhalation, Daily       Physical Exam:  General: Alert, oriented to person, place, date/time, pleasant  HEENT: Normocephalic, eyes normal ears normal  Heart: Normal S1, S2 , rhythm regular, no rubs or gallops II/VI systolic murmur present at the apex  Respiratory: Lungs clear to auscultation bilaterally, no rales, crackles, wheezes or rhonchi  Abdomen: Bowel sounds present in all quadrants  Extremities: 1+ bilateral edema worse on the left  Dermatology: Skin Normal  Genitourinary: Deferred  Neurological: Nonfocal, moves all extremities  Psychology :Appropriate affect and mood      Assessment/Plan   Nonsustained atrial tachycardia with aberrant conduction.  I would continue beta-blocker therapy and treat the underlying anemia which likely exacerbated her arrhythmia.  LV function is normal.  She already has anticoagulation on board with Xarelto.  Peripheral IV 08/11/24 20 G Left;Proximal;Anterior Forearm (Active)   Site Assessment Clean;Dry;Intact 08/14/24 0752   Dressing Status Clean;Dry 08/14/24 0752   Number of days: 3       Code Status:  Full  Code            Randy Sol MD   none

## 2024-08-14 NOTE — PROGRESS NOTES
Physical Therapy    Physical Therapy Treatment    Patient Name: Anitha Welch  MRN: [id number]  Today's Date: 8/14/2024  Time Calculation  Start Time: 0900  Stop Time: 0926  Time Calculation (min): 26 min    Assessment/Plan   PT Assessment  End of Session Communication: Bedside nurse  Assessment Comment: pt demonstrating slowly improving overall functional mobility tolerance and ease on 2 liters o2; o2 sat dropped to 91% but recovered to 93% with seated pursed lip breathing in 15 to 20 secs. Decreased mobility safety awareness  End of Session Patient Position: Up in chair, Alarm on (call button in reach)  PT Plan  Inpatient/Swing Bed or Outpatient: Inpatient  PT Plan  Treatment/Interventions: Bed mobility, Transfer training, Gait training, Stair training, Balance training, Strengthening, Endurance training, Therapeutic exercise, Therapeutic activity  PT Plan: Ongoing PT  PT Frequency: 5 times per week  PT Discharge Recommendations: Moderate intensity level of continued care  Equipment Recommended upon Discharge: Wheeled walker  PT Recommended Transfer Status: Contact guard, Assistive device (FWW)  PT - OK to Discharge: Yes      General Visit Information:   PT  Visit  PT Received On: 08/14/24  General  Family/Caregiver Present: No  Prior to Session Communication: Bedside nurse  Patient Position Received: Bed, 3 rail up, Alarm on  Preferred Learning Style: auditory, verbal  General Comment: cleared by nurse for therapy; pt agreeable to therapy    Subjective   Precautions:  Precautions  Hearing/Visual Limitations: mild [place]  Medical Precautions: Fall precautions, Oxygen therapy device and L/min (2 liters o2)  Vital Signs:  Vital Signs  Heart Rate: 85  SpO2: 95 %  Patient Position: Lying    Objective   Pain:  Pain Assessment  Pain Assessment: 0-10  0-10 (Numeric) Pain Score: 0 - No pain  Cognition:  Cognition  Overall Cognitive Status: Impaired  Orientation Level: Disoriented to time, Disoriented to situation  Memory:  Exceptions to WFL  Short-Term Memory: Impaired  Safety/Judgement:  (decreased safety insight during functional mobility)  Insight: Mild  Impulsive: Mildly  Coordination:  Movements are Fluid and Coordinated: No  Heel to Shin: Impaired (richard LE's slightly slow)  Postural Control:  Postural Control  Posture Comment: mild forward head, protracted shldrs  Extremity/Trunk Assessments:    Activity Tolerance:  Activity Tolerance  Endurance: Decreased tolerance for upright activites  Activity Tolerance Comments: fair +  Treatments:  Therapeutic Exercise  Therapeutic Exercise Performed: Yes  Therapeutic Exercise Activity 1: supine richard AP x 30 reps  Therapeutic Exercise Activity 2: supine richard heel slides x 12 reps  Therapeutic Exercise Activity 3: supine richard hip abd/addx 20 reps  Therapeutic Exercise Activity 4: seated richard LAQ's x 30 reps  Therapeutic Exercise Activity 5: seated richard marching x 20 reps    Therapeutic Activity  Therapeutic Activity Performed: Yes (see bed mobility, transfers, amb with FWW comments)    Bed Mobility  Bed Mobility: Yes  Bed Mobility 1  Bed Mobility 1: Supine to sitting  Level of Assistance 1: Close supervision  Bed Mobility Comments 1: head of bed elevated, mildly effortful and slow transition    Ambulation/Gait Training  Ambulation/Gait Training Performed: Yes  Ambulation/Gait Training 1  Surface 1: Level tile  Device 1: Rolling walker  Assistance 1: Contact guard, Minimal verbal cues  Quality of Gait 1: Decreased step length, Diminished heel strike, Narrow base of support  Comments/Distance (ft) 1: pt amb 16 ft x 1 to bathroom, 24 ft x 2 in room ( pt refusing to amb in main), FWW, + turns, verbal cues for staying close to FWW at all times, increased B of S. O2 sat 91% with HR 94 bpm  Transfers  Transfer: Yes  Transfer 1  Transfer From 1: Sit to  Transfer to 1: Stand  Technique 1: Sit to stand  Transfer Device 1: Walker  Transfer Level of Assistance 1: Contact guard, Minimal verbal  cues  Trials/Comments 1: contact guard of 1 for trunk up from bed, verbal cues for both hands on bed  Transfers 2  Transfer From 2: Stand to  Transfer to 2: Sit  Technique 2: Stand to sit  Transfer Device 2: Walker  Transfer Level of Assistance 2: Contact guard, Moderate verbal cues  Trials/Comments 2: contact guard of 1 for trunk down, verbal cues for backing fully into chair, reaching back with both hands for arms of chair  Transfers 3  Transfer From 3:  (on/off bathroom code)  Technique 3: Sit to stand, Stand to sit  Transfer Device 3: Walker  Transfer Level of Assistance 3: Minimum assistance, Minimal verbal cues  Trials/Comments 3: min assist of 1 for trunk down/up, verbal cues for use of right grabbar and sink on left    Stairs  Stairs: No    Outcome Measures:  [facility] Basic Mobility  Turning from your back to your side while in a flat bed without using bedrails: None  Moving from lying on your back to sitting on the side of a flat bed without using bedrails: A little  Moving to and from bed to chair (including a wheelchair): A little  Standing up from a chair using your arms (e.g. wheelchair or bedside chair): A little  To walk in hospital room: A little  Climbing 3-5 steps with railing: A lot  Basic Mobility - Total Score: 18    Education Documentation  Mobility Training, taught by Roma Villarreal PT at 8/14/2024  9:40 AM.  Learner: Patient  Readiness: Acceptance  Method: Explanation, Demonstration  Response: Needs Reinforcement    Education Comments  No comments found.               Encounter Problems       Encounter Problems (Active)       Mobility       Bed mobility including supine to sit and sit to supine with supervision. (Progressing)       Start:  08/11/24    Expected End:  08/25/24            Ambulate 60 feet with rolling walker and supervision. (Progressing)       Start:  08/11/24    Expected End:  08/25/24            Negotiate 3 steps with single handrail and mod assist. (Progressing)       Start:   08/11/24    Expected End:  08/25/24               PT Transfers       Transfers including sit to supine and stand to sit with supervision. (Progressing)       Start:  08/11/24    Expected End:  08/25/24

## 2024-08-14 NOTE — CARE PLAN
The patient's goals for the shift include  safety and maintain oxygenation    The clinical goals for the shift include no falls no signs of bleeding and maintain  oxtgenation    Over the shift, the patient did not make progress toward the following goals. Barriers to progression include patient refused to wear Bipap at night. Recommendations to address these barriers include re educate importance of wearing mask.

## 2024-08-14 NOTE — NURSING NOTE
Patient only had the Bipap on for about 30 min and refused to keep it on. She says it feels heavy and makes her feel scared.

## 2024-08-15 ENCOUNTER — PHARMACY VISIT (OUTPATIENT)
Dept: PHARMACY | Facility: CLINIC | Age: 84
End: 2024-08-15
Payer: MEDICARE

## 2024-08-15 ENCOUNTER — DOCUMENTATION (OUTPATIENT)
Dept: HOME HEALTH SERVICES | Facility: HOME HEALTH | Age: 84
End: 2024-08-15
Payer: MEDICARE

## 2024-08-15 ENCOUNTER — HOME HEALTH ADMISSION (OUTPATIENT)
Dept: HOME HEALTH SERVICES | Facility: HOME HEALTH | Age: 84
End: 2024-08-15
Payer: MEDICARE

## 2024-08-15 VITALS
WEIGHT: 155.2 LBS | TEMPERATURE: 97.3 F | SYSTOLIC BLOOD PRESSURE: 100 MMHG | RESPIRATION RATE: 18 BRPM | HEART RATE: 99 BPM | HEIGHT: 65 IN | OXYGEN SATURATION: 96 % | BODY MASS INDEX: 25.86 KG/M2 | DIASTOLIC BLOOD PRESSURE: 60 MMHG

## 2024-08-15 LAB
ALBUMIN SERPL-MCNC: 3.5 G/DL (ref 3.5–5)
ANION GAP SERPL CALC-SCNC: 8 MMOL/L
BASOPHILS # BLD AUTO: 0.02 X10*3/UL (ref 0–0.1)
BASOPHILS NFR BLD AUTO: 0.4 %
BUN SERPL-MCNC: 26 MG/DL (ref 8–25)
BURR CELLS BLD QL SMEAR: NORMAL
CALCIUM SERPL-MCNC: 9 MG/DL (ref 8.5–10.4)
CHLORIDE SERPL-SCNC: 105 MMOL/L (ref 97–107)
CO2 SERPL-SCNC: 30 MMOL/L (ref 24–31)
CREAT SERPL-MCNC: 1.1 MG/DL (ref 0.4–1.6)
DACRYOCYTES BLD QL SMEAR: NORMAL
EGFRCR SERPLBLD CKD-EPI 2021: 49 ML/MIN/1.73M*2
EOSINOPHIL # BLD AUTO: 0.04 X10*3/UL (ref 0–0.4)
EOSINOPHIL NFR BLD AUTO: 0.7 %
ERYTHROCYTE [DISTWIDTH] IN BLOOD BY AUTOMATED COUNT: 18.6 % (ref 11.5–14.5)
GLUCOSE BLD MANUAL STRIP-MCNC: 114 MG/DL (ref 74–99)
GLUCOSE BLD MANUAL STRIP-MCNC: 145 MG/DL (ref 74–99)
GLUCOSE SERPL-MCNC: 112 MG/DL (ref 65–99)
HCT VFR BLD AUTO: 26.1 % (ref 36–46)
HCT VFR BLD AUTO: 28.9 % (ref 36–46)
HGB BLD-MCNC: 8 G/DL (ref 12–16)
HGB BLD-MCNC: 8.5 G/DL (ref 12–16)
IMM GRANULOCYTES # BLD AUTO: 0.05 X10*3/UL (ref 0–0.5)
IMM GRANULOCYTES NFR BLD AUTO: 0.9 % (ref 0–0.9)
LYMPHOCYTES # BLD AUTO: 0.8 X10*3/UL (ref 0.8–3)
LYMPHOCYTES NFR BLD AUTO: 14 %
MAGNESIUM SERPL-MCNC: 2 MG/DL (ref 1.6–3.1)
MCH RBC QN AUTO: 24.7 PG (ref 26–34)
MCHC RBC AUTO-ENTMCNC: 30.7 G/DL (ref 32–36)
MCV RBC AUTO: 81 FL (ref 80–100)
MONOCYTES # BLD AUTO: 0.57 X10*3/UL (ref 0.05–0.8)
MONOCYTES NFR BLD AUTO: 10 %
NEUTROPHILS # BLD AUTO: 4.23 X10*3/UL (ref 1.6–5.5)
NEUTROPHILS NFR BLD AUTO: 74 %
NRBC BLD-RTO: 0 /100 WBCS (ref 0–0)
OVALOCYTES BLD QL SMEAR: NORMAL
PHOSPHATE SERPL-MCNC: 4.2 MG/DL (ref 2.5–4.5)
PLATELET # BLD AUTO: 99 X10*3/UL (ref 150–450)
POTASSIUM SERPL-SCNC: 4.1 MMOL/L (ref 3.4–5.1)
RBC # BLD AUTO: 3.24 X10*6/UL (ref 4–5.2)
RBC MORPH BLD: NORMAL
SCHISTOCYTES BLD QL SMEAR: NORMAL
SODIUM SERPL-SCNC: 143 MMOL/L (ref 133–145)
SPHEROCYTES BLD QL SMEAR: NORMAL
T4 FREE SERPL-MCNC: 0.6 NG/DL (ref 0.9–1.7)
WBC # BLD AUTO: 5.7 X10*3/UL (ref 4.4–11.3)

## 2024-08-15 PROCEDURE — 83735 ASSAY OF MAGNESIUM: CPT

## 2024-08-15 PROCEDURE — 9420000001 HC RT PATIENT EDUCATION 5 MIN

## 2024-08-15 PROCEDURE — 2500000002 HC RX 250 W HCPCS SELF ADMINISTERED DRUGS (ALT 637 FOR MEDICARE OP, ALT 636 FOR OP/ED): Performed by: INTERNAL MEDICINE

## 2024-08-15 PROCEDURE — 85014 HEMATOCRIT: CPT | Performed by: STUDENT IN AN ORGANIZED HEALTH CARE EDUCATION/TRAINING PROGRAM

## 2024-08-15 PROCEDURE — 36415 COLL VENOUS BLD VENIPUNCTURE: CPT

## 2024-08-15 PROCEDURE — 2500000001 HC RX 250 WO HCPCS SELF ADMINISTERED DRUGS (ALT 637 FOR MEDICARE OP)

## 2024-08-15 PROCEDURE — 84439 ASSAY OF FREE THYROXINE: CPT | Performed by: REGISTERED NURSE

## 2024-08-15 PROCEDURE — 97110 THERAPEUTIC EXERCISES: CPT | Mod: GP,CQ

## 2024-08-15 PROCEDURE — RXMED WILLOW AMBULATORY MEDICATION CHARGE

## 2024-08-15 PROCEDURE — 80069 RENAL FUNCTION PANEL: CPT

## 2024-08-15 PROCEDURE — 2500000004 HC RX 250 GENERAL PHARMACY W/ HCPCS (ALT 636 FOR OP/ED)

## 2024-08-15 PROCEDURE — 94660 CPAP INITIATION&MGMT: CPT

## 2024-08-15 PROCEDURE — 82947 ASSAY GLUCOSE BLOOD QUANT: CPT

## 2024-08-15 PROCEDURE — 85025 COMPLETE CBC W/AUTO DIFF WBC: CPT

## 2024-08-15 PROCEDURE — 97116 GAIT TRAINING THERAPY: CPT | Mod: GP,CQ

## 2024-08-15 PROCEDURE — 2500000002 HC RX 250 W HCPCS SELF ADMINISTERED DRUGS (ALT 637 FOR MEDICARE OP, ALT 636 FOR OP/ED)

## 2024-08-15 PROCEDURE — 2500000005 HC RX 250 GENERAL PHARMACY W/O HCPCS: Performed by: INTERNAL MEDICINE

## 2024-08-15 PROCEDURE — 94640 AIRWAY INHALATION TREATMENT: CPT

## 2024-08-15 PROCEDURE — 36415 COLL VENOUS BLD VENIPUNCTURE: CPT | Performed by: REGISTERED NURSE

## 2024-08-15 PROCEDURE — 2500000001 HC RX 250 WO HCPCS SELF ADMINISTERED DRUGS (ALT 637 FOR MEDICARE OP): Performed by: INTERNAL MEDICINE

## 2024-08-15 RX ORDER — PANTOPRAZOLE SODIUM 40 MG/1
40 TABLET, DELAYED RELEASE ORAL 2 TIMES DAILY
Qty: 60 TABLET | Refills: 0 | Status: SHIPPED | OUTPATIENT
Start: 2024-08-15 | End: 2024-09-14

## 2024-08-15 RX ORDER — POLYETHYLENE GLYCOL 3350 17 G/17G
17 POWDER, FOR SOLUTION ORAL DAILY
Qty: 510 G | Refills: 0 | Status: SHIPPED | OUTPATIENT
Start: 2024-08-15

## 2024-08-15 RX ORDER — LEVOTHYROXINE SODIUM 25 UG/1
25 TABLET ORAL DAILY
Qty: 30 TABLET | Refills: 1 | Status: SHIPPED | OUTPATIENT
Start: 2024-08-16 | End: 2024-10-15

## 2024-08-15 ASSESSMENT — COGNITIVE AND FUNCTIONAL STATUS - GENERAL
MOVING TO AND FROM BED TO CHAIR: A LITTLE
WALKING IN HOSPITAL ROOM: A LITTLE
MOVING FROM LYING ON BACK TO SITTING ON SIDE OF FLAT BED WITH BEDRAILS: A LITTLE
MOBILITY SCORE: 18
TURNING FROM BACK TO SIDE WHILE IN FLAT BAD: A LITTLE
STANDING UP FROM CHAIR USING ARMS: A LITTLE
CLIMB 3 TO 5 STEPS WITH RAILING: A LITTLE

## 2024-08-15 ASSESSMENT — PAIN DESCRIPTION - LOCATION: LOCATION: FOOT

## 2024-08-15 ASSESSMENT — PAIN DESCRIPTION - ORIENTATION: ORIENTATION: LEFT

## 2024-08-15 ASSESSMENT — PAIN SCALES - GENERAL
PAINLEVEL_OUTOF10: 0 - NO PAIN
PAINLEVEL_OUTOF10: 2

## 2024-08-15 ASSESSMENT — PAIN DESCRIPTION - DESCRIPTORS: DESCRIPTORS: ACHING

## 2024-08-15 NOTE — DISCHARGE SUMMARY
Discharge Diagnosis  Gastrointestinal bleed    Issues Requiring Follow-Up  Cbc in 1 week to followup at PCP  PCP followup for CBC and TSH/T4    Discharge Meds     Your medication list        START taking these medications        Instructions Last Dose Given Next Dose Due   levothyroxine 25 mcg tablet  Commonly known as: Synthroid, Levoxyl  Start taking on: August 16, 2024      Take 1 tablet (25 mcg) by mouth early in the morning.. Take on an empty stomach at the same time each day, either 30 to 60 minutes prior to breakfast       polyethylene glycol 17 gram packet  Commonly known as: Glycolax, Miralax      Take 17 g by mouth once daily.              CONTINUE taking these medications        Instructions Last Dose Given Next Dose Due   albuterol 90 mcg/actuation inhaler           atorvastatin 40 mg tablet  Commonly known as: Lipitor      TAKE 1 TABLET BY MOUTH EVERY DAY AT BEDTIME       carvedilol 3.125 mg tablet  Commonly known as: Coreg      Take 1 tablet (3.125 mg) by mouth 2 times a day.       diclofenac sodium 1 % gel  Commonly known as: Voltaren      Apply 4.5 inches (4 g) topically 4 times a day.       loratadine 10 mg tablet  Commonly known as: Claritin           Multi Complete with Iron  mg-mcg tablet  Generic drug: multivitamin with minerals           nitroglycerin 0.4 mg SL tablet  Commonly known as: Nitrostat           nystatin 100,000 unit/gram powder  Commonly known as: Mycostatin      Apply 1 Application topically 2 times a day.       oxygen gas therapy  Commonly known as: O2           pantoprazole 40 mg EC tablet  Commonly known as: ProtoNix      Take 1 tablet (40 mg) by mouth 2 times a day.       rivaroxaban 20 mg tablet  Commonly known as: Xarelto      Take 1 tablet (20 mg) by mouth once daily in the evening. Take with meals. Take with food.       tiZANidine 2 mg tablet  Commonly known as: Zanaflex      Take 1 tablet (2 mg) by mouth every 8 hours if needed for muscle spasms.       torsemide 20  mg tablet  Commonly known as: Demadex      Take 1 tablet (20 mg) by mouth once daily. Do not fill before May 2, 2024.       Trelegy Ellipta 100-62.5-25 mcg blister with device  Generic drug: fluticasone-umeclidin-vilanter      Inhale 1 puff once daily.                 Where to Get Your Medications        These medications were sent to [facility]  [id number] [address] [facility] [id number]      Hours: 9 AM to 6 PM Mon-Fri, 9 AM to 1 PM Sat Phone: [phone]   levothyroxine 25 mcg tablet  pantoprazole 40 mg EC tablet  polyethylene glycol 17 gram packet         Test Results Pending At Discharge  Pending Labs       No current pending labs.            Hospital Course   Anitha Welch is a 84 y.o. year old female patient with Past Medical History of COPD on 2 liters oxygen  ( prn in AM and PM), CAD s/p stent, Ischemic CMP, CVA with mild left sided weakness, LLE DVT on xarelto, and recent Mitral valve repair ARMANI on 4/25/24 who is presenting to [facility] ED with c/o general weakness and SOB that has been going on the past 2 weeks.      ER workup: HR 83/min, /82, Sats 95% on 2 liters oxygen, RR 20/min and Temp of 36.8. Labs significant for HB of 5.3, hematocrit 19. ( Base line HB 8.1 and Hematocrit 30.7) . Urea elevated to 27, and creatinine is 1.3. Sodium is elevated to 150.   She is admitted to medicine, step down unit for Acute anemia of blood loss. 2 units of PRBC transfusion are planned in the ED     Patient was then admitted to the step down unit. While on the unit she started having increasing WOB. He O2 dropped to 78% and she went from 2L to 6L O2. She was still tachypneic and increased WOB so she was transitioned to high jose j and 80 of lasix. Due to  resp status patient transferred to ICU briefly for flash pulm edema 2/2 transfusion where was stabilization on bipap & diuressi. Repeat ECHO 8/9/24 with normal EF 60-65%. Moderate elevated rvsp/phtn. Transferred back to floor 8/10    GI saw and  "completed EGD with no acute findings, Continued on PPI BID . Xarelto held briefly. Per GI \"Patient's hemoglobin remains stable at 9.2.  There is no obvious signs or symptoms of active bleeding at this time.  Instructed patient to have outpatient colonoscopy.  If patient has a significant drop in H&H or develops active bleeding can reconsult\" Pt hgb trended and stable, Xarelto restarted later in hospitalization and hgb stable still. No melena/GI bleed, at time of discharge hgb stable at her baseline at 8.5.    EP saw pt day prior to discharge for some tachycardia, this was deemed \" Electrophysiology consult supports the diagnosis of paroxysmal atrial tachycardia with aberrant conduction, not ventricular tachycardia. No other cardiac issues were testing is necessary at this time and she is stable for discharge from a cardiac standpoint. We will follow her with you as needed and call with questions if necessary. \"    Pt doing well and per request, discharged back home where stays with family  TSH abnl while admitted and was started on low dose levothyroxine 25mcg daily with plan for pcp followup for reassessment  To repeat CBC also in 1 week and followup at PCP in 1 week  Ordered to continue PPI BID for 30 days with GI followup outpt- appt requested  If has any new signs of GI bleed to return to ED  Euvolumic at discharge - and on her baseline 2L with no complaints or resp distress. Hgb 8.5    Pertinent Physical Exam At Time of Discharge  Physical Exam  Pt is NAD, A0x2-3, chatty  Cooperative with exam.  In no distress at rest.  On 2 L of oxygen per nasal cannula which is her baseline  A, Ox3.  Face is symmetrical.  Skin - no lesions.  Lungs: clear to auscultations B/L. No wheezes, rales, rhonchi.  Heart: regular S1S2.  Abdomen: soft, NT, ND. BS positive.  Extr.: no edema, cords, cyanosis.  Moves all extr.     Outpatient Follow-Up  Future Appointments   Date Time Provider Department Center   8/22/2024 10:45 AM Jeison MCKEON" MD [facility] [id number] [facility]   8/22/2024  2:30 PM Milana Hernadez APRN-CNP [id number] [facility]   9/19/2024  1:00 PM Felisha Menendez MD, MS [id number] [facility]   4/25/2025  8:00 AM  MAC [id number] [id number] [facility]         Cydney Burdick MD MPH

## 2024-08-15 NOTE — HH CARE COORDINATION
Home Care received a Referral for Nursing, Physical Therapy, Occupational Therapy, and Home Health Aide. We have processed the referral for a Start of Care on 08/17.     If you have any questions or concerns, please feel free to contact us at [phone]. Follow the prompts, enter your five digit zip code, and you will be directed to your care team on EAST 1.

## 2024-08-15 NOTE — NURSING NOTE
Assumed care of pt.  Bedside report received.  Pt in bed resting.  Call light and patient belongings within reach. Bed low and locked.

## 2024-08-15 NOTE — PROGRESS NOTES
Physical Therapy    Physical Therapy Treatment    Patient Name: Anitha Welch  MRN: [id number]  Today's Date: 8/15/2024  Time Calculation  Start Time: 1422  Stop Time: 1445  Time Calculation (min): 23 min    Assessment/Plan   PT Assessment  End of Session Communication: Bedside nurse  Assessment Comment: Improved ambulation distances, pt continues to present with mild strength and balance deficits.  End of Session Patient Position: Up in chair, Alarm on  PT Plan  Inpatient/Swing Bed or Outpatient: Inpatient  PT Plan  Treatment/Interventions: Bed mobility, Transfer training, Gait training, Stair training, Balance training, Strengthening, Endurance training, Therapeutic exercise, Therapeutic activity  PT Plan: Ongoing PT  PT Frequency: 5 times per week  PT Discharge Recommendations: Moderate intensity level of continued care  Equipment Recommended upon Discharge: Wheeled walker  PT Recommended Transfer Status: Contact guard, Assistive device (FWW)  PT - OK to Discharge: Yes      General Visit Information:   PT  Visit  PT Received On: 08/15/24  General  Family/Caregiver Present: Yes  Caregiver Feedback: Spouse present  Prior to Session Communication: Bedside nurse  Patient Position Received: Bed, 3 rail up, Alarm on  General Comment: Cleared by nursing to be seen for therapy, pt agreeable with tx, supine in bed upon arrival.    Subjective   Precautions:  Precautions  Medical Precautions: Fall precautions, Oxygen therapy device and L/min (2L)       Objective   Pain:  Pain Assessment  Pain Assessment: 0-10  0-10 (Numeric) Pain Score: 0 - No pain    Cognition:  WFL     Postural Control:  Static Sitting Balance  Static Sitting-Balance Support: Feet supported  Static Sitting-Level of Assistance: Independent  Static Sitting-Comment/Number of Minutes: Good seated static balance.  Static Standing Balance  Static Standing-Balance Support: Bilateral upper extremity supported  Static Standing-Level of Assistance: Contact  guard  Static Standing-Comment/Number of Minutes: Fair+ static standing balance with bilateral UE support on walker.    Treatments:  Therapeutic Exercise  Therapeutic Exercise Performed: Yes  Therapeutic Exercise Activity 1: Bilateral ankle pumps x15  Therapeutic Exercise Activity 2: Bilateral hip flexion x15  Therapeutic Exercise Activity 3: Bilateral knee extension x15    Bed Mobility  Bed Mobility: Yes  Bed Mobility 1  Bed Mobility 1: Supine to sitting  Level of Assistance 1: Close supervision    Ambulation/Gait Training  Ambulation/Gait Training Performed: Yes  Ambulation/Gait Training 1  Surface 1: Level tile  Device 1: Rolling walker  Assistance 1: Contact guard, Minimal verbal cues  Quality of Gait 1: Decreased step length, Diminished heel strike, Narrow base of support  Comments/Distance (ft) 1: 60' with wheeled walker, cues to remain in JAZIEL of walker, CGA for safety.    Transfers  Transfer: Yes  Transfer 1  Transfer From 1: Sit to  Transfer to 1: Stand  Technique 1: Sit to stand, Stand to sit  Transfer Device 1: Walker  Transfer Level of Assistance 1: Contact guard, Minimal verbal cues  Trials/Comments 1: Cues for proper hand placement.    Outcome Measures:  [facility] Basic Mobility  Turning from your back to your side while in a flat bed without using bedrails: A little  Moving from lying on your back to sitting on the side of a flat bed without using bedrails: A little  Moving to and from bed to chair (including a wheelchair): A little  Standing up from a chair using your arms (e.g. wheelchair or bedside chair): A little  To walk in hospital room: A little  Climbing 3-5 steps with railing: A little  Basic Mobility - Total Score: 18    Encounter Problems       Encounter Problems (Active)       Mobility       Bed mobility including supine to sit and sit to supine with supervision. (Met)       Start:  08/11/24    Expected End:  08/25/24    Resolved:  08/15/24         Ambulate 60 feet with rolling walker and  supervision. (Progressing)       Start:  08/11/24    Expected End:  08/25/24            Negotiate 3 steps with single handrail and mod assist. (Not Progressing)       Start:  08/11/24    Expected End:  08/25/24               PT Transfers       Transfers including sit to supine and stand to sit with supervision. (Progressing)       Start:  08/11/24    Expected End:  08/25/24

## 2024-08-15 NOTE — CARE PLAN
The patient's goals for the shift include  go home    The clinical goals for the shift include no signs of bleeding

## 2024-08-16 ENCOUNTER — DOCUMENTATION (OUTPATIENT)
Dept: PRIMARY CARE | Facility: CLINIC | Age: 84
End: 2024-08-16
Payer: MEDICARE

## 2024-08-16 DIAGNOSIS — I10 ESSENTIAL (PRIMARY) HYPERTENSION: ICD-10-CM

## 2024-08-18 RX ORDER — CARVEDILOL 3.12 MG/1
3.25 TABLET ORAL 2 TIMES DAILY
Qty: 180 TABLET | Refills: 3 | Status: SHIPPED | OUTPATIENT
Start: 2024-08-18 | End: 2025-08-13

## 2024-08-21 ENCOUNTER — TELEPHONE (OUTPATIENT)
Dept: PRIMARY CARE | Facility: CLINIC | Age: 84
End: 2024-08-21
Payer: MEDICARE

## 2024-08-21 NOTE — TELEPHONE ENCOUNTER
Call placed to patient number as listed, Jose answers line, appointment confirmed, COVID Screening negative and verbal consent obtained.

## 2024-08-22 ENCOUNTER — APPOINTMENT (OUTPATIENT)
Dept: PAIN MEDICINE | Facility: CLINIC | Age: 84
End: 2024-08-22
Payer: MEDICARE

## 2024-08-22 ENCOUNTER — OFFICE VISIT (OUTPATIENT)
Dept: PRIMARY CARE | Facility: CLINIC | Age: 84
End: 2024-08-22
Payer: MEDICARE

## 2024-08-22 VITALS
HEART RATE: 84 BPM | SYSTOLIC BLOOD PRESSURE: 96 MMHG | WEIGHT: 140 LBS | RESPIRATION RATE: 16 BRPM | DIASTOLIC BLOOD PRESSURE: 60 MMHG | BODY MASS INDEX: 23.32 KG/M2 | HEIGHT: 65 IN

## 2024-08-22 VITALS
SYSTOLIC BLOOD PRESSURE: 110 MMHG | WEIGHT: 140 LBS | TEMPERATURE: 98 F | HEART RATE: 82 BPM | RESPIRATION RATE: 18 BRPM | DIASTOLIC BLOOD PRESSURE: 60 MMHG | BODY MASS INDEX: 23.32 KG/M2 | HEIGHT: 65 IN | OXYGEN SATURATION: 100 %

## 2024-08-22 DIAGNOSIS — R53.1 WEAKNESS: ICD-10-CM

## 2024-08-22 DIAGNOSIS — M48.062 SPINAL STENOSIS OF LUMBAR REGION WITH NEUROGENIC CLAUDICATION: Primary | ICD-10-CM

## 2024-08-22 DIAGNOSIS — E03.9 ACQUIRED HYPOTHYROIDISM: ICD-10-CM

## 2024-08-22 DIAGNOSIS — D50.0 ANEMIA DUE TO BLOOD LOSS: ICD-10-CM

## 2024-08-22 DIAGNOSIS — M79.604 RIGHT LEG PAIN: ICD-10-CM

## 2024-08-22 DIAGNOSIS — K92.2 GASTROINTESTINAL HEMORRHAGE, UNSPECIFIED GASTROINTESTINAL HEMORRHAGE TYPE: ICD-10-CM

## 2024-08-22 DIAGNOSIS — M79.89 LEFT LEG SWELLING: Primary | ICD-10-CM

## 2024-08-22 DIAGNOSIS — J44.9 CHRONIC OBSTRUCTIVE PULMONARY DISEASE, UNSPECIFIED COPD TYPE (MULTI): ICD-10-CM

## 2024-08-22 DIAGNOSIS — M79.605 LEFT LEG PAIN: ICD-10-CM

## 2024-08-22 PROCEDURE — 1160F RVW MEDS BY RX/DR IN RCRD: CPT | Performed by: NURSE PRACTITIONER

## 2024-08-22 PROCEDURE — 99349 HOME/RES VST EST MOD MDM 40: CPT | Performed by: NURSE PRACTITIONER

## 2024-08-22 PROCEDURE — 3074F SYST BP LT 130 MM HG: CPT | Performed by: PHYSICAL MEDICINE & REHABILITATION

## 2024-08-22 PROCEDURE — 3078F DIAST BP <80 MM HG: CPT | Performed by: NURSE PRACTITIONER

## 2024-08-22 PROCEDURE — 3074F SYST BP LT 130 MM HG: CPT | Performed by: NURSE PRACTITIONER

## 2024-08-22 PROCEDURE — 1125F AMNT PAIN NOTED PAIN PRSNT: CPT | Performed by: PHYSICAL MEDICINE & REHABILITATION

## 2024-08-22 PROCEDURE — 1123F ACP DISCUSS/DSCN MKR DOCD: CPT | Performed by: NURSE PRACTITIONER

## 2024-08-22 PROCEDURE — 1157F ADVNC CARE PLAN IN RCRD: CPT | Performed by: NURSE PRACTITIONER

## 2024-08-22 PROCEDURE — 1111F DSCHRG MED/CURRENT MED MERGE: CPT | Performed by: NURSE PRACTITIONER

## 2024-08-22 PROCEDURE — 1123F ACP DISCUSS/DSCN MKR DOCD: CPT | Performed by: PHYSICAL MEDICINE & REHABILITATION

## 2024-08-22 PROCEDURE — 1126F AMNT PAIN NOTED NONE PRSNT: CPT | Performed by: NURSE PRACTITIONER

## 2024-08-22 PROCEDURE — 1157F ADVNC CARE PLAN IN RCRD: CPT | Performed by: PHYSICAL MEDICINE & REHABILITATION

## 2024-08-22 PROCEDURE — 99204 OFFICE O/P NEW MOD 45 MIN: CPT | Performed by: PHYSICAL MEDICINE & REHABILITATION

## 2024-08-22 PROCEDURE — 1111F DSCHRG MED/CURRENT MED MERGE: CPT | Performed by: PHYSICAL MEDICINE & REHABILITATION

## 2024-08-22 PROCEDURE — 1159F MED LIST DOCD IN RCRD: CPT | Performed by: NURSE PRACTITIONER

## 2024-08-22 PROCEDURE — 3078F DIAST BP <80 MM HG: CPT | Performed by: PHYSICAL MEDICINE & REHABILITATION

## 2024-08-22 RX ORDER — GABAPENTIN 100 MG/1
100 CAPSULE ORAL NIGHTLY
Qty: 30 CAPSULE | Refills: 2 | Status: SHIPPED | OUTPATIENT
Start: 2024-08-22 | End: 2024-11-20

## 2024-08-22 ASSESSMENT — PAIN SCALES - GENERAL
PAINLEVEL: 0-NO PAIN
PAINLEVEL: 10-WORST PAIN EVER

## 2024-08-22 NOTE — PROGRESS NOTES
Subjective   Patient ID: Anitha Welch is a 84 y.o. female who presents for Leg Pain.  HPI  Patient appears to be a poor historian.  She is present with her son, who is also her primary caregiver.  Patient also is on 2 L of home oxygen for several years now.    She complains of low back pain that is dull aching in nature, as well as bilateral leg pain that is also aching. Her thigh is not involved. She states the pain is worse with standing and with walking, but better with sitting and laying down. She has not attempted physical therapy, and is not currently on any medication for her pain.     Oswestry disability index score: 34%    Review of Systems     Current Outpatient Medications   Medication Instructions    albuterol 90 mcg/actuation inhaler 1 puff, inhalation, Every 4 hours PRN    atorvastatin (LIPITOR) 40 mg, oral, Nightly    carvedilol (COREG) 3.125 mg, oral, 2 times daily    diclofenac sodium (VOLTAREN) 4 g, Topical, 4 times daily    gabapentin (NEURONTIN) 100 mg, oral, Nightly    levothyroxine (SYNTHROID, LEVOXYL) 25 mcg, oral, Daily, Take on an empty stomach at the same time each day, either 30 to 60 minutes prior to breakfast    loratadine (CLARITIN) 10 mg, oral, Daily    multivitamin-iron-folic acid (Multi Complete with Iron)  mg-mcg tablet tablet 1 tablet, oral, Daily    nitroglycerin (Nitrostat) 0.4 mg SL tablet Place 1 tablet (0.4 mg) under the tongue every 5 minutes if needed.    nystatin (Mycostatin) 100,000 unit/gram powder 1 Application, Topical, 2 times daily    oxygen (O2) 2 L/min, inhalation, Continuous    pantoprazole (PROTONIX) 40 mg, oral, 2 times daily    polyethylene glycol (Glycolax, Miralax) 17 gram/dose powder mix 17 grams (1 capful) in 8 ounces of water and drink daily    rivaroxaban (XARELTO) 20 mg, oral, Daily with evening meal, Take with food.    tiZANidine (ZANAFLEX) 2 mg, oral, Every 8 hours PRN    torsemide (DEMADEX) 20 mg, oral, Daily    Trelegy Ellipta 100-62.5-25 mcg  blister with device 1 puff, inhalation, Daily        Past Medical History:   Diagnosis Date    AAA (abdominal aortic aneurysm) (CMS-[facility])     Acute urinary tract infection 04/20/2023    Anemia     Anxiety     Arthritis     CHF (congestive heart failure) (Multi)     Chronic pain disorder     back pain    CKD (chronic kidney disease)     Cognitive decline     COPD (chronic obstructive pulmonary disease) (Multi)     oxygen dependent- wears 2L NC continuously    Coronary artery disease     s/p stent    CVA (cerebral vascular accident) (Multi)     weaker on the left side    Deep vein thrombosis (DVT) of calf (Multi)     left    Depression     Disease of thyroid gland     Diverticulosis     DVT (deep venous thrombosis) (Multi)     left leg, on xarelto    Easy bruising     Epistaxis     GERD (gastroesophageal reflux disease)     managed with protonix    History of blood transfusion 2023    History of degenerative disc disease     Hyperlipidemia     Hypertension     Hypothyroidism     Ischemic cardiomyopathy     Meralgia paresthetica     Nonrheumatic mitral valve regurgitation     Osteoarthritis     Osteopenia 2010    hip - DEXA    Plantar fasciitis     RLS (restless legs syndrome)     Sciatica     Spinal stenosis     ST elevation (STEMI) myocardial infarction (Multi)         Past Surgical History:   Procedure Laterality Date    ADENOIDECTOMY      ANOMALOUS PULMONARY VENOUS RETURN REPAIR, 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    Procedure: Mitral-ARMANI (Transcatheter Edge to Edge Repair);  Surgeon: Kyle Bernardo MD;  Location: [address] Cardiac Cath Lab;  Service: Cardiovascular;  Laterality: N/A;  SAMMY Elgendy.Labs with cPMMaynard,Schedule at 7;30am    CARDIAC CATHETERIZATION  10/2019    CARDIAC CATHETERIZATION N/A 02/16/2024    Procedure: Left And Right Heart Cath, With LV;  Surgeon: Tuan Foss DO;  Location: [facility] Cardiac Cath Lab;  Service: Cardiovascular;  Laterality: N/A;  no pre auth needed    CATARACT EXTRACTION Bilateral  "11/13/2012    CHOLECYSTECTOMY  1996    laparoscopic    COLONOSCOPY      Dr. Rodriguez    CORONARY STENT PLACEMENT      CYST REMOVAL Right 06/24/2013    Excision of Sebaceous cyst right axilla    DILATION AND CURETTAGE OF UTERUS      ESOPHAGOGASTRODUODENOSCOPY      KNEE ARTHROPLASTY Left     MR HEAD ANGIO WO IV CONTRAST  02/24/2017    MR HEAD ANGIO WO IV CONTRAST [facility]    MR HEAD ANGIO WO IV CONTRAST  08/11/2017    MR HEAD ANGIO WO IV CONTRAST [facility]    MR HEAD ANGIO WO IV CONTRAST  02/10/2019    MR HEAD ANGIO WO IV CONTRAST [facility]    OTHER SURGICAL HISTORY  01/09/2019    Stent synergy    OTHER SURGICAL HISTORY  01/09/2019    Stent synergy    CT ARTHRP ACETBLR/PROX FEM PROSTC AGRFT/ALGRFT      SURGICAL SAMMY MONITORING      THYROIDECTOMY, PARTIAL Bilateral 04/17/2013    subtotal thyroidectomy    TONSILLECTOMY      TOTAL HIP ARTHROPLASTY Right 2006    UPPER GASTROINTESTINAL ENDOSCOPY  03/2019    Dr. Galan        Family History   Problem Relation Name Age of Onset    Hyperthyroidism Mother          Treated with radioactive iodine    Hypertension Mother      Heart disease Mother      Hyperthyroidism Sibling      Diabetes Father's Sister          Allergies   Allergen Reactions    Amoxicillin Anaphylaxis and Shortness of breath    Iodinated Contrast Media Dizziness and Other     Increased BP    Increase BP, dizziness    Ciprofloxacin Rash and Swelling     Decreased BP    Influenza Virus Vaccines Other     \"sick\" for 24 hours afterwards    Flu Vac 2023 [id number](Pf) Unknown    Diphenhydramine Rash    Other Itching     Fluzone inj high dose        Objective     Vitals:    08/22/24 1129   BP: 96/60   Pulse: 84   Resp: 16        Physical Exam    GENERAL EXAM  Vital Signs: Vital signs to include heart rate, respiration rate, blood pressure, and temperature were reviewed.  General Appearance:  Awake, alert, healthy appearing, well developed, No acute distress.  Head: Normocephalic " without evidence of head injury.  Neck: The appearance of the neck was normal without swelling with a midline trachea.  Eyes: The eyelids and eyebrows exhibited no abnormalities.  Pupils were not pin-point.  Sclera was without icterus.  Lungs: Respiration rhythm and depth was normal.  Respiratory movements were normal without labored breathing.  Cardiovascular: No peripheral edema was present.    Neurological: Patient was oriented to time, place, and person.  Speech was normal.  Balance, gait, and stance were unremarkable.    Psychiatric: Appearance was normal with appropriate dress.  Mood was euthymic and affect was normal.  Skin: Affected regions were without ecchymosis or skin lesions.    Back:   Palpation: Point tenderness over lumbar paraspinous muscles.    Straight leg raise: Does not reproduce their pain, bilaterally.  SI Joint: Tenderness to palpation over SI joint, on the left side. No provocative test performed, due to patient discomfort.    Hip: No pain over greater trochanters, Pain not reproduced with hip internal/external rotation.     Neurologic:   Cranial nerves grossly intact.   Strength: 5/5 and symmetric throughout.   Sensation: Normal to light touch and pinprick intact throughout.  DTRs: 2+ throughout and symmetric at biceps/brachioradialis/patella/achilles    Physical exam as above except:    Assessment/Plan   Diagnoses and all orders for this visit:  Spinal stenosis of lumbar region with neurogenic claudication  -     gabapentin (Neurontin) 100 mg capsule; Take 1 capsule (100 mg) by mouth once daily at bedtime.  -     Referral to Physical Therapy; Future  Left leg pain  -     Referral to Pain Medicine  Right leg pain  -     Referral to Pain Medicine    Ms. Anitha Welch is an 84-year-old female with a history of a complicated cardiac history including severe MVR s/p mitral valve clip implant now on anticoagulation, chronic low back pain, who presents to us for evaluation of her back pain.  Given  her history of back pain and bilateral leg pain that is worse with standing and walking, patient likely has neurogenic claudication due to lumbar spinal stenosis.  CT lumbar spine did show moderate multilevel lumbar spondylosis and facet arthropathy at L2-3/L3-4/L4-5.  Physical exam was significant for left SI joint tenderness on palpation, although provocative exams were not performed due to patient discomfort, as well as lumbar paraspinal tenderness.    Our plan is as follows:  - Patient referred to physical therapy for low back pain.  - Start gabapentin 100 mg nightly.  GFR 49 so we will be careful about titrating up.  - It was noted that the patient did have a larger left lower extremity that was warm to the touch, and did have tenderness to the left calf that was not present in the right.  Patient and her son were advised to have the left lower extremity evaluated with concerns for DVT.  On chart review, it does appear that the patient has had a bilateral lower extremity vascular ultrasounds that were negative 7/22/2024,  and she is on anticoagulation as mentioned earlier in the note.       This note was generated with the aid of dictation software, there may be typos despite my attempts at proofreading.     I saw and evaluated the patient. I personally obtained the key and critical portions of the history and physical exam or was physically present for key and critical portions performed by the resident/fellow. I reviewed the resident/fellow's documentation and discussed the patient with the resident/fellow. I agree with the resident/fellow's medical decision making as documented in the note.    Jeison Nascimento MD

## 2024-08-22 NOTE — PROGRESS NOTES
"Subjective   Patient ID: Anitha Welch is a 84 y.o. female who presents for Hospital Follow-up (Anemia, weakness ).    Visit for 83 y/o female seen today in private home, accompanied by spouse and son Jose for Hospital follow up. Pt arrived home just prior to provider arrival. She established care with pain management this afternoon and is sitting at the dining room table eating lunch. Pt is alert but is an overall poor historian regarding her health so her spouse and son supplement most of the health history.     Hospital Course: Anitha Welch is a 84 y.o. year old female patient with Past Medical History of COPD on 2 liters oxygen  ( prn in AM and PM), CAD s/p stent, Ischemic CMP, CVA with mild left sided weakness, LLE DVT on xarelto, and recent Mitral valve repair ARMANI on 4/25/24 who is presenting to [facility] ED with c/o general weakness and SOB that has been going on the past 2 weeks. ER workup: HR 83/min, /82, Sats 95% on 2 liters oxygen, RR 20/min and Temp of 36.8. Labs significant for HB of 5.3, hematocrit 19. ( Base line HB 8.1 and Hematocrit 30.7) . Urea elevated to 27, and creatinine is 1.3. Sodium is elevated to 150. She is admitted to medicine, step down unit for Acute anemia of blood loss. 2 units of PRBC transfusion are planned in the ED. Patient was then admitted to the step down unit. While on the unit she started having increasing WOB. He O2 dropped to 78% and she went from 2L to 6L O2. She was still tachypneic and increased WOB so she was transitioned to high jose j and 80 of lasix. Due to  resp status patient transferred to ICU briefly for flash pulm edema 2/2 transfusion where was stabilization on bipap & diuresis. Repeat ECHO 8/9/24 with normal EF 60-65%. Moderate elevated rvsp/phtn. Transferred back to floor 8/10. GI saw and completed EGD with no acute findings, Continued on PPI BID . Xarelto held briefly. Per GI \"Patient's hemoglobin remains stable at 9.2.  There is no obvious signs or " "symptoms of active bleeding at this time.  Instructed patient to have outpatient colonoscopy. If patient has a significant drop in H&H or develops active bleeding can reconsult\" Pt hgb trended and stable, Xarelto restarted later in hospitalization and hgb stable still. No melena/GI bleed, at time of discharge hgb stable at her baseline at 8.5. TSH abnl while admitted and was started on low dose levothyroxine 25mcg daily with plan for pcp follow up for reassessment.     Pt was hospitalized at [facility] from 8/8-8/15. She reports that she has been tired since discharging home but doing well overall. Her appetite is \"good\". She endorses intermittent abdominal discomfort, constipation. Pt was discharged on Miralax but pt is not taking routinely and did not take today. She has not scheduled an appointment with GI for outpatient colonoscopy. Pt denies any obvious signs of bleeding since discharge home. She was having issues with epistaxis but started using saline nasal gel and this has improved. She is oxygen dependent, 2L NC and frequently reports a dry nose. Pt denies increased shortness of breath, cough or wheezing. Pt has LLE edema, chronically worse than right. She saw pain management this afternoon and was recommended to have a repeat ultrasound due to calf tenderness, worsening pain. Pts son Jose thought that her Xarelto was discontinued and has not been giving it despite it being restarted prior to discharge home. Pt is not wearing her compression stockings today. She remains ambulatory. She will mostly furniture walk. Denies recent fall or injury. She is scheduled to establish with [facility] tomorrow.     Home Visit:         Medically necessary due to: Illness or condition that results in activity lmitation or restriction that impacts the ability to leave home such as:, Illness or condition that results in activity lmitation or restriction that impacts the ability to leave home such as:, unsteady gait/poor " balance.         Current Outpatient Medications:     albuterol 90 mcg/actuation inhaler, Inhale 1 puff every 4 hours if needed., Disp: , Rfl:     atorvastatin (Lipitor) 40 mg tablet, TAKE 1 TABLET BY MOUTH EVERY DAY AT BEDTIME, Disp: 90 tablet, Rfl: 3    carvedilol (Coreg) 3.125 mg tablet, TAKE 1 TABLET (3.125 MG) BY MOUTH 2 TIMES A DAY., Disp: 180 tablet, Rfl: 3    diclofenac sodium (Voltaren) 1 % gel, Apply 4.5 inches (4 g) topically 4 times a day., Disp: 100 g, Rfl: 1    gabapentin (Neurontin) 100 mg capsule, Take 1 capsule (100 mg) by mouth once daily at bedtime., Disp: 30 capsule, Rfl: 2    levothyroxine (Synthroid, Levoxyl) 25 mcg tablet, Take 1 tablet (25 mcg) by mouth early in the morning.. Take on an empty stomach at the same time each day, either 30 to 60 minutes prior to breakfast, Disp: 30 tablet, Rfl: 1    loratadine (Claritin) 10 mg tablet, Take 1 tablet (10 mg) by mouth once daily., Disp: , Rfl:     multivitamin-iron-folic acid (Multi Complete with Iron)  mg-mcg tablet tablet, Take 1 tablet by mouth once daily., Disp: , Rfl:     nitroglycerin (Nitrostat) 0.4 mg SL tablet, Place 1 tablet (0.4 mg) under the tongue every 5 minutes if needed., Disp: , Rfl:     nystatin (Mycostatin) 100,000 unit/gram powder, Apply 1 Application topically 2 times a day., Disp: 30 g, Rfl: 0    oxygen (O2) gas therapy, Inhale 2 L/min continuously. Indications: sob, Disp: , Rfl:     pantoprazole (ProtoNix) 40 mg EC tablet, Take 1 tablet (40 mg) by mouth 2 times a day., Disp: 60 tablet, Rfl: 0    polyethylene glycol (Glycolax, Miralax) 17 gram/dose powder, mix 17 grams (1 capful) in 8 ounces of water and drink daily, Disp: 510 g, Rfl: 0    rivaroxaban (Xarelto) 20 mg tablet, Take 1 tablet (20 mg) by mouth once daily in the evening. Take with meals. Take with food., Disp: 30 tablet, Rfl: 11    tiZANidine (Zanaflex) 2 mg tablet, Take 1 tablet (2 mg) by mouth every 8 hours if needed for muscle spasms., Disp: 30 tablet, Rfl:  "0    torsemide (Demadex) 20 mg tablet, Take 1 tablet (20 mg) by mouth once daily. Do not fill before May 2, 2024., Disp: , Rfl:     Dayron High 100-62.5-25 mcg blister with device, Inhale 1 puff once daily., Disp: 1 each, Rfl: 3     Review of Systems  Constitutional: Negative for appetite changes, fever, chills, unexplained weight loss.   HENT:  Negative for trouble swallowing.    Respiratory:  Positive for shortness of breath with exertion, oxygen dependent. Negative for cough, wheezing.    Cardiovascular: Negative for chest pain, palpitations.   Gastrointestinal: Positive for constipation. Negative for abdominal for pain, diarrhea, nausea and vomiting.   Endocrine: Positive for thyroid disease  Genitourinary:  Negative for difficulty urinating.   Musculoskeletal:  Positive for gait problem, left leg pain  Neurological:  Positive for prior stroke, left sided neuropathy. Negative for dizziness and light-headedness.   Psychiatric/Behavioral: Positive for anxiety    Objective   /60 (BP Location: Right arm, Patient Position: Sitting, BP Cuff Size: Adult)   Pulse 82   Temp 36.7 °C (98 °F) (Temporal)   Resp 18   Ht 1.651 m (5' 5\")   Wt 63.5 kg (140 lb)   SpO2 100%   BMI 23.30 kg/m²     Physical Exam  Physical Exam     General: No acute distress     Appearance: She is alert, chronically ill. Nontoxic.  HENT:      Head: Normocephalic and atraumatic.      Nose: No congestion or rhinorrhea.      Mouth/Throat:      Mouth: Mucous membranes are moist.      Pharynx: Oropharynx is clear.   Eyes:      General: No scleral icterus.        Right eye: No discharge.         Left eye: No discharge.      Extraocular Movements: Extraocular movements intact.   Neck:      Vascular: No carotid bruit.      Comments: No obvious JVD  Cardiovascular:      Rate and Rhythm: Normal rate.      Pulses: Normal pulses.      No friction rub. No gallop.   Pulmonary:      Breath sounds: Lungs clear.      Comments: No wheezing, rales or " rhonchi. Oxygen 2L NC  Abdominal:      General: There is no distension.      Tenderness: There is no abdominal tenderness.   Musculoskeletal:      Cervical back Neck supple. No rigidity.      Right lower leg: Trace edema     Left lower le+ LE edema  Lymphadenopathy:      Cervical: No cervical adenopathy.   Skin:     Capillary Refill: Capillary refill takes less than 2 seconds.   Neurological:      General: No focal deficit present.      Mental Status: She is alert. Mental status is at baseline.     Cranial Nerves: No cranial nerve deficit.      Motor: generalized weakness  Psychiatric:         Behavior: Behavior normal.         Thought Content: Thought content normal.         Judgment: Judgment normal.     Assessment/Plan   Diagnoses and all orders for this visit:  Left leg swelling  -     Lower extremity venous duplex left; Future  -chronic, worsening calf pain  -discussed restarting xarelto immediately, [facility] nurse to obtain CBC previously ordered in EPIC     Gastrointestinal hemorrhage, unspecified gastrointestinal hemorrhage type  Anemia due to blood loss  -status post hospitalization with improvement   -continue MVI with iron  -pt to have repeat CBC, order in EPIC    Acquired hypothyroidism  -acute, TSH elevated, started on Levothyroxine   -will repeat lab in 6-8 weeks     Chronic obstructive pulmonary disease, unspecified COPD type (Multi)  -chronic, oxygen dependent   -continue to use nasal saline gel as needed to minimize nose bleeds   -continue trelegy ellipta, albuterol inhaler as needed    Weakness  -chronic, high fall risk  -pt to have home PT/OT for increased strength and conditioning     Patient delicately stable. She is scheduled to start with [facility] tomorrow. Will be evaluated for PT/OT and have SN. Order for venous doppler of LLE entered in [facility]. I gave patients son Jose contact information for [facility] to schedule patients outpatient colonoscopy. Advised pt to start using her  Miralax daily for constipation in addition to her Docusate Sodium. Will follow up with pt in 4 weeks as she is high risk for rehospitalization.       Milana Hernadez, APRN-CNP

## 2024-08-23 ENCOUNTER — HOME CARE VISIT (OUTPATIENT)
Dept: HOME HEALTH SERVICES | Facility: HOME HEALTH | Age: 84
End: 2024-08-23
Payer: MEDICARE

## 2024-08-23 VITALS
WEIGHT: 140 LBS | HEART RATE: 70 BPM | TEMPERATURE: 98.7 F | DIASTOLIC BLOOD PRESSURE: 60 MMHG | HEIGHT: 65 IN | OXYGEN SATURATION: 98 % | RESPIRATION RATE: 20 BRPM | SYSTOLIC BLOOD PRESSURE: 106 MMHG | BODY MASS INDEX: 23.32 KG/M2

## 2024-08-23 PROCEDURE — G0152 HHCP-SERV OF OT,EA 15 MIN: HCPCS | Mod: HHH

## 2024-08-23 PROCEDURE — 169592 NO-PAY CLAIM PROCEDURE

## 2024-08-23 PROCEDURE — G0299 HHS/HOSPICE OF RN EA 15 MIN: HCPCS | Mod: HHH

## 2024-08-23 SDOH — ECONOMIC STABILITY: HOUSING INSECURITY: EVIDENCE OF SMOKING MATERIAL: 0

## 2024-08-23 ASSESSMENT — ACTIVITIES OF DAILY LIVING (ADL)
TRANSPORTATION: DEPENDENT
AMBULATION ASSISTANCE: 1
FEEDING ASSESSED: 1
BATHING ASSESSED: 1
AMBULATION ASSISTANCE: STAND BY ASSIST
BATHING_CURRENT_FUNCTION: STAND BY ASSIST
TOILETING: 1
TOILETING: SUPERVISION
PREPARING MEALS: DEPENDENT
GROOMING_CURRENT_FUNCTION: STAND BY ASSIST
FEEDING: SUPERVISION
CURRENT_FUNCTION: STAND BY ASSIST
GROOMING ASSESSED: 1
HOUSEKEEPING ASSESSED: 1
SHOPPING: DEPENDENT
TRANSPORTATION ASSESSED: 1
ORAL_CARE_ASSESSED: 1
LAUNDRY: DEPENDENT
DRESSING_LB_CURRENT_FUNCTION: SUPERVISION
ORAL_CARE_CURRENT_FUNCTION: NEEDS ASSISTANCE
PHYSICAL TRANSFERS ASSESSED: 1
LIGHT HOUSEKEEPING: DEPENDENT
ENTERING_EXITING_HOME: MODERATE ASSIST
TELEPHONE USE ASSESSED: 1
USING THE TELPHONE: NEEDS ASSISTANCE
OASIS_M1830: 03
DRESSING_UB_CURRENT_FUNCTION: SUPERVISION
SHOPPING ASSESSED: 1
LAUNDRY ASSESSED: 1

## 2024-08-23 ASSESSMENT — ENCOUNTER SYMPTOMS
FORGETFULNESS: 1
SHORTNESS OF BREATH: 1
DYSPNEA ON EXERTION: 1
FREQUENCY: 1
FATIGUES EASILY: 1
DESCRIPTION OF MEMORY LOSS: SHORT TERM
FATIGUE: 1
LAST BOWEL MOVEMENT: 67068
LOWEST PAIN SEVERITY IN PAST 24 HOURS: 6/10
DEPRESSION: 1
PAIN LOCATION - RELIEVING FACTORS: OTC PAIN MEDS
PAIN LOCATION: BACK
STOOL FREQUENCY: LESS THAN DAILY
PAIN LOCATION - PAIN QUALITY: SORE
PERSON REPORTING PAIN: PATIENT
DESCRIPTION OF MEMORY LOSS: LONG TERM
DEPRESSED MOOD: 1
PAIN LOCATION - EXACERBATING FACTORS: ALWAYS THERE
APPETITE LEVEL: GOOD
PAIN: 1
DYSPNEA ACTIVITY LEVEL: AFTER AMBULATING MORE THAN 20 FT
PAIN LOCATION - PAIN SEVERITY: 6/10
PAIN SEVERITY GOAL: 0/10
OCCASIONAL FEELINGS OF UNSTEADINESS: 0
PAIN LOCATION - PAIN DURATION: CONTINUOUS
BOWEL INCONTINENCE: 1
DESCRIPTION OF MEMORY LOSS: IMMEDIATE
HIGHEST PAIN SEVERITY IN PAST 24 HOURS: 6/10
CHANGE IN APPETITE: UNCHANGED
PAIN LOCATION - PAIN FREQUENCY: CONSTANT
LIMITED RANGE OF MOTION: 1
LOWER EXTREMITY EDEMA: 1
MUSCLE WEAKNESS: 1

## 2024-08-23 ASSESSMENT — LIFESTYLE VARIABLES: SMOKING_STATUS: 0

## 2024-08-24 ENCOUNTER — HOME CARE VISIT (OUTPATIENT)
Dept: HOME HEALTH SERVICES | Facility: HOME HEALTH | Age: 84
End: 2024-08-24
Payer: MEDICARE

## 2024-08-24 VITALS
TEMPERATURE: 98.9 F | DIASTOLIC BLOOD PRESSURE: 78 MMHG | OXYGEN SATURATION: 98 % | RESPIRATION RATE: 18 BRPM | HEART RATE: 94 BPM | SYSTOLIC BLOOD PRESSURE: 124 MMHG

## 2024-08-24 VITALS
SYSTOLIC BLOOD PRESSURE: 110 MMHG | RESPIRATION RATE: 18 BRPM | OXYGEN SATURATION: 98 % | HEART RATE: 74 BPM | TEMPERATURE: 96.4 F | DIASTOLIC BLOOD PRESSURE: 53 MMHG

## 2024-08-24 PROCEDURE — G0151 HHCP-SERV OF PT,EA 15 MIN: HCPCS | Mod: HHH

## 2024-08-24 SDOH — HEALTH STABILITY: MENTAL HEALTH: SMOKING IN HOME: 0

## 2024-08-24 SDOH — HEALTH STABILITY: PHYSICAL HEALTH
EXERCISE COMMENTS: PERFORMED THE FOLLOWING X 10 REPS:    SITTING  ANKLE PUMPS  LAQ  MARCHING  ISOM HIP ADDUCTION      STANDING  TOE RAISES  HIP ABDUCTION  MARCHING

## 2024-08-24 SDOH — HEALTH STABILITY: PHYSICAL HEALTH: EXERCISE TYPE: B LE THERAEX

## 2024-08-24 SDOH — ECONOMIC STABILITY: HOUSING INSECURITY: EVIDENCE OF SMOKING MATERIAL: 0

## 2024-08-24 ASSESSMENT — ENCOUNTER SYMPTOMS
PERSON REPORTING PAIN: PATIENT
PAIN LOCATION - EXACERBATING FACTORS: WALKING AND STANDING
LOWEST PAIN SEVERITY IN PAST 24 HOURS: 4/10
PERSON REPORTING PAIN: PATIENT
PAIN LOCATION - PAIN QUALITY: ACHE
MUSCLE WEAKNESS: 1
PAIN LOCATION - RELIEVING FACTORS: REST AND HEAT
PAIN LOCATION - PAIN SEVERITY: 4/10
HIGHEST PAIN SEVERITY IN PAST 24 HOURS: 7/10
PAIN: 1
PAIN SEVERITY GOAL: 3/10
PAIN LOCATION: BACK
DENIES PAIN: 1
PAIN LOCATION - PAIN FREQUENCY: INTERMITTENT
SUBJECTIVE PAIN PROGRESSION: WAXING AND WANING

## 2024-08-24 ASSESSMENT — BALANCE ASSESSMENTS
BALANCE SCORE: 8
NUDGED SCORE: 0
NUDGED: 0 - BEGINS TO FALL
STANDING BALANCE: 1 - STEADY BUT WIDE STANCE AND USES CANE OR OTHER SUPPORT
ATTEMPTS TO ARISE: 2 - ABLE TO RISE, ONE ATTEMPT
EYES CLOSED AT MAXIMUM POSITION NUDGED: 0 - UNSTEADY
SITTING BALANCE: 1 - STEADY, SAFE
SITTING DOWN: 2 - SAFE, SMOOTH MOTION
ARISING SCORE: 1
TURNING 360 DEGREES STEPS: 0 - DISCONTINUOUS STEPS
ARISES: 1 - ABLE, USES ARMS TO HELP
IMMEDIATE STANDING BALANCE FIRST 5 SECONDS: 1 - STEADY BUT USES WALKER OR OTHER SUPPORT

## 2024-08-24 ASSESSMENT — GAIT ASSESSMENTS
WALKING STANCE: 1 - HEELS ALMOST TOUCHING WHILE WALKING
BALANCE AND GAIT SCORE: 19
STEP SYMMETRY: 1 - RIGHT AND LEFT STEP LENGTH APPEAR EQUAL
INITIATION OF GAIT IMMEDIATELY AFTER GO: 1 - NO HESITANCY
GAIT SCORE: 11
STEP CONTINUITY: 1 - STEPS APPEAR CONTINUOUS
PATH: 1 - MILD/MODERATE DEVIATION OR USES WALKING AID
PATH SCORE: 1
TRUNK: 2 - NO SWAY, NO FLEXION, NO USE OF ARMS, NO WALKING AID
TRUNK SCORE: 2

## 2024-08-24 ASSESSMENT — ACTIVITIES OF DAILY LIVING (ADL)
DRESSING_UB_CURRENT_FUNCTION: STAND BY ASSIST
TOILETING: 1
GROOMING_CURRENT_FUNCTION: STAND BY ASSIST
PHYSICAL TRANSFERS ASSESSED: 1
CURRENT_FUNCTION: SUPERVISION
GROOMING_CURRENT_FUNCTION: INDEPENDENT
AMBULATION ASSISTANCE ON FLAT SURFACES: 1
DRESSING_LB_CURRENT_FUNCTION: INDEPENDENT
DRESSING_UB_CURRENT_FUNCTION: INDEPENDENT
TOILETING: 1
GROOMING ASSESSED: 1
AMBULATION ASSISTANCE: SUPERVISION
AMBULATION ASSISTANCE: STAND BY ASSIST
AMBULATION ASSISTANCE: 1
TOILETING: INDEPENDENT
AMBULATION_DISTANCE/DURATION_TOLERATED: 75 X 2
DRESSING_LB_CURRENT_FUNCTION: STAND BY ASSIST
FEEDING ASSESSED: 1
AMBULATION ASSISTANCE: 1
TOILETING: STAND BY ASSIST
FEEDING: INDEPENDENT
GROOMING ASSESSED: 1

## 2024-08-24 ASSESSMENT — PAIN SCALES - PAIN ASSESSMENT IN ADVANCED DEMENTIA (PAINAD)
FACIALEXPRESSION: 1 - SAD. FRIGHTENED. FROWN.
TOTALSCORE: 4
NEGVOCALIZATION: 1
CONSOLABILITY: 0
CONSOLABILITY: 0 - NO NEED TO CONSOLE.
NEGVOCALIZATION: 1 - OCCASIONAL MOAN OR GROAN. LOW-LEVEL SPEECH WITH A NEGATIVE OR DISAPPROVING QUALITY.
BODYLANGUAGE: 1
BODYLANGUAGE: 1 - TENSE. DISTRESSED PACING. FIDGETING.
FACIALEXPRESSION: 1
BREATHING: 1

## 2024-08-26 ENCOUNTER — TELEPHONE (OUTPATIENT)
Dept: PRIMARY CARE | Facility: CLINIC | Age: 84
End: 2024-08-26
Payer: MEDICARE

## 2024-08-26 DIAGNOSIS — K59.00 CONSTIPATION, UNSPECIFIED CONSTIPATION TYPE: Primary | ICD-10-CM

## 2024-08-26 RX ORDER — SENNOSIDES 25 MG/1
1 TABLET, FILM COATED ORAL EVERY 6 HOURS PRN
Qty: 45 G | Refills: 0 | Status: SHIPPED | OUTPATIENT
Start: 2024-08-26

## 2024-08-26 RX ORDER — DOCUSATE SODIUM 100 MG/1
100 CAPSULE, LIQUID FILLED ORAL 2 TIMES DAILY PRN
Qty: 60 CAPSULE | Refills: 3 | Status: SHIPPED | OUTPATIENT
Start: 2024-08-26

## 2024-08-26 NOTE — TELEPHONE ENCOUNTER
Received message from Nata Solano with [facility] reporting patient has constipation, hemorrhoids. Will send Rx for Docusate Sodium and Hemorrhoidal cream to [facility].

## 2024-08-27 ENCOUNTER — TELEPHONE (OUTPATIENT)
Dept: PRIMARY CARE | Facility: CLINIC | Age: 84
End: 2024-08-27
Payer: MEDICARE

## 2024-08-27 ENCOUNTER — HOME CARE VISIT (OUTPATIENT)
Dept: HOME HEALTH SERVICES | Facility: HOME HEALTH | Age: 84
End: 2024-08-27
Payer: MEDICARE

## 2024-08-27 VITALS
HEART RATE: 74 BPM | TEMPERATURE: 97.5 F | DIASTOLIC BLOOD PRESSURE: 57 MMHG | SYSTOLIC BLOOD PRESSURE: 110 MMHG | OXYGEN SATURATION: 99 % | RESPIRATION RATE: 18 BRPM

## 2024-08-27 PROCEDURE — G0299 HHS/HOSPICE OF RN EA 15 MIN: HCPCS | Mod: HHH

## 2024-08-27 SDOH — ECONOMIC STABILITY: HOUSING INSECURITY: EVIDENCE OF SMOKING MATERIAL: 0

## 2024-08-27 SDOH — HEALTH STABILITY: MENTAL HEALTH: SMOKING IN HOME: 0

## 2024-08-27 SDOH — ECONOMIC STABILITY: GENERAL

## 2024-08-27 ASSESSMENT — ENCOUNTER SYMPTOMS
DENIES PAIN: 1
CONSTIPATION: 1
LOWER EXTREMITY EDEMA: 1
SHORTNESS OF BREATH: 1
STOOL FREQUENCY: LESS THAN DAILY
LAST BOWEL MOVEMENT: 67079

## 2024-08-27 ASSESSMENT — ACTIVITIES OF DAILY LIVING (ADL)
CURRENT_FUNCTION: STAND BY ASSIST
MONEY MANAGEMENT (EXPENSES/BILLS): NEEDS ASSISTANCE
AMBULATION ASSISTANCE: STAND BY ASSIST

## 2024-08-27 NOTE — TELEPHONE ENCOUNTER
Morena is with patient now and  states he thinks hospital wanted pt to take iron pill on discharge. Morena asking for clarification.

## 2024-08-28 ENCOUNTER — HOME CARE VISIT (OUTPATIENT)
Dept: HOME HEALTH SERVICES | Facility: HOME HEALTH | Age: 84
End: 2024-08-28
Payer: MEDICARE

## 2024-08-28 VITALS
RESPIRATION RATE: 18 BRPM | TEMPERATURE: 97 F | SYSTOLIC BLOOD PRESSURE: 118 MMHG | OXYGEN SATURATION: 99 % | DIASTOLIC BLOOD PRESSURE: 65 MMHG | HEART RATE: 76 BPM

## 2024-08-28 VITALS
HEART RATE: 76 BPM | OXYGEN SATURATION: 99 % | TEMPERATURE: 96.9 F | DIASTOLIC BLOOD PRESSURE: 65 MMHG | SYSTOLIC BLOOD PRESSURE: 118 MMHG

## 2024-08-28 PROCEDURE — G0152 HHCP-SERV OF OT,EA 15 MIN: HCPCS | Mod: HHH

## 2024-08-28 PROCEDURE — G0151 HHCP-SERV OF PT,EA 15 MIN: HCPCS | Mod: HHH | Performed by: PHYSICAL THERAPIST

## 2024-08-28 ASSESSMENT — ENCOUNTER SYMPTOMS
PERSON REPORTING PAIN: PATIENT
PAIN LOCATION - PAIN SEVERITY: 10/10
MUSCLE WEAKNESS: 1
PAIN: 1
PAIN LOCATION: BACK

## 2024-08-29 ENCOUNTER — LAB (OUTPATIENT)
Dept: LAB | Facility: LAB | Age: 84
End: 2024-08-29
Payer: MEDICARE

## 2024-08-29 DIAGNOSIS — K62.5 HEMORRHAGE OF ANUS AND RECTUM: Primary | ICD-10-CM

## 2024-08-29 ASSESSMENT — ENCOUNTER SYMPTOMS
PERSON REPORTING PAIN: PATIENT
PAIN LOCATION - PAIN SEVERITY: 6/10
LOWEST PAIN SEVERITY IN PAST 24 HOURS: 5/10
PAIN LOCATION - PAIN DURATION: 1 TO 2 HOURS
PAIN LOCATION - PAIN QUALITY: SORENESS
PAIN LOCATION - PAIN FREQUENCY: FREQUENT
PAIN LOCATION - EXACERBATING FACTORS: LOADING
PAIN SEVERITY GOAL: 0/10
PAIN LOCATION - RELIEVING FACTORS: UNLOADING
HIGHEST PAIN SEVERITY IN PAST 24 HOURS: 6/10
SUBJECTIVE PAIN PROGRESSION: WAXING AND WANING
PAIN LOCATION: BACK
PAIN: 1

## 2024-08-29 ASSESSMENT — ACTIVITIES OF DAILY LIVING (ADL)
AMBULATION ASSISTANCE: 1
AMBULATION ASSISTANCE: SUPERVISION
TOILETING: SUPERVISION
GROOMING_CURRENT_FUNCTION: SUPERVISION
PHYSICAL TRANSFERS ASSESSED: 1
GROOMING ASSESSED: 1
CURRENT_FUNCTION: SUPERVISION
TOILETING: 1

## 2024-08-29 ASSESSMENT — PAIN SCALES - PAIN ASSESSMENT IN ADVANCED DEMENTIA (PAINAD)
NEGVOCALIZATION: 1
NEGVOCALIZATION: 1 - OCCASIONAL MOAN OR GROAN. LOW-LEVEL SPEECH WITH A NEGATIVE OR DISAPPROVING QUALITY.
BREATHING: 1
BODYLANGUAGE: 1
CONSOLABILITY: 0 - NO NEED TO CONSOLE.
BODYLANGUAGE: 1 - TENSE. DISTRESSED PACING. FIDGETING.
CONSOLABILITY: 0
TOTALSCORE: 5
FACIALEXPRESSION: 2 - FACIAL GRIMACING.
FACIALEXPRESSION: 2

## 2024-08-30 ENCOUNTER — HOME CARE VISIT (OUTPATIENT)
Dept: HOME HEALTH SERVICES | Facility: HOME HEALTH | Age: 84
End: 2024-08-30
Payer: MEDICARE

## 2024-09-04 ENCOUNTER — HOME CARE VISIT (OUTPATIENT)
Dept: HOME HEALTH SERVICES | Facility: HOME HEALTH | Age: 84
End: 2024-09-04
Payer: MEDICARE

## 2024-09-04 ENCOUNTER — APPOINTMENT (OUTPATIENT)
Dept: HOME HEALTH SERVICES | Facility: HOME HEALTH | Age: 84
End: 2024-09-04
Payer: MEDICARE

## 2024-09-04 VITALS
DIASTOLIC BLOOD PRESSURE: 68 MMHG | HEART RATE: 95 BPM | TEMPERATURE: 97.2 F | OXYGEN SATURATION: 93 % | SYSTOLIC BLOOD PRESSURE: 120 MMHG

## 2024-09-04 PROCEDURE — G0151 HHCP-SERV OF PT,EA 15 MIN: HCPCS | Mod: HHH | Performed by: PHYSICAL THERAPIST

## 2024-09-04 ASSESSMENT — ENCOUNTER SYMPTOMS
PERSON REPORTING PAIN: PATIENT
PAIN LOCATION: BACK
MUSCLE WEAKNESS: 1
PAIN LOCATION - PAIN SEVERITY: 6/10
PAIN: 1

## 2024-09-05 ENCOUNTER — HOME CARE VISIT (OUTPATIENT)
Dept: HOME HEALTH SERVICES | Facility: HOME HEALTH | Age: 84
End: 2024-09-05
Payer: MEDICARE

## 2024-09-05 VITALS
WEIGHT: 141.5 LBS | RESPIRATION RATE: 18 BRPM | BODY MASS INDEX: 23.55 KG/M2 | OXYGEN SATURATION: 97 % | TEMPERATURE: 97.4 F | HEART RATE: 84 BPM | DIASTOLIC BLOOD PRESSURE: 56 MMHG | SYSTOLIC BLOOD PRESSURE: 122 MMHG

## 2024-09-05 DIAGNOSIS — R53.1 ASTHENIA: ICD-10-CM

## 2024-09-05 PROCEDURE — G0299 HHS/HOSPICE OF RN EA 15 MIN: HCPCS | Mod: HHH

## 2024-09-05 ASSESSMENT — ENCOUNTER SYMPTOMS
SHORTNESS OF BREATH: 1
CHANGE IN APPETITE: INCREASED
DESCRIPTION OF MEMORY LOSS: LONG TERM
COUGH CHARACTERISTICS: NON-PRODUCTIVE
DYSPNEA ON EXERTION: 1
FORGETFULNESS: 1
LIMITED RANGE OF MOTION: 1
DENIES PAIN: 1
COUGH: 1
DESCRIPTION OF MEMORY LOSS: SHORT TERM
FATIGUE: 1
PERSON REPORTING PAIN: PATIENT
MUSCLE WEAKNESS: 1
DESCRIPTION OF MEMORY LOSS: IMMEDIATE
FLUID RETENTION: 1
APPETITE LEVEL: GOOD
DYSPNEA ACTIVITY LEVEL: AFTER AMBULATING MORE THAN 20 FT
FATIGUES EASILY: 1
LOWER EXTREMITY EDEMA: 1

## 2024-09-05 ASSESSMENT — ACTIVITIES OF DAILY LIVING (ADL)
AMBULATION ASSISTANCE: STAND BY ASSIST
CURRENT_FUNCTION: STAND BY ASSIST

## 2024-09-06 ENCOUNTER — HOME CARE VISIT (OUTPATIENT)
Dept: HOME HEALTH SERVICES | Facility: HOME HEALTH | Age: 84
End: 2024-09-06
Payer: MEDICARE

## 2024-09-07 ENCOUNTER — HOME CARE VISIT (OUTPATIENT)
Dept: HOME HEALTH SERVICES | Facility: HOME HEALTH | Age: 84
End: 2024-09-07
Payer: MEDICARE

## 2024-09-08 RX ORDER — TIZANIDINE 2 MG/1
2 TABLET ORAL 3 TIMES DAILY PRN
Qty: 270 TABLET | Refills: 3 | Status: SHIPPED | OUTPATIENT
Start: 2024-09-08

## 2024-09-09 ENCOUNTER — APPOINTMENT (OUTPATIENT)
Dept: RADIOLOGY | Facility: HOSPITAL | Age: 84
End: 2024-09-09
Payer: MEDICARE

## 2024-09-09 ENCOUNTER — HOME CARE VISIT (OUTPATIENT)
Dept: HOME HEALTH SERVICES | Facility: HOME HEALTH | Age: 84
End: 2024-09-09
Payer: MEDICARE

## 2024-09-09 ENCOUNTER — APPOINTMENT (OUTPATIENT)
Dept: CARDIOLOGY | Facility: HOSPITAL | Age: 84
End: 2024-09-09
Payer: MEDICARE

## 2024-09-09 ENCOUNTER — TELEPHONE (OUTPATIENT)
Dept: PRIMARY CARE | Facility: CLINIC | Age: 84
End: 2024-09-09
Payer: MEDICARE

## 2024-09-09 ENCOUNTER — HOSPITAL ENCOUNTER (EMERGENCY)
Facility: HOSPITAL | Age: 84
Discharge: HOME | End: 2024-09-09
Attending: STUDENT IN AN ORGANIZED HEALTH CARE EDUCATION/TRAINING PROGRAM
Payer: MEDICARE

## 2024-09-09 VITALS
RESPIRATION RATE: 18 BRPM | OXYGEN SATURATION: 96 % | HEART RATE: 89 BPM | DIASTOLIC BLOOD PRESSURE: 62 MMHG | TEMPERATURE: 97 F | SYSTOLIC BLOOD PRESSURE: 122 MMHG

## 2024-09-09 VITALS
WEIGHT: 135 LBS | SYSTOLIC BLOOD PRESSURE: 113 MMHG | OXYGEN SATURATION: 97 % | BODY MASS INDEX: 24.84 KG/M2 | TEMPERATURE: 98.1 F | HEIGHT: 62 IN | RESPIRATION RATE: 20 BRPM | HEART RATE: 80 BPM | DIASTOLIC BLOOD PRESSURE: 62 MMHG

## 2024-09-09 VITALS
RESPIRATION RATE: 34 BRPM | SYSTOLIC BLOOD PRESSURE: 116 MMHG | OXYGEN SATURATION: 96 % | TEMPERATURE: 96.8 F | DIASTOLIC BLOOD PRESSURE: 56 MMHG | HEART RATE: 92 BPM

## 2024-09-09 DIAGNOSIS — B37.0 THRUSH: Primary | ICD-10-CM

## 2024-09-09 DIAGNOSIS — R06.02 SHORTNESS OF BREATH: Primary | ICD-10-CM

## 2024-09-09 LAB
ALBUMIN SERPL-MCNC: 3.7 G/DL (ref 3.5–5)
ALP BLD-CCNC: 95 U/L (ref 35–125)
ALT SERPL-CCNC: 20 U/L (ref 5–40)
ANION GAP BLDA CALCULATED.4IONS-SCNC: 5 MMO/L (ref 10–25)
ANION GAP SERPL CALC-SCNC: 10 MMOL/L
APPARATUS: ABNORMAL
AST SERPL-CCNC: 19 U/L (ref 5–40)
BASE EXCESS BLDA CALC-SCNC: 10.9 MMOL/L (ref -2–3)
BASOPHILS # BLD AUTO: 0.01 X10*3/UL (ref 0–0.1)
BASOPHILS NFR BLD AUTO: 0.2 %
BILIRUB SERPL-MCNC: 0.9 MG/DL (ref 0.1–1.2)
BODY TEMPERATURE: 37 DEGREES CELSIUS
BUN SERPL-MCNC: 19 MG/DL (ref 8–25)
CA-I BLDA-SCNC: 1.04 MMOL/L (ref 1.1–1.33)
CALCIUM SERPL-MCNC: 8.3 MG/DL (ref 8.5–10.4)
CHLORIDE BLDA-SCNC: 103 MMOL/L (ref 98–107)
CHLORIDE SERPL-SCNC: 99 MMOL/L (ref 97–107)
CO2 SERPL-SCNC: 32 MMOL/L (ref 24–31)
CREAT SERPL-MCNC: 1 MG/DL (ref 0.4–1.6)
EGFRCR SERPLBLD CKD-EPI 2021: 55 ML/MIN/1.73M*2
EOSINOPHIL # BLD AUTO: 0.01 X10*3/UL (ref 0–0.4)
EOSINOPHIL NFR BLD AUTO: 0.2 %
ERYTHROCYTE [DISTWIDTH] IN BLOOD BY AUTOMATED COUNT: 19.6 % (ref 11.5–14.5)
GLUCOSE BLDA-MCNC: 105 MG/DL (ref 74–99)
GLUCOSE SERPL-MCNC: 99 MG/DL (ref 65–99)
HCO3 BLDA-SCNC: 36.1 MMOL/L (ref 22–26)
HCT VFR BLD AUTO: 27.5 % (ref 36–46)
HCT VFR BLD EST: 25 % (ref 36–46)
HGB BLD-MCNC: 7.9 G/DL (ref 12–16)
HGB BLDA-MCNC: 8.2 G/DL (ref 12–16)
IMM GRANULOCYTES # BLD AUTO: 0.03 X10*3/UL (ref 0–0.5)
IMM GRANULOCYTES NFR BLD AUTO: 0.5 % (ref 0–0.9)
INHALED O2 CONCENTRATION: 100 %
LACTATE BLDA-SCNC: 0.5 MMOL/L (ref 0.4–2)
LYMPHOCYTES # BLD AUTO: 1.07 X10*3/UL (ref 0.8–3)
LYMPHOCYTES NFR BLD AUTO: 19.5 %
MCH RBC QN AUTO: 24.4 PG (ref 26–34)
MCHC RBC AUTO-ENTMCNC: 28.7 G/DL (ref 32–36)
MCV RBC AUTO: 85 FL (ref 80–100)
MONOCYTES # BLD AUTO: 0.67 X10*3/UL (ref 0.05–0.8)
MONOCYTES NFR BLD AUTO: 12.2 %
NEUTROPHILS # BLD AUTO: 3.71 X10*3/UL (ref 1.6–5.5)
NEUTROPHILS NFR BLD AUTO: 67.4 %
NRBC BLD-RTO: 0 /100 WBCS (ref 0–0)
NT-PROBNP SERPL-MCNC: 3866 PG/ML (ref 0–624)
OXYHGB MFR BLDA: 95.6 % (ref 94–98)
PCO2 BLDA: 52 MM HG (ref 38–42)
PH BLDA: 7.45 PH (ref 7.38–7.42)
PLATELET # BLD AUTO: 123 X10*3/UL (ref 150–450)
PO2 BLDA: 346 MM HG (ref 85–95)
POTASSIUM BLDA-SCNC: 3.4 MMOL/L (ref 3.5–5.3)
POTASSIUM SERPL-SCNC: 3.6 MMOL/L (ref 3.4–5.1)
PROT SERPL-MCNC: 6.7 G/DL (ref 5.9–7.9)
RBC # BLD AUTO: 3.24 X10*6/UL (ref 4–5.2)
SAO2 % BLDA: 98 % (ref 94–100)
SODIUM BLDA-SCNC: 141 MMOL/L (ref 136–145)
SODIUM SERPL-SCNC: 141 MMOL/L (ref 133–145)
SPECIMEN DRAWN FROM PATIENT: ABNORMAL
TROPONIN T SERPL-MCNC: 26 NG/L
TROPONIN T SERPL-MCNC: 27 NG/L
WBC # BLD AUTO: 5.5 X10*3/UL (ref 4.4–11.3)

## 2024-09-09 PROCEDURE — G0299 HHS/HOSPICE OF RN EA 15 MIN: HCPCS | Mod: HHH

## 2024-09-09 PROCEDURE — 78830 RP LOCLZJ TUM SPECT W/CT 1: CPT | Performed by: RADIOLOGY

## 2024-09-09 PROCEDURE — 36415 COLL VENOUS BLD VENIPUNCTURE: CPT | Performed by: STUDENT IN AN ORGANIZED HEALTH CARE EDUCATION/TRAINING PROGRAM

## 2024-09-09 PROCEDURE — 84075 ASSAY ALKALINE PHOSPHATASE: CPT | Performed by: STUDENT IN AN ORGANIZED HEALTH CARE EDUCATION/TRAINING PROGRAM

## 2024-09-09 PROCEDURE — 96375 TX/PRO/DX INJ NEW DRUG ADDON: CPT

## 2024-09-09 PROCEDURE — A9540 TC99M MAA: HCPCS | Performed by: PHYSICIAN ASSISTANT

## 2024-09-09 PROCEDURE — 2500000004 HC RX 250 GENERAL PHARMACY W/ HCPCS (ALT 636 FOR OP/ED): Performed by: STUDENT IN AN ORGANIZED HEALTH CARE EDUCATION/TRAINING PROGRAM

## 2024-09-09 PROCEDURE — 84484 ASSAY OF TROPONIN QUANT: CPT | Performed by: STUDENT IN AN ORGANIZED HEALTH CARE EDUCATION/TRAINING PROGRAM

## 2024-09-09 PROCEDURE — 3430000001 HC RX 343 DIAGNOSTIC RADIOPHARMACEUTICALS: Performed by: PHYSICIAN ASSISTANT

## 2024-09-09 PROCEDURE — 78803 RP LOCLZJ TUM SPECT 1 AREA: CPT

## 2024-09-09 PROCEDURE — 99285 EMERGENCY DEPT VISIT HI MDM: CPT | Mod: 25

## 2024-09-09 PROCEDURE — 93005 ELECTROCARDIOGRAM TRACING: CPT

## 2024-09-09 PROCEDURE — G0152 HHCP-SERV OF OT,EA 15 MIN: HCPCS | Mod: HHH

## 2024-09-09 PROCEDURE — 94640 AIRWAY INHALATION TREATMENT: CPT

## 2024-09-09 PROCEDURE — 2500000004 HC RX 250 GENERAL PHARMACY W/ HCPCS (ALT 636 FOR OP/ED): Performed by: PHYSICIAN ASSISTANT

## 2024-09-09 PROCEDURE — 84132 ASSAY OF SERUM POTASSIUM: CPT | Performed by: STUDENT IN AN ORGANIZED HEALTH CARE EDUCATION/TRAINING PROGRAM

## 2024-09-09 PROCEDURE — 71046 X-RAY EXAM CHEST 2 VIEWS: CPT

## 2024-09-09 PROCEDURE — 96374 THER/PROPH/DIAG INJ IV PUSH: CPT

## 2024-09-09 PROCEDURE — 71046 X-RAY EXAM CHEST 2 VIEWS: CPT | Performed by: STUDENT IN AN ORGANIZED HEALTH CARE EDUCATION/TRAINING PROGRAM

## 2024-09-09 PROCEDURE — 84132 ASSAY OF SERUM POTASSIUM: CPT | Performed by: PHYSICIAN ASSISTANT

## 2024-09-09 PROCEDURE — 83880 ASSAY OF NATRIURETIC PEPTIDE: CPT | Performed by: STUDENT IN AN ORGANIZED HEALTH CARE EDUCATION/TRAINING PROGRAM

## 2024-09-09 PROCEDURE — 85025 COMPLETE CBC W/AUTO DIFF WBC: CPT | Performed by: STUDENT IN AN ORGANIZED HEALTH CARE EDUCATION/TRAINING PROGRAM

## 2024-09-09 PROCEDURE — 2500000002 HC RX 250 W HCPCS SELF ADMINISTERED DRUGS (ALT 637 FOR MEDICARE OP, ALT 636 FOR OP/ED): Performed by: PHYSICIAN ASSISTANT

## 2024-09-09 RX ORDER — FUROSEMIDE 10 MG/ML
20 INJECTION INTRAMUSCULAR; INTRAVENOUS ONCE
Status: COMPLETED | OUTPATIENT
Start: 2024-09-09 | End: 2024-09-09

## 2024-09-09 RX ORDER — PREDNISONE 20 MG/1
20 TABLET ORAL DAILY
Qty: 3 TABLET | Refills: 0 | Status: ON HOLD | OUTPATIENT
Start: 2024-09-09 | End: 2024-09-12

## 2024-09-09 RX ORDER — IPRATROPIUM BROMIDE AND ALBUTEROL SULFATE 2.5; .5 MG/3ML; MG/3ML
3 SOLUTION RESPIRATORY (INHALATION)
Status: DISCONTINUED | OUTPATIENT
Start: 2024-09-09 | End: 2024-09-10 | Stop reason: HOSPADM

## 2024-09-09 RX ORDER — NYSTATIN 100000 [USP'U]/ML
5 SUSPENSION ORAL 4 TIMES DAILY
Qty: 140 ML | Refills: 0 | Status: ON HOLD | OUTPATIENT
Start: 2024-09-09 | End: 2024-09-16

## 2024-09-09 RX ADMIN — METHYLPREDNISOLONE SODIUM SUCCINATE 125 MG: 125 INJECTION, POWDER, FOR SOLUTION INTRAMUSCULAR; INTRAVENOUS at 21:33

## 2024-09-09 RX ADMIN — FUROSEMIDE 20 MG: 10 INJECTION, SOLUTION INTRAMUSCULAR; INTRAVENOUS at 21:54

## 2024-09-09 RX ADMIN — IPRATROPIUM BROMIDE AND ALBUTEROL SULFATE 3 ML: 2.5; .5 SOLUTION RESPIRATORY (INHALATION) at 21:33

## 2024-09-09 RX ADMIN — KIT FOR THE PREPARATION OF TECHNETIUM TC 99M ALBUMIN AGGREGATED 4.1 MILLICURIE: 2.5 INJECTION, POWDER, FOR SOLUTION INTRAVENOUS at 20:20

## 2024-09-09 SDOH — ECONOMIC STABILITY: GENERAL

## 2024-09-09 SDOH — ECONOMIC STABILITY: HOUSING INSECURITY: EVIDENCE OF SMOKING MATERIAL: 0

## 2024-09-09 SDOH — HEALTH STABILITY: MENTAL HEALTH: SMOKING IN HOME: 0

## 2024-09-09 ASSESSMENT — ENCOUNTER SYMPTOMS
LOWER EXTREMITY EDEMA: 1
FORGETFULNESS: 1
DENIES PAIN: 1
APPETITE LEVEL: GOOD

## 2024-09-09 ASSESSMENT — PAIN SCALES - GENERAL: PAINLEVEL_OUTOF10: 0 - NO PAIN

## 2024-09-09 ASSESSMENT — PAIN - FUNCTIONAL ASSESSMENT: PAIN_FUNCTIONAL_ASSESSMENT: 0-10

## 2024-09-09 ASSESSMENT — ACTIVITIES OF DAILY LIVING (ADL)
CURRENT_FUNCTION: STAND BY ASSIST
AMBULATION ASSISTANCE: STAND BY ASSIST

## 2024-09-09 NOTE — ED PROVIDER NOTES
HPI   Chief Complaint   Patient presents with    Shortness of Breath       HPI    Patient is an 84-year-old female with history of blood clots, CHF, COPD, CKD and CVA presenting for evaluation of shortness of breath.  Patient states that she chronically feels short of breath and does wear 2 L nasal cannula oxygen continuously.  She states that 911 was called today when her home health aide noticed that the patient was breathing quickly and harder than usual.  She denies associated chest pain, back pain, abdominal pain, fevers, chills, recent illness, nausea, vomiting, diarrhea, constipation, dysuria or hematuria.  Patient was recently admitted to the hospital for gastrointestinal hemorrhage where she required multiple blood transfusions.  Son at the bedside states that the patient had an EGD but they could not identify the source and is scheduled for an outpatient colonoscopy to identify the source of bleeding.  Patient denies presence of black tarry stools.     Patient History   Past Medical History:   Diagnosis Date    AAA (abdominal aortic aneurysm) (CMS-[facility])     Acute urinary tract infection 04/20/2023    Anemia     Anxiety     Arthritis     CHF (congestive heart failure) (Multi)     Chronic pain disorder     back pain    CKD (chronic kidney disease)     Cognitive decline     COPD (chronic obstructive pulmonary disease) (Multi)     oxygen dependent- wears 2L NC continuously    Coronary artery disease     s/p stent    CVA (cerebral vascular accident) (Multi)     weaker on the left side    Deep vein thrombosis (DVT) of calf (Multi)     left    Depression     Disease of thyroid gland     Diverticulosis     DVT (deep venous thrombosis) (Multi)     left leg, on xarelto    Easy bruising     Epistaxis     GERD (gastroesophageal reflux disease)     managed with protonix    History of blood transfusion 2023    History of degenerative disc disease     Hyperlipidemia     Hypertension     Hypothyroidism     Ischemic  cardiomyopathy     Meralgia paresthetica     Nonrheumatic mitral valve regurgitation     Osteoarthritis     Osteopenia 2010    hip - DEXA    Plantar fasciitis     RLS (restless legs syndrome)     Sciatica     Spinal stenosis     ST elevation (STEMI) myocardial infarction (Multi)      Past Surgical History:   Procedure Laterality Date    ADENOIDECTOMY      ANOMALOUS PULMONARY VENOUS RETURN REPAIR, 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    Procedure: Mitral-ARMANI (Transcatheter Edge to Edge Repair);  Surgeon: Kyle Bernardo MD;  Location: [address] Cardiac Cath Lab;  Service: Cardiovascular;  Laterality: N/A;  SAMMY Elgendy.Labs with cPM,Maynard,Schedule at 7;30am    CARDIAC CATHETERIZATION  10/2019    CARDIAC CATHETERIZATION N/A 02/16/2024    Procedure: Left And Right Heart Cath, With LV;  Surgeon: Tuan Foss DO;  Location: [facility] Cardiac Cath Lab;  Service: Cardiovascular;  Laterality: N/A;  no pre auth needed    CATARACT EXTRACTION Bilateral 11/13/2012    CHOLECYSTECTOMY  1996    laparoscopic    COLONOSCOPY      Dr. Rodriguez    CORONARY STENT PLACEMENT      CYST REMOVAL Right 06/24/2013    Excision of Sebaceous cyst right axilla    DILATION AND CURETTAGE OF UTERUS      ESOPHAGOGASTRODUODENOSCOPY      KNEE ARTHROPLASTY Left     MR HEAD ANGIO WO IV CONTRAST  02/24/2017    MR HEAD ANGIO WO IV CONTRAST [facility]    MR HEAD ANGIO WO IV CONTRAST  08/11/2017    MR HEAD ANGIO WO IV CONTRAST [facility]    MR HEAD ANGIO WO IV CONTRAST  02/10/2019    MR HEAD ANGIO WO IV CONTRAST [facility]    OTHER SURGICAL HISTORY  01/09/2019    Stent synergy    OTHER SURGICAL HISTORY  01/09/2019    Stent synergy    TN ARTHRP ACETBLR/PROX FEM PROSTC AGRFT/ALGRFT      SURGICAL SAMMY MONITORING      THYROIDECTOMY, PARTIAL Bilateral 04/17/2013    subtotal thyroidectomy    TONSILLECTOMY      TOTAL HIP ARTHROPLASTY Right 2006    UPPER GASTROINTESTINAL ENDOSCOPY  03/2019    Dr. Galan     Family History   Problem Relation Name  Age of Onset    Hyperthyroidism Mother          Treated with radioactive iodine    Hypertension Mother      Heart disease Mother      Hyperthyroidism Sibling      Diabetes Father's Sister       Social History     Tobacco Use    Smoking status: Former     Current packs/day: 0.00     Types: Cigarettes     Quit date: 2014     Years since quitting: 10.6     Passive exposure: Never    Smokeless tobacco: Never   Vaping Use    Vaping status: Never Used   Substance Use Topics    Alcohol use: Not Currently     Comment: glass wine at holiday    Drug use: Never       Physical Exam   ED Triage Vitals [09/09/24 1918]   Temperature Heart Rate Respirations BP   36.7 °C (98.1 °F) 87 (!) 22 145/71      Pulse Ox Temp Source Heart Rate Source Patient Position   100 % Temporal Monitor --      BP Location FiO2 (%)     -- --       Physical Exam  Vitals and nursing note reviewed.   Constitutional:       Appearance: Normal appearance.   HENT:      Head: Normocephalic and atraumatic.   Eyes:      Extraocular Movements: Extraocular movements intact.      Conjunctiva/sclera: Conjunctivae normal.      Pupils: Pupils are equal, round, and reactive to light.   Cardiovascular:      Rate and Rhythm: Normal rate and regular rhythm.   Pulmonary:      Effort: Pulmonary effort is normal.      Breath sounds: Normal breath sounds.      Comments: Poor expansion of the chest on inspiration.  Abdominal:      General: Abdomen is flat. Bowel sounds are normal.      Palpations: Abdomen is soft.   Musculoskeletal:         General: Normal range of motion.      Cervical back: Normal range of motion and neck supple.   Skin:     General: Skin is warm and dry.   Neurological:      Mental Status: She is alert.           ED Course & MDM   ED Course as of 09/09/24 2300   Mon Sep 09, 2024   1916 EKG interpreted by me: Normal sinus rhythm, rate 88.  Normal axis.  Right bundle branch block morphology with normal QRS duration.  No significant ST or T wave abnormalities in  setting of incomplete right bundle branch block. [ML]      ED Course User Index  [ML] Beto Rhodes MD         Diagnoses as of 09/09/24 2300   Shortness of breath                 No data recorded     Donald Coma Scale Score: 15 (09/09/24 1957 : Wanda Archibald, RN)                           Medical Decision Making  Parts of this chart have been completed using voice recognition software. Please excuse any errors of transcription. Despite the medical decision making time stamp above-my medical decision making has taken place during the patient's entire visit. My thought process and reason for plan has been formulated from the time that I saw the patient until the time of disposition and is not specific to one specific moment during their visit and furthermore my MDM encompasses this entire chart and not only this text box.      HPI: Detailed above.    Exam: A medically appropriate exam performed, outlined above, given the known history and presentation.    History obtained from: Patient    Social Determinants of Health considered during this visit: Coming from home    EKG interpreted by my attending physician, reviewed by myself.    Labs Reviewed   CBC WITH AUTO DIFFERENTIAL - Abnormal       Result Value    WBC 5.5      nRBC 0.0      RBC 3.24 (*)     Hemoglobin 7.9 (*)     Hematocrit 27.5 (*)     MCV 85      MCH 24.4 (*)     MCHC 28.7 (*)     RDW 19.6 (*)     Platelets 123 (*)     Neutrophils % 67.4      Immature Granulocytes %, Automated 0.5      Lymphocytes % 19.5      Monocytes % 12.2      Eosinophils % 0.2      Basophils % 0.2      Neutrophils Absolute 3.71      Immature Granulocytes Absolute, Automated 0.03      Lymphocytes Absolute 1.07      Monocytes Absolute 0.67      Eosinophils Absolute 0.01      Basophils Absolute 0.01     N-TERMINAL PROBNP - Abnormal    PROBNP 3,866 (*)     Narrative:     Reference ranges are based on clinical submission data. These ranges represent the 95th percentile of normal cut-off  points. As NT Pro- BNP values approach 1000 pg/ml, clinical symptoms are more likely associated with CHF.   COMPREHENSIVE METABOLIC PANEL - Abnormal    Glucose 99      Sodium 141      Potassium 3.6      Chloride 99      Bicarbonate 32 (*)     Urea Nitrogen 19      Creatinine 1.00      eGFR 55 (*)     Calcium 8.3 (*)     Albumin 3.7      Alkaline Phosphatase 95      Total Protein 6.7      AST 19      Bilirubin, Total 0.9      ALT 20      Anion Gap 10     SERIAL TROPONIN, INITIAL ([facility]) - Abnormal    Troponin T, High Sensitivity 27 (*)    SERIAL TROPONIN,  2 HOUR ([facility]) - Abnormal    Troponin T, High Sensitivity 26 (*)    BLOOD GAS ARTERIAL FULL PANEL - Abnormal    POCT pH, Arterial 7.45 (*)     POCT pCO2, Arterial 52 (*)     POCT pO2, Arterial 346 (*)     POCT SO2, Arterial 98      POCT Oxy Hemoglobin, Arterial 95.6      POCT Hematocrit Calculated, Arterial 25.0 (*)     POCT Sodium, Arterial 141      POCT Potassium, Arterial 3.4 (*)     POCT Chloride, Arterial 103      POCT Ionized Calcium, Arterial 1.04 (*)     POCT Glucose, Arterial 105 (*)     POCT Lactate, Arterial 0.5      POCT Base Excess, Arterial 10.9 (*)     POCT HCO3 Calculated, Arterial 36.1 (*)     POCT Hemoglobin, Arterial 8.2 (*)     POCT Anion Gap, Arterial 5 (*)     Patient Temperature 37.0      FiO2 100      Apparatus NON REBREATHER      Site of Arterial Puncture Brachial Right     TROPONIN T SERIES, HIGH SENSITIVITY (0, 2 HR, 6 HR)    Narrative:     The following orders were created for panel order Troponin T Series, High Sensitivity (0, 2HR, 6HR).  Procedure                               Abnormality         Status                     ---------                               -----------         ------                     Serial Troponin, Initial...[[id number]]  Abnormal            Final result               Serial Troponin, 2 Hour ...[[id number]]  Abnormal            Final result               Serial Troponin, 6 Hour ...[[id number]]                                                    Please view results for these tests on the individual orders.   SERIAL TROPONIN, 6 HOUR ([facility])     NM Lung perfusion with spect   Final Result   Perfusion defect in the posterior lateral right lower lung and   indeterminate probability of pulmonary embolism.                  Signed by: Silver Virk 9/9/2024 10:09 PM   Dictation workstation:   [id number]      XR chest 2 views   Final Result   Diffuse pulmonary edema, less severe when compared to 08/09/2024.             MACRO:   None.        Signed by: Carlitos Gibson 9/9/2024 8:14 PM   Dictation workstation:   [id number]        Medications   ipratropium-albuteroL (Duo-Neb) 0.5-2.5 mg/3 mL nebulizer solution 3 mL (3 mL nebulization Given 9/9/24 2133)   Tc-99m-albumin (Draximage MAA) injection 4.1 millicurie (4.1 millicuries intravenous Given 9/9/24 2020)   methylPREDNISolone sod succinate (SOLU-Medrol) injection 125 mg (125 mg intravenous Given 9/9/24 2133)   furosemide (Lasix) injection 20 mg (20 mg intravenous Given 9/9/24 2154)       Differential diagnoses considered for this visit include: Pulmonary embolism versus acute coronary syndrome versus STEMI versus NSTEMI versus electrolyte imbalance versus metabolic disturbance    Considerations/further MDM:    Patient is an 84-year-old female presenting for evaluation of tachypnea and shortness of breath.  Patient initially was on a nonrebreather on arrival but was then placed on her normal 2 L nasal cannula and is saturating above 95%.  Patient's arterial blood gas shows a pH of 7.45.  CBC shows mild anemia with a hemoglobin of 7.9.  CMP was within normal limits.  Initial troponin of 27.  proBNP slightly elevated at 3866.Chest x-ray shows diffuse pulmonary edema that is less severe when compared to findings from 08/09/2024. NM lung perfusion scan demonstrated a perfusion defect in the posterior lateral right lower lung and indeterminant probability of PE.  While this is concerning for  pulmonary embolism, patient was oxygenating normal on her 2 L nasal cannula and is already on a Xarelto blood thinning therapy. Patient ambulated to the bathroom without difficulty and without a drastic drop in her pulse ox.  She is saturating at 100% on room air on her normal 2 L nasal cannula. Shared decision making discussion was had with the patient given that she did drop to 90% with ambulation.  Patient is insistent upon going home, stating that she does not feel any more short of breath than normal. Patient will be sent a prescription for several days of prednisone and recommended to follow-up with her primary care provider. Patient was understanding during this discussion and felt comfortable to follow-up outpatient.  Return precautions were discussed.  Patient was released home in good condition.    All of the patient's questions were answered to the best of my ability. Patient states understanding that they have been screened for an emergency today, and we have not found any etiology of symptoms that requires emergent treatment or admission to the hospital at this point. They understand that they have not had definitive care day and require follow-up for treatment of their condition. They also state understanding that they may have an emergent condition that may potentially have not of detected at this visit and they must return to the emergency department if they develop any worsening of symptoms or new complaints.    Patient was seen in conjunction with attending physician Dr. Beto Rhodes.   Patient's history, physical exam, diagnostic studies, and treatment plan were discussed thoroughly.    Procedure  Procedures     Melonie Desouza PA-C  09/09/24 [id number]

## 2024-09-09 NOTE — TELEPHONE ENCOUNTER
Received message from Morena Agee, [facility] nurse reporting that patient has signs of thrush, white spots in mouth, not rinsing mouth after inhalers. Will start Nystatin.

## 2024-09-09 NOTE — ED TRIAGE NOTES
Pt brought in by ems for shortness of breath. Pt has a history of copd and chf and is normally on O2 but today had increased work of breathing. Pt denies any other complaints.

## 2024-09-10 ENCOUNTER — TELEPHONE (OUTPATIENT)
Dept: PRIMARY CARE | Facility: CLINIC | Age: 84
End: 2024-09-10

## 2024-09-10 NOTE — ED PROVIDER NOTES
Supervisory note:  Patient seen in conjunction with ALEXANDRA Jade.  Patient presents with shortness of breath.  She states that she became short of breath this evening.  She was working with her therapist at the time.  She reports that she now feels somewhat better.  EMS reports that she was not wearing her oxygen at the time, however her son who now presents with her reports that she was wearing her oxygen.  She has been having some chronic swelling of her left lower extremity as well.  She does have history of heart failure as well as COPD.  On examination, there are faint crackles in bilateral lung bases.    Laboratory studies reveal elevated BNP and chest x-ray suggestive of pulmonary edema.  Patient treated with diuretics as well as steroids and breathing treatments for both CHF and COPD.  VQ scan was obtained to evaluate for PE in setting of history of thromboembolic disease.  This result is indeterminate, however patient is already being treated with Eliquis, is on her baseline oxygen saturation, has BNP that is lower than previous measurements, and stable troponin.  Thus even if a small PE is present, patient is receiving appropriate treatment.  My suspicion for acute coronary syndrome is very low.  Patient given prescription for several days of steroids and was advised to follow-up with primary care physician.  Return precautions given for any worsening symptoms.  I personally saw the patient and made/approved the management plan and take responsiblity for the patient management.  Parts of this chart were completed with dictation software, please excuse any errors in transcription.     Beto Rhodes MD  09/10/24 [id number]

## 2024-09-10 NOTE — TELEPHONE ENCOUNTER
Patient was [facility]R [facility] to [facility] ED 9/9/24. Per ED note: Patient treated with diuretics as well as steroids and breathing treatments for both CHF and COPD. Patient given prescription for several days of steroids and was advised to follow-up with primary care physician. Phoned Jose/ son to offer to schedule a [facility] Call[facility] visit with Milana Hernadez NP, no answer, LMOM.

## 2024-09-10 NOTE — DISCHARGE INSTRUCTIONS
Thank you for choosing [facility] for your healthcare needs today!    There were no acute findings on your workup today warranting hospital mission or inpatient management.    You have been sent a prescription for prednisone to your local retail pharmacy.  Please take as prescribed when directed.    Return to the emergency department if symptoms worsen or new symptoms develop.    Follow-up with your primary care provider as recommended after today's visit.  Please call and schedule an appointment with them as soon as you are able to.

## 2024-09-11 ENCOUNTER — HOME CARE VISIT (OUTPATIENT)
Dept: HOME HEALTH SERVICES | Facility: HOME HEALTH | Age: 84
End: 2024-09-11
Payer: MEDICARE

## 2024-09-11 VITALS
SYSTOLIC BLOOD PRESSURE: 100 MMHG | TEMPERATURE: 96.8 F | HEART RATE: 90 BPM | OXYGEN SATURATION: 95 % | RESPIRATION RATE: 18 BRPM | DIASTOLIC BLOOD PRESSURE: 62 MMHG

## 2024-09-11 LAB
ATRIAL RATE: 88 BPM
P AXIS: 71 DEGREES
P OFFSET: 214 MS
P ONSET: 154 MS
PR INTERVAL: 148 MS
Q ONSET: 228 MS
QRS COUNT: 14 BEATS
QRS DURATION: 100 MS
QT INTERVAL: 380 MS
QTC CALCULATION(BAZETT): 459 MS
QTC FREDERICIA: 431 MS
R AXIS: 3 DEGREES
T AXIS: 73 DEGREES
T OFFSET: 418 MS
VENTRICULAR RATE: 88 BPM

## 2024-09-11 PROCEDURE — G0151 HHCP-SERV OF PT,EA 15 MIN: HCPCS | Mod: HHH | Performed by: PHYSICAL THERAPIST

## 2024-09-11 ASSESSMENT — PAIN SCALES - PAIN ASSESSMENT IN ADVANCED DEMENTIA (PAINAD)
TOTALSCORE: 4
NEGVOCALIZATION: 1
FACIALEXPRESSION: 1 - SAD. FRIGHTENED. FROWN.
FACIALEXPRESSION: 1
BREATHING: 1
CONSOLABILITY: 0
CONSOLABILITY: 0 - NO NEED TO CONSOLE.
BODYLANGUAGE: 1
BODYLANGUAGE: 1 - TENSE. DISTRESSED PACING. FIDGETING.
NEGVOCALIZATION: 1 - OCCASIONAL MOAN OR GROAN. LOW-LEVEL SPEECH WITH A NEGATIVE OR DISAPPROVING QUALITY.

## 2024-09-11 ASSESSMENT — ENCOUNTER SYMPTOMS
PERSON REPORTING PAIN: PATIENT
MUSCLE WEAKNESS: 1
DENIES PAIN: 1
PAIN: 1
PAIN LOCATION: BACK
LOWEST PAIN SEVERITY IN PAST 24 HOURS: 5/10
PERSON REPORTING PAIN: PATIENT

## 2024-09-12 ENCOUNTER — TELEPHONE (OUTPATIENT)
Dept: PRIMARY CARE | Facility: CLINIC | Age: 84
End: 2024-09-12
Payer: MEDICARE

## 2024-09-13 ENCOUNTER — OFFICE VISIT (OUTPATIENT)
Dept: PRIMARY CARE | Facility: CLINIC | Age: 84
End: 2024-09-13
Payer: MEDICARE

## 2024-09-13 VITALS
HEART RATE: 104 BPM | WEIGHT: 135 LBS | TEMPERATURE: 97.5 F | DIASTOLIC BLOOD PRESSURE: 58 MMHG | OXYGEN SATURATION: 93 % | HEIGHT: 62 IN | RESPIRATION RATE: 18 BRPM | BODY MASS INDEX: 24.84 KG/M2 | SYSTOLIC BLOOD PRESSURE: 118 MMHG

## 2024-09-13 DIAGNOSIS — I82.4Y2 ACUTE DEEP VEIN THROMBOSIS (DVT) OF PROXIMAL VEIN OF LEFT LOWER EXTREMITY (MULTI): ICD-10-CM

## 2024-09-13 DIAGNOSIS — R53.1 WEAKNESS: ICD-10-CM

## 2024-09-13 DIAGNOSIS — I50.20 HFREF (HEART FAILURE WITH REDUCED EJECTION FRACTION): ICD-10-CM

## 2024-09-13 DIAGNOSIS — D50.0 ANEMIA DUE TO BLOOD LOSS: ICD-10-CM

## 2024-09-13 DIAGNOSIS — R06.02 SHORTNESS OF BREATH: Primary | ICD-10-CM

## 2024-09-13 DIAGNOSIS — J44.9 CHRONIC OBSTRUCTIVE PULMONARY DISEASE, UNSPECIFIED COPD TYPE (MULTI): ICD-10-CM

## 2024-09-13 DIAGNOSIS — K59.04 CHRONIC IDIOPATHIC CONSTIPATION: ICD-10-CM

## 2024-09-13 PROCEDURE — 3074F SYST BP LT 130 MM HG: CPT | Performed by: NURSE PRACTITIONER

## 2024-09-13 PROCEDURE — 99349 HOME/RES VST EST MOD MDM 40: CPT | Performed by: NURSE PRACTITIONER

## 2024-09-13 PROCEDURE — 1159F MED LIST DOCD IN RCRD: CPT | Performed by: NURSE PRACTITIONER

## 2024-09-13 PROCEDURE — 1157F ADVNC CARE PLAN IN RCRD: CPT | Performed by: NURSE PRACTITIONER

## 2024-09-13 PROCEDURE — 1160F RVW MEDS BY RX/DR IN RCRD: CPT | Performed by: NURSE PRACTITIONER

## 2024-09-13 PROCEDURE — 1123F ACP DISCUSS/DSCN MKR DOCD: CPT | Performed by: NURSE PRACTITIONER

## 2024-09-13 PROCEDURE — 3078F DIAST BP <80 MM HG: CPT | Performed by: NURSE PRACTITIONER

## 2024-09-13 PROCEDURE — 1126F AMNT PAIN NOTED NONE PRSNT: CPT | Performed by: NURSE PRACTITIONER

## 2024-09-13 PROCEDURE — 1111F DSCHRG MED/CURRENT MED MERGE: CPT | Performed by: NURSE PRACTITIONER

## 2024-09-13 ASSESSMENT — PAIN SCALES - GENERAL: PAINLEVEL: 0-NO PAIN

## 2024-09-13 NOTE — PROGRESS NOTES
Subjective   Patient ID: Anitha Welch is a 84 y.o. female who presents for Follow-up (ED follow up 9/9, shortness of breath).    Visit for 85 y/o female seen today in private home, accompanied by her spouse for ED follow up. Pt is lying on the couch this afternoon. She is alert but is an overall poor historian regarding her health so most of the health history is supplemented by patients family and medical chart. Pt will frequently repeat herself. Pt presented to [facility] ED on 9/9 for evaluation of shortness of breath.     ED HPI: Patient is an 84-year-old female with history of blood clots, CHF, COPD, CKD and CVA presenting for evaluation of shortness of breath.  Patient states that she chronically feels short of breath and does wear 2 L nasal cannula oxygen continuously.  She states that 911 was called today when her home health aide noticed that the patient was breathing quickly and harder than usual.  She denies associated chest pain, back pain, abdominal pain, fevers, chills, recent illness, nausea, vomiting, diarrhea, constipation, dysuria or hematuria.  Patient was recently admitted to the hospital for gastrointestinal hemorrhage where she required multiple blood transfusions.  Son at the bedside states that the patient had an EGD but they could not identify the source and is scheduled for an outpatient colonoscopy to identify the source of bleeding.  Patient denies presence of black tarry stools.     ED MDM: Laboratory studies reveal elevated BNP and chest x-ray suggestive of pulmonary edema.  Patient treated with diuretics as well as steroids and breathing treatments for both CHF and COPD.  VQ scan was obtained to evaluate for PE in setting of history of thromboembolic disease.  This result is indeterminate, however patient is already being treated with Eliquis, is on her baseline oxygen saturation, has BNP that is lower than previous measurements, and stable troponin.  Thus even if a small PE is  present, patient is receiving appropriate treatment.  My suspicion for acute coronary syndrome is very low.  Patient given prescription for several days of steroids and was advised to follow-up with primary care physician.    Pt is resting on couch this afternoon. Her son Jose is typically present on exam but had car trouble this afternoon and did not make it home in time for patients follow up. Jose helps to manage patients medications. Pt reports that she has not taken any of her medications today. She is active with [facility],  and therapy services. Pt is ambulatory. Denies recent fall or injury. Denies appetite changes, abdominal pain, N/V/D. She does endorse chronic constipation. Denies urinary concerns. Pt does endorse shortness of breath, improved when wearing her oxygen. Pt does take her oxygen off on occasion. Spouse is unsure why she does this. Pt has anemia and is scheduled for a colonoscopy on 11/18/24.     Home Visit:         Medically necessary due to: Illness or condition that results in activity lmitation or restriction that impacts the ability to leave home such as:, Illness or condition that results in activity lmitation or restriction that impacts the ability to leave home such as:, unsteady gait/poor balance         Current Outpatient Medications:     albuterol 90 mcg/actuation inhaler, Inhale 1 puff every 4 hours if needed., Disp: , Rfl:     atorvastatin (Lipitor) 40 mg tablet, TAKE 1 TABLET BY MOUTH EVERY DAY AT BEDTIME, Disp: 90 tablet, Rfl: 3    carvedilol (Coreg) 3.125 mg tablet, TAKE 1 TABLET (3.125 MG) BY MOUTH 2 TIMES A DAY., Disp: 180 tablet, Rfl: 3    diclofenac sodium (Voltaren) 1 % gel, Apply 4.5 inches (4 g) topically 4 times a day., Disp: 100 g, Rfl: 1    docusate sodium (Colace) 100 mg capsule, Take 1 capsule (100 mg) by mouth 2 times a day as needed for constipation., Disp: 60 capsule, Rfl: 3    gabapentin (Neurontin) 100 mg capsule, Take 1 capsule (100 mg) by mouth once daily at  bedtime., Disp: 30 capsule, Rfl: 2    levothyroxine (Synthroid, Levoxyl) 25 mcg tablet, Take 1 tablet (25 mcg) by mouth early in the morning.. Take on an empty stomach at the same time each day, either 30 to 60 minutes prior to breakfast, Disp: 30 tablet, Rfl: 1    lidocaine (Hemorrhoidal Relief) 5 % cream cream, Apply 1 Application topically every 6 hours if needed (hemorrhoids)., Disp: 45 g, Rfl: 0    loratadine (Claritin) 10 mg tablet, Take 1 tablet (10 mg) by mouth once daily., Disp: , Rfl:     multivitamin-iron-folic acid (Multi Complete with Iron)  mg-mcg tablet tablet, Take 1 tablet by mouth once daily., Disp: , Rfl:     nitroglycerin (Nitrostat) 0.4 mg SL tablet, Place 1 tablet (0.4 mg) under the tongue every 5 minutes if needed., Disp: , Rfl:     nystatin (Mycostatin) 100,000 unit/gram powder, Apply 1 Application topically 2 times a day., Disp: 30 g, Rfl: 0    nystatin (Mycostatin) 100,000 unit/mL suspension, Take 5 mL (500,000 Units) by mouth 4 times a day for 7 days. Swish in mouth and swallow., Disp: 140 mL, Rfl: 0    oxygen (O2) gas therapy, Inhale 2 L/min continuously. Indications: sob, Disp: , Rfl:     pantoprazole (ProtoNix) 40 mg EC tablet, Take 1 tablet (40 mg) by mouth 2 times a day., Disp: 60 tablet, Rfl: 0    polyethylene glycol (Glycolax, Miralax) 17 gram/dose powder, mix 17 grams (1 capful) in 8 ounces of water and drink daily, Disp: 510 g, Rfl: 0    rivaroxaban (Xarelto) 20 mg tablet, Take 1 tablet (20 mg) by mouth once daily in the evening. Take with meals. Take with food., Disp: 30 tablet, Rfl: 11    tiZANidine (Zanaflex) 2 mg tablet, TAKE 1 TABLET BY MOUTH THREE TIMES A DAY AS NEEDED, Disp: 270 tablet, Rfl: 3    torsemide (Demadex) 20 mg tablet, Take 1 tablet (20 mg) by mouth once daily. Do not fill before May 2, 2024., Disp: , Rfl:     Dayron High 100-62.5-25 mcg blister with device, Inhale 1 puff once daily., Disp: 1 each, Rfl: 3     Review of Systems  Constitutional: Negative  "for appetite changes, fever, chills, unexplained weight loss.   HENT: Positive for dry mouth. Negative for trouble swallowing.    Respiratory: Positive for shortness of breath with exertion, oxygen dependent, occasional dry cough. Negative for wheezing.    Cardiovascular: Negative for chest pain, palpitations.   Gastrointestinal: Positive for constipation. Negative for abdominal for pain, diarrhea, nausea and vomiting.   Endocrine: Positive for thyroid disease  Genitourinary:  Negative for difficulty urinating.   Musculoskeletal:  Positive for gait problem  Neurological:  Positive for prior stroke, left sided neuropathy. Negative for dizziness and light-headedness.   Psychiatric/Behavioral: Positive for anxiety     Objective   /58 (BP Location: Left arm, Patient Position: Sitting, BP Cuff Size: Adult)   Pulse 104   Temp 36.4 °C (97.5 °F) (Temporal)   Resp 18   Ht 1.575 m (5' 2\")   Wt 61.2 kg (135 lb)   SpO2 93%   BMI 24.69 kg/m²     - patient has not taken any medications yet today.     Physical Exam  Physical Exam     General: No acute distress     Appearance: She is alert, chronically ill. Nontoxic.  HENT:      Head: Normocephalic and atraumatic.      Nose: No congestion or rhinorrhea.      Mouth/Throat:      Mouth: Mucous membranes are moist.      Pharynx: Oropharynx is clear.   Eyes:      General: No scleral icterus.        Right eye: No discharge.         Left eye: No discharge.      Extraocular Movements: Extraocular movements intact.   Neck:      Vascular: No carotid bruit.      Comments: No obvious JVD  Cardiovascular:      Rate and Rhythm: Tachycardic,      Pulses: Normal pulses.      No friction rub. No gallop.   Pulmonary:      Breath sounds: Lungs clear. Occasional dry cough.      Comments: No wheezing, rales or rhonchi. Oxygen 2L NC  Abdominal:      General: There is no distension.      Tenderness: There is no abdominal tenderness.   Musculoskeletal:      Cervical back Neck " supple. No rigidity.      Right lower leg: Trace edema     Left lower le+ LE edema  Lymphadenopathy:      Cervical: No cervical adenopathy.   Skin:     Capillary Refill: Capillary refill takes less than 2 seconds.   Neurological:      General: No focal deficit present.      Mental Status: She is alert. Mental status is at baseline.     Cranial Nerves: No cranial nerve deficit.      Motor: generalized weakness  Psychiatric:         Behavior: Behavior normal.         Thought Content: Thought content normal.         Judgment: Judgment normal.     Lab Results   Component Value Date    WBC 5.5 2024    HGB 7.9 (L) 2024    HCT 27.5 (L) 2024    MCV 85 2024     (L) 2024       Chemistry    Lab Results   Component Value Date/Time     2024    K 3.6 2024    CL 99 2024    CO2 32 (H) 2024    BUN 19 2024    CREATININE 1.00 2024    Lab Results   Component Value Date/Time    CALCIUM 8.3 (L) 2024    ALKPHOS 95 2024    AST 19 2024    ALT 20 2024    BILITOT 0.9 2024        === 24 ===    XR CHEST 2 VIEWS    - Impression -  Diffuse pulmonary edema, less severe when compared to 2024.      MACRO:  None.    Signed by: Carlitos Gibson 2024 8:14 PM  Dictation workstation:   [id number]     Assessment/Plan   Diagnoses and all orders for this visit:  Shortness of breath  --status post evaluation in the ED  -continue steroid as prescribed   -continue oxygen at 2L NC     HFrEF (heart failure with reduced ejection fraction) (Multi)  -chronic, LE edema at baseline   -continue Torsemide  -monitor daily weight     Chronic obstructive pulmonary disease, unspecified COPD type (Multi)  -chronic, oxygen dependent   -continue Trelegy Ellipta, Albuterol inhaler as needed    Acute deep vein thrombosis (DVT) of proximal vein of left lower extremity (Multi)  -acute, managed  with Xarelto   -monitor for increased s/sx of bruising/bleeding    Anemia due to blood loss  -CBC reviewed with continued anemia  -outpatient colonoscopy scheduled     Chronic idiopathic constipation  -chronic, intermittent  -continue miralax daily, docusate sodium as needed     Weakness  -chronic, active with [facility]  -continue therapy services for increased strength and conditioning        FELA Devine-CNP

## 2024-09-14 ENCOUNTER — HOME CARE VISIT (OUTPATIENT)
Dept: HOME HEALTH SERVICES | Facility: HOME HEALTH | Age: 84
End: 2024-09-14
Payer: MEDICARE

## 2024-09-14 ENCOUNTER — HOSPITAL ENCOUNTER (INPATIENT)
Facility: HOSPITAL | Age: 84
LOS: 5 days | Discharge: HOME HEALTH | DRG: 291 | End: 2024-09-19
Attending: EMERGENCY MEDICINE | Admitting: INTERNAL MEDICINE
Payer: MEDICARE

## 2024-09-14 ENCOUNTER — APPOINTMENT (OUTPATIENT)
Dept: RADIOLOGY | Facility: HOSPITAL | Age: 84
DRG: 291 | End: 2024-09-14
Payer: MEDICARE

## 2024-09-14 ENCOUNTER — APPOINTMENT (OUTPATIENT)
Dept: CARDIOLOGY | Facility: HOSPITAL | Age: 84
DRG: 291 | End: 2024-09-14
Payer: MEDICARE

## 2024-09-14 DIAGNOSIS — D64.9 ANEMIA, UNSPECIFIED TYPE: Primary | ICD-10-CM

## 2024-09-14 DIAGNOSIS — J96.00 ACUTE RESPIRATORY FAILURE: ICD-10-CM

## 2024-09-14 DIAGNOSIS — R60.0 LOCALIZED EDEMA: ICD-10-CM

## 2024-09-14 DIAGNOSIS — I82.402 DEEP VEIN THROMBOSIS (DVT) OF LEFT LOWER EXTREMITY, UNSPECIFIED CHRONICITY, UNSPECIFIED VEIN (MULTI): ICD-10-CM

## 2024-09-14 DIAGNOSIS — I50.23 ACUTE ON CHRONIC SYSTOLIC HEART FAILURE: ICD-10-CM

## 2024-09-14 DIAGNOSIS — I82.412 DVT OF DEEP FEMORAL VEIN, LEFT (MULTI): ICD-10-CM

## 2024-09-14 PROBLEM — I50.31: Status: ACTIVE | Noted: 2024-09-14

## 2024-09-14 LAB
ABO GROUP (TYPE) IN BLOOD: NORMAL
ALBUMIN SERPL-MCNC: 3.1 G/DL (ref 3.5–5)
ALP BLD-CCNC: 74 U/L (ref 35–125)
ALT SERPL-CCNC: 17 U/L (ref 5–40)
ANION GAP BLDA CALCULATED.4IONS-SCNC: 6 MMO/L (ref 10–25)
ANION GAP SERPL CALC-SCNC: 12 MMOL/L
ANTIBODY SCREEN: NORMAL
APPARATUS: ABNORMAL
ARTERIAL PATENCY WRIST A: POSITIVE
AST SERPL-CCNC: 24 U/L (ref 5–40)
BASE EXCESS BLDA CALC-SCNC: 7.8 MMOL/L (ref -2–3)
BASOPHILS # BLD AUTO: 0.01 X10*3/UL (ref 0–0.1)
BASOPHILS NFR BLD AUTO: 0.1 %
BILIRUB SERPL-MCNC: 1 MG/DL (ref 0.1–1.2)
BLOOD EXPIRATION DATE: NORMAL
BODY TEMPERATURE: 37 DEGREES CELSIUS
BUN SERPL-MCNC: 31 MG/DL (ref 8–25)
CA-I BLDA-SCNC: 1.02 MMOL/L (ref 1.1–1.33)
CALCIUM SERPL-MCNC: 7.8 MG/DL (ref 8.5–10.4)
CHLORIDE BLDA-SCNC: 108 MMOL/L (ref 98–107)
CHLORIDE SERPL-SCNC: 105 MMOL/L (ref 97–107)
CO2 SERPL-SCNC: 24 MMOL/L (ref 24–31)
CREAT SERPL-MCNC: 1.3 MG/DL (ref 0.4–1.6)
DISPENSE STATUS: NORMAL
EGFRCR SERPLBLD CKD-EPI 2021: 40 ML/MIN/1.73M*2
EOSINOPHIL # BLD AUTO: 0 X10*3/UL (ref 0–0.4)
EOSINOPHIL NFR BLD AUTO: 0 %
ERYTHROCYTE [DISTWIDTH] IN BLOOD BY AUTOMATED COUNT: 19.8 % (ref 11.5–14.5)
FLOW: 30 LPM
GLUCOSE BLD MANUAL STRIP-MCNC: 157 MG/DL (ref 74–99)
GLUCOSE BLDA-MCNC: 123 MG/DL (ref 74–99)
GLUCOSE SERPL-MCNC: 105 MG/DL (ref 65–99)
HCO3 BLDA-SCNC: 32 MMOL/L (ref 22–26)
HCT VFR BLD AUTO: 24.5 % (ref 36–46)
HCT VFR BLD EST: 22 % (ref 36–46)
HGB BLD-MCNC: 7.2 G/DL (ref 12–16)
HGB BLDA-MCNC: 7.3 G/DL (ref 12–16)
IMM GRANULOCYTES # BLD AUTO: 0.1 X10*3/UL (ref 0–0.5)
IMM GRANULOCYTES NFR BLD AUTO: 0.9 % (ref 0–0.9)
INHALED O2 CONCENTRATION: 50 %
LACTATE BLDA-SCNC: 0.7 MMOL/L (ref 0.4–2)
LYMPHOCYTES # BLD AUTO: 0.28 X10*3/UL (ref 0.8–3)
LYMPHOCYTES NFR BLD AUTO: 2.6 %
MCH RBC QN AUTO: 24.7 PG (ref 26–34)
MCHC RBC AUTO-ENTMCNC: 29.4 G/DL (ref 32–36)
MCV RBC AUTO: 84 FL (ref 80–100)
MONOCYTES # BLD AUTO: 0.26 X10*3/UL (ref 0.05–0.8)
MONOCYTES NFR BLD AUTO: 2.4 %
NEUTROPHILS # BLD AUTO: 10.28 X10*3/UL (ref 1.6–5.5)
NEUTROPHILS NFR BLD AUTO: 94 %
NRBC BLD-RTO: 0 /100 WBCS (ref 0–0)
NT-PROBNP SERPL-MCNC: 9777 PG/ML (ref 0–624)
OXYHGB MFR BLDA: 97.1 % (ref 94–98)
PCO2 BLDA: 43 MM HG (ref 38–42)
PH BLDA: 7.48 PH (ref 7.38–7.42)
PLATELET # BLD AUTO: 142 X10*3/UL (ref 150–450)
PO2 BLDA: 164 MM HG (ref 85–95)
POTASSIUM BLDA-SCNC: 3.7 MMOL/L (ref 3.5–5.3)
POTASSIUM SERPL-SCNC: 3.6 MMOL/L (ref 3.4–5.1)
PRODUCT BLOOD TYPE: 9500
PRODUCT CODE: NORMAL
PROT SERPL-MCNC: 5.5 G/DL (ref 5.9–7.9)
RBC # BLD AUTO: 2.91 X10*6/UL (ref 4–5.2)
RH FACTOR (ANTIGEN D): NORMAL
SAO2 % BLDA: 100 % (ref 94–100)
SARS-COV-2 RNA RESP QL NAA+PROBE: NOT DETECTED
SODIUM BLDA-SCNC: 142 MMOL/L (ref 136–145)
SODIUM SERPL-SCNC: 141 MMOL/L (ref 133–145)
SPECIMEN DRAWN FROM PATIENT: ABNORMAL
TROPONIN T SERPL-MCNC: 38 NG/L
TROPONIN T SERPL-MCNC: 46 NG/L
TROPONIN T SERPL-MCNC: 51 NG/L
UNIT ABO: NORMAL
UNIT NUMBER: NORMAL
UNIT RH: NORMAL
UNIT VOLUME: 350
WBC # BLD AUTO: 10.9 X10*3/UL (ref 4.4–11.3)
XM INTEP: NORMAL

## 2024-09-14 PROCEDURE — 2500000002 HC RX 250 W HCPCS SELF ADMINISTERED DRUGS (ALT 637 FOR MEDICARE OP, ALT 636 FOR OP/ED): Performed by: INTERNAL MEDICINE

## 2024-09-14 PROCEDURE — 84132 ASSAY OF SERUM POTASSIUM: CPT | Performed by: EMERGENCY MEDICINE

## 2024-09-14 PROCEDURE — 93005 ELECTROCARDIOGRAM TRACING: CPT

## 2024-09-14 PROCEDURE — 71045 X-RAY EXAM CHEST 1 VIEW: CPT | Performed by: RADIOLOGY

## 2024-09-14 PROCEDURE — 87635 SARS-COV-2 COVID-19 AMP PRB: CPT | Performed by: PHYSICIAN ASSISTANT

## 2024-09-14 PROCEDURE — 2500000004 HC RX 250 GENERAL PHARMACY W/ HCPCS (ALT 636 FOR OP/ED): Performed by: PHYSICIAN ASSISTANT

## 2024-09-14 PROCEDURE — 93010 ELECTROCARDIOGRAM REPORT: CPT | Performed by: INTERNAL MEDICINE

## 2024-09-14 PROCEDURE — 9420000001 HC RT PATIENT EDUCATION 5 MIN

## 2024-09-14 PROCEDURE — 2500000005 HC RX 250 GENERAL PHARMACY W/O HCPCS: Performed by: EMERGENCY MEDICINE

## 2024-09-14 PROCEDURE — 94640 AIRWAY INHALATION TREATMENT: CPT

## 2024-09-14 PROCEDURE — 2500000002 HC RX 250 W HCPCS SELF ADMINISTERED DRUGS (ALT 637 FOR MEDICARE OP, ALT 636 FOR OP/ED): Performed by: PHYSICIAN ASSISTANT

## 2024-09-14 PROCEDURE — 84484 ASSAY OF TROPONIN QUANT: CPT | Performed by: PHYSICIAN ASSISTANT

## 2024-09-14 PROCEDURE — 36415 COLL VENOUS BLD VENIPUNCTURE: CPT | Performed by: PHYSICIAN ASSISTANT

## 2024-09-14 PROCEDURE — 71045 X-RAY EXAM CHEST 1 VIEW: CPT

## 2024-09-14 PROCEDURE — 94664 DEMO&/EVAL PT USE INHALER: CPT

## 2024-09-14 PROCEDURE — 80053 COMPREHEN METABOLIC PANEL: CPT | Performed by: PHYSICIAN ASSISTANT

## 2024-09-14 PROCEDURE — 36430 TRANSFUSION BLD/BLD COMPNT: CPT

## 2024-09-14 PROCEDURE — 86901 BLOOD TYPING SEROLOGIC RH(D): CPT | Performed by: INTERNAL MEDICINE

## 2024-09-14 PROCEDURE — 96374 THER/PROPH/DIAG INJ IV PUSH: CPT

## 2024-09-14 PROCEDURE — 5A0935A ASSISTANCE WITH RESPIRATORY VENTILATION, LESS THAN 24 CONSECUTIVE HOURS, HIGH NASAL FLOW/VELOCITY: ICD-10-PCS | Performed by: STUDENT IN AN ORGANIZED HEALTH CARE EDUCATION/TRAINING PROGRAM

## 2024-09-14 PROCEDURE — 86920 COMPATIBILITY TEST SPIN: CPT

## 2024-09-14 PROCEDURE — 82947 ASSAY GLUCOSE BLOOD QUANT: CPT

## 2024-09-14 PROCEDURE — 99285 EMERGENCY DEPT VISIT HI MDM: CPT | Mod: 25

## 2024-09-14 PROCEDURE — P9040 RBC LEUKOREDUCED IRRADIATED: HCPCS

## 2024-09-14 PROCEDURE — 85025 COMPLETE CBC W/AUTO DIFF WBC: CPT | Performed by: PHYSICIAN ASSISTANT

## 2024-09-14 PROCEDURE — 2500000001 HC RX 250 WO HCPCS SELF ADMINISTERED DRUGS (ALT 637 FOR MEDICARE OP): Performed by: INTERNAL MEDICINE

## 2024-09-14 PROCEDURE — 83880 ASSAY OF NATRIURETIC PEPTIDE: CPT | Performed by: PHYSICIAN ASSISTANT

## 2024-09-14 PROCEDURE — 2060000001 HC INTERMEDIATE ICU ROOM DAILY

## 2024-09-14 PROCEDURE — 2500000004 HC RX 250 GENERAL PHARMACY W/ HCPCS (ALT 636 FOR OP/ED): Performed by: INTERNAL MEDICINE

## 2024-09-14 PROCEDURE — 94640 AIRWAY INHALATION TREATMENT: CPT | Mod: 59

## 2024-09-14 RX ORDER — POTASSIUM CHLORIDE 20 MEQ/1
20 TABLET, EXTENDED RELEASE ORAL ONCE
Status: COMPLETED | OUTPATIENT
Start: 2024-09-14 | End: 2024-09-14

## 2024-09-14 RX ORDER — IPRATROPIUM BROMIDE AND ALBUTEROL SULFATE 2.5; .5 MG/3ML; MG/3ML
3 SOLUTION RESPIRATORY (INHALATION)
Status: DISCONTINUED | OUTPATIENT
Start: 2024-09-14 | End: 2024-09-15

## 2024-09-14 RX ORDER — DOCUSATE SODIUM 100 MG/1
100 CAPSULE, LIQUID FILLED ORAL 2 TIMES DAILY PRN
Status: DISCONTINUED | OUTPATIENT
Start: 2024-09-14 | End: 2024-09-18

## 2024-09-14 RX ORDER — GABAPENTIN 100 MG/1
100 CAPSULE ORAL NIGHTLY
Status: DISCONTINUED | OUTPATIENT
Start: 2024-09-14 | End: 2024-09-19 | Stop reason: HOSPADM

## 2024-09-14 RX ORDER — TORSEMIDE 20 MG/1
20 TABLET ORAL DAILY
Status: DISCONTINUED | OUTPATIENT
Start: 2024-09-15 | End: 2024-09-19 | Stop reason: HOSPADM

## 2024-09-14 RX ORDER — DEXTROSE 50 % IN WATER (D50W) INTRAVENOUS SYRINGE
12.5
Status: DISCONTINUED | OUTPATIENT
Start: 2024-09-14 | End: 2024-09-19 | Stop reason: HOSPADM

## 2024-09-14 RX ORDER — BUDESONIDE 0.5 MG/2ML
0.5 INHALANT ORAL
Status: DISCONTINUED | OUTPATIENT
Start: 2024-09-14 | End: 2024-09-19 | Stop reason: HOSPADM

## 2024-09-14 RX ORDER — CARVEDILOL 3.12 MG/1
3.25 TABLET ORAL 2 TIMES DAILY
Status: DISCONTINUED | OUTPATIENT
Start: 2024-09-14 | End: 2024-09-19 | Stop reason: HOSPADM

## 2024-09-14 RX ORDER — INSULIN LISPRO 100 [IU]/ML
0-5 INJECTION, SOLUTION INTRAVENOUS; SUBCUTANEOUS
Status: DISCONTINUED | OUTPATIENT
Start: 2024-09-14 | End: 2024-09-19 | Stop reason: HOSPADM

## 2024-09-14 RX ORDER — FUROSEMIDE 10 MG/ML
60 INJECTION INTRAMUSCULAR; INTRAVENOUS ONCE
Status: COMPLETED | OUTPATIENT
Start: 2024-09-14 | End: 2024-09-14

## 2024-09-14 RX ORDER — FUROSEMIDE 10 MG/ML
40 INJECTION INTRAMUSCULAR; INTRAVENOUS ONCE
Status: COMPLETED | OUTPATIENT
Start: 2024-09-14 | End: 2024-09-14

## 2024-09-14 RX ORDER — LEVOTHYROXINE SODIUM 25 UG/1
25 TABLET ORAL DAILY
Status: DISCONTINUED | OUTPATIENT
Start: 2024-09-15 | End: 2024-09-19 | Stop reason: HOSPADM

## 2024-09-14 RX ORDER — PANTOPRAZOLE SODIUM 40 MG/1
40 TABLET, DELAYED RELEASE ORAL 2 TIMES DAILY
Status: DISCONTINUED | OUTPATIENT
Start: 2024-09-14 | End: 2024-09-19 | Stop reason: HOSPADM

## 2024-09-14 RX ORDER — ATORVASTATIN CALCIUM 40 MG/1
40 TABLET, FILM COATED ORAL NIGHTLY
Status: DISCONTINUED | OUTPATIENT
Start: 2024-09-14 | End: 2024-09-19 | Stop reason: HOSPADM

## 2024-09-14 RX ORDER — DEXTROSE 50 % IN WATER (D50W) INTRAVENOUS SYRINGE
25
Status: DISCONTINUED | OUTPATIENT
Start: 2024-09-14 | End: 2024-09-19 | Stop reason: HOSPADM

## 2024-09-14 RX ORDER — IPRATROPIUM BROMIDE AND ALBUTEROL SULFATE 2.5; .5 MG/3ML; MG/3ML
3 SOLUTION RESPIRATORY (INHALATION)
Status: DISCONTINUED | OUTPATIENT
Start: 2024-09-14 | End: 2024-09-14 | Stop reason: SDUPTHER

## 2024-09-14 ASSESSMENT — LIFESTYLE VARIABLES
EVER HAD A DRINK FIRST THING IN THE MORNING TO STEADY YOUR NERVES TO GET RID OF A HANGOVER: NO
HAVE YOU EVER FELT YOU SHOULD CUT DOWN ON YOUR DRINKING: NO
TOTAL SCORE: 0
HAVE PEOPLE ANNOYED YOU BY CRITICIZING YOUR DRINKING: NO
EVER FELT BAD OR GUILTY ABOUT YOUR DRINKING: NO

## 2024-09-14 ASSESSMENT — ACTIVITIES OF DAILY LIVING (ADL)
HEARING - RIGHT EAR: FUNCTIONAL
FEEDING YOURSELF: NEEDS ASSISTANCE
GROOMING: NEEDS ASSISTANCE
PATIENT'S MEMORY ADEQUATE TO SAFELY COMPLETE DAILY ACTIVITIES?: YES
WALKS IN HOME: NEEDS ASSISTANCE
HEARING - LEFT EAR: FUNCTIONAL
TOILETING: NEEDS ASSISTANCE
ADEQUATE_TO_COMPLETE_ADL: YES
DRESSING YOURSELF: NEEDS ASSISTANCE
BATHING: NEEDS ASSISTANCE
JUDGMENT_ADEQUATE_SAFELY_COMPLETE_DAILY_ACTIVITIES: YES

## 2024-09-14 ASSESSMENT — PAIN SCALES - GENERAL
PAINLEVEL_OUTOF10: 8
PAINLEVEL_OUTOF10: 0 - NO PAIN

## 2024-09-14 ASSESSMENT — PAIN - FUNCTIONAL ASSESSMENT: PAIN_FUNCTIONAL_ASSESSMENT: 0-10

## 2024-09-14 ASSESSMENT — PAIN DESCRIPTION - PAIN TYPE: TYPE: CHRONIC PAIN

## 2024-09-14 NOTE — ED TRIAGE NOTES
Pt brought in by EMS from home for SOB and hypoxia. Pt has COPD and wears 2L NC oxygen at baseline, was 74% on 2L, now 98% on 4L. Denies CP or any other symptoms

## 2024-09-14 NOTE — ASSESSMENT & PLAN NOTE
She was hospitalized a month ago for blood loss anemia.  She had EGD that was unremarkable.  Her Xarelto was temporarily held and she was put back on it.  Her hemoglobin is dropped from 9.4-7.2 in the last month.  It seems she is still having problems in this area.  I am holding Xarelto.  During 1 unit packed red cells.  Consulting GI.  Her legs were dopplered about 2 months ago and there was no DVT.  I will consider calling radiology operations tomorrow and trying to make somebody coming Sunday to ultrasound that left leg again.

## 2024-09-14 NOTE — ASSESSMENT & PLAN NOTE
I think this is mostly due to diastolic heart failure.  She took her torsemide this morning.  I am ordering 60 mg IV Lasix now.  Will order DuoNebs and Pulmicort.  ER gave a dose of steroids I will continue lower dose steroids for now . repeat chest x-ray.  Consult pulmonary    Did discuss CODE STATUS with the patient.  She will be DNR but for now ventilators okay if necessary

## 2024-09-14 NOTE — ED PROVIDER NOTES
HPI   Chief Complaint   Patient presents with    COPD     Pt here for COPD exacerbation pt normally on 2l o2 at home but has been sob. Pt denies chest pain at this time. Pt is currently on 4l o2 98%        HPI    Patient is an 84-year-old female with a history of COPD, chronic kidney disease and CHF senting for evaluation of shortness of breath.  Patient states that she was feeling short of breath today and her and her son decided to call the rescue squad for the patient to be evaluated.  Patient states she normally wears 2 L oxygen at home at baseline for her COPD.  She denies any recent illness.  No associated chest pain.  No fevers or chills.  No abdominal pain.  No nausea or vomiting.  No diarrhea or constipation.    Patient History   Past Medical History:   Diagnosis Date    AAA (abdominal aortic aneurysm) (CMS-[facility])     Acute urinary tract infection 04/20/2023    Anemia     Anxiety     Arthritis     CHF (congestive heart failure) (Multi)     Chronic pain disorder     back pain    CKD (chronic kidney disease)     Cognitive decline     COPD (chronic obstructive pulmonary disease) (Multi)     oxygen dependent- wears 2L NC continuously    Coronary artery disease     s/p stent    CVA (cerebral vascular accident) (Multi)     weaker on the left side    Deep vein thrombosis (DVT) of calf (Multi)     left    Depression     Disease of thyroid gland     Diverticulosis     DVT (deep venous thrombosis) (Multi)     left leg, on xarelto    Easy bruising     Epistaxis     GERD (gastroesophageal reflux disease)     managed with protonix    History of blood transfusion 2023    History of degenerative disc disease     Hyperlipidemia     Hypertension     Hypothyroidism     Ischemic cardiomyopathy     Meralgia paresthetica     Nonrheumatic mitral valve regurgitation     Osteoarthritis     Osteopenia 2010    hip - DEXA    Plantar fasciitis     RLS (restless legs syndrome)     Sciatica     Spinal stenosis     ST elevation (STEMI)  myocardial infarction (Multi)      Past Surgical History:   Procedure Laterality Date    ADENOIDECTOMY      ANOMALOUS PULMONARY VENOUS RETURN REPAIR, 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 Cardiac Cath Lab;  Service: Cardiovascular;  Laterality: N/A;  no pre auth needed    CATARACT EXTRACTION Bilateral 11/13/2012    CHOLECYSTECTOMY  1996    laparoscopic    COLONOSCOPY      Dr. Rodriguez    CORONARY STENT PLACEMENT      CYST REMOVAL Right 06/24/2013    Excision of Sebaceous cyst right axilla    DILATION AND CURETTAGE OF UTERUS      ESOPHAGOGASTRODUODENOSCOPY      KNEE ARTHROPLASTY Left     MR HEAD ANGIO WO IV CONTRAST  02/24/2017    MR HEAD ANGIO WO IV CONTRAST [facility]    MR HEAD ANGIO WO IV CONTRAST  08/11/2017    MR HEAD ANGIO WO IV CONTRAST [facility]    MR HEAD ANGIO WO IV CONTRAST  02/10/2019    MR HEAD ANGIO WO IV CONTRAST [facility]    OTHER SURGICAL HISTORY  01/09/2019    Stent synergy    OTHER SURGICAL HISTORY  01/09/2019    Stent synergy    NC ARTHRP ACETBLR/PROX FEM PROSTC AGRFT/ALGRFT      SURGICAL SAMMY MONITORING      THYROIDECTOMY, PARTIAL Bilateral 04/17/2013    subtotal thyroidectomy    TONSILLECTOMY      TOTAL HIP ARTHROPLASTY Right 2006    UPPER GASTROINTESTINAL ENDOSCOPY  03/2019    Dr. Galan     Family History   Problem Relation Name Age of Onset    Hyperthyroidism Mother          Treated with radioactive iodine    Hypertension Mother      Heart disease Mother      Hyperthyroidism Sibling      Diabetes Father's Sister       Social History     Tobacco Use    Smoking status: Former      Current packs/day: 0.00     Types: Cigarettes     Quit date: 2014     Years since quitting: 10.7     Passive exposure: Never    Smokeless tobacco: Never   Vaping Use    Vaping status: Never Used   Substance Use Topics    Alcohol use: Not Currently     Comment: glass wine at holiday    Drug use: Never       Physical Exam   ED Triage Vitals [09/14/24 1333]   Temperature Heart Rate Respirations BP   36.9 °C (98.4 °F) 85 (!) 32 113/62      Pulse Ox Temp Source Heart Rate Source Patient Position   97 % Oral Monitor Sitting      BP Location FiO2 (%)     Left arm --       Physical Exam  Vitals and nursing note reviewed.   Constitutional:       Appearance: Normal appearance.   HENT:      Head: Normocephalic and atraumatic.   Eyes:      Extraocular Movements: Extraocular movements intact.      Conjunctiva/sclera: Conjunctivae normal.      Pupils: Pupils are equal, round, and reactive to light.   Cardiovascular:      Rate and Rhythm: Normal rate and regular rhythm.   Pulmonary:      Effort: Pulmonary effort is normal.      Comments: Coarse lung sounds throughout  Abdominal:      General: Abdomen is flat. Bowel sounds are normal.      Palpations: Abdomen is soft.   Neurological:      Mental Status: She is alert.           ED Course & MDM   ED Course as of 09/14/24 1637   Sat Sep 14, 2024   1335 I independently interpreted the results of the EKG and it showed normal sinus rhythm at 85 bpm, normal axis, normal voltage, normal ST segment, normal T waves [NG]   1441 Patient acutely desaturated at this time.  Respiratory therapy was alerted and the patient was put on high flow. [RP]      ED Course User Index  [NG] Anamaria Orlando MD  [RP] Melonie Desouza PA-C         Diagnoses as of 09/14/24 1637   Acute respiratory failure (Multi)                 No data recorded     Haddock Coma Scale Score: 15 (09/14/24 1335 : Anat Jasmine RN)                           Medical Decision Making    Parts of this chart have been completed  using voice recognition software. Please excuse any errors of transcription. Despite the medical decision making time stamp above-my medical decision making has taken place during the patient's entire visit. My thought process and reason for plan has been formulated from the time that I saw the patient until the time of disposition and is not specific to one specific moment during their visit and furthermore my MDM encompasses this entire chart and not only this text box.      HPI: Detailed above.    Exam: A medically appropriate exam performed, outlined above, given the known history and presentation.    History obtained from: Patient    Social Determinants of Health considered during this visit: From home    EKG interpreted by my attending physician, reviewed by myself.    Labs Reviewed   CBC WITH AUTO DIFFERENTIAL - Abnormal       Result Value    WBC 10.9      nRBC 0.0      RBC 2.91 (*)     Hemoglobin 7.2 (*)     Hematocrit 24.5 (*)     MCV 84      MCH 24.7 (*)     MCHC 29.4 (*)     RDW 19.8 (*)     Platelets 142 (*)     Neutrophils % 94.0      Immature Granulocytes %, Automated 0.9      Lymphocytes % 2.6      Monocytes % 2.4      Eosinophils % 0.0      Basophils % 0.1      Neutrophils Absolute 10.28 (*)     Immature Granulocytes Absolute, Automated 0.10      Lymphocytes Absolute 0.28 (*)     Monocytes Absolute 0.26      Eosinophils Absolute 0.00      Basophils Absolute 0.01     COMPREHENSIVE METABOLIC PANEL - Abnormal    Glucose 105 (*)     Sodium 141      Potassium 3.6      Chloride 105      Bicarbonate 24      Urea Nitrogen 31 (*)     Creatinine 1.30      eGFR 40 (*)     Calcium 7.8 (*)     Albumin 3.1 (*)     Alkaline Phosphatase 74      Total Protein 5.5 (*)     AST 24      Bilirubin, Total 1.0      ALT 17      Anion Gap 12     N-TERMINAL PROBNP - Abnormal    PROBNP 9,777 (*)     Narrative:     Reference ranges are based on clinical submission data. These ranges represent the 95th percentile of normal  cut-off points. As NT Pro- BNP values approach 1000 pg/ml, clinical symptoms are more likely associated with CHF.   SERIAL TROPONIN, INITIAL ([facility]) - Abnormal    Troponin T, High Sensitivity 46 (*)    SERIAL TROPONIN,  2 HOUR ([facility]) - Abnormal    Troponin T, High Sensitivity 51 (*)    BLOOD GAS ARTERIAL FULL PANEL - Abnormal    POCT pH, Arterial 7.48 (*)     POCT pCO2, Arterial 43 (*)     POCT pO2, Arterial 164 (*)     POCT SO2, Arterial 100      POCT Oxy Hemoglobin, Arterial 97.1      POCT Hematocrit Calculated, Arterial 22.0 (*)     POCT Sodium, Arterial 142      POCT Potassium, Arterial 3.7      POCT Chloride, Arterial 108 (*)     POCT Ionized Calcium, Arterial 1.02 (*)     POCT Glucose, Arterial 123 (*)     POCT Lactate, Arterial 0.7      POCT Base Excess, Arterial 7.8 (*)     POCT HCO3 Calculated, Arterial 32.0 (*)     POCT Hemoglobin, Arterial 7.3 (*)     POCT Anion Gap, Arterial 6 (*)     Patient Temperature 37.0      FiO2 50      Apparatus HIGH FLOW CANNULA      Flow 30.0      Site of Arterial Puncture Radial Left      Leonidas's Test Positive     TROPONIN T SERIES, HIGH SENSITIVITY (0, 2 HR, 6 HR)    Narrative:     The following orders were created for panel order Troponin T Series, High Sensitivity (0, 2HR, 6HR).  Procedure                               Abnormality         Status                     ---------                               -----------         ------                     Serial Troponin, Initial...[[id number]]  Abnormal            Final result               Serial Troponin, 2 Hour ...[[id number]]  Abnormal            Final result               Serial Troponin, 6 Hour ...[[id number]]                                                   Please view results for these tests on the individual orders.   SARS-COV-2 PCR     XR chest 1 view   Final Result   Findings suggestive of a degree of pulmonary edema/CHF.        MACRO:   None        Signed by: Deuce Bill 9/14/2024 3:18 PM   Dictation workstation:    [id number]        Medications   ipratropium-albuteroL (Duo-Neb) 0.5-2.5 mg/3 mL nebulizer solution 3 mL (3 mL nebulization Given 9/14/24 1341)   methylPREDNISolone sod succinate (SOLU-Medrol) injection 125 mg (125 mg intravenous Given 9/14/24 1341)     Differential diagnoses considered for this visit include: COPD exacerbation versus fluid overload versus acute coronary syndrome versus STEMI versus NSTEMI    Considerations/further MDM:    Patient is an 84-year-old female with a history of CHF and COPD presenting for evaluation of acute hypoxia and shortness of breath.  Patient was found to be in the 70s on her 2 L nasal cannula at home.  She was put on 6 L here in the emergency department.  Shortly after arrival, patient acutely desaturated to the 80s and was put on high flow where she maintains a pulse ox of 100%.  CMP was trending along patient's baseline.  proBNP slightly elevated at 9777.  Initial troponin of 46.  EKG showed normal sinus rhythm with a rate of 85 bpm normal axis normal voltage and normal ST segment without ST or T wave abnormalities.  ABG panel showed a pH of 7.48 with a pCO2 of 43.  Patient's hemoglobin was found to be 7.2, a drop from 5 days ago where her hemoglobin was 7.9.    Given that the patient was currently on high flow for her COPD exacerbation, admittance to the hospital was warranted.  I spoke to the patient, who was agreeable to admittance.  I then spoke to the on-call hospitalist Dr. Rodriguez, accepted the patient for further workup and management.    The patient/family was counseled on clinical impression, expectations, and plan along with recommendations to admission. All questions were answered and involved parties were understanding and agreeable to course of treatment. Case was discussed with admitting physician and any consultants. Bed type, ED treatment and further ED workup decided by joint decision making with admitting team and any consultants. Patient stable for admission  per my assessment and further management of patient will be deferred to the inpatient setting.    Patient was seen in conjunction with attending physician Dr. Stephany Ford.   Patient's history, physical exam, diagnostic studies, and treatment plan were discussed thoroughly.  Procedure  Procedures     Melonie Desouza PA-C  09/14/24 [id number]

## 2024-09-14 NOTE — H&P
History Of Present Illness 60-minute critical care evaluation  Anitha Welch is a 84 y.o. female presenting with hypoxemic respiratory failure.    She has a history of COPD on home O2.  History of diastolic heart failure.  She has a history of DVT and is supposed to be on Xarelto but she was hospitalized a month ago for GI blood loss and anemia.  She had an EGD and went back on Xarelto..  She now comes in with worsening shortness of breath.  In the ER she has been put on high flow nasal cannula, given a DuoNeb and a dose of Solu-Medrol     Past Medical History  She has a past medical history of AAA (abdominal aortic aneurysm) (CMS-HCC), Acute urinary tract infection (04/20/2023), Anemia, Anxiety, Arthritis, CHF (congestive heart failure) (Multi), Chronic pain disorder, CKD (chronic kidney disease), Cognitive decline, COPD (chronic obstructive pulmonary disease) (Multi), Coronary artery disease, CVA (cerebral vascular accident) (Multi), Deep vein thrombosis (DVT) of calf (Multi), Depression, Disease of thyroid gland, Diverticulosis, DVT (deep venous thrombosis) (Multi), Easy bruising, Epistaxis, GERD (gastroesophageal reflux disease), History of blood transfusion (2023), History of degenerative disc disease, Hyperlipidemia, Hypertension, Hypothyroidism, Ischemic cardiomyopathy, Meralgia paresthetica, Nonrheumatic mitral valve regurgitation, Osteoarthritis, Osteopenia (2010), Plantar fasciitis, RLS (restless legs syndrome), Sciatica, Spinal stenosis, and ST elevation (STEMI) myocardial infarction (Multi).    Surgical History  She has a past surgical history that includes MR angio head wo IV contrast (02/24/2017); MR angio head wo IV contrast (08/11/2017); MR angio head wo IV contrast (02/10/2019); Cholecystectomy (1996); Tonsillectomy; pr arthrp acetblr/prox fem prostc agrft/algrft; Thyroidectomy, partial (Bilateral, 04/17/2013); Coronary stent placement; Knee Arthroplasty (Left); Total hip arthroplasty (Right,  2006); Dilation and curettage of uterus; Other surgical history (01/09/2019); Upper gastrointestinal endoscopy (03/2019); Cardiac catheterization (10/2019); Cataract extraction (Bilateral, 11/13/2012); Adenoidectomy; Cyst Removal (Right, 06/24/2013); Colonoscopy; Other surgical history (01/09/2019); surgical laverne monitoring; Cardiac catheterization (N/A, 02/16/2024); Esophagogastroduodenoscopy; and Anomalous pulmonary venous return repair, total (N/A, 4/25/2024).     Social History  She reports that she quit smoking about 10 years ago. Her smoking use included cigarettes. She has never been exposed to tobacco smoke. She has never used smokeless tobacco. She reports that she does not currently use alcohol. She reports that she does not use drugs.    Family History  Family History   Problem Relation Name Age of Onset    Hyperthyroidism Mother          Treated with radioactive iodine    Hypertension Mother      Heart disease Mother      Hyperthyroidism Sibling      Diabetes Father's Sister          Allergies  Amoxicillin, Iodinated contrast media, Ciprofloxacin, Influenza virus vaccines, Flu vac 2023 [id number](pf), Diphenhydramine, and Other       Physical Exam  Alert oriented undistressed  Heart regular rate and rhythm without murmurs rubs or gallops  Lungs loud bibasilar crackles without much in the way of wheezing  Abdomen soft nontender nondistended  Extremities bilateral edema left greater than right  Neuro cranial nerves intact good tone and strength in arms he can lift both legs up off the bed     Last Recorded Vitals  /74   Pulse 88   Temp 36.9 °C (98.4 °F) (Oral)   Resp (!) 24   Wt 61.2 kg (135 lb)   SpO2 100%     Relevant Results  Chest x-ray looks like CHF.  Hemoglobin 7.2.     Assessment/Plan   Assessment & Plan  Acute respiratory failure (Multi)  I think this is mostly due to diastolic heart failure.  She took her torsemide this morning.  I am ordering 60 mg IV Lasix now.  Will order DuoNebs  and Pulmicort.  ER gave a dose of steroids I will continue lower dose steroids for now . repeat chest x-ray.  Consult pulmonary    Did discuss CODE STATUS with the patient.  She will be DNR but for now ventilators okay if necessary    Anemia  She was hospitalized a month ago for blood loss anemia.  She had EGD that was unremarkable.  Her Xarelto was temporarily held and she was put back on it.  Her hemoglobin is dropped from 9.4-7.2 in the last month.  It seems she is still having problems in this area.  I am holding Xarelto.  During 1 unit packed red cells.  Consulting GI.  Her legs were dopplered about 2 months ago and there was no DVT.  I will consider calling radiology operations tomorrow and trying to make somebody coming Sunday to ultrasound that left leg again.    I did spend 60 minutes on this critically ill patient     Jose Rodriguez MD

## 2024-09-14 NOTE — CARE PLAN
Respiratory Stat was called for pt. Upon entering the room pt appeared very SOB. NRB 15L on. SpO2 98%. Verbal order received from Stephany Ford DO, to place pt on HFNC and obtain ABG. Pts WOB breathing greatly improved after several minutes of being on HFNC. Current settings 25L 45%.    RT to follow.

## 2024-09-15 ENCOUNTER — APPOINTMENT (OUTPATIENT)
Dept: RADIOLOGY | Facility: HOSPITAL | Age: 84
DRG: 291 | End: 2024-09-15
Payer: MEDICARE

## 2024-09-15 PROBLEM — I82.409 DVT (DEEP VENOUS THROMBOSIS) (MULTI): Status: ACTIVE | Noted: 2024-09-15

## 2024-09-15 LAB
ANION GAP BLDA CALCULATED.4IONS-SCNC: 10 MMO/L (ref 10–25)
ANION GAP SERPL CALC-SCNC: 12 MMOL/L
APPARATUS: ABNORMAL
APPEARANCE UR: ABNORMAL
ARTERIAL PATENCY WRIST A: POSITIVE
BACTERIA #/AREA URNS AUTO: ABNORMAL /HPF
BASE EXCESS BLDA CALC-SCNC: 9 MMOL/L (ref -2–3)
BILIRUB UR STRIP.AUTO-MCNC: NEGATIVE MG/DL
BODY TEMPERATURE: 37 DEGREES CELSIUS
BUN SERPL-MCNC: 32 MG/DL (ref 8–25)
CA-I BLDA-SCNC: 1.08 MMOL/L (ref 1.1–1.33)
CALCIUM SERPL-MCNC: 8 MG/DL (ref 8.5–10.4)
CHLORIDE BLDA-SCNC: 102 MMOL/L (ref 98–107)
CHLORIDE SERPL-SCNC: 104 MMOL/L (ref 97–107)
CO2 SERPL-SCNC: 29 MMOL/L (ref 24–31)
COLOR UR: COLORLESS
CREAT SERPL-MCNC: 1.2 MG/DL (ref 0.4–1.6)
EGFRCR SERPLBLD CKD-EPI 2021: 44 ML/MIN/1.73M*2
ERYTHROCYTE [DISTWIDTH] IN BLOOD BY AUTOMATED COUNT: 18.4 % (ref 11.5–14.5)
FLOW: 30 LPM
FLUAV RNA RESP QL NAA+PROBE: NOT DETECTED
FLUBV RNA RESP QL NAA+PROBE: NOT DETECTED
GLUCOSE BLD MANUAL STRIP-MCNC: 137 MG/DL (ref 74–99)
GLUCOSE BLD MANUAL STRIP-MCNC: 145 MG/DL (ref 74–99)
GLUCOSE BLD MANUAL STRIP-MCNC: 151 MG/DL (ref 74–99)
GLUCOSE BLD MANUAL STRIP-MCNC: 225 MG/DL (ref 74–99)
GLUCOSE BLDA-MCNC: 141 MG/DL (ref 74–99)
GLUCOSE SERPL-MCNC: 138 MG/DL (ref 65–99)
GLUCOSE UR STRIP.AUTO-MCNC: NORMAL MG/DL
HCO3 BLDA-SCNC: 34.1 MMOL/L (ref 22–26)
HCT VFR BLD AUTO: 25.9 % (ref 36–46)
HCT VFR BLD EST: 29 % (ref 36–46)
HGB BLD-MCNC: 8.1 G/DL (ref 12–16)
HGB BLDA-MCNC: 9.6 G/DL (ref 12–16)
HYALINE CASTS #/AREA URNS AUTO: ABNORMAL /LPF
INHALED O2 CONCENTRATION: 40 %
KETONES UR STRIP.AUTO-MCNC: NEGATIVE MG/DL
LACTATE BLDA-SCNC: 1 MMOL/L (ref 0.4–2)
LEUKOCYTE ESTERASE UR QL STRIP.AUTO: ABNORMAL
MCH RBC QN AUTO: 26.1 PG (ref 26–34)
MCHC RBC AUTO-ENTMCNC: 31.3 G/DL (ref 32–36)
MCV RBC AUTO: 84 FL (ref 80–100)
MUCOUS THREADS #/AREA URNS AUTO: ABNORMAL /LPF
NITRITE UR QL STRIP.AUTO: NEGATIVE
NRBC BLD-RTO: 0 /100 WBCS (ref 0–0)
OXYHGB MFR BLDA: 94.8 % (ref 94–98)
PCO2 BLDA: 49 MM HG (ref 38–42)
PH BLDA: 7.45 PH (ref 7.38–7.42)
PH UR STRIP.AUTO: 5 [PH]
PLATELET # BLD AUTO: 144 X10*3/UL (ref 150–450)
PO2 BLDA: 88 MM HG (ref 85–95)
POTASSIUM BLDA-SCNC: 4.3 MMOL/L (ref 3.5–5.3)
POTASSIUM SERPL-SCNC: 3.5 MMOL/L (ref 3.4–5.1)
PROT UR STRIP.AUTO-MCNC: ABNORMAL MG/DL
RBC # BLD AUTO: 3.1 X10*6/UL (ref 4–5.2)
RBC # UR STRIP.AUTO: ABNORMAL /UL
RBC #/AREA URNS AUTO: >20 /HPF
SAO2 % BLDA: 97 % (ref 94–100)
SODIUM BLDA-SCNC: 142 MMOL/L (ref 136–145)
SODIUM SERPL-SCNC: 145 MMOL/L (ref 133–145)
SP GR UR STRIP.AUTO: 1.01
SPECIMEN DRAWN FROM PATIENT: ABNORMAL
UROBILINOGEN UR STRIP.AUTO-MCNC: NORMAL MG/DL
WBC # BLD AUTO: 15.2 X10*3/UL (ref 4.4–11.3)
WBC #/AREA URNS AUTO: >50 /HPF
WBC CLUMPS #/AREA URNS AUTO: ABNORMAL /HPF

## 2024-09-15 PROCEDURE — 2500000001 HC RX 250 WO HCPCS SELF ADMINISTERED DRUGS (ALT 637 FOR MEDICARE OP): Performed by: STUDENT IN AN ORGANIZED HEALTH CARE EDUCATION/TRAINING PROGRAM

## 2024-09-15 PROCEDURE — 2500000005 HC RX 250 GENERAL PHARMACY W/O HCPCS: Performed by: INTERNAL MEDICINE

## 2024-09-15 PROCEDURE — 51702 INSERT TEMP BLADDER CATH: CPT

## 2024-09-15 PROCEDURE — 51701 INSERT BLADDER CATHETER: CPT

## 2024-09-15 PROCEDURE — 9420000001 HC RT PATIENT EDUCATION 5 MIN

## 2024-09-15 PROCEDURE — 2060000001 HC INTERMEDIATE ICU ROOM DAILY

## 2024-09-15 PROCEDURE — 81001 URINALYSIS AUTO W/SCOPE: CPT | Performed by: INTERNAL MEDICINE

## 2024-09-15 PROCEDURE — 2500000002 HC RX 250 W HCPCS SELF ADMINISTERED DRUGS (ALT 637 FOR MEDICARE OP, ALT 636 FOR OP/ED): Performed by: INTERNAL MEDICINE

## 2024-09-15 PROCEDURE — 71045 X-RAY EXAM CHEST 1 VIEW: CPT | Performed by: RADIOLOGY

## 2024-09-15 PROCEDURE — 94799 UNLISTED PULMONARY SVC/PX: CPT

## 2024-09-15 PROCEDURE — 85027 COMPLETE CBC AUTOMATED: CPT | Performed by: INTERNAL MEDICINE

## 2024-09-15 PROCEDURE — 84132 ASSAY OF SERUM POTASSIUM: CPT | Performed by: STUDENT IN AN ORGANIZED HEALTH CARE EDUCATION/TRAINING PROGRAM

## 2024-09-15 PROCEDURE — 36600 WITHDRAWAL OF ARTERIAL BLOOD: CPT

## 2024-09-15 PROCEDURE — 36415 COLL VENOUS BLD VENIPUNCTURE: CPT | Performed by: INTERNAL MEDICINE

## 2024-09-15 PROCEDURE — 2500000002 HC RX 250 W HCPCS SELF ADMINISTERED DRUGS (ALT 637 FOR MEDICARE OP, ALT 636 FOR OP/ED): Performed by: PHYSICIAN ASSISTANT

## 2024-09-15 PROCEDURE — 2500000004 HC RX 250 GENERAL PHARMACY W/ HCPCS (ALT 636 FOR OP/ED): Mod: JZ | Performed by: INTERNAL MEDICINE

## 2024-09-15 PROCEDURE — 2500000004 HC RX 250 GENERAL PHARMACY W/ HCPCS (ALT 636 FOR OP/ED): Performed by: STUDENT IN AN ORGANIZED HEALTH CARE EDUCATION/TRAINING PROGRAM

## 2024-09-15 PROCEDURE — 82947 ASSAY GLUCOSE BLOOD QUANT: CPT

## 2024-09-15 PROCEDURE — 71045 X-RAY EXAM CHEST 1 VIEW: CPT

## 2024-09-15 PROCEDURE — 80048 BASIC METABOLIC PNL TOTAL CA: CPT | Performed by: INTERNAL MEDICINE

## 2024-09-15 PROCEDURE — 87502 INFLUENZA DNA AMP PROBE: CPT | Performed by: INTERNAL MEDICINE

## 2024-09-15 PROCEDURE — 2500000004 HC RX 250 GENERAL PHARMACY W/ HCPCS (ALT 636 FOR OP/ED): Performed by: INTERNAL MEDICINE

## 2024-09-15 PROCEDURE — 2500000001 HC RX 250 WO HCPCS SELF ADMINISTERED DRUGS (ALT 637 FOR MEDICARE OP): Performed by: INTERNAL MEDICINE

## 2024-09-15 PROCEDURE — 2500000002 HC RX 250 W HCPCS SELF ADMINISTERED DRUGS (ALT 637 FOR MEDICARE OP, ALT 636 FOR OP/ED): Performed by: STUDENT IN AN ORGANIZED HEALTH CARE EDUCATION/TRAINING PROGRAM

## 2024-09-15 PROCEDURE — 94640 AIRWAY INHALATION TREATMENT: CPT

## 2024-09-15 RX ORDER — IPRATROPIUM BROMIDE AND ALBUTEROL SULFATE 2.5; .5 MG/3ML; MG/3ML
3 SOLUTION RESPIRATORY (INHALATION) EVERY 2 HOUR PRN
Status: DISCONTINUED | OUTPATIENT
Start: 2024-09-15 | End: 2024-09-19 | Stop reason: HOSPADM

## 2024-09-15 RX ORDER — CEFTRIAXONE 1 G/50ML
1 INJECTION, SOLUTION INTRAVENOUS EVERY 24 HOURS
Status: DISCONTINUED | OUTPATIENT
Start: 2024-09-15 | End: 2024-09-18

## 2024-09-15 RX ORDER — HEPARIN SODIUM 5000 [USP'U]/ML
5000 INJECTION, SOLUTION INTRAVENOUS; SUBCUTANEOUS EVERY 12 HOURS SCHEDULED
Status: DISCONTINUED | OUTPATIENT
Start: 2024-09-15 | End: 2024-09-19 | Stop reason: HOSPADM

## 2024-09-15 RX ORDER — FUROSEMIDE 10 MG/ML
40 INJECTION INTRAMUSCULAR; INTRAVENOUS ONCE
Status: COMPLETED | OUTPATIENT
Start: 2024-09-15 | End: 2024-09-15

## 2024-09-15 RX ORDER — POLYETHYLENE GLYCOL 3350, SODIUM CHLORIDE, SODIUM BICARBONATE, POTASSIUM CHLORIDE 420; 11.2; 5.72; 1.48 G/4L; G/4L; G/4L; G/4L
4000 POWDER, FOR SOLUTION ORAL ONCE
Status: COMPLETED | OUTPATIENT
Start: 2024-09-15 | End: 2024-09-18

## 2024-09-15 RX ORDER — QUETIAPINE FUMARATE 25 MG/1
25 TABLET, FILM COATED ORAL ONCE
Status: COMPLETED | OUTPATIENT
Start: 2024-09-15 | End: 2024-09-15

## 2024-09-15 RX ORDER — AZITHROMYCIN 500 MG/1
500 TABLET, FILM COATED ORAL
Status: COMPLETED | OUTPATIENT
Start: 2024-09-15 | End: 2024-09-17

## 2024-09-15 RX ORDER — IPRATROPIUM BROMIDE AND ALBUTEROL SULFATE 2.5; .5 MG/3ML; MG/3ML
3 SOLUTION RESPIRATORY (INHALATION)
Status: DISCONTINUED | OUTPATIENT
Start: 2024-09-15 | End: 2024-09-19 | Stop reason: HOSPADM

## 2024-09-15 RX ORDER — PREDNISONE 20 MG/1
40 TABLET ORAL DAILY
Status: COMPLETED | OUTPATIENT
Start: 2024-09-16 | End: 2024-09-18

## 2024-09-15 RX ORDER — POTASSIUM CHLORIDE 20 MEQ/1
20 TABLET, EXTENDED RELEASE ORAL ONCE
Status: COMPLETED | OUTPATIENT
Start: 2024-09-15 | End: 2024-09-15

## 2024-09-15 RX ORDER — IPRATROPIUM BROMIDE AND ALBUTEROL SULFATE 2.5; .5 MG/3ML; MG/3ML
3 SOLUTION RESPIRATORY (INHALATION) ONCE
Status: COMPLETED | OUTPATIENT
Start: 2024-09-15 | End: 2024-09-15

## 2024-09-15 RX ORDER — OLANZAPINE 10 MG/2ML
2.5 INJECTION, POWDER, FOR SOLUTION INTRAMUSCULAR EVERY 4 HOURS PRN
Status: DISCONTINUED | OUTPATIENT
Start: 2024-09-15 | End: 2024-09-19 | Stop reason: HOSPADM

## 2024-09-15 SDOH — ECONOMIC STABILITY: FOOD INSECURITY: WITHIN THE PAST 12 MONTHS, YOU WORRIED THAT YOUR FOOD WOULD RUN OUT BEFORE YOU GOT MONEY TO BUY MORE.: NEVER TRUE

## 2024-09-15 SDOH — SOCIAL STABILITY: SOCIAL INSECURITY: HAVE YOU HAD THOUGHTS OF HARMING ANYONE ELSE?: NO

## 2024-09-15 SDOH — ECONOMIC STABILITY: INCOME INSECURITY: IN THE LAST 12 MONTHS, WAS THERE A TIME WHEN YOU WERE NOT ABLE TO PAY THE MORTGAGE OR RENT ON TIME?: NO

## 2024-09-15 SDOH — HEALTH STABILITY: MENTAL HEALTH
HOW OFTEN DO YOU NEED TO HAVE SOMEONE HELP YOU WHEN YOU READ INSTRUCTIONS, PAMPHLETS, OR OTHER WRITTEN MATERIAL FROM YOUR DOCTOR OR PHARMACY?: NEVER

## 2024-09-15 SDOH — SOCIAL STABILITY: SOCIAL INSECURITY: WITHIN THE LAST YEAR, HAVE YOU BEEN HUMILIATED OR EMOTIONALLY ABUSED IN OTHER WAYS BY YOUR PARTNER OR EX-PARTNER?: NO

## 2024-09-15 SDOH — SOCIAL STABILITY: SOCIAL NETWORK: HOW OFTEN DO YOU ATTENT MEETINGS OF THE CLUB OR ORGANIZATION YOU BELONG TO?: NEVER

## 2024-09-15 SDOH — ECONOMIC STABILITY: FOOD INSECURITY: WITHIN THE PAST 12 MONTHS, THE FOOD YOU BOUGHT JUST DIDN'T LAST AND YOU DIDN'T HAVE MONEY TO GET MORE.: NEVER TRUE

## 2024-09-15 SDOH — SOCIAL STABILITY: SOCIAL INSECURITY: DO YOU FEEL ANYONE HAS EXPLOITED OR TAKEN ADVANTAGE OF YOU FINANCIALLY OR OF YOUR PERSONAL PROPERTY?: NO

## 2024-09-15 SDOH — ECONOMIC STABILITY: HOUSING INSECURITY: AT ANY TIME IN THE PAST 12 MONTHS, WERE YOU HOMELESS OR LIVING IN A SHELTER (INCLUDING NOW)?: NO

## 2024-09-15 SDOH — ECONOMIC STABILITY: INCOME INSECURITY: HOW HARD IS IT FOR YOU TO PAY FOR THE VERY BASICS LIKE FOOD, HOUSING, MEDICAL CARE, AND HEATING?: NOT VERY HARD

## 2024-09-15 SDOH — SOCIAL STABILITY: SOCIAL NETWORK: ARE YOU MARRIED, WIDOWED, DIVORCED, SEPARATED, NEVER MARRIED, OR LIVING WITH A PARTNER?: MARRIED

## 2024-09-15 SDOH — SOCIAL STABILITY: SOCIAL INSECURITY: WITHIN THE LAST YEAR, HAVE YOU BEEN AFRAID OF YOUR PARTNER OR EX-PARTNER?: NO

## 2024-09-15 SDOH — SOCIAL STABILITY: SOCIAL INSECURITY: DO YOU FEEL UNSAFE GOING BACK TO THE PLACE WHERE YOU ARE LIVING?: NO

## 2024-09-15 SDOH — ECONOMIC STABILITY: INCOME INSECURITY: IN THE PAST 12 MONTHS, HAS THE ELECTRIC, GAS, OIL, OR WATER COMPANY THREATENED TO SHUT OFF SERVICE IN YOUR HOME?: NO

## 2024-09-15 SDOH — SOCIAL STABILITY: SOCIAL NETWORK: HOW OFTEN DO YOU ATTEND CHURCH OR RELIGIOUS SERVICES?: NEVER

## 2024-09-15 SDOH — HEALTH STABILITY: PHYSICAL HEALTH: ON AVERAGE, HOW MANY DAYS PER WEEK DO YOU ENGAGE IN MODERATE TO STRENUOUS EXERCISE (LIKE A BRISK WALK)?: 0 DAYS

## 2024-09-15 SDOH — ECONOMIC STABILITY: HOUSING INSECURITY: IN THE PAST 12 MONTHS, HOW MANY TIMES HAVE YOU MOVED WHERE YOU WERE LIVING?: 0

## 2024-09-15 SDOH — SOCIAL STABILITY: SOCIAL NETWORK: HOW OFTEN DO YOU GET TOGETHER WITH FRIENDS OR RELATIVES?: MORE THAN THREE TIMES A WEEK

## 2024-09-15 SDOH — SOCIAL STABILITY: SOCIAL INSECURITY: DOES ANYONE TRY TO KEEP YOU FROM HAVING/CONTACTING OTHER FRIENDS OR DOING THINGS OUTSIDE YOUR HOME?: NO

## 2024-09-15 SDOH — SOCIAL STABILITY: SOCIAL INSECURITY: ARE THERE ANY APPARENT SIGNS OF INJURIES/BEHAVIORS THAT COULD BE RELATED TO ABUSE/NEGLECT?: NO

## 2024-09-15 SDOH — HEALTH STABILITY: PHYSICAL HEALTH: ON AVERAGE, HOW MANY MINUTES DO YOU ENGAGE IN EXERCISE AT THIS LEVEL?: 0 MIN

## 2024-09-15 SDOH — SOCIAL STABILITY: SOCIAL INSECURITY: ABUSE: ADULT

## 2024-09-15 SDOH — SOCIAL STABILITY: SOCIAL INSECURITY: HAS ANYONE EVER THREATENED TO HURT YOUR FAMILY OR YOUR PETS?: NO

## 2024-09-15 SDOH — SOCIAL STABILITY: SOCIAL INSECURITY: ARE YOU OR HAVE YOU BEEN THREATENED OR ABUSED PHYSICALLY, EMOTIONALLY, OR SEXUALLY BY ANYONE?: NO

## 2024-09-15 SDOH — SOCIAL STABILITY: SOCIAL INSECURITY: HAVE YOU HAD ANY THOUGHTS OF HARMING ANYONE ELSE?: NO

## 2024-09-15 ASSESSMENT — COGNITIVE AND FUNCTIONAL STATUS - GENERAL
WALKING IN HOSPITAL ROOM: A LITTLE
CLIMB 3 TO 5 STEPS WITH RAILING: A LITTLE
CLIMB 3 TO 5 STEPS WITH RAILING: A LITTLE
DAILY ACTIVITIY SCORE: 24
MOBILITY SCORE: 23
TOILETING: A LITTLE
PATIENT BASELINE BEDBOUND: NO
MOBILITY SCORE: 20
MOVING TO AND FROM BED TO CHAIR: A LITTLE
DAILY ACTIVITIY SCORE: 23
STANDING UP FROM CHAIR USING ARMS: A LITTLE

## 2024-09-15 ASSESSMENT — ENCOUNTER SYMPTOMS
SHORTNESS OF BREATH: 1
ENDOCRINE NEGATIVE: 1
PSYCHIATRIC NEGATIVE: 1
FATIGUE: 1
ALLERGIC/IMMUNOLOGIC NEGATIVE: 1
HEMATOLOGIC/LYMPHATIC NEGATIVE: 1
GASTROINTESTINAL NEGATIVE: 1
MUSCULOSKELETAL NEGATIVE: 1
EYES NEGATIVE: 1
NEUROLOGICAL NEGATIVE: 1

## 2024-09-15 ASSESSMENT — LIFESTYLE VARIABLES
HOW OFTEN DO YOU HAVE A DRINK CONTAINING ALCOHOL: NEVER
HOW OFTEN DO YOU HAVE 6 OR MORE DRINKS ON ONE OCCASION: NEVER
HOW MANY STANDARD DRINKS CONTAINING ALCOHOL DO YOU HAVE ON A TYPICAL DAY: PATIENT DOES NOT DRINK
SUBSTANCE_ABUSE_PAST_12_MONTHS: NO
AUDIT-C TOTAL SCORE: 0
AUDIT-C TOTAL SCORE: 0
PRESCIPTION_ABUSE_PAST_12_MONTHS: NO
SKIP TO QUESTIONS 9-10: 1

## 2024-09-15 ASSESSMENT — PAIN SCALES - GENERAL: PAINLEVEL_OUTOF10: 0 - NO PAIN

## 2024-09-15 NOTE — PROGRESS NOTES
09/15/24 1409   [facility] Disability Status   Are you deaf or do you have serious difficulty hearing? N   Are you blind or do you have serious difficulty seeing, even when wearing glasses? N   Because of a physical, mental, or emotional condition, do you have serious difficulty concentrating, remembering, or making decisions? (5 years old or older) N   Do you have serious difficulty walking or climbing stairs? N   Do you have serious difficulty dressing or bathing? N   Because of a physical, mental, or emotional condition, do you have serious difficulty doing errands alone such as visiting the doctor? N

## 2024-09-15 NOTE — ASSESSMENT & PLAN NOTE
she is apparently on long-term Xarelto but she also seems to be losing blood through her GI tract.     I see a DVT in 2013.  I see a DVT in November 2023.  I do not see any history of pulmonary emboli.  She had a indeterminate VQ scan last week. I presume she has been on long-term anticoagulation because of recurrent DVT.  I have ordered leg Dopplers which will not be done till tomorrow I am putting her on subcu heparin for now.  Low dose.

## 2024-09-15 NOTE — PROGRESS NOTES
Anitha Welch is a 84 y.o. female on day 1 of admission presenting with CHF (congestive heart failure), NYHA class II, acute, diastolic (Multi).      Subjective History of COPD, diastolic heart failure, recurrent DVT and recent GI bleed. came in yesterday with acute hypoxemic respiratory failure.  Given steroids and some relatively aggressive diuresis.  Also had a drop in blood count.  Hemoglobin went from 9-7 within the last week. she was just evaluated for GI bleed and put back on her Xarelto.  I had her get 1 unit packed red cells yesterday.  She is breathing better now.  She has been confused and at times agitated so she is getting as needed Zyprexa      Objective    alert cooperative speaking clearly demonstrating some memory problems  Heart regular rate and rhythm without murmurs rubs or gallops  Lungs-bibasilar crackles very faint expiratory wheeze.  Abdomen soft nontender nondistended  Extremities 1+ edema worse on the left.    Last Recorded Vitals  /62 (BP Location: Left arm, Patient Position: Lying)   Pulse 81   Temp 36.3 °C (97.3 °F) (Temporal)   Resp 24   Wt 66.6 kg (146 lb 13.2 oz)   SpO2 98%   Intake/Output last 3 Shifts:    Intake/Output Summary (Last 24 hours) at 9/15/2024 1048  Last data filed at 9/15/2024 0501  Gross per 24 hour   Intake 273.75 ml   Output 600 ml   Net -326.25 ml       Admission Weight  Weight: 61.2 kg (135 lb) (09/14/24 1333)    Daily Weight  09/15/24 : 66.6 kg (146 lb 13.2 oz)    Image Results  XR chest 1 view  Narrative: Interpreted By:  Mattie Burr,   STUDY:  XR CHEST 1 VIEW;  9/15/2024 5:50 am      INDICATION:  Signs/Symptoms:chf.          COMPARISON:  09/14/2024      ACCESSION NUMBER(S):  [id number]      ORDERING CLINICIAN:  [address]      FINDINGS:  AP radiograph of the chest was provided.              CARDIOMEDIASTINAL SILHOUETTE:  Cardiomediastinal silhouette is stable in size and configuration.  Enlarged with atherosclerotic calcifications of the aortic  arch.      LUNGS:  Diffuse bilateral airspace opacities, more pronounced in the right  upper lobe.. Possible small left pleural effusion. No sizable  pneumothorax.      ABDOMEN:  No remarkable upper abdominal findings.      BONES:  Degenerative changes with demineralization. Surgical clips over the  lower spine.      Impression: 1.  Diffuse interstitial opacities could be related to pulmonary  edema with enlarged cardiac silhouette. Slightly more confluent in  the right upper lung field. Possible small left pleural effusion.  Superimposed infection not excluded.              MACRO:  None      Signed by: Mattie Burr 9/15/2024 7:25 AM  Dictation workstation:   [id number]      Physical Exam    Relevant Results               Assessment/Plan                  Assessment & Plan  Acute respiratory failure (Multi)   she responded well to diuresis.  She is being seen by pulmonary right now.  I have asked the pulmonologist to decide on whether we want up give her extra diuretics today and have also asked her to address what we are doing with the steroids.  We are going to take her off high flow and try 6 L.  Anemia    She does not seem to be hemorrhaging but she did drop her blood count and required a transfusion yesterday.  she was seen by GI and they plan on doing a colonoscopy I believe tomorrow.  CHF (congestive heart failure), NYHA class II, acute, diastolic (Multi)    Treating for acute diastolic congestive heart failure.  She has received extra diuretics and this is improved her respiratory function  DVT (deep venous thrombosis) (Multi)   she is apparently on long-term Xarelto but she also seems to be losing blood through her GI tract.     I see a DVT in 2013.  I see a DVT in November 2023.  I do not see any history of pulmonary emboli.  She had a indeterminate VQ scan last week. I presume she has been on long-term anticoagulation because of recurrent DVT.  I have ordered leg Dopplers which will not be done till  tomorrow I am putting her on subcu heparin for now.  Low dose.              Jose Rodriguez MD

## 2024-09-15 NOTE — PROGRESS NOTES
09/15/24 1407   Discharge Planning   Living Arrangements Spouse/significant other;Children   Support Systems Children   Assistance Needed PT/OT   Type of Residence Private residence   Number of Stairs to Enter Residence 3   Number of Stairs Within Residence 2  (flight to basement, flight to upper level)   Do you have animals or pets at home? No   Who is requesting discharge planning? Provider   Home or Post Acute Services In home services   Type of Home Care Services Home nursing visits;Home OT;Home PT   Expected Discharge Disposition Home Health   Does the patient need discharge transport arranged? No   Patient Choice   Provider Choice list and CMS website (https://medicare.gov/care-compare#search) for post-acute Quality and Resource Measure Data were provided and reviewed with: Other (Comment)  (patient declined)   Patient / Family choosing to utilize agency / facility established prior to hospitalization Yes

## 2024-09-15 NOTE — CONSULTS
Reason For Consult  Hypoxic respiratory failure    Pulmonary Consultation Note   Subjective    Anitha Welch is a 84 y.o. year old female patient known with AAA, CKD, COPD (mod obstruction according to z score in 4/2024 41% FEV1), chronic respiratory failure on 2 liter oxygen,  CHF, CVA, mitral regur s/p transcatheter [id number] mitral valve repair , hx of DVT admitted on 9/14/2024 with following:    -Hypoxic respiratory failure required high flow oxygen in ED. Received lasix, steroids    History of Present Illness:  Patient seen today on montes oxygen improved to 4 liter from non rebreather and high flow yesterday (paO2 was 164 unsure if of non rebreather). Patient was slightly confused this morning but was able to give me some history. Called son for collaborating history however unsuccessful.     Patient mentions worsening SOB recently in the last 1-2 weeks where she had to go to [facility] ED and also had another medical visit but needed to call EMS to help at the end as her breathing became worse. She reports worsening cough, mostly dry in nature it seems like. Mentions subjective chills but could not confirm if she had a fever. Denies runny nose, sore throat. Mentions chest tightness. Mentions use of albuterol more frequently in last two days and thought that it helped. She is not sure about her medications but her son gives them to her. She does not use the inhaler consistently.       She was on non rebreather, then high flow nasal cannula then placed on NC this AM    -On trelegy at home   -Follows with Dr Boo quiles for pulmonary issues    9/14 BNP 9,777 higher from previous   9/9: solumedrol 125, and lasix in ED in [facility] for SOB.  9/13 OP received a dose of steroids   Past Medical History  She has a past medical history of AAA (abdominal aortic aneurysm) (CMS-[facility]), Acute urinary tract infection (04/20/2023), Anemia, Anxiety, Arthritis, CHF (congestive heart failure) (Multi), Chronic pain disorder, CKD  (chronic kidney disease), Cognitive decline, COPD (chronic obstructive pulmonary disease) (Multi), Coronary artery disease, CVA (cerebral vascular accident) (Multi), Deep vein thrombosis (DVT) of calf (Multi), Depression, Disease of thyroid gland, Diverticulosis, DVT (deep venous thrombosis) (Multi), Easy bruising, Epistaxis, GERD (gastroesophageal reflux disease), History of blood transfusion (2023), History of degenerative disc disease, Hyperlipidemia, Hypertension, Hypothyroidism, Ischemic cardiomyopathy, Meralgia paresthetica, Nonrheumatic mitral valve regurgitation, Osteoarthritis, Osteopenia (2010), Plantar fasciitis, RLS (restless legs syndrome), Sciatica, Spinal stenosis, and ST elevation (STEMI) myocardial infarction (Multi).    Surgical History  She has a past surgical history that includes MR angio head wo IV contrast (02/24/2017); MR angio head wo IV contrast (08/11/2017); MR angio head wo IV contrast (02/10/2019); Cholecystectomy (1996); Tonsillectomy; pr arthrp acetblr/prox fem prostc agrft/algrft; Thyroidectomy, partial (Bilateral, 04/17/2013); Coronary stent placement; Knee Arthroplasty (Left); Total hip arthroplasty (Right, 2006); Dilation and curettage of uterus; Other surgical history (01/09/2019); Upper gastrointestinal endoscopy (03/2019); Cardiac catheterization (10/2019); Cataract extraction (Bilateral, 11/13/2012); Adenoidectomy; Cyst Removal (Right, 06/24/2013); Colonoscopy; Other surgical history (01/09/2019); surgical laverne monitoring; Cardiac catheterization (N/A, 02/16/2024); Esophagogastroduodenoscopy; and Anomalous pulmonary venous return repair, total (N/A, 4/25/2024).     Social History  She reports that she quit smoking about 10 years ago. Her smoking use included cigarettes. She has never been exposed to tobacco smoke. She has never used smokeless tobacco. She reports that she does not currently use alcohol. She reports that she does not use drugs.  Cigarette smoking history:  "  Marijuana use:  Vaping:  Pets:   Asbestos:  Other specific exposure:      Family History  Family History   Problem Relation Name Age of Onset    Hyperthyroidism Mother          Treated with radioactive iodine    Hypertension Mother      Heart disease Mother      Hyperthyroidism Sibling      Diabetes Father's Sister          Allergies  Amoxicillin, Iodinated contrast media, Ciprofloxacin, Influenza virus vaccines, Flu vac 2023 [id number](pf), Diphenhydramine, and Other    Review of Systems   As above       Meds    Scheduled medications  atorvastatin, 40 mg, oral, Nightly  aztreonam, 1 g, intravenous, q12h  budesonide, 0.5 mg, nebulization, BID  carvedilol, 3.125 mg, oral, BID  gabapentin, 100 mg, oral, Nightly  insulin lispro, 0-5 Units, subcutaneous, TID  ipratropium-albuteroL, 3 mL, nebulization, TID  levothyroxine, 25 mcg, oral, Daily  methylPREDNISolone sodium succinate (PF), 20 mg, intravenous, q12h  pantoprazole, 40 mg, oral, BID  polyethylene glycol-electrolytes, 4,000 mL, oral, Once  torsemide, 20 mg, oral, Daily      Continuous medications     PRN medications  PRN medications: dextrose, dextrose, docusate sodium, glucagon, glucagon, ipratropium-albuteroL     Objective      Last Recorded Vitals  /62 (BP Location: Left arm, Patient Position: Lying)   Pulse 81   Temp 36.3 °C (97.3 °F) (Temporal)   Resp 24   Wt 66.6 kg (146 lb 13.2 oz)   SpO2 98%     Blood pressure 116/62, pulse 81, temperature 36.3 °C (97.3 °F), temperature source Temporal, resp. rate 24, height 1.575 m (5' 2\"), weight 66.6 kg (146 lb 13.2 oz), SpO2 98%.   Physical Exam   GENERAL: No respiratory distress, short sentences, mild tachypnea  OROPHARYNX: Moist mucosa  LUNGS: Decrease air entry, wheezing, crackles heard. No anterior wheezing.   CARDIAC: RRR  EXTREMITIES:mild edema L>R  NEURO: alert, conservative  SKIN: Skin turgor normal.   PSYCH: Normal affect    Intake/Output Summary (Last 24 hours) at 9/15/2024 0907  Last data " filed at 9/15/2024 0501  Gross per 24 hour   Intake 273.75 ml   Output 600 ml   Net -326.25 ml     Labs:   Results from last 72 hours   Lab Units 09/15/24  0447 09/14/24  1347   SODIUM mmol/L 145 141   POTASSIUM mmol/L 3.5 3.6   CHLORIDE mmol/L 104 105   CO2 mmol/L 29 24   BUN mg/dL 32* 31*   CREATININE mg/dL 1.20 1.30   GLUCOSE mg/dL 138* 105*   CALCIUM mg/dL 8.0* 7.8*   ANION GAP mmol/L 12 12   EGFR mL/min/1.73m*2 44* 40*      Results from last 72 hours   Lab Units 09/15/24  0447 09/14/24  1556   WBC AUTO x10*3/uL 15.2* 10.9   HEMOGLOBIN g/dL 8.1* 7.2*   HEMATOCRIT % 25.9* 24.5*   PLATELETS AUTO x10*3/uL 144* 142*   NEUTROS PCT AUTO %  --  94.0   LYMPHS PCT AUTO %  --  2.6   MONOS PCT AUTO %  --  2.4   EOS PCT AUTO %  --  0.0      Results from last 72 hours   Lab Units 09/14/24  1451   POCT PH, ARTERIAL pH 7.48*   POCT PCO2, ARTERIAL mm Hg 43*   POCT PO2, ARTERIAL mm Hg 164*   POCT SO2, ARTERIAL % 100          Micro/ID:   Lab Results   Component Value Date    URINECULTURE 20,000 - 80,000 Escherichia coli (A) 04/28/2024    BLOODCULT No growth at 4 days -  FINAL REPORT 10/07/2023    BLOODCULT No growth at 4 days -  FINAL REPORT 10/07/2023     Summary of key imaging results from the last 24 hours  CXR interstitial edema    Echo 8/2024: mod elevated RVSP, pulm HTN, mitral regurg mild to mod, dilated RA, EF conserved, RV function conserved.     9/14 BNP 9,777 higher from previous     CT back in 2013 no significant structural lung problems     VQ scan with indeterminate probability for posterior lateral right lower lung and  indeterminate probability of pulmonary embolism.    PFT in 4/2024 (mod obstruction according to z score 41% FEV1      Impression   Anitha Welch is a 84 y.o. year old female patient is being seen by the pulmonary service for   Acute on chronic respiratory failure on 2 liter oxygen baseline most probably related to fluid overload   Preserved EF, volume overload, valvular disease, pulmonary  hypertension (admissions this year with MR repair, fluid overload)  COPD (mod obstruction according to z score in 4/2024 41% FEV1)  Former smoker   Possible GI bleed but recent EGD in August was neg for UGI bleed  Hx of DVT and indeterminate VQ scan      Clinical picture fits better fluid overload but with patient's underlying moderate obstructive lung disease and auscultation findings today she looks like she could benefit from AECOPD treatment (increased cough, worse dyspnea)    Recommendations   As follows:  Fluid overload, diuretics managed by primary team, she was placed back on her home dose, would give an extra dose of lasix 20 mg IV if she does not meed net neg goal of 500-1 liter neg today as her oxygen requirements improved but patient is not yet at baseline. With possible GI bleed and increased BUN would also be mindful with diuresis  Could benefit from re assessment by cardiology regarding her valvular function (could not collaborate with family members if patient has been compliant with home meds)  AECOPD treatment with prednisone 40 mg daily for 3 more days (total of 5 days)  Z pack for 3 days (QTC acceptable)  Sputum culture  Repeat CXR tomorrow to evaluate for interstitial edema improvement (this lags in improvement)  If patient does not improve might need CT chest   Bronchopulmonary hygiene with flutter and acapella   Clinical sx less likely related to lung VTE at this time and more compatible with fluid overload and possible AECOPD    Melissa Handley MD   09/15/24 at 9:07 AM     Disclaimer: Documentation completed with the information available at the time of input. Parts of this note may have been scribed or generated using voice dictation software, Dragon.  Homophonic errors may exist.  Please contact me directly if clarification is needed. The times in the chart may not be reflective of actual patient care times, interventions, or procedures. Documentation occurs after the physical care of the  patient.

## 2024-09-15 NOTE — ASSESSMENT & PLAN NOTE
Treating for acute diastolic congestive heart failure.  She has received extra diuretics and this is improved her respiratory function

## 2024-09-15 NOTE — CONSULTS
Inpatient consult to Gastroenterology  Consult performed by: Melonie Larsen, FELA-CNP  Consult ordered by: Jose Rodriguez MD          Reason For Consult  Anemia     History Of Present Illness  Anitha Welch is a 84 y.o. female with a past medical history of COPD, chronic kidney disease and CHF presenting with shortness of breath. Patient states she normally wears 2 L oxygen at home at baseline for her COPD. She denies any recent illness. Denies fevers or chills, abdominal pain, nausea or vomiting. No diarrhea or constipation. Found evidence normocytic anemia with hgb 7.2. She does take Xarelto at home. She was seen by our group last month and EGD on 8/12 was unremarkable. No bleeding identified. Last colonoscopy in 2020 showed small Hemorrhoids, Diverticulosis of large intestine without complication, colon otherwise normal. She followed up as outpatient and is scheduled for outpatient colonoscopy in November. She reports no black tarry, bloody stools. She denies any abdominal pain, nausea, vomiting. Denies any alcohol, NSAID use.      Past Medical History  She has a past medical history of AAA (abdominal aortic aneurysm) ([facility]), Acute urinary tract infection (04/20/2023), Anemia, Anxiety, Arthritis, CHF (congestive heart failure) (Multi), Chronic pain disorder, CKD (chronic kidney disease), Cognitive decline, COPD (chronic obstructive pulmonary disease) (Multi), Coronary artery disease, CVA (cerebral vascular accident) (Multi), Deep vein thrombosis (DVT) of calf (Multi), Depression, Disease of thyroid gland, Diverticulosis, DVT (deep venous thrombosis) (Multi), Easy bruising, Epistaxis, GERD (gastroesophageal reflux disease), History of blood transfusion (2023), History of degenerative disc disease, Hyperlipidemia, Hypertension, Hypothyroidism, Ischemic cardiomyopathy, Meralgia paresthetica, Nonrheumatic mitral valve regurgitation, Osteoarthritis, Osteopenia (2010), Plantar fasciitis, RLS (restless legs  syndrome), Sciatica, Spinal stenosis, and ST elevation (STEMI) myocardial infarction (Multi).    Surgical History  She has a past surgical history that includes MR angio head wo IV contrast (02/24/2017); MR angio head wo IV contrast (08/11/2017); MR angio head wo IV contrast (02/10/2019); Cholecystectomy (1996); Tonsillectomy; pr arthrp acetblr/prox fem prostc agrft/algrft; Thyroidectomy, partial (Bilateral, 04/17/2013); Coronary stent placement; Knee Arthroplasty (Left); Total hip arthroplasty (Right, 2006); Dilation and curettage of uterus; Other surgical history (01/09/2019); Upper gastrointestinal endoscopy (03/2019); Cardiac catheterization (10/2019); Cataract extraction (Bilateral, 11/13/2012); Adenoidectomy; Cyst Removal (Right, 06/24/2013); Colonoscopy; Other surgical history (01/09/2019); surgical laverne monitoring; Cardiac catheterization (N/A, 02/16/2024); Esophagogastroduodenoscopy; and Anomalous pulmonary venous return repair, total (N/A, 4/25/2024).     Social History  She reports that she quit smoking about 10 years ago. Her smoking use included cigarettes. She has never been exposed to tobacco smoke. She has never used smokeless tobacco. She reports that she does not currently use alcohol. She reports that she does not use drugs.    Family History  Family History   Problem Relation Name Age of Onset    Hyperthyroidism Mother          Treated with radioactive iodine    Hypertension Mother      Heart disease Mother      Hyperthyroidism Sibling      Diabetes Father's Sister          Allergies  Amoxicillin, Iodinated contrast media, Ciprofloxacin, Influenza virus vaccines, Flu vac 2023 [id number](pf), Diphenhydramine, and Other    Review of Systems   Constitutional:  Positive for fatigue.   HENT: Negative.     Eyes: Negative.    Respiratory:  Positive for shortness of breath.    Gastrointestinal: Negative.    Endocrine: Negative.    Genitourinary: Negative.    Musculoskeletal: Negative.    Skin:  "Negative.    Allergic/Immunologic: Negative.    Neurological: Negative.    Hematological: Negative.    Psychiatric/Behavioral: Negative.          Physical Exam  HENT:      Head: Normocephalic.      Nose: Nose normal.      Mouth/Throat:      Mouth: Mucous membranes are moist.   Eyes:      Pupils: Pupils are equal, round, and reactive to light.   Cardiovascular:      Rate and Rhythm: Normal rate.   Abdominal:      Palpations: Abdomen is soft.   Musculoskeletal:         General: Normal range of motion.      Cervical back: Normal range of motion.   Skin:     General: Skin is warm.   Neurological:      General: No focal deficit present.      Mental Status: She is alert.   Psychiatric:         Mood and Affect: Mood normal.          Last Recorded Vitals  Blood pressure 151/78, pulse 86, temperature 36.6 °C (97.9 °F), temperature source Oral, resp. rate (!) 33, height 1.575 m (5' 2\"), weight 61.2 kg (135 lb), SpO2 98%.    Relevant Results  XR chest 1 view    Result Date: 9/15/2024  Interpreted By:  Mattie Burr, STUDY: XR CHEST 1 VIEW;  9/15/2024 5:50 am   INDICATION: Signs/Symptoms:chf.     COMPARISON: 09/14/2024   ACCESSION NUMBER(S): [id number]   ORDERING CLINICIAN: TERRANCE STREET   FINDINGS: AP radiograph of the chest was provided.       CARDIOMEDIASTINAL SILHOUETTE: Cardiomediastinal silhouette is stable in size and configuration. Enlarged with atherosclerotic calcifications of the aortic arch.   LUNGS: Diffuse bilateral airspace opacities, more pronounced in the right upper lobe.. Possible small left pleural effusion. No sizable pneumothorax.   ABDOMEN: No remarkable upper abdominal findings.   BONES: Degenerative changes with demineralization. Surgical clips over the lower spine.       1.  Diffuse interstitial opacities could be related to pulmonary edema with enlarged cardiac silhouette. Slightly more confluent in the right upper lung field. Possible small left pleural effusion. Superimposed infection not excluded. "       MACRO: None   Signed by: Mattie Burr 9/15/2024 7:25 AM Dictation workstation:   [id number]    XR chest 1 view    Result Date: 9/14/2024  Interpreted By:  Deuce Bill, STUDY: Chest dated  9/14/2024.   INDICATION: Signs/Symptoms:sob   COMPARISON: Chest dated 09/09/2024.   ACCESSION NUMBER(S): [id number]   ORDERING CLINICIAN: NESTOR CERVANTES   TECHNIQUE: One view of the chest.   FINDINGS: There is diffuse prominence of the pulmonary interstitium and franny. No pneumothorax is evident. The cardiomediastinal silhouette is enlarged but similar to the prior exam.Degenerative change is seen of the spine and shoulders.Surgical changes are seen over the neck.       Findings suggestive of a degree of pulmonary edema/CHF.   MACRO: None   Signed by: Deuce Bill 9/14/2024 3:18 PM Dictation workstation:   [id number]      Scheduled medications  atorvastatin, 40 mg, oral, Nightly  budesonide, 0.5 mg, nebulization, BID  carvedilol, 3.125 mg, oral, BID  furosemide, 40 mg, intravenous, Once  gabapentin, 100 mg, oral, Nightly  insulin lispro, 0-5 Units, subcutaneous, TID  ipratropium-albuteroL, 3 mL, nebulization, q6h  [START ON 9/15/2024] levothyroxine, 25 mcg, oral, Daily  [START ON 9/15/2024] methylPREDNISolone sodium succinate (PF), 20 mg, intravenous, q12h  pantoprazole, 40 mg, oral, BID  [START ON 9/15/2024] torsemide, 20 mg, oral, Daily      Continuous medications     PRN medications  PRN medications: dextrose, dextrose, docusate sodium, glucagon, glucagon  Results for orders placed or performed during the hospital encounter of 09/14/24 (from the past 24 hour(s))   Comprehensive metabolic panel   Result Value Ref Range    Glucose 105 (H) 65 - 99 mg/dL    Sodium 141 133 - 145 mmol/L    Potassium 3.6 3.4 - 5.1 mmol/L    Chloride 105 97 - 107 mmol/L    Bicarbonate 24 24 - 31 mmol/L    Urea Nitrogen 31 (H) 8 - 25 mg/dL    Creatinine 1.30 0.40 - 1.60 mg/dL    eGFR 40 (L) >60 mL/min/1.73m*2    Calcium 7.8 (L) 8.5 -  10.4 mg/dL    Albumin 3.1 (L) 3.5 - 5.0 g/dL    Alkaline Phosphatase 74 35 - 125 U/L    Total Protein 5.5 (L) 5.9 - 7.9 g/dL    AST 24 5 - 40 U/L    Bilirubin, Total 1.0 0.1 - 1.2 mg/dL    ALT 17 5 - 40 U/L    Anion Gap 12 <=19 mmol/L   NT Pro-BNP   Result Value Ref Range    PROBNP 9,777 (H) 0 - 624 pg/mL   Serial Troponin, Initial ([facility])   Result Value Ref Range    Troponin T, High Sensitivity 46 (HH) <=14 ng/L   Blood Gas Arterial Full Panel   Result Value Ref Range    POCT pH, Arterial 7.48 (H) 7.38 - 7.42 pH    POCT pCO2, Arterial 43 (H) 38 - 42 mm Hg    POCT pO2, Arterial 164 (H) 85 - 95 mm Hg    POCT SO2, Arterial 100 94 - 100 %    POCT Oxy Hemoglobin, Arterial 97.1 94.0 - 98.0 %    POCT Hematocrit Calculated, Arterial 22.0 (L) 36.0 - 46.0 %    POCT Sodium, Arterial 142 136 - 145 mmol/L    POCT Potassium, Arterial 3.7 3.5 - 5.3 mmol/L    POCT Chloride, Arterial 108 (H) 98 - 107 mmol/L    POCT Ionized Calcium, Arterial 1.02 (L) 1.10 - 1.33 mmol/L    POCT Glucose, Arterial 123 (H) 74 - 99 mg/dL    POCT Lactate, Arterial 0.7 0.4 - 2.0 mmol/L    POCT Base Excess, Arterial 7.8 (H) -2.0 - 3.0 mmol/L    POCT HCO3 Calculated, Arterial 32.0 (H) 22.0 - 26.0 mmol/L    POCT Hemoglobin, Arterial 7.3 (L) 12.0 - 16.0 g/dL    POCT Anion Gap, Arterial 6 (L) 10 - 25 mmo/L    Patient Temperature 37.0 degrees Celsius    FiO2 50 %    Apparatus HIGH FLOW CANNULA     Flow 30.0 LPM    Site of Arterial Puncture Radial Left     Leonidas's Test Positive    CBC and Auto Differential   Result Value Ref Range    WBC 10.9 4.4 - 11.3 x10*3/uL    nRBC 0.0 0.0 - 0.0 /100 WBCs    RBC 2.91 (L) 4.00 - 5.20 x10*6/uL    Hemoglobin 7.2 (L) 12.0 - 16.0 g/dL    Hematocrit 24.5 (L) 36.0 - 46.0 %    MCV 84 80 - 100 fL    MCH 24.7 (L) 26.0 - 34.0 pg    MCHC 29.4 (L) 32.0 - 36.0 g/dL    RDW 19.8 (H) 11.5 - 14.5 %    Platelets 142 (L) 150 - 450 x10*3/uL    Neutrophils % 94.0 40.0 - 80.0 %    Immature Granulocytes %, Automated 0.9 0.0 - 0.9 %    Lymphocytes %  2.6 13.0 - 44.0 %    Monocytes % 2.4 2.0 - 10.0 %    Eosinophils % 0.0 0.0 - 6.0 %    Basophils % 0.1 0.0 - 2.0 %    Neutrophils Absolute 10.28 (H) 1.60 - 5.50 x10*3/uL    Immature Granulocytes Absolute, Automated 0.10 0.00 - 0.50 x10*3/uL    Lymphocytes Absolute 0.28 (L) 0.80 - 3.00 x10*3/uL    Monocytes Absolute 0.26 0.05 - 0.80 x10*3/uL    Eosinophils Absolute 0.00 0.00 - 0.40 x10*3/uL    Basophils Absolute 0.01 0.00 - 0.10 x10*3/uL   Serial Troponin, 2 Hour ([facility])   Result Value Ref Range    Troponin T, High Sensitivity 51 (HH) <=14 ng/L   Sars-CoV-2 PCR   Result Value Ref Range    Coronavirus 2019, PCR Not Detected Not Detected   Prepare RBC: 1 Units   Result Value Ref Range    PRODUCT CODE G9373W34     Unit Number [id number]     Unit ABO O     Unit RH NEG     XM INTEP COMP     Dispense Status IS     Blood Expiration Date 9/22/2024 11:59:00 PM EDT     PRODUCT BLOOD TYPE 9500     UNIT VOLUME 350    Type and screen   Result Value Ref Range    ABO TYPE O     Rh TYPE POS     ANTIBODY SCREEN NEG    POCT GLUCOSE   Result Value Ref Range    POCT Glucose 157 (H) 74 - 99 mg/dL        Assessment/Plan     Anemia/Anticoagulation   Normocytic anemia hgb 8.1. No obvious s/s of bleeding. On Xarelto  Hemoglobin has dropped from 9.4-7.2 in the last month. Recent EGD unremarkable. Planned outpatient colonoscopy, however with drop in H/H we can complete as inpatient. Will tentatively plan on Monday  - Clear liquid diet   - Bowel prep  - NPO after midnight    - Monitor  CBC, CMP, INR,  - Transfuse to keep hgb >7  - Protonix 40 mg IV BID  - Hold  Xarelto    Melonie Larsen, APRN-CNP

## 2024-09-15 NOTE — ASSESSMENT & PLAN NOTE
She does not seem to be hemorrhaging but she did drop her blood count and required a transfusion yesterday.  she was seen by GI and they plan on doing a colonoscopy I believe tomorrow.

## 2024-09-15 NOTE — ASSESSMENT & PLAN NOTE
she responded well to diuresis.  She is being seen by pulmonary right now.  I have asked the pulmonologist to decide on whether we want up give her extra diuretics today and have also asked her to address what we are doing with the steroids.  We are going to take her off high flow and try 6 L.

## 2024-09-15 NOTE — NURSING NOTE
Patient states that she is unable to void, bladder scanned for 470, order received for straight cath and UA.  Straight cathed for 600 ml, UA sent to lab.

## 2024-09-15 NOTE — PROGRESS NOTES
Anitha Welch is an 84 year old female admitted with COPD exacerbation.        09/15/24 1410   Current Planned Discharge Disposition   Current Planned Discharge Disposition Home Health  (resume [facility])     Patient is a readmission.   She was admitted to [facility] 08/08-08/15/2024. She was discharged home with [facility]. States she has not been able to continue with [facility] d/t being in the hospital. She would like to resume upon discharge. She has declined SNF.   ADOD 09/21/2024  Plan is home with [facility], family will transport.    Ele Parrish RN-BSN

## 2024-09-15 NOTE — CARE PLAN
Patient was placed on high flow on re assessment this afternoon. Decrease air entry.   Will give breathing treatment, and lasix 40 mg IV once   Will order CXR, abg  Plan relayed to primary team

## 2024-09-16 ENCOUNTER — ANESTHESIA EVENT (OUTPATIENT)
Dept: GASTROENTEROLOGY | Facility: HOSPITAL | Age: 84
End: 2024-09-16

## 2024-09-16 ENCOUNTER — APPOINTMENT (OUTPATIENT)
Dept: RADIOLOGY | Facility: HOSPITAL | Age: 84
DRG: 291 | End: 2024-09-16
Payer: MEDICARE

## 2024-09-16 ENCOUNTER — TELEPHONE (OUTPATIENT)
Dept: PRIMARY CARE | Facility: CLINIC | Age: 84
End: 2024-09-16

## 2024-09-16 ENCOUNTER — APPOINTMENT (OUTPATIENT)
Dept: CARDIOLOGY | Facility: HOSPITAL | Age: 84
DRG: 291 | End: 2024-09-16
Payer: MEDICARE

## 2024-09-16 ENCOUNTER — ANESTHESIA (OUTPATIENT)
Dept: GASTROENTEROLOGY | Facility: HOSPITAL | Age: 84
End: 2024-09-16

## 2024-09-16 LAB
ANION GAP SERPL CALC-SCNC: 7 MMOL/L
BUN SERPL-MCNC: 34 MG/DL (ref 8–25)
CALCIUM SERPL-MCNC: 8.5 MG/DL (ref 8.5–10.4)
CHLORIDE SERPL-SCNC: 103 MMOL/L (ref 97–107)
CO2 SERPL-SCNC: 34 MMOL/L (ref 24–31)
CREAT SERPL-MCNC: 1.3 MG/DL (ref 0.4–1.6)
EGFRCR SERPLBLD CKD-EPI 2021: 40 ML/MIN/1.73M*2
ERYTHROCYTE [DISTWIDTH] IN BLOOD BY AUTOMATED COUNT: 18.6 % (ref 11.5–14.5)
GLUCOSE BLD MANUAL STRIP-MCNC: 102 MG/DL (ref 74–99)
GLUCOSE BLD MANUAL STRIP-MCNC: 160 MG/DL (ref 74–99)
GLUCOSE BLD MANUAL STRIP-MCNC: 195 MG/DL (ref 74–99)
GLUCOSE BLD MANUAL STRIP-MCNC: 305 MG/DL (ref 74–99)
GLUCOSE SERPL-MCNC: 123 MG/DL (ref 65–99)
HCT VFR BLD AUTO: 31 % (ref 36–46)
HGB BLD-MCNC: 9.1 G/DL (ref 12–16)
MCH RBC QN AUTO: 25.4 PG (ref 26–34)
MCHC RBC AUTO-ENTMCNC: 29.4 G/DL (ref 32–36)
MCV RBC AUTO: 87 FL (ref 80–100)
NRBC BLD-RTO: 0 /100 WBCS (ref 0–0)
PLATELET # BLD AUTO: 162 X10*3/UL (ref 150–450)
POTASSIUM SERPL-SCNC: 4.2 MMOL/L (ref 3.4–5.1)
RBC # BLD AUTO: 3.58 X10*6/UL (ref 4–5.2)
SODIUM SERPL-SCNC: 144 MMOL/L (ref 133–145)
WBC # BLD AUTO: 11.3 X10*3/UL (ref 4.4–11.3)

## 2024-09-16 PROCEDURE — 2500000002 HC RX 250 W HCPCS SELF ADMINISTERED DRUGS (ALT 637 FOR MEDICARE OP, ALT 636 FOR OP/ED): Performed by: STUDENT IN AN ORGANIZED HEALTH CARE EDUCATION/TRAINING PROGRAM

## 2024-09-16 PROCEDURE — 93970 EXTREMITY STUDY: CPT

## 2024-09-16 PROCEDURE — 97166 OT EVAL MOD COMPLEX 45 MIN: CPT | Mod: GO

## 2024-09-16 PROCEDURE — 2500000005 HC RX 250 GENERAL PHARMACY W/O HCPCS: Performed by: INTERNAL MEDICINE

## 2024-09-16 PROCEDURE — 2500000002 HC RX 250 W HCPCS SELF ADMINISTERED DRUGS (ALT 637 FOR MEDICARE OP, ALT 636 FOR OP/ED): Performed by: INTERNAL MEDICINE

## 2024-09-16 PROCEDURE — 9420000001 HC RT PATIENT EDUCATION 5 MIN

## 2024-09-16 PROCEDURE — 82947 ASSAY GLUCOSE BLOOD QUANT: CPT

## 2024-09-16 PROCEDURE — 97161 PT EVAL LOW COMPLEX 20 MIN: CPT | Mod: GP

## 2024-09-16 PROCEDURE — 2500000004 HC RX 250 GENERAL PHARMACY W/ HCPCS (ALT 636 FOR OP/ED): Performed by: INTERNAL MEDICINE

## 2024-09-16 PROCEDURE — 93970 EXTREMITY STUDY: CPT | Performed by: SURGERY

## 2024-09-16 PROCEDURE — 94640 AIRWAY INHALATION TREATMENT: CPT

## 2024-09-16 PROCEDURE — 71045 X-RAY EXAM CHEST 1 VIEW: CPT

## 2024-09-16 PROCEDURE — 2500000001 HC RX 250 WO HCPCS SELF ADMINISTERED DRUGS (ALT 637 FOR MEDICARE OP): Performed by: INTERNAL MEDICINE

## 2024-09-16 PROCEDURE — 36415 COLL VENOUS BLD VENIPUNCTURE: CPT | Performed by: INTERNAL MEDICINE

## 2024-09-16 PROCEDURE — 71045 X-RAY EXAM CHEST 1 VIEW: CPT | Performed by: RADIOLOGY

## 2024-09-16 PROCEDURE — 2500000004 HC RX 250 GENERAL PHARMACY W/ HCPCS (ALT 636 FOR OP/ED): Performed by: STUDENT IN AN ORGANIZED HEALTH CARE EDUCATION/TRAINING PROGRAM

## 2024-09-16 PROCEDURE — 99233 SBSQ HOSP IP/OBS HIGH 50: CPT | Performed by: INTERNAL MEDICINE

## 2024-09-16 PROCEDURE — 85027 COMPLETE CBC AUTOMATED: CPT | Performed by: INTERNAL MEDICINE

## 2024-09-16 PROCEDURE — 2060000001 HC INTERMEDIATE ICU ROOM DAILY

## 2024-09-16 PROCEDURE — 82374 ASSAY BLOOD CARBON DIOXIDE: CPT | Performed by: INTERNAL MEDICINE

## 2024-09-16 RX ORDER — ACETAMINOPHEN 325 MG/1
650 TABLET ORAL EVERY 6 HOURS PRN
Status: DISCONTINUED | OUTPATIENT
Start: 2024-09-16 | End: 2024-09-18

## 2024-09-16 RX ORDER — ONDANSETRON HYDROCHLORIDE 2 MG/ML
4 INJECTION, SOLUTION INTRAVENOUS EVERY 6 HOURS PRN
Status: DISCONTINUED | OUTPATIENT
Start: 2024-09-16 | End: 2024-09-19 | Stop reason: HOSPADM

## 2024-09-16 ASSESSMENT — COGNITIVE AND FUNCTIONAL STATUS - GENERAL
WALKING IN HOSPITAL ROOM: A LOT
MOBILITY SCORE: 20
STANDING UP FROM CHAIR USING ARMS: A LOT
WALKING IN HOSPITAL ROOM: A LITTLE
HELP NEEDED FOR BATHING: A LOT
MOBILITY SCORE: 20
DAILY ACTIVITIY SCORE: 24
TURNING FROM BACK TO SIDE WHILE IN FLAT BAD: A LOT
CLIMB 3 TO 5 STEPS WITH RAILING: A LITTLE
MOVING FROM LYING ON BACK TO SITTING ON SIDE OF FLAT BED WITH BEDRAILS: A LOT
DAILY ACTIVITIY SCORE: 24
WALKING IN HOSPITAL ROOM: A LITTLE
TOILETING: TOTAL
STANDING UP FROM CHAIR USING ARMS: A LITTLE
STANDING UP FROM CHAIR USING ARMS: A LITTLE
DAILY ACTIVITIY SCORE: 24
CLIMB 3 TO 5 STEPS WITH RAILING: A LITTLE
EATING MEALS: A LITTLE
MOVING TO AND FROM BED TO CHAIR: A LITTLE
PERSONAL GROOMING: A LITTLE
MOVING TO AND FROM BED TO CHAIR: A LOT
MOBILITY SCORE: 20
CLIMB 3 TO 5 STEPS WITH RAILING: A LOT
MOBILITY SCORE: 12
DAILY ACTIVITIY SCORE: 12
CLIMB 3 TO 5 STEPS WITH RAILING: A LITTLE
MOVING TO AND FROM BED TO CHAIR: A LITTLE
STANDING UP FROM CHAIR USING ARMS: A LITTLE
WALKING IN HOSPITAL ROOM: A LITTLE
MOVING TO AND FROM BED TO CHAIR: A LITTLE
DRESSING REGULAR LOWER BODY CLOTHING: TOTAL
DRESSING REGULAR UPPER BODY CLOTHING: A LOT

## 2024-09-16 ASSESSMENT — PAIN SCALES - GENERAL
PAINLEVEL_OUTOF10: 0 - NO PAIN
PAINLEVEL_OUTOF10: 5 - MODERATE PAIN
PAINLEVEL_OUTOF10: 3
PAINLEVEL_OUTOF10: 0 - NO PAIN

## 2024-09-16 ASSESSMENT — PAIN DESCRIPTION - LOCATION: LOCATION: HEAD

## 2024-09-16 ASSESSMENT — PAIN - FUNCTIONAL ASSESSMENT
PAIN_FUNCTIONAL_ASSESSMENT: 0-10

## 2024-09-16 ASSESSMENT — PAIN DESCRIPTION - ORIENTATION: ORIENTATION: MID

## 2024-09-16 ASSESSMENT — PAIN DESCRIPTION - DESCRIPTORS: DESCRIPTORS: ACHING

## 2024-09-16 ASSESSMENT — ACTIVITIES OF DAILY LIVING (ADL)
BATHING_ASSISTANCE: NOT PERFORMED
ADL_ASSISTANCE: INDEPENDENT

## 2024-09-16 NOTE — ASSESSMENT & PLAN NOTE
she is apparently on long-term Xarelto but she also seems to be losing blood through her GI tract. Xarelto on hold  Monitoring counts     I see a DVT in 2013.  I see a DVT in November 2023.  I do not see any history of pulmonary emboli.  She had a indeterminate VQ scan last week. I presume she has been on long-term anticoagulation because of recurrent DVT.  I have ordered leg Dopplers which will not be done till tomorrow I am putting her on subcu heparin for now.  Low dose.  - awaiting DVT US

## 2024-09-16 NOTE — PROGRESS NOTES
Anitha Welch is a 84 y.o. female on day 2 of admission presenting with CHF (congestive heart failure), NYHA class II, acute, diastolic (Multi).      Subjective   Patient reports that she feels overall a bit lisa than yesterday. Denies pain. Denies current SOB. Says she is congested and has a cough. Reports she hasn't been getting much food.       Objective     Last Recorded Vitals  /70   Pulse 89 Comment: 89-98 with activity this date.  Temp 36.4 °C (97.5 °F) (Axillary)   Resp 17   Wt 65.4 kg (144 lb 2.9 oz)   SpO2 94% Comment: 93-98 with activity on HFNC.  Intake/Output last 3 Shifts:    Intake/Output Summary (Last 24 hours) at 9/16/2024 1346  Last data filed at 9/16/2024 0600  Gross per 24 hour   Intake 50 ml   Output 1125 ml   Net -1075 ml       Admission Weight  Weight: 61.2 kg (135 lb) (09/14/24 1333)    Daily Weight  09/16/24 : 65.4 kg (144 lb 2.9 oz)    Image Results  XR chest 1 view  Narrative: Interpreted By:  Shirley Vance,   STUDY:  XR CHEST 1 VIEW 9/16/2024 5:35 am      INDICATION:  Signs/Symptoms:evaluate for inerstitial edema      COMPARISON:  09/15/2024      ACCESSION NUMBER(S):  [id number]      ORDERING CLINICIAN:  PREMA NICHOLAS      TECHNIQUE:  Single AP view chest      FINDINGS:  Cardiomediastinal silhouette is enlarged with moderate cardiomegaly  demonstrated. There is moderate vascular calcification of the aortic  knob. Generalized alveolar airspace infiltrate demonstrated in the  right perihilar location as well as right lower lung zone. Findings  are not significantly changed                  Impression: 1. Stable appearing alveolar airspace infiltrate in the right lung in  particular right perihilar location and right lung base.      Signed by: Shirley Vance 9/16/2024 9:23 AM  Dictation workstation:   [id number]  ECG 12 lead  Normal sinus rhythm with sinus arrhythmia  Normal ECG  When compared with ECG of 09-SEP-2024 19:10,  Criteria for Septal infarct are no longer  Present      Physical Exam  General: alert, no diaphoresis   HENT: mucous membranes moist, external ears normal, no rhinorrhea   Eyes: no icterus or injection, no discharge   Lungs: diminished. Frequent cough   Heart: RRR,no LE edema BL   GI: abdomen soft, nontender, nondistended, BS present   MSK: no joint effusion or deformity   Skin: no rashes, erythema, or ecchymosis   Neuro: grossly normal cognition, motor strength, sensation    Relevant Results               Assessment/Plan          This patient has a urinary catheter   Reason for the urinary catheter remaining today? urinary retention/bladder outlet obstruction, acute or chronic          Assessment & Plan  Acute respiratory failure (Multi)  Remains on HF but less amount than yesterday-- 30L/30%  Weaning as able  Got IV diuresis yesterday  Awaiting pulm re-eval today  Anemia    She does not seem to be hemorrhaging but she did drop her blood count and required a transfusion over the weekend.  she was seen by GI and they plan on doing a colonoscopy but this is currently on hold due to her need for HF  CHF (congestive heart failure), NYHA class II, acute, diastolic (Multi)    Treating for acute diastolic congestive heart failure.  She has received extra diuretics and this is improved her respiratory function  On PO torsemide right now  DVT (deep venous thrombosis) (Multi)   she is apparently on long-term Xarelto but she also seems to be losing blood through her GI tract. Xarelto on hold  Monitoring counts     I see a DVT in 2013.  I see a DVT in November 2023.  I do not see any history of pulmonary emboli.  She had a indeterminate VQ scan last week. I presume she has been on long-term anticoagulation because of recurrent DVT.  I have ordered leg Dopplers which will not be done till tomorrow I am putting her on subcu heparin for now.  Low dose.  - awaiting DVT US                Shannan Whitley,

## 2024-09-16 NOTE — PROGRESS NOTES
"Anitha Welch is a 84 y.o. female on day 2 of admission presenting with CHF (congestive heart failure), NYHA class II, acute, diastolic (Multi).    Subjective   Per nursing patient respiratory status declined.  She was placed on high flow O2 last evening.  No reported black tarry, bloody stools       Objective     Physical Exam  HENT:      Head: Normocephalic.      Nose: Nose normal.      Mouth/Throat:      Mouth: Mucous membranes are moist.   Eyes:      Pupils: Pupils are equal, round, and reactive to light.   Cardiovascular:      Rate and Rhythm: Normal rate.   Abdominal:      Palpations: Abdomen is soft.   Musculoskeletal:         General: Normal range of motion.   Skin:     General: Skin is warm.   Neurological:      General: No focal deficit present.      Mental Status: She is alert.   Psychiatric:         Mood and Affect: Mood normal.         Last Recorded Vitals  Blood pressure 142/70, pulse 73, temperature 36.4 °C (97.5 °F), temperature source Axillary, resp. rate 17, height 1.575 m (5' 2\"), weight 65.4 kg (144 lb 2.9 oz), SpO2 99%.  Intake/Output last 3 Shifts:  I/O last 3 completed shifts:  In: 323.8 (5 mL/kg) [Blood:223.8; IV Piggyback:100]  Out: 1725 (26.4 mL/kg) [Urine:1725 (0.7 mL/kg/hr)]  Weight: 65.4 kg     Relevant Results  XR chest 1 view    Result Date: 9/16/2024  Interpreted By:  Shirley Vance, STUDY: XR CHEST 1 VIEW 9/16/2024 5:35 am   INDICATION: Signs/Symptoms:evaluate for inerstitial edema   COMPARISON: 09/15/2024   ACCESSION NUMBER(S): [id number]   ORDERING CLINICIAN: PREMA NICHOLAS   TECHNIQUE: Single AP view chest   FINDINGS: Cardiomediastinal silhouette is enlarged with moderate cardiomegaly demonstrated. There is moderate vascular calcification of the aortic knob. Generalized alveolar airspace infiltrate demonstrated in the right perihilar location as well as right lower lung zone. Findings are not significantly changed             1. Stable appearing alveolar airspace infiltrate " in the right lung in particular right perihilar location and right lung base.   Signed by: Shirley Vance 9/16/2024 9:23 AM Dictation workstation:   [id number]    ECG 12 lead    Result Date: 9/16/2024  Normal sinus rhythm with sinus arrhythmia Normal ECG When compared with ECG of 09-SEP-2024 19:10, Criteria for Septal infarct are no longer Present    XR chest 1 view    Result Date: 9/15/2024  Interpreted By:  Indiana Perea, STUDY: XR CHEST 1 VIEW;  9/15/2024 4:38 pm   INDICATION: Signs/Symptoms:SOB.   COMPARISON: Chest 5:44 a.m.; CT chest dated 01/02/2013   ACCESSION NUMBER(S): [id number]   ORDERING CLINICIAN: PREMA NICHOLAS   FINDINGS: Cardiac silhouette may be enlarged. There are atherosclerotic changes of the aorta.   There is bilateral parenchymal infiltration.   There are surgical clips in the neck. This may be related to previous thyroid surgery.   The bones are not well seen. The patient appears scoliotic.   COMPARISON OF FINDING: The chest is similar.       Persistent parenchymal infiltration.   MACRO: none   Signed by: Indiana Perea 9/15/2024 4:56 PM Dictation workstation:   [id number]    XR chest 1 view    Result Date: 9/15/2024  Interpreted By:  Mattie Burr, STUDY: XR CHEST 1 VIEW;  9/15/2024 5:50 am   INDICATION: Signs/Symptoms:chf.     COMPARISON: 09/14/2024   ACCESSION NUMBER(S): [id number]   ORDERING CLINICIAN: TERRANCE [address]RE[address]   FINDINGS: AP radiograph of the chest was provided.       CARDIOMEDIASTINAL SILHOUETTE: Cardiomediastinal silhouette is stable in size and configuration. Enlarged with atherosclerotic calcifications of the aortic arch.   LUNGS: Diffuse bilateral airspace opacities, more pronounced in the right upper lobe.. Possible small left pleural effusion. No sizable pneumothorax.   ABDOMEN: No remarkable upper abdominal findings.   BONES: Degenerative changes with demineralization. Surgical clips over the lower spine.       1.  Diffuse interstitial opacities could be related to  pulmonary edema with enlarged cardiac silhouette. Slightly more confluent in the right upper lung field. Possible small left pleural effusion. Superimposed infection not excluded.       MACRO: None   Signed by: Mattie Burr 9/15/2024 7:25 AM Dictation workstation:   [id number]    XR chest 1 view    Result Date: 9/14/2024  Interpreted By:  Deuce Bill, STUDY: Chest dated  9/14/2024.   INDICATION: Signs/Symptoms:sob   COMPARISON: Chest dated 09/09/2024.   ACCESSION NUMBER(S): [id number]   ORDERING CLINICIAN: NESTOR CERVANTES   TECHNIQUE: One view of the chest.   FINDINGS: There is diffuse prominence of the pulmonary interstitium and franny. No pneumothorax is evident. The cardiomediastinal silhouette is enlarged but similar to the prior exam.Degenerative change is seen of the spine and shoulders.Surgical changes are seen over the neck.       Findings suggestive of a degree of pulmonary edema/CHF.   MACRO: None   Signed by: Deuce Bill 9/14/2024 3:18 PM Dictation workstation:   [id number]      Scheduled medications  atorvastatin, 40 mg, oral, Nightly  azithromycin, 500 mg, oral, q24h GARDENIA  budesonide, 0.5 mg, nebulization, BID  carvedilol, 3.125 mg, oral, BID  cefTRIAXone, 1 g, intravenous, q24h  gabapentin, 100 mg, oral, Nightly  heparin, 5,000 Units, subcutaneous, q12h GARDENIA  insulin lispro, 0-5 Units, subcutaneous, TID  ipratropium-albuteroL, 3 mL, nebulization, TID  levothyroxine, 25 mcg, oral, Daily  oxygen, , inhalation, Continuous - Inhalation  pantoprazole, 40 mg, oral, BID  polyethylene glycol-electrolytes, 4,000 mL, oral, Once  predniSONE, 40 mg, oral, Daily  torsemide, 20 mg, oral, Daily      Continuous medications     PRN medications  PRN medications: dextrose, dextrose, docusate sodium, glucagon, glucagon, ipratropium-albuteroL, OLANZapine  Results for orders placed or performed during the hospital encounter of 09/14/24 (from the past 24 hour(s))   POCT GLUCOSE   Result Value Ref Range    POCT  Glucose 137 (H) 74 - 99 mg/dL   Blood Gas Arterial Full Panel   Result Value Ref Range    POCT pH, Arterial 7.45 (H) 7.38 - 7.42 pH    POCT pCO2, Arterial 49 (H) 38 - 42 mm Hg    POCT pO2, Arterial 88 85 - 95 mm Hg    POCT SO2, Arterial 97 94 - 100 %    POCT Oxy Hemoglobin, Arterial 94.8 94.0 - 98.0 %    POCT Hematocrit Calculated, Arterial 29.0 (L) 36.0 - 46.0 %    POCT Sodium, Arterial 142 136 - 145 mmol/L    POCT Potassium, Arterial 4.3 3.5 - 5.3 mmol/L    POCT Chloride, Arterial 102 98 - 107 mmol/L    POCT Ionized Calcium, Arterial 1.08 (L) 1.10 - 1.33 mmol/L    POCT Glucose, Arterial 141 (H) 74 - 99 mg/dL    POCT Lactate, Arterial 1.0 0.4 - 2.0 mmol/L    POCT Base Excess, Arterial 9.0 (H) -2.0 - 3.0 mmol/L    POCT HCO3 Calculated, Arterial 34.1 (H) 22.0 - 26.0 mmol/L    POCT Hemoglobin, Arterial 9.6 (L) 12.0 - 16.0 g/dL    POCT Anion Gap, Arterial 10 10 - 25 mmo/L    Patient Temperature 37.0 degrees Celsius    FiO2 40 %    Apparatus HIGH FLOW CANNULA     Flow 30.0 LPM    Site of Arterial Puncture Radial Right     Leonidas's Test Positive    POCT GLUCOSE   Result Value Ref Range    POCT Glucose 151 (H) 74 - 99 mg/dL   POCT GLUCOSE   Result Value Ref Range    POCT Glucose 225 (H) 74 - 99 mg/dL   Basic Metabolic Panel   Result Value Ref Range    Glucose 123 (H) 65 - 99 mg/dL    Sodium 144 133 - 145 mmol/L    Potassium 4.2 3.4 - 5.1 mmol/L    Chloride 103 97 - 107 mmol/L    Bicarbonate 34 (H) 24 - 31 mmol/L    Urea Nitrogen 34 (H) 8 - 25 mg/dL    Creatinine 1.30 0.40 - 1.60 mg/dL    eGFR 40 (L) >60 mL/min/1.73m*2    Calcium 8.5 8.5 - 10.4 mg/dL    Anion Gap 7 <=19 mmol/L   CBC   Result Value Ref Range    WBC 11.3 4.4 - 11.3 x10*3/uL    nRBC 0.0 0.0 - 0.0 /100 WBCs    RBC 3.58 (L) 4.00 - 5.20 x10*6/uL    Hemoglobin 9.1 (L) 12.0 - 16.0 g/dL    Hematocrit 31.0 (L) 36.0 - 46.0 %    MCV 87 80 - 100 fL    MCH 25.4 (L) 26.0 - 34.0 pg    MCHC 29.4 (L) 32.0 - 36.0 g/dL    RDW 18.6 (H) 11.5 - 14.5 %    Platelets 162 150 - 450  x10*3/uL   POCT GLUCOSE   Result Value Ref Range    POCT Glucose 102 (H) 74 - 99 mg/dL                          Assessment/Plan   Assessment & Plan  CHF (congestive heart failure), NYHA class II, acute, diastolic (Multi)    Anemia    Acute respiratory failure (Multi)    DVT (deep venous thrombosis) (Multi)    Anemia/Anticoagulation   Normocytic anemia hgb 8.1. No obvious s/s of bleeding. On Xarelto  Hemoglobin has dropped from 9.4-7.2 in the last month. Recent EGD unremarkable. Planned outpatient colonoscopy, however with drop in H/H we can complete as inpatient. Will tentatively plan on Monday  - Clear liquid diet   - Bowel prep  - NPO after midnight    - Monitor  CBC, CMP, INR,  - Transfuse to keep hgb >7  - Protonix 40 mg IV BID  - Hold  Xarelto     9/16  Due to decline in patient's respiratory status and currently on high flow, will hold off on colonoscopy until respiratory status improves. Hemoglobin stable at 9.1.  Denies any black tarry, bloody stools . Continue to monitor H&H, PPI twice daily.     Melonie Larsen, APRN-CNP

## 2024-09-16 NOTE — PROGRESS NOTES
Physical Therapy    Physical Therapy Evaluation & Treatment    Patient Name: Anitha Welch  MRN: [id number]  Department: [address]  Room: 10/10-  Today's Date: 9/16/2024   Time Calculation  Start Time: 0831  Stop Time: 0856  Time Calculation (min): 25 min    Assessment/Plan   PT Assessment  PT Assessment Results: Decreased strength, Decreased endurance, Impaired balance, Decreased mobility, Decreased safety awareness  Rehab Prognosis: Good  Evaluation/Treatment Tolerance: Patient limited by fatigue  End of Session Communication: Bedside nurse  Assessment Comment: 84 year old female presents with decline from baseline functional mobility, impaired balance, decreased tolerance to activity, and decreased safety awareness;  patient would benefit from skilled physical therapy services to safely maximize functional mobility to modified independent levels.  End of Session Patient Position: Bed, 3 rail up, Alarm on   IP OR SWING BED PT PLAN  Inpatient or Swing Bed: Inpatient  PT Plan  Treatment/Interventions: Bed mobility, Transfer training, Gait training, Balance training, Strengthening, Endurance training, Therapeutic exercise, Therapeutic activity  PT Plan: Ongoing PT  PT Frequency: 4 times per week  PT Discharge Recommendations: Moderate intensity level of continued care  PT Recommended Transfer Status: Assist x1  PT - OK to Discharge: Yes (with skilled physical therapy services at next level of care)      Subjective     General Visit Information:  General  Reason for Referral: Impaired mobility with CHF  Past Medical History Relevant to Rehab: DOPD, DVT, GI bleed, AAA, CKD, chronic resp failure, home O2 at 2 liters, CHF, CVA, mitral regurg and repair, UTI, anemia, anxiety, depression, arthritis, CAD, diverticulosis, right THR, left TKR, cardiac stent, choley, MI, spinal stenosis, sciatica, RLS, plantar fasciitis, osteopenia, OA, epistaxis, GERD, DDD, hyperlipid, HTN, hypothyroid, ischemic cardiomyopathy,  Co-Treatment:  OT  Co-Treatment Reason: Limited tolerance to activity  Prior to Session Communication: Bedside nurse  Patient Position Received: Bed, 3 rail up, Alarm on  General Comment: 84 year old female admit from home with hypoxic respiratory failure;  patient with recent hospital admit 8/8/24 - 8/15/24.  Home Living:  Home Living  Type of Home: House  Lives With:  (Spouse and adult son)  Home Adaptive Equipment: Walker rolling or standard, Cane  Home Layout: Two level (Patient stays on mail level of home)  Home Access: Stairs to enter with rails  Entrance Stairs-Rails:  (unilateral)  Entrance Stairs-Number of Steps: 3  Bathroom Shower/Tub: Walk-in shower  Bathroom Equipment: Grab bars in shower (shower chair)  Prior Level of Function:  Prior Function Per Pt/Caregiver Report  Ambulatory Assistance: Independent (cane versus rolling walker PRN)  Precautions:  Precautions  Medical Precautions: Fall precautions    Vital Signs (Past 2hrs)        Date/Time Vitals Session Patient Position Pulse Resp SpO2 BP MAP (mmHg)    09/16/24 0733 --  --  --  --  99 %  --  --     09/16/24 0800 --  --  --  17  --  142/70  91     09/16/24 0831 --  --  --  --  --  --  88                        Objective   Pain:  Pain Assessment  Pain Assessment: 0-10  0-10 (Numeric) Pain Score: 0 - No pain  Cognition:  Cognition  Overall Cognitive Status: Impaired (disoriented x 2;  patient knew she was in hospital but was unable to state name of hospital)    General Assessments:                  Activity Tolerance  Endurance: Decreased tolerance for upright activites  Activity Tolerance Comments: Fatigue and respiratory status    Sensation  Sensation Comment: Reports numbness left LE       Coordination  Movements are Fluid and Coordinated: No  Lower Body Coordination: slower rate of movement richard LE (left greater than right)    Static Sitting Balance  Static Sitting-Balance Support: Bilateral upper extremity supported  Static Sitting-Level of Assistance: Close  supervision  Static Sitting-Comment/Number of Minutes: Supervision with balance while sitting on side of bed    Static Standing Balance  Static Standing-Balance Support: Bilateral upper extremity supported  Static Standing-Level of Assistance: Minimum assistance  Static Standing-Comment/Number of Minutes: Assist with balance during static standing with rolling walker  Functional Assessments:  Bed Mobility  Bed Mobility: Yes  Bed Mobility 1  Bed Mobility 1: Supine to sitting  Level of Assistance 1: Minimum assistance  Bed Mobility Comments 1: Assist with trunk-up during supine to sit;  increased time and effort required to achieve supine to sit  Bed Mobility 2  Bed Mobility  2: Sitting to supine  Level of Assistance 2: Moderate assistance  Bed Mobility Comments 2: Assist with richard LE    Transfers  Transfer: Yes  Transfer 1  Transfer From 1: Sit to  Transfer to 1: Stand  Technique 1: Sit to stand  Transfer Device 1: Walker  Transfer Level of Assistance 1: Moderate assistance  Trials/Comments 1: Assist with trunk support and balance;  verbal cues for hand placement  Transfers 2  Transfer From 2: Stand to  Transfer to 2: Sit  Technique 2: Stand to sit  Transfer Device 2: Walker  Transfer Level of Assistance 2: Moderate assistance  Trials/Comments 2: Assist with trunk support;  verbal cues for hand placement    Ambulation/Gait Training  Ambulation/Gait Training Performed: No    Stairs  Stairs: No  Extremity/Trunk Assessments:  RLE   RLE : Exceptions to WFL  Strength RLE  R Hip Flexion: 3/5  R Knee Extension: 3/5  R Ankle Dorsiflexion: 3/5  LLE   LLE : Exceptions to WFL  Strength LLE  L Hip Flexion: 2/5  L Knee Extension: 2+/5  L Ankle Dorsiflexion: 3-/5  Treatments:       Therapeutic Activity  Therapeutic Activity Performed: Yes  Therapeutic Activity 1: Sidestepping (4 steps) with rolling walker and mod assist with trunk support and balance;  patient was able to support body weight with richard LE;  patient required increased  time and effort to weight shift and advance richard LE during sidestepping.         Bed Mobility  Bed Mobility: Yes  Bed Mobility 1  Bed Mobility 1: Supine to sitting  Level of Assistance 1: Minimum assistance  Bed Mobility Comments 1: Assist with trunk-up during supine to sit;  increased time and effort required to achieve supine to sit  Bed Mobility 2  Bed Mobility  2: Sitting to supine  Level of Assistance 2: Moderate assistance  Bed Mobility Comments 2: Assist with richard LE    Ambulation/Gait Training  Ambulation/Gait Training Performed: No  Transfers  Transfer: Yes  Transfer 1  Transfer From 1: Sit to  Transfer to 1: Stand  Technique 1: Sit to stand  Transfer Device 1: Walker  Transfer Level of Assistance 1: Moderate assistance  Trials/Comments 1: Assist with trunk support and balance;  verbal cues for hand placement  Transfers 2  Transfer From 2: Stand to  Transfer to 2: Sit  Technique 2: Stand to sit  Transfer Device 2: Walker  Transfer Level of Assistance 2: Moderate assistance  Trials/Comments 2: Assist with trunk support;  verbal cues for hand placement    Stairs  Stairs: No       Outcome Measures:  [facility] Basic Mobility  Turning from your back to your side while in a flat bed without using bedrails: A lot  Moving from lying on your back to sitting on the side of a flat bed without using bedrails: A lot  Moving to and from bed to chair (including a wheelchair): A lot  Standing up from a chair using your arms (e.g. wheelchair or bedside chair): A lot  To walk in hospital room: A lot  Climbing 3-5 steps with railing: A lot  Basic Mobility - Total Score: 12    Encounter Problems       Encounter Problems (Active)       Mobility       Bed mobility including supine to sit and sit to supine with supervision. (Progressing)       Start:  09/16/24    Expected End:  09/30/24            Ambulate 50 feet with rolling walker and supervision.       Start:  09/16/24    Expected End:  09/30/24            Negotiate 3 steps with  single handrail +/- cane and mod assist.       Start:  09/16/24    Expected End:  09/30/24               PT Transfers       Transfers including sit to stand and stand to sit with supervision. (Progressing)       Start:  09/16/24    Expected End:  09/30/24                   Education Documentation  No documentation found.  Education Comments  No comments found.

## 2024-09-16 NOTE — CARE PLAN
Problem: Mobility  Goal: Bed mobility including supine to sit and sit to supine with supervision.  Outcome: Progressing     Problem: PT Transfers  Goal: Transfers including sit to stand and stand to sit with supervision.  Outcome: Progressing

## 2024-09-16 NOTE — CARE PLAN
Patient was placed on high flow this afternoon. Will hold off on colonoscopy at this time until respiratory status improves.

## 2024-09-16 NOTE — PROGRESS NOTES
"Anitha Welch is a 84 y.o. female on day 2 of admission presenting with CHF (congestive heart failure), NYHA class II, acute, diastolic (Multi).      Subjective   The patient reports that she if feeling \"pretty good\" today. She says her SOB is \" on and off\". She has no pain but reports weakness and a cough. She says she feels \"croupy\" but has not been coughing up anything.        Objective     Last Recorded Vitals  /70   Pulse 89 Comment: 89-98 with activity this date.  Temp 36.4 °C (97.5 °F) (Axillary)   Resp 17   Wt 65.4 kg (144 lb 2.9 oz)   SpO2 94% Comment: 93-98 with activity on HFNC.  Intake/Output last 3 Shifts:    Intake/Output Summary (Last 24 hours) at 9/16/2024 1144  Last data filed at 9/16/2024 0600  Gross per 24 hour   Intake 50 ml   Output 1125 ml   Net -1075 ml       Admission Weight  Weight: 61.2 kg (135 lb) (09/14/24 1333)    Daily Weight  09/16/24 : 65.4 kg (144 lb 2.9 oz)    Image Results  XR chest 1 view  Narrative: Interpreted By:  Shirley Vance,   STUDY:  XR CHEST 1 VIEW 9/16/2024 5:35 am      INDICATION:  Signs/Symptoms:evaluate for inerstitial edema      COMPARISON:  09/15/2024      ACCESSION NUMBER(S):  [id number]      ORDERING CLINICIAN:  PREMA NICHOLAS      TECHNIQUE:  Single AP view chest      FINDINGS:  Cardiomediastinal silhouette is enlarged with moderate cardiomegaly  demonstrated. There is moderate vascular calcification of the aortic  knob. Generalized alveolar airspace infiltrate demonstrated in the  right perihilar location as well as right lower lung zone. Findings  are not significantly changed                  Impression: 1. Stable appearing alveolar airspace infiltrate in the right lung in  particular right perihilar location and right lung base.      Signed by: Shirley Vance 9/16/2024 9:23 AM  Dictation workstation:   [id number]  ECG 12 lead  Normal sinus rhythm with sinus arrhythmia  Normal ECG  When compared with ECG of 09-SEP-2024 19:10,  Criteria for " Septal infarct are no longer Present      Physical Exam  Constitutional:       General: She is not in acute distress.     Appearance: Normal appearance. She is normal weight. She is not ill-appearing.   Cardiovascular:      Rate and Rhythm: Normal rate and regular rhythm.      Pulses: Normal pulses.      Heart sounds: Normal heart sounds. No murmur heard.     No friction rub. No gallop.   Pulmonary:      Effort: Respiratory distress present.      Breath sounds: No stridor. No wheezing, rhonchi or rales.   Abdominal:      General: Abdomen is flat. Bowel sounds are normal. There is no distension.      Palpations: Abdomen is soft.   Musculoskeletal:      Right lower leg: Edema present.      Left lower leg: No edema.   Skin:     General: Skin is warm and dry.   Neurological:      General: No focal deficit present.      Mental Status: She is alert and oriented to person, place, and time. Mental status is at baseline.         Relevant Results               Assessment/Plan    Assessment   Mrs. Welch is an 83 y/o female with a PMHx of diastolic HF, COPD and recent GI bleed causing anemia presenting for SOB and respiratory distress   Respiratory Failure   Likely complicated by CHF and COPD exacerbation   Patient is normally on 2L at home but is now on HFNC 30L 30%  Diastolic HF   Patient has LVEF of 60-65%  PROBNP 9,777 on 09/14/2024  Previously 3,866 on 09/09/2024  Anemia   Patient had a GI bleed about a month ago   She does not seem to be currently hemorrhaging but her hemoglobin dropped from 9.4 -->7.2 in the last month   The patient received a blood transfusion and her hemoglobin has been stable at 9.1   DVT   Patient has a history of DVT's (2013 and 2023)  She is on prophylactic Xarelto but had to stop taking this because of the recent GI bleed  She has been put on subcutaneous heparin   Plan   Treating with prednisone for a total of 5 days and a z pack for 3 days per pulmonology, pulmonology would also like a sputum  culture, consider chest Ct if patient does not improve, continue to give breathing treatments as needed   Patient has been appropriately diuresed, consider consulting cardiology or arranging an outpatient visit to follow the progression of her HF   GI was planning on doing an inpatient colonoscopy today but this has been postponed due to the decline in the patients respiratory status, continue to monitor hemoglobin through daily CBC, if hemoglobin drops below 7 consider another blood transfusion, monitor for signs of blood loss via GI tract   Patient has a hx of DVTs, patient was started on subcutaneous heparin, b/l LE venous duplex was done and results are pending, continue to hold xarelto until hemoglobin stabilizes and GI bleed is ruled out              Assessment & Plan  Acute respiratory failure (Multi)   she responded well to diuresis.  She is being seen by pulmonary right now.  I have asked the pulmonologist to decide on whether we want up give her extra diuretics today and have also asked her to address what we are doing with the steroids.  We are going to take her off high flow and try 6 L.  Anemia    She does not seem to be hemorrhaging but she did drop her blood count and required a transfusion yesterday.  she was seen by GI and they plan on doing a colonoscopy I believe tomorrow.  CHF (congestive heart failure), NYHA class II, acute, diastolic (Multi)    Treating for acute diastolic congestive heart failure.  She has received extra diuretics and this is improved her respiratory function  DVT (deep venous thrombosis) (Multi)   she is apparently on long-term Xarelto but she also seems to be losing blood through her GI tract.     I see a DVT in 2013.  I see a DVT in November 2023.  I do not see any history of pulmonary emboli.  She had a indeterminate VQ scan last week. I presume she has been on long-term anticoagulation because of recurrent DVT.  I have ordered leg Dopplers which will not be done till  tomorrow I am putting her on subcu heparin for now.  Low dose.                LILIBETH MORTON

## 2024-09-16 NOTE — ASSESSMENT & PLAN NOTE
Treating for acute diastolic congestive heart failure.  She has received extra diuretics and this is improved her respiratory function  On PO torsemide right now

## 2024-09-16 NOTE — ASSESSMENT & PLAN NOTE
Remains on HF but less amount than yesterday-- 30L/30%  Weaning as able  Got IV diuresis yesterday  Awaiting pulm re-eval today

## 2024-09-16 NOTE — PROGRESS NOTES
09/16/24 1440   Discharge Planning   Expected Discharge Disposition Home Health  (Resume care for HC)     TCC spoke to pt regarding therapy recommendations for SNF. Patient refusing at this time would like to discharge home and resume HHC with [facility]     Will need internal referral for home health upon discharge

## 2024-09-16 NOTE — PROGRESS NOTES
Spiritual Care Visit    Clinical Encounter Type  Visited With: Patient  Routine Visit: Introduction  Continue Visiting: Yes         Values/Beliefs  Spiritual Requests During Hospitalization: Anitha received the Sacraments of the Sick and Holy Communon today    Sacramental Encounters  Communion: Patient wants communion  Communion Given Indicator: Yes  Sacrament of Sick-Anointing: Anointed                             Taxonomy  Intended Effects: Build relationship of care and support  Donnell Quintanilla

## 2024-09-16 NOTE — TELEPHONE ENCOUNTER
Patient is active with [facility], followed by Milana Hernadez NP. Admitted to [facility] 9/14/24. Presented with acute hypoxemic respiratory failure. PMH COPD and CHF. Given steroids and diuresed. Hgb dropped from 9.4-7.2 in the last month. Recent EGD unremarkable. Plan to proceed with IP colonoscopy. 1 unit PRBC ordered. Confusion and agitation noted at times, PRN Zyprexa ordered. [facility] will monitor hospitalization and discharge plan.

## 2024-09-16 NOTE — PROGRESS NOTES
Occupational Therapy    Evaluation    Patient Name: Anitha Welch  MRN: [id number]  Department: [address]  Room: 10/10  Today's Date: 9/16/2024  Time Calculation  Start Time: 0830  Stop Time: 0851  Time Calculation (min): 21 min        Assessment:  OT Assessment: 84 year old female admit from home with hypoxic respiratory failure;  patient with recent hospital admit 8/8/24 - 8/15/24. Patient is limited with decreased endurace, on HFNC this date, is limited with decreased strength, decreased balance, is functioning below her baseline, will benefit from continued OT intervention and moderate intensity rehab is recommended.  Prognosis: Good  Barriers to Discharge: None  Evaluation/Treatment Tolerance: Patient limited by fatigue  Medical Staff Made Aware: Yes  End of Session Communication: Bedside nurse  End of Session Patient Position: Bed, 3 rail up, Alarm on  OT Assessment Results: Decreased ADL status, Decreased upper extremity strength, Decreased safe judgment during ADL, Decreased cognition, Decreased endurance, Decreased sensation, Decreased functional mobility  Prognosis: Good  Barriers to Discharge: None  Evaluation/Treatment Tolerance: Patient limited by fatigue  Medical Staff Made Aware: Yes  Strengths: Support of Caregivers, Premorbid level of function  Barriers to Participation: Comorbidities  Plan:  Treatment Interventions: ADL retraining, Functional transfer training, UE strengthening/ROM, Endurance training, Cognitive reorientation, Patient/family training, Equipment evaluation/education, Fine motor coordination activities  OT Frequency: 4 times per week  OT Discharge Recommendations: Moderate intensity level of continued care  Equipment Recommended upon Discharge: Wheeled walker  OT Recommended Transfer Status: Assist of 1  OT - OK to Discharge: Yes  Treatment Interventions: ADL retraining, Functional transfer training, UE strengthening/ROM, Endurance training, Cognitive reorientation, Patient/family  training, Equipment evaluation/education, Fine motor coordination activities    Subjective   Current Problem:  1. Deep vein thrombosis (DVT) of left lower extremity, unspecified chronicity, unspecified vein (Multi)  Lower extremity venous duplex bilateral    Lower extremity venous duplex bilateral      2. Acute respiratory failure (Multi)        3. Anemia, unspecified type  Colonoscopy Diagnostic    Colonoscopy Diagnostic      4. DVT of deep femoral vein, left (Multi)  Lower extremity venous duplex bilateral    Lower extremity venous duplex bilateral        General:  General  Reason for Referral: Decreased ADL function for CHF  Referred By: Dr Rodriguez  Past Medical History Relevant to Rehab: COPD, DVT, GI bleed, AAA, CKD, chronic resp failure, home O2 at 2 liters, CHF, CVA, mitral regurg and repair, UTI, anemia, anxiety, depression, arthritis, CAD, diverticulosis, right THR, left TKR, cardiac stent, choley, MI, spinal stenosis, sciatica, RLS, plantar fasciitis, osteopenia, OA, epistaxis, GERD, DDD, hyperlipid, HTN, hypothyroid, ischemic cardiomyopathy,  Family/Caregiver Present: No  Co-Treatment: PT  Co-Treatment Reason: CoEval as patient with poor endurance, to maximize care this date.  Prior to Session Communication: Bedside nurse  Patient Position Received: Bed, 3 rail up, Alarm on  Preferred Learning Style: verbal  General Comment: Cleared by nurse to see patient for OT, patient met in the room , agreeable to therapy. Patient on HFNC, NPO , waiting to go for procedure.  Precautions:  Hearing/Visual Limitations: WFL, glasses for reading.  Medical Precautions: Fall precautions, Oxygen therapy device and L/min (HFNC, FiO2 30%)  Precautions Comment: Patient as instructed in calling staff to sit up and how to use the nurse call light this date.    Vital Sign (Past 2hrs)        Date/Time Vitals Session Patient Position Pulse Resp SpO2 BP MAP (mmHg)    09/16/24 0830 --  --  89  --  94 %  142/70  --     09/16/24 0831 --   --  --  --  --  --  88                         Pain:  Pain Assessment  Pain Assessment: 0-10  0-10 (Numeric) Pain Score: 0 - No pain    Objective   Cognition:  Overall Cognitive Status: Impaired (for memory, safety, insight/judgement.)  Orientation Level: Disoriented to time, Disoriented to place, Disoriented to situation  Attention: Within Functional Limits  Memory: Exceptions to WFL  Long-Term Memory: Impaired  Short-Term Memory: Impaired  Problem Solving: Exceptions to WFL  Complex Functional Tasks: Impaired  Insight: Moderate        Home Living:  Type of Home: House  Lives With: Spouse, Adult children (son)  Home Adaptive Equipment: Walker rolling or standard, Cane, Other (Comment) (2 liters of O2 continous at home per patient .)  Home Layout: Two level  Home Access: Stairs to enter with rails  Entrance Stairs-Rails:  (unilateral)  Entrance Stairs-Number of Steps: 2  Bathroom Shower/Tub: Walk-in shower  Bathroom Toilet: Standard  Bathroom Equipment: Grab bars in shower, Shower chair with back  Bathroom Accessibility: Patient reports bath on the first floor.  Home Living Comments: Patient reports sleeps on the couch, son works full-time, spouse is 91 years  Prior Function:  Level of [place]: Independent with ADLs and functional transfers, Needs assistance with homemaking  Receives Help From: Family (son and spouse)  ADL Assistance: Independent  Homemaking Assistance: Needs assistance  Homemaking Assistance Comments: Per patient son and spouse do all the I ADL's and driving.  Ambulatory Assistance: Independent  Vocational: Retired  Hand Dominance: Left  Prior Function Comments: Patient reports fall on the steps, son manages the medication and finances . Patient does not drive.  IADL History:  Homemaking Responsibilities: No  Current License: No  Mode of Transportation: Family  ADL:  Eating Assistance: Independent  Eating Deficit: Setup, NPO (NPO this date.)  Grooming Assistance: Independent  Grooming Deficit:  Setup  Bathing Assistance: Not performed  UE Dressing Assistance: Moderate  UE Dressing Deficit:  (maximilian/doff gown)  LE Dressing Assistance: Total  LE Dressing Deficit: Don/doff R sock, Don/doff L sock  Toileting Assistance with Device: Total  Toileting Deficit: Urinary Catheter  Functional Assistance: Minimal  Activity Tolerance:  Endurance: Decreased tolerance for upright activites  Activity Tolerance Comments: Patient on HFNC, easily fatigued.  Rate of Perceived Exertion (RPE): 4  Bed Mobility/Transfers: Bed Mobility  Bed Mobility: Yes (Patient needed moderate assist this date with the HOB raised to get to the EOB this date with increased time, bed deflated this date. Patient needed moderate assist for BLE to return back to bed this date at the end of the session.)    Transfers  Transfer: Yes (Patient needed minimal /moderate assist to stand from the bed with the walker with moderate V/C for safe hand positioning this date.)    Functional Mobility:  Functional Mobility  Functional Mobility Performed: Yes (Patient was able to take 3-4 small side steps with the walker with minimal assist with increased time, weak, on HFNC this date.)  Sitting Balance:  Static Sitting Balance  Static Sitting-Balance Support: Feet supported, Bilateral upper extremity supported  Static Sitting-Level of Assistance: Close supervision  Static Sitting-Comment/Number of Minutes: 5min Good  Dynamic Sitting Balance  Dynamic Sitting-Balance Support: Feet supported, Bilateral upper extremity supported  Dynamic Sitting-Level of Assistance: Close supervision  Dynamic Sitting-Balance: Forward lean  Dynamic Sitting-Comments: F+/G, 5min  Standing Balance:  Static Standing Balance  Static Standing-Balance Support: Bilateral upper extremity supported  Static Standing-Level of Assistance: Minimum assistance  Static Standing-Comment/Number of Minutes: Fair, 3min  Dynamic Standing Balance  Dynamic Standing-Balance Support: Bilateral upper extremity  supported  Dynamic Standing-Level of Assistance: Minimum assistance  Dynamic Standing-Balance: Forward lean, Lateral lean  Dynamic Standing-Comments: Fair, 3min   Modalities:     Vision:Vision - Basic Assessment  Current Vision: Wears glasses only for reading  Sensation:  Sensation Comment: Patient reports no tingling/numbness in BUE, reports numbness in the LLE.  Strength:  Strength Comments: 3+/5 overall BUE  Perception:     Coordination:  Movements are Fluid and Coordinated: No  Upper Body Coordination: Slower rate of movement this date.   Hand Function:  Gross Grasp: Functional  Coordination: Functional    Outcome Measures:[facility] Daily Activity  Putting on and taking off regular lower body clothing: Total  Bathing (including washing, rinsing, drying): A lot  Putting on and taking off regular upper body clothing: A lot  Toileting, which includes using toilet, bedpan or urinal: Total  Taking care of personal grooming such as brushing teeth: A little  Eating Meals: A little  Daily Activity - Total Score: 12        Education Documentation  ADL Training, taught by Jacquelin Morris OT at 9/16/2024 10:06 AM.  Learner: Patient  Readiness: Acceptance  Method: Explanation  Response: Verbalizes Understanding, Demonstrated Understanding, Needs Reinforcement  Comment: Patient was instructed in safe functional transfers/mobility with the walker.    Education Comments  No comments found.        OP EDUCATION:       Goals:  Encounter Problems       Encounter Problems (Active)       OT Goals       Patient will be able to complete UB dressing/bathing, grooming with set up. (Progressing)       Start:  09/16/24    Expected End:  09/30/24            Patient will be able to complete LB dressing, bathing, toileting with Close Supervision with use of AE and use of energy conservation techniques. (Progressing)       Start:  09/16/24    Expected End:  09/30/24            Patient will be able to complete functional transfers/mobility with  the walker with Close Supervision using good safety and with G balance.  (Progressing)       Start:  09/16/24    Expected End:  09/30/24            Patient will be able to tolerate 10 min of functional standing with G balance in prep for ADL/transfers. (Progressing)       Start:  09/16/24    Expected End:  09/30/24

## 2024-09-17 LAB
GLUCOSE BLD MANUAL STRIP-MCNC: 107 MG/DL (ref 74–99)
GLUCOSE BLD MANUAL STRIP-MCNC: 183 MG/DL (ref 74–99)
GLUCOSE BLD MANUAL STRIP-MCNC: 197 MG/DL (ref 74–99)
GLUCOSE BLD MANUAL STRIP-MCNC: 218 MG/DL (ref 74–99)
GLUCOSE BLD MANUAL STRIP-MCNC: 225 MG/DL (ref 74–99)

## 2024-09-17 PROCEDURE — 97110 THERAPEUTIC EXERCISES: CPT | Mod: GO,CO

## 2024-09-17 PROCEDURE — 2500000004 HC RX 250 GENERAL PHARMACY W/ HCPCS (ALT 636 FOR OP/ED): Performed by: STUDENT IN AN ORGANIZED HEALTH CARE EDUCATION/TRAINING PROGRAM

## 2024-09-17 PROCEDURE — 82947 ASSAY GLUCOSE BLOOD QUANT: CPT

## 2024-09-17 PROCEDURE — 2500000005 HC RX 250 GENERAL PHARMACY W/O HCPCS: Performed by: INTERNAL MEDICINE

## 2024-09-17 PROCEDURE — 2500000001 HC RX 250 WO HCPCS SELF ADMINISTERED DRUGS (ALT 637 FOR MEDICARE OP): Performed by: STUDENT IN AN ORGANIZED HEALTH CARE EDUCATION/TRAINING PROGRAM

## 2024-09-17 PROCEDURE — 2500000004 HC RX 250 GENERAL PHARMACY W/ HCPCS (ALT 636 FOR OP/ED): Performed by: INTERNAL MEDICINE

## 2024-09-17 PROCEDURE — 99233 SBSQ HOSP IP/OBS HIGH 50: CPT | Performed by: INTERNAL MEDICINE

## 2024-09-17 PROCEDURE — 94640 AIRWAY INHALATION TREATMENT: CPT

## 2024-09-17 PROCEDURE — 2060000001 HC INTERMEDIATE ICU ROOM DAILY

## 2024-09-17 PROCEDURE — 2500000002 HC RX 250 W HCPCS SELF ADMINISTERED DRUGS (ALT 637 FOR MEDICARE OP, ALT 636 FOR OP/ED): Performed by: INTERNAL MEDICINE

## 2024-09-17 PROCEDURE — 9420000001 HC RT PATIENT EDUCATION 5 MIN

## 2024-09-17 PROCEDURE — 2500000001 HC RX 250 WO HCPCS SELF ADMINISTERED DRUGS (ALT 637 FOR MEDICARE OP): Performed by: INTERNAL MEDICINE

## 2024-09-17 PROCEDURE — 97530 THERAPEUTIC ACTIVITIES: CPT | Mod: GP,CQ

## 2024-09-17 ASSESSMENT — PAIN SCALES - GENERAL
PAINLEVEL_OUTOF10: 7
PAINLEVEL_OUTOF10: 0 - NO PAIN
PAINLEVEL_OUTOF10: 0 - NO PAIN
PAINLEVEL_OUTOF10: 4
PAINLEVEL_OUTOF10: 0 - NO PAIN
PAINLEVEL_OUTOF10: 3

## 2024-09-17 ASSESSMENT — COGNITIVE AND FUNCTIONAL STATUS - GENERAL
DRESSING REGULAR UPPER BODY CLOTHING: A LOT
HELP NEEDED FOR BATHING: A LITTLE
STANDING UP FROM CHAIR USING ARMS: A LITTLE
DRESSING REGULAR LOWER BODY CLOTHING: A LITTLE
TOILETING: TOTAL
DRESSING REGULAR LOWER BODY CLOTHING: A LOT
CLIMB 3 TO 5 STEPS WITH RAILING: A LITTLE
WALKING IN HOSPITAL ROOM: A LITTLE
EATING MEALS: A LITTLE
DAILY ACTIVITIY SCORE: 13
MOVING FROM LYING ON BACK TO SITTING ON SIDE OF FLAT BED WITH BEDRAILS: A LITTLE
MOBILITY SCORE: 20
DAILY ACTIVITIY SCORE: 24
DRESSING REGULAR UPPER BODY CLOTHING: A LITTLE
MOVING TO AND FROM BED TO CHAIR: A LITTLE
HELP NEEDED FOR BATHING: A LOT
DAILY ACTIVITIY SCORE: 20
CLIMB 3 TO 5 STEPS WITH RAILING: A LITTLE
MOVING TO AND FROM BED TO CHAIR: A LITTLE
PERSONAL GROOMING: A LITTLE
STANDING UP FROM CHAIR USING ARMS: A LITTLE
MOBILITY SCORE: 15
MOVING TO AND FROM BED TO CHAIR: A LITTLE
MOBILITY SCORE: 20
TURNING FROM BACK TO SIDE WHILE IN FLAT BAD: A LITTLE
WALKING IN HOSPITAL ROOM: A LOT
STANDING UP FROM CHAIR USING ARMS: A LITTLE
CLIMB 3 TO 5 STEPS WITH RAILING: TOTAL
TOILETING: A LITTLE
WALKING IN HOSPITAL ROOM: A LITTLE

## 2024-09-17 ASSESSMENT — PAIN DESCRIPTION - DESCRIPTORS
DESCRIPTORS: ACHING
DESCRIPTORS: ACHING

## 2024-09-17 ASSESSMENT — PAIN DESCRIPTION - LOCATION: LOCATION: BACK

## 2024-09-17 ASSESSMENT — PAIN - FUNCTIONAL ASSESSMENT
PAIN_FUNCTIONAL_ASSESSMENT: 0-10

## 2024-09-17 NOTE — PROGRESS NOTES
Pulmonary Progress Note    Anitha Welch is a 84 y.o. female on day 3 of admission presenting with CHF (congestive heart failure), NYHA class II, acute, diastolic (Multi).    Subjective   On HFNC  Objective   Vital Signs      9/16/2024     5:55 AM 9/16/2024     8:00 AM 9/16/2024     8:30 AM 9/16/2024     4:57 PM 9/16/2024     7:08 PM 9/16/2024     7:40 PM 9/17/2024    12:14 AM   Vitals   Systolic  142 142 129 124 137 144   Diastolic  70 70 62 89 75 92   Heart Rate   89  85 89    Temp  36.4 °C (97.5 °F)  36.8 °C (98.2 °F) 36.7 °C (98.1 °F)  36.6 °C (97.9 °F)   Resp  17   22 19    Weight (lb) 144.18         BMI 26.37 kg/m2         BSA (m2) 1.69 m2             Oxygen Therapy  SpO2: 95 %  Medical Gas Therapy: Supplemental oxygen  Medical Gas Delivery Method: High flow nasal cannula    FiO2 (%):  [30 %-35 %] 35 %    Intake/Output previous 24 hours:    Intake/Output Summary (Last 24 hours) at 9/17/2024 0207  Last data filed at 9/16/2024 1800  Gross per 24 hour   Intake --   Output 1300 ml   Net -1300 ml       Physical Exam  Vitals reviewed.   Constitutional:       Appearance: Normal appearance.   HENT:      Head: Normocephalic and atraumatic.      Mouth/Throat:      Mouth: Mucous membranes are moist.   Eyes:      Extraocular Movements: Extraocular movements intact.      Pupils: Pupils are equal, round, and reactive to light.   Cardiovascular:      Rate and Rhythm: Normal rate and regular rhythm.   Pulmonary:      Effort: Pulmonary effort is normal.      Breath sounds: Normal breath sounds and air entry.   Abdominal:      General: Abdomen is flat. Bowel sounds are normal.      Palpations: Abdomen is soft.   Musculoskeletal:         General: Normal range of motion.      Cervical back: Normal range of motion and neck supple.   Skin:     General: Skin is warm and dry.   Neurological:      General: No focal deficit present.      Mental Status: She is alert and oriented to person, place, and time. Mental status is at  baseline.       ...  Lines and Tubes:  Peripheral IV 09/15/24 22 G Right;Dorsal Wrist (Active)   Placement Date/Time: 09/15/24 2102   Size (Gauge): 22 G  Orientation: Right;Dorsal  Location: Wrist  Site Prep: Alcohol   Number of days: 1       Peripheral IV 09/15/24 22 G Right;Anterior Forearm (Active)   Placement Date/Time: 09/15/24 2103   Size (Gauge): 22 G  Orientation: Right;Anterior  Location: Forearm  Site Prep: Alcohol   Number of days: 1       Urethral Catheter 16 Fr. (Active)   Placement Date/Time: 09/15/24 1500   Tube Size (Fr.): 16 Fr.  Catheter Balloon Size: 10 mL  Urine Returned: Yes   Number of days: 1         Scheduled medications  atorvastatin, 40 mg, oral, Nightly  azithromycin, 500 mg, oral, q24h [place]  budesonide, 0.5 mg, nebulization, BID  carvedilol, 3.125 mg, oral, BID  cefTRIAXone, 1 g, intravenous, q24h  gabapentin, 100 mg, oral, Nightly  heparin, 5,000 Units, subcutaneous, q12h [place]  insulin lispro, 0-5 Units, subcutaneous, TID  ipratropium-albuteroL, 3 mL, nebulization, TID  levothyroxine, 25 mcg, oral, Daily  oxygen, , inhalation, Continuous - Inhalation  pantoprazole, 40 mg, oral, BID  polyethylene glycol-electrolytes, 4,000 mL, oral, Once  predniSONE, 40 mg, oral, Daily  torsemide, 20 mg, oral, Daily      Continuous medications     PRN medications  PRN medications: acetaminophen, dextrose, dextrose, docusate sodium, glucagon, glucagon, ipratropium-albuteroL, OLANZapine, ondansetron    Relevant Results  Results from last 7 days   Lab Units 09/16/24  0452 09/15/24  0447 09/14/24  1556   WBC AUTO x10*3/uL 11.3 15.2* 10.9   HEMOGLOBIN g/dL 9.1* 8.1* 7.2*   HEMATOCRIT % 31.0* 25.9* 24.5*   PLATELETS AUTO x10*3/uL 162 144* 142*      Results from last 7 days   Lab Units 09/16/24  0452 09/15/24  0447 09/14/24  1347   SODIUM mmol/L 144 145 141   POTASSIUM mmol/L 4.2 3.5 3.6   CHLORIDE mmol/L 103 104 105   CO2 mmol/L 34* 29 24   BUN mg/dL 34* 32* 31*   CREATININE mg/dL 1.30 1.20 1.30   GLUCOSE mg/dL  123* 138* 105*   CALCIUM mg/dL 8.5 8.0* 7.8*      Results from last 7 days   Lab Units 09/15/24  1616   POCT PH, ARTERIAL pH 7.45*   POCT PCO2, ARTERIAL mm Hg 49*   POCT PO2, ARTERIAL mm Hg 88   POCT HCO3 CALCULATED, ARTERIAL mmol/L 34.1*   POCT BASE EXCESS, ARTERIAL mmol/L 9.0*     XR chest 1 view 09/16/2024    Narrative  Interpreted By:  Shirley Vance,  STUDY:  XR CHEST 1 VIEW 9/16/2024 5:35 am    INDICATION:  Signs/Symptoms:evaluate for inerstitial edema    COMPARISON:  09/15/2024    ACCESSION NUMBER(S):  [id number]    ORDERING CLINICIAN:  PREMA NICHOLAS    TECHNIQUE:  Single AP view chest    FINDINGS:  Cardiomediastinal silhouette is enlarged with moderate cardiomegaly  demonstrated. There is moderate vascular calcification of the aortic  knob. Generalized alveolar airspace infiltrate demonstrated in the  right perihilar location as well as right lower lung zone. Findings  are not significantly changed    Impression  1. Stable appearing alveolar airspace infiltrate in the right lung in  particular right perihilar location and right lung base.    Signed by: Shirley Vance 9/16/2024 9:23 AM  Dictation workstation:   [id number]      Patient Active Problem List   Diagnosis    Epistaxis    Abdominal aortic aneurysm (AAA) without rupture (CMS-HCC)    Acute low back pain    Acute on chronic systolic heart failure (Multi)    Acute renal failure syndrome (CMS-HCC)    Acute ST segment elevation myocardial infarction (Multi)    Arteriosclerosis of coronary artery    Artificial lens present    Benign essential hypertension    Anemia due to blood loss    Stenosis of left carotid artery    Cerebrovascular accident (CVA) involving left cerebral hemisphere (Multi)    Cerebrovascular accident (CVA) due to embolism of cerebral artery (Multi)    Cerebrovascular accident (Multi)    Transient ischemic attack    Angina pectoris (CMS-HCC)    Backache    Chest pain    Tight chest    Chronic diastolic heart failure (Multi)     Chronic heart failure with preserved ejection fraction (Multi)    Stage 3 chronic kidney disease (Multi)    Acute exacerbation of chronic obstructive airways disease (Multi)    Chronic obstructive pulmonary disease (Multi)    Claudication (CMS-[facility])    Cystocele with prolapse    Vaginal prolapse    Dehydration    Dermatochalasis of left upper eyelid    Dermatochalasis of right upper eyelid    Vision disorder    Dysgeusia    Dyspnea    Electrocardiogram abnormal    Facial weakness    Fall    Gastroesophageal reflux disease    Gastrointestinal hemorrhage    Headache    History of coronary artery stent placement    History of myocardial infarction    History of stroke without residual deficits    History of cerebrovascular accident    Hypothyroidism (acquired)    Ischemic cardiomyopathy    Lacunar infarct, acute (Multi)    Lumbago-sciatica due to displacement of lumbar intervertebral disc    Lumbar degenerative disc disease    Mixed hyperlipidemia    Overactive bladder    Pain of lower extremity    Pinguecula    Hypertension    Low blood pressure    Near syncope    Peripheral vascular disease (CMS-[facility])    Right homonymous hemianopsia    Seizure (Multi)    Stented coronary artery    Syncope and collapse    Tear film insufficiency    Urinary urgency    Varicose veins of both legs with edema    Floaters in visual field    Vitreous degeneration    Asthenia    Chronic fatigue syndrome    Weakness    Acute respiratory failure with hypoxia (Multi)    Acquired hypothyroidism    Bilateral carotid artery stenosis    Restless legs syndrome    HFrEF (heart failure with reduced ejection fraction) (Multi)    Severe mitral valve regurgitation    Heart failure (Multi)    Acute deep vein thrombosis (DVT) of proximal vein of left lower extremity (Multi)    Constipation    Venous thromboembolism (VTE)    Do not resuscitate    Amnesia    Hypertensive heart disease with heart failure (Multi)    Nonrheumatic mitral valve regurgitation    S/P  mitral valve clip implantation    Gastrointestinal bleed    Anemia    Spinal stenosis of lumbar region with neurogenic claudication    Acute respiratory failure (Multi)    CHF (congestive heart failure), NYHA class II, acute, diastolic (Multi)    DVT (deep venous thrombosis) (Multi)     Assessment/Plan   Acute on chronic respiratory failure   Preserved EF, volume overload, valvular disease, pulmonary hypertension (admissions this year with MR repair, fluid overload)  Wean oxygen  Diuresis    Pneumonia?   Chest xray reviewed from today   Continue abx   Repeat chest xray in AM. Consider CT    COPD (mod obstruction according to z score in 4/2024 41% FEV1)   Steroids - minimize dose if truly concerned about GI Bleed   Contunue aerosols    Possible GI bleed   recent EGD in August was neg for UGI bleed  Hx of DVT and indeterminate VQ scan   Xarelto on hold      Matti Rae MD

## 2024-09-17 NOTE — PROGRESS NOTES
Anitha Díaz is a 84 y.o. female on day 3 of admission presenting with CHF (congestive heart failure), NYHA class II, acute, diastolic (Multi).      Subjective   Patient was seen and examined at bedside. Patient admitted to respiratory status improvement. She is back to her baseline oxygen per patient report. No melena or hematochezia. Admits to weakness     Objective     Last Recorded Vitals  /51 (BP Location: Right arm, Patient Position: Lying)   Pulse 82   Temp 36.4 °C (97.5 °F) (Temporal)   Resp (!) 35   Wt 66.6 kg (146 lb 13.2 oz)   SpO2 93%   Intake/Output last 3 Shifts:    Intake/Output Summary (Last 24 hours) at 9/17/2024 1358  Last data filed at 9/17/2024 1100  Gross per 24 hour   Intake --   Output 2150 ml   Net -2150 ml       Admission Weight  Weight: 61.2 kg (135 lb) (09/14/24 1333)    Daily Weight  09/17/24 : 66.6 kg (146 lb 13.2 oz)    Image Results  Lower extremity venous duplex bilateral             [place] [address]             Phone [phone]       Vascular Lab Report     [facility] LOWER EXTREMITY VENOUS DUPLEX BILATERAL    Patient Name:      ANITHA DÍAZ      Reading Physician:  [id number] Vijay Leon MD  Study Date:        9/16/2024           Ordering Provider:  [id number]  MRN/PID:           [id number]            Fellow:  Accession#:        [id number]        Technologist:       Giovanna Castro RVT  Date of Birth/Age: 1940 / 84 years Technologist 2:  Gender:            F                   Encounter#:         [id number]  Admission Status:  Inpatient           Location Performed: [facility]       Diagnosis/ICD: Localized (leg) edema-R60.0  CPT Codes:     68354 Peripheral venous duplex scan for DVT complete       CONCLUSIONS:  Right Lower Venous: No evidence of acute deep vein thrombus visualized in the right lower extremity.  Left Lower Venous: No evidence of acute deep vein thrombus visualized in the left lower  extremity.  Additional Findings:  Technically difficult exam due to edema, patient's positioning, and pain.       Imaging & Doppler Findings:     Right                 Compressible Thrombus        Flow  Distal External Iliac     Yes        None  CFV                       Yes        None   Spontaneous/Phasic  PFV                       Yes        None  FV Proximal               Yes        None   Spontaneous/Phasic  FV Mid                    Yes        None  FV Distal                 Yes        None  Popliteal                 Yes        None   Spontaneous/Phasic  Peroneal                  Yes        None  PTV                       Yes        None       Left                  Compress Thrombus        Flow  Distal External Iliac   Yes      None  CFV                     Yes      None   Spontaneous/Phasic  PFV                     Yes      None  FV Proximal             Yes      None   Spontaneous/Phasic  FV Mid                  Yes      None  FV Distal               Yes      None  Popliteal               Yes      None   Spontaneous/Phasic  Peroneal                Yes      None  PTV                     Yes      None       [id number] Vijay Leon MD  Electronically signed by [id number] Vijay Leon MD on 9/16/2024 at 5:53:37 PM       ** Final **  XR chest 1 view  Narrative: Interpreted By:  Shirley Vance,   STUDY:  XR CHEST 1 VIEW 9/16/2024 5:35 am      INDICATION:  Signs/Symptoms:evaluate for inerstitial edema      COMPARISON:  09/15/2024      ACCESSION NUMBER(S):  [id number]      ORDERING CLINICIAN:  PREMA NICHOLAS      TECHNIQUE:  Single AP view chest      FINDINGS:  Cardiomediastinal silhouette is enlarged with moderate cardiomegaly  demonstrated. There is moderate vascular calcification of the aortic  knob. Generalized alveolar airspace infiltrate demonstrated in the  right perihilar location as well as right lower lung zone. Findings  are not significantly changed                  Impression: 1. Stable appearing alveolar  airspace infiltrate in the right lung in  particular right perihilar location and right lung base.      Signed by: Shirley Vance 9/16/2024 9:23 AM  Dictation workstation:   [id number]  ECG 12 lead  Normal sinus rhythm with sinus arrhythmia  Normal ECG  When compared with ECG of 09-SEP-2024 19:10,  Criteria for Septal infarct are no longer Present      Physical Exam  General: lying in bed with no distress  HEENT: moist oral mucosa  Neck: No JVD  Lungs: scattered wheezing and distant, no rhonch   Cardiac: regular rate and rhythm, S1 and S2 present, no rubs, no murmurs, no gallops   Abdomen: bowel sounds present, non tender, non distended  Ext: No cyanosis, no clubbing, no edema     Relevant Results  Scheduled medications  atorvastatin, 40 mg, oral, Nightly  budesonide, 0.5 mg, nebulization, BID  carvedilol, 3.125 mg, oral, BID  cefTRIAXone, 1 g, intravenous, q24h  gabapentin, 100 mg, oral, Nightly  heparin, 5,000 Units, subcutaneous, q12h GARDENIA  insulin lispro, 0-5 Units, subcutaneous, TID  ipratropium-albuteroL, 3 mL, nebulization, TID  levothyroxine, 25 mcg, oral, Daily  oxygen, , inhalation, Continuous - Inhalation  pantoprazole, 40 mg, oral, BID  polyethylene glycol-electrolytes, 4,000 mL, oral, Once  predniSONE, 40 mg, oral, Daily  torsemide, 20 mg, oral, Daily      Continuous medications     PRN medications  PRN medications: acetaminophen, dextrose, dextrose, docusate sodium, glucagon, glucagon, ipratropium-albuteroL, OLANZapine, ondansetron  Results for orders placed or performed during the hospital encounter of 09/14/24 (from the past 24 hour(s))   POCT GLUCOSE   Result Value Ref Range    POCT Glucose 160 (H) 74 - 99 mg/dL   POCT GLUCOSE   Result Value Ref Range    POCT Glucose 195 (H) 74 - 99 mg/dL   POCT GLUCOSE   Result Value Ref Range    POCT Glucose 107 (H) 74 - 99 mg/dL   POCT GLUCOSE   Result Value Ref Range    POCT Glucose 225 (H) 74 - 99 mg/dL        Lower extremity venous duplex bilateral    Result  Date: 9/16/2024           [facility] [id number] [place], [place] [address]            Phone [phone]  Vascular Lab Report  VASC US LOWER EXTREMITY VENOUS DUPLEX BILATERAL Patient Name:      RUDOLPH DÍAZ      Reading Physician:  [id number] Vijay Leon MD Study Date:        9/16/2024           Ordering Provider:  [id number] MRN/PID:           [id number]            Fellow: Accession#:        [id number]        Technologist:       Giovanna Castro RVT Date of Birth/Age: 1940 / 84 years Technologist 2: Gender:            F                   Encounter#:         [id number] Admission Status:  Inpatient           Location Performed: [facility]  Diagnosis/ICD: Localized (leg) edema-R60.0 CPT Codes:     59959 Peripheral venous duplex scan for DVT complete  CONCLUSIONS: Right Lower Venous: No evidence of acute deep vein thrombus visualized in the right lower extremity. Left Lower Venous: No evidence of acute deep vein thrombus visualized in the left lower extremity. Additional Findings: Technically difficult exam due to edema, patient's positioning, and pain.  Imaging & Doppler Findings:  Right                 Compressible Thrombus        Flow Distal External Iliac     Yes        None CFV                       Yes        None   Spontaneous/Phasic PFV                       Yes        None FV Proximal               Yes        None   Spontaneous/Phasic FV Mid                    Yes        None FV Distal                 Yes        None Popliteal                 Yes        None   Spontaneous/Phasic Peroneal                  Yes        None PTV                       Yes        None  Left                  Compress Thrombus        Flow Distal External Iliac   Yes      None CFV                     Yes      None   Spontaneous/Phasic PFV                     Yes      None FV Proximal             Yes      None   Spontaneous/Phasic FV Mid                  Yes      None FV Distal               Yes       None Popliteal               Yes      None   Spontaneous/Phasic Peroneal                Yes      None PTV                     Yes      None  [id number] Vijay Leon MD Electronically signed by [id number] Vijay Leon MD on 9/16/2024 at 5:53:37 PM  ** Final **     XR chest 1 view    Result Date: 9/16/2024  Interpreted By:  Shirley Vance, STUDY: XR CHEST 1 VIEW 9/16/2024 5:35 am   INDICATION: Signs/Symptoms:evaluate for inerstitial edema   COMPARISON: 09/15/2024   ACCESSION NUMBER(S): [id number]   ORDERING CLINICIAN: PREMA NICHOLAS   TECHNIQUE: Single AP view chest   FINDINGS: Cardiomediastinal silhouette is enlarged with moderate cardiomegaly demonstrated. There is moderate vascular calcification of the aortic knob. Generalized alveolar airspace infiltrate demonstrated in the right perihilar location as well as right lower lung zone. Findings are not significantly changed             1. Stable appearing alveolar airspace infiltrate in the right lung in particular right perihilar location and right lung base.   Signed by: Shirley Vance 9/16/2024 9:23 AM Dictation workstation:   [id number]    ECG 12 lead    Result Date: 9/16/2024  Normal sinus rhythm with sinus arrhythmia Normal ECG When compared with ECG of 09-SEP-2024 19:10, Criteria for Septal infarct are no longer Present    XR chest 1 view    Result Date: 9/15/2024  Interpreted By:  Indiana Perea, STUDY: XR CHEST 1 VIEW;  9/15/2024 4:38 pm   INDICATION: Signs/Symptoms:SOB.   COMPARISON: Chest 5:44 a.m.; CT chest dated 01/02/2013   ACCESSION NUMBER(S): [id number]   ORDERING CLINICIAN: PREMA NICHOLAS   FINDINGS: Cardiac silhouette may be enlarged. There are atherosclerotic changes of the aorta.   There is bilateral parenchymal infiltration.   There are surgical clips in the neck. This may be related to previous thyroid surgery.   The bones are not well seen. The patient appears scoliotic.   COMPARISON OF FINDING: The chest is similar.       Persistent parenchymal  infiltration.   MACRO: none   Signed by: Indiana Perea 9/15/2024 4:56 PM Dictation workstation:   [id number]    XR chest 1 view    Result Date: 9/15/2024  Interpreted By:  Mattie Burr, STUDY: XR CHEST 1 VIEW;  9/15/2024 5:50 am   INDICATION: Signs/Symptoms:chf.     COMPARISON: 09/14/2024   ACCESSION NUMBER(S): [id number]   ORDERING CLINICIAN: [address]   FINDINGS: AP radiograph of the chest was provided.       CARDIOMEDIASTINAL SILHOUETTE: Cardiomediastinal silhouette is stable in size and configuration. Enlarged with atherosclerotic calcifications of the aortic arch.   LUNGS: Diffuse bilateral airspace opacities, more pronounced in the right upper lobe.. Possible small left pleural effusion. No sizable pneumothorax.   ABDOMEN: No remarkable upper abdominal findings.   BONES: Degenerative changes with demineralization. Surgical clips over the lower spine.       1.  Diffuse interstitial opacities could be related to pulmonary edema with enlarged cardiac silhouette. Slightly more confluent in the right upper lung field. Possible small left pleural effusion. Superimposed infection not excluded.       MACRO: None   Signed by: Mattie Burr 9/15/2024 7:25 AM Dictation workstation:   [id number]    XR chest 1 view    Result Date: 9/14/2024  Interpreted By:  Deuce Bill, STUDY: Chest dated  9/14/2024.   INDICATION: Signs/Symptoms:sob   COMPARISON: Chest dated 09/09/2024.   ACCESSION NUMBER(S): [id number]   ORDERING CLINICIAN: NESTOR CERVANTES   TECHNIQUE: One view of the chest.   FINDINGS: There is diffuse prominence of the pulmonary interstitium and franny. No pneumothorax is evident. The cardiomediastinal silhouette is enlarged but similar to the prior exam.Degenerative change is seen of the spine and shoulders.Surgical changes are seen over the neck.       Findings suggestive of a degree of pulmonary edema/CHF.   MACRO: None   Signed by: Deuce Bill 9/14/2024 3:18 PM Dictation workstation:    [id number]    ECG 12 Lead    Result Date: 9/11/2024  Normal sinus rhythm Incomplete right bundle branch block Septal infarct (cited on or before 09-SEP-2024) Abnormal ECG When compared with ECG of 08-AUG-2024 12:42, Questionable change in initial forces of Septal leads Confirmed by Tuan Foss ([id number]) on 9/11/2024 3:49:00 PM    NM Lung perfusion with spect    Result Date: 9/9/2024  Interpreted By:  Silver Virk, STUDY: NM LUNG PERFUSION WITH SPECT;  9/9/2024 8:54 pm   INDICATION: Signs/Symptoms:r/o PE, contrast allergy.   COMPARISON: Chest x-ray from 9/9/2024   ACCESSION NUMBER(S): [id number]   ORDERING CLINICIAN: KEESHA MOTTA   TECHNIQUE: Multiple perfusion images of the lungs were acquired after the intravenous administration of 4.1 mCi of Tc-99m macroaggregated albumin (MAA). In addition, SPECT of the chest was performed.   FINDINGS: Limited examination with no ventilation images and lack of SPECT/CT images. There is perfusion defect in the posterior lateral right lower lung.       Perfusion defect in the posterior lateral right lower lung and indeterminate probability of pulmonary embolism.       Signed by: Silver Virk 9/9/2024 10:09 PM Dictation workstation:   [id number]    XR chest 2 views    Result Date: 9/9/2024  Interpreted By:  Carlitos Gibson, STUDY: XR CHEST 2 VIEWS;  9/9/2024 7:50 pm   INDICATION: Signs/Symptoms:sob.     COMPARISON: 08/09/2024   ACCESSION NUMBER(S): [id number]   ORDERING CLINICIAN: KEESHA MOTTA   FINDINGS:     Cardiomegaly. Atherosclerotic aorta. The pulmonary vasculature is congested centrally and indistinct peripherally, with interlobular septal thickening and indistinct mid to lower lung consolidative opacities consistent with pulmonary edema. Overall, degree of edema is less pronounced compared to 08/09/2024. No greater than trace pleural effusions are seen.       Diffuse pulmonary edema, less severe when compared to 08/09/2024.     MACRO: None.   Signed by: Carlitos  Arthur 9/9/2024 8:14 PM Dictation workstation:   [id number]      Assessment/Plan   Anitha Welch is a 84 y.o. female with PMH of COPD on 2 liters oxygen, CAD s/p stent, Ischemic CMP, CVA with mild left sided weakness, LLE DVT on xarelto, and recent Mitral valve repair ARMANI on 4/25/24 who is presented for acute respiratory failure 2/2 to pulmonary edema was on high flow and now weaned down to 2 liters oxygen after diuretics     Acute on chronic respiratory failure 2/2 to pulmonary edema        1. CXR with evidence of fluid overload         2. She was weaned off high flow today. (Baseline oxygen is 2 liters)         3. Currently on torsemide 20 mg daily     2. Anemia with hx of GI bleed       1. GI will perform Colonoscopy on Thursday       2. Continue to hold xarelto     3. Acute decompensated HF      1. Improved     2. Patient is on po torsemide now     4. Hx of LLE DVT       1. Xarelto on hold    5. Hx of COPD      1. Stable     6. H/o Mitral valve repair with ARMANI:     1. On torsemide 20 mg    7. Prophylaxis       1. Heparin 5000 units subcutaneous q 8 hours     Disposition: Patient will remain inpatient for colonoscopy until Thursday.  Anticipated discharge > 2 days    55 minutes spent coordinating the care of the patient     Lucretia Rocha DO

## 2024-09-17 NOTE — PROGRESS NOTES
Physical Therapy    Physical Therapy Treatment    Patient Name: Anitha Welch  MRN: [id number]  Department: [address]  Room: 10/10  Today's Date: 9/17/2024  Time Calculation  Start Time: 1350  Stop Time: 1414  Time Calculation (min): 24 min         Assessment/Plan   PT Assessment  End of Session Communication: Bedside nurse  Assessment Comment: Pt is mildly impulsive and benefits from cuing for safety awareness and attention to task. Sp02 dropping to 88% with mobility, returning to 94% with rest.  End of Session Patient Position: Up in chair, Alarm on  PT Plan  Inpatient/Swing Bed or Outpatient: Inpatient  PT Plan  Treatment/Interventions: Bed mobility, Transfer training, Gait training, Balance training, Strengthening, Endurance training, Therapeutic exercise, Therapeutic activity  PT Plan: Ongoing PT  PT Frequency: 4 times per week  PT Discharge Recommendations: Moderate intensity level of continued care  PT Recommended Transfer Status: Assist x1  PT - OK to Discharge: Yes (with skilled physical therapy services at next level of care)      General Visit Information:   PT  Visit  PT Received On: 09/17/24  General  Co-Treatment: OT  Co-Treatment Reason: CoTx to maximize patient safety with mobility and transfers d/t poor activity tolerance  Patient Position Received: Bed, 2 rail up, Alarm on  General Comment: Pt cleared for PT by nursing. Pt agreeable to PT services.    Subjective   Precautions:  Precautions  Medical Precautions: Oxygen therapy device and L/min (HFNC 30L, FiO2 30%)  Precautions Comment: + tele, +sp02      Objective   Pain:  Pain Assessment  Pain Assessment: 0-10  0-10 (Numeric) Pain Score: 0 - No pain  Cognition:  Cognition  Overall Cognitive Status: Impaired (for memory, safety, insight/judgement.)     Postural Control:  Static Sitting Balance  Static Sitting-Balance Support: Feet supported, Bilateral upper extremity supported  Static Sitting-Level of Assistance: Contact guard  Static  Sitting-Comment/Number of Minutes: Good seated static balance  Static Standing Balance  Static Standing-Balance Support: Bilateral upper extremity supported  Static Standing-Level of Assistance: Contact guard  Static Standing-Comment/Number of Minutes: Fair + static standing balance    Treatments:  Therapeutic Exercise  Therapeutic Exercise Performed: Yes  Therapeutic Exercise Activity 1: Seated B marches x10  Therapeutic Exercise Activity 2: Seated B LAQ x10  Therapeutic Exercise Activity 3: Seated B ankle pumps x10    Bed Mobility  Bed Mobility: Yes  Bed Mobility 1  Bed Mobility 1: Supine to sitting  Level of Assistance 1: Contact guard  Bed Mobility Comments 1: HOB elevated, use of bed rails. Increased time to reach EOB, cues for ant scooting to EOB to place feet flat on floor.    Ambulation/Gait Training  Ambulation/Gait Training Performed: Yes  Ambulation/Gait Training 1  Surface 1: Level tile  Device 1: Rolling walker  Assistance 1: Minimum assistance  Quality of Gait 1: Diminished heel strike, Decreased step length  Comments/Distance (ft) 1: 5 steps from EOB > bedside chair. Light assist for stability and safety  Transfers  Transfer: Yes  Transfer 1  Transfer From 1: Sit to, Stand to  Transfer to 1: Sit, Stand  Technique 1: Sit to stand, Stand to sit  Transfer Device 1: Walker  Transfer Level of Assistance 1: Minimum assistance  Trials/Comments 1: Cues for hand placement and eccentric control when lowering to seat.    Outcome Measures:  [facility] Basic Mobility  Turning from your back to your side while in a flat bed without using bedrails: A little  Moving from lying on your back to sitting on the side of a flat bed without using bedrails: A little  Moving to and from bed to chair (including a wheelchair): A little  Standing up from a chair using your arms (e.g. wheelchair or bedside chair): A little  To walk in hospital room: A lot  Climbing 3-5 steps with railing: Total  Basic Mobility - Total Score:  15    Encounter Problems       Encounter Problems (Active)       Mobility       Bed mobility including supine to sit and sit to supine with supervision. (Progressing)       Start:  09/16/24    Expected End:  09/30/24            Ambulate 50 feet with rolling walker and supervision. (Progressing)       Start:  09/16/24    Expected End:  09/30/24            Negotiate 3 steps with single handrail +/- cane and mod assist. (Not Progressing)       Start:  09/16/24    Expected End:  09/30/24               PT Transfers       Transfers including sit to stand and stand to sit with supervision. (Progressing)       Start:  09/16/24    Expected End:  09/30/24

## 2024-09-17 NOTE — CARE PLAN
The patient's goals for the shift include sleep    The clinical goals for the shift include wean O2    Over the shift, the patient did not make progress toward the following goals. Barriers to progression include increased O2 demand with crackles in the bases. Recommendations to address these barriers include administer IV lasix, monitor O2.

## 2024-09-17 NOTE — PROGRESS NOTES
Occupational Therapy    OT Treatment    Patient Name: Anitha Welch  MRN: [id number]  Department: [address]  Room: 10/10-A  Today's Date: 9/17/2024  Time Calculation  Start Time: 1351  Stop Time: 1415  Time Calculation (min): 24 min        Assessment:  OT Assessment: Gradual progress made towards OT goals. Continue with current OT POC to increase strength, balance and functional tolerance to maximize safety and independence during ADLs. Due to cognitive deficts observed throughout session pt may benefit from further cognitive testing prior to discharge to assess for additional recommendations to maximize safety and independence in the least restrictive environment.  Barriers to Discharge: Decreased caregiver support  Evaluation/Treatment Tolerance: Patient limited by fatigue  End of Session Communication: Bedside nurse  End of Session Patient Position: Up in chair, Alarm on (all needs in reach)  OT Assessment Results: Decreased ADL status, Decreased upper extremity strength, Decreased safe judgment during ADL, Decreased cognition, Decreased endurance, Decreased sensation, Decreased functional mobility  Barriers to Discharge: Decreased caregiver support  Evaluation/Treatment Tolerance: Patient limited by fatigue  Plan:  Treatment Interventions: ADL retraining, Functional transfer training, UE strengthening/ROM, Endurance training, Cognitive reorientation, Patient/family training, Equipment evaluation/education, Fine motor coordination activities  OT Frequency: 4 times per week  OT Discharge Recommendations: Moderate intensity level of continued care (Pt may benefit from further cognitive testing to assess for safety and additional discharge recommendations.)  Equipment Recommended upon Discharge: Wheeled walker  OT Recommended Transfer Status: Assist of 1  OT - OK to Discharge: Yes  Treatment Interventions: ADL retraining, Functional transfer training, UE strengthening/ROM, Endurance training, Cognitive reorientation,  Patient/family training, Equipment evaluation/education, Fine motor coordination activities    Subjective   Previous Visit Info:  OT Last Visit  OT Received On: 09/17/24  General:  General  Reason for Referral: Decreased ADL function for CHF  Past Medical History Relevant to Rehab: DOPD, DVT, GI bleed, AAA, CKD, chronic resp failure, home O2 at 2 liters, CHF, CVA, mitral regurg and repair, UTI, anemia, anxiety, depression, arthritis, CAD, diverticulosis, right THR, left TKR, cardiac stent, choley, MI, spinal stenosis, sciatica, RLS, plantar fasciitis, osteopenia, OA, epistaxis, GERD, DDD, hyperlipid, HTN, hypothyroid, ischemic cardiomyopathy,  Co-Treatment: PT  Co-Treatment Reason: CoTx to maximize patient safety with mobility and transfers d/t poor functional tolerance  Patient Position Received: Bed, 2 rail up, Alarm on  General Comment: Pt cleared for therapy session per nursing, cooperative this date.  Precautions:  Hearing/Visual Limitations: WFL, glasses for reading.  Medical Precautions: Fall precautions, Oxygen therapy device and L/min (4L O2 via NC)  Precautions Comment: telemetry, continuous pulse-ox, heath            Pain:  Pain Assessment  Pain Assessment: 0-10  0-10 (Numeric) Pain Score: 0 - No pain  Clinical Progression: Not changed    Objective    Cognition:  Cognition  Overall Cognitive Status: Impaired  Orientation Level: Disoriented to situation, Disoriented to time  Safety/Judgement: Exceptions to WFL  Insight: Moderate  Impulsive: Moderately  Task Initiation: Initiates with cues  Processing Speed: Delayed  Coordination:  Movements are Fluid and Coordinated: No  Upper Body Coordination: Slower rate of movement this date.  Lower Body Coordination: slower rate of movement richard LE (left greater than right)  Activities of Daily Living: Grooming  Grooming Comments: pt pleasantly declined participation in ADLs at this time.    UE Dressing  UE Dressing Comments: modA required to don front-opening garment  while seated EOB, increased time and assistance required for task completion for sequencing.  Functional Standing Tolerance:     Bed Mobility/Transfers: Bed Mobility  Bed Mobility: Yes  Bed Mobility 1  Bed Mobility 1: Supine to sitting  Level of Assistance 1: Contact guard  Bed Mobility Comments 1: HOB elevated to maximum height, increased time and effort required with use of bed rails. Min verbal cues required for proper hand placement to maximize potential.    Transfers  Transfer: Yes  Transfer 1  Trials/Comments 1: sit<>stands completed from standard EOB and chair heights with standard walker and Vaishali- verbal/ tactile cues required for proper hand placement to increase safety and decrease risk of falls.      Functional Mobility:  Functional Mobility  Functional Mobility Performed: Yes  Functional Mobility 1  Surface 1: Level tile  Device 1: Standard walker  Assistance 1: Contact guard  Comments 1: Minimal functional mobility trial completed this date to increase functional tolerance for safety and independence upon discharge. Decreased awareness/ insight observed with SOB. SpO2 84% requiring >1min to recover >90% on 4L O2 via NC. Pt unable to complete household distances this date.    Therapy/Activity: Therapeutic Exercise  Therapeutic Exercise Performed: Yes  Therapeutic Exercise Activity 1: BUE exercises completed 1x10 with min resistance for following exercises: wrist flexion/ extension, pronation/supination, bicep curl, triceps extension, and shoulder press  Therapeutic Exercise Activity 2: Exercises completed this date to increase strength and functional tolerance for safety and ease of ADL/ADL transfer completion. Verbal instruction and demonstration provided to ensure proper muscle recruitment.      Outcome Measures:[facility] Daily Activity  Putting on and taking off regular lower body clothing: A lot  Bathing (including washing, rinsing, drying): A lot  Putting on and taking off regular upper body clothing: A  lot  Toileting, which includes using toilet, bedpan or urinal: Total  Taking care of personal grooming such as brushing teeth: A little  Eating Meals: A little  Daily Activity - Total Score: 13        Education Documentation  ADL Training, taught by OLVIN Sun at 9/17/2024  5:07 PM.  Learner: Patient  Readiness: Acceptance  Method: Explanation, Demonstration  Response: Needs Reinforcement    Education Comments  Education provided on role of OT/POC, safety awareness throughout functional tasks/transfers, importance of activity/ rest routine, EC/WS techniques, and use of call light for assistance. Questions, comments and concerns addressed regarding OT.      Goals:  Encounter Problems       Encounter Problems (Active)       OT Goals       Patient will be able to complete UB dressing/bathing, grooming with set up. (Progressing)       Start:  09/16/24    Expected End:  09/30/24            Patient will be able to complete LB dressing, bathing, toileting with Close Supervision with use of AE and use of energy conservation techniques. (Progressing)       Start:  09/16/24    Expected End:  09/30/24            Patient will be able to complete functional transfers/mobility with the walker with Close Supervision using good safety and with G balance.  (Progressing)       Start:  09/16/24    Expected End:  09/30/24            Patient will be able to tolerate 10 min of functional standing with G balance in prep for ADL/transfers. (Progressing)       Start:  09/16/24    Expected End:  09/30/24

## 2024-09-17 NOTE — CARE PLAN
Problem: Respiratory  Goal: Clear secretions with interventions this shift  Outcome: Progressing  Goal: Minimize anxiety/maximize coping throughout shift  Outcome: Progressing  Goal: Minimal/no exertional discomfort or dyspnea this shift  Outcome: Progressing  Goal: No signs of respiratory distress (eg. Use of accessory muscles. Peds grunting)  Outcome: Progressing  Goal: Patent airway maintained this shift  Outcome: Progressing  Goal: Tolerate pulmonary toileting this shift  Outcome: Progressing  Goal: Verbalize decreased shortness of breath this shift  Outcome: Progressing  Goal: Wean oxygen to maintain O2 saturation per order/standard this shift  Outcome: Progressing  Goal: Increase self care and/or family involvement in next 24 hours  Outcome: Progressing   The patient's goals for the shift include sleep    The clinical goals for the shift include comfort    Over the shift, the patient did not make progress toward the following goals. Barriers to progression include none. Recommendations to address these barriers include none.

## 2024-09-17 NOTE — PROGRESS NOTES
"Anitha Welch is a 84 y.o. female on day 3 of admission presenting with CHF (congestive heart failure), NYHA class II, acute, diastolic (Multi).    Subjective   Resp status improved. She denies abdominal pain, nausea, vomiting        Objective     Physical Exam  Constitutional:       Appearance: Normal appearance.   HENT:      Head: Normocephalic and atraumatic.      Mouth/Throat:      Mouth: Mucous membranes are moist.   Pulmonary:      Effort: Pulmonary effort is normal.   Abdominal:      General: There is no distension.      Palpations: Abdomen is soft.      Tenderness: There is no abdominal tenderness. There is no guarding.   Musculoskeletal:         General: Normal range of motion.      Cervical back: Normal range of motion.   Skin:     General: Skin is warm and dry.   Neurological:      General: No focal deficit present.      Mental Status: She is alert. Mental status is at baseline.   Psychiatric:         Mood and Affect: Mood normal.         Last Recorded Vitals  Blood pressure 142/78, pulse 89, temperature 36.5 °C (97.7 °F), resp. rate 19, height 1.575 m (5' 2\"), weight 66.6 kg (146 lb 13.2 oz), SpO2 99%.  Intake/Output last 3 Shifts:  I/O last 3 completed shifts:  In: - (0 mL/kg)   Out: 1925 (28.9 mL/kg) [Urine:1925 (0.8 mL/kg/hr)]  Weight: 66.6 kg     Relevant Results               Results for orders placed or performed during the hospital encounter of 09/14/24 (from the past 24 hour(s))   POCT GLUCOSE   Result Value Ref Range    POCT Glucose 305 (H) 74 - 99 mg/dL   POCT GLUCOSE   Result Value Ref Range    POCT Glucose 160 (H) 74 - 99 mg/dL   POCT GLUCOSE   Result Value Ref Range    POCT Glucose 195 (H) 74 - 99 mg/dL   POCT GLUCOSE   Result Value Ref Range    POCT Glucose 107 (H) 74 - 99 mg/dL     Lower extremity venous duplex bilateral    Result Date: 9/16/2024           [place] [id number]            Phone [phone]  Vascular Lab Report  VASC US LOWER " EXTREMITY VENOUS DUPLEX BILATERAL Patient Name:      RUDOLPH DÍAZ      Reading Physician:  [id number] Vijay Leon MD Study Date:        9/16/2024           Ordering Provider:  [id number] TERRANCE CURRAN JAD MRN/PID:           [id number]            Fellow: Accession#:        [id number]        Technologist:       Giovanna Castro RVT Date of Birth/Age: 1940 / 84 years Technologist 2: Gender:            F                   Encounter#:         [id number] Admission Status:  Inpatient           Location Performed: [facility]  Diagnosis/ICD: Localized (leg) edema-R60.0 CPT Codes:     61382 Peripheral venous duplex scan for DVT complete  CONCLUSIONS: Right Lower Venous: No evidence of acute deep vein thrombus visualized in the right lower extremity. Left Lower Venous: No evidence of acute deep vein thrombus visualized in the left lower extremity. Additional Findings: Technically difficult exam due to edema, patient's positioning, and pain.  Imaging & Doppler Findings:  Right                 Compressible Thrombus        Flow Distal External Iliac     Yes        None CFV                       Yes        None   Spontaneous/Phasic PFV                       Yes        None FV Proximal               Yes        None   Spontaneous/Phasic FV Mid                    Yes        None FV Distal                 Yes        None Popliteal                 Yes        None   Spontaneous/Phasic Peroneal                  Yes        None PTV                       Yes        None  Left                  Compress Thrombus        Flow Distal External Iliac   Yes      None CFV                     Yes      None   Spontaneous/Phasic PFV                     Yes      None FV Proximal             Yes      None   Spontaneous/Phasic FV Mid                  Yes      None FV Distal               Yes      None Popliteal               Yes      None   Spontaneous/Phasic Peroneal                Yes      None PTV                     Yes      None  [id number] Leon  MD Electronically signed by [id number] Vijay Leon MD on 9/16/2024 at 5:53:37 PM  ** Final **     XR chest 1 view    Result Date: 9/16/2024  Interpreted By:  Shirley Vance, STUDY: XR CHEST 1 VIEW 9/16/2024 5:35 am   INDICATION: Signs/Symptoms:evaluate for inerstitial edema   COMPARISON: 09/15/2024   ACCESSION NUMBER(S): [id number]   ORDERING CLINICIAN: PREMA NICHOLAS   TECHNIQUE: Single AP view chest   FINDINGS: Cardiomediastinal silhouette is enlarged with moderate cardiomegaly demonstrated. There is moderate vascular calcification of the aortic knob. Generalized alveolar airspace infiltrate demonstrated in the right perihilar location as well as right lower lung zone. Findings are not significantly changed             1. Stable appearing alveolar airspace infiltrate in the right lung in particular right perihilar location and right lung base.   Signed by: Shirley Vance 9/16/2024 9:23 AM Dictation workstation:   [id number]    ECG 12 lead    Result Date: 9/16/2024  Normal sinus rhythm with sinus arrhythmia Normal ECG When compared with ECG of 09-SEP-2024 19:10, Criteria for Septal infarct are no longer Present    XR chest 1 view    Result Date: 9/15/2024  Interpreted By:  Indiana Perea, STUDY: XR CHEST 1 VIEW;  9/15/2024 4:38 pm   INDICATION: Signs/Symptoms:SOB.   COMPARISON: Chest 5:44 a.m.; CT chest dated 01/02/2013   ACCESSION NUMBER(S): [id number]   ORDERING CLINICIAN: PREMA NICHOLAS   FINDINGS: Cardiac silhouette may be enlarged. There are atherosclerotic changes of the aorta.   There is bilateral parenchymal infiltration.   There are surgical clips in the neck. This may be related to previous thyroid surgery.   The bones are not well seen. The patient appears scoliotic.   COMPARISON OF FINDING: The chest is similar.       Persistent parenchymal infiltration.   MACRO: none   Signed by: Indiana Perea 9/15/2024 4:56 PM Dictation workstation:   [id number]    XR chest 1 view    Result Date:  9/15/2024  Interpreted By:  Mattie Burr, STUDY: XR CHEST 1 VIEW;  9/15/2024 5:50 am   INDICATION: Signs/Symptoms:chf.     COMPARISON: 09/14/2024   ACCESSION NUMBER(S): [id number]   ORDERING CLINICIAN: TERRANCE STREET   FINDINGS: AP radiograph of the chest was provided.       CARDIOMEDIASTINAL SILHOUETTE: Cardiomediastinal silhouette is stable in size and configuration. Enlarged with atherosclerotic calcifications of the aortic arch.   LUNGS: Diffuse bilateral airspace opacities, more pronounced in the right upper lobe.. Possible small left pleural effusion. No sizable pneumothorax.   ABDOMEN: No remarkable upper abdominal findings.   BONES: Degenerative changes with demineralization. Surgical clips over the lower spine.       1.  Diffuse interstitial opacities could be related to pulmonary edema with enlarged cardiac silhouette. Slightly more confluent in the right upper lung field. Possible small left pleural effusion. Superimposed infection not excluded.       MACRO: None   Signed by: Mattie Burr 9/15/2024 7:25 AM Dictation workstation:   [id number]    XR chest 1 view    Result Date: 9/14/2024  Interpreted By:  Deuce Bill, STUDY: Chest dated  9/14/2024.   INDICATION: Signs/Symptoms:sob   COMPARISON: Chest dated 09/09/2024.   ACCESSION NUMBER(S): [id number]   ORDERING CLINICIAN: NESTOR CERVANTES   TECHNIQUE: One view of the chest.   FINDINGS: There is diffuse prominence of the pulmonary interstitium and franny. No pneumothorax is evident. The cardiomediastinal silhouette is enlarged but similar to the prior exam.Degenerative change is seen of the spine and shoulders.Surgical changes are seen over the neck.       Findings suggestive of a degree of pulmonary edema/CHF.   MACRO: None   Signed by: Deuce Bill 9/14/2024 3:18 PM Dictation workstation:   [id number]    ECG 12 Lead    Result Date: 9/11/2024  Normal sinus rhythm Incomplete right bundle branch block Septal infarct (cited on or before  09-SEP-2024) Abnormal ECG When compared with ECG of 08-AUG-2024 12:42, Questionable change in initial forces of Septal leads Confirmed by Tuan Foss ([id number]) on 9/11/2024 3:49:00 PM    NM Lung perfusion with spect    Result Date: 9/9/2024  Interpreted By:  Silver Virk, STUDY: NM LUNG PERFUSION WITH SPECT;  9/9/2024 8:54 pm   INDICATION: Signs/Symptoms:r/o PE, contrast allergy.   COMPARISON: Chest x-ray from 9/9/2024   ACCESSION NUMBER(S): [id number]   ORDERING CLINICIAN: KEESHA MOTTA   TECHNIQUE: Multiple perfusion images of the lungs were acquired after the intravenous administration of 4.1 mCi of Tc-99m macroaggregated albumin (MAA). In addition, SPECT of the chest was performed.   FINDINGS: Limited examination with no ventilation images and lack of SPECT/CT images. There is perfusion defect in the posterior lateral right lower lung.       Perfusion defect in the posterior lateral right lower lung and indeterminate probability of pulmonary embolism.       Signed by: Silver Virk 9/9/2024 10:09 PM Dictation workstation:   [id number]    XR chest 2 views    Result Date: 9/9/2024  Interpreted By:  Carlitos Gibson, STUDY: XR CHEST 2 VIEWS;  9/9/2024 7:50 pm   INDICATION: Signs/Symptoms:sob.     COMPARISON: 08/09/2024   ACCESSION NUMBER(S): [id number]   ORDERING CLINICIAN: KEESHA MOTTA   FINDINGS:     Cardiomegaly. Atherosclerotic aorta. The pulmonary vasculature is congested centrally and indistinct peripherally, with interlobular septal thickening and indistinct mid to lower lung consolidative opacities consistent with pulmonary edema. Overall, degree of edema is less pronounced compared to 08/09/2024. No greater than trace pleural effusions are seen.       Diffuse pulmonary edema, less severe when compared to 08/09/2024.     MACRO: None.   Signed by: Carlitos Gibson 9/9/2024 8:14 PM Dictation workstation:   [id number]                  Assessment/Plan   Assessment & Plan  CHF (congestive heart failure),  NYHA class II, acute, diastolic (Multi)    Anemia    Acute respiratory failure (Multi)    DVT (deep venous thrombosis) (Multi)    Anemia/Anticoagulation   Normocytic anemia hgb 8.1. No obvious s/s of bleeding. On Xarelto  Hemoglobin has dropped from 9.4-7.2 in the last month. Recent EGD unremarkable. Planned outpatient colonoscopy, however with drop in H/H we can complete as inpatient.   - Clear liquid diet   - Bowel prep  - NPO after midnight    - Monitor  CBC, CMP, INR,  - Transfuse to keep hgb >7  - Protonix 40 mg IV BID  - Hold  Xarelto    9/17  Resp status improving. Just transitioned off high flow today. Given lack of overt bleeding, endoscopy is not urgent. We will tentatively plan to prep tomorrow for colonoscopy on Thursday        I spent 20 minutes in the professional and overall care of this patient.      Sharon Urbina, APRN-CNP

## 2024-09-17 NOTE — PROGRESS NOTES
Spiritual Care Visit    Clinical Encounter Type  Visited With: Patient  Routine Visit: Follow-up  Continue Visiting: Yes         Values/Beliefs  Spiritual Requests During Hospitalization: josé miguel Clifford today    Sacramental Encounters  Communion: Patient wants communion  Communion Given Indicator: Yes     Donnell Quintanilla

## 2024-09-18 ENCOUNTER — APPOINTMENT (OUTPATIENT)
Dept: CARDIOLOGY | Facility: HOSPITAL | Age: 84
DRG: 291 | End: 2024-09-18
Payer: MEDICARE

## 2024-09-18 LAB
ALBUMIN SERPL BCP-MCNC: 3.1 G/DL (ref 3.4–5)
ANION GAP SERPL CALCULATED.3IONS-SCNC: 9 MMOL/L (ref 10–20)
ATRIAL RATE: 77 BPM
ATRIAL RATE: 85 BPM
BASOPHILS # BLD AUTO: 0.01 X10*3/UL (ref 0–0.1)
BASOPHILS NFR BLD AUTO: 0.1 %
BUN SERPL-MCNC: 37 MG/DL (ref 6–23)
CALCIUM SERPL-MCNC: 8.3 MG/DL (ref 8.6–10.3)
CARDIAC TROPONIN I PNL SERPL HS: 29 NG/L (ref 0–13)
CHLORIDE SERPL-SCNC: 102 MMOL/L (ref 98–107)
CO2 SERPL-SCNC: 37 MMOL/L (ref 21–32)
CREAT SERPL-MCNC: 1.13 MG/DL (ref 0.5–1.05)
EGFRCR SERPLBLD CKD-EPI 2021: 48 ML/MIN/1.73M*2
EOSINOPHIL # BLD AUTO: 0.01 X10*3/UL (ref 0–0.4)
EOSINOPHIL NFR BLD AUTO: 0.1 %
ERYTHROCYTE [DISTWIDTH] IN BLOOD BY AUTOMATED COUNT: 18.7 % (ref 11.5–14.5)
EST. AVERAGE GLUCOSE BLD GHB EST-MCNC: 100 MG/DL
GLUCOSE BLD MANUAL STRIP-MCNC: 104 MG/DL (ref 74–99)
GLUCOSE BLD MANUAL STRIP-MCNC: 135 MG/DL (ref 74–99)
GLUCOSE BLD MANUAL STRIP-MCNC: 90 MG/DL (ref 74–99)
GLUCOSE SERPL-MCNC: 100 MG/DL (ref 74–99)
HBA1C MFR BLD: 5.1 %
HCT VFR BLD AUTO: 27.9 % (ref 36–46)
HGB BLD-MCNC: 8.2 G/DL (ref 12–16)
IMM GRANULOCYTES # BLD AUTO: 0.17 X10*3/UL (ref 0–0.5)
IMM GRANULOCYTES NFR BLD AUTO: 2.2 % (ref 0–0.9)
LYMPHOCYTES # BLD AUTO: 0.83 X10*3/UL (ref 0.8–3)
LYMPHOCYTES NFR BLD AUTO: 10.6 %
MAGNESIUM SERPL-MCNC: 2.02 MG/DL (ref 1.6–2.4)
MCH RBC QN AUTO: 25.3 PG (ref 26–34)
MCHC RBC AUTO-ENTMCNC: 29.4 G/DL (ref 32–36)
MCV RBC AUTO: 86 FL (ref 80–100)
MONOCYTES # BLD AUTO: 0.69 X10*3/UL (ref 0.05–0.8)
MONOCYTES NFR BLD AUTO: 8.8 %
NEUTROPHILS # BLD AUTO: 6.12 X10*3/UL (ref 1.6–5.5)
NEUTROPHILS NFR BLD AUTO: 78.2 %
NRBC BLD-RTO: 0 /100 WBCS (ref 0–0)
P AXIS: 67 DEGREES
P AXIS: 69 DEGREES
P OFFSET: 201 MS
P OFFSET: 214 MS
P ONSET: 138 MS
P ONSET: 155 MS
PHOSPHATE SERPL-MCNC: 4 MG/DL (ref 2.5–4.9)
PLATELET # BLD AUTO: 175 X10*3/UL (ref 150–450)
POTASSIUM SERPL-SCNC: 3.9 MMOL/L (ref 3.5–5.3)
PR INTERVAL: 140 MS
PR INTERVAL: 144 MS
Q ONSET: 208 MS
Q ONSET: 227 MS
QRS COUNT: 13 BEATS
QRS COUNT: 14 BEATS
QRS DURATION: 100 MS
QRS DURATION: 104 MS
QT INTERVAL: 386 MS
QT INTERVAL: 406 MS
QTC CALCULATION(BAZETT): 459 MS
QTC CALCULATION(BAZETT): 459 MS
QTC FREDERICIA: 433 MS
QTC FREDERICIA: 441 MS
R AXIS: -24 DEGREES
R AXIS: -47 DEGREES
RBC # BLD AUTO: 3.24 X10*6/UL (ref 4–5.2)
SODIUM SERPL-SCNC: 144 MMOL/L (ref 136–145)
T AXIS: 44 DEGREES
T AXIS: 47 DEGREES
T OFFSET: 411 MS
T OFFSET: 420 MS
VENTRICULAR RATE: 77 BPM
VENTRICULAR RATE: 85 BPM
WBC # BLD AUTO: 7.8 X10*3/UL (ref 4.4–11.3)

## 2024-09-18 PROCEDURE — 99232 SBSQ HOSP IP/OBS MODERATE 35: CPT | Performed by: INTERNAL MEDICINE

## 2024-09-18 PROCEDURE — 2500000005 HC RX 250 GENERAL PHARMACY W/O HCPCS: Performed by: INTERNAL MEDICINE

## 2024-09-18 PROCEDURE — 2500000004 HC RX 250 GENERAL PHARMACY W/ HCPCS (ALT 636 FOR OP/ED): Performed by: INTERNAL MEDICINE

## 2024-09-18 PROCEDURE — 2500000004 HC RX 250 GENERAL PHARMACY W/ HCPCS (ALT 636 FOR OP/ED): Performed by: NURSE PRACTITIONER

## 2024-09-18 PROCEDURE — 2500000001 HC RX 250 WO HCPCS SELF ADMINISTERED DRUGS (ALT 637 FOR MEDICARE OP): Performed by: NURSE PRACTITIONER

## 2024-09-18 PROCEDURE — 85025 COMPLETE CBC W/AUTO DIFF WBC: CPT | Performed by: INTERNAL MEDICINE

## 2024-09-18 PROCEDURE — 2500000001 HC RX 250 WO HCPCS SELF ADMINISTERED DRUGS (ALT 637 FOR MEDICARE OP): Performed by: INTERNAL MEDICINE

## 2024-09-18 PROCEDURE — 83036 HEMOGLOBIN GLYCOSYLATED A1C: CPT | Mod: WESLAB | Performed by: INTERNAL MEDICINE

## 2024-09-18 PROCEDURE — 2060000001 HC INTERMEDIATE ICU ROOM DAILY

## 2024-09-18 PROCEDURE — 97110 THERAPEUTIC EXERCISES: CPT | Mod: GP,CQ

## 2024-09-18 PROCEDURE — 94640 AIRWAY INHALATION TREATMENT: CPT

## 2024-09-18 PROCEDURE — 97535 SELF CARE MNGMENT TRAINING: CPT | Mod: GO

## 2024-09-18 PROCEDURE — 97530 THERAPEUTIC ACTIVITIES: CPT | Mod: GO

## 2024-09-18 PROCEDURE — 36415 COLL VENOUS BLD VENIPUNCTURE: CPT | Performed by: INTERNAL MEDICINE

## 2024-09-18 PROCEDURE — 93005 ELECTROCARDIOGRAM TRACING: CPT

## 2024-09-18 PROCEDURE — 2500000004 HC RX 250 GENERAL PHARMACY W/ HCPCS (ALT 636 FOR OP/ED): Performed by: STUDENT IN AN ORGANIZED HEALTH CARE EDUCATION/TRAINING PROGRAM

## 2024-09-18 PROCEDURE — 80069 RENAL FUNCTION PANEL: CPT | Performed by: INTERNAL MEDICINE

## 2024-09-18 PROCEDURE — 84484 ASSAY OF TROPONIN QUANT: CPT | Performed by: INTERNAL MEDICINE

## 2024-09-18 PROCEDURE — 2500000002 HC RX 250 W HCPCS SELF ADMINISTERED DRUGS (ALT 637 FOR MEDICARE OP, ALT 636 FOR OP/ED): Performed by: INTERNAL MEDICINE

## 2024-09-18 PROCEDURE — 82947 ASSAY GLUCOSE BLOOD QUANT: CPT

## 2024-09-18 PROCEDURE — 93010 ELECTROCARDIOGRAM REPORT: CPT | Performed by: INTERNAL MEDICINE

## 2024-09-18 PROCEDURE — 9420000001 HC RT PATIENT EDUCATION 5 MIN

## 2024-09-18 PROCEDURE — 2500000001 HC RX 250 WO HCPCS SELF ADMINISTERED DRUGS (ALT 637 FOR MEDICARE OP)

## 2024-09-18 PROCEDURE — 83735 ASSAY OF MAGNESIUM: CPT | Performed by: INTERNAL MEDICINE

## 2024-09-18 RX ORDER — POLYETHYLENE GLYCOL 3350 17 G/17G
238 POWDER, FOR SOLUTION ORAL ONCE
Status: COMPLETED | OUTPATIENT
Start: 2024-09-18 | End: 2024-09-18

## 2024-09-18 RX ORDER — ACETAMINOPHEN 325 MG/1
650 TABLET ORAL EVERY 8 HOURS PRN
Status: DISCONTINUED | OUTPATIENT
Start: 2024-09-18 | End: 2024-09-18

## 2024-09-18 RX ORDER — ACETAMINOPHEN 325 MG/1
975 TABLET ORAL EVERY 8 HOURS PRN
Status: DISCONTINUED | OUTPATIENT
Start: 2024-09-18 | End: 2024-09-19 | Stop reason: HOSPADM

## 2024-09-18 RX ORDER — BISACODYL 5 MG
20 TABLET, DELAYED RELEASE (ENTERIC COATED) ORAL ONCE
Status: COMPLETED | OUTPATIENT
Start: 2024-09-18 | End: 2024-09-18

## 2024-09-18 ASSESSMENT — COGNITIVE AND FUNCTIONAL STATUS - GENERAL
DRESSING REGULAR UPPER BODY CLOTHING: A LOT
CLIMB 3 TO 5 STEPS WITH RAILING: TOTAL
TOILETING: A LITTLE
DRESSING REGULAR LOWER BODY CLOTHING: A LITTLE
HELP NEEDED FOR BATHING: A LITTLE
PERSONAL GROOMING: A LITTLE
DAILY ACTIVITIY SCORE: 19
MOVING TO AND FROM BED TO CHAIR: A LITTLE
PERSONAL GROOMING: A LITTLE
MOVING TO AND FROM BED TO CHAIR: A LITTLE
TURNING FROM BACK TO SIDE WHILE IN FLAT BAD: A LITTLE
STANDING UP FROM CHAIR USING ARMS: A LITTLE
DAILY ACTIVITIY SCORE: 15
TURNING FROM BACK TO SIDE WHILE IN FLAT BAD: A LITTLE
HELP NEEDED FOR BATHING: A LITTLE
TOILETING: TOTAL
MOBILITY SCORE: 16
EATING MEALS: A LITTLE
WALKING IN HOSPITAL ROOM: A LOT
MOBILITY SCORE: 15
STANDING UP FROM CHAIR USING ARMS: A LITTLE
WALKING IN HOSPITAL ROOM: A LOT
CLIMB 3 TO 5 STEPS WITH RAILING: A LOT
DRESSING REGULAR LOWER BODY CLOTHING: A LITTLE
DRESSING REGULAR UPPER BODY CLOTHING: A LITTLE
MOVING FROM LYING ON BACK TO SITTING ON SIDE OF FLAT BED WITH BEDRAILS: A LITTLE
MOVING FROM LYING ON BACK TO SITTING ON SIDE OF FLAT BED WITH BEDRAILS: A LITTLE

## 2024-09-18 ASSESSMENT — PAIN - FUNCTIONAL ASSESSMENT
PAIN_FUNCTIONAL_ASSESSMENT: 0-10

## 2024-09-18 ASSESSMENT — ACTIVITIES OF DAILY LIVING (ADL)
HOME_MANAGEMENT_TIME_ENTRY: 15
BATHING_LEVEL_OF_ASSISTANCE: MINIMUM ASSISTANCE
BATHING_WHERE_ASSESSED: OTHER (COMMENT)

## 2024-09-18 ASSESSMENT — PAIN SCALES - GENERAL
PAINLEVEL_OUTOF10: 0 - NO PAIN
PAINLEVEL_OUTOF10: 5 - MODERATE PAIN
PAINLEVEL_OUTOF10: 0 - NO PAIN

## 2024-09-18 NOTE — NURSING NOTE
Pt c/o 5/10 crushing/radiating chest pain across mid chest. MD notfied. Continuing to moniotr. Telemetry shows NSR on monitor.

## 2024-09-18 NOTE — PROGRESS NOTES
Anitha Welch is a 84 y.o. female on day 4 of admission presenting with CHF (congestive heart failure), NYHA class II, acute, diastolic (Multi).      Subjective   Patient was seen and examined at bedside. Patient has mild chest discomfort his morning that immediately resolved. EKG with no acute findings     Objective     Last Recorded Vitals  /67 (BP Location: Right arm, Patient Position: Sitting)   Pulse 82   Temp 36.7 °C (98.1 °F) (Temporal)   Resp 18   Wt 67 kg (147 lb 11.3 oz)   SpO2 97%   Intake/Output last 3 Shifts:    Intake/Output Summary (Last 24 hours) at 9/18/2024 1438  Last data filed at 9/18/2024 1100  Gross per 24 hour   Intake 220 ml   Output 1900 ml   Net -1680 ml       Admission Weight  Weight: 61.2 kg (135 lb) (09/14/24 1333)    Daily Weight  09/18/24 : 67 kg (147 lb 11.3 oz)    Image Results  ECG 12 lead  Normal sinus rhythm with sinus arrhythmia  Normal ECG  When compared with ECG of 09-SEP-2024 19:10,  Criteria for Septal infarct are no longer Present  Confirmed by Leticia Tan ([id number]) on 9/18/2024 9:09:41 AM  ECG 12 Lead  Sinus rhythm with Premature atrial complexes  Possible Left atrial enlargement  Left anterior fascicular block  Abnormal ECG  When compared with ECG of 14-SEP-2024 13:33, (unconfirmed)  Premature atrial complexes are now Present  Left anterior fascicular block is now Present  Nonspecific T wave abnormality no longer evident in Anterior leads      Physical Exam  General: lying in bed with no distress  HEENT: moist oral mucosa  Neck: No JVD  Lungs: distant breath sounds bilaterally, no wheezing, no rhonchi   Cardiac: regular rate and rhythm, S1 and S2 present, no rubs, no murmurs, no gallops   Abdomen: bowel sounds present, non tender, non distended  Ext: No cyanosis, no clubbing, no edema     Relevant Results  Scheduled medications  atorvastatin, 40 mg, oral, Nightly  budesonide, 0.5 mg, nebulization, BID  carvedilol, 3.125 mg, oral, BID  cefTRIAXone, 1 g,  intravenous, q24h  gabapentin, 100 mg, oral, Nightly  heparin, 5,000 Units, subcutaneous, q12h GARDENIA  insulin lispro, 0-5 Units, subcutaneous, TID  ipratropium-albuteroL, 3 mL, nebulization, TID  levothyroxine, 25 mcg, oral, Daily  oxygen, , inhalation, Continuous - Inhalation  pantoprazole, 40 mg, oral, BID  polyethylene glycol, 238 g, oral, Once  polyethylene glycol-electrolytes, 4,000 mL, oral, Once  torsemide, 20 mg, oral, Daily      Continuous medications     PRN medications  PRN medications: acetaminophen, dextrose, dextrose, docusate sodium, glucagon, glucagon, ipratropium-albuteroL, OLANZapine, ondansetron   Results for orders placed or performed during the hospital encounter of 09/14/24 (from the past 24 hour(s))   POCT GLUCOSE   Result Value Ref Range    POCT Glucose 218 (H) 74 - 99 mg/dL   POCT GLUCOSE   Result Value Ref Range    POCT Glucose 197 (H) 74 - 99 mg/dL   POCT GLUCOSE   Result Value Ref Range    POCT Glucose 183 (H) 74 - 99 mg/dL   CBC and Auto Differential   Result Value Ref Range    WBC 7.8 4.4 - 11.3 x10*3/uL    nRBC 0.0 0.0 - 0.0 /100 WBCs    RBC 3.24 (L) 4.00 - 5.20 x10*6/uL    Hemoglobin 8.2 (L) 12.0 - 16.0 g/dL    Hematocrit 27.9 (L) 36.0 - 46.0 %    MCV 86 80 - 100 fL    MCH 25.3 (L) 26.0 - 34.0 pg    MCHC 29.4 (L) 32.0 - 36.0 g/dL    RDW 18.7 (H) 11.5 - 14.5 %    Platelets 175 150 - 450 x10*3/uL    Neutrophils % 78.2 40.0 - 80.0 %    Immature Granulocytes %, Automated 2.2 (H) 0.0 - 0.9 %    Lymphocytes % 10.6 13.0 - 44.0 %    Monocytes % 8.8 2.0 - 10.0 %    Eosinophils % 0.1 0.0 - 6.0 %    Basophils % 0.1 0.0 - 2.0 %    Neutrophils Absolute 6.12 (H) 1.60 - 5.50 x10*3/uL    Immature Granulocytes Absolute, Automated 0.17 0.00 - 0.50 x10*3/uL    Lymphocytes Absolute 0.83 0.80 - 3.00 x10*3/uL    Monocytes Absolute 0.69 0.05 - 0.80 x10*3/uL    Eosinophils Absolute 0.01 0.00 - 0.40 x10*3/uL    Basophils Absolute 0.01 0.00 - 0.10 x10*3/uL   Renal Function Panel   Result Value Ref Range     Glucose 100 (H) 74 - 99 mg/dL    Sodium 144 136 - 145 mmol/L    Potassium 3.9 3.5 - 5.3 mmol/L    Chloride 102 98 - 107 mmol/L    Bicarbonate 37 (H) 21 - 32 mmol/L    Anion Gap 9 (L) 10 - 20 mmol/L    Urea Nitrogen 37 (H) 6 - 23 mg/dL    Creatinine 1.13 (H) 0.50 - 1.05 mg/dL    eGFR 48 (L) >60 mL/min/1.73m*2    Calcium 8.3 (L) 8.6 - 10.3 mg/dL    Phosphorus 4.0 2.5 - 4.9 mg/dL    Albumin 3.1 (L) 3.4 - 5.0 g/dL   Magnesium   Result Value Ref Range    Magnesium 2.02 1.60 - 2.40 mg/dL   POCT GLUCOSE   Result Value Ref Range    POCT Glucose 90 74 - 99 mg/dL   POCT GLUCOSE   Result Value Ref Range    POCT Glucose 104 (H) 74 - 99 mg/dL   ECG 12 Lead   Result Value Ref Range    Ventricular Rate 77 BPM    Atrial Rate 77 BPM    SC Interval 140 ms    QRS Duration 104 ms    QT Interval 406 ms    QTC Calculation(Bazett) 459 ms    P Axis 67 degrees    R Axis -47 degrees    T Axis 44 degrees    QRS Count 13 beats    Q Onset 208 ms    P Onset 138 ms    P Offset 201 ms    T Offset 411 ms    QTC Fredericia 441 ms   Troponin I, High Sensitivity   Result Value Ref Range    Troponin I, High Sensitivity 29 (H) 0 - 13 ng/L   POCT GLUCOSE   Result Value Ref Range    POCT Glucose 135 (H) 74 - 99 mg/dL      Assessment:   Anitha Welch is a 84 y.o. female with PMH of COPD on 2 liters oxygen, CAD s/p stent, Ischemic CMP, CVA with mild left sided weakness, LLE DVT on xarelto, and recent Mitral valve repair ARMANI on 4/25/24 who is presented for acute respiratory failure 2/2 to pulmonary edema was on high flow and now weaned down to 2 liters oxygen after diuretics      PLAN:  Acute on chronic respiratory failure 2/2 to pulmonary edema        1. CXR with evidence of fluid overload         2. She was weaned off high flow on 9/17/24  (Baseline oxygen is 2 liters)         3. Currently on torsemide 20 mg daily         4. Stable      2. Anemia with prior hx of GI bleed       1. GI will perform Colonoscopy on Thursday       2. Continue to hold xarelto        3. Clear liquid diet and bowel prep tonight per GI       4. NPO after midnight for colonoscopy      5. Patient has been placed on protonix 40 mg po BID while inpatient      3. Acute decompensated HF      1. Improved     2. Patient is on po torsemide now      3. Continue coreg 3.125 mg BID      4. Continue atorvastatin 40 mg po daily      4. Hx of LLE DVT       1. Xarelto on hold     5. Hx of COPD moderate obstructive disease      1. Stable     2. On duonebs TID     6. Hypothyroidism      1. Continue levothyroxine 25 mcg daily         7. H/o Mitral valve repair with ARMANI     1. On torsemide 20 mg daily       2. Continue coreg 3.125 mg bid and atorvastatin 40 mg daily       8. Hx of DVT's      1. Recurrent in 2013 and 2023      2. Xarelto on hold. Pending colonoscopy tomorrow     9. CKD stage III     1. Stable at baseline     10. Prophylaxis       1. Heparin 5000 units subcutaneous q 8 hours     11. Severe deconditioning         1. PT recs SNF      Disposition: Patient will remain inpatient for colonoscopy until Thursday. At discharge patient wants to return home    35 minutes spent coordinating the care of the patient      Lucretia Rocha DO

## 2024-09-18 NOTE — NURSING NOTE
Assumed pt care 0700. Pt was awake, lying in bed.pt has heath for acute retention. R to RN shift report revealed no significant events overnight. Care team inquiring about anemia. Pt did receive 1 U PRBCs for a Hgb of 7.0. latest Hgb 8.2 as of 0620. Possible EGD planned for today, pt on clear liq diet. Possible colonoscopy planned for tmr, potential prep tonight. Pt has no complaints of pain as of now. Call light and belongings within reach.

## 2024-09-18 NOTE — PROGRESS NOTES
Occupational Therapy    OT Treatment    Patient Name: Anitha Welch  MRN: [id number]  Department: [address]  Room: 10/10-A  Today's Date: 9/18/2024  Time Calculation  Start Time: 1139  Stop Time: 1203  Time Calculation (min): 24 min        Assessment:  OT Assessment: Patient tolerated session fairly well and progressing towards her goals. Patient continues to be limited with dizziness, decreased strength, decreased balance, decreased endurance, decreased safety, decreased cognition, will benefit from continued OT intervention and moderate intensity rehab is recommended.  Prognosis: Good  Barriers to Discharge: None  Evaluation/Treatment Tolerance: Patient limited by fatigue  Medical Staff Made Aware: Yes  End of Session Communication: Bedside nurse  End of Session Patient Position: Up in chair, Alarm off, caregiver present (PTA with patient at the end of the session.)  OT Assessment Results: Decreased ADL status, Decreased upper extremity strength, Decreased safe judgment during ADL, Decreased cognition, Decreased endurance, Decreased sensation, Decreased functional mobility  Prognosis: Good  Barriers to Discharge: None  Evaluation/Treatment Tolerance: Patient limited by fatigue  Medical Staff Made Aware: Yes  Strengths: Premorbid level of function, Support of Caregivers  Barriers to Participation: Comorbidities  Plan:  Treatment Interventions: ADL retraining, Functional transfer training, UE strengthening/ROM, Endurance training, Cognitive reorientation, Patient/family training, Equipment evaluation/education, Compensatory technique education  OT Frequency: 4 times per week  OT Discharge Recommendations: Moderate intensity level of continued care  Equipment Recommended upon Discharge: Wheeled walker  OT Recommended Transfer Status: Assist of 1  OT - OK to Discharge: Yes  Treatment Interventions: ADL retraining, Functional transfer training, UE strengthening/ROM, Endurance training, Cognitive reorientation,  Patient/family training, Equipment evaluation/education, Compensatory technique education    Subjective   Previous Visit Info:  OT Last Visit  OT Received On: 09/18/24  General:  General  Reason for Referral: OT F/U for CHF  Missed Visit: Yes  Missed Visit Reason: Other (Comment) (Attempted to see patient for OT, patient with new complains of chest pain per nurse, cancel OT this AM.)  Prior to Session Communication: Bedside nurse  Patient Position Received: Bed, 3 rail up, Alarm on  Preferred Learning Style: verbal  General Comment: Cleared by nurse to see patient for OT, patient cleared for chest pain issued per nurse with normal EKG. Patient agreeable to therapy.  Precautions:  Hearing/Visual Limitations: WFL, glasses for reading.  Medical Precautions: Fall precautions, Oxygen therapy device and L/min (4 liters of O2)  Precautions Comment: Patient with confusion, is a fall risk, recommend bed/chair alarm for safety.    Vital Signs (Past 2hrs)        Date/Time Vitals Session Patient Position Pulse Resp SpO2 BP MAP (mmHg)    09/18/24 1139 --  --  82  --  97 %  --  --     09/18/24 1213 --  --  --  --  --  147/67  90                   Vital Signs Comment: Patient with no SOB, no dizziness, no pain during the whole treatment session.     Pain:  Pain Assessment  Pain Assessment: 0-10  0-10 (Numeric) Pain Score: 0 - No pain    Objective    Cognition:  Cognition  Overall Cognitive Status: Impaired (for memory, safety, insight and judgement.)  Orientation Level: Disoriented to time, Disoriented to situation  Attention: Within Functional Limits  Memory: Exceptions to WFL  Long-Term Memory: Impaired  Short-Term Memory: Impaired  Problem Solving: Exceptions to WFL  Complex Functional Tasks: Impaired  Managing Finances: Impaired  Managing Medications: Impaired  Safety/Judgement: Exceptions to WFL  Complex Functional Tasks: Moderate  Insight: Moderate  Impulsive: Moderately  Coordination:  Movements are Fluid and Coordinated:  No  Upper Body Coordination: Slower rate of movements this date.  Activities of Daily Living:      Grooming  Grooming Level of Assistance: Setup, Distant supervision  Grooming Where Assessed: Chair  Grooming Comments: face, teeth hygiene this date    UE Bathing  UE Bathing Level of Assistance: Close supervision, Setup, Moderate verbal cues  UE Bathing Where Assessed: Other (Comment) (chair level)  UE Bathing Comments: Patient eaasily frustrated this date with multiple lines affecting her sponge bathing task this date.    LE Bathing  LE Bathing Level of Assistance: Minimum assistance  LE Bathing Where Assessed: Other (Comment) (siiting in the chair and standing walker level .)  LE Bathing Comments: minimal assist with increased time, moderate V/C and minimal assist for standing balance this date.    UE Dressing  UE Dressing Level of Assistance: Moderate assistance  UE Dressing Where Assessed: Chair  UE Dressing Comments: maximilian/dofff gown due to multiple lines this date.    LE Dressing  LE Dressing: Yes (Patient needed Close Supervision this date to maximilian/doff socks sitting in the chair this date.)    Toileting  Toileting Level of Assistance: Dependent  Toileting Comments: catheter  Functional Standing Tolerance:  Time: 2min  Activity: ADL  Functional Standing Tolerance Comments: minimal assist walker level for standing balance tasks.  Bed Mobility/Transfers: Bed Mobility  Bed Mobility: Yes (Patient needed Close Supervision with increased time, HOB rasied and use of bed rail to get to the EOB this date, mild dizziness reported, SPO2 97%.)    Transfers  Transfer: Yes (Patient needed minimal assist with the walker and minimal V/C for safe hand positioning to complete bed and chair transfer this date.)    Functional Mobility:  Functional Mobility  Functional Mobility Performed: Yes (Patient needed minimal assist with the walker to take 4-5 small steps with moderate V/C for safety, 1 LOB this date from bed-chair this date,  mild dizziness reported this date, SPO2 97%.)  Sitting Balance:  Static Sitting Balance  Static Sitting-Balance Support: Feet supported  Static Sitting-Level of Assistance: Close supervision  Static Sitting-Comment/Number of Minutes: Good, 2min  Dynamic Sitting Balance  Dynamic Sitting-Balance Support: Feet supported, No upper extremity supported  Dynamic Sitting-Level of Assistance: Close supervision  Dynamic Sitting-Balance: Reaching for objects  Dynamic Sitting-Comments: F+/G, 3-4min  Standing Balance:  Static Standing Balance  Static Standing-Balance Support: Bilateral upper extremity supported  Static Standing-Level of Assistance: Minimum assistance  Static Standing-Comment/Number of Minutes: 2-3min, F/F+  Dynamic Standing Balance  Dynamic Standing-Balance Support: Bilateral upper extremity supported  Dynamic Standing-Level of Assistance: Minimum assistance  Dynamic Standing-Balance: Reaching for objects, Forward lean, Lateral lean  Dynamic Standing-Comments: fair, 2-3min    Therapy/Activity: Therapeutic Activity  Therapeutic Activity Performed: Yes  Therapeutic Activity 1: Patient worked on static and dynamic sitting and standing balance tasks this date.    Other Activity:  Other Activity Performed: Yes (Patient was instructed in energy conservation techniques this date.)    Outcome Measures:[facility] Daily Activity  Putting on and taking off regular lower body clothing: A little  Bathing (including washing, rinsing, drying): A little  Putting on and taking off regular upper body clothing: A lot  Toileting, which includes using toilet, bedpan or urinal: Total  Taking care of personal grooming such as brushing teeth: A little  Eating Meals: A little  Daily Activity - Total Score: 15        Education Documentation  No documentation found.  Education Comments  No comments found.        OP EDUCATION:       Goals:  Encounter Problems       Encounter Problems (Active)       OT Goals       Patient will be able to  complete UB dressing/bathing, grooming with set up. (Progressing)       Start:  09/16/24    Expected End:  09/30/24            Patient will be able to complete LB dressing, bathing, toileting with Close Supervision with use of AE and use of energy conservation techniques. (Progressing)       Start:  09/16/24    Expected End:  09/30/24            Patient will be able to complete functional transfers/mobility with the walker with Close Supervision using good safety and with G balance.  (Progressing)       Start:  09/16/24    Expected End:  09/30/24            Patient will be able to tolerate 10 min of functional standing with G balance in prep for ADL/transfers. (Progressing)       Start:  09/16/24    Expected End:  09/30/24

## 2024-09-18 NOTE — CARE PLAN
Problem: Respiratory  Goal: Clear secretions with interventions this shift  Outcome: Progressing  Goal: Minimize anxiety/maximize coping throughout shift  Outcome: Progressing  Goal: Minimal/no exertional discomfort or dyspnea this shift  Outcome: Progressing  Goal: No signs of respiratory distress (eg. Use of accessory muscles. Peds grunting)  Outcome: Progressing  Goal: Patent airway maintained this shift  Outcome: Progressing  Goal: Tolerate pulmonary toileting this shift  Outcome: Progressing  Goal: Verbalize decreased shortness of breath this shift  Outcome: Progressing  Goal: Wean oxygen to maintain O2 saturation per order/standard this shift  Outcome: Progressing  Goal: Increase self care and/or family involvement in next 24 hours  Outcome: Progressing   The patient's goals for the shift include sleep    The clinical goals for the shift include pt will remain hemodynamically stable

## 2024-09-18 NOTE — PROGRESS NOTES
Occupational Therapy                 Therapy Communication Note    Patient Name: Anitha Welch  MRN: [id number]  Department: [address]  Room: 10/10-A  Today's Date: 9/18/2024     Discipline: Occupational Therapy    Missed Visit Reason: Missed Visit Reason: Other (Comment) (Attempted to see patient for OT, patient with new complains of chest pain per nurse, cancel OT this AM.)    Missed Time: Cancel    Comment:

## 2024-09-18 NOTE — PROGRESS NOTES
"Anitha Welch is a 84 y.o. female on day 4 of admission presenting with CHF (congestive heart failure), NYHA class II, acute, diastolic (Multi).    Subjective   Patient reports feeling tired. Denies black, tarry, bloody stools        Objective     Physical Exam  Constitutional:       Appearance: Normal appearance.   HENT:      Head: Normocephalic and atraumatic.      Mouth/Throat:      Mouth: Mucous membranes are moist.   Pulmonary:      Effort: Pulmonary effort is normal.   Abdominal:      General: There is no distension.      Palpations: Abdomen is soft.      Tenderness: There is no abdominal tenderness. There is no guarding.   Musculoskeletal:         General: Normal range of motion.      Cervical back: Normal range of motion.   Skin:     General: Skin is warm and dry.   Neurological:      General: No focal deficit present.      Mental Status: She is alert. Mental status is at baseline.   Psychiatric:         Mood and Affect: Mood normal.         Last Recorded Vitals  Blood pressure 141/66, pulse 71, temperature 36.6 °C (97.9 °F), temperature source Temporal, resp. rate 18, height 1.575 m (5' 2\"), weight 67 kg (147 lb 11.3 oz), SpO2 98%.  Intake/Output last 3 Shifts:  I/O last 3 completed shifts:  In: 220 (3.3 mL/kg) [P.O.:120; IV Piggyback:100]  Out: 2400 (35.8 mL/kg) [Urine:2400 (1 mL/kg/hr)]  Weight: 67 kg     Relevant Results               Results for orders placed or performed during the hospital encounter of 09/14/24 (from the past 24 hour(s))   POCT GLUCOSE   Result Value Ref Range    POCT Glucose 225 (H) 74 - 99 mg/dL   POCT GLUCOSE   Result Value Ref Range    POCT Glucose 218 (H) 74 - 99 mg/dL   POCT GLUCOSE   Result Value Ref Range    POCT Glucose 197 (H) 74 - 99 mg/dL   POCT GLUCOSE   Result Value Ref Range    POCT Glucose 183 (H) 74 - 99 mg/dL   CBC and Auto Differential   Result Value Ref Range    WBC 7.8 4.4 - 11.3 x10*3/uL    nRBC 0.0 0.0 - 0.0 /100 WBCs    RBC 3.24 (L) 4.00 - 5.20 x10*6/uL    " Hemoglobin 8.2 (L) 12.0 - 16.0 g/dL    Hematocrit 27.9 (L) 36.0 - 46.0 %    MCV 86 80 - 100 fL    MCH 25.3 (L) 26.0 - 34.0 pg    MCHC 29.4 (L) 32.0 - 36.0 g/dL    RDW 18.7 (H) 11.5 - 14.5 %    Platelets 175 150 - 450 x10*3/uL    Neutrophils % 78.2 40.0 - 80.0 %    Immature Granulocytes %, Automated 2.2 (H) 0.0 - 0.9 %    Lymphocytes % 10.6 13.0 - 44.0 %    Monocytes % 8.8 2.0 - 10.0 %    Eosinophils % 0.1 0.0 - 6.0 %    Basophils % 0.1 0.0 - 2.0 %    Neutrophils Absolute 6.12 (H) 1.60 - 5.50 x10*3/uL    Immature Granulocytes Absolute, Automated 0.17 0.00 - 0.50 x10*3/uL    Lymphocytes Absolute 0.83 0.80 - 3.00 x10*3/uL    Monocytes Absolute 0.69 0.05 - 0.80 x10*3/uL    Eosinophils Absolute 0.01 0.00 - 0.40 x10*3/uL    Basophils Absolute 0.01 0.00 - 0.10 x10*3/uL   Renal Function Panel   Result Value Ref Range    Glucose 100 (H) 74 - 99 mg/dL    Sodium 144 136 - 145 mmol/L    Potassium 3.9 3.5 - 5.3 mmol/L    Chloride 102 98 - 107 mmol/L    Bicarbonate 37 (H) 21 - 32 mmol/L    Anion Gap 9 (L) 10 - 20 mmol/L    Urea Nitrogen 37 (H) 6 - 23 mg/dL    Creatinine 1.13 (H) 0.50 - 1.05 mg/dL    eGFR 48 (L) >60 mL/min/1.73m*2    Calcium 8.3 (L) 8.6 - 10.3 mg/dL    Phosphorus 4.0 2.5 - 4.9 mg/dL    Albumin 3.1 (L) 3.4 - 5.0 g/dL   Magnesium   Result Value Ref Range    Magnesium 2.02 1.60 - 2.40 mg/dL   POCT GLUCOSE   Result Value Ref Range    POCT Glucose 90 74 - 99 mg/dL   POCT GLUCOSE   Result Value Ref Range    POCT Glucose 104 (H) 74 - 99 mg/dL   ECG 12 Lead   Result Value Ref Range    Ventricular Rate 77 BPM    Atrial Rate 77 BPM    TX Interval 140 ms    QRS Duration 104 ms    QT Interval 406 ms    QTC Calculation(Bazett) 459 ms    P Axis 67 degrees    R Axis -47 degrees    T Axis 44 degrees    QRS Count 13 beats    Q Onset 208 ms    P Onset 138 ms    P Offset 201 ms    T Offset 411 ms    QTC Fredericia 441 ms     Lower extremity venous duplex bilateral    Result Date: 9/16/2024           [facility] [id number]  [place] [id number]            Phone [phone]  Vascular Lab Report  [facility] US LOWER EXTREMITY VENOUS DUPLEX BILATERAL Patient Name:      RUDOLPH DÍAZ      Reading Physician:  [id number] Vijay Leon MD Study Date:        9/16/2024           Ordering Provider:  [id number] MRN/PID:           [id number]            Fellow: Accession#:        [id number]        Technologist:       Giovanna Castro RVT Date of Birth/Age: 1940 / 84 years Technologist 2: Gender:            F                   Encounter#:         [id number] Admission Status:  Inpatient           Location Performed: [facility]  Diagnosis/ICD: Localized (leg) edema-R60.0 CPT Codes:     26314 Peripheral venous duplex scan for DVT complete  CONCLUSIONS: Right Lower Venous: No evidence of acute deep vein thrombus visualized in the right lower extremity. Left Lower Venous: No evidence of acute deep vein thrombus visualized in the left lower extremity. Additional Findings: Technically difficult exam due to edema, patient's positioning, and pain.  Imaging & Doppler Findings:  Right                 Compressible Thrombus        Flow Distal External Iliac     Yes        None CFV                       Yes        None   Spontaneous/Phasic PFV                       Yes        None FV Proximal               Yes        None   Spontaneous/Phasic FV Mid                    Yes        None FV Distal                 Yes        None Popliteal                 Yes        None   Spontaneous/Phasic Peroneal                  Yes        None PTV                       Yes        None  Left                  Compress Thrombus        Flow Distal External Iliac   Yes      None CFV                     Yes      None   Spontaneous/Phasic PFV                     Yes      None FV Proximal             Yes      None   Spontaneous/Phasic FV Mid                  Yes      None FV Distal               Yes      None Popliteal               Yes      None    Spontaneous/Phasic Peroneal                Yes      None PTV                     Yes      None  [id number] Vijay Leon MD Electronically signed by [id number] Vijay Leon MD on 9/16/2024 at 5:53:37 PM  ** Final **     XR chest 1 view    Result Date: 9/16/2024  Interpreted By:  Shirley Vance, STUDY: XR CHEST 1 VIEW 9/16/2024 5:35 am   INDICATION: Signs/Symptoms:evaluate for inerstitial edema   COMPARISON: 09/15/2024   ACCESSION NUMBER(S): [id number]   ORDERING CLINICIAN: PREMA NICHOLAS   TECHNIQUE: Single AP view chest   FINDINGS: Cardiomediastinal silhouette is enlarged with moderate cardiomegaly demonstrated. There is moderate vascular calcification of the aortic knob. Generalized alveolar airspace infiltrate demonstrated in the right perihilar location as well as right lower lung zone. Findings are not significantly changed             1. Stable appearing alveolar airspace infiltrate in the right lung in particular right perihilar location and right lung base.   Signed by: Shirley Vance 9/16/2024 9:23 AM Dictation workstation:   [id number]    ECG 12 lead    Result Date: 9/16/2024  Normal sinus rhythm with sinus arrhythmia Normal ECG When compared with ECG of 09-SEP-2024 19:10, Criteria for Septal infarct are no longer Present    XR chest 1 view    Result Date: 9/15/2024  Interpreted By:  Indiana Perea, STUDY: XR CHEST 1 VIEW;  9/15/2024 4:38 pm   INDICATION: Signs/Symptoms:SOB.   COMPARISON: Chest 5:44 a.m.; CT chest dated 01/02/2013   ACCESSION NUMBER(S): [id number]   ORDERING CLINICIAN: PREMA NICHOLAS   FINDINGS: Cardiac silhouette may be enlarged. There are atherosclerotic changes of the aorta.   There is bilateral parenchymal infiltration.   There are surgical clips in the neck. This may be related to previous thyroid surgery.   The bones are not well seen. The patient appears scoliotic.   COMPARISON OF FINDING: The chest is similar.       Persistent parenchymal infiltration.   MACRO: none   Signed by: Indiana  Eliazar 9/15/2024 4:56 PM Dictation workstation:   [id number]    XR chest 1 view    Result Date: 9/15/2024  Interpreted By:  Mattie Burr, STUDY: XR CHEST 1 VIEW;  9/15/2024 5:50 am   INDICATION: Signs/Symptoms:chf.     COMPARISON: 09/14/2024   ACCESSION NUMBER(S): [id number]   ORDERING CLINICIAN: [address]   FINDINGS: AP radiograph of the chest was provided.       CARDIOMEDIASTINAL SILHOUETTE: Cardiomediastinal silhouette is stable in size and configuration. Enlarged with atherosclerotic calcifications of the aortic arch.   LUNGS: Diffuse bilateral airspace opacities, more pronounced in the right upper lobe.. Possible small left pleural effusion. No sizable pneumothorax.   ABDOMEN: No remarkable upper abdominal findings.   BONES: Degenerative changes with demineralization. Surgical clips over the lower spine.       1.  Diffuse interstitial opacities could be related to pulmonary edema with enlarged cardiac silhouette. Slightly more confluent in the right upper lung field. Possible small left pleural effusion. Superimposed infection not excluded.       MACRO: None   Signed by: Mattie Burr 9/15/2024 7:25 AM Dictation workstation:   [id number]    XR chest 1 view    Result Date: 9/14/2024  Interpreted By:  Deuce Bill, STUDY: Chest dated  9/14/2024.   INDICATION: Signs/Symptoms:sob   COMPARISON: Chest dated 09/09/2024.   ACCESSION NUMBER(S): [id number]   ORDERING CLINICIAN: NESTOR CERVANTES   TECHNIQUE: One view of the chest.   FINDINGS: There is diffuse prominence of the pulmonary interstitium and franny. No pneumothorax is evident. The cardiomediastinal silhouette is enlarged but similar to the prior exam.Degenerative change is seen of the spine and shoulders.Surgical changes are seen over the neck.       Findings suggestive of a degree of pulmonary edema/CHF.   MACRO: None   Signed by: Deuce Bill 9/14/2024 3:18 PM Dictation workstation:   [id number]    ECG 12 Lead    Result Date:  9/11/2024  Normal sinus rhythm Incomplete right bundle branch block Septal infarct (cited on or before 09-SEP-2024) Abnormal ECG When compared with ECG of 08-AUG-2024 12:42, Questionable change in initial forces of Septal leads Confirmed by Tuan Foss ([id number]) on 9/11/2024 3:49:00 PM    NM Lung perfusion with spect    Result Date: 9/9/2024  Interpreted By:  Silver Virk, STUDY: NM LUNG PERFUSION WITH SPECT;  9/9/2024 8:54 pm   INDICATION: Signs/Symptoms:r/o PE, contrast allergy.   COMPARISON: Chest x-ray from 9/9/2024   ACCESSION NUMBER(S): [id number]   ORDERING CLINICIAN: KEESHA MOTTA   TECHNIQUE: Multiple perfusion images of the lungs were acquired after the intravenous administration of 4.1 mCi of Tc-99m macroaggregated albumin (MAA). In addition, SPECT of the chest was performed.   FINDINGS: Limited examination with no ventilation images and lack of SPECT/CT images. There is perfusion defect in the posterior lateral right lower lung.       Perfusion defect in the posterior lateral right lower lung and indeterminate probability of pulmonary embolism.       Signed by: Silver Virk 9/9/2024 10:09 PM Dictation workstation:   [id number]    XR chest 2 views    Result Date: 9/9/2024  Interpreted By:  Carlitos Gibson, STUDY: XR CHEST 2 VIEWS;  9/9/2024 7:50 pm   INDICATION: Signs/Symptoms:sob.     COMPARISON: 08/09/2024   ACCESSION NUMBER(S): [id number]   ORDERING CLINICIAN: KEESHA MOTTA   FINDINGS:     Cardiomegaly. Atherosclerotic aorta. The pulmonary vasculature is congested centrally and indistinct peripherally, with interlobular septal thickening and indistinct mid to lower lung consolidative opacities consistent with pulmonary edema. Overall, degree of edema is less pronounced compared to 08/09/2024. No greater than trace pleural effusions are seen.       Diffuse pulmonary edema, less severe when compared to 08/09/2024.     MACRO: None.   Signed by: Carlitos Gibson 9/9/2024 8:14 PM Dictation workstation:    [id number]                  Assessment/Plan   Assessment & Plan  CHF (congestive heart failure), NYHA class II, acute, diastolic (Multi)    Anemia    Acute respiratory failure (Multi)    DVT (deep venous thrombosis) (Multi)    Anemia/Anticoagulation   Normocytic anemia hgb 8.1. No obvious s/s of bleeding. On Xarelto  Hemoglobin has dropped from 9.4-7.2 in the last month. Recent EGD unremarkable. Planned outpatient colonoscopy, however with drop in H/H we can complete as inpatient.   - Clear liquid diet   - Bowel prep  - NPO after midnight    - Monitor  CBC, CMP, INR,  - Transfuse to keep hgb >7  - Protonix 40 mg IV BID  - Hold  Xarelto    9/18  Given anemia and need for anticoagulation (would need colonoscopy in hospital anyway with chronic 02 requirement), will plan colonoscopy tomorrow. Recent EGD normal   -Clear liquid plus prep   -NPO after midnight         I spent 20 minutes in the professional and overall care of this patient.      Sharon Urbina, APRN-CNP

## 2024-09-18 NOTE — PROGRESS NOTES
Physical Therapy    Physical Therapy Treatment    Patient Name: Anitha Welch  MRN: [id number]  Department: [address]  Room: 10/10-  Today's Date: 9/18/2024  Time Calculation  Start Time: 1205  Stop Time: 1215  Time Calculation (min): 10 min         Assessment/Plan   PT Assessment  PT Assessment Results: Decreased strength, Decreased endurance, Impaired balance, Decreased mobility, Decreased safety awareness  Rehab Prognosis: Good  Evaluation/Treatment Tolerance: Patient limited by fatigue  Strengths: Support of Caregivers, Premorbid level of function  Barriers to Participation: Comorbidities  End of Session Communication: Bedside nurse  Assessment Comment: 84 year old female presents with decline from baseline functional mobility, impaired balance, decreased tolerance to activity, and decreased safety awareness;  patient would benefit from skilled physical therapy services to safely maximize functional mobility to modified independent levels.  End of Session Patient Position: Up in chair, Alarm on  PT Plan  Inpatient/Swing Bed or Outpatient: Inpatient  PT Plan  Treatment/Interventions: Bed mobility, Transfer training, Gait training, Balance training, Endurance training, Therapeutic exercise, Therapeutic activity  PT Plan: Ongoing PT  PT Frequency: 4 times per week  PT Discharge Recommendations: Moderate intensity level of continued care  Equipment Recommended upon Discharge: Wheeled walker  PT Recommended Transfer Status: Assist x1  PT - OK to Discharge: Yes      General Visit Information:   PT  Visit  PT Received On: 09/18/24  Response to Previous Treatment: Patient with no complaints from previous session.  General  Reason for Referral: Impaired mobility  Referred By: Dr Rodriguez  Past Medical History Relevant to Rehab: DOPD, DVT, GI bleed, AAA, CKD, chronic resp failure, home O2 at 2 liters, CHF, CVA, mitral regurg and repair, UTI, anemia, anxiety, depression, arthritis, CAD, diverticulosis, right THR, left TKR,  cardiac stent, choley, MI, spinal stenosis, sciatica, RLS, plantar fasciitis, osteopenia, OA, epistaxis, GERD, DDD, hyperlipid, HTN, hypothyroid, ischemic cardiomyopathy,  Missed Visit: No  Family/Caregiver Present: No  Prior to Session Communication: Bedside nurse  Patient Position Received: Up in chair, Alarm on  Preferred Learning Style: verbal  General Comment: Cleared by RN, NAD, agreeable to PT    Subjective   Precautions:  Precautions  Hearing/Visual Limitations: WFL, glasses for reading.  Medical Precautions: Fall precautions, Oxygen therapy device and L/min  Precautions Comment: telemetry, continuous pulse-ox, heath    Vital Signs (Past 2hrs)        Date/Time Vitals Session Patient Position Pulse Resp SpO2 BP MAP (mmHg)    09/18/24 1213 --  --  --  --  --  147/67  90                         Objective   Pain:  Pain Assessment  0-10 (Numeric) Pain Score: 0 - No pain  Cognition:  Cognition  Overall Cognitive Status: Impaired  Orientation Level: Disoriented to time  Insight: Moderate  Impulsive: Moderately  Task Initiation: Initiates with cues  Processing Speed: Delayed  Coordination:     Postural Control:  Static Sitting Balance  Static Sitting-Balance Support: Feet supported  Static Sitting-Level of Assistance: Close supervision  Static Sitting-Comment/Number of Minutes: Good  Dynamic Sitting Balance  Dynamic Sitting-Balance Support: Feet supported  Dynamic Sitting-Level of Assistance: Close supervision  Dynamic Sitting-Balance: Forward lean  Dynamic Sitting-Comments: Good-  Static Standing Balance  Static Standing-Balance Support: Bilateral upper extremity supported  Static Standing-Level of Assistance: Contact guard  Static Standing-Comment/Number of Minutes: +RW: Fair+  Dynamic Standing Balance  Dynamic Standing-Balance Support: Bilateral upper extremity supported  Dynamic Standing-Level of Assistance: Contact guard  Dynamic Standing-Comments: +RW: Fair+    Activity Tolerance:  Activity  Tolerance  Endurance: Decreased tolerance for upright activites  Activity Tolerance Comments: Fatigues easily  Rate of Perceived Exertion (RPE): 4  Treatments:  Therapeutic Exercise  Therapeutic Exercise Performed: Yes  Therapeutic Exercise Activity 1: Standing march in place ~70 seconds to fatigue  Therapeutic Exercise Activity 2: Standing toe raises x20  Therapeutic Exercise Activity 3: +RW and contact guard    Therapeutic Activity  Therapeutic Activity Performed: Yes  Therapeutic Activity 1: Functional transfers, standing activity tolerance, education.    Transfers  Transfer: Yes  Transfer 1  Transfer From 1: Chair with arms to  Transfer to 1: Chair with arms  Technique 1: Sit to stand, Stand to sit  Transfer Device 1: Walker  Transfer Level of Assistance 1: Minimum assistance  Trials/Comments 1: Verbal cueing for technique and safety    Outcome Measures:  [facility] Basic Mobility  Turning from your back to your side while in a flat bed without using bedrails: A little  Moving from lying on your back to sitting on the side of a flat bed without using bedrails: A little  Moving to and from bed to chair (including a wheelchair): A little  Standing up from a chair using your arms (e.g. wheelchair or bedside chair): A little  To walk in hospital room: A lot  Climbing 3-5 steps with railing: Total  Basic Mobility - Total Score: 15    Education Documentation  No documentation found.  Education Comments  No comments found.        OP EDUCATION:  Outpatient Education  Individual(s) Educated: Patient  Education Provided: Body Mechanics, Fall Risk (Importance of early mobility)  Patient Response to Education: Patient/Caregiver Verbalized Understanding of Information  Education Comment: Needs reinforcement    Encounter Problems       Encounter Problems (Active)       Mobility       Bed mobility including supine to sit and sit to supine with supervision. (Progressing)       Start:  09/16/24    Expected End:  09/30/24             Ambulate 50 feet with rolling walker and supervision. (Progressing)       Start:  09/16/24    Expected End:  09/30/24            Negotiate 3 steps with single handrail +/- cane and mod assist. (Not Progressing)       Start:  09/16/24    Expected End:  09/30/24               PT Transfers       Transfers including sit to stand and stand to sit with supervision. (Progressing)       Start:  09/16/24    Expected End:  09/30/24

## 2024-09-19 ENCOUNTER — APPOINTMENT (OUTPATIENT)
Dept: GASTROENTEROLOGY | Facility: HOSPITAL | Age: 84
DRG: 291 | End: 2024-09-19
Payer: MEDICARE

## 2024-09-19 ENCOUNTER — APPOINTMENT (OUTPATIENT)
Dept: VASCULAR MEDICINE | Facility: CLINIC | Age: 84
End: 2024-09-19
Payer: MEDICARE

## 2024-09-19 ENCOUNTER — DOCUMENTATION (OUTPATIENT)
Dept: HOME HEALTH SERVICES | Facility: HOME HEALTH | Age: 84
End: 2024-09-19

## 2024-09-19 ENCOUNTER — ANESTHESIA (OUTPATIENT)
Dept: GASTROENTEROLOGY | Facility: HOSPITAL | Age: 84
End: 2024-09-19
Payer: MEDICARE

## 2024-09-19 ENCOUNTER — APPOINTMENT (OUTPATIENT)
Dept: CARDIOLOGY | Facility: CLINIC | Age: 84
End: 2024-09-19
Payer: MEDICARE

## 2024-09-19 ENCOUNTER — ANESTHESIA EVENT (OUTPATIENT)
Dept: GASTROENTEROLOGY | Facility: HOSPITAL | Age: 84
End: 2024-09-19
Payer: MEDICARE

## 2024-09-19 VITALS
DIASTOLIC BLOOD PRESSURE: 60 MMHG | OXYGEN SATURATION: 95 % | RESPIRATION RATE: 19 BRPM | HEIGHT: 62 IN | WEIGHT: 149.25 LBS | BODY MASS INDEX: 27.47 KG/M2 | TEMPERATURE: 98.4 F | HEART RATE: 92 BPM | SYSTOLIC BLOOD PRESSURE: 113 MMHG

## 2024-09-19 LAB
ALBUMIN SERPL BCP-MCNC: 3.1 G/DL (ref 3.4–5)
ANION GAP SERPL CALCULATED.3IONS-SCNC: 9 MMOL/L (ref 10–20)
BASOPHILS # BLD AUTO: 0.01 X10*3/UL (ref 0–0.1)
BASOPHILS NFR BLD AUTO: 0.1 %
BUN SERPL-MCNC: 28 MG/DL (ref 6–23)
CALCIUM SERPL-MCNC: 8.2 MG/DL (ref 8.6–10.3)
CHLORIDE SERPL-SCNC: 101 MMOL/L (ref 98–107)
CO2 SERPL-SCNC: 42 MMOL/L (ref 21–32)
CREAT SERPL-MCNC: 1.08 MG/DL (ref 0.5–1.05)
EGFRCR SERPLBLD CKD-EPI 2021: 50 ML/MIN/1.73M*2
EOSINOPHIL # BLD AUTO: 0.02 X10*3/UL (ref 0–0.4)
EOSINOPHIL NFR BLD AUTO: 0.3 %
ERYTHROCYTE [DISTWIDTH] IN BLOOD BY AUTOMATED COUNT: 18.6 % (ref 11.5–14.5)
GLUCOSE BLD MANUAL STRIP-MCNC: 101 MG/DL (ref 74–99)
GLUCOSE BLD MANUAL STRIP-MCNC: 99 MG/DL (ref 74–99)
GLUCOSE SERPL-MCNC: 87 MG/DL (ref 74–99)
HCT VFR BLD AUTO: 30.1 % (ref 36–46)
HGB BLD-MCNC: 8.9 G/DL (ref 12–16)
IMM GRANULOCYTES # BLD AUTO: 0.32 X10*3/UL (ref 0–0.5)
IMM GRANULOCYTES NFR BLD AUTO: 4.1 % (ref 0–0.9)
LYMPHOCYTES # BLD AUTO: 1.12 X10*3/UL (ref 0.8–3)
LYMPHOCYTES NFR BLD AUTO: 14.2 %
MAGNESIUM SERPL-MCNC: 1.96 MG/DL (ref 1.6–2.4)
MCH RBC QN AUTO: 25.7 PG (ref 26–34)
MCHC RBC AUTO-ENTMCNC: 29.6 G/DL (ref 32–36)
MCV RBC AUTO: 87 FL (ref 80–100)
MONOCYTES # BLD AUTO: 0.74 X10*3/UL (ref 0.05–0.8)
MONOCYTES NFR BLD AUTO: 9.4 %
NEUTROPHILS # BLD AUTO: 5.67 X10*3/UL (ref 1.6–5.5)
NEUTROPHILS NFR BLD AUTO: 71.9 %
NRBC BLD-RTO: 0 /100 WBCS (ref 0–0)
PHOSPHATE SERPL-MCNC: 3.6 MG/DL (ref 2.5–4.9)
PLATELET # BLD AUTO: 192 X10*3/UL (ref 150–450)
POTASSIUM SERPL-SCNC: 3.6 MMOL/L (ref 3.5–5.3)
RBC # BLD AUTO: 3.46 X10*6/UL (ref 4–5.2)
SODIUM SERPL-SCNC: 148 MMOL/L (ref 136–145)
WBC # BLD AUTO: 7.9 X10*3/UL (ref 4.4–11.3)

## 2024-09-19 PROCEDURE — 2500000004 HC RX 250 GENERAL PHARMACY W/ HCPCS (ALT 636 FOR OP/ED): Performed by: INTERNAL MEDICINE

## 2024-09-19 PROCEDURE — 7100000001 HC RECOVERY ROOM TIME - INITIAL BASE CHARGE

## 2024-09-19 PROCEDURE — 82947 ASSAY GLUCOSE BLOOD QUANT: CPT

## 2024-09-19 PROCEDURE — 85025 COMPLETE CBC W/AUTO DIFF WBC: CPT | Performed by: INTERNAL MEDICINE

## 2024-09-19 PROCEDURE — 7100000002 HC RECOVERY ROOM TIME - EACH INCREMENTAL 1 MINUTE

## 2024-09-19 PROCEDURE — 80069 RENAL FUNCTION PANEL: CPT | Performed by: INTERNAL MEDICINE

## 2024-09-19 PROCEDURE — 2500000005 HC RX 250 GENERAL PHARMACY W/O HCPCS: Performed by: INTERNAL MEDICINE

## 2024-09-19 PROCEDURE — 94640 AIRWAY INHALATION TREATMENT: CPT

## 2024-09-19 PROCEDURE — 9420000001 HC RT PATIENT EDUCATION 5 MIN

## 2024-09-19 PROCEDURE — 2500000001 HC RX 250 WO HCPCS SELF ADMINISTERED DRUGS (ALT 637 FOR MEDICARE OP): Performed by: INTERNAL MEDICINE

## 2024-09-19 PROCEDURE — 45385 COLONOSCOPY W/LESION REMOVAL: CPT | Performed by: INTERNAL MEDICINE

## 2024-09-19 PROCEDURE — 3700000001 HC GENERAL ANESTHESIA TIME - INITIAL BASE CHARGE

## 2024-09-19 PROCEDURE — 36415 COLL VENOUS BLD VENIPUNCTURE: CPT | Performed by: INTERNAL MEDICINE

## 2024-09-19 PROCEDURE — 2500000002 HC RX 250 W HCPCS SELF ADMINISTERED DRUGS (ALT 637 FOR MEDICARE OP, ALT 636 FOR OP/ED): Performed by: INTERNAL MEDICINE

## 2024-09-19 PROCEDURE — 99239 HOSP IP/OBS DSCHRG MGMT >30: CPT | Performed by: INTERNAL MEDICINE

## 2024-09-19 PROCEDURE — 2500000004 HC RX 250 GENERAL PHARMACY W/ HCPCS (ALT 636 FOR OP/ED): Performed by: ANESTHESIOLOGIST ASSISTANT

## 2024-09-19 PROCEDURE — 88305 TISSUE EXAM BY PATHOLOGIST: CPT | Mod: TC | Performed by: INTERNAL MEDICINE

## 2024-09-19 PROCEDURE — 0DBK8ZX EXCISION OF ASCENDING COLON, VIA NATURAL OR ARTIFICIAL OPENING ENDOSCOPIC, DIAGNOSTIC: ICD-10-PCS | Performed by: INTERNAL MEDICINE

## 2024-09-19 PROCEDURE — 3700000002 HC GENERAL ANESTHESIA TIME - EACH INCREMENTAL 1 MINUTE

## 2024-09-19 PROCEDURE — 0DBH8ZX EXCISION OF CECUM, VIA NATURAL OR ARTIFICIAL OPENING ENDOSCOPIC, DIAGNOSTIC: ICD-10-PCS | Performed by: INTERNAL MEDICINE

## 2024-09-19 PROCEDURE — 83735 ASSAY OF MAGNESIUM: CPT | Performed by: INTERNAL MEDICINE

## 2024-09-19 RX ORDER — ONDANSETRON HYDROCHLORIDE 2 MG/ML
4 INJECTION, SOLUTION INTRAVENOUS ONCE AS NEEDED
Status: DISCONTINUED | OUTPATIENT
Start: 2024-09-19 | End: 2024-09-19 | Stop reason: HOSPADM

## 2024-09-19 RX ORDER — SODIUM CHLORIDE 9 MG/ML
INJECTION, SOLUTION INTRAVENOUS CONTINUOUS PRN
Status: DISCONTINUED | OUTPATIENT
Start: 2024-09-19 | End: 2024-09-19

## 2024-09-19 RX ORDER — PROPOFOL 10 MG/ML
INJECTION, EMULSION INTRAVENOUS AS NEEDED
Status: DISCONTINUED | OUTPATIENT
Start: 2024-09-19 | End: 2024-09-19

## 2024-09-19 RX ORDER — SODIUM CHLORIDE, SODIUM LACTATE, POTASSIUM CHLORIDE, CALCIUM CHLORIDE 600; 310; 30; 20 MG/100ML; MG/100ML; MG/100ML; MG/100ML
50 INJECTION, SOLUTION INTRAVENOUS CONTINUOUS
Status: DISCONTINUED | OUTPATIENT
Start: 2024-09-19 | End: 2024-09-19 | Stop reason: HOSPADM

## 2024-09-19 RX ORDER — ALBUTEROL SULFATE 0.83 MG/ML
2.5 SOLUTION RESPIRATORY (INHALATION) ONCE AS NEEDED
Status: DISCONTINUED | OUTPATIENT
Start: 2024-09-19 | End: 2024-09-19 | Stop reason: HOSPADM

## 2024-09-19 SDOH — HEALTH STABILITY: MENTAL HEALTH: CURRENT SMOKER: 0

## 2024-09-19 ASSESSMENT — PAIN SCALES - GENERAL
PAINLEVEL_OUTOF10: 0 - NO PAIN
PAIN_LEVEL: 0

## 2024-09-19 ASSESSMENT — PAIN - FUNCTIONAL ASSESSMENT
PAIN_FUNCTIONAL_ASSESSMENT: 0-10

## 2024-09-19 NOTE — PROGRESS NOTES
09/19/24 1319   Discharge Planning   Expected Discharge Disposition Home Health  (24-48 HOURS KARYN)     **Patient has a  safe discharge plan**

## 2024-09-19 NOTE — ANESTHESIA POSTPROCEDURE EVALUATION
Patient: Anitha Welch    Procedure Summary       Date: 09/19/24 Room / Location: [facility]    Anesthesia Start: 0843 Anesthesia Stop: 0936    Procedure: COLONOSCOPY Diagnosis: Anemia, unspecified type    Scheduled Providers: Rai Xie MD; Cara Orr MD; VICENTE Uriarte Responsible Provider: Cara Orr MD    Anesthesia Type: MAC ASA Status: 4            Anesthesia Type: MAC    Vitals Value Taken Time   /64 09/19/24 0941   Temp 36 °C (96.8 °F) 09/19/24 0935   Pulse 84 09/19/24 0944   Resp 24 09/19/24 0944   SpO2 100 % 09/19/24 0944   Vitals shown include unfiled device data.    Anesthesia Post Evaluation    Patient location during evaluation: PACU  Patient participation: complete - patient participated  Level of consciousness: awake and alert  Pain score: 0  Pain management: adequate  Multimodal analgesia pain management approach  Airway patency: patent  Two or more strategies used to mitigate risk of obstructive sleep apnea  Cardiovascular status: acceptable  Respiratory status: acceptable  Hydration status: acceptable  Postoperative Nausea and Vomiting: none    No notable events documented.

## 2024-09-19 NOTE — PROGRESS NOTES
Occupational Therapy                 Therapy Communication Note    Patient Name: Anitha Welch  MRN: [id number]  Department: [address]  Room: 10/10-A  Today's Date: 9/19/2024     Discipline: Occupational Therapy    Missed Visit Reason: Missed Visit Reason: Patient in a medical procedure (Pt currently off floor for colonoscopy, no OT tx completed at this time.)    Missed Time: Attempt at 0909    Comment:

## 2024-09-19 NOTE — HH CARE COORDINATION
Home Care received a Referral to Resume Care for Nursing, Physical Therapy, Occupational Therapy, and Home Health Aide. We have processed the referral for a Resumption of Care on 24-48 HOURS .     If you have any questions or concerns, please feel free to contact us at [phone]. Follow the prompts, enter your five digit zip code, and you will be directed to your care team on [facility].

## 2024-09-19 NOTE — DISCHARGE SUMMARY
Discharge Diagnosis  CHF (congestive heart failure), NYHA class II, acute, diastolic (Multi)    Issues Requiring Follow-Up  PCP follow up to monitor Fluid status and BMP in a week.     Discharge Meds     Medication List      CHANGE how you take these medications     nystatin 100,000 unit/gram powder; Commonly known as: Mycostatin; Apply   1 Application topically 2 times a day.; What changed: Another medication   with the same name was removed. Continue taking this medication, and   follow the directions you see here.     CONTINUE taking these medications     albuterol 90 mcg/actuation inhaler   atorvastatin 40 mg tablet; Commonly known as: Lipitor; TAKE 1 TABLET BY   MOUTH EVERY DAY AT BEDTIME   carvedilol 3.125 mg tablet; Commonly known as: Coreg; TAKE 1 TABLET   (3.125 MG) BY MOUTH 2 TIMES A DAY.   diclofenac sodium 1 % gel; Commonly known as: Voltaren; Apply 4.5 inches   (4 g) topically 4 times a day.   docusate sodium 100 mg capsule; Commonly known as: Colace; Take 1   capsule (100 mg) by mouth 2 times a day as needed for constipation.   gabapentin 100 mg capsule; Commonly known as: Neurontin; Take 1 capsule   (100 mg) by mouth once daily at bedtime.   levothyroxine 25 mcg tablet; Commonly known as: Synthroid, Levoxyl; Take   1 tablet (25 mcg) by mouth early in the morning.. Take on an empty stomach   at the same time each day, either 30 to 60 minutes prior to breakfast   lidocaine 5 % cream cream; Commonly known as: Hemorrhoidal Relief; Apply   1 Application topically every 6 hours if needed (hemorrhoids).   loratadine 10 mg tablet; Commonly known as: Claritin   Multi Complete with Iron  mg-mcg tablet; Generic drug:   multivitamin with minerals   nitroglycerin 0.4 mg SL tablet; Commonly known as: Nitrostat   oxygen gas therapy; Commonly known as: O2   pantoprazole 40 mg EC tablet; Commonly known as: ProtoNix; Take 1 tablet   (40 mg) by mouth 2 times a day.   polyethylene glycol 17 gram/dose powder;  Commonly known as: Glycolax,   Miralax; mix 17 grams (1 capful) in 8 ounces of water and drink daily   rivaroxaban 20 mg tablet; Commonly known as: Xarelto; Take 1 tablet (20   mg) by mouth once daily in the evening. Take with meals. Take with food.   tiZANidine 2 mg tablet; Commonly known as: Zanaflex; TAKE 1 TABLET BY   MOUTH THREE TIMES A DAY AS NEEDED   torsemide 20 mg tablet; Commonly known as: Demadex; Take 1 tablet (20   mg) by mouth once daily. Do not fill before May 2, 2024.   Trelegy Ellipta 100-62.5-25 mcg blister with device; Generic drug:   fluticasone-umeclidin-vilanter; Inhale 1 puff once daily.     STOP taking these medications     predniSONE 20 mg tablet; Commonly known as: Deltasone       Test Results Pending At Discharge  Pending Labs       Order Current Status    Surgical Pathology Exam In process            Hospital Course    Anitha Welch is a 84 y.o. female with PMH of COPD/CHF on 2 liters oxygen, CAD s/p stent, Ischemic CMP, CVA with mild left sided weakness, LLE DVT on xarelto, and recent Mitral valve repair ARMANI on 4/25/24 who was presented for acute respiratory failure 2/2 to pulmonary edema. She was treated with iv diuretics with improvement and now weaned down to 2 liters oxygen. She was seen by PT.     Issues addressed during the stay:         Acute on chronic respiratory failure 2/2 to pulmonary edema  Admission CXR with evidence of fluid overload   Treated with IV diuretics with improvement,    Weaned off to baseline oxygen is 2 liters.    Continue on home dose torsemide 20 mg daily        Anemia with prior hx of GI bleed   S/p colonoscopy today, results pending.   Continue protonix.       Acute decompensated HF   Improved  Continue home dose of Torsemide 20 mg daily.  Continue coreg 3.125 mg BID   Continue atorvastatin 40 mg po daily      Hx of LLE DVT   Continue Xarelto.      Hx of COPD moderate obstructive disease   Stable  Continue home meds.      Hypothyroidism   Continue  levothyroxine 25 mcg daily         H/o Mitral valve repair with ARMANI   torsemide 20 mg daily   Continue coreg 3.125 mg bid and atorvastatin 40 mg daily       Hx of DVT's  Recurrent in 2013 and 2023  Continue Xarelto.      CKD stage III  Stable at baseline      PT recommended moderate intensity.   She wants to go home with [facility].    Discharged home with [facility] in a stable condition.  Discharge day management time > 30 minutes.       Pertinent Physical Exam At Time of Discharge:  Vitals:    09/19/24 1214   BP: 113/60   Pulse: 92   Resp: 19   Temp: 36.9 °C (98.4 °F)   SpO2: 95%       Physical Exam  Constitutional:       Comments: Elderly lady,  Comfortable on 2 liters oxygen at rest,      HENT:      Head: Normocephalic.      Nose: Nose normal.      Mouth/Throat:      Mouth: Mucous membranes are moist.   Eyes:      Extraocular Movements: Extraocular movements intact.      Pupils: Pupils are equal, round, and reactive to light.   Cardiovascular:      Rate and Rhythm: Normal rate and regular rhythm.      Heart sounds: Normal heart sounds. No murmur heard.  Pulmonary:      Effort: No respiratory distress.      Breath sounds: Normal breath sounds. No wheezing.   Abdominal:      General: There is no distension.      Palpations: Abdomen is soft.      Tenderness: There is no abdominal tenderness.   Musculoskeletal:         General: Normal range of motion.      Cervical back: Normal range of motion.      Comments: Bilateral trace ankle edema,    Skin:     General: Skin is warm.   Neurological:      General: No focal deficit present.      Mental Status: She is oriented to person, place, and time.   Psychiatric:         Mood and Affect: Mood normal.         Outpatient Follow-Up  Future Appointments   Date Time Provider Department Center   9/22/2024 To Be Determined Morena Agee RN [facility]   9/23/2024 To Be Determined Tami Davison, PT [facility]   9/25/2024 To Be Determined Seble Goetz OT [facility]   9/26/2024 11:20 AM  Gee Dupree MD [id number] [facility]   10/8/2024 10:30 AM Milana Hernadez, APRN-CNP [id number] [facility]   11/18/2024 10:00 AM YANET PAT ROOM 01 [facility]   11/25/2024  8:30 AM Edilberto Morse MD [id number] [facility]   4/25/2025  8:00 AM  MAC [id number] [id number] [facility]         Sal Cordova MD

## 2024-09-19 NOTE — PROGRESS NOTES
Pulmonary Progress Note    Anitha Welch is a 84 y.o. female on day 4 of admission presenting with CHF (congestive heart failure), NYHA class II, acute, diastolic (Multi).    Subjective   On HFNC  Objective   Vital Signs      9/18/2024     4:00 AM 9/18/2024     5:00 AM 9/18/2024     7:25 AM 9/18/2024    11:39 AM 9/18/2024    12:13 PM 9/18/2024     4:08 PM 9/18/2024     7:28 PM   Vitals   Systolic   141  147 143 149   Diastolic   66  67 67 73   Heart Rate 72 71  82   79   Temp   36.6 °C (97.9 °F)  36.7 °C (98.1 °F) 36.6 °C (97.9 °F) 36.6 °C (97.9 °F)   Resp 24 21 18 24       Oxygen Therapy  SpO2: 100 %  Medical Gas Therapy: Supplemental oxygen  Medical Gas Delivery Method: Nasal cannula (2L)    FiO2 (%):  [24 %-28 %] 28 %    Intake/Output previous 24 hours:    Intake/Output Summary (Last 24 hours) at 9/18/2024 2108  Last data filed at 9/18/2024 2058  Gross per 24 hour   Intake --   Output 1600 ml   Net -1600 ml       Physical Exam  Vitals reviewed.   Constitutional:       Appearance: Normal appearance.   HENT:      Head: Normocephalic and atraumatic.      Mouth/Throat:      Mouth: Mucous membranes are moist.   Eyes:      Extraocular Movements: Extraocular movements intact.      Pupils: Pupils are equal, round, and reactive to light.   Cardiovascular:      Rate and Rhythm: Normal rate and regular rhythm.   Pulmonary:      Effort: Pulmonary effort is normal.      Breath sounds: Normal breath sounds and air entry.   Abdominal:      General: Abdomen is flat. Bowel sounds are normal.      Palpations: Abdomen is soft.   Musculoskeletal:         General: Normal range of motion.      Cervical back: Normal range of motion and neck supple.   Skin:     General: Skin is warm and dry.   Neurological:      General: No focal deficit present.      Mental Status: She is alert and oriented to person, place, and time. Mental status is at baseline.       ...  Lines and Tubes:  Peripheral IV 09/15/24 22 G Right;Dorsal Wrist  (Active)   Placement Date/Time: 09/15/24 2102   Size (Gauge): 22 G  Orientation: Right;Dorsal  Location: Wrist  Site Prep: Alcohol   Number of days: 1       Peripheral IV 09/15/24 22 G Right;Anterior Forearm (Active)   Placement Date/Time: 09/15/24 2103   Size (Gauge): 22 G  Orientation: Right;Anterior  Location: Forearm  Site Prep: Alcohol   Number of days: 1       Urethral Catheter 16 Fr. (Active)   Placement Date/Time: 09/15/24 1500   Tube Size (Fr.): 16 Fr.  Catheter Balloon Size: 10 mL  Urine Returned: Yes   Number of days: 1         Scheduled medications  atorvastatin, 40 mg, oral, Nightly  budesonide, 0.5 mg, nebulization, BID  carvedilol, 3.125 mg, oral, BID  gabapentin, 100 mg, oral, Nightly  heparin, 5,000 Units, subcutaneous, q12h GARDENIA  insulin lispro, 0-5 Units, subcutaneous, TID  ipratropium-albuteroL, 3 mL, nebulization, TID  levothyroxine, 25 mcg, oral, Daily  oxygen, , inhalation, Continuous - Inhalation  pantoprazole, 40 mg, oral, BID  torsemide, 20 mg, oral, Daily      Continuous medications     PRN medications  PRN medications: acetaminophen, dextrose, dextrose, glucagon, glucagon, ipratropium-albuteroL, OLANZapine, ondansetron    Relevant Results  Results from last 7 days   Lab Units 09/18/24  0446 09/16/24  0452 09/15/24  0447   WBC AUTO x10*3/uL 7.8 11.3 15.2*   HEMOGLOBIN g/dL 8.2* 9.1* 8.1*   HEMATOCRIT % 27.9* 31.0* 25.9*   PLATELETS AUTO x10*3/uL 175 162 144*      Results from last 7 days   Lab Units 09/18/24  0447 09/16/24  0452 09/15/24  0447   SODIUM mmol/L 144 144 145   POTASSIUM mmol/L 3.9 4.2 3.5   CHLORIDE mmol/L 102 103 104   CO2 mmol/L 37* 34* 29   BUN mg/dL 37* 34* 32*   CREATININE mg/dL 1.13* 1.30 1.20   GLUCOSE mg/dL 100* 123* 138*   CALCIUM mg/dL 8.3* 8.5 8.0*      Results from last 7 days   Lab Units 09/15/24  1616   POCT PH, ARTERIAL pH 7.45*   POCT PCO2, ARTERIAL mm Hg 49*   POCT PO2, ARTERIAL mm Hg 88   POCT HCO3 CALCULATED, ARTERIAL mmol/L 34.1*   POCT BASE EXCESS, ARTERIAL  mmol/L 9.0*     XR chest 1 view 09/16/2024    Narrative  Interpreted By:  Shirley Vance,  STUDY:  XR CHEST 1 VIEW 9/16/2024 5:35 am    INDICATION:  Signs/Symptoms:evaluate for inerstitial edema    COMPARISON:  09/15/2024    ACCESSION NUMBER(S):  [id number]    ORDERING CLINICIAN:  PREMA NICHOLAS    TECHNIQUE:  Single AP view chest    FINDINGS:  Cardiomediastinal silhouette is enlarged with moderate cardiomegaly  demonstrated. There is moderate vascular calcification of the aortic  knob. Generalized alveolar airspace infiltrate demonstrated in the  right perihilar location as well as right lower lung zone. Findings  are not significantly changed    Impression  1. Stable appearing alveolar airspace infiltrate in the right lung in  particular right perihilar location and right lung base.    Signed by: Shirley Vance 9/16/2024 9:23 AM  Dictation workstation:   [id number]      Patient Active Problem List   Diagnosis    Epistaxis    Abdominal aortic aneurysm (AAA) without rupture (CMS-HCC)    Acute low back pain    Acute on chronic systolic heart failure (Multi)    Acute renal failure syndrome (CMS-HCC)    Acute ST segment elevation myocardial infarction (Multi)    Arteriosclerosis of coronary artery    Artificial lens present    Benign essential hypertension    Anemia due to blood loss    Stenosis of left carotid artery    Cerebrovascular accident (CVA) involving left cerebral hemisphere (Multi)    Cerebrovascular accident (CVA) due to embolism of cerebral artery (Multi)    Cerebrovascular accident (Multi)    Transient ischemic attack    Angina pectoris (CMS-HCC)    Backache    Chest pain    Tight chest    Chronic diastolic heart failure (Multi)    Chronic heart failure with preserved ejection fraction (Multi)    Stage 3 chronic kidney disease (Multi)    Acute exacerbation of chronic obstructive airways disease (Multi)    Chronic obstructive pulmonary disease (Multi)    Claudication (CMS-HCC)    Cystocele with  prolapse    Vaginal prolapse    Dehydration    Dermatochalasis of left upper eyelid    Dermatochalasis of right upper eyelid    Vision disorder    Dysgeusia    Dyspnea    Electrocardiogram abnormal    Facial weakness    Fall    Gastroesophageal reflux disease    Gastrointestinal hemorrhage    Headache    History of coronary artery stent placement    History of myocardial infarction    History of stroke without residual deficits    History of cerebrovascular accident    Hypothyroidism (acquired)    Ischemic cardiomyopathy    Lacunar infarct, acute (Multi)    Lumbago-sciatica due to displacement of lumbar intervertebral disc    Lumbar degenerative disc disease    Mixed hyperlipidemia    Overactive bladder    Pain of lower extremity    Pinguecula    Hypertension    Low blood pressure    Near syncope    Peripheral vascular disease (CMS-HCC)    Right homonymous hemianopsia    Seizure (Multi)    Stented coronary artery    Syncope and collapse    Tear film insufficiency    Urinary urgency    Varicose veins of both legs with edema    Floaters in visual field    Vitreous degeneration    Asthenia    Chronic fatigue syndrome    Weakness    Acute respiratory failure with hypoxia (Multi)    Acquired hypothyroidism    Bilateral carotid artery stenosis    Restless legs syndrome    HFrEF (heart failure with reduced ejection fraction) (Multi)    Severe mitral valve regurgitation    Heart failure (Multi)    Acute deep vein thrombosis (DVT) of proximal vein of left lower extremity (Multi)    Constipation    Venous thromboembolism (VTE)    Do not resuscitate    Amnesia    Hypertensive heart disease with heart failure (Multi)    Nonrheumatic mitral valve regurgitation    S/P mitral valve clip implantation    Gastrointestinal bleed    Anemia    Spinal stenosis of lumbar region with neurogenic claudication    Acute respiratory failure (Multi)    CHF (congestive heart failure), NYHA class II, acute, diastolic (Multi)    DVT (deep venous  thrombosis) (Multi)     Assessment/Plan   Acute on chronic respiratory failure   Preserved EF, volume overload, valvular disease, pulmonary hypertension (admissions this year with MR repair, fluid overload)  Wean oxygen  Diuresis    Pneumonia   Alveolar airspace infiltrate in the right lung in   particular right perihilar location and right lung base     COPD (mod obstruction according to z score in 4/2024 41% FEV1)   Steroids - minimize dose if truly concerned about GI Bleed   Contunue aerosols    Possible GI bleed   recent EGD in August was neg for UGI bleed  Hx of DVT and indeterminate VQ scan   Xarelto on hold    Restart xarelto  Stable for dc from a pulmonary standpoint  Sign off  Matti Rae MD

## 2024-09-19 NOTE — ANESTHESIA PREPROCEDURE EVALUATION
Patient: Anitha Welch    Procedure Information       Date/Time: 09/19/24 0845    Scheduled providers: Rai Xie MD; Cara Orr MD; VICENTE Uriarte    Procedure: COLONOSCOPY    Location: [facility]            Relevant Problems   Cardiac   (+) Abdominal aortic aneurysm (AAA) without rupture (CMS-[facility])   (+) Arteriosclerosis of coronary artery   (+) Hypertension   (+) Mixed hyperlipidemia   (+) Peripheral vascular disease (CMS-HCC)      Pulmonary   (+) Chronic obstructive pulmonary disease (Multi)      Neuro   (+) Bilateral carotid artery stenosis   (+) Cerebrovascular accident (Multi)   (+) Seizure (Multi)   (+) Transient ischemic attack      GI   (+) Gastroesophageal reflux disease      Endocrine   (+) Acquired hypothyroidism   (+) Hypothyroidism (acquired)      Hematology   (+) Anemia   (+) Anemia due to blood loss   (+) DVT (deep venous thrombosis) (Multi)   (+) Venous thromboembolism (VTE)      Musculoskeletal   (+) Lumbar degenerative disc disease      Respiratory   (+) Acute respiratory failure (Multi)      Circulatory   (+) Acute on chronic systolic heart failure (Multi)   (+) CHF (congestive heart failure), NYHA class II, acute, diastolic (Multi)   (+) History of coronary artery stent placement   (+) Ischemic cardiomyopathy      Genitourinary   (+) Stage 3 chronic kidney disease (Multi)      Cardiac and Vasculature   (+) History of myocardial infarction     Past Surgical History:   Procedure Laterality Date   • ADENOIDECTOMY     • ANOMALOUS PULMONARY VENOUS RETURN REPAIR, 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    Procedure: Mitral-ARMANI (Transcatheter Edge to Edge Repair);  Surgeon: Kyle Bernardo MD;  Location: [address] Cardiac Cath Lab;  Service: Cardiovascular;  Laterality: N/A;  SAMMY Elgendy.Labs with cPM,Maynard,Schedule at 7;30am   • CARDIAC CATHETERIZATION  10/2019   • CARDIAC CATHETERIZATION N/A 02/16/2024    Procedure: Left And Right Heart Cath, With LV;  Surgeon: Tuan CHEUNG  DO Prudence;  Location: [facility] Cardiac Cath Lab;  Service: Cardiovascular;  Laterality: N/A;  no pre auth needed   • CATARACT EXTRACTION Bilateral 11/13/2012   • CHOLECYSTECTOMY  1996    laparoscopic   • COLONOSCOPY      Dr. Rodriguez   • CORONARY STENT PLACEMENT     • CYST REMOVAL Right 06/24/2013    Excision of Sebaceous cyst right axilla   • DILATION AND CURETTAGE OF UTERUS     • ESOPHAGOGASTRODUODENOSCOPY     • KNEE ARTHROPLASTY Left    • MR HEAD ANGIO WO IV CONTRAST  02/24/2017    MR HEAD ANGIO WO IV CONTRAST [facility]   • MR HEAD ANGIO WO IV CONTRAST  08/11/2017    MR HEAD ANGIO WO IV CONTRAST [facility]   • MR HEAD ANGIO WO IV CONTRAST  02/10/2019    MR HEAD ANGIO WO IV CONTRAST [facility]   • OTHER SURGICAL HISTORY  01/09/2019    Stent synergy   • OTHER SURGICAL HISTORY  01/09/2019    Stent synergy   • OR ARTHRP ACETBLR/PROX FEM PROSTC AGRFT/ALGRFT     • SURGICAL SAMMY MONITORING     • THYROIDECTOMY, PARTIAL Bilateral 04/17/2013    subtotal thyroidectomy   • TONSILLECTOMY     • TOTAL HIP ARTHROPLASTY Right 2006   • UPPER GASTROINTESTINAL ENDOSCOPY  03/2019    Dr. Galan         Clinical information reviewed:   Tobacco  Allergies  Meds  Problems  Med Hx  Surg Hx   Fam Hx  Soc   Hx        NPO Detail:  No data recorded     Physical Exam    Airway  Mallampati: II  TM distance: >3 FB  Neck ROM: limited     Cardiovascular    Dental    Pulmonary    Abdominal        Anesthesia Plan    History of general anesthesia?: yes  History of complications of general anesthesia?: no    ASA 4     MAC     The patient is not a current smoker.  Education provided regarding risk of obstructive sleep apnea.  intravenous induction   Anesthetic plan and risks discussed with patient.    Plan discussed with CAA.

## 2024-09-21 ENCOUNTER — HOME CARE VISIT (OUTPATIENT)
Dept: HOME HEALTH SERVICES | Facility: HOME HEALTH | Age: 84
End: 2024-09-21
Payer: MEDICARE

## 2024-09-21 VITALS
OXYGEN SATURATION: 100 % | TEMPERATURE: 97.5 F | SYSTOLIC BLOOD PRESSURE: 120 MMHG | RESPIRATION RATE: 18 BRPM | DIASTOLIC BLOOD PRESSURE: 57 MMHG | HEART RATE: 80 BPM

## 2024-09-21 PROCEDURE — G0299 HHS/HOSPICE OF RN EA 15 MIN: HCPCS | Mod: HHH

## 2024-09-21 SDOH — ECONOMIC STABILITY: HOUSING INSECURITY: EVIDENCE OF SMOKING MATERIAL: 0

## 2024-09-21 SDOH — HEALTH STABILITY: MENTAL HEALTH: SMOKING IN HOME: 0

## 2024-09-21 ASSESSMENT — ACTIVITIES OF DAILY LIVING (ADL)
AMBULATION ASSISTANCE: STAND BY ASSIST
ENTERING_EXITING_HOME: MODERATE ASSIST
OASIS_M1830: 03
CURRENT_FUNCTION: STAND BY ASSIST

## 2024-09-21 ASSESSMENT — ENCOUNTER SYMPTOMS
PAIN LOCATION: BACK
APPETITE LEVEL: GOOD
SHORTNESS OF BREATH: 1
HIGHEST PAIN SEVERITY IN PAST 24 HOURS: 4/10
PAIN: 1
LOWER EXTREMITY EDEMA: 1

## 2024-09-23 ENCOUNTER — HOME CARE VISIT (OUTPATIENT)
Dept: HOME HEALTH SERVICES | Facility: HOME HEALTH | Age: 84
End: 2024-09-23
Payer: MEDICARE

## 2024-09-23 ENCOUNTER — APPOINTMENT (OUTPATIENT)
Dept: PRIMARY CARE | Facility: CLINIC | Age: 84
End: 2024-09-23
Payer: MEDICARE

## 2024-09-23 VITALS — TEMPERATURE: 97.3 F | OXYGEN SATURATION: 100 % | HEART RATE: 73 BPM

## 2024-09-23 LAB
LAB AP ADENOMA INDICATOR: YES
LABORATORY COMMENT REPORT: NORMAL
PATH REPORT.FINAL DX SPEC: NORMAL
PATH REPORT.GROSS SPEC: NORMAL
PATH REPORT.RELEVANT HX SPEC: NORMAL
PATH REPORT.TOTAL CANCER: NORMAL

## 2024-09-23 PROCEDURE — G0151 HHCP-SERV OF PT,EA 15 MIN: HCPCS | Mod: HHH | Performed by: PHYSICAL THERAPIST

## 2024-09-23 ASSESSMENT — ENCOUNTER SYMPTOMS
PAIN LOCATION - PAIN SEVERITY: 6/10
PAIN: 1
PERSON REPORTING PAIN: PATIENT
PAIN LOCATION: LEFT ARM
MUSCLE WEAKNESS: 1
PAIN LOCATION - PAIN SEVERITY: 6/10
PAIN LOCATION: RIGHT ARM

## 2024-09-23 ASSESSMENT — ACTIVITIES OF DAILY LIVING (ADL): AMBULATION ASSISTANCE ON FLAT SURFACES: 1

## 2024-09-24 ENCOUNTER — HOME CARE VISIT (OUTPATIENT)
Dept: HOME HEALTH SERVICES | Facility: HOME HEALTH | Age: 84
End: 2024-09-24
Payer: MEDICARE

## 2024-09-24 VITALS
RESPIRATION RATE: 12 BRPM | DIASTOLIC BLOOD PRESSURE: 60 MMHG | HEART RATE: 84 BPM | OXYGEN SATURATION: 99 % | SYSTOLIC BLOOD PRESSURE: 118 MMHG | TEMPERATURE: 97.2 F

## 2024-09-24 PROCEDURE — G0299 HHS/HOSPICE OF RN EA 15 MIN: HCPCS | Mod: HHH

## 2024-09-24 SDOH — HEALTH STABILITY: MENTAL HEALTH: SMOKING IN HOME: 0

## 2024-09-24 SDOH — ECONOMIC STABILITY: FOOD INSECURITY: MEALS PER DAY: 3

## 2024-09-24 SDOH — ECONOMIC STABILITY: HOUSING INSECURITY: EVIDENCE OF SMOKING MATERIAL: 0

## 2024-09-24 ASSESSMENT — ENCOUNTER SYMPTOMS
APPETITE LEVEL: FAIR
STOOL FREQUENCY: DAILY
FATIGUE: 1
FATIGUES EASILY: 1
LAST BOWEL MOVEMENT: 67107
CHANGE IN APPETITE: VARYING
DENIES PAIN: 1
CONSTIPATION: 1

## 2024-09-24 ASSESSMENT — PAIN SCALES - PAIN ASSESSMENT IN ADVANCED DEMENTIA (PAINAD)
FACIALEXPRESSION: 0
BODYLANGUAGE: 0 - RELAXED.
BREATHING: 0
BODYLANGUAGE: 0
CONSOLABILITY: 0
FACIALEXPRESSION: 0 - SMILING OR INEXPRESSIVE.
NEGVOCALIZATION: 0 - NONE.
TOTALSCORE: 0
CONSOLABILITY: 0 - NO NEED TO CONSOLE.
NEGVOCALIZATION: 0

## 2024-09-24 ASSESSMENT — ACTIVITIES OF DAILY LIVING (ADL)
AMBULATION ASSISTANCE: STAND BY ASSIST
CURRENT_FUNCTION: STAND BY ASSIST
AMBULATION ASSISTANCE: ONE PERSON
CURRENT_FUNCTION: ONE PERSON

## 2024-09-26 ENCOUNTER — HOME CARE VISIT (OUTPATIENT)
Dept: HOME HEALTH SERVICES | Facility: HOME HEALTH | Age: 84
End: 2024-09-26
Payer: MEDICARE

## 2024-09-26 ENCOUNTER — OFFICE VISIT (OUTPATIENT)
Dept: PRIMARY CARE | Facility: CLINIC | Age: 84
End: 2024-09-26
Payer: MEDICARE

## 2024-09-26 VITALS
HEIGHT: 65 IN | SYSTOLIC BLOOD PRESSURE: 118 MMHG | BODY MASS INDEX: 23.16 KG/M2 | HEART RATE: 81 BPM | OXYGEN SATURATION: 95 % | WEIGHT: 139 LBS | DIASTOLIC BLOOD PRESSURE: 86 MMHG

## 2024-09-26 VITALS
TEMPERATURE: 97.8 F | HEART RATE: 102 BPM | RESPIRATION RATE: 18 BRPM | DIASTOLIC BLOOD PRESSURE: 62 MMHG | SYSTOLIC BLOOD PRESSURE: 116 MMHG | OXYGEN SATURATION: 99 %

## 2024-09-26 DIAGNOSIS — I50.23 ACUTE ON CHRONIC SYSTOLIC HEART FAILURE: ICD-10-CM

## 2024-09-26 DIAGNOSIS — Z23 NEED FOR VACCINATION: ICD-10-CM

## 2024-09-26 DIAGNOSIS — K59.09 OTHER CONSTIPATION: Primary | ICD-10-CM

## 2024-09-26 DIAGNOSIS — D50.0 ANEMIA DUE TO BLOOD LOSS: ICD-10-CM

## 2024-09-26 DIAGNOSIS — K59.00 CONSTIPATION, UNSPECIFIED CONSTIPATION TYPE: ICD-10-CM

## 2024-09-26 PROCEDURE — 1111F DSCHRG MED/CURRENT MED MERGE: CPT | Performed by: FAMILY MEDICINE

## 2024-09-26 PROCEDURE — 1159F MED LIST DOCD IN RCRD: CPT | Performed by: FAMILY MEDICINE

## 2024-09-26 PROCEDURE — 99495 TRANSJ CARE MGMT MOD F2F 14D: CPT | Performed by: FAMILY MEDICINE

## 2024-09-26 PROCEDURE — 90662 IIV NO PRSV INCREASED AG IM: CPT | Performed by: FAMILY MEDICINE

## 2024-09-26 PROCEDURE — 1125F AMNT PAIN NOTED PAIN PRSNT: CPT | Performed by: FAMILY MEDICINE

## 2024-09-26 PROCEDURE — G0152 HHCP-SERV OF OT,EA 15 MIN: HCPCS | Mod: HHH

## 2024-09-26 PROCEDURE — 1157F ADVNC CARE PLAN IN RCRD: CPT | Performed by: FAMILY MEDICINE

## 2024-09-26 PROCEDURE — 1123F ACP DISCUSS/DSCN MKR DOCD: CPT | Performed by: FAMILY MEDICINE

## 2024-09-26 PROCEDURE — 3074F SYST BP LT 130 MM HG: CPT | Performed by: FAMILY MEDICINE

## 2024-09-26 PROCEDURE — 1036F TOBACCO NON-USER: CPT | Performed by: FAMILY MEDICINE

## 2024-09-26 PROCEDURE — 3079F DIAST BP 80-89 MM HG: CPT | Performed by: FAMILY MEDICINE

## 2024-09-26 RX ORDER — DOCUSATE SODIUM 100 MG/1
100 CAPSULE, LIQUID FILLED ORAL 2 TIMES DAILY PRN
Qty: 60 CAPSULE | Refills: 11 | Status: SHIPPED | OUTPATIENT
Start: 2024-09-26

## 2024-09-26 RX ORDER — BISACODYL 5 MG
5 TABLET, DELAYED RELEASE (ENTERIC COATED) ORAL DAILY PRN
Qty: 30 TABLET | Refills: 1 | Status: SHIPPED | OUTPATIENT
Start: 2024-09-26 | End: 2024-11-25

## 2024-09-26 ASSESSMENT — PAIN SCALES - PAIN ASSESSMENT IN ADVANCED DEMENTIA (PAINAD)
FACIALEXPRESSION: 0 - SMILING OR INEXPRESSIVE.
BODYLANGUAGE: 0
FACIALEXPRESSION: 0
CONSOLABILITY: 0 - NO NEED TO CONSOLE.
NEGVOCALIZATION: 0
BODYLANGUAGE: 0 - RELAXED.
TOTALSCORE: 0
NEGVOCALIZATION: 0 - NONE.
BREATHING: 0
CONSOLABILITY: 0

## 2024-09-26 ASSESSMENT — ENCOUNTER SYMPTOMS
BRUISES/BLEEDS EASILY: 0
UNEXPECTED WEIGHT CHANGE: 1
CHILLS: 0
ACTIVITY CHANGE: 1
SHORTNESS OF BREATH: 1
CHEST TIGHTNESS: 0
FEVER: 0
DENIES PAIN: 1
PERSON REPORTING PAIN: PATIENT
APPETITE CHANGE: 0

## 2024-09-26 ASSESSMENT — ACTIVITIES OF DAILY LIVING (ADL)
DRESSING_LB_CURRENT_FUNCTION: INDEPENDENT
GROOMING_WITHIN_DEFINED_LIMITS: 1
TOILETING: INDEPENDENT
FEEDING_WITHIN_DEFINED_LIMITS: 1
BATHING ASSESSED: 1
BATHING_CURRENT_FUNCTION: INDEPENDENT
TOILETING: 1

## 2024-09-26 ASSESSMENT — PATIENT HEALTH QUESTIONNAIRE - PHQ9
SUM OF ALL RESPONSES TO PHQ9 QUESTIONS 1 AND 2: 0
1. LITTLE INTEREST OR PLEASURE IN DOING THINGS: NOT AT ALL
2. FEELING DOWN, DEPRESSED OR HOPELESS: NOT AT ALL

## 2024-09-26 ASSESSMENT — PAIN SCALES - GENERAL: PAINLEVEL: 7

## 2024-09-26 NOTE — PROGRESS NOTES
[facility]: [facility]  E/M EVALUATION    Anitha Welch is a 84 y.o. female who presents for Hospital Followup and Med Refill (Patient needs refills on Docusate/dd).    Subjective   Pt here for hospital follow up.     Pt admitted for Decompensated HF- she is back at baseline oxygen.  She states she has gained a couple pounds since being out of the hospital.  Concern today for left leg swelling. Is using compression socks.   Having constipation even on miralax. Is eating 3 x per day.  Had neg US of legs 24    Colonscopy done for chronic anemia-  tubular adenoma.      Home health care coming to the house.  Pts son is requesting house calls program. Difficult getting her out of the home.     Med Refill  Pertinent negatives include no chills or fever.     Review of Systems   Constitutional:  Positive for activity change and unexpected weight change. Negative for appetite change, chills and fever.   Respiratory:  Positive for shortness of breath. Negative for chest tightness.    Cardiovascular:  Positive for leg swelling.   Hematological:  Does not bruise/bleed easily.       Objective   Vitals:    24 1119   BP: 118/86   Pulse: 81   SpO2: 95%     Physical Exam  Constitutional:       Appearance: Normal appearance.   Cardiovascular:      Rate and Rhythm: Normal rate and regular rhythm.      Heart sounds: No murmur heard.     Comments: Current compression sock does not have enough elasticity.   Pulmonary:      Effort: Pulmonary effort is normal.      Breath sounds: Normal breath sounds.   Musculoskeletal:      Right lower leg: No edema.      Left lower le+ Edema present.   Neurological:      Mental Status: She is alert.         Assessment/Plan      Patient Active Problem List   Diagnosis   • Epistaxis   • Abdominal aortic aneurysm (AAA) without rupture (CMS-HCC)   • Acute low back pain   • Acute on chronic systolic heart failure (Multi)   • Acute renal failure syndrome (CMS-HCC)   •  Acute ST segment elevation myocardial infarction (Multi)   • Arteriosclerosis of coronary artery   • Artificial lens present   • Benign essential hypertension   • Anemia due to blood loss   • Stenosis of left carotid artery   • Cerebrovascular accident (CVA) involving left cerebral hemisphere (Multi)   • Cerebrovascular accident (CVA) due to embolism of cerebral artery (Multi)   • Cerebrovascular accident (Multi)   • Transient ischemic attack   • Angina pectoris (CMS-HCC)   • Backache   • Chest pain   • Tight chest   • Chronic diastolic heart failure (Multi)   • Chronic heart failure with preserved ejection fraction (Multi)   • Stage 3 chronic kidney disease (Multi)   • Acute exacerbation of chronic obstructive airways disease (Multi)   • Chronic obstructive pulmonary disease (Multi)   • Claudication (CMS-HCC)   • Cystocele with prolapse   • Vaginal prolapse   • Dehydration   • Dermatochalasis of left upper eyelid   • Dermatochalasis of right upper eyelid   • Vision disorder   • Dysgeusia   • Dyspnea   • Electrocardiogram abnormal   • Facial weakness   • Fall   • Gastroesophageal reflux disease   • Gastrointestinal hemorrhage   • Headache   • History of coronary artery stent placement   • History of myocardial infarction   • History of stroke without residual deficits   • History of cerebrovascular accident   • Hypothyroidism (acquired)   • Ischemic cardiomyopathy   • Lacunar infarct, acute (Multi)   • Lumbago-sciatica due to displacement of lumbar intervertebral disc   • Lumbar degenerative disc disease   • Mixed hyperlipidemia   • Overactive bladder   • Pain of lower extremity   • Pinguecula   • Hypertension   • Low blood pressure   • Near syncope   • Peripheral vascular disease (CMS-HCC)   • Right homonymous hemianopsia   • Seizure (Multi)   • Stented coronary artery   • Syncope and collapse   • Tear film insufficiency   • Urinary urgency   • Varicose veins of both legs with edema   • Floaters in visual field   •  Vitreous degeneration   • Asthenia   • Chronic fatigue syndrome   • Weakness   • Acute respiratory failure with hypoxia (Multi)   • Acquired hypothyroidism   • Bilateral carotid artery stenosis   • Restless legs syndrome   • HFrEF (heart failure with reduced ejection fraction) (Multi)   • Severe mitral valve regurgitation   • Heart failure (Multi)   • Acute deep vein thrombosis (DVT) of proximal vein of left lower extremity (Multi)   • Constipation   • Venous thromboembolism (VTE)   • Do not resuscitate   • Amnesia   • Hypertensive heart disease with heart failure (Multi)   • Nonrheumatic mitral valve regurgitation   • S/P mitral valve clip implantation   • Gastrointestinal bleed   • Anemia   • Spinal stenosis of lumbar region with neurogenic claudication   • Acute respiratory failure (Multi)   • CHF (congestive heart failure), NYHA class II, acute, diastolic (Multi)   • DVT (deep venous thrombosis) (Multi)       Diagnoses and all orders for this visit:  Other constipation  Acute on chronic systolic heart failure (Multi)  -     Referral to Primary Care - Family Practice  -     Referral to [facility]; Future  Anemia due to blood loss  Constipation, unspecified constipation type  -     docusate sodium (Colace) 100 mg capsule; Take 1 capsule (100 mg) by mouth 2 times a day as needed for constipation.  -     bisacodyl (Dulcolax) 5 mg EC tablet; Take 1 tablet (5 mg) by mouth once daily as needed for constipation. Do not crush, chew, or split.  Other orders  -     Flu vaccine, trivalent, preservative free, HIGH-DOSE, age 65y+ (Fluzone)  Follow up GI  keep weight under 140.      The patient was encouraged to ensure that any/all documentation is accurate and up to date, and that our office be provided a copy in the event that anything changes.         Gee Dupree MD

## 2024-09-26 NOTE — TELEPHONE ENCOUNTER
Patient discharged home 9/19/24 with [facility]. Per [facility] patient has a visit scheduled with PCP Dr. Dupree 9/26/24. Patient also has a [facility] visit scheduled with Milana Hernadez NP 10/8/24.

## 2024-09-27 ENCOUNTER — HOME CARE VISIT (OUTPATIENT)
Dept: HOME HEALTH SERVICES | Facility: HOME HEALTH | Age: 84
End: 2024-09-27
Payer: MEDICARE

## 2024-09-27 VITALS
SYSTOLIC BLOOD PRESSURE: 112 MMHG | OXYGEN SATURATION: 100 % | HEART RATE: 73 BPM | WEIGHT: 138 LBS | BODY MASS INDEX: 22.96 KG/M2 | DIASTOLIC BLOOD PRESSURE: 56 MMHG | TEMPERATURE: 97.2 F | RESPIRATION RATE: 18 BRPM

## 2024-09-27 PROCEDURE — G0300 HHS/HOSPICE OF LPN EA 15 MIN: HCPCS | Mod: HHH

## 2024-09-27 SDOH — HEALTH STABILITY: MENTAL HEALTH: SMOKING IN HOME: 0

## 2024-09-27 SDOH — ECONOMIC STABILITY: HOUSING INSECURITY: EVIDENCE OF SMOKING MATERIAL: 0

## 2024-09-27 ASSESSMENT — ENCOUNTER SYMPTOMS
PAIN LOCATION: LEFT LEG
LAST BOWEL MOVEMENT: 67108
LOWEST PAIN SEVERITY IN PAST 24 HOURS: 0/10
PAIN LOCATION - EXACERBATING FACTORS: EDEMA
PAIN LOCATION - PAIN FREQUENCY: INTERMITTENT
DYSPNEA ACTIVITY LEVEL: AFTER AMBULATING LESS THAN 10 FT
PAIN LOCATION - PAIN SEVERITY: 6/10
LOWER EXTREMITY EDEMA: 1
CONSTIPATION: 1
HIGHEST PAIN SEVERITY IN PAST 24 HOURS: 6/10
STOOL FREQUENCY: LESS THAN DAILY
PAIN SEVERITY GOAL: 0/10
SUBJECTIVE PAIN PROGRESSION: WAXING AND WANING
PAIN: 1
PAIN LOCATION - PAIN QUALITY: ACHES
SHORTNESS OF BREATH: 1
FORGETFULNESS: 1
PAIN LOCATION - PAIN DURATION: DAILY

## 2024-09-30 ENCOUNTER — HOME CARE VISIT (OUTPATIENT)
Dept: HOME HEALTH SERVICES | Facility: HOME HEALTH | Age: 84
End: 2024-09-30
Payer: MEDICARE

## 2024-10-01 ENCOUNTER — HOME CARE VISIT (OUTPATIENT)
Dept: HOME HEALTH SERVICES | Facility: HOME HEALTH | Age: 84
End: 2024-10-01
Payer: MEDICARE

## 2024-10-01 VITALS
SYSTOLIC BLOOD PRESSURE: 136 MMHG | BODY MASS INDEX: 22.96 KG/M2 | OXYGEN SATURATION: 99 % | DIASTOLIC BLOOD PRESSURE: 81 MMHG | WEIGHT: 138 LBS | HEART RATE: 92 BPM | TEMPERATURE: 97.3 F

## 2024-10-01 PROCEDURE — G0300 HHS/HOSPICE OF LPN EA 15 MIN: HCPCS | Mod: HHH

## 2024-10-01 SDOH — ECONOMIC STABILITY: HOUSING INSECURITY: EVIDENCE OF SMOKING MATERIAL: 0

## 2024-10-01 SDOH — HEALTH STABILITY: MENTAL HEALTH: SMOKING IN HOME: 0

## 2024-10-01 ASSESSMENT — ENCOUNTER SYMPTOMS
SHORTNESS OF BREATH: 1
DYSPNEA ACTIVITY LEVEL: AFTER AMBULATING LESS THAN 10 FT
CHANGE IN APPETITE: UNCHANGED
FORGETFULNESS: 1
APPETITE LEVEL: GOOD
LOWER EXTREMITY EDEMA: 1

## 2024-10-04 ENCOUNTER — HOME CARE VISIT (OUTPATIENT)
Dept: HOME HEALTH SERVICES | Facility: HOME HEALTH | Age: 84
End: 2024-10-04
Payer: MEDICARE

## 2024-10-07 ENCOUNTER — TELEPHONE (OUTPATIENT)
Dept: PRIMARY CARE | Facility: CLINIC | Age: 84
End: 2024-10-07
Payer: MEDICARE

## 2024-10-07 ENCOUNTER — HOME CARE VISIT (OUTPATIENT)
Dept: HOME HEALTH SERVICES | Facility: HOME HEALTH | Age: 84
End: 2024-10-07
Payer: MEDICARE

## 2024-10-07 VITALS
TEMPERATURE: 97.2 F | HEART RATE: 96 BPM | OXYGEN SATURATION: 95 % | DIASTOLIC BLOOD PRESSURE: 78 MMHG | SYSTOLIC BLOOD PRESSURE: 130 MMHG

## 2024-10-07 PROCEDURE — G0151 HHCP-SERV OF PT,EA 15 MIN: HCPCS | Mod: HHH | Performed by: PHYSICAL THERAPIST

## 2024-10-07 ASSESSMENT — ENCOUNTER SYMPTOMS
PAIN: 1
PERSON REPORTING PAIN: PATIENT

## 2024-10-08 ENCOUNTER — OFFICE VISIT (OUTPATIENT)
Dept: PRIMARY CARE | Facility: CLINIC | Age: 84
End: 2024-10-08
Payer: MEDICARE

## 2024-10-08 VITALS
TEMPERATURE: 97.3 F | RESPIRATION RATE: 20 BRPM | HEART RATE: 83 BPM | WEIGHT: 138 LBS | SYSTOLIC BLOOD PRESSURE: 130 MMHG | BODY MASS INDEX: 22.99 KG/M2 | DIASTOLIC BLOOD PRESSURE: 68 MMHG | HEIGHT: 65 IN | OXYGEN SATURATION: 100 %

## 2024-10-08 DIAGNOSIS — E03.9 HYPOTHYROIDISM, UNSPECIFIED TYPE: ICD-10-CM

## 2024-10-08 DIAGNOSIS — J96.21 ACUTE ON CHRONIC RESPIRATORY FAILURE WITH HYPOXIA (MULTI): ICD-10-CM

## 2024-10-08 DIAGNOSIS — I50.31 CHF (CONGESTIVE HEART FAILURE), NYHA CLASS II, ACUTE, DIASTOLIC: ICD-10-CM

## 2024-10-08 DIAGNOSIS — I63.40 CEREBROVASCULAR ACCIDENT (CVA) DUE TO EMBOLISM OF CEREBRAL ARTERY (MULTI): ICD-10-CM

## 2024-10-08 DIAGNOSIS — D50.0 ANEMIA DUE TO BLOOD LOSS: Primary | ICD-10-CM

## 2024-10-08 DIAGNOSIS — R53.1 WEAKNESS: ICD-10-CM

## 2024-10-08 PROCEDURE — 1159F MED LIST DOCD IN RCRD: CPT | Performed by: NURSE PRACTITIONER

## 2024-10-08 PROCEDURE — 1111F DSCHRG MED/CURRENT MED MERGE: CPT | Performed by: NURSE PRACTITIONER

## 2024-10-08 PROCEDURE — 1125F AMNT PAIN NOTED PAIN PRSNT: CPT | Performed by: NURSE PRACTITIONER

## 2024-10-08 PROCEDURE — 1160F RVW MEDS BY RX/DR IN RCRD: CPT | Performed by: NURSE PRACTITIONER

## 2024-10-08 PROCEDURE — 1123F ACP DISCUSS/DSCN MKR DOCD: CPT | Performed by: NURSE PRACTITIONER

## 2024-10-08 PROCEDURE — 99349 HOME/RES VST EST MOD MDM 40: CPT | Performed by: NURSE PRACTITIONER

## 2024-10-08 PROCEDURE — 1157F ADVNC CARE PLAN IN RCRD: CPT | Performed by: NURSE PRACTITIONER

## 2024-10-08 PROCEDURE — 3075F SYST BP GE 130 - 139MM HG: CPT | Performed by: NURSE PRACTITIONER

## 2024-10-08 PROCEDURE — 3078F DIAST BP <80 MM HG: CPT | Performed by: NURSE PRACTITIONER

## 2024-10-08 RX ORDER — LEVOTHYROXINE SODIUM 25 UG/1
25 TABLET ORAL DAILY
Qty: 90 TABLET | Refills: 3 | Status: SHIPPED | OUTPATIENT
Start: 2024-10-08

## 2024-10-08 ASSESSMENT — PAIN SCALES - GENERAL: PAINLEVEL: 6

## 2024-10-08 NOTE — PROGRESS NOTES
Subjective   Patient ID: Anitha Welch is a 84 y.o. female who presents for Hospital Follow-up (Anemia, Acute on Chronic Respiratory failure, CHF, weakness).    Visit for 83 y/o female seen today in private home, accompanied by her spouse Chito and son Jose for routine follow up, recent hospitalization at [facility] with acute on chronic respiratory failure, CHF, anemia, weakness. Pt was hospitalized from 9/14-9/19/24.     Hospital Course: Anitha Welch is a 84 y.o. female with PMH of COPD/CHF on 2 liters oxygen, CAD s/p stent, Ischemic CMP, CVA with mild left sided weakness, LLE DVT on xarelto, and recent Mitral valve repair ARMANI on 4/25/24 who was presented for acute respiratory failure 2/2 to pulmonary edema. She was treated with IV diuretics with improvement and now weaned down to 2 liters oxygen. She was seen by PT. She has anemia with history of GI bleed. Underwent colonoscopy during hospital stay. Pt was recommended to have moderate intensity therapy, SNF placement but she requested to be discharged home with [facility] services.     Pt is sitting on couch this afternoon. She is alert, able to answer simple questions regarding her health but she does get easily confused and will often times repeat the same things so her family will supplement most of the health history. Pt lives with her spouse and son in a multi-level home. She has not been going upstairs due to declining health. Pts spouse reports that patient sits on the couch all day and has been sleeping on the couch at night. Her family has been managing her medications. There are no reported compliance issues. Pt is able to do her own dressing and toileting but does require stand by assistance in the shower. She is oxygen dependent and family reports that she has been taking her oxygen off to ambulate to the bathroom. She is very short of breath when she returns to the couch. She does have extension tubing but family thinks it is just a mental thing and  "she will frequently take the oxygen off even with reminders. Pt does not use assistive device when ambulating. She denies recent fall or injury. Pt is active with [facility],  and therapy services. She has days where she has difficulty participating in therapy, mostly due to \"not feeling well\" or \"feeling tired\". Pt denies appetite changes, signs of weight loss, nausea, vomiting. Pt endorses chronic constipation, managed with stool softeners. Pt denies urinary concerns.     Home Visit:         Medically necessary due to: Illness or condition that results in activity lmitation or restriction that impacts the ability to leave home such as:, Illness or condition that results in activity lmitation or restriction that impacts the ability to leave home such as:, unsteady gait/poor balance         Current Outpatient Medications:     albuterol 90 mcg/actuation inhaler, Inhale 1 puff every 4 hours if needed for shortness of breath., Disp: , Rfl:     atorvastatin (Lipitor) 40 mg tablet, TAKE 1 TABLET BY MOUTH EVERY DAY AT BEDTIME, Disp: 90 tablet, Rfl: 3    bisacodyl (Dulcolax) 5 mg EC tablet, Take 1 tablet (5 mg) by mouth once daily as needed for constipation. Do not crush, chew, or split., Disp: 30 tablet, Rfl: 1    carvedilol (Coreg) 3.125 mg tablet, TAKE 1 TABLET (3.125 MG) BY MOUTH 2 TIMES A DAY., Disp: 180 tablet, Rfl: 3    diclofenac sodium (Voltaren) 1 % gel, Apply 4.5 inches (4 g) topically 4 times a day., Disp: 100 g, Rfl: 1    docusate sodium (Colace) 100 mg capsule, Take 1 capsule (100 mg) by mouth 2 times a day as needed for constipation., Disp: 60 capsule, Rfl: 11    gabapentin (Neurontin) 100 mg capsule, Take 1 capsule (100 mg) by mouth once daily at bedtime., Disp: 30 capsule, Rfl: 2    levothyroxine (Synthroid, Levoxyl) 25 mcg tablet, Take 1 tablet (25 mcg) by mouth early in the morning.. Take on an empty stomach at the same time each day, either 30 to 60 minutes prior to breakfast, Disp: 30 tablet, Rfl: 1    " lidocaine (Hemorrhoidal Relief) 5 % cream cream, Apply 1 Application topically every 6 hours if needed (hemorrhoids)., Disp: 45 g, Rfl: 0    loratadine (Claritin) 10 mg tablet, Take 1 tablet (10 mg) by mouth once daily., Disp: , Rfl:     multivitamin-iron-folic acid (Multi Complete with Iron)  mg-mcg tablet tablet, Take 1 tablet by mouth once daily., Disp: , Rfl:     nitroglycerin (Nitrostat) 0.4 mg SL tablet, Place 1 tablet (0.4 mg) under the tongue every 5 minutes if needed for chest pain., Disp: , Rfl:     nystatin (Mycostatin) 100,000 unit/gram powder, Apply 1 Application topically 2 times a day., Disp: 30 g, Rfl: 0    oxygen (O2) gas therapy, Inhale 2 L/min continuously. Indications: sob, Disp: , Rfl:     pantoprazole (ProtoNix) 40 mg EC tablet, Take 1 tablet (40 mg) by mouth 2 times a day., Disp: 60 tablet, Rfl: 0    polyethylene glycol (Glycolax, Miralax) 17 gram/dose powder, mix 17 grams (1 capful) in 8 ounces of water and drink daily, Disp: 510 g, Rfl: 0    rivaroxaban (Xarelto) 20 mg tablet, Take 1 tablet (20 mg) by mouth once daily in the evening. Take with meals. Take with food., Disp: 30 tablet, Rfl: 11    tiZANidine (Zanaflex) 2 mg tablet, TAKE 1 TABLET BY MOUTH THREE TIMES A DAY AS NEEDED (Patient taking differently: Take 1 tablet (2 mg) by mouth 3 times a day as needed for muscle spasms.), Disp: 270 tablet, Rfl: 3    torsemide (Demadex) 20 mg tablet, Take 1 tablet (20 mg) by mouth once daily. Do not fill before May 2, 2024., Disp: , Rfl:     Trelegy Ellipta 100-62.5-25 mcg blister with device, Inhale 1 puff once daily., Disp: 1 each, Rfl: 3     Review of Systems  Constitutional: Negative for appetite changes, fever, chills, unexplained weight loss.   HENT: Negative for trouble swallowing.    Respiratory: Positive for shortness of breath with exertion, oxygen dependent. Negative for wheezing.    Cardiovascular: Positive for edema. Negative for chest pain, palpitations.   Gastrointestinal:  "Positive for constipation. Negative for abdominal for pain, diarrhea, nausea and vomiting.   Endocrine: Positive for thyroid disease  Genitourinary:  Negative for difficulty urinating.   Musculoskeletal:  Positive for gait problem  Neurological:  Positive for prior stroke, left sided neuropathy. Negative for dizziness and light-headedness.   Psychiatric/Behavioral: Positive for anxiety    Objective   /68 (BP Location: Left arm, Patient Position: Sitting, BP Cuff Size: Adult)   Pulse 83   Temp 36.3 °C (97.3 °F) (Temporal)   Resp 20   Ht 1.651 m (5' 5\")   Wt 62.6 kg (138 lb)   SpO2 100%   BMI 22.96 kg/m²     Physical Exam  General: No acute distress     Appearance: She is alert, chronically ill. Nontoxic.   HENT:      Head: Normocephalic and atraumatic.      Nose: No congestion or rhinorrhea.      Mouth/Throat:      Mouth: Mucous membranes are moist.      Pharynx: Oropharynx is clear.   Eyes:      General: No scleral icterus.        Right eye: No discharge.         Left eye: No discharge.      Extraocular Movements: Extraocular movements intact.   Neck:      Vascular: No carotid bruit.      Comments: No obvious JVD  Cardiovascular:      Rate and Rhythm: Normal rate. Regular rhythm.      Pulses: Normal pulses.      No friction rub. No gallop.   Pulmonary:      Breath sounds: Lungs clear.     Comments: No wheezing, rales or rhonchi. Oxygen 2L NC  Abdominal:      General: There is no distension.      Tenderness: There is no abdominal tenderness.   Musculoskeletal:      Cervical back Neck supple. No rigidity.      Right lower leg: Trace edema     Left lower le+ LE edema  Lymphadenopathy:      Cervical: No cervical adenopathy.   Skin:     Capillary Refill: Capillary refill takes less than 2 seconds.   Neurological:      General: No focal deficit present.      Mental Status: She is alert. Mental status is at baseline.     Cranial Nerves: No cranial nerve deficit.      Motor: generalized " weakness  Psychiatric:         Behavior: Behavior normal.         Thought Content: Thought content normal.         Judgment: Judgment normal.      Assessment/Plan   Diagnoses and all orders for this visit:  Anemia due to blood loss  -     CBC and Auto Differential; Future  -status post colonoscopy- found to have tubular adenoma   -denies overt s/sx of bleeding   -will have [place] check CBC     Acute on chronic respiratory failure with hypoxia (Multi)  -status post hospitalization with improvement  -oxygen dependent 2L NC   -continue Trelegy Ellipta, Albuterol inhaler as needed     CHF (congestive heart failure), NYHA class II, acute, diastolic  -     Basic metabolic panel; Future  -chronic, LE edema at baseline   -recommend monitoring daily weight   -limit sodium intake   -continue current dose of Torsemide     Cerebrovascular accident (CVA) due to embolism of cerebral artery (Multi)  -     rivaroxaban (Xarelto) 20 mg tablet; Take 1 tablet (20 mg) by mouth once daily in the evening. Take with meals. Take with food.    Hypothyroidism, unspecified type  -     levothyroxine (Synthroid, Levoxyl) 25 mcg tablet; Take 1 tablet (25 mcg) by mouth early in the morning.. Take on an empty stomach at the same time each day, either 30 to 60 minutes prior to breakfast    Weakness  -chronic, active with [facility]  -continue therapy services for increased strength and conditioning     Order for CBC, BMP entered in EPIC and sent to [facility] nurse to obtain at next follow up. Will see patient in 4 weeks as she is high risk for rehospitalization. Advised pt/spouse/son to contact house calls office with any acute concerns or medication needs.        Milana Hernadez, APRN-CNP

## 2024-10-09 ENCOUNTER — HOME CARE VISIT (OUTPATIENT)
Dept: HOME HEALTH SERVICES | Facility: HOME HEALTH | Age: 84
End: 2024-10-09
Payer: MEDICARE

## 2024-10-09 VITALS
OXYGEN SATURATION: 100 % | HEART RATE: 76 BPM | DIASTOLIC BLOOD PRESSURE: 62 MMHG | TEMPERATURE: 98.7 F | SYSTOLIC BLOOD PRESSURE: 140 MMHG | RESPIRATION RATE: 20 BRPM

## 2024-10-09 PROCEDURE — 80048 BASIC METABOLIC PNL TOTAL CA: CPT

## 2024-10-09 PROCEDURE — G0300 HHS/HOSPICE OF LPN EA 15 MIN: HCPCS | Mod: HHH

## 2024-10-09 PROCEDURE — 85025 COMPLETE CBC W/AUTO DIFF WBC: CPT

## 2024-10-09 SDOH — HEALTH STABILITY: MENTAL HEALTH: SMOKING IN HOME: 0

## 2024-10-09 SDOH — ECONOMIC STABILITY: HOUSING INSECURITY: EVIDENCE OF SMOKING MATERIAL: 0

## 2024-10-09 ASSESSMENT — ENCOUNTER SYMPTOMS
LOWER EXTREMITY EDEMA: 1
FORGETFULNESS: 1
DENIES PAIN: 1
DESCRIPTION OF MEMORY LOSS: SHORT TERM
APPETITE LEVEL: GOOD
LAST BOWEL MOVEMENT: 67119
CHANGE IN APPETITE: UNCHANGED

## 2024-10-14 ENCOUNTER — TELEPHONE (OUTPATIENT)
Dept: PRIMARY CARE | Facility: CLINIC | Age: 84
End: 2024-10-14
Payer: MEDICARE

## 2024-10-15 ENCOUNTER — TELEPHONE (OUTPATIENT)
Dept: PRIMARY CARE | Facility: CLINIC | Age: 84
End: 2024-10-15

## 2024-10-15 ENCOUNTER — APPOINTMENT (OUTPATIENT)
Dept: CARDIOLOGY | Facility: HOSPITAL | Age: 84
DRG: 871 | End: 2024-10-15
Payer: MEDICARE

## 2024-10-15 ENCOUNTER — HOME CARE VISIT (OUTPATIENT)
Dept: HOME HEALTH SERVICES | Facility: HOME HEALTH | Age: 84
End: 2024-10-15
Payer: MEDICARE

## 2024-10-15 ENCOUNTER — HOSPITAL ENCOUNTER (INPATIENT)
Facility: HOSPITAL | Age: 84
DRG: 871 | End: 2024-10-15
Attending: STUDENT IN AN ORGANIZED HEALTH CARE EDUCATION/TRAINING PROGRAM | Admitting: INTERNAL MEDICINE
Payer: MEDICARE

## 2024-10-15 ENCOUNTER — APPOINTMENT (OUTPATIENT)
Dept: RADIOLOGY | Facility: HOSPITAL | Age: 84
DRG: 871 | End: 2024-10-15
Payer: MEDICARE

## 2024-10-15 DIAGNOSIS — I82.4Y9 DEEP VEIN THROMBOSIS (DVT) OF PROXIMAL LOWER EXTREMITY, UNSPECIFIED CHRONICITY, UNSPECIFIED LATERALITY (MULTI): ICD-10-CM

## 2024-10-15 DIAGNOSIS — M79.662 PAIN IN LEFT LOWER LEG: ICD-10-CM

## 2024-10-15 DIAGNOSIS — F03.94 ANXIETY DUE TO DEMENTIA (MULTI): ICD-10-CM

## 2024-10-15 DIAGNOSIS — R06.00 DYSPNEA, UNSPECIFIED TYPE: Primary | ICD-10-CM

## 2024-10-15 DIAGNOSIS — I82.90 VENOUS THROMBOEMBOLISM (VTE): ICD-10-CM

## 2024-10-15 DIAGNOSIS — J81.0 ACUTE PULMONARY EDEMA: ICD-10-CM

## 2024-10-15 DIAGNOSIS — I50.9 ACUTE ON CHRONIC HEART FAILURE, UNSPECIFIED HEART FAILURE TYPE: ICD-10-CM

## 2024-10-15 DIAGNOSIS — J18.9 PNEUMONIA DUE TO INFECTIOUS ORGANISM, UNSPECIFIED LATERALITY, UNSPECIFIED PART OF LUNG: ICD-10-CM

## 2024-10-15 DIAGNOSIS — J96.21 ACUTE AND CHRONIC RESPIRATORY FAILURE WITH HYPOXIA (MULTI): ICD-10-CM

## 2024-10-15 LAB
ABO GROUP (TYPE) IN BLOOD: NORMAL
ALBUMIN SERPL BCP-MCNC: 4.3 G/DL (ref 3.4–5)
ALP SERPL-CCNC: 77 U/L (ref 33–136)
ALT SERPL W P-5'-P-CCNC: 21 U/L (ref 7–45)
ANION GAP BLDA CALCULATED.4IONS-SCNC: 4 MMO/L (ref 10–25)
ANION GAP BLDV CALCULATED.4IONS-SCNC: 7 MMOL/L (ref 10–25)
ANION GAP SERPL CALCULATED.3IONS-SCNC: 12 MMOL/L (ref 10–20)
ANTIBODY SCREEN: NORMAL
APPEARANCE UR: ABNORMAL
ARTERIAL PATENCY WRIST A: ABNORMAL
AST SERPL W P-5'-P-CCNC: 28 U/L (ref 9–39)
BASE EXCESS BLDA CALC-SCNC: 13 MMOL/L (ref -2–3)
BASE EXCESS BLDV CALC-SCNC: 10.5 MMOL/L (ref -2–3)
BASOPHILS # BLD AUTO: 0.05 X10*3/UL (ref 0–0.1)
BASOPHILS NFR BLD AUTO: 0.5 %
BILIRUB SERPL-MCNC: 1.2 MG/DL (ref 0–1.2)
BILIRUB UR STRIP.AUTO-MCNC: NEGATIVE MG/DL
BNP SERPL-MCNC: 1229 PG/ML (ref 0–99)
BODY TEMPERATURE: 37 DEGREES CELSIUS
BODY TEMPERATURE: 37 DEGREES CELSIUS
BUN SERPL-MCNC: 23 MG/DL (ref 6–23)
CA-I BLDA-SCNC: 1.07 MMOL/L (ref 1.1–1.33)
CA-I BLDV-SCNC: 1.1 MMOL/L (ref 1.1–1.33)
CALCIUM SERPL-MCNC: 9.1 MG/DL (ref 8.6–10.3)
CARDIAC TROPONIN I PNL SERPL HS: 26 NG/L (ref 0–13)
CARDIAC TROPONIN I PNL SERPL HS: 30 NG/L (ref 0–13)
CARDIAC TROPONIN I PNL SERPL HS: 56 NG/L (ref 0–13)
CARDIAC TROPONIN I PNL SERPL HS: 61 NG/L (ref 0–13)
CARDIAC TROPONIN I PNL SERPL HS: 63 NG/L (ref 0–13)
CHLORIDE BLDA-SCNC: 101 MMOL/L (ref 98–107)
CHLORIDE BLDV-SCNC: 101 MMOL/L (ref 98–107)
CHLORIDE SERPL-SCNC: 99 MMOL/L (ref 98–107)
CO2 SERPL-SCNC: 35 MMOL/L (ref 21–32)
COLOR UR: YELLOW
CREAT SERPL-MCNC: 1.32 MG/DL (ref 0.5–1.05)
EGFRCR SERPLBLD CKD-EPI 2021: 40 ML/MIN/1.73M*2
EOSINOPHIL # BLD AUTO: 0.04 X10*3/UL (ref 0–0.4)
EOSINOPHIL NFR BLD AUTO: 0.4 %
EPAP CMH2O: 5 CM H2O
ERYTHROCYTE [DISTWIDTH] IN BLOOD BY AUTOMATED COUNT: 17.8 % (ref 11.5–14.5)
EST. AVERAGE GLUCOSE BLD GHB EST-MCNC: 91 MG/DL
FLUAV RNA RESP QL NAA+PROBE: NOT DETECTED
FLUBV RNA RESP QL NAA+PROBE: NOT DETECTED
GLUCOSE BLDA-MCNC: 174 MG/DL (ref 74–99)
GLUCOSE BLDV-MCNC: 196 MG/DL (ref 74–99)
GLUCOSE SERPL-MCNC: 195 MG/DL (ref 74–99)
GLUCOSE UR STRIP.AUTO-MCNC: NORMAL MG/DL
HBA1C MFR BLD: 4.8 %
HCO3 BLDA-SCNC: 38.4 MMOL/L (ref 22–26)
HCO3 BLDV-SCNC: 37.8 MMOL/L (ref 22–26)
HCT VFR BLD AUTO: 33.6 % (ref 36–46)
HCT VFR BLD EST: 26 % (ref 36–46)
HCT VFR BLD EST: 42 % (ref 36–46)
HGB BLD-MCNC: 9.4 G/DL (ref 12–16)
HGB BLDA-MCNC: 8.5 G/DL (ref 12–16)
HGB BLDV-MCNC: 13.9 G/DL (ref 12–16)
HYALINE CASTS #/AREA URNS AUTO: ABNORMAL /LPF
IMM GRANULOCYTES # BLD AUTO: 0.06 X10*3/UL (ref 0–0.5)
IMM GRANULOCYTES NFR BLD AUTO: 0.6 % (ref 0–0.9)
INHALED O2 CONCENTRATION: 100 %
INHALED O2 CONCENTRATION: 50 %
INR PPP: 1.2 (ref 0.9–1.2)
IPAP CMH2O: 15 CM H2O
KETONES UR STRIP.AUTO-MCNC: NEGATIVE MG/DL
LACTATE BLDA-SCNC: 1.1 MMOL/L (ref 0.4–2)
LACTATE BLDV-SCNC: 1.2 MMOL/L (ref 0.4–2)
LEUKOCYTE ESTERASE UR QL STRIP.AUTO: ABNORMAL
LYMPHOCYTES # BLD AUTO: 1.98 X10*3/UL (ref 0.8–3)
LYMPHOCYTES NFR BLD AUTO: 19.4 %
MAGNESIUM SERPL-MCNC: 1.75 MG/DL (ref 1.6–2.4)
MCH RBC QN AUTO: 24.6 PG (ref 26–34)
MCHC RBC AUTO-ENTMCNC: 28 G/DL (ref 32–36)
MCV RBC AUTO: 88 FL (ref 80–100)
MONOCYTES # BLD AUTO: 0.57 X10*3/UL (ref 0.05–0.8)
MONOCYTES NFR BLD AUTO: 5.6 %
MUCOUS THREADS #/AREA URNS AUTO: ABNORMAL /LPF
NEUTROPHILS # BLD AUTO: 7.5 X10*3/UL (ref 1.6–5.5)
NEUTROPHILS NFR BLD AUTO: 73.5 %
NITRITE UR QL STRIP.AUTO: NEGATIVE
NRBC BLD-RTO: 0 /100 WBCS (ref 0–0)
OXYHGB MFR BLDA: 96.8 % (ref 94–98)
OXYHGB MFR BLDV: 90.4 % (ref 45–75)
PCO2 BLDA: 54 MM HG (ref 38–42)
PCO2 BLDV: 61 MM HG (ref 41–51)
PH BLDA: 7.46 PH (ref 7.38–7.42)
PH BLDV: 7.4 PH (ref 7.33–7.43)
PH UR STRIP.AUTO: 5.5 [PH]
PLATELET # BLD AUTO: 226 X10*3/UL (ref 150–450)
PO2 BLDA: 139 MM HG (ref 85–95)
PO2 BLDV: 77 MM HG (ref 35–45)
POTASSIUM BLDA-SCNC: 3.6 MMOL/L (ref 3.5–5.3)
POTASSIUM BLDV-SCNC: 4.2 MMOL/L (ref 3.5–5.3)
POTASSIUM SERPL-SCNC: 4 MMOL/L (ref 3.5–5.3)
PROT SERPL-MCNC: 7.2 G/DL (ref 6.4–8.2)
PROT UR STRIP.AUTO-MCNC: ABNORMAL MG/DL
PROTHROMBIN TIME: 12.3 SECONDS (ref 9.3–12.7)
RBC # BLD AUTO: 3.82 X10*6/UL (ref 4–5.2)
RBC # UR STRIP.AUTO: ABNORMAL /UL
RBC #/AREA URNS AUTO: ABNORMAL /HPF
RH FACTOR (ANTIGEN D): NORMAL
RSV RNA RESP QL NAA+PROBE: NOT DETECTED
SAO2 % BLDA: 99 % (ref 94–100)
SAO2 % BLDV: 99 % (ref 45–75)
SARS-COV-2 RNA RESP QL NAA+PROBE: NOT DETECTED
SODIUM BLDA-SCNC: 140 MMOL/L (ref 136–145)
SODIUM BLDV-SCNC: 142 MMOL/L (ref 136–145)
SODIUM SERPL-SCNC: 142 MMOL/L (ref 136–145)
SP GR UR STRIP.AUTO: 1.01
SPECIMEN DRAWN FROM PATIENT: ABNORMAL
SQUAMOUS #/AREA URNS AUTO: ABNORMAL /HPF
T4 FREE SERPL-MCNC: 0.62 NG/DL (ref 0.61–1.12)
TSH SERPL-ACNC: 17.22 MIU/L (ref 0.44–3.98)
UROBILINOGEN UR STRIP.AUTO-MCNC: NORMAL MG/DL
VENTILATOR MODE: ABNORMAL
VENTILATOR RATE: 16 BPM
WBC # BLD AUTO: 10.2 X10*3/UL (ref 4.4–11.3)
WBC #/AREA URNS AUTO: ABNORMAL /HPF
WBC CLUMPS #/AREA URNS AUTO: ABNORMAL /HPF

## 2024-10-15 PROCEDURE — 93010 ELECTROCARDIOGRAM REPORT: CPT | Performed by: INTERNAL MEDICINE

## 2024-10-15 PROCEDURE — 83036 HEMOGLOBIN GLYCOSYLATED A1C: CPT | Mod: TRILAB,WESLAB | Performed by: INTERNAL MEDICINE

## 2024-10-15 PROCEDURE — 85025 COMPLETE CBC W/AUTO DIFF WBC: CPT | Performed by: STUDENT IN AN ORGANIZED HEALTH CARE EDUCATION/TRAINING PROGRAM

## 2024-10-15 PROCEDURE — 84443 ASSAY THYROID STIM HORMONE: CPT | Performed by: INTERNAL MEDICINE

## 2024-10-15 PROCEDURE — 2500000005 HC RX 250 GENERAL PHARMACY W/O HCPCS: Performed by: INTERNAL MEDICINE

## 2024-10-15 PROCEDURE — 99291 CRITICAL CARE FIRST HOUR: CPT | Mod: 25 | Performed by: STUDENT IN AN ORGANIZED HEALTH CARE EDUCATION/TRAINING PROGRAM

## 2024-10-15 PROCEDURE — 83880 ASSAY OF NATRIURETIC PEPTIDE: CPT | Performed by: STUDENT IN AN ORGANIZED HEALTH CARE EDUCATION/TRAINING PROGRAM

## 2024-10-15 PROCEDURE — 2500000001 HC RX 250 WO HCPCS SELF ADMINISTERED DRUGS (ALT 637 FOR MEDICARE OP): Performed by: INTERNAL MEDICINE

## 2024-10-15 PROCEDURE — 84439 ASSAY OF FREE THYROXINE: CPT | Performed by: INTERNAL MEDICINE

## 2024-10-15 PROCEDURE — 93005 ELECTROCARDIOGRAM TRACING: CPT

## 2024-10-15 PROCEDURE — 82330 ASSAY OF CALCIUM: CPT | Performed by: STUDENT IN AN ORGANIZED HEALTH CARE EDUCATION/TRAINING PROGRAM

## 2024-10-15 PROCEDURE — 2500000002 HC RX 250 W HCPCS SELF ADMINISTERED DRUGS (ALT 637 FOR MEDICARE OP, ALT 636 FOR OP/ED): Performed by: INTERNAL MEDICINE

## 2024-10-15 PROCEDURE — 96374 THER/PROPH/DIAG INJ IV PUSH: CPT | Mod: 59

## 2024-10-15 PROCEDURE — 85610 PROTHROMBIN TIME: CPT | Performed by: INTERNAL MEDICINE

## 2024-10-15 PROCEDURE — 84484 ASSAY OF TROPONIN QUANT: CPT | Performed by: INTERNAL MEDICINE

## 2024-10-15 PROCEDURE — 9420000001 HC RT PATIENT EDUCATION 5 MIN

## 2024-10-15 PROCEDURE — 71045 X-RAY EXAM CHEST 1 VIEW: CPT

## 2024-10-15 PROCEDURE — 5A09357 ASSISTANCE WITH RESPIRATORY VENTILATION, LESS THAN 24 CONSECUTIVE HOURS, CONTINUOUS POSITIVE AIRWAY PRESSURE: ICD-10-PCS | Performed by: STUDENT IN AN ORGANIZED HEALTH CARE EDUCATION/TRAINING PROGRAM

## 2024-10-15 PROCEDURE — 86738 MYCOPLASMA ANTIBODY: CPT | Performed by: INTERNAL MEDICINE

## 2024-10-15 PROCEDURE — 71045 X-RAY EXAM CHEST 1 VIEW: CPT | Performed by: RADIOLOGY

## 2024-10-15 PROCEDURE — 87636 SARSCOV2 & INF A&B AMP PRB: CPT | Performed by: INTERNAL MEDICINE

## 2024-10-15 PROCEDURE — 82435 ASSAY OF BLOOD CHLORIDE: CPT | Performed by: NURSE PRACTITIONER

## 2024-10-15 PROCEDURE — 96365 THER/PROPH/DIAG IV INF INIT: CPT

## 2024-10-15 PROCEDURE — 84484 ASSAY OF TROPONIN QUANT: CPT | Performed by: STUDENT IN AN ORGANIZED HEALTH CARE EDUCATION/TRAINING PROGRAM

## 2024-10-15 PROCEDURE — 87449 NOS EACH ORGANISM AG IA: CPT | Mod: TRILAB,WESLAB | Performed by: INTERNAL MEDICINE

## 2024-10-15 PROCEDURE — 83735 ASSAY OF MAGNESIUM: CPT | Performed by: INTERNAL MEDICINE

## 2024-10-15 PROCEDURE — 94660 CPAP INITIATION&MGMT: CPT

## 2024-10-15 PROCEDURE — 99222 1ST HOSP IP/OBS MODERATE 55: CPT

## 2024-10-15 PROCEDURE — 36415 COLL VENOUS BLD VENIPUNCTURE: CPT | Performed by: STUDENT IN AN ORGANIZED HEALTH CARE EDUCATION/TRAINING PROGRAM

## 2024-10-15 PROCEDURE — 81001 URINALYSIS AUTO W/SCOPE: CPT | Performed by: INTERNAL MEDICINE

## 2024-10-15 PROCEDURE — 96368 THER/DIAG CONCURRENT INF: CPT

## 2024-10-15 PROCEDURE — 99222 1ST HOSP IP/OBS MODERATE 55: CPT | Performed by: NURSE PRACTITIONER

## 2024-10-15 PROCEDURE — 87081 CULTURE SCREEN ONLY: CPT | Mod: TRILAB,WESLAB | Performed by: NURSE PRACTITIONER

## 2024-10-15 PROCEDURE — 92610 EVALUATE SWALLOWING FUNCTION: CPT | Mod: GN

## 2024-10-15 PROCEDURE — 2060000001 HC INTERMEDIATE ICU ROOM DAILY

## 2024-10-15 PROCEDURE — 87040 BLOOD CULTURE FOR BACTERIA: CPT | Mod: TRILAB | Performed by: STUDENT IN AN ORGANIZED HEALTH CARE EDUCATION/TRAINING PROGRAM

## 2024-10-15 PROCEDURE — 2500000004 HC RX 250 GENERAL PHARMACY W/ HCPCS (ALT 636 FOR OP/ED): Mod: JZ | Performed by: INTERNAL MEDICINE

## 2024-10-15 PROCEDURE — 86901 BLOOD TYPING SEROLOGIC RH(D): CPT | Performed by: INTERNAL MEDICINE

## 2024-10-15 PROCEDURE — 87899 AGENT NOS ASSAY W/OPTIC: CPT | Mod: TRILAB,WESLAB | Performed by: INTERNAL MEDICINE

## 2024-10-15 PROCEDURE — 36600 WITHDRAWAL OF ARTERIAL BLOOD: CPT

## 2024-10-15 PROCEDURE — 99223 1ST HOSP IP/OBS HIGH 75: CPT | Performed by: INTERNAL MEDICINE

## 2024-10-15 PROCEDURE — 87634 RSV DNA/RNA AMP PROBE: CPT | Performed by: INTERNAL MEDICINE

## 2024-10-15 PROCEDURE — 2500000004 HC RX 250 GENERAL PHARMACY W/ HCPCS (ALT 636 FOR OP/ED): Performed by: STUDENT IN AN ORGANIZED HEALTH CARE EDUCATION/TRAINING PROGRAM

## 2024-10-15 PROCEDURE — 94640 AIRWAY INHALATION TREATMENT: CPT

## 2024-10-15 PROCEDURE — 82435 ASSAY OF BLOOD CHLORIDE: CPT | Performed by: STUDENT IN AN ORGANIZED HEALTH CARE EDUCATION/TRAINING PROGRAM

## 2024-10-15 RX ORDER — IPRATROPIUM BROMIDE AND ALBUTEROL SULFATE 2.5; .5 MG/3ML; MG/3ML
3 SOLUTION RESPIRATORY (INHALATION)
Status: DISCONTINUED | OUTPATIENT
Start: 2024-10-16 | End: 2024-10-17

## 2024-10-15 RX ORDER — DICLOFENAC SODIUM 10 MG/G
4 GEL TOPICAL 3 TIMES DAILY PRN
Status: DISCONTINUED | OUTPATIENT
Start: 2024-10-15 | End: 2024-10-22 | Stop reason: HOSPADM

## 2024-10-15 RX ORDER — ACETAMINOPHEN 160 MG/5ML
650 SOLUTION ORAL EVERY 4 HOURS PRN
Status: DISCONTINUED | OUTPATIENT
Start: 2024-10-15 | End: 2024-10-22 | Stop reason: HOSPADM

## 2024-10-15 RX ORDER — FUROSEMIDE 10 MG/ML
40 INJECTION INTRAMUSCULAR; INTRAVENOUS ONCE
Status: COMPLETED | OUTPATIENT
Start: 2024-10-15 | End: 2024-10-15

## 2024-10-15 RX ORDER — CEFTRIAXONE 1 G/50ML
1 INJECTION, SOLUTION INTRAVENOUS ONCE
Status: COMPLETED | OUTPATIENT
Start: 2024-10-15 | End: 2024-10-15

## 2024-10-15 RX ORDER — PANTOPRAZOLE SODIUM 40 MG/10ML
40 INJECTION, POWDER, LYOPHILIZED, FOR SOLUTION INTRAVENOUS
Status: DISCONTINUED | OUTPATIENT
Start: 2024-10-16 | End: 2024-10-22 | Stop reason: HOSPADM

## 2024-10-15 RX ORDER — DOCUSATE SODIUM 100 MG/1
100 CAPSULE, LIQUID FILLED ORAL 2 TIMES DAILY PRN
Status: DISCONTINUED | OUTPATIENT
Start: 2024-10-15 | End: 2024-10-17

## 2024-10-15 RX ORDER — NYSTATIN 100000 [USP'U]/G
1 POWDER TOPICAL 2 TIMES DAILY
Status: DISCONTINUED | OUTPATIENT
Start: 2024-10-15 | End: 2024-10-22 | Stop reason: HOSPADM

## 2024-10-15 RX ORDER — LEVOTHYROXINE SODIUM 25 UG/1
25 TABLET ORAL DAILY
Status: DISCONTINUED | OUTPATIENT
Start: 2024-10-15 | End: 2024-10-22 | Stop reason: HOSPADM

## 2024-10-15 RX ORDER — IPRATROPIUM BROMIDE AND ALBUTEROL SULFATE 2.5; .5 MG/3ML; MG/3ML
3 SOLUTION RESPIRATORY (INHALATION)
Status: DISCONTINUED | OUTPATIENT
Start: 2024-10-15 | End: 2024-10-15

## 2024-10-15 RX ORDER — PANTOPRAZOLE SODIUM 40 MG/1
40 TABLET, DELAYED RELEASE ORAL
Status: DISCONTINUED | OUTPATIENT
Start: 2024-10-16 | End: 2024-10-22 | Stop reason: HOSPADM

## 2024-10-15 RX ORDER — IPRATROPIUM BROMIDE AND ALBUTEROL SULFATE 2.5; .5 MG/3ML; MG/3ML
3 SOLUTION RESPIRATORY (INHALATION) EVERY 2 HOUR PRN
Status: DISCONTINUED | OUTPATIENT
Start: 2024-10-15 | End: 2024-10-19

## 2024-10-15 RX ORDER — BISACODYL 5 MG
5 TABLET, DELAYED RELEASE (ENTERIC COATED) ORAL DAILY PRN
Status: DISCONTINUED | OUTPATIENT
Start: 2024-10-15 | End: 2024-10-22 | Stop reason: HOSPADM

## 2024-10-15 RX ORDER — ACETAMINOPHEN 325 MG/1
650 TABLET ORAL EVERY 4 HOURS PRN
Status: DISCONTINUED | OUTPATIENT
Start: 2024-10-15 | End: 2024-10-22 | Stop reason: HOSPADM

## 2024-10-15 RX ORDER — ATORVASTATIN CALCIUM 40 MG/1
40 TABLET, FILM COATED ORAL NIGHTLY
Status: DISCONTINUED | OUTPATIENT
Start: 2024-10-15 | End: 2024-10-22 | Stop reason: HOSPADM

## 2024-10-15 RX ORDER — CARVEDILOL 3.12 MG/1
3.25 TABLET ORAL 2 TIMES DAILY
Status: DISCONTINUED | OUTPATIENT
Start: 2024-10-15 | End: 2024-10-16

## 2024-10-15 RX ORDER — ACETAMINOPHEN 650 MG/1
650 SUPPOSITORY RECTAL EVERY 4 HOURS PRN
Status: DISCONTINUED | OUTPATIENT
Start: 2024-10-15 | End: 2024-10-22 | Stop reason: HOSPADM

## 2024-10-15 RX ORDER — GABAPENTIN 100 MG/1
100 CAPSULE ORAL NIGHTLY
Status: DISCONTINUED | OUTPATIENT
Start: 2024-10-15 | End: 2024-10-22 | Stop reason: HOSPADM

## 2024-10-15 RX ORDER — NITROGLYCERIN 0.4 MG/1
0.4 TABLET SUBLINGUAL EVERY 5 MIN PRN
Status: DISCONTINUED | OUTPATIENT
Start: 2024-10-15 | End: 2024-10-22 | Stop reason: HOSPADM

## 2024-10-15 SDOH — SOCIAL STABILITY: SOCIAL INSECURITY: ABUSE: ADULT

## 2024-10-15 SDOH — HEALTH STABILITY: MENTAL HEALTH
DO YOU FEEL STRESS - TENSE, RESTLESS, NERVOUS, OR ANXIOUS, OR UNABLE TO SLEEP AT NIGHT BECAUSE YOUR MIND IS TROUBLED ALL THE TIME - THESE DAYS?: ONLY A LITTLE

## 2024-10-15 SDOH — SOCIAL STABILITY: SOCIAL INSECURITY: ARE YOU MARRIED, WIDOWED, DIVORCED, SEPARATED, NEVER MARRIED, OR LIVING WITH A PARTNER?: MARRIED

## 2024-10-15 SDOH — HEALTH STABILITY: PHYSICAL HEALTH: ON AVERAGE, HOW MANY MINUTES DO YOU ENGAGE IN EXERCISE AT THIS LEVEL?: 0 MIN

## 2024-10-15 SDOH — SOCIAL STABILITY: SOCIAL INSECURITY: DO YOU FEEL ANYONE HAS EXPLOITED OR TAKEN ADVANTAGE OF YOU FINANCIALLY OR OF YOUR PERSONAL PROPERTY?: NO

## 2024-10-15 SDOH — SOCIAL STABILITY: SOCIAL INSECURITY: WITHIN THE LAST YEAR, HAVE YOU BEEN AFRAID OF YOUR PARTNER OR EX-PARTNER?: NO

## 2024-10-15 SDOH — ECONOMIC STABILITY: FOOD INSECURITY: WITHIN THE PAST 12 MONTHS, THE FOOD YOU BOUGHT JUST DIDN'T LAST AND YOU DIDN'T HAVE MONEY TO GET MORE.: NEVER TRUE

## 2024-10-15 SDOH — ECONOMIC STABILITY: FOOD INSECURITY: WITHIN THE PAST 12 MONTHS, YOU WORRIED THAT YOUR FOOD WOULD RUN OUT BEFORE YOU GOT THE MONEY TO BUY MORE.: NEVER TRUE

## 2024-10-15 SDOH — SOCIAL STABILITY: SOCIAL INSECURITY: HAVE YOU HAD THOUGHTS OF HARMING ANYONE ELSE?: NO

## 2024-10-15 SDOH — SOCIAL STABILITY: SOCIAL INSECURITY
WITHIN THE LAST YEAR, HAVE YOU BEEN RAPED OR FORCED TO HAVE ANY KIND OF SEXUAL ACTIVITY BY YOUR PARTNER OR EX-PARTNER?: NO

## 2024-10-15 SDOH — ECONOMIC STABILITY: INCOME INSECURITY: IN THE PAST 12 MONTHS HAS THE ELECTRIC, GAS, OIL, OR WATER COMPANY THREATENED TO SHUT OFF SERVICES IN YOUR HOME?: NO

## 2024-10-15 SDOH — SOCIAL STABILITY: SOCIAL INSECURITY: ARE YOU OR HAVE YOU BEEN THREATENED OR ABUSED PHYSICALLY, EMOTIONALLY, OR SEXUALLY BY ANYONE?: NO

## 2024-10-15 SDOH — SOCIAL STABILITY: SOCIAL INSECURITY: DO YOU FEEL UNSAFE GOING BACK TO THE PLACE WHERE YOU ARE LIVING?: NO

## 2024-10-15 SDOH — SOCIAL STABILITY: SOCIAL NETWORK: HOW OFTEN DO YOU ATTEND CHURCH OR RELIGIOUS SERVICES?: MORE THAN 4 TIMES PER YEAR

## 2024-10-15 SDOH — SOCIAL STABILITY: SOCIAL INSECURITY: WITHIN THE LAST YEAR, HAVE YOU BEEN HUMILIATED OR EMOTIONALLY ABUSED IN OTHER WAYS BY YOUR PARTNER OR EX-PARTNER?: NO

## 2024-10-15 SDOH — SOCIAL STABILITY: SOCIAL INSECURITY: ARE THERE ANY APPARENT SIGNS OF INJURIES/BEHAVIORS THAT COULD BE RELATED TO ABUSE/NEGLECT?: NO

## 2024-10-15 SDOH — SOCIAL STABILITY: SOCIAL NETWORK: HOW OFTEN DO YOU ATTEND MEETINGS OF THE CLUBS OR ORGANIZATIONS YOU BELONG TO?: NEVER

## 2024-10-15 SDOH — SOCIAL STABILITY: SOCIAL INSECURITY: HAS ANYONE EVER THREATENED TO HURT YOUR FAMILY OR YOUR PETS?: NO

## 2024-10-15 SDOH — HEALTH STABILITY: PHYSICAL HEALTH: ON AVERAGE, HOW MANY DAYS PER WEEK DO YOU ENGAGE IN MODERATE TO STRENUOUS EXERCISE (LIKE A BRISK WALK)?: 0 DAYS

## 2024-10-15 SDOH — SOCIAL STABILITY: SOCIAL NETWORK
DO YOU BELONG TO ANY CLUBS OR ORGANIZATIONS SUCH AS CHURCH GROUPS, UNIONS, FRATERNAL OR ATHLETIC GROUPS, OR SCHOOL GROUPS?: NO

## 2024-10-15 SDOH — SOCIAL STABILITY: SOCIAL NETWORK: HOW OFTEN DO YOU GET TOGETHER WITH FRIENDS OR RELATIVES?: MORE THAN THREE TIMES A WEEK

## 2024-10-15 SDOH — SOCIAL STABILITY: SOCIAL INSECURITY: HAVE YOU HAD ANY THOUGHTS OF HARMING ANYONE ELSE?: NO

## 2024-10-15 SDOH — SOCIAL STABILITY: SOCIAL INSECURITY: WERE YOU ABLE TO COMPLETE ALL THE BEHAVIORAL HEALTH SCREENINGS?: YES

## 2024-10-15 SDOH — SOCIAL STABILITY: SOCIAL INSECURITY: DOES ANYONE TRY TO KEEP YOU FROM HAVING/CONTACTING OTHER FRIENDS OR DOING THINGS OUTSIDE YOUR HOME?: NO

## 2024-10-15 ASSESSMENT — COGNITIVE AND FUNCTIONAL STATUS - GENERAL
DRESSING REGULAR UPPER BODY CLOTHING: A LITTLE
HELP NEEDED FOR BATHING: A LITTLE
PERSONAL GROOMING: A LITTLE
DRESSING REGULAR LOWER BODY CLOTHING: A LITTLE
PATIENT BASELINE BEDBOUND: NO
DAILY ACTIVITIY SCORE: 24
MOVING TO AND FROM BED TO CHAIR: A LITTLE
DAILY ACTIVITIY SCORE: 19
MOBILITY SCORE: 24
MOBILITY SCORE: 22
TURNING FROM BACK TO SIDE WHILE IN FLAT BAD: A LITTLE
TOILETING: A LITTLE

## 2024-10-15 ASSESSMENT — LIFESTYLE VARIABLES
HOW OFTEN DO YOU HAVE A DRINK CONTAINING ALCOHOL: NEVER
AUDIT-C TOTAL SCORE: 0
HOW MANY STANDARD DRINKS CONTAINING ALCOHOL DO YOU HAVE ON A TYPICAL DAY: PATIENT DOES NOT DRINK
HOW OFTEN DO YOU HAVE 6 OR MORE DRINKS ON ONE OCCASION: NEVER
SKIP TO QUESTIONS 9-10: 1
AUDIT-C TOTAL SCORE: 0

## 2024-10-15 ASSESSMENT — ENCOUNTER SYMPTOMS
CHEST TIGHTNESS: 1
NEUROLOGICAL NEGATIVE: 1
ALLERGIC/IMMUNOLOGIC NEGATIVE: 1
GASTROINTESTINAL NEGATIVE: 1
PSYCHIATRIC NEGATIVE: 1
HEMATOLOGIC/LYMPHATIC NEGATIVE: 1
SHORTNESS OF BREATH: 1
EYES NEGATIVE: 1
ENDOCRINE NEGATIVE: 1
MUSCULOSKELETAL NEGATIVE: 1
CONSTITUTIONAL NEGATIVE: 1
PALPITATIONS: 0

## 2024-10-15 ASSESSMENT — ACTIVITIES OF DAILY LIVING (ADL)
LACK_OF_TRANSPORTATION: NO
BATHING: NEEDS ASSISTANCE
WALKS IN HOME: NEEDS ASSISTANCE
ADEQUATE_TO_COMPLETE_ADL: YES
HEARING - LEFT EAR: FUNCTIONAL
FEEDING YOURSELF: NEEDS ASSISTANCE
JUDGMENT_ADEQUATE_SAFELY_COMPLETE_DAILY_ACTIVITIES: NO
DRESSING YOURSELF: NEEDS ASSISTANCE
PATIENT'S MEMORY ADEQUATE TO SAFELY COMPLETE DAILY ACTIVITIES?: NO
TOILETING: NEEDS ASSISTANCE
GROOMING: NEEDS ASSISTANCE
HEARING - RIGHT EAR: FUNCTIONAL

## 2024-10-15 ASSESSMENT — PAIN DESCRIPTION - LOCATION: LOCATION: LEG

## 2024-10-15 ASSESSMENT — PAIN SCALES - GENERAL
PAINLEVEL_OUTOF10: 0 - NO PAIN
PAINLEVEL_OUTOF10: 2
PAINLEVEL_OUTOF10: 0 - NO PAIN
PAINLEVEL_OUTOF10: 0 - NO PAIN

## 2024-10-15 ASSESSMENT — PAIN DESCRIPTION - ORIENTATION: ORIENTATION: RIGHT;LEFT

## 2024-10-15 ASSESSMENT — PAIN - FUNCTIONAL ASSESSMENT
PAIN_FUNCTIONAL_ASSESSMENT: 0-10

## 2024-10-15 NOTE — CONSULTS
Reason For Consult  Failure question A-fib question mitral regurg check echo troponins rule out MI     History Of Present Illness  Anitha Welch is a 84 y.o. female presenting with shortness of breath. She follows with Dr. Foss for cardiology. She has a past medical history of mitral valve regurgitation s/p mitral transcatheter [id number] repair on 4/25/2024, heart failure, hypertensive disorder, coronary artery disease, chronic obstructive pulmonary disease on o2 at baseline, cerebrovascular accident and hypothyroidism.  Patient was significantly short of breath this morning so she checked her pulse ox and it was below 90, this prompted her to call EMS.  When they arrived she was evidently hypoxic.  She was placed on BiPAP in the ED which improved her shortness of breath to the point where she was actually able to converse.  Chest x-ray demonstrates pneumonia versus [place] edema.  proBNP was elevated at 29.  High-sensitivity troponins were found to be 30, 26 and 63.  Other lab work reveals a sodium of 132, potassium 4.0 bicarb of 35, creatinine 1.32 BC 10.2, hemoglobin 9.6.  Viral swabs are pending.  EKG demonstrates sinus arrhythmia with PVCs.  She was started on IV Lasix and IV antibiotics and admitted for further evaluation.  We are consulted for questionable A-fib, questionable heart failure elevated troponins and mitral regurgitation.  .  Past Medical History  She has a past medical history of AAA (abdominal aortic aneurysm) (CMS-[facility]), Acute urinary tract infection (04/20/2023), Anemia, Anxiety, Arthritis, CHF (congestive heart failure), Chronic pain disorder, CKD (chronic kidney disease), Cognitive decline, COPD (chronic obstructive pulmonary disease) (Multi), Coronary artery disease, CVA (cerebral vascular accident) (Multi), Deep vein thrombosis (DVT) of calf (Multi), Depression, Disease of thyroid gland, Diverticulosis, DVT (deep venous thrombosis) (Multi), Easy bruising, Epistaxis, GERD  (gastroesophageal reflux disease), History of blood transfusion (2023), History of degenerative disc disease, Hyperlipidemia, Hypertension, Hypothyroidism, Ischemic cardiomyopathy, Meralgia paresthetica, Nonrheumatic mitral valve regurgitation, Osteoarthritis, Osteopenia (2010), Plantar fasciitis, RLS (restless legs syndrome), Sciatica, Spinal stenosis, and ST elevation (STEMI) myocardial infarction (Multi).    Surgical History  She has a past surgical history that includes MR angio head wo IV contrast (02/24/2017); MR angio head wo IV contrast (08/11/2017); MR angio head wo IV contrast (02/10/2019); Cholecystectomy (1996); Tonsillectomy; pr arthrp acetblr/prox fem prostc agrft/algrft; Thyroidectomy, partial (Bilateral, 04/17/2013); Coronary stent placement; Knee Arthroplasty (Left); Total hip arthroplasty (Right, 2006); Dilation and curettage of uterus; Other surgical history (01/09/2019); Upper gastrointestinal endoscopy (03/2019); Cardiac catheterization (10/2019); Cataract extraction (Bilateral, 11/13/2012); Adenoidectomy; Cyst Removal (Right, 06/24/2013); Colonoscopy; Other surgical history (01/09/2019); surgical laverne monitoring; Cardiac catheterization (N/A, 02/16/2024); Esophagogastroduodenoscopy; and Anomalous pulmonary venous return repair, total (N/A, 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     Social History  She reports that she quit smoking about 10 years ago. Her smoking use included cigarettes. She has never been exposed to tobacco smoke. She has never used smokeless tobacco. She reports that she does not currently use alcohol. She reports that she does not use drugs.    Family History  Family History   Problem Relation Name Age of Onset    Hyperthyroidism Mother          Treated with radioactive iodine    Hypertension Mother      Heart disease Mother      Hyperthyroidism Sibling      Diabetes Father's Sister          Allergies  Amoxicillin, Iodinated contrast media, Ciprofloxacin, Influenza virus vaccines, Diphenhydramine, Flu  "vac 2023 [id number](pf), and Other    Review of Systems  Review of Systems   Constitutional: Negative.    HENT: Negative.     Respiratory:  Positive for chest tightness and shortness of breath.    Cardiovascular:  Negative for chest pain, palpitations and leg swelling.   Gastrointestinal: Negative.    Endocrine: Negative.    Genitourinary: Negative.    Musculoskeletal: Negative.    Allergic/Immunologic: Negative.    Neurological: Negative.    Hematological: Negative.    Psychiatric/Behavioral: Negative.     All other systems reviewed and are negative.        Physical Exam  Physical Exam  Constitutional:       Appearance: She is ill-appearing.   HENT:      Head: Normocephalic.   Cardiovascular:      Rate and Rhythm: Normal rate and regular rhythm.      Pulses: Normal pulses.      Heart sounds: Murmur heard.   Pulmonary:      Effort: Pulmonary effort is normal. No respiratory distress.      Breath sounds: Wheezing and rales present.   Musculoskeletal:      Right lower leg: No edema.      Left lower leg: No edema.   Skin:     Capillary Refill: Capillary refill takes less than 2 seconds.   Neurological:      Mental Status: She is alert and oriented to person, place, and time.           Last Recorded Vitals  Blood pressure 139/67, pulse 94, temperature 37.1 °C (98.7 °F), temperature source Axillary, resp. rate (!) 31, height 1.626 m (5' 4\"), weight 63.5 kg (140 lb), SpO2 98%.    Relevant Results  Results for orders placed or performed during the hospital encounter of 10/15/24 (from the past 24 hour(s))   CBC and Auto Differential   Result Value Ref Range    WBC 10.2 4.4 - 11.3 x10*3/uL    nRBC 0.0 0.0 - 0.0 /100 WBCs    RBC 3.82 (L) 4.00 - 5.20 x10*6/uL    Hemoglobin 9.4 (L) 12.0 - 16.0 g/dL    Hematocrit 33.6 (L) 36.0 - 46.0 %    MCV 88 80 - 100 fL    MCH 24.6 (L) 26.0 - 34.0 pg    MCHC 28.0 (L) 32.0 - 36.0 g/dL    RDW 17.8 (H) 11.5 - 14.5 %    Platelets 226 150 - 450 x10*3/uL    Neutrophils % 73.5 40.0 - 80.0 %    " Immature Granulocytes %, Automated 0.6 0.0 - 0.9 %    Lymphocytes % 19.4 13.0 - 44.0 %    Monocytes % 5.6 2.0 - 10.0 %    Eosinophils % 0.4 0.0 - 6.0 %    Basophils % 0.5 0.0 - 2.0 %    Neutrophils Absolute 7.50 (H) 1.60 - 5.50 x10*3/uL    Immature Granulocytes Absolute, Automated 0.06 0.00 - 0.50 x10*3/uL    Lymphocytes Absolute 1.98 0.80 - 3.00 x10*3/uL    Monocytes Absolute 0.57 0.05 - 0.80 x10*3/uL    Eosinophils Absolute 0.04 0.00 - 0.40 x10*3/uL    Basophils Absolute 0.05 0.00 - 0.10 x10*3/uL   B-Type Natriuretic Peptide   Result Value Ref Range    BNP 1,229 (H) 0 - 99 pg/mL   Comprehensive Metabolic Panel   Result Value Ref Range    Glucose 195 (H) 74 - 99 mg/dL    Sodium 142 136 - 145 mmol/L    Potassium 4.0 3.5 - 5.3 mmol/L    Chloride 99 98 - 107 mmol/L    Bicarbonate 35 (H) 21 - 32 mmol/L    Anion Gap 12 10 - 20 mmol/L    Urea Nitrogen 23 6 - 23 mg/dL    Creatinine 1.32 (H) 0.50 - 1.05 mg/dL    eGFR 40 (L) >60 mL/min/1.73m*2    Calcium 9.1 8.6 - 10.3 mg/dL    Albumin 4.3 3.4 - 5.0 g/dL    Alkaline Phosphatase 77 33 - 136 U/L    Total Protein 7.2 6.4 - 8.2 g/dL    AST 28 9 - 39 U/L    Bilirubin, Total 1.2 0.0 - 1.2 mg/dL    ALT 21 7 - 45 U/L   BLOOD GAS VENOUS FULL PANEL   Result Value Ref Range    POCT pH, Venous 7.40 7.33 - 7.43 pH    POCT pCO2, Venous 61 (H) 41 - 51 mm Hg    POCT pO2, Venous 77 (H) 35 - 45 mm Hg    POCT SO2, Venous 99 (H) 45 - 75 %    POCT Oxy Hemoglobin, Venous 90.4 (H) 45.0 - 75.0 %    POCT Hematocrit Calculated, Venous 42.0 36.0 - 46.0 %    POCT Sodium, Venous 142 136 - 145 mmol/L    POCT Potassium, Venous 4.2 3.5 - 5.3 mmol/L    POCT Chloride, Venous 101 98 - 107 mmol/L    POCT Ionized Calicum, Venous 1.10 1.10 - 1.33 mmol/L    POCT Glucose, Venous 196 (H) 74 - 99 mg/dL    POCT Lactate, Venous 1.2 0.4 - 2.0 mmol/L    POCT Base Excess, Venous 10.5 (H) -2.0 - 3.0 mmol/L    POCT HCO3 Calculated, Venous 37.8 (H) 22.0 - 26.0 mmol/L    POCT Hemoglobin, Venous 13.9 12.0 - 16.0 g/dL    POCT  Anion Gap, Venous 7.0 (L) 10.0 - 25.0 mmol/L    Patient Temperature 37.0 degrees Celsius    FiO2 100 %   Troponin I, High Sensitivity, Initial   Result Value Ref Range    Troponin I, High Sensitivity 30 (H) 0 - 13 ng/L   Troponin, High Sensitivity, 1 Hour   Result Value Ref Range    Troponin I, High Sensitivity 26 (H) 0 - 13 ng/L   Magnesium   Result Value Ref Range    Magnesium 1.75 1.60 - 2.40 mg/dL   Protime-INR   Result Value Ref Range    Protime 12.3 9.3 - 12.7 seconds    INR 1.2 0.9 - 1.2   TSH with reflex to Free T4 if abnormal   Result Value Ref Range    Thyroid Stimulating Hormone 17.22 (H) 0.44 - 3.98 mIU/L   Troponin I, High Sensitivity, Initial   Result Value Ref Range    Troponin I, High Sensitivity 63 (HH) 0 - 13 ng/L   Urinalysis with Reflex Microscopic   Result Value Ref Range    Color, Urine Yellow Light-Yellow, Yellow, Dark-Yellow    Appearance, Urine Turbid (N) Clear    Specific Gravity, Urine 1.015 1.005 - 1.035    pH, Urine 5.5 5.0, 5.5, 6.0, 6.5, 7.0, 7.5, 8.0    Protein, Urine 50 (1+) (A) NEGATIVE, 10 (TRACE), 20 (TRACE) mg/dL    Glucose, Urine Normal Normal mg/dL    Blood, Urine 0.03 (TRACE) (A) NEGATIVE    Ketones, Urine NEGATIVE NEGATIVE mg/dL    Bilirubin, Urine NEGATIVE NEGATIVE    Urobilinogen, Urine Normal Normal mg/dL    Nitrite, Urine NEGATIVE NEGATIVE    Leukocyte Esterase, Urine 250 [place]/µL (A) NEGATIVE   Microscopic Only, Urine   Result Value Ref Range    WBC, Urine 21-50 (A) 1-5, NONE /HPF    WBC Clumps, Urine OCCASIONAL Reference range not established. /HPF    RBC, Urine 1-2 NONE, 1-2, 3-5 /HPF    Squamous Epithelial Cells, Urine 1-9 (SPARSE) Reference range not established. /HPF    Mucus, Urine FEW Reference range not established. /LPF    Hyaline Casts, Urine 2+ (A) NONE /LPF          Assessment/Plan     Acute respiratory failure with hypoxia  Acute on chronic diastolic heart failure  Type II myocardial infarction  Possible pneumonia  Mitral valve regurgitation status post  ARMANI  Coronary artery disease status post stenting to RCA  Sinus arrhythmia  Lower extremity DVT on Xarelto    Overall impression/plan:  10/15: 84-year-old female presenting with shortness of breath and hypoxia.  She was started on BiPAP in the ED with improvement of her respiratory status.  She currently is on 3 L of oxygen via nasal cannula.  She tells me she wears O2 at home just as needed.  Chest x-ray demonstrates pulmonary edema versus pneumonia.  She has been treated for both.  She was given 40 mg IV Lasix in the ED.  A another 40 mg IV dose has been ordered for later this evening.  Agree with this.  Will likely reorder her home dose of torsemide 20 mg daily as early as tomorrow.  In regards to her questionable atrial fibrillation.  She appears to be in a sinus arrhythmia with PVCs.  There is evidence of P waves throughout all leads.  Her high-sensitivity troponins are elevated, however she denies chest pain.  Her EKG is nonischemic. I suspect this is a type 2 myocardial infarction in the setting of her hypoxia.  Patient an echocardiogram 8/24 which revealed preserved ejection fraction of 60 to 65%, normal right ventricular global systolic function, moderately dilated right atrium, mild to moderate mitral valve regurgitation, mild tricuspid regurgitation, moderately elevated RVSP and moderate pulmonary hypertension.  No need to repeat this imaging at this time.  Blood pressures are slightly hypertensive most recent reading 142/58.  Clinically she appears euvolemic.  She remains in a rate controlled sinus arrhythmia with PVCs on telemetry.  She is on Xarelto for a lower extremity DVT.  Will discuss further plan with Dr. Tan.  Will continue to follow with you.    I spent 60 minutes in the professional and overall care of this patient.      Stefanie Chadwick PA-C

## 2024-10-15 NOTE — ED PROVIDER NOTES
HPI   No chief complaint on file.      Patient presents with shortness of breath.  She reports that it started just this morning.  She states that she did not feel well last night.  She does use oxygen 2 L/min chronically for COPD history.  EMS found that her oxygen saturation was low on their arrival.  Patient has difficulty providing history as she can only speak 1 or 2 words at a time.              Patient History   Past Medical History:   Diagnosis Date    AAA (abdominal aortic aneurysm) (CMS-[facility])     Acute urinary tract infection 04/20/2023    Anemia     Anxiety     Arthritis     CHF (congestive heart failure)     Chronic pain disorder     back pain    CKD (chronic kidney disease)     Cognitive decline     COPD (chronic obstructive pulmonary disease) (Multi)     oxygen dependent- wears 2L NC continuously    Coronary artery disease     s/p stent    CVA (cerebral vascular accident) (Multi)     weaker on the left side    Deep vein thrombosis (DVT) of calf (Multi)     left    Depression     Disease of thyroid gland     Diverticulosis     DVT (deep venous thrombosis) (Multi)     left leg, on xarelto    Easy bruising     Epistaxis     GERD (gastroesophageal reflux disease)     managed with protonix    History of blood transfusion 2023    History of degenerative disc disease     Hyperlipidemia     Hypertension     Hypothyroidism     Ischemic cardiomyopathy     Meralgia paresthetica     Nonrheumatic mitral valve regurgitation     Osteoarthritis     Osteopenia 2010    hip - DEXA    Plantar fasciitis     RLS (restless legs syndrome)     Sciatica     Spinal stenosis     ST elevation (STEMI) myocardial infarction (Multi)      Past Surgical History:   Procedure Laterality Date    ADENOIDECTOMY      ANOMALOUS PULMONARY VENOUS RETURN REPAIR, 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    Procedure: Mitral-ARMANI (Transcatheter Edge to Edge Repair);  Surgeon: Kyle Bernardo MD;  Location: [address] Cardiac Cath Lab;  Service: Cardiovascular;   Laterality: N/A;  SAMMY Elgendy.Labs with cPM,Maynard,Schedule at 7;30am    CARDIAC CATHETERIZATION  10/2019    CARDIAC CATHETERIZATION N/A 02/16/2024    Procedure: Left And Right Heart Cath, With LV;  Surgeon: Tuan Foss DO;  Location: [facility] Cardiac Cath Lab;  Service: Cardiovascular;  Laterality: N/A;  no pre auth needed    CATARACT EXTRACTION Bilateral 11/13/2012    CHOLECYSTECTOMY  1996    laparoscopic    COLONOSCOPY      Dr. Rodriguez    CORONARY STENT PLACEMENT      CYST REMOVAL Right 06/24/2013    Excision of Sebaceous cyst right axilla    DILATION AND CURETTAGE OF UTERUS      ESOPHAGOGASTRODUODENOSCOPY      KNEE ARTHROPLASTY Left     MR HEAD ANGIO WO IV CONTRAST  02/24/2017    MR HEAD ANGIO WO IV CONTRAST [facility]    MR HEAD ANGIO WO IV CONTRAST  08/11/2017    MR HEAD ANGIO WO IV CONTRAST [facility]    MR HEAD ANGIO WO IV CONTRAST  02/10/2019    MR HEAD ANGIO WO IV CONTRAST [facility]    OTHER SURGICAL HISTORY  01/09/2019    Stent synergy    OTHER SURGICAL HISTORY  01/09/2019    Stent synergy    WI ARTHRP ACETBLR/PROX FEM PROSTC AGRFT/ALGRFT      SURGICAL SAMMY MONITORING      THYROIDECTOMY, PARTIAL Bilateral 04/17/2013    subtotal thyroidectomy    TONSILLECTOMY      TOTAL HIP ARTHROPLASTY Right 2006    UPPER GASTROINTESTINAL ENDOSCOPY  03/2019    Dr. Galan     Family History   Problem Relation Name Age of Onset    Hyperthyroidism Mother          Treated with radioactive iodine    Hypertension Mother      Heart disease Mother      Hyperthyroidism Sibling      Diabetes Father's Sister       Social History     Tobacco Use    Smoking status: Former     Current packs/day: 0.00     Types: Cigarettes     Quit date: 2014     Years since quitting: 10.7     Passive exposure: Never    Smokeless tobacco: Never   Vaping Use    Vaping status: Never Used   Substance Use Topics    Alcohol use: Not Currently     Comment: glass wine at holiday    Drug use: Never       Physical Exam   ED Triage  Vitals   Temp Pulse Resp BP   -- -- -- --      SpO2 Temp src Heart Rate Source Patient Position   -- -- -- --      BP Location FiO2 (%)     -- --       Physical Exam  HENT:      Head: Normocephalic.   Eyes:      General: No scleral icterus.  Cardiovascular:      Rate and Rhythm: Tachycardia present.   Pulmonary:      Comments: Diminished breath sounds in all lung fields with grunting expiration  Musculoskeletal:      Comments: 1+ pretibial edema bilateral lower extremities   Neurological:      Mental Status: She is alert.           ED Course & MDM   ED Course as of 10/15/24 1126   Tue Oct 15, 2024   0856 EKG interpreted by me: Sinus rhythm with PVCs, ventricular rate 99.  Borderline leftward axis.  No significant ST or T wave abnormalities. [ML]      ED Course User Index  [ML] Beto Rhodes MD         Diagnoses as of 10/15/24 1126   Dyspnea, unspecified type   Acute on chronic heart failure, unspecified heart failure type   Acute pulmonary edema   Pneumonia due to infectious organism, unspecified laterality, unspecified part of lung   Acute and chronic respiratory failure with hypoxia (Multi)                 No data recorded                                 Medical Decision Making  Patient only able to speak 1 or 2 words at a time on arrival on BiPAP.  After some time on BiPAP, her symptoms have gradually improved and she is not able to speak in full sentences.  X-ray reveals pneumonia versus edema.  Her BNP is significantly elevated with no history of heart failure, she was treated with 40 mg of IV Lasix.  She was also treated with antibiotics for concern for possible pneumonia.  In setting of worsening/acute on chronic hypoxemic respiratory failure, patient excepted to medicine service for further management.  Parts of this chart were completed with dictation software, please excuse any errors in transcription.        Procedure  Critical Care    Performed by: Beto Rhodes MD  Authorized by: Beto Rhodes MD     Critical care provider statement:     Critical care time (minutes):  33    Critical care time was exclusive of:  Separately billable procedures and treating other patients    Critical care was necessary to treat or prevent imminent or life-threatening deterioration of the following conditions:  Respiratory failure    Critical care was time spent personally by me on the following activities:  Development of treatment plan with patient or surrogate, evaluation of patient's response to treatment, examination of patient, obtaining history from patient or surrogate, ordering and performing treatments and interventions, ordering and review of laboratory studies, ordering and review of radiographic studies, pulse oximetry, re-evaluation of patient's condition and review of old charts    Care discussed with: admitting provider         Beto Rhodes MD  10/15/24 1417

## 2024-10-15 NOTE — NURSING NOTE
1515- Patient with increased work of breathing, diaphoretic and 80% on 2 lpm nasal canula. Increased nasal canula to 6 lpm and sat patient up in bed, Spo2 100%. Respiratory rate 30-36, /90, . Respiratory at bedside, placed back on BIPAP, ordered Lasix given.     1540- Urinary catheter placed using sterile technique.

## 2024-10-15 NOTE — CONSULTS
Inpatient consult to Pulmonology  Consult performed by: FELA Guadarrama-CNP  Consult ordered by: Jayro Lea MD  Reason for consult: Acute on chronic respiratory failure          Reason For Consult  Acute on chronic respiratory failure    History Of Present Illness  Anitha Welch is a 84 y.o. female with a past medical history that includes but is not limited to COPD; on 2 L nasal cannula oxygen continuously, anemia, congestive heart failure and chronic kidney disease who presented to [facility] Emergency Department today via squad for evaluation of shortness of breath since waking this morning.  She was placed on CPAP by EMS this is since been weaned to her baseline oxygen of 2 L nasal cannula.     Past Medical History  Past Medical History:   Diagnosis Date    AAA (abdominal aortic aneurysm) (CMS-HCC)     Acute urinary tract infection 04/20/2023    Anemia     Anxiety     Arthritis     CHF (congestive heart failure)     Chronic pain disorder     back pain    CKD (chronic kidney disease)     Cognitive decline     COPD (chronic obstructive pulmonary disease) (Multi)     oxygen dependent- wears 2L NC continuously    Coronary artery disease     s/p stent    CVA (cerebral vascular accident) (Multi)     weaker on the left side    Deep vein thrombosis (DVT) of calf (Multi)     left    Depression     Disease of thyroid gland     Diverticulosis     DVT (deep venous thrombosis) (Multi)     left leg, on xarelto    Easy bruising     Epistaxis     GERD (gastroesophageal reflux disease)     managed with protonix    History of blood transfusion 2023    History of degenerative disc disease     Hyperlipidemia     Hypertension     Hypothyroidism     Ischemic cardiomyopathy     Meralgia paresthetica     Nonrheumatic mitral valve regurgitation     Osteoarthritis     Osteopenia 2010    hip - DEXA    Plantar fasciitis     RLS (restless legs syndrome)     Sciatica     Spinal stenosis     ST elevation (STEMI)  myocardial infarction (Multi)        Surgical History  Past Surgical History:   Procedure Laterality Date    ADENOIDECTOMY      ANOMALOUS PULMONARY VENOUS RETURN REPAIR, 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 Cardiac Cath Lab;  Service: Cardiovascular;  Laterality: N/A;  no pre auth needed    CATARACT EXTRACTION Bilateral 11/13/2012    CHOLECYSTECTOMY  1996    laparoscopic    COLONOSCOPY      Dr. Rodriguez    CORONARY STENT PLACEMENT      CYST REMOVAL Right 06/24/2013    Excision of Sebaceous cyst right axilla    DILATION AND CURETTAGE OF UTERUS      ESOPHAGOGASTRODUODENOSCOPY      KNEE ARTHROPLASTY Left     MR HEAD ANGIO WO IV CONTRAST  02/24/2017    MR HEAD ANGIO WO IV CONTRAST [facility]    MR HEAD ANGIO WO IV CONTRAST  08/11/2017    MR HEAD ANGIO WO IV CONTRAST [facility]    MR HEAD ANGIO WO IV CONTRAST  02/10/2019    MR HEAD ANGIO WO IV CONTRAST [facility]    OTHER SURGICAL HISTORY  01/09/2019    Stent synergy    OTHER SURGICAL HISTORY  01/09/2019    Stent synergy    TN ARTHRP ACETBLR/PROX FEM PROSTC AGRFT/ALGRFT      SURGICAL SAMMY MONITORING      THYROIDECTOMY, PARTIAL Bilateral 04/17/2013    subtotal thyroidectomy    TONSILLECTOMY      TOTAL HIP ARTHROPLASTY Right 2006    UPPER GASTROINTESTINAL ENDOSCOPY  03/2019    Dr. Gaaln        Social History  Social History     Tobacco Use    Smoking status: Former     Current packs/day: 0.00     Types: Cigarettes     Quit date: 2014     Years since quitting: 10.7     Passive exposure: Never    Smokeless tobacco: Never   Vaping Use    Vaping status: Never  Used   Substance Use Topics    Alcohol use: Not Currently     Comment: glass wine at holiday    Drug use: Never       Family History  Family History   Problem Relation Name Age of Onset    Hyperthyroidism Mother          Treated with radioactive iodine    Hypertension Mother      Heart disease Mother      Hyperthyroidism Sibling      Diabetes Father's Sister            Allergies  Amoxicillin, Iodinated contrast media, Ciprofloxacin, Influenza virus vaccines, Diphenhydramine, Flu vac 2023 [id number](pf), and Other    Review of Systems   Constitutional: Negative.    HENT: Negative.     Eyes: Negative.    Respiratory:  Positive for shortness of breath.    Cardiovascular:  Positive for leg swelling.   Gastrointestinal: Negative.    Endocrine: Negative.    Genitourinary: Negative.    Musculoskeletal: Negative.    Allergic/Immunologic: Negative.    Neurological: Negative.    Hematological: Negative.    Psychiatric/Behavioral: Negative.          Physical Exam  Vitals and nursing note reviewed.   Constitutional:       Appearance: Normal appearance.   HENT:      Head: Normocephalic and atraumatic.      Nose: Nose normal.      Mouth/Throat:      Mouth: Mucous membranes are moist.   Eyes:      Extraocular Movements: Extraocular movements intact.      Conjunctiva/sclera: Conjunctivae normal.      Pupils: Pupils are equal, round, and reactive to light.   Cardiovascular:      Rate and Rhythm: Normal rate and regular rhythm.      Pulses: Normal pulses.      Heart sounds: Normal heart sounds.   Pulmonary:      Effort: Pulmonary effort is normal.      Breath sounds: Normal breath sounds.   Abdominal:      General: Bowel sounds are normal.      Palpations: Abdomen is soft.   Musculoskeletal:         General: Normal range of motion.   Skin:     General: Skin is warm and dry.      Capillary Refill: Capillary refill takes less than 2 seconds.   Neurological:      General: No focal deficit present.      Mental Status: She is alert and  "oriented to person, place, and time.   Psychiatric:         Mood and Affect: Mood normal.         Behavior: Behavior normal.          Vital Signs  Blood pressure 142/58, pulse 95, temperature 36.7 °C (98 °F), temperature source Temporal, resp. rate (!) 32, height 1.626 m (5' 4\"), weight 63.5 kg (140 lb), SpO2 97%.  Oxygen Therapy  SpO2: 97 %  Medical Gas Therapy: Supplemental oxygen  Medical Gas Delivery Method: Nasal cannula     Scheduled medications:  atorvastatin, 40 mg, oral, Nightly  [START ON 10/16/2024] azithromycin, 500 mg, intravenous, Daily  aztreonam, 1 g, intravenous, q12h  carvedilol, 3.125 mg, oral, BID  furosemide, 40 mg, intravenous, Once  gabapentin, 100 mg, oral, Nightly  ipratropium-albuteroL, 3 mL, nebulization, q4h  levothyroxine, 25 mcg, oral, Daily  methylPREDNISolone sodium succinate (PF), 40 mg, intravenous, q8h  nystatin, 1 Application, Topical, BID  [START ON 10/16/2024] pantoprazole, 40 mg, oral, Daily before breakfast   Or  [START ON 10/16/2024] pantoprazole, 40 mg, intravenous, Daily before breakfast  rivaroxaban, 20 mg, oral, Daily with evening meal     PRN medications: acetaminophen **OR** acetaminophen **OR** acetaminophen, bisacodyl, diclofenac sodium, docusate sodium, nitroglycerin, oxygen   oxygen, 2 L/min       Relevant Results  Results for orders placed or performed during the hospital encounter of 10/15/24 (from the past 24 hour(s))   CBC and Auto Differential   Result Value Ref Range    WBC 10.2 4.4 - 11.3 x10*3/uL    nRBC 0.0 0.0 - 0.0 /100 WBCs    RBC 3.82 (L) 4.00 - 5.20 x10*6/uL    Hemoglobin 9.4 (L) 12.0 - 16.0 g/dL    Hematocrit 33.6 (L) 36.0 - 46.0 %    MCV 88 80 - 100 fL    MCH 24.6 (L) 26.0 - 34.0 pg    MCHC 28.0 (L) 32.0 - 36.0 g/dL    RDW 17.8 (H) 11.5 - 14.5 %    Platelets 226 150 - 450 x10*3/uL    Neutrophils % 73.5 40.0 - 80.0 %    Immature Granulocytes %, Automated 0.6 0.0 - 0.9 %    Lymphocytes % 19.4 13.0 - 44.0 %    Monocytes % 5.6 2.0 - 10.0 %    Eosinophils " % 0.4 0.0 - 6.0 %    Basophils % 0.5 0.0 - 2.0 %    Neutrophils Absolute 7.50 (H) 1.60 - 5.50 x10*3/uL    Immature Granulocytes Absolute, Automated 0.06 0.00 - 0.50 x10*3/uL    Lymphocytes Absolute 1.98 0.80 - 3.00 x10*3/uL    Monocytes Absolute 0.57 0.05 - 0.80 x10*3/uL    Eosinophils Absolute 0.04 0.00 - 0.40 x10*3/uL    Basophils Absolute 0.05 0.00 - 0.10 x10*3/uL   B-Type Natriuretic Peptide   Result Value Ref Range    BNP 1,229 (H) 0 - 99 pg/mL   Comprehensive Metabolic Panel   Result Value Ref Range    Glucose 195 (H) 74 - 99 mg/dL    Sodium 142 136 - 145 mmol/L    Potassium 4.0 3.5 - 5.3 mmol/L    Chloride 99 98 - 107 mmol/L    Bicarbonate 35 (H) 21 - 32 mmol/L    Anion Gap 12 10 - 20 mmol/L    Urea Nitrogen 23 6 - 23 mg/dL    Creatinine 1.32 (H) 0.50 - 1.05 mg/dL    eGFR 40 (L) >60 mL/min/1.73m*2    Calcium 9.1 8.6 - 10.3 mg/dL    Albumin 4.3 3.4 - 5.0 g/dL    Alkaline Phosphatase 77 33 - 136 U/L    Total Protein 7.2 6.4 - 8.2 g/dL    AST 28 9 - 39 U/L    Bilirubin, Total 1.2 0.0 - 1.2 mg/dL    ALT 21 7 - 45 U/L   BLOOD GAS VENOUS FULL PANEL   Result Value Ref Range    POCT pH, Venous 7.40 7.33 - 7.43 pH    POCT pCO2, Venous 61 (H) 41 - 51 mm Hg    POCT pO2, Venous 77 (H) 35 - 45 mm Hg    POCT SO2, Venous 99 (H) 45 - 75 %    POCT Oxy Hemoglobin, Venous 90.4 (H) 45.0 - 75.0 %    POCT Hematocrit Calculated, Venous 42.0 36.0 - 46.0 %    POCT Sodium, Venous 142 136 - 145 mmol/L    POCT Potassium, Venous 4.2 3.5 - 5.3 mmol/L    POCT Chloride, Venous 101 98 - 107 mmol/L    POCT Ionized Calicum, Venous 1.10 1.10 - 1.33 mmol/L    POCT Glucose, Venous 196 (H) 74 - 99 mg/dL    POCT Lactate, Venous 1.2 0.4 - 2.0 mmol/L    POCT Base Excess, Venous 10.5 (H) -2.0 - 3.0 mmol/L    POCT HCO3 Calculated, Venous 37.8 (H) 22.0 - 26.0 mmol/L    POCT Hemoglobin, Venous 13.9 12.0 - 16.0 g/dL    POCT Anion Gap, Venous 7.0 (L) 10.0 - 25.0 mmol/L    Patient Temperature 37.0 degrees Celsius    FiO2 100 %   Troponin I, High  Sensitivity, Initial   Result Value Ref Range    Troponin I, High Sensitivity 30 (H) 0 - 13 ng/L   Troponin, High Sensitivity, 1 Hour   Result Value Ref Range    Troponin I, High Sensitivity 26 (H) 0 - 13 ng/L   Magnesium   Result Value Ref Range    Magnesium 1.75 1.60 - 2.40 mg/dL   Protime-INR   Result Value Ref Range    Protime 12.3 9.3 - 12.7 seconds    INR 1.2 0.9 - 1.2   TSH with reflex to Free T4 if abnormal   Result Value Ref Range    Thyroid Stimulating Hormone 17.22 (H) 0.44 - 3.98 mIU/L   Troponin I, High Sensitivity, Initial   Result Value Ref Range    Troponin I, High Sensitivity 63 (HH) 0 - 13 ng/L   Urinalysis with Reflex Microscopic   Result Value Ref Range    Color, Urine Yellow Light-Yellow, Yellow, Dark-Yellow    Appearance, Urine Turbid (N) Clear    Specific Gravity, Urine 1.015 1.005 - 1.035    pH, Urine 5.5 5.0, 5.5, 6.0, 6.5, 7.0, 7.5, 8.0    Protein, Urine 50 (1+) (A) NEGATIVE, 10 (TRACE), 20 (TRACE) mg/dL    Glucose, Urine Normal Normal mg/dL    Blood, Urine 0.03 (TRACE) (A) NEGATIVE    Ketones, Urine NEGATIVE NEGATIVE mg/dL    Bilirubin, Urine NEGATIVE NEGATIVE    Urobilinogen, Urine Normal Normal mg/dL    Nitrite, Urine NEGATIVE NEGATIVE    Leukocyte Esterase, Urine 250 [place]/µL (A) NEGATIVE   Microscopic Only, Urine   Result Value Ref Range    WBC, Urine 21-50 (A) 1-5, NONE /HPF    WBC Clumps, Urine OCCASIONAL Reference range not established. /HPF    RBC, Urine 1-2 NONE, 1-2, 3-5 /HPF    Squamous Epithelial Cells, Urine 1-9 (SPARSE) Reference range not established. /HPF    Mucus, Urine FEW Reference range not established. /LPF    Hyaline Casts, Urine 2+ (A) NONE /LPF      XR chest 1 view  Result Date: 10/15/2024  FINDINGS: Multifocal airspace opacities greatest in the right lung both upper and lower lung field, concerning for pneumonia or an atypical presentation of edema have slightly worsened from prior study.   There has also been increase in small bilateral pleural effusions.   No  evidence of pneumothorax.  Multifocal airspace opacities greatest in the right lung both upper and lower lung field, concerning for pneumonia or an atypical presentation of edema have slightly worsened from prior study.   There has also been increase in small bilateral pleural effusions.       Assessment/Plan   Acute on chronic hypoxic, hypercapnic respiratory failure  Wean oxygen as sats allow  BiPAP nightly and as needed  Continuous pulse oximetry  Incentive spirometry/pulmonary hygiene  Arterial blood gas  PA and lateral chest x-ray in the a.m.    Acute COPD exacerbation  Continue IBD/ICS  FEV1 when optimized    Chronic congestive heart failure  40 mg IV Lasix given x 1 dose today  Cardiology follow-up    Pneumonia  Continue IV aztreonam ceftriaxone  Sputum if able  Urine Legionella, strep pneumo, MRSA, procalcitonin pending  Follow-up cultures  Swallow evaluation per ST    Acute kidney injury on chronic kidney disease  Nephrology consultation    Sabiha Lane, APRN-CNP

## 2024-10-15 NOTE — PROGRESS NOTES
Inpatient Speech-Language Pathology Clinical Swallow Evaluation       Patient Name: Anitha Welch  MRN: [id number]  : 1940  Today's Date: 10/15/24  Time Calculation  Start Time: 1400  Stop Time: 1435  Time Calculation (min): 35 min       Current Problem:  1. Dyspnea, unspecified type        2. Acute on chronic heart failure, unspecified heart failure type        3. Acute pulmonary edema        4. Pneumonia due to infectious organism, unspecified laterality, unspecified part of lung        5. Acute and chronic respiratory failure with hypoxia (Multi)            Past Medical History:  Past Medical History:   Diagnosis Date    AAA (abdominal aortic aneurysm) (CMS-[facility])     Acute urinary tract infection 2023    Anemia     Anxiety     Arthritis     CHF (congestive heart failure)     Chronic pain disorder     back pain    CKD (chronic kidney disease)     Cognitive decline     COPD (chronic obstructive pulmonary disease) (Multi)     oxygen dependent- wears 2L NC continuously    Coronary artery disease     s/p stent    CVA (cerebral vascular accident) (Multi)     weaker on the left side    Deep vein thrombosis (DVT) of calf (Multi)     left    Depression     Disease of thyroid gland     Diverticulosis     DVT (deep venous thrombosis) (Multi)     left leg, on xarelto    Easy bruising     Epistaxis     GERD (gastroesophageal reflux disease)     managed with protonix    History of blood transfusion     History of degenerative disc disease     Hyperlipidemia     Hypertension     Hypothyroidism     Ischemic cardiomyopathy     Meralgia paresthetica     Nonrheumatic mitral valve regurgitation     Osteoarthritis     Osteopenia     hip - DEXA    Plantar fasciitis     RLS (restless legs syndrome)     Sciatica     Spinal stenosis     ST elevation (STEMI) myocardial infarction (Multi)        History Of Present Illness from Physician:  This patient presented with shortness of breath.  She normally uses oxygen but this  morning her pulse ox was below 90.  She was short of breath all night no chest pain no nausea vomiting no fever chills no cold flulike symptoms.  When she presented to the emergency room she was clinically started on BiPAP which was continued up until 30 minutes ago when she was transitioned to 4 L of oxygen now she is saturating at 96- 7%.  She feels better.  She was given Lasix but has not urinated yet she is given also antibiotics.    Reason for Referral:  Pt presented to ED on 10/15/24 with shortness of breath.  Pt admitted for respiratory failure and further testing.  Pt referred to Speech-Language Pathology services for Clinical Swallow Evaluation in order to fully assess current swallow skills and determine plan of treatment.      Relevant Imaging Results:  CXR 10/15/24     IMPRESSION:      Multifocal airspace opacities greatest in the right lung both upper  and lower lung field, concerning for pneumonia or an atypical  presentation of edema have slightly worsened from prior study.      There has also been increase in small bilateral pleural effusions.       Recommendations:  Risk for Aspiration: Yes   Solid Diet Recommendations : Regular (IDDSI Level 7)   Liquid Diet Recommendations: Thin (IDDSI Level 0)         MBSS Recommended: Yes, to further assess pharyngeal phase of swallowing    Medication Intake Method: Whole with liquid  Compensatory Swallow Strategies:   - Upright positioning as close to 90º as possible  - Small bites/sips      Skilled dysphagia treatment may be warranted at next level of care--to be determined upon further treatment.      Assessment    Impression: Pt presents with adequate oral phase characterized by efficient mastication and oral clearance.  Pt's shortness of breath has improved since actions taken in ED with intermittent baseline cough persisting.  Immediate cough occurred post swallow thin liquids via straw with no cough post intake of other trials.    Pt may advance to regular  diet.  MBSS recommended to further assess pharyngeal phase of swallow under fluoroscopy.    Consistencies Trialed: Thin (IDDSI Level 0) - Straw, Thin (IDDSI Level 0) - Cup, Regular (IDDSI Level 7)  Dentition: Adequate/Natural   Medical Staff Made Aware: Yes     Plan  SLP Plan: Skilled SLP   SLP Frequency: 3x per week   Duration: 2 weeks   Next Treatment Priority: MBSS   Discussed POC: Patient, Nursing   Discussed Risks/Benefits: Yes   Patient/Caregiver Agreeable: Yes     Further treatment in acute setting:  Skilled dysphagia treatment is warranted for further education and training on dysphagia management including instruction on oropharyngeal therapeutic exercises and/or compensatory swallowing strategies    Dysphagia Goals: (Established 10/15/24, projected discontinuation 2 weeks)    - Pt will safely swallow recommended diet without s/s aspiration for 90% of observed trials in order to maintain adequate nutrition and hydration.  - Pt will utilize safe swallow strategies with 90% acc in order to reduce risk of aspiration and s/s dysphagia.  - Pt to participate in assessment of oropharyngeal swallow function via MBSS for assessment of possible aspiration and to determine least restrictive diet to meet nutritional and hydration needs.       Subjective   Pt pleasant and cooperative, upright in bed for assessment.      General Visit Information:  Ordering Physician: Dr. Lea     Reason for Referral: Assess swallowing in setting of respiratory failure with suspected pneumonia  Current Diet : Clear liquid   Self feeding: Pt able to feed herself independently    Objective  Clinical Swallow Evaluation completed consisting of patient/caregiver interview, oral motor assessment, and analysis of swallow abilities with PO trials (8 oz thin liquid via cup & straw, puree, regular solids.)    ORAL PHASE:   Natural dentition in adequate condition.   Oral mucosa were pink, moist, and free of obvious lesions.    Mastication of  regular solids was adequate.  Bolus formation,  A-P transport, and oral clearance were also adequate.    PHARYNGEAL PHASE:   No suspected delay in onset of timeliness of swallow.  Laryngeal movement was visualized with all trials, however adequacy of hyolaryngeal elevation/excursion cannot be determined at bedside.   Pt demonstrated immediate cough post swallow thin liquids via straw and no cough with cup sips or other consistencies.  Pt also demonstrated intermittent cough and wet vocal quality.    Baseline Assessment:  Behavior/Cognition: Alert, Cooperative, Pleasant mood, Confused  Patient Positioning: Upright at edge of bed  Baseline Vocal Quality: Normal    Pain:  Pain Assessment  Pain Assessment: 0-10  0-10 (Numeric) Pain Score: 0 - No pain     Oxygen Status:  Oxygen via nasal canula    Oral Motor Assessment:  Oral/Motor Assessment  Oral Hygiene: WFL  Dentition: Adequate/Natural  Labial ROM: Within Functional Limits  Lingual ROM: Within Functional Limits    Inpatient Education:   Individual(s) Educated: Patient  Verbal Education : results, recommendations  Risk and Benefits Discussed with Patient/Caregiver/Other: yes  Patient/Caregiver Demonstrated Understanding: yes  Plan of Care Discussed and Agreed Upon: yes  Patient Response to Education: Patient/Caregiver Verbalized Understanding of Information    Communication with Medical Team:  SLP communicated with bedside nurse prior to evaluation to obtain clearance for patient participation.  Results of the clinical swallow evaluation were discussed verbally with nurse upon completion with verbal understanding noted.    Consultations/Referrals/Coordination of Services: SLP to coordinate with Radiology to complete MBSS, likely in the morning on 10/16/24

## 2024-10-15 NOTE — H&P
History Of Present Illness  This patient presented with shortness of breath.  She normally uses oxygen but this morning her pulse ox was below 90.  She was short of breath all night no chest pain no nausea vomiting no fever chills no cold flulike symptoms.  When she presented to the emergency room she was clinically started on BiPAP which was continued up until 30 minutes ago when she was transitioned to 4 L of oxygen now she is saturating at 96- 7%.  She feels better.  She was given Lasix but has not urinated yet she is given also antibiotics.  Past Medical History  She has a past medical history of AAA (abdominal aortic aneurysm) ([facility], located within [facility]), Acute urinary tract infection (04/20/2023), Anemia, Anxiety, Arthritis, CHF (congestive heart failure), Chronic pain disorder, CKD (chronic kidney disease), Cognitive decline, COPD (chronic obstructive pulmonary disease) (Multi), Coronary artery disease, CVA (cerebral vascular accident) (Multi), Deep vein thrombosis (DVT) of calf (Multi), Depression, Disease of thyroid gland, Diverticulosis, DVT (deep venous thrombosis) (Multi), Easy bruising, Epistaxis, GERD (gastroesophageal reflux disease), History of blood transfusion (2023), History of degenerative disc disease, Hyperlipidemia, Hypertension, Hypothyroidism, Ischemic cardiomyopathy, Meralgia paresthetica, Nonrheumatic mitral valve regurgitation, Osteoarthritis, Osteopenia (2010), Plantar fasciitis, RLS (restless legs syndrome), Sciatica, Spinal stenosis, and ST elevation (STEMI) myocardial infarction (Multi).  Dr. Foss is her cardiologist, she is on Xarelto I am assuming for atrial fibrillation  Dr. Parra is her pulmonologist  Surgical History  She has a past surgical history that includes MR angio head wo IV contrast (02/24/2017); MR angio head wo IV contrast (08/11/2017); MR angio head wo IV contrast (02/10/2019); Cholecystectomy (1996); Tonsillectomy; pr arthrp acetblr/prox fem prostc agrft/algrft; Thyroidectomy,  partial (Bilateral, 04/17/2013); Coronary stent placement; Knee Arthroplasty (Left); Total hip arthroplasty (Right, 2006); Dilation and curettage of uterus; Other surgical history (01/09/2019); Upper gastrointestinal endoscopy (03/2019); Cardiac catheterization (10/2019); Cataract extraction (Bilateral, 11/13/2012); Adenoidectomy; Cyst Removal (Right, 06/24/2013); Colonoscopy; Other surgical history (01/09/2019); surgical laverne monitoring; Cardiac catheterization (N/A, 02/16/2024); Esophagogastroduodenoscopy; and Anomalous pulmonary venous return repair, total (N/A, 4/25/2024).  Reviewed  Social History  She reports that she quit smoking about 10 years ago. Her smoking use included cigarettes. She has never been exposed to tobacco smoke. She has never used smokeless tobacco. She reports that she does not currently use alcohol. She reports that she does not use drugs.  Reviewed  Family History  Family History   Problem Relation Name Age of Onset    Hyperthyroidism Mother          Treated with radioactive iodine    Hypertension Mother      Heart disease Mother      Hyperthyroidism Sibling      Diabetes Father's Sister          Allergies  Amoxicillin, Iodinated contrast media, Ciprofloxacin, Influenza virus vaccines, Diphenhydramine, Flu vac 2023 [id number](pf), and Other    ROS  Review of Systems   Constitutional: Negative.    HENT: Negative.     Eyes: Negative.    Respiratory:  Positive for shortness of breath.    Cardiovascular:  Positive for leg swelling.   Gastrointestinal: Negative.    Endocrine: Negative.    Genitourinary: Negative.    Musculoskeletal: Negative.    Skin: Negative.    Allergic/Immunologic: Negative.    Neurological: Negative.    Hematological: Negative.    Psychiatric/Behavioral: Negative.     All other systems reviewed and are negative.    She is a poor historian  Last Recorded Vitals  /59   Pulse 90   Temp 37.1 °C (98.7 °F) (Axillary)   Resp (!) 29   Wt 63.5 kg (140 lb)   SpO2 98%      Physical Exam  Assessed patient emergency room bed #11.  Is a  female who is alert to self hospital her son who is at the bedside she follows commands she is in no distress and able to speak with full sentences  Normocephalic/atraumatic EOMI PERRLA  Neck supple  Chest bilateral fairly tight wheezing with crackles  Heart irregularly irregular distant heart sounds  Abdomen soft nontender  Extremities trace edema seems slightly more pronounced in the left calf compared to the right  Good pedal pulses  Neurologic examination grossly motor sensor nonfocal may be a very slight weakness on the left  Psych no obvious delirium  Relevant Results  EKG questions presence of A-fib some nonspecific ST segment changes  Labs reviewed x-ray was reviewed    ASSESSMENT/PLAN  Assessment/Plan   Principal Problem:    Acute respiratory failure    October 15:    Acute on chronic respiratory failure suspect there is a component of volume overload suspect systolic congestive heart failure acute on chronic and also cannot rule out an infectious process viral versus bacterial we will continue with antibiotics check viral swabs continue diuresis consult pulmonology and cardiology she will be admitted to stepdown we will need to cycle her troponins as well  Question A-fib  COPD  History of CVA  History of MI coronary disease  History of chronic kidney disease  CODE STATUS discussed with her in the presence of her son she chose to be DNR CCA no intubation       Jayro Lea MD

## 2024-10-15 NOTE — PROGRESS NOTES
Pharmacy Medication History Review    Anitha Welch is a 84 y.o. female admitted for Acute respiratory failure. Pharmacy reviewed the patient's [id number] medications and allergies for accuracy.    The list below reflectives the updated PTA list. Please review each medication in order reconciliation for additional clarification and justification.  Prior to Admission Medications   Prescriptions Last Dose Informant Patient Reported? Taking?   Trelegy Ellipta 100-62.5-25 mcg blister with device 10/15/2024 at am Family Member No Yes   Sig: Inhale 1 puff once daily.   albuterol 90 mcg/actuation inhaler 10/15/2024 at am Family Member Yes Yes   Sig: Inhale 1 puff every 4 hours if needed for shortness of breath.   atorvastatin (Lipitor) 40 mg tablet 10/14/2024 at pm Family Member No Yes   Sig: TAKE 1 TABLET BY MOUTH EVERY DAY AT BEDTIME   bisacodyl (Dulcolax) 5 mg EC tablet Past Week  No Yes   Sig: Take 1 tablet (5 mg) by mouth once daily as needed for constipation. Do not crush, chew, or split.   carvedilol (Coreg) 3.125 mg tablet 10/14/2024 at pm  No Yes   Sig: TAKE 1 TABLET (3.125 MG) BY MOUTH 2 TIMES A DAY.   diclofenac sodium (Voltaren) 1 % gel Past Week Family Member No Yes   Sig: Apply 4.5 inches (4 g) topically 4 times a day.   docusate sodium (Colace) 100 mg capsule 10/14/2024 at pm  No Yes   Sig: Take 1 capsule (100 mg) by mouth 2 times a day as needed for constipation.   gabapentin (Neurontin) 100 mg capsule   No No   Sig: Take 1 capsule (100 mg) by mouth once daily at bedtime.   levothyroxine (Synthroid, Levoxyl) 25 mcg tablet 10/14/2024 at am  No Yes   Sig: Take 1 tablet (25 mcg) by mouth early in the morning.. Take on an empty stomach at the same time each day, either 30 to 60 minutes prior to breakfast   lidocaine (Hemorrhoidal Relief) 5 % cream cream Past Month  No Yes   Sig: Apply 1 Application topically every 6 hours if needed (hemorrhoids).   loratadine (Claritin) 10 mg tablet 10/14/2024 at am  "Family Member Yes Yes   Sig: Take 1 tablet (10 mg) by mouth once daily.   multivitamin-iron-folic acid (Multi Complete with Iron)  mg-mcg tablet tablet 10/14/2024 at am Family Member Yes Yes   Sig: Take 1 tablet by mouth once daily.   nitroglycerin (Nitrostat) 0.4 mg SL tablet  Family Member Yes No   Sig: Place 1 tablet (0.4 mg) under the tongue every 5 minutes if needed for chest pain.   nystatin (Mycostatin) 100,000 unit/gram powder  Family Member No No   Sig: Apply 1 Application topically 2 times a day.   oxygen (O2) gas therapy  Family Member Yes No   Sig: Inhale 2 L/min continuously. Indications: sob   pantoprazole (ProtoNix) 40 mg EC tablet   No No   Sig: Take 1 tablet (40 mg) by mouth 2 times a day.   polyethylene glycol (Glycolax, Miralax) 17 gram/dose powder Past Month  No Yes   Sig: mix 17 grams (1 capful) in 8 ounces of water and drink daily   rivaroxaban (Xarelto) 20 mg tablet 10/14/2024 at pm  No Yes   Sig: Take 1 tablet (20 mg) by mouth once daily in the evening. Take with meals. Take with food.   tiZANidine (Zanaflex) 2 mg tablet 10/14/2024  No Yes   Sig: TAKE 1 TABLET BY MOUTH THREE TIMES A DAY AS NEEDED   Patient taking differently: Take 1 tablet (2 mg) by mouth 2 times a day as needed for muscle spasms.   torsemide (Demadex) 20 mg tablet 10/14/2024 at am Family Member No Yes   Sig: Take 1 tablet (20 mg) by mouth once daily. Do not fill before May 2, 2024.      Facility-Administered Medications: None          The list below reflectives the updated allergy list. Please review each documented allergy for additional clarification and justification.  Allergies  Reviewed by Linda Israel CPhT on 10/15/2024        Severity Reactions Comments    Amoxicillin High Anaphylaxis, Shortness of breath     Iodinated Contrast Media High Dizziness, Other Increased BP Increase BP, dizziness    Ciprofloxacin Medium Rash, Swelling Decreased BP    Influenza Virus Vaccines Medium Other \"sick\" for 24 hours " afterwards    Diphenhydramine Low Rash     Flu Vac 2023 [id number](pf) Low Unknown     Other Low Itching Fluzone inj high dose            Below are additional concerns with the patient's PTA list.  - pt is no longer taking the following medications:  Voltaren  Lidocaine cream  Gabapentin  Nystatin      CELE HOFF, [id number]

## 2024-10-15 NOTE — NURSING NOTE
Discussed catheter placement with patient and provider. No catheter placement at this time. Patient wishes not to have it placed.  ml per bladder scanner. Provider aware.

## 2024-10-15 NOTE — TELEPHONE ENCOUNTER
Patient is active with [facility], followed by Milana Hernadez NP. Patient admitted to  [facility] 10/15/24 with acute on chronic respiratory failure. Presented to ED with SOB that started this morning, was only able to speak 1 or 2 words at a time. BiPAP initated in the ED and then transitioned to 4 L 02. CXR revealed PNA vs. edema. BNP elevated. Treated with IV lasix and ATB. [facility] will monitor hospitalization and discharge plan.

## 2024-10-16 ENCOUNTER — APPOINTMENT (OUTPATIENT)
Dept: RADIOLOGY | Facility: HOSPITAL | Age: 84
DRG: 871 | End: 2024-10-16
Payer: MEDICARE

## 2024-10-16 LAB
ACANTHOCYTES BLD QL SMEAR: NORMAL
ALBUMIN SERPL BCP-MCNC: 3.6 G/DL (ref 3.4–5)
ALP SERPL-CCNC: 54 U/L (ref 33–136)
ALT SERPL W P-5'-P-CCNC: 14 U/L (ref 7–45)
ANION GAP SERPL CALCULATED.3IONS-SCNC: 12 MMOL/L (ref 10–20)
AST SERPL W P-5'-P-CCNC: 16 U/L (ref 9–39)
ATRIAL RATE: 93 BPM
ATRIAL RATE: 97 BPM
BASOPHILS # BLD AUTO: 0 X10*3/UL (ref 0–0.1)
BASOPHILS NFR BLD AUTO: 0 %
BILIRUB SERPL-MCNC: 0.8 MG/DL (ref 0–1.2)
BUN SERPL-MCNC: 20 MG/DL (ref 6–23)
BURR CELLS BLD QL SMEAR: NORMAL
CALCIUM SERPL-MCNC: 8.7 MG/DL (ref 8.6–10.3)
CHLORIDE SERPL-SCNC: 100 MMOL/L (ref 98–107)
CO2 SERPL-SCNC: 35 MMOL/L (ref 21–32)
CREAT SERPL-MCNC: 1.07 MG/DL (ref 0.5–1.05)
EGFRCR SERPLBLD CKD-EPI 2021: 51 ML/MIN/1.73M*2
EOSINOPHIL # BLD AUTO: 0 X10*3/UL (ref 0–0.4)
EOSINOPHIL NFR BLD AUTO: 0 %
ERYTHROCYTE [DISTWIDTH] IN BLOOD BY AUTOMATED COUNT: 17.7 % (ref 11.5–14.5)
GLUCOSE SERPL-MCNC: 158 MG/DL (ref 74–99)
HCT VFR BLD AUTO: 25.4 % (ref 36–46)
HGB BLD-MCNC: 7.5 G/DL (ref 12–16)
IMM GRANULOCYTES # BLD AUTO: 0.03 X10*3/UL (ref 0–0.5)
IMM GRANULOCYTES NFR BLD AUTO: 0.9 % (ref 0–0.9)
IRON SATN MFR SERPL: 9 % (ref 25–45)
IRON SERPL-MCNC: 35 UG/DL (ref 35–150)
LEGIONELLA AG UR QL: NEGATIVE
LYMPHOCYTES # BLD AUTO: 0.52 X10*3/UL (ref 0.8–3)
LYMPHOCYTES NFR BLD AUTO: 15.8 %
MCH RBC QN AUTO: 25.1 PG (ref 26–34)
MCHC RBC AUTO-ENTMCNC: 29.5 G/DL (ref 32–36)
MCV RBC AUTO: 85 FL (ref 80–100)
MONOCYTES # BLD AUTO: 0.07 X10*3/UL (ref 0.05–0.8)
MONOCYTES NFR BLD AUTO: 2.1 %
NEUTROPHILS # BLD AUTO: 2.68 X10*3/UL (ref 1.6–5.5)
NEUTROPHILS NFR BLD AUTO: 81.2 %
NRBC BLD-RTO: 0 /100 WBCS (ref 0–0)
OVALOCYTES BLD QL SMEAR: NORMAL
P AXIS: 63 DEGREES
P AXIS: 70 DEGREES
PLATELET # BLD AUTO: 125 X10*3/UL (ref 150–450)
POLYCHROMASIA BLD QL SMEAR: NORMAL
POTASSIUM SERPL-SCNC: 3.5 MMOL/L (ref 3.5–5.3)
PR INTERVAL: 144 MS
PR INTERVAL: 160 MS
PROCALCITONIN SERPL-MCNC: 0.07 NG/ML
PROT SERPL-MCNC: 6 G/DL (ref 6.4–8.2)
Q ONSET: 208 MS
Q ONSET: 208 MS
QRS COUNT: 15 BEATS
QRS COUNT: 16 BEATS
QRS DURATION: 104 MS
QRS DURATION: 106 MS
QT INTERVAL: 380 MS
QT INTERVAL: 400 MS
QTC CALCULATION(BAZETT): 482 MS
QTC CALCULATION(BAZETT): 497 MS
QTC FREDERICIA: 445 MS
QTC FREDERICIA: 463 MS
R AXIS: -40 DEGREES
R AXIS: -47 DEGREES
RBC # BLD AUTO: 2.99 X10*6/UL (ref 4–5.2)
RBC MORPH BLD: NORMAL
S PNEUM AG UR QL: NEGATIVE
SODIUM SERPL-SCNC: 143 MMOL/L (ref 136–145)
T AXIS: 57 DEGREES
T AXIS: 75 DEGREES
T OFFSET: 398 MS
T OFFSET: 408 MS
T4 FREE SERPL-MCNC: 0.62 NG/DL (ref 0.61–1.12)
TIBC SERPL-MCNC: 404 UG/DL (ref 240–445)
TSH SERPL-ACNC: 7.27 MIU/L (ref 0.44–3.98)
UIBC SERPL-MCNC: 369 UG/DL (ref 110–370)
VENTRICULAR RATE: 93 BPM
VENTRICULAR RATE: 97 BPM
WBC # BLD AUTO: 3.3 X10*3/UL (ref 4.4–11.3)

## 2024-10-16 PROCEDURE — 82728 ASSAY OF FERRITIN: CPT | Mod: TRILAB,WESLAB | Performed by: INTERNAL MEDICINE

## 2024-10-16 PROCEDURE — 82607 VITAMIN B-12: CPT | Mod: TRILAB,WESLAB | Performed by: INTERNAL MEDICINE

## 2024-10-16 PROCEDURE — 85025 COMPLETE CBC W/AUTO DIFF WBC: CPT | Performed by: INTERNAL MEDICINE

## 2024-10-16 PROCEDURE — 2500000004 HC RX 250 GENERAL PHARMACY W/ HCPCS (ALT 636 FOR OP/ED): Mod: JZ | Performed by: INTERNAL MEDICINE

## 2024-10-16 PROCEDURE — 2060000001 HC INTERMEDIATE ICU ROOM DAILY

## 2024-10-16 PROCEDURE — 2500000005 HC RX 250 GENERAL PHARMACY W/O HCPCS: Performed by: INTERNAL MEDICINE

## 2024-10-16 PROCEDURE — 99232 SBSQ HOSP IP/OBS MODERATE 35: CPT

## 2024-10-16 PROCEDURE — 2500000004 HC RX 250 GENERAL PHARMACY W/ HCPCS (ALT 636 FOR OP/ED)

## 2024-10-16 PROCEDURE — 36415 COLL VENOUS BLD VENIPUNCTURE: CPT | Performed by: INTERNAL MEDICINE

## 2024-10-16 PROCEDURE — 99223 1ST HOSP IP/OBS HIGH 75: CPT | Performed by: INTERNAL MEDICINE

## 2024-10-16 PROCEDURE — 84439 ASSAY OF FREE THYROXINE: CPT | Performed by: INTERNAL MEDICINE

## 2024-10-16 PROCEDURE — 84145 PROCALCITONIN (PCT): CPT | Mod: TRILAB,WESLAB | Performed by: NURSE PRACTITIONER

## 2024-10-16 PROCEDURE — 94640 AIRWAY INHALATION TREATMENT: CPT

## 2024-10-16 PROCEDURE — 99232 SBSQ HOSP IP/OBS MODERATE 35: CPT | Performed by: INTERNAL MEDICINE

## 2024-10-16 PROCEDURE — 2500000002 HC RX 250 W HCPCS SELF ADMINISTERED DRUGS (ALT 637 FOR MEDICARE OP, ALT 636 FOR OP/ED): Performed by: INTERNAL MEDICINE

## 2024-10-16 PROCEDURE — 84134 ASSAY OF PREALBUMIN: CPT | Mod: TRILAB,WESLAB | Performed by: NURSE PRACTITIONER

## 2024-10-16 PROCEDURE — 74230 X-RAY XM SWLNG FUNCJ C+: CPT | Performed by: RADIOLOGY

## 2024-10-16 PROCEDURE — 94660 CPAP INITIATION&MGMT: CPT

## 2024-10-16 PROCEDURE — 71250 CT THORAX DX C-: CPT

## 2024-10-16 PROCEDURE — 82746 ASSAY OF FOLIC ACID SERUM: CPT | Mod: TRILAB,WESLAB | Performed by: INTERNAL MEDICINE

## 2024-10-16 PROCEDURE — 2500000001 HC RX 250 WO HCPCS SELF ADMINISTERED DRUGS (ALT 637 FOR MEDICARE OP): Performed by: INTERNAL MEDICINE

## 2024-10-16 PROCEDURE — 74230 X-RAY XM SWLNG FUNCJ C+: CPT

## 2024-10-16 PROCEDURE — 83540 ASSAY OF IRON: CPT | Performed by: INTERNAL MEDICINE

## 2024-10-16 PROCEDURE — 82378 CARCINOEMBRYONIC ANTIGEN: CPT | Mod: TRILAB,WESLAB | Performed by: INTERNAL MEDICINE

## 2024-10-16 PROCEDURE — 84443 ASSAY THYROID STIM HORMONE: CPT | Performed by: INTERNAL MEDICINE

## 2024-10-16 PROCEDURE — 2500000001 HC RX 250 WO HCPCS SELF ADMINISTERED DRUGS (ALT 637 FOR MEDICARE OP)

## 2024-10-16 PROCEDURE — 80053 COMPREHEN METABOLIC PANEL: CPT | Performed by: INTERNAL MEDICINE

## 2024-10-16 PROCEDURE — 9420000001 HC RT PATIENT EDUCATION 5 MIN

## 2024-10-16 PROCEDURE — 2500000001 HC RX 250 WO HCPCS SELF ADMINISTERED DRUGS (ALT 637 FOR MEDICARE OP): Performed by: STUDENT IN AN ORGANIZED HEALTH CARE EDUCATION/TRAINING PROGRAM

## 2024-10-16 PROCEDURE — 99232 SBSQ HOSP IP/OBS MODERATE 35: CPT | Performed by: NURSE PRACTITIONER

## 2024-10-16 PROCEDURE — 71046 X-RAY EXAM CHEST 2 VIEWS: CPT

## 2024-10-16 PROCEDURE — 92526 ORAL FUNCTION THERAPY: CPT | Mod: GN

## 2024-10-16 PROCEDURE — 71046 X-RAY EXAM CHEST 2 VIEWS: CPT | Performed by: RADIOLOGY

## 2024-10-16 PROCEDURE — 92611 MOTION FLUOROSCOPY/SWALLOW: CPT | Mod: GN

## 2024-10-16 PROCEDURE — 71250 CT THORAX DX C-: CPT | Performed by: RADIOLOGY

## 2024-10-16 RX ORDER — OXYMETAZOLINE HCL 0.05 %
2 SPRAY, NON-AEROSOL (ML) NASAL EVERY 12 HOURS PRN
Status: DISPENSED | OUTPATIENT
Start: 2024-10-16 | End: 2024-10-19

## 2024-10-16 RX ORDER — CARVEDILOL 6.25 MG/1
6.25 TABLET ORAL 2 TIMES DAILY
Status: DISCONTINUED | OUTPATIENT
Start: 2024-10-16 | End: 2024-10-17

## 2024-10-16 RX ORDER — TORSEMIDE 20 MG/1
20 TABLET ORAL DAILY
Status: DISCONTINUED | OUTPATIENT
Start: 2024-10-16 | End: 2024-10-16

## 2024-10-16 RX ORDER — TORSEMIDE 20 MG/1
20 TABLET ORAL DAILY
Status: DISCONTINUED | OUTPATIENT
Start: 2024-10-17 | End: 2024-10-22 | Stop reason: HOSPADM

## 2024-10-16 RX ORDER — FUROSEMIDE 10 MG/ML
20 INJECTION INTRAMUSCULAR; INTRAVENOUS ONCE
Status: COMPLETED | OUTPATIENT
Start: 2024-10-16 | End: 2024-10-16

## 2024-10-16 ASSESSMENT — ENCOUNTER SYMPTOMS
FATIGUE: 1
CHEST TIGHTNESS: 0
COUGH: 0
FEVER: 0
SHORTNESS OF BREATH: 1
CARDIOVASCULAR NEGATIVE: 1
UNEXPECTED WEIGHT CHANGE: 0

## 2024-10-16 ASSESSMENT — PAIN SCALES - GENERAL
PAINLEVEL_OUTOF10: 0 - NO PAIN
PAINLEVEL_OUTOF10: 3
PAINLEVEL_OUTOF10: 0 - NO PAIN
PAINLEVEL_OUTOF10: 1
PAINLEVEL_OUTOF10: 0 - NO PAIN

## 2024-10-16 ASSESSMENT — PAIN DESCRIPTION - ORIENTATION: ORIENTATION: RIGHT;LEFT

## 2024-10-16 ASSESSMENT — PAIN - FUNCTIONAL ASSESSMENT
PAIN_FUNCTIONAL_ASSESSMENT: 0-10
PAIN_FUNCTIONAL_ASSESSMENT: 0-10

## 2024-10-16 ASSESSMENT — PAIN DESCRIPTION - LOCATION: LOCATION: LEG

## 2024-10-16 NOTE — PROGRESS NOTES
Speech-Language Pathology    Inpatient Speech Language Pathology Dysphagia Treatment Note     Patient Name: Anitha Welch  MRN: [id number]  : 1940  Today's Date: 10/16/24  Time Calculation  Start Time: 1504  Stop Time: 1524  Time Calculation (min): 20 min       Total Number of Visits: 1/3     PLAN:  Skilled dysphagia treatment continues to be warranted to provide training and instruction regarding the use of compensatory swallow strategies  Plan  Inpatient/Swing Bed or Outpatient: Inpatient  SLP Frequency: 3x per week  Duration: 2 weeks  Next Treatment Priority: Diet tolerance, strategies  Discussed POC: Patient, Nursing  Discussed Risks/Benefits: Yes  Patient/Caregiver Agreeable: Yes    Recommendations:   DIET:    - Regular (IDDSI Level 7)  - Thin liquids (IDDSI Level 0)        Recommended Method of Medication Intake:    Whole with liquid     STRATEGIES:  - Small bites  - Small, single sips  - Upright for all PO intake  Subjective:  Pt was positioned upright in bed and was pleasant and cooperative.  Pain:  Pain Assessment  Pain Assessment: 0-10  0-10 (Numeric) Pain Score: 0 - No pain         Oxygen Status:   nasal cannula      Objective:  Pt seen at bedside for skilled dysphagia treatment.  SLP provided skilled instruction for use of compensatory swallow strategies to reduce cough and other s/s aspiration that could result in reduced airway protection.    Dysphagia Goals: (Established 10/16/24, projected discontinuation 2 weeks)     - Pt will safely swallow recommended diet without s/s aspiration for 90% of observed trials in order to maintain adequate nutrition and hydration.   CURRENT STATUS:  85%; pt demonstrated cough with larger sips of liquids   PROGRESS: Improving    - Pt will utilize safe swallow strategies with 90% acc in order to reduce risk of aspiration and s/s dysphagia.   CURRENT STATUS: 75%   PROGRESS: Improved awareness of necessity of swallow strategies to improve airway protection.    - Pt  to participate in assessment of oropharyngeal swallow function via MBSS for assessment of possible aspiration and to determine least restrictive diet to meet nutritional and hydration needs.    CURRENT STATUS: MBSS completed today   PROGRESS: Goal achieved      SLP Assessment:  SLP explained results of MBSS in thorough detail and described impairments present that could contribute to reduced airway protection.    Treatment Outcome:  Pt tolerated treatment well.  Pt is progressing as a result of current treatment interventions and would benefit from continued skilled dysphagia treatment.  Medical Staff Made Aware: Yes     Inpatient Education:   Individual(s) Educated: Patient  Verbal Education : Strategies, POC, diet recommendations  Risk and Benefits Discussed with Patient/Caregiver/Other: yes  Patient/Caregiver Demonstrated Understanding: yes  Plan of Care Discussed and Agreed Upon: yes  Patient Response to Education: Patient/Caregiver Verbalized Understanding of Information

## 2024-10-16 NOTE — PROGRESS NOTES
"Anitha Welch is a 84 y.o. female on day 1 of admission seen in follow-up for acute on chronic hypoxic, hypercapnic respiratory failure, acute COPD exacerbation, pneumonia    Subjective   Family at bedside. On 3 L nasal cannula oxygen; oxygen sats 98%. Endorses dyspnea. Denies cough. Denies pain. Afebrile        Objective     Physical Exam  Vitals and nursing note reviewed.   Constitutional:       Appearance: She is ill-appearing.   HENT:      Head: Normocephalic and atraumatic.      Nose: Nose normal.      Mouth/Throat:      Mouth: Mucous membranes are moist.   Eyes:      Extraocular Movements: Extraocular movements intact.      Conjunctiva/sclera: Conjunctivae normal.      Pupils: Pupils are equal, round, and reactive to light.   Cardiovascular:      Rate and Rhythm: Normal rate and regular rhythm.      Pulses: Normal pulses.      Heart sounds: Normal heart sounds.   Pulmonary:      Effort: Pulmonary effort is normal.      Comments: End-expiratory wheezes bilaterally. Shallow respirations. Tachypneic.   Abdominal:      General: Bowel sounds are normal.      Palpations: Abdomen is soft.   Musculoskeletal:         General: Normal range of motion.   Skin:     General: Skin is warm and dry.      Capillary Refill: Capillary refill takes less than 2 seconds.   Neurological:      General: No focal deficit present.      Mental Status: She is alert and oriented to person, place, and time.   Psychiatric:         Mood and Affect: Mood normal.         Behavior: Behavior normal.         Last Recorded Vitals  Blood pressure 143/73, pulse 93, temperature 36.7 °C (98.1 °F), temperature source Temporal, resp. rate (!) 27, height 1.626 m (5' 4\"), weight 67.8 kg (149 lb 6.4 oz), SpO2 98%.  Intake/Output last 3 Shifts:  I/O last 3 completed shifts:  In: - (0 mL/kg)   Out: 650 (9.6 mL/kg) [Urine:650 (0.3 mL/kg/hr)]  Weight: 67.8 kg       Intake/Output Summary (Last 24 hours) at 10/16/2024 0955  Last data filed at 10/16/2024 " 0936  Gross per 24 hour   Intake --   Output 1100 ml   Net -1100 ml      Scheduled medications:  atorvastatin, 40 mg, oral, Nightly  azithromycin, 500 mg, intravenous, Daily  aztreonam, 1 g, intravenous, q12h  barium sulfate, 5 mL, oral, Once in imaging  barium sulfate, 85 mL, oral, Once in imaging  barium sulfate, 10 mL, oral, Once in imaging  barium sulfate, 90 mL, oral, Once in imaging  carvedilol, 3.125 mg, oral, BID  gabapentin, 100 mg, oral, Nightly  ipratropium-albuteroL, 3 mL, nebulization, 4x daily  levothyroxine, 25 mcg, oral, Daily  methylPREDNISolone sodium succinate (PF), 40 mg, intravenous, q8h  nystatin, 1 Application, Topical, BID  pantoprazole, 40 mg, oral, Daily before breakfast   Or  pantoprazole, 40 mg, intravenous, Daily before breakfast  rivaroxaban, 20 mg, oral, Daily with evening meal     PRN medications: acetaminophen **OR** acetaminophen **OR** acetaminophen, bisacodyl, diclofenac sodium, docusate sodium, ipratropium-albuteroL, nitroglycerin, oxygen, oxymetazoline   oxygen, 2 L/min       Relevant Results  Results for orders placed or performed during the hospital encounter of 10/15/24 (from the past 24 hours)   Blood Culture    Specimen: Peripheral Venipuncture; Blood culture   Result Value Ref Range    Blood Culture Loaded on Instrument - Culture in progress    Blood Culture    Specimen: Peripheral Venipuncture; Blood culture   Result Value Ref Range    Blood Culture Loaded on Instrument - Culture in progress    Hemoglobin A1C   Result Value Ref Range    Hemoglobin A1C 4.8 See comment %    Estimated Average Glucose 91 Not Established mg/dL   Magnesium   Result Value Ref Range    Magnesium 1.75 1.60 - 2.40 mg/dL   Protime-INR   Result Value Ref Range    Protime 12.3 9.3 - 12.7 seconds    INR 1.2 0.9 - 1.2   TSH with reflex to Free T4 if abnormal   Result Value Ref Range    Thyroid Stimulating Hormone 17.22 (H) 0.44 - 3.98 mIU/L   Troponin I, High Sensitivity, Initial   Result Value Ref  Range    Troponin I, High Sensitivity 63 (HH) 0 - 13 ng/L   Thyroxine, Free   Result Value Ref Range    Thyroxine, Free 0.62 0.61 - 1.12 ng/dL   Type And Screen   Result Value Ref Range    ABO TYPE O     Rh TYPE POS     ANTIBODY SCREEN NEG    Sars-CoV-2 PCR   Result Value Ref Range    Coronavirus 2019, PCR Not Detected Not Detected   Influenza A, and B PCR   Result Value Ref Range    Flu A Result Not Detected Not Detected    Flu B Result Not Detected Not Detected   RSV PCR   Result Value Ref Range    RSV PCR Not Detected Not Detected   Urinalysis with Reflex Microscopic   Result Value Ref Range    Color, Urine Yellow Light-Yellow, Yellow, Dark-Yellow    Appearance, Urine Turbid (N) Clear    Specific Gravity, Urine 1.015 1.005 - 1.035    pH, Urine 5.5 5.0, 5.5, 6.0, 6.5, 7.0, 7.5, 8.0    Protein, Urine 50 (1+) (A) NEGATIVE, 10 (TRACE), 20 (TRACE) mg/dL    Glucose, Urine Normal Normal mg/dL    Blood, Urine 0.03 (TRACE) (A) NEGATIVE    Ketones, Urine NEGATIVE NEGATIVE mg/dL    Bilirubin, Urine NEGATIVE NEGATIVE    Urobilinogen, Urine Normal Normal mg/dL    Nitrite, Urine NEGATIVE NEGATIVE    Leukocyte Esterase, Urine 250 [place]/µL (A) NEGATIVE   Microscopic Only, Urine   Result Value Ref Range    WBC, Urine 21-50 (A) 1-5, NONE /HPF    WBC Clumps, Urine OCCASIONAL Reference range not established. /HPF    RBC, Urine 1-2 NONE, 1-2, 3-5 /HPF    Squamous Epithelial Cells, Urine 1-9 (SPARSE) Reference range not established. /HPF    Mucus, Urine FEW Reference range not established. /LPF    Hyaline Casts, Urine 2+ (A) NONE /LPF   Troponin, High Sensitivity, 1 Hour   Result Value Ref Range    Troponin I, High Sensitivity 61 (HH) 0 - 13 ng/L   ECG 12 Lead   Result Value Ref Range    Ventricular Rate 93 BPM    Atrial Rate 93 BPM    CT Interval 160 ms    QRS Duration 106 ms    QT Interval 400 ms    QTC Calculation(Bazett) 497 ms    P Axis 70 degrees    R Axis -47 degrees    T Axis 57 degrees    QRS Count 15 beats    Q Onset 208 ms     T Offset 408 ms    QTC Fredericia 463 ms   Blood Gas Arterial Full Panel   Result Value Ref Range    POCT pH, Arterial 7.46 (H) 7.38 - 7.42 pH    POCT pCO2, Arterial 54 (H) 38 - 42 mm Hg    POCT pO2, Arterial 139 (H) 85 - 95 mm Hg    POCT SO2, Arterial 99 94 - 100 %    POCT Oxy Hemoglobin, Arterial 96.8 94.0 - 98.0 %    POCT Hematocrit Calculated, Arterial 26.0 (L) 36.0 - 46.0 %    POCT Sodium, Arterial 140 136 - 145 mmol/L    POCT Potassium, Arterial 3.6 3.5 - 5.3 mmol/L    POCT Chloride, Arterial 101 98 - 107 mmol/L    POCT Ionized Calcium, Arterial 1.07 (L) 1.10 - 1.33 mmol/L    POCT Glucose, Arterial 174 (H) 74 - 99 mg/dL    POCT Lactate, Arterial 1.1 0.4 - 2.0 mmol/L    POCT Base Excess, Arterial 13.0 (H) -2.0 - 3.0 mmol/L    POCT HCO3 Calculated, Arterial 38.4 (H) 22.0 - 26.0 mmol/L    POCT Hemoglobin, Arterial 8.5 (L) 12.0 - 16.0 g/dL    POCT Anion Gap, Arterial 4 (L) 10 - 25 mmo/L    Patient Temperature 37.0 degrees Celsius    FiO2 50 %    Ventilator Mode BiPAP     Ventilator Rate 16 bpm    Ipap CMH2O 15.0 cm H2O    Epap CMH2O 5.0 cm H2O    Site of Arterial Puncture Brachial Right     Leonidas's Test     Troponin I, High Sensitivity   Result Value Ref Range    Troponin I, High Sensitivity 56 (HH) 0 - 13 ng/L   Comprehensive metabolic panel   Result Value Ref Range    Glucose 158 (H) 74 - 99 mg/dL    Sodium 143 136 - 145 mmol/L    Potassium 3.5 3.5 - 5.3 mmol/L    Chloride 100 98 - 107 mmol/L    Bicarbonate 35 (H) 21 - 32 mmol/L    Anion Gap 12 10 - 20 mmol/L    Urea Nitrogen 20 6 - 23 mg/dL    Creatinine 1.07 (H) 0.50 - 1.05 mg/dL    eGFR 51 (L) >60 mL/min/1.73m*2    Calcium 8.7 8.6 - 10.3 mg/dL    Albumin 3.6 3.4 - 5.0 g/dL    Alkaline Phosphatase 54 33 - 136 U/L    Total Protein 6.0 (L) 6.4 - 8.2 g/dL    AST 16 9 - 39 U/L    Bilirubin, Total 0.8 0.0 - 1.2 mg/dL    ALT 14 7 - 45 U/L   CBC and Auto Differential   Result Value Ref Range    WBC 3.3 (L) 4.4 - 11.3 x10*3/uL    nRBC 0.0 0.0 - 0.0 /100 WBCs    RBC  2.99 (L) 4.00 - 5.20 x10*6/uL    Hemoglobin 7.5 (L) 12.0 - 16.0 g/dL    Hematocrit 25.4 (L) 36.0 - 46.0 %    MCV 85 80 - 100 fL    MCH 25.1 (L) 26.0 - 34.0 pg    MCHC 29.5 (L) 32.0 - 36.0 g/dL    RDW 17.7 (H) 11.5 - 14.5 %    Platelets 125 (L) 150 - 450 x10*3/uL    Neutrophils % 81.2 40.0 - 80.0 %    Immature Granulocytes %, Automated 0.9 0.0 - 0.9 %    Lymphocytes % 15.8 13.0 - 44.0 %    Monocytes % 2.1 2.0 - 10.0 %    Eosinophils % 0.0 0.0 - 6.0 %    Basophils % 0.0 0.0 - 2.0 %    Neutrophils Absolute 2.68 1.60 - 5.50 x10*3/uL    Immature Granulocytes Absolute, Automated 0.03 0.00 - 0.50 x10*3/uL    Lymphocytes Absolute 0.52 (L) 0.80 - 3.00 x10*3/uL    Monocytes Absolute 0.07 0.05 - 0.80 x10*3/uL    Eosinophils Absolute 0.00 0.00 - 0.40 x10*3/uL    Basophils Absolute 0.00 0.00 - 0.10 x10*3/uL   Morphology   Result Value Ref Range    RBC Morphology See Below     Polychromasia Mild     Ovalocytes Few     [place] Cells Few     Acanthocytes Few      *Note: Due to a large number of results and/or encounters for the requested time period, some results have not been displayed. A complete set of results can be found in Results Review.      XR chest 2 views  Result Date: 10/16/2024    XR chest 1 view  Result Date: 10/15/2024  FINDINGS: Multifocal airspace opacities greatest in the right lung both upper and lower lung field, concerning for pneumonia or an atypical presentation of edema have slightly worsened from prior study.   There has also been increase in small bilateral pleural effusions.   No evidence of pneumothorax.  Multifocal airspace opacities greatest in the right lung both upper and lower lung field, concerning for pneumonia or an atypical presentation of edema have slightly worsened from prior study.   There has also been increase in small bilateral pleural effusions.         Assessment/Plan   Acute on chronic hypoxic, hypercapnic respiratory failure  Wean oxygen as sats allow  BiPAP nightly and as  needed  Continuous pulse oximetry  Incentive spirometry/pulmonary hygiene     Acute COPD exacerbation  Continue IBD/ICS  IV Solu-Medrol-will maintain current dose  FEV1 when optimized     Chronic congestive heart failure  40 mg IV Lasix given x 1 dose yesterday  Cardiology follow-up     Pneumonia  Continue IV azithromycin, aztreonam   Sputum if able  Urine Legionella, strep pneumo, MRSA, procalcitonin pending  Follow-up cultures  MBS pending  CT scan chest wo IV contrast     Acute kidney injury on chronic kidney disease  Nephrology consultation    Prophylaxis  Xarelto   Protonix    PT/OT/out of bed    Sabiha Lane, APRN-CNP

## 2024-10-16 NOTE — CARE PLAN
Problem: Pain - Adult  Goal: Verbalizes/displays adequate comfort level or baseline comfort level  Outcome: Progressing     Problem: Safety - Adult  Goal: Free from fall injury  Outcome: Progressing     Problem: Discharge Planning  Goal: Discharge to home or other facility with appropriate resources  Outcome: Progressing     Problem: Chronic Conditions and Co-morbidities  Goal: Patient's chronic conditions and co-morbidity symptoms are monitored and maintained or improved  Outcome: Progressing     Problem: Respiratory  Goal: Verbalize decreased shortness of breath this shift  Outcome: Progressing  Goal: Wean oxygen to maintain O2 saturation per order/standard this shift  Outcome: Progressing   The patient's goals for the shift include      The clinical goals for the shift include monitor oxygen saturation    Over the shift, the patient did not make progress toward the following goals. Barriers to progression include na. Recommendations to address these barriers include na.

## 2024-10-16 NOTE — PROGRESS NOTES
Speech-Language Pathology    SLP Adult Inpatient Modified Barium Swallow Study    Patient Name: Anitha Welch  MRN: [id number]  : 1940  Today's Date: 10/16/24  Time Calculation  Start Time: 0950  Stop Time: 1030  Time Calculation (min): 40 min          Modified Barium Swallow Study completed. Informed verbal consent obtained prior to completion of exam. Trials of thin, nectar/mildly thick liquid, honey/moderately thick liquid, puree, and regular solids were given.     A 1.9 cm or .75 inch (outer diameter) ring was placed on the chin in the lateral view. The anatomic structures and function of the oropharynx, larynx, hypopharynx and cervical esophagus were evaluated.    SLP: Yulissa Nogueira M.A. CCC-SLP  Contact info:   JOOR [phone]  Office phone: [phone], Option 2  [email or web address]    Reason for Referral:  Results of Clinical Swallow Evaluation indicated need for further assessment of pharyngeal phase.    History Of Present Illness from Physician:  This patient presented with shortness of breath.  She normally uses oxygen but this morning her pulse ox was below 90.  She was short of breath all night no chest pain no nausea vomiting no fever chills no cold flulike symptoms.  When she presented to the emergency room she was clinically started on BiPAP which was continued up until 30 minutes ago when she was transitioned to 4 L of oxygen now she is saturating at 96- 7%.  She feels better.  She was given Lasix but has not urinated yet she is given also antibiotics.    Pertinent Past Medical Hx: CHF, COPD, GERD, CVA, HTN, hypothyroidism, AAA, anemia, depression, anxiety  Respiratory Status: Oxygen via nasal canula  Current diet:   - Regular (IDDSI Level 7)  - Thin liquids (IDDSI Level 0)    Pain:  Pain Scale: 0-10  Ratin    General Visit Information: Exam conducted with pt in upright position with legs extended in transport [facility].  Pt on oxygen via nasal  canula during test, as well as telemetry monitoring.  Pt accompanied by 2 student nurses who remained behind glass in control room during exam, but had view of telemetry monitor throughout.      Pt had occasional difficulty understanding directions of SLP, and sometimes began drinking when asked to hold bolus.  Pt had concerns that 20 mL boluses were too large to drink all at once as instructed, so she divided them into separate swallows.  Pt also had difficulty keeping her shoulders relaxed for exam, and changed position, which impacted view.  A-P view not conducted due to limitations with patient positioning.    DIET: Per the results of today's MBSS, the following is recommended:    - Regular (IDDSI Level 7)  - Thin liquids (IDDSI Level 0)      Recommended Method of Medication Intake:    Whole with liquid    STRATEGIES:  - Small bites  - Small, single sips  - Upright for all PO intake      SLP PLAN:  Skilled SLP Services: Skilled SLP intervention for dysphagia is warranted.  SLP Frequency: 3x per week  Duration: 2 weeks  Treatment/Interventions:   - Compensatory strategy training  - Diet tolerance/advancement  - Patient/caregiver education    Discussed POC: Patient  Discussed Risks/Benefits: Yes  Patient/Caregiver Agreeable: Yes    Dysphagia Goals: (Established 10/16/24, projected discontinuation 2 weeks)     - Pt will safely swallow recommended diet without s/s aspiration for 90% of observed trials in order to maintain adequate nutrition and hydration.  - Pt will utilize safe swallow strategies with 90% acc in order to reduce risk of aspiration and s/s dysphagia.  - Pt to participate in assessment of oropharyngeal swallow function via MBSS for assessment of possible aspiration and to determine least restrictive diet to meet nutritional and hydration needs.     Education Provided: Results and recommendations per MBSS, as well as POC were reviewed at this time. Verbal understanding and agreement demonstrated.      Repeat Study/ Discharge Plan: Repeat study as clinically indicated if s/s aspiration worsen.    Communication with Medical Team: SLP shared results/recommendations with bedside nurse via Secure Chat.     Mechanics of the Swallow Summary:    ORAL PHASE:  Lip Closure - No labial escape/anterior loss of bolus   Tongue Control During Bolus Hold - Cohesive bolus between tongue to palatal seal   Bolus prep/mastication - Timely and efficient mastication skills   Bolus transport/lingual motion - Delayed initiation of tongue motion for A-P movement of the bolus   Oral residue - Residue collection on oral structure     PHARYNGEAL PHASE:  Initiation of pharyngeal swallow - Bolus head at vallecular pit   Soft palate elevation - No bolus between soft palate/pharyngeal wall   Laryngeal elevation - Complete superior movement of thyroid cartilage with contact of arytenoids to epiglottic petiole   Anterior hyoid excursion - Partial anterior movement   Epiglottic movement - Partial inversion  Laryngeal vestibule closure - Complete - no air/contrast in laryngeal vestibule   Pharyngeal stripping wave - Complete  Pharyngeal contraction (A/P view) - Not tested       Pharyngoesophageal segment opening - Complete distension and complete duration/no obstruction of flow of bolus   Tongue base retraction - No bolus between tongue base and posterior pharyngeal wall   Pharyngeal residue - Trace residue within or on the pharyngeal structures     ESOPHAGEAL PHASE:  Esophageal clearance - Not evaluated       SLP Impressions with Severity Rating:   Pt presents with functional oral phase and mild pharyngeal phase dysphagia upon completion of modified barium swallow study this date.   Swallowing physiology is detailed above.   Impairments most impacting swallowing safety and efficiency include reduced hyoid excursion and partial epiglottic inversion.  Despite these impairments, no contrast entered airway, thus no penetration and no aspiration  were visualized during study.   Patient demonstrated trace oral and pharyngeal residue that was reduced with additional swallows.    OUTCOME MEASURES:  Functional Oral Intake Scale  Functional Oral Intake Scale: Level 7--total oral diet with no restrictions       Rosenbek's Penetration Aspiration Scale    Thin Liquids: 1. NO ASPIRATION & NO PENETRATION - no aspiration, contrast does not enter airway  [place] Thick Liquids: 1. NO ASPIRATION & NO PENETRATION - no aspiration, contrast does not enter airway  Honey Thick Liquids: 1. NO ASPIRATION & NO PENETRATION - no aspiration, contrast does not enter airway  Puree: 1. NO ASPIRATION & NO PENETRATION - no aspiration, contrast does not enter airway  Solids: 1. NO ASPIRATION & NO PENETRATION - no aspiration, contrast does not enter airway

## 2024-10-16 NOTE — PROGRESS NOTES
Spiritual Care Visit    Clinical Encounter Type  Visited With: Patient not available  Routine Visit: Follow-up  Continue Visiting: Yes     Donnell Quintanilla

## 2024-10-16 NOTE — PROGRESS NOTES
"Anitha Welch is a 84 y.o. female on day 1 of admission presenting with Acute respiratory failure.    Subjective   Patient upright in bed.  She is quite short of breath this morning remains on 3 L with saturations at 86 to 88%.       Objective     Physical Exam  Constitutional:       Appearance: Normal appearance. She is ill-appearing.   Cardiovascular:      Rate and Rhythm: Normal rate and regular rhythm.      Pulses: Normal pulses.      Heart sounds: Normal heart sounds. No murmur heard.  Pulmonary:      Effort: Pulmonary effort is normal.      Breath sounds: Wheezing and rhonchi present.   Abdominal:      General: Abdomen is flat.      Palpations: Abdomen is soft.   Musculoskeletal:      Right lower leg: No edema.      Left lower leg: No edema.   Neurological:      Mental Status: She is alert and oriented to person, place, and time.         Last Recorded Vitals  Blood pressure 143/73, pulse 93, temperature 36.7 °C (98.1 °F), temperature source Temporal, resp. rate (!) 27, height 1.626 m (5' 4\"), weight 67.8 kg (149 lb 6.4 oz), SpO2 98%.  Intake/Output last 3 Shifts:  I/O last 3 completed shifts:  In: - (0 mL/kg)   Out: 650 (9.6 mL/kg) [Urine:650 (0.3 mL/kg/hr)]  Weight: 67.8 kg     Relevant Results  Results for orders placed or performed during the hospital encounter of 10/15/24 (from the past 24 hours)   Blood Culture    Specimen: Peripheral Venipuncture; Blood culture   Result Value Ref Range    Blood Culture Loaded on Instrument - Culture in progress    Blood Culture    Specimen: Peripheral Venipuncture; Blood culture   Result Value Ref Range    Blood Culture Loaded on Instrument - Culture in progress    Hemoglobin A1C   Result Value Ref Range    Hemoglobin A1C 4.8 See comment %    Estimated Average Glucose 91 Not Established mg/dL   Magnesium   Result Value Ref Range    Magnesium 1.75 1.60 - 2.40 mg/dL   Protime-INR   Result Value Ref Range    Protime 12.3 9.3 - 12.7 seconds    INR 1.2 0.9 - 1.2   TSH with " reflex to Free T4 if abnormal   Result Value Ref Range    Thyroid Stimulating Hormone 17.22 (H) 0.44 - 3.98 mIU/L   Troponin I, High Sensitivity, Initial   Result Value Ref Range    Troponin I, High Sensitivity 63 (HH) 0 - 13 ng/L   Thyroxine, Free   Result Value Ref Range    Thyroxine, Free 0.62 0.61 - 1.12 ng/dL   Type And Screen   Result Value Ref Range    ABO TYPE O     Rh TYPE POS     ANTIBODY SCREEN NEG    Sars-CoV-2 PCR   Result Value Ref Range    Coronavirus 2019, PCR Not Detected Not Detected   Influenza A, and B PCR   Result Value Ref Range    Flu A Result Not Detected Not Detected    Flu B Result Not Detected Not Detected   RSV PCR   Result Value Ref Range    RSV PCR Not Detected Not Detected   Urinalysis with Reflex Microscopic   Result Value Ref Range    Color, Urine Yellow Light-Yellow, Yellow, Dark-Yellow    Appearance, Urine Turbid (N) Clear    Specific Gravity, Urine 1.015 1.005 - 1.035    pH, Urine 5.5 5.0, 5.5, 6.0, 6.5, 7.0, 7.5, 8.0    Protein, Urine 50 (1+) (A) NEGATIVE, 10 (TRACE), 20 (TRACE) mg/dL    Glucose, Urine Normal Normal mg/dL    Blood, Urine 0.03 (TRACE) (A) NEGATIVE    Ketones, Urine NEGATIVE NEGATIVE mg/dL    Bilirubin, Urine NEGATIVE NEGATIVE    Urobilinogen, Urine Normal Normal mg/dL    Nitrite, Urine NEGATIVE NEGATIVE    Leukocyte Esterase, Urine 250 [place]/µL (A) NEGATIVE   Microscopic Only, Urine   Result Value Ref Range    WBC, Urine 21-50 (A) 1-5, NONE /HPF    WBC Clumps, Urine OCCASIONAL Reference range not established. /HPF    RBC, Urine 1-2 NONE, 1-2, 3-5 /HPF    Squamous Epithelial Cells, Urine 1-9 (SPARSE) Reference range not established. /HPF    Mucus, Urine FEW Reference range not established. /LPF    Hyaline Casts, Urine 2+ (A) NONE /LPF   Troponin, High Sensitivity, 1 Hour   Result Value Ref Range    Troponin I, High Sensitivity 61 (HH) 0 - 13 ng/L   ECG 12 Lead   Result Value Ref Range    Ventricular Rate 93 BPM    Atrial Rate 93 BPM    OR Interval 160 ms    QRS  Duration 106 ms    QT Interval 400 ms    QTC Calculation(Bazett) 497 ms    P Axis 70 degrees    R Axis -47 degrees    T Axis 57 degrees    QRS Count 15 beats    Q Onset 208 ms    T Offset 408 ms    QTC Fredericia 463 ms   Blood Gas Arterial Full Panel   Result Value Ref Range    POCT pH, Arterial 7.46 (H) 7.38 - 7.42 pH    POCT pCO2, Arterial 54 (H) 38 - 42 mm Hg    POCT pO2, Arterial 139 (H) 85 - 95 mm Hg    POCT SO2, Arterial 99 94 - 100 %    POCT Oxy Hemoglobin, Arterial 96.8 94.0 - 98.0 %    POCT Hematocrit Calculated, Arterial 26.0 (L) 36.0 - 46.0 %    POCT Sodium, Arterial 140 136 - 145 mmol/L    POCT Potassium, Arterial 3.6 3.5 - 5.3 mmol/L    POCT Chloride, Arterial 101 98 - 107 mmol/L    POCT Ionized Calcium, Arterial 1.07 (L) 1.10 - 1.33 mmol/L    POCT Glucose, Arterial 174 (H) 74 - 99 mg/dL    POCT Lactate, Arterial 1.1 0.4 - 2.0 mmol/L    POCT Base Excess, Arterial 13.0 (H) -2.0 - 3.0 mmol/L    POCT HCO3 Calculated, Arterial 38.4 (H) 22.0 - 26.0 mmol/L    POCT Hemoglobin, Arterial 8.5 (L) 12.0 - 16.0 g/dL    POCT Anion Gap, Arterial 4 (L) 10 - 25 mmo/L    Patient Temperature 37.0 degrees Celsius    FiO2 50 %    Ventilator Mode BiPAP     Ventilator Rate 16 bpm    Ipap CMH2O 15.0 cm H2O    Epap CMH2O 5.0 cm H2O    Site of Arterial Puncture Brachial Right     Leonidas's Test     Troponin I, High Sensitivity   Result Value Ref Range    Troponin I, High Sensitivity 56 (HH) 0 - 13 ng/L   Comprehensive metabolic panel   Result Value Ref Range    Glucose 158 (H) 74 - 99 mg/dL    Sodium 143 136 - 145 mmol/L    Potassium 3.5 3.5 - 5.3 mmol/L    Chloride 100 98 - 107 mmol/L    Bicarbonate 35 (H) 21 - 32 mmol/L    Anion Gap 12 10 - 20 mmol/L    Urea Nitrogen 20 6 - 23 mg/dL    Creatinine 1.07 (H) 0.50 - 1.05 mg/dL    eGFR 51 (L) >60 mL/min/1.73m*2    Calcium 8.7 8.6 - 10.3 mg/dL    Albumin 3.6 3.4 - 5.0 g/dL    Alkaline Phosphatase 54 33 - 136 U/L    Total Protein 6.0 (L) 6.4 - 8.2 g/dL    AST 16 9 - 39 U/L     Bilirubin, Total 0.8 0.0 - 1.2 mg/dL    ALT 14 7 - 45 U/L   CBC and Auto Differential   Result Value Ref Range    WBC 3.3 (L) 4.4 - 11.3 x10*3/uL    nRBC 0.0 0.0 - 0.0 /100 WBCs    RBC 2.99 (L) 4.00 - 5.20 x10*6/uL    Hemoglobin 7.5 (L) 12.0 - 16.0 g/dL    Hematocrit 25.4 (L) 36.0 - 46.0 %    MCV 85 80 - 100 fL    MCH 25.1 (L) 26.0 - 34.0 pg    MCHC 29.5 (L) 32.0 - 36.0 g/dL    RDW 17.7 (H) 11.5 - 14.5 %    Platelets 125 (L) 150 - 450 x10*3/uL    Neutrophils % 81.2 40.0 - 80.0 %    Immature Granulocytes %, Automated 0.9 0.0 - 0.9 %    Lymphocytes % 15.8 13.0 - 44.0 %    Monocytes % 2.1 2.0 - 10.0 %    Eosinophils % 0.0 0.0 - 6.0 %    Basophils % 0.0 0.0 - 2.0 %    Neutrophils Absolute 2.68 1.60 - 5.50 x10*3/uL    Immature Granulocytes Absolute, Automated 0.03 0.00 - 0.50 x10*3/uL    Lymphocytes Absolute 0.52 (L) 0.80 - 3.00 x10*3/uL    Monocytes Absolute 0.07 0.05 - 0.80 x10*3/uL    Eosinophils Absolute 0.00 0.00 - 0.40 x10*3/uL    Basophils Absolute 0.00 0.00 - 0.10 x10*3/uL   Morphology   Result Value Ref Range    RBC Morphology See Below     Polychromasia Mild     Ovalocytes Few     Madonna Cells Few     Acanthocytes Few      *Note: Due to a large number of results and/or encounters for the requested time period, some results have not been displayed. A complete set of results can be found in Results Review.                     Assessment/Plan   Assessment & Plan  Acute respiratory failure    Dyspnea, unspecified type    Acute respiratory failure with hypoxia  Acute on chronic diastolic heart failure  Type II myocardial infarction  Possible pneumonia  Mitral valve regurgitation status post ARMANI  Coronary artery disease status post stenting to RCA  Sinus arrhythmia  Lower extremity DVT on Xarelto     Overall impression/plan:  10/15: 84-year-old female presenting with shortness of breath and hypoxia.  She was started on BiPAP in the ED with improvement of her respiratory status.  She currently is on 3 L of oxygen via  nasal cannula.  She tells me she wears O2 at home just as needed.  Chest x-ray demonstrates pulmonary edema versus pneumonia.  She has been treated for both.  She was given 40 mg IV Lasix in the ED.  A another 40 mg IV dose has been ordered for later this evening.  Agree with this.  Will likely reorder her home dose of torsemide 20 mg daily as early as tomorrow.  In regards to her questionable atrial fibrillation.  She appears to be in a sinus arrhythmia with PVCs.  There is evidence of P waves throughout all leads.  Her high-sensitivity troponins are elevated, however she denies chest pain.  Her EKG is nonischemic. I suspect this is a type 2 myocardial infarction in the setting of her hypoxia.  Patient an echocardiogram 8/24 which revealed preserved ejection fraction of 60 to 65%, normal right ventricular global systolic function, moderately dilated right atrium, mild to moderate mitral valve regurgitation, mild tricuspid regurgitation, moderately elevated RVSP and moderate pulmonary hypertension.  No need to repeat this imaging at this time.  Blood pressures are slightly hypertensive most recent reading 142/58.  Clinically she appears euvolemic.  She remains in a rate controlled sinus arrhythmia with PVCs on telemetry.  She is on Xarelto for a lower extremity DVT.  Will discuss further plan with Dr. Tan.  Will continue to follow with you.    Patient seen and examined. Agree with MAC note. Patient with history of coronary artery disease status post remote PCI to RCA, mitral regurgitation status post mitral clip. Had recent echocardiogram in August 2014 which was overall okay with mild gradient across the mitral valve. Patient has been complaining of chronic anemia and shortness of breath. Patient has been wheezing mild productive cough. Physical examination chest x-ray suggestive of COPD exacerbation/pneumonia in addition of mild acute on chronic diastolic heart failure exacerbation. Will gently diurese her. She  has mildly elevated troponin without chest pain EKG at baseline she does have frequent PACs. Will try to slow down her arrhythmia. Continue Xarelto. Will follow-up in AM. Check TSH magnesium and electrolytes.     10/16: As above.  Patient rather short of breath after getting back in bed this morning.  Saturations were 85%.  As patient sat in bed and calm down her saturations went back up to 95%.  She remains on 3 L of O2.  Will give her 20 mg IV Lasix now and time her home dose of torsemide for later this afternoon.  Pulmonology is following.  She remains on IV antibiotics for suspected pneumonia.  Patient had repeat chest x-ray completed this morning they are not read yet.  She remains in normal sinus rhythm on telemetry without significant ectopy.  Most recent blood pressure 143/73.  Will increase her Coreg to 6.25 mg twice daily.  Lab work this morning reveals sodium of 143, potassium of 3.5 creatinine down to 1.07 today.  High-sensitivity troponins elevated however flat at 28,29, 63, 61, 56.  Again patient denies chest pain.  Elevated troponins most likely secondary to type II MI from acute hypoxia.  Hemoglobin this morning is 7.5 with hematocrit of 25.4.  TSH 17.22.  patient had modified barium swallow completed today we will await results. Will continue to follow with you.            I spent 20 minutes in the professional and overall care of this patient.      Stefanie Chadwick PA-C

## 2024-10-16 NOTE — PROGRESS NOTES
Anitha Welch is a 84 y.o. female on day 1 of admission presenting with Acute respiratory failure.      Subjective   She feels only moderately improved  Reviewed input from pulmonology and cardiology       Objective     Last Recorded Vitals  /73 (BP Location: Right arm, Patient Position: Sitting)   Pulse 93   Temp 36.7 °C (98.1 °F) (Temporal)   Resp (!) 27   Wt 67.8 kg (149 lb 6.4 oz)   SpO2 96%   Intake/Output last 3 Shifts:    Intake/Output Summary (Last 24 hours) at 10/16/2024 1154  Last data filed at 10/16/2024 0936  Gross per 24 hour   Intake --   Output 1100 ml   Net -1100 ml       Physical Exam  Alert oriented x 3  and son are at the bedside  Normocephalic/atraumatic EOMI PERRLA  Neck supple  Chest bilateral wheezes and crackles  Heart regular  Abdomen soft  Extremities minimal if any edema  Neurologic examination gross motor sensor nonfocal  Psych no psychosis or delirium  Relevant Results  Reviewed , CT chest ordered by pulmonology  Assessment/Plan     Principal Problem:    Acute respiratory failure  Active Problems:    Dyspnea, unspecified type      October 16:    Acute on chronic respiratory failure currently be treated both with diastolic heart failure and possible superimposed pneumonia.  Agree with obtaining CT chest.  Gentle diuresis per cardiology creatinine seems to be better  Question A-fib this is per cardiology  COPD with likely exacerbation on steroids and albuterol treatments  History of MI coronary disease no detectable troponin per cardiology  History of chronic kidney disease creatinine numbers actually better with diuresis we will continue to monitor and consult nephrology if needed  Anemia thrombocytopenia, hemodynamically stable but will guaiac stools ask for hematology eval hold heparin  CODE STATUS once again confirmed with the patient in the presence of her  and son             Jayro Lea MD

## 2024-10-16 NOTE — PROGRESS NOTES
10/16/24 1605   Discharge Planning   Living Arrangements Spouse/significant other   Support Systems Spouse/significant other;Children   Assistance Needed None PTA - Discharge needs TBD   Type of Residence Private residence   Home or Post Acute Services   (HHC vs Home - No therapy orders at this time.)   Expected Discharge Disposition   (TBD - HHC vs Home)   Does the patient need discharge transport arranged? No

## 2024-10-16 NOTE — NURSING NOTE
Placed patient on bipap due to shortness of breath and spo2 88%.  Patient did not want to go on but agreed to go on for 1 hour.   At 2330 patient did not want to continue to stay on bipap wanted off. Placed back on 3Lpm nc

## 2024-10-16 NOTE — CONSULTS
Nutrition Assessement Note    Nutrition Assessment    Reason for Assessment: Dietitian discretion (CHF)    Reason for Hospital Admission:  Anitha Welch is a 84 y.o. female who is admitted for respiratory failure, acute on chronic CHF. Attempted to visit with pt x2 - down for MBS and now CT.     Past Medical History:   Diagnosis Date    AAA (abdominal aortic aneurysm) (CMS-[facility])     Acute urinary tract infection 04/20/2023    Anemia     Anxiety     Arthritis     CHF (congestive heart failure)     Chronic pain disorder     back pain    CKD (chronic kidney disease)     Cognitive decline     COPD (chronic obstructive pulmonary disease) (Multi)     oxygen dependent- wears 2L NC continuously    Coronary artery disease     s/p stent    CVA (cerebral vascular accident) (Multi)     weaker on the left side    Deep vein thrombosis (DVT) of calf (Multi)     left    Depression     Disease of thyroid gland     Diverticulosis     DVT (deep venous thrombosis) (Multi)     left leg, on xarelto    Easy bruising     Epistaxis     GERD (gastroesophageal reflux disease)     managed with protonix    History of blood transfusion 2023    History of degenerative disc disease     Hyperlipidemia     Hypertension     Hypothyroidism     Ischemic cardiomyopathy     Meralgia paresthetica     Nonrheumatic mitral valve regurgitation     Osteoarthritis     Osteopenia 2010    hip - DEXA    Plantar fasciitis     RLS (restless legs syndrome)     Sciatica     Spinal stenosis     ST elevation (STEMI) myocardial infarction (Multi)       Past Surgical History:   Procedure Laterality Date    ADENOIDECTOMY      ANOMALOUS PULMONARY VENOUS RETURN REPAIR, 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    Procedure: Mitral-ARMANI (Transcatheter Edge to Edge Repair);  Surgeon: Kyle Bernardo MD;  Location: [address] Cardiac Cath Lab;  Service: Cardiovascular;  Laterality: N/A;  SAMMY Elgendy.Labs with Aleyda Mota,Schedule at 7;30am    CARDIAC CATHETERIZATION  10/2019    CARDIAC  "CATHETERIZATION N/A 02/16/2024    Procedure: Left And Right Heart Cath, With LV;  Surgeon: Tuan Foss DO;  Location: [facility] Cardiac [facility] Lab;  Service: Cardiovascular;  Laterality: N/A;  no pre auth needed    CATARACT EXTRACTION Bilateral 11/13/2012    CHOLECYSTECTOMY  1996    laparoscopic    COLONOSCOPY      Dr. Rodriguez    CORONARY STENT PLACEMENT      CYST REMOVAL Right 06/24/2013    Excision of Sebaceous cyst right axilla    DILATION AND CURETTAGE OF UTERUS      ESOPHAGOGASTRODUODENOSCOPY      KNEE ARTHROPLASTY Left     MR HEAD ANGIO WO IV CONTRAST  02/24/2017    MR HEAD ANGIO WO IV CONTRAST [facility]    MR HEAD ANGIO WO IV CONTRAST  08/11/2017    MR HEAD ANGIO WO IV CONTRAST [facility]    MR HEAD ANGIO WO IV CONTRAST  02/10/2019    MR HEAD ANGIO WO IV CONTRAST [facility]    OTHER SURGICAL HISTORY  01/09/2019    Stent synergy    OTHER SURGICAL HISTORY  01/09/2019    Stent synergy    [place] ARTHRP ACETBLR/PROX FEM PROSTC AGRFT/ALGRFT      SURGICAL SAMMY MONITORING      THYROIDECTOMY, PARTIAL Bilateral 04/17/2013    subtotal thyroidectomy    TONSILLECTOMY      TOTAL HIP ARTHROPLASTY Right 2006    UPPER GASTROINTESTINAL ENDOSCOPY  03/2019    Dr. Galan     Nutrition History:  Food and Nutrient History: N/A    Anthropometrics:  Ht: 162.6 cm (5' 4\"), Wt: 67.8 kg (149 lb 6.4 oz), BMI: 25.63    Weight Change:  Daily Weight  10/16/24 : 67.8 kg (149 lb 6.4 oz)  10/08/24 : 62.6 kg (138 lb)  10/01/24 : 62.6 kg (138 lb)  09/27/24 : 62.6 kg (138 lb)  09/26/24 : 63 kg (139 lb)  09/19/24 : 67.7 kg (149 lb 4 oz)  09/13/24 : 61.2 kg (135 lb)  09/09/24 : 61.2 kg (135 lb)  09/05/24 : 64.2 kg (141 lb 8 oz)  08/23/24 : 63.5 kg (140 lb)  08/22/24 : 63.5 kg (140 lb)  08/22/24 : 63.5 kg (140 lb)  08/15/24 : 70.4 kg (155 lb 3.3 oz)  08/08/24 : 63.5 kg (140 lb)  07/16/24 : 63.5 kg (140 lb)  07/15/24 : 64 kg (141 lb 3.2 oz)  06/25/24 : 65 kg (143 lb 3.2 oz)  06/18/24 : 63 kg (139 lb)  05/17/24 : 64 kg (141 " lb)  05/06/24 : 63.5 kg (140 lb)     Weight History / % Weight Change: wt fluctuations possibly r/t fluid    Nutrition Focused Physical Exam Findings:   Edema  Edema: +2 mild  Edema Location: BLE    Nutrition Significant Labs:  Lab Results   Component Value Date    WBC 3.3 (L) 10/16/2024    HGB 7.5 (L) 10/16/2024    HCT 25.4 (L) 10/16/2024     (L) 10/16/2024    CHOL 190 02/21/2023    TRIG 64 02/21/2023    HDL 52 02/21/2023    ALT 14 10/16/2024    AST 16 10/16/2024     10/16/2024    K 3.5 10/16/2024     10/16/2024    CREATININE 1.07 (H) 10/16/2024    BUN 20 10/16/2024    CO2 35 (H) 10/16/2024    TSH 17.22 (H) 10/15/2024    INR 1.2 10/15/2024    HGBA1C 4.8 10/15/2024     Nutrition Specific Medications:  atorvastatin, 40 mg, oral, Nightly  azithromycin, 500 mg, intravenous, Daily  aztreonam, 1 g, intravenous, q12h  carvedilol, 6.25 mg, oral, BID  gabapentin, 100 mg, oral, Nightly  ipratropium-albuteroL, 3 mL, nebulization, 4x daily  levothyroxine, 25 mcg, oral, Daily  methylPREDNISolone sodium succinate (PF), 40 mg, intravenous, q8h  nystatin, 1 Application, Topical, BID  pantoprazole, 40 mg, oral, Daily before breakfast   Or  pantoprazole, 40 mg, intravenous, Daily before breakfast  [Held by provider] rivaroxaban, 20 mg, oral, Daily with evening meal  [START ON 10/17/2024] torsemide, 20 mg, oral, Daily      oxygen, 2 L/min      Dietary Orders (From admission, onward)       Start     Ordered    10/16/24 1221  Adult diet 2-3 grams sodium  Diet effective now        Question:  Diet type  Answer:  2-3 grams sodium    10/16/24 1220    10/16/24 1014  May Participate in Room Service  ( ROOM SERVICE MAY PARTICIPATE)  Once        Question:  .  Answer:  Yes    10/16/24 1013                  Estimated Needs:   Estimated Energy Needs  Total Energy Estimated Needs (kCal): 1693 kCal  Total Estimated Energy Need per Day (kCal/kg): 25 kCal/kg  Method for Estimating Needs: actual wt    Estimated Protein  Needs  Total Protein Estimated Needs (g): 81 g  Total Protein Estimated Needs (g/kg): 1.2 g/kg  Method for Estimating Needs: actual wt    Estimated Fluid Needs  Method for Estimating Needs: 1 ml/kcal or per MD        Nutrition Diagnosis   Nutrition Diagnosis:  Malnutrition Diagnosis  Patient has Malnutrition Diagnosis: No    Nutrition Diagnosis  Patient has Nutrition Diagnosis: Yes  Diagnosis Status (1): New  Nutrition Diagnosis 1: Increased nutrient needs  Related to (1): increased demand for nutrients  As Evidenced by (1): conditions associated with diagnosis       Nutrition Interventions/Recommendations   Nutrition Interventions and Recommendations:    Nutrition Prescription:  Individualized Nutrition Prescription Provided for : 1693 kcals and 81g protein to be provided via diet    Nutrition Interventions:   Food and/or Nutrient Delivery Interventions  Interventions: Meals and snacks  Meals and Snacks: Mineral-modified diet  Goal: low Na+ diet, texture per SLP recs    Education Documentation  No documentation found.           Nutrition Monitoring and Evaluation   Monitoring/Evaluation:   Food/Nutrient Related History Monitoring  Monitoring and Evaluation Plan: Energy intake  Energy Intake: Estimated energy intake  Criteria: pt to consume >/= 75% estimated needs       Time Spent/Follow-up:   Follow Up  Time Spent (min): 30 minutes  Last Date of Nutrition Visit: 10/16/24  Nutrition Follow-Up Needed?: 3-5 days  Follow up Comment: 10/18/24

## 2024-10-17 ENCOUNTER — APPOINTMENT (OUTPATIENT)
Dept: RADIOLOGY | Facility: HOSPITAL | Age: 84
DRG: 871 | End: 2024-10-17
Payer: MEDICARE

## 2024-10-17 ENCOUNTER — APPOINTMENT (OUTPATIENT)
Dept: CARDIOLOGY | Facility: HOSPITAL | Age: 84
DRG: 871 | End: 2024-10-17
Payer: MEDICARE

## 2024-10-17 LAB
ACANTHOCYTES BLD QL SMEAR: NORMAL
ALBUMIN SERPL BCP-MCNC: 3.6 G/DL (ref 3.4–5)
ALP SERPL-CCNC: 53 U/L (ref 33–136)
ALT SERPL W P-5'-P-CCNC: 15 U/L (ref 7–45)
ANION GAP SERPL CALCULATED.3IONS-SCNC: 14 MMOL/L (ref 10–20)
AST SERPL W P-5'-P-CCNC: 14 U/L (ref 9–39)
BASOPHILS # BLD AUTO: 0.01 X10*3/UL (ref 0–0.1)
BASOPHILS NFR BLD AUTO: 0.2 %
BILIRUB SERPL-MCNC: 0.7 MG/DL (ref 0–1.2)
BUN SERPL-MCNC: 22 MG/DL (ref 6–23)
CALCIUM SERPL-MCNC: 8.7 MG/DL (ref 8.6–10.3)
CEA SERPL-MCNC: 3.1 UG/L
CHLORIDE SERPL-SCNC: 98 MMOL/L (ref 98–107)
CO2 SERPL-SCNC: 34 MMOL/L (ref 21–32)
CREAT SERPL-MCNC: 0.99 MG/DL (ref 0.5–1.05)
EGFRCR SERPLBLD CKD-EPI 2021: 56 ML/MIN/1.73M*2
EOSINOPHIL # BLD AUTO: 0 X10*3/UL (ref 0–0.4)
EOSINOPHIL NFR BLD AUTO: 0 %
ERYTHROCYTE [DISTWIDTH] IN BLOOD BY AUTOMATED COUNT: 17.4 % (ref 11.5–14.5)
FERRITIN SERPL-MCNC: 68 NG/ML (ref 8–150)
FOLATE SERPL-MCNC: 13.7 NG/ML
GLUCOSE SERPL-MCNC: 161 MG/DL (ref 74–99)
HCT VFR BLD AUTO: 25.6 % (ref 36–46)
HGB BLD-MCNC: 7.5 G/DL (ref 12–16)
HYPOCHROMIA BLD QL SMEAR: NORMAL
IMM GRANULOCYTES # BLD AUTO: 0.03 X10*3/UL (ref 0–0.5)
IMM GRANULOCYTES NFR BLD AUTO: 0.7 % (ref 0–0.9)
LYMPHOCYTES # BLD AUTO: 0.63 X10*3/UL (ref 0.8–3)
LYMPHOCYTES NFR BLD AUTO: 14 %
M PNEUMO IGM SER IA-ACNC: 0.09 U/L
MCH RBC QN AUTO: 24.9 PG (ref 26–34)
MCHC RBC AUTO-ENTMCNC: 29.3 G/DL (ref 32–36)
MCV RBC AUTO: 85 FL (ref 80–100)
MONOCYTES # BLD AUTO: 0.17 X10*3/UL (ref 0.05–0.8)
MONOCYTES NFR BLD AUTO: 3.8 %
NEUTROPHILS # BLD AUTO: 3.66 X10*3/UL (ref 1.6–5.5)
NEUTROPHILS NFR BLD AUTO: 81.3 %
NRBC BLD-RTO: 0 /100 WBCS (ref 0–0)
OVALOCYTES BLD QL SMEAR: NORMAL
PLATELET # BLD AUTO: 139 X10*3/UL (ref 150–450)
POTASSIUM SERPL-SCNC: 4.3 MMOL/L (ref 3.5–5.3)
PROT SERPL-MCNC: 5.9 G/DL (ref 6.4–8.2)
RBC # BLD AUTO: 3.01 X10*6/UL (ref 4–5.2)
RBC MORPH BLD: NORMAL
SCHISTOCYTES BLD QL SMEAR: NORMAL
SODIUM SERPL-SCNC: 142 MMOL/L (ref 136–145)
STAPHYLOCOCCUS SPEC CULT: NORMAL
VIT B12 SERPL-MCNC: 587 PG/ML (ref 211–911)
WBC # BLD AUTO: 4.5 X10*3/UL (ref 4.4–11.3)

## 2024-10-17 PROCEDURE — 97162 PT EVAL MOD COMPLEX 30 MIN: CPT | Mod: GP

## 2024-10-17 PROCEDURE — 71046 X-RAY EXAM CHEST 2 VIEWS: CPT

## 2024-10-17 PROCEDURE — 71046 X-RAY EXAM CHEST 2 VIEWS: CPT | Performed by: RADIOLOGY

## 2024-10-17 PROCEDURE — 2060000001 HC INTERMEDIATE ICU ROOM DAILY

## 2024-10-17 PROCEDURE — 99497 ADVNCD CARE PLAN 30 MIN: CPT | Performed by: NURSE PRACTITIONER

## 2024-10-17 PROCEDURE — 2500000001 HC RX 250 WO HCPCS SELF ADMINISTERED DRUGS (ALT 637 FOR MEDICARE OP): Performed by: INTERNAL MEDICINE

## 2024-10-17 PROCEDURE — 2500000004 HC RX 250 GENERAL PHARMACY W/ HCPCS (ALT 636 FOR OP/ED): Performed by: INTERNAL MEDICINE

## 2024-10-17 PROCEDURE — 94640 AIRWAY INHALATION TREATMENT: CPT

## 2024-10-17 PROCEDURE — 99232 SBSQ HOSP IP/OBS MODERATE 35: CPT

## 2024-10-17 PROCEDURE — 2500000001 HC RX 250 WO HCPCS SELF ADMINISTERED DRUGS (ALT 637 FOR MEDICARE OP): Performed by: NURSE PRACTITIONER

## 2024-10-17 PROCEDURE — 99232 SBSQ HOSP IP/OBS MODERATE 35: CPT | Performed by: NURSE PRACTITIONER

## 2024-10-17 PROCEDURE — 2500000005 HC RX 250 GENERAL PHARMACY W/O HCPCS: Performed by: INTERNAL MEDICINE

## 2024-10-17 PROCEDURE — 2500000001 HC RX 250 WO HCPCS SELF ADMINISTERED DRUGS (ALT 637 FOR MEDICARE OP)

## 2024-10-17 PROCEDURE — 9420000001 HC RT PATIENT EDUCATION 5 MIN

## 2024-10-17 PROCEDURE — 97165 OT EVAL LOW COMPLEX 30 MIN: CPT | Mod: GO

## 2024-10-17 PROCEDURE — 94660 CPAP INITIATION&MGMT: CPT

## 2024-10-17 PROCEDURE — 36410 VNPNXR 3YR/> PHY/QHP DX/THER: CPT

## 2024-10-17 PROCEDURE — 2500000002 HC RX 250 W HCPCS SELF ADMINISTERED DRUGS (ALT 637 FOR MEDICARE OP, ALT 636 FOR OP/ED): Performed by: INTERNAL MEDICINE

## 2024-10-17 PROCEDURE — 99223 1ST HOSP IP/OBS HIGH 75: CPT | Performed by: NURSE PRACTITIONER

## 2024-10-17 PROCEDURE — 85025 COMPLETE CBC W/AUTO DIFF WBC: CPT | Performed by: INTERNAL MEDICINE

## 2024-10-17 PROCEDURE — C1751 CATH, INF, PER/CENT/MIDLINE: HCPCS

## 2024-10-17 PROCEDURE — 84075 ASSAY ALKALINE PHOSPHATASE: CPT | Performed by: INTERNAL MEDICINE

## 2024-10-17 PROCEDURE — 99232 SBSQ HOSP IP/OBS MODERATE 35: CPT | Performed by: INTERNAL MEDICINE

## 2024-10-17 PROCEDURE — 2780000003 HC OR 278 NO HCPCS

## 2024-10-17 RX ORDER — IPRATROPIUM BROMIDE AND ALBUTEROL SULFATE 2.5; .5 MG/3ML; MG/3ML
3 SOLUTION RESPIRATORY (INHALATION)
Status: DISCONTINUED | OUTPATIENT
Start: 2024-10-17 | End: 2024-10-22 | Stop reason: HOSPADM

## 2024-10-17 RX ORDER — DULOXETIN HYDROCHLORIDE 20 MG/1
20 CAPSULE, DELAYED RELEASE ORAL NIGHTLY
Status: DISCONTINUED | OUTPATIENT
Start: 2024-10-17 | End: 2024-10-22 | Stop reason: HOSPADM

## 2024-10-17 RX ORDER — AZITHROMYCIN 500 MG/1
500 TABLET, FILM COATED ORAL
Status: COMPLETED | OUTPATIENT
Start: 2024-10-18 | End: 2024-10-20

## 2024-10-17 RX ORDER — CARVEDILOL 3.12 MG/1
3.12 TABLET ORAL 2 TIMES DAILY
Status: DISCONTINUED | OUTPATIENT
Start: 2024-10-17 | End: 2024-10-22 | Stop reason: HOSPADM

## 2024-10-17 RX ORDER — ALPRAZOLAM 0.25 MG/1
0.25 TABLET ORAL 3 TIMES DAILY PRN
Status: DISCONTINUED | OUTPATIENT
Start: 2024-10-17 | End: 2024-10-22 | Stop reason: HOSPADM

## 2024-10-17 RX ORDER — DOCUSATE SODIUM 100 MG/1
100 CAPSULE, LIQUID FILLED ORAL 2 TIMES DAILY
Status: DISCONTINUED | OUTPATIENT
Start: 2024-10-17 | End: 2024-10-22 | Stop reason: HOSPADM

## 2024-10-17 RX ORDER — PREDNISONE 20 MG/1
20 TABLET ORAL 2 TIMES DAILY
Status: DISCONTINUED | OUTPATIENT
Start: 2024-10-17 | End: 2024-10-22 | Stop reason: HOSPADM

## 2024-10-17 RX ORDER — LIDOCAINE HYDROCHLORIDE 10 MG/ML
5 INJECTION, SOLUTION INFILTRATION; PERINEURAL ONCE
Status: COMPLETED | OUTPATIENT
Start: 2024-10-17 | End: 2024-10-17

## 2024-10-17 ASSESSMENT — COGNITIVE AND FUNCTIONAL STATUS - GENERAL
DAILY ACTIVITIY SCORE: 19
TOILETING: A LITTLE
DRESSING REGULAR UPPER BODY CLOTHING: A LITTLE
MOVING TO AND FROM BED TO CHAIR: A LITTLE
DAILY ACTIVITIY SCORE: 19
MOVING FROM LYING ON BACK TO SITTING ON SIDE OF FLAT BED WITH BEDRAILS: A LITTLE
DRESSING REGULAR LOWER BODY CLOTHING: A LITTLE
MOBILITY SCORE: 18
TURNING FROM BACK TO SIDE WHILE IN FLAT BAD: A LITTLE
HELP NEEDED FOR BATHING: A LOT
MOBILITY SCORE: 22
TURNING FROM BACK TO SIDE WHILE IN FLAT BAD: A LITTLE
DRESSING REGULAR UPPER BODY CLOTHING: A LITTLE
TURNING FROM BACK TO SIDE WHILE IN FLAT BAD: A LITTLE
PERSONAL GROOMING: A LITTLE
TOILETING: A LITTLE
DAILY ACTIVITIY SCORE: 17
PERSONAL GROOMING: A LITTLE
DRESSING REGULAR UPPER BODY CLOTHING: A LITTLE
PERSONAL GROOMING: A LITTLE
DRESSING REGULAR LOWER BODY CLOTHING: A LOT
DRESSING REGULAR LOWER BODY CLOTHING: A LITTLE
TOILETING: A LITTLE
WALKING IN HOSPITAL ROOM: A LITTLE
MOBILITY SCORE: 22
MOVING TO AND FROM BED TO CHAIR: A LITTLE
STANDING UP FROM CHAIR USING ARMS: A LITTLE
MOVING TO AND FROM BED TO CHAIR: A LITTLE
HELP NEEDED FOR BATHING: A LITTLE
HELP NEEDED FOR BATHING: A LITTLE
CLIMB 3 TO 5 STEPS WITH RAILING: A LITTLE

## 2024-10-17 ASSESSMENT — PAIN SCALES - GENERAL
PAINLEVEL_OUTOF10: 0 - NO PAIN
PAINLEVEL_OUTOF10: 3
PAINLEVEL_OUTOF10: 0 - NO PAIN
PAINLEVEL_OUTOF10: 0 - NO PAIN
PAINLEVEL_OUTOF10: 3

## 2024-10-17 ASSESSMENT — PAIN - FUNCTIONAL ASSESSMENT
PAIN_FUNCTIONAL_ASSESSMENT: 0-10
PAIN_FUNCTIONAL_ASSESSMENT: FLACC (FACE, LEGS, ACTIVITY, CRY, CONSOLABILITY)
PAIN_FUNCTIONAL_ASSESSMENT: 0-10

## 2024-10-17 ASSESSMENT — PAIN DESCRIPTION - ORIENTATION: ORIENTATION: LEFT

## 2024-10-17 ASSESSMENT — PAIN DESCRIPTION - LOCATION
LOCATION: THROAT
LOCATION: FOOT

## 2024-10-17 ASSESSMENT — ACTIVITIES OF DAILY LIVING (ADL)
ADL_ASSISTANCE: INDEPENDENT
ADL_ASSISTANCE: INDEPENDENT
BATHING_ASSISTANCE: MINIMAL

## 2024-10-17 ASSESSMENT — PAIN SCALES - WONG BAKER: WONGBAKER_NUMERICALRESPONSE: HURTS LITTLE MORE

## 2024-10-17 NOTE — PROGRESS NOTES
Physical Therapy Evaluation    Patient Name: Anitha Welch  MRN: [id number]  Department: [address]  Room: [address] [place] [address]  Today's Date: 10/17/2024   Time Calculation  Start Time: 1309  Stop Time: 1327  Time Calculation (min): 18 min    Assessment/Plan   PT Assessment  PT Assessment Results: Decreased strength, Decreased mobility, Impaired balance, Decreased endurance, Decreased cognition, Impaired judgement, Decreased safety awareness, Decreased skin integrity  Rehab Prognosis: Fair  Barriers to Discharge: 24/7 for cognition  Evaluation/Treatment Tolerance: Patient tolerated treatment well  Medical Staff Made Aware: Yes  End of Session Communication: Bedside nurse  Assessment Comment: 84 year old female admits with Acute on Chronic Respiratory Failure, Question Afib, COPD exacerbation, anemia, thrombocytopenia, agitation, anxiety, and possible dementia. On eval, she demonstrates impaired safe mobility warranting skilled PT care. At [place], low intensity rehab is recommended along with 24/7 supervision at home for cognition.  End of Session Patient Position: Alarm on, Up in chair  IP OR SWING BED PT PLAN  Inpatient or Swing Bed: Inpatient  PT Plan  Treatment/Interventions: Bed mobility, Transfer training, Gait training, Stair training, Balance training, Strengthening, Endurance training, Range of motion, Therapeutic exercise, Therapeutic activity, Home exercise program  PT Plan: Ongoing PT  PT Frequency: 4 times per week  PT Discharge Recommendations: Low intensity level of continued care, 24 hr supervision due to cognition  Equipment Recommended upon Discharge: Wheeled walker  PT Recommended Transfer Status: Contact guard, Assistive device  PT - OK to Discharge: Yes    Subjective   General Visit Information:  General  Reason for Referral: Impaired Mobility  Referred By: Dr. Lea  Past Medical History Relevant to Rehab: Chronic Respiratory Failure, COPD, Anxiety, CHF, GERD, AAA, CVA, DVT, HTN, MI, CKD  Family/Caregiver  Present: No  Co-Treatment: OT  Co-Treatment Reason: Safe Mobility  Prior to Session Communication: Bedside nurse  Patient Position Received: Bed, 3 rail up, Alarm on  Preferred Learning Style: verbal, visual  General Comment: 84 year old female admits with Acute on Chronic Respiratory Failure, Question Afib, COPD exacerbation, anemia, thrombocytopenia, agitation, anxiety, and possible dementia.  Home Living:  Home Living  Type of Home: House  Lives With: Spouse, Adult children (Son)  Home Adaptive Equipment: Cane, Walker rolling or standard  Home Layout: Able to live on main level with bedroom/bathroom  Home Access: Stairs to enter with rails  Entrance Stairs-Rails: Right  Entrance Stairs-Number of Steps: 3  Bathroom Shower/Tub: Walk-in shower  Bathroom Equipment: Grab bars in shower, Shower chair with back  Home Living Comments: Reports recent fall at home but unable to provide details?  Prior Level of Function:  Prior Function Per Pt/Caregiver Report  Level of [place]: Independent with ADLs and functional transfers, Independent with homemaking with ambulation  Receives Help From: Family  ADL Assistance: Independent  Homemaking Assistance: Independent  Ambulatory Assistance: Independent (denies AD use)  Vocational: Retired  Precautions:  Precautions  Medical Precautions: Fall precautions     Vital Signs (Past 2hrs)        Date/Time Vitals Session Patient Position Pulse Resp SpO2 BP MAP (mmHg)    10/17/24 1300 --  --  96  20  94 %  131/70  86                   Vital Signs Comment: 2L NC at start of session with vitals WNLs supine in bed. Upon sitting at EOB SPO2 89/90% and remains here without improvement for several minutes. Discussed with RN who clears PT for 3L NC use for mobility. Following gait on 3L NC, SPO2 down to 87% taking 2-3 mins of seated rest to return back to WNLs. Pt left up in chair with alarm on, 2L NC, and SPO2 WNLs at end of session     Objective   Pain:  Pain Assessment  Pain Assessment:  0-10  0-10 (Numeric) Pain Score: 0 - No pain  Cognition:  Cognition  Overall Cognitive Status: Impaired (possible dementia per EMR)  Orientation Level: Oriented X4  Cognition Comments: RN reports she had the Pt up to the chair earlier today. Pt adament she has not been out of bed and that she would love to be able to sit up for a bit today. Not aware she has a chair in her room?  Memory: Exceptions to WFL  Long-Term Memory: Impaired  Insight: Moderate  Impulsive: Moderately    General Assessments:  Activity Tolerance  Endurance: Decreased tolerance for upright activites    Sensation  Light Touch:  (denies abn)    Strength  Strength Comments: Grossly 4-/5 BLEs on MMT  Functional Assessments:  Bed Mobility  Bed Mobility: Yes  Bed Mobility 1  Bed Mobility 1: Supine to sitting  Level of Assistance 1: Close supervision  Bed Mobility Comments 1: Laborous but self completed with cues on technique. HOB elevated    Transfers  Transfer: Yes  Transfer 1  Transfer From 1: Bed to  Transfer to 1: Stand  Technique 1: Sit to stand, Stand to sit  Transfer Device 1: Walker  Transfer Level of Assistance 1: Close supervision  Trials/Comments 1: x2 trials--CS for safety. Impulsive with transfers and quick to move    Ambulation/Gait Training  Ambulation/Gait Training Performed: Yes  Ambulation/Gait Training 1  Surface 1: Level tile  Device 1: Rolling walker  Assistance 1: Close supervision  Quality of Gait 1:  (slow, shuffled, and mildly kyphotic. SOB with exertion.)  Comments/Distance (ft) 1: 30    Outcome Measures:  [facility] Basic Mobility  Turning from your back to your side while in a flat bed without using bedrails: A little  Moving from lying on your back to sitting on the side of a flat bed without using bedrails: A little  Moving to and from bed to chair (including a wheelchair): A little  Standing up from a chair using your arms (e.g. wheelchair or bedside chair): A little  To walk in hospital room: A little  Climbing 3-5 steps  with railing: A little  Basic Mobility - Total Score: 18    Encounter Problems       Encounter Problems (Active)       Balance       Standing Balance (Progressing)       Start:  10/17/24    Expected End:  10/31/24       Pt will demonstrate good static standing balance to promote safe participation with out of bed activity, transfers, and mobility              Mobility       Ambulation (Progressing)       Start:  10/17/24    Expected End:  10/31/24       Pt will ambulate 75' modified independent assist with walker to promote safe home mobility           Entry Stair Negotiation (Progressing)       Start:  10/17/24    Expected End:  10/31/24       Pt will ascend/descend 3 stairs with rail(s) on R and modified independent assist to promote safe entry and exit in home environment                PT Transfers       Supine to sit (Progressing)       Start:  10/17/24    Expected End:  10/31/24       Pt will transfer supine to sitting at edge of bed with modified independent assist to promote acute care out of bed activity           Sit to stand (Progressing)       Start:  10/17/24    Expected End:  10/31/24       Pt will transfer sit to standing position with modified independent assist and walker to promote safe out of bed activity           Bed to chair (Progressing)       Start:  10/17/24    Expected End:  10/31/24       Pt will transfer from sitting edge of bed to the chair with modified independent assist and walker to promote out of bed activity and reduce the risks of prolonged acute care bedrest              Pain - Adult          Safety       Safe Mobility Techniques (Progressing)       Start:  10/17/24    Expected End:  10/31/24       Pt will correctly identify and demonstrate safe mobility techniques to reduce their risks for falls during their acute care stay                   Education Documentation  Mobility Training, taught by Uli Mccarthy, PT at 10/17/2024  2:12 PM.  Learner: Patient  Readiness:  Acceptance  Method: Explanation, Demonstration  Response: Needs Reinforcement  Comment: safe mobility techniques, importance of OOB activity, risks of prolonged bedrest    Education Comments  No comments found.

## 2024-10-17 NOTE — CONSULTS
Inpatient consult to Hematology-Oncology  Consult performed by: Tika Joe MD  Consult ordered by: Jayro Lea MD  Reason for consult: Anemia  Assessment/Recommendations: 84F, p/to hospital with increasing dyspnea. She cannot give reliable history due to dementia. Was brought to ER due to dyspnea and hypoxia. Denies pain or cough. Denies hematuria and melena.    On exam, no LAPs palpable. B/L crackles.   Labs reveal anemia and decreased iron saturation.     Iron deficiency anemia: Etiology not clear. Not a good candidate for GI work up. Will check CEA in AM with ferritin. Needs IV venofer. Already ordered. Her dyspnea may improve if her Hb goes above 10. I would not recommend transfusion at this time though as it may worsen CHF. Please follow up Cardiology as I am not sure how severe her presumed CHF is. Her EF was 65% in August 2024.           Reason For Consult  Anemia    History Of Present Illness  Anitha Welch is a 84 y.o. female presenting with dyspnea.     Past Medical History  She has a past medical history of AAA (abdominal aortic aneurysm) (CMS-[facility]), Acute urinary tract infection (04/20/2023), Anemia, Anxiety, Arthritis, CHF (congestive heart failure), Chronic pain disorder, CKD (chronic kidney disease), Cognitive decline, COPD (chronic obstructive pulmonary disease) (Multi), Coronary artery disease, CVA (cerebral vascular accident) (Multi), Deep vein thrombosis (DVT) of calf (Multi), Depression, Disease of thyroid gland, Diverticulosis, DVT (deep venous thrombosis) (Multi), Easy bruising, Epistaxis, GERD (gastroesophageal reflux disease), History of blood transfusion (2023), History of degenerative disc disease, Hyperlipidemia, Hypertension, Hypothyroidism, Ischemic cardiomyopathy, Meralgia paresthetica, Nonrheumatic mitral valve regurgitation, Osteoarthritis, Osteopenia (2010), Plantar fasciitis, RLS (restless legs syndrome), Sciatica, Spinal stenosis, and ST elevation (STEMI)  myocardial infarction (Multi).    Surgical History  She has a past surgical history that includes MR angio head wo IV contrast (02/24/2017); MR angio head wo IV contrast (08/11/2017); MR angio head wo IV contrast (02/10/2019); Cholecystectomy (1996); Tonsillectomy; pr arthrp acetblr/prox fem prostc agrft/algrft; Thyroidectomy, partial (Bilateral, 04/17/2013); Coronary stent placement; Knee Arthroplasty (Left); Total hip arthroplasty (Right, 2006); Dilation and curettage of uterus; Other surgical history (01/09/2019); Upper gastrointestinal endoscopy (03/2019); Cardiac catheterization (10/2019); Cataract extraction (Bilateral, 11/13/2012); Adenoidectomy; Cyst Removal (Right, 06/24/2013); Colonoscopy; Other surgical history (01/09/2019); surgical laverne monitoring; Cardiac catheterization (N/A, 02/16/2024); Esophagogastroduodenoscopy; and Anomalous pulmonary venous return repair, total (N/A, 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     Social History  She reports that she quit smoking about 10 years ago. Her smoking use included cigarettes. She has never been exposed to tobacco smoke. She has never used smokeless tobacco. She reports that she does not currently use alcohol. She reports that she does not use drugs.    Family History  Family History   Problem Relation Name Age of Onset    Hyperthyroidism Mother          Treated with radioactive iodine    Hypertension Mother      Heart disease Mother      Hyperthyroidism Sibling      Diabetes Father's Sister          Allergies  Amoxicillin, Iodinated contrast media, Ciprofloxacin, Influenza virus vaccines, Diphenhydramine, Flu vac 2023 [id number](pf), and Other    Review of Systems   Constitutional:  Positive for fatigue. Negative for fever and unexpected weight change.   Respiratory:  Positive for shortness of breath. Negative for cough and chest tightness.    Cardiovascular: Negative.         Physical Exam  Constitutional:       Appearance: Normal appearance.   HENT:      Head: Normocephalic.  "  Pulmonary:      Effort: Pulmonary effort is normal. No respiratory distress.      Breath sounds: Rales present.   Abdominal:      General: Abdomen is flat.   Skin:     Coloration: Skin is not jaundiced.   Neurological:      Mental Status: She is alert.          Last Recorded Vitals  Blood pressure 134/83, pulse 97, temperature 36.4 °C (97.5 °F), temperature source Temporal, resp. rate 24, height 1.626 m (5' 4\"), weight 67.8 kg (149 lb 6.4 oz), SpO2 94%.    Relevant Results  See above     Assessment/Plan     See above    I spent 60 minutes in the professional and overall care of this patient.          "

## 2024-10-17 NOTE — PROCEDURES
Vascular Access Team Procedure Note     Visit Date: 10/17/2024      Patient Name: Anitha Welch         MRN: [id number]             Procedure:  Single lumen midline placed in Rt basilic vein without difficulty, catheter 13 cm, arm circumference 26 cm, ext catheter 1 cm, flushes easily with NS and with positive blood return, clamped and curos cap applied. Patient tolerated well, RN aware of placement.                          Vickie Amin RN  10/17/2024  3:59 PM

## 2024-10-17 NOTE — CONSULTS
Inpatient consult to Palliative Care  Consult performed by: FELA Arreola-CNP  Consult ordered by: Jayro Lea MD  Reason for consult: Goals of care           History Of Present Illness  Anitha Welch is a 84 y.o. female with past medical history of mitral valve regurgitation status post mitral transcatheter cran-vj-puqk repair 4/25/2024, acute on chronic heart failure hypertension CAD COPD chronic baseline O2 about 2 L history of CVA hypothyroidism anxiety etc. over the course of a couple days had increasing shortness of breath anxiety poor appetite for 2 to 3 days presented to the hospital with worsening shortness of breath that was not any better with her  turning her oxygen up from 2 L all the way up to about 5L.  She is known to our palliative service having been seen last November.  Her  and son tell me that her overall condition has been very up-and-down generally she has been declined that she has good times however she does not stay pat.  She has had repeated hospitalizations over the past year they tell me.  Ongoing issues with her chronic respiratory failure heart as well as chronic, worsened anemia.  She has been seen by hematology and has been started on IV iron, she is not a great patient for a aggressive GI workup.  On chest imaging she has pleural effusion moderate right and small left pleural effusion.  Being treated for the possibility of pneumonia presently on Zithromax and Azactam.  He does endorse feeling better and less shortness of breath less coughing.    Her  denies any fever chills no lightheadedness no nausea or vomiting she trends towards chronic constipation.  No chest pain or palpitations no abdominal pain no bright red blood per rectum no melena no hematuria denies dysuria.  She does have chronic back pain has seen pain management in the past is on gabapentin at bedtime she said the back pain has not really improved all that much.  She has had  increasing bouts with anxiety which are related to her breathing and vice versa.  Has been on anticoagulation with Xarelto at one point Eliquis if I remember correctly anticoagulation is on hold with her anemia.  Have a history of DVT.    Palliative medicine has been consulted to assist with goals of care.    Lab work reviewed showing a white count of 4.5 hemoglobin 7.5 hematocrit 25.6 platelets 139,000 sodium 142 potassium 4.3 chloride 98 CO2 34 BUN 22 creatinine 0.99 sodium 161 calcium 8.7    TSH most recently 7.27    Iron 35 TIBC 404% saturation 9 folate 13.7 ferritin 68  LFTs within normal limits    Legionella antigen negative negative for strep pneumoniae negative RSV negative influenza A/B- COVID-19  Following send been elevated recently the trend is 63, 61, 56    EKG reviewed QTc 497 ms, 10/15/2024    She is followed by Milana Hernadez CNP with her housecal program just seen 10/8/24.  I have spoken with her this afternoon she corroborates patient's progressive decline repeated hospitalization significantly worsening memory poor functional status back pain poor mobility etc..     Symptoms (0 - 10, Best to Worst)  [place] Symptom Assessment System  0-10 (Numeric) Pain Score: 0 - No pain    BM in last 48 hours? no    Emotional/Psychological/Spiritual Needs  Over the past two weeks, how often has the patient been bothered by having little interest or pleasure in doing things?  occurrence (last two weeks): more than half the days    Over the past two weeks, how often has the patient been bothered by feeling down, depressed, or hopeless?  occurrence (last two weeks): more than half the days    Serious Illness Conversation due to patient memory issues really incapable of having a serious illness conversation to any degree    Personal/Social History  Per chart    She reports that she quit smoking about 10 years ago. Her smoking use included cigarettes. She has never been exposed to tobacco smoke. She has never used  smokeless tobacco. She reports that she does not currently use alcohol. She reports that she does not use drugs.    Functional Status        Caregiving/Caregiver Support  Does the patient require assistance in some or all components of his care, including coordination of medical care? Yes  If Yes, which person serves that role?  , son, home health care, housecalls  Caregiver emotional or practical needs:     having increasingly difficult time caring for her at home    Past Medical History  She has a past medical history of AAA (abdominal aortic aneurysm) (CMS-HCC), Acute urinary tract infection (04/20/2023), Anemia, Anxiety, Arthritis, CHF (congestive heart failure), Chronic pain disorder, CKD (chronic kidney disease), Cognitive decline, COPD (chronic obstructive pulmonary disease) (Multi), Coronary artery disease, CVA (cerebral vascular accident) (Multi), Deep vein thrombosis (DVT) of calf (Multi), Depression, Disease of thyroid gland, Diverticulosis, DVT (deep venous thrombosis) (Multi), Easy bruising, Epistaxis, GERD (gastroesophageal reflux disease), History of blood transfusion (2023), History of degenerative disc disease, Hyperlipidemia, Hypertension, Hypothyroidism, Ischemic cardiomyopathy, Meralgia paresthetica, Nonrheumatic mitral valve regurgitation, Osteoarthritis, Osteopenia (2010), Plantar fasciitis, RLS (restless legs syndrome), Sciatica, Spinal stenosis, and ST elevation (STEMI) myocardial infarction (Multi).    Surgical History  She has a past surgical history that includes MR angio head wo IV contrast (02/24/2017); MR angio head wo IV contrast (08/11/2017); MR angio head wo IV contrast (02/10/2019); Cholecystectomy (1996); Tonsillectomy; pr arthrp acetblr/prox fem prostc agrft/algrft; Thyroidectomy, partial (Bilateral, 04/17/2013); Coronary stent placement; Knee Arthroplasty (Left); Total hip arthroplasty (Right, 2006); Dilation and curettage of uterus; Other surgical history  (01/09/2019); Upper gastrointestinal endoscopy (03/2019); Cardiac catheterization (10/2019); Cataract extraction (Bilateral, 11/13/2012); Adenoidectomy; Cyst Removal (Right, 06/24/2013); Colonoscopy; Other surgical history (01/09/2019); surgical laverne monitoring; Cardiac catheterization (N/A, 02/16/2024); Esophagogastroduodenoscopy; and Anomalous pulmonary venous return repair, total (N/A, 4/25/2024).     Family History  Family History   Problem Relation Name Age of Onset    Hyperthyroidism Mother          Treated with radioactive iodine    Hypertension Mother      Heart disease Mother      Hyperthyroidism Sibling      Diabetes Father's Sister       Allergies  Amoxicillin, Iodinated contrast media, Ciprofloxacin, Influenza virus vaccines, Diphenhydramine, Flu vac 2023 [id number](pf), and Other    Review of Systems  Unobtainable review of systems from the patient given her memory issues repeatedly tells me that her breathing was bad and EMS was called.  She tells me this close to 15 times within the hour plus that I spoke with her her son and her .  She is easily derailed in conversation with very poor recall.    Per her  EMS was called because she had progressive shortness of breath over a couple of days creased anxiety poor appetite for about 2 to 3 days    Physical Exam  Nurses notes and vitals reviewed  Chronically ill-appearing frail elderly white female sitting up in bed her  Chito and son Jose are present at the bedside  Normocephalic appears to be atraumatic pupils reactive to light extraocular movements are intact no nystagmus no eye drainage or discharge no conjunctival injection  Nose grossly clear no epistaxis or rhinorrhea sinus pain and pressure  Mucous membranes moist no erythema or exudates  Neck supple no obvious JVD trachea midline no palpable adenopathy or notable thyromegaly  No easily appreciated carotid bruits carotid pulses were 2 without delayed upstroke  Lungs anterior  "mostly clear few crackles in the bases bilaterally a bit more diminished on the right compared to the left  Nasal cannula oxygen no converse  Patient with dyspnea no accessory muscle use noted  Rhythm and rate regular she has about a 2 out of 6 murmur in the mitral area ([place])  Abdomen is soft nontender nondistended positive bowel sounds without rebound or guarding no grimacing or wincing   deferred  Lower extremities with a little bit of edema more so on the left compared to the right supple calves no tenderness on the right but she does have chronic left calf tenderness.  Grasps in the upper extremities are moderate at best 4 out of 5 upper extremity pulses intact some bruising present  Mild tenting of the skin  Deneen area buttocks and breast not examined  No rash to the upper chest  Neurologically she has no focal weakness but she is quite weak generally  Hearing and vision are adequate no hemifacial droop tongue protrudes midline no dysarthria shoulder shrug  moderate at best and strength.  No abnormal facial sensation.  She is very forgetful easily derailed in conversation cannot provide details of her admission she is not at all capable decision maker not able to weigh risks and benefits of treatment versus nontreatment around 15 times during her interaction she told me why EMS was called but really has very poor insight into what is actually going on with her health.  Currently cooperative pleasant nonagitated intermittent anxiety corroborated to me by the son and the  and the patient    Last Recorded Vitals  Blood pressure 99/80, pulse 87, temperature 36 °C (96.8 °F), temperature source Temporal, resp. rate 24, height 1.626 m (5' 4\"), weight 66.1 kg (145 lb 11.2 oz), SpO2 96%.    Relevant Results    Results for orders placed or performed during the hospital encounter of 10/15/24 (from the past 24 hours)   CBC and Auto Differential   Result Value Ref Range    WBC 4.5 4.4 - 11.3 x10*3/uL    nRBC 0.0 " 0.0 - 0.0 /100 WBCs    RBC 3.01 (L) 4.00 - 5.20 x10*6/uL    Hemoglobin 7.5 (L) 12.0 - 16.0 g/dL    Hematocrit 25.6 (L) 36.0 - 46.0 %    MCV 85 80 - 100 fL    MCH 24.9 (L) 26.0 - 34.0 pg    MCHC 29.3 (L) 32.0 - 36.0 g/dL    RDW 17.4 (H) 11.5 - 14.5 %    Platelets 139 (L) 150 - 450 x10*3/uL    Neutrophils % 81.3 40.0 - 80.0 %    Immature Granulocytes %, Automated 0.7 0.0 - 0.9 %    Lymphocytes % 14.0 13.0 - 44.0 %    Monocytes % 3.8 2.0 - 10.0 %    Eosinophils % 0.0 0.0 - 6.0 %    Basophils % 0.2 0.0 - 2.0 %    Neutrophils Absolute 3.66 1.60 - 5.50 x10*3/uL    Immature Granulocytes Absolute, Automated 0.03 0.00 - 0.50 x10*3/uL    Lymphocytes Absolute 0.63 (L) 0.80 - 3.00 x10*3/uL    Monocytes Absolute 0.17 0.05 - 0.80 x10*3/uL    Eosinophils Absolute 0.00 0.00 - 0.40 x10*3/uL    Basophils Absolute 0.01 0.00 - 0.10 x10*3/uL   Comprehensive Metabolic Panel   Result Value Ref Range    Glucose 161 (H) 74 - 99 mg/dL    Sodium 142 136 - 145 mmol/L    Potassium 4.3 3.5 - 5.3 mmol/L    Chloride 98 98 - 107 mmol/L    Bicarbonate 34 (H) 21 - 32 mmol/L    Anion Gap 14 10 - 20 mmol/L    Urea Nitrogen 22 6 - 23 mg/dL    Creatinine 0.99 0.50 - 1.05 mg/dL    eGFR 56 (L) >60 mL/min/1.73m*2    Calcium 8.7 8.6 - 10.3 mg/dL    Albumin 3.6 3.4 - 5.0 g/dL    Alkaline Phosphatase 53 33 - 136 U/L    Total Protein 5.9 (L) 6.4 - 8.2 g/dL    AST 14 9 - 39 U/L    Bilirubin, Total 0.7 0.0 - 1.2 mg/dL    ALT 15 7 - 45 U/L   Morphology   Result Value Ref Range    RBC Morphology See Below     Hypochromia Mild     RBC Fragments Few     Ovalocytes Few     Acanthocytes Few      *Note: Due to a large number of results and/or encounters for the requested time period, some results have not been displayed. A complete set of results can be found in Results Review.     Transthoracic Echo (TTE) Complete    Result Date: 8/11/2024           [place] [id number]            Phone [phone] TRANSTHORACIC  ECHOCARDIOGRAM REPORT Patient Name:     RUDOLPH DÍAZ       Reading Physician:   [id number] Leticia Tan MD Study Date:       8/9/2024             Ordering Provider:   [id number] JULIO CONLEY                                                             AVERY MRN/PID:          [id number]             Fellow: Accession#:       [id number]         Nurse: Date of           1940 / 84 years  Sonographer:         Dick Mitchell RDCS Birth/Age: Gender:           F                    Additional Staff: Height:           165.10 cm            Admit Date: Weight:           67.13 kg             Admission Status:    Inpatient - Routine BSA / BMI:        1.74 m2 / 24.63      Department Location: [facility]                   kg/m2 Blood Pressure: 142 /70 mmHg Study Type:    TRANSTHORACIC ECHO (TTE) COMPLETE Diagnosis/ICD: Shortness of breath-R06.02 Indication:    pulm edema CPT Codes:     Echo Complete w Full Doppler-16883 Patient History: Valve Disorders:   Mitral Regurgitation. Pertinent History: AAA, CHF, CAD, Angina, TIA, PVD, COPD, Cardiomyopathy, CVA                    and HTN. MV clip, STEMI, PCI-stents, pericardial effusion. Study Detail: The following Echo studies were performed: 2D, M-Mode, Doppler and               color flow. Technically challenging study due to body habitus and               patient lying in supine position.  PHYSICIAN INTERPRETATION: Left Ventricle: The left ventricular systolic function is normal, with a visually estimated ejection fraction of 60-65%. There are no regional wall motion abnormalities. The left ventricular cavity size is normal. There is mild concentric left ventricular hypertrophy. Spectral Doppler shows a normal pattern of left ventricular diastolic filling. Left Atrium: The left atrium is normal in size. Right Ventricle: The right ventricle is normal in size. There is normal right ventricular global systolic function. Right Atrium: The  right atrium is moderately dilated. Aortic Valve: The aortic valve appears structurally normal. There is no evidence of aortic valve regurgitation. The peak instantaneous gradient of the aortic valve is 7.4 mmHg. Mitral Valve: The mitral valve is abnormal. There is mild to moderate mitral valve regurgitation which is centrally directed. There is at least 1 mitral valve clip seen at the leaflets with mild to moderate mitral valve regurgitation. There is moderate gradient across the mitral valve with mean gradient of 10 mmHg, peak gradient of 19 mmHg. Tricuspid Valve: The tricuspid valve is structurally normal. There is mild tricuspid regurgitation. The Doppler estimated RVSP is moderately elevated at 59.2 mmHg. Pulmonic Valve: The pulmonic valve is structurally normal. There is no indication of pulmonic valve regurgitation. Pericardium: There is a small pericardial effusion. Aorta: The aortic root is normal. Systemic Veins: The inferior vena cava appears to be of normal size. There is IVC inspiratory collapse greater than 50%.  CONCLUSIONS:  1. The left ventricular systolic function is normal, with a visually estimated ejection fraction of 60-65%.  2. There is normal right ventricular global systolic function.  3. The right atrium is moderately dilated.  4. There is at least 1 mitral valve clip seen at the leaflets with mild to moderate mitral valve regurgitation. There is moderate gradient across the mitral valve with mean gradient of 10 mmHg, peak gradient of 19 mmHg.  5. Mild to moderate mitral valve regurgitation.  6. Mild tricuspid regurgitation is visualized.  7. Moderately elevated right ventricular systolic pressure.  8. Moderate pulmonary hypertension seen. QUANTITATIVE DATA SUMMARY: 2D MEASUREMENTS:                           Normal Ranges: LAs:           4.10 cm    (2.7-4.0cm) IVSd:          1.25 cm    (0.6-1.1cm) LVPWd:         1.26 cm    (0.6-1.1cm) LVIDd:         5.01 cm    (3.9-5.9cm) LVIDs:          2.94 cm LV Mass Index: 143.5 g/m2 LV % FS        41.3 % LA VOLUME:                             Normal Ranges: LA Volume Index: 56.0 ml/m2 RA VOLUME BY A/L METHOD:                       Normal Ranges: RA Area A4C: 21.0 cm2 M-MODE MEASUREMENTS:                  Normal Ranges: Ao Root: 2.80 cm (2.0-3.7cm) LAs:     5.30 cm (2.7-4.0cm) AORTA MEASUREMENTS:                    Normal Ranges: Asc Ao, d: 3.00 cm (2.1-3.4cm) LV SYSTOLIC FUNCTION BY 2D PLANIMETRY (MOD):                      Normal Ranges: EF-Visual:      63 % LV EF Reported: 63 % LV DIASTOLIC FUNCTION:                     Normal Ranges: MV Peak E: 1.80 m/s (0.7-1.2 m/s) MV Peak A: 1.61 m/s (0.42-0.7 m/s) E/A Ratio: 1.12     (1.0-2.2) MITRAL VALVE:                       Normal Ranges: MV Vmax:    2.15 m/s  (<=1.3m/s) MV peak P.5 mmHg (<5mmHg) MV mean PG: 10.0 mmHg (<48mmHg) MV DT:      146 msec  (150-240msec) AORTIC VALVE:                         Normal Ranges: AoV Vmax:      1.36 m/s (<=1.7m/s) AoV Peak P.4 mmHg (<20mmHg) LVOT Max Jamal:  1.00 m/s (<=1.1m/s) LVOT VTI:      18.40 cm LVOT Diameter: 1.90 cm  (1.8-2.4cm) AoV Area,Vmax: 2.08 cm2 (2.5-4.5cm2) TRICUSPID VALVE/RVSP:                             Normal Ranges: Peak TR Velocity: 3.75 m/s RV Syst Pressure: 59.2 mmHg (< 30mmHg) PULMONIC VALVE:                      Normal Ranges: PV Max Jamal: 0.8 m/s  (0.6-0.9m/s) PV Max P.5 mmHg  [id number] Leticia Tan MD Electronically signed on 2024 at 8:43:55 AM  ** Final **     Transthoracic Echo (TTE) Limited    Result Date: 2024           [place] [id number]            Phone [phone] TRANSTHORACIC ECHOCARDIOGRAM REPORT  Patient Name:     RUDOLPH Carr Physician:   [id number] Tuan Foss DO Study Date:       2024            Ordering Provider:   [id number] MADELYN FERMIN MRN/PID:          [id number]             Fellow: Accession#:        [id number]         Nurse: Date of           1940 / 84 years  Sonographer:         Altagracia Joseph RDMUMTAZ Birth/Age: Gender:           F                    Additional Staff: Height:           165.00 cm            Admit Date: Weight:           61.00 kg             Admission Status:    Inpatient - Routine BSA / BMI:        1.67 m2 / 22.41      Department Location: [facility] ER                   kg/m2 Blood Pressure: 90 /40 mmHg Study Type:    TRANSTHORACIC ECHO (TTE) LIMITED Diagnosis/ICD: Presence of other cardiac implants and grafts-Z95.818 Indication:    stat ltd s/p mv clip CPT Codes:     Echo Limited-25950; Color Doppler-39842; Doppler Limited-77358 Patient History: Pertinent History: HTN. sob, stat ltd s/p mv clip,assess mv,stemi, aaa,                    cva/multi,heart failure. Study Detail: The following Echo studies were performed: 2D, M-Mode, Doppler and               color flow. Technically challenging study due to prominent lung               artifact.  PHYSICIAN INTERPRETATION: Left Ventricle: Left ventricular systolic function is normal. There are no regional wall motion abnormalities. The left ventricular cavity size is normal. Spectral Doppler shows an impaired relaxation pattern of left ventricular diastolic filling. Left Atrium: The left atrium is moderately dilated. Right Ventricle: The right ventricle is normal in size. There is normal right ventricular global systolic function. Right Atrium: The right atrium is normal in size. Aortic Valve: The aortic valve is trileaflet. There is no evidence of aortic valve regurgitation. The peak instantaneous gradient of the aortic valve is 8.9 mmHg. The mean gradient of the aortic valve is 5.0 mmHg. Mitral Valve: The mitral valve is abnormal. There is moderate mitral valve regurgitation. Mitraclip in situ. Tricuspid Valve: The tricuspid valve is structurally normal. There is mild tricuspid regurgitation. Pulmonic Valve: The pulmonic valve is not well  visualized. The pulmonic valve regurgitation was not well visualized. Pericardium: There is a trivial pericardial effusion posterior to the left ventricle. There is no evidence of cardiac tamponade. Aorta: The aortic root is normal.  CONCLUSIONS:  1. Left ventricular systolic function is normal.  2. Spectral Doppler shows an impaired relaxation pattern of left ventricular diastolic filling.  3. The left atrium is moderately dilated.  4. There is no evidence of cardiac tamponade.  5. Moderate mitral valve regurgitation. QUANTITATIVE DATA SUMMARY: 2D MEASUREMENTS:                           Normal Ranges: IVSd:          1.08 cm    (0.6-1.1cm) LVPWd:         0.74 cm    (0.6-1.1cm) LVIDd:         5.31 cm    (3.9-5.9cm) LV Mass Index: 106.5 g/m2 LA VOLUME:                              Normal Ranges: LA Vol A4C:        41.8 ml   (22+/-6mL/m2) LA Vol Index A4C:  25.0ml/m2 LA Area A4C:       17.3 cm2 LA Major Axis A4C: 6.1 cm LA Vol A4C:        37.8 ml LV SYSTOLIC FUNCTION BY 2D PLANIMETRY (MOD):                     Normal Ranges: EF-A4C View: 62.5 % (>=55%) LV DIASTOLIC FUNCTION:                     Normal Ranges: MV Peak E: 0.98 m/s (0.7-1.2 m/s) MV Peak A: 1.66 m/s (0.42-0.7 m/s) E/A Ratio: 0.59     (1.0-2.2) MITRAL VALVE:                       Normal Ranges: MV Vmax:    1.89 m/s  (<=1.3m/s) MV peak P.3 mmHg (<5mmHg) MV mean P.0 mmHg  (<48mmHg) MV DT:      295 msec  (150-240msec) AORTIC VALVE:                                   Normal Ranges: AoV Vmax:                1.49 m/s (<=1.7m/s) AoV Peak P.9 mmHg (<20mmHg) AoV Mean P.0 mmHg (1.7-11.5mmHg) LVOT Max Jamal:            1.06 m/s (<=1.1m/s) AoV VTI:                 24.40 cm (18-25cm) LVOT VTI:                17.60 cm AoV Dimensionless Index: 0.72 TRICUSPID VALVE/RVSP:                             Normal Ranges: Peak TR Velocity: 2.91 m/s RV Syst Pressure: 36.9 mmHg (< 30mmHg)  [id number] Tuan Foss DO Electronically signed on 2024  at 2:18:48 PM  ** Final (Updated) **     Transthoracic Echo (TTE) Limited    Result Date: 4/26/2024   [facility], [address] [place] [address]                Tel [phone] and Fax [phone] TRANSTHORACIC ECHOCARDIOGRAM REPORT  Patient Name:      RUDOLPH DÍAZ       Reading Physician:    [id number] Jessica Almanzar MD Study Date:        4/26/2024            Ordering Provider:    [id number] VALERIA CARRANZA MRN/PID:           [id number]             Fellow: Accession#:        [id number]         Nurse: Date of Birth/Age: 1940 / 84 years  Sonographer:          Nicolasa Villalta RDCS Gender:            F                    Additional Staff: Height:            162.56 cm            Admit Date:           4/25/2024 Weight:            61.69 kg             Admission Status:     Inpatient -                                                               Routine BSA / BMI:         1.66 m2 / 23.34      Encounter#:           [id number]                    kg/m2                                         Department Location:  [facility] Blood Pressure: 112 /56 mmHg Study Type:    TRANSTHORACIC ECHO (TTE) LIMITED Diagnosis/ICD: Presence of other cardiac implants and grafts-Z95.818 Indication:    s/p Mitral valve clip implantation CPT Code:      Echo Limited-67400; Color Doppler-21426; Doppler Limited-45762 Patient History: Pertinent History: HTN and Hyperlipidemia. HFrEF 40-45%, s/p MitraClip                    (04/25/2024), respiratory failure, CKD, prior CVA. Study Detail: The following Echo studies were performed: 2D, M-Mode, Doppler and               color flow. Technically challenging study due to patient lying in               supine position and prominent lung artifact.  PHYSICIAN INTERPRETATION: Left Ventricle: The left ventricular systolic function is mildly decreased, with an  estimated ejection fraction of 45%. The left ventricular cavity size is normal. Spectral Doppler shows an abnormal pattern of left ventricular diastolic filling. There is an elevated left ventricular end diastolic pressure. LV Wall Scoring: The basal inferolateral segment and basal inferior segment are hypokinetic. Left Atrium: The left atrium is moderately dilated. Right Ventricle: The right ventricle is mildly enlarged. There is normal right ventricular global systolic function. Right Atrium: The right atrium is normal in size. Aortic Valve: The aortic valve is trileaflet. There is mild aortic valve thickening. There is trivial aortic valve regurgitation. Mitral Valve: The mitral valve is abnormal. There is moderate to severe mitral valve regurgitation. S/p 2 MV clips deployed yesterday with residual moderate to severe MR . The mean gradient accross the MV is 7 mmHg at a HR of 89 bpm and a BP of 112/56 mmHg. Tricuspid Valve: The tricuspid valve is structurally normal. There is mild tricuspid regurgitation. The Doppler estimated RVSP is within normal limits at 33.2 mmHg. Pulmonic Valve: The pulmonic valve was not assessed. Pulmonic valve regurgitation was not assessed. Pericardium: There is a trivial to small pericardial effusion. Aorta: The aortic root is normal. Systemic Veins: The inferior vena cava appears to be of normal size. There is IVC inspiratory collapse greater than 50%. In comparison to the previous echocardiogram(s): Comparable to the post MV clip SAMMY performed yesterday.  CONCLUSIONS:  1. Left ventricular systolic function is mildly decreased with a 45% estimated ejection fraction.  2. Basal inferolateral segment and basal inferior segment are abnormal.  3. Spectral Doppler shows an abnormal pattern of left ventricular diastolic filling.  4. There is an elevated left ventricular end diastolic pressure.  5. The left atrium is moderately dilated.  6. Moderate to severe mitral valve regurgitation.  7.  RVSP within normal limits. QUANTITATIVE DATA SUMMARY: 2D MEASUREMENTS:                           Normal Ranges: LAs:           4.60 cm    (2.7-4.0cm) IVSd:          1.30 cm    (0.6-1.1cm) LVPWd:         1.00 cm    (0.6-1.1cm) LVIDd:         5.10 cm    (3.9-5.9cm) LVIDs:         3.90 cm LV Mass Index: 137.0 g/m2 LV % FS        23.5 % RA VOLUME BY A/L METHOD:                       Normal Ranges: RA Area A4C: 17.4 cm2 AORTA MEASUREMENTS:                    Normal Ranges: Asc Ao, d: 3.20 cm (2.1-3.4cm) LV SYSTOLIC FUNCTION BY 2D PLANIMETRY (MOD):                     Normal Ranges: EF-A4C View: 60.0 % (>=55%) EF-A2C View: 37.6 % EF-Biplane:  48.1 % LV DIASTOLIC FUNCTION:                           Normal Ranges: MV Peak E:    1.74 m/s    (0.7-1.2 m/s) MV Peak A:    1.91 m/s    (0.42-0.7 m/s) E/A Ratio:    0.91        (1.0-2.2) MV e'         0.04 m/s    (>8.0) MV lateral e' 0.04 m/s MV medial e'  0.03 m/s MV A Dur:     161.00 msec E/e' Ratio:   48.67       (<8.0) MV DT:        160 msec    (150-240 msec) MITRAL VALVE:                       Normal Ranges: MV Vmax:    1.88 m/s  (<=1.3m/s) MV peak P.1 mmHg (<5mmHg) MV mean P.0 mmHg  (<2mmHg) MV DT:      160 msec  (150-240msec) MITRAL INSUFFICIENCY:                      Normal Ranges: MR VTI:  158.00 cm MR Vmax: 509.50 cm/s  RIGHT VENTRICLE: RV Basal 4.40 cm RV Mid   2.90 cm RV Major 5.5 cm TAPSE:   18.0 mm RV s'    0.11 m/s TRICUSPID VALVE/RVSP:                             Normal Ranges: Peak TR Velocity: 2.75 m/s RV Syst Pressure: 33.2 mmHg (< 30mmHg)  [id number] Jessica Almanzar MD Electronically signed on 2024 at 8:36:38 AM  Wall Scoring  ** Final **     Intraoperative SAMMY (Anesthesia please do not use)    Result Date: 2024   [facility], [address] [place] [address]                Tel [phone] and Fax [phone] TRANSESOPHAGEAL ECHOCARDIOGRAM REPORT  Patient Name:      RUDOLPH DÍAZ       Reading Physician:    [id number] Ramy Chopra                                                                MD Study Date:        4/25/2024            Ordering Provider:    [id number] ANDREA NOEL MRN/PID:           [id number]             Fellow: Accession#:        [id number]         Nurse: Date of Birth/Age: 1940 / 84 years  Sonographer: Gender:            F                    Additional Staff: Height:            162.56 cm            Admit Date:           4/25/2024 Weight:            61.69 kg             Admission Status:     Inpatient -                                                               Routine BSA / BMI:         1.66 m2 / 23.34      Encounter#:           [id number]                    kg/m2                                         Department Location:  [facility]                                                               Cath Lab Study Type:    INTRAOPERATIVE SAMMY Diagnosis/ICD: Nonrheumatic mitral (valve) insufficiency-I34.0 Indication:    Mitral regurgitation CPT Code:      Echocardiography, Transesophageal (SAMMY) for Guidance of a                Transcatheter Intracardiac or Great Vessel(s) Structural                Intervention(s)-28391 Patient History: Pertinent History: AAA, Acute on chronic systolic heart failure, STEMI, HTN,                    CVA, Chest pain, HFpEF, DVT, PVD, Hypertensive heart disease,                    Nonrhuematic mitral regurgitation, CKD, Dyspnea.  PHYSICIAN INTERPRETATION: SAMMY Details: The SAMMY probe used was X8-2t. Technically adequate omniplane transesophageal echocardiogram performed. SAMMY Medication: The patient was sedated by Anesthesia; please refer to anesthesia flow sheet for medications used. SAMMY Procedure: The probe was passed without difficulty. Left Ventricle: The left ventricular systolic function is mildly decreased, with an estimated ejection fraction of 40-45%. Wall motion is abnormal. The left ventricular cavity size is normal.  Left ventricular diastolic filling was not assessed. LV Wall Scoring: The basal and mid inferior wall and basal and mid inferolateral wall are hypokinetic. All remaining scored segments are normal. Left Atrium: The left atrium is severely dilated. There is no definite left atrial thrombus present. There is a normal sized left atrial appendage. Right Ventricle: The right ventricle is normal in size. There is mildly reduced right ventricular systolic function. Right Atrium: The right atrium is mild to moderately dilated. Aortic Valve: The aortic valve is trileaflet. There is no evidence of aortic valve regurgitation. Mitral Valve: The mitral valve is mildly thickened. There is moderately decreased mitral valve posterior leaflet mobility. There is severe mitral valve regurgitation. There is flow reversal is the left pulmonary vein. Tricuspid Valve: The tricuspid valve is structurally normal. Tricuspid regurgitation was not assessed. Pulmonic Valve: The pulmonic valve is structurally normal. There is no indication of pulmonic valve regurgitation. Pericardium: There is a trivial pericardial effusion. Aorta: The aortic root is normal. There is plaque visualized in the descending aorta which is classified as a Grade 3 [moderate atheroma >3-5 mm (no mobile/ulcerated component)] atherosclerosis. Additional Comments: Following deployment of two MitraClips, mitral regurgitation remained severe, likely related to mechanical issues with the second MitraClip. Residual mean mitral gradient was 10 mmHg at a heart rate of 79/min. Expected post transseptal puncture associated left to right interatrial shunt. In comparison to the previous echocardiogram(s): Compared with the prior study dated 1/31/2024, the current study was a procedural SAMMY for MitraClip placement.  CONCLUSIONS:  1. Left ventricular systolic function is mildly decreased with a 40-45% estimated ejection fraction.  2. Basal and mid inferior wall and basal and mid  inferolateral wall are abnormal.  3. There is mildly reduced right ventricular systolic function.  4. The left atrium is severely dilated.  5. The right atrium is mild to moderately dilated.  6. Severe mitral valve regurgitation related to posterior leaflet tethering at baselibe.  7. Following deployment of two MitraClips, mitral regurgitation remained severe, likely related to mechanical issues with the second MitraClip. Residual mean mitral gradient was 10 mmHg at a heart rate of 79/min.  8. No left atrial thrombus.  9. There is plaque visualized in the descending aorta. 10. Expected post transseptal puncture associated left to right interatrial shunt. 11. Compared with the prior study dated 2024, the current study was a procedural SAMMY for MitraClip placement. QUANTITATIVE DATA SUMMARY: MITRAL VALVE:                       Normal Ranges: MV Vmax:    1.87 m/s  (<=1.3m/s) MV peak P.0 mmHg (<5mmHg) MV mean P.6 mmHg  (<2mmHg) MITRAL INSUFFICIENCY:                      Normal Ranges: MR VTI:  214.00 cm MR Vmax: 574.00 cm/s  [id number] Ramy Chopra MD Electronically signed on 2024 at 7:10:31 PM  Wall Scoring  ** Final (Updated) **     Transesophageal Echo (SAMMY)    Result Date: 2024   [facility], [address] [place] [address]                Tel [phone] and Fax [phone] TRANSESOPHAGEAL ECHOCARDIOGRAM REPORT  Patient Name:      RUDOLPH DÍAZ      Reading Physician:    [id number] Tiesha Contreras MD Study Date:        2024           Ordering Provider:    [id number] MITCHELL NUR MRN/PID:           [id number]            Fellow:               [id number] Aleksander Mitchell MD Accession#:        [id number]        Nurse:                Ellen Smalls RN Date of Birth/Age: 1940 / 83 years Sonographer: Gender:             F                   Additional Staff:     Chantale Connelly RN Height:            165.10 cm           Admit Date: Weight:            59.90 kg            Admission Status:     Outpatient BSA:               1.66 m2             Encounter#:           [id number]                                        Department Location:  [facility]                                                              Invasive Blood Pressure: 169 /75 mmHg Study Type:    TRANSESOPHAGEAL ECHO (SAMMY) Diagnosis/ICD: Nonrheumatic mitral (valve) insufficiency-I34.0 Indication:    mitral valve regurgitation CPT Code:      SAMMY Complete-93505; Doppler Limited-48491; Color Doppler-42526 Patient History: Allergies:         Amoxicillin, flu vaccine, iodine contrast, ciprofloxacin,                    diphenhydramine, lisinopril. Pertinent History: CHF, CAD, HTN, CVA, PVD, COPD, Syncope and TIA. AAA without                    rupture, STEMI, carotid arterial stenosis, DVT/VTE, severe                    MR, GERD, GI bleed, anemia, CKD3, home O2 - 2L NC, MILLER/AHRF,                    hypothyroid and seizures.  PHYSICIAN INTERPRETATION: SAMMY Details: Technically adequate omniplane transesophageal echocardiogram performed. Agitated saline contrast echo was performed to assess for the presence of a patent foramen ovale. SAMMY Medication: The pharynx was anesthetized with viscous lidocaine and topex spray. The patient was sedated by Anesthesia; please refer to anesthesia flow sheet for medications used. SAMMY Procedure: The probe was passed without difficulty. Left Ventricle: The left ventricular systolic function is mildly decreased, with an estimated ejection fraction of 40-45%. Wall motion is abnormal. The left ventricular cavity size is normal. Left ventricular diastolic filling was not assessed. Overall mildly reduced LV systolic function with an inferior and inferolateral wall motion  "abnormality. LV Wall Scoring: The basal and mid inferior wall is akinetic. The basal and mid inferolateral wall is hypokinetic. Left Atrium: The left atrium is enlarged. A bubble study using agitated saline was performed. Bubble study is negative. The left atrial appendage Doppler velocities are normal and there is no thrombus visualized in the left atrial appendage. Color Doppler demonstrated a small PFO with left to right shunting, but no intermitent right to left shunt by agitated saline echocontrast ( even with valsalva). Right Ventricle: The right ventricle is normal in size. There is normal right ventricular global systolic function. Right Atrium: The right atrium grossly normal. Aortic Valve: The aortic valve is trileaflet. There is no evidence of aortic valve regurgitation. Mitral Valve: The mitral valve is abnormal. There is moderate to severe mitral valve regurgitation which is posteriorly directed. There is blunted systolic flow in the pulmonary veins. The mitral leaflets appears\"stiff\" with restricted leaflet closure with posterior leaflet restriction greater than the anterior leaflet restriciton causing moderate to severre (3+) central to posteriorly directed MR. The EROA was measured at ~0.3cm2 , and there is also systolic blunting of the pulmonary vein flow on PW Doppler. Tricuspid Valve: The tricuspid valve is structurally normal. There is mild tricuspid regurgitation. Pulmonic Valve: The pulmonic valve is not well visualized. There is trace pulmonic valve regurgitation. Pericardium: There is a trivial to small pericardial effusion. Aorta: The aortic root is normal. The aortic root appears normal in size and measures 2.50 cm. There is no dilatation of the ascending aorta. There is plaque visualized in the descending aorta which is classified as a Grade 3 [moderate atheroma >3-5 mm (no mobile/ulcerated component)] atherosclerosis. In comparison to the previous echocardiogram(s): Compared with study " "from 10/9/2023,. Compared with the prior exam TTE from 10/9/2023, the MV leaflets were better visualized today by SAMMY, and the moderate to severe MR was confirmed today. There was already an inferior wall motion abnormality with mildlyreduced funciton on the prior study. OVerall no significant changes.  CONCLUSIONS:  1. Left ventricular systolic function is mildly decreased with a 40-45% estimated ejection fraction.  2. Basal and mid inferior wall and basal and mid inferolateral wall are abnormal.  3. Overall mildly reduced LV systolic function with an inferior and inferolateral wall motion abnormality.  4. The left atrium is enlarged.  5. Color Doppler demonstrated a small PFO with left to right shunting, but no intermitent right to left shunt by agitated saline echocontrast ( even with valsalva).  6. The mitral leaflets appears\"stiff\" with restricted leaflet closure with posterior leaflet restriction greater than the anterior leaflet restriciton causing moderate to severre (3+) central to posteriorly directed MR. The EROA was measured at ~0.3cm2 , and there is also systolic blunting of the pulmonary vein flow on PW Doppler.  7. Moderate to severe mitral valve regurgitation.  8. Normal aortic root.  9. Compared with the prior exam TTE from 10/9/2023, the MV leaflets were better visualized today by SAMMY, and the moderate to severe MR was confirmed today. There was already an inferior wall motion abnormality with mildlyreduced funciton on the prior study. OVerall no significant changes. 10. There is plaque visualized in the descending aorta. QUANTITATIVE DATA SUMMARY: 2D MEASUREMENTS:                    Normal Ranges: Ao Root d: 2.50 cm (2.0-3.7cm) MITRAL INSUFFICIENCY:                     Normal Ranges: PISA Radius: 0.7 cm  [id number] Tiesha Contreras MD Electronically signed on 1/31/2024 at 4:45:15 PM  Wall Scoring  ** Final **    Scheduled medications  atorvastatin, 40 mg, oral, Nightly  [START ON 10/18/2024] azithromycin, " 500 mg, oral, q24h GARDENIA  aztreonam, 1 g, intravenous, q12h  carvedilol, 3.125 mg, oral, BID  docusate sodium, 100 mg, oral, BID  DULoxetine, 20 mg, oral, Nightly  gabapentin, 100 mg, oral, Nightly  ipratropium-albuteroL, 3 mL, nebulization, 4x daily  iron sucrose, 200 mg, intravenous, Every other day  levothyroxine, 25 mcg, oral, Daily  lidocaine, 5 mL, infiltration, Once  nystatin, 1 Application, Topical, BID  pantoprazole, 40 mg, oral, Daily before breakfast   Or  pantoprazole, 40 mg, intravenous, Daily before breakfast  predniSONE, 20 mg, oral, BID  [Held by provider] rivaroxaban, 20 mg, oral, Daily with evening meal  torsemide, 20 mg, oral, Daily      Continuous medications  oxygen, 2 L/min, Last Rate: 3 L/min (10/17/24 0814)      PRN medications  PRN medications: acetaminophen **OR** acetaminophen **OR** acetaminophen, ALPRAZolam, bisacodyl, diclofenac sodium, ipratropium-albuteroL, nitroglycerin, oxygen, oxymetazoline         Symptom Management  Pain: Chronic lumbar back pain  Medications recommended for pain?  Yes  Tiredness: Yes  Nausea: Denied  Depression: Some  Anxiety: Yes  Drowsiness: Yes  Appetite: Relatively poor now but it was actually decent at home  Wellbeing: Fair at best  Dyspnea: Yes improving from what it was at admit  Intervention recommended for dyspnea?  yes  Intervention recommended for constipation?  Yes    Provider estimate of survival: 1-6 months  Truly depends on the level of aggressiveness from the  son and patient her respiratory  Function has declined as has her memory she is increasingly frail  If she remains stable estimate of survival could certainly be over 6 months but she has had repeated hospitalizations with many acute on chronic issues presently.  Patient Prognostic Awareness: Patient unable to respond    Patient/proxy preference for information  Prefers full information    Goals of Care  Disease-modifying at present CODE STATUS is DNR CCA/DNI  Informational  hospice/palliative meeting to be arranged with [facility] as per the 's request continuing  With housecalls and home health care.      Is the patient hospice-eligible?   Yes  Was a discussion held re hospice services?   yes  Was a decision made re hospice services?  Yes    Assessment/Plan   Acute on chronic hypoxic respiratory failure  -2LO2 baseline    Acute on chronic diastolic heart failure  -8/24 echo showed EF of 60 to 65% with normal RVSP moderately dilated right atrium and mild to moderate mitral valve regurgitation mild tricuspid regurgitation and moderately elevated RVSP with moderate pulmonary hypertension  -Pleural effusions present ?  Thoracentesis    History of CAD with prior stenting to the RCA    Type II MI secondary to hypoxia    Possible pneumonia/pneumonitis    Anemia  -Iron deficiency, and worked up by hematology, appreciate  Suspect this is also contributing to her overall presentation    History of DVT presently Xarelto on hold due to her anemia    Mitral regurgitation status post transcatheter [id number] repair 4/25/2024    Declining functional status-significant decline since I saw her last year about the same time  Worsening memory per her  and son, I suspect she has a combination of Alzheimer's and vascular dementia which has been progressively getting worse.  Her history of CVAs    Non compliance with her O2  -Frequently takes this off and argues with her  and son about its necessity becomes quickly short of breath and hypoxic  This is becoming increasingly difficult home situation for her     Anxiety    Chronic lower back pain    Palliative care    ACP    CODE STATUS DNR CCA/DNI    I do not believe the patient to be a capable decision maker in the least she has had progressive decline physically as well as in her mental faculties she has atrophy and prior strokes and CVA suspect that she is guarding underlying Alzheimer's/vascular dementia which has  progressively worsened coexisting with all of her chronic health conditions they are otherwise.    Her  Jesús (Chito) is her primary POA these documents are located in [facility].    Being treated at this point for acute on chronic respiratory failure which seems to be improved also possibility of pneumonia.  In general she seems to be doing a bit better    I have spoken with our outpatient colleague Milana Peoples who sees this patient at home with housecalls just saw her 9 days ago she to agrees that she has had significant decline both physically as well as mentally.  Additionally this was corroborated to me by both the son and .     I have had conversations with the family including the patient in the past about a year ago regarding possible hospice they at that point were not ready to have further conversations however today we had a significant ACP conversation and that given her progressive decline and their prior experience with hospice of the [facility] for her son who passed away the  and son Jose are very open to having an informational meeting with [facility] and also they not be ready for hospice they are very open to palliative care at home.  I have asked care coordination to call this evening to get documents sent over to them.  Will look to get this done prior to discharge.    Regarding her chronic back pain she has seen pain management in the past started on low-dose gabapentin at bedtime she also has worsening anxiety often which makes her breathing worse and vice versa.  Will start on the small dose of Cymbalta at bedtime hopefully attenuating the back pain as well as her anxiety until this kicks in she can have a tiny dose of Xanax 3 times a day as needed of 0.25 mg.  She has a border line prolonged QTc on the recent EKG of 497 ms therefore would like to try to stay away from Vistaril, also given her cardiac history less than ideal in my eyes.  Decision to add Cymbalta and  low-dose Xanax was made in collaboration with her [facility] provider who will follow her up when she is discharged home.    Again did speak with the patient's  and son that while they may not be ready for hospice at this point as she is improving I do think that her overall level of function has declined significantly as has her memory I do feel that she would qualify for hospice and we will see how this meeting goes over the next day or 2 but at least at some place to start.    In the interim we will stay in the disease modifying model of care albeit somewhat conservative    Total time which has not been continuous has been well over 2 hours around 23 minutes of this time was directly on ACP.    Discussed in detail with her outpatient provider colleague Milana Hernadez CNP  Discussed with the nurse caring for the patient  Discussed also with Sabiha de leon CNP pulmonology  Discussed with Stefanie Chadwick CNP cardiology    Thank you very much for the consultation we will follow-up    FELA Arreola-YOVANNY

## 2024-10-17 NOTE — PROGRESS NOTES
Spiritual Care Visit    Clinical Encounter Type  Visited With: Patient  Routine Visit: Follow-up  Continue Visiting: Yes         Values/Beliefs  Spiritual Requests During Hospitalization: Anitha and I had a nice convesation. She received Commuion this morning too.    Sacramental Encounters  Communion: Patient wants communion  Communion Given Indicator: Yes     Donnell Quintanilla

## 2024-10-17 NOTE — PROGRESS NOTES
Medication Adjustment    The following medication(s) was/were adjusted for Anitha Wlech per protocol/policy due to [facility] approved/hospital use guidelines.    Medication(s) adjusted:   azithromycin    Tiesha Sherwood, PharmD

## 2024-10-17 NOTE — PROGRESS NOTES
"Anitha Welch is a 84 y.o. female on day 2 of admission seen in follow-up for acute on chronic hypoxic, hypercapnic respiratory failure, acute COPD exacerbation, pneumonia    Subjective   On 3 L nasal cannula oxygen; oxygen sats 96%. Denies cough. Denies pain. Afebrile        Objective     Physical Exam  Vitals and nursing note reviewed.   Constitutional:       Appearance: Normal appearance.   HENT:      Head: Normocephalic and atraumatic.      Nose: Nose normal.      Mouth/Throat:      Mouth: Mucous membranes are moist.   Eyes:      Extraocular Movements: Extraocular movements intact.      Conjunctiva/sclera: Conjunctivae normal.      Pupils: Pupils are equal, round, and reactive to light.   Cardiovascular:      Rate and Rhythm: Normal rate and regular rhythm.      Pulses: Normal pulses.      Heart sounds: Normal heart sounds.   Pulmonary:      Effort: Pulmonary effort is normal.      Breath sounds: Normal breath sounds.      Comments: End-expiratory wheezes bilaterally. Shallow respirations. Tachypneic.   Abdominal:      General: Bowel sounds are normal.      Palpations: Abdomen is soft.   Musculoskeletal:         General: Normal range of motion.   Skin:     General: Skin is warm and dry.      Capillary Refill: Capillary refill takes less than 2 seconds.   Neurological:      General: No focal deficit present.      Mental Status: She is alert and oriented to person, place, and time.   Psychiatric:         Mood and Affect: Mood normal.         Behavior: Behavior normal.         Last Recorded Vitals  Blood pressure 99/80, pulse 87, temperature 36 °C (96.8 °F), temperature source Temporal, resp. rate 24, height 1.626 m (5' 4\"), weight 66.1 kg (145 lb 11.2 oz), SpO2 96%.  Intake/Output last 3 Shifts:  I/O last 3 completed shifts:  In: 840 (12.7 mL/kg) [P.O.:540; IV Piggyback:300]  Out: 1450 (21.9 mL/kg) [Urine:1450 (0.6 mL/kg/hr)]  Weight: 66.1 kg       Intake/Output Summary (Last 24 hours) at 10/17/2024 0932  Last " data filed at 10/17/2024 0500  Gross per 24 hour   Intake 610 ml   Output 1450 ml   Net -840 ml      Scheduled medications:  atorvastatin, 40 mg, oral, Nightly  azithromycin, 500 mg, intravenous, Daily  aztreonam, 1 g, intravenous, q12h  carvedilol, 6.25 mg, oral, BID  gabapentin, 100 mg, oral, Nightly  ipratropium-albuteroL, 3 mL, nebulization, 4x daily  iron sucrose, 200 mg, intravenous, Every other day  levothyroxine, 25 mcg, oral, Daily  methylPREDNISolone sodium succinate (PF), 40 mg, intravenous, q8h  nystatin, 1 Application, Topical, BID  pantoprazole, 40 mg, oral, Daily before breakfast   Or  pantoprazole, 40 mg, intravenous, Daily before breakfast  [Held by provider] rivaroxaban, 20 mg, oral, Daily with evening meal  torsemide, 20 mg, oral, Daily     PRN medications: acetaminophen **OR** acetaminophen **OR** acetaminophen, bisacodyl, diclofenac sodium, docusate sodium, ipratropium-albuteroL, nitroglycerin, oxygen, oxymetazoline   oxygen, 2 L/min, Last Rate: 3 L/min (10/17/24 0814)       Relevant Results  Results for orders placed or performed during the hospital encounter of 10/15/24 (from the past 24 hours)   Vitamin B12   Result Value Ref Range    Vitamin B12 587 211 - 911 pg/mL   TSH with reflex to Free T4 if abnormal   Result Value Ref Range    Thyroid Stimulating Hormone 7.27 (H) 0.44 - 3.98 mIU/L   Folate   Result Value Ref Range    Folate, Serum 13.7 >5.0 ng/mL   Iron and TIBC   Result Value Ref Range    Iron 35 35 - 150 ug/dL    UIBC 369 110 - 370 ug/dL    TIBC 404 240 - 445 ug/dL    % Saturation 9 (L) 25 - 45 %   Thyroxine, Free   Result Value Ref Range    Thyroxine, Free 0.62 0.61 - 1.12 ng/dL   Ferritin   Result Value Ref Range    Ferritin 68 8 - 150 ng/mL   Carcinoembryonic Antigen   Result Value Ref Range    Carcinoembryonic AG 3.1 ug/L   CBC and Auto Differential   Result Value Ref Range    WBC 4.5 4.4 - 11.3 x10*3/uL    nRBC 0.0 0.0 - 0.0 /100 WBCs    RBC 3.01 (L) 4.00 - 5.20 x10*6/uL     Hemoglobin 7.5 (L) 12.0 - 16.0 g/dL    Hematocrit 25.6 (L) 36.0 - 46.0 %    MCV 85 80 - 100 fL    MCH 24.9 (L) 26.0 - 34.0 pg    MCHC 29.3 (L) 32.0 - 36.0 g/dL    RDW 17.4 (H) 11.5 - 14.5 %    Platelets 139 (L) 150 - 450 x10*3/uL    Neutrophils % 81.3 40.0 - 80.0 %    Immature Granulocytes %, Automated 0.7 0.0 - 0.9 %    Lymphocytes % 14.0 13.0 - 44.0 %    Monocytes % 3.8 2.0 - 10.0 %    Eosinophils % 0.0 0.0 - 6.0 %    Basophils % 0.2 0.0 - 2.0 %    Neutrophils Absolute 3.66 1.60 - 5.50 x10*3/uL    Immature Granulocytes Absolute, Automated 0.03 0.00 - 0.50 x10*3/uL    Lymphocytes Absolute 0.63 (L) 0.80 - 3.00 x10*3/uL    Monocytes Absolute 0.17 0.05 - 0.80 x10*3/uL    Eosinophils Absolute 0.00 0.00 - 0.40 x10*3/uL    Basophils Absolute 0.01 0.00 - 0.10 x10*3/uL   Comprehensive Metabolic Panel   Result Value Ref Range    Glucose 161 (H) 74 - 99 mg/dL    Sodium 142 136 - 145 mmol/L    Potassium 4.3 3.5 - 5.3 mmol/L    Chloride 98 98 - 107 mmol/L    Bicarbonate 34 (H) 21 - 32 mmol/L    Anion Gap 14 10 - 20 mmol/L    Urea Nitrogen 22 6 - 23 mg/dL    Creatinine 0.99 0.50 - 1.05 mg/dL    eGFR 56 (L) >60 mL/min/1.73m*2    Calcium 8.7 8.6 - 10.3 mg/dL    Albumin 3.6 3.4 - 5.0 g/dL    Alkaline Phosphatase 53 33 - 136 U/L    Total Protein 5.9 (L) 6.4 - 8.2 g/dL    AST 14 9 - 39 U/L    Bilirubin, Total 0.7 0.0 - 1.2 mg/dL    ALT 15 7 - 45 U/L   Morphology   Result Value Ref Range    RBC Morphology See Below     Hypochromia Mild     RBC Fragments Few     Ovalocytes Few     Acanthocytes Few      *Note: Due to a large number of results and/or encounters for the requested time period, some results have not been displayed. A complete set of results can be found in Results Review.      XR chest 2 views  Result Date: 10/16/2024    XR chest 1 view  Result Date: 10/15/2024  FINDINGS: Multifocal airspace opacities greatest in the right lung both upper and lower lung field, concerning for pneumonia or an atypical presentation of edema  have slightly worsened from prior study.   There has also been increase in small bilateral pleural effusions.   No evidence of pneumothorax.  Multifocal airspace opacities greatest in the right lung both upper and lower lung field, concerning for pneumonia or an atypical presentation of edema have slightly worsened from prior study.   There has also been increase in small bilateral pleural effusions.         Assessment/Plan   Acute on chronic hypoxic, hypercapnic respiratory failure  Wean oxygen as sats allow  BiPAP nightly and as needed  Continuous pulse oximetry  Incentive spirometry/pulmonary hygiene     Acute COPD exacerbation  Continue IBD/ICS  IV Solu-Medrol-will maintain current dose  FEV1 when optimized     Chronic congestive heart failure  Cardiology follow-up     Pneumonia  Continue IV azithromycin, aztreonam   Follow-up cultures  PA and lateral chest x-ray today    Acute kidney injury on chronic kidney disease  Nephrology consultation    Prophylaxis  Xarelto   Protonix    PT/OT/out of bed  Palliative Medicine consultation    Sabiha Lane, APRN-CNP

## 2024-10-17 NOTE — PROGRESS NOTES
Occupational Therapy    Evaluation    Patient Name: Anitha Welch  MRN: [id number]  Department: [address]  Room[facility]  Today's Date: 10/17/2024  Time Calculation  Start Time: 1310  Stop Time: 1327  Time Calculation (min): 17 min    Assessment  IP OT Assessment  OT Assessment: Patient is an 84 year old female admitted with acute respiratory failure. Patient is presenting slightly below baseline level with a declinein strength, balance, activity tolerance, and function, resulting in an increased need for assist with ADL/IADL tasks. Recommend skilled OT to address the above deficits in order to increse safety and independence with daily tasks.  Prognosis: Good  Barriers to Discharge: None  Evaluation/Treatment Tolerance: Patient tolerated treatment well, Patient limited by fatigue  End of Session Communication: Bedside nurse  End of Session Patient Position: Alarm on, Up in chair  Plan:  Treatment Interventions: ADL retraining, Functional transfer training, UE strengthening/ROM, Endurance training, Patient/family training, Compensatory technique education, Equipment evaluation/education  OT Frequency: 3 times per week  OT Discharge Recommendations: Low intensity level of continued care, 24 hr supervision due to cognition  Equipment Recommended upon Discharge: Wheeled walker  OT Recommended Transfer Status: Assist of 1  OT - OK to Discharge: Yes    Subjective   Current Problem:  1. Dyspnea, unspecified type        2. Acute on chronic heart failure, unspecified heart failure type        3. Acute pulmonary edema        4. Pneumonia due to infectious organism, unspecified laterality, unspecified part of lung        5. Acute and chronic respiratory failure with hypoxia (Multi)          General:  General  Reason for Referral: decline in ADLs, acute respiratory failure  Referred By: Dr. Lea  Past Medical History Relevant to Rehab: Chronic Respiratory Failure, COPD, Anxiety, CHF, GERD, AAA, CVA, DVT, HTN, MI,  CKD  Family/Caregiver Present: No  Co-Treatment: PT  Co-Treatment Reason: safety with OOB mobility and activity tolerance  Prior to Session Communication: Bedside nurse  Patient Position Received: Bed, 3 rail up, Alarm on  Preferred Learning Style: verbal, visual  General Comment: 84 year old female admits with Acute on Chronic Respiratory Failure, Question Afib, COPD exacerbation, anemia, thrombocytopenia, agitation, anxiety, and possible dementia.  Precautions:  Medical Precautions: Fall precautions, Oxygen therapy device and L/min (2L O2 via NC)    Vital Sign (Past 2hrs)        Date/Time Vitals Session Patient Position Pulse Resp SpO2 BP MAP (mmHg)    10/17/24 1300 --  --  96  20  94 %  131/70  86                   Vital Signs Comment: 2L NC at start of session with vitals WNLs supine in bed. Upon sitting at EOB SPO2 89/90% and remains here without improvement for several minutes. Discussed with RN who clears for 3L NC use for mobility. Following gait on 3L NC, SPO2 down to 87% taking 2-3 mins of seated rest to return back to WNLs. Pt left up in chair with alarm on, 2L NC, and SPO2 WNLs at end of session    Pain:  Pain Assessment  Pain Assessment: 0-10  0-10 (Numeric) Pain Score: 0 - No pain    Objective   Cognition:  Overall Cognitive Status: Impaired at baseline (hx of dementia)  Orientation Level: Oriented X4  Cognition Comments: patient appears oriented at times, able to answer all orientation questions appropriately. however, then appears confused at time throughout  Insight: Moderate  Impulsive: Mildly  Processing Speed: Delayed     Home Living:  Type of Home: House  Lives With: Spouse, Adult children (Son)  Home Adaptive Equipment: Cane, Walker rolling or standard  Home Layout: Able to live on main level with bedroom/bathroom  Home Access: Stairs to enter with rails  Entrance Stairs-Rails: Right  Entrance Stairs-Number of Steps: 3  Bathroom Shower/Tub: Walk-in shower  Bathroom Equipment: Grab bars in  shower, Shower chair with back  Home Living Comments: Reports recent fall at home but unable to provide details?   Prior Function:  Level of [place]: Independent with ADLs and functional transfers, Independent with homemaking with ambulation  Receives Help From: Family  ADL Assistance: Independent  Homemaking Assistance: Independent  Ambulatory Assistance: Independent (denies AD use)  Vocational: Retired  Prior Function Comments: patient wears 2L O2 via NC at baseline  IADL History:  Homemaking Responsibilities: Yes  IADL Comments: patient reports independent at baseline  ADL:  Eating Assistance: Independent  Eating Deficit: Setup  Grooming Assistance: Stand by  Grooming Deficit: Setup, Supervision/safety (per clinical judgement)  Bathing Assistance: Minimal  Bathing Deficit: Setup, Steadying, Increased time to complete , Supervision/safety (per clinical judgement)  UE Dressing Assistance: Minimal  UE Dressing Deficit:  (don robe)  LE Dressing Assistance: Minimal  LE Dressing Deficit: Steadying, Supervision/safety (per clinical judgement)  Toileting Assistance with Device: Total  Toileting Deficit: Urinary Catheter  Activity Tolerance:  Endurance: Decreased tolerance for upright activites  Bed Mobility/Transfers: Bed Mobility  Bed Mobility: Yes  Bed Mobility 1  Bed Mobility 1: Supine to sitting  Level of Assistance 1: Close supervision  Bed Mobility Comments 1: head of bed elevated, able to complete with effort    Transfers  Transfer: Yes  Transfer 1  Transfer From 1: Bed to  Transfer to 1: Stand  Technique 1: Sit to stand  Transfer Device 1: Walker  Transfer Level of Assistance 1: Close supervision  Trials/Comments 1: x2 trials. cues for safety    Functional Mobility:  Functional Mobility  Functional Mobility Performed: Yes  Functional Mobility 1  Surface 1: Level tile  Device 1: Rolling walker  Assistance 1: Close supervision  Comments 1: < household distances  Sitting Balance:  Static Sitting Balance  Static  Sitting-Balance Support: Feet supported, No upper extremity supported  Static Sitting-Level of Assistance: Close supervision  Standing Balance:  Static Standing Balance  Static Standing-Balance Support: Bilateral upper extremity supported  Static Standing-Level of Assistance: Close supervision    IADL's:   Homemaking Responsibilities: Yes  IADL Comments: patient reports independent at baseline  Vision: Vision - Basic Assessment  Current Vision: Wears glasses only for reading  Sensation:  Sensation Comment: patient denies numbness/tingling  Strength:  Strength Comments: B UE 4-/5 grossly  Coordination:  Movements are Fluid and Coordinated: Yes   Hand Function:  Hand Function  Gross Grasp: Functional  Coordination: Functional  Extremities: RUE   RUE : Within Functional Limits and LUE   LUE: Within Functional Limits    Outcome Measures: [facility] Daily Activity  Putting on and taking off regular lower body clothing: A lot  Bathing (including washing, rinsing, drying): A lot  Putting on and taking off regular upper body clothing: A little  Toileting, which includes using toilet, bedpan or urinal: A little  Taking care of personal grooming such as brushing teeth: A little  Eating Meals: None  Daily Activity - Total Score: 17    Education Documentation  Body Mechanics, taught by Nisha Liz OT at 10/17/2024  2:26 PM.  Learner: Patient  Readiness: Acceptance  Method: Explanation, Demonstration  Response: Needs Reinforcement, Verbalizes Understanding    Precautions, taught by Nisha Liz OT at 10/17/2024  2:26 PM.  Learner: Patient  Readiness: Acceptance  Method: Explanation, Demonstration  Response: Needs Reinforcement, Verbalizes Understanding    ADL Training, taught by Nisha Liz OT at 10/17/2024  2:26 PM.  Learner: Patient  Readiness: Acceptance  Method: Explanation, Demonstration  Response: Needs Reinforcement, Verbalizes Understanding    Education Comments  No comments found.      Goals:   Encounter Problems        Encounter Problems (Active)       OT Goals       ADLs (Progressing)       Start:  10/17/24    Expected End:  11/14/24       Patient will complete ADL tasks, with modified independence, using AE need in order to increase patient's safety and independence with self-care tasks.          Functional Transfers (Progressing)       Start:  10/17/24    Expected End:  11/14/24       Patient will complete functional transfers with modified independent using least restrictive device, in order to increase patient's safety and independence with daily tasks.          Functional Mobility  (Progressing)       Start:  10/17/24    Expected End:  11/14/24       Patient will demonstrate the ability to complete item retrieval and functional mobility with modified independence in order to increase safety and independence with daily tasks.          Activity Tolerance (Progressing)       Start:  10/17/24    Expected End:  11/14/24       Patient will demonstrate the ability to participate in functional activity at least >/= 25 minutes in order to increase patient's safety and independence with daily tasks.          B UE Strengthening (Progressing)       Start:  10/17/24    Expected End:  11/14/24       Patient will increase B UE strength to 4+/5 for functional transfers.

## 2024-10-17 NOTE — PROGRESS NOTES
10/17/24 1331   Discharge Planning   Type of Home Care Services Hospice  (Referral to [facility] for informational meeting for palliative and hospice.  Awaiting updates/meeting time.)   Expected Discharge Disposition Home   Does the patient need discharge transport arranged? No

## 2024-10-17 NOTE — PROGRESS NOTES
Anitha Welch is a 84 y.o. female on day 2 of admission presenting with Acute respiratory failure.      Subjective   She has become somewhat agitated as far as IV sites.  Refused to have the nurse tried to put another IV.  When I came to talk to her she agrees that her breathing is not yet normal and she is agreeable now for a possible midline/PICC.  Reviewed oncology notes.  Ordered IV iron, stating patient not a good candidate for GI workup, I agree with that  Reviewed cardiology notes from yesterday they have started oral diuretics  Reviewed kidney function from today it has improved  Reviewed swallowing evaluation she has been cleared for normal diet       Objective     Last Recorded Vitals  BP 99/80 (BP Location: Left arm, Patient Position: Sitting)   Pulse 87   Temp 36 °C (96.8 °F) (Temporal)   Resp 24   Wt 66.1 kg (145 lb 11.2 oz)   SpO2 96%   Intake/Output last 3 Shifts:    Intake/Output Summary (Last 24 hours) at 10/17/2024 1009  Last data filed at 10/17/2024 0843  Gross per 24 hour   Intake 850 ml   Output 1000 ml   Net -150 ml       Physical Exam  Alert oriented to self hospital cooperative and pleasant with me  Normocephalic/atraumatic  Neck supple  Chest bibasilar wheezes and crackles only minimally improved  Heart regular  Abdomen soft  Extremities no significant edema  Neurologic examination gross nonfocal  Relevant Results  Labs reviewed  CT chest reviewed  Assessment/Plan     Principal Problem:    Acute respiratory failure  Active Problems:    Dyspnea, unspecified type      October 17    Acute on chronic respiratory failure currently be treated both with diastolic heart failure and possible superimposed pneumonia.  Agree with obtaining CT chest.  Gentle diuresis per cardiology creatinine seems to be better; on oral diuretics currently, oxygenation has improved on baseline 2 L of oxygen however she seems still very symptomatic clinically and short of breath and does have positive auscultatory  findings  Question A-fib this is per cardiology, dismissed by cardiology  COPD with likely exacerbation on steroids and albuterol treatments; wean steroids, will continue IV antibiotics and will await pulmonary input on how much of CAT scan findings actually represent active pneumonia versus scarring  History of MI coronary disease no detectable troponin per cardiology  History of chronic kidney disease creatinine numbers actually better with diuresis we will continue to monitor and consult nephrology if needed; creatinine has improved continue to monitor no need for renal consult at this time  Anemia thrombocytopenia, hemodynamically stable but will guaiac stools ask for hematology eval hold heparin; iron deficiency anemia per hematology evaluation iron being given agree she is not a good candidate for GI investigation  Agitation and anxiety and probably some degree of dementia.  She apparently has difficulty tolerating hospital environment.  Will consult palliative care  CODE STATUS once again confirmed with the patient in the presence of her  and son             Jayro Lea MD

## 2024-10-17 NOTE — PROGRESS NOTES
"Anitha Welch is a 84 y.o. female on day 2 of admission presenting with Acute respiratory failure.    Subjective   Patient upright in bed.  Undergoing breathing treatment.  States her breathing is about the same.       Objective     Physical Exam  Constitutional:       Appearance: Normal appearance. She is ill-appearing.   Cardiovascular:      Rate and Rhythm: Normal rate and regular rhythm.      Pulses: Normal pulses.      Heart sounds: Murmur heard.   Pulmonary:      Effort: Pulmonary effort is normal.      Breath sounds: Wheezing present.   Abdominal:      General: Abdomen is flat.      Palpations: Abdomen is soft.   Musculoskeletal:      Right lower leg: No edema.      Left lower leg: No edema.   Neurological:      Mental Status: She is alert and oriented to person, place, and time.         Last Recorded Vitals  Blood pressure 99/80, pulse 87, temperature 36 °C (96.8 °F), temperature source Temporal, resp. rate 24, height 1.626 m (5' 4\"), weight 66.1 kg (145 lb 11.2 oz), SpO2 96%.  Intake/Output last 3 Shifts:  I/O last 3 completed shifts:  In: 840 (12.7 mL/kg) [P.O.:540; IV Piggyback:300]  Out: 1450 (21.9 mL/kg) [Urine:1450 (0.6 mL/kg/hr)]  Weight: 66.1 kg     Relevant Results  Results for orders placed or performed during the hospital encounter of 10/15/24 (from the past 24 hours)   Vitamin B12   Result Value Ref Range    Vitamin B12 587 211 - 911 pg/mL   TSH with reflex to Free T4 if abnormal   Result Value Ref Range    Thyroid Stimulating Hormone 7.27 (H) 0.44 - 3.98 mIU/L   Folate   Result Value Ref Range    Folate, Serum 13.7 >5.0 ng/mL   Iron and TIBC   Result Value Ref Range    Iron 35 35 - 150 ug/dL    UIBC 369 110 - 370 ug/dL    TIBC 404 240 - 445 ug/dL    % Saturation 9 (L) 25 - 45 %   Thyroxine, Free   Result Value Ref Range    Thyroxine, Free 0.62 0.61 - 1.12 ng/dL   Ferritin   Result Value Ref Range    Ferritin 68 8 - 150 ng/mL   Carcinoembryonic Antigen   Result Value Ref Range    " Carcinoembryonic AG 3.1 ug/L   CBC and Auto Differential   Result Value Ref Range    WBC 4.5 4.4 - 11.3 x10*3/uL    nRBC 0.0 0.0 - 0.0 /100 WBCs    RBC 3.01 (L) 4.00 - 5.20 x10*6/uL    Hemoglobin 7.5 (L) 12.0 - 16.0 g/dL    Hematocrit 25.6 (L) 36.0 - 46.0 %    MCV 85 80 - 100 fL    MCH 24.9 (L) 26.0 - 34.0 pg    MCHC 29.3 (L) 32.0 - 36.0 g/dL    RDW 17.4 (H) 11.5 - 14.5 %    Platelets 139 (L) 150 - 450 x10*3/uL    Neutrophils % 81.3 40.0 - 80.0 %    Immature Granulocytes %, Automated 0.7 0.0 - 0.9 %    Lymphocytes % 14.0 13.0 - 44.0 %    Monocytes % 3.8 2.0 - 10.0 %    Eosinophils % 0.0 0.0 - 6.0 %    Basophils % 0.2 0.0 - 2.0 %    Neutrophils Absolute 3.66 1.60 - 5.50 x10*3/uL    Immature Granulocytes Absolute, Automated 0.03 0.00 - 0.50 x10*3/uL    Lymphocytes Absolute 0.63 (L) 0.80 - 3.00 x10*3/uL    Monocytes Absolute 0.17 0.05 - 0.80 x10*3/uL    Eosinophils Absolute 0.00 0.00 - 0.40 x10*3/uL    Basophils Absolute 0.01 0.00 - 0.10 x10*3/uL   Comprehensive Metabolic Panel   Result Value Ref Range    Glucose 161 (H) 74 - 99 mg/dL    Sodium 142 136 - 145 mmol/L    Potassium 4.3 3.5 - 5.3 mmol/L    Chloride 98 98 - 107 mmol/L    Bicarbonate 34 (H) 21 - 32 mmol/L    Anion Gap 14 10 - 20 mmol/L    Urea Nitrogen 22 6 - 23 mg/dL    Creatinine 0.99 0.50 - 1.05 mg/dL    eGFR 56 (L) >60 mL/min/1.73m*2    Calcium 8.7 8.6 - 10.3 mg/dL    Albumin 3.6 3.4 - 5.0 g/dL    Alkaline Phosphatase 53 33 - 136 U/L    Total Protein 5.9 (L) 6.4 - 8.2 g/dL    AST 14 9 - 39 U/L    Bilirubin, Total 0.7 0.0 - 1.2 mg/dL    ALT 15 7 - 45 U/L   Morphology   Result Value Ref Range    RBC Morphology See Below     Hypochromia Mild     RBC Fragments Few     Ovalocytes Few     Acanthocytes Few      *Note: Due to a large number of results and/or encounters for the requested time period, some results have not been displayed. A complete set of results can be found in Results Review.                    Assessment/Plan   Assessment & Plan  Acute  respiratory failure    Dyspnea, unspecified type    Acute respiratory failure with hypoxia  Acute on chronic diastolic heart failure  Type II myocardial infarction  Possible pneumonia  Mitral valve regurgitation status post ARMANI  Coronary artery disease status post stenting to RCA  Sinus arrhythmia  Lower extremity DVT on Xarelto     Overall impression/plan:  10/15: 84-year-old female presenting with shortness of breath and hypoxia.  She was started on BiPAP in the ED with improvement of her respiratory status.  She currently is on 3 L of oxygen via nasal cannula.  She tells me she wears O2 at home just as needed.  Chest x-ray demonstrates pulmonary edema versus pneumonia.  She has been treated for both.  She was given 40 mg IV Lasix in the ED.  A another 40 mg IV dose has been ordered for later this evening.  Agree with this.  Will likely reorder her home dose of torsemide 20 mg daily as early as tomorrow.  In regards to her questionable atrial fibrillation.  She appears to be in a sinus arrhythmia with PVCs.  There is evidence of P waves throughout all leads.  Her high-sensitivity troponins are elevated, however she denies chest pain.  Her EKG is nonischemic. I suspect this is a type 2 myocardial infarction in the setting of her hypoxia.  Patient an echocardiogram 8/24 which revealed preserved ejection fraction of 60 to 65%, normal right ventricular global systolic function, moderately dilated right atrium, mild to moderate mitral valve regurgitation, mild tricuspid regurgitation, moderately elevated RVSP and moderate pulmonary hypertension.  No need to repeat this imaging at this time.  Blood pressures are slightly hypertensive most recent reading 142/58.  Clinically she appears euvolemic.  She remains in a rate controlled sinus arrhythmia with PVCs on telemetry.  She is on Xarelto for a lower extremity DVT.  Will discuss further plan with Dr. Tan.  Will continue to follow with you.     Patient seen and  examined. Agree with MAC note. Patient with history of coronary artery disease status post remote PCI to RCA, mitral regurgitation status post mitral clip. Had recent echocardiogram in August 2014 which was overall okay with mild gradient across the mitral valve. Patient has been complaining of chronic anemia and shortness of breath. Patient has been wheezing mild productive cough. Physical examination chest x-ray suggestive of COPD exacerbation/pneumonia in addition of mild acute on chronic diastolic heart failure exacerbation. Will gently diurese her. She has mildly elevated troponin without chest pain EKG at baseline she does have frequent PACs. Will try to slow down her arrhythmia. Continue Xarelto. Will follow-up in AM. Check TSH magnesium and electrolytes.      10/16: As above.  Patient rather short of breath after getting back in bed this morning.  Saturations were 85%.  As patient sat in bed and calm down her saturations went back up to 95%.  She remains on 3 L of O2.  Will give her 20 mg IV Lasix now and time her home dose of torsemide for later this afternoon.  Pulmonology is following.  She remains on IV antibiotics for suspected pneumonia.  Patient had repeat chest x-ray completed this morning they are not read yet.  She remains in normal sinus rhythm on telemetry without significant ectopy.  Most recent blood pressure 143/73.  Will increase her Coreg to 6.25 mg twice daily.  Lab work this morning reveals sodium of 143, potassium of 3.5 creatinine down to 1.07 today.  High-sensitivity troponins elevated however flat at 28,29, 63, 61, 56.  Again patient denies chest pain.  Elevated troponins most likely secondary to type II MI from acute hypoxia.  Hemoglobin this morning is 7.5 with hematocrit of 25.4.  TSH 17.22.  patient had modified barium swallow completed today we will await results. Will continue to follow with you.     10/17: As above.  Currently undergoing breathing treatment.  She remains in  normal sinus rhythm on telemetry without significant ectopy.  Yesterday I increased her Coreg to 6.25 however blood pressures are on the lower side today most recent reading 99/80.  Will decrease this dosing back to her home dose.  She underwent a chest CT yesterday which demonstrates moderate right-sided pleural effusion and small left-sided pleural effusion as well as nonspecific lung findings aspiration pneumonitis versus multifocal pneumonia.  She is on her home dose of 20 mg torsemide, continue with this.  She remains on IV antibiotics.  Hematology saw her yesterday in regards to her chronic anemia. Recommended IV Venofer. Palliative care is now following. Patient remains in a treatment care model. Will continue to follow with you.        I spent 20 minutes in the professional and overall care of this patient.      Stefanie Chadwick PA-C

## 2024-10-18 LAB
ALBUMIN SERPL BCP-MCNC: 3.6 G/DL (ref 3.4–5)
ALP SERPL-CCNC: 58 U/L (ref 33–136)
ALT SERPL W P-5'-P-CCNC: 16 U/L (ref 7–45)
ANION GAP SERPL CALCULATED.3IONS-SCNC: 11 MMOL/L (ref 10–20)
AST SERPL W P-5'-P-CCNC: 14 U/L (ref 9–39)
BASOPHILS # BLD AUTO: 0.01 X10*3/UL (ref 0–0.1)
BASOPHILS NFR BLD AUTO: 0.2 %
BILIRUB SERPL-MCNC: 0.7 MG/DL (ref 0–1.2)
BUN SERPL-MCNC: 26 MG/DL (ref 6–23)
CALCIUM SERPL-MCNC: 8.9 MG/DL (ref 8.6–10.3)
CHLORIDE SERPL-SCNC: 97 MMOL/L (ref 98–107)
CO2 SERPL-SCNC: 39 MMOL/L (ref 21–32)
CREAT SERPL-MCNC: 1.02 MG/DL (ref 0.5–1.05)
EGFRCR SERPLBLD CKD-EPI 2021: 54 ML/MIN/1.73M*2
EOSINOPHIL # BLD AUTO: 0 X10*3/UL (ref 0–0.4)
EOSINOPHIL NFR BLD AUTO: 0 %
ERYTHROCYTE [DISTWIDTH] IN BLOOD BY AUTOMATED COUNT: 18 % (ref 11.5–14.5)
GLUCOSE SERPL-MCNC: 125 MG/DL (ref 74–99)
HCT VFR BLD AUTO: 25.4 % (ref 36–46)
HGB BLD-MCNC: 7.6 G/DL (ref 12–16)
IMM GRANULOCYTES # BLD AUTO: 0.09 X10*3/UL (ref 0–0.5)
IMM GRANULOCYTES NFR BLD AUTO: 1.5 % (ref 0–0.9)
LYMPHOCYTES # BLD AUTO: 1.06 X10*3/UL (ref 0.8–3)
LYMPHOCYTES NFR BLD AUTO: 18.1 %
MCH RBC QN AUTO: 25.5 PG (ref 26–34)
MCHC RBC AUTO-ENTMCNC: 29.9 G/DL (ref 32–36)
MCV RBC AUTO: 85 FL (ref 80–100)
MONOCYTES # BLD AUTO: 0.63 X10*3/UL (ref 0.05–0.8)
MONOCYTES NFR BLD AUTO: 10.8 %
NEUTROPHILS # BLD AUTO: 4.07 X10*3/UL (ref 1.6–5.5)
NEUTROPHILS NFR BLD AUTO: 69.4 %
NRBC BLD-RTO: 0 /100 WBCS (ref 0–0)
PLATELET # BLD AUTO: 149 X10*3/UL (ref 150–450)
POTASSIUM SERPL-SCNC: 3.9 MMOL/L (ref 3.5–5.3)
PREALB SERPL-MCNC: 22.6 MG/DL (ref 18–40)
PROT SERPL-MCNC: 5.9 G/DL (ref 6.4–8.2)
RBC # BLD AUTO: 2.98 X10*6/UL (ref 4–5.2)
SODIUM SERPL-SCNC: 143 MMOL/L (ref 136–145)
WBC # BLD AUTO: 5.9 X10*3/UL (ref 4.4–11.3)

## 2024-10-18 PROCEDURE — 2500000002 HC RX 250 W HCPCS SELF ADMINISTERED DRUGS (ALT 637 FOR MEDICARE OP, ALT 636 FOR OP/ED): Performed by: INTERNAL MEDICINE

## 2024-10-18 PROCEDURE — 2500000004 HC RX 250 GENERAL PHARMACY W/ HCPCS (ALT 636 FOR OP/ED): Performed by: INTERNAL MEDICINE

## 2024-10-18 PROCEDURE — 99232 SBSQ HOSP IP/OBS MODERATE 35: CPT | Performed by: INTERNAL MEDICINE

## 2024-10-18 PROCEDURE — 99232 SBSQ HOSP IP/OBS MODERATE 35: CPT | Performed by: NURSE PRACTITIONER

## 2024-10-18 PROCEDURE — 80053 COMPREHEN METABOLIC PANEL: CPT | Performed by: INTERNAL MEDICINE

## 2024-10-18 PROCEDURE — 97110 THERAPEUTIC EXERCISES: CPT | Mod: GP,CQ

## 2024-10-18 PROCEDURE — 94640 AIRWAY INHALATION TREATMENT: CPT

## 2024-10-18 PROCEDURE — 2500000001 HC RX 250 WO HCPCS SELF ADMINISTERED DRUGS (ALT 637 FOR MEDICARE OP): Performed by: NURSE PRACTITIONER

## 2024-10-18 PROCEDURE — 2500000001 HC RX 250 WO HCPCS SELF ADMINISTERED DRUGS (ALT 637 FOR MEDICARE OP): Performed by: INTERNAL MEDICINE

## 2024-10-18 PROCEDURE — 2060000001 HC INTERMEDIATE ICU ROOM DAILY

## 2024-10-18 PROCEDURE — 94660 CPAP INITIATION&MGMT: CPT

## 2024-10-18 PROCEDURE — 2500000001 HC RX 250 WO HCPCS SELF ADMINISTERED DRUGS (ALT 637 FOR MEDICARE OP)

## 2024-10-18 PROCEDURE — 9420000001 HC RT PATIENT EDUCATION 5 MIN

## 2024-10-18 PROCEDURE — 97116 GAIT TRAINING THERAPY: CPT | Mod: GP,CQ

## 2024-10-18 PROCEDURE — 97535 SELF CARE MNGMENT TRAINING: CPT | Mod: GO,CO

## 2024-10-18 PROCEDURE — 2500000005 HC RX 250 GENERAL PHARMACY W/O HCPCS: Performed by: INTERNAL MEDICINE

## 2024-10-18 PROCEDURE — 85025 COMPLETE CBC W/AUTO DIFF WBC: CPT | Performed by: INTERNAL MEDICINE

## 2024-10-18 PROCEDURE — 99232 SBSQ HOSP IP/OBS MODERATE 35: CPT

## 2024-10-18 ASSESSMENT — COGNITIVE AND FUNCTIONAL STATUS - GENERAL
CLIMB 3 TO 5 STEPS WITH RAILING: A LITTLE
TURNING FROM BACK TO SIDE WHILE IN FLAT BAD: A LITTLE
DRESSING REGULAR UPPER BODY CLOTHING: A LITTLE
MOBILITY SCORE: 24
MOVING FROM LYING ON BACK TO SITTING ON SIDE OF FLAT BED WITH BEDRAILS: A LITTLE
MOVING TO AND FROM BED TO CHAIR: A LITTLE
DAILY ACTIVITIY SCORE: 20
HELP NEEDED FOR BATHING: A LITTLE
DRESSING REGULAR LOWER BODY CLOTHING: A LITTLE
DAILY ACTIVITIY SCORE: 24
WALKING IN HOSPITAL ROOM: A LITTLE
TOILETING: A LITTLE
MOBILITY SCORE: 24
STANDING UP FROM CHAIR USING ARMS: A LITTLE
MOBILITY SCORE: 18
DAILY ACTIVITIY SCORE: 24

## 2024-10-18 ASSESSMENT — PAIN - FUNCTIONAL ASSESSMENT
PAIN_FUNCTIONAL_ASSESSMENT: 0-10
PAIN_FUNCTIONAL_ASSESSMENT: 0-10

## 2024-10-18 ASSESSMENT — ACTIVITIES OF DAILY LIVING (ADL)
BATHING_WHERE_ASSESSED: OTHER (COMMENT)
BATHING_LEVEL_OF_ASSISTANCE: CONTACT GUARD

## 2024-10-18 ASSESSMENT — PAIN SCALES - GENERAL
PAINLEVEL_OUTOF10: 0 - NO PAIN
PAINLEVEL_OUTOF10: 0 - NO PAIN

## 2024-10-18 NOTE — CARE PLAN
The patient's goals for the shift include wear NC 2L & attempt Bipap     The clinical goals for the shift include increase mobilization, maintain safety    Over the shift, the patient did not make progress toward the following goals. Barriers to progression include none. Recommendations to address these barriers include none.

## 2024-10-18 NOTE — PROGRESS NOTES
"Anitha Welch is a 84 y.o. female on day 3 of admission seen in follow-up for acute on chronic hypoxic, hypercapnic respiratory failure, acute COPD exacerbation, pneumonia    Subjective   Family at bedside. On 2 L nasal cannula oxygen; oxygen sats 93%. No respiratory complaints. Denies pain. Afebrile.       Objective     Physical Exam  Vitals and nursing note reviewed.   Constitutional:       Appearance: Normal appearance.   HENT:      Head: Normocephalic and atraumatic.      Nose: Nose normal.      Mouth/Throat:      Mouth: Mucous membranes are moist.   Eyes:      Extraocular Movements: Extraocular movements intact.      Conjunctiva/sclera: Conjunctivae normal.      Pupils: Pupils are equal, round, and reactive to light.   Cardiovascular:      Rate and Rhythm: Normal rate and regular rhythm.      Pulses: Normal pulses.      Heart sounds: Normal heart sounds.   Pulmonary:      Effort: Pulmonary effort is normal.      Breath sounds: Normal breath sounds.      Comments: Mild end-expiratory wheezes bilaterally. Shallow respirations.   Abdominal:      General: Bowel sounds are normal.      Palpations: Abdomen is soft.   Musculoskeletal:         General: Normal range of motion.   Skin:     General: Skin is warm and dry.      Capillary Refill: Capillary refill takes less than 2 seconds.   Neurological:      General: No focal deficit present.      Mental Status: She is alert and oriented to person, place, and time.   Psychiatric:         Mood and Affect: Mood normal.         Behavior: Behavior normal.         Last Recorded Vitals  Blood pressure 125/64, pulse 87, temperature 36 °C (96.8 °F), temperature source Temporal, resp. rate 19, height 1.626 m (5' 4\"), weight 66.1 kg (145 lb 11.6 oz), SpO2 91%.  Intake/Output last 3 Shifts:  I/O last 3 completed shifts:  In: 440 (6.7 mL/kg) [P.O.:240; IV Piggyback:200]  Out: 1550 (23.4 mL/kg) [Urine:1550 (0.7 mL/kg/hr)]  Weight: 66.1 kg       Intake/Output Summary (Last 24 hours) at " 10/18/2024 0928  Last data filed at 10/18/2024 0532  Gross per 24 hour   Intake 200 ml   Output 1150 ml   Net -950 ml      Scheduled medications:  atorvastatin, 40 mg, oral, Nightly  azithromycin, 500 mg, oral, q24h GARDENIA  aztreonam, 1 g, intravenous, q12h  carvedilol, 3.125 mg, oral, BID  docusate sodium, 100 mg, oral, BID  DULoxetine, 20 mg, oral, Nightly  gabapentin, 100 mg, oral, Nightly  ipratropium-albuteroL, 3 mL, nebulization, TID  iron sucrose, 200 mg, intravenous, Every other day  levothyroxine, 25 mcg, oral, Daily  nystatin, 1 Application, Topical, BID  pantoprazole, 40 mg, oral, Daily before breakfast   Or  pantoprazole, 40 mg, intravenous, Daily before breakfast  predniSONE, 20 mg, oral, BID  [Held by provider] rivaroxaban, 20 mg, oral, Daily with evening meal  torsemide, 20 mg, oral, Daily     PRN medications: acetaminophen **OR** acetaminophen **OR** acetaminophen, ALPRAZolam, bisacodyl, diclofenac sodium, ipratropium-albuteroL, nitroglycerin, oxygen, oxymetazoline   oxygen, 2 L/min, Last Rate: 3 L/min (10/17/24 0814)       Relevant Results  Results for orders placed or performed during the hospital encounter of 10/15/24 (from the past 24 hours)   CBC and Auto Differential   Result Value Ref Range    WBC 5.9 4.4 - 11.3 x10*3/uL    nRBC 0.0 0.0 - 0.0 /100 WBCs    RBC 2.98 (L) 4.00 - 5.20 x10*6/uL    Hemoglobin 7.6 (L) 12.0 - 16.0 g/dL    Hematocrit 25.4 (L) 36.0 - 46.0 %    MCV 85 80 - 100 fL    MCH 25.5 (L) 26.0 - 34.0 pg    MCHC 29.9 (L) 32.0 - 36.0 g/dL    RDW 18.0 (H) 11.5 - 14.5 %    Platelets 149 (L) 150 - 450 x10*3/uL    Neutrophils % 69.4 40.0 - 80.0 %    Immature Granulocytes %, Automated 1.5 (H) 0.0 - 0.9 %    Lymphocytes % 18.1 13.0 - 44.0 %    Monocytes % 10.8 2.0 - 10.0 %    Eosinophils % 0.0 0.0 - 6.0 %    Basophils % 0.2 0.0 - 2.0 %    Neutrophils Absolute 4.07 1.60 - 5.50 x10*3/uL    Immature Granulocytes Absolute, Automated 0.09 0.00 - 0.50 x10*3/uL    Lymphocytes Absolute 1.06 0.80 -  3.00 x10*3/uL    Monocytes Absolute 0.63 0.05 - 0.80 x10*3/uL    Eosinophils Absolute 0.00 0.00 - 0.40 x10*3/uL    Basophils Absolute 0.01 0.00 - 0.10 x10*3/uL   Comprehensive Metabolic Panel   Result Value Ref Range    Glucose 125 (H) 74 - 99 mg/dL    Sodium 143 136 - 145 mmol/L    Potassium 3.9 3.5 - 5.3 mmol/L    Chloride 97 (L) 98 - 107 mmol/L    Bicarbonate 39 (H) 21 - 32 mmol/L    Anion Gap 11 10 - 20 mmol/L    Urea Nitrogen 26 (H) 6 - 23 mg/dL    Creatinine 1.02 0.50 - 1.05 mg/dL    eGFR 54 (L) >60 mL/min/1.73m*2    Calcium 8.9 8.6 - 10.3 mg/dL    Albumin 3.6 3.4 - 5.0 g/dL    Alkaline Phosphatase 58 33 - 136 U/L    Total Protein 5.9 (L) 6.4 - 8.2 g/dL    AST 14 9 - 39 U/L    Bilirubin, Total 0.7 0.0 - 1.2 mg/dL    ALT 16 7 - 45 U/L     *Note: Due to a large number of results and/or encounters for the requested time period, some results have not been displayed. A complete set of results can be found in Results Review.      XR chest 2 views  Result Date: 10/16/2024    XR chest 1 view  Result Date: 10/15/2024  FINDINGS: Multifocal airspace opacities greatest in the right lung both upper and lower lung field, concerning for pneumonia or an atypical presentation of edema have slightly worsened from prior study.   There has also been increase in small bilateral pleural effusions.   No evidence of pneumothorax.  Multifocal airspace opacities greatest in the right lung both upper and lower lung field, concerning for pneumonia or an atypical presentation of edema have slightly worsened from prior study.   There has also been increase in small bilateral pleural effusions.         Assessment/Plan   Acute on chronic hypoxic, hypercapnic respiratory failure  Wean oxygen as sats allow  BiPAP nightly and as needed  Continuous pulse oximetry  Incentive spirometry/pulmonary hygiene     Acute COPD exacerbation  Continue IBD/ICS  Prednisone with taper  FEV1 when optimized     Chronic congestive heart  failure  Torsemide  Cardiology follow-up     Pneumonia  Reviewed PA and lateral chest x-ray with collaborating physician Dr. Rae  Continue IV azithromycin, aztreonam   Follow-up cultures    Anemia  Xarelto on hold  IV Venofer    Acute kidney injury on chronic kidney disease  Creatinine at baseline    Prophylaxis  Xarelto   Protonix    PT/OT/out of bed  Palliative Medicine following-referral made to [facility] for informational meeting  Code Status: DNR CCA DNI    Sabiha Lane, APRN-CNP

## 2024-10-18 NOTE — PROGRESS NOTES
"Nutrition Follow up Note    Nutrition Assessment      Pt fairly alert this morning, spouse and son also in room, meeting w/ hospice today for informational meeting only. All state that po intake much better, but is agreeable to trying Ensure compact to maximize intake    Nutrition History:     Energy Intake: Good > 75 %  Food Allergies/Intolerances:  None  GI Symptoms: None  Oral Problems: None    Anthropometrics:  Ht: 162.6 cm (5' 4\"), Wt: 66.1 kg (145 lb 11.6 oz), BMI: 25           Weight Change:  Daily Weight  10/18/24 : 66.1 kg (145 lb 11.6 oz)  10/08/24 : 62.6 kg (138 lb)  10/01/24 : 62.6 kg (138 lb)  09/27/24 : 62.6 kg (138 lb)  09/26/24 : 63 kg (139 lb)  09/19/24 : 67.7 kg (149 lb 4 oz)  09/13/24 : 61.2 kg (135 lb)  09/09/24 : 61.2 kg (135 lb)  09/05/24 : 64.2 kg (141 lb 8 oz)  08/23/24 : 63.5 kg (140 lb)               Nutrition Focused Physical Exam Findings: visually only age-related deficits        Edema  Edema: +2 mild  Edema Location: nonpitting BLE     Nutrition Significant Labs:  Lab Results   Component Value Date    WBC 5.9 10/18/2024    HGB 7.6 (L) 10/18/2024    HCT 25.4 (L) 10/18/2024     (L) 10/18/2024    CHOL 190 02/21/2023    TRIG 64 02/21/2023    HDL 52 02/21/2023    ALT 16 10/18/2024    AST 14 10/18/2024     10/18/2024    K 3.9 10/18/2024    CL 97 (L) 10/18/2024    CREATININE 1.02 10/18/2024    BUN 26 (H) 10/18/2024    CO2 39 (H) 10/18/2024    TSH 7.27 (H) 10/16/2024    INR 1.2 10/15/2024    HGBA1C 4.8 10/15/2024     Nutrition Specific Medications:  atorvastatin, 40 mg, oral, Nightly  azithromycin, 500 mg, oral, q24h GARDENIA  aztreonam, 1 g, intravenous, q12h  carvedilol, 3.125 mg, oral, BID  docusate sodium, 100 mg, oral, BID  DULoxetine, 20 mg, oral, Nightly  gabapentin, 100 mg, oral, Nightly  ipratropium-albuteroL, 3 mL, nebulization, TID  iron sucrose, 200 mg, intravenous, Every other day  levothyroxine, 25 mcg, oral, Daily  nystatin, 1 Application, Topical, BID  pantoprazole, 40 " mg, oral, Daily before breakfast   Or  pantoprazole, 40 mg, intravenous, Daily before breakfast  predniSONE, 20 mg, oral, BID  [Held by provider] rivaroxaban, 20 mg, oral, Daily with evening meal  torsemide, 20 mg, oral, Daily      oxygen, 2 L/min, Last Rate: 3 L/min (10/17/24 0814)      Dietary Orders (From admission, onward)       Start     Ordered    10/16/24 1221  Adult diet 2-3 grams sodium  Diet effective now        Question:  Diet type  Answer:  2-3 grams sodium    10/16/24 1220    10/16/24 1014  May Participate in Room Service  ( ROOM SERVICE MAY PARTICIPATE)  Once        Question:  .  Answer:  Yes    10/16/24 1013                     Estimated Needs:   Estimated Energy Needs  Total Energy Estimated Needs (kCal): 1693 kCal  Total Estimated Energy Need per Day (kCal/kg): 25 kCal/kg  Method for Estimating Needs: actual wt    Estimated Protein Needs  Total Protein Estimated Needs (g): 81 g  Total Protein Estimated Needs (g/kg): 1.2 g/kg  Method for Estimating Needs: actual wt    Estimated Fluid Needs  Method for Estimating Needs: 1 ml/kcal or per MD      Nutrition Diagnosis   Nutrition Diagnosis:  Malnutrition Diagnosis  Patient has Malnutrition Diagnosis: No    Nutrition Diagnosis  Patient has Nutrition Diagnosis: Yes  Diagnosis Status (1): Ongoing  Nutrition Diagnosis 1: Increased nutrient needs  Related to (1): Increased demand for nutrients  As Evidenced by (1): conditions associated with diagnosis     Nutrition Interventions/Recommendations   Nutrition Interventions and Recommendations:    Nutrition Prescription:  Individualized Nutrition Prescription Provided for : 1693 kcals and 81g protein to be provided via diet    Nutrition Interventions:   Food and/or Nutrient Delivery Interventions  Interventions: Meals and snacks  Meals and Snacks: Mineral-modified diet  Additional Interventions: chocolate ensure compact BID, each provides 220 calories, 9gm protein    Education Documentation  No documentation  found.       Nutrition Monitoring and Evaluation   Monitoring/Evaluation:   Food/Nutrient Related History Monitoring  Monitoring and Evaluation Plan: Energy intake  Energy Intake: Estimated energy intake  Criteria: pt to consume >/= 75% estimated needs     Time Spent/Follow-up:   Follow Up  Time Spent (min): 25 minutes  Last Date of Nutrition Visit: 10/18/24  Nutrition Follow-Up Needed?: 5-7 days  Follow up Comment: 10/23/24

## 2024-10-18 NOTE — PROGRESS NOTES
Anitha Welch is a 84 y.o. female on day 3 of admission presenting with Acute respiratory failure.      Subjective   She feels better today compared to yesterday finishing breakfast saturating in the mid 90s on 2 L  No evidence of acute bleeding       Objective     Last Recorded Vitals  /64 (BP Location: Left arm, Patient Position: Lying)   Pulse 87   Temp 36 °C (96.8 °F) (Temporal)   Resp 19   Wt 66.1 kg (145 lb 11.6 oz)   SpO2 91%   Intake/Output last 3 Shifts:    Intake/Output Summary (Last 24 hours) at 10/18/2024 0932  Last data filed at 10/18/2024 0532  Gross per 24 hour   Intake 200 ml   Output 1150 ml   Net -950 ml       Physical Exam  Alert oriented follows commands no distress  Chest slightly improved crackles and wheezes  Heart regular  Abdomen soft nontender  Extremities improved edema  Neurologic exam nonfocal  Relevant Results  Reviewed labs  Assessment/Plan     Principal Problem:    Acute respiratory failure  Active Problems:    Dyspnea, unspecified type      October 18    Acute on chronic respiratory failure.  Oral diuretics currently.  IV antibiotics however procalcitonin level was low will defer to pulmonology if we can stop those.  Oral steroids.  Underlying COPD scarring  Anemia thrombocytopenia.  Her ferritin level was not low.  I did hold Xarelto initially but there is no evidence of bleeding so we will resume today.  Will continue to monitor.  Indication for anticoagulation seem to be old DVT from November 2023 left lower extremity  Worsening dementia, however seems more cooperative today  Prior stroke  Chronic kidney disease at baseline      Jayro Lea MD

## 2024-10-18 NOTE — PROGRESS NOTES
"Anitha Welch is a 84 y.o. female on day 3 of admission presenting with Acute respiratory failure.    Subjective   Patient upright in bed. She is much more alert this morning.        Objective     Physical Exam    Last Recorded Vitals  Blood pressure 125/64, pulse 87, temperature 36 °C (96.8 °F), temperature source Temporal, resp. rate 19, height 1.626 m (5' 4\"), weight 66.1 kg (145 lb 11.6 oz), SpO2 91%.  Intake/Output last 3 Shifts:  I/O last 3 completed shifts:  In: 440 (6.7 mL/kg) [P.O.:240; IV Piggyback:200]  Out: 1550 (23.4 mL/kg) [Urine:1550 (0.7 mL/kg/hr)]  Weight: 66.1 kg     Relevant Results    Results for orders placed or performed during the hospital encounter of 10/15/24 (from the past 24 hours)   CBC and Auto Differential   Result Value Ref Range    WBC 5.9 4.4 - 11.3 x10*3/uL    nRBC 0.0 0.0 - 0.0 /100 WBCs    RBC 2.98 (L) 4.00 - 5.20 x10*6/uL    Hemoglobin 7.6 (L) 12.0 - 16.0 g/dL    Hematocrit 25.4 (L) 36.0 - 46.0 %    MCV 85 80 - 100 fL    MCH 25.5 (L) 26.0 - 34.0 pg    MCHC 29.9 (L) 32.0 - 36.0 g/dL    RDW 18.0 (H) 11.5 - 14.5 %    Platelets 149 (L) 150 - 450 x10*3/uL    Neutrophils % 69.4 40.0 - 80.0 %    Immature Granulocytes %, Automated 1.5 (H) 0.0 - 0.9 %    Lymphocytes % 18.1 13.0 - 44.0 %    Monocytes % 10.8 2.0 - 10.0 %    Eosinophils % 0.0 0.0 - 6.0 %    Basophils % 0.2 0.0 - 2.0 %    Neutrophils Absolute 4.07 1.60 - 5.50 x10*3/uL    Immature Granulocytes Absolute, Automated 0.09 0.00 - 0.50 x10*3/uL    Lymphocytes Absolute 1.06 0.80 - 3.00 x10*3/uL    Monocytes Absolute 0.63 0.05 - 0.80 x10*3/uL    Eosinophils Absolute 0.00 0.00 - 0.40 x10*3/uL    Basophils Absolute 0.01 0.00 - 0.10 x10*3/uL   Comprehensive Metabolic Panel   Result Value Ref Range    Glucose 125 (H) 74 - 99 mg/dL    Sodium 143 136 - 145 mmol/L    Potassium 3.9 3.5 - 5.3 mmol/L    Chloride 97 (L) 98 - 107 mmol/L    Bicarbonate 39 (H) 21 - 32 mmol/L    Anion Gap 11 10 - 20 mmol/L    Urea Nitrogen 26 (H) 6 - 23 mg/dL    " Creatinine 1.02 0.50 - 1.05 mg/dL    eGFR 54 (L) >60 mL/min/1.73m*2    Calcium 8.9 8.6 - 10.3 mg/dL    Albumin 3.6 3.4 - 5.0 g/dL    Alkaline Phosphatase 58 33 - 136 U/L    Total Protein 5.9 (L) 6.4 - 8.2 g/dL    AST 14 9 - 39 U/L    Bilirubin, Total 0.7 0.0 - 1.2 mg/dL    ALT 16 7 - 45 U/L     *Note: Due to a large number of results and/or encounters for the requested time period, some results have not been displayed. A complete set of results can be found in Results Review.                Assessment/Plan   Assessment & Plan  Acute respiratory failure    Dyspnea, unspecified type    Acute respiratory failure with hypoxia  Acute on chronic diastolic heart failure  Type II myocardial infarction  Possible pneumonia  Mitral valve regurgitation status post ARMANI  Coronary artery disease status post stenting to RCA  Sinus arrhythmia  Lower extremity DVT on Xarelto     Overall impression/plan:  10/15: 84-year-old female presenting with shortness of breath and hypoxia.  She was started on BiPAP in the ED with improvement of her respiratory status.  She currently is on 3 L of oxygen via nasal cannula.  She tells me she wears O2 at home just as needed.  Chest x-ray demonstrates pulmonary edema versus pneumonia.  She has been treated for both.  She was given 40 mg IV Lasix in the ED.  A another 40 mg IV dose has been ordered for later this evening.  Agree with this.  Will likely reorder her home dose of torsemide 20 mg daily as early as tomorrow.  In regards to her questionable atrial fibrillation.  She appears to be in a sinus arrhythmia with PVCs.  There is evidence of P waves throughout all leads.  Her high-sensitivity troponins are elevated, however she denies chest pain.  Her EKG is nonischemic. I suspect this is a type 2 myocardial infarction in the setting of her hypoxia.  Patient an echocardiogram 8/24 which revealed preserved ejection fraction of 60 to 65%, normal right ventricular global systolic function, moderately  dilated right atrium, mild to moderate mitral valve regurgitation, mild tricuspid regurgitation, moderately elevated RVSP and moderate pulmonary hypertension.  No need to repeat this imaging at this time.  Blood pressures are slightly hypertensive most recent reading 142/58.  Clinically she appears euvolemic.  She remains in a rate controlled sinus arrhythmia with PVCs on telemetry.  She is on Xarelto for a lower extremity DVT.  Will discuss further plan with Dr. Tan.  Will continue to follow with you.     Patient seen and examined. Agree with MCA note. Patient with history of coronary artery disease status post remote PCI to RCA, mitral regurgitation status post mitral clip. Had recent echocardiogram in August 2014 which was overall okay with mild gradient across the mitral valve. Patient has been complaining of chronic anemia and shortness of breath. Patient has been wheezing mild productive cough. Physical examination chest x-ray suggestive of COPD exacerbation/pneumonia in addition of mild acute on chronic diastolic heart failure exacerbation. Will gently diurese her. She has mildly elevated troponin without chest pain EKG at baseline she does have frequent PACs. Will try to slow down her arrhythmia. Continue Xarelto. Will follow-up in AM. Check TSH magnesium and electrolytes.      10/16: As above.  Patient rather short of breath after getting back in bed this morning.  Saturations were 85%.  As patient sat in bed and calm down her saturations went back up to 95%.  She remains on 3 L of O2.  Will give her 20 mg IV Lasix now and time her home dose of torsemide for later this afternoon.  Pulmonology is following.  She remains on IV antibiotics for suspected pneumonia.  Patient had repeat chest x-ray completed this morning they are not read yet.  She remains in normal sinus rhythm on telemetry without significant ectopy.  Most recent blood pressure 143/73.  Will increase her Coreg to 6.25 mg twice daily.  Lab  work this morning reveals sodium of 143, potassium of 3.5 creatinine down to 1.07 today.  High-sensitivity troponins elevated however flat at 28,29, 63, 61, 56.  Again patient denies chest pain.  Elevated troponins most likely secondary to type II MI from acute hypoxia.  Hemoglobin this morning is 7.5 with hematocrit of 25.4.  TSH 17.22.  patient had modified barium swallow completed today we will await results. Will continue to follow with you.      10/17: As above.  Currently undergoing breathing treatment.  She remains in normal sinus rhythm on telemetry without significant ectopy.  Yesterday I increased her Coreg to 6.25 however blood pressures are on the lower side today most recent reading 99/80.  Will decrease this dosing back to her home dose.  She underwent a chest CT yesterday which demonstrates moderate right-sided pleural effusion and small left-sided pleural effusion as well as nonspecific lung findings aspiration pneumonitis versus multifocal pneumonia.  She is on her home dose of 20 mg torsemide, continue with this.  She remains on IV antibiotics.  Hematology saw her yesterday in regards to her chronic anemia. Recommended IV Venofer. Palliative care is now following. Patient remains in a treatment care model. Will continue to follow with you.      10/18: As above.  Patient continues to improve.  She states she does not feel quite short of breath today.  She remains on her home dose of torsemide 20 mg daily.  Recommend continuing this as maintenance diuretic.  Her creatinine is stable at 1.02 this morning.  Overall patient relatively stable from a cardiac standpoint.  I believe most of her respiratory issues are secondary to her advanced COPD and pneumonia.  Overall patient appears euvolemic.  Blood pressure stable most recent reading 131/62.  She remains in normal sinus rhythm on telemetry without significant ectopy.  Lab work this morning demonstrates a sodium of 143, potassium 3.9, hemoglobin 7.6  hematocrit 25.4.  Cardiology will sign off.  Please do not hesitate to call with any questions or concerns.  Thank you for this consultation.       I spent 20 minutes in the professional and overall care of this patient.      Stefanie Chadwick PA-C

## 2024-10-18 NOTE — PROGRESS NOTES
10/18/24 1057   Discharge Planning   Home or Post Acute Services Community services  (Patient is active w/[facility] and seen by Milana Hernadez NP.  Referral to [facility] 10/17/24.  Spouse deffered [facility] meeting until after d/c.)   Type of Home Care Services Hospice  ([facility] to follow up after d/c.)   Expected Discharge Disposition Home   Does the patient need discharge transport arranged? No

## 2024-10-18 NOTE — PROGRESS NOTES
Speech-Language Pathology                 Therapy Communication Note    Patient Name: Anitha Welch  MRN: [id number]  Department: [facility]  Room: 422/[facility]  Today's Date: 10/18/2024     Discipline: Speech Language Pathology    Missed Visit Reason:      Missed Time: Attempt    Comment: Hospice meeting ongoing, hold SLP tx pending review of goals of care.

## 2024-10-18 NOTE — PROGRESS NOTES
Occupational Therapy    OT Treatment    Patient Name: Anitha Welch  MRN: [id number]  Department: [address]  Room: Pending sale to [facility]  Today's Date: 10/18/2024  Time Calculation  Start Time: 1452  Stop Time: 1530  Time Calculation (min): 38 min        Assessment:  OT Assessment: Pt declines mobility to bathroom, all tasks completed in chair. Continiue to recommend OT services to address POC goals.  Prognosis: Good  Barriers to Discharge: None  Evaluation/Treatment Tolerance: Patient tolerated treatment well, Patient limited by fatigue  End of Session Patient Position: Up in chair, Alarm on (all needs in reach.)  OT Assessment Results: Decreased ADL status, Decreased upper extremity strength, Decreased safe judgment during ADL, Decreased endurance, Decreased functional mobility, Decreased gross motor control, Decreased IADLs, Decreased cognition  Prognosis: Good  Barriers to Discharge: None  Evaluation/Treatment Tolerance: Patient tolerated treatment well, Patient limited by fatigue  Plan:  Treatment Interventions: ADL retraining, Functional transfer training  OT Frequency: 3 times per week  OT Discharge Recommendations: Low intensity level of continued care, 24 hr supervision due to cognition  Equipment Recommended upon Discharge: Wheeled walker  OT Recommended Transfer Status: Assist of 1  OT - OK to Discharge: Yes  Treatment Interventions: ADL retraining, Functional transfer training    Subjective   Previous Visit Info:  OT Last Visit  OT Received On: 10/18/24  General:  General  Reason for Referral: Impaired mobility  Referred By: Dr. Lea  Past Medical History Relevant to Rehab: Chronic Respiratory Failure, COPD, Anxiety, CHF, GERD, AAA, CVA, DVT, HTN, MI, CKD  Prior to Session Communication: Bedside nurse  Patient Position Received: Up in chair, Alarm on  General Comment: agreeable to treatment  Precautions:  Medical Precautions: Fall precautions, Oxygen therapy device and L/min (2Lnc)    Pain:  Pain Assessment  Pain  Assessment: 0-10  0-10 (Numeric) Pain Score: 0 - No pain    Objective    Cognition:  Cognition  Insight: Moderate (appears self limiting)  Impulsive: Mildly     Activities of Daily Living: Grooming  Grooming Level of Assistance: Setup  Grooming Where Assessed: Chair  Grooming Comments: oral care, washing face    UE Bathing  UE Bathing Level of Assistance: Close supervision  UE Bathing Where Assessed: Other (Comment) (chair)  UE Bathing Comments: sponge bathing    LE Bathing  LE Bathing Level of Assistance: Contact guard  LE Bathing Where Assessed: Other (Comment) (chair)  LE Bathing Comments: sponge bathing, steady assist in stance    UE Dressing  UE Dressing Level of Assistance: Minimum assistance  UE Dressing Where Assessed: Chair  UE Dressing Comments: to tie hospital gown in back    LE Dressing  LE Dressing Adaptive Equipment: Reacher  Pants Level of Assistance: Moderate assistance  LE Dressing Where Assessed: Chair  LE Dressing Comments: assist with threading BLE into pant legs due to heath cath, hospital socks sticking to pants  Functional Standing Tolerance:  Time: ~ 1 minute  Activity: self care  Functional Standing Tolerance Comments: fair balance  Bed Mobility/Transfers: Transfer 1  Transfer From 1: Chair with arms to  Transfer to 1: Chair with arms  Technique 1: Sit to stand, Stand to sit  Transfer Device 1: Walker  Transfer Level of Assistance 1: Close supervision  Trials/Comments 1: x 2 trials during self care    Outcome Measures:[facility] Daily Activity  Putting on and taking off regular lower body clothing: A little  Bathing (including washing, rinsing, drying): A little  Putting on and taking off regular upper body clothing: A little  Toileting, which includes using toilet, bedpan or urinal: A little  Taking care of personal grooming such as brushing teeth: None  Eating Meals: None  Daily Activity - Total Score: 20    IP EDUCATION:  Education  Individual(s) Educated: Patient  Education Provided: Risk and  benefits of OT discussed with patient or other, POC discussed and agreed upon    Goals:  Encounter Problems       Encounter Problems (Active)       OT Goals       ADLs (Progressing)       Start:  10/17/24    Expected End:  11/14/24       Patient will complete ADL tasks, with modified independence, using AE need in order to increase patient's safety and independence with self-care tasks.          Functional Transfers (Progressing)       Start:  10/17/24    Expected End:  11/14/24       Patient will complete functional transfers with modified independent using least restrictive device, in order to increase patient's safety and independence with daily tasks.          Functional Mobility  (Progressing)       Start:  10/17/24    Expected End:  11/14/24       Patient will demonstrate the ability to complete item retrieval and functional mobility with modified independence in order to increase safety and independence with daily tasks.          Activity Tolerance (Progressing)       Start:  10/17/24    Expected End:  11/14/24       Patient will demonstrate the ability to participate in functional activity at least >/= 25 minutes in order to increase patient's safety and independence with daily tasks.          B UE Strengthening (Progressing)       Start:  10/17/24    Expected End:  11/14/24       Patient will increase B UE strength to 4+/5 for functional transfers.

## 2024-10-18 NOTE — NURSING NOTE
Pt has a single lumen midline to R upper arm (dated 10/17), drsg dry and intact without any redness, swelling or drainage, lumen has brisk blood return and flushes easily with NS, clamped and curos cap applied.

## 2024-10-18 NOTE — PROGRESS NOTES
"Anitha Welch is a 84 y.o. female on day 3 of admission presenting with Acute respiratory failure.    Subjective   Symptoms (0 - 10, Best to Worst)  [place] Symptom Assessment System  0-10 (Numeric) Pain Score: 0 - No pain    Overall she is feeling a little bit better complaints of being weak and tired does admit she slept \" a lot last night\"  Denies any issues such as headache dizziness lightheadedness diplopia spinning room no vivid dreaming.  Tolerated duloxetine last p.m.  Did get a small dose of Xanax as well anxiety.  States she feels better this morning denies being anxious.  No nausea or vomiting no chest pain or palpitations still coughing occasionally wheezing.    Appreciate care transitions documentation hospice [facility]/palliative Navigator meeting deferred until patient is discharged home per her 's request.  She is active with [facility] and I spoke with Milana Hernadez CNP in detail yesterday.    Objective     Physical Exam  Nurses notes and vitals reviewed chronically ill-appearing frail elderly female sitting up in bed watching compresses.  On TV  No family present  She remains markedly confused repetitively telling me that she has pneumonia.  Normocephalic appears to be atraumatic pupils are reactive to light with intact extraocular movements there is no nystagmus there is no eye drainage or discharge without conjunctival injection  Clear nose no rhinorrhea or epistaxis moist mucous membranes no erythema or exudates  Neck is supple with no obvious JVD trachea is midline without palpable adenopathy or thyromegaly  Anterior lungs reveal a few wheezes she has a raspy cough few crackles in the bases and she is a bit more diminished in the right base compared to the left base  On nasal cannula oxygen no accessory muscle, use noted no conversational dyspnea  Rhythm and rate seem to be regular does have about a 2 out of 6 murmur loudest in the mitral area (apex)  Soft abdomen nontender " "nondistended positive bowel sounds without rebound or guarding  Lopez catheter present yellow urine  Lower extremities with a bit of edema little bit more so on the left compared to the right  No calf tenderness today  Very poor memory she is pleasant and cooperative no agitation or anxiety  Able to move all extremities but she is weak in the lower extremities compared to the upper  Acknowledges that she physically feels weak  Mild tenting of the skin over the dorsum of the hand    Last Recorded Vitals  Blood pressure 125/64, pulse 87, temperature 36 °C (96.8 °F), temperature source Temporal, resp. rate 19, height 1.626 m (5' 4\"), weight 66.1 kg (145 lb 11.6 oz), SpO2 91%.  Intake/Output last 3 Shifts:  I/O last 3 completed shifts:  In: 440 (6.7 mL/kg) [P.O.:240; IV Piggyback:200]  Out: 1550 (23.4 mL/kg) [Urine:1550 (0.7 mL/kg/hr)]  Weight: 66.1 kg     Relevant Results  Results for orders placed or performed during the hospital encounter of 10/15/24 (from the past 24 hours)   CBC and Auto Differential   Result Value Ref Range    WBC 5.9 4.4 - 11.3 x10*3/uL    nRBC 0.0 0.0 - 0.0 /100 WBCs    RBC 2.98 (L) 4.00 - 5.20 x10*6/uL    Hemoglobin 7.6 (L) 12.0 - 16.0 g/dL    Hematocrit 25.4 (L) 36.0 - 46.0 %    MCV 85 80 - 100 fL    MCH 25.5 (L) 26.0 - 34.0 pg    MCHC 29.9 (L) 32.0 - 36.0 g/dL    RDW 18.0 (H) 11.5 - 14.5 %    Platelets 149 (L) 150 - 450 x10*3/uL    Neutrophils % 69.4 40.0 - 80.0 %    Immature Granulocytes %, Automated 1.5 (H) 0.0 - 0.9 %    Lymphocytes % 18.1 13.0 - 44.0 %    Monocytes % 10.8 2.0 - 10.0 %    Eosinophils % 0.0 0.0 - 6.0 %    Basophils % 0.2 0.0 - 2.0 %    Neutrophils Absolute 4.07 1.60 - 5.50 x10*3/uL    Immature Granulocytes Absolute, Automated 0.09 0.00 - 0.50 x10*3/uL    Lymphocytes Absolute 1.06 0.80 - 3.00 x10*3/uL    Monocytes Absolute 0.63 0.05 - 0.80 x10*3/uL    Eosinophils Absolute 0.00 0.00 - 0.40 x10*3/uL    Basophils Absolute 0.01 0.00 - 0.10 x10*3/uL   Comprehensive Metabolic " Panel   Result Value Ref Range    Glucose 125 (H) 74 - 99 mg/dL    Sodium 143 136 - 145 mmol/L    Potassium 3.9 3.5 - 5.3 mmol/L    Chloride 97 (L) 98 - 107 mmol/L    Bicarbonate 39 (H) 21 - 32 mmol/L    Anion Gap 11 10 - 20 mmol/L    Urea Nitrogen 26 (H) 6 - 23 mg/dL    Creatinine 1.02 0.50 - 1.05 mg/dL    eGFR 54 (L) >60 mL/min/1.73m*2    Calcium 8.9 8.6 - 10.3 mg/dL    Albumin 3.6 3.4 - 5.0 g/dL    Alkaline Phosphatase 58 33 - 136 U/L    Total Protein 5.9 (L) 6.4 - 8.2 g/dL    AST 14 9 - 39 U/L    Bilirubin, Total 0.7 0.0 - 1.2 mg/dL    ALT 16 7 - 45 U/L     *Note: Due to a large number of results and/or encounters for the requested time period, some results have not been displayed. A complete set of results can be found in Results Review.     Scheduled medications  atorvastatin, 40 mg, oral, Nightly  azithromycin, 500 mg, oral, q24h GARDENIA  aztreonam, 1 g, intravenous, q12h  carvedilol, 3.125 mg, oral, BID  docusate sodium, 100 mg, oral, BID  DULoxetine, 20 mg, oral, Nightly  gabapentin, 100 mg, oral, Nightly  ipratropium-albuteroL, 3 mL, nebulization, TID  iron sucrose, 200 mg, intravenous, Every other day  levothyroxine, 25 mcg, oral, Daily  nystatin, 1 Application, Topical, BID  pantoprazole, 40 mg, oral, Daily before breakfast   Or  pantoprazole, 40 mg, intravenous, Daily before breakfast  predniSONE, 20 mg, oral, BID  [Held by provider] rivaroxaban, 20 mg, oral, Daily with evening meal  torsemide, 20 mg, oral, Daily      Continuous medications  oxygen, 2 L/min, Last Rate: 3 L/min (10/17/24 0814)      PRN medications  PRN medications: acetaminophen **OR** acetaminophen **OR** acetaminophen, ALPRAZolam, bisacodyl, diclofenac sodium, ipratropium-albuteroL, nitroglycerin, oxygen, oxymetazoline           Assessment/Plan   Acute on chronic hypoxic respiratory failure  -2LO2 baseline     Acute on chronic diastolic heart failure  -8/24 echo showed EF of 60 to 65% with normal RVSP moderately dilated right atrium and  mild to moderate mitral valve regurgitation mild tricuspid regurgitation and moderately elevated RVSP with moderate pulmonary hypertension  -Pleural effusions present   ? Thoracentesis     History of CAD with prior stenting to the RCA     Type II MI secondary to hypoxia     Possible pneumonia/pneumonitis     Anemia  -Iron deficiency, and worked up by hematology, appreciate  Suspect this is also contributing to her overall presentation  -Hemoglobin remained stable on IV iron  ?  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  Her history of CVAs     Non compliance with her O2  -Frequently takes this off and argues with her  and son about its necessity becomes quickly short of breath and hypoxic  This is becoming increasingly difficult home situation for her      Anxiety     Chronic lower back pain     Palliative care     CODE STATUS DNR CCA/DNI  I (still) do not believe the patient to be a capable decision maker in the least., she has had progressive decline physically as well as in her mental faculties, she has brain atrophy and prior CVAs, I suspect that she is has underlying Alzheimer's/vascular dementia which has progressively worsened now coexisting with all of her chronic health conditions.     Her  Jesús Douglass) is her primary POA these documents are located in [facility].    10/18/2024  Continues in the disease modifying model of care CODE STATUS as above   Overall condition slightly Improved  Remains on antibiotics  Appreciate clinical dietitian's input  Recent appetite has been poor, but prior to this was eating relatively  well at home per the son and the  per my conversation yesterday.  Will check a prealbumin to trend  Ensure compact twice daily recommended as per clinical dietitian    As per care transitions note  would prefer to wait on meeting with hospice of the [facility]/palliative until she is discharged home.    Seems to be tolerating Cymbalta did receive a dose of Xanax last night.  These additions were discussed with her primary provider Milana Hernadez CNP with our [facility] team whom I spoke with yesterday afternoon, these new medications should follow her home and can be adjusted by my colleague.      Sagar Camara, APRN-CNP

## 2024-10-18 NOTE — DOCUMENTATION CLARIFICATION NOTE
"    PATIENT:               RUDOLPH DÍAZ  ACCT #:                  [id number]  MRN:                       [id number]  :                       1940  ADMIT DATE:       10/15/2024 8:25 AM  DISCH DATE:  RESPONDING PROVIDER #:        [id number]          PROVIDER RESPONSE TEXT:    Sepsis with renal organ dysfunction of AYSE    CDI QUERY TEXT:    Clarification        Instruction:  Based on your assessment of the patient and the clinical information, please provide the requested documentation by clicking on the appropriate radio button and enter any additional information if prompted.    Question: Is there a diagnosis indicative of a patient meeting SIRS criteria and with organ dysfunction in the setting of PNA infection    When answering this query, please exercise your independent professional judgment. The fact that a question is being asked, does not imply that any particular answer is desired or expected.    The patient's clinical indicators include:  Clinical Information: This patient presented with shortness of breath.  She normally uses oxygen but this morning her pulse ox was below 90.  She was short of breath all night no chest pain no nausea vomiting no fever chills no cold flulike symptoms.    Clinical Indicators:    SIRS criteria:  WBC 3.3  VS: 10/15: , 94, 95 RR: 27, 28, 27, 24, 26, 22, 29, 31, 32    Source of infection: Pneumonia  10/18: Pulm PN: \"Pneumonia  Reviewed PA and lateral chest x-ray with collaborating physician Dr. Rae  Continue IV azithromycin, aztreonam\"    Organ Dysfunction:  PN 10/16-10/18: Anemia thrombocytopenia.  Pulm PN 10/18: Acute kidney injury on chronic kidney disease  Cardiology 10/18: Acute on chronic diastolic heart failure. Type II myocardial infarction\"    Treatment: azithromycin 500 mg iv daily, ceftriaxone 1 gm iv x 1, Lasix iv 20-40 mg, IV venofer 200 mg x 1    Risk Factors: Hx; CVA, HTN, COPD, CKD, Tobacco, CAD  Options provided:  -- Sepsis with renal organ dysfunction " of AYSE  -- Sepsis with cardiac organ dysfunction of MI Type II/ Acute diastolic CHF  -- Sepsis with multi-system organ dysfunction of AYSE/ Type II MI/ Thrombocytopenia  -- Sepsis with hematological organ dysfunction of Thrombocytopenia  -- Other - I will add my own diagnosis  -- Refer to Clinical Documentation Reviewer    Query created by: Raymon Juarez on 10/18/2024 2:19 PM      Electronically signed by:  DHARMESH GALVAN MD 10/18/2024 2:23 PM

## 2024-10-18 NOTE — CARE PLAN
The patient's goals for the shift include  breathing easier    The clinical goals for the shift include keep npatient's oxygen level over 92%

## 2024-10-19 ENCOUNTER — APPOINTMENT (OUTPATIENT)
Dept: RADIOLOGY | Facility: HOSPITAL | Age: 84
DRG: 871 | End: 2024-10-19
Payer: MEDICARE

## 2024-10-19 PROBLEM — J18.9 PNEUMONIA OF RIGHT LOWER LOBE DUE TO INFECTIOUS ORGANISM: Status: ACTIVE | Noted: 2024-10-19

## 2024-10-19 PROBLEM — M79.605 LEFT LEG PAIN: Status: ACTIVE | Noted: 2023-09-02

## 2024-10-19 LAB
ALBUMIN SERPL BCP-MCNC: 3.5 G/DL (ref 3.4–5)
ALP SERPL-CCNC: 60 U/L (ref 33–136)
ALT SERPL W P-5'-P-CCNC: 14 U/L (ref 7–45)
ANION GAP SERPL CALCULATED.3IONS-SCNC: 9 MMOL/L (ref 10–20)
AST SERPL W P-5'-P-CCNC: 14 U/L (ref 9–39)
BACTERIA BLD CULT: NORMAL
BACTERIA BLD CULT: NORMAL
BASOPHILS # BLD AUTO: 0.01 X10*3/UL (ref 0–0.1)
BASOPHILS NFR BLD AUTO: 0.2 %
BILIRUB SERPL-MCNC: 0.7 MG/DL (ref 0–1.2)
BUN SERPL-MCNC: 24 MG/DL (ref 6–23)
CALCIUM SERPL-MCNC: 8.6 MG/DL (ref 8.6–10.3)
CHLORIDE SERPL-SCNC: 100 MMOL/L (ref 98–107)
CO2 SERPL-SCNC: 36 MMOL/L (ref 21–32)
CREAT SERPL-MCNC: 1.06 MG/DL (ref 0.5–1.05)
EGFRCR SERPLBLD CKD-EPI 2021: 52 ML/MIN/1.73M*2
EOSINOPHIL # BLD AUTO: 0 X10*3/UL (ref 0–0.4)
EOSINOPHIL NFR BLD AUTO: 0 %
ERYTHROCYTE [DISTWIDTH] IN BLOOD BY AUTOMATED COUNT: 18 % (ref 11.5–14.5)
GLUCOSE BLD MANUAL STRIP-MCNC: 159 MG/DL (ref 74–99)
GLUCOSE SERPL-MCNC: 152 MG/DL (ref 74–99)
HCT VFR BLD AUTO: 27.4 % (ref 36–46)
HGB BLD-MCNC: 7.9 G/DL (ref 12–16)
IMM GRANULOCYTES # BLD AUTO: 0.17 X10*3/UL (ref 0–0.5)
IMM GRANULOCYTES NFR BLD AUTO: 3 % (ref 0–0.9)
LYMPHOCYTES # BLD AUTO: 0.62 X10*3/UL (ref 0.8–3)
LYMPHOCYTES NFR BLD AUTO: 10.9 %
MCH RBC QN AUTO: 24.8 PG (ref 26–34)
MCHC RBC AUTO-ENTMCNC: 28.8 G/DL (ref 32–36)
MCV RBC AUTO: 86 FL (ref 80–100)
MONOCYTES # BLD AUTO: 0.15 X10*3/UL (ref 0.05–0.8)
MONOCYTES NFR BLD AUTO: 2.6 %
NEUTROPHILS # BLD AUTO: 4.75 X10*3/UL (ref 1.6–5.5)
NEUTROPHILS NFR BLD AUTO: 83.3 %
NRBC BLD-RTO: 0 /100 WBCS (ref 0–0)
PLATELET # BLD AUTO: 176 X10*3/UL (ref 150–450)
POTASSIUM SERPL-SCNC: 4.4 MMOL/L (ref 3.5–5.3)
PROT SERPL-MCNC: 5.8 G/DL (ref 6.4–8.2)
RBC # BLD AUTO: 3.18 X10*6/UL (ref 4–5.2)
SODIUM SERPL-SCNC: 141 MMOL/L (ref 136–145)
WBC # BLD AUTO: 5.7 X10*3/UL (ref 4.4–11.3)

## 2024-10-19 PROCEDURE — 2500000005 HC RX 250 GENERAL PHARMACY W/O HCPCS: Performed by: INTERNAL MEDICINE

## 2024-10-19 PROCEDURE — 2500000004 HC RX 250 GENERAL PHARMACY W/ HCPCS (ALT 636 FOR OP/ED): Performed by: INTERNAL MEDICINE

## 2024-10-19 PROCEDURE — 99233 SBSQ HOSP IP/OBS HIGH 50: CPT | Performed by: STUDENT IN AN ORGANIZED HEALTH CARE EDUCATION/TRAINING PROGRAM

## 2024-10-19 PROCEDURE — 99232 SBSQ HOSP IP/OBS MODERATE 35: CPT | Performed by: INTERNAL MEDICINE

## 2024-10-19 PROCEDURE — 93970 EXTREMITY STUDY: CPT

## 2024-10-19 PROCEDURE — 82947 ASSAY GLUCOSE BLOOD QUANT: CPT

## 2024-10-19 PROCEDURE — 2500000001 HC RX 250 WO HCPCS SELF ADMINISTERED DRUGS (ALT 637 FOR MEDICARE OP): Performed by: INTERNAL MEDICINE

## 2024-10-19 PROCEDURE — 2500000004 HC RX 250 GENERAL PHARMACY W/ HCPCS (ALT 636 FOR OP/ED): Mod: JZ | Performed by: STUDENT IN AN ORGANIZED HEALTH CARE EDUCATION/TRAINING PROGRAM

## 2024-10-19 PROCEDURE — 2060000001 HC INTERMEDIATE ICU ROOM DAILY

## 2024-10-19 PROCEDURE — 94640 AIRWAY INHALATION TREATMENT: CPT

## 2024-10-19 PROCEDURE — 85025 COMPLETE CBC W/AUTO DIFF WBC: CPT | Performed by: INTERNAL MEDICINE

## 2024-10-19 PROCEDURE — 2500000001 HC RX 250 WO HCPCS SELF ADMINISTERED DRUGS (ALT 637 FOR MEDICARE OP)

## 2024-10-19 PROCEDURE — 2500000001 HC RX 250 WO HCPCS SELF ADMINISTERED DRUGS (ALT 637 FOR MEDICARE OP): Performed by: NURSE PRACTITIONER

## 2024-10-19 PROCEDURE — 2500000002 HC RX 250 W HCPCS SELF ADMINISTERED DRUGS (ALT 637 FOR MEDICARE OP, ALT 636 FOR OP/ED): Performed by: INTERNAL MEDICINE

## 2024-10-19 PROCEDURE — 80053 COMPREHEN METABOLIC PANEL: CPT | Performed by: INTERNAL MEDICINE

## 2024-10-19 RX ORDER — ALBUTEROL SULFATE 0.83 MG/ML
2.5 SOLUTION RESPIRATORY (INHALATION)
Status: DISCONTINUED | OUTPATIENT
Start: 2024-10-19 | End: 2024-10-22 | Stop reason: HOSPADM

## 2024-10-19 ASSESSMENT — PAIN SCALES - GENERAL
PAINLEVEL_OUTOF10: 0 - NO PAIN

## 2024-10-19 ASSESSMENT — COGNITIVE AND FUNCTIONAL STATUS - GENERAL
MOBILITY SCORE: 24
DAILY ACTIVITIY SCORE: 24
MOBILITY SCORE: 24
DAILY ACTIVITIY SCORE: 24

## 2024-10-19 NOTE — PROGRESS NOTES
"Anitha Welch is a 84 y.o. female on day 4 of admission presenting with Acute respiratory failure.    Subjective   Still has rattling cough.  Complaining of aching pain in LLE and foot.   Anticoagulation has been held   Had epistaxis this morning - no bleeding on my visit.        Objective     GENERAL APPEARANCE: Healthy appearing, in no acute distress  SKIN: no rashes  HEAD, EYES, EARS, NOSE, THROAT: Without sinus tenderness. Conjunctiva and sclera clear. No rhinitis Oropharynx unremarkable.  NECK: No lymphadenopathy  CHEST: AP Diameter normal. No retractions. Auscultation reveals good air exchange without Inspiratory crackles on right.  HEART: Normal rhythm, without murmur  ABDOMEN: Not distended. Normal bowel sounds. Soft and non-tender to palpation, without hepatomegaly or splenomegaly. No masses  EXTREMITIES: Without cyanosis. No clubbing. Trace edema bilateral lower extremities. RLE tender to palpation.   LYMPHATIC: No lymphadenopathy  MUSCULOSKELETAL: Unremarkable   NEUROLOGIC: Grossly intact      Last Recorded Vitals  Blood pressure 131/65, pulse 85, temperature 36.6 °C (97.9 °F), temperature source Temporal, resp. rate 21, height 1.626 m (5' 4\"), weight 65.8 kg (145 lb 1 oz), SpO2 97%.  Intake/Output last 3 Shifts:  I/O last 3 completed shifts:  In: 250 (3.8 mL/kg) [IV Piggyback:250]  Out: 1025 (15.6 mL/kg) [Urine:1025 (0.4 mL/kg/hr)]  Weight: 65.8 kg     Relevant Results  Results for orders placed or performed during the hospital encounter of 10/15/24 (from the past 24 hours)   CBC and Auto Differential   Result Value Ref Range    WBC 5.7 4.4 - 11.3 x10*3/uL    nRBC 0.0 0.0 - 0.0 /100 WBCs    RBC 3.18 (L) 4.00 - 5.20 x10*6/uL    Hemoglobin 7.9 (L) 12.0 - 16.0 g/dL    Hematocrit 27.4 (L) 36.0 - 46.0 %    MCV 86 80 - 100 fL    MCH 24.8 (L) 26.0 - 34.0 pg    MCHC 28.8 (L) 32.0 - 36.0 g/dL    RDW 18.0 (H) 11.5 - 14.5 %    Platelets 176 150 - 450 x10*3/uL    Neutrophils % 83.3 40.0 - 80.0 %    Immature " Granulocytes %, Automated 3.0 (H) 0.0 - 0.9 %    Lymphocytes % 10.9 13.0 - 44.0 %    Monocytes % 2.6 2.0 - 10.0 %    Eosinophils % 0.0 0.0 - 6.0 %    Basophils % 0.2 0.0 - 2.0 %    Neutrophils Absolute 4.75 1.60 - 5.50 x10*3/uL    Immature Granulocytes Absolute, Automated 0.17 0.00 - 0.50 x10*3/uL    Lymphocytes Absolute 0.62 (L) 0.80 - 3.00 x10*3/uL    Monocytes Absolute 0.15 0.05 - 0.80 x10*3/uL    Eosinophils Absolute 0.00 0.00 - 0.40 x10*3/uL    Basophils Absolute 0.01 0.00 - 0.10 x10*3/uL   Comprehensive Metabolic Panel   Result Value Ref Range    Glucose 152 (H) 74 - 99 mg/dL    Sodium 141 136 - 145 mmol/L    Potassium 4.4 3.5 - 5.3 mmol/L    Chloride 100 98 - 107 mmol/L    Bicarbonate 36 (H) 21 - 32 mmol/L    Anion Gap 9 (L) 10 - 20 mmol/L    Urea Nitrogen 24 (H) 6 - 23 mg/dL    Creatinine 1.06 (H) 0.50 - 1.05 mg/dL    eGFR 52 (L) >60 mL/min/1.73m*2    Calcium 8.6 8.6 - 10.3 mg/dL    Albumin 3.5 3.4 - 5.0 g/dL    Alkaline Phosphatase 60 33 - 136 U/L    Total Protein 5.8 (L) 6.4 - 8.2 g/dL    AST 14 9 - 39 U/L    Bilirubin, Total 0.7 0.0 - 1.2 mg/dL    ALT 14 7 - 45 U/L   POCT GLUCOSE   Result Value Ref Range    POCT Glucose 159 (H) 74 - 99 mg/dL     *Note: Due to a large number of results and/or encounters for the requested time period, some results have not been displayed. A complete set of results can be found in Results Review.                          Assessment/Plan   Assessment & Plan  Acute respiratory failure  Secondary to COPD and pneumonia  On baseline O2  Pneumonia of right lower lobe due to infectious organism  Has productive cough with crackles on right  CT with right sided infiltrate  Continue aztreonam for 5 days  Continue azithromycin for 3 days  Flutter device  COPD exacerbation (Multi)  Wean steroid to prednisone 40 daily for total of 5 days of therapy  Continue ICS/LABA  Left leg pain  Continue gapapentin  LE ultrasound to eval for DVT         I spent 45 minutes in the professional and  overall care of this patient.      Chacha Ojeda MD PhD

## 2024-10-19 NOTE — PROGRESS NOTES
Anitha Welch is a 84 y.o. female on day 4 of admission presenting with Acute respiratory failure.      Subjective   Apparently she started having some nosebleeding from her right nostril about an hour ago seems to have stopped by now.  She has not received any blood thinners over the last 3 days  She states breathing is about the same no chest pain  Reviewed notes from pulmonology and cardiology and palliative       Objective     Last Recorded Vitals  /73 (BP Location: Right arm, Patient Position: Lying)   Pulse 90   Temp 36.4 °C (97.5 °F) (Temporal)   Resp 18   Wt 65.8 kg (145 lb 1 oz)   SpO2 93%   Intake/Output last 3 Shifts:    Intake/Output Summary (Last 24 hours) at 10/19/2024 0821  Last data filed at 10/19/2024 0058  Gross per 24 hour   Intake 50 ml   Output 625 ml   Net -575 ml       Physical Exam  Alert oriented at baseline no distress holding a bloody towel but there is no active nosebleed currently  Saturating 96% on Ventimask  Chest same crackles same as before no significant wheezes today  Heart regular  Abdomen soft nontender  Extremities no edema  Neurologic examination gross motor sensor nonfocal  Relevant Results  Reviewed a.m. labs  Assessment/Plan     Principal Problem:    Acute respiratory failure  Active Problems:    Dyspnea, unspecified type      October 19      Epistaxis, this is new,  will need to monitor closely; she has been off exert relative for at least 3 days by now; advised nurse and RT to avoid nasal cannula oxygen for the time being and use of Ventimask instead ; if recurs may need ENT eval although there is nobody on-call right now  acute on chronic respiratory failure.  Oral diuretics currently.  IV antibiotics however procalcitonin level was low will defer to pulmonology if we can stop those.  Oral steroids.  Will defer to pulmonology decision about antibiotics with her negative procalcitonin  Underlying COPD scarring  Anemia thrombocytopenia.  Her ferritin level was  not low.  I did hold Xarelto initially but there is no evidence of bleeding so we will resume today.  Will continue to monitor.  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

## 2024-10-19 NOTE — ASSESSMENT & PLAN NOTE
Has productive cough with crackles on right  CT with right sided infiltrate  Continue aztreonam for 5 days  Continue azithromycin for 3 days  Flutter device

## 2024-10-20 ENCOUNTER — APPOINTMENT (OUTPATIENT)
Dept: RADIOLOGY | Facility: HOSPITAL | Age: 84
DRG: 871 | End: 2024-10-20
Payer: MEDICARE

## 2024-10-20 VITALS
BODY MASS INDEX: 24.8 KG/M2 | OXYGEN SATURATION: 92 % | HEART RATE: 99 BPM | TEMPERATURE: 97.3 F | WEIGHT: 145.28 LBS | RESPIRATION RATE: 21 BRPM | SYSTOLIC BLOOD PRESSURE: 136 MMHG | DIASTOLIC BLOOD PRESSURE: 65 MMHG | HEIGHT: 64 IN

## 2024-10-20 LAB
ALBUMIN SERPL BCP-MCNC: 3.5 G/DL (ref 3.4–5)
ALP SERPL-CCNC: 53 U/L (ref 33–136)
ALT SERPL W P-5'-P-CCNC: 15 U/L (ref 7–45)
ANION GAP SERPL CALCULATED.3IONS-SCNC: 10 MMOL/L (ref 10–20)
AST SERPL W P-5'-P-CCNC: 14 U/L (ref 9–39)
BASOPHILS # BLD AUTO: 0.01 X10*3/UL (ref 0–0.1)
BASOPHILS NFR BLD AUTO: 0.1 %
BILIRUB SERPL-MCNC: 0.8 MG/DL (ref 0–1.2)
BUN SERPL-MCNC: 27 MG/DL (ref 6–23)
CALCIUM SERPL-MCNC: 8.5 MG/DL (ref 8.6–10.3)
CHLORIDE SERPL-SCNC: 97 MMOL/L (ref 98–107)
CO2 SERPL-SCNC: 39 MMOL/L (ref 21–32)
CREAT SERPL-MCNC: 1.07 MG/DL (ref 0.5–1.05)
EGFRCR SERPLBLD CKD-EPI 2021: 51 ML/MIN/1.73M*2
EOSINOPHIL # BLD AUTO: 0 X10*3/UL (ref 0–0.4)
EOSINOPHIL NFR BLD AUTO: 0 %
ERYTHROCYTE [DISTWIDTH] IN BLOOD BY AUTOMATED COUNT: 18.2 % (ref 11.5–14.5)
GLUCOSE SERPL-MCNC: 149 MG/DL (ref 74–99)
HCT VFR BLD AUTO: 26.8 % (ref 36–46)
HGB BLD-MCNC: 7.6 G/DL (ref 12–16)
IMM GRANULOCYTES # BLD AUTO: 0.28 X10*3/UL (ref 0–0.5)
IMM GRANULOCYTES NFR BLD AUTO: 4 % (ref 0–0.9)
LYMPHOCYTES # BLD AUTO: 0.74 X10*3/UL (ref 0.8–3)
LYMPHOCYTES NFR BLD AUTO: 10.7 %
MCH RBC QN AUTO: 24.8 PG (ref 26–34)
MCHC RBC AUTO-ENTMCNC: 28.4 G/DL (ref 32–36)
MCV RBC AUTO: 88 FL (ref 80–100)
MONOCYTES # BLD AUTO: 0.35 X10*3/UL (ref 0.05–0.8)
MONOCYTES NFR BLD AUTO: 5.1 %
NEUTROPHILS # BLD AUTO: 5.55 X10*3/UL (ref 1.6–5.5)
NEUTROPHILS NFR BLD AUTO: 80.1 %
NRBC BLD-RTO: 0.3 /100 WBCS (ref 0–0)
PLATELET # BLD AUTO: 176 X10*3/UL (ref 150–450)
POTASSIUM SERPL-SCNC: 4.4 MMOL/L (ref 3.5–5.3)
PROT SERPL-MCNC: 5.6 G/DL (ref 6.4–8.2)
RBC # BLD AUTO: 3.06 X10*6/UL (ref 4–5.2)
SODIUM SERPL-SCNC: 142 MMOL/L (ref 136–145)
WBC # BLD AUTO: 6.9 X10*3/UL (ref 4.4–11.3)

## 2024-10-20 PROCEDURE — 2500000004 HC RX 250 GENERAL PHARMACY W/ HCPCS (ALT 636 FOR OP/ED): Performed by: STUDENT IN AN ORGANIZED HEALTH CARE EDUCATION/TRAINING PROGRAM

## 2024-10-20 PROCEDURE — 2500000004 HC RX 250 GENERAL PHARMACY W/ HCPCS (ALT 636 FOR OP/ED): Performed by: INTERNAL MEDICINE

## 2024-10-20 PROCEDURE — 94640 AIRWAY INHALATION TREATMENT: CPT

## 2024-10-20 PROCEDURE — 94664 DEMO&/EVAL PT USE INHALER: CPT

## 2024-10-20 PROCEDURE — 2500000002 HC RX 250 W HCPCS SELF ADMINISTERED DRUGS (ALT 637 FOR MEDICARE OP, ALT 636 FOR OP/ED): Performed by: INTERNAL MEDICINE

## 2024-10-20 PROCEDURE — 94762 N-INVAS EAR/PLS OXIMTRY CONT: CPT

## 2024-10-20 PROCEDURE — 2500000001 HC RX 250 WO HCPCS SELF ADMINISTERED DRUGS (ALT 637 FOR MEDICARE OP)

## 2024-10-20 PROCEDURE — 9420000001 HC RT PATIENT EDUCATION 5 MIN

## 2024-10-20 PROCEDURE — 99232 SBSQ HOSP IP/OBS MODERATE 35: CPT | Performed by: INTERNAL MEDICINE

## 2024-10-20 PROCEDURE — 71046 X-RAY EXAM CHEST 2 VIEWS: CPT | Performed by: RADIOLOGY

## 2024-10-20 PROCEDURE — 2500000001 HC RX 250 WO HCPCS SELF ADMINISTERED DRUGS (ALT 637 FOR MEDICARE OP): Performed by: STUDENT IN AN ORGANIZED HEALTH CARE EDUCATION/TRAINING PROGRAM

## 2024-10-20 PROCEDURE — 2060000001 HC INTERMEDIATE ICU ROOM DAILY

## 2024-10-20 PROCEDURE — 80053 COMPREHEN METABOLIC PANEL: CPT | Performed by: INTERNAL MEDICINE

## 2024-10-20 PROCEDURE — 2500000001 HC RX 250 WO HCPCS SELF ADMINISTERED DRUGS (ALT 637 FOR MEDICARE OP): Performed by: NURSE PRACTITIONER

## 2024-10-20 PROCEDURE — 2500000005 HC RX 250 GENERAL PHARMACY W/O HCPCS: Performed by: INTERNAL MEDICINE

## 2024-10-20 PROCEDURE — 94760 N-INVAS EAR/PLS OXIMETRY 1: CPT

## 2024-10-20 PROCEDURE — 85025 COMPLETE CBC W/AUTO DIFF WBC: CPT | Performed by: INTERNAL MEDICINE

## 2024-10-20 PROCEDURE — 2500000001 HC RX 250 WO HCPCS SELF ADMINISTERED DRUGS (ALT 637 FOR MEDICARE OP): Performed by: INTERNAL MEDICINE

## 2024-10-20 PROCEDURE — 94660 CPAP INITIATION&MGMT: CPT

## 2024-10-20 PROCEDURE — 94667 MNPJ CHEST WALL 1ST: CPT

## 2024-10-20 PROCEDURE — 71046 X-RAY EXAM CHEST 2 VIEWS: CPT

## 2024-10-20 ASSESSMENT — COGNITIVE AND FUNCTIONAL STATUS - GENERAL
MOBILITY SCORE: 24
DAILY ACTIVITIY SCORE: 24
DAILY ACTIVITIY SCORE: 24
MOBILITY SCORE: 24

## 2024-10-20 ASSESSMENT — PAIN SCALES - GENERAL
PAINLEVEL_OUTOF10: 0 - NO PAIN

## 2024-10-20 ASSESSMENT — PAIN SCALES - PAIN ASSESSMENT IN ADVANCED DEMENTIA (PAINAD)
BREATHING: NORMAL
NEGVOCALIZATION: OCCASIONAL MOAN/GROAN, LOW SPEECH, NEGATIVE/DISAPPROVING QUALITY

## 2024-10-20 ASSESSMENT — PAIN - FUNCTIONAL ASSESSMENT: PAIN_FUNCTIONAL_ASSESSMENT: 0-10

## 2024-10-20 NOTE — CARE PLAN
The patient's goals for the shift include      The clinical goals for the shift include Monitor SPO2

## 2024-10-20 NOTE — PROGRESS NOTES
Anitha Welch is a 84 y.o. female on day 5 of admission presenting with Acute respiratory failure.      Subjective   I saw the patient in room 422 saturating mid 90s on current oxygen, struggling with her breakfast.  She states breathing is better keeps coughing  Reviewed pulmonology notes from yesterday given recommendations about ultrasound lower extremities antibiotics steroids but no orders were put in       Objective     Last Recorded Vitals  /85 (BP Location: Left arm, Patient Position: Lying)   Pulse 101   Temp 36.8 °C (98.2 °F) (Temporal)   Resp 22   Wt 65.9 kg (145 lb 4.5 oz)   SpO2 96%   Intake/Output last 3 Shifts:    Intake/Output Summary (Last 24 hours) at 10/20/2024 0941  Last data filed at 10/20/2024 0931  Gross per 24 hour   Intake 50 ml   Output 1975 ml   Net -1925 ml       Physical Exam  Alert oriented baseline follows commands no distress  Atraumatic EOMI PERRLA  Chest bronchial breath sounds both bases no wheezing  Heart regular  Abdomen soft  Extremities no significant edema  Neurologic exam gross nonfocal  Relevant Results  Reviewed  Assessment/Plan     Principal Problem:    Acute respiratory failure  Active Problems:    COPD exacerbation (Multi)    Left leg pain    Dyspnea, unspecified type    Pneumonia of right lower lobe due to infectious organism      October 20    Epistaxis has resolved she has not had any bleeding for over 24 hours.  Acute on chronic respiratory failure auscultatory finding may be worse.  Oxygenation is about the same clinical presentation is about the same.  Repeat x-ray tomorrow.  Being treated for pneumonia with aztreonam and azithromycin on oral steroids currently as well as oral diuretics.  On chronic Xarelto for DVT from a year ago.  Xarelto was on hold for her anemia however despite very shortly minimal epistaxis there has been no active bleeding.  Will attempt to restart Xarelto  Prior stroke, dementia  Chronic kidney disease estimated creatinine  clearance is about 40 this is baseline  DNR CCA DNI.  Her  met with palliative care and they eventually will be followed by them as an outpatient.  Plan is for discharge to home if able.  I am afraid however that the patient has not made significant clinical progress still continues to desaturate with minimal exertion but appears fairly comfortable while in bed  Will order ultrasound lower extremities and flutter valve on behalf of pulmonology as I believe this was the intention from their note from yesterday        Jayro Lea MD

## 2024-10-20 NOTE — CARE PLAN
The patient's goals for the shift include  pt wants to stay out of bed    The clinical goals for the shift include wean of 02

## 2024-10-21 PROBLEM — J96.21 ACUTE ON CHRONIC RESPIRATORY FAILURE WITH HYPOXIA (MULTI): Status: ACTIVE | Noted: 2024-09-14

## 2024-10-21 LAB
ALBUMIN SERPL BCP-MCNC: 3.5 G/DL (ref 3.4–5)
ALP SERPL-CCNC: 53 U/L (ref 33–136)
ALT SERPL W P-5'-P-CCNC: 15 U/L (ref 7–45)
ANION GAP SERPL CALCULATED.3IONS-SCNC: 8 MMOL/L (ref 10–20)
AST SERPL W P-5'-P-CCNC: 13 U/L (ref 9–39)
BASOPHILS # BLD AUTO: 0 X10*3/UL (ref 0–0.1)
BASOPHILS NFR BLD AUTO: 0 %
BILIRUB SERPL-MCNC: 0.8 MG/DL (ref 0–1.2)
BUN SERPL-MCNC: 27 MG/DL (ref 6–23)
CALCIUM SERPL-MCNC: 8.6 MG/DL (ref 8.6–10.3)
CHLORIDE SERPL-SCNC: 97 MMOL/L (ref 98–107)
CO2 SERPL-SCNC: 42 MMOL/L (ref 21–32)
CREAT SERPL-MCNC: 1.05 MG/DL (ref 0.5–1.05)
EGFRCR SERPLBLD CKD-EPI 2021: 52 ML/MIN/1.73M*2
EOSINOPHIL # BLD AUTO: 0 X10*3/UL (ref 0–0.4)
EOSINOPHIL NFR BLD AUTO: 0 %
ERYTHROCYTE [DISTWIDTH] IN BLOOD BY AUTOMATED COUNT: 19.3 % (ref 11.5–14.5)
GLUCOSE SERPL-MCNC: 153 MG/DL (ref 74–99)
HCT VFR BLD AUTO: 27 % (ref 36–46)
HGB BLD-MCNC: 7.8 G/DL (ref 12–16)
IMM GRANULOCYTES # BLD AUTO: 0.25 X10*3/UL (ref 0–0.5)
IMM GRANULOCYTES NFR BLD AUTO: 4.3 % (ref 0–0.9)
LYMPHOCYTES # BLD AUTO: 0.64 X10*3/UL (ref 0.8–3)
LYMPHOCYTES NFR BLD AUTO: 11 %
MCH RBC QN AUTO: 25.6 PG (ref 26–34)
MCHC RBC AUTO-ENTMCNC: 28.9 G/DL (ref 32–36)
MCV RBC AUTO: 89 FL (ref 80–100)
MONOCYTES # BLD AUTO: 0.23 X10*3/UL (ref 0.05–0.8)
MONOCYTES NFR BLD AUTO: 4 %
NEUTROPHILS # BLD AUTO: 4.68 X10*3/UL (ref 1.6–5.5)
NEUTROPHILS NFR BLD AUTO: 80.7 %
NRBC BLD-RTO: 0 /100 WBCS (ref 0–0)
PLATELET # BLD AUTO: 155 X10*3/UL (ref 150–450)
POTASSIUM SERPL-SCNC: 4.6 MMOL/L (ref 3.5–5.3)
PROT SERPL-MCNC: 5.6 G/DL (ref 6.4–8.2)
RBC # BLD AUTO: 3.05 X10*6/UL (ref 4–5.2)
SODIUM SERPL-SCNC: 142 MMOL/L (ref 136–145)
WBC # BLD AUTO: 5.8 X10*3/UL (ref 4.4–11.3)

## 2024-10-21 PROCEDURE — 9420000001 HC RT PATIENT EDUCATION 5 MIN

## 2024-10-21 PROCEDURE — 94640 AIRWAY INHALATION TREATMENT: CPT

## 2024-10-21 PROCEDURE — 92526 ORAL FUNCTION THERAPY: CPT | Mod: GN

## 2024-10-21 PROCEDURE — 2500000001 HC RX 250 WO HCPCS SELF ADMINISTERED DRUGS (ALT 637 FOR MEDICARE OP): Performed by: STUDENT IN AN ORGANIZED HEALTH CARE EDUCATION/TRAINING PROGRAM

## 2024-10-21 PROCEDURE — 80053 COMPREHEN METABOLIC PANEL: CPT | Performed by: INTERNAL MEDICINE

## 2024-10-21 PROCEDURE — 2500000002 HC RX 250 W HCPCS SELF ADMINISTERED DRUGS (ALT 637 FOR MEDICARE OP, ALT 636 FOR OP/ED): Performed by: INTERNAL MEDICINE

## 2024-10-21 PROCEDURE — 2500000005 HC RX 250 GENERAL PHARMACY W/O HCPCS: Performed by: INTERNAL MEDICINE

## 2024-10-21 PROCEDURE — 2500000004 HC RX 250 GENERAL PHARMACY W/ HCPCS (ALT 636 FOR OP/ED): Performed by: STUDENT IN AN ORGANIZED HEALTH CARE EDUCATION/TRAINING PROGRAM

## 2024-10-21 PROCEDURE — 2500000001 HC RX 250 WO HCPCS SELF ADMINISTERED DRUGS (ALT 637 FOR MEDICARE OP)

## 2024-10-21 PROCEDURE — 99232 SBSQ HOSP IP/OBS MODERATE 35: CPT | Performed by: NURSE PRACTITIONER

## 2024-10-21 PROCEDURE — 2500000004 HC RX 250 GENERAL PHARMACY W/ HCPCS (ALT 636 FOR OP/ED): Performed by: INTERNAL MEDICINE

## 2024-10-21 PROCEDURE — 85025 COMPLETE CBC W/AUTO DIFF WBC: CPT | Performed by: INTERNAL MEDICINE

## 2024-10-21 PROCEDURE — 94762 N-INVAS EAR/PLS OXIMTRY CONT: CPT

## 2024-10-21 PROCEDURE — 2500000001 HC RX 250 WO HCPCS SELF ADMINISTERED DRUGS (ALT 637 FOR MEDICARE OP): Performed by: INTERNAL MEDICINE

## 2024-10-21 PROCEDURE — 94660 CPAP INITIATION&MGMT: CPT

## 2024-10-21 PROCEDURE — 97530 THERAPEUTIC ACTIVITIES: CPT | Mod: GO

## 2024-10-21 PROCEDURE — 2500000001 HC RX 250 WO HCPCS SELF ADMINISTERED DRUGS (ALT 637 FOR MEDICARE OP): Performed by: NURSE PRACTITIONER

## 2024-10-21 PROCEDURE — 2060000001 HC INTERMEDIATE ICU ROOM DAILY

## 2024-10-21 PROCEDURE — 97535 SELF CARE MNGMENT TRAINING: CPT | Mod: GO

## 2024-10-21 RX ORDER — SENNOSIDES 8.6 MG/1
1 TABLET ORAL ONCE
Status: COMPLETED | OUTPATIENT
Start: 2024-10-21 | End: 2024-10-21

## 2024-10-21 RX ORDER — AZITHROMYCIN 500 MG/1
500 TABLET, FILM COATED ORAL
Status: DISCONTINUED | OUTPATIENT
Start: 2024-10-21 | End: 2024-10-21

## 2024-10-21 ASSESSMENT — COGNITIVE AND FUNCTIONAL STATUS - GENERAL
MOBILITY SCORE: 24
TOILETING: A LITTLE
DRESSING REGULAR UPPER BODY CLOTHING: A LITTLE
DRESSING REGULAR LOWER BODY CLOTHING: A LOT
DAILY ACTIVITIY SCORE: 24
HELP NEEDED FOR BATHING: A LITTLE
DAILY ACTIVITIY SCORE: 19
DAILY ACTIVITIY SCORE: 24
MOBILITY SCORE: 24

## 2024-10-21 ASSESSMENT — PAIN SCALES - GENERAL
PAINLEVEL_OUTOF10: 10 - WORST POSSIBLE PAIN
PAINLEVEL_OUTOF10: 0 - NO PAIN

## 2024-10-21 ASSESSMENT — ACTIVITIES OF DAILY LIVING (ADL)
BATHING_WHERE_ASSESSED: OTHER (COMMENT)
BATHING_LEVEL_OF_ASSISTANCE: CLOSE SUPERVISION
HOME_MANAGEMENT_TIME_ENTRY: 23

## 2024-10-21 ASSESSMENT — PAIN SCALES - WONG BAKER: WONGBAKER_NUMERICALRESPONSE: NO HURT

## 2024-10-21 ASSESSMENT — PAIN - FUNCTIONAL ASSESSMENT
PAIN_FUNCTIONAL_ASSESSMENT: 0-10
PAIN_FUNCTIONAL_ASSESSMENT: 0-10

## 2024-10-21 ASSESSMENT — PAIN DESCRIPTION - LOCATION: LOCATION: LEG

## 2024-10-21 ASSESSMENT — PAIN DESCRIPTION - ORIENTATION: ORIENTATION: RIGHT

## 2024-10-21 NOTE — PROGRESS NOTES
Physical Therapy                 Therapy Communication Note    Patient Name: Anitha Welch  MRN: [id number]  Department: [facility]  Room: [address] [place], [place] [address]  Today's Date: 10/21/2024     Discipline: Physical Therapy    Missed Visit Reason: Missed Visit Reason: Other (Comment) (Hospice at bedside for meeting)    Missed Time: Attempt

## 2024-10-21 NOTE — PROGRESS NOTES
"Anitha Welch is a 84 y.o. female on day 6 of admission seen in follow-up for acute on chronic hypoxic, hypercapnic respiratory failure, acute COPD exacerbation, pneumonia    Subjective   Family at bedside.  On 2 L nasal cannula oxygen; oxygen sats 95%. No respiratory complaints. Denies pain. Afebrile.       Objective     Physical Exam  Vitals and nursing note reviewed.   Constitutional:       Appearance: Normal appearance.   HENT:      Head: Normocephalic and atraumatic.      Nose: Nose normal.      Mouth/Throat:      Mouth: Mucous membranes are moist.   Eyes:      Extraocular Movements: Extraocular movements intact.      Conjunctiva/sclera: Conjunctivae normal.      Pupils: Pupils are equal, round, and reactive to light.   Cardiovascular:      Rate and Rhythm: Normal rate and regular rhythm.      Pulses: Normal pulses.      Heart sounds: Normal heart sounds.   Pulmonary:      Effort: Pulmonary effort is normal.      Comments: Lungs diminished but clear.  Abdominal:      General: Bowel sounds are normal.      Palpations: Abdomen is soft.   Musculoskeletal:         General: Normal range of motion.   Skin:     General: Skin is warm and dry.      Capillary Refill: Capillary refill takes less than 2 seconds.   Neurological:      General: No focal deficit present.      Mental Status: She is alert and oriented to person, place, and time.   Psychiatric:         Mood and Affect: Mood normal.         Behavior: Behavior normal.         Last Recorded Vitals  Blood pressure 124/68, pulse 90, temperature 36.5 °C (97.7 °F), temperature source Temporal, resp. rate 24, height 1.626 m (5' 4\"), weight 67.7 kg (149 lb 3.2 oz), SpO2 95%.  Intake/Output last 3 Shifts:  I/O last 3 completed shifts:  In: - (0 mL/kg)   Out: 2675 (39.5 mL/kg) [Urine:2675 (1.1 mL/kg/hr)]  Weight: 67.7 kg       Intake/Output Summary (Last 24 hours) at 10/21/2024 0948  Last data filed at 10/21/2024 0600  Gross per 24 hour   Intake --   Output 1800 ml   Net " -1800 ml      Scheduled medications:  atorvastatin, 40 mg, oral, Nightly  aztreonam, 1 g, intravenous, q8h  carvedilol, 3.125 mg, oral, BID  docusate sodium, 100 mg, oral, BID  DULoxetine, 20 mg, oral, Nightly  gabapentin, 100 mg, oral, Nightly  ipratropium-albuteroL, 3 mL, nebulization, TID  iron sucrose, 200 mg, intravenous, Every other day  levothyroxine, 25 mcg, oral, Daily  nystatin, 1 Application, Topical, BID  oxygen, 2 L/min, inhalation, q8h  pantoprazole, 40 mg, oral, Daily before breakfast   Or  pantoprazole, 40 mg, intravenous, Daily before breakfast  predniSONE, 20 mg, oral, BID  rivaroxaban, 20 mg, oral, Daily with evening meal  torsemide, 20 mg, oral, Daily     PRN medications: acetaminophen **OR** acetaminophen **OR** acetaminophen, albuterol, ALPRAZolam, bisacodyl, diclofenac sodium, nitroglycerin, oxygen         Relevant Results  Results for orders placed or performed during the hospital encounter of 10/15/24 (from the past 24 hours)   CBC and Auto Differential   Result Value Ref Range    WBC 5.8 4.4 - 11.3 x10*3/uL    nRBC 0.0 0.0 - 0.0 /100 WBCs    RBC 3.05 (L) 4.00 - 5.20 x10*6/uL    Hemoglobin 7.8 (L) 12.0 - 16.0 g/dL    Hematocrit 27.0 (L) 36.0 - 46.0 %    MCV 89 80 - 100 fL    MCH 25.6 (L) 26.0 - 34.0 pg    MCHC 28.9 (L) 32.0 - 36.0 g/dL    RDW 19.3 (H) 11.5 - 14.5 %    Platelets 155 150 - 450 x10*3/uL    Neutrophils % 80.7 40.0 - 80.0 %    Immature Granulocytes %, Automated 4.3 (H) 0.0 - 0.9 %    Lymphocytes % 11.0 13.0 - 44.0 %    Monocytes % 4.0 2.0 - 10.0 %    Eosinophils % 0.0 0.0 - 6.0 %    Basophils % 0.0 0.0 - 2.0 %    Neutrophils Absolute 4.68 1.60 - 5.50 x10*3/uL    Immature Granulocytes Absolute, Automated 0.25 0.00 - 0.50 x10*3/uL    Lymphocytes Absolute 0.64 (L) 0.80 - 3.00 x10*3/uL    Monocytes Absolute 0.23 0.05 - 0.80 x10*3/uL    Eosinophils Absolute 0.00 0.00 - 0.40 x10*3/uL    Basophils Absolute 0.00 0.00 - 0.10 x10*3/uL   Comprehensive Metabolic Panel   Result Value Ref  Range    Glucose 153 (H) 74 - 99 mg/dL    Sodium 142 136 - 145 mmol/L    Potassium 4.6 3.5 - 5.3 mmol/L    Chloride 97 (L) 98 - 107 mmol/L    Bicarbonate 42 (HH) 21 - 32 mmol/L    Anion Gap 8 (L) 10 - 20 mmol/L    Urea Nitrogen 27 (H) 6 - 23 mg/dL    Creatinine 1.05 0.50 - 1.05 mg/dL    eGFR 52 (L) >60 mL/min/1.73m*2    Calcium 8.6 8.6 - 10.3 mg/dL    Albumin 3.5 3.4 - 5.0 g/dL    Alkaline Phosphatase 53 33 - 136 U/L    Total Protein 5.6 (L) 6.4 - 8.2 g/dL    AST 13 9 - 39 U/L    Bilirubin, Total 0.8 0.0 - 1.2 mg/dL    ALT 15 7 - 45 U/L     *Note: Due to a large number of results and/or encounters for the requested time period, some results have not been displayed. A complete set of results can be found in Results Review.      XR chest 2 views  Result Date: 10/20/2024  FINDINGS: The cardiac silhouette is normal in appearance. Bilateral infiltrates  are identified along with bilateral small effusions. IMPRESSION: No change bilateral infiltrates and effusions.    XR chest 2 views  Result Date: 10/17/2024  FINDINGS: Cardiomediastinal silhouette is enlarged with mild cardiomegaly  demonstrated. Aorta is tortuous. There is diffuse vascular calcification of the aortic knob. Persistent hazy airspace infiltrate right mid to lower lung zone with mild improved aeration of the right lower lung zone. There is persistent right basilar effusion. Also, blunting of the left costophrenic angle with small left basilar effusion. No dense airspace consolidation. Multiple surgical staples  demonstrated within the lower neck. Multilevel endplate degenerative changes with multilevel anterior osteophyte formation within the thoracic spine. Lung fields are hyperinflated. Multilevel endplate degenerative changes noted. IMPRESSION: Patchy alveolar airspace consolidation right mid to lower lung zone with findings as seen on prior imaging with improved aeration of the  right lung base.    XR chest 1 view  Result Date: 10/15/2024  FINDINGS:  Multifocal airspace opacities greatest in the right lung both upper and lower lung field, concerning for pneumonia or an atypical presentation of edema have slightly worsened from prior study.   There has also been increase in small bilateral pleural effusions.   No evidence of pneumothorax.  Multifocal airspace opacities greatest in the right lung both upper and lower lung field, concerning for pneumonia or an atypical presentation of edema have slightly worsened from prior study.   There has also been increase in small bilateral pleural effusions.         Assessment/Plan   Acute on chronic hypoxic, hypercapnic respiratory failure  Oxygen at baseline  BiPAP nightly and as needed  Continuous pulse oximetry  Incentive spirometry/pulmonary hygiene     Acute COPD exacerbation  Continue IBD/ICS  Prednisone with taper  FEV1 when optimized     Chronic congestive heart failure  Torsemide  Cardiology follow-up     Pneumonia  Reviewed PA and lateral chest x-ray with collaborating physician Dr. Rae  Continue IV azithromycin, aztreonam   Follow-up cultures    Left leg pain  Duplex ultrasound pending    Anemia  Xarelto on hold  IV Venofer    Acute kidney injury on chronic kidney disease  Creatinine at baseline    Prophylaxis  Xarelto   Protonix    PT/OT/out of bed  Palliative Medicine following-referral made to [facility] for informational meeting  Code Status: DNR CCA DNI    Sabiha Lane, APRN-CNP

## 2024-10-21 NOTE — NURSING NOTE
Vascular access note    Patient with Rt arm single lumen midline, dressing D&I, flushes easily and with positive blood return, clamped and curos cap applied.

## 2024-10-21 NOTE — PROGRESS NOTES
Occupational Therapy    Occupational Therapy Treatment    Name: Anitha Welch  MRN: [id number]  Department: [address]  Room: [address] [place] [address]  Date: 10/21/24  Time Calculation  Start Time: 0938  Stop Time: 1012  Time Calculation (min): 34 min    Assessment:  OT Assessment: Patient will continue to benefit from skilled OT to maximize patient's safety and independence with daily tasks.  Prognosis: Good  Barriers to Discharge: None  Evaluation/Treatment Tolerance: Patient tolerated treatment well  End of Session Communication: Bedside nurse  End of Session Patient Position: Up in chair, Alarm on  Plan:  Treatment Interventions: ADL retraining, Functional transfer training, UE strengthening/ROM, Endurance training, Patient/family training, Compensatory technique education, Cognitive reorientation  OT Frequency: 3 times per week  OT Discharge Recommendations: Low intensity level of continued care, 24 hr supervision due to cognition  Equipment Recommended upon Discharge: Wheeled walker  OT Recommended Transfer Status: Stand by assist  OT - OK to Discharge: Yes    Subjective   Previous Visit Info:  OT Last Visit  OT Received On: 10/21/24  General:  General  Reason for Referral: decline in ADLs; acute respiratory failure  Referred By: Dr. Lea  Past Medical History Relevant to Rehab: Chronic Respiratory Failure, COPD, Anxiety, CHF, GERD, AAA, CVA, DVT, HTN, MI, CKD  Family/Caregiver Present: No  Prior to Session Communication: Bedside nurse  Patient Position Received: Bed, 3 rail up, Alarm on  Preferred Learning Style: verbal, visual  General Comment: Patient is cleared by nursing for therapy. Patient in bed upon arrival, agreeable to participate.  Precautions:  Medical Precautions: Fall precautions, Oxygen therapy device and L/min (2L O2 via NC)    Vital Signs Comment: 95% on 2L O2 via NC    Pain Assessment:  Pain Assessment  Pain Assessment: 0-10  0-10 (Numeric) Pain Score: 0 - No pain     Objective   Cognition:  Overall  Cognitive Status: Impaired at baseline (hx of dementia)  Orientation Level: Oriented X4  Cognition Comments: patient able to follow commands throughout. appears easily forgetful. repeats self a lot  Insight: Moderate  Impulsive: Mildly  Processing Speed: Delayed  Activities of Daily Living: Feeding  Feeding Level of Assistance: Setup  Feeding Where Assessed: Chair  Feeding Comments: all items within reach, assistance to open containers    Grooming  Grooming Level of Assistance: Setup  Grooming Where Assessed: Chair  Grooming Comments: wash face, comb hair, and brush teeth    UE Bathing  UE Bathing Level of Assistance: Close supervision  UE Bathing Where Assessed: Other (Comment) (bedside chair)  UE Bathing Comments: UB CHG sponge bath, cues for thoroughness    LE Bathing  LE Bathing Level of Assistance: Close supervision  LE Bathing Where Assessed: Other (Comment) (bedside chair)  LE Bathing Comments: LB CHG sponge bath, cues for thoroughness    UE Dressing  UE Dressing Level of Assistance: Minimum assistance  UE Dressing Where Assessed: Chair  UE Dressing Comments: doff/don gown, assistance for line management    LE Dressing  LE Dressing: Yes  Sock Level of Assistance: Setup, Maximum assistance  LE Dressing Where Assessed: Chair  LE Dressing Comments: patient able to doff socks without assistance. requires Max A to don socks    Bed Mobility/Transfers: Bed Mobility  Bed Mobility: Yes  Bed Mobility 1  Bed Mobility 1: Supine to sitting  Level of Assistance 1: Close supervision  Bed Mobility Comments 1: head of bed elevated    Transfers  Transfer: Yes  Transfer 1  Transfer From 1: Bed to  Transfer to 1: Stand  Technique 1: Sit to stand  Transfer Level of Assistance 1: Close supervision    Sitting Balance:  Static Sitting Balance  Static Sitting-Balance Support: Feet supported, No upper extremity supported  Static Sitting-Level of Assistance: Close supervision  Static Sitting-Comment/Number of Minutes: ~8 minutes    RUE    RUE : Within Functional Limits and LUE   LUE: Within Functional Limits    Outcome Measures:  [facility] Daily Activity  Putting on and taking off regular lower body clothing: A lot  Bathing (including washing, rinsing, drying): A little  Putting on and taking off regular upper body clothing: A little  Toileting, which includes using toilet, bedpan or urinal: A little  Taking care of personal grooming such as brushing teeth: None  Eating Meals: None  Daily Activity - Total Score: 19    Education Documentation  Body Mechanics, taught by Nisha Liz OT at 10/21/2024 10:35 AM.  Learner: Patient  Readiness: Acceptance  Method: Explanation, Demonstration  Response: Verbalizes Understanding, Needs Reinforcement    Precautions, taught by Nisha Liz OT at 10/21/2024 10:35 AM.  Learner: Patient  Readiness: Acceptance  Method: Explanation, Demonstration  Response: Verbalizes Understanding, Needs Reinforcement    ADL Training, taught by Nisha Liz OT at 10/21/2024 10:35 AM.  Learner: Patient  Readiness: Acceptance  Method: Explanation, Demonstration  Response: Verbalizes Understanding, Needs Reinforcement    Education Comments  No comments found.      Goals:  Encounter Problems       Encounter Problems (Active)       OT Goals       ADLs (Progressing)       Start:  10/17/24    Expected End:  11/14/24       Patient will complete ADL tasks, with modified independence, using AE need in order to increase patient's safety and independence with self-care tasks.          Functional Transfers (Progressing)       Start:  10/17/24    Expected End:  11/14/24       Patient will complete functional transfers with modified independent using least restrictive device, in order to increase patient's safety and independence with daily tasks.          Functional Mobility  (Progressing)       Start:  10/17/24    Expected End:  11/14/24       Patient will demonstrate the ability to complete item retrieval and functional mobility with  modified independence in order to increase safety and independence with daily tasks.          Activity Tolerance (Progressing)       Start:  10/17/24    Expected End:  11/14/24       Patient will demonstrate the ability to participate in functional activity at least >/= 25 minutes in order to increase patient's safety and independence with daily tasks.          B UE Strengthening (Progressing)       Start:  10/17/24    Expected End:  11/14/24       Patient will increase B UE strength to 4+/5 for functional transfers.

## 2024-10-21 NOTE — PROGRESS NOTES
[facility] Initial Visit/Information Visit.     HWR MARK met with pt spouse Jesús, son Jose, and pt at bedside.    Reviewed hospice care and services, primarily at home and the availability of hospice house for acute symptom management, as the current goal of pt and family is to go home.      Pt participated in the conversation, though confused.  She was persistant in thoughts of what happens when she needs to return to the hospital when she is short of breath.  A lot of discussion/training re: the routine visits and support of hospice, ideally staying on top of symptoms, the availability of hospice 24/7, and the availability of hospice house in order to avoid frequent hospitalizations.     Family has much experience with hospice and hospice house, as two of pt sons had hospice within the last 4 years.   Pt and spouse cared for them at home until they went into hospice house.    Pt spouse appreciated the meeting and information, but states they have to discuss with their two daughters who are out of state before making any decisions.      Pt currently has [facility] home care and Palliative Care thru another provider.  They are aware with hospice, both of these programs would discontinue.      Pt spouse is open to a phone call follow up in 3 days and is aware he can call or have the hospital call if they make a decision prior to that. He is also aware he can ask the hospital to make re-referral.  Provided Caregiver Guide and referrals phone number.      Updated Dr. May and MADELIN RAMÍREZ in person.  Team updated via EPIC chat: Bert Kothari RN, Yulissa Gonzalez RN, Destiny Arreaga [facility]    Thank you,   Wanda Baker, [facility]  [phone] or Copyright Agent Chat

## 2024-10-21 NOTE — PROGRESS NOTES
Spiritual Care Visit    Clinical Encounter Type  Visited With: Patient  Routine Visit: Follow-up  Continue Visiting: Yes         Values/Beliefs  Spiritual Requests During Hospitalization: I haed a nice conversation with Anitha and gave her Communion this morning.    Sacramental Encounters  Communion: Patient wants communion  Communion Given Indicator: Yes     Donnell Quintanilla

## 2024-10-21 NOTE — PROGRESS NOTES
Palliative Care Progress Note    Date of Admission: 10/15/2024    Patient is a 84 y.o. female admitted with Acute respiratory failure. Has c/o sob although she feels her breathing is improving. Tells me she is going home in 45 minutes. Also tells me she thinks she has a bowel obstruction. No nausea/vomiting/abd pain or distension. She is on iron infusions. Attending ordered prn Senna this morning.    Mental/Cognitive Status: awake and alert, pleasantly confused    Respiratory Status: on nasal canula with some shortness of breath at rest but pt feels this is improving. Still has rattling cough    Pain Assessment: denies pain today    Pertinent Symptoms: no nausea/vomiting    Diet/Nutrition: appetite fair    Bowel Regimen: as noted above    Patient's current condition/Anticipated Prognosis: guarded.  Family/Healthcare Proxy involvement: Spouse Chito and son Saurabh.    Scheduled medications  atorvastatin, 40 mg, oral, Nightly  aztreonam, 1 g, intravenous, q8h  carvedilol, 3.125 mg, oral, BID  docusate sodium, 100 mg, oral, BID  DULoxetine, 20 mg, oral, Nightly  gabapentin, 100 mg, oral, Nightly  ipratropium-albuteroL, 3 mL, nebulization, TID  levothyroxine, 25 mcg, oral, Daily  nystatin, 1 Application, Topical, BID  oxygen, 2 L/min, inhalation, q8h  pantoprazole, 40 mg, oral, Daily before breakfast   Or  pantoprazole, 40 mg, intravenous, Daily before breakfast  predniSONE, 20 mg, oral, BID  rivaroxaban, 20 mg, oral, Daily with evening meal  torsemide, 20 mg, oral, Daily      Continuous medications     PRN medications  PRN medications: acetaminophen **OR** acetaminophen **OR** acetaminophen, albuterol, ALPRAZolam, bisacodyl, diclofenac sodium, nitroglycerin, oxygen     Results for orders placed or performed during the hospital encounter of 10/15/24 (from the past 24 hours)   CBC and Auto Differential   Result Value Ref Range    WBC 5.8 4.4 - 11.3 x10*3/uL    nRBC 0.0 0.0 - 0.0 /100 WBCs    RBC 3.05 (L) 4.00 - 5.20  x10*6/uL    Hemoglobin 7.8 (L) 12.0 - 16.0 g/dL    Hematocrit 27.0 (L) 36.0 - 46.0 %    MCV 89 80 - 100 fL    MCH 25.6 (L) 26.0 - 34.0 pg    MCHC 28.9 (L) 32.0 - 36.0 g/dL    RDW 19.3 (H) 11.5 - 14.5 %    Platelets 155 150 - 450 x10*3/uL    Neutrophils % 80.7 40.0 - 80.0 %    Immature Granulocytes %, Automated 4.3 (H) 0.0 - 0.9 %    Lymphocytes % 11.0 13.0 - 44.0 %    Monocytes % 4.0 2.0 - 10.0 %    Eosinophils % 0.0 0.0 - 6.0 %    Basophils % 0.0 0.0 - 2.0 %    Neutrophils Absolute 4.68 1.60 - 5.50 x10*3/uL    Immature Granulocytes Absolute, Automated 0.25 0.00 - 0.50 x10*3/uL    Lymphocytes Absolute 0.64 (L) 0.80 - 3.00 x10*3/uL    Monocytes Absolute 0.23 0.05 - 0.80 x10*3/uL    Eosinophils Absolute 0.00 0.00 - 0.40 x10*3/uL    Basophils Absolute 0.00 0.00 - 0.10 x10*3/uL   Comprehensive Metabolic Panel   Result Value Ref Range    Glucose 153 (H) 74 - 99 mg/dL    Sodium 142 136 - 145 mmol/L    Potassium 4.6 3.5 - 5.3 mmol/L    Chloride 97 (L) 98 - 107 mmol/L    Bicarbonate 42 (HH) 21 - 32 mmol/L    Anion Gap 8 (L) 10 - 20 mmol/L    Urea Nitrogen 27 (H) 6 - 23 mg/dL    Creatinine 1.05 0.50 - 1.05 mg/dL    eGFR 52 (L) >60 mL/min/1.73m*2    Calcium 8.6 8.6 - 10.3 mg/dL    Albumin 3.5 3.4 - 5.0 g/dL    Alkaline Phosphatase 53 33 - 136 U/L    Total Protein 5.6 (L) 6.4 - 8.2 g/dL    AST 13 9 - 39 U/L    Bilirubin, Total 0.8 0.0 - 1.2 mg/dL    ALT 15 7 - 45 U/L     *Note: Due to a large number of results and/or encounters for the requested time period, some results have not been displayed. A complete set of results can be found in Results Review.        Electrocardiogram, 12-lead PRN ACS symptoms  Result Date: 10/21/2024   Poor data quality, interpretation may be adversely affected Sinus rhythm with marked sinus arrhythmia with occasional Premature ventricular complexes Possible Left atrial enlargement Left axis deviation Anteroseptal infarct , age undetermined Abnormal ECG When compared with ECG of 18-SEP-2024 08:51,  Significant changes have occurred    XR chest 2 views  Result Date: 10/21/2024   FINDINGS: The cardiac silhouette is normal in appearance. Bilateral infiltrates are identified along with bilateral small effusions.     No change bilateral infiltrates and effusions.      Lower extremity venous duplex bilateral  Result Date: 10/20/2024   FINDINGS: There is normal compressibility and flow seen within common femoral, femoral, popliteal, posterior tibial, and peroneal veins bilaterally. No deep venous thrombosis on either side is present.     No evidence of DVT within either lower extremity.      XR chest 2 views  Result Date: 10/17/2024  FINDINGS: Cardiomediastinal silhouette is enlarged with mild cardiomegaly demonstrated. Aorta is tortuous. There is diffuse vascular calcification of the aortic knob. Persistent hazy airspace infiltrate right mid to lower lung zone with mild improved aeration of the right lower lung zone. There is persistent right basilar effusion. Also, blunting of the left costophrenic angle with small left basilar effusion. No dense airspace consolidation. Multiple surgical staples demonstrated within the lower neck. Multilevel endplate degenerative changes with multilevel anterior osteophyte formation within the thoracic spine. Lung fields are hyperinflated. Multilevel endplate degenerative changes noted.             Patchy alveolar airspace consolidation right mid to lower lung zone with findings as seen on prior imaging with improved aeration of the right lung base.   Small bilateral basilar effusion noted.   Signed by: Shirley Vance 10/17/2024 3:39 PM Dictation workstation:   [id number]    FL modified barium swallow study  Result Date: 10/16/2024  SPEECH FINDINGS: Reason for Referral:  Results of Clinical Swallow Evaluation indicated need for further assessment of pharyngeal phase.   History Of Present Illness from Physician: This patient presented with shortness of breath.  She normally uses  oxygen but this morning her pulse ox was below 90.  She was short of breath all night no chest pain no nausea vomiting no fever chills no cold flulike symptoms.  When she presented to the emergency room she was clinically started on BiPAP which was continued up until 30 minutes ago when she was transitioned to 4 L of oxygen now she is saturating at 96- 7%.  She feels better.  She was given Lasix but has not urinated yet she is given also antibiotics.   Pertinent Past Medical Hx: CHF, COPD, GERD, CVA, HTN, hypothyroidism, AAA, anemia, depression, anxiety Respiratory Status: Oxygen via nasal canula Current diet: - Regular (IDDSI Level 7) - Thin liquids (IDDSI Level 0)   Pain: Pain Scale: 0-10 Ratin   General Visit Information: Exam conducted with pt in upright position with legs extended in transport gurney.  Pt on oxygen via nasal canula during test, as well as telemetry monitoring.  Pt accompanied by 2 student nurses who remained behind glass in control room during exam, but had view of telemetry monitor throughout.   Pt had occasional difficulty understanding directions of SLP, and sometimes began drinking when asked to hold bolus.  Pt had concerns that 20 mL boluses were too large to drink all at once as instructed, so she divided them into separate swallows.  Pt also had difficulty keeping her shoulders relaxed for exam, and changed position, which impacted view.  A-P view not conducted due to limitations with patient positioning.   DIET: Per the results of today's MBSS, the following is recommended:   - Regular (IDDSI Level 7) - Thin liquids (IDDSI Level 0)     Recommended Method of Medication Intake: Whole with liquid   STRATEGIES: - Small bites - Small, single sips - Upright for all PO intake     SLP PLAN: Skilled SLP Services: Skilled SLP intervention for dysphagia is warranted. SLP Frequency: 3x per week Duration: 2 weeks Treatment/Interventions: - Compensatory strategy training - Diet  tolerance/advancement - Patient/caregiver education   Discussed POC: Patient Discussed Risks/Benefits: Yes Patient/Caregiver Agreeable: Yes   Dysphagia Goals: (Established 10/16/24, projected discontinuation 2 weeks)   - Pt will safely swallow recommended diet without s/s aspiration for 90% of observed trials in order to maintain adequate nutrition and hydration. - Pt will utilize safe swallow strategies with 90% acc in order to reduce risk of aspiration and s/s dysphagia. - Pt to participate in assessment of oropharyngeal swallow function via MBSS for assessment of possible aspiration and to determine least restrictive diet to meet nutritional and hydration needs.   Education Provided: Results and recommendations per MBSS, as well as POC were reviewed at this time. Verbal understanding and agreement demonstrated.   Repeat Study/ Discharge Plan: Repeat study as clinically indicated if s/s aspiration worsen.   Communication with Medical Team: SLP shared results/recommendations with bedside nurse via Secure Chat.   Mechanics of the Swallow Summary:   ORAL PHASE: Lip Closure - No labial escape/anterior loss of bolus Tongue Control During Bolus Hold - Cohesive bolus between tongue to palatal seal Bolus prep/mastication - Timely and efficient mastication skills Bolus transport/lingual motion - Delayed initiation of tongue motion for A-P movement of the bolus Oral residue - Residue collection on oral structure   PHARYNGEAL PHASE: Initiation of pharyngeal swallow - Bolus head at vallecular pit Soft palate elevation - No bolus between soft palate/pharyngeal wall Laryngeal elevation - Complete superior movement of thyroid cartilage with contact of arytenoids to epiglottic petiole Anterior hyoid excursion - Partial anterior movement Epiglottic movement - Partial inversion Laryngeal vestibule closure - Complete - no air/contrast in laryngeal vestibule Pharyngeal stripping wave - Complete Pharyngeal contraction (A/P view) - Not  tested Pharyngoesophageal segment opening - Complete distension and complete duration/no obstruction of flow of bolus Tongue base retraction - No bolus between tongue base and posterior pharyngeal wall Pharyngeal residue - Trace residue within or on the pharyngeal structures   ESOPHAGEAL PHASE: Esophageal clearance - Not evaluated     SLP Impressions with Severity Rating: Pt presents with functional oral phase and mild pharyngeal phase dysphagia upon completion of modified barium swallow study this date. Swallowing physiology is detailed above. Impairments most impacting swallowing safety and efficiency include reduced hyoid excursion and partial epiglottic inversion.  Despite these impairments, no contrast entered airway, thus no penetration and no aspiration were visualized during study. Patient demonstrated trace oral and pharyngeal residue that was reduced with additional swallows.   OUTCOME MEASURES: Functional Oral Intake Scale Functional Oral Intake Scale: Level 7--total oral diet with no restrictions     Rosenbek's Penetration Aspiration Scale   Thin Liquids: 1. NO ASPIRATION & NO PENETRATION - no aspiration, contrast does not enter airway [place] Thick Liquids: 1. NO ASPIRATION & NO PENETRATION - no aspiration, contrast does not enter airway [place] Thick Liquids: 1. NO ASPIRATION & NO PENETRATION - no aspiration, contrast does not enter airway Puree: 1. NO ASPIRATION & NO PENETRATION - no aspiration, contrast does not enter airway Solids: 1. NO ASPIRATION & NO PENETRATION - no aspiration, contrast does not enter airway   Speech Therapy section of this report signed by Yulissa Nogueira M.A., CCC-SLP on 10/16/2024 at 12:46 pm.   RADIOLOGY FINDINGS: Included portions visualized cervical spine demonstrate degenerative discogenic changes with mild loss disc space height C3/4 through C5/6.         1. Modified barium swallow as described above.   MACRO: None   Signed by: Shirley Vance 10/16/2024 2:27 PM Dictation  workstation:   [id number]    CT chest wo IV contrast  Result Date: 10/16/2024   FINDINGS: LUNGS AND AIRWAYS: The trachea and central airways are patent. No endobronchial lesion.   Moderate right-sided pleural effusion. Small left-sided pleural effusion. Consolidative opacification of the dependent bilateral lower lobes. Mixed reticular and airspace opacification of the right lower lobe and right upper lobe. There is somewhat dependent nodular and reticular opacification of the right upper lobe. Background of probable emphysematous changes.   Presumed minimal inspissated dependent debris within the right trachea.   MEDIASTINUM AND BRENDA, LOWER NECK AND AXILLA: Postsurgical changes with numerous surgical clips in the region of the thyroid bed.   Enlarged pretracheal lymph node measuring up to 1.2 cm in short axis, nonspecific.   Esophagus appears within normal limits as seen.   HEART AND VESSELS: The thoracic aorta is unremarkable with respect to course, caliber, and contour.   Main pulmonary artery and its branches are unremarkable in caliber.   Coronary atherosclerotic calcifications. The study is not optimized for evaluation of coronary arteries.   Cardiomegaly.   No evidence of pericardial effusion.   UPPER ABDOMEN: No acute subdiaphragmatic abnormality detected.   CHEST WALL AND OSSEOUS STRUCTURES: No acute osseous abnormality.Multilevel degenerative changes of the imaged spine. Multilevel presumed chronic or degenerative endplate changes of the imaged spine.       1.  Moderate right and small left pleural effusions. 2. Asymmetric mixed reticular and airspace opacities, greatest within the right upper lobe and right lower lobe with somewhat greater distribution in the dependent regions. Findings are nonspecific, however may indicate sequela of aspiration pneumonitis or multifocal pneumonia. 3. Cardiomegaly with significant coronary atherosclerotic calcifications. 4. Nonspecific prominent mediastinal lymph node  measuring up to 1.2 cm in short axis. Interval clinical follow-up advised to exclude lymphoproliferative process or metastatic disease.   Signed by: Florian Mlain 10/16/2024 1:47 PM Dictation workstation:   [id number]    XR chest 2 views  Result Date: 10/16/2024  FINDINGS: Cardiac silhouette is enlarged with moderate cardiomegaly. There is diffuse vascular calcification of the aortic knob. Blunting of bilateral costophrenic angles with small bilateral basilar effusions and compressive atelectasis. There is cephalization with patchy alveolar airspace infiltrate demonstrated in particular within the right lung intervally improved from the prior examination with persistent patchy consolidation at the right lung base and right midlung laterally. While, this may be related to postinfectious etiology component of pulmonary edema not excluded.           1. Improved aeration of the right lung with persistent patchy alveolar airspace infiltrate in the right midlung and right lung base suggesting postinfectious etiology versus pulmonary edema.   2. Small bilateral basilar pleural effusions     MACRO: None   Signed by: Shirley Vance 10/16/2024 10:59 AM Dictation workstation:   [id number]    ECG 12 Lead  Result Date: 10/16/2024   Poor data quality, interpretation may be adversely affected Sinus rhythm with Premature ventricular complexes or Fusion complexes Left axis deviation ST elevation, consider lateral injury or acute infarct Abnormal ECG Confirmed by Randy Sol ([id number]) on 10/16/2024 8:49:43 AM    ECG 12 Lead  Result Date: 10/16/2024   Poor data quality, interpretation may be adversely affected Sinus rhythm with occasional Premature ventricular complexes and Premature atrial complexes Left anterior fascicular block Nonspecific ST abnormality Prolonged QT Abnormal ECG When compared with ECG of 15-OCT-2024 14:36, (unconfirmed) Fusion complexes are no longer Present Premature atrial complexes are now Present ST  depression has replaced ST elevation in Lateral leads Confirmed by Randy Sol ([id number]) on 10/16/2024 8:49:28 AM    XR chest 1 view  Result Date: 10/15/2024  Interpreted By:  Randy Perea, STUDY: XR CHEST 1 VIEW   INDICATION: Signs/Symptoms:sob.   COMPARISON: September 16   ACCESSION NUMBER(S): [id number]   ORDERING CLINICIAN: KEESHA MOTTA   FINDINGS: Multifocal airspace opacities greatest in the right lung both upper and lower lung field, concerning for pneumonia or an atypical presentation of edema have slightly worsened from prior study.   There has also been increase in small bilateral pleural effusions.   No evidence of pneumothorax.         Multifocal airspace opacities greatest in the right lung both upper and lower lung field, concerning for pneumonia or an atypical presentation of edema have slightly worsened from prior study.   There has also been increase in small bilateral pleural effusions.   Signed by: Randy Perea 10/15/2024 10:31 AM Dictation workstation:   [id number]       Vitals:    10/21/24 1216   BP: (!) 131/49   Pulse: 89   Resp: 23   Temp: 36.3 °C (97.3 °F)   SpO2: 93%       Physical Exam  Vitals and nursing note reviewed.   Constitutional:       General: She is not in acute distress.     Appearance: She is normal weight. She is not ill-appearing.      Comments: Elderly female sitting up in chair    HENT:      Mouth/Throat:      Mouth: Mucous membranes are dry.      Pharynx: Oropharynx is clear.   Eyes:      General: No scleral icterus.     Extraocular Movements: Extraocular movements intact.   Cardiovascular:      Rate and Rhythm: Normal rate and regular rhythm.      Pulses: Normal pulses.      Heart sounds: Normal heart sounds.   Pulmonary:      Effort: Pulmonary effort is normal. No respiratory distress.      Breath sounds: No wheezing.      Comments: Lung sounds are diminished throughout  Abdominal:      General: Bowel sounds are normal. There is no distension.      Palpations:  Abdomen is soft.      Tenderness: There is no abdominal tenderness. There is no guarding.   Musculoskeletal:      Right lower leg: No edema.      Left lower leg: No edema.   Skin:     General: Skin is warm and dry.      Findings: No rash.   Neurological:      General: No focal deficit present.      Mental Status: She is alert.      Comments: Oriented to person and place. Confused. Follows commands.   Psychiatric:         Mood and Affect: Mood normal.         Behavior: Behavior normal.       Assessment/Plan   IMP:    Acute on chronic hypoxic respiratory failure-2LO2 baseline, nurses attempting to wean O2. Pulmonology following started pt on oral prednisone. Also getting IV abx  Acute on chronic HFpEF- pleural effusions present but unchanged on cxr  Type 2 MI 2/2 hypoxia   Possible pneumonia/pneumonitis-on Azactam  Chronic Anemia -Iron deficiency, and worked up by hematology, on Venofer. Suspect this is also contributing to her overall presentation. No further epistaxis. Hgb has been stable. Xarelto resumed.  History of DVT on Xarelto    Alzheimer's +/- vascular type dementia, with Declining functional status   Non compliance with her O2 -Frequently takes this off and argues with her  and son about its necessity becomes quickly short of breath and hypoxic. This is becoming increasingly difficult home situation for her   Anxiety - Cymbalta, low dose Xanax prn  Chronic lower back pain - tylenol and topical diclofenac prn. Cymbalta started this admission.     Palliative care encounter  DNR-CCA-DNI  The patient is capable  Her  Jesús Douglass) is her primary POA these documents are located in [facility].     10/21/2024  Respiratory status improving. Family meeting with [facility] today at 1300. Await outcome of meeting.    10/18/2024  Continues in the disease modifying model of care CODE STATUS as above   Overall condition slightly Improved  Remains on antibiotics  Appreciate clinical dietitian's input  Recent  appetite has been poor, but prior to this was eating relatively well at home per the son and the  per my conversation yesterday.  Will check a prealbumin to trend  Ensure compact twice daily recommended as per clinical dietitian     As per care transitions note  would prefer to wait on meeting with hospice of the [facility]/palliative until she is discharged home.     Seems to be tolerating Cymbalta did receive a dose of Xanax last night.  These additions were discussed with her primary provider Milana Hernadez CNP with our hospitals team whom I spoke with yesterday afternoon, these new medications should follow her home and can be adjusted by my colleague.     Advance Directives Info: Patient has advance directive, copy not in chart  Discharge Planning: await outcome of hospice meeting  Palliative Care Team will continue to follow patient.      Destiny Arreaga, APRN-CNP

## 2024-10-21 NOTE — PROGRESS NOTES
"Anitha Welch is a 84 y.o. female on day 6 of admission presenting with Acute respiratory failure.    Subjective   Reports feeling well, concerned that she has not had a BM. Plan for meeting with family this afternoon       Objective   Last Recorded Vitals  Blood pressure (!) 131/49, pulse 89, temperature 36.3 °C (97.3 °F), temperature source Temporal, resp. rate 23, height 1.626 m (5' 4\"), weight 67.7 kg (149 lb 3.2 oz), SpO2 93%.    Vitals Reviewed: yes  Constitutional: Alert, no acute distress  Eyes: EOMI, normal conjunctiva  ENT: Moist mucus membranes, airway patent  Neck: Supple, normal ROM, no lymphadenopathy  Pulm: Clear to auscultation bilaterally, no wheezes, rhonchi, or rales  Cardiac:  normal rate, regular rhythm, no murmur, equal pulses  GI: Soft, non-tender, non-distended, normal bowel sounds  Skin: Warm and well perfused  Neuro: Oriented, no focal deficit, CN II-XII grossly intact  Psych: Cooperative, appropriate affect, some confusion and repeating the same questions    Intake/Output last 3 Shifts:  I/O last 3 completed shifts:  In: - (0 mL/kg)   Out: 2675 (39.5 mL/kg) [Urine:2675 (1.1 mL/kg/hr)]  Weight: 67.7 kg     Relevant Results  Scheduled medications  atorvastatin, 40 mg, oral, Nightly  aztreonam, 1 g, intravenous, q8h  carvedilol, 3.125 mg, oral, BID  docusate sodium, 100 mg, oral, BID  DULoxetine, 20 mg, oral, Nightly  gabapentin, 100 mg, oral, Nightly  ipratropium-albuteroL, 3 mL, nebulization, TID  levothyroxine, 25 mcg, oral, Daily  nystatin, 1 Application, Topical, BID  oxygen, 2 L/min, inhalation, q8h  pantoprazole, 40 mg, oral, Daily before breakfast   Or  pantoprazole, 40 mg, intravenous, Daily before breakfast  predniSONE, 20 mg, oral, BID  rivaroxaban, 20 mg, oral, Daily with evening meal  torsemide, 20 mg, oral, Daily      Continuous medications     PRN medications  PRN medications: acetaminophen **OR** acetaminophen **OR** acetaminophen, albuterol, ALPRAZolam, bisacodyl, diclofenac " sodium, nitroglycerin, oxygen        This patient has a urinary catheter   Reason for the urinary catheter remaining today? Urine catheter unnecessary, will be removed today     Assessment/Plan   Assessment & Plan  Acute on chronic respiratory failure with hypoxia (Multi)  -down to 2L which she reports is her home O2, still dropping down with movement  Pneumonia of right lower lobe due to infectious organism  -s/p 5 days of azithromycin  -on aztreonam day 7  -on her home O2 level  COPD exacerbation (Multi)  -due to pneumonia  -compeleted azithro  -switched to prednisone per pulm  -continue inhalers    Constipation: Continue colace, giving 1x dose senna    DVT prophylaxis: Resume home xarelto, monitor for epistaxis  Disposition: Anticipate DC tomorrow. Had discussion with Hospice today, will continue to have outpatient discussions but likely will not DC with hospice at this time         I spent 55 minutes in the professional and overall care of this patient.    Norma May MD

## 2024-10-21 NOTE — PROGRESS NOTES
10/21/24 0917   Discharge Planning   Home or Post Acute Services Community services  (Patient is active w/[facility] - seen by Milana Hernadez NP.  Referral to [facility] 10/17/24.  Spouse deffered [facility] meeting until after d/c.)   Type of Home Care Services   (PCP w/[facility], [facility] to follow and have meeting after d/c)   Expected Discharge Disposition Home     1058  Per [facility], [facility] meeting set for today at 1pm at bedside.  Awaiting outcome.  Attending, bedside nurse and Palliative NP aware.    1520  Per [facility] staff, informational meeting only.  [facility] will follow up in 3 days.  Spouse aware he can reach out earlier if needed.

## 2024-10-21 NOTE — PROGRESS NOTES
Speech-Language Pathology    Inpatient Speech Language Pathology Dysphagia Treatment Note     Patient Name: Anitha Welch  MRN: [id number]  : 1940  Today's Date: 10/21/24  Time Calculation  Start Time: 1420  Stop Time: 1444  Time Calculation (min): 24 min       Total Number of Visits: 2/3     PLAN:  Skilled dysphagia treatment continues to be warranted to provide training and instruction regarding the use of compensatory swallow strategies  Plan  Inpatient/Swing Bed or Outpatient: Inpatient  SLP TX Plan: Continue Plan of Care  SLP Plan: Skilled SLP  SLP Frequency: 3x per week  Duration: 2 weeks  Next Treatment Priority: diet tolerance  Discussed POC: Patient  Discussed Risks/Benefits: Yes  Patient/Caregiver Agreeable: Yes    Recommendations:   DIET:    - Regular (IDDSI Level 7)  - Thin liquids (IDDSI Level 0)        Recommended Method of Medication Intake:    Whole with liquid     STRATEGIES:  - Small bites  - Small, single sips  - Upright for all PO intake  Subjective:  Pt was positioned upright in bed and was pleasant and cooperative.  Pain:  Pain Assessment  Pain Assessment: 0-10  0-10 (Numeric) Pain Score: 0 - No pain         Oxygen Status:   nasal cannula      Objective:  Pt seen at bedside for skilled dysphagia treatment.  SLP provided skilled instruction for use of compensatory swallow strategies to reduce cough and other s/s aspiration that could result in reduced airway protection.    Dysphagia Goals: (Established 10/16/24, projected discontinuation 2 weeks)     - Pt will safely swallow recommended diet without s/s aspiration for 90% of observed trials in order to maintain adequate nutrition and hydration.   CURRENT STATUS:  85%; pt demonstrated cough with larger sips of liquids   PROGRESS: Improving    - Pt will utilize safe swallow strategies with 90% acc in order to reduce risk of aspiration and s/s dysphagia.   CURRENT STATUS: 80%   PROGRESS: Improved awareness of necessity of swallow strategies  to improve airway protection.    - Pt to participate in assessment of oropharyngeal swallow function via MBSS for assessment of possible aspiration and to determine least restrictive diet to meet nutritional and hydration needs.    CURRENT STATUS: MBSS completed today   PROGRESS: Goal achieved      SLP Assessment:  Pt continues to demonstrate functional swallow with regular diet and thin liquids with use of compensatory swallow strategies. Pt demonstrates occasional mild cough post swallow thin liquids (10-15%).  When cough occurred during MBSS, no contrast had entered airway.  Recommend continue current diet and plan of care.    Treatment Outcome:  Pt tolerated treatment well.  Pt is progressing as a result of current treatment interventions and would benefit from continued skilled dysphagia treatment.  Medical Staff Made Aware: Yes     Inpatient Education:   Individual(s) Educated: Patient  Verbal Education : Strategies, POC, diet recommendations  Risk and Benefits Discussed with Patient/Caregiver/Other: yes  Patient/Caregiver Demonstrated Understanding: yes  Plan of Care Discussed and Agreed Upon: yes  Patient Response to Education: Patient/Caregiver Verbalized Understanding of Information

## 2024-10-22 ENCOUNTER — PHARMACY VISIT (OUTPATIENT)
Dept: PHARMACY | Facility: CLINIC | Age: 84
End: 2024-10-22
Payer: MEDICARE

## 2024-10-22 VITALS
OXYGEN SATURATION: 95 % | HEIGHT: 64 IN | SYSTOLIC BLOOD PRESSURE: 126 MMHG | BODY MASS INDEX: 24.24 KG/M2 | TEMPERATURE: 98.2 F | WEIGHT: 142 LBS | DIASTOLIC BLOOD PRESSURE: 64 MMHG | HEART RATE: 92 BPM | RESPIRATION RATE: 22 BRPM

## 2024-10-22 LAB
ACANTHOCYTES BLD QL SMEAR: NORMAL
ALBUMIN SERPL BCP-MCNC: 3.6 G/DL (ref 3.4–5)
ALP SERPL-CCNC: 64 U/L (ref 33–136)
ALT SERPL W P-5'-P-CCNC: 15 U/L (ref 7–45)
ANION GAP SERPL CALCULATED.3IONS-SCNC: 11 MMOL/L (ref 10–20)
AST SERPL W P-5'-P-CCNC: 15 U/L (ref 9–39)
ATRIAL RATE: 99 BPM
BASOPHILS # BLD AUTO: 0.01 X10*3/UL (ref 0–0.1)
BASOPHILS NFR BLD AUTO: 0.1 %
BILIRUB SERPL-MCNC: 0.9 MG/DL (ref 0–1.2)
BUN SERPL-MCNC: 31 MG/DL (ref 6–23)
BURR CELLS BLD QL SMEAR: NORMAL
CALCIUM SERPL-MCNC: 8.8 MG/DL (ref 8.6–10.3)
CHLORIDE SERPL-SCNC: 95 MMOL/L (ref 98–107)
CO2 SERPL-SCNC: 38 MMOL/L (ref 21–32)
CREAT SERPL-MCNC: 1 MG/DL (ref 0.5–1.05)
EGFRCR SERPLBLD CKD-EPI 2021: 55 ML/MIN/1.73M*2
EOSINOPHIL # BLD AUTO: 0 X10*3/UL (ref 0–0.4)
EOSINOPHIL NFR BLD AUTO: 0 %
ERYTHROCYTE [DISTWIDTH] IN BLOOD BY AUTOMATED COUNT: 20.7 % (ref 11.5–14.5)
GLUCOSE SERPL-MCNC: 154 MG/DL (ref 74–99)
HCT VFR BLD AUTO: 29.2 % (ref 36–46)
HGB BLD-MCNC: 8.6 G/DL (ref 12–16)
IMM GRANULOCYTES # BLD AUTO: 0.25 X10*3/UL (ref 0–0.5)
IMM GRANULOCYTES NFR BLD AUTO: 3.3 % (ref 0–0.9)
LYMPHOCYTES # BLD AUTO: 0.71 X10*3/UL (ref 0.8–3)
LYMPHOCYTES NFR BLD AUTO: 9.3 %
MCH RBC QN AUTO: 26.3 PG (ref 26–34)
MCHC RBC AUTO-ENTMCNC: 29.5 G/DL (ref 32–36)
MCV RBC AUTO: 89 FL (ref 80–100)
MONOCYTES # BLD AUTO: 0.3 X10*3/UL (ref 0.05–0.8)
MONOCYTES NFR BLD AUTO: 3.9 %
NEUTROPHILS # BLD AUTO: 6.39 X10*3/UL (ref 1.6–5.5)
NEUTROPHILS NFR BLD AUTO: 83.4 %
NRBC BLD-RTO: 0 /100 WBCS (ref 0–0)
P AXIS: 69 DEGREES
P OFFSET: 192 MS
P ONSET: 138 MS
PLATELET # BLD AUTO: 188 X10*3/UL (ref 150–450)
POLYCHROMASIA BLD QL SMEAR: NORMAL
POTASSIUM SERPL-SCNC: 4.1 MMOL/L (ref 3.5–5.3)
PR INTERVAL: 144 MS
PROT SERPL-MCNC: 5.7 G/DL (ref 6.4–8.2)
Q ONSET: 210 MS
QRS COUNT: 17 BEATS
QRS DURATION: 90 MS
QT INTERVAL: 386 MS
QTC CALCULATION(BAZETT): 495 MS
QTC FREDERICIA: 456 MS
R AXIS: -49 DEGREES
RBC # BLD AUTO: 3.27 X10*6/UL (ref 4–5.2)
RBC MORPH BLD: NORMAL
SODIUM SERPL-SCNC: 140 MMOL/L (ref 136–145)
T AXIS: 77 DEGREES
T OFFSET: 403 MS
VENTRICULAR RATE: 99 BPM
WBC # BLD AUTO: 7.7 X10*3/UL (ref 4.4–11.3)

## 2024-10-22 PROCEDURE — 2500000001 HC RX 250 WO HCPCS SELF ADMINISTERED DRUGS (ALT 637 FOR MEDICARE OP): Performed by: INTERNAL MEDICINE

## 2024-10-22 PROCEDURE — 94660 CPAP INITIATION&MGMT: CPT

## 2024-10-22 PROCEDURE — 2500000001 HC RX 250 WO HCPCS SELF ADMINISTERED DRUGS (ALT 637 FOR MEDICARE OP)

## 2024-10-22 PROCEDURE — 2500000001 HC RX 250 WO HCPCS SELF ADMINISTERED DRUGS (ALT 637 FOR MEDICARE OP): Performed by: NURSE PRACTITIONER

## 2024-10-22 PROCEDURE — 2500000005 HC RX 250 GENERAL PHARMACY W/O HCPCS: Performed by: INTERNAL MEDICINE

## 2024-10-22 PROCEDURE — 2500000004 HC RX 250 GENERAL PHARMACY W/ HCPCS (ALT 636 FOR OP/ED): Performed by: INTERNAL MEDICINE

## 2024-10-22 PROCEDURE — 99232 SBSQ HOSP IP/OBS MODERATE 35: CPT | Performed by: NURSE PRACTITIONER

## 2024-10-22 PROCEDURE — RXMED WILLOW AMBULATORY MEDICATION CHARGE

## 2024-10-22 PROCEDURE — 94640 AIRWAY INHALATION TREATMENT: CPT

## 2024-10-22 PROCEDURE — 85025 COMPLETE CBC W/AUTO DIFF WBC: CPT | Performed by: INTERNAL MEDICINE

## 2024-10-22 PROCEDURE — 97116 GAIT TRAINING THERAPY: CPT | Mod: GP

## 2024-10-22 PROCEDURE — 9420000001 HC RT PATIENT EDUCATION 5 MIN

## 2024-10-22 PROCEDURE — 2500000004 HC RX 250 GENERAL PHARMACY W/ HCPCS (ALT 636 FOR OP/ED): Mod: JZ | Performed by: STUDENT IN AN ORGANIZED HEALTH CARE EDUCATION/TRAINING PROGRAM

## 2024-10-22 PROCEDURE — 80053 COMPREHEN METABOLIC PANEL: CPT | Performed by: INTERNAL MEDICINE

## 2024-10-22 PROCEDURE — 94762 N-INVAS EAR/PLS OXIMTRY CONT: CPT

## 2024-10-22 PROCEDURE — 2500000002 HC RX 250 W HCPCS SELF ADMINISTERED DRUGS (ALT 637 FOR MEDICARE OP, ALT 636 FOR OP/ED): Performed by: INTERNAL MEDICINE

## 2024-10-22 RX ORDER — DULOXETIN HYDROCHLORIDE 20 MG/1
20 CAPSULE, DELAYED RELEASE ORAL NIGHTLY
Qty: 30 CAPSULE | Refills: 0 | Status: SHIPPED | OUTPATIENT
Start: 2024-10-22 | End: 2024-11-21

## 2024-10-22 RX ORDER — ALPRAZOLAM 0.25 MG/1
0.25 TABLET ORAL 3 TIMES DAILY PRN
Qty: 7 TABLET | Refills: 0 | Status: SHIPPED | OUTPATIENT
Start: 2024-10-22 | End: 2024-10-25

## 2024-10-22 ASSESSMENT — COGNITIVE AND FUNCTIONAL STATUS - GENERAL
CLIMB 3 TO 5 STEPS WITH RAILING: A LITTLE
MOVING TO AND FROM BED TO CHAIR: A LITTLE
MOVING FROM LYING ON BACK TO SITTING ON SIDE OF FLAT BED WITH BEDRAILS: A LITTLE
DAILY ACTIVITIY SCORE: 24
WALKING IN HOSPITAL ROOM: A LITTLE
TURNING FROM BACK TO SIDE WHILE IN FLAT BAD: A LITTLE
MOBILITY SCORE: 18
MOBILITY SCORE: 24
STANDING UP FROM CHAIR USING ARMS: A LITTLE

## 2024-10-22 ASSESSMENT — PAIN SCALES - GENERAL: PAINLEVEL_OUTOF10: 0 - NO PAIN

## 2024-10-22 NOTE — TELEPHONE ENCOUNTER
Patient discharged from [facility] to home. Per today's Pall Med progress note: Back to baseline O2 requirements. Family met with hospice yesterday, did not sign consents, informational only. [facility] to follow up with them in a couple of days.  Patient has a [facility] visit scheduled with Milana Hernadez NP 11/8/24 at 10:00 am.

## 2024-10-22 NOTE — DISCHARGE SUMMARY
Discharge Diagnosis  Acute on chronic respiratory failure with hypoxia (Multi)    Issues Requiring Follow-Up  Chronic hypoxic respiratory failure  Dementia    Discharge Meds     Medication List      START taking these medications     ALPRAZolam 0.25 mg tablet; Commonly known as: Xanax; Take 1 tablet (0.25   mg) by mouth 3 times a day as needed for anxiety for up to 3 days.   DULoxetine 20 mg DR capsule; Commonly known as: Cymbalta; Take 1 capsule   (20 mg) by mouth once daily at bedtime. Do not crush or chew.     CHANGE how you take these medications     tiZANidine 2 mg tablet; Commonly known as: Zanaflex; TAKE 1 TABLET BY   MOUTH THREE TIMES A DAY AS NEEDED; What changed: when to take this,   reasons to take this     CONTINUE taking these medications     albuterol 90 mcg/actuation inhaler   atorvastatin 40 mg tablet; Commonly known as: Lipitor; TAKE 1 TABLET BY   MOUTH EVERY DAY AT BEDTIME   bisacodyl 5 mg EC tablet; Commonly known as: Dulcolax; Take 1 tablet (5   mg) by mouth once daily as needed for constipation. Do not crush, chew, or   split.   carvedilol 3.125 mg tablet; Commonly known as: Coreg; TAKE 1 TABLET   (3.125 MG) BY MOUTH 2 TIMES A DAY.   diclofenac sodium 1 % gel; Commonly known as: Voltaren; Apply 4.5 inches   (4 g) topically 4 times a day.   docusate sodium 100 mg capsule; Commonly known as: Colace; Take 1   capsule (100 mg) by mouth 2 times a day as needed for constipation.   gabapentin 100 mg capsule; Commonly known as: Neurontin; Take 1 capsule   (100 mg) by mouth once daily at bedtime.   levothyroxine 25 mcg tablet; Commonly known as: Synthroid, Levoxyl; Take   1 tablet (25 mcg) by mouth early in the morning.. Take on an empty stomach   at the same time each day, either 30 to 60 minutes prior to breakfast   lidocaine 5 % cream cream; Commonly known as: Hemorrhoidal Relief; Apply   1 Application topically every 6 hours if needed (hemorrhoids).   loratadine 10 mg tablet; Commonly known as:  Claritin   Multi Complete with Iron  mg-mcg tablet; Generic drug:   multivitamin with minerals   nitroglycerin 0.4 mg SL tablet; Commonly known as: Nitrostat   nystatin 100,000 unit/gram powder; Commonly known as: Mycostatin; Apply   1 Application topically 2 times a day.   oxygen gas therapy; Commonly known as: O2   pantoprazole 40 mg EC tablet; Commonly known as: ProtoNix; Take 1 tablet   (40 mg) by mouth 2 times a day.   polyethylene glycol 17 gram/dose powder; Commonly known as: Glycolax,   Miralax; mix 17 grams (1 capful) in 8 ounces of water and drink daily   torsemide 20 mg tablet; Commonly known as: Demadex; Take 1 tablet (20   mg) by mouth once daily. Do not fill before May 2, 2024.   Trelegy Ellipta 100-62.5-25 mcg blister with device; Generic drug:   fluticasone-umeclidin-vilanter; Inhale 1 puff once daily.     STOP taking these medications     rivaroxaban 20 mg tablet; Commonly known as: Xarelto       Test Results Pending At Discharge  Pending Labs       No current pending labs.            Hospital Course    Anitha Welch is a 84 y.o. who presented initially with Shortness of Breath (Increased SOB starting this AM around 0600. Son took SpO2 and pt was at 86%. ) and was found to be hypoxic  They were treated for acute on chronic respiratory failure, anemia, epistaxis. After discussion with her and family,   They were seen by Pulm, cardiology, palliative, hematology specialists   Hospital course was complicated by anemia and low platelets, her xarelto was held with instructions to followup with PCP regarding if to resume it  This patient was discharged in improved/stable condition. 34 minutes spent performing discharge services      Pertinent Physical Exam At Time of Discharge  Visit Vitals  /64 (BP Location: Left arm, Patient Position: Lying)   Pulse 92   Temp 36.8 °C (98.2 °F) (Temporal)   Resp 22       Vitals Reviewed: yes  Constitutional: Alert, no acute distress  Eyes: EOMI, normal  conjunctiva  ENT: Moist mucus membranes, airway patent  Neck: Supple, normal ROM, no lymphadenopathy  Pulm: Diminished lung sounds, worst bibasilar  Cardiac: reg rate, regular rhythm, no murmur, equal pulses  GI: Soft, non-tender, non-distended, normal bowel sounds  : Yes heath, no CVA tenderness, yellow urine present  Neuro: Oriented, no focal deficit, CN II-XII grossly intact  Psych:Cooperative, appropriate affect, some confusion and repeating the same questions     Outpatient Follow-Up  Future Appointments   Date Time Provider Department Center   11/5/2024  2:20 PM Gee Dupree MD [id number] [facility]   11/8/2024 10:00 AM Milana Hernadez, APRN-CNP [id number] [facility]   11/18/2024 10:00 AM [facility] ROOM [address] [place] [address]   4/25/2025  8:00 AM  MAC [id number] [facility]         Norma May MD

## 2024-10-22 NOTE — PROGRESS NOTES
Physical Therapy    Physical Therapy Treatment    Patient Name: Anitha Welch  MRN: [id number]  Department: [address]  Room: [address] [place] [address]  Today's Date: 10/22/2024  Time Calculation  Start Time: 1018  Stop Time: 1035  Time Calculation (min): 17 min         Assessment/Plan   PT Assessment  PT Assessment Results: Decreased strength, Decreased mobility, Impaired balance, Decreased endurance, Decreased cognition, Impaired judgement, Decreased safety awareness, Decreased skin integrity  Rehab Prognosis: Fair  Barriers to Discharge: 24/7 for cognition  Evaluation/Treatment Tolerance: Patient limited by fatigue  Medical Staff Made Aware: Yes  End of Session Communication: Bedside nurse  End of Session Patient Position: Up in chair, Alarm on      Assessment Comment: Pt tolerated ambulation into main with wheeled walker. Presents with decreased activity tolerance. Pt reported not feeling well today. Family at bedside; supportive.           PT Plan  Treatment/Interventions: Bed mobility, Transfer training, Gait training, Balance training, Neuromuscular re-education, Endurance training, Therapeutic activity  PT Plan: Ongoing PT  PT Frequency: 4 times per week  PT Discharge Recommendations: Low intensity level of continued care, 24 hr supervision due to cognition  Equipment Recommended upon Discharge: Wheeled walker  PT Recommended Transfer Status: Assist x1, Assistive device  PT - OK to Discharge: Yes      General Visit Information:   PT  Visit  PT Received On: 10/22/24  Response to Previous Treatment: Patient reporting fatigue but able to participate.  General  Reason for Referral: impaired mobility; acute respiratory failure  Past Medical History Relevant to Rehab: Chronic Respiratory Failure, COPD, Anxiety, CHF, GERD, AAA, CVA, DVT, HTN, MI, CKD  Missed Visit: No  Missed Visit Reason: Other (Comment) (Hospice at bedside for meeting)  Family/Caregiver Present: Yes  Caregiver Feedback: son and spouse arrived during session;  "supportive  Prior to Session Communication: Bedside nurse  Patient Position Received: Bed, 3 rail up, Alarm on  Preferred Learning Style: verbal, visual  General Comment: Pt cleared for mobility per nursing. Pt supine in bed upon arrival. Agreeable to participate, although stated, \"I don't feel good today.\" 2 L O2, tele.    Subjective   Precautions:  Precautions  Medical Precautions: Fall precautions, Oxygen therapy device and L/min  Precautions Comment: 2L O2    Vital Signs (Past 2hrs)           Vital Signs Comment: SpO2 93% on 2 L O2 during activity     Objective   Pain:  Pain Assessment  0-10 (Numeric) Pain Score: 0 - No pain  Cognition:  Cognition  Orientation Level: Oriented X4  Cognition Comments: able to follow commands appropriately  Processing Speed: Delayed    Activity Tolerance:  Activity Tolerance  Endurance: Decreased tolerance for upright activites  Activity Tolerance Comments: fatigues easily. reported lethargy this date  Treatments:  Therapeutic Exercise  Therapeutic Exercise Activity 1: Reviewed performing B LAQs, ankle pumps and seated marches     Bed Mobility 1  Bed Mobility 1: Supine to sitting  Level of Assistance 1: Close supervision  Bed Mobility Comments 1: HOB elevated    Ambulation/Gait Training 1  Surface 1: Level tile  Device 1: Rolling walker  Assistance 1: Close supervision  Quality of Gait 1: Decreased step length, Forward flexed posture  Comments/Distance (ft) 1: about 30 ft x 2 trials, seated rest break in between. 2 L O2. Instructed pton pursed-lip breathing and to stay within parameter of walker    Transfer 1  Technique 1: Sit to stand  Transfer Device 1: Walker  Transfer Level of Assistance 1: Close supervision  Trials/Comments 1: x2 trials  Transfers 2  Technique 2: Stand to sit  Transfer Device 2: Walker  Transfer Level of Assistance 2: Close supervision        Outcome Measures:  [facility] Basic Mobility  Turning from your back to your side while in a flat bed without using " bedrails: A little  Moving from lying on your back to sitting on the side of a flat bed without using bedrails: A little  Moving to and from bed to chair (including a wheelchair): A little  Standing up from a chair using your arms (e.g. wheelchair or bedside chair): A little  To walk in hospital room: A little  Climbing 3-5 steps with railing: A little  Basic Mobility - Total Score: 18    Education Documentation  Precautions, taught by Elham Odell PT at 10/22/2024 11:31 AM.  Learner: Family, Patient  Readiness: Acceptance  Method: Explanation  Response: Verbalizes Understanding    Body Mechanics, taught by Elham Odell PT at 10/22/2024 11:31 AM.  Learner: Family, Patient  Readiness: Acceptance  Method: Explanation  Response: Verbalizes Understanding    Mobility Training, taught by Elham Odell PT at 10/22/2024 11:31 AM.  Learner: Family, Patient  Readiness: Acceptance  Method: Explanation  Response: Verbalizes Understanding    Education Comments  No comments found.        OP EDUCATION:       Encounter Problems       Encounter Problems (Active)       Balance       Standing Balance (Progressing)       Start:  10/17/24    Expected End:  10/31/24       Pt will demonstrate good static standing balance to promote safe participation with out of bed activity, transfers, and mobility              Mobility       Ambulation (Progressing)       Start:  10/17/24    Expected End:  10/31/24       Pt will ambulate 75' modified independent assist with walker to promote safe home mobility           Entry Stair Negotiation (Progressing)       Start:  10/17/24    Expected End:  10/31/24       Pt will ascend/descend 3 stairs with rail(s) on R and modified independent assist to promote safe entry and exit in home environment                PT Transfers       Supine to sit (Progressing)       Start:  10/17/24    Expected End:  10/31/24       Pt will transfer supine to sitting at edge of bed with modified independent assist to promote acute  care out of bed activity           Sit to stand (Progressing)       Start:  10/17/24    Expected End:  10/31/24       Pt will transfer sit to standing position with modified independent assist and walker to promote safe out of bed activity           Bed to chair (Progressing)       Start:  10/17/24    Expected End:  10/31/24       Pt will transfer from sitting edge of bed to the chair with modified independent assist and walker to promote out of bed activity and reduce the risks of prolonged acute care bedrest              Pain - Adult          Safety       Safe Mobility Techniques (Progressing)       Start:  10/17/24    Expected End:  10/31/24       Pt will correctly identify and demonstrate safe mobility techniques to reduce their risks for falls during their acute care stay

## 2024-10-22 NOTE — PROGRESS NOTES
Spiritual Care Visit    Clinical Encounter Type  Visited With: Patient  Routine Visit: Follow-up  Continue Visiting: Yes         Values/Beliefs  Spiritual Requests During Hospitalization: Anitha conway Communion today.    Sacramental Encounters  Communion: Patient wants communion  Communion Given Indicator: Yes     Donnell Quintanilla

## 2024-10-22 NOTE — PROGRESS NOTES
"Anitha Welch is a 84 y.o. female on day 7 of admission seen in follow-up for acute on chronic hypoxic, hypercapnic respiratory failure, acute COPD exacerbation, pneumonia    Subjective   Family at bedside.  On 2 L nasal cannula oxygen; oxygen sats 95%. No respiratory complaints. Denies pain. Afebrile.       Objective     Physical Exam  Vitals and nursing note reviewed.   Constitutional:       Appearance: Normal appearance.   HENT:      Head: Normocephalic and atraumatic.      Nose: Nose normal.      Mouth/Throat:      Mouth: Mucous membranes are moist.   Eyes:      Extraocular Movements: Extraocular movements intact.      Conjunctiva/sclera: Conjunctivae normal.      Pupils: Pupils are equal, round, and reactive to light.   Cardiovascular:      Rate and Rhythm: Normal rate and regular rhythm.      Pulses: Normal pulses.      Heart sounds: Normal heart sounds.   Pulmonary:      Effort: Pulmonary effort is normal.      Comments: Lungs diminished but clear.  Abdominal:      General: Bowel sounds are normal.      Palpations: Abdomen is soft.   Musculoskeletal:         General: Normal range of motion.   Skin:     General: Skin is warm and dry.      Capillary Refill: Capillary refill takes less than 2 seconds.   Neurological:      General: No focal deficit present.      Mental Status: She is alert and oriented to person, place, and time.   Psychiatric:         Mood and Affect: Mood normal.         Behavior: Behavior normal.         Last Recorded Vitals  Blood pressure 141/62, pulse 92, temperature 36.7 °C (98.1 °F), temperature source Temporal, resp. rate 22, height 1.626 m (5' 4\"), weight 64.4 kg (142 lb), SpO2 95%.  Intake/Output last 3 Shifts:  I/O last 3 completed shifts:  In: 10 (0.2 mL/kg) [IV Piggyback:10]  Out: 1225 (19 mL/kg) [Urine:1225 (0.5 mL/kg/hr)]  Weight: 64.4 kg       Intake/Output Summary (Last 24 hours) at 10/22/2024 0990  Last data filed at 10/21/2024 1512  Gross per 24 hour   Intake --   Output 750 " ml   Net -750 ml      Scheduled medications:  atorvastatin, 40 mg, oral, Nightly  aztreonam, 1 g, intravenous, q8h  carvedilol, 3.125 mg, oral, BID  docusate sodium, 100 mg, oral, BID  DULoxetine, 20 mg, oral, Nightly  gabapentin, 100 mg, oral, Nightly  ipratropium-albuteroL, 3 mL, nebulization, TID  levothyroxine, 25 mcg, oral, Daily  nystatin, 1 Application, Topical, BID  oxygen, 2 L/min, inhalation, q8h  pantoprazole, 40 mg, oral, Daily before breakfast   Or  pantoprazole, 40 mg, intravenous, Daily before breakfast  predniSONE, 20 mg, oral, BID  rivaroxaban, 20 mg, oral, Daily with evening meal  torsemide, 20 mg, oral, Daily     PRN medications: acetaminophen **OR** acetaminophen **OR** acetaminophen, albuterol, ALPRAZolam, bisacodyl, diclofenac sodium, nitroglycerin, oxygen         Relevant Results  Results for orders placed or performed during the hospital encounter of 10/15/24 (from the past 24 hours)   CBC and Auto Differential   Result Value Ref Range    WBC 7.7 4.4 - 11.3 x10*3/uL    nRBC 0.0 0.0 - 0.0 /100 WBCs    RBC 3.27 (L) 4.00 - 5.20 x10*6/uL    Hemoglobin 8.6 (L) 12.0 - 16.0 g/dL    Hematocrit 29.2 (L) 36.0 - 46.0 %    MCV 89 80 - 100 fL    MCH 26.3 26.0 - 34.0 pg    MCHC 29.5 (L) 32.0 - 36.0 g/dL    RDW 20.7 (H) 11.5 - 14.5 %    Platelets 188 150 - 450 x10*3/uL    Neutrophils % 83.4 40.0 - 80.0 %    Immature Granulocytes %, Automated 3.3 (H) 0.0 - 0.9 %    Lymphocytes % 9.3 13.0 - 44.0 %    Monocytes % 3.9 2.0 - 10.0 %    Eosinophils % 0.0 0.0 - 6.0 %    Basophils % 0.1 0.0 - 2.0 %    Neutrophils Absolute 6.39 (H) 1.60 - 5.50 x10*3/uL    Immature Granulocytes Absolute, Automated 0.25 0.00 - 0.50 x10*3/uL    Lymphocytes Absolute 0.71 (L) 0.80 - 3.00 x10*3/uL    Monocytes Absolute 0.30 0.05 - 0.80 x10*3/uL    Eosinophils Absolute 0.00 0.00 - 0.40 x10*3/uL    Basophils Absolute 0.01 0.00 - 0.10 x10*3/uL   Comprehensive Metabolic Panel   Result Value Ref Range    Glucose 154 (H) 74 - 99 mg/dL    Sodium  140 136 - 145 mmol/L    Potassium 4.1 3.5 - 5.3 mmol/L    Chloride 95 (L) 98 - 107 mmol/L    Bicarbonate 38 (H) 21 - 32 mmol/L    Anion Gap 11 10 - 20 mmol/L    Urea Nitrogen 31 (H) 6 - 23 mg/dL    Creatinine 1.00 0.50 - 1.05 mg/dL    eGFR 55 (L) >60 mL/min/1.73m*2    Calcium 8.8 8.6 - 10.3 mg/dL    Albumin 3.6 3.4 - 5.0 g/dL    Alkaline Phosphatase 64 33 - 136 U/L    Total Protein 5.7 (L) 6.4 - 8.2 g/dL    AST 15 9 - 39 U/L    Bilirubin, Total 0.9 0.0 - 1.2 mg/dL    ALT 15 7 - 45 U/L   Morphology   Result Value Ref Range    RBC Morphology See Below     Polychromasia Mild     Madonna Cells Few     Acanthocytes Few      *Note: Due to a large number of results and/or encounters for the requested time period, some results have not been displayed. A complete set of results can be found in Results Review.      XR chest 2 views  Result Date: 10/20/2024  FINDINGS: The cardiac silhouette is normal in appearance. Bilateral infiltrates  are identified along with bilateral small effusions. IMPRESSION: No change bilateral infiltrates and effusions.    XR chest 2 views  Result Date: 10/17/2024  FINDINGS: Cardiomediastinal silhouette is enlarged with mild cardiomegaly  demonstrated. Aorta is tortuous. There is diffuse vascular calcification of the aortic knob. Persistent hazy airspace infiltrate right mid to lower lung zone with mild improved aeration of the right lower lung zone. There is persistent right basilar effusion. Also, blunting of the left costophrenic angle with small left basilar effusion. No dense airspace consolidation. Multiple surgical staples  demonstrated within the lower neck. Multilevel endplate degenerative changes with multilevel anterior osteophyte formation within the thoracic spine. Lung fields are hyperinflated. Multilevel endplate degenerative changes noted. IMPRESSION: Patchy alveolar airspace consolidation right mid to lower lung zone with findings as seen on prior imaging with improved aeration of  the  right lung base.    XR chest 1 view  Result Date: 10/15/2024  FINDINGS: Multifocal airspace opacities greatest in the right lung both upper and lower lung field, concerning for pneumonia or an atypical presentation of edema have slightly worsened from prior study.   There has also been increase in small bilateral pleural effusions.   No evidence of pneumothorax.  Multifocal airspace opacities greatest in the right lung both upper and lower lung field, concerning for pneumonia or an atypical presentation of edema have slightly worsened from prior study.   There has also been increase in small bilateral pleural effusions.         Assessment/Plan   Acute on chronic hypoxic, hypercapnic respiratory failure  Oxygen at baseline  BiPAP nightly and as needed  Continuous pulse oximetry  Incentive spirometry/pulmonary hygiene     Acute COPD exacerbation  Continue IBD/ICS  Prednisone with taper  FEV1 when optimized     Chronic congestive heart failure  Torsemide  Cardiology follow-up     Pneumonia  Reviewed PA and lateral chest x-ray with collaborating physician Dr. Rae  Continue IV azithromycin, aztreonam   Follow-up cultures    Left leg pain  Duplex ultrasound pending    Anemia  Xarelto on hold  IV Venofer    Acute kidney injury on chronic kidney disease  Creatinine at baseline    Prophylaxis  Xarelto   Protonix    PT/OT/out of bed  Palliative Medicine following-referral made to [facility] for informational meeting  Code Status: DNR CCA DNI    Sabiha Lane, APRN-CNP

## 2024-10-22 NOTE — PROGRESS NOTES
10/22/24 0914   Discharge Planning   Home or Post Acute Services Community services  (Patient is active w/[facility] - seen by Milana Hernadez NP.  Referral to [facility] 10/17/24.  [facility] info meeting 10/21/24.  Spouse deffered [facility] services until after d/c.  [facility] to f/u in 2 days.  Patient will d/c home with family w/no needs.)   Type of Home Care Services DME or oxygen   Expected Discharge Disposition Home   Does the patient need discharge transport arranged? No

## 2024-10-22 NOTE — PROGRESS NOTES
Palliative Care Progress Note    Date of Admission: 10/15/2024    Patient is a 84 y.o. female admitted with Acute on chronic respiratory failure with hypoxia (Multi). Family met with [facility] yesterday informational only.     Mental/Cognitive Status: awake, alert, confused    Respiratory Status: at baseline O2 2 liters    Pain Assessment:denies    Pertinent Symptoms: avendaño-chronic at baseline, anxiety seems better    Diet/Nutrition: appetite fair    Bowel Regimen: colace    Patient's current condition/Anticipated Prognosis: guarded.  Family/Healthcare Proxy involvement: spouse Chito and son Saurabh.    Scheduled medications  atorvastatin, 40 mg, oral, Nightly  aztreonam, 1 g, intravenous, q8h  carvedilol, 3.125 mg, oral, BID  docusate sodium, 100 mg, oral, BID  DULoxetine, 20 mg, oral, Nightly  gabapentin, 100 mg, oral, Nightly  ipratropium-albuteroL, 3 mL, nebulization, TID  levothyroxine, 25 mcg, oral, Daily  nystatin, 1 Application, Topical, BID  oxygen, 2 L/min, inhalation, q8h  pantoprazole, 40 mg, oral, Daily before breakfast   Or  pantoprazole, 40 mg, intravenous, Daily before breakfast  predniSONE, 20 mg, oral, BID  rivaroxaban, 20 mg, oral, Daily with evening meal  torsemide, 20 mg, oral, Daily      Continuous medications     PRN medications  PRN medications: acetaminophen **OR** acetaminophen **OR** acetaminophen, albuterol, ALPRAZolam, bisacodyl, diclofenac sodium, nitroglycerin, oxygen     Results for orders placed or performed during the hospital encounter of 10/15/24 (from the past 24 hours)   CBC and Auto Differential   Result Value Ref Range    WBC 7.7 4.4 - 11.3 x10*3/uL    nRBC 0.0 0.0 - 0.0 /100 WBCs    RBC 3.27 (L) 4.00 - 5.20 x10*6/uL    Hemoglobin 8.6 (L) 12.0 - 16.0 g/dL    Hematocrit 29.2 (L) 36.0 - 46.0 %    MCV 89 80 - 100 fL    MCH 26.3 26.0 - 34.0 pg    MCHC 29.5 (L) 32.0 - 36.0 g/dL    RDW 20.7 (H) 11.5 - 14.5 %    Platelets 188 150 - 450 x10*3/uL    Neutrophils % 83.4 40.0 - 80.0 %    Immature  Granulocytes %, Automated 3.3 (H) 0.0 - 0.9 %    Lymphocytes % 9.3 13.0 - 44.0 %    Monocytes % 3.9 2.0 - 10.0 %    Eosinophils % 0.0 0.0 - 6.0 %    Basophils % 0.1 0.0 - 2.0 %    Neutrophils Absolute 6.39 (H) 1.60 - 5.50 x10*3/uL    Immature Granulocytes Absolute, Automated 0.25 0.00 - 0.50 x10*3/uL    Lymphocytes Absolute 0.71 (L) 0.80 - 3.00 x10*3/uL    Monocytes Absolute 0.30 0.05 - 0.80 x10*3/uL    Eosinophils Absolute 0.00 0.00 - 0.40 x10*3/uL    Basophils Absolute 0.01 0.00 - 0.10 x10*3/uL   Comprehensive Metabolic Panel   Result Value Ref Range    Glucose 154 (H) 74 - 99 mg/dL    Sodium 140 136 - 145 mmol/L    Potassium 4.1 3.5 - 5.3 mmol/L    Chloride 95 (L) 98 - 107 mmol/L    Bicarbonate 38 (H) 21 - 32 mmol/L    Anion Gap 11 10 - 20 mmol/L    Urea Nitrogen 31 (H) 6 - 23 mg/dL    Creatinine 1.00 0.50 - 1.05 mg/dL    eGFR 55 (L) >60 mL/min/1.73m*2    Calcium 8.8 8.6 - 10.3 mg/dL    Albumin 3.6 3.4 - 5.0 g/dL    Alkaline Phosphatase 64 33 - 136 U/L    Total Protein 5.7 (L) 6.4 - 8.2 g/dL    AST 15 9 - 39 U/L    Bilirubin, Total 0.9 0.0 - 1.2 mg/dL    ALT 15 7 - 45 U/L   Morphology   Result Value Ref Range    RBC Morphology See Below     Polychromasia Mild     Madonna Cells Few     Acanthocytes Few      *Note: Due to a large number of results and/or encounters for the requested time period, some results have not been displayed. A complete set of results can be found in Results Review.        Electrocardiogram, 12-lead PRN ACS symptoms  Result Date: 10/22/2024   Poor data quality, interpretation may be adversely affected Sinus rhythm with marked sinus arrhythmia with occasional Premature ventricular complexes Possible Left atrial enlargement Left axis deviation Anteroseptal infarct , age undetermined Abnormal ECG When compared with ECG of 18-SEP-2024 08:51, Significant changes have occurred    XR chest 2 views  Result Date: 10/21/2024  Interpreted By:  Jose Rodriguez, STUDY: XR CHEST 2 VIEWS; 10/20/2024 10:57 am    INDICATION: Signs/Symptoms:Follow-up pneumonia. Previous abnormal chest radiograph.   COMPARISON: 10/17/2024   ACCESSION NUMBER(S): [id number]   ORDERING CLINICIAN: DHARMESH GALVAN   TECHNIQUE: AP and lateral views of the chest were obtained.   FINDINGS: The cardiac silhouette is normal in appearance. Bilateral infiltrates are identified along with bilateral small effusions.       No change bilateral infiltrates and effusions.   MACRO: none   Signed by: Jose Rodriguez 10/21/2024 9:03 AM Dictation workstation:   [id number]    Lower extremity venous duplex bilateral  Result Date: 10/20/2024  Interpreted By:  Kellie Arreola, STUDY: VAS US LOWER EXTREMITY VENOUS DUPLEX BILATERAL 10/19/2024 2:30 pm   INDICATION: Left lower extremity pain and swelling for 2 weeks   COMPARISON: 09/16/2024   ACCESSION NUMBER(S): [id number]   ORDERING CLINICIAN: MADELYN AGUILA   TECHNIQUE: High-resolution and real time compression images as well as color Doppler ultrasound of each lower extremity is performed. Attempts were made to visualize the posterior tibial and peroneal veins.   FINDINGS: There is normal compressibility and flow seen within common femoral, femoral, popliteal, posterior tibial, and peroneal veins bilaterally. No deep venous thrombosis on either side is present.       No evidence of DVT within either lower extremity.   Signed by: Kellie Arreola 10/20/2024 4:58 AM Dictation workstation:   [id number]    XR chest 2 views  Result Date: 10/17/2024  Interpreted By:  Shirley Vance, STUDY: XR CHEST 2 VIEWS 10/17/2024 2:06 pm   INDICATION: Signs/Symptoms:Follow-up   COMPARISON: 10/16/2024   ACCESSION NUMBER(S): [id number]   ORDERING CLINICIAN: BELA STARK   TECHNIQUE: AP and lateral views of the chest   FINDINGS: Cardiomediastinal silhouette is enlarged with mild cardiomegaly demonstrated. Aorta is tortuous. There is diffuse vascular calcification of the aortic knob. Persistent hazy airspace infiltrate right mid to  lower lung zone with mild improved aeration of the right lower lung zone. There is persistent right basilar effusion. Also, blunting of the left costophrenic angle with small left basilar effusion. No dense airspace consolidation. Multiple surgical staples demonstrated within the lower neck. Multilevel endplate degenerative changes with multilevel anterior osteophyte formation within the thoracic spine. Lung fields are hyperinflated. Multilevel endplate degenerative changes noted.             Patchy alveolar airspace consolidation right mid to lower lung zone with findings as seen on prior imaging with improved aeration of the right lung base.   Small bilateral basilar effusion noted.   Signed by: Shirley Vance 10/17/2024 3:39 PM Dictation workstation:   [id number]    CT chest wo IV contrast  Result Date: 10/16/2024  Interpreted By:  Florian Malin, STUDY: CT CHEST WO IV CONTRAST;  10/16/2024 12:23 pm   INDICATION: Signs/Symptoms:Hypoxia, pneumonia.   COMPARISON: Chest radiograph 10/16/2024, chest CT 01/02/2013   ACCESSION NUMBER(S): [id number]   ORDERING CLINICIAN: DHARMESH GALVAN   TECHNIQUE: Helical data acquisition of the chest was obtained  without IV contrast material.  Images were reformatted in axial, coronal, and sagittal planes.   FINDINGS: LUNGS AND AIRWAYS: The trachea and central airways are patent. No endobronchial lesion.   Moderate right-sided pleural effusion. Small left-sided pleural effusion. Consolidative opacification of the dependent bilateral lower lobes. Mixed reticular and airspace opacification of the right lower lobe and right upper lobe. There is somewhat dependent nodular and reticular opacification of the right upper lobe. Background of probable emphysematous changes.   Presumed minimal inspissated dependent debris within the right trachea.   MEDIASTINUM AND BRENDA, LOWER NECK AND AXILLA: Postsurgical changes with numerous surgical clips in the region of the thyroid bed.    Enlarged pretracheal lymph node measuring up to 1.2 cm in short axis, nonspecific.   Esophagus appears within normal limits as seen.   HEART AND VESSELS: The thoracic aorta is unremarkable with respect to course, caliber, and contour.   Main pulmonary artery and its branches are unremarkable in caliber.   Coronary atherosclerotic calcifications. The study is not optimized for evaluation of coronary arteries.   Cardiomegaly.   No evidence of pericardial effusion.   UPPER ABDOMEN: No acute subdiaphragmatic abnormality detected.   CHEST WALL AND OSSEOUS STRUCTURES: No acute osseous abnormality.Multilevel degenerative changes of the imaged spine. Multilevel presumed chronic or degenerative endplate changes of the imaged spine.       1.  Moderate right and small left pleural effusions. 2. Asymmetric mixed reticular and airspace opacities, greatest within the right upper lobe and right lower lobe with somewhat greater distribution in the dependent regions. Findings are nonspecific, however may indicate sequela of aspiration pneumonitis or multifocal pneumonia. 3. Cardiomegaly with significant coronary atherosclerotic calcifications. 4. Nonspecific prominent mediastinal lymph node measuring up to 1.2 cm in short axis. Interval clinical follow-up advised to exclude lymphoproliferative process or metastatic disease.   Signed by: Florian Malin 10/16/2024 1:47 PM Dictation workstation:   [id number]       Vitals:    10/22/24 0800   BP:    Pulse:    Resp:    Temp:    SpO2: 95%   Visit Vitals  /62 (BP Location: Left arm, Patient Position: Lying)   Pulse 92   Temp 36.7 °C (98.1 °F) (Temporal)   Resp 22        Physical Exam  Vitals and nursing note reviewed.   Constitutional:       General: She is not in acute distress.     Appearance: She is normal weight. She is not toxic-appearing.   HENT:      Mouth/Throat:      Mouth: Mucous membranes are moist.      Pharynx: Oropharynx is clear.   Eyes:      General: No scleral  icterus.     Extraocular Movements: Extraocular movements intact.   Cardiovascular:      Rate and Rhythm: Normal rate and regular rhythm.      Pulses: Normal pulses.      Heart sounds: Normal heart sounds.   Pulmonary:      Effort: Pulmonary effort is normal. No respiratory distress.      Breath sounds: No wheezing.      Comments: On 2 liters  Abdominal:      General: There is no distension.      Tenderness: There is no abdominal tenderness. There is no guarding.      Comments: Abd firm, non tender   Musculoskeletal:      Comments: Trace non pitting edema ble's   Skin:     General: Skin is warm and dry.   Neurological:      General: No focal deficit present.      Mental Status: She is alert.      Comments: Oriented to person and place, pleasantly confused, follows commands   Psychiatric:      Comments: Calm, cooperative, talkative          Assessment/Plan   IMP:    Acute on chronic hypoxic respiratory failure-2LO2 baseline, nurses attempting to wean O2. Pulmonology following started pt on oral prednisone. Also getting IV abx  Acute on chronic HFpEF- pleural effusions present but unchanged on cxr  Type 2 MI 2/2 hypoxia   Possible pneumonia/pneumonitis-on Azactam  Chronic Anemia -Iron deficiency, and worked up by hematology, on Venofer. Suspect this is also contributing to her overall presentation. No further epistaxis. Hgb has been stable. Xarelto resumed.  History of DVT on Xarelto    Alzheimer's +/- vascular type dementia, with Declining functional status   Non compliance with her O2 -Frequently takes this off and argues with her  and son about its necessity becomes quickly short of breath and hypoxic. This is becoming increasingly difficult home situation for her   Anxiety - Cymbalta, low dose Xanax prn  Chronic lower back pain - tylenol and topical diclofenac prn. Cymbalta started this admission.     Palliative care encounter  DNR-CCA-DNI  The patient is capable  Her  Jesús (Chito) is her  primary POA these documents are located in [facility].    10/22/2024  Back to baseline O2 requirements. Anticipate dc home soon. Family met with hospice yesterday, did not sign consents, informational only. [facility] to follow up with them in a couple of days.      10/21/2024  Respiratory status improving. Family meeting with [facility] today at 1300. Await outcome of meeting.     10/18/2024  Continues in the disease modifying model of care CODE STATUS as above   Overall condition slightly Improved  Remains on antibiotics  Appreciate clinical dietitian's input  Recent appetite has been poor, but prior to this was eating relatively well at home per the son and the  per my conversation yesterday.  Will check a prealbumin to trend  Ensure compact twice daily recommended as per clinical dietitian     As per care transitions note  would prefer to wait on meeting with hospice of the [facility]/palliative until she is discharged home.     Seems to be tolerating Cymbalta did receive a dose of Xanax last night.  These additions were discussed with her primary provider Milana Hernadez CNP with our [facility] team whom I spoke with yesterday afternoon, these new medications should follow her home and can be adjusted by my colleague.     Advance Directives Info: Patient has advance directive, copy not in chart  Discharge Planning: home    Palliative Care Team will sign off at this time. GOC established. Please reach out if needed.          Destiny Arreaga, FELA-CNP

## 2024-11-01 ENCOUNTER — TELEPHONE (OUTPATIENT)
Dept: INPATIENT UNIT | Facility: HOSPITAL | Age: 84
End: 2024-11-01

## 2024-11-05 ENCOUNTER — OFFICE VISIT (OUTPATIENT)
Dept: PRIMARY CARE | Facility: CLINIC | Age: 84
End: 2024-11-05
Payer: MEDICARE

## 2024-11-05 VITALS
SYSTOLIC BLOOD PRESSURE: 112 MMHG | WEIGHT: 137.4 LBS | OXYGEN SATURATION: 96 % | DIASTOLIC BLOOD PRESSURE: 60 MMHG | BODY MASS INDEX: 23.58 KG/M2 | HEART RATE: 67 BPM

## 2024-11-05 DIAGNOSIS — D50.0 ANEMIA DUE TO BLOOD LOSS: Primary | ICD-10-CM

## 2024-11-05 DIAGNOSIS — J96.21 ACUTE ON CHRONIC RESPIRATORY FAILURE WITH HYPOXIA (MULTI): ICD-10-CM

## 2024-11-05 DIAGNOSIS — Z86.73 HISTORY OF STROKE: ICD-10-CM

## 2024-11-05 DIAGNOSIS — I50.31 CHF (CONGESTIVE HEART FAILURE), NYHA CLASS II, ACUTE, DIASTOLIC: ICD-10-CM

## 2024-11-05 PROCEDURE — 3078F DIAST BP <80 MM HG: CPT | Performed by: FAMILY MEDICINE

## 2024-11-05 PROCEDURE — 1123F ACP DISCUSS/DSCN MKR DOCD: CPT | Performed by: FAMILY MEDICINE

## 2024-11-05 PROCEDURE — 1125F AMNT PAIN NOTED PAIN PRSNT: CPT | Performed by: FAMILY MEDICINE

## 2024-11-05 PROCEDURE — 1036F TOBACCO NON-USER: CPT | Performed by: FAMILY MEDICINE

## 2024-11-05 PROCEDURE — 3074F SYST BP LT 130 MM HG: CPT | Performed by: FAMILY MEDICINE

## 2024-11-05 PROCEDURE — 99495 TRANSJ CARE MGMT MOD F2F 14D: CPT | Performed by: FAMILY MEDICINE

## 2024-11-05 PROCEDURE — 1159F MED LIST DOCD IN RCRD: CPT | Performed by: FAMILY MEDICINE

## 2024-11-05 PROCEDURE — 1111F DSCHRG MED/CURRENT MED MERGE: CPT | Performed by: FAMILY MEDICINE

## 2024-11-05 PROCEDURE — 1157F ADVNC CARE PLAN IN RCRD: CPT | Performed by: FAMILY MEDICINE

## 2024-11-05 ASSESSMENT — PATIENT HEALTH QUESTIONNAIRE - PHQ9
1. LITTLE INTEREST OR PLEASURE IN DOING THINGS: NOT AT ALL
SUM OF ALL RESPONSES TO PHQ9 QUESTIONS 1 AND 2: 0
2. FEELING DOWN, DEPRESSED OR HOPELESS: NOT AT ALL

## 2024-11-05 ASSESSMENT — ENCOUNTER SYMPTOMS
FATIGUE: 0
OCCASIONAL FEELINGS OF UNSTEADINESS: 0
PALPITATIONS: 0
APPETITE CHANGE: 0
COUGH: 1
ACTIVITY CHANGE: 1
DIZZINESS: 0
DEPRESSION: 0
LOSS OF SENSATION IN FEET: 0
LIGHT-HEADEDNESS: 0
SHORTNESS OF BREATH: 0

## 2024-11-05 ASSESSMENT — PAIN SCALES - GENERAL: PAINLEVEL_OUTOF10: 2

## 2024-11-05 NOTE — PROGRESS NOTES
"[facility]: [facility]  E/M EVALUATION    Anitha Welch is a 84 y.o. female who presents for Hospital Follow-up (10/15/2024 - 10/22/2024 (7 days)/[facility]/ Discharge Diagnosis/Acute on chronic respiratory failure with hypoxia (Multi)//Pt stated she still feels like she doesn't have much strength in her body/walking &breathing is continuing to be difficult. Cough is ongoing but mucus \"getting stuck\" and not being expelled. //).    Subjective   Pt here for hospital follow up.     Hypoxic respiratory failure- PNA, anemia.  Back to 2 L,  son helps care for them.  No issues with most ADLs.      Pulm- Fidel  Cardio- [facility].        Review of Systems   Constitutional:  Positive for activity change. Negative for appetite change and fatigue.   Eyes:  Positive for visual disturbance.   Respiratory:  Positive for cough. Negative for shortness of breath.    Cardiovascular:  Negative for chest pain and palpitations.   Neurological:  Negative for dizziness and light-headedness.       Objective   Vitals:    11/05/24 1432   BP: 112/60   Pulse: 67   SpO2: 96%     Physical Exam  Constitutional:       General: She is not in acute distress.     Appearance: Normal appearance.   Cardiovascular:      Rate and Rhythm: Normal rate and regular rhythm.      Heart sounds: No murmur heard.  Pulmonary:      Effort: Pulmonary effort is normal.      Breath sounds: Normal breath sounds.   Musculoskeletal:      Right lower leg: No edema.      Left lower leg: No edema.   Neurological:      Mental Status: She is alert.           Assessment/Plan      Patient Active Problem List   Diagnosis    Epistaxis    Abdominal aortic aneurysm (AAA) without rupture (CMS-HCC)    Acute low back pain    Acute on chronic systolic heart failure    Acute renal failure syndrome (CMS-HCC)    Acute ST segment elevation myocardial infarction (Multi)    Arteriosclerosis of coronary artery    Artificial lens present    Benign essential " hypertension    Anemia due to blood loss    Stenosis of left carotid artery    Cerebrovascular accident (CVA) involving left cerebral hemisphere (Multi)    Cerebrovascular accident (CVA) due to embolism of cerebral artery (Multi)    Cerebrovascular accident (Multi)    Transient ischemic attack    Angina pectoris    Backache    Chest pain    Tight chest    Chronic diastolic heart failure    Chronic heart failure with preserved ejection fraction    Stage 3 chronic kidney disease (Multi)    COPD exacerbation (Multi)    Chronic obstructive pulmonary disease (Multi)    Claudication (CMS-[facility])    Cystocele with prolapse    Vaginal prolapse    Dehydration    Dermatochalasis of left upper eyelid    Dermatochalasis of right upper eyelid    Vision disorder    Dysgeusia    Dyspnea    Electrocardiogram abnormal    Facial weakness    Fall    Gastroesophageal reflux disease    Gastrointestinal hemorrhage    Headache    History of coronary artery stent placement    History of myocardial infarction    History of stroke without residual deficits    History of cerebrovascular accident    Hypothyroidism (acquired)    Ischemic cardiomyopathy    Lacunar infarct, acute (Multi)    Lumbago-sciatica due to displacement of lumbar intervertebral disc    Lumbar degenerative disc disease    Mixed hyperlipidemia    Overactive bladder    Left leg pain    Pinguecula    Hypertension    Low blood pressure    Near syncope    Peripheral vascular disease (CMS-[facility])    Right homonymous hemianopsia    Seizure (Multi)    Stented coronary artery    Syncope and collapse    Tear film insufficiency    Urinary urgency    Varicose veins of both legs with edema    Floaters in visual field    Vitreous degeneration    Asthenia    Chronic fatigue syndrome    Weakness    Acute respiratory failure with hypoxia (Multi)    Acquired hypothyroidism    Bilateral carotid artery stenosis    Restless legs syndrome    HFrEF (heart failure with reduced ejection fraction)     Severe mitral valve regurgitation    Heart failure    Acute deep vein thrombosis (DVT) of proximal vein of left lower extremity (Multi)    Constipation    Venous thromboembolism (VTE)    Do not resuscitate    Amnesia    Hypertensive heart disease with heart failure    Nonrheumatic mitral valve regurgitation    S/P mitral valve clip implantation    Gastrointestinal bleed    Anemia    Spinal stenosis of lumbar region with neurogenic claudication    Acute on chronic respiratory failure with hypoxia (Multi)    CHF (congestive heart failure), NYHA class II, acute, diastolic    DVT (deep venous thrombosis) (Multi)    Dyspnea, unspecified type    Pneumonia of right lower lobe due to infectious organism       Diagnoses and all orders for this visit:  Anemia due to blood loss  -     CBC and Auto Differential; Future  -     Folate; Future  -     Reticulocytes; Future  -     Iron and TIBC; Future  Acute on chronic respiratory failure with hypoxia (Multi)  CHF (congestive heart failure), NYHA class II, acute, diastolic  History of stroke  We discussed staying off vs on  xarelto. Going to hold xarelto.   Avoid driving due to vision changes. She will resume 81mg asa. Recheck anemia abs 1 month.  Follow up pulmonary, cardiology.     The patient was encouraged to ensure that any/all documentation is accurate and up to date, and that our office be provided a copy in the event that anything changes.         Gee Dupree MD

## 2024-11-07 ENCOUNTER — TELEPHONE (OUTPATIENT)
Dept: PRIMARY CARE | Facility: CLINIC | Age: 84
End: 2024-11-07
Payer: MEDICARE

## 2024-11-08 ENCOUNTER — APPOINTMENT (OUTPATIENT)
Dept: PRIMARY CARE | Facility: CLINIC | Age: 84
End: 2024-11-08
Payer: MEDICARE

## 2024-11-08 NOTE — PROGRESS NOTES
Subjective   Patient ID: Anitha Welch is a 84 y.o. female who presents for No chief complaint on file..    HPI       Current Outpatient Medications:     albuterol 90 mcg/actuation inhaler, Inhale 1 puff every 4 hours if needed for shortness of breath., Disp: , Rfl:     ALPRAZolam (Xanax) 0.25 mg tablet, Take 1 tablet (0.25 mg) by mouth 3 times a day as needed for anxiety for up to 3 days., Disp: 7 tablet, Rfl: 0    atorvastatin (Lipitor) 40 mg tablet, TAKE 1 TABLET BY MOUTH EVERY DAY AT BEDTIME, Disp: 90 tablet, Rfl: 3    bisacodyl (Dulcolax) 5 mg EC tablet, Take 1 tablet (5 mg) by mouth once daily as needed for constipation. Do not crush, chew, or split., Disp: 30 tablet, Rfl: 1    carvedilol (Coreg) 3.125 mg tablet, TAKE 1 TABLET (3.125 MG) BY MOUTH 2 TIMES A DAY., Disp: 180 tablet, Rfl: 3    diclofenac sodium (Voltaren) 1 % gel, Apply 4.5 inches (4 g) topically 4 times a day., Disp: 100 g, Rfl: 1    docusate sodium (Colace) 100 mg capsule, Take 1 capsule (100 mg) by mouth 2 times a day as needed for constipation., Disp: 60 capsule, Rfl: 11    DULoxetine (Cymbalta) 20 mg DR capsule, Take 1 capsule (20 mg) by mouth once daily at bedtime. Do not crush or chew., Disp: 30 capsule, Rfl: 0    gabapentin (Neurontin) 100 mg capsule, Take 1 capsule (100 mg) by mouth once daily at bedtime., Disp: 30 capsule, Rfl: 2    levothyroxine (Synthroid, Levoxyl) 25 mcg tablet, Take 1 tablet (25 mcg) by mouth early in the morning.. Take on an empty stomach at the same time each day, either 30 to 60 minutes prior to breakfast, Disp: 90 tablet, Rfl: 3    lidocaine (Hemorrhoidal Relief) 5 % cream cream, Apply 1 Application topically every 6 hours if needed (hemorrhoids)., Disp: 45 g, Rfl: 0    loratadine (Claritin) 10 mg tablet, Take 1 tablet (10 mg) by mouth once daily., Disp: , Rfl:     multivitamin-iron-folic acid (Multi Complete with Iron)  mg-mcg tablet tablet, Take 1 tablet by mouth once daily., Disp: , Rfl:      nitroglycerin (Nitrostat) 0.4 mg SL tablet, Place 1 tablet (0.4 mg) under the tongue every 5 minutes if needed for chest pain., Disp: , Rfl:     nystatin (Mycostatin) 100,000 unit/gram powder, Apply 1 Application topically 2 times a day., Disp: 30 g, Rfl: 0    oxygen (O2) gas therapy, Inhale 2 L/min continuously. Indications: sob, Disp: , Rfl:     pantoprazole (ProtoNix) 40 mg EC tablet, Take 1 tablet (40 mg) by mouth 2 times a day., Disp: 60 tablet, Rfl: 0    polyethylene glycol (Glycolax, Miralax) 17 gram/dose powder, mix 17 grams (1 capful) in 8 ounces of water and drink daily, Disp: 510 g, Rfl: 0    tiZANidine (Zanaflex) 2 mg tablet, TAKE 1 TABLET BY MOUTH THREE TIMES A DAY AS NEEDED, Disp: 270 tablet, Rfl: 3    torsemide (Demadex) 20 mg tablet, Take 1 tablet (20 mg) by mouth once daily. Do not fill before May 2, 2024., Disp: , Rfl:     Trelegy Ellipta 100-62.5-25 mcg blister with device, Inhale 1 puff once daily., Disp: 1 each, Rfl: 3     Review of Systems    Objective   There were no vitals taken for this visit.    Physical Exam    Assessment/Plan   {Assess/PlanSmartLinks:73726}             Milana Hernadez, APRN-CNP

## 2024-11-18 ENCOUNTER — APPOINTMENT (OUTPATIENT)
Dept: PREADMISSION TESTING | Facility: HOSPITAL | Age: 84
End: 2024-11-18
Payer: MEDICARE

## 2024-11-25 ENCOUNTER — APPOINTMENT (OUTPATIENT)
Dept: GASTROENTEROLOGY | Facility: HOSPITAL | Age: 84
End: 2024-11-25
Payer: MEDICARE

## 2024-12-08 DIAGNOSIS — R53.1 ASTHENIA: ICD-10-CM

## 2024-12-10 RX ORDER — PANTOPRAZOLE SODIUM 40 MG/1
40 TABLET, DELAYED RELEASE ORAL 2 TIMES DAILY
Qty: 180 TABLET | Refills: 3 | Status: SHIPPED | OUTPATIENT
Start: 2024-12-10

## 2024-12-12 ENCOUNTER — TELEPHONE (OUTPATIENT)
Dept: PRIMARY CARE | Facility: CLINIC | Age: 84
End: 2024-12-12
Payer: MEDICARE

## 2024-12-13 ENCOUNTER — OFFICE VISIT (OUTPATIENT)
Dept: PRIMARY CARE | Facility: CLINIC | Age: 84
End: 2024-12-13
Payer: MEDICARE

## 2024-12-13 VITALS
HEART RATE: 76 BPM | WEIGHT: 140.2 LBS | RESPIRATION RATE: 16 BRPM | HEIGHT: 65 IN | SYSTOLIC BLOOD PRESSURE: 112 MMHG | BODY MASS INDEX: 23.36 KG/M2 | OXYGEN SATURATION: 99 % | TEMPERATURE: 96.8 F | DIASTOLIC BLOOD PRESSURE: 58 MMHG

## 2024-12-13 DIAGNOSIS — Z86.73 HISTORY OF STROKE WITHOUT RESIDUAL DEFICITS: ICD-10-CM

## 2024-12-13 DIAGNOSIS — I50.23 ACUTE ON CHRONIC SYSTOLIC HEART FAILURE: Primary | Chronic | ICD-10-CM

## 2024-12-13 DIAGNOSIS — F03.94 ANXIETY DUE TO DEMENTIA (MULTI): Chronic | ICD-10-CM

## 2024-12-13 DIAGNOSIS — J44.9 CHRONIC OBSTRUCTIVE PULMONARY DISEASE, UNSPECIFIED COPD TYPE (MULTI): Chronic | ICD-10-CM

## 2024-12-13 PROCEDURE — 1157F ADVNC CARE PLAN IN RCRD: CPT | Performed by: NURSE PRACTITIONER

## 2024-12-13 PROCEDURE — 1123F ACP DISCUSS/DSCN MKR DOCD: CPT | Performed by: NURSE PRACTITIONER

## 2024-12-13 PROCEDURE — 3074F SYST BP LT 130 MM HG: CPT | Performed by: NURSE PRACTITIONER

## 2024-12-13 PROCEDURE — 3078F DIAST BP <80 MM HG: CPT | Performed by: NURSE PRACTITIONER

## 2024-12-13 PROCEDURE — 99349 HOME/RES VST EST MOD MDM 40: CPT | Performed by: NURSE PRACTITIONER

## 2024-12-13 RX ORDER — DULOXETIN HYDROCHLORIDE 20 MG/1
20 CAPSULE, DELAYED RELEASE ORAL NIGHTLY
Qty: 90 CAPSULE | Refills: 3 | Status: SHIPPED | OUTPATIENT
Start: 2024-12-13 | End: 2025-12-13

## 2025-01-09 ENCOUNTER — HOSPITAL ENCOUNTER (INPATIENT)
Facility: HOSPITAL | Age: 85
End: 2025-01-09
Attending: EMERGENCY MEDICINE | Admitting: STUDENT IN AN ORGANIZED HEALTH CARE EDUCATION/TRAINING PROGRAM
Payer: MEDICARE

## 2025-01-09 DIAGNOSIS — I11.0 HYPERTENSIVE HEART DISEASE WITH HEART FAILURE: ICD-10-CM

## 2025-01-09 DIAGNOSIS — S72.002A CLOSED FRACTURE OF NECK OF LEFT FEMUR, INITIAL ENCOUNTER: Primary | ICD-10-CM
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
## 2025-01-09 DIAGNOSIS — J18.9 PNEUMONIA OF RIGHT LOWER LOBE DUE TO INFECTIOUS ORGANISM: ICD-10-CM

## 2025-01-09 DIAGNOSIS — R06.00 DYSPNEA, UNSPECIFIED TYPE: ICD-10-CM

## 2025-01-09 DIAGNOSIS — J96.11 CHRONIC RESPIRATORY FAILURE WITH HYPOXIA (MULTI): ICD-10-CM

## 2025-01-09 DIAGNOSIS — I34.0 SEVERE MITRAL VALVE REGURGITATION: ICD-10-CM

## 2025-01-09 DIAGNOSIS — Z86.79 HISTORY OF CHF (CONGESTIVE HEART FAILURE): ICD-10-CM

## 2025-01-09 DIAGNOSIS — I50.31 CHF (CONGESTIVE HEART FAILURE), NYHA CLASS II, ACUTE, DIASTOLIC: ICD-10-CM

## 2025-01-09 DIAGNOSIS — Z86.718 HISTORY OF DVT (DEEP VEIN THROMBOSIS): ICD-10-CM

## 2025-01-09 DIAGNOSIS — I26.99 OTHER ACUTE PULMONARY EMBOLISM, UNSPECIFIED WHETHER ACUTE COR PULMONALE PRESENT (MULTI): ICD-10-CM

## 2025-01-09 DIAGNOSIS — D50.0 IRON DEFICIENCY ANEMIA DUE TO CHRONIC BLOOD LOSS: ICD-10-CM

## 2025-01-09 DIAGNOSIS — I50.33 ACUTE ON CHRONIC DIASTOLIC HF (HEART FAILURE): ICD-10-CM

## 2025-01-09 DIAGNOSIS — R01.1 CARDIAC MURMUR, UNSPECIFIED: ICD-10-CM

## 2025-01-09 DIAGNOSIS — I26.99 OTHER PULMONARY EMBOLISM WITHOUT ACUTE COR PULMONALE, UNSPECIFIED CHRONICITY (MULTI): ICD-10-CM

## 2025-01-09 DIAGNOSIS — I50.20 HFREF (HEART FAILURE WITH REDUCED EJECTION FRACTION): ICD-10-CM

## 2025-01-09 PROCEDURE — 99285 EMERGENCY DEPT VISIT HI MDM: CPT | Performed by: EMERGENCY MEDICINE

## 2025-01-09 PROCEDURE — 99291 CRITICAL CARE FIRST HOUR: CPT | Performed by: EMERGENCY MEDICINE

## 2025-01-09 ASSESSMENT — PAIN DESCRIPTION - LOCATION: LOCATION: LEG

## 2025-01-09 ASSESSMENT — PAIN - FUNCTIONAL ASSESSMENT: PAIN_FUNCTIONAL_ASSESSMENT: 0-10

## 2025-01-09 ASSESSMENT — PAIN SCALES - GENERAL: PAINLEVEL_OUTOF10: 9

## 2025-01-09 ASSESSMENT — PAIN DESCRIPTION - ORIENTATION: ORIENTATION: LEFT

## 2025-01-09 ASSESSMENT — PAIN DESCRIPTION - DESCRIPTORS: DESCRIPTORS: SHARP

## 2025-01-09 ASSESSMENT — PAIN DESCRIPTION - PAIN TYPE: TYPE: ACUTE PAIN

## 2025-01-10 ENCOUNTER — ANESTHESIA EVENT (OUTPATIENT)
Dept: OPERATING ROOM | Facility: HOSPITAL | Age: 85
End: 2025-01-10
Payer: MEDICARE

## 2025-01-10 ENCOUNTER — APPOINTMENT (OUTPATIENT)
Dept: RADIOLOGY | Facility: HOSPITAL | Age: 85
End: 2025-01-10
Payer: MEDICARE

## 2025-01-10 ENCOUNTER — TELEPHONE (OUTPATIENT)
Dept: PRIMARY CARE | Facility: CLINIC | Age: 85
End: 2025-01-10

## 2025-01-10 ENCOUNTER — APPOINTMENT (OUTPATIENT)
Dept: CARDIOLOGY | Facility: HOSPITAL | Age: 85
End: 2025-01-10
Payer: MEDICARE

## 2025-01-10 PROBLEM — Z86.79 HISTORY OF CHF (CONGESTIVE HEART FAILURE): Status: ACTIVE | Noted: 2025-01-10

## 2025-01-10 PROBLEM — J96.10 CHRONIC RESPIRATORY FAILURE: Status: ACTIVE | Noted: 2024-09-14

## 2025-01-10 PROBLEM — Z86.718 HISTORY OF DVT (DEEP VEIN THROMBOSIS): Status: ACTIVE | Noted: 2025-01-10

## 2025-01-10 PROBLEM — S72.002A CLOSED FRACTURE OF NECK OF LEFT FEMUR, INITIAL ENCOUNTER: Status: ACTIVE | Noted: 2025-01-10

## 2025-01-10 LAB
ALBUMIN SERPL BCP-MCNC: 4.1 G/DL (ref 3.4–5)
ALP SERPL-CCNC: 56 U/L (ref 33–136)
ALT SERPL W P-5'-P-CCNC: 19 U/L (ref 7–45)
ANION GAP SERPL CALCULATED.3IONS-SCNC: 10 MMOL/L (ref 10–20)
APPEARANCE UR: CLEAR
AST SERPL W P-5'-P-CCNC: 25 U/L (ref 9–39)
BASOPHILS # BLD AUTO: 0.02 X10*3/UL (ref 0–0.1)
BASOPHILS NFR BLD AUTO: 0.3 %
BILIRUB SERPL-MCNC: 0.8 MG/DL (ref 0–1.2)
BILIRUB UR STRIP.AUTO-MCNC: NEGATIVE MG/DL
BUN SERPL-MCNC: 30 MG/DL (ref 6–23)
CALCIUM SERPL-MCNC: 8.9 MG/DL (ref 8.6–10.3)
CHLORIDE SERPL-SCNC: 100 MMOL/L (ref 98–107)
CO2 SERPL-SCNC: 36 MMOL/L (ref 21–32)
COLOR UR: ABNORMAL
CREAT SERPL-MCNC: 1.11 MG/DL (ref 0.5–1.05)
EGFRCR SERPLBLD CKD-EPI 2021: 49 ML/MIN/1.73M*2
EOSINOPHIL # BLD AUTO: 0.04 X10*3/UL (ref 0–0.4)
EOSINOPHIL NFR BLD AUTO: 0.7 %
ERYTHROCYTE [DISTWIDTH] IN BLOOD BY AUTOMATED COUNT: 17.2 % (ref 11.5–14.5)
GLUCOSE SERPL-MCNC: 114 MG/DL (ref 74–99)
GLUCOSE UR STRIP.AUTO-MCNC: NORMAL MG/DL
HCT VFR BLD AUTO: 36.7 % (ref 36–46)
HGB BLD-MCNC: 11.5 G/DL (ref 12–16)
IMM GRANULOCYTES # BLD AUTO: 0.04 X10*3/UL (ref 0–0.5)
IMM GRANULOCYTES NFR BLD AUTO: 0.7 % (ref 0–0.9)
IRON SATN MFR SERPL: 18 % (ref 25–45)
IRON SERPL-MCNC: 62 UG/DL (ref 35–150)
KETONES UR STRIP.AUTO-MCNC: NEGATIVE MG/DL
LEUKOCYTE ESTERASE UR QL STRIP.AUTO: ABNORMAL
LYMPHOCYTES # BLD AUTO: 0.91 X10*3/UL (ref 0.8–3)
LYMPHOCYTES NFR BLD AUTO: 15.4 %
MCH RBC QN AUTO: 28.1 PG (ref 26–34)
MCHC RBC AUTO-ENTMCNC: 31.3 G/DL (ref 32–36)
MCV RBC AUTO: 90 FL (ref 80–100)
MONOCYTES # BLD AUTO: 0.38 X10*3/UL (ref 0.05–0.8)
MONOCYTES NFR BLD AUTO: 6.4 %
MUCOUS THREADS #/AREA URNS AUTO: ABNORMAL /LPF
NEUTROPHILS # BLD AUTO: 4.53 X10*3/UL (ref 1.6–5.5)
NEUTROPHILS NFR BLD AUTO: 76.5 %
NITRITE UR QL STRIP.AUTO: NEGATIVE
NRBC BLD-RTO: 0 /100 WBCS (ref 0–0)
PH UR STRIP.AUTO: 5 [PH]
PLATELET # BLD AUTO: 133 X10*3/UL (ref 150–450)
POTASSIUM SERPL-SCNC: 4.5 MMOL/L (ref 3.5–5.3)
PROT SERPL-MCNC: 6.7 G/DL (ref 6.4–8.2)
PROT UR STRIP.AUTO-MCNC: NEGATIVE MG/DL
RBC # BLD AUTO: 4.09 X10*6/UL (ref 4–5.2)
RBC # UR STRIP.AUTO: ABNORMAL /UL
RBC #/AREA URNS AUTO: ABNORMAL /HPF
SODIUM SERPL-SCNC: 141 MMOL/L (ref 136–145)
SP GR UR STRIP.AUTO: 1.02
TIBC SERPL-MCNC: 349 UG/DL (ref 240–445)
UIBC SERPL-MCNC: 287 UG/DL (ref 110–370)
UROBILINOGEN UR STRIP.AUTO-MCNC: NORMAL MG/DL
WBC # BLD AUTO: 5.9 X10*3/UL (ref 4.4–11.3)
WBC #/AREA URNS AUTO: ABNORMAL /HPF

## 2025-01-10 PROCEDURE — 83540 ASSAY OF IRON: CPT | Performed by: NURSE PRACTITIONER

## 2025-01-10 PROCEDURE — 96376 TX/PRO/DX INJ SAME DRUG ADON: CPT

## 2025-01-10 PROCEDURE — 72131 CT LUMBAR SPINE W/O DYE: CPT

## 2025-01-10 PROCEDURE — 99223 1ST HOSP IP/OBS HIGH 75: CPT | Performed by: STUDENT IN AN ORGANIZED HEALTH CARE EDUCATION/TRAINING PROGRAM

## 2025-01-10 PROCEDURE — 93005 ELECTROCARDIOGRAM TRACING: CPT

## 2025-01-10 PROCEDURE — 72131 CT LUMBAR SPINE W/O DYE: CPT | Performed by: RADIOLOGY

## 2025-01-10 PROCEDURE — 80053 COMPREHEN METABOLIC PANEL: CPT | Performed by: EMERGENCY MEDICINE

## 2025-01-10 PROCEDURE — 93010 ELECTROCARDIOGRAM REPORT: CPT | Performed by: INTERNAL MEDICINE

## 2025-01-10 PROCEDURE — 73502 X-RAY EXAM HIP UNI 2-3 VIEWS: CPT | Mod: LT

## 2025-01-10 PROCEDURE — 73552 X-RAY EXAM OF FEMUR 2/>: CPT | Mod: LEFT SIDE | Performed by: RADIOLOGY

## 2025-01-10 PROCEDURE — 96374 THER/PROPH/DIAG INJ IV PUSH: CPT | Mod: 59

## 2025-01-10 PROCEDURE — 73552 X-RAY EXAM OF FEMUR 2/>: CPT | Mod: LT

## 2025-01-10 PROCEDURE — 2500000001 HC RX 250 WO HCPCS SELF ADMINISTERED DRUGS (ALT 637 FOR MEDICARE OP): Performed by: NURSE PRACTITIONER

## 2025-01-10 PROCEDURE — 2500000001 HC RX 250 WO HCPCS SELF ADMINISTERED DRUGS (ALT 637 FOR MEDICARE OP): Performed by: STUDENT IN AN ORGANIZED HEALTH CARE EDUCATION/TRAINING PROGRAM

## 2025-01-10 PROCEDURE — 87086 URINE CULTURE/COLONY COUNT: CPT | Mod: WESLAB | Performed by: EMERGENCY MEDICINE

## 2025-01-10 PROCEDURE — 2500000002 HC RX 250 W HCPCS SELF ADMINISTERED DRUGS (ALT 637 FOR MEDICARE OP, ALT 636 FOR OP/ED): Performed by: STUDENT IN AN ORGANIZED HEALTH CARE EDUCATION/TRAINING PROGRAM

## 2025-01-10 PROCEDURE — 85025 COMPLETE CBC W/AUTO DIFF WBC: CPT | Performed by: EMERGENCY MEDICINE

## 2025-01-10 PROCEDURE — 71045 X-RAY EXAM CHEST 1 VIEW: CPT

## 2025-01-10 PROCEDURE — 2500000004 HC RX 250 GENERAL PHARMACY W/ HCPCS (ALT 636 FOR OP/ED): Performed by: STUDENT IN AN ORGANIZED HEALTH CARE EDUCATION/TRAINING PROGRAM

## 2025-01-10 PROCEDURE — 73502 X-RAY EXAM HIP UNI 2-3 VIEWS: CPT | Mod: LEFT SIDE | Performed by: RADIOLOGY

## 2025-01-10 PROCEDURE — 1200000002 HC GENERAL ROOM WITH TELEMETRY DAILY

## 2025-01-10 PROCEDURE — 81001 URINALYSIS AUTO W/SCOPE: CPT | Performed by: EMERGENCY MEDICINE

## 2025-01-10 PROCEDURE — 2500000005 HC RX 250 GENERAL PHARMACY W/O HCPCS: Performed by: STUDENT IN AN ORGANIZED HEALTH CARE EDUCATION/TRAINING PROGRAM

## 2025-01-10 PROCEDURE — 73590 X-RAY EXAM OF LOWER LEG: CPT | Mod: LT

## 2025-01-10 PROCEDURE — 96372 THER/PROPH/DIAG INJ SC/IM: CPT | Performed by: EMERGENCY MEDICINE

## 2025-01-10 PROCEDURE — 36415 COLL VENOUS BLD VENIPUNCTURE: CPT | Performed by: EMERGENCY MEDICINE

## 2025-01-10 PROCEDURE — 2500000004 HC RX 250 GENERAL PHARMACY W/ HCPCS (ALT 636 FOR OP/ED): Performed by: EMERGENCY MEDICINE

## 2025-01-10 PROCEDURE — 73590 X-RAY EXAM OF LOWER LEG: CPT | Mod: LEFT SIDE | Performed by: RADIOLOGY

## 2025-01-10 PROCEDURE — 94640 AIRWAY INHALATION TREATMENT: CPT

## 2025-01-10 PROCEDURE — 99222 1ST HOSP IP/OBS MODERATE 55: CPT | Performed by: NURSE PRACTITIONER

## 2025-01-10 PROCEDURE — 96375 TX/PRO/DX INJ NEW DRUG ADDON: CPT

## 2025-01-10 RX ORDER — CARVEDILOL 3.12 MG/1
3.25 TABLET ORAL 2 TIMES DAILY
Status: DISPENSED | OUTPATIENT
Start: 2025-01-10

## 2025-01-10 RX ORDER — ONDANSETRON 4 MG/1
4 TABLET, FILM COATED ORAL EVERY 8 HOURS PRN
Status: ACTIVE | OUTPATIENT
Start: 2025-01-10

## 2025-01-10 RX ORDER — ONDANSETRON HYDROCHLORIDE 2 MG/ML
4 INJECTION, SOLUTION INTRAVENOUS EVERY 8 HOURS PRN
Status: ACTIVE | OUTPATIENT
Start: 2025-01-10

## 2025-01-10 RX ORDER — MORPHINE SULFATE 2 MG/ML
2 INJECTION, SOLUTION INTRAMUSCULAR; INTRAVENOUS EVERY 4 HOURS PRN
Status: DISCONTINUED | OUTPATIENT
Start: 2025-01-10 | End: 2025-01-11

## 2025-01-10 RX ORDER — ACETAMINOPHEN 325 MG/1
650 TABLET ORAL EVERY 4 HOURS PRN
Status: DISPENSED | OUTPATIENT
Start: 2025-01-10

## 2025-01-10 RX ORDER — OXYCODONE HYDROCHLORIDE 5 MG/1
10 TABLET ORAL EVERY 4 HOURS PRN
Status: DISCONTINUED | OUTPATIENT
Start: 2025-01-10 | End: 2025-01-11

## 2025-01-10 RX ORDER — ALBUTEROL SULFATE 0.83 MG/ML
3 SOLUTION RESPIRATORY (INHALATION) EVERY 4 HOURS PRN
Status: ACTIVE | OUTPATIENT
Start: 2025-01-10

## 2025-01-10 RX ORDER — HYDROMORPHONE HYDROCHLORIDE 2 MG/ML
5 INJECTION, SOLUTION INTRAMUSCULAR; INTRAVENOUS; SUBCUTANEOUS
Status: DISCONTINUED | OUTPATIENT
Start: 2025-01-10 | End: 2025-01-10

## 2025-01-10 RX ORDER — POLYETHYLENE GLYCOL 3350 17 G/17G
17 POWDER, FOR SOLUTION ORAL DAILY
Status: DISPENSED | OUTPATIENT
Start: 2025-01-10

## 2025-01-10 RX ORDER — PANTOPRAZOLE SODIUM 40 MG/1
40 TABLET, DELAYED RELEASE ORAL 2 TIMES DAILY
Status: DISPENSED | OUTPATIENT
Start: 2025-01-10

## 2025-01-10 RX ORDER — KETOROLAC TROMETHAMINE 15 MG/ML
15 INJECTION, SOLUTION INTRAMUSCULAR; INTRAVENOUS ONCE
Status: COMPLETED | OUTPATIENT
Start: 2025-01-10 | End: 2025-01-10

## 2025-01-10 RX ORDER — ACETAMINOPHEN 325 MG/1
650 TABLET ORAL EVERY 8 HOURS
Status: DISPENSED | OUTPATIENT
Start: 2025-01-10

## 2025-01-10 RX ORDER — ATORVASTATIN CALCIUM 40 MG/1
40 TABLET, FILM COATED ORAL NIGHTLY
Status: DISPENSED | OUTPATIENT
Start: 2025-01-10

## 2025-01-10 RX ORDER — NITROGLYCERIN 0.4 MG/1
0.4 TABLET SUBLINGUAL EVERY 5 MIN PRN
Status: ACTIVE | OUTPATIENT
Start: 2025-01-10

## 2025-01-10 RX ORDER — ACETAMINOPHEN 160 MG/5ML
650 SOLUTION ORAL EVERY 4 HOURS PRN
Status: ACTIVE | OUTPATIENT
Start: 2025-01-10

## 2025-01-10 RX ORDER — VANCOMYCIN HYDROCHLORIDE 1 G/20ML
1 INJECTION, POWDER, LYOPHILIZED, FOR SOLUTION INTRAVENOUS ONCE
OUTPATIENT
Start: 2025-01-10 | End: 2025-01-10

## 2025-01-10 RX ORDER — OXYCODONE HYDROCHLORIDE 5 MG/1
5 TABLET ORAL EVERY 4 HOURS PRN
Status: DISPENSED | OUTPATIENT
Start: 2025-01-10

## 2025-01-10 RX ORDER — GABAPENTIN 100 MG/1
100 CAPSULE ORAL NIGHTLY
Status: DISPENSED | OUTPATIENT
Start: 2025-01-10

## 2025-01-10 RX ORDER — DULOXETIN HYDROCHLORIDE 20 MG/1
20 CAPSULE, DELAYED RELEASE ORAL NIGHTLY
Status: DISPENSED | OUTPATIENT
Start: 2025-01-10

## 2025-01-10 RX ORDER — ONDANSETRON HYDROCHLORIDE 2 MG/ML
4 INJECTION, SOLUTION INTRAVENOUS ONCE
Status: COMPLETED | OUTPATIENT
Start: 2025-01-10 | End: 2025-01-10

## 2025-01-10 RX ORDER — ACETAMINOPHEN 650 MG/1
650 SUPPOSITORY RECTAL EVERY 4 HOURS PRN
Status: ACTIVE | OUTPATIENT
Start: 2025-01-10

## 2025-01-10 RX ORDER — TORSEMIDE 20 MG/1
20 TABLET ORAL DAILY
Status: ACTIVE | OUTPATIENT
Start: 2025-01-10

## 2025-01-10 RX ORDER — SODIUM CHLORIDE 9 MG/ML
45 INJECTION, SOLUTION INTRAVENOUS CONTINUOUS
Status: DISCONTINUED | OUTPATIENT
Start: 2025-01-10 | End: 2025-01-10

## 2025-01-10 RX ORDER — FLUTICASONE FUROATE AND VILANTEROL 100; 25 UG/1; UG/1
1 POWDER RESPIRATORY (INHALATION)
Status: DISPENSED | OUTPATIENT
Start: 2025-01-10

## 2025-01-10 RX ORDER — ACETAMINOPHEN 500 MG
500 TABLET ORAL ONCE
OUTPATIENT
Start: 2025-01-10 | End: 2025-01-10

## 2025-01-10 RX ORDER — IPRATROPIUM BROMIDE AND ALBUTEROL SULFATE 2.5; .5 MG/3ML; MG/3ML
3 SOLUTION RESPIRATORY (INHALATION) 4 TIMES DAILY PRN
Status: DISPENSED | OUTPATIENT
Start: 2025-01-10

## 2025-01-10 RX ORDER — ACETAMINOPHEN 500 MG
5 TABLET ORAL NIGHTLY PRN
Status: DISPENSED | OUTPATIENT
Start: 2025-01-10

## 2025-01-10 RX ORDER — LEVOTHYROXINE SODIUM 25 UG/1
25 TABLET ORAL DAILY
Status: DISPENSED | OUTPATIENT
Start: 2025-01-10

## 2025-01-10 RX ADMIN — CARVEDILOL 3.12 MG: 3.12 TABLET, FILM COATED ORAL at 10:56

## 2025-01-10 RX ADMIN — HYDROMORPHONE HYDROCHLORIDE 0.5 MG: 1 INJECTION, SOLUTION INTRAMUSCULAR; INTRAVENOUS; SUBCUTANEOUS at 01:04

## 2025-01-10 RX ADMIN — ACETAMINOPHEN 650 MG: 325 TABLET ORAL at 19:52

## 2025-01-10 RX ADMIN — PANTOPRAZOLE SODIUM 40 MG: 40 TABLET, DELAYED RELEASE ORAL at 22:15

## 2025-01-10 RX ADMIN — Medication 2 L/MIN: at 07:47

## 2025-01-10 RX ADMIN — OXYCODONE 5 MG: 5 TABLET ORAL at 12:29

## 2025-01-10 RX ADMIN — IPRATROPIUM BROMIDE AND ALBUTEROL SULFATE 3 ML: 2.5; .5 SOLUTION RESPIRATORY (INHALATION) at 07:44

## 2025-01-10 RX ADMIN — ONDANSETRON 4 MG: 2 INJECTION INTRAMUSCULAR; INTRAVENOUS at 01:16

## 2025-01-10 RX ADMIN — GABAPENTIN 100 MG: 100 CAPSULE ORAL at 22:15

## 2025-01-10 RX ADMIN — ATORVASTATIN CALCIUM 40 MG: 40 TABLET, FILM COATED ORAL at 22:15

## 2025-01-10 RX ADMIN — SODIUM CHLORIDE 45 ML/HR: 900 INJECTION, SOLUTION INTRAVENOUS at 06:38

## 2025-01-10 RX ADMIN — Medication 3 L/MIN: at 07:46

## 2025-01-10 RX ADMIN — ONDANSETRON 4 MG: 2 INJECTION INTRAMUSCULAR; INTRAVENOUS at 02:21

## 2025-01-10 RX ADMIN — KETOROLAC TROMETHAMINE 15 MG: 15 INJECTION, SOLUTION INTRAMUSCULAR; INTRAVENOUS at 01:16

## 2025-01-10 RX ADMIN — ACETAMINOPHEN 650 MG: 325 TABLET ORAL at 10:56

## 2025-01-10 RX ADMIN — CARVEDILOL 3.12 MG: 3.12 TABLET, FILM COATED ORAL at 22:15

## 2025-01-10 RX ADMIN — DULOXETINE HYDROCHLORIDE 20 MG: 20 CAPSULE, DELAYED RELEASE ORAL at 22:15

## 2025-01-10 RX ADMIN — OXYCODONE 5 MG: 5 TABLET ORAL at 16:58

## 2025-01-10 SDOH — ECONOMIC STABILITY: INCOME INSECURITY: IN THE PAST 12 MONTHS HAS THE ELECTRIC, GAS, OIL, OR WATER COMPANY THREATENED TO SHUT OFF SERVICES IN YOUR HOME?: NO

## 2025-01-10 SDOH — ECONOMIC STABILITY: FOOD INSECURITY: WITHIN THE PAST 12 MONTHS, THE FOOD YOU BOUGHT JUST DIDN'T LAST AND YOU DIDN'T HAVE MONEY TO GET MORE.: NEVER TRUE

## 2025-01-10 SDOH — SOCIAL STABILITY: SOCIAL INSECURITY: ARE YOU MARRIED, WIDOWED, DIVORCED, SEPARATED, NEVER MARRIED, OR LIVING WITH A PARTNER?: MARRIED

## 2025-01-10 SDOH — SOCIAL STABILITY: SOCIAL NETWORK: HOW OFTEN DO YOU ATTEND MEETINGS OF THE CLUBS OR ORGANIZATIONS YOU BELONG TO?: NEVER

## 2025-01-10 SDOH — HEALTH STABILITY: PHYSICAL HEALTH
HOW OFTEN DO YOU NEED TO HAVE SOMEONE HELP YOU WHEN YOU READ INSTRUCTIONS, PAMPHLETS, OR OTHER WRITTEN MATERIAL FROM YOUR DOCTOR OR PHARMACY?: SOMETIMES

## 2025-01-10 SDOH — SOCIAL STABILITY: SOCIAL INSECURITY: HAS ANYONE EVER THREATENED TO HURT YOUR FAMILY OR YOUR PETS?: NO

## 2025-01-10 SDOH — ECONOMIC STABILITY: HOUSING INSECURITY: AT ANY TIME IN THE PAST 12 MONTHS, WERE YOU HOMELESS OR LIVING IN A SHELTER (INCLUDING NOW)?: NO

## 2025-01-10 SDOH — HEALTH STABILITY: MENTAL HEALTH: HOW OFTEN DO YOU HAVE SIX OR MORE DRINKS ON ONE OCCASION?: NEVER

## 2025-01-10 SDOH — SOCIAL STABILITY: SOCIAL NETWORK: HOW OFTEN DO YOU ATTEND CHURCH OR RELIGIOUS SERVICES?: MORE THAN 4 TIMES PER YEAR

## 2025-01-10 SDOH — ECONOMIC STABILITY: FOOD INSECURITY: HOW HARD IS IT FOR YOU TO PAY FOR THE VERY BASICS LIKE FOOD, HOUSING, MEDICAL CARE, AND HEATING?: NOT HARD AT ALL

## 2025-01-10 SDOH — SOCIAL STABILITY: SOCIAL INSECURITY: HAVE YOU HAD ANY THOUGHTS OF HARMING ANYONE ELSE?: NO

## 2025-01-10 SDOH — HEALTH STABILITY: PHYSICAL HEALTH: ON AVERAGE, HOW MANY DAYS PER WEEK DO YOU ENGAGE IN MODERATE TO STRENUOUS EXERCISE (LIKE A BRISK WALK)?: 0 DAYS

## 2025-01-10 SDOH — HEALTH STABILITY: PHYSICAL HEALTH: ON AVERAGE, HOW MANY MINUTES DO YOU ENGAGE IN EXERCISE AT THIS LEVEL?: 0 MIN

## 2025-01-10 SDOH — SOCIAL STABILITY: SOCIAL INSECURITY: HAVE YOU HAD THOUGHTS OF HARMING ANYONE ELSE?: NO

## 2025-01-10 SDOH — SOCIAL STABILITY: SOCIAL INSECURITY: WERE YOU ABLE TO COMPLETE ALL THE BEHAVIORAL HEALTH SCREENINGS?: YES

## 2025-01-10 SDOH — ECONOMIC STABILITY: HOUSING INSECURITY: IN THE PAST 12 MONTHS, HOW MANY TIMES HAVE YOU MOVED WHERE YOU WERE LIVING?: 0

## 2025-01-10 SDOH — ECONOMIC STABILITY: FOOD INSECURITY: WITHIN THE PAST 12 MONTHS, YOU WORRIED THAT YOUR FOOD WOULD RUN OUT BEFORE YOU GOT THE MONEY TO BUY MORE.: NEVER TRUE

## 2025-01-10 SDOH — SOCIAL STABILITY: SOCIAL NETWORK: HOW OFTEN DO YOU GET TOGETHER WITH FRIENDS OR RELATIVES?: MORE THAN THREE TIMES A WEEK

## 2025-01-10 SDOH — HEALTH STABILITY: MENTAL HEALTH: HOW MANY DRINKS CONTAINING ALCOHOL DO YOU HAVE ON A TYPICAL DAY WHEN YOU ARE DRINKING?: PATIENT DOES NOT DRINK

## 2025-01-10 SDOH — SOCIAL STABILITY: SOCIAL INSECURITY: WITHIN THE LAST YEAR, HAVE YOU BEEN HUMILIATED OR EMOTIONALLY ABUSED IN OTHER WAYS BY YOUR PARTNER OR EX-PARTNER?: NO

## 2025-01-10 SDOH — SOCIAL STABILITY: SOCIAL INSECURITY: WITHIN THE LAST YEAR, HAVE YOU BEEN AFRAID OF YOUR PARTNER OR EX-PARTNER?: NO

## 2025-01-10 SDOH — HEALTH STABILITY: MENTAL HEALTH: HOW OFTEN DO YOU HAVE A DRINK CONTAINING ALCOHOL?: NEVER

## 2025-01-10 SDOH — SOCIAL STABILITY: SOCIAL INSECURITY: DO YOU FEEL ANYONE HAS EXPLOITED OR TAKEN ADVANTAGE OF YOU FINANCIALLY OR OF YOUR PERSONAL PROPERTY?: NO

## 2025-01-10 SDOH — SOCIAL STABILITY: SOCIAL INSECURITY: DO YOU FEEL UNSAFE GOING BACK TO THE PLACE WHERE YOU ARE LIVING?: NO

## 2025-01-10 SDOH — ECONOMIC STABILITY: HOUSING INSECURITY: IN THE LAST 12 MONTHS, WAS THERE A TIME WHEN YOU WERE NOT ABLE TO PAY THE MORTGAGE OR RENT ON TIME?: NO

## 2025-01-10 SDOH — SOCIAL STABILITY: SOCIAL INSECURITY: ARE THERE ANY APPARENT SIGNS OF INJURIES/BEHAVIORS THAT COULD BE RELATED TO ABUSE/NEGLECT?: NO

## 2025-01-10 SDOH — SOCIAL STABILITY: SOCIAL INSECURITY: ABUSE: ADULT

## 2025-01-10 SDOH — ECONOMIC STABILITY: TRANSPORTATION INSECURITY: IN THE PAST 12 MONTHS, HAS LACK OF TRANSPORTATION KEPT YOU FROM MEDICAL APPOINTMENTS OR FROM GETTING MEDICATIONS?: NO

## 2025-01-10 SDOH — SOCIAL STABILITY: SOCIAL INSECURITY: ARE YOU OR HAVE YOU BEEN THREATENED OR ABUSED PHYSICALLY, EMOTIONALLY, OR SEXUALLY BY ANYONE?: NO

## 2025-01-10 SDOH — SOCIAL STABILITY: SOCIAL INSECURITY: DOES ANYONE TRY TO KEEP YOU FROM HAVING/CONTACTING OTHER FRIENDS OR DOING THINGS OUTSIDE YOUR HOME?: NO

## 2025-01-10 ASSESSMENT — PAIN DESCRIPTION - LOCATION
LOCATION: LEG
LOCATION: LEG

## 2025-01-10 ASSESSMENT — PAIN - FUNCTIONAL ASSESSMENT
PAIN_FUNCTIONAL_ASSESSMENT: 0-10

## 2025-01-10 ASSESSMENT — LIFESTYLE VARIABLES
SKIP TO QUESTIONS 9-10: 1
HOW OFTEN DO YOU HAVE A DRINK CONTAINING ALCOHOL: NEVER
SKIP TO QUESTIONS 9-10: 1
HOW OFTEN DO YOU HAVE 6 OR MORE DRINKS ON ONE OCCASION: NEVER
AUDIT-C TOTAL SCORE: 0
AUDIT-C TOTAL SCORE: 0
HOW MANY STANDARD DRINKS CONTAINING ALCOHOL DO YOU HAVE ON A TYPICAL DAY: PATIENT DOES NOT DRINK
AUDIT-C TOTAL SCORE: 0

## 2025-01-10 ASSESSMENT — PAIN DESCRIPTION - DESCRIPTORS
DESCRIPTORS: ACHING

## 2025-01-10 ASSESSMENT — PAIN SCALES - GENERAL
PAINLEVEL_OUTOF10: 5 - MODERATE PAIN
PAINLEVEL_OUTOF10: 6
PAINLEVEL_OUTOF10: 10 - WORST POSSIBLE PAIN
PAINLEVEL_OUTOF10: 5 - MODERATE PAIN
PAINLEVEL_OUTOF10: 7

## 2025-01-10 ASSESSMENT — COGNITIVE AND FUNCTIONAL STATUS - GENERAL
PATIENT BASELINE BEDBOUND: NO
TOILETING: A LITTLE
MOBILITY SCORE: 15
DRESSING REGULAR UPPER BODY CLOTHING: A LITTLE
MOVING TO AND FROM BED TO CHAIR: A LITTLE
MOVING FROM LYING ON BACK TO SITTING ON SIDE OF FLAT BED WITH BEDRAILS: A LITTLE
PERSONAL GROOMING: A LITTLE
HELP NEEDED FOR BATHING: A LITTLE
WALKING IN HOSPITAL ROOM: A LOT
MOBILITY SCORE: 15
HELP NEEDED FOR BATHING: A LITTLE
DAILY ACTIVITIY SCORE: 19
DRESSING REGULAR LOWER BODY CLOTHING: A LITTLE
MOVING FROM LYING ON BACK TO SITTING ON SIDE OF FLAT BED WITH BEDRAILS: A LITTLE
CLIMB 3 TO 5 STEPS WITH RAILING: TOTAL
STANDING UP FROM CHAIR USING ARMS: A LITTLE
TURNING FROM BACK TO SIDE WHILE IN FLAT BAD: A LITTLE
DRESSING REGULAR UPPER BODY CLOTHING: A LITTLE
TURNING FROM BACK TO SIDE WHILE IN FLAT BAD: A LITTLE
TOILETING: A LITTLE
CLIMB 3 TO 5 STEPS WITH RAILING: TOTAL
DAILY ACTIVITIY SCORE: 19
MOVING TO AND FROM BED TO CHAIR: A LITTLE
STANDING UP FROM CHAIR USING ARMS: A LITTLE
PERSONAL GROOMING: A LITTLE
WALKING IN HOSPITAL ROOM: A LOT
DRESSING REGULAR LOWER BODY CLOTHING: A LITTLE

## 2025-01-10 ASSESSMENT — ACTIVITIES OF DAILY LIVING (ADL)
PATIENT'S MEMORY ADEQUATE TO SAFELY COMPLETE DAILY ACTIVITIES?: UNABLE TO ASSESS
BATHING: INDEPENDENT
HEARING - LEFT EAR: FUNCTIONAL
LACK_OF_TRANSPORTATION: NO
LACK_OF_TRANSPORTATION: NO
HEARING - RIGHT EAR: FUNCTIONAL
FEEDING YOURSELF: INDEPENDENT
GROOMING: INDEPENDENT
TOILETING: INDEPENDENT
ADEQUATE_TO_COMPLETE_ADL: YES
ASSISTIVE_DEVICE: EYEGLASSES
JUDGMENT_ADEQUATE_SAFELY_COMPLETE_DAILY_ACTIVITIES: UNABLE TO ASSESS
DRESSING YOURSELF: INDEPENDENT
WALKS IN HOME: INDEPENDENT

## 2025-01-10 ASSESSMENT — PAIN SCALES - WONG BAKER
WONGBAKER_NUMERICALRESPONSE: NO HURT
WONGBAKER_NUMERICALRESPONSE: NO HURT

## 2025-01-10 ASSESSMENT — ENCOUNTER SYMPTOMS
HEMATOLOGIC/LYMPHATIC NEGATIVE: 1
RESPIRATORY NEGATIVE: 1
EYES NEGATIVE: 1
ALLERGIC/IMMUNOLOGIC NEGATIVE: 1
CARDIOVASCULAR NEGATIVE: 1
ENDOCRINE NEGATIVE: 1
NEUROLOGICAL NEGATIVE: 1
PSYCHIATRIC NEGATIVE: 1
CONSTITUTIONAL NEGATIVE: 1
GASTROINTESTINAL NEGATIVE: 1

## 2025-01-10 ASSESSMENT — PAIN DESCRIPTION - ORIENTATION
ORIENTATION: LEFT
ORIENTATION: LEFT

## 2025-01-10 NOTE — H&P
History Of Present Illness  Anitha Welch is a 84 y.o. female presenting with left hip and leg pain.    This is a 84-year-old female with past medical history of dementia/cognitive decline, CHF, COPD, chronic respiratory failure on 2 L nasal cannula, AAA, DVT, anxiety, depression, CVA, GERD, CKD, CAD, OA, osteopenia and other comorbidities presented to the ED after suffering a mechanical fall.  She reports sometime around 6 PM she was going to bed as she was walking through her living room she experienced a fall landed on her left hip in the living room.  Complains of severe left hip pain.  She denies head strike or anticoagulation use.  Xarelto was held on last encounter  due to anemia and thrombocytopenia, she was to follow-up with PCP to resume.  Patient extremely poor historian and appeared confused.  She is unable to confirm which medications she take or what medical conditions she has.  Every question I ask her she pauses for few seconds and answers and irrelevant question.  Unable to get a reliable accurate review of systems from this patient.    In the ED on admission VSS, labs notable for bicarb 36, BUN 30, CR 1.11, EGFR 49, hemoglobin 11.5, platelets 133.  She was given Zofran for nausea, Toradol 15 mg and Dilaudid 0.5 mg for pain.    X-ray left hip, x-ray tibia-fibula, x-ray femur:  Acute basicervical left femoral neck fracture with varus alignment.   Moderate left femoroacetabular joint osteoarthrosis.   Right total hip arthroplasty hardware is intact without surrounding   lucency or periprosthetic fracture. No acute displaced fracture of   the knee, tibia or fibula.     Admitted for left femoral neck fracture requiring orthopedic surgery evaluation and possible intervention.     Past Medical History  Past Medical History:   Diagnosis Date    AAA (abdominal aortic aneurysm) (CMS-[facility])     Acute urinary tract infection 04/20/2023    Anemia     Anxiety     Arthritis     CHF (congestive heart failure)      Chronic pain disorder     back pain    CKD (chronic kidney disease)     Cognitive decline     COPD (chronic obstructive pulmonary disease) (Multi)     oxygen dependent- wears 2L NC continuously    Coronary artery disease     s/p stent    CVA (cerebral vascular accident) (Multi)     weaker on the left side    Deep vein thrombosis (DVT) of calf (Multi)     left    Depression     Disease of thyroid gland     Diverticulosis     DVT (deep venous thrombosis) (Multi)     left leg, on xarelto    Easy bruising     Epistaxis     GERD (gastroesophageal reflux disease)     managed with protonix    History of blood transfusion 2023    History of degenerative disc disease     Hyperlipidemia     Hypertension     Hypothyroidism     Ischemic cardiomyopathy     Meralgia paresthetica     Nonrheumatic mitral valve regurgitation     Osteoarthritis     Osteopenia 2010    hip - DEXA    Plantar fasciitis     RLS (restless legs syndrome)     Sciatica     Spinal stenosis     ST elevation (STEMI) myocardial infarction (Multi)        Surgical History  Past Surgical History:   Procedure Laterality Date    ADENOIDECTOMY      ANOMALOUS PULMONARY VENOUS RETURN REPAIR, 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    Procedure: Mitral-ARMANI (Transcatheter Edge to Edge Repair);  Surgeon: Kyle Bernardo MD;  Location: [address] Cardiac Cath Lab;  Service: Cardiovascular;  Laterality: N/A;  SAMMY Elgendy.Labs with cPM,Maynard,Schedule at 7;30am    CARDIAC CATHETERIZATION  10/2019    CARDIAC CATHETERIZATION N/A 02/16/2024    Procedure: Left And Right Heart Cath, With LV;  Surgeon: Tuan Foss DO;  Location: [facility];  Service: Cardiovascular;  Laterality: N/A;  no pre auth needed    CATARACT EXTRACTION Bilateral 11/13/2012    CHOLECYSTECTOMY  1996    laparoscopic    COLONOSCOPY      Dr. Rodriguez    CORONARY STENT PLACEMENT      CYST REMOVAL Right 06/24/2013    Excision of Sebaceous cyst right axilla    DILATION AND CURETTAGE OF UTERUS       ESOPHAGOGASTRODUODENOSCOPY      KNEE ARTHROPLASTY Left     MR HEAD ANGIO WO IV CONTRAST  02/24/2017    MR HEAD ANGIO WO IV CONTRAST [facility]    MR HEAD ANGIO WO IV CONTRAST  08/11/2017    MR HEAD ANGIO WO IV CONTRAST [facility]    MR HEAD ANGIO WO IV CONTRAST  02/10/2019    MR HEAD ANGIO WO IV CONTRAST [facility]    OTHER SURGICAL HISTORY  01/09/2019    Stent synergy    OTHER SURGICAL HISTORY  01/09/2019    Stent synergy    MD ARTHRP ACETBLR/PROX FEM PROSTC AGRFT/ALGRFT      SURGICAL SAMMY MONITORING      THYROIDECTOMY, PARTIAL Bilateral 04/17/2013    subtotal thyroidectomy    TONSILLECTOMY      TOTAL HIP ARTHROPLASTY Right 2006    UPPER GASTROINTESTINAL ENDOSCOPY  03/2019    Dr. Galan        Social History  She reports that she quit smoking about 11 years ago. Her smoking use included cigarettes. She has never been exposed to tobacco smoke. She has never used smokeless tobacco. She reports that she does not currently use alcohol. She reports that she does not use drugs.    Family History  Family History   Problem Relation Name Age of Onset    Hyperthyroidism Mother          Treated with radioactive iodine    Hypertension Mother      Heart disease Mother      Hyperthyroidism Sibling      Diabetes Father's Sister          Allergies  Amoxicillin, Iodinated contrast media, Ciprofloxacin, Influenza virus vaccines, Diphenhydramine, Flu vac 2023 [id number](pf), and Other    Review of Systems  Unable to get a reliable review of system due to dementia/cognitive decline.    Physical Exam  General: alert, no diaphoresis   HENT: mucous membranes dry, external ears normal, no rhinorrhea   Eyes: no icterus or injection, no discharge   Lungs: CTA BL   Heart: RRR, no murmurs, no LE edema BL   GI: abdomen soft, nontender, nondistended, BS present   MSK: no joint effusion or deformity   Skin: no rashes, erythema, or ecchymosis   Neuro: grossly normal cognition, motor strength, sensation       Last  "Recorded Vitals  Blood pressure 122/61, pulse 88, temperature 36.7 °C (98.1 °F), temperature source Oral, resp. rate 18, height 1.651 m (5' 5\"), weight 59 kg (130 lb), SpO2 97%.    Relevant Results  Scheduled medications  atorvastatin, 40 mg, oral, Nightly  carvedilol, 3.125 mg, oral, BID  DULoxetine, 20 mg, oral, Nightly  tiotropium, 2 puff, inhalation, Daily   And  fluticasone furoate-vilanteroL, 1 puff, inhalation, Daily  gabapentin, 100 mg, oral, Nightly  levothyroxine, 25 mcg, oral, Daily  oxygen, 2 L/min, inhalation, Continuous - Inhalation  pantoprazole, 40 mg, oral, BID  polyethylene glycol, 17 g, oral, Daily  [Held by provider] torsemide, 20 mg, oral, Daily      Continuous medications  sodium chloride 0.9%, 45 mL/hr, Last Rate: 45 mL/hr (01/10/25 0638)      PRN medications  PRN medications: acetaminophen **OR** acetaminophen **OR** acetaminophen, albuterol, HYDROmorphone, ipratropium-albuteroL, melatonin, nitroglycerin, ondansetron **OR** ondansetron  Results for orders placed or performed during the hospital encounter of 01/09/25 (from the past 24 hours)   CBC and Auto Differential   Result Value Ref Range    WBC 5.9 4.4 - 11.3 x10*3/uL    nRBC 0.0 0.0 - 0.0 /100 WBCs    RBC 4.09 4.00 - 5.20 x10*6/uL    Hemoglobin 11.5 (L) 12.0 - 16.0 g/dL    Hematocrit 36.7 36.0 - 46.0 %    MCV 90 80 - 100 fL    MCH 28.1 26.0 - 34.0 pg    MCHC 31.3 (L) 32.0 - 36.0 g/dL    RDW 17.2 (H) 11.5 - 14.5 %    Platelets 133 (L) 150 - 450 x10*3/uL    Neutrophils % 76.5 40.0 - 80.0 %    Immature Granulocytes %, Automated 0.7 0.0 - 0.9 %    Lymphocytes % 15.4 13.0 - 44.0 %    Monocytes % 6.4 2.0 - 10.0 %    Eosinophils % 0.7 0.0 - 6.0 %    Basophils % 0.3 0.0 - 2.0 %    Neutrophils Absolute 4.53 1.60 - 5.50 x10*3/uL    Immature Granulocytes Absolute, Automated 0.04 0.00 - 0.50 x10*3/uL    Lymphocytes Absolute 0.91 0.80 - 3.00 x10*3/uL    Monocytes Absolute 0.38 0.05 - 0.80 x10*3/uL    Eosinophils Absolute 0.04 0.00 - 0.40 x10*3/uL    " Basophils Absolute 0.02 0.00 - 0.10 x10*3/uL   Comprehensive metabolic panel   Result Value Ref Range    Glucose 114 (H) 74 - 99 mg/dL    Sodium 141 136 - 145 mmol/L    Potassium 4.5 3.5 - 5.3 mmol/L    Chloride 100 98 - 107 mmol/L    Bicarbonate 36 (H) 21 - 32 mmol/L    Anion Gap 10 10 - 20 mmol/L    Urea Nitrogen 30 (H) 6 - 23 mg/dL    Creatinine 1.11 (H) 0.50 - 1.05 mg/dL    eGFR 49 (L) >60 mL/min/1.73m*2    Calcium 8.9 8.6 - 10.3 mg/dL    Albumin 4.1 3.4 - 5.0 g/dL    Alkaline Phosphatase 56 33 - 136 U/L    Total Protein 6.7 6.4 - 8.2 g/dL    AST 25 9 - 39 U/L    Bilirubin, Total 0.8 0.0 - 1.2 mg/dL    ALT 19 7 - 45 U/L     *Note: Due to a large number of results and/or encounters for the requested time period, some results have not been displayed. A complete set of results can be found in Results Review.     XR hip left with pelvis when performed 2 or 3 views    Result Date: 1/10/2025  Interpreted By:  Finkelstein, Evan, STUDY: XR HIP LEFT WITH PELVIS WHEN PERFORMED 2 OR 3 VIEWS; XR TIBIA FIBULA LEFT 2 VIEWS; XR FEMUR LEFT 2+ VIEWS;  1/10/2025 2:13 am 4 images of the left femur. 3 images of the left tibia/fibula. 2 images of the left hip. One view of the right hip.. AP view of the pelvis.   INDICATION: Signs/Symptoms:Fall.   COMPARISON: None.   ACCESSION NUMBER(S): [id number]; [id number]; [id number]   ORDERING CLINICIAN: RAMAN HEMPHILL   FINDINGS: Right hip arthroplasty hardware is present without surrounding lucency or periprosthetic fracture. Degenerative changes in the visualized portions of the lower lumbosacral spine. Phleboliths overlie the pelvis. Bones are demineralized. Acute basicervical fracture of the left femoral neck with varus alignment. Moderate left femoroacetabular joint space narrowing with osteophytic spurring. There are vascular calcifications. Bones are demineralized. No acute displaced fracture throughout the remainder of the femur. No acute displaced fracture of the tibia or  fibula. Edema throughout the calf soft tissues. No sizable suprapatellar joint effusion.       Acute basicervical left femoral neck fracture with varus alignment. 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 No acute displaced fracture of the knee, tibia or fibula.     MACRO: None.   Signed by: Evan Finkelstein 1/10/2025 2:42 AM Dictation workstation:   [id number]    XR femur left 2+ views    Result Date: 1/10/2025  Interpreted By:  Finkelstein, Evan, STUDY: XR HIP LEFT WITH PELVIS WHEN PERFORMED 2 OR 3 VIEWS; XR TIBIA FIBULA LEFT 2 VIEWS; XR FEMUR LEFT 2+ VIEWS;  1/10/2025 2:13 am 4 images of the left femur. 3 images of the left tibia/fibula. 2 images of the left hip. One view of the right hip.. AP view of the pelvis.   INDICATION: Signs/Symptoms:Fall.   COMPARISON: None.   ACCESSION NUMBER(S): [id number]; [id number]; [id number]   ORDERING CLINICIAN: RAMAN HEMPHILL   FINDINGS: Right hip arthroplasty hardware is present without surrounding lucency or periprosthetic fracture. Degenerative changes in the visualized portions of the lower lumbosacral spine. Phleboliths overlie the pelvis. Bones are demineralized. Acute basicervical fracture of the left femoral neck with varus alignment. Moderate left femoroacetabular joint space narrowing with osteophytic spurring. There are vascular calcifications. Bones are demineralized. No acute displaced fracture throughout the remainder of the femur. No acute displaced fracture of the tibia or fibula. Edema throughout the calf soft tissues. No sizable suprapatellar joint effusion.       Acute basicervical left femoral neck fracture with varus alignment. 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 No acute displaced fracture of the knee, tibia or  fibula.     MACRO: None.   Signed by: Evan Finkelstein 1/10/2025 2:42 AM Dictation workstation:   [id number]    XR tibia fibula left 2 views    Result Date: 1/10/2025  Interpreted By:  Finkelstein, Evan, STUDY: XR HIP LEFT WITH PELVIS WHEN PERFORMED 2 OR 3 VIEWS; XR TIBIA FIBULA LEFT 2 VIEWS; XR FEMUR LEFT 2+ VIEWS;  1/10/2025 2:13 am 4 images of the left femur. 3 images of the left tibia/fibula. 2 images of the left hip. One view of the right hip.. AP view of the pelvis.   INDICATION: Signs/Symptoms:Fall.   COMPARISON: None.   ACCESSION NUMBER(S): [id number]; [id number]; [id number]   ORDERING CLINICIAN: RAMAN HEMPHILL   FINDINGS: Right hip arthroplasty hardware is present without surrounding lucency or periprosthetic fracture. Degenerative changes in the visualized portions of the lower lumbosacral spine. Phleboliths overlie the pelvis. Bones are demineralized. Acute basicervical fracture of the left femoral neck with varus alignment. Moderate left femoroacetabular joint space narrowing with osteophytic spurring. There are vascular calcifications. Bones are demineralized. No acute displaced fracture throughout the remainder of the femur. No acute displaced fracture of the tibia or fibula. Edema throughout the calf soft tissues. No sizable suprapatellar joint effusion.       Acute basicervical left femoral neck fracture with varus alignment. 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 No acute displaced fracture of the knee, tibia or fibula.     MACRO: None.   Signed by: Evan Finkelstein 1/10/2025 2:42 AM Dictation workstation:   [id number]    CT lumbar spine wo IV contrast    Result Date: 1/10/2025  Interpreted By:  Finkelstein, Evan, STUDY: CT LUMBAR SPINE WO IV CONTRAST;  1/10/2025 2:12 am   INDICATION: Signs/Symptoms:fall, low back and left hip pain.     COMPARISON: CT lumbar spine  11/25/2023   ACCESSION NUMBER(S): [id number]   ORDERING CLINICIAN: RAMAN HEMPHILL   TECHNIQUE: Axial noncontrast CT images of the lumbar spine with coronal and sagittal reconstructed images.   FINDINGS: ALIGNMENT: Levocurvature of the lumbar spine. No traumatic malalignment VERTEBRAE: No acute loss of vertebral body height. Bones are demineralized. Irregularity of the endplates across the L3/L4 disc space and L1/L2 disc space, likely representing a combination of Schmorl's nodes and degenerative changes, which is similar in appearance to prior imaging 11/25/2023. DISC SPACES: Disc space narrowing most pronounced at L2/L3. Vacuum disc phenomena at L1/L2, L2/L3 and L4/L5. SPINAL CANAL: Diffuse facet arthropathy with varying degrees of foraminal narrowing. No severe central narrowing.. PREVERTEBRAL SOFT TISSUES: No prevertebral soft tissue swelling.   OTHER FINDINGS: Mild bibasilar atelectasis. The gallbladder is surgically absent. Calcifications scattered throughout the spleen suggest sequela of prior granulomatous disease. Hypodensities in the kidneys measure simple fluid attenuation and are most compatible with simple cysts. Scattered colonic diverticula without pericolonic inflammatory stranding. Extensive aortoiliac calcifications. Focal ectasia of the infrarenal abdominal aorta measuring up to 2.7 cm in maximum AP dimension.       Lumbar spondylosis without acute fracture or traumatic malalignment.     MACRO: None.   Signed by: Evan Finkelstein 1/10/2025 2:32 AM Dictation workstation:   [id number]       Assessment/Plan   84-year-old female with multiple comorbidities presented after mechanical fall resulting in left femoral neck fracture. Orthopedic surgery consulted.  Will likely need medical cardiology clearance.  Assessment & Plan  Closed fracture of neck of left femur, initial encounter  X-ray left hip with acute basicervical left femoral neck fracture with varus alignment.,  Extreme pain on  physical exam with decreased active and passive range of motion.  -Continue IV pain medication  -N.p.o.  -PT OT social work consulted  -Currently not on anticoagulation  -Cardiology consulted, patient will likely need medical clearance   -Orthopedic surgery consulted    Chronic respiratory failure  Is on 2 L home oxygen nasal cannula, she is not in exacerbation.  Continue 2 L of oxygen nasal cannula  Bronchodilators home and as needed nebulizer    History of DVT (deep vein thrombosis)  History of DVT previously on Xarelto which was held on last hospital admission due to anemia and thrombocytopenia.  Patient was to follow with PCP to resume but has not to date  -No anticoagulation due to possible surgical intervention today  -SCD for DVT prophylaxis  -Resume Xarelto when appropriate    History of CHF (congestive heart failure)  Echo 8/9/2024 ejection fraction of 60-65%. There are no regional wall motion abnormalities. The left ventricular cavity size is normal. There is mild concentric left ventricular hypertrophy. Spectral Doppler shows a normal pattern of left ventricular diastolic filling.   -Held torsemide due to signs of volume depletion  -Gentle IV fluid hydration  -Continue Coreg 3.125 mg twice daily    Mixed hyperlipidemia  Continue atorvastatin 40 mg    Acquired hypothyroidism:  Continue Synthroid 25 mg    DVT and GI prophylaxis: SCD, no AC due to possible surgical intervention    CODE STATUS: DNR and no intubation, per medical records.  Please verify    I spent 65 minutes in the professional and overall care of this patient.      Sammy Hwang MD

## 2025-01-10 NOTE — ASSESSMENT & PLAN NOTE
Echo 8/9/2024 ejection fraction of 60-65%. There are no regional wall motion abnormalities. The left ventricular cavity size is normal. There is mild concentric left ventricular hypertrophy. Spectral Doppler shows a normal pattern of left ventricular diastolic filling.   Continue to hold torsemide  Stop IV fluids  Resume home Coreg  Cardiology consulted for cardiac clearance

## 2025-01-10 NOTE — NURSING NOTE
Assumed care of pt. BSSR complete. Respirations even and unlabored on 3L NC this morning. Pt possible surgery after physician clearance from Cardiology. Care ongoing.

## 2025-01-10 NOTE — NURSING NOTE
16 Frisian heath placed at this time. Pt tolerated well. Clear yellow urine was returned in heath tube.

## 2025-01-10 NOTE — ED TRIAGE NOTES
Pt presensts to the ED after she had a fall at home. Pt stated that she was going to bed and trying to navigate herself in the dark she fell from standing and landed an her left side. Pt denies hitting her head or LOC. Pt also denies use of thinners. Pt currently c/o  left leg and hip pain. Pt also c/o lower back pain

## 2025-01-10 NOTE — PROGRESS NOTES
Anitha Welch is a 84 y.o. female on day 0 of admission presenting with Closed fracture of neck of left femur, initial encounter.      Subjective   Patient seen and examined.  Awake/alert/oriented to person and place.  Agitated.  Reports hip pain and urge to urinate.  Discussed with RN.  Will place a Lopez catheter due to severe pain and unable to ambulate.  Denies chest pain, shortness of breath, nausea, or vomiting.        Objective     Last Recorded Vitals  BP (!) 108/47 (BP Location: Left arm, Patient Position: Lying)   Pulse 97   Temp 36 °C (96.8 °F) (Temporal)   Resp 20   Wt 64.5 kg (142 lb 3.2 oz)   SpO2 95%   Intake/Output last 3 Shifts:  No intake or output data in the 24 hours ending 01/10/25 0956    Admission Weight  Weight: 59 kg (130 lb) (01/09/25 7627)    Daily Weight  01/10/25 : 64.5 kg (142 lb 3.2 oz)    Image Results  XR hip left with pelvis when performed 2 or 3 views, XR femur left 2+ views, XR tibia fibula left 2 views  Narrative: Interpreted By:  Finkelstein, Evan,   STUDY:  XR HIP LEFT WITH PELVIS WHEN PERFORMED 2 OR 3 VIEWS; XR TIBIA FIBULA  LEFT 2 VIEWS; XR FEMUR LEFT 2+ VIEWS;  1/10/2025 2:13 am 4 images of  the left femur. 3 images of the left tibia/fibula. 2 images of the  left hip. One view of the right hip.. AP view of the pelvis.      INDICATION:  Signs/Symptoms:Fall.      COMPARISON:  None.      ACCESSION NUMBER(S):  [id number]; [id number]; [id number]      ORDERING CLINICIAN:  RAMAN HEMPHILL      FINDINGS:  Right hip arthroplasty hardware is present without surrounding  lucency or periprosthetic fracture. Degenerative changes in the  visualized portions of the lower lumbosacral spine. Phleboliths  overlie the pelvis. Bones are demineralized. Acute basicervical  fracture of the left femoral neck with varus alignment. Moderate left  femoroacetabular joint space narrowing with osteophytic spurring.  There are vascular calcifications. Bones are demineralized. No  acute  displaced fracture throughout the remainder of the femur. No acute  displaced fracture of the tibia or fibula. Edema throughout the calf  soft tissues. No sizable suprapatellar joint effusion.      Impression: Acute basicervical left femoral neck fracture with varus alignment.  Moderate left femoroacetabular joint osteoarthrosis.  Right total hip arthroplasty hardware is intact without surrounding  lucency or periprosthetic fracture. No acute displaced fracture of  the knee, tibia or fibula.          MACRO:  None.      Signed by: Evan Finkelstein 1/10/2025 2:42 AM  Dictation workstation:   [id number]  CT lumbar spine wo IV contrast  Narrative: Interpreted By:  Finkelstein, Evan,   STUDY:  CT LUMBAR SPINE WO IV CONTRAST;  1/10/2025 2:12 am      INDICATION:  Signs/Symptoms:fall, low back and left hip pain.          COMPARISON:  CT lumbar spine 11/25/2023      ACCESSION NUMBER(S):  [id number]      ORDERING CLINICIAN:  RAMAN HEMPHILL      TECHNIQUE:  Axial noncontrast CT images of the lumbar spine with coronal and  sagittal reconstructed images.      FINDINGS:  ALIGNMENT: Levocurvature of the lumbar spine. No traumatic  malalignment VERTEBRAE: No acute loss of vertebral body height. Bones  are demineralized. Irregularity of the endplates across the L3/L4  disc space and L1/L2 disc space, likely representing a combination of  Schmorl's nodes and degenerative changes, which is similar in  appearance to prior imaging 11/25/2023. DISC SPACES: Disc space  narrowing most pronounced at L2/L3. Vacuum disc phenomena at L1/L2,  L2/L3 and L4/L5. SPINAL CANAL: Diffuse facet arthropathy with varying  degrees of foraminal narrowing. No severe central narrowing..  PREVERTEBRAL SOFT TISSUES: No prevertebral soft tissue swelling.      OTHER FINDINGS: Mild bibasilar atelectasis. The gallbladder is  surgically absent. Calcifications scattered throughout the spleen  suggest sequela of prior granulomatous disease.  Hypodensities in the  kidneys measure simple fluid attenuation and are most compatible with  simple cysts. Scattered colonic diverticula without pericolonic  inflammatory stranding. Extensive aortoiliac calcifications. Focal  ectasia of the infrarenal abdominal aorta measuring up to 2.7 cm in  maximum AP dimension.      Impression: Lumbar spondylosis without acute fracture or traumatic malalignment.          MACRO:  None.      Signed by: Evan Finkelstein 1/10/2025 2:32 AM  Dictation workstation:   [id number]      Physical Exam  Vitals reviewed.   Constitutional:       Comments: Elderly.  Poor historian.   HENT:      Head: Normocephalic and atraumatic.   Eyes:      Extraocular Movements: Extraocular movements intact.      Conjunctiva/sclera: Conjunctivae normal.   Cardiovascular:      Rate and Rhythm: Normal rate and regular rhythm.   Pulmonary:      Effort: Pulmonary effort is normal.      Breath sounds: No wheezing, rhonchi or rales.      Comments: Breath sounds clear, diminished bilaterally  Abdominal:      General: Bowel sounds are normal.      Palpations: Abdomen is soft.      Tenderness: There is no abdominal tenderness.   Musculoskeletal:      Cervical back: Neck supple.      Comments: Limited range of motion to left lower extremity   Skin:     General: Skin is warm and dry.      Capillary Refill: Capillary refill takes less than 2 seconds.   Neurological:      General: No focal deficit present.      Mental Status: She is alert and oriented to person, place, and time.   Psychiatric:         Mood and Affect: Mood normal.         Behavior: Behavior normal.         Relevant Results  Lab Results   Component Value Date    GLUCOSE 114 (H) 01/10/2025    CALCIUM 8.9 01/10/2025     01/10/2025    K 4.5 01/10/2025    CO2 36 (H) 01/10/2025     01/10/2025    BUN 30 (H) 01/10/2025    CREATININE 1.11 (H) 01/10/2025      Lab Results   Component Value Date    WBC 5.9 01/10/2025    HGB 11.5 (L) 01/10/2025    HCT  36.7 01/10/2025    MCV 90 01/10/2025     (L) 01/10/2025    XR hip left with pelvis when performed 2 or 3 views  Result Date: 1/10/2025  Acute basicervical left femoral neck fracture with varus alignment. 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 No acute displaced fracture of the knee, tibia or fibula.     MACRO: None.   Signed by: Evan Finkelstein 1/10/2025 2:42 AM Dictation workstation:   [id number]    XR femur left 2+ views  Result Date: 1/10/2025  Acute basicervical left femoral neck fracture with varus alignment. 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 No acute displaced fracture of the knee, tibia or fibula.     MACRO: None.   Signed by: Evan Finkelstein 1/10/2025 2:42 AM Dictation workstation:   [id number]    XR tibia fibula left 2 views  Result Date: 1/10/2025  Acute basicervical left femoral neck fracture with varus alignment. 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 No acute displaced fracture of the knee, tibia or fibula.     MACRO: None.   Signed by: Evan Finkelstein 1/10/2025 2:42 AM Dictation workstation:   [id number]    CT lumbar spine wo IV contrast  Result Date: 1/10/2025  Lumbar spondylosis without acute fracture or traumatic malalignment.     MACRO: None.   Signed by: Evan Finkelstein 1/10/2025 2:32 AM Dictation workstation:   [id number]         Assessment/Plan      Assessment & Plan  Closed fracture of neck of left femur, initial encounter  X-ray left hip with acute basicervical left femoral neck fracture with varus alignment.  Pain medication regimen adjusted  Bowel regimen  Encourage incentive spirometer  Orthopedic surgery  consulted  PT/OT  N.p.o. after midnight for anticipated surgery tomorrow  Cardiology following, clearance pending EKG and chest x-ray    Chronic respiratory failure  On 3 L oxygen via nasal cannula, 2 L is baseline  Monitor pulse ox, wean oxygen as tolerated  Resume home inhalers  As needed Vicki    History of DVT (deep vein thrombosis)  History of DVT previously on Xarelto which was held on last hospital admission due to anemia and thrombocytopenia.  Patient was to follow with PCP to resume but has not to date  -No anticoagulation due to possible surgical intervention today  -SCD for DVT prophylaxis  -Resume Xarelto when appropriate    History of CHF (congestive heart failure)  Echo 8/9/2024 ejection fraction of 60-65%. There are no regional wall motion abnormalities. The left ventricular cavity size is normal. There is mild concentric left ventricular hypertrophy. Spectral Doppler shows a normal pattern of left ventricular diastolic filling.   Continue to hold torsemide  Stop IV fluids  Resume home Coreg  Cardiology consulted for cardiac clearance    Mixed hyperlipidemia  Continue atorvastatin 40 mg    Plan  Monitor on telemetry  Pain management/bowel regimen  Encourage incentive spirometer  Cardiology and orthopedic surgery consulted, appreciate recommendations  Cardiac clearance pending EKG and chest x-ray  Supplemental oxygen, monitor pulse ox  DVT prophylaxis: SCDs, pharmacologic prophylaxis per orthopedic surgery  CBC and BMP in the morning  Fall precautions  PT/OT                  Tuyet Garcia, APRN-CNP

## 2025-01-10 NOTE — ASSESSMENT & PLAN NOTE
Echo 8/9/2024 ejection fraction of 60-65%. There are no regional wall motion abnormalities. The left ventricular cavity size is normal. There is mild concentric left ventricular hypertrophy. Spectral Doppler shows a normal pattern of left ventricular diastolic filling.   -Held torsemide due to signs of volume depletion  -Gentle IV fluid hydration  -Continue Coreg 3.125 mg twice daily

## 2025-01-10 NOTE — NURSING NOTE
This RN and RN Kamille took several attempts to place heath cathter. No heath catheter placed yet at this time. Care ongoing.

## 2025-01-10 NOTE — ASSESSMENT & PLAN NOTE
History of DVT previously on Xarelto which was held on last hospital admission due to anemia and thrombocytopenia.  Patient was to follow with PCP to resume but has not to date  -No anticoagulation due to possible surgical intervention today  -SCD for DVT prophylaxis  -Resume Xarelto when appropriate

## 2025-01-10 NOTE — ASSESSMENT & PLAN NOTE
X-ray left hip with acute basicervical left femoral neck fracture with varus alignment.  Pain medication regimen adjusted  Bowel regimen  Encourage incentive spirometer  Orthopedic surgery consulted  PT/OT  N.p.o. after midnight for anticipated surgery tomorrow  Cardiology following, clearance pending EKG and chest x-ray

## 2025-01-10 NOTE — CONSULTS
Reason For Consult  Left hip fracture    History Of Present Illness  Anitha Welch is a 84 y.o. female presenting with a fall and injury to her left hip.  Patient this morning seems a bit confused is uncertain why she is in the hospital initially states due to some sort of tumor on her head does not mention anything about the hip or injury to the hip does not relate any pain to the hip until later on in the conversation.  Does appear oriented to person and that she is in a hospital uncertain of city uncertain of year.  Otherwise per report had a fall at home yesterday injuring her left hip was unable to ambulate afterwards brought to the hospital and found to have a left hip fracture and was admitted for further treatment.       Past Medical History  She has a past medical history of AAA (abdominal aortic aneurysm) ([facility]), Acute urinary tract infection (04/20/2023), Anemia, Anxiety, Arthritis, CHF (congestive heart failure), Chronic pain disorder, CKD (chronic kidney disease), Cognitive decline, COPD (chronic obstructive pulmonary disease) (Multi), Coronary artery disease, CVA (cerebral vascular accident) (Multi), Deep vein thrombosis (DVT) of calf (Multi), Depression, Disease of thyroid gland, Diverticulosis, DVT (deep venous thrombosis) (Multi), Easy bruising, Epistaxis, GERD (gastroesophageal reflux disease), History of blood transfusion (2023), History of degenerative disc disease, Hyperlipidemia, Hypertension, Hypothyroidism, Ischemic cardiomyopathy, Meralgia paresthetica, Nonrheumatic mitral valve regurgitation, Osteoarthritis, Osteopenia (2010), Plantar fasciitis, RLS (restless legs syndrome), Sciatica, Spinal stenosis, and ST elevation (STEMI) myocardial infarction (Multi).    Surgical History  She has a past surgical history that includes MR angio head wo IV contrast (02/24/2017); MR angio head wo IV contrast (08/11/2017); MR angio head wo IV contrast (02/10/2019); Cholecystectomy (1996); Tonsillectomy;  "pr arthrp acetblr/prox fem prostc agrft/algrft; Thyroidectomy, partial (Bilateral, 04/17/2013); Coronary stent placement; Knee Arthroplasty (Left); Total hip arthroplasty (Right, 2006); Dilation and curettage of uterus; Other surgical history (01/09/2019); Upper gastrointestinal endoscopy (03/2019); Cardiac catheterization (10/2019); Cataract extraction (Bilateral, 11/13/2012); Adenoidectomy; Cyst Removal (Right, 06/24/2013); Colonoscopy; Other surgical history (01/09/2019); surgical laverne monitoring; Cardiac catheterization (N/A, 02/16/2024); Esophagogastroduodenoscopy; and Anomalous pulmonary venous return repair, total (N/A, 4/25/2024).     Social History  She reports that she quit smoking about 11 years ago. Her smoking use included cigarettes. She has never been exposed to tobacco smoke. She has never used smokeless tobacco. She reports that she does not currently use alcohol. She reports that she does not use drugs.    Family History  Family History   Problem Relation Name Age of Onset    Hyperthyroidism Mother          Treated with radioactive iodine    Hypertension Mother      Heart disease Mother      Hyperthyroidism Sibling      Diabetes Father's Sister          Allergies  Amoxicillin, Iodinated contrast media, Ciprofloxacin, Influenza virus vaccines, Diphenhydramine, Flu vac 2023 [id number](pf), and Other    Review of Systems  Denies     Physical Exam  Patient alert oriented to self to month and partially to location, respirations unlabored heart rate regular rate and rhythm abdomen soft nontender left leg slightly shortened some tenderness palpation with logroll and tenderness palpation about the groin positive EHL FHL sensation intact light touch no calf pain tenderness or swelling     Last Recorded Vitals  Blood pressure (!) 108/47, pulse 97, temperature 36 °C (96.8 °F), temperature source Temporal, resp. rate 20, height 1.651 m (5' 5\"), weight 64.5 kg (142 lb 3.2 oz), SpO2 95%.    Relevant " Results  X-rays demonstrate displaced left femoral neck fracture     Assessment/Plan     84-year-old female with a displaced left femoral neck fracture.  Discussed with patient that she is going to likely require surgery for this.  Will await medical and cardiac clearance.  My partner Dr. Jackson is planning for surgery tomorrow morning once medically cleared and stable.  May eat today but n.p.o. after midnight.  Will discuss questions of competency with my partner so that appropriate consent can be obtained and patient will have to be reassessed prior to surgery to see if she is consentable.    I spent 30 minutes in the professional and overall care of this patient.      Phillip Lambert MD

## 2025-01-10 NOTE — PROGRESS NOTES
Occupational Therapy                 Therapy Communication Note    Patient Name: Anitha Welch  MRN: [id number]  Department: [address]  Room: [facility]/464-A  Today's Date: 1/10/2025     Discipline: Occupational Therapy          Missed Visit Reason: Missed Visit Reason: Cancel (Patient admitted with Lt hip fx, plan for ortho surgery tomorrow)    Missed Time: Cancel    Comment:

## 2025-01-10 NOTE — ANESTHESIA PREPROCEDURE EVALUATION
"Patient: Anitha Welch    Procedure Information       Date/Time: 01/11/25 1320    Procedure: ARTHROPLASTY, HIP, PARTIAL (Left: Hip)    Location: [facility] / [facility]    Surgeons: Jeison Jackson MD            Visit Vitals  /53 (BP Location: Left arm, Patient Position: Lying)   Pulse 100   Temp 36.7 °C (98.1 °F) (Temporal)   Resp 18   Ht 1.651 m (5' 5\")   Wt 64.5 kg (142 lb 3.2 oz)   SpO2 99%   BMI 23.66 kg/m²   OB Status Postmenopausal   Smoking Status Former   BSA 1.72 m²        Current Outpatient Medications   Medication Instructions   • albuterol 90 mcg/actuation inhaler 1 puff, Every 4 hours PRN   • ALPRAZolam (XANAX) 0.25 mg, oral, 3 times daily PRN   • atorvastatin (LIPITOR) 40 mg, oral, Nightly   • carvedilol (COREG) 3.125 mg, oral, 2 times daily   • diclofenac sodium (VOLTAREN) 4 g, Topical, 4 times daily   • docusate sodium (COLACE) 100 mg, oral, 2 times daily PRN   • DULoxetine (CYMBALTA) 20 mg, oral, Nightly, Do not crush or chew.   • gabapentin (NEURONTIN) 100 mg, oral, Nightly   • levothyroxine (SYNTHROID, LEVOXYL) 25 mcg, oral, Daily, Take on an empty stomach at the same time each day, either 30 to 60 minutes prior to breakfast   • lidocaine (Hemorrhoidal Relief) 5 % cream cream 1 Application, Topical, Every 6 hours PRN   • loratadine (CLARITIN) 10 mg, Daily   • multivitamin-iron-folic acid (Multi Complete with Iron)  mg-mcg tablet tablet 1 tablet, Daily   • nitroglycerin (Nitrostat) 0.4 mg SL tablet 1 tablet, Every 5 min PRN   • nystatin (Mycostatin) 100,000 unit/gram powder 1 Application, Topical, 2 times daily   • oxygen (O2) 2 L/min, Continuous   • pantoprazole (PROTONIX) 40 mg, oral, 2 times daily   • polyethylene glycol (Glycolax, Miralax) 17 gram/dose powder mix 17 grams (1 capful) in 8 ounces of water and drink daily   • tiZANidine (ZANAFLEX) 2 mg, oral, 3 times daily PRN   • torsemide (DEMADEX) 20 mg, oral, Daily   • Trelegy Ellipta 100-62.5-25 mcg blister with device 1 " "puff, inhalation, Daily        Allergies   Allergen Reactions   • Amoxicillin Anaphylaxis and Shortness of breath   • Iodinated Contrast Media Dizziness and Other     Increased BP    Increase BP, dizziness   • Ciprofloxacin Rash and Swelling     Decreased BP   • Influenza Virus Vaccines Other     \"sick\" for 24 hours afterwards   • Diphenhydramine Rash   • Flu Vac 2023 [id number](Pf) Unknown   • Other Itching     Fluzone inj high dose        Past Surgical History:   Procedure Laterality Date   • ADENOIDECTOMY     • ANOMALOUS PULMONARY VENOUS RETURN REPAIR, 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    Procedure: Mitral-ARMANI (Transcatheter Edge to Edge Repair);  Surgeon: Kyle Bernardo MD;  Location: [address] Cardiac Cath Lab;  Service: Cardiovascular;  Laterality: N/A;  SAMMY Elgendy.Labs with cPM,Maynard,Schedule at 7;30am   • CARDIAC CATHETERIZATION  10/2019   • CARDIAC CATHETERIZATION N/A 02/16/2024    Procedure: Left And Right Heart Cath, With LV;  Surgeon: Tuan Foss DO;  Location: [facility] Cardiac [facility] Lab;  Service: Cardiovascular;  Laterality: N/A;  no pre auth needed   • CATARACT EXTRACTION Bilateral 11/13/2012   • CHOLECYSTECTOMY  1996    laparoscopic   • COLONOSCOPY      Dr. Rodriguez   • CORONARY STENT PLACEMENT     • CYST REMOVAL Right 06/24/2013    Excision of Sebaceous cyst right axilla   • DILATION AND CURETTAGE OF UTERUS     • ESOPHAGOGASTRODUODENOSCOPY     • KNEE ARTHROPLASTY Left    • MR HEAD ANGIO WO IV CONTRAST  02/24/2017    MR HEAD ANGIO WO IV CONTRAST [facility]   • MR HEAD ANGIO WO IV CONTRAST  08/11/2017    MR HEAD ANGIO WO IV CONTRAST [facility]   • MR HEAD ANGIO WO IV CONTRAST  02/10/2019    MR HEAD ANGIO WO IV CONTRAST [facility]   • OTHER SURGICAL HISTORY  01/09/2019    Stent synergy   • OTHER SURGICAL HISTORY  01/09/2019    Stent synergy   • CA ARTHRP ACETBLR/PROX FEM PROSTC AGRFT/ALGRFT     • SURGICAL SAMMY MONITORING     • THYROIDECTOMY, PARTIAL Bilateral 04/17/2013    " subtotal thyroidectomy   • TONSILLECTOMY     • TOTAL HIP ARTHROPLASTY Right 2006   • UPPER GASTROINTESTINAL ENDOSCOPY  03/2019    Dr. Galan        Relevant Problems   Cardiac   (+) Abdominal aortic aneurysm (AAA) without rupture (CMS-HCC)   (+) Acute ST segment elevation myocardial infarction (Multi)   (+) Angina pectoris   (+) Arteriosclerosis of coronary artery   (+) Benign essential hypertension   (+) Chest pain   (+) Electrocardiogram abnormal   (+) Hypertension   (+) Mixed hyperlipidemia   (+) Nonrheumatic mitral valve regurgitation   (+) Peripheral vascular disease (CMS-HCC)   (+) Severe mitral valve regurgitation   (+) Stented coronary artery      Pulmonary   (+) COPD exacerbation (Multi)   (+) Chronic obstructive pulmonary disease (Multi)   (+) Pneumonia of right lower lobe due to infectious organism      Neuro   (+) Bilateral carotid artery stenosis   (+) Cerebrovascular accident (CVA) due to embolism of cerebral artery (Multi)   (+) Cerebrovascular accident (CVA) involving left cerebral hemisphere (Multi)   (+) Cerebrovascular accident (Multi)   (+) Lumbago-sciatica due to displacement of lumbar intervertebral disc   (+) Seizure (Multi)   (+) Stenosis of left carotid artery   (+) Transient ischemic attack      GI   (+) Gastroesophageal reflux disease   (+) Gastrointestinal bleed   (+) Gastrointestinal hemorrhage      Endocrine   (+) Acquired hypothyroidism   (+) Hypothyroidism (acquired)      Hematology   (+) Acute deep vein thrombosis (DVT) of proximal vein of left lower extremity (Multi)   (+) Anemia   (+) Anemia due to blood loss   (+) DVT (deep venous thrombosis) (Multi)   (+) Venous thromboembolism (VTE)      Musculoskeletal   (+) Lumbago-sciatica due to displacement of lumbar intervertebral disc   (+) Lumbar degenerative disc disease   (+) Spinal stenosis of lumbar region with neurogenic claudication      ID   (+) Pneumonia of right lower lobe due to infectious organism       Active Ambulatory  Problems     Diagnosis Date Noted   • Epistaxis 09/02/2023   • Abdominal aortic aneurysm (AAA) without rupture (CMS-HCC) 09/02/2023   • Acute low back pain 09/02/2023   • Acute on chronic systolic heart failure 09/02/2023   • Acute renal failure syndrome (CMS-HCC) 09/02/2023   • Acute ST segment elevation myocardial infarction (Multi) 09/02/2023   • Arteriosclerosis of coronary artery 09/02/2023   • Artificial lens present 09/02/2023   • Benign essential hypertension 09/02/2023   • Anemia due to blood loss 09/02/2023   • Stenosis of left carotid artery 02/21/2023   • Cerebrovascular accident (CVA) involving left cerebral hemisphere (Multi) 09/02/2023   • Cerebrovascular accident (CVA) due to embolism of cerebral artery (Multi) 09/02/2023   • Cerebrovascular accident (Multi) 09/02/2023   • Transient ischemic attack 09/02/2023   • Angina pectoris 09/02/2023   • Backache 09/02/2023   • Chest pain 09/02/2023   • Tight chest 09/02/2023   • Chronic diastolic heart failure 09/02/2023   • Chronic heart failure with preserved ejection fraction 09/02/2023   • Stage 3 chronic kidney disease (Multi) 09/02/2023   • COPD exacerbation (Multi) 09/02/2023   • Chronic obstructive pulmonary disease (Multi) 09/02/2023   • Claudication (CMS-HCC) 09/02/2023   • Cystocele with prolapse 09/02/2023   • Vaginal prolapse 09/02/2023   • Dehydration 09/02/2023   • Dermatochalasis of left upper eyelid 09/02/2023   • Dermatochalasis of right upper eyelid 09/02/2023   • Vision disorder 07/02/2021   • Dysgeusia 09/02/2023   • Dyspnea 09/02/2023   • Electrocardiogram abnormal 09/02/2023   • Facial weakness 09/02/2023   • Fall 09/02/2023   • Gastroesophageal reflux disease 09/02/2023   • Gastrointestinal hemorrhage 09/02/2023   • Headache 09/02/2023   • History of coronary artery stent placement 09/02/2023   • History of myocardial infarction 09/02/2023   • History of stroke without residual deficits 09/02/2023   • History of cerebrovascular accident  09/02/2023   • Hypothyroidism (acquired) 09/02/2023   • Ischemic cardiomyopathy 09/02/2023   • Lacunar infarct, acute (Multi) 09/02/2023   • Lumbago-sciatica due to displacement of lumbar intervertebral disc 09/02/2023   • Lumbar degenerative disc disease 09/02/2023   • Mixed hyperlipidemia 09/02/2023   • Overactive bladder 09/02/2023   • Left leg pain 09/02/2023   • Pinguecula 09/02/2023   • Hypertension 09/02/2023   • Low blood pressure 09/02/2023   • Near syncope 09/02/2023   • Peripheral vascular disease (CMS-[facility]) 02/21/2023   • Right homonymous hemianopsia 09/02/2023   • Seizure (Multi) 09/02/2023   • Stented coronary artery 09/02/2023   • Syncope and collapse 09/02/2023   • Tear film insufficiency 09/02/2023   • Urinary urgency 09/02/2023   • Varicose veins of both legs with edema 09/02/2023   • Floaters in visual field 09/02/2023   • Vitreous degeneration 09/02/2023   • Asthenia 09/02/2023   • Chronic fatigue syndrome 09/02/2023   • Weakness 09/02/2023   • Acute respiratory failure with hypoxia (Multi) 10/07/2023   • Acquired hypothyroidism 10/07/2023   • Bilateral carotid artery stenosis 10/29/2023   • Restless legs syndrome 02/21/2023   • HFrEF (heart failure with reduced ejection fraction) 11/21/2023   • Severe mitral valve regurgitation 11/21/2023   • Heart failure 11/22/2023   • Acute deep vein thrombosis (DVT) of proximal vein of left lower extremity (Multi) 12/08/2023   • Constipation 01/02/2024   • Venous thromboembolism (VTE) 02/21/2023   • Do not resuscitate 02/21/2023   • Amnesia 01/31/2024   • Hypertensive heart disease with heart failure 02/21/2023   • Nonrheumatic mitral valve regurgitation 01/31/2024   • S/P mitral valve clip implantation 04/23/2024   • Gastrointestinal bleed 08/08/2024   • Anemia 08/08/2024   • Spinal stenosis of lumbar region with neurogenic claudication 08/22/2024   • Chronic respiratory failure 09/14/2024   • CHF (congestive heart failure), NYHA class II, acute, diastolic  09/14/2024   • DVT (deep venous thrombosis) (Multi) 09/15/2024   • Dyspnea, unspecified type 10/15/2024   • Pneumonia of right lower lobe due to infectious organism 10/19/2024     Resolved Ambulatory Problems     Diagnosis Date Noted   • Contact with and (suspected) exposure to covid-19 03/02/2023   • Acute sinusitis 01/02/2024   • Acute urinary tract infection 04/20/2023   • Epistaxis 01/02/2024     Past Medical History:   Diagnosis Date   • AAA (abdominal aortic aneurysm) (CMS-[facility])    • Anxiety    • Arthritis    • CHF (congestive heart failure)    • Chronic pain disorder    • CKD (chronic kidney disease)    • Cognitive decline    • COPD (chronic obstructive pulmonary disease) (Multi)    • Coronary artery disease    • CVA (cerebral vascular accident) (Multi)    • Deep vein thrombosis (DVT) of calf (Multi)    • Depression    • Disease of thyroid gland    • Diverticulosis    • Easy bruising    • GERD (gastroesophageal reflux disease)    • History of blood transfusion 2023   • History of degenerative disc disease    • Hyperlipidemia    • Hypothyroidism    • Meralgia paresthetica    • Osteoarthritis    • Osteopenia 2010   • Plantar fasciitis    • RLS (restless legs syndrome)    • Sciatica    • Spinal stenosis    • ST elevation (STEMI) myocardial infarction (Multi)        Clinical information reviewed:    Allergies   Problems              NPO Detail:    No data recorded     PHYSICAL EXAM    Anesthesia Plan

## 2025-01-10 NOTE — PROGRESS NOTES
[facility] met with pt at bedside to discuss dc planning needs. Pt lives with her  and son in multistory house. Discussion around therapy eval that would eventually occur, pt made aware a recommendation may be made for a short stay at a rehab facility, pt is neither agreeable or disagreeable and accepting of a list of SNF's. Pt will discuss with her family. Pt would not requir a precert to go to a SNF however would not be able to admit to any until on or after Monday 1/13.     01/10/25 1541   Discharge Planning   Living Arrangements Spouse/significant other;Children   Support Systems Spouse/significant other;Children   Type of Residence Private residence   Number of Stairs to Enter Residence 3   Number of Stairs Within Residence 18   Do you have animals or pets at home? No   Who is requesting discharge planning? Provider   Home or Post Acute Services Post acute facilities (Rehab/SNF/etc)   Expected Discharge Disposition SNF   Financial Resource Strain   How hard is it for you to pay for the very basics like food, housing, medical care, and heating? Not hard   Housing Stability   In the last 12 months, was there a time when you were not able to pay the mortgage or rent on time? N   In the past 12 months, how many times have you moved where you were living? 0   At any time in the past 12 months, were you homeless or living in a shelter (including now)? N   Transportation Needs   In the past 12 months, has lack of transportation kept you from medical appointments or from getting medications? no   In the past 12 months, has lack of transportation kept you from meetings, work, or from getting things needed for daily living? No

## 2025-01-10 NOTE — PROGRESS NOTES
Physical Therapy                 Therapy Communication Note    Patient Name: Anitha Welch  MRN: [id number]  Department: [address]  Room: [facility]  Today's Date: 1/10/2025     Discipline: Physical Therapy    PT Missed Visit: Yes     Missed Visit Reason: Patient placed on medical hold   (Patient admitted with Lt hip fx and awaiting ortho consult with possible sx. Will hold eval.)    Missed Time: Cancel    Comment:

## 2025-01-10 NOTE — ASSESSMENT & PLAN NOTE
Continue atorvastatin 40 mg    Plan  Monitor on telemetry  Pain management/bowel regimen  Encourage incentive spirometer  Cardiology and orthopedic surgery consulted, appreciate recommendations  Cardiac clearance pending EKG and chest x-ray  Supplemental oxygen, monitor pulse ox  DVT prophylaxis: SCDs, pharmacologic prophylaxis per orthopedic surgery  CBC and BMP in the morning  Fall precautions  PT/OT

## 2025-01-10 NOTE — ED PROVIDER NOTES
HPI   Chief Complaint   Patient presents with    Fall     Pt presensts to the ED after she had a fall at home. Pt stated that she was going to bed and trying to navigate herself in the dark she fell from standing and landed an her left side. Pt denies hitting her head or LOC. Pt also denies use of thinners. Pt currently c/o  left leg and hip pain. Pt also c/o lower back pain        84-year-old female with a history of AAA, CHF, COPD, chronic respiratory failure on 2 L by nasal cannula, dyslipidemia, DVT, anxiety, depression, CAD, CKD, CVA, GERD, constipation, osteoarthritis, osteopenia, degenerative disc disease brought to the emergency department by EMS with a chief complaint of left-sided hip and leg pain.  Pt stated that she was going to bed and trying to navigate herself in the dark she fell from standing and landed an her left side. Pt denies hitting her head or LOC. Pt also denies use of thinners despite previous history of DVT. Pt currently c/o  left leg and hip pain. Pt also c/o lower back pain       History provided by:  Patient, relative and medical records   used: No            Patient History   Past Medical History:   Diagnosis Date    AAA (abdominal aortic aneurysm) (CMS-[facility])     Acute urinary tract infection 04/20/2023    Anemia     Anxiety     Arthritis     CHF (congestive heart failure)     Chronic pain disorder     back pain    CKD (chronic kidney disease)     Cognitive decline     COPD (chronic obstructive pulmonary disease) (Multi)     oxygen dependent- wears 2L NC continuously    Coronary artery disease     s/p stent    CVA (cerebral vascular accident) (Multi)     weaker on the left side    Deep vein thrombosis (DVT) of calf (Multi)     left    Depression     Disease of thyroid gland     Diverticulosis     DVT (deep venous thrombosis) (Multi)     left leg, on xarelto    Easy bruising     Epistaxis     GERD (gastroesophageal reflux disease)     managed with protonix    History  of blood transfusion 2023    History of degenerative disc disease     Hyperlipidemia     Hypertension     Hypothyroidism     Ischemic cardiomyopathy     Meralgia paresthetica     Nonrheumatic mitral valve regurgitation     Osteoarthritis     Osteopenia 2010    hip - DEXA    Plantar fasciitis     RLS (restless legs syndrome)     Sciatica     Spinal stenosis     ST elevation (STEMI) myocardial infarction (Multi)      Past Surgical History:   Procedure Laterality Date    ADENOIDECTOMY      ANOMALOUS PULMONARY VENOUS RETURN REPAIR, 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    Procedure: Mitral-ARMANI (Transcatheter Edge to Edge Repair);  Surgeon: Kyle Bernardo MD;  Location: [address] Cardiac Cath Lab;  Service: Cardiovascular;  Laterality: N/A;  SAMMY Elgendy.Labs with cPM,Maynard,Schedule at 7;30am    CARDIAC CATHETERIZATION  10/2019    CARDIAC CATHETERIZATION N/A 02/16/2024    Procedure: Left And Right Heart Cath, With LV;  Surgeon: Tuan Foss DO;  Location: [facility] Cardiac Cath Lab;  Service: Cardiovascular;  Laterality: N/A;  no pre auth needed    CATARACT EXTRACTION Bilateral 11/13/2012    CHOLECYSTECTOMY  1996    laparoscopic    COLONOSCOPY      Dr. Rodriguez    CORONARY STENT PLACEMENT      CYST REMOVAL Right 06/24/2013    Excision of Sebaceous cyst right axilla    DILATION AND CURETTAGE OF UTERUS      ESOPHAGOGASTRODUODENOSCOPY      KNEE ARTHROPLASTY Left     MR HEAD ANGIO WO IV CONTRAST  02/24/2017    MR HEAD ANGIO WO IV CONTRAST [facility]    MR HEAD ANGIO WO IV CONTRAST  08/11/2017    MR HEAD ANGIO WO IV CONTRAST [facility]    MR HEAD ANGIO WO IV CONTRAST  02/10/2019    MR HEAD ANGIO WO IV CONTRAST [facility]    OTHER SURGICAL HISTORY  01/09/2019    Stent synergy    OTHER SURGICAL HISTORY  01/09/2019    Stent synergy    MI ARTHRP ACETBLR/PROX FEM PROSTC AGRFT/ALGRFT      SURGICAL SAMMY MONITORING      THYROIDECTOMY, PARTIAL Bilateral 04/17/2013    subtotal thyroidectomy    TONSILLECTOMY      TOTAL  HIP ARTHROPLASTY Right 2006    UPPER GASTROINTESTINAL ENDOSCOPY  2019    Dr. Galan     Family History   Problem Relation Name Age of Onset    Hyperthyroidism Mother          Treated with radioactive iodine    Hypertension Mother      Heart disease Mother      Hyperthyroidism Sibling      Diabetes Father's Sister       Social History     Tobacco Use    Smoking status: Former     Current packs/day: 0.00     Types: Cigarettes     Quit date:      Years since quittin.0     Passive exposure: Never    Smokeless tobacco: Never   Vaping Use    Vaping status: Never Used   Substance Use Topics    Alcohol use: Not Currently     Comment: glass wine at holiday    Drug use: Never       Physical Exam   ED Triage Vitals [25 2357]   Temperature Heart Rate Respirations BP   36.7 °C (98.1 °F) 83 18 137/72      Pulse Ox Temp Source Heart Rate Source Patient Position   95 % Oral Monitor --      BP Location FiO2 (%)     -- --       Physical Exam  Vitals and nursing note reviewed.   Constitutional:       General: She is not in acute distress.     Appearance: She is well-developed.   HENT:      Head: Normocephalic and atraumatic.   Eyes:      Conjunctiva/sclera: Conjunctivae normal.   Cardiovascular:      Rate and Rhythm: Normal rate and regular rhythm.      Heart sounds: No murmur heard.  Pulmonary:      Effort: Pulmonary effort is normal. No respiratory distress.      Breath sounds: Normal breath sounds.   Abdominal:      Palpations: Abdomen is soft.      Tenderness: There is no abdominal tenderness.   Musculoskeletal:         General: Swelling, tenderness, deformity and signs of injury present.      Cervical back: Neck supple.      Comments: Left leg is shortened and externally rotated.  1+ posterior tibial and dorsalis pedis pulses equal bilateral, left leg is more edematous than right without 3 cm circumference difference   Skin:     General: Skin is warm and dry.   Neurological:      General: No focal deficit  present.      Mental Status: She is alert. Mental status is at baseline.      Sensory: No sensory deficit.      Motor: No weakness.   Psychiatric:         Mood and Affect: Mood normal.           ED Course & MDM   ED Course as of 01/12/25 0806   Fri Isra 10, 2025   0255 XR hip left with pelvis when performed 2 or 3 views  IMPRESSION:  Acute basicervical left femoral neck fracture with varus alignment.  Moderate left femoroacetabular joint osteoarthrosis.  Right total hip arthroplasty hardware is intact without surrounding  lucency or periprosthetic fracture. No acute displaced fracture of  the knee, tibia or fibula.   [EG]   0255 CT lumbar spine wo IV contrast     IMPRESSION:  Lumbar spondylosis without acute fracture or traumatic malalignment.       [EG]   0326 Comprehensive metabolic panel(!)  Uncontributory.  Similar to previous labs [EG]   0326 CBC and Auto Differential(!)  Not consistent with SIRS criteria.  No significant anemia requiring intervention at this time, minimal thrombocytopenia greater than 100 [EG]      ED Course User Index  [EG] Ese Sanchez MD         Diagnoses as of 01/12/25 0806   Closed fracture of neck of left femur, initial encounter   Fall, initial encounter                 No data recorded     [place] Coma Scale Score: 15 (01/11/25 2100 : Cathie Jeff RN)                           Medical Decision Making    HPI:  As Above  PMHx/PSHx/Meds/Allergies/SH/FH as per nursing documentation and reviewed.  Review of systems: Total of 10 systems reviewed and otherwise negative except as noted elsewhere    DDX: As described in MDM    If performed, radiology listed above interpreted by me and confirmed by the Radiologist.  Medications administered during this visit (name and route): see MAR  Social determinants of health considered for this visit: lives at home  If performed, EKG interpreted by me and detailed above    MDM Summary/considerations:  84-year-old female presenting with left  femur fracture status post mechanical fall at home.  Given that this is a nonweightbearing fracture and will require surgical intervention patient will be admitted to the hospital.  Initial attempt to transfer to [facility] was unsuccessful as [facility] is also fall with boarding patients in the ER.  Patient will remain at [place] and board in the [place] emergency department.  She is admitted to the hospitalist service with orthopedic consult and likely surgery.  Patient was initially treated with IM Dilaudid with good management of pain.    The patient was seen and triaged by our nursing/medic staff, their vitals were taken and the staff notes were reviewed.  If the patient arrived by an EMS squad or an outside agency, we discussed the case with transporting EMS medic, police, or other historians. My initial assessment was attention to their airway, breathing, and circulatory status.  We addressed any immediate or life threatening findings and completed a medical history and a physical exam if the patient or those legally responsible were in agreement with this.     **Disclaimer:  This note was dictated by speech recognition technology.  Minor errors in transcription may be present.  Please contact for clarification or corrections.      Amount and/or Complexity of Data Reviewed  Labs: ordered. Decision-making details documented in ED Course.  Radiology: ordered and independent interpretation performed. Decision-making details documented in ED Course.        Procedure  Critical Care    Performed by: Ese Sanchez MD  Authorized by: Ese Sanchez MD    Critical care provider statement:     Critical care time (minutes):  31    Critical care time was exclusive of:  Separately billable procedures and treating other patients    Critical care was necessary to treat or prevent imminent or life-threatening deterioration of the following conditions:  Trauma    Critical care was time spent personally by me on  the following activities:  Development of treatment plan with patient or surrogate, discussions with consultants, evaluation of patient's response to treatment, examination of patient, obtaining history from patient or surrogate, ordering and performing treatments and interventions, ordering and review of laboratory studies, ordering and review of radiographic studies, pulse oximetry, re-evaluation of patient's condition and review of old charts    Care discussed with: admitting provider         Ese Sanchez MD  01/12/25 0815

## 2025-01-10 NOTE — ASSESSMENT & PLAN NOTE
On 3 L oxygen via nasal cannula, 2 L is baseline  Monitor pulse ox, wean oxygen as tolerated  Resume home inhalers  As needed Vicki

## 2025-01-10 NOTE — ASSESSMENT & PLAN NOTE
Is on 2 L home oxygen nasal cannula, she is not in exacerbation.  Continue 2 L of oxygen nasal cannula  Bronchodilators home and as needed nebulizer

## 2025-01-10 NOTE — CONSULTS
Inpatient consult to Cardiology  Consult performed by: Anna Gaspar, FELA-CNP  Consult ordered by: Sammy Hwang MD  Reason for consult: Medical clearance for orthopedic surgery          Reason For Consult  Medical clearance for orthopedic surgery    History Of Present Illness  Anitha Welch is a 84 y.o. female with a history of dementia, CHF, MVR s/p ARMANI, COPD, chronic respiratory failure, AAA, DVT anxiety / depression, CVA, GERD, CKD, CAD, OA, and osteopenia who presented to the ED after sustaining a mechanical fall. She reports she lost her balance and fell. Denies any cardiac discomfort, SOB, lightheadedness, palpitations. Imaging revealed a left femoral neck fracture. Of note, she was previously on Xarelto but this was stopped in November for anemia and thrombocytopenia.     Past Medical History  She has a past medical history of AAA (abdominal aortic aneurysm) ([facility]), Acute urinary tract infection (04/20/2023), Anemia, Anxiety, Arthritis, CHF (congestive heart failure), Chronic pain disorder, CKD (chronic kidney disease), Cognitive decline, COPD (chronic obstructive pulmonary disease) (Multi), Coronary artery disease, CVA (cerebral vascular accident) (Multi), Deep vein thrombosis (DVT) of calf (Multi), Depression, Disease of thyroid gland, Diverticulosis, DVT (deep venous thrombosis) (Multi), Easy bruising, Epistaxis, GERD (gastroesophageal reflux disease), History of blood transfusion (2023), History of degenerative disc disease, Hyperlipidemia, Hypertension, Hypothyroidism, Ischemic cardiomyopathy, Meralgia paresthetica, Nonrheumatic mitral valve regurgitation, Osteoarthritis, Osteopenia (2010), Plantar fasciitis, RLS (restless legs syndrome), Sciatica, Spinal stenosis, and ST elevation (STEMI) myocardial infarction (Multi).    Cardiac History  Follows with Dr. Foss    TTE 8/2024: EF 60-65%, right atrium moderately dilated, mild-mod MVR, mild tricuspid regurg, moderately elevated R ventricular  systolic pressure, moderate pulmonary HTN    ARMANI MitraClip placement x 2 4/2024    Cardiac catheterization 10/2019: Nonobstructive CAD    Cardiac catheterization 1/2019: STEMI of RCA with mid vessel occlusion, stent placed    Surgical History  She has a past surgical history that includes MR angio head wo IV contrast (02/24/2017); MR angio head wo IV contrast (08/11/2017); MR angio head wo IV contrast (02/10/2019); Cholecystectomy (1996); Tonsillectomy; pr arthrp acetblr/prox fem prostc agrft/algrft; Thyroidectomy, partial (Bilateral, 04/17/2013); Coronary stent placement; Knee Arthroplasty (Left); Total hip arthroplasty (Right, 2006); Dilation and curettage of uterus; Other surgical history (01/09/2019); Upper gastrointestinal endoscopy (03/2019); Cardiac catheterization (10/2019); Cataract extraction (Bilateral, 11/13/2012); Adenoidectomy; Cyst Removal (Right, 06/24/2013); Colonoscopy; Other surgical history (01/09/2019); surgical laverne monitoring; Cardiac catheterization (N/A, 02/16/2024); Esophagogastroduodenoscopy; and Anomalous pulmonary venous return repair, total (N/A, 4/25/2024).     Social History  She reports that she quit smoking about 11 years ago. Her smoking use included cigarettes. She has never been exposed to tobacco smoke. She has never used smokeless tobacco. She reports that she does not currently use alcohol. She reports that she does not use drugs.    Family History  Family History   Problem Relation Name Age of Onset    Hyperthyroidism Mother          Treated with radioactive iodine    Hypertension Mother      Heart disease Mother      Hyperthyroidism Sibling      Diabetes Father's Sister          Allergies  Amoxicillin, Iodinated contrast media, Ciprofloxacin, Influenza virus vaccines, Diphenhydramine, Flu vac 2023 [id number](pf), and Other    Review of Systems   Constitutional: Negative.    HENT: Negative.     Eyes: Negative.    Respiratory: Negative.     Cardiovascular: Negative.   "  Gastrointestinal: Negative.    Endocrine: Negative.    Genitourinary: Negative.    Musculoskeletal:         L hip pain and tenderness   Skin: Negative.    Allergic/Immunologic: Negative.    Neurological: Negative.    Hematological: Negative.    Psychiatric/Behavioral: Negative.          Physical Exam  Vitals reviewed.   Constitutional:       General: She is not in acute distress.     Appearance: Normal appearance. She is normal weight. She is not ill-appearing, toxic-appearing or diaphoretic.   HENT:      Head: Normocephalic and atraumatic.      Nose: Nose normal.      Mouth/Throat:      Mouth: Mucous membranes are moist.   Eyes:      Extraocular Movements: Extraocular movements intact.   Cardiovascular:      Rate and Rhythm: Normal rate and regular rhythm.      Pulses: Normal pulses.      Heart sounds: Murmur heard.      Systolic murmur is present with a grade of 2/6.   Pulmonary:      Effort: Pulmonary effort is normal.      Breath sounds: Normal breath sounds.   Abdominal:      General: Abdomen is flat. Bowel sounds are normal. There is no distension.      Palpations: Abdomen is soft.      Tenderness: There is no abdominal tenderness.   Musculoskeletal:         General: Normal range of motion.      Cervical back: Normal range of motion and neck supple.      Right lower leg: No edema.      Left lower leg: Edema present.      Comments: L hip pain and tenderness   Skin:     General: Skin is warm and dry.      Capillary Refill: Capillary refill takes less than 2 seconds.   Neurological:      General: No focal deficit present.      Mental Status: She is alert.   Psychiatric:         Mood and Affect: Mood normal.         Behavior: Behavior normal.          Last Recorded Vitals  Blood pressure 142/70, pulse (!) 128, temperature 36.8 °C (98.2 °F), temperature source Oral, resp. rate 20, height 1.651 m (5' 5\"), weight 64.5 kg (142 lb 3.2 oz), SpO2 96%.    Relevant Results  Results for orders placed or performed during " the hospital encounter of 01/09/25 (from the past 24 hours)   CBC and Auto Differential   Result Value Ref Range    WBC 5.9 4.4 - 11.3 x10*3/uL    nRBC 0.0 0.0 - 0.0 /100 WBCs    RBC 4.09 4.00 - 5.20 x10*6/uL    Hemoglobin 11.5 (L) 12.0 - 16.0 g/dL    Hematocrit 36.7 36.0 - 46.0 %    MCV 90 80 - 100 fL    MCH 28.1 26.0 - 34.0 pg    MCHC 31.3 (L) 32.0 - 36.0 g/dL    RDW 17.2 (H) 11.5 - 14.5 %    Platelets 133 (L) 150 - 450 x10*3/uL    Neutrophils % 76.5 40.0 - 80.0 %    Immature Granulocytes %, Automated 0.7 0.0 - 0.9 %    Lymphocytes % 15.4 13.0 - 44.0 %    Monocytes % 6.4 2.0 - 10.0 %    Eosinophils % 0.7 0.0 - 6.0 %    Basophils % 0.3 0.0 - 2.0 %    Neutrophils Absolute 4.53 1.60 - 5.50 x10*3/uL    Immature Granulocytes Absolute, Automated 0.04 0.00 - 0.50 x10*3/uL    Lymphocytes Absolute 0.91 0.80 - 3.00 x10*3/uL    Monocytes Absolute 0.38 0.05 - 0.80 x10*3/uL    Eosinophils Absolute 0.04 0.00 - 0.40 x10*3/uL    Basophils Absolute 0.02 0.00 - 0.10 x10*3/uL   Comprehensive metabolic panel   Result Value Ref Range    Glucose 114 (H) 74 - 99 mg/dL    Sodium 141 136 - 145 mmol/L    Potassium 4.5 3.5 - 5.3 mmol/L    Chloride 100 98 - 107 mmol/L    Bicarbonate 36 (H) 21 - 32 mmol/L    Anion Gap 10 10 - 20 mmol/L    Urea Nitrogen 30 (H) 6 - 23 mg/dL    Creatinine 1.11 (H) 0.50 - 1.05 mg/dL    eGFR 49 (L) >60 mL/min/1.73m*2    Calcium 8.9 8.6 - 10.3 mg/dL    Albumin 4.1 3.4 - 5.0 g/dL    Alkaline Phosphatase 56 33 - 136 U/L    Total Protein 6.7 6.4 - 8.2 g/dL    AST 25 9 - 39 U/L    Bilirubin, Total 0.8 0.0 - 1.2 mg/dL    ALT 19 7 - 45 U/L     *Note: Due to a large number of results and/or encounters for the requested time period, some results have not been displayed. A complete set of results can be found in Results Review.      XR hip left with pelvis when performed 2 or 3 views    Result Date: 1/10/2025  Interpreted By:  Finkelstein, Evan, STUDY: XR HIP LEFT WITH PELVIS WHEN PERFORMED 2 OR 3 VIEWS; XR TIBIA FIBULA  LEFT 2 VIEWS; XR FEMUR LEFT 2+ VIEWS;  1/10/2025 2:13 am 4 images of the left femur. 3 images of the left tibia/fibula. 2 images of the left hip. One view of the right hip.. AP view of the pelvis.   INDICATION: Signs/Symptoms:Fall.   COMPARISON: None.   ACCESSION NUMBER(S): [id number]; [id number]; [id number]   ORDERING CLINICIAN: RAMAN HEMPHILL   FINDINGS: Right hip arthroplasty hardware is present without surrounding lucency or periprosthetic fracture. Degenerative changes in the visualized portions of the lower lumbosacral spine. Phleboliths overlie the pelvis. Bones are demineralized. Acute basicervical fracture of the left femoral neck with varus alignment. Moderate left femoroacetabular joint space narrowing with osteophytic spurring. There are vascular calcifications. Bones are demineralized. No acute displaced fracture throughout the remainder of the femur. No acute displaced fracture of the tibia or fibula. Edema throughout the calf soft tissues. No sizable suprapatellar joint effusion.       Acute basicervical left femoral neck fracture with varus alignment. 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 No acute displaced fracture of the knee, tibia or fibula.     MACRO: None.   Signed by: Evan Finkelstein 1/10/2025 2:42 AM Dictation workstation:   [id number]    XR femur left 2+ views    Result Date: 1/10/2025  Interpreted By:  Finkelstein, Evan, STUDY: XR HIP LEFT WITH PELVIS WHEN PERFORMED 2 OR 3 VIEWS; XR TIBIA FIBULA LEFT 2 VIEWS; XR FEMUR LEFT 2+ VIEWS;  1/10/2025 2:13 am 4 images of the left femur. 3 images of the left tibia/fibula. 2 images of the left hip. One view of the right hip.. AP view of the pelvis.   INDICATION: Signs/Symptoms:Fall.   COMPARISON: None.   ACCESSION NUMBER(S): [id number]; [id number]; [id number]   ORDERING CLINICIAN: RAMAN HEMPHILL    FINDINGS: Right hip arthroplasty hardware is present without surrounding lucency or periprosthetic fracture. Degenerative changes in the visualized portions of the lower lumbosacral spine. Phleboliths overlie the pelvis. Bones are demineralized. Acute basicervical fracture of the left femoral neck with varus alignment. Moderate left femoroacetabular joint space narrowing with osteophytic spurring. There are vascular calcifications. Bones are demineralized. No acute displaced fracture throughout the remainder of the femur. No acute displaced fracture of the tibia or fibula. Edema throughout the calf soft tissues. No sizable suprapatellar joint effusion.       Acute basicervical left femoral neck fracture with varus alignment. 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 No acute displaced fracture of the knee, tibia or fibula.     MACRO: None.   Signed by: Evan Finkelstein 1/10/2025 2:42 AM Dictation workstation:   [id number]    XR tibia fibula left 2 views    Result Date: 1/10/2025  Interpreted By:  Finkelstein, Evan, STUDY: XR HIP LEFT WITH PELVIS WHEN PERFORMED 2 OR 3 VIEWS; XR TIBIA FIBULA LEFT 2 VIEWS; XR FEMUR LEFT 2+ VIEWS;  1/10/2025 2:13 am 4 images of the left femur. 3 images of the left tibia/fibula. 2 images of the left hip. One view of the right hip.. AP view of the pelvis.   INDICATION: Signs/Symptoms:Fall.   COMPARISON: None.   ACCESSION NUMBER(S): [id number]; [id number]; [id number]   ORDERING CLINICIAN: RAMAN HEMPHILL   FINDINGS: Right hip arthroplasty hardware is present without surrounding lucency or periprosthetic fracture. Degenerative changes in the visualized portions of the lower lumbosacral spine. Phleboliths overlie the pelvis. Bones are demineralized. Acute basicervical fracture of the left femoral neck with varus alignment. Moderate left femoroacetabular joint space  narrowing with osteophytic spurring. There are vascular calcifications. Bones are demineralized. No acute displaced fracture throughout the remainder of the femur. No acute displaced fracture of the tibia or fibula. Edema throughout the calf soft tissues. No sizable suprapatellar joint effusion.       Acute basicervical left femoral neck fracture with varus alignment. 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 No acute displaced fracture of the knee, tibia or fibula.     MACRO: None.   Signed by: Evan Finkelstein 1/10/2025 2:42 AM Dictation workstation:   [id number]    CT lumbar spine wo IV contrast    Result Date: 1/10/2025  Interpreted By:  Finkelstein, Evan, STUDY: CT LUMBAR SPINE WO IV CONTRAST;  1/10/2025 2:12 am   INDICATION: Signs/Symptoms:fall, low back and left hip pain.     COMPARISON: CT lumbar spine 11/25/2023   ACCESSION NUMBER(S): [id number]   ORDERING CLINICIAN: RAMAN HEMPHILL   TECHNIQUE: Axial noncontrast CT images of the lumbar spine with coronal and sagittal reconstructed images.   FINDINGS: ALIGNMENT: Levocurvature of the lumbar spine. No traumatic malalignment VERTEBRAE: No acute loss of vertebral body height. Bones are demineralized. Irregularity of the endplates across the L3/L4 disc space and L1/L2 disc space, likely representing a combination of Schmorl's nodes and degenerative changes, which is similar in appearance to prior imaging 11/25/2023. DISC SPACES: Disc space narrowing most pronounced at L2/L3. Vacuum disc phenomena at L1/L2, L2/L3 and L4/L5. SPINAL CANAL: Diffuse facet arthropathy with varying degrees of foraminal narrowing. No severe central narrowing.. PREVERTEBRAL SOFT TISSUES: No prevertebral soft tissue swelling.   OTHER FINDINGS: Mild bibasilar atelectasis. The gallbladder is surgically absent. Calcifications scattered throughout the spleen suggest  sequela of prior granulomatous disease. Hypodensities in the kidneys measure simple fluid attenuation and are most compatible with simple cysts. Scattered colonic diverticula without pericolonic inflammatory stranding. Extensive aortoiliac calcifications. Focal ectasia of the infrarenal abdominal aorta measuring up to 2.7 cm in maximum AP dimension.       Lumbar spondylosis without acute fracture or traumatic malalignment.     MACRO: None.   Signed by: Evan Finkelstein 1/10/2025 2:32 AM Dictation workstation:   [id number]       Assessment/Plan     L femur fracture  HFpEF  CAD s/p PCI to RCA  MVR s/p ARMANI  Chronic respiratory failure on 2L NC  COPD  Hx DVT  Hx CVA  Dementia    1/10: As above. 83 yo F with left femur fracture with cardiac history of CAD, HFpEF, and MVR s/p ARMANI. We were consulted for cardiac clearance for hip repair. The patient denies any chest discomfort, palpitations, SOB, lightheadedness. She appears euvolemic upon exam. Labs today with sodium 141, potassium 4.5, bicarb 36, creatinine 1.11, hemoglobin 11.5. Blood pressures have been normotensive with most recent reading of 108/47. SpO2 95% on 3L NC. Telemetry with sinus rhythm without ectopy, rates in the 80s-90s BPM. No initial EKG or CXR has been done; will order both. Patient with known risks of CAD, CHF, and cerebrovascular disease. Using the RCRI risk calculator the patient is considered a class 3 risk, giving her a 15% risk of death, myocardial infarction or cardiac arrest 30 days following surgery. In the absence of acute coronary syndrome, significant fluid overload, or life threatening arrhythmias, I believe this patient can proceed with this needed surgery pending her EKG and CXR, understanding his baseline risks which at this time are not modifiable. We will continue to follow this patient with you. Will discuss with my collaborating physician Dr. Spaulding.         I spent 60 minutes in the professional and overall care of this  patient.

## 2025-01-10 NOTE — ASSESSMENT & PLAN NOTE
X-ray left hip with acute basicervical left femoral neck fracture with varus alignment.,  Extreme pain on physical exam with decreased active and passive range of motion.  -Continue IV pain medication  -N.p.o.  -PT OT social work consulted  -Currently not on anticoagulation  -Cardiology consulted, patient will likely need medical clearance   -Orthopedic surgery consulted

## 2025-01-10 NOTE — TELEPHONE ENCOUNTER
Patient is active with [facility]. Patient admitted to [facility] 1/9/25. She presented to the ED after suffering a mechanical fall with resulting Lt femoral neck fracture. Plan for medical clearance and then to OR. [facility] will monitor hospitalization and discharge plan.

## 2025-01-11 ENCOUNTER — ANESTHESIA (OUTPATIENT)
Dept: OPERATING ROOM | Facility: HOSPITAL | Age: 85
End: 2025-01-11
Payer: MEDICARE

## 2025-01-11 ENCOUNTER — APPOINTMENT (OUTPATIENT)
Dept: CARDIOLOGY | Facility: HOSPITAL | Age: 85
End: 2025-01-11
Payer: MEDICARE

## 2025-01-11 ENCOUNTER — APPOINTMENT (OUTPATIENT)
Dept: RADIOLOGY | Facility: HOSPITAL | Age: 85
End: 2025-01-11
Payer: MEDICARE

## 2025-01-11 PROBLEM — I26.99 ACUTE PULMONARY EMBOLISM (MULTI): Status: ACTIVE | Noted: 2025-01-11

## 2025-01-11 LAB
ALBUMIN SERPL BCP-MCNC: 3.9 G/DL (ref 3.4–5)
ALP SERPL-CCNC: 85 U/L (ref 33–136)
ALT SERPL W P-5'-P-CCNC: 149 U/L (ref 7–45)
ANION GAP BLDA CALCULATED.4IONS-SCNC: 1 MMO/L (ref 10–25)
ANION GAP SERPL CALCULATED.3IONS-SCNC: 11 MMOL/L (ref 10–20)
ANION GAP SERPL CALCULATED.3IONS-SCNC: 7 MMOL/L (ref 10–20)
AORTIC VALVE MEAN GRADIENT: 3 MMHG
AORTIC VALVE PEAK VELOCITY: 1.24 M/S
APPARATUS: ABNORMAL
APPEARANCE UR: CLEAR
APTT PPP: 105.6 SECONDS (ref 22–32.5)
APTT PPP: 25.9 SECONDS (ref 22–32.5)
ARTERIAL PATENCY WRIST A: POSITIVE
AST SERPL W P-5'-P-CCNC: 124 U/L (ref 9–39)
AV PEAK GRADIENT: 6 MMHG
BACTERIA #/AREA URNS AUTO: ABNORMAL /HPF
BASE EXCESS BLDA CALC-SCNC: 11.9 MMOL/L (ref -2–3)
BILIRUB SERPL-MCNC: 1.1 MG/DL (ref 0–1.2)
BILIRUB UR STRIP.AUTO-MCNC: NEGATIVE MG/DL
BNP SERPL-MCNC: 615 PG/ML (ref 0–99)
BODY TEMPERATURE: 37 DEGREES CELSIUS
BUN SERPL-MCNC: 32 MG/DL (ref 6–23)
BUN SERPL-MCNC: 32 MG/DL (ref 6–23)
CA-I BLDA-SCNC: 1.21 MMOL/L (ref 1.1–1.33)
CALCIUM SERPL-MCNC: 8.8 MG/DL (ref 8.6–10.3)
CALCIUM SERPL-MCNC: 9.2 MG/DL (ref 8.6–10.3)
CHLORIDE BLDA-SCNC: 104 MMOL/L (ref 98–107)
CHLORIDE SERPL-SCNC: 101 MMOL/L (ref 98–107)
CHLORIDE SERPL-SCNC: 102 MMOL/L (ref 98–107)
CHLORIDE UR-SCNC: <15 MMOL/L
CHLORIDE/CREATININE (MMOL/G) IN URINE: NORMAL
CK SERPL-CCNC: 126 U/L (ref 0–215)
CO2 SERPL-SCNC: 35 MMOL/L (ref 21–32)
CO2 SERPL-SCNC: 37 MMOL/L (ref 21–32)
COLOR UR: ABNORMAL
CREAT SERPL-MCNC: 1.26 MG/DL (ref 0.5–1.05)
CREAT SERPL-MCNC: 1.31 MG/DL (ref 0.5–1.05)
CREAT UR-MCNC: 92.3 MG/DL (ref 20–320)
CREAT UR-MCNC: 92.3 MG/DL (ref 20–320)
D DIMER PPP FEU-MCNC: 7.42 MG/L FEU (ref 0.19–0.5)
EGFRCR SERPLBLD CKD-EPI 2021: 40 ML/MIN/1.73M*2
EGFRCR SERPLBLD CKD-EPI 2021: 42 ML/MIN/1.73M*2
EJECTION FRACTION APICAL 4 CHAMBER: 66.4
EJECTION FRACTION: 58 %
ERYTHROCYTE [DISTWIDTH] IN BLOOD BY AUTOMATED COUNT: 17.4 % (ref 11.5–14.5)
ERYTHROCYTE [DISTWIDTH] IN BLOOD BY AUTOMATED COUNT: 17.4 % (ref 11.5–14.5)
ERYTHROCYTE [DISTWIDTH] IN BLOOD BY AUTOMATED COUNT: 17.6 % (ref 11.5–14.5)
GLUCOSE BLDA-MCNC: 103 MG/DL (ref 74–99)
GLUCOSE SERPL-MCNC: 102 MG/DL (ref 74–99)
GLUCOSE SERPL-MCNC: 124 MG/DL (ref 74–99)
GLUCOSE UR STRIP.AUTO-MCNC: NORMAL MG/DL
HCO3 BLDA-SCNC: 38.7 MMOL/L (ref 22–26)
HCT VFR BLD AUTO: 34.5 % (ref 36–46)
HCT VFR BLD AUTO: 36.8 % (ref 36–46)
HCT VFR BLD AUTO: 37 % (ref 36–46)
HCT VFR BLD EST: 34 % (ref 36–46)
HGB BLD-MCNC: 10.6 G/DL (ref 12–16)
HGB BLD-MCNC: 11.4 G/DL (ref 12–16)
HGB BLD-MCNC: 11.4 G/DL (ref 12–16)
HGB BLDA-MCNC: 11.3 G/DL (ref 12–16)
HOLD SPECIMEN: NORMAL
HYALINE CASTS #/AREA URNS AUTO: ABNORMAL /LPF
INHALED O2 CONCENTRATION: 44 %
KETONES UR STRIP.AUTO-MCNC: NEGATIVE MG/DL
LACTATE BLDA-SCNC: 0.6 MMOL/L (ref 0.4–2)
LEUKOCYTE ESTERASE UR QL STRIP.AUTO: ABNORMAL
LV EJECTION FRACTION BIPLANE: 50 %
MAGNESIUM SERPL-MCNC: 2.16 MG/DL (ref 1.6–2.4)
MCH RBC QN AUTO: 28.3 PG (ref 26–34)
MCH RBC QN AUTO: 28.7 PG (ref 26–34)
MCH RBC QN AUTO: 28.9 PG (ref 26–34)
MCHC RBC AUTO-ENTMCNC: 30.7 G/DL (ref 32–36)
MCHC RBC AUTO-ENTMCNC: 30.8 G/DL (ref 32–36)
MCHC RBC AUTO-ENTMCNC: 31 G/DL (ref 32–36)
MCV RBC AUTO: 92 FL (ref 80–100)
MCV RBC AUTO: 93 FL (ref 80–100)
MCV RBC AUTO: 94 FL (ref 80–100)
MITRAL VALVE E/A RATIO: 0.58
MUCOUS THREADS #/AREA URNS AUTO: ABNORMAL /LPF
NITRITE UR QL STRIP.AUTO: NEGATIVE
NRBC BLD-RTO: 0 /100 WBCS (ref 0–0)
OXYHGB MFR BLDA: 92.1 % (ref 94–98)
PCO2 BLDA: 61 MM HG (ref 38–42)
PH BLDA: 7.41 PH (ref 7.38–7.42)
PH UR STRIP.AUTO: 5 [PH]
PLATELET # BLD AUTO: 110 X10*3/UL (ref 150–450)
PLATELET # BLD AUTO: 111 X10*3/UL (ref 150–450)
PLATELET # BLD AUTO: 125 X10*3/UL (ref 150–450)
PO2 BLDA: 68 MM HG (ref 85–95)
POTASSIUM BLDA-SCNC: 4.6 MMOL/L (ref 3.5–5.3)
POTASSIUM SERPL-SCNC: 4.3 MMOL/L (ref 3.5–5.3)
POTASSIUM SERPL-SCNC: 4.4 MMOL/L (ref 3.5–5.3)
POTASSIUM UR-SCNC: 48 MMOL/L
POTASSIUM/CREAT UR-RTO: 52 MMOL/G CREAT
PROT SERPL-MCNC: 6.4 G/DL (ref 6.4–8.2)
PROT UR STRIP.AUTO-MCNC: NEGATIVE MG/DL
PROT UR-MCNC: 21 MG/DL (ref 5–24)
PROT/CREAT UR: 0.23 MG/MG CREAT (ref 0–0.17)
RBC # BLD AUTO: 3.75 X10*6/UL (ref 4–5.2)
RBC # BLD AUTO: 3.95 X10*6/UL (ref 4–5.2)
RBC # BLD AUTO: 3.97 X10*6/UL (ref 4–5.2)
RBC # UR STRIP.AUTO: ABNORMAL /UL
RBC #/AREA URNS AUTO: ABNORMAL /HPF
RIGHT VENTRICLE PEAK SYSTOLIC PRESSURE: 40.9 MMHG
SAO2 % BLDA: 94 % (ref 94–100)
SODIUM BLDA-SCNC: 139 MMOL/L (ref 136–145)
SODIUM SERPL-SCNC: 142 MMOL/L (ref 136–145)
SODIUM SERPL-SCNC: 143 MMOL/L (ref 136–145)
SODIUM UR-SCNC: 26 MMOL/L
SODIUM/CREAT UR-RTO: 28 MMOL/G CREAT
SP GR UR STRIP.AUTO: 1.02
SPECIMEN DRAWN FROM PATIENT: ABNORMAL
UROBILINOGEN UR STRIP.AUTO-MCNC: NORMAL MG/DL
WBC # BLD AUTO: 7.8 X10*3/UL (ref 4.4–11.3)
WBC # BLD AUTO: 7.9 X10*3/UL (ref 4.4–11.3)
WBC # BLD AUTO: 8.1 X10*3/UL (ref 4.4–11.3)
WBC #/AREA URNS AUTO: ABNORMAL /HPF

## 2025-01-11 PROCEDURE — 82436 ASSAY OF URINE CHLORIDE: CPT | Performed by: INTERNAL MEDICINE

## 2025-01-11 PROCEDURE — 82550 ASSAY OF CK (CPK): CPT | Performed by: INTERNAL MEDICINE

## 2025-01-11 PROCEDURE — 80053 COMPREHEN METABOLIC PANEL: CPT | Performed by: STUDENT IN AN ORGANIZED HEALTH CARE EDUCATION/TRAINING PROGRAM

## 2025-01-11 PROCEDURE — 82570 ASSAY OF URINE CREATININE: CPT | Performed by: INTERNAL MEDICINE

## 2025-01-11 PROCEDURE — 93321 DOPPLER ECHO F-UP/LMTD STD: CPT

## 2025-01-11 PROCEDURE — 2500000005 HC RX 250 GENERAL PHARMACY W/O HCPCS: Performed by: STUDENT IN AN ORGANIZED HEALTH CARE EDUCATION/TRAINING PROGRAM

## 2025-01-11 PROCEDURE — 2500000001 HC RX 250 WO HCPCS SELF ADMINISTERED DRUGS (ALT 637 FOR MEDICARE OP): Performed by: NURSE PRACTITIONER

## 2025-01-11 PROCEDURE — 93325 DOPPLER ECHO COLOR FLOW MAPG: CPT

## 2025-01-11 PROCEDURE — 36415 COLL VENOUS BLD VENIPUNCTURE: CPT | Performed by: STUDENT IN AN ORGANIZED HEALTH CARE EDUCATION/TRAINING PROGRAM

## 2025-01-11 PROCEDURE — 93308 TTE F-UP OR LMTD: CPT | Performed by: INTERNAL MEDICINE

## 2025-01-11 PROCEDURE — 84132 ASSAY OF SERUM POTASSIUM: CPT | Performed by: STUDENT IN AN ORGANIZED HEALTH CARE EDUCATION/TRAINING PROGRAM

## 2025-01-11 PROCEDURE — 93010 ELECTROCARDIOGRAM REPORT: CPT | Performed by: INTERNAL MEDICINE

## 2025-01-11 PROCEDURE — 84132 ASSAY OF SERUM POTASSIUM: CPT | Performed by: NURSE PRACTITIONER

## 2025-01-11 PROCEDURE — 85027 COMPLETE CBC AUTOMATED: CPT | Performed by: NURSE PRACTITIONER

## 2025-01-11 PROCEDURE — 78830 RP LOCLZJ TUM SPECT W/CT 1: CPT

## 2025-01-11 PROCEDURE — 85730 THROMBOPLASTIN TIME PARTIAL: CPT | Performed by: INTERNAL MEDICINE

## 2025-01-11 PROCEDURE — 93005 ELECTROCARDIOGRAM TRACING: CPT

## 2025-01-11 PROCEDURE — 2500000001 HC RX 250 WO HCPCS SELF ADMINISTERED DRUGS (ALT 637 FOR MEDICARE OP): Performed by: STUDENT IN AN ORGANIZED HEALTH CARE EDUCATION/TRAINING PROGRAM

## 2025-01-11 PROCEDURE — 2500000004 HC RX 250 GENERAL PHARMACY W/ HCPCS (ALT 636 FOR OP/ED): Performed by: NURSE PRACTITIONER

## 2025-01-11 PROCEDURE — 84156 ASSAY OF PROTEIN URINE: CPT | Performed by: INTERNAL MEDICINE

## 2025-01-11 PROCEDURE — 71045 X-RAY EXAM CHEST 1 VIEW: CPT | Performed by: SURGERY

## 2025-01-11 PROCEDURE — 80048 BASIC METABOLIC PNL TOTAL CA: CPT | Mod: CCI | Performed by: STUDENT IN AN ORGANIZED HEALTH CARE EDUCATION/TRAINING PROGRAM

## 2025-01-11 PROCEDURE — 94640 AIRWAY INHALATION TREATMENT: CPT

## 2025-01-11 PROCEDURE — 36415 COLL VENOUS BLD VENIPUNCTURE: CPT | Performed by: NURSE PRACTITIONER

## 2025-01-11 PROCEDURE — 83880 ASSAY OF NATRIURETIC PEPTIDE: CPT | Performed by: STUDENT IN AN ORGANIZED HEALTH CARE EDUCATION/TRAINING PROGRAM

## 2025-01-11 PROCEDURE — 36600 WITHDRAWAL OF ARTERIAL BLOOD: CPT

## 2025-01-11 PROCEDURE — 99222 1ST HOSP IP/OBS MODERATE 55: CPT | Performed by: STUDENT IN AN ORGANIZED HEALTH CARE EDUCATION/TRAINING PROGRAM

## 2025-01-11 PROCEDURE — 83735 ASSAY OF MAGNESIUM: CPT | Performed by: STUDENT IN AN ORGANIZED HEALTH CARE EDUCATION/TRAINING PROGRAM

## 2025-01-11 PROCEDURE — A9540 TC99M MAA: HCPCS | Performed by: INTERNAL MEDICINE

## 2025-01-11 PROCEDURE — 78830 RP LOCLZJ TUM SPECT W/CT 1: CPT | Performed by: STUDENT IN AN ORGANIZED HEALTH CARE EDUCATION/TRAINING PROGRAM

## 2025-01-11 PROCEDURE — 2500000004 HC RX 250 GENERAL PHARMACY W/ HCPCS (ALT 636 FOR OP/ED): Performed by: STUDENT IN AN ORGANIZED HEALTH CARE EDUCATION/TRAINING PROGRAM

## 2025-01-11 PROCEDURE — 85730 THROMBOPLASTIN TIME PARTIAL: CPT | Performed by: NURSE PRACTITIONER

## 2025-01-11 PROCEDURE — 3430000001 HC RX 343 DIAGNOSTIC RADIOPHARMACEUTICALS: Performed by: INTERNAL MEDICINE

## 2025-01-11 PROCEDURE — 99233 SBSQ HOSP IP/OBS HIGH 50: CPT | Performed by: NURSE PRACTITIONER

## 2025-01-11 PROCEDURE — 71045 X-RAY EXAM CHEST 1 VIEW: CPT

## 2025-01-11 PROCEDURE — 81001 URINALYSIS AUTO W/SCOPE: CPT | Performed by: INTERNAL MEDICINE

## 2025-01-11 PROCEDURE — 1200000002 HC GENERAL ROOM WITH TELEMETRY DAILY

## 2025-01-11 PROCEDURE — 93325 DOPPLER ECHO COLOR FLOW MAPG: CPT | Performed by: INTERNAL MEDICINE

## 2025-01-11 PROCEDURE — 85300 ANTITHROMBIN III ACTIVITY: CPT | Performed by: STUDENT IN AN ORGANIZED HEALTH CARE EDUCATION/TRAINING PROGRAM

## 2025-01-11 PROCEDURE — 93970 EXTREMITY STUDY: CPT

## 2025-01-11 PROCEDURE — 85027 COMPLETE CBC AUTOMATED: CPT | Performed by: STUDENT IN AN ORGANIZED HEALTH CARE EDUCATION/TRAINING PROGRAM

## 2025-01-11 PROCEDURE — 99291 CRITICAL CARE FIRST HOUR: CPT | Performed by: STUDENT IN AN ORGANIZED HEALTH CARE EDUCATION/TRAINING PROGRAM

## 2025-01-11 PROCEDURE — 87086 URINE CULTURE/COLONY COUNT: CPT | Mod: WESLAB | Performed by: INTERNAL MEDICINE

## 2025-01-11 PROCEDURE — 82435 ASSAY OF BLOOD CHLORIDE: CPT | Performed by: NURSE PRACTITIONER

## 2025-01-11 PROCEDURE — 99232 SBSQ HOSP IP/OBS MODERATE 35: CPT | Performed by: NURSE PRACTITIONER

## 2025-01-11 PROCEDURE — 93321 DOPPLER ECHO F-UP/LMTD STD: CPT | Performed by: INTERNAL MEDICINE

## 2025-01-11 RX ORDER — MORPHINE SULFATE 2 MG/ML
1 INJECTION, SOLUTION INTRAMUSCULAR; INTRAVENOUS EVERY 4 HOURS PRN
Status: DISPENSED | OUTPATIENT
Start: 2025-01-11

## 2025-01-11 RX ORDER — HEPARIN SODIUM 5000 [USP'U]/ML
2000-4000 INJECTION, SOLUTION INTRAVENOUS; SUBCUTANEOUS AS NEEDED
Status: ACTIVE | OUTPATIENT
Start: 2025-01-11

## 2025-01-11 RX ORDER — FUROSEMIDE 10 MG/ML
40 INJECTION INTRAMUSCULAR; INTRAVENOUS ONCE
Status: COMPLETED | OUTPATIENT
Start: 2025-01-11 | End: 2025-01-11

## 2025-01-11 RX ORDER — HEPARIN SODIUM 10000 [USP'U]/100ML
0-4500 INJECTION, SOLUTION INTRAVENOUS CONTINUOUS
Status: DISPENSED | OUTPATIENT
Start: 2025-01-11

## 2025-01-11 RX ORDER — SODIUM CHLORIDE 9 MG/ML
60 INJECTION, SOLUTION INTRAVENOUS CONTINUOUS
Status: DISCONTINUED | OUTPATIENT
Start: 2025-01-11 | End: 2025-01-11

## 2025-01-11 RX ORDER — HEPARIN SODIUM 5000 [USP'U]/ML
80 INJECTION, SOLUTION INTRAVENOUS; SUBCUTANEOUS ONCE
Status: COMPLETED | OUTPATIENT
Start: 2025-01-11 | End: 2025-01-11

## 2025-01-11 RX ADMIN — PANTOPRAZOLE SODIUM 40 MG: 40 TABLET, DELAYED RELEASE ORAL at 21:58

## 2025-01-11 RX ADMIN — ACETAMINOPHEN 650 MG: 325 TABLET ORAL at 11:31

## 2025-01-11 RX ADMIN — Medication 6 L/MIN: at 03:28

## 2025-01-11 RX ADMIN — CARVEDILOL 3.12 MG: 3.12 TABLET, FILM COATED ORAL at 21:58

## 2025-01-11 RX ADMIN — MORPHINE SULFATE 2 MG: 2 INJECTION, SOLUTION INTRAMUSCULAR; INTRAVENOUS at 08:49

## 2025-01-11 RX ADMIN — FUROSEMIDE 40 MG: 10 INJECTION, SOLUTION INTRAMUSCULAR; INTRAVENOUS at 05:56

## 2025-01-11 RX ADMIN — HEPARIN SODIUM 1200 UNITS/HR: 10000 INJECTION, SOLUTION INTRAVENOUS at 11:28

## 2025-01-11 RX ADMIN — Medication 6 L/MIN: at 08:00

## 2025-01-11 RX ADMIN — Medication 6 L/MIN: at 07:59

## 2025-01-11 RX ADMIN — Medication 100 PERCENT: at 03:58

## 2025-01-11 RX ADMIN — TIOTROPIUM BROMIDE INHALATION SPRAY 2 PUFF: 3.12 SPRAY, METERED RESPIRATORY (INHALATION) at 07:57

## 2025-01-11 RX ADMIN — ACETAMINOPHEN 650 MG: 325 TABLET ORAL at 18:41

## 2025-01-11 RX ADMIN — POLYETHYLENE GLYCOL 3350 17 G: 17 POWDER, FOR SOLUTION ORAL at 11:27

## 2025-01-11 RX ADMIN — ATORVASTATIN CALCIUM 40 MG: 40 TABLET, FILM COATED ORAL at 21:58

## 2025-01-11 RX ADMIN — FLUTICASONE FUROATE AND VILANTEROL TRIFENATATE 1 PUFF: 100; 25 POWDER RESPIRATORY (INHALATION) at 07:57

## 2025-01-11 RX ADMIN — Medication 2 L/MIN: at 19:56

## 2025-01-11 RX ADMIN — KIT FOR THE PREPARATION OF TECHNETIUM TC 99M ALBUMIN AGGREGATED 4 MILLICURIE: 2.5 INJECTION, POWDER, FOR SOLUTION INTRAVENOUS at 09:00

## 2025-01-11 RX ADMIN — Medication 5 MG: at 21:58

## 2025-01-11 RX ADMIN — DULOXETINE HYDROCHLORIDE 20 MG: 20 CAPSULE, DELAYED RELEASE ORAL at 21:57

## 2025-01-11 RX ADMIN — CARVEDILOL 3.12 MG: 3.12 TABLET, FILM COATED ORAL at 11:31

## 2025-01-11 RX ADMIN — GABAPENTIN 100 MG: 100 CAPSULE ORAL at 21:58

## 2025-01-11 RX ADMIN — OXYCODONE 5 MG: 5 TABLET ORAL at 18:46

## 2025-01-11 RX ADMIN — Medication 40 PERCENT: at 19:57

## 2025-01-11 RX ADMIN — PANTOPRAZOLE SODIUM 40 MG: 40 TABLET, DELAYED RELEASE ORAL at 11:31

## 2025-01-11 RX ADMIN — HEPARIN SODIUM 5250 UNITS: 5000 INJECTION, SOLUTION INTRAVENOUS; SUBCUTANEOUS at 11:27

## 2025-01-11 ASSESSMENT — PAIN DESCRIPTION - ORIENTATION
ORIENTATION: LEFT
ORIENTATION: LEFT

## 2025-01-11 ASSESSMENT — COGNITIVE AND FUNCTIONAL STATUS - GENERAL
DAILY ACTIVITIY SCORE: 17
STANDING UP FROM CHAIR USING ARMS: TOTAL
HELP NEEDED FOR BATHING: A LOT
MOVING FROM LYING ON BACK TO SITTING ON SIDE OF FLAT BED WITH BEDRAILS: A LITTLE
DRESSING REGULAR UPPER BODY CLOTHING: A LITTLE
TURNING FROM BACK TO SIDE WHILE IN FLAT BAD: A LOT
MOBILITY SCORE: 9
DRESSING REGULAR LOWER BODY CLOTHING: A LOT
WALKING IN HOSPITAL ROOM: TOTAL
CLIMB 3 TO 5 STEPS WITH RAILING: TOTAL
DAILY ACTIVITIY SCORE: 16
STANDING UP FROM CHAIR USING ARMS: TOTAL
DRESSING REGULAR LOWER BODY CLOTHING: A LOT
EATING MEALS: A LITTLE
MOBILITY SCORE: 9
WALKING IN HOSPITAL ROOM: TOTAL
TOILETING: A LITTLE
PERSONAL GROOMING: A LOT
MOVING TO AND FROM BED TO CHAIR: TOTAL
TOILETING: A LITTLE
PERSONAL GROOMING: A LITTLE
TURNING FROM BACK TO SIDE WHILE IN FLAT BAD: A LOT
MOVING TO AND FROM BED TO CHAIR: TOTAL
DRESSING REGULAR UPPER BODY CLOTHING: A LITTLE
HELP NEEDED FOR BATHING: A LITTLE
MOVING FROM LYING ON BACK TO SITTING ON SIDE OF FLAT BED WITH BEDRAILS: A LITTLE
CLIMB 3 TO 5 STEPS WITH RAILING: TOTAL

## 2025-01-11 ASSESSMENT — PAIN DESCRIPTION - LOCATION
LOCATION: HIP
LOCATION: LEG

## 2025-01-11 ASSESSMENT — PAIN - FUNCTIONAL ASSESSMENT
PAIN_FUNCTIONAL_ASSESSMENT: 0-10
PAIN_FUNCTIONAL_ASSESSMENT: 0-10

## 2025-01-11 ASSESSMENT — ENCOUNTER SYMPTOMS
EYE REDNESS: 0
LIGHT-HEADEDNESS: 0
ARTHRALGIAS: 0
ABDOMINAL DISTENTION: 0
FATIGUE: 0
BACK PAIN: 0
EYE ITCHING: 0
ABDOMINAL PAIN: 0
DIZZINESS: 0
PHOTOPHOBIA: 0
PALPITATIONS: 0
ADENOPATHY: 0
FEVER: 0
COUGH: 1
DYSURIA: 0
COLOR CHANGE: 0
SHORTNESS OF BREATH: 1
BRUISES/BLEEDS EASILY: 0
FREQUENCY: 0

## 2025-01-11 ASSESSMENT — PAIN SCALES - GENERAL
PAINLEVEL_OUTOF10: 0 - NO PAIN
PAINLEVEL_OUTOF10: 5 - MODERATE PAIN
PAINLEVEL_OUTOF10: 7
PAINLEVEL_OUTOF10: 0 - NO PAIN

## 2025-01-11 NOTE — ASSESSMENT & PLAN NOTE
History of DVT previously on Xarelto which was held on last hospital admission due to anemia and thrombocytopenia.  Patient was to follow with PCP to resume but has not to date  -found to have high prob for PE on VQ-started on heparin gtt, will need to be discharged on anticoagulation

## 2025-01-11 NOTE — ASSESSMENT & PLAN NOTE
Echo 8/9/2024 ejection fraction of 60-65%. There are no regional wall motion abnormalities. The left ventricular cavity size is normal. There is mild concentric left ventricular hypertrophy. Spectral Doppler shows a normal pattern of left ventricular diastolic filling.   Did have vascular congestion on CXR-given one time dose of Lasix  Continue to hold torsemide  Stop IV fluids  Resume home Coreg  Cardiology consulted for cardiac clearance

## 2025-01-11 NOTE — NURSING NOTE
"BSSR complete Anitha on high  flow O2  100%pulse ox   Intermittent confusion to situation  \"My family doesn't know where I am\"   Reminded family was here with her when she came in yesterday   will continue to monitor bed alarm on for safety   "

## 2025-01-11 NOTE — NURSING NOTE
Called Dr Hwang d/t pt desat to high 80s on 6L NC. Pt is asymptomatic. Dr Hwang in to see pt.  to place new orders

## 2025-01-11 NOTE — PROGRESS NOTES
Physical Therapy                 Therapy Communication Note    Patient Name: Anitha Welch  MRN: [id number]  Department: [address]  Room: [facility]464-  Today's Date: 1/11/2025     Discipline: Physical Therapy        Missed Visit Reason: Missed Visit Reason: Cancel (patient with left femoral neck fracture, planned for surgery today, will hold PT)    Missed Time: Cancel

## 2025-01-11 NOTE — NURSING NOTE
Rounded on pt. Pt lying in bed, assisted to reposition for comfort. Pt denies further needs at this time. NAD. Bed low, call light in reach. Continuing to monitor

## 2025-01-11 NOTE — PROGRESS NOTES
"Anitha Welch is a 84 y.o. female on day 1 of admission presenting with Closed fracture of neck of left femur, initial encounter.    Subjective   Patient resting in bed. States her breathing is ok, improving. Denies any chest discomfort or palpitations.        Objective     Physical Exam  Vitals reviewed.   Constitutional:       General: She is not in acute distress.     Appearance: Normal appearance. She is not ill-appearing, toxic-appearing or diaphoretic.   HENT:      Head: Normocephalic and atraumatic.      Right Ear: External ear normal.      Left Ear: External ear normal.      Nose: Nose normal.      Mouth/Throat:      Mouth: Mucous membranes are moist.   Eyes:      Extraocular Movements: Extraocular movements intact.   Cardiovascular:      Rate and Rhythm: Regular rhythm. Tachycardia present.      Pulses: Normal pulses.      Heart sounds: Murmur heard.      Systolic murmur is present with a grade of 2/6.   Pulmonary:      Effort: Pulmonary effort is normal. No respiratory distress.      Comments: Diminished bibasilar  Abdominal:      General: Bowel sounds are normal. There is no distension.      Palpations: Abdomen is soft.      Tenderness: There is no abdominal tenderness.   Musculoskeletal:         General: Normal range of motion.      Cervical back: Normal range of motion and neck supple.      Right lower leg: No edema.      Left lower leg: Edema present.   Skin:     General: Skin is warm and dry.      Capillary Refill: Capillary refill takes less than 2 seconds.   Neurological:      General: No focal deficit present.      Mental Status: She is alert.      Comments: A+Ox1   Psychiatric:         Mood and Affect: Mood normal.         Behavior: Behavior normal.         Last Recorded Vitals  Blood pressure 108/53, pulse 103, temperature 36.1 °C (97 °F), temperature source Temporal, resp. rate 20, height 1.651 m (5' 5\"), weight 66 kg (145 lb 8.1 oz), SpO2 100%.  Intake/Output last 3 Shifts:  I/O last 3 " completed shifts:  In: 580.5 (8.8 mL/kg) [P.O.:420; I.V.:160.5 (2.4 mL/kg)]  Out: 625 (9.5 mL/kg) [Urine:625 (0.3 mL/kg/hr)]  Weight: 66 kg     Relevant Results               Scheduled medications  acetaminophen, 650 mg, oral, q8h  atorvastatin, 40 mg, oral, Nightly  carvedilol, 3.125 mg, oral, BID  DULoxetine, 20 mg, oral, Nightly  tiotropium, 2 puff, inhalation, Daily   And  fluticasone furoate-vilanteroL, 1 puff, inhalation, Daily  gabapentin, 100 mg, oral, Nightly  levothyroxine, 25 mcg, oral, Daily  oxygen, 2 L/min, inhalation, Continuous - Inhalation  oxygen, , inhalation, Continuous - Inhalation  pantoprazole, 40 mg, oral, BID  polyethylene glycol, 17 g, oral, Daily  [Held by provider] torsemide, 20 mg, oral, Daily      Continuous medications     PRN medications  PRN medications: acetaminophen **OR** acetaminophen **OR** acetaminophen, albuterol, ipratropium-albuteroL, melatonin, morphine, nitroglycerin, ondansetron **OR** ondansetron, oxyCODONE, oxyCODONE   Results for orders placed or performed during the hospital encounter of 01/09/25 (from the past 24 hours)   Urinalysis with Reflex Culture and Microscopic   Result Value Ref Range    Color, Urine Light-Yellow Light-Yellow, Yellow, Dark-Yellow    Appearance, Urine Clear Clear    Specific Gravity, Urine 1.017 1.005 - 1.035    pH, Urine 5.0 5.0, 5.5, 6.0, 6.5, 7.0, 7.5, 8.0    Protein, Urine NEGATIVE NEGATIVE, 10 (TRACE), 20 (TRACE) mg/dL    Glucose, Urine Normal Normal mg/dL    Blood, Urine 0.03 (TRACE) (A) NEGATIVE    Ketones, Urine NEGATIVE NEGATIVE mg/dL    Bilirubin, Urine NEGATIVE NEGATIVE    Urobilinogen, Urine Normal Normal mg/dL    Nitrite, Urine NEGATIVE NEGATIVE    Leukocyte Esterase, Urine 75 [place]/uL (A) NEGATIVE   Extra Urine Gray Tube   Result Value Ref Range    Extra Tube Hold for add-ons.    Microscopic Only, Urine   Result Value Ref Range    WBC, Urine 6-10 (A) 1-5, NONE /HPF    RBC, Urine 1-2 NONE, 1-2, 3-5 /HPF    Mucus, Urine FEW Reference  range not established. /LPF   Blood Gas Arterial Full Panel   Result Value Ref Range    POCT pH, Arterial 7.41 7.38 - 7.42 pH    POCT pCO2, Arterial 61 (H) 38 - 42 mm Hg    POCT pO2, Arterial 68 (L) 85 - 95 mm Hg    POCT SO2, Arterial 94 94 - 100 %    POCT Oxy Hemoglobin, Arterial 92.1 (L) 94.0 - 98.0 %    POCT Hematocrit Calculated, Arterial 34.0 (L) 36.0 - 46.0 %    POCT Sodium, Arterial 139 136 - 145 mmol/L    POCT Potassium, Arterial 4.6 3.5 - 5.3 mmol/L    POCT Chloride, Arterial 104 98 - 107 mmol/L    POCT Ionized Calcium, Arterial 1.21 1.10 - 1.33 mmol/L    POCT Glucose, Arterial 103 (H) 74 - 99 mg/dL    POCT Lactate, Arterial 0.6 0.4 - 2.0 mmol/L    POCT Base Excess, Arterial 11.9 (H) -2.0 - 3.0 mmol/L    POCT HCO3 Calculated, Arterial 38.7 (H) 22.0 - 26.0 mmol/L    POCT Hemoglobin, Arterial 11.3 (L) 12.0 - 16.0 g/dL    POCT Anion Gap, Arterial 1 (L) 10 - 25 mmo/L    Patient Temperature 37.0 degrees Celsius    FiO2 44 %    Apparatus CANNULA     Site of Arterial Puncture Brachial Right     Leonidas's Test Positive    D-dimer, Non VTE   Result Value Ref Range    D-Dimer Non VTE, Quant (mg/L FEU) 7.42 (H) 0.19 - 0.50 mg/L FEU   CBC   Result Value Ref Range    WBC 7.8 4.4 - 11.3 x10*3/uL    nRBC 0.0 0.0 - 0.0 /100 WBCs    RBC 3.95 (L) 4.00 - 5.20 x10*6/uL    Hemoglobin 11.4 (L) 12.0 - 16.0 g/dL    Hematocrit 37.0 36.0 - 46.0 %    MCV 94 80 - 100 fL    MCH 28.9 26.0 - 34.0 pg    MCHC 30.8 (L) 32.0 - 36.0 g/dL    RDW 17.6 (H) 11.5 - 14.5 %    Platelets 125 (L) 150 - 450 x10*3/uL   Comprehensive metabolic panel   Result Value Ref Range    Glucose 102 (H) 74 - 99 mg/dL    Sodium 142 136 - 145 mmol/L    Potassium 4.4 3.5 - 5.3 mmol/L    Chloride 102 98 - 107 mmol/L    Bicarbonate 37 (H) 21 - 32 mmol/L    Anion Gap 7 (L) 10 - 20 mmol/L    Urea Nitrogen 32 (H) 6 - 23 mg/dL    Creatinine 1.31 (H) 0.50 - 1.05 mg/dL    eGFR 40 (L) >60 mL/min/1.73m*2    Calcium 9.2 8.6 - 10.3 mg/dL    Albumin 3.9 3.4 - 5.0 g/dL    Alkaline  Phosphatase 85 33 - 136 U/L    Total Protein 6.4 6.4 - 8.2 g/dL     (H) 9 - 39 U/L    Bilirubin, Total 1.1 0.0 - 1.2 mg/dL     (H) 7 - 45 U/L   Magnesium   Result Value Ref Range    Magnesium 2.16 1.60 - 2.40 mg/dL   B-type natriuretic peptide   Result Value Ref Range     (H) 0 - 99 pg/mL     *Note: Due to a large number of results and/or encounters for the requested time period, some results have not been displayed. A complete set of results can be found in Results Review.      XR chest 1 view    Result Date: 1/11/2025  Interpreted By:  Augustin Mccarthy, STUDY: XR CHEST 1 VIEW;  1/11/2025 3:14 am   INDICATION: Signs/Symptoms:SOB, desaturating.     COMPARISON: 01/10/2025.   ACCESSION NUMBER(S): [id number]   ORDERING CLINICIAN: DAFNE IRBY   FINDINGS: Semi-erect AP view of the chest.   Leads overlie the chest, partially obscuring the field-of-view.   Thyroidectomy changes redemonstrated.   CARDIOMEDIASTINAL SILHOUETTE: Cardiac silhouette is enlarged, unchanged. Tortuous and atherosclerotic thoracic aorta.   LUNGS: Lungs are hyperinflated and hyperlucent, compatible with emphysema/COPD. Pulmonary vascular congestion suggests volume overload, with prominent interstitial markings and Kerley B-lines suggesting acute pulmonary interstitial edema/CHF. No focal consolidation. No sizable pleural effusion. No pneumothorax.   ABDOMEN: No remarkable upper abdominal findings.   BONES: No acute osseous changes.       1.   Lungs are hyperinflated and hyperlucent, compatible with emphysema/COPD. Pulmonary vascular congestion suggests volume overload, with prominent interstitial markings and Kerley B-lines suggesting acute pulmonary interstitial edema/CHF. No focal consolidation. No sizable pleural effusion. No pneumothorax. 2. Similar cardiomegaly.       MACRO: None   Signed by: Augustin Mccarthy 1/11/2025 3:31 AM Dictation workstation:   [id number]    XR chest 1 view    Result Date: 1/10/2025  Interpreted By:   Shirley Vance, STUDY: XR CHEST 1 VIEW 1/10/2025 11:09 am   INDICATION: Signs/Symptoms:PreOp   COMPARISON: 10/20/2024   ACCESSION NUMBER(S): [id number]   ORDERING CLINICIAN: ISAURA CAMEJO   TECHNIQUE: Single AP view chest   FINDINGS: Cardiac silhouette is enlarged with moderate cardiomegaly. Aorta is tortuous. There is moderate vascular calcification of the aortic knob. Muscle surgical staples demonstrated in the lower neck. Lung fields are hyperinflated. There is a interval improvement in aeration of the right upper lung zone. Also, retrocardiac areas better seen on the current examination. There are chronic appearing interstitial changes.             1. Improved aeration in particular right upper lung zone.   Signed by: Shirley Vance 1/10/2025 1:15 PM Dictation workstation:   [id number]    XR hip left with pelvis when performed 2 or 3 views    Result Date: 1/10/2025  Interpreted By:  Finkelstein, Evan, STUDY: XR HIP LEFT WITH PELVIS WHEN PERFORMED 2 OR 3 VIEWS; XR TIBIA FIBULA LEFT 2 VIEWS; XR FEMUR LEFT 2+ VIEWS;  1/10/2025 2:13 am 4 images of the left femur. 3 images of the left tibia/fibula. 2 images of the left hip. One view of the right hip.. AP view of the pelvis.   INDICATION: Signs/Symptoms:Fall.   COMPARISON: None.   ACCESSION NUMBER(S): [id number]; [id number]; [id number]   ORDERING CLINICIAN: RAMAN HEMPHILL   FINDINGS: Right hip arthroplasty hardware is present without surrounding lucency or periprosthetic fracture. Degenerative changes in the visualized portions of the lower lumbosacral spine. Phleboliths overlie the pelvis. Bones are demineralized. Acute basicervical fracture of the left femoral neck with varus alignment. Moderate left femoroacetabular joint space narrowing with osteophytic spurring. There are vascular calcifications. Bones are demineralized. No acute displaced fracture throughout the remainder of the femur. No acute displaced fracture of the tibia or fibula. Edema  throughout the calf soft tissues. No sizable suprapatellar joint effusion.       Acute basicervical left femoral neck fracture with varus alignment. 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 No acute displaced fracture of the knee, tibia or fibula.     MACRO: None.   Signed by: Evan Finkelstein 1/10/2025 2:42 AM Dictation workstation:   [id number]    XR femur left 2+ views    Result Date: 1/10/2025  Interpreted By:  Finkelstein, Evan, STUDY: XR HIP LEFT WITH PELVIS WHEN PERFORMED 2 OR 3 VIEWS; XR TIBIA FIBULA LEFT 2 VIEWS; XR FEMUR LEFT 2+ VIEWS;  1/10/2025 2:13 am 4 images of the left femur. 3 images of the left tibia/fibula. 2 images of the left hip. One view of the right hip.. AP view of the pelvis.   INDICATION: Signs/Symptoms:Fall.   COMPARISON: None.   ACCESSION NUMBER(S): [id number]; [id number]; [id number]   ORDERING CLINICIAN: RAMAN HEMPHILL   FINDINGS: Right hip arthroplasty hardware is present without surrounding lucency or periprosthetic fracture. Degenerative changes in the visualized portions of the lower lumbosacral spine. Phleboliths overlie the pelvis. Bones are demineralized. Acute basicervical fracture of the left femoral neck with varus alignment. Moderate left femoroacetabular joint space narrowing with osteophytic spurring. There are vascular calcifications. Bones are demineralized. No acute displaced fracture throughout the remainder of the femur. No acute displaced fracture of the tibia or fibula. Edema throughout the calf soft tissues. No sizable suprapatellar joint effusion.       Acute basicervical left femoral neck fracture with varus alignment. 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 No acute displaced fracture of the knee, tibia or fibula.      MACRO: None.   Signed by: Evan Finkelstein 1/10/2025 2:42 AM Dictation workstation:   [id number]    XR tibia fibula left 2 views    Result Date: 1/10/2025  Interpreted By:  Finkelstein, Evan, STUDY: XR HIP LEFT WITH PELVIS WHEN PERFORMED 2 OR 3 VIEWS; XR TIBIA FIBULA LEFT 2 VIEWS; XR FEMUR LEFT 2+ VIEWS;  1/10/2025 2:13 am 4 images of the left femur. 3 images of the left tibia/fibula. 2 images of the left hip. One view of the right hip.. AP view of the pelvis.   INDICATION: Signs/Symptoms:Fall.   COMPARISON: None.   ACCESSION NUMBER(S): [id number]; [id number]; [id number]   ORDERING CLINICIAN: RAMAN HEMPHILL   FINDINGS: Right hip arthroplasty hardware is present without surrounding lucency or periprosthetic fracture. Degenerative changes in the visualized portions of the lower lumbosacral spine. Phleboliths overlie the pelvis. Bones are demineralized. Acute basicervical fracture of the left femoral neck with varus alignment. Moderate left femoroacetabular joint space narrowing with osteophytic spurring. There are vascular calcifications. Bones are demineralized. No acute displaced fracture throughout the remainder of the femur. No acute displaced fracture of the tibia or fibula. Edema throughout the calf soft tissues. No sizable suprapatellar joint effusion.       Acute basicervical left femoral neck fracture with varus alignment. 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 No acute displaced fracture of the knee, tibia or fibula.     MACRO: None.   Signed by: Evan Finkelstein 1/10/2025 2:42 AM Dictation workstation:   [id number]    CT lumbar spine wo IV contrast    Result Date: 1/10/2025  Interpreted By:  Finkelstein, Evan, STUDY: CT LUMBAR SPINE WO IV CONTRAST;  1/10/2025 2:12 am   INDICATION: Signs/Symptoms:fall, low back and left hip pain.     COMPARISON: CT lumbar spine 11/25/2023    ACCESSION NUMBER(S): [id number]   ORDERING CLINICIAN: RAMAN HEMPHILL   TECHNIQUE: Axial noncontrast CT images of the lumbar spine with coronal and sagittal reconstructed images.   FINDINGS: ALIGNMENT: Levocurvature of the lumbar spine. No traumatic malalignment VERTEBRAE: No acute loss of vertebral body height. Bones are demineralized. Irregularity of the endplates across the L3/L4 disc space and L1/L2 disc space, likely representing a combination of Schmorl's nodes and degenerative changes, which is similar in appearance to prior imaging 11/25/2023. DISC SPACES: Disc space narrowing most pronounced at L2/L3. Vacuum disc phenomena at L1/L2, L2/L3 and L4/L5. SPINAL CANAL: Diffuse facet arthropathy with varying degrees of foraminal narrowing. No severe central narrowing.. PREVERTEBRAL SOFT TISSUES: No prevertebral soft tissue swelling.   OTHER FINDINGS: Mild bibasilar atelectasis. The gallbladder is surgically absent. Calcifications scattered throughout the spleen suggest sequela of prior granulomatous disease. Hypodensities in the kidneys measure simple fluid attenuation and are most compatible with simple cysts. Scattered colonic diverticula without pericolonic inflammatory stranding. Extensive aortoiliac calcifications. Focal ectasia of the infrarenal abdominal aorta measuring up to 2.7 cm in maximum AP dimension.       Lumbar spondylosis without acute fracture or traumatic malalignment.     MACRO: None.   Signed by: Evan Finkelstein 1/10/2025 2:32 AM Dictation workstation:   [id number]             Assessment/Plan   Assessment & Plan  Closed fracture of neck of left femur, initial encounter    Mixed hyperlipidemia    Chronic respiratory failure    History of DVT (deep vein thrombosis)    History of CHF (congestive heart failure)    L femur fracture  HFpEF  CAD s/p PCI to RCA  MVR s/p ARMANI  Chronic respiratory failure on 2L NC  COPD  Hx DVT  Hx CVA  Dementia     1/10: As above. 85 yo F with left femur  fracture with cardiac history of CAD, HFpEF, and MVR s/p ARMANI. We were consulted for cardiac clearance for hip repair. The patient denies any chest discomfort, palpitations, SOB, lightheadedness. She appears euvolemic upon exam. Labs today with sodium 141, potassium 4.5, bicarb 36, creatinine 1.11, hemoglobin 11.5. Blood pressures have been normotensive with most recent reading of 108/47. SpO2 95% on 3L NC. Telemetry with sinus rhythm without ectopy, rates in the 80s-90s BPM. No initial EKG or CXR has been done; will order both. Patient with known risks of CAD, CHF, and cerebrovascular disease. Using the RCRI risk calculator the patient is considered a class 3 risk, giving her a 15% risk of death, myocardial infarction or cardiac arrest 30 days following surgery. In the absence of acute coronary syndrome, significant fluid overload, or life threatening arrhythmias, I believe this patient can proceed with this needed surgery pending her EKG and CXR, understanding his baseline risks which at this time are not modifiable. We will continue to follow this patient with you. Will discuss with my collaborating physician Dr. Spaulding.    1/11: Early this morning a rapid response was called for desaturation. CXR showed some pulmonary vascular congestion. . D-dimer elevated at 7.42. A VQ scan has been ordered as well. As previously noted, she was on Xarelto for hx DVT but was taken off in November 2024. She was given 40 mg IV Lasix this AM. She is currently on HIFLOW NC SpO2 100%. Telemetry with sinus tachycardia in the 100's BPM. Labs today with sodium 143, potassium 4.3, creatinine 1.26, hemoglobin 11.4. Nephrology has been consulted for AYSE. She has a preserved EF of 60-65% on her last TTE in 8/2024. Will recheck a limited TTE to assess for any changes. Await VQ scan. Will defer further diuretics to nephrology. Will continue to follow closely with you. Will discuss with my collaborating physician Dr. Spaulding.            I  spent 35 minutes in the professional and overall care of this patient.      Anna Gaspar, APRN-CNP

## 2025-01-11 NOTE — ASSESSMENT & PLAN NOTE
Increased oxygen demand overnight  Elevated D Dimer-VQ high probability PE  Has a history of DVT however Xarelto was stopped due to anemia/thrombocytopenia, has been off for some time  Started on Heparin gtt, will need oral anticoagulation on discharge  Pulmonary consulted, appreciate recs  Awaiting US BLE for further recommendations

## 2025-01-11 NOTE — ASSESSMENT & PLAN NOTE
X-ray left hip with acute basicervical left femoral neck fracture with varus alignment.  Pain management  Bowel regimen  Encourage incentive spirometer  Orthopedic surgery consulted  PT/OT  OR cancelled today due to worsening respiratory failure, positive PE  Pulmonary and orthopedic following, to determine a safe plan to pause heparin gtt for surgery. Awaiting US BLE for further recs.   Cardiology following

## 2025-01-11 NOTE — ASSESSMENT & PLAN NOTE
Continue atorvastatin 40 mg    Thrombocytopenia  Will need to monitor close given currently on Heparin gtt    Plan  Started on Heparin gtt  Monitor on telemetry  Pain management/bowel regimen  Encourage incentive spirometer  Cardiology and orthopedic surgery consulted, appreciate recommendations  Will need cardiac and pulmonary clearance   US BLE pending  Supplemental oxygen, monitor pulse ox  DVT prophylaxis: heparin gtt  CBC and BMP in the morning  Fall precautions  PT/OT

## 2025-01-11 NOTE — NURSING NOTE
Pt down to 5L NC with pulse ox  96% at this time. Attempted to wean down to 4L NC but pt pulse ox dropped to 88%. Message sent to hospitalist to make aware of oxygen needs

## 2025-01-11 NOTE — PROGRESS NOTES
Anitha Welch is a 84 y.o. female on day 1 of admission presenting with Closed fracture of neck of left femur, initial encounter.      Subjective   Patient seen and examined.  Awake/alert/oriented to person and place.  Pain is currently controlled. Does have mild shortness of breath. Worsening hypoxia overnight, was on high flow oxygen for a period of time. CXR showed vascular congestion. Was given IV Lasix with some improvement. Currently on 6L oxygen via NC. D Dimer was elevated, found to have a PE on VQ scan. Surgery held today. Pulmonary consulted, US BLE pending results.        Objective     Last Recorded Vitals  /53 (BP Location: Left arm, Patient Position: Lying)   Pulse 75   Temp 36.8 °C (98.2 °F) (Temporal)   Resp 20   Wt 66 kg (145 lb 8.1 oz)   SpO2 97%   Intake/Output last 3 Shifts:    Intake/Output Summary (Last 24 hours) at 1/11/2025 1132  Last data filed at 1/11/2025 0333  Gross per 24 hour   Intake 580.5 ml   Output 625 ml   Net -44.5 ml       Admission Weight  Weight: 59 kg (130 lb) (01/09/25 2357)    Daily Weight  01/11/25 : 66 kg (145 lb 8.1 oz)    Image Results  NM Lung perfusion with spect/ct  Narrative: Interpreted By:  Rey Medina,   STUDY:  NM LUNG PERFUSION WITH SPECT/CT;  1/11/2025 9:40 am      INDICATION:  Signs/Symptoms:Rule out PE, patient off anticoagulation for surgical  procedure tomorrow.  Desaturating with tachycardia and tachypnea..      COMPARISON:  Chest x-ray from 01/11/2025      ACCESSION NUMBER(S):  [id number]      ORDERING CLINICIAN:  DAFNE IRBY      TECHNIQUE:  DIVISION OF NUCLEAR MEDICINE  PERFUSION LUNG SCANS      Multiple perfusion images of the lungs were acquired after the  intravenous administration of 4.0 mCi of Tc-99m macroaggregated  albumin (MAA). In addition, SPECT/CT of the chest was performed.      FINDINGS:  Planar perfusion images of both lungs demonstrate mild heterogeneity  throughout the lung fields bilaterally. There are  wedge-shaped  subsegmental perfusion defect in the right upper lobe inferiorly and  segmental perfusion defect in the superior left lower lobe seen on  SPECT/CT, suggestive of high probability of acute pulmonary embolism.      Impression: 1. Wedge-shaped subsegmental perfusion defect in the right upper lobe  inferiorly and segmental perfusion defect in the superior left lower  lobe seen on SPECT/CT, suggestive of high probability of acute  pulmonary embolism.      The interpretation above is based on modified PIOPED II and PISAPED  criteria.      This study was analyzed and interpreted at [place], [place].      MACRO:  Rey Medina discussed the significance and urgency of this critical  finding through [facility] with Esther Santoyo  on 1/11/2025 at  10:28 am.  (**-RCF-**) Findings:  See findings.              Signed by: Rey Medina 1/11/2025 10:47 AM  Dictation workstation:   [id number]  XR chest 1 view  Narrative: Interpreted By:  Augustin Mccarthy,   STUDY:  XR CHEST 1 VIEW;  1/11/2025 3:14 am      INDICATION:  Signs/Symptoms:SOB, desaturating.          COMPARISON:  01/10/2025.      ACCESSION NUMBER(S):  [id number]      ORDERING CLINICIAN:  DAFNE IRBY      FINDINGS:  Semi-erect AP view of the chest.      Leads overlie the chest, partially obscuring the field-of-view.      Thyroidectomy changes redemonstrated.      CARDIOMEDIASTINAL SILHOUETTE:  Cardiac silhouette is enlarged, unchanged. Tortuous and  atherosclerotic thoracic aorta.      LUNGS:  Lungs are hyperinflated and hyperlucent, compatible with  emphysema/COPD. Pulmonary vascular congestion suggests volume  overload, with prominent interstitial markings and Kerley B-lines  suggesting acute pulmonary interstitial edema/CHF. No focal  consolidation. No sizable pleural effusion. No pneumothorax.      ABDOMEN:  No remarkable upper abdominal findings.      BONES:  No acute osseous changes.      Impression: 1.   Lungs are hyperinflated  and hyperlucent, compatible with  emphysema/COPD. Pulmonary vascular congestion suggests volume  overload, with prominent interstitial markings and Kerley B-lines  suggesting acute pulmonary interstitial edema/CHF. No focal  consolidation. No sizable pleural effusion. No pneumothorax.  2. Similar cardiomegaly.              MACRO:  None      Signed by: Augustin Mccarthy 1/11/2025 3:31 AM  Dictation workstation:   [id number]      Physical Exam  Vitals reviewed.   Constitutional:       Comments: Elderly.  Poor historian.   HENT:      Head: Normocephalic and atraumatic.   Eyes:      Extraocular Movements: Extraocular movements intact.      Conjunctiva/sclera: Conjunctivae normal.   Cardiovascular:      Rate and Rhythm: Normal rate and regular rhythm.   Pulmonary:      Effort: Pulmonary effort is normal.      Breath sounds: No wheezing, rhonchi or rales.      Comments: Breath sounds clear, diminished bilaterally  Abdominal:      General: Bowel sounds are normal.      Palpations: Abdomen is soft.      Tenderness: There is no abdominal tenderness.   Musculoskeletal:      Cervical back: Neck supple.      Comments: Limited range of motion to left lower extremity   Skin:     General: Skin is warm and dry.      Capillary Refill: Capillary refill takes less than 2 seconds.   Neurological:      General: No focal deficit present.      Mental Status: She is alert and oriented to person, place, and time.   Psychiatric:         Mood and Affect: Mood normal.         Behavior: Behavior normal.         Relevant Results  Lab Results   Component Value Date    GLUCOSE 124 (H) 01/11/2025    CALCIUM 8.8 01/11/2025     01/11/2025    K 4.3 01/11/2025    CO2 35 (H) 01/11/2025     01/11/2025    BUN 32 (H) 01/11/2025    CREATININE 1.26 (H) 01/11/2025      Lab Results   Component Value Date    WBC 8.1 01/11/2025    HGB 11.4 (L) 01/11/2025    HCT 36.8 01/11/2025    MCV 93 01/11/2025     (L) 01/11/2025    XR hip left with pelvis when  performed 2 or 3 views  Result Date: 1/10/2025  Acute basicervical left femoral neck fracture with varus alignment. 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 No acute displaced fracture of the knee, tibia or fibula.     MACRO: None.   Signed by: Evan Finkelstein 1/10/2025 2:42 AM Dictation workstation:   [id number]    XR femur left 2+ views  Result Date: 1/10/2025  Acute basicervical left femoral neck fracture with varus alignment. 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 No acute displaced fracture of the knee, tibia or fibula.     MACRO: None.   Signed by: Evan Finkelstein 1/10/2025 2:42 AM Dictation workstation:   [id number]    XR tibia fibula left 2 views  Result Date: 1/10/2025  Acute basicervical left femoral neck fracture with varus alignment. 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 No acute displaced fracture of the knee, tibia or fibula.     MACRO: None.   Signed by: Evan Finkelstein 1/10/2025 2:42 AM Dictation workstation:   [id number]    CT lumbar spine wo IV contrast  Result Date: 1/10/2025  Lumbar spondylosis without acute fracture or traumatic malalignment.     MACRO: None.   Signed by: Evan Finkelstein 1/10/2025 2:32 AM Dictation workstation:   [id number]         Assessment/Plan      Assessment & Plan  Closed fracture of neck of left femur, initial encounter  X-ray left hip with acute basicervical left femoral neck fracture with varus alignment.  Pain management  Bowel regimen  Encourage incentive spirometer  Orthopedic surgery consulted  PT/OT  OR cancelled today due to worsening respiratory failure, positive PE  Pulmonary and orthopedic following, to  determine a safe plan to pause heparin gtt for surgery. Awaiting US BLE for further recs.   Cardiology following    Acute pulmonary embolism (Multi)  Increased oxygen demand overnight  Elevated D Dimer-VQ high probability PE  Has a history of DVT however Xarelto was stopped due to anemia/thrombocytopenia, has been off for some time  Started on Heparin gtt, will need oral anticoagulation on discharge  Pulmonary consulted, appreciate recs  Awaiting US BLE for further recommendations  Acute on chronic hypoxic respiratory failure (Multi)  On 6 L oxygen via nasal cannula, 2 L is baseline  Monitor pulse ox, wean oxygen as tolerated  Resume home inhalers  As needed DuoNekayden    History of DVT (deep vein thrombosis)  History of DVT previously on Xarelto which was held on last hospital admission due to anemia and thrombocytopenia.  Patient was to follow with PCP to resume but has not to date  -found to have high prob for PE on VQ-started on heparin gtt, will need to be discharged on anticoagulation    History of CHF (congestive heart failure)  Echo 8/9/2024 ejection fraction of 60-65%. There are no regional wall motion abnormalities. The left ventricular cavity size is normal. There is mild concentric left ventricular hypertrophy. Spectral Doppler shows a normal pattern of left ventricular diastolic filling.   Did have vascular congestion on CXR-given one time dose of Lasix  Continue to hold torsemide  Stop IV fluids  Resume home Coreg  Cardiology consulted for cardiac clearance    Mixed hyperlipidemia  Continue atorvastatin 40 mg    Thrombocytopenia  Will need to monitor close given currently on Heparin gtt    Plan  Started on Heparin gtt  Monitor on telemetry  Pain management/bowel regimen  Encourage incentive spirometer  Cardiology and orthopedic surgery consulted, appreciate recommendations  Will need cardiac and pulmonary clearance   US BLE pending  Supplemental oxygen, monitor pulse ox  DVT prophylaxis: heparin gtt  CBC  and BMP in the morning  Fall precautions  PT/OT                  Tuyet Garcia, APRN-CNP

## 2025-01-11 NOTE — NURSING NOTE
Anitha moved to [address] so that she is closer to nursing station due to her dementia  oriented to room   new IV placed she pulled out the old one  telemetry reinitiated  bath given call light in reach bed alarm on for safety   Son Jose made aware of transferred room   again making sure we knows  she has dementia   she will be made nonselect for meals

## 2025-01-11 NOTE — ASSESSMENT & PLAN NOTE
On 6 L oxygen via nasal cannula, 2 L is baseline  Monitor pulse ox, wean oxygen as tolerated  Resume home inhalers  As needed Vicki

## 2025-01-11 NOTE — CONSULTS
Inpatient consult to Pulmonology  Consult performed by: Rob Franco MD  Consult ordered by: FELA Segovia-CNP  Reason for consult: acute hypoxic respiratory failure          Reason For Consult  Acute on chronic hypoxic respiratory faliure    History Of Present Illness  Anitha Welch is a 84 y.o. female presenting with left hip and leg pain. Her medical history included copd with chronic oxygen 2 L. She has copd with FEV1 41%. She has history of heart failure with preserved ejection fraction.  She has history of DVT/PE on xarelto, but xarelto was held on last admission with plans to resume as outpatient.     She had fallen on 1/9 on her left hip. X-ray showed acute left femoral neck fracture. Orthopedics was consulted and recommended surgical correction. On admission she required 3L oxygen (2L baseline), but she continued to desaturate 1/10 overnight requiring high flow nasal cannula. She was given lasix and was able to be weaned to 6L nasal cannula. VQ scan did show acute PE high probability.     Today she reports: Feeling better from a breathing standpoint.     Echo 8/9/2024 ejection fraction of 60-65%. There are no regional wall motion abnormalities. The left ventricular cavity size is normal. There is mild concentric left ventricular hypertrophy. Spectral Doppler shows a normal pattern of left ventricular diastolic filling.      Past Medical History  Past Medical History:   Diagnosis Date    AAA (abdominal aortic aneurysm) (CMS-[facility])     Acute urinary tract infection 04/20/2023    Anemia     Anxiety     Arthritis     CHF (congestive heart failure)     Chronic pain disorder     back pain    CKD (chronic kidney disease)     Cognitive decline     COPD (chronic obstructive pulmonary disease) (Multi)     oxygen dependent- wears 2L NC continuously    Coronary artery disease     s/p stent    CVA (cerebral vascular accident) (Multi)     weaker on the left side    Deep vein thrombosis (DVT) of calf (Multi)      left    Depression     Disease of thyroid gland     Diverticulosis     DVT (deep venous thrombosis) (Multi)     left leg, on xarelto    Easy bruising     Epistaxis     GERD (gastroesophageal reflux disease)     managed with protonix    History of blood transfusion 2023    History of degenerative disc disease     Hyperlipidemia     Hypertension     Hypothyroidism     Ischemic cardiomyopathy     Meralgia paresthetica     Nonrheumatic mitral valve regurgitation     Osteoarthritis     Osteopenia 2010    hip - DEXA    Plantar fasciitis     RLS (restless legs syndrome)     Sciatica     Spinal stenosis     ST elevation (STEMI) myocardial infarction (Multi)        Surgical History  Past Surgical History:   Procedure Laterality Date    ADENOIDECTOMY      ANOMALOUS PULMONARY VENOUS RETURN REPAIR, 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    Procedure: Mitral-ARMANI (Transcatheter Edge to Edge Repair);  Surgeon: Kyle Bernardo MD;  Location: [address] Cardiac Cath Lab;  Service: Cardiovascular;  Laterality: N/A;  SAMMY Elgendy.Labs with Aleyda Mota,Schedule at 7;30am    CARDIAC CATHETERIZATION  10/2019    CARDIAC CATHETERIZATION N/A 02/16/2024    Procedure: Left And Right Heart Cath, With LV;  Surgeon: Tuan Foss DO;  Location: [facility] Card[facility] Lab;  Service: Cardiovascular;  Laterality: N/A;  no pre auth needed    CATARACT EXTRACTION Bilateral 11/13/2012    CHOLECYSTECTOMY  1996    laparoscopic    COLONOSCOPY      Dr. Rodriguez    CORONARY STENT PLACEMENT      CYST REMOVAL Right 06/24/2013    Excision of Sebaceous cyst right axilla    DILATION AND CURETTAGE OF UTERUS      ESOPHAGOGASTRODUODENOSCOPY      KNEE ARTHROPLASTY Left     MR HEAD ANGIO WO IV CONTRAST  02/24/2017    MR HEAD ANGIO WO IV CONTRAST [facility]    MR HEAD ANGIO WO IV CONTRAST  08/11/2017    MR HEAD ANGIO WO IV CONTRAST [facility]    MR HEAD ANGIO WO IV CONTRAST  02/10/2019    MR HEAD ANGIO WO IV CONTRAST [facility]    OTHER SURGICAL  HISTORY  2019    Stent synergy    OTHER SURGICAL HISTORY  2019    Stent synergy    VA ARTHRP ACETBLR/PROX FEM PROSTC AGRFT/ALGRFT      SURGICAL SAMMY MONITORING      THYROIDECTOMY, PARTIAL Bilateral 2013    subtotal thyroidectomy    TONSILLECTOMY      TOTAL HIP ARTHROPLASTY Right 2006    UPPER GASTROINTESTINAL ENDOSCOPY  2019    Dr. Galan        Social History  Social History     Tobacco Use    Smoking status: Former     Current packs/day: 0.00     Types: Cigarettes     Quit date:      Years since quittin.0     Passive exposure: Never    Smokeless tobacco: Never   Vaping Use    Vaping status: Never Used   Substance Use Topics    Alcohol use: Not Currently     Comment: glass wine at holiday    Drug use: Never       Family History  Family History   Problem Relation Name Age of Onset    Hyperthyroidism Mother          Treated with radioactive iodine    Hypertension Mother      Heart disease Mother      Hyperthyroidism Sibling      Diabetes Father's Sister           Allergies  Amoxicillin, Iodinated contrast media, Ciprofloxacin, Influenza virus vaccines, Diphenhydramine, Flu vac  [id number](pf), and Other    Review of Systems   Constitutional:  Negative for fatigue and fever.   HENT:  Negative for congestion, ear pain and postnasal drip.    Eyes:  Negative for photophobia, redness and itching.   Respiratory:  Positive for cough and shortness of breath.    Cardiovascular:  Negative for palpitations and leg swelling.   Gastrointestinal:  Negative for abdominal distention and abdominal pain.   Endocrine: Negative for cold intolerance and heat intolerance.   Genitourinary:  Negative for dysuria and frequency.   Musculoskeletal:  Negative for arthralgias and back pain.   Skin:  Negative for color change and rash.   Neurological:  Negative for dizziness and light-headedness.   Hematological:  Negative for adenopathy. Does not bruise/bleed easily.        Physical Exam  Constitutional:       " Appearance: Normal appearance.   HENT:      Head: Normocephalic and atraumatic.      Mouth/Throat:      Mouth: Mucous membranes are moist.      Pharynx: Oropharynx is clear.   Eyes:      Extraocular Movements: Extraocular movements intact.      Conjunctiva/sclera: Conjunctivae normal.   Cardiovascular:      Rate and Rhythm: Normal rate and regular rhythm.   Pulmonary:      Effort: Pulmonary effort is normal.      Breath sounds: Normal breath sounds.   Abdominal:      General: Abdomen is flat.      Palpations: Abdomen is soft.   Musculoskeletal:      Cervical back: Normal range of motion and neck supple.   Skin:     General: Skin is warm and dry.   Neurological:      General: No focal deficit present.      Mental Status: She is alert. Mental status is at baseline.   Psychiatric:         Mood and Affect: Mood normal.         Behavior: Behavior normal.          Vital Signs  Blood pressure 108/53, pulse 103, temperature 36.1 °C (97 °F), temperature source Temporal, resp. rate 20, height 1.651 m (5' 5\"), weight 66 kg (145 lb 8.1 oz), SpO2 97%.  Oxygen Therapy  SpO2: 97 %  Medical Gas Therapy: Supplemental oxygen  Medical Gas Delivery Method: Nasal cannula (6lpm)  FiO2 (%): 44 %    Relevant Results  XR chest 1 view 01/11/2025    Narrative  Interpreted By:  Augustin Mccarthy,  STUDY:  XR CHEST 1 VIEW;  1/11/2025 3:14 am    INDICATION:  Signs/Symptoms:SOB, desaturating.      COMPARISON:  01/10/2025.    ACCESSION NUMBER(S):  [id number]    ORDERING CLINICIAN:  DAFNE IRBY    FINDINGS:  Semi-erect AP view of the chest.    Leads overlie the chest, partially obscuring the field-of-view.    Thyroidectomy changes redemonstrated.    CARDIOMEDIASTINAL SILHOUETTE:  Cardiac silhouette is enlarged, unchanged. Tortuous and  atherosclerotic thoracic aorta.    LUNGS:  Lungs are hyperinflated and hyperlucent, compatible with  emphysema/COPD. Pulmonary vascular congestion suggests volume  overload, with prominent interstitial markings and " Kerley B-lines  suggesting acute pulmonary interstitial edema/CHF. No focal  consolidation. No sizable pleural effusion. No pneumothorax.    ABDOMEN:  No remarkable upper abdominal findings.    BONES:  No acute osseous changes.    Impression  1.   Lungs are hyperinflated and hyperlucent, compatible with  emphysema/COPD. Pulmonary vascular congestion suggests volume  overload, with prominent interstitial markings and Kerley B-lines  suggesting acute pulmonary interstitial edema/CHF. No focal  consolidation. No sizable pleural effusion. No pneumothorax.  2. Similar cardiomegaly.        MACRO:  None    Signed by: Augustin Mccarthy 1/11/2025 3:31 AM  Dictation workstation:   [id number]        VQ Scan  IMPRESSION:  1. Wedge-shaped subsegmental perfusion defect in the right upper lobe  inferiorly and segmental perfusion defect in the superior left lower  lobe seen on SPECT/CT, suggestive of high probability of acute  pulmonary embolism.         Assessment/Plan     85 yo female with acute on chronic hypoxic respiratory failure.     #Acute on Chronic hypoxic respiratory failure  -likely secondary to acute PE per VQ scan. Hemodynamically stable.   -Likely thrombotic PE, but fat embolism is possible in the setting of bone fracture  -mild volume overload may have contributed at some point, and she is at risk for atelectasis, which could further worsen hypoxia    Recommendations:  -net even volume status unless otherwise directed per nephrology or cardiology  -heparin gtt, monitor platelets, eventual transition to doac prior to discharge  -agree with TTE and Duplex LE, if Duplex LE shows high clot burden and she needs ortho surgery she may benefit from temporary ivc filter  -discuss with orthopedic surgery the urgency of surgery in the setting of newly diagnosed PE. She might benefit from a period of time on uniterrupted anticoagulation if the surgery is not urgent.  -encourage frequent incentive spirometer use    I spent 50  minutes in the professional and overall care of this patient.      Rob Franco MD

## 2025-01-11 NOTE — NURSING NOTE
Heparin gtt initiated am meds given  Tuyet Garcia CNP talked with Anitha and family about PE needs for heparin gtt, 2 decho and ultrasound of legs    Echo in progress at bedside call light in reach bed alarm on for safety

## 2025-01-11 NOTE — PROGRESS NOTES
Spiritual Care Visit  Spiritual Care Request    Reason for Visit:  Routine Visit: Introduction     Request Received From:       Focus of Care:  Visited With: Patient and family together         Refer to :          Spiritual Care Assessment    Spiritual Assessment:                      Care Provided:  Intended Effects: Build relationship of care and support, Demonstrate caring and concern, Promote sense of peace    Sense of Community and or Restorationism Affiliation:  Rastafarian   Values/Beliefs  Spiritual Requests During Hospitalization: Anitha askd to be anointed today with her family at bedside.     Addressed Needs/Concerns and/or Libia Through:     Sacramental Encounters  Sacrament of Sick-Anointing: Anointed    Outcome:        Advance Directives:         Spiritual Care Annotation    Annotation:  Anitha asked to be anointed today with her family in her room. Great folks!   =ChitoGinger Quintanilla

## 2025-01-11 NOTE — NURSING NOTE
Assumed care of this pt. PCA in room taking VS during BSSR and pts pulse ox 80% on 3L which is her baseline. Pt denies SOB, conversing with nurse. Pt pulled up bed, instructed on deep breathing, NC increased. Pt provided with IS and instructed on how to use it with pt demonstrating teachback. Bed low, call light in reach. Continuing to monitor

## 2025-01-11 NOTE — NURSING NOTE
Telemetry placed back on pt  states her husbands 92[age] birthday is today and should be home with her family  bed alarm on for safety

## 2025-01-11 NOTE — CONSULTS
Reason For Consult  Acute CHF, developing AYSE.  Diuretic management please     History Of Present Illness  Anitha Welhc is a 84 y.o. female   PMH; dementia/cognitive decline, CHF, COPD, chronic respiratory failure on 2 L nasal cannula, AAA, DVT, anxiety, depression, CVA, GERD, CKD, CAD, OA, osteopenia.   Patient presented to the ED after suffering a mechanical fall.     In the ED on admission VSS, labs notable for bicarb 36, BUN 30, CR 1.11, EGFR 49, hemoglobin 11.5, platelets 133.  She was given Zofran for nausea, Toradol 15 mg and Dilaudid 0.5 mg for pain.   X-ray left hip, x-ray tibia-fibula, x-ray femur:  Acute basicervical left femoral neck fracture with varus alignment.   Moderate left femoroacetabular joint osteoarthrosis.   Right total hip arthroplasty hardware is intact without surrounding   lucency or periprosthetic fracture. No acute displaced fracture of   the knee, tibia or fibula.      Admitted for left femoral neck fracture requiring orthopedic surgery evaluation and possible intervention.  Patient herself is confused, grandson in the room.     Nephrology was consulted for developing AYSE and volume management.        Past Medical History  She has a past medical history of AAA (abdominal aortic aneurysm) (CMS-[facility]), Acute urinary tract infection (04/20/2023), Anemia, Anxiety, Arthritis, CHF (congestive heart failure), Chronic pain disorder, CKD (chronic kidney disease), Cognitive decline, COPD (chronic obstructive pulmonary disease) (Multi), Coronary artery disease, CVA (cerebral vascular accident) (Multi), Deep vein thrombosis (DVT) of calf (Multi), Depression, Disease of thyroid gland, Diverticulosis, DVT (deep venous thrombosis) (Multi), Easy bruising, Epistaxis, GERD (gastroesophageal reflux disease), History of blood transfusion (2023), History of degenerative disc disease, Hyperlipidemia, Hypertension, Hypothyroidism, Ischemic cardiomyopathy, Meralgia paresthetica, Nonrheumatic mitral valve  regurgitation, Osteoarthritis, Osteopenia (2010), Plantar fasciitis, RLS (restless legs syndrome), Sciatica, Spinal stenosis, and ST elevation (STEMI) myocardial infarction (Multi).    Surgical History  She has a past surgical history that includes MR angio head wo IV contrast (02/24/2017); MR angio head wo IV contrast (08/11/2017); MR angio head wo IV contrast (02/10/2019); Cholecystectomy (1996); Tonsillectomy; pr arthrp acetblr/prox fem prostc agrft/algrft; Thyroidectomy, partial (Bilateral, 04/17/2013); Coronary stent placement; Knee Arthroplasty (Left); Total hip arthroplasty (Right, 2006); Dilation and curettage of uterus; Other surgical history (01/09/2019); Upper gastrointestinal endoscopy (03/2019); Cardiac catheterization (10/2019); Cataract extraction (Bilateral, 11/13/2012); Adenoidectomy; Cyst Removal (Right, 06/24/2013); Colonoscopy; Other surgical history (01/09/2019); surgical laverne monitoring; Cardiac catheterization (N/A, 02/16/2024); Esophagogastroduodenoscopy; and Anomalous pulmonary venous return repair, total (N/A, 4/25/2024).     Social History  She reports that she quit smoking about 11 years ago. Her smoking use included cigarettes. She has never been exposed to tobacco smoke. She has never used smokeless tobacco. She reports that she does not currently use alcohol. She reports that she does not use drugs.    Family History  Family History   Problem Relation Name Age of Onset    Hyperthyroidism Mother          Treated with radioactive iodine    Hypertension Mother      Heart disease Mother      Hyperthyroidism Sibling      Diabetes Father's Sister          Allergies  Amoxicillin, Iodinated contrast media, Ciprofloxacin, Influenza virus vaccines, Diphenhydramine, Flu vac 2023 [id number](pf), and Other  Scheduled medications  acetaminophen, 650 mg, oral, q8h  atorvastatin, 40 mg, oral, Nightly  carvedilol, 3.125 mg, oral, BID  DULoxetine, 20 mg, oral, Nightly  tiotropium, 2 puff, inhalation,  Daily   And  fluticasone furoate-vilanteroL, 1 puff, inhalation, Daily  gabapentin, 100 mg, oral, Nightly  levothyroxine, 25 mcg, oral, Daily  oxygen, 2 L/min, inhalation, Continuous - Inhalation  oxygen, , inhalation, Continuous - Inhalation  pantoprazole, 40 mg, oral, BID  polyethylene glycol, 17 g, oral, Daily  [Held by provider] torsemide, 20 mg, oral, Daily      Continuous medications     PRN medications  PRN medications: acetaminophen **OR** acetaminophen **OR** acetaminophen, albuterol, ipratropium-albuteroL, melatonin, morphine, nitroglycerin, ondansetron **OR** ondansetron, oxyCODONE, oxyCODONE    Review of Systems  12 systems were reviewed and pertinent findings were addressed in HPI       Physical Exam  GENERAL: Alert, no distress, cooperative  SKIN: Skin color, texture, turgor normal. No rashes or lesions.  EYES: PERRLA, EOMI  HEENT: Head: Normocephalic  Neck: supple and no adenopathy  LUNGS: Lungs clear to auscultation. Good diaphragmatic excursion.  CARDIAC: Normal S1 and S2; no rubs, murmurs, or gallops  ABDOMEN: Abdomen soft, non-tender. BS normal. No masses or organomegaly.  EXTREMITIES: No ulcers, Extremities normal. No deformities, edema, clubbing or skin discoloration.  NEURO: Cranial nerves II-XII intact, no focal deficit.             I&O 24HR    Intake/Output Summary (Last 24 hours) at 1/11/2025 1003  Last data filed at 1/11/2025 0333  Gross per 24 hour   Intake 580.5 ml   Output 625 ml   Net -44.5 ml       Vitals 24HR  Heart Rate:  []   Temp:  [36.1 °C (97 °F)-36.8 °C (98.2 °F)]   Resp:  [17-22]   BP: ()/(47-61)   Weight:  [66 kg (145 lb 8.1 oz)]   SpO2:  [81 %-100 %]     Relevant Results  Results for orders placed or performed during the hospital encounter of 01/09/25 (from the past 24 hours)   Urinalysis with Reflex Culture and Microscopic   Result Value Ref Range    Color, Urine Light-Yellow Light-Yellow, Yellow, Dark-Yellow    Appearance, Urine Clear Clear    Specific Gravity,  Urine 1.017 1.005 - 1.035    pH, Urine 5.0 5.0, 5.5, 6.0, 6.5, 7.0, 7.5, 8.0    Protein, Urine NEGATIVE NEGATIVE, 10 (TRACE), 20 (TRACE) mg/dL    Glucose, Urine Normal Normal mg/dL    Blood, Urine 0.03 (TRACE) (A) NEGATIVE    Ketones, Urine NEGATIVE NEGATIVE mg/dL    Bilirubin, Urine NEGATIVE NEGATIVE    Urobilinogen, Urine Normal Normal mg/dL    Nitrite, Urine NEGATIVE NEGATIVE    Leukocyte Esterase, Urine 75 [place]do/uL (A) NEGATIVE   Extra Urine Gray Tube   Result Value Ref Range    Extra Tube Hold for add-ons.    Microscopic Only, Urine   Result Value Ref Range    WBC, Urine 6-10 (A) 1-5, NONE /HPF    RBC, Urine 1-2 NONE, 1-2, 3-5 /HPF    Mucus, Urine FEW Reference range not established. /LPF   Blood Gas Arterial Full Panel   Result Value Ref Range    POCT pH, Arterial 7.41 7.38 - 7.42 pH    POCT pCO2, Arterial 61 (H) 38 - 42 mm Hg    POCT pO2, Arterial 68 (L) 85 - 95 mm Hg    POCT SO2, Arterial 94 94 - 100 %    POCT Oxy Hemoglobin, Arterial 92.1 (L) 94.0 - 98.0 %    POCT Hematocrit Calculated, Arterial 34.0 (L) 36.0 - 46.0 %    POCT Sodium, Arterial 139 136 - 145 mmol/L    POCT Potassium, Arterial 4.6 3.5 - 5.3 mmol/L    POCT Chloride, Arterial 104 98 - 107 mmol/L    POCT Ionized Calcium, Arterial 1.21 1.10 - 1.33 mmol/L    POCT Glucose, Arterial 103 (H) 74 - 99 mg/dL    POCT Lactate, Arterial 0.6 0.4 - 2.0 mmol/L    POCT Base Excess, Arterial 11.9 (H) -2.0 - 3.0 mmol/L    POCT HCO3 Calculated, Arterial 38.7 (H) 22.0 - 26.0 mmol/L    POCT Hemoglobin, Arterial 11.3 (L) 12.0 - 16.0 g/dL    POCT Anion Gap, Arterial 1 (L) 10 - 25 mmo/L    Patient Temperature 37.0 degrees Celsius    FiO2 44 %    Apparatus CANNULA     Site of Arterial Puncture Brachial Right     Leonidas's Test Positive    D-dimer, Non VTE   Result Value Ref Range    D-Dimer Non VTE, Quant (mg/L FEU) 7.42 (H) 0.19 - 0.50 mg/L FEU   CBC   Result Value Ref Range    WBC 7.8 4.4 - 11.3 x10*3/uL    nRBC 0.0 0.0 - 0.0 /100 WBCs    RBC 3.95 (L) 4.00 - 5.20 x10*6/uL     Hemoglobin 11.4 (L) 12.0 - 16.0 g/dL    Hematocrit 37.0 36.0 - 46.0 %    MCV 94 80 - 100 fL    MCH 28.9 26.0 - 34.0 pg    MCHC 30.8 (L) 32.0 - 36.0 g/dL    RDW 17.6 (H) 11.5 - 14.5 %    Platelets 125 (L) 150 - 450 x10*3/uL   Comprehensive metabolic panel   Result Value Ref Range    Glucose 102 (H) 74 - 99 mg/dL    Sodium 142 136 - 145 mmol/L    Potassium 4.4 3.5 - 5.3 mmol/L    Chloride 102 98 - 107 mmol/L    Bicarbonate 37 (H) 21 - 32 mmol/L    Anion Gap 7 (L) 10 - 20 mmol/L    Urea Nitrogen 32 (H) 6 - 23 mg/dL    Creatinine 1.31 (H) 0.50 - 1.05 mg/dL    eGFR 40 (L) >60 mL/min/1.73m*2    Calcium 9.2 8.6 - 10.3 mg/dL    Albumin 3.9 3.4 - 5.0 g/dL    Alkaline Phosphatase 85 33 - 136 U/L    Total Protein 6.4 6.4 - 8.2 g/dL     (H) 9 - 39 U/L    Bilirubin, Total 1.1 0.0 - 1.2 mg/dL     (H) 7 - 45 U/L   Magnesium   Result Value Ref Range    Magnesium 2.16 1.60 - 2.40 mg/dL   B-type natriuretic peptide   Result Value Ref Range     (H) 0 - 99 pg/mL   CBC   Result Value Ref Range    WBC 8.1 4.4 - 11.3 x10*3/uL    nRBC 0.0 0.0 - 0.0 /100 WBCs    RBC 3.97 (L) 4.00 - 5.20 x10*6/uL    Hemoglobin 11.4 (L) 12.0 - 16.0 g/dL    Hematocrit 36.8 36.0 - 46.0 %    MCV 93 80 - 100 fL    MCH 28.7 26.0 - 34.0 pg    MCHC 31.0 (L) 32.0 - 36.0 g/dL    RDW 17.4 (H) 11.5 - 14.5 %    Platelets 110 (L) 150 - 450 x10*3/uL   Basic Metabolic Panel   Result Value Ref Range    Glucose 124 (H) 74 - 99 mg/dL    Sodium 143 136 - 145 mmol/L    Potassium 4.3 3.5 - 5.3 mmol/L    Chloride 101 98 - 107 mmol/L    Bicarbonate 35 (H) 21 - 32 mmol/L    Anion Gap 11 10 - 20 mmol/L    Urea Nitrogen 32 (H) 6 - 23 mg/dL    Creatinine 1.26 (H) 0.50 - 1.05 mg/dL    eGFR 42 (L) >60 mL/min/1.73m*2    Calcium 8.8 8.6 - 10.3 mg/dL     *Note: Due to a large number of results and/or encounters for the requested time period, some results have not been displayed. A complete set of results can be found in Results Review.          Assessment/Plan      Assessment & Plan  Closed fracture of neck of left femur, initial encounter    Mixed hyperlipidemia    Chronic respiratory failure    History of DVT (deep vein thrombosis)    History of CHF (congestive heart failure)      // AYSE  - Baseline Cr; 1 with multiple AKIs in the past.   - Cr on admission; 1.11   - IV contrast; none.   - Hemodynamics; low side BP.   - Check UA; blood and LE.   - Check urine electrolytes;   - Check UPCR;   - Check renal US;   - Check CPK.   - Avoid nephrotoxic medications including IV contrast if possible.   - Dose medication to eGFR.   - Etiology is likely; dehydration, ?pigment induced.   - IVF and hold diuretics.         Shivam Rocha MD

## 2025-01-11 NOTE — SIGNIFICANT EVENT
Removed patient from HFNC, spo2 100% on 90% fio2.  Patient is more compliant on n/c and spo2 97% on 6lpm.

## 2025-01-11 NOTE — SIGNIFICANT EVENT
Received a call from nursing that the patient keeps desaturating.  She is not on AC waiting for Surg for hip fracture, with Hx DVT.  Now up to 6L oxygen saturating 90%. Contrast allergy, therefore VQ scan in am, CXR ordered and Duoneb.  Chest x-ray showed vascular congestion with curly B-lines.  Ddimer elevated, ABG ordered. Developing AYSE, liver enzymes elevating secondary to either heart failure or PE, D-dimer elevated but chest x-ray & ABG pointing towards CHF, 40 IV lasix x1. Cardiology and Nehro consult for diuretic management, CHF and worsening AYSE.    Most recent echo from 8/9/2024:   1. The left ventricular systolic function is normal, with a visually estimated ejection fraction of 60-65%.   2. There is normal right ventricular global systolic function.   3. The right atrium is moderately dilated.   4. There is at least 1 mitral valve clip seen at the leaflets with mild to moderate mitral valve regurgitation. There is moderate gradient across the mitral valve with mean gradient of 10 mmHg, peak gradient of 19 mmHg.   5. Mild to moderate mitral valve regurgitation.   6. Mild tricuspid regurgitation is visualized.   7. Moderately elevated right ventricular systolic pressure.   8. Moderate pulmonary hypertension seen.    Acute on chronic respiratory failure  Acute CHF  History of DVT  Acute kidney injury    -proBNP pending  -Chest x-ray with vascular congestion  -40 mg of IV Lasix x 1  -Elevated D-dimer, history of DVT, contrast allergy.  VQ scan pending  -Worsening AYSE, nephrology consulted for diuretic management  -Cardiology reconsulted for acute CHF  -Echocardiogram ordered pending    Total critical care time 30 minutes

## 2025-01-12 ENCOUNTER — APPOINTMENT (OUTPATIENT)
Dept: RADIOLOGY | Facility: HOSPITAL | Age: 85
End: 2025-01-12
Payer: MEDICARE

## 2025-01-12 LAB
ABO GROUP (TYPE) IN BLOOD: NORMAL
ALBUMIN SERPL BCP-MCNC: 3.6 G/DL (ref 3.4–5)
ANION GAP SERPL CALCULATED.3IONS-SCNC: 8 MMOL/L (ref 10–20)
ANTIBODY SCREEN: NORMAL
APTT PPP: 40.7 SECONDS (ref 22–32.5)
APTT PPP: 56.3 SECONDS (ref 22–32.5)
APTT PPP: 70.1 SECONDS (ref 22–32.5)
BACTERIA UR CULT: NO GROWTH
BLOOD EXPIRATION DATE: NORMAL
BLOOD EXPIRATION DATE: NORMAL
BUN SERPL-MCNC: 32 MG/DL (ref 6–23)
CALCIUM SERPL-MCNC: 8.5 MG/DL (ref 8.6–10.3)
CHLORIDE SERPL-SCNC: 100 MMOL/L (ref 98–107)
CO2 SERPL-SCNC: 37 MMOL/L (ref 21–32)
CREAT SERPL-MCNC: 1.21 MG/DL (ref 0.5–1.05)
DISPENSE STATUS: NORMAL
DISPENSE STATUS: NORMAL
EGFRCR SERPLBLD CKD-EPI 2021: 44 ML/MIN/1.73M*2
ERYTHROCYTE [DISTWIDTH] IN BLOOD BY AUTOMATED COUNT: 17.3 % (ref 11.5–14.5)
GLUCOSE BLD MANUAL STRIP-MCNC: 127 MG/DL (ref 74–99)
GLUCOSE SERPL-MCNC: 115 MG/DL (ref 74–99)
HCT VFR BLD AUTO: 33.5 % (ref 36–46)
HGB BLD-MCNC: 10.3 G/DL (ref 12–16)
HOLD SPECIMEN: NORMAL
MCH RBC QN AUTO: 28.6 PG (ref 26–34)
MCHC RBC AUTO-ENTMCNC: 30.7 G/DL (ref 32–36)
MCV RBC AUTO: 93 FL (ref 80–100)
NRBC BLD-RTO: 0 /100 WBCS (ref 0–0)
PHOSPHATE SERPL-MCNC: 3.9 MG/DL (ref 2.5–4.9)
PLATELET # BLD AUTO: 95 X10*3/UL (ref 150–450)
POTASSIUM SERPL-SCNC: 4.2 MMOL/L (ref 3.5–5.3)
PRODUCT BLOOD TYPE: 5100
PRODUCT BLOOD TYPE: 5100
PRODUCT CODE: NORMAL
PRODUCT CODE: NORMAL
RBC # BLD AUTO: 3.6 X10*6/UL (ref 4–5.2)
RH FACTOR (ANTIGEN D): NORMAL
SODIUM SERPL-SCNC: 141 MMOL/L (ref 136–145)
UNIT ABO: NORMAL
UNIT ABO: NORMAL
UNIT NUMBER: NORMAL
UNIT NUMBER: NORMAL
UNIT RH: NORMAL
UNIT RH: NORMAL
UNIT VOLUME: 350
UNIT VOLUME: 350
WBC # BLD AUTO: 5.1 X10*3/UL (ref 4.4–11.3)
XM INTEP: NORMAL
XM INTEP: NORMAL

## 2025-01-12 PROCEDURE — 76770 US EXAM ABDO BACK WALL COMP: CPT | Performed by: RADIOLOGY

## 2025-01-12 PROCEDURE — 80069 RENAL FUNCTION PANEL: CPT | Performed by: INTERNAL MEDICINE

## 2025-01-12 PROCEDURE — 2500000004 HC RX 250 GENERAL PHARMACY W/ HCPCS (ALT 636 FOR OP/ED): Performed by: STUDENT IN AN ORGANIZED HEALTH CARE EDUCATION/TRAINING PROGRAM

## 2025-01-12 PROCEDURE — 94640 AIRWAY INHALATION TREATMENT: CPT

## 2025-01-12 PROCEDURE — 2500000005 HC RX 250 GENERAL PHARMACY W/O HCPCS: Performed by: STUDENT IN AN ORGANIZED HEALTH CARE EDUCATION/TRAINING PROGRAM

## 2025-01-12 PROCEDURE — 2500000001 HC RX 250 WO HCPCS SELF ADMINISTERED DRUGS (ALT 637 FOR MEDICARE OP): Performed by: NURSE PRACTITIONER

## 2025-01-12 PROCEDURE — 85730 THROMBOPLASTIN TIME PARTIAL: CPT | Performed by: NURSE PRACTITIONER

## 2025-01-12 PROCEDURE — 99232 SBSQ HOSP IP/OBS MODERATE 35: CPT | Performed by: NURSE PRACTITIONER

## 2025-01-12 PROCEDURE — 99232 SBSQ HOSP IP/OBS MODERATE 35: CPT | Performed by: STUDENT IN AN ORGANIZED HEALTH CARE EDUCATION/TRAINING PROGRAM

## 2025-01-12 PROCEDURE — 1200000002 HC GENERAL ROOM WITH TELEMETRY DAILY

## 2025-01-12 PROCEDURE — 86923 COMPATIBILITY TEST ELECTRIC: CPT

## 2025-01-12 PROCEDURE — 86901 BLOOD TYPING SEROLOGIC RH(D): CPT | Performed by: ORTHOPAEDIC SURGERY

## 2025-01-12 PROCEDURE — 36415 COLL VENOUS BLD VENIPUNCTURE: CPT | Performed by: NURSE PRACTITIONER

## 2025-01-12 PROCEDURE — 76770 US EXAM ABDO BACK WALL COMP: CPT

## 2025-01-12 PROCEDURE — 82947 ASSAY GLUCOSE BLOOD QUANT: CPT

## 2025-01-12 PROCEDURE — 2500000004 HC RX 250 GENERAL PHARMACY W/ HCPCS (ALT 636 FOR OP/ED): Performed by: NURSE PRACTITIONER

## 2025-01-12 PROCEDURE — 85027 COMPLETE CBC AUTOMATED: CPT | Performed by: NURSE PRACTITIONER

## 2025-01-12 PROCEDURE — 85730 THROMBOPLASTIN TIME PARTIAL: CPT | Performed by: INTERNAL MEDICINE

## 2025-01-12 PROCEDURE — 36415 COLL VENOUS BLD VENIPUNCTURE: CPT | Performed by: INTERNAL MEDICINE

## 2025-01-12 PROCEDURE — 2500000001 HC RX 250 WO HCPCS SELF ADMINISTERED DRUGS (ALT 637 FOR MEDICARE OP): Performed by: STUDENT IN AN ORGANIZED HEALTH CARE EDUCATION/TRAINING PROGRAM

## 2025-01-12 RX ORDER — ACETAMINOPHEN 500 MG
500 TABLET ORAL ONCE
OUTPATIENT
Start: 2025-01-12 | End: 2025-01-12

## 2025-01-12 RX ORDER — VANCOMYCIN 1 G/200ML
1 INJECTION, SOLUTION INTRAVENOUS ONCE
OUTPATIENT
Start: 2025-01-12 | End: 2025-01-12

## 2025-01-12 RX ORDER — CLINDAMYCIN PHOSPHATE 900 MG/50ML
900 INJECTION, SOLUTION INTRAVENOUS ONCE
OUTPATIENT
Start: 2025-01-12 | End: 2025-01-12

## 2025-01-12 RX ORDER — VANCOMYCIN HYDROCHLORIDE 1 G/20ML
1 INJECTION, POWDER, LYOPHILIZED, FOR SOLUTION INTRAVENOUS ONCE
OUTPATIENT
Start: 2025-01-12 | End: 2025-01-12

## 2025-01-12 RX ADMIN — TIOTROPIUM BROMIDE INHALATION SPRAY 2 PUFF: 3.12 SPRAY, METERED RESPIRATORY (INHALATION) at 07:32

## 2025-01-12 RX ADMIN — DULOXETINE HYDROCHLORIDE 20 MG: 20 CAPSULE, DELAYED RELEASE ORAL at 20:17

## 2025-01-12 RX ADMIN — MORPHINE SULFATE 1 MG: 2 INJECTION, SOLUTION INTRAMUSCULAR; INTRAVENOUS at 20:18

## 2025-01-12 RX ADMIN — PANTOPRAZOLE SODIUM 40 MG: 40 TABLET, DELAYED RELEASE ORAL at 20:17

## 2025-01-12 RX ADMIN — FLUTICASONE FUROATE AND VILANTEROL TRIFENATATE 1 PUFF: 100; 25 POWDER RESPIRATORY (INHALATION) at 07:32

## 2025-01-12 RX ADMIN — GABAPENTIN 100 MG: 100 CAPSULE ORAL at 20:17

## 2025-01-12 RX ADMIN — Medication 3 L/MIN: at 07:34

## 2025-01-12 RX ADMIN — ATORVASTATIN CALCIUM 40 MG: 40 TABLET, FILM COATED ORAL at 20:17

## 2025-01-12 RX ADMIN — LEVOTHYROXINE SODIUM 25 MCG: 0.03 TABLET ORAL at 06:23

## 2025-01-12 RX ADMIN — ACETAMINOPHEN 650 MG: 325 TABLET ORAL at 12:51

## 2025-01-12 RX ADMIN — Medication 3 L/MIN: at 07:35

## 2025-01-12 RX ADMIN — POLYETHYLENE GLYCOL 3350 17 G: 17 POWDER, FOR SOLUTION ORAL at 09:13

## 2025-01-12 RX ADMIN — CARVEDILOL 3.12 MG: 3.12 TABLET, FILM COATED ORAL at 20:17

## 2025-01-12 RX ADMIN — ACETAMINOPHEN 650 MG: 325 TABLET ORAL at 20:17

## 2025-01-12 RX ADMIN — CARVEDILOL 3.12 MG: 3.12 TABLET, FILM COATED ORAL at 09:13

## 2025-01-12 RX ADMIN — PANTOPRAZOLE SODIUM 40 MG: 40 TABLET, DELAYED RELEASE ORAL at 09:13

## 2025-01-12 RX ADMIN — Medication 5 MG: at 20:17

## 2025-01-12 RX ADMIN — HEPARIN SODIUM 700 UNITS/HR: 10000 INJECTION, SOLUTION INTRAVENOUS at 16:18

## 2025-01-12 ASSESSMENT — PAIN DESCRIPTION - LOCATION: LOCATION: LEG

## 2025-01-12 ASSESSMENT — COGNITIVE AND FUNCTIONAL STATUS - GENERAL
DRESSING REGULAR LOWER BODY CLOTHING: A LOT
CLIMB 3 TO 5 STEPS WITH RAILING: TOTAL
TURNING FROM BACK TO SIDE WHILE IN FLAT BAD: A LITTLE
WALKING IN HOSPITAL ROOM: TOTAL
HELP NEEDED FOR BATHING: A LITTLE
STANDING UP FROM CHAIR USING ARMS: TOTAL
MOVING TO AND FROM BED TO CHAIR: A LOT
DRESSING REGULAR UPPER BODY CLOTHING: A LITTLE
EATING MEALS: A LITTLE
PERSONAL GROOMING: A LITTLE
DAILY ACTIVITIY SCORE: 17
TOILETING: A LITTLE
MOBILITY SCORE: 12

## 2025-01-12 ASSESSMENT — PAIN SCALES - GENERAL
PAINLEVEL_OUTOF10: 0 - NO PAIN
PAINLEVEL_OUTOF10: 10 - WORST POSSIBLE PAIN

## 2025-01-12 ASSESSMENT — PAIN DESCRIPTION - ORIENTATION: ORIENTATION: LEFT;LOWER

## 2025-01-12 NOTE — ASSESSMENT & PLAN NOTE
Had increased oxygen demand, improving  Elevated D Dimer-VQ high probability PE  Has a history of DVT however Xarelto was stopped due to anemia/thrombocytopenia, has been off for some time  Started on Heparin gtt, will need oral anticoagulation on discharge  Pulmonary consulted, appreciate recs  Ultrasound of lower extremities as above

## 2025-01-12 NOTE — NURSING NOTE
"Son, Jose updated by phone will bring the name of her anti anxiety medication   States \"she can get pretty mean.  The longer she's there the worse it will get\"    "

## 2025-01-12 NOTE — CARE PLAN
The patient's goals for the shift include safety    The clinical goals for the shift include safety    Over the shift, the patient did make progress toward the following goals. Barriers to progression include poor safety awareness. Recommendations to address these barriers include purposeful rounding .

## 2025-01-12 NOTE — ASSESSMENT & PLAN NOTE
Continue atorvastatin 40 mg    Thrombocytopenia  Will need to monitor close given currently on Heparin gtt    Plan  Started on Heparin gtt-will need held 4 hours prior to surgery and resume to soon as possible and when cleared with orthopedic surgery and anesthesia  Monitor on telemetry  Pain management/bowel regimen  Encourage incentive spirometer  Cardiology, pulmonary, and orthopedic surgery consulted, appreciate recommendations  Supplemental oxygen, monitor pulse ox  DVT prophylaxis: heparin gtt  CBC and BMP in the morning  Fall precautions  PT/OT  N.p.o. after midnight for possible surgery tomorrow

## 2025-01-12 NOTE — NURSING NOTE
BSSR complete heparin gtt stopped to be restarted in 1 hr    Repositioned for comfort call light in reach bed alarm on for safety

## 2025-01-12 NOTE — PROGRESS NOTES
"Anitha Welch is a 84 y.o. female on day 2 of admission presenting with Closed fracture of neck of left femur, initial encounter.    Subjective   Anitha Welch is a 84 y.o. female presenting with left hip and leg pain. Found to have acute PE. History of DVT/PE, but was off atc. Today she's down to 3L oxygen. She states she's resting but no shortness of breath. She remains bed bound due to leg fracture.        Objective     Physical Exam  Constitutional:       Appearance: Normal appearance.   HENT:      Head: Normocephalic and atraumatic.      Mouth/Throat:      Mouth: Mucous membranes are moist.      Pharynx: Oropharynx is clear.   Eyes:      Extraocular Movements: Extraocular movements intact.      Conjunctiva/sclera: Conjunctivae normal.   Cardiovascular:      Rate and Rhythm: Normal rate and regular rhythm.   Pulmonary:      Effort: Pulmonary effort is normal.      Breath sounds: Normal breath sounds.   Abdominal:      General: Abdomen is flat.      Palpations: Abdomen is soft.   Musculoskeletal:      Cervical back: Normal range of motion and neck supple.   Skin:     General: Skin is warm and dry.   Neurological:      General: No focal deficit present.      Mental Status: She is alert. Mental status is at baseline.   Psychiatric:         Mood and Affect: Mood normal.         Behavior: Behavior normal.         Last Recorded Vitals  Blood pressure 132/57, pulse 82, temperature 36.7 °C (98.1 °F), temperature source Oral, resp. rate 17, height 1.651 m (5' 5\"), weight 66 kg (145 lb 8.1 oz), SpO2 96%.  Intake/Output last 3 Shifts:  I/O last 3 completed shifts:  In: 800 (12.1 mL/kg) [P.O.:800]  Out: 1175 (17.8 mL/kg) [Urine:1175 (0.5 mL/kg/hr)]  Weight: 66 kg     Relevant Results               TTE  CONCLUSIONS:   1. The left ventricular systolic function is normal, with a visually estimated ejection fraction of 55-60%.   2. Spectral Doppler shows a Grade II (pseudonormal pattern) of left ventricular diastolic filling " with an elevated left atrial pressure.   3. There is normal right ventricular global systolic function.   4. The left atrium is moderate to severely dilated.   5. The right atrium is mild to moderately dilated.   6. There is no evidence of cardiac tamponade.   7. Moderate to severe mitral valve regurgitation.             VQ Scan  IMPRESSION:  1. Wedge-shaped subsegmental perfusion defect in the right upper lobe  inferiorly and segmental perfusion defect in the superior left lower  lobe seen on SPECT/CT, suggestive of high probability of acute  pulmonary embolism.      Duplex LE:  IMPRESSION:  Negative study.  No deep venous thrombosis of the  bilateral lower  extremity.    Assessment/Plan   Assessment & Plan  Closed fracture of neck of left femur, initial encounter    Mixed hyperlipidemia    Acute on chronic hypoxic respiratory failure (Multi)    History of DVT (deep vein thrombosis)    History of CHF (congestive heart failure)    Acute pulmonary embolism (Multi)    83 yo female with acute on chronic hypoxic respiratory failure.      #Acute on Chronic hypoxic respiratory failure  -likely secondary to acute PE per VQ scan. Hemodynamically stable.   -Likely thrombotic PE, but fat embolism is possible in the setting of bone fracture  -mild volume overload may have contributed at some point, and she is at risk for atelectasis, which could further worsen hypoxia  -TTE without right heart strain but with valvular disease  -duplex LE negative    Recommendations:  -net even volume status unless otherwise directed per nephrology or cardiology  -heparin gtt, monitor platelets, eventual transition to doac prior to discharge  -from an orthopedic surgery standpoint: It is ideal in the setting of an elective surgery to wait 2-4 weeks after diagnosis of acute PE. But if this is considered more urgent and not fully elective, then would be reasonable to consider inpatient. No evidence of Right heart strain and no evidence of  further DVT that could lead to further PE. Would only interrupt atc for the duration of surgery and otherwise would restart anticoagulation with heparin gtt immediately after. Would involved cardiology for risk assessment given valvular disease and would have anesthesiology eval her prior to surgery too.    -she is back to 3L oxygen which is near her baseline  -no indication for IVC filter given negative duplex  -encourage frequent incentive spirometer use       I spent 35 minutes in the professional and overall care of this patient.      Rob Franco MD

## 2025-01-12 NOTE — PROGRESS NOTES
Discussed with pulmonology.      Treatment delayed due to pulmonary embolus and hypoxemia.  Currently on heparin.  Plan is to proceed with surgery treatment tomorrow for left hip bipolar hemiarthroplasty as long as medically clear.  Heparin to be managed by medical doctor perioperatively.  N.p.o. after midnight.

## 2025-01-12 NOTE — PROGRESS NOTES
Anitha Welch is a 84 y.o. female on day 2 of admission presenting with Closed fracture of neck of left femur, initial encounter.      Subjective   Patient seen and examined.  Awake/alert/oriented to person and place.  Pain is currently controlled. Does have mild shortness of breath however improved.  Near baseline O2 requirements, on 3 L oxygen via nasal cannula.  No chest pain, nausea, vomiting, or abdominal discomfort.     Objective     Last Recorded Vitals  /57 (BP Location: Right arm, Patient Position: Lying)   Pulse 82   Temp 36.7 °C (98.1 °F) (Oral)   Resp 17   Wt 66 kg (145 lb 8.1 oz)   SpO2 96%   Intake/Output last 3 Shifts:    Intake/Output Summary (Last 24 hours) at 1/12/2025 1104  Last data filed at 1/12/2025 0729  Gross per 24 hour   Intake 800 ml   Output 925 ml   Net -125 ml       Admission Weight  Weight: 59 kg (130 lb) (01/09/25 2357)    Daily Weight  01/11/25 : 66 kg (145 lb 8.1 oz)    Image Results  Vascular US Lower Extremity Venous Duplex Bilateral  Narrative: Interpreted By:  Parviz Allen,   STUDY:  [facility] US LOWER EXTREMITY VENOUS DUPLEX BILATERAL  1/11/2025 3:00 pm      INDICATION:  85 y/o   F with  Signs/Symptoms:PE, DVT. LMP:  Unknown.      ,Z86.718 Personal history of other venous thrombosis and  embolism,I26.99 Other pulmonary embolism without acute cor pulmonale      COMPARISON:  Ultrasound 10/19/2024      ACCESSION NUMBER(S):  [id number]      ORDERING CLINICIAN:  MERY HUNT      TECHNIQUE:  Routine ultrasound of the  bilateral lower extremity was performed  with duplex Doppler (color and spectral) evaluation.   Static images  were obtained for remote interpretation.      FINDINGS:  THIGH VEINS:  The common femoral, femoral, popliteal, proximal medial  saphenous, and deep femoral veins are patent and free of thrombus.  The veins are normally compressible.  They demonstrate normal phasic  flow and augmentation response.      CALF VEINS:  The paired peroneal and  posterior tibial calf veins are  patent.      Impression: Negative study.  No deep venous thrombosis of the  bilateral lower  extremity.      MACRO:  None      Signed by: Parviz Allen 1/11/2025 4:04 PM  Dictation workstation:   [id number]  Transthoracic Echo (TTE) [place] [address]             Phone [phone]    TRANSTHORACIC ECHOCARDIOGRAM REPORT    Patient Name:       RUDOLPH DÍAZ       Reading Physician:    [id number] Jeison Spaulding DO  Study Date:         1/11/2025            Ordering Provider:    [id number] ISAURA CAMEJO  MRN/PID:            [id number]             Fellow:  Accession#:         [id number]         Nurse:  Date of Birth/Age:  1940 / 84 years  Sonographer:          Eber Horvath RDCS  Gender Assigned at  F                    Additional Staff:  Birth:  Height:             164.00 cm            Admit Date:  Weight:             67.00 kg             Admission Status:     Inpatient -                                                                 Routine  BSA / BMI:          1.73 m2 / 24.91      Department Location:  [facility]                      kg/m2  Blood Pressure: 108 /53 mmHg    Study Type:    TRANSTHORACIC ECHO (TTE) LIMITED  Diagnosis/ICD: Cardiac murmur, unspecified-R01.1  Indication:    Murmur  CPT Codes:     Echo Limited-62068; Color Doppler-72243; Doppler Full-58612   Study Detail: The following Echo studies were performed: 2D, M-Mode, Doppler and                color flow. Technically challenging study due to poor acoustic                windows.       PHYSICIAN INTERPRETATION:  Left Ventricle: The left ventricular systolic function is normal, with a visually estimated ejection fraction of 55-60%. There are no regional wall  motion abnormalities. The left ventricular cavity size is normal. Spectral Doppler shows a Grade II (pseudonormal pattern) of left ventricular diastolic filling with an elevated left atrial pressure.  Left Atrium: The left atrium is moderate to severely dilated.  Right Ventricle: The right ventricle is normal in size. There is normal right ventricular global systolic function.  Right Atrium: The right atrium is mild to moderately dilated.  Aortic Valve: The aortic valve is trileaflet. There is mild aortic valve cusp calcification. The aortic valve dimensionless index is 0.73. There is no evidence of aortic valve regurgitation. The peak instantaneous gradient of the aortic valve is 6 mmHg. The mean gradient of the aortic valve is 3 mmHg.  Mitral Valve: The mitral valve is mild to moderately thickened. The peak instantaneous gradient of the mitral valve is 10 mmHg. There is moderate to severe mitral valve regurgitation.  Tricuspid Valve: The tricuspid valve is structurally normal. There is mild tricuspid regurgitation.  Pulmonic Valve: The pulmonic valve is structurally normal. There is no indication of pulmonic valve regurgitation.  Pericardium: Small pericardial effusion posterior to the left ventricle. There is no evidence of cardiac tamponade.  Aorta: The aortic root is normal.       CONCLUSIONS:   1. The left ventricular systolic function is normal, with a visually estimated ejection fraction of 55-60%.   2. Spectral Doppler shows a Grade II (pseudonormal pattern) of left ventricular diastolic filling with an elevated left atrial pressure.   3. There is normal right ventricular global systolic function.   4. The left atrium is moderate to severely dilated.   5. The right atrium is mild to moderately dilated.   6. There is no evidence of cardiac tamponade.   7. Moderate to severe mitral valve regurgitation.    QUANTITATIVE DATA SUMMARY:     LA VOLUME:                   Normal Ranges:  LA Vol A4C:       77.3 ml  LA  Vol A2C:       78.6 ml  LA Vol Index BSA: 45.0 ml/m2       LV SYSTOLIC FUNCTION BY 2D PLANIMETRY (MOD):                       Normal Ranges:  EF-A4C View:    66 % (>=55%)  EF-A2C View:    34 %  EF-Biplane:     50 %  EF-Visual:      58 %  EF-Auto:        43 %  LV EF Reported: 58 %       LV DIASTOLIC FUNCTION:           Normal Ranges:  MV Peak E:             0.75 m/s  (0.7-1.2 m/s)  MV Peak A:             1.28 m/s  (0.42-0.7 m/s)  E/A Ratio:             0.58      (1.0-2.2)  MV e'                  0.052 m/s (>8.0)  MV lateral e'          0.06 m/s  MV medial e'           0.05 m/s  E/e' Ratio:            14.31     (<8.0)       MITRAL VALVE:          Normal Ranges:  MV Vmax:      1.55 m/s (<=1.3m/s)  MV peak P.6 mmHg (<5mmHg)  MV mean P.8 mmHg (<48mmHg)  MV DT:        142 msec (150-240msec)       MITRAL INSUFFICIENCY:             Normal Ranges:  MR VTI:               131.38 cm  MR Vmax:              477.92 cm/s       AORTIC VALVE:                     Normal Ranges:  AoV Vmax:                1.24 m/s (<=1.7m/s)  AoV Peak P.1 mmHg (<20mmHg)  AoV Mean PG:             3.0 mmHg (1.7-11.5mmHg)  LVOT Max Jamal:            0.90 m/s (<=1.1m/s)  AoV VTI:                 21.86 cm (18-25cm)  LVOT VTI:                15.96 cm  AoV Dimensionless Index: 0.73       TRICUSPID VALVE/RVSP:          Normal Ranges:  Peak TR Velocity:     3.08 m/s  RV Syst Pressure:     41 mmHg  (< 30mmHg)  IVC Diam:             1.66 cm       PULMONIC VALVE:          Normal Ranges:  PV Max Jamal:     0.6 m/s  (0.6-0.9m/s)  PV Max P.4 mmHg       [id number] Jeison [facility]  Electronically signed on 2025 at 1:32:10 PM       ** Final **  NM Lung perfusion with spect/ct  Narrative: Interpreted By:  Rey Medina,   STUDY:  NM LUNG PERFUSION WITH SPECT/CT;  2025 9:40 am      INDICATION:  Signs/Symptoms:Rule out PE, patient off anticoagulation for surgical  procedure tomorrow.  Desaturating with tachycardia and tachypnea..       COMPARISON:  Chest x-ray from 01/11/2025      ACCESSION NUMBER(S):  [id number]      ORDERING CLINICIAN:  DAFNE IRBY      TECHNIQUE:  DIVISION OF NUCLEAR MEDICINE  PERFUSION LUNG SCANS      Multiple perfusion images of the lungs were acquired after the  intravenous administration of 4.0 mCi of Tc-99m macroaggregated  albumin (MAA). In addition, SPECT/CT of the chest was performed.      FINDINGS:  Planar perfusion images of both lungs demonstrate mild heterogeneity  throughout the lung fields bilaterally. There are wedge-shaped  subsegmental perfusion defect in the right upper lobe inferiorly and  segmental perfusion defect in the superior left lower lobe seen on  SPECT/CT, suggestive of high probability of acute pulmonary embolism.      Impression: 1. Wedge-shaped subsegmental perfusion defect in the right upper lobe  inferiorly and segmental perfusion defect in the superior left lower  lobe seen on SPECT/CT, suggestive of high probability of acute  pulmonary embolism.      The interpretation above is based on modified PIOPED II and PISAPED  criteria.      This study was analyzed and interpreted at [place], [place].      MACRO:  Rey Medina discussed the significance and urgency of this critical  finding through [employer] with Esther Santoyo  on 1/11/2025 at  10:28 am.  (**-RCF-**) Findings:  See findings.              Signed by: Rey Medina 1/11/2025 10:47 AM  Dictation workstation:   [id number]  XR chest 1 view  Narrative: Interpreted By:  Augustin Mccarthy,   STUDY:  XR CHEST 1 VIEW;  1/11/2025 3:14 am      INDICATION:  Signs/Symptoms:SOB, desaturating.          COMPARISON:  01/10/2025.      ACCESSION NUMBER(S):  [id number]      ORDERING CLINICIAN:  DAFNE IRBY      FINDINGS:  Semi-erect AP view of the chest.      Leads overlie the chest, partially obscuring the field-of-view.      Thyroidectomy changes redemonstrated.      CARDIOMEDIASTINAL SILHOUETTE:  Cardiac silhouette is  enlarged, unchanged. Tortuous and  atherosclerotic thoracic aorta.      LUNGS:  Lungs are hyperinflated and hyperlucent, compatible with  emphysema/COPD. Pulmonary vascular congestion suggests volume  overload, with prominent interstitial markings and Kerley B-lines  suggesting acute pulmonary interstitial edema/CHF. No focal  consolidation. No sizable pleural effusion. No pneumothorax.      ABDOMEN:  No remarkable upper abdominal findings.      BONES:  No acute osseous changes.      Impression: 1.   Lungs are hyperinflated and hyperlucent, compatible with  emphysema/COPD. Pulmonary vascular congestion suggests volume  overload, with prominent interstitial markings and Kerley B-lines  suggesting acute pulmonary interstitial edema/CHF. No focal  consolidation. No sizable pleural effusion. No pneumothorax.  2. Similar cardiomegaly.              MACRO:  None      Signed by: Augustin Mccarthy 1/11/2025 3:31 AM  Dictation workstation:   [id number]      Physical Exam  Vitals reviewed.   Constitutional:       Comments: Elderly.  Poor historian.   HENT:      Head: Normocephalic and atraumatic.   Eyes:      Extraocular Movements: Extraocular movements intact.      Conjunctiva/sclera: Conjunctivae normal.   Cardiovascular:      Rate and Rhythm: Normal rate and regular rhythm.   Pulmonary:      Effort: Pulmonary effort is normal.      Breath sounds: No wheezing, rhonchi or rales.      Comments: Breath sounds clear, diminished bilaterally  Abdominal:      General: Bowel sounds are normal.      Palpations: Abdomen is soft.      Tenderness: There is no abdominal tenderness.   Musculoskeletal:      Cervical back: Neck supple.      Comments: Limited range of motion to left lower extremity   Skin:     General: Skin is warm and dry.      Capillary Refill: Capillary refill takes less than 2 seconds.   Neurological:      General: No focal deficit present.      Mental Status: She is alert and oriented to person, place, and time.    Psychiatric:         Mood and Affect: Mood normal.         Behavior: Behavior normal.         Relevant Results  Lab Results   Component Value Date    GLUCOSE 115 (H) 01/12/2025    CALCIUM 8.5 (L) 01/12/2025     01/12/2025    K 4.2 01/12/2025    CO2 37 (H) 01/12/2025     01/12/2025    BUN 32 (H) 01/12/2025    CREATININE 1.21 (H) 01/12/2025      Lab Results   Component Value Date    WBC 5.1 01/12/2025    HGB 10.3 (L) 01/12/2025    HCT 33.5 (L) 01/12/2025    MCV 93 01/12/2025    PLT 95 (L) 01/12/2025    XR hip left with pelvis when performed 2 or 3 views  Result Date: 1/10/2025  Acute basicervical left femoral neck fracture with varus alignment. 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 No acute displaced fracture of the knee, tibia or fibula.     MACRO: None.   Signed by: Evan Finkelstein 1/10/2025 2:42 AM Dictation workstation:   [id number]    XR femur left 2+ views  Result Date: 1/10/2025  Acute basicervical left femoral neck fracture with varus alignment. 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 No acute displaced fracture of the knee, tibia or fibula.     MACRO: None.   Signed by: Evan Finkelstein 1/10/2025 2:42 AM Dictation workstation:   [id number]    XR tibia fibula left 2 views  Result Date: 1/10/2025  Acute basicervical left femoral neck fracture with varus alignment. 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 No acute displaced fracture of the knee, tibia or fibula.     MACRO: None.   Signed by: Evan Finkelstein 1/10/2025 2:42 AM Dictation workstation:   [id number]    CT lumbar spine wo IV contrast  Result Date: 1/10/2025  Lumbar spondylosis without  acute fracture or traumatic malalignment.     MACRO: None.   Signed by: Evan Finkelstein 1/10/2025 2:32 AM Dictation workstation:   [id number]         Assessment/Plan      Assessment & Plan  Closed fracture of neck of left femur, initial encounter  X-ray left hip with acute basicervical left femoral neck fracture with varus alignment.  Pain management  Bowel regimen  Encourage incentive spirometer  Orthopedic surgery consulted  PT/OT  OR cancelled 1/11/2025 due to worsening respiratory failure, positive PE  Pulmonary, cardiology, orthopedic surgery following, appreciate recommendations  Anticipate possible surgery tomorrow.  N.p.o. after midnight.  Heparin drip will need to be held 4 hours prior to surgery and resume to soon as possible after surgery.  Discussed with pulmonary and orthopedic surgery yesterday.    Acute pulmonary embolism (Multi)  Had increased oxygen demand, improving  Elevated D Dimer-VQ high probability PE  Has a history of DVT however Xarelto was stopped due to anemia/thrombocytopenia, has been off for some time  Started on Heparin gtt, will need oral anticoagulation on discharge  Pulmonary consulted, appreciate recs  Ultrasound of lower extremities as above  Acute on chronic hypoxic respiratory failure (Multi)  On 3 L oxygen via nasal cannula, 2 L is baseline  Monitor pulse ox, wean oxygen as tolerated  Resume home inhalers  As needed DuoNebs    History of DVT (deep vein thrombosis)  History of DVT previously on Xarelto which was held on last hospital admission due to anemia and thrombocytopenia.  Patient was to follow with PCP to resume but has not to date  -found to have high prob for PE on VQ-started on heparin gtt, will need to be discharged on anticoagulation    History of CHF (congestive heart failure)  Echo 8/9/2024 ejection fraction of 60-65%. There are no regional wall motion abnormalities. The left ventricular cavity size is normal. There is mild concentric left ventricular  hypertrophy. Spectral Doppler shows a normal pattern of left ventricular diastolic filling.   Did have vascular congestion on CXR-given one time dose of Lasix  Continue to hold torsemide  Stop IV fluids  Resume home Coreg  Cardiology consulted for cardiac clearance  Repeat echocardiogram showed an ejection fraction of 55 to 60%, moderate to severe mitral valve regurgitation    Mixed hyperlipidemia  Continue atorvastatin 40 mg    Thrombocytopenia  Will need to monitor close given currently on Heparin gtt    Plan  Started on Heparin gtt-will need held 4 hours prior to surgery and resume to soon as possible and when cleared with orthopedic surgery and anesthesia  Monitor on telemetry  Pain management/bowel regimen  Encourage incentive spirometer  Cardiology, pulmonary, and orthopedic surgery consulted, appreciate recommendations  Supplemental oxygen, monitor pulse ox  DVT prophylaxis: heparin gtt  CBC and BMP in the morning  Fall precautions  PT/OT  N.p.o. after midnight for possible surgery tomorrow                  Tuyet Garcia, FELA-CNP

## 2025-01-12 NOTE — ASSESSMENT & PLAN NOTE
X-ray left hip with acute basicervical left femoral neck fracture with varus alignment.  Pain management  Bowel regimen  Encourage incentive spirometer  Orthopedic surgery consulted  PT/OT  OR cancelled 1/11/2025 due to worsening respiratory failure, positive PE  Pulmonary, cardiology, orthopedic surgery following, appreciate recommendations  Anticipate possible surgery tomorrow.  N.p.o. after midnight.  Heparin drip will need to be held 4 hours prior to surgery and resume to soon as possible after surgery.  Discussed with pulmonary and orthopedic surgery yesterday.

## 2025-01-12 NOTE — PROGRESS NOTES
"Anitha Welch is a 84 y.o. female on day 2 of admission presenting with Closed fracture of neck of left femur, initial encounter.    Subjective   Patient resting in bed. No complaints other than L hip pain.        Objective     Physical Exam  Vitals reviewed.   Constitutional:       General: She is not in acute distress.     Appearance: Normal appearance. She is normal weight. She is not ill-appearing, toxic-appearing or diaphoretic.   HENT:      Head: Normocephalic and atraumatic.      Nose: Nose normal. No congestion.      Mouth/Throat:      Mouth: Mucous membranes are moist.   Eyes:      Extraocular Movements: Extraocular movements intact.   Cardiovascular:      Rate and Rhythm: Normal rate. Rhythm irregular.      Pulses: Normal pulses.      Heart sounds: Murmur heard.      Systolic murmur is present with a grade of 2/6.   Pulmonary:      Effort: Pulmonary effort is normal.      Breath sounds: Normal breath sounds.   Abdominal:      General: Bowel sounds are normal. There is no distension.      Palpations: Abdomen is soft.      Tenderness: There is no abdominal tenderness.   Musculoskeletal:         General: Normal range of motion.      Cervical back: Normal range of motion and neck supple.      Right lower leg: No edema.      Left lower leg: Edema present.   Skin:     General: Skin is warm.      Capillary Refill: Capillary refill takes less than 2 seconds.   Neurological:      General: No focal deficit present.      Mental Status: She is alert.   Psychiatric:         Mood and Affect: Mood normal.         Behavior: Behavior normal.         Last Recorded Vitals  Blood pressure 132/57, pulse 82, temperature 36.7 °C (98.1 °F), temperature source Oral, resp. rate 17, height 1.651 m (5' 5\"), weight 66 kg (145 lb 8.1 oz), SpO2 96%.  Intake/Output last 3 Shifts:  I/O last 3 completed shifts:  In: 800 (12.1 mL/kg) [P.O.:800]  Out: 1175 (17.8 mL/kg) [Urine:1175 (0.5 mL/kg/hr)]  Weight: 66 kg     Relevant Results          "   Scheduled medications  acetaminophen, 650 mg, oral, q8h  atorvastatin, 40 mg, oral, Nightly  carvedilol, 3.125 mg, oral, BID  DULoxetine, 20 mg, oral, Nightly  tiotropium, 2 puff, inhalation, Daily   And  fluticasone furoate-vilanteroL, 1 puff, inhalation, Daily  gabapentin, 100 mg, oral, Nightly  levothyroxine, 25 mcg, oral, Daily  oxygen, 2 L/min, inhalation, Continuous - Inhalation  oxygen, , inhalation, Continuous - Inhalation  pantoprazole, 40 mg, oral, BID  polyethylene glycol, 17 g, oral, Daily  [Held by provider] torsemide, 20 mg, oral, Daily      Continuous medications  heparin, 0-4,500 Units/hr, Last Rate: 800 Units/hr (01/12/25 0330)      PRN medications  PRN medications: acetaminophen **OR** acetaminophen **OR** acetaminophen, albuterol, heparin (porcine), ipratropium-albuteroL, melatonin, morphine, nitroglycerin, ondansetron **OR** ondansetron, oxyCODONE     Results for orders placed or performed during the hospital encounter of 01/09/25 (from the past 24 hours)   Creatine Kinase   Result Value Ref Range    Creatine Kinase 126 0 - 215 U/L   aPTT   Result Value Ref Range    aPTT 25.9 22.0 - 32.5 seconds   CBC   Result Value Ref Range    WBC 7.9 4.4 - 11.3 x10*3/uL    nRBC 0.0 0.0 - 0.0 /100 WBCs    RBC 3.75 (L) 4.00 - 5.20 x10*6/uL    Hemoglobin 10.6 (L) 12.0 - 16.0 g/dL    Hematocrit 34.5 (L) 36.0 - 46.0 %    MCV 92 80 - 100 fL    MCH 28.3 26.0 - 34.0 pg    MCHC 30.7 (L) 32.0 - 36.0 g/dL    RDW 17.4 (H) 11.5 - 14.5 %    Platelets 111 (L) 150 - 450 x10*3/uL   Transthoracic Echo (TTE) Limited   Result Value Ref Range    AV pk mable 1.24 m/s    AV mn grad 3 mmHg    MV E/A ratio 0.58     LV Biplane EF 50 %    LV EF 58 %    RVSP 40.9 mmHg    AV pk grad 6 mmHg    LV A4C EF 66.4    APTT   Result Value Ref Range    aPTT 105.6 () 22.0 - 32.5 seconds   Electrolyte Panel, Urine   Result Value Ref Range    Sodium, Urine Random 26 mmol/L    Sodium/Creatinine Ratio 28 Not established. mmol/g Creat    Potassium,  Urine Random 48 mmol/L    Potassium/Creatinine Ratio 52 Not established mmol/g Creat    Chloride, Urine Random <15 mmol/L    Chloride/Creatinine Ratio      Creatinine, Urine Random 92.3 20.0 - 320.0 mg/dL   Protein, Urine Random   Result Value Ref Range    Total Protein, Urine Random 21 5 - 24 mg/dL    Creatinine, Urine Random 92.3 20.0 - 320.0 mg/dL    T. Protein/Creatinine Ratio 0.23 (H) 0.00 - 0.17 mg/mg Creat   Urinalysis with Reflex Culture and Microscopic   Result Value Ref Range    Color, Urine Light-Yellow Light-Yellow, Yellow, Dark-Yellow    Appearance, Urine Clear Clear    Specific Gravity, Urine 1.017 1.005 - 1.035    pH, Urine 5.0 5.0, 5.5, 6.0, 6.5, 7.0, 7.5, 8.0    Protein, Urine NEGATIVE NEGATIVE, 10 (TRACE), 20 (TRACE) mg/dL    Glucose, Urine Normal Normal mg/dL    Blood, Urine 0.2 (2+) (A) NEGATIVE    Ketones, Urine NEGATIVE NEGATIVE mg/dL    Bilirubin, Urine NEGATIVE NEGATIVE    Urobilinogen, Urine Normal Normal mg/dL    Nitrite, Urine NEGATIVE NEGATIVE    Leukocyte Esterase, Urine 25 [place]/uL (A) NEGATIVE   Extra Urine Gray Tube   Result Value Ref Range    Extra Tube Hold for add-ons.    Microscopic Only, Urine   Result Value Ref Range    WBC, Urine 1-5 1-5, NONE /HPF    RBC, Urine 11-20 (A) NONE, 1-2, 3-5 /HPF    Bacteria, Urine 1+ (A) NONE SEEN /HPF    Mucus, Urine FEW Reference range not established. /LPF    Hyaline Casts, Urine 3+ (A) NONE /LPF   Renal Function Panel   Result Value Ref Range    Glucose 115 (H) 74 - 99 mg/dL    Sodium 141 136 - 145 mmol/L    Potassium 4.2 3.5 - 5.3 mmol/L    Chloride 100 98 - 107 mmol/L    Bicarbonate 37 (H) 21 - 32 mmol/L    Anion Gap 8 (L) 10 - 20 mmol/L    Urea Nitrogen 32 (H) 6 - 23 mg/dL    Creatinine 1.21 (H) 0.50 - 1.05 mg/dL    eGFR 44 (L) >60 mL/min/1.73m*2    Calcium 8.5 (L) 8.6 - 10.3 mg/dL    Phosphorus 3.9 2.5 - 4.9 mg/dL    Albumin 3.6 3.4 - 5.0 g/dL   CBC   Result Value Ref Range    WBC 5.1 4.4 - 11.3 x10*3/uL    nRBC 0.0 0.0 - 0.0 /100 WBCs    RBC  3.60 (L) 4.00 - 5.20 x10*6/uL    Hemoglobin 10.3 (L) 12.0 - 16.0 g/dL    Hematocrit 33.5 (L) 36.0 - 46.0 %    MCV 93 80 - 100 fL    MCH 28.6 26.0 - 34.0 pg    MCHC 30.7 (L) 32.0 - 36.0 g/dL    RDW 17.3 (H) 11.5 - 14.5 %    Platelets 95 (L) 150 - 450 x10*3/uL   aPTT   Result Value Ref Range    aPTT 70.1 (H) 22.0 - 32.5 seconds     *Note: Due to a large number of results and/or encounters for the requested time period, some results have not been displayed. A complete set of results can be found in Results Review.      Vascular US Lower Extremity Venous Duplex Bilateral    Result Date: 1/11/2025  Interpreted By:  Parviz Allen, STUDY: [facility] US LOWER EXTREMITY VENOUS DUPLEX BILATERAL  1/11/2025 3:00 pm   INDICATION: 83 y/o   F with  Signs/Symptoms:PE, DVT. LMP:  Unknown.   ,Z86.718 Personal history of other venous thrombosis and embolism,I26.99 Other pulmonary embolism without acute cor pulmonale   COMPARISON: Ultrasound 10/19/2024   ACCESSION NUMBER(S): [id number]   ORDERING CLINICIAN: MERY HUNT   TECHNIQUE: Routine ultrasound of the  bilateral lower extremity was performed with duplex Doppler (color and spectral) evaluation.   Static images were obtained for remote interpretation.   FINDINGS: THIGH VEINS:  The common femoral, femoral, popliteal, proximal medial saphenous, and deep femoral veins are patent and free of thrombus. The veins are normally compressible.  They demonstrate normal phasic flow and augmentation response.   CALF VEINS:  The paired peroneal and posterior tibial calf veins are patent.       Negative study.  No deep venous thrombosis of the  bilateral lower extremity.   MACRO: None   Signed by: Parviz Allen 1/11/2025 4:04 PM Dictation workstation:   [id number]    Transthoracic Echo (TTE) Limited    Result Date: 1/11/2025           [place] [id number]            Phone [phone] TRANSTHORACIC ECHOCARDIOGRAM REPORT Patient Name:       RUDOLPH  BARBRA Carr [facility]    [id number] [facility] Study Date:         1/11/2025            Ordering Provider:    [id number] ISAURA CAMEJO MRN/PID:            [id number]             Fellow: Accession#:         [id number]         Nurse: Date of Birth/Age:  1940 / 84 years  Sonographer:          Eber Horvath RDCS Gender Assigned at  F                    Additional Staff: Birth: Height:             164.00 cm            Admit Date: Weight:             67.00 kg             Admission Status:     Inpatient -                                                                Routine BSA / BMI:          1.73 m2 / 24.91      Department Location:  [facility]                     kg/m2 Blood Pressure: 108 /53 mmHg Study Type:    TRANSTHORACIC ECHO (TTE) LIMITED Diagnosis/ICD: Cardiac murmur, unspecified-R01.1 Indication:    Murmur CPT Codes:     Echo Limited-30625; Color Doppler-35608; Doppler Full-07625  Study Detail: The following Echo studies were performed: 2D, M-Mode, Doppler and               color flow. Technically challenging study due to poor acoustic               windows.  PHYSICIAN INTERPRETATION: Left Ventricle: The left ventricular systolic function is normal, with a visually estimated ejection fraction of 55-60%. There are no regional wall motion abnormalities. The left ventricular cavity size is normal. Spectral Doppler shows a Grade II (pseudonormal pattern) of left ventricular diastolic filling with an elevated left atrial pressure. Left Atrium: The left atrium is moderate to severely dilated. Right Ventricle: The right ventricle is normal in size. There is normal right ventricular global systolic function. Right Atrium: The right atrium is mild to moderately dilated. Aortic Valve: The aortic valve is trileaflet. There  is mild aortic valve cusp calcification. The aortic valve dimensionless index is 0.73. There is no evidence of aortic valve regurgitation. The peak instantaneous gradient of the aortic valve is 6 mmHg. The mean gradient of the aortic valve is 3 mmHg. Mitral Valve: The mitral valve is mild to moderately thickened. The peak instantaneous gradient of the mitral valve is 10 mmHg. There is moderate to severe mitral valve regurgitation. Tricuspid Valve: The tricuspid valve is structurally normal. There is mild tricuspid regurgitation. Pulmonic Valve: The pulmonic valve is structurally normal. There is no indication of pulmonic valve regurgitation. Pericardium: Small pericardial effusion posterior to the left ventricle. There is no evidence of cardiac tamponade. Aorta: The aortic root is normal.  CONCLUSIONS:  1. The left ventricular systolic function is normal, with a visually estimated ejection fraction of 55-60%.  2. Spectral Doppler shows a Grade II (pseudonormal pattern) of left ventricular diastolic filling with an elevated left atrial pressure.  3. There is normal right ventricular global systolic function.  4. The left atrium is moderate to severely dilated.  5. The right atrium is mild to moderately dilated.  6. There is no evidence of cardiac tamponade.  7. Moderate to severe mitral valve regurgitation. QUANTITATIVE DATA SUMMARY:  LA VOLUME:                   Normal Ranges: LA Vol A4C:       77.3 ml LA Vol A2C:       78.6 ml LA Vol Index BSA: 45.0 ml/m2  LV SYSTOLIC FUNCTION BY 2D PLANIMETRY (MOD):                      Normal Ranges: EF-A4C View:    66 % (>=55%) EF-A2C View:    34 % EF-Biplane:     50 % EF-Visual:      58 % EF-Auto:        43 % LV EF Reported: 58 %  LV DIASTOLIC FUNCTION:           Normal Ranges: MV Peak E:             0.75 m/s  (0.7-1.2 m/s) MV Peak A:             1.28 m/s  (0.42-0.7 m/s) E/A Ratio:             0.58      (1.0-2.2) MV e'                  0.052 m/s (>8.0) MV lateral e'           0.06 m/s MV medial e'           0.05 m/s E/e' Ratio:            14.31     (<8.0)  MITRAL VALVE:          Normal Ranges: MV Vmax:      1.55 m/s (<=1.3m/s) MV peak P.6 mmHg (<5mmHg) MV mean P.8 mmHg (<48mmHg) MV DT:        142 msec (150-240msec)  MITRAL INSUFFICIENCY:             Normal Ranges: MR VTI:               131.38 cm MR Vmax:              477.92 cm/s  AORTIC VALVE:                     Normal Ranges: AoV Vmax:                1.24 m/s (<=1.7m/s) AoV Peak P.1 mmHg (<20mmHg) AoV Mean PG:             3.0 mmHg (1.7-11.5mmHg) LVOT Max Jamal:            0.90 m/s (<=1.1m/s) AoV VTI:                 21.86 cm (18-25cm) LVOT VTI:                15.96 cm AoV Dimensionless Index: 0.73  TRICUSPID VALVE/RVSP:          Normal Ranges: Peak TR Velocity:     3.08 m/s RV Syst Pressure:     41 mmHg  (< 30mmHg) IVC Diam:             1.66 cm  PULMONIC VALVE:          Normal Ranges: PV Max Jamal:     0.6 m/s  (0.6-0.9m/s) PV Max P.4 mmHg  [id number] Jeison Spaulding DO Electronically signed on 2025 at 1:32:10 PM  ** Final **     NM Lung perfusion with spect/ct    Result Date: 2025  Interpreted By:  Rey Medina, STUDY: NM LUNG PERFUSION WITH SPECT/CT;  2025 9:40 am   INDICATION: Signs/Symptoms:Rule out PE, patient off anticoagulation for surgical procedure tomorrow.  Desaturating with tachycardia and tachypnea..   COMPARISON: Chest x-ray from 2025   ACCESSION NUMBER(S): [id number]   ORDERING CLINICIAN: DAFNE IRBY   TECHNIQUE: DIVISION OF NUCLEAR MEDICINE PERFUSION LUNG SCANS   Multiple perfusion images of the lungs were acquired after the intravenous administration of 4.0 mCi of Tc-99m macroaggregated albumin (MAA). In addition, SPECT/CT of the chest was performed.   FINDINGS: Planar perfusion images of both lungs demonstrate mild heterogeneity throughout the lung fields bilaterally. There are wedge-shaped subsegmental perfusion defect in the right upper lobe inferiorly and segmental  perfusion defect in the superior left lower lobe seen on SPECT/CT, suggestive of high probability of acute pulmonary embolism.       1. Wedge-shaped subsegmental perfusion defect in the right upper lobe inferiorly and segmental perfusion defect in the superior left lower lobe seen on SPECT/CT, suggestive of high probability of acute pulmonary embolism.   The interpretation above is based on modified PIOPED II and PISAPED criteria.   This study was analyzed and interpreted at [place], [place].   MACRO: Rey Medina discussed the significance and urgency of this critical finding through [facility] with Esther Santoyo  on 1/11/2025 at 10:28 am.  (**-RCF-**) Findings:  See findings.       Signed by: Rey Medina 1/11/2025 10:47 AM Dictation workstation:   [id number]    XR chest 1 view    Result Date: 1/11/2025  Interpreted By:  Augustin Mccarthy, STUDY: XR CHEST 1 VIEW;  1/11/2025 3:14 am   INDICATION: Signs/Symptoms:SOB, desaturating.     COMPARISON: 01/10/2025.   ACCESSION NUMBER(S): [id number]   ORDERING CLINICIAN: DAFNE IRBY   FINDINGS: Semi-erect AP view of the chest.   Leads overlie the chest, partially obscuring the field-of-view.   Thyroidectomy changes redemonstrated.   CARDIOMEDIASTINAL SILHOUETTE: Cardiac silhouette is enlarged, unchanged. Tortuous and atherosclerotic thoracic aorta.   LUNGS: Lungs are hyperinflated and hyperlucent, compatible with emphysema/COPD. Pulmonary vascular congestion suggests volume overload, with prominent interstitial markings and Kerley B-lines suggesting acute pulmonary interstitial edema/CHF. No focal consolidation. No sizable pleural effusion. No pneumothorax.   ABDOMEN: No remarkable upper abdominal findings.   BONES: No acute osseous changes.       1.   Lungs are hyperinflated and hyperlucent, compatible with emphysema/COPD. Pulmonary vascular congestion suggests volume overload, with prominent interstitial markings and Kerley B-lines suggesting acute  pulmonary interstitial edema/CHF. No focal consolidation. No sizable pleural effusion. No pneumothorax. 2. Similar cardiomegaly.       MACRO: None   Signed by: Augustin Mccarthy 1/11/2025 3:31 AM Dictation workstation:   [id number]    XR chest 1 view    Result Date: 1/10/2025  Interpreted By:  Shirley Vance, STUDY: XR CHEST 1 VIEW 1/10/2025 11:09 am   INDICATION: Signs/Symptoms:PreOp   COMPARISON: 10/20/2024   ACCESSION NUMBER(S): [id number]   ORDERING CLINICIAN: ISAURA CAMEJO   TECHNIQUE: Single AP view chest   FINDINGS: Cardiac silhouette is enlarged with moderate cardiomegaly. Aorta is tortuous. There is moderate vascular calcification of the aortic knob. Muscle surgical staples demonstrated in the lower neck. Lung fields are hyperinflated. There is a interval improvement in aeration of the right upper lung zone. Also, retrocardiac areas better seen on the current examination. There are chronic appearing interstitial changes.             1. Improved aeration in particular right upper lung zone.   Signed by: Shirley Vance 1/10/2025 1:15 PM Dictation workstation:   [id number]                    Assessment/Plan   Assessment & Plan  Closed fracture of neck of left femur, initial encounter    Mixed hyperlipidemia    Acute on chronic hypoxic respiratory failure (Multi)    History of DVT (deep vein thrombosis)    History of CHF (congestive heart failure)    Acute pulmonary embolism (Multi)    L femur fracture  Acute PE  HFpEF  CAD s/p PCI to RCA  MVR s/p ARMANI  Acute on chronic respiratory failure on 2L NC  COPD  Hx DVT  Hx CVA  Dementia     1/10: As above. 83 yo F with left femur fracture with cardiac history of CAD, HFpEF, and MVR s/p ARMANI. We were consulted for cardiac clearance for hip repair. The patient denies any chest discomfort, palpitations, SOB, lightheadedness. She appears euvolemic upon exam. Labs today with sodium 141, potassium 4.5, bicarb 36, creatinine 1.11, hemoglobin 11.5. Blood pressures have been  normotensive with most recent reading of 108/47. SpO2 95% on 3L NC. Telemetry with sinus rhythm without ectopy, rates in the 80s-90s BPM. No initial EKG or CXR has been done; will order both. Patient with known risks of CAD, CHF, and cerebrovascular disease. Using the RCRI risk calculator the patient is considered a class 3 risk, giving her a 15% risk of death, myocardial infarction or cardiac arrest 30 days following surgery. In the absence of acute coronary syndrome, significant fluid overload, or life threatening arrhythmias, I believe this patient can proceed with this needed surgery pending her EKG and CXR, understanding his baseline risks which at this time are not modifiable. We will continue to follow this patient with you. Will discuss with my collaborating physician Dr. Spaulding.     1/11: Early this morning a rapid response was called for desaturation. CXR showed some pulmonary vascular congestion. . D-dimer elevated at 7.42. A VQ scan has been ordered as well. As previously noted, she was on Xarelto for hx DVT but was taken off in November 2024. She was given 40 mg IV Lasix this AM. She is currently on HIFLOW NC SpO2 100%. Telemetry with sinus tachycardia in the 100's BPM. Labs today with sodium 143, potassium 4.3, creatinine 1.26, hemoglobin 11.4. Nephrology has been consulted for AYSE. She has a preserved EF of 60-65% on her last TTE in 8/2024. Will recheck a limited TTE to assess for any changes. Await VQ scan. Will defer further diuretics to nephrology. Will continue to follow closely with you. Will discuss with my collaborating physician Dr. Spaulding.     1/12: VQ scan yesterday shows acute PE. TTE yesterday with EF 55-60%, grade II left ventricular diastolic filling with elevated L atrial pressure, left atrium severely dilated, right atrium moderately dilated, moderate to severe MVR. BLE US negative. Heparin gtt has been started. Pulmonology now following. Ortho surgery delayed for now. Nephrology  now following for AYSE, diuretics on hold. Blood pressures are normotensive with most recent reading of 132/57. Continue Coreg. SpO2 96% on 3L NC. Telemetry with sinus rhythm without significant ectopy. Labs today with sodium 141, potassium 4.2, creatinine 1.21, hemoglobin 10.3. Will continue to follow closely with you. Will discuss with my collaborating physician Dr. Spaulding.            I spent 35 minutes in the professional and overall care of this patient.      Anna Gaspar, APRN-CNP

## 2025-01-12 NOTE — NURSING NOTE
PTT 56.3 decreased by 100 units hr  now infusing at 700 units an hr  7ml/hr   declining anything for pain at this time son and spouse at bedside call light in reach bed alarm on for safety

## 2025-01-12 NOTE — ASSESSMENT & PLAN NOTE
Echo 8/9/2024 ejection fraction of 60-65%. There are no regional wall motion abnormalities. The left ventricular cavity size is normal. There is mild concentric left ventricular hypertrophy. Spectral Doppler shows a normal pattern of left ventricular diastolic filling.   Did have vascular congestion on CXR-given one time dose of Lasix  Continue to hold torsemide  Stop IV fluids  Resume home Coreg  Cardiology consulted for cardiac clearance  Repeat echocardiogram showed an ejection fraction of 55 to 60%, moderate to severe mitral valve regurgitation

## 2025-01-12 NOTE — PROGRESS NOTES
Physical Therapy                 Therapy Communication Note    Patient Name: Anitha Welch  MRN: [id number]  Department: [address]  Room: [address] [place] [address]  Today's Date: 1/12/2025     Discipline: Physical Therapy    PT Missed Visit: Yes     Missed Visit Reason: Missed Visit Reason: Other (Comment) (Awaiting orthropedic surgery for left femur fracture;  surgery was scheduled and cancelled 1/11/25 due to new diagnosis of PE.)    Missed Time: Cancel

## 2025-01-13 ENCOUNTER — ANESTHESIA (OUTPATIENT)
Dept: OPERATING ROOM | Facility: HOSPITAL | Age: 85
End: 2025-01-13
Payer: MEDICARE

## 2025-01-13 ENCOUNTER — ANESTHESIA EVENT (OUTPATIENT)
Dept: OPERATING ROOM | Facility: HOSPITAL | Age: 85
End: 2025-01-13
Payer: MEDICARE

## 2025-01-13 ENCOUNTER — APPOINTMENT (OUTPATIENT)
Dept: RADIOLOGY | Facility: HOSPITAL | Age: 85
End: 2025-01-13
Payer: MEDICARE

## 2025-01-13 VITALS
SYSTOLIC BLOOD PRESSURE: 119 MMHG | HEIGHT: 65 IN | BODY MASS INDEX: 24.24 KG/M2 | DIASTOLIC BLOOD PRESSURE: 54 MMHG | OXYGEN SATURATION: 99 % | TEMPERATURE: 97.7 F | RESPIRATION RATE: 18 BRPM | WEIGHT: 145.5 LBS | HEART RATE: 84 BPM

## 2025-01-13 LAB
ANION GAP SERPL CALCULATED.3IONS-SCNC: 8 MMOL/L (ref 10–20)
APTT PPP: 25.5 SECONDS (ref 22–32.5)
APTT PPP: 37.9 SECONDS (ref 22–32.5)
ATRIAL RATE: 103 BPM
ATRIAL RATE: 95 BPM
ATRIAL RATE: 97 BPM
BACTERIA UR CULT: NO GROWTH
BUN SERPL-MCNC: 32 MG/DL (ref 6–23)
CALCIUM SERPL-MCNC: 8.6 MG/DL (ref 8.6–10.3)
CHLORIDE SERPL-SCNC: 99 MMOL/L (ref 98–107)
CO2 SERPL-SCNC: 36 MMOL/L (ref 21–32)
CREAT SERPL-MCNC: 1.25 MG/DL (ref 0.5–1.05)
EGFRCR SERPLBLD CKD-EPI 2021: 42 ML/MIN/1.73M*2
ERYTHROCYTE [DISTWIDTH] IN BLOOD BY AUTOMATED COUNT: 16.9 % (ref 11.5–14.5)
ERYTHROCYTE [DISTWIDTH] IN BLOOD BY AUTOMATED COUNT: 16.9 % (ref 11.5–14.5)
GLUCOSE SERPL-MCNC: 114 MG/DL (ref 74–99)
HCT VFR BLD AUTO: 30.1 % (ref 36–46)
HCT VFR BLD AUTO: 32.5 % (ref 36–46)
HGB BLD-MCNC: 10.1 G/DL (ref 12–16)
HGB BLD-MCNC: 9.6 G/DL (ref 12–16)
MCH RBC QN AUTO: 28.2 PG (ref 26–34)
MCH RBC QN AUTO: 28.6 PG (ref 26–34)
MCHC RBC AUTO-ENTMCNC: 31.1 G/DL (ref 32–36)
MCHC RBC AUTO-ENTMCNC: 31.9 G/DL (ref 32–36)
MCV RBC AUTO: 88 FL (ref 80–100)
MCV RBC AUTO: 92 FL (ref 80–100)
NRBC BLD-RTO: 0 /100 WBCS (ref 0–0)
NRBC BLD-RTO: 0 /100 WBCS (ref 0–0)
P AXIS: 54 DEGREES
P AXIS: 58 DEGREES
P AXIS: 59 DEGREES
P OFFSET: 193 MS
P OFFSET: 193 MS
P OFFSET: 194 MS
P ONSET: 126 MS
P ONSET: 131 MS
P ONSET: 132 MS
PLATELET # BLD AUTO: 104 X10*3/UL (ref 150–450)
PLATELET # BLD AUTO: 105 X10*3/UL (ref 150–450)
POTASSIUM SERPL-SCNC: 4.2 MMOL/L (ref 3.5–5.3)
PR INTERVAL: 150 MS
PR INTERVAL: 156 MS
PR INTERVAL: 170 MS
Q ONSET: 207 MS
Q ONSET: 209 MS
Q ONSET: 211 MS
QRS COUNT: 15 BEATS
QRS COUNT: 16 BEATS
QRS COUNT: 17 BEATS
QRS DURATION: 102 MS
QRS DURATION: 102 MS
QRS DURATION: 104 MS
QT INTERVAL: 338 MS
QT INTERVAL: 356 MS
QT INTERVAL: 364 MS
QTC CALCULATION(BAZETT): 424 MS
QTC CALCULATION(BAZETT): 462 MS
QTC CALCULATION(BAZETT): 466 MS
QTC FREDERICIA: 393 MS
QTC FREDERICIA: 426 MS
QTC FREDERICIA: 427 MS
R AXIS: -47 DEGREES
R AXIS: -54 DEGREES
R AXIS: -56 DEGREES
RBC # BLD AUTO: 3.41 X10*6/UL (ref 4–5.2)
RBC # BLD AUTO: 3.53 X10*6/UL (ref 4–5.2)
SODIUM SERPL-SCNC: 139 MMOL/L (ref 136–145)
T AXIS: 30 DEGREES
T AXIS: 32 DEGREES
T AXIS: 50 DEGREES
T OFFSET: 380 MS
T OFFSET: 385 MS
T OFFSET: 391 MS
VENTRICULAR RATE: 103 BPM
VENTRICULAR RATE: 95 BPM
VENTRICULAR RATE: 97 BPM
WBC # BLD AUTO: 4.9 X10*3/UL (ref 4.4–11.3)
WBC # BLD AUTO: 5.3 X10*3/UL (ref 4.4–11.3)

## 2025-01-13 PROCEDURE — 7100000001 HC RECOVERY ROOM TIME - INITIAL BASE CHARGE: Performed by: ORTHOPAEDIC SURGERY

## 2025-01-13 PROCEDURE — A6213 FOAM DRG >16<=48 SQ IN W/BDR: HCPCS | Performed by: ORTHOPAEDIC SURGERY

## 2025-01-13 PROCEDURE — 72170 X-RAY EXAM OF PELVIS: CPT | Performed by: RADIOLOGY

## 2025-01-13 PROCEDURE — 99232 SBSQ HOSP IP/OBS MODERATE 35: CPT

## 2025-01-13 PROCEDURE — 2500000001 HC RX 250 WO HCPCS SELF ADMINISTERED DRUGS (ALT 637 FOR MEDICARE OP): Performed by: ORTHOPAEDIC SURGERY

## 2025-01-13 PROCEDURE — 2780000003 HC OR 278 NO HCPCS: Performed by: ORTHOPAEDIC SURGERY

## 2025-01-13 PROCEDURE — 99232 SBSQ HOSP IP/OBS MODERATE 35: CPT | Performed by: NURSE PRACTITIONER

## 2025-01-13 PROCEDURE — 80048 BASIC METABOLIC PNL TOTAL CA: CPT | Performed by: NURSE PRACTITIONER

## 2025-01-13 PROCEDURE — 2500000001 HC RX 250 WO HCPCS SELF ADMINISTERED DRUGS (ALT 637 FOR MEDICARE OP): Performed by: STUDENT IN AN ORGANIZED HEALTH CARE EDUCATION/TRAINING PROGRAM

## 2025-01-13 PROCEDURE — 2720000007 HC OR 272 NO HCPCS: Performed by: ORTHOPAEDIC SURGERY

## 2025-01-13 PROCEDURE — 85027 COMPLETE CBC AUTOMATED: CPT | Performed by: NURSE PRACTITIONER

## 2025-01-13 PROCEDURE — 1200000002 HC GENERAL ROOM WITH TELEMETRY DAILY

## 2025-01-13 PROCEDURE — 27236 TREAT THIGH FRACTURE: CPT | Performed by: PHYSICIAN ASSISTANT

## 2025-01-13 PROCEDURE — 3700000001 HC GENERAL ANESTHESIA TIME - INITIAL BASE CHARGE: Performed by: ORTHOPAEDIC SURGERY

## 2025-01-13 PROCEDURE — C1776 JOINT DEVICE (IMPLANTABLE): HCPCS | Performed by: ORTHOPAEDIC SURGERY

## 2025-01-13 PROCEDURE — 85730 THROMBOPLASTIN TIME PARTIAL: CPT | Performed by: ORTHOPAEDIC SURGERY

## 2025-01-13 PROCEDURE — 2500000005 HC RX 250 GENERAL PHARMACY W/O HCPCS: Performed by: STUDENT IN AN ORGANIZED HEALTH CARE EDUCATION/TRAINING PROGRAM

## 2025-01-13 PROCEDURE — 2500000004 HC RX 250 GENERAL PHARMACY W/ HCPCS (ALT 636 FOR OP/ED): Mod: JZ | Performed by: ORTHOPAEDIC SURGERY

## 2025-01-13 PROCEDURE — 2500000004 HC RX 250 GENERAL PHARMACY W/ HCPCS (ALT 636 FOR OP/ED): Performed by: NURSE ANESTHETIST, CERTIFIED REGISTERED

## 2025-01-13 PROCEDURE — 85730 THROMBOPLASTIN TIME PARTIAL: CPT | Performed by: INTERNAL MEDICINE

## 2025-01-13 PROCEDURE — 36415 COLL VENOUS BLD VENIPUNCTURE: CPT | Performed by: NURSE PRACTITIONER

## 2025-01-13 PROCEDURE — 99100 ANES PT EXTEME AGE<1 YR&>70: CPT | Performed by: STUDENT IN AN ORGANIZED HEALTH CARE EDUCATION/TRAINING PROGRAM

## 2025-01-13 PROCEDURE — 94640 AIRWAY INHALATION TREATMENT: CPT

## 2025-01-13 PROCEDURE — 0SRS0JZ REPLACEMENT OF LEFT HIP JOINT, FEMORAL SURFACE WITH SYNTHETIC SUBSTITUTE, OPEN APPROACH: ICD-10-PCS | Performed by: ORTHOPAEDIC SURGERY

## 2025-01-13 PROCEDURE — 2500000005 HC RX 250 GENERAL PHARMACY W/O HCPCS: Performed by: NURSE ANESTHETIST, CERTIFIED REGISTERED

## 2025-01-13 PROCEDURE — 3600000009 HC OR TIME - EACH INCREMENTAL 1 MINUTE - PROCEDURE LEVEL FOUR: Performed by: ORTHOPAEDIC SURGERY

## 2025-01-13 PROCEDURE — 2500000001 HC RX 250 WO HCPCS SELF ADMINISTERED DRUGS (ALT 637 FOR MEDICARE OP): Performed by: NURSE PRACTITIONER

## 2025-01-13 PROCEDURE — A27236 PR FEMORAL FX, OPEN TX: Performed by: NURSE ANESTHETIST, CERTIFIED REGISTERED

## 2025-01-13 PROCEDURE — A27236 PR FEMORAL FX, OPEN TX: Performed by: STUDENT IN AN ORGANIZED HEALTH CARE EDUCATION/TRAINING PROGRAM

## 2025-01-13 PROCEDURE — A4649 SURGICAL SUPPLIES: HCPCS | Performed by: ORTHOPAEDIC SURGERY

## 2025-01-13 PROCEDURE — 72170 X-RAY EXAM OF PELVIS: CPT

## 2025-01-13 PROCEDURE — 2500000004 HC RX 250 GENERAL PHARMACY W/ HCPCS (ALT 636 FOR OP/ED): Mod: JZ

## 2025-01-13 PROCEDURE — 94664 DEMO&/EVAL PT USE INHALER: CPT

## 2025-01-13 PROCEDURE — 3700000002 HC GENERAL ANESTHESIA TIME - EACH INCREMENTAL 1 MINUTE: Performed by: ORTHOPAEDIC SURGERY

## 2025-01-13 PROCEDURE — 3600000004 HC OR TIME - INITIAL BASE CHARGE - PROCEDURE LEVEL FOUR: Performed by: ORTHOPAEDIC SURGERY

## 2025-01-13 PROCEDURE — 7100000002 HC RECOVERY ROOM TIME - EACH INCREMENTAL 1 MINUTE: Performed by: ORTHOPAEDIC SURGERY

## 2025-01-13 DEVICE — 127 DEGREE NECK ANGLE HIP STEM
Type: IMPLANTABLE DEVICE | Site: HIP | Status: FUNCTIONAL
Brand: ACCOLADE

## 2025-01-13 DEVICE — BIPOLAR COMPONENT
Type: IMPLANTABLE DEVICE | Site: HIP | Status: FUNCTIONAL
Brand: UHR

## 2025-01-13 DEVICE — V40 FEMORAL HEAD
Type: IMPLANTABLE DEVICE | Site: HIP | Status: FUNCTIONAL
Brand: V40 HEAD

## 2025-01-13 RX ORDER — ALBUTEROL SULFATE 0.83 MG/ML
2.5 SOLUTION RESPIRATORY (INHALATION) EVERY 30 MIN PRN
Status: DISCONTINUED | OUTPATIENT
Start: 2025-01-13 | End: 2025-01-13 | Stop reason: HOSPADM

## 2025-01-13 RX ORDER — CLINDAMYCIN PHOSPHATE 900 MG/50ML
900 INJECTION, SOLUTION INTRAVENOUS ONCE
Status: DISCONTINUED | OUTPATIENT
Start: 2025-01-13 | End: 2025-01-13 | Stop reason: HOSPADM

## 2025-01-13 RX ORDER — SODIUM CHLORIDE, SODIUM LACTATE, POTASSIUM CHLORIDE, CALCIUM CHLORIDE 600; 310; 30; 20 MG/100ML; MG/100ML; MG/100ML; MG/100ML
INJECTION, SOLUTION INTRAVENOUS CONTINUOUS PRN
Status: DISCONTINUED | OUTPATIENT
Start: 2025-01-13 | End: 2025-01-13

## 2025-01-13 RX ORDER — HYDROMORPHONE HYDROCHLORIDE 0.2 MG/ML
0.2 INJECTION INTRAMUSCULAR; INTRAVENOUS; SUBCUTANEOUS EVERY 5 MIN PRN
Status: DISCONTINUED | OUTPATIENT
Start: 2025-01-13 | End: 2025-01-13 | Stop reason: HOSPADM

## 2025-01-13 RX ORDER — VANCOMYCIN 1 G/200ML
1 INJECTION, SOLUTION INTRAVENOUS ONCE
Status: COMPLETED | OUTPATIENT
Start: 2025-01-13 | End: 2025-01-13

## 2025-01-13 RX ORDER — PHENYLEPHRINE HCL IN 0.9% NACL 0.4MG/10ML
SYRINGE (ML) INTRAVENOUS AS NEEDED
Status: DISCONTINUED | OUTPATIENT
Start: 2025-01-13 | End: 2025-01-13

## 2025-01-13 RX ORDER — FENTANYL CITRATE 50 UG/ML
INJECTION, SOLUTION INTRAMUSCULAR; INTRAVENOUS AS NEEDED
Status: DISCONTINUED | OUTPATIENT
Start: 2025-01-13 | End: 2025-01-13

## 2025-01-13 RX ORDER — LABETALOL HYDROCHLORIDE 5 MG/ML
5 INJECTION, SOLUTION INTRAVENOUS EVERY 5 MIN PRN
Status: DISCONTINUED | OUTPATIENT
Start: 2025-01-13 | End: 2025-01-13 | Stop reason: HOSPADM

## 2025-01-13 RX ORDER — CLINDAMYCIN PHOSPHATE 600 MG/50ML
INJECTION, SOLUTION INTRAVENOUS AS NEEDED
Status: DISCONTINUED | OUTPATIENT
Start: 2025-01-13 | End: 2025-01-13

## 2025-01-13 RX ORDER — FENTANYL CITRATE 50 UG/ML
50 INJECTION, SOLUTION INTRAMUSCULAR; INTRAVENOUS EVERY 5 MIN PRN
Status: DISCONTINUED | OUTPATIENT
Start: 2025-01-13 | End: 2025-01-13 | Stop reason: HOSPADM

## 2025-01-13 RX ORDER — LIDOCAINE HYDROCHLORIDE 20 MG/ML
INJECTION, SOLUTION INFILTRATION; PERINEURAL AS NEEDED
Status: DISCONTINUED | OUTPATIENT
Start: 2025-01-13 | End: 2025-01-13

## 2025-01-13 RX ORDER — ROCURONIUM BROMIDE 10 MG/ML
INJECTION, SOLUTION INTRAVENOUS AS NEEDED
Status: DISCONTINUED | OUTPATIENT
Start: 2025-01-13 | End: 2025-01-13

## 2025-01-13 RX ORDER — PROPOFOL 10 MG/ML
INJECTION, EMULSION INTRAVENOUS AS NEEDED
Status: DISCONTINUED | OUTPATIENT
Start: 2025-01-13 | End: 2025-01-13

## 2025-01-13 RX ORDER — ONDANSETRON HYDROCHLORIDE 2 MG/ML
INJECTION, SOLUTION INTRAVENOUS AS NEEDED
Status: DISCONTINUED | OUTPATIENT
Start: 2025-01-13 | End: 2025-01-13

## 2025-01-13 RX ORDER — VANCOMYCIN HYDROCHLORIDE 1 G/20ML
INJECTION, POWDER, LYOPHILIZED, FOR SOLUTION INTRAVENOUS AS NEEDED
Status: DISCONTINUED | OUTPATIENT
Start: 2025-01-13 | End: 2025-01-13

## 2025-01-13 RX ORDER — ONDANSETRON HYDROCHLORIDE 2 MG/ML
4 INJECTION, SOLUTION INTRAVENOUS ONCE AS NEEDED
Status: DISCONTINUED | OUTPATIENT
Start: 2025-01-13 | End: 2025-01-13 | Stop reason: HOSPADM

## 2025-01-13 RX ORDER — IPRATROPIUM BROMIDE 0.5 MG/2.5ML
500 SOLUTION RESPIRATORY (INHALATION) EVERY 30 MIN PRN
Status: DISCONTINUED | OUTPATIENT
Start: 2025-01-13 | End: 2025-01-13 | Stop reason: HOSPADM

## 2025-01-13 RX ORDER — SODIUM CHLORIDE, SODIUM LACTATE, POTASSIUM CHLORIDE, CALCIUM CHLORIDE 600; 310; 30; 20 MG/100ML; MG/100ML; MG/100ML; MG/100ML
40 INJECTION, SOLUTION INTRAVENOUS CONTINUOUS
Status: DISCONTINUED | OUTPATIENT
Start: 2025-01-13 | End: 2025-01-13 | Stop reason: HOSPADM

## 2025-01-13 RX ORDER — CLINDAMYCIN PHOSPHATE 900 MG/50ML
900 INJECTION, SOLUTION INTRAVENOUS EVERY 8 HOURS
Status: COMPLETED | OUTPATIENT
Start: 2025-01-13 | End: 2025-01-14

## 2025-01-13 RX ORDER — DEXAMETHASONE SODIUM PHOSPHATE 10 MG/ML
INJECTION INTRAMUSCULAR; INTRAVENOUS AS NEEDED
Status: DISCONTINUED | OUTPATIENT
Start: 2025-01-13 | End: 2025-01-13

## 2025-01-13 RX ORDER — NORETHINDRONE AND ETHINYL ESTRADIOL 0.5-0.035
KIT ORAL AS NEEDED
Status: DISCONTINUED | OUTPATIENT
Start: 2025-01-13 | End: 2025-01-13

## 2025-01-13 RX ADMIN — GABAPENTIN 100 MG: 100 CAPSULE ORAL at 21:03

## 2025-01-13 RX ADMIN — EPHEDRINE SULFATE 5 MG: 50 INJECTION, SOLUTION INTRAVENOUS at 15:31

## 2025-01-13 RX ADMIN — Medication 3 L/MIN: at 09:02

## 2025-01-13 RX ADMIN — PROPOFOL 90 MG: 10 INJECTION, EMULSION INTRAVENOUS at 15:16

## 2025-01-13 RX ADMIN — CARVEDILOL 3.12 MG: 3.12 TABLET, FILM COATED ORAL at 21:03

## 2025-01-13 RX ADMIN — CLINDAMYCIN PHOSPHATE 900 MG: 900 INJECTION, SOLUTION INTRAVENOUS at 21:31

## 2025-01-13 RX ADMIN — FLUTICASONE FUROATE AND VILANTEROL TRIFENATATE 1 PUFF: 100; 25 POWDER RESPIRATORY (INHALATION) at 09:04

## 2025-01-13 RX ADMIN — PANTOPRAZOLE SODIUM 40 MG: 40 TABLET, DELAYED RELEASE ORAL at 21:03

## 2025-01-13 RX ADMIN — Medication 100 MCG: at 15:42

## 2025-01-13 RX ADMIN — CARVEDILOL 3.12 MG: 3.12 TABLET, FILM COATED ORAL at 09:46

## 2025-01-13 RX ADMIN — Medication 2 L/MIN: at 09:04

## 2025-01-13 RX ADMIN — SODIUM CHLORIDE, POTASSIUM CHLORIDE, SODIUM LACTATE AND CALCIUM CHLORIDE: 600; 310; 30; 20 INJECTION, SOLUTION INTRAVENOUS at 15:10

## 2025-01-13 RX ADMIN — Medication 200 MCG: at 15:45

## 2025-01-13 RX ADMIN — Medication 200 MCG: at 15:26

## 2025-01-13 RX ADMIN — VANCOMYCIN 1 G: 1 INJECTION, SOLUTION INTRAVENOUS at 13:58

## 2025-01-13 RX ADMIN — EPHEDRINE SULFATE 5 MG: 50 INJECTION, SOLUTION INTRAVENOUS at 16:12

## 2025-01-13 RX ADMIN — Medication 300 MCG: at 15:31

## 2025-01-13 RX ADMIN — EPHEDRINE SULFATE 10 MG: 50 INJECTION, SOLUTION INTRAVENOUS at 15:43

## 2025-01-13 RX ADMIN — PROPOFOL 20 MG: 10 INJECTION, EMULSION INTRAVENOUS at 15:17

## 2025-01-13 RX ADMIN — SUGAMMADEX 200 MG: 100 INJECTION, SOLUTION INTRAVENOUS at 16:53

## 2025-01-13 RX ADMIN — FENTANYL CITRATE 50 MCG: 0.05 INJECTION, SOLUTION INTRAMUSCULAR; INTRAVENOUS at 15:16

## 2025-01-13 RX ADMIN — TIOTROPIUM BROMIDE INHALATION SPRAY 2 PUFF: 3.12 SPRAY, METERED RESPIRATORY (INHALATION) at 09:02

## 2025-01-13 RX ADMIN — CLINDAMYCIN IN 5 PERCENT DEXTROSE 900 MG: 12 INJECTION, SOLUTION INTRAVENOUS at 15:12

## 2025-01-13 RX ADMIN — Medication 200 MCG: at 16:24

## 2025-01-13 RX ADMIN — VANCOMYCIN HYDROCHLORIDE 1 G: 1 INJECTION, POWDER, LYOPHILIZED, FOR SOLUTION INTRAVENOUS at 15:26

## 2025-01-13 RX ADMIN — ONDANSETRON HYDROCHLORIDE 4 MG: 2 INJECTION INTRAMUSCULAR; INTRAVENOUS at 16:17

## 2025-01-13 RX ADMIN — DULOXETINE HYDROCHLORIDE 20 MG: 20 CAPSULE, DELAYED RELEASE ORAL at 21:03

## 2025-01-13 RX ADMIN — Medication 300 MCG: at 15:29

## 2025-01-13 RX ADMIN — ACETAMINOPHEN 650 MG: 325 TABLET ORAL at 11:02

## 2025-01-13 RX ADMIN — DEXAMETHASONE SODIUM PHOSPHATE 10 MG: 10 INJECTION INTRAMUSCULAR; INTRAVENOUS at 15:33

## 2025-01-13 RX ADMIN — ROCURONIUM BROMIDE 50 MG: 10 INJECTION, SOLUTION INTRAVENOUS at 15:16

## 2025-01-13 RX ADMIN — PANTOPRAZOLE SODIUM 40 MG: 40 TABLET, DELAYED RELEASE ORAL at 09:46

## 2025-01-13 RX ADMIN — ATORVASTATIN CALCIUM 40 MG: 40 TABLET, FILM COATED ORAL at 21:03

## 2025-01-13 SDOH — HEALTH STABILITY: MENTAL HEALTH: CURRENT SMOKER: 0

## 2025-01-13 ASSESSMENT — PAIN SCALES - GENERAL
PAINLEVEL_OUTOF10: 0 - NO PAIN

## 2025-01-13 ASSESSMENT — PAIN - FUNCTIONAL ASSESSMENT
PAIN_FUNCTIONAL_ASSESSMENT: 0-10
PAIN_FUNCTIONAL_ASSESSMENT: 0-10
PAIN_FUNCTIONAL_ASSESSMENT: CPOT (CRITICAL CARE PAIN OBSERVATION TOOL)
PAIN_FUNCTIONAL_ASSESSMENT: 0-10
PAIN_FUNCTIONAL_ASSESSMENT: CPOT (CRITICAL CARE PAIN OBSERVATION TOOL)
PAIN_FUNCTIONAL_ASSESSMENT: 0-10
PAIN_FUNCTIONAL_ASSESSMENT: CPOT (CRITICAL CARE PAIN OBSERVATION TOOL)

## 2025-01-13 ASSESSMENT — COGNITIVE AND FUNCTIONAL STATUS - GENERAL
HELP NEEDED FOR BATHING: TOTAL
DRESSING REGULAR LOWER BODY CLOTHING: TOTAL
TOILETING: TOTAL
DRESSING REGULAR UPPER BODY CLOTHING: TOTAL
MOVING FROM LYING ON BACK TO SITTING ON SIDE OF FLAT BED WITH BEDRAILS: A LITTLE
STANDING UP FROM CHAIR USING ARMS: TOTAL
PERSONAL GROOMING: A LOT
EATING MEALS: A LOT
TURNING FROM BACK TO SIDE WHILE IN FLAT BAD: A LOT
CLIMB 3 TO 5 STEPS WITH RAILING: TOTAL
MOVING TO AND FROM BED TO CHAIR: TOTAL
MOBILITY SCORE: 9
DAILY ACTIVITIY SCORE: 8
WALKING IN HOSPITAL ROOM: TOTAL

## 2025-01-13 ASSESSMENT — PAIN SCALES - PAIN ASSESSMENT IN ADVANCED DEMENTIA (PAINAD)
TOTALSCORE: 0
FACIALEXPRESSION: SMILING OR INEXPRESSIVE
BREATHING: NORMAL
BODYLANGUAGE: RELAXED
CONSOLABILITY: NO NEED TO CONSOLE

## 2025-01-13 NOTE — PERIOPERATIVE NURSING NOTE
Patient received to Pacu Nodaway # 6 from OR. Anesthesia at bedside. Report received. Initial assessment complete. VSS on Cardiac Monitor. Patient appears resting comfortably at present. No Signs or Symptoms of Resp. Distress or Pain Noted.

## 2025-01-13 NOTE — PROGRESS NOTES
Physical Therapy                 Therapy Communication Note    Patient Name: Anitha Welch  MRN: [id number]  Department: [address]  Room: [facility]-  Today's Date: 1/13/2025     Discipline: Physical Therapy    PT Missed Visit: Yes     Missed Visit Reason: Missed Visit Reason: Other (Comment) (Patient scheduled for repair of left femur fracture this date;  pending post-op orders.)    Missed Time: Cancel

## 2025-01-13 NOTE — PROGRESS NOTES
Anitha Welch is a 84 y.o. female on day 3 of admission presenting with Closed fracture of neck of left femur, initial encounter.      Subjective   Awaiting left hip frx repair.        Objective     Last Recorded Vitals  /57   Pulse 86   Temp 36.7 °C (98.1 °F) (Temporal)   Resp 20   Wt 66 kg (145 lb 8.1 oz)   SpO2 97%   Intake/Output last 3 Shifts:    Intake/Output Summary (Last 24 hours) at 1/13/2025 1432  Last data filed at 1/13/2025 1358  Gross per 24 hour   Intake 822 ml   Output 625 ml   Net 197 ml       Admission Weight  Weight: 59 kg (130 lb) (01/09/25 2357)    Daily Weight  01/11/25 : 66 kg (145 lb 8.1 oz)    Image Results  Electrocardiogram, 12-lead PRN ACS symptoms  Sinus tachycardia  Possible Left atrial enlargement  Left anterior fascicular block  Abnormal ECG  When compared with ECG of 10-ALVERTO-2025 17:09, (unconfirmed)  No significant change was found  Confirmed by Jeison Spaulding ([id number]) on 1/13/2025 11:43:22 AM  ECG 12 lead  Normal sinus rhythm  Possible Left atrial enlargement  Incomplete right bundle branch block  Left anterior fascicular block  Abnormal ECG  When compared with ECG of 10-ALVERTO-2025 17:08, (unconfirmed)  No significant change was found  Confirmed by Jeison Spaulding ([id number]) on 1/13/2025 11:42:10 AM  ECG 12 Lead  Normal sinus rhythm  Poor data quality  Possible Left atrial enlargement  Left axis deviation  Abnormal ECG  When compared with ECG of 15-OCT-2024 14:45,  Premature ventricular complexes are no longer Present  Premature atrial complexes are no longer Present  QT has shortened  Confirmed by Jeison Spaulding ([id number]) on 1/13/2025 11:37:56 AM      Physical Exam  Vitals and nursing note reviewed.   Constitutional:       Appearance: Normal appearance.   HENT:      Head: Normocephalic and atraumatic.      Mouth/Throat:      Mouth: Mucous membranes are moist.   Eyes:      Extraocular Movements: Extraocular movements intact.   Cardiovascular:      Rate and Rhythm: Normal rate.      Pulses:  Normal pulses.      Heart sounds: Normal heart sounds.   Pulmonary:      Effort: Pulmonary effort is normal.      Breath sounds: Normal breath sounds.   Abdominal:      General: Bowel sounds are normal.      Palpations: Abdomen is soft.   Musculoskeletal:         General: Normal range of motion.      Cervical back: Normal range of motion and neck supple.      Comments: ROM LLE limited due to pain    Skin:     General: Skin is warm and dry.      Capillary Refill: Capillary refill takes less than 2 seconds.   Neurological:      General: No focal deficit present.      Mental Status: She is alert.   Psychiatric:         Mood and Affect: Mood normal.         Behavior: Behavior normal.         Relevant Results               Assessment/Plan     Assessment & Plan  Closed fracture of neck of left femur, initial encounter  X-ray left hip with acute basicervical left femoral neck fracture with varus alignment.  Pain management  Bowel regimen  Encourage incentive spirometer  Orthopedic surgery consulted  PT/OT  OR cancelled 1/11/2025 due to worsening respiratory failure, positive PE  Pulmonary, cardiology, orthopedic surgery following, appreciate recommendations  Anticipate possible surgery tomorrow.  N.p.o. after midnight.  Heparin drip will need to be held 4 hours prior to surgery and resume to soon as possible after surgery.  Discussed with pulmonary and orthopedic surgery yesterday.    Acute pulmonary embolism (Multi)  Had increased oxygen demand, improving  Elevated D Dimer-VQ high probability PE  Has a history of DVT however Xarelto was stopped due to anemia/thrombocytopenia, has been off for some time  Started on Heparin gtt, will need oral anticoagulation on discharge  Pulmonary consulted, appreciate recs  Ultrasound of lower extremities as above  Acute on chronic hypoxic respiratory failure (Multi)  On 3 L oxygen via nasal cannula, 2 L is baseline  Monitor pulse ox, wean oxygen as tolerated  Resume home inhalers  As  needed DuoNebs    History of DVT (deep vein thrombosis)  History of DVT previously on Xarelto which was held on last hospital admission due to anemia and thrombocytopenia.  Patient was to follow with PCP to resume but has not to date  -found to have high prob for PE on VQ-started on heparin gtt, resume after surgery today, will need to be discharged on anticoagulation    History of CHF (congestive heart failure)  Echo 8/9/2024 ejection fraction of 60-65%. There are no regional wall motion abnormalities. The left ventricular cavity size is normal. There is mild concentric left ventricular hypertrophy. Spectral Doppler shows a normal pattern of left ventricular diastolic filling.   Did have vascular congestion on CXR-given one time dose of Lasix  Continue to hold torsemide  Stop IV fluids  Resume home Coreg  Cardiology consulted for cardiac clearance  Repeat echocardiogram showed an ejection fraction of 55 to 60%, moderate to severe mitral valve regurgitation    Mixed hyperlipidemia  Continue atorvastatin 40 mg    Thrombocytopenia  Will need to monitor close given currently on Heparin gtt                      Yanet Mcclelland, APRN-CNP

## 2025-01-13 NOTE — PROGRESS NOTES
"Anitha Welch is a 84 y.o. female on day 3 of admission presenting with Closed fracture of neck of left femur, initial encounter.    Subjective   Asked to see and assume care by Dr. Jackson.    Chart reviewed and history taken from patient's son at bedside as well as the patient herself.    Patient sustained a fall the evening of 1/9/2025.  Came to the hospital overnight unable to ambulate.  Found to have a displaced left femoral neck fracture.  History of chronic oxygen use at home.  History of previous lower extremity DVT.  Was previously on Xarelto but was stopped for anemia and bleeding concern.  Surgery was attempted on 1/10/2025 but she was noted to have respiratory distress and was found to have an acute pulmonary embolism.  She has been deemed stable for surgery.  She has been on a heparin drip which was stopped approximately 4-1/2 hours ago.    Patient concerning for pain in the left hip area.  Pain in the low back with attempted movement.  Denies current chest pain or shortness of breath.  She is on a nasal cannula.       Objective     Physical Exam  Alert and oriented x 1  Left lower extremity: Pain with attempted logroll exam.  She holds the leg in a fairly neutral position.  Compartments are soft throughout the extremity.  SCDs present.  Able to flex and extend the toes and ankle distally.    Musculoskeletal exam of the right lower extremity within normal limits tolerates passive and active range of motion.  Upper extremities within normal limits with some bruising throughout especially at her previous IV sites.    Last Recorded Vitals  Blood pressure 102/57, pulse 86, temperature 36.7 °C (98.1 °F), temperature source Temporal, resp. rate 20, height 1.651 m (5' 5\"), weight 66 kg (145 lb 8.1 oz), SpO2 97%.  Intake/Output last 3 Shifts:  I/O last 3 completed shifts:  In: 1132 (17.2 mL/kg) [P.O.:1030; I.V.:102 (1.5 mL/kg)]  Out: 300 (4.5 mL/kg) [Urine:300 (0.1 mL/kg/hr)]  Weight: 66 kg     Relevant " Results    I reviewed x-ray imaging report of the pelvis.  Displaced left femoral neck fracture.  History of prior right total hip replacement.      Scheduled medications  acetaminophen, 500 mg, oral, Once  acetaminophen, 500 mg, oral, Once  acetaminophen, 650 mg, oral, q8h  atorvastatin, 40 mg, oral, Nightly  carvedilol, 3.125 mg, oral, BID  clindamycin, 900 mg, intravenous, Once  DULoxetine, 20 mg, oral, Nightly  tiotropium, 2 puff, inhalation, Daily   And  fluticasone furoate-vilanteroL, 1 puff, inhalation, Daily  gabapentin, 100 mg, oral, Nightly  levothyroxine, 25 mcg, oral, Daily  oxygen, 2 L/min, inhalation, Continuous - Inhalation  oxygen, , inhalation, Continuous - Inhalation  pantoprazole, 40 mg, oral, BID  perflutren lipid microspheres, 0.5-10 mL of dilution, intravenous, Once in imaging  perflutren lipid microspheres, 0.5-10 mL of dilution, intravenous, Once in imaging  perflutren protein A microsphere, 0.5 mL, intravenous, Once in imaging  perflutren protein A microsphere, 0.5 mL, intravenous, Once in imaging  polyethylene glycol, 17 g, oral, Daily  povidone-iodine, , Topical, Once  povidone-iodine, , Topical, Once  sulfur hexafluoride microsphr, 2 mL, intravenous, Once in imaging  sulfur hexafluoride microsphr, 2 mL, intravenous, Once in imaging  [Held by provider] torsemide, 20 mg, oral, Daily  vancomycin, 1 g, intravenous, Once      Continuous medications  heparin, 0-4,500 Units/hr, Last Rate: Stopped (01/13/25 0943)      PRN medications  PRN medications: acetaminophen **OR** acetaminophen **OR** acetaminophen, albuterol, heparin (porcine), ipratropium-albuteroL, melatonin, morphine, nitroglycerin, ondansetron **OR** ondansetron, oxyCODONE  Results for orders placed or performed during the hospital encounter of 01/09/25 (from the past 24 hours)   Prepare RBC: 2 Units   Result Value Ref Range    PRODUCT CODE F5344I77     Unit Number [id number]     Unit ABO O     Unit RH POS     XM INTEP COMP      Dispense Status XM     Blood Expiration Date 1/24/2025 11:59:00 PM EST     PRODUCT BLOOD TYPE 5100     UNIT VOLUME 350     PRODUCT CODE H7715F74     Unit Number [id number]     Unit ABO O     Unit RH POS     XM INTEP COMP     Dispense Status XM     Blood Expiration Date 1/24/2025 11:59:00 PM EST     PRODUCT BLOOD TYPE 5100     UNIT VOLUME 350    APTT   Result Value Ref Range    aPTT 40.7 (H) 22.0 - 32.5 seconds   Type and screen   Result Value Ref Range    ABO TYPE O     Rh TYPE POS     ANTIBODY SCREEN NEG    POCT GLUCOSE   Result Value Ref Range    POCT Glucose 127 (H) 74 - 99 mg/dL   Basic Metabolic Panel   Result Value Ref Range    Glucose 114 (H) 74 - 99 mg/dL    Sodium 139 136 - 145 mmol/L    Potassium 4.2 3.5 - 5.3 mmol/L    Chloride 99 98 - 107 mmol/L    Bicarbonate 36 (H) 21 - 32 mmol/L    Anion Gap 8 (L) 10 - 20 mmol/L    Urea Nitrogen 32 (H) 6 - 23 mg/dL    Creatinine 1.25 (H) 0.50 - 1.05 mg/dL    eGFR 42 (L) >60 mL/min/1.73m*2    Calcium 8.6 8.6 - 10.3 mg/dL   CBC   Result Value Ref Range    WBC 4.9 4.4 - 11.3 x10*3/uL    nRBC 0.0 0.0 - 0.0 /100 WBCs    RBC 3.41 (L) 4.00 - 5.20 x10*6/uL    Hemoglobin 9.6 (L) 12.0 - 16.0 g/dL    Hematocrit 30.1 (L) 36.0 - 46.0 %    MCV 88 80 - 100 fL    MCH 28.2 26.0 - 34.0 pg    MCHC 31.9 (L) 32.0 - 36.0 g/dL    RDW 16.9 (H) 11.5 - 14.5 %    Platelets 104 (L) 150 - 450 x10*3/uL   APTT   Result Value Ref Range    aPTT 37.9 (H) 22.0 - 32.5 seconds   CBC   Result Value Ref Range    WBC 5.3 4.4 - 11.3 x10*3/uL    nRBC 0.0 0.0 - 0.0 /100 WBCs    RBC 3.53 (L) 4.00 - 5.20 x10*6/uL    Hemoglobin 10.1 (L) 12.0 - 16.0 g/dL    Hematocrit 32.5 (L) 36.0 - 46.0 %    MCV 92 80 - 100 fL    MCH 28.6 26.0 - 34.0 pg    MCHC 31.1 (L) 32.0 - 36.0 g/dL    RDW 16.9 (H) 11.5 - 14.5 %    Platelets 105 (L) 150 - 450 x10*3/uL     *Note: Due to a large number of results and/or encounters for the requested time period, some results have not been displayed. A complete set of results can be found  in Results Review.               This patient has a urinary catheter   Reason for the urinary catheter remaining today? perioperative use for selected surgical procedures               Assessment/Plan   Assessment & Plan  Closed fracture of neck of left femur, initial encounter    Mixed hyperlipidemia    Acute on chronic hypoxic respiratory failure (Multi)    History of DVT (deep vein thrombosis)    History of CHF (congestive heart failure)    Acute pulmonary embolism (Multi)    1.  Displaced left femoral neck fracture.  Discussed with the patient, patient's son and her power of  her  Jesús over the phone the risk benefits complications and alternatives of a left hip hemiarthroplasty for fracture.  Risks of the procedure including but not limited to infection DVT pulmonary embolism recurrent dislocation damage ligaments tendons neurovascular structures was discussed.  Despite the risk she elects proceed all questions answered no guarantees were given.  She has been medically optimized.  She is high risk given her medical comorbidities.  2.  Pulmonary emboli: Heparin drip has been stopped.  Plan to resume that.  Further anticoagulation by pulmonary or primary.  This will certainly generate more bleeding at the surgical site.  I will consider using a drain if necessary today.  She may have wound complication and hematoma issues with chronic anticoagulation postoperatively.  3.  Preoperative anemia due to chronic blood loss.  Peers stable preoperatively trending downward slightly.  2 units typed and screened for surgery here today.  May require postoperative transfusion which could also increase her perioperative prosthetic joint infection risk.             Brady Tony MD

## 2025-01-13 NOTE — NURSING NOTE
Heparin drip stopped per provider request for 4 hrs prior to procedure..  Patient also repositioned at this time

## 2025-01-13 NOTE — NURSING NOTE
aPTT came back at 37.9    Per orders:   APTT 33 to 37: INCREASE rate by 100 units/hr.     Heparin gtt is now running at 800 units/hr (8ml/hr).   Next aPTT draw will be around 1340.

## 2025-01-13 NOTE — SIGNIFICANT EVENT
NRB removed. O2 3l NC replaced for return of 100% saturation with pulse oximeter on toe. POC sustained.

## 2025-01-13 NOTE — ASSESSMENT & PLAN NOTE
History of DVT previously on Xarelto which was held on last hospital admission due to anemia and thrombocytopenia.  Patient was to follow with PCP to resume but has not to date  -found to have high prob for PE on VQ-started on heparin gtt, resume after surgery today, will need to be discharged on anticoagulation

## 2025-01-13 NOTE — DISCHARGE INSTRUCTIONS
Orthopaedic Surgery:  Brady Tony MD was your surgeon.    Dressing: remove after 1 week. Call if any drainage is coming out of the bandage.  Weight bearing: weight bear as tolerated  Abduction pillow: please wear the abduction pillow between your legs for the next 6 weeks at bedtime  Showering: may shower with assistance  Bathing: no tub bathing or pools  Driving: no driving    Call for follow-up appointment in 4 weeks. [phone]    Call with any concerns.          INTERNAL MEDICINE:  Follow up pcp 1-2 weeks  Follow up cardiology 1-2 weeks  Follow up nephrology 1-2 weeks  Follow up pulmonology 1-2 weeks    SNF: REPEAT CBC TOMORROW 1/18/25, RFP 1/24/25 send results to Dr. SKYE ANNE

## 2025-01-13 NOTE — NURSING NOTE
Bed side shift report received. Patient resting comfortably. Reports  feeling like her mouth is dry and would like something to drink. This nurse explained to patient that she is currently NPO due to her upcoming surgery. Call light within reach. Bed alarm active. Patient currently on 3L NC and has Heparin drip infusing. This nurse assumed care.

## 2025-01-13 NOTE — NURSING NOTE
Assumed care of pt around this time.  Pt is alert, lying in bed, watching tv during BSSR.  Respirations even and unlabored on 3L O2 NC.  Lopez catheter in place.  Heparin gtt running at 7ml/hr - aptt came back therapeutic around shift change - no adjustments to gtt needed at this time. (Day shift nurse stated heparin gtt needs to be stopped 4hr before surgery)  Pt is reporting a 60/10 pain level and is requesting pain medication.  Pt  is going for surgery tomorrow - will be NPO at midnight.  Will be continuing to monitor.

## 2025-01-13 NOTE — NURSING NOTE
Patient taken off of the floor for her Left hip surgery. IV antibiotics pulled from [facility] and sent with patient to OR.

## 2025-01-13 NOTE — NURSING NOTE
Patient returned to the floor. Patient reconnected to telemetry and continuous pulse ox per orders. Patient drowsy but responds to verbal stimuli. Surgical silver dressing to Left hip, dry clean and intact. Ice pack on surgical site.

## 2025-01-13 NOTE — NURSING NOTE
This RN messaged provider, Yanet Mcclelland requesting diet order.    This RN messaged provider, Dr. Foss requesting confirmation of if Heparin drip restart. Dr. Foss added, Dr. Gallardo to chat

## 2025-01-13 NOTE — SIGNIFICANT EVENT
Pt desaturating with a good wave form NC increased to 5l O2. Saturation maintained in 80's. Dr. Guzmán made aware. NRB mask at 15L O2 placed. Son and Dr. Tony at the bedside. Return of saturation

## 2025-01-13 NOTE — NURSING NOTE
Pt keeps taking off her heart monitor and all of the stickers. I have put new stickers on her 3 times so far tonight and have reminded her many times that she needs to leave the heart monitor on.

## 2025-01-13 NOTE — OP NOTE
Hip Hemiarthrotplasty  26674 - DC OPTX FEM FX PROX END NCK INT FIXJ/PROSTC RPLCMT   Operative Note     Date: 2025 - 2025  OR Location: [facility]    Name: Anitha Welch, : 1940, Age: 84 y.o., MRN: [id number], Sex: female    PRE Diagnosis  Left displaced femoral neck fracture  Acute pulmonary embolism  History of lower extremity DVT    POST Diagnosis  Same    Procedures  Open treatment of left proximal femur fracture with prosthetic replacement, also known as left hip hemiarthroplasty    Surgeons      * Brady Tony - Primary    Resident/Fellow/Other Assistant:  ariella Singh eric    The above-named assistant(s) was/were integral to all portions of the procedure including patient positioning, exposure, implantation, closure and transportation.     Procedure Summary  Implants:  [place] Accolade 2 size 6-1 27 femoral stem, size 45 mm outer diameter, 26+0 mm inner diameter bipolar femoral head    Anesthesia: GETA  ASA: III  Anesthesia Staff:   Anesthesiologist: Greyson Guzmán DO  CRNA: WON Rubin  Estimated Blood Loss: 50 mL    Specimen: No specimens collected     Staff:   Circulator: Mayda Fierro RN  Scrub Person: Matthew Calhoun         Drains and/or Catheters:   None    Tourniquet Times:                Findings: Patient had a acutely fractured displaced femoral neck at the basicervical position.  Stable implantation of the above components.  Full extension external rotation without impingement.  Hip flexion in neutral plane to 105 degrees.  At position hip flexion 45 adduction 20 I can internally rotate approximately 80 degrees prior to any instability.  There is no impingement.  This appeared to restore leg lengths.    Indications: Anitha Welch is an 84 y.o. female who is having surgery for Closed fracture of neck of left femur, initial encounter [S72.002A].   Patient presented to the hospital after mechanical fall at home.  She was found to have a displaced  femoral neck fracture.  On the day of potential treatment she developed respiratory distress and was found to have an acute pulmonary embolism.  Surgery was delayed.  I was asked to take over care for Dr. Jackson.  Patient was seen and evaluated.  She was medically optimized.  Her heparin drip was held.  I saw and evaluated the patient and discussed with her, her son and her  who is power of  the risk benefits complications and alternatives of proceeding with a hip Arnel arthroplasty for her fracture.  Risks including but not limited to infection DVT pulmonary embolism leg length discrepancy recurrent dislocation damage ligaments tendons neurovascular structures.  Despite the risk elected proceed all questions answered no guarantees were given.      Procedure Details: Medications: Vancomycin 1 g IV, clindamycin 900 mg IV, Decadron 10 mg IV    The patient was seen and evaluated in the preoperative area.  The left leg was marked as the operative extremity.  We then brought the patient back to the operating room.  Anesthesia controlled the head neck and airway.  They were transferred to the operating room table.  Anesthesia then administered a general anesthetic with endotracheal tube.  I then positioned the patient in the lateral decubitus position.  All bony prominences well padded.  I cleaned the leg with chlorhexidine and alcohol.  I then prepped with ChloraPrep and draped in the standard fashion.  An operative time-out was performed.    I marked out the trochanteric margin and a curvilinear incision in line with the Kocher Langenbach approach.  I then dissected down to the fascia with the Bovie.  I then proceeded to incise the fascia in line with the skin incision.  Bluntly split the fibers of the gluteus paul.  I palpated the posterior flap and inserted a Charnley retractor.  I then extended internally rotated the leg.  I identified the short external rotators and the adductor tendon.  I  protected the tendon with a narrow Yony.  I then proceeded to release the short external rotators from the posterior edge of the femur.  I then incised the capsule and T'd this at the level of the piriformis.  I preserved the labrum deep to this.  There is an acute fracture hematoma identified.  I then manipulated the leg and brought the fractured femoral neck into the incision with a femoral neck elevator.  I inspected the comminution.  It did not appear to extend distally.  I protected the soft tissues and then used an oscillating saw at the base of the fracture to resect the fractured femoral neck.  I used a rongeur to remove this.      I now placed a corkscrew into the femoral head.  I was able to use this to lever the head out of the acetabulum.  I sized the diameter at 45 millimeters.  I took a bipolar head trial and inserted into the acetabulum.  There was had good suction fit of the trial into the acetabulum.  I now removed the trial. I then retracted the femur anteriorly with my finger and swept the acetabulum.  The labrum was preserved.  Small fracture fragments were removed.  I then irrigated the acetabulum.    I now returned to the femur with a femoral neck elevator.  I used a box osteotome and canal finer to enter the proximal femur.  I then sequentially broached from a size 0 to a size 6. I felt the last broach had stability in the metaphysis.  I used a calcar planer.  I did a trial reduction now with the 127 degree neck shaft angle trial and +0.  I was able to reduce the femoral head.  This appeared to restore the leg lengths.  There was good range of motion and stability.  I now removed the head and neck trial.  I removed the broach.  I inspected the calcar and did not appreciate any extension of fracture.  I now irrigated the canal with Irrisept solution.  I then washed this out with normal saline.  I then selected the femoral component and impacted this the same levels my broaching.  There is no  extension of any fractures.  I then assembled the bipolar head components.  I cleaned the trunnion and impacted the head onto the clean trunnion.  I now irrigated the acetabulum a final time.  I did a final reduction.    I now turn my attention to closure.  I irrigated with Irrisept solution.  I then washed this out with normal saline.  I then did a capsular closure posteriorly with #2 FiberWire through bone drill holes for an excellent closure of the posterior soft tissue incorporating the piriformis tendon.  I then removed the Charnley and obtained hemostasis.  I closed the fascia with interrupted #2 Ethibond for a watertight closure.  I then closed the wound in a layered fashion with a running 0 Vicryl, buried interrupted 2-0 Vicryl, running 4-0 Monocryl on skin.  Dermabond and a silver impregnated dressing were applied.  No complications were encountered.  An abduction pillow was placed and the patient was awakened and taken to PACU in stable condition.  Complications:  None; patient tolerated the procedure well.    Disposition: PACU - hemodynamically stable.  Condition: stable             Attending Attestation: I performed the procedure.    Brady Tony  Phone Number: [phone]

## 2025-01-13 NOTE — ANESTHESIA PREPROCEDURE EVALUATION
"Patient: Anitha Welch    Procedure Information       Date/Time: 01/13/25 1320    Procedure: Hip Hemiarthrotplasty (Left: Hip)    Location: [facility] / [facility]    Surgeons: Brady Tony MD            Relevant Problems   Cardiac  EF 55-60%  Grade II diastolic dysfunction  Mod to severe MR    Cleared by cardiology, per note, at \"increased risk\", but pt \"euvolemic\" and there are no modifiable risk factors   (+) Abdominal aortic aneurysm (AAA) without rupture (CMS-HCC)   (+) Acute ST segment elevation myocardial infarction (Multi)   (+) Angina pectoris   (+) Arteriosclerosis of coronary artery   (+) Benign essential hypertension   (+) Chest pain   (+) Electrocardiogram abnormal   (+) Hypertension   (+) Mixed hyperlipidemia   (+) Nonrheumatic mitral valve regurgitation   (+) Peripheral vascular disease (CMS-HCC)   (+) Severe mitral valve regurgitation   (+) Stented coronary artery      Pulmonary   (+) Acute pulmonary embolism (Multi)   (+) COPD exacerbation (Multi)   (+) Chronic obstructive pulmonary disease (Multi)   (+) Pneumonia of right lower lobe due to infectious organism      Neuro   (+) Bilateral carotid artery stenosis   (+) Cerebrovascular accident (CVA) due to embolism of cerebral artery (Multi)   (+) Cerebrovascular accident (CVA) involving left cerebral hemisphere (Multi)   (+) Cerebrovascular accident (Multi)   (+) Lumbago-sciatica due to displacement of lumbar intervertebral disc   (+) Seizure (Multi)   (+) Stenosis of left carotid artery   (+) Transient ischemic attack      GI   (+) Gastroesophageal reflux disease   (+) Gastrointestinal bleed   (+) Gastrointestinal hemorrhage      Endocrine   (+) Acquired hypothyroidism   (+) Hypothyroidism (acquired)      Hematology   (+) Acute deep vein thrombosis (DVT) of proximal vein of left lower extremity (Multi)   (+) Anemia   (+) Anemia due to blood loss   (+) DVT (deep venous thrombosis) (Multi)   (+) Venous thromboembolism (VTE)      Musculoskeletal "   (+) Lumbago-sciatica due to displacement of lumbar intervertebral disc   (+) Lumbar degenerative disc disease   (+) Spinal stenosis of lumbar region with neurogenic claudication      ID   (+) Pneumonia of right lower lobe due to infectious organism       Clinical information reviewed:    Allergies   Problems              NPO Detail:  No data recorded     Physical Exam    Airway  Mallampati: II  TM distance: >3 FB  Neck ROM: full     Cardiovascular   Comments: deferred   Dental   Comments: No loose teeth   Pulmonary   Comments: Pt had brief desaturation in PACU per RN.  Placed on Non rebreather.  When I arrived patient was saturating 100%.  Removed nonrebreather, oxygen saturation remained at 100% for atleast 15 mins while I monitored.  Pt otherwise stable and assymptomatic.   Abdominal     Comments: deferred           Anesthesia Plan    History of general anesthesia?: yes  History of complications of general anesthesia?: no    ASA 3     general     The patient is not a current smoker.  Patient was not previously instructed to abstain from smoking on day of procedure.  Patient did not smoke on day of procedure.  Education provided regarding risk of obstructive sleep apnea.  intravenous induction   Postoperative administration of opioids is intended.  Anesthetic plan and risks discussed with patient.  Use of blood products discussed with patient who consented to blood products.    Plan discussed with CRNA and CAA.

## 2025-01-13 NOTE — ANESTHESIA POSTPROCEDURE EVALUATION
Patient: Anitha Welch    Procedure Summary       Date: 01/13/25 Room / Location: [facility] OR 08 / [facility]    Anesthesia Start: 1510 Anesthesia Stop: 1711    Procedure: Hip Hemiarthrotplasty (Left: Hip) Diagnosis:       Closed fracture of neck of left femur, initial encounter      (Closed fracture of neck of left femur, initial encounter [S72.002A])    Surgeons: Brady Tony MD Responsible Provider: Greyson Guzmán DO    Anesthesia Type: general ASA Status: 3            Anesthesia Type: general    Vitals Value Taken Time   /62 01/13/25 1725   Temp 36.4 °C (97.5 °F) 01/13/25 1710   Pulse 89 01/13/25 1728   Resp 15 01/13/25 1728   SpO2 100 % 01/13/25 1728   Vitals shown include unfiled device data.    Anesthesia Post Evaluation    Patient location during evaluation: bedside  Patient participation: complete - patient participated  Level of consciousness: awake and alert  Pain management: adequate  Multimodal analgesia pain management approach  Airway patency: patent  Two or more strategies used to mitigate risk of obstructive sleep apnea  Cardiovascular status: acceptable  Respiratory status: acceptable  Hydration status: acceptable  Postoperative Nausea and Vomiting: none        No notable events documented.

## 2025-01-13 NOTE — PROGRESS NOTES
Pulmonary Daily Progress Note   Subjective    Anitha Welch is a 84 y.o. year old female patient known with DVT/PE (not on AC), COPD with FEV1 41%, heart failure with preserved EF,  admitted on 1/9/2025 with a fall with acute left femoral neck fracture    Interval History:  Patient restless overnight.  3 L nasal cannula.  No complaints of cough shortness of breath chest pain. heparin on standby for surgery  Transport in room to take patient to the OR    Meds    Scheduled medications  acetaminophen, 500 mg, oral, Once  acetaminophen, 500 mg, oral, Once  acetaminophen, 650 mg, oral, q8h  atorvastatin, 40 mg, oral, Nightly  carvedilol, 3.125 mg, oral, BID  clindamycin, 900 mg, intravenous, Once  DULoxetine, 20 mg, oral, Nightly  tiotropium, 2 puff, inhalation, Daily   And  fluticasone furoate-vilanteroL, 1 puff, inhalation, Daily  gabapentin, 100 mg, oral, Nightly  levothyroxine, 25 mcg, oral, Daily  oxygen, 2 L/min, inhalation, Continuous - Inhalation  oxygen, , inhalation, Continuous - Inhalation  pantoprazole, 40 mg, oral, BID  perflutren lipid microspheres, 0.5-10 mL of dilution, intravenous, Once in imaging  perflutren lipid microspheres, 0.5-10 mL of dilution, intravenous, Once in imaging  perflutren protein A microsphere, 0.5 mL, intravenous, Once in imaging  perflutren protein A microsphere, 0.5 mL, intravenous, Once in imaging  polyethylene glycol, 17 g, oral, Daily  povidone-iodine, , Topical, Once  povidone-iodine, , Topical, Once  sulfur hexafluoride microsphr, 2 mL, intravenous, Once in imaging  sulfur hexafluoride microsphr, 2 mL, intravenous, Once in imaging  [Held by provider] torsemide, 20 mg, oral, Daily  vancomycin, 1 g, intravenous, Once      Continuous medications  heparin, 0-4,500 Units/hr, Last Rate: Stopped (01/13/25 0943)      PRN medications  PRN medications: acetaminophen **OR** acetaminophen **OR** acetaminophen, albuterol, heparin (porcine), ipratropium-albuteroL, melatonin, morphine,  "nitroglycerin, ondansetron **OR** ondansetron, oxyCODONE     Objective    Blood pressure (!) 104/45, pulse 82, temperature 36.4 °C (97.5 °F), temperature source Oral, resp. rate 16, height 1.651 m (5' 5\"), weight 66 kg (145 lb 8.1 oz), SpO2 98%.   Physical Exam   GENERAL: normal appearance. well nourished. No respiratory distress  HEAD/SINUSES: 3L NC  LUNGS: Symmetric chest. Good excursion. Equal breath sounds. no wheezing.   CARDIAC: regular rate and rhythm  EXTREMITIES: No edema, no varicose veins  NEURO: grossly normal mental status  SKIN: Skin turgor normal.   PSYCH: Normal affect    Intake/Output Summary (Last 24 hours) at 1/13/2025 1229  Last data filed at 1/13/2025 0748  Gross per 24 hour   Intake 982 ml   Output 925 ml   Net 57 ml     Labs:   Results from last 72 hours   Lab Units 01/13/25  0532 01/12/25  0251 01/11/25  0656   SODIUM mmol/L 139 141 143   POTASSIUM mmol/L 4.2 4.2 4.3   CHLORIDE mmol/L 99 100 101   CO2 mmol/L 36* 37* 35*   BUN mg/dL 32* 32* 32*   CREATININE mg/dL 1.25* 1.21* 1.26*   GLUCOSE mg/dL 114* 115* 124*   CALCIUM mg/dL 8.6 8.5* 8.8   ANION GAP mmol/L 8* 8* 11   EGFR mL/min/1.73m*2 42* 44* 42*   PHOSPHORUS mg/dL  --  3.9  --       Results from last 72 hours   Lab Units 01/13/25  1039 01/13/25  0532 01/12/25  0251   WBC AUTO x10*3/uL 5.3 4.9 5.1   HEMOGLOBIN g/dL 10.1* 9.6* 10.3*   HEMATOCRIT % 32.5* 30.1* 33.5*   PLATELETS AUTO x10*3/uL 105* 104* 95*      Results from last 72 hours   Lab Units 01/11/25  0327   POCT PH, ARTERIAL pH 7.41   POCT PCO2, ARTERIAL mm Hg 61*   POCT PO2, ARTERIAL mm Hg 68*   POCT SO2, ARTERIAL % 94          Micro/ID:   Lab Results   Component Value Date    URINECULTURE No growth 01/10/2025    BLOODCULT No growth at 4 days -  FINAL REPORT 10/15/2024    BLOODCULT No growth at 4 days -  FINAL REPORT 10/15/2024     Summary of key imaging results from the last 24 hours  BLE duplex US - IMPRESSION:  Negative study.  No deep venous thrombosis of the  bilateral " lower  extremity.  VQ scan - Wedge-shaped subsegmental perfusion defect in the right upper lobe  inferiorly and segmental perfusion defect in the superior left lower  lobe seen on SPECT/CT, suggestive of high probability of acute  pulmonary embolism.  CXR - Lungs are hyperinflated and hyperlucent, compatible with  emphysema/COPD. Pulmonary vascular congestion suggests volume  overload, with prominent interstitial markings and Kerley B-lines  suggesting acute pulmonary interstitial edema/CHF. No focal  consolidation. No sizable pleural effusion. No pneumothorax  Cardiomegaly  TTE - CONCLUSIONS:   1. The left ventricular systolic function is normal, with a visually estimated ejection fraction of 55-60%.   2. Spectral Doppler shows a Grade II (pseudonormal pattern) of left ventricular diastolic filling with an elevated left atrial pressure.   3. There is normal right ventricular global systolic function.   4. The left atrium is moderate to severely dilated.   5. The right atrium is mild to moderately dilated.   6. There is no evidence of cardiac tamponade.   7. Moderate to severe mitral valve regurgitation    Impression   Anitha Welch is a 84 y.o. year old female patient is being seen by the pulmonary service for   Acute on chronic hypoxic respiratory failure -hemodynamically medically stable likely secondary to acute pulmonary embolism  Acute Pulmonary embolism -likely thrombotic, but fat embolism cannot be excluded in the setting of bone fracture  Volume overload- mild. torsemide on hold.   Thrombocytopenia -monitor  Femur fracture -orthopedics recommending surgery    Recommendations   As follows:  Continue heparin drip monitor CBC, transition to DOAC prior to discharge  Bilateral lower extremity duplex negative for DVT  IVC filter not necessary at this time  Wean supplemental oxygen as tolerated to maintain pulse oximetry >92%  Baseline is 2 L nasal cannula  Bronchopulmonary hygiene: coughing and deep breathing,  incentive spirometry  Risk for atelectasis  Continue Spiriva and Breo.  As needed nebulizers  Surgery planned for today continue heparin gtt after surgery      Radha Moreno, FELA-CNP   01/13/25 at 12:29 PM     -Only the Medical problems listed under impression were addressed today.   -Please contact primary team for all other concerns and medical problem  -Thank you for your consult       Disclaimer: Documentation completed with the information available at the time of input. Parts of this note may have been scribed or generated using voice dictation software, Dragon.  Homophonic errors may exist.  Please contact me directly if clarification is needed. The times in the chart may not be reflective of actual patient care times, interventions, or procedures. Documentation occurs after the physical care of the patient.

## 2025-01-13 NOTE — PROGRESS NOTES
Spiritual Care Visit  Spiritual Care Request    Reason for Visit:  Routine Visit: Follow-up     Request Received From:       Focus of Care:  Visited With: Patient not available         Refer to :          Spiritual Care Assessment    Spiritual Assessment:                      Care Provided:       Sense of Community and or Baptism Affiliation:  Yazidi         Addressed Needs/Concerns and/or [place] Through:          Outcome:        Advance Directives:         Spiritual Care Annotation    Annotation:  Anitha was not in the room today.  Donnell Quintanilla

## 2025-01-13 NOTE — ASSESSMENT & PLAN NOTE
Continue atorvastatin 40 mg    Thrombocytopenia  Will need to monitor close given currently on Heparin gtt

## 2025-01-13 NOTE — PROGRESS NOTES
The patient's creatinine is stable and right around baseline. Will sign off at this time, please call back with any questions, thank you. Patient can follow-up with us with a renal function panel in 1 week and office visit in 2 weeks.     Cj Lepe MD

## 2025-01-13 NOTE — ANESTHESIA PROCEDURE NOTES
Peripheral IV  Date/Time: 1/13/2025 3:29 PM  Inserted by: Greyson Guzmán DO    Placement  Needle size: 20 G  Laterality: left  Location: wrist  Local anesthetic: none  Site prep: alcohol  Technique: anatomical landmarks  Attempts: 1

## 2025-01-13 NOTE — PROGRESS NOTES
Occupational Therapy                 Therapy Communication Note    Patient Name: Anitha Welch  MRN: [id number]  Department: [address]  Room: [facility]-  Today's Date: 1/13/2025     Discipline: Occupational Therapy    OT Missed Visit: Yes     Missed Visit Reason: Missed Visit Reason: Other (Comment) (OT eval received and chart reviewed. Pt with plans for surgical repair of left femur fx this date. Will require new orders post-op.)    Missed Time: Cancel

## 2025-01-13 NOTE — ANESTHESIA PROCEDURE NOTES
Airway  Date/Time: 1/13/2025 3:18 PM  Urgency: elective    Airway not difficult    Staffing  Performed: CRNA   Authorized by: Greyson Guzmán DO    Performed by: FELA Rubin-RAYMUNDO  Patient location during procedure: OR    Indications and Patient Condition  Indications for airway management: anesthesia  Spontaneous Ventilation: absent  Sedation level: deep  Preoxygenated: yes  Patient position: sniffing  Mask difficulty assessment: 1 - vent by mask    Final Airway Details  Final airway type: endotracheal airway      Successful airway: ETT  Cuffed: yes   Successful intubation technique: video laryngoscopy  Facilitating devices/methods: intubating stylet  Endotracheal tube insertion site: oral  Blade: Bijal  Blade size: #3  ETT size (mm): 7.0  Cormack-Lehane Classification: grade I - full view of glottis  Placement verified by: chest auscultation and capnometry   Cuff volume (mL): 8  Measured from: teeth  ETT to teeth (cm): 21  Number of attempts at approach: 1

## 2025-01-13 NOTE — PROGRESS NOTES
"Anitha Welch is a 84 y.o. female on day 3 of admission presenting with Closed fracture of neck of left femur, initial encounter.    Subjective   Alert and oriented.  Denies complaints chest pain or pressure palpitations or feeling rapid heart rate.  N.p.o. for surgery today for left hip fracture       Objective     Physical Exam  Vitals and nursing note reviewed.   Constitutional:       General: She is not in acute distress.     Appearance: She is not ill-appearing or toxic-appearing.   HENT:      Head: Normocephalic and atraumatic.      Nose: Nose normal.      Mouth/Throat:      Mouth: Mucous membranes are moist.      Pharynx: Oropharynx is clear.   Cardiovascular:      Rate and Rhythm: Normal rate and regular rhythm.      Pulses: Normal pulses.      Heart sounds: Normal heart sounds. No murmur heard.     No friction rub. No gallop.   Pulmonary:      Effort: Pulmonary effort is normal.      Breath sounds: No wheezing or rhonchi.      Comments: Oxygen nasal cannula.  Poor inspiratory effort.  Diminished lung sounds in bases.  No crackles or wheezing.  Abdominal:      General: Bowel sounds are normal.      Palpations: Abdomen is soft.   Musculoskeletal:      Cervical back: Normal range of motion.      Right lower leg: No edema.      Left lower leg: No edema.      Comments: Shortening and external rotation of left lower extremity noted   Skin:     Capillary Refill: Capillary refill takes less than 2 seconds.   Neurological:      Mental Status: She is alert and oriented to person, place, and time. Mental status is at baseline.   Psychiatric:         Mood and Affect: Mood normal.         Behavior: Behavior normal.         Thought Content: Thought content normal.         Judgment: Judgment normal.         Last Recorded Vitals  Blood pressure 121/54, pulse 83, temperature 36.4 °C (97.5 °F), temperature source Oral, resp. rate 16, height 1.651 m (5' 5\"), weight 66 kg (145 lb 8.1 oz), SpO2 98%.  Intake/Output last 3 " Shifts:  I/O last 3 completed shifts:  In: 1132 (17.2 mL/kg) [P.O.:1030; I.V.:102 (1.5 mL/kg)]  Out: 300 (4.5 mL/kg) [Urine:300 (0.1 mL/kg/hr)]  Weight: 66 kg     Relevant Results  Results for orders placed or performed during the hospital encounter of 01/09/25 (from the past 24 hours)   aPTT   Result Value Ref Range    aPTT 56.3 (H) 22.0 - 32.5 seconds   Lavender Top   Result Value Ref Range    Extra Tube Hold for add-ons.    PST Top   Result Value Ref Range    Extra Tube Hold for add-ons.    Prepare RBC: 2 Units   Result Value Ref Range    PRODUCT CODE L4650M38     Unit Number [id number]     Unit ABO O     Unit RH POS     XM INTEP COMP     Dispense Status XM     Blood Expiration Date 1/24/2025 11:59:00 PM EST     PRODUCT BLOOD TYPE 5100     UNIT VOLUME 350     PRODUCT CODE U9577B57     Unit Number [id number]     Unit ABO O     Unit RH POS     XM INTEP COMP     Dispense Status XM     Blood Expiration Date 1/24/2025 11:59:00 PM EST     PRODUCT BLOOD TYPE 5100     UNIT VOLUME 350    APTT   Result Value Ref Range    aPTT 40.7 (H) 22.0 - 32.5 seconds   Type and screen   Result Value Ref Range    ABO TYPE O     Rh TYPE POS     ANTIBODY SCREEN NEG    POCT GLUCOSE   Result Value Ref Range    POCT Glucose 127 (H) 74 - 99 mg/dL   Basic Metabolic Panel   Result Value Ref Range    Glucose 114 (H) 74 - 99 mg/dL    Sodium 139 136 - 145 mmol/L    Potassium 4.2 3.5 - 5.3 mmol/L    Chloride 99 98 - 107 mmol/L    Bicarbonate 36 (H) 21 - 32 mmol/L    Anion Gap 8 (L) 10 - 20 mmol/L    Urea Nitrogen 32 (H) 6 - 23 mg/dL    Creatinine 1.25 (H) 0.50 - 1.05 mg/dL    eGFR 42 (L) >60 mL/min/1.73m*2    Calcium 8.6 8.6 - 10.3 mg/dL   CBC   Result Value Ref Range    WBC 4.9 4.4 - 11.3 x10*3/uL    nRBC 0.0 0.0 - 0.0 /100 WBCs    RBC 3.41 (L) 4.00 - 5.20 x10*6/uL    Hemoglobin 9.6 (L) 12.0 - 16.0 g/dL    Hematocrit 30.1 (L) 36.0 - 46.0 %    MCV 88 80 - 100 fL    MCH 28.2 26.0 - 34.0 pg    MCHC 31.9 (L) 32.0 - 36.0 g/dL    RDW 16.9 (H)  11.5 - 14.5 %    Platelets 104 (L) 150 - 450 x10*3/uL   APTT   Result Value Ref Range    aPTT 37.9 (H) 22.0 - 32.5 seconds     *Note: Due to a large number of results and/or encounters for the requested time period, some results have not been displayed. A complete set of results can be found in Results Review.           Assessment/Plan   Assessment & Plan  Closed fracture of neck of left femur, initial encounter    Mixed hyperlipidemia    Acute on chronic hypoxic respiratory failure (Multi)    History of DVT (deep vein thrombosis)    History of CHF (congestive heart failure)    Acute pulmonary embolism (Multi)      L femur fracture  Acute PE  HFpEF  CAD s/p PCI to RCA  MVR s/p ARMANI  Acute on chronic respiratory failure on 2L NC  COPD  Hx DVT  Hx CVA  Dementia     1/10: As above. 83 yo F with left femur fracture with cardiac history of CAD, HFpEF, and MVR s/p ARMANI. We were consulted for cardiac clearance for hip repair. The patient denies any chest discomfort, palpitations, SOB, lightheadedness. She appears euvolemic upon exam. Labs today with sodium 141, potassium 4.5, bicarb 36, creatinine 1.11, hemoglobin 11.5. Blood pressures have been normotensive with most recent reading of 108/47. SpO2 95% on 3L NC. Telemetry with sinus rhythm without ectopy, rates in the 80s-90s BPM. No initial EKG or CXR has been done; will order both. Patient with known risks of CAD, CHF, and cerebrovascular disease. Using the RCRI risk calculator the patient is considered a class 3 risk, giving her a 15% risk of death, myocardial infarction or cardiac arrest 30 days following surgery. In the absence of acute coronary syndrome, significant fluid overload, or life threatening arrhythmias, I believe this patient can proceed with this needed surgery pending her EKG and CXR, understanding his baseline risks which at this time are not modifiable. We will continue to follow this patient with you. Will discuss with my collaborating physician   Jasson.     1/11: Early this morning a rapid response was called for desaturation. CXR showed some pulmonary vascular congestion. . D-dimer elevated at 7.42. A VQ scan has been ordered as well. As previously noted, she was on Xarelto for hx DVT but was taken off in November 2024. She was given 40 mg IV Lasix this AM. She is currently on HIFLOW NC SpO2 100%. Telemetry with sinus tachycardia in the 100's BPM. Labs today with sodium 143, potassium 4.3, creatinine 1.26, hemoglobin 11.4. Nephrology has been consulted for AYSE. She has a preserved EF of 60-65% on her last TTE in 8/2024. Will recheck a limited TTE to assess for any changes. Await VQ scan. Will defer further diuretics to nephrology. Will continue to follow closely with you. Will discuss with my collaborating physician Dr. Spaluding.      1/12: VQ scan yesterday shows acute PE. TTE yesterday with EF 55-60%, grade II left ventricular diastolic filling with elevated L atrial pressure, left atrium severely dilated, right atrium moderately dilated, moderate to severe MVR. BLE US negative. Heparin gtt has been started. Pulmonology now following. Ortho surgery delayed for now. Nephrology now following for AYSE, diuretics on hold. Blood pressures are normotensive with most recent reading of 132/57. Continue Coreg. SpO2 96% on 3L NC. Telemetry with sinus rhythm without significant ectopy. Labs today with sodium 141, potassium 4.2, creatinine 1.21, hemoglobin 10.3. Will continue to follow closely with you. Will discuss with my collaborating physician Dr. Spaulding.     1/13: Stable overnight.  Denies complaints chest pain or pressure palpitations or feeling rapid heart rate.  Breathing comfortably nasal cannula oxygen.  Continues on heparin drip for incidental finding of pulmonary emboli.  Patient is going for surgery to repair left femur fracture today.  She is euvolemic on examination.  Chest pain-free and reports no syncope.  Her blood pressure stable 121/54.   Creatinine 1.25.  Obviously she is at some increased risk given her acute pulmonary emboli and poor inspiratory effort, however at this point she appears to be optimized as best as possible for the current situation.  Will follow with you postoperatively.      I spent 35 minutes in the professional and overall care of this patient.      [place], APRN-CNP

## 2025-01-14 LAB
ANION GAP SERPL CALCULATED.3IONS-SCNC: 10 MMOL/L (ref 10–20)
APTT PPP: 24.3 SECONDS (ref 22–32.5)
APTT PPP: 43.7 SECONDS (ref 22–32.5)
BUN SERPL-MCNC: 28 MG/DL (ref 6–23)
CALCIUM SERPL-MCNC: 8.9 MG/DL (ref 8.6–10.3)
CHLORIDE SERPL-SCNC: 101 MMOL/L (ref 98–107)
CO2 SERPL-SCNC: 35 MMOL/L (ref 21–32)
CREAT SERPL-MCNC: 1.03 MG/DL (ref 0.5–1.05)
EGFRCR SERPLBLD CKD-EPI 2021: 53 ML/MIN/1.73M*2
ERYTHROCYTE [DISTWIDTH] IN BLOOD BY AUTOMATED COUNT: 16.1 % (ref 11.5–14.5)
GLUCOSE SERPL-MCNC: 141 MG/DL (ref 74–99)
HCT VFR BLD AUTO: 33.2 % (ref 36–46)
HGB BLD-MCNC: 10.3 G/DL (ref 12–16)
MCH RBC QN AUTO: 28.2 PG (ref 26–34)
MCHC RBC AUTO-ENTMCNC: 31 G/DL (ref 32–36)
MCV RBC AUTO: 91 FL (ref 80–100)
NRBC BLD-RTO: 0 /100 WBCS (ref 0–0)
PLATELET # BLD AUTO: 112 X10*3/UL (ref 150–450)
POTASSIUM SERPL-SCNC: 4.7 MMOL/L (ref 3.5–5.3)
RBC # BLD AUTO: 3.65 X10*6/UL (ref 4–5.2)
SODIUM SERPL-SCNC: 141 MMOL/L (ref 136–145)
WBC # BLD AUTO: 6.7 X10*3/UL (ref 4.4–11.3)

## 2025-01-14 PROCEDURE — 99232 SBSQ HOSP IP/OBS MODERATE 35: CPT

## 2025-01-14 PROCEDURE — 80048 BASIC METABOLIC PNL TOTAL CA: CPT | Performed by: ORTHOPAEDIC SURGERY

## 2025-01-14 PROCEDURE — 2500000001 HC RX 250 WO HCPCS SELF ADMINISTERED DRUGS (ALT 637 FOR MEDICARE OP): Performed by: ORTHOPAEDIC SURGERY

## 2025-01-14 PROCEDURE — 85730 THROMBOPLASTIN TIME PARTIAL: CPT

## 2025-01-14 PROCEDURE — 2500000004 HC RX 250 GENERAL PHARMACY W/ HCPCS (ALT 636 FOR OP/ED): Mod: JZ | Performed by: ORTHOPAEDIC SURGERY

## 2025-01-14 PROCEDURE — 36415 COLL VENOUS BLD VENIPUNCTURE: CPT

## 2025-01-14 PROCEDURE — 94640 AIRWAY INHALATION TREATMENT: CPT

## 2025-01-14 PROCEDURE — 9420000001 HC RT PATIENT EDUCATION 5 MIN

## 2025-01-14 PROCEDURE — 2500000004 HC RX 250 GENERAL PHARMACY W/ HCPCS (ALT 636 FOR OP/ED): Performed by: ORTHOPAEDIC SURGERY

## 2025-01-14 PROCEDURE — 99232 SBSQ HOSP IP/OBS MODERATE 35: CPT | Performed by: NURSE PRACTITIONER

## 2025-01-14 PROCEDURE — 36415 COLL VENOUS BLD VENIPUNCTURE: CPT | Performed by: ORTHOPAEDIC SURGERY

## 2025-01-14 PROCEDURE — 2500000005 HC RX 250 GENERAL PHARMACY W/O HCPCS: Performed by: ORTHOPAEDIC SURGERY

## 2025-01-14 PROCEDURE — 1200000002 HC GENERAL ROOM WITH TELEMETRY DAILY

## 2025-01-14 PROCEDURE — 85027 COMPLETE CBC AUTOMATED: CPT | Performed by: ORTHOPAEDIC SURGERY

## 2025-01-14 RX ORDER — OXYCODONE HYDROCHLORIDE 5 MG/1
5 TABLET ORAL EVERY 4 HOURS PRN
Qty: 20 TABLET | Refills: 0 | Status: SHIPPED | OUTPATIENT
Start: 2025-01-14 | End: 2025-01-21

## 2025-01-14 RX ADMIN — DULOXETINE HYDROCHLORIDE 20 MG: 20 CAPSULE, DELAYED RELEASE ORAL at 20:21

## 2025-01-14 RX ADMIN — CARVEDILOL 3.12 MG: 3.12 TABLET, FILM COATED ORAL at 20:21

## 2025-01-14 RX ADMIN — OXYCODONE 5 MG: 5 TABLET ORAL at 02:40

## 2025-01-14 RX ADMIN — POLYETHYLENE GLYCOL 3350 17 G: 17 POWDER, FOR SOLUTION ORAL at 09:25

## 2025-01-14 RX ADMIN — CLINDAMYCIN PHOSPHATE 900 MG: 900 INJECTION, SOLUTION INTRAVENOUS at 05:16

## 2025-01-14 RX ADMIN — OXYCODONE 5 MG: 5 TABLET ORAL at 10:18

## 2025-01-14 RX ADMIN — TIOTROPIUM BROMIDE INHALATION SPRAY 2 PUFF: 3.12 SPRAY, METERED RESPIRATORY (INHALATION) at 07:47

## 2025-01-14 RX ADMIN — PANTOPRAZOLE SODIUM 40 MG: 40 TABLET, DELAYED RELEASE ORAL at 09:25

## 2025-01-14 RX ADMIN — ATORVASTATIN CALCIUM 40 MG: 40 TABLET, FILM COATED ORAL at 20:21

## 2025-01-14 RX ADMIN — LEVOTHYROXINE SODIUM 25 MCG: 0.03 TABLET ORAL at 05:09

## 2025-01-14 RX ADMIN — FLUTICASONE FUROATE AND VILANTEROL TRIFENATATE 1 PUFF: 100; 25 POWDER RESPIRATORY (INHALATION) at 07:49

## 2025-01-14 RX ADMIN — GABAPENTIN 100 MG: 100 CAPSULE ORAL at 20:21

## 2025-01-14 RX ADMIN — Medication 3 L/MIN: at 07:47

## 2025-01-14 RX ADMIN — PANTOPRAZOLE SODIUM 40 MG: 40 TABLET, DELAYED RELEASE ORAL at 20:21

## 2025-01-14 RX ADMIN — CARVEDILOL 3.12 MG: 3.12 TABLET, FILM COATED ORAL at 09:25

## 2025-01-14 RX ADMIN — OXYCODONE 5 MG: 5 TABLET ORAL at 17:35

## 2025-01-14 RX ADMIN — Medication 3 L/MIN: at 07:49

## 2025-01-14 RX ADMIN — HEPARIN SODIUM 1200 UNITS/HR: 10000 INJECTION, SOLUTION INTRAVENOUS at 18:21

## 2025-01-14 RX ADMIN — HEPARIN SODIUM 1200 UNITS/HR: 10000 INJECTION, SOLUTION INTRAVENOUS at 07:40

## 2025-01-14 ASSESSMENT — PAIN DESCRIPTION - ORIENTATION
ORIENTATION: LEFT

## 2025-01-14 ASSESSMENT — PAIN SCALES - GENERAL
PAINLEVEL_OUTOF10: 4
PAINLEVEL_OUTOF10: 0 - NO PAIN
PAINLEVEL_OUTOF10: 0 - NO PAIN
PAINLEVEL_OUTOF10: 6
PAINLEVEL_OUTOF10: 2
PAINLEVEL_OUTOF10: 6
PAINLEVEL_OUTOF10: 6
PAINLEVEL_OUTOF10: 2

## 2025-01-14 ASSESSMENT — COGNITIVE AND FUNCTIONAL STATUS - GENERAL
STANDING UP FROM CHAIR USING ARMS: A LITTLE
MOBILITY SCORE: 11
DRESSING REGULAR LOWER BODY CLOTHING: TOTAL
DAILY ACTIVITIY SCORE: 8
PERSONAL GROOMING: A LOT
MOVING FROM LYING ON BACK TO SITTING ON SIDE OF FLAT BED WITH BEDRAILS: A LITTLE
EATING MEALS: A LOT
TOILETING: TOTAL
WALKING IN HOSPITAL ROOM: TOTAL
HELP NEEDED FOR BATHING: TOTAL
CLIMB 3 TO 5 STEPS WITH RAILING: TOTAL
TURNING FROM BACK TO SIDE WHILE IN FLAT BAD: A LOT
MOVING TO AND FROM BED TO CHAIR: TOTAL
DRESSING REGULAR UPPER BODY CLOTHING: TOTAL

## 2025-01-14 ASSESSMENT — PAIN DESCRIPTION - LOCATION
LOCATION: HIP

## 2025-01-14 ASSESSMENT — PAIN - FUNCTIONAL ASSESSMENT
PAIN_FUNCTIONAL_ASSESSMENT: 0-10

## 2025-01-14 ASSESSMENT — PAIN DESCRIPTION - DESCRIPTORS
DESCRIPTORS: DISCOMFORT;SHOOTING
DESCRIPTORS: DISCOMFORT;SHOOTING;STABBING
DESCRIPTORS: DISCOMFORT
DESCRIPTORS: DISCOMFORT
DESCRIPTORS: DISCOMFORT;SHOOTING

## 2025-01-14 NOTE — NURSING NOTE
Patient removed own IV in sleep. Bathed in CHG and complete linen change completed. Patient medicated for pain and went back to sleep. Call light within reach. Bed alarm in use.

## 2025-01-14 NOTE — ASSESSMENT & PLAN NOTE
History of DVT previously on Xarelto which was held on last hospital admission due to anemia and thrombocytopenia.  Patient was to follow with PCP to resume but has not to date  -found to have high prob for PE on VQ-started on heparin gtt, resume after surgery will need to be discharged on anticoagulation

## 2025-01-14 NOTE — PROGRESS NOTES
Physical Therapy                 Therapy Communication Note    Patient Name: Anitha Welch  MRN: [id number]  Department: [address]  Room: [address] [place] [address]  Today's Date: 1/14/2025     Discipline: Physical Therapy    PT Missed Visit: Yes     Missed Visit Reason: Missed Visit Reason: Other (Comment) (PTT 24.3;  value is subtherapeutic for mobility with diagnosis of PE treated with heparin anticoagulation;  discussed with RN and cardiologist.)    Missed Time: Cancel

## 2025-01-14 NOTE — NURSING NOTE
Assumed care of patient. Patient pleasant and cooperative during BSSR. Still very drowsy Await PTT redraw result and will restart heparin. Call light within reach. Bed alarm in use.

## 2025-01-14 NOTE — PROGRESS NOTES
"Anitha Welch is a 84 y.o. female on day 4 of admission presenting with Closed fracture of neck of left femur, initial encounter.    Subjective   In a bed.  Somewhat confused.  Sore in the left leg.  No other concerns today.  Discussed with nursing.  Has not worked with therapy yet.       Objective     Physical Exam  Alert and oriented x 2  Left lower extremity: Abduction pillow intact.  Dressing dry.  Compartments are soft throughout.  Neurovascular intact distally.    Last Recorded Vitals  Blood pressure 124/65, pulse 96, temperature 36.5 °C (97.7 °F), temperature source Oral, resp. rate 16, height 1.651 m (5' 5\"), weight 66 kg (145 lb 8.1 oz), SpO2 98%.      Relevant Results  I reviewed postoperative pelvis imaging.  Stable positioning of left hip hemiarthroplasty.    Results for orders placed or performed during the hospital encounter of 01/09/25 (from the past 24 hours)   aPTT   Result Value Ref Range    aPTT 25.5 22.0 - 32.5 seconds   Basic Metabolic Panel   Result Value Ref Range    Glucose 141 (H) 74 - 99 mg/dL    Sodium 141 136 - 145 mmol/L    Potassium 4.7 3.5 - 5.3 mmol/L    Chloride 101 98 - 107 mmol/L    Bicarbonate 35 (H) 21 - 32 mmol/L    Anion Gap 10 10 - 20 mmol/L    Urea Nitrogen 28 (H) 6 - 23 mg/dL    Creatinine 1.03 0.50 - 1.05 mg/dL    eGFR 53 (L) >60 mL/min/1.73m*2    Calcium 8.9 8.6 - 10.3 mg/dL   CBC   Result Value Ref Range    WBC 6.7 4.4 - 11.3 x10*3/uL    nRBC 0.0 0.0 - 0.0 /100 WBCs    RBC 3.65 (L) 4.00 - 5.20 x10*6/uL    Hemoglobin 10.3 (L) 12.0 - 16.0 g/dL    Hematocrit 33.2 (L) 36.0 - 46.0 %    MCV 91 80 - 100 fL    MCH 28.2 26.0 - 34.0 pg    MCHC 31.0 (L) 32.0 - 36.0 g/dL    RDW 16.1 (H) 11.5 - 14.5 %    Platelets 112 (L) 150 - 450 x10*3/uL   APTT   Result Value Ref Range    aPTT 24.3 22.0 - 32.5 seconds     *Note: Due to a large number of results and/or encounters for the requested time period, some results have not been displayed. A complete set of results can be found in Results " Review.       Assessment/Plan   Assessment & Plan  Closed fracture of neck of left femur, initial encounter    Mixed hyperlipidemia    Acute on chronic hypoxic respiratory failure (Multi)    History of DVT (deep vein thrombosis)    History of CHF (congestive heart failure)    Acute pulmonary embolism (Multi)      Problem List:  Left femoral neck fracture: Postoperative day 1.  Mobilize physical therapy weightbearing as tolerated posterior hip precautions.  Plan for home with home health care.  DVT risk: On a heparin drip.  Okay to transition to anticoagulation per primary service for her pulmonary emboli.  Please call with questions.  Follow-up with me in 4 weeks.        Brady Tony MD

## 2025-01-14 NOTE — CARE PLAN
The patient's goals for the shift include rest    The clinical goals for the shift include patient will remain hemodynamically stable      Problem: Pain - Adult  Goal: Verbalizes/displays adequate comfort level or baseline comfort level  Outcome: Progressing     Problem: Safety - Adult  Goal: Free from fall injury  Outcome: Progressing     Problem: Discharge Planning  Goal: Discharge to home or other facility with appropriate resources  Outcome: Progressing     Problem: Chronic Conditions and Co-morbidities  Goal: Patient's chronic conditions and co-morbidity symptoms are monitored and maintained or improved  Outcome: Progressing     Problem: Pain  Goal: Takes deep breaths with improved pain control throughout the shift  Outcome: Progressing  Goal: Turns in bed with improved pain control throughout the shift  Outcome: Progressing  Goal: Walks with improved pain control throughout the shift  Outcome: Progressing  Goal: Performs ADL's with improved pain control throughout shift  Outcome: Progressing  Goal: Participates in PT with improved pain control throughout the shift  Outcome: Progressing  Goal: Free from opioid side effects throughout the shift  Outcome: Progressing  Goal: Free from acute confusion related to pain meds throughout the shift  Outcome: Progressing     Problem: Respiratory  Goal: Clear secretions with interventions this shift  Outcome: Progressing  Goal: Minimize anxiety/maximize coping throughout shift  Outcome: Progressing  Goal: Minimal/no exertional discomfort or dyspnea this shift  Outcome: Progressing  Goal: No signs of respiratory distress (eg. Use of accessory muscles. Peds grunting)  Outcome: Progressing  Goal: Patent airway maintained this shift  Outcome: Progressing  Goal: Tolerate mechanical ventilation evidenced by VS/agitation level this shift  Outcome: Progressing  Goal: Tolerate pulmonary toileting this shift  Outcome: Progressing  Goal: Verbalize decreased shortness of breath this  shift  Outcome: Progressing  Goal: Wean oxygen to maintain O2 saturation per order/standard this shift  Outcome: Progressing  Goal: Increase self care and/or family involvement in next 24 hours  Outcome: Progressing     Problem: Skin  Goal: Decreased wound size/increased tissue granulation at next dressing change  Outcome: Progressing  Flowsheets (Taken 1/14/2025 1404)  Decreased wound size/increased tissue granulation at next dressing change:   Promote sleep for wound healing   Protective dressings over bony prominences   Utilize specialty bed per algorithm  Goal: Participates in plan/prevention/treatment measures  Outcome: Progressing  Flowsheets (Taken 1/14/2025 1404)  Participates in plan/prevention/treatment measures:   Discuss with provider PT/OT consult   Elevate heels   Increase activity/out of bed for meals  Goal: Prevent/manage excess moisture  Outcome: Progressing  Flowsheets (Taken 1/14/2025 1404)  Prevent/manage excess moisture:   Cleanse incontinence/protect with barrier cream   Monitor for/manage infection if present   Follow provider orders for dressing changes   Use wicking fabric (obtain order)   Moisturize dry skin  Goal: Prevent/minimize sheer/friction injuries  Outcome: Progressing  Flowsheets (Taken 1/14/2025 1404)  Prevent/minimize sheer/friction injuries:   Complete micro-shifts as needed if patient unable. Adjust patient position to relieve pressure points, not a full turn   Increase activity/out of bed for meals   Use pull sheet   HOB 30 degrees or less   Turn/reposition every 2 hours/use positioning/transfer devices   Utilize specialty bed per algorithm  Goal: Promote/optimize nutrition  Outcome: Progressing  Flowsheets (Taken 1/14/2025 1404)  Promote/optimize nutrition:   Assist with feeding   Offer water/supplements/favorite foods   Consume > 50% meals/supplements   Monitor/record intake including meals  Goal: Promote skin healing  Outcome: Progressing  Flowsheets (Taken 1/14/2025  1404)  Promote skin healing:   Assess skin/pad under line(s)/device(s)   Protective dressings over bony prominences   Turn/reposition every 2 hours/use positioning/transfer devices   Ensure correct size (line/device) and apply per  instructions   Rotate device position/do not position patient on device

## 2025-01-14 NOTE — ASSESSMENT & PLAN NOTE
Echo 8/9/2024 ejection fraction of 60-65%. There are no regional wall motion abnormalities. The left ventricular cavity size is normal. There is mild concentric left ventricular hypertrophy. Spectral Doppler shows a normal pattern of left ventricular diastolic filling.   Did have vascular congestion on CXR-given one time dose of Lasix  Continue to hold torsemide  Resume home Coreg  Cardiology consulted for cardiac clearance  Repeat echocardiogram showed an ejection fraction of 55 to 60%, moderate to severe mitral valve regurgitation

## 2025-01-14 NOTE — PROGRESS NOTES
Occupational Therapy                 Therapy Communication Note    Patient Name: Anitha Welch  MRN: [id number]  Department: [address]  Room: [address] [place] [address]  Today's Date: 1/14/2025     Discipline: Occupational Therapy    OT Missed Visit: Yes     Missed Visit Reason: Missed Visit Reason: Other (Comment) (PTT 24.3;  value is subtherapeutic for mobility with diagnosis of PE treated with heparin anticoagulation)    Missed Time: Cancel    Comment:

## 2025-01-14 NOTE — NURSING NOTE
Patient remains drowsy but arousable and answers questions. Patient a&ox1. States she wears oxygen at baseline but unsure how much. Patient denies pain and did not remember having surgery today. Nursing reached out to Dr. Jessica regarding heparin gtt as day nurse reached out to cardiology who did not respond when to restart heparin post op. Per Dr. Jessica, hold heparin post op until 6am. Restart at 6ml/hr without bolus. Took all meds whole in applesauce. Call light within reach. Bed alarm in use. Ice pack in use and SCDS in place.

## 2025-01-14 NOTE — NURSING NOTE
Talked with Dr. Arredondo regarding the patients APTT being 24.3, and working with therapy. We are going to hold off on therapy for now, wait for the next APTT result, and go from there. Patient will start eliquis tomorrow.

## 2025-01-14 NOTE — PROGRESS NOTES
Pulmonary Daily Progress Note   Subjective    Anitha Welch is a 84 y.o. year old female patient known with DVT/PE (not on AC), COPD with FEV1 41%, heart failure with preserved EF,  admitted on 1/9/2025 with a fall with acute closed left femoral neck fracture     Surgery on 1/13/25 for Closed fracture of left femur     Interval History:  3 L nasal cannula.  No complaints of cough shortness of breath chest pain. Pain in left hip, managed with analgesics.     Meds    Scheduled medications  atorvastatin, 40 mg, oral, Nightly  carvedilol, 3.125 mg, oral, BID  DULoxetine, 20 mg, oral, Nightly  tiotropium, 2 puff, inhalation, Daily   And  fluticasone furoate-vilanteroL, 1 puff, inhalation, Daily  gabapentin, 100 mg, oral, Nightly  levothyroxine, 25 mcg, oral, Daily  oxygen, 2 L/min, inhalation, Continuous - Inhalation  oxygen, , inhalation, Continuous - Inhalation  pantoprazole, 40 mg, oral, BID  perflutren lipid microspheres, 0.5-10 mL of dilution, intravenous, Once in imaging  perflutren lipid microspheres, 0.5-10 mL of dilution, intravenous, Once in imaging  perflutren protein A microsphere, 0.5 mL, intravenous, Once in imaging  perflutren protein A microsphere, 0.5 mL, intravenous, Once in imaging  polyethylene glycol, 17 g, oral, Daily  sulfur hexafluoride microsphr, 2 mL, intravenous, Once in imaging  sulfur hexafluoride microsphr, 2 mL, intravenous, Once in imaging  Tc-99m-albumin, 4 millicurie, intravenous, Once in imaging  [Held by provider] torsemide, 20 mg, oral, Daily      Continuous medications  heparin, 0-4,500 Units/hr, Last Rate: 1,200 Units/hr (01/14/25 0740)      PRN medications  PRN medications: acetaminophen **OR** acetaminophen **OR** acetaminophen, albuterol, heparin (porcine), ipratropium-albuteroL, melatonin, morphine, nitroglycerin, ondansetron **OR** ondansetron, oxyCODONE     Objective    Blood pressure 124/65, pulse 96, temperature 36.5 °C (97.7 °F), temperature source Oral, resp. rate 16,  "height 1.651 m (5' 5\"), weight 66 kg (145 lb 8.1 oz), SpO2 98%.   Physical Exam   GENERAL: normal appearance. well nourished. No respiratory distress  HEAD/SINUSES: 3L NC  LUNGS: Symmetric chest. Good excursion. Equal breath sounds. no wheezing.   CARDIAC: regular rate and rhythm  EXTREMITIES: No edema, no varicose veins  NEURO: alert and oriented to self and place,   SKIN: Skin turgor normal.   PSYCH: Normal affect    Intake/Output Summary (Last 24 hours) at 1/14/2025 1041  Last data filed at 1/14/2025 0900  Gross per 24 hour   Intake 960 ml   Output 550 ml   Net 410 ml     Labs:   Results from last 72 hours   Lab Units 01/14/25  0426 01/13/25  0532 01/12/25  0251   SODIUM mmol/L 141 139 141   POTASSIUM mmol/L 4.7 4.2 4.2   CHLORIDE mmol/L 101 99 100   CO2 mmol/L 35* 36* 37*   BUN mg/dL 28* 32* 32*   CREATININE mg/dL 1.03 1.25* 1.21*   GLUCOSE mg/dL 141* 114* 115*   CALCIUM mg/dL 8.9 8.6 8.5*   ANION GAP mmol/L 10 8* 8*   EGFR mL/min/1.73m*2 53* 42* 44*   PHOSPHORUS mg/dL  --   --  3.9      Results from last 72 hours   Lab Units 01/14/25  0426 01/13/25  1039 01/13/25  0532   WBC AUTO x10*3/uL 6.7 5.3 4.9   HEMOGLOBIN g/dL 10.3* 10.1* 9.6*   HEMATOCRIT % 33.2* 32.5* 30.1*   PLATELETS AUTO x10*3/uL 112* 105* 104*               Micro/ID:   Lab Results   Component Value Date    URINECULTURE No growth 01/11/2025    BLOODCULT No growth at 4 days -  FINAL REPORT 10/15/2024    BLOODCULT No growth at 4 days -  FINAL REPORT 10/15/2024     Summary of key imaging results from the last 24 hours      Phil Welch is a 84 y.o. year old female patient is being seen by the pulmonary service for   Acute on chronic hypoxic respiratory failure -hemodynamically medically stable likely secondary to acute pulmonary embolism  Acute Pulmonary embolism -likely thrombotic, but fat embolism cannot be excluded in the setting of bone fracture  Volume overload- mild. torsemide on hold.   Thrombocytopenia -monitor  Femur fracture " -orthopedics recommending surgery    Recommendations   As follows:  Continue heparin drip monitor CBC, transition to DOAC prior to discharge, patient is post op day 1  Cardiology managing DOAC  Bilateral lower extremity duplex negative for DVT  IVC filter not necessary at this time  Wean supplemental oxygen as tolerated to maintain pulse oximetry >92% baseline 2L  Baseline is 2 L nasal cannula  Bronchopulmonary hygiene: coughing and deep breathing, incentive spirometry  Risk for atelectasis  Continue Spiriva and Breo.  As needed nebulizers  Will continue to follow    FELA Estrada-CNP   01/14/25 at 10:41 AM     -Only the Medical problems listed under impression were addressed today.   -Please contact primary team for all other concerns and medical problem  -Thank you for your consult       Disclaimer: Documentation completed with the information available at the time of input. Parts of this note may have been scribed or generated using voice dictation software, Dragon.  Homophonic errors may exist.  Please contact me directly if clarification is needed. The times in the chart may not be reflective of actual patient care times, interventions, or procedures. Documentation occurs after the physical care of the patient.

## 2025-01-14 NOTE — NURSING NOTE
End of shift, bedside shift report given. Patient laying in bed resting comfortably, with call light within reach. Telemetry and continuous pulse ox in place. Dressing to left hip, dry clean and intact. Abductor pillow in place between patient's legs. Patient verbalizes no new concerns at this time. Patient denies pain at this time. Bed alarm active. Bed at lowest position.

## 2025-01-14 NOTE — PROGRESS NOTES
Pt on on heparin drip for PE - therapy following and will work with the pt when able. Pt has a list of SNFs in the event moderate intensity is recommended. Pt would not require a precert if SNF need is indicated and she chooses this.      01/14/25 1506   Discharge Planning   Expected Discharge Disposition SNF

## 2025-01-14 NOTE — NURSING NOTE
Talked wit YOVANNY Holt regarding patients heparin drip. She stated to use the order that already in the computer for 1200u/h high heparin protocol. Will continue to monitor for bleeding.

## 2025-01-14 NOTE — PROGRESS NOTES
Anitha Welch is a 84 y.o. female on day 4 of admission presenting with Closed fracture of neck of left femur, initial encounter.      Subjective   POD #1 left hip hemiarthroplasty. Mildly confused but reportedly near baseline mentation. Denies chest pain. States her left hip feels ok when she is at rest. Stable on baseline 2L O2 via NC. Denies shortness of breath. Denies nausea/vomiting. Family bedside.        Objective     Last Recorded Vitals  /51 (BP Location: Left arm, Patient Position: Sitting)   Pulse 90   Temp 37.1 °C (98.8 °F) (Oral)   Resp 16   Wt 66 kg (145 lb 8.1 oz)   SpO2 99%   Intake/Output last 3 Shifts:    Intake/Output Summary (Last 24 hours) at 1/14/2025 1356  Last data filed at 1/14/2025 0900  Gross per 24 hour   Intake 960 ml   Output 550 ml   Net 410 ml       Admission Weight  Weight: 59 kg (130 lb) (01/09/25 2357)    Daily Weight  01/11/25 : 66 kg (145 lb 8.1 oz)    Image Results  XR pelvis 1-2 views  Narrative: Interpreted By:  Jose Rodriguez,   STUDY:  XR PELVIS 1-2 VIEWS; 1/13/2025 5:31 pm      INDICATION:  Signs/Symptoms:fracture. Postop hip assessment.      COMPARISON:  01/10/2025      ACCESSION NUMBER(S):  [id number]      ORDERING CLINICIAN:  JASON PATTON      TECHNIQUE:  AP pelvis, one view, portable      FINDINGS:  No fractures or destructive lesions are identified. There is a new  left total hip prosthesis present with soft tissue air adjacent to  the prosthesis. There is no change in the right hip prosthesis.  Patient is significantly rotated to the right. The upper half of the  pelvis is omitted from the field-of-view.      Impression: Status post left hip arthroplasty in this patient with a history of  left femoral neck fracture.      MACRO:  none      Signed by: Jose Rodriguez 1/14/2025 8:42 AM  Dictation workstation:   [id number]      Physical Exam  Vitals and nursing note reviewed. Exam conducted with a chaperone present (adult son and spouse bedside).    Constitutional:       Appearance: Normal appearance.   HENT:      Head: Normocephalic and atraumatic.      Right Ear: External ear normal.      Left Ear: External ear normal.      Nose: Nose normal.      Mouth/Throat:      Mouth: Mucous membranes are moist.   Eyes:      Extraocular Movements: Extraocular movements intact.   Cardiovascular:      Rate and Rhythm: Normal rate and regular rhythm.      Pulses: Normal pulses.      Heart sounds: Normal heart sounds.      Comments: NSR on my asessment  Pulmonary:      Effort: Pulmonary effort is normal.      Breath sounds: Normal breath sounds.   Abdominal:      General: Bowel sounds are normal.      Palpations: Abdomen is soft.   Musculoskeletal:         General: Tenderness present.      Cervical back: Normal range of motion and neck supple.      Comments: ROM LLE limited due to post operative discomfort   Skin:     General: Skin is warm and dry.      Capillary Refill: Capillary refill takes less than 2 seconds.      Comments: Left hip dressing clean dry intact   Neurological:      General: No focal deficit present.      Mental Status: She is alert. Mental status is at baseline.      Comments: Pleasantly confused  Per family, near her baseline mentation  Hx of underlying dementia per family   Psychiatric:         Mood and Affect: Mood normal.         Relevant Results               Assessment/Plan     Assessment & Plan  Closed fracture of neck of left femur, initial encounter  X-ray left hip with acute basicervical left femoral neck fracture with varus alignment.  Pain management  Bowel regimen  Encourage incentive spirometer  Orthopedic surgery consulted  PT/OT  OR cancelled 1/11/2025 due to worsening respiratory failure, positive PE  Pulmonary, cardiology, orthopedic surgery following, appreciate recommendations  Anticipate possible surgery tomorrow.  N.p.o. after midnight.  Heparin drip will need to be held 4 hours prior to surgery and resume to soon as possible after  surgery.  Discussed with pulmonary and orthopedic surgery yesterday.    Acute pulmonary embolism (Multi)  Had increased oxygen demand, improving  Elevated D Dimer-VQ high probability PE  Has a history of DVT however Xarelto was stopped due to anemia/thrombocytopenia, has been off for some time  Started on Heparin gtt, will need oral anticoagulation on discharge  Pulmonary consulted, appreciate recs  Ultrasound of lower extremities as above  Acute on chronic hypoxic respiratory failure (Multi)  On 3 L oxygen via nasal cannula, 2 L is baseline  Monitor pulse ox, wean oxygen as tolerated  Resume home inhalers  As needed DuRadha    History of DVT (deep vein thrombosis)  History of DVT previously on Xarelto which was held on last hospital admission due to anemia and thrombocytopenia.  Patient was to follow with PCP to resume but has not to date  -found to have high prob for PE on VQ-started on heparin gtt, resume after surgery will need to be discharged on anticoagulation    History of CHF (congestive heart failure)  Echo 8/9/2024 ejection fraction of 60-65%. There are no regional wall motion abnormalities. The left ventricular cavity size is normal. There is mild concentric left ventricular hypertrophy. Spectral Doppler shows a normal pattern of left ventricular diastolic filling.   Did have vascular congestion on CXR-given one time dose of Lasix  Continue to hold torsemide  Resume home Coreg  Cardiology consulted for cardiac clearance  Repeat echocardiogram showed an ejection fraction of 55 to 60%, moderate to severe mitral valve regurgitation    Mixed hyperlipidemia  Continue atorvastatin 40 mg    Thrombocytopenia  Mildly improved today 104--105--112 this AM  Will need to monitor close given currently on Heparin gtt    Disposition: Pending updated PT OT eval. Family open to SNF/rehab placement.     1/14/25: Evaluated s/p left hip hemiarthroplasty with Dr. Tony yesterday POD #1. Adult son and spouse bedside and updated  on POC. Patient mildy confused but per family suspect underlying dementia. They report her mentation is near baseline. Patient denies chest pain. Reports post operative pain with exertion. Denies shortness of breath, no nausea/vomiting. Remains on heparin gtt for PE this hospitalization with plans to convert to DOAC prior to discharge. Appreciate ongoing recommendations from pulmonology and cardiology.                       Yanet Mcclelland, APRN-CNP

## 2025-01-14 NOTE — CARE PLAN
The patient's goals for the shift include rest    The clinical goals for the shift include patient will remain hemodynamically stable

## 2025-01-14 NOTE — ASSESSMENT & PLAN NOTE
Continue atorvastatin 40 mg    Thrombocytopenia  Mildly improved today 104--105--112 this AM  Will need to monitor close given currently on Heparin gtt    Disposition: Pending updated PT OT eval. Family open to SNF/rehab placement.     1/14/25: Evaluated s/p left hip hemiarthroplasty with Dr. Tony yesterday POD #1. Adult son and spouse bedside and updated on POC. Patient mildy confused but per family suspect underlying dementia. They report her mentation is near baseline. Patient denies chest pain. Reports post operative pain with exertion. Denies shortness of breath, no nausea/vomiting. Remains on heparin gtt for PE this hospitalization with plans to convert to DOAC prior to discharge. Appreciate ongoing recommendations from pulmonology and cardiology.

## 2025-01-14 NOTE — NURSING NOTE
Patient lying in bed with eyes closed snoring. NSR on tele. Satting well on 4lnc. Heparin remains on hold per Dr. Jessica. Dressing to left hip cdi. SCDs inb place. Reviewed IS with patient but patient needs review. Difficult for her to understand how to use appropriately. Call light within reach. Bed alarm in use.

## 2025-01-14 NOTE — TELEPHONE ENCOUNTER
To OR 1/13/25. Per-op diagnosis: Lt displaced femoral neck fracture. Acute pulmonary embolism, History of lower extremity DVT. Procedure: Lt hip hemiarthroplasty.

## 2025-01-14 NOTE — PROGRESS NOTES
"Anitha Welch is a 84 y.o. female on day 4 of admission presenting with Closed fracture of neck of left femur, initial encounter.    Subjective   Alert and oriented x 3.  Denies complaints chest pain or pressure palpitations or feeling of rapid heart rate.       Objective     Physical Exam  Vitals and nursing note reviewed.   Constitutional:       General: She is not in acute distress.     Appearance: She is not ill-appearing or toxic-appearing.   HENT:      Head: Normocephalic and atraumatic.      Nose: Nose normal.      Mouth/Throat:      Mouth: Mucous membranes are moist.      Pharynx: Oropharynx is clear.   Cardiovascular:      Rate and Rhythm: Normal rate and regular rhythm.      Pulses: Normal pulses.      Heart sounds: Normal heart sounds.      No friction rub. No gallop.   Pulmonary:      Effort: Pulmonary effort is normal.      Breath sounds: Normal breath sounds.      Comments: Oxygen via nasal cannula.  No conversational dyspnea appreciated.  No tachypnea.  No pain with deep aspiration.  Minimal expiratory wheezing noted in upper lobes  Abdominal:      General: Bowel sounds are normal.      Palpations: Abdomen is soft.   Musculoskeletal:      Cervical back: Normal range of motion.      Right lower leg: No edema.      Left lower leg: No edema.      Comments: Maintains movement and sensation distally.   Skin:     General: Skin is warm and dry.      Capillary Refill: Capillary refill takes less than 2 seconds.   Neurological:      Mental Status: She is alert and oriented to person, place, and time. Mental status is at baseline.   Psychiatric:         Mood and Affect: Mood normal.         Behavior: Behavior normal.         Thought Content: Thought content normal.         Judgment: Judgment normal.         Last Recorded Vitals  Blood pressure 124/65, pulse 96, temperature 36.5 °C (97.7 °F), temperature source Oral, resp. rate 16, height 1.651 m (5' 5\"), weight 66 kg (145 lb 8.1 oz), SpO2 98%.  Intake/Output " last 3 Shifts:  I/O last 3 completed shifts:  In: 770 (11.7 mL/kg) [P.O.:170; I.V.:300 (4.5 mL/kg); IV Piggyback:300]  Out: 1175 (17.8 mL/kg) [Urine:1125 (0.5 mL/kg/hr); Blood:50]  Weight: 66 kg     Relevant Results  Results for orders placed or performed during the hospital encounter of 01/09/25 (from the past 24 hours)   CBC   Result Value Ref Range    WBC 5.3 4.4 - 11.3 x10*3/uL    nRBC 0.0 0.0 - 0.0 /100 WBCs    RBC 3.53 (L) 4.00 - 5.20 x10*6/uL    Hemoglobin 10.1 (L) 12.0 - 16.0 g/dL    Hematocrit 32.5 (L) 36.0 - 46.0 %    MCV 92 80 - 100 fL    MCH 28.6 26.0 - 34.0 pg    MCHC 31.1 (L) 32.0 - 36.0 g/dL    RDW 16.9 (H) 11.5 - 14.5 %    Platelets 105 (L) 150 - 450 x10*3/uL   aPTT   Result Value Ref Range    aPTT 25.5 22.0 - 32.5 seconds   Basic Metabolic Panel   Result Value Ref Range    Glucose 141 (H) 74 - 99 mg/dL    Sodium 141 136 - 145 mmol/L    Potassium 4.7 3.5 - 5.3 mmol/L    Chloride 101 98 - 107 mmol/L    Bicarbonate 35 (H) 21 - 32 mmol/L    Anion Gap 10 10 - 20 mmol/L    Urea Nitrogen 28 (H) 6 - 23 mg/dL    Creatinine 1.03 0.50 - 1.05 mg/dL    eGFR 53 (L) >60 mL/min/1.73m*2    Calcium 8.9 8.6 - 10.3 mg/dL   CBC   Result Value Ref Range    WBC 6.7 4.4 - 11.3 x10*3/uL    nRBC 0.0 0.0 - 0.0 /100 WBCs    RBC 3.65 (L) 4.00 - 5.20 x10*6/uL    Hemoglobin 10.3 (L) 12.0 - 16.0 g/dL    Hematocrit 33.2 (L) 36.0 - 46.0 %    MCV 91 80 - 100 fL    MCH 28.2 26.0 - 34.0 pg    MCHC 31.0 (L) 32.0 - 36.0 g/dL    RDW 16.1 (H) 11.5 - 14.5 %    Platelets 112 (L) 150 - 450 x10*3/uL   APTT   Result Value Ref Range    aPTT 24.3 22.0 - 32.5 seconds     *Note: Due to a large number of results and/or encounters for the requested time period, some results have not been displayed. A complete set of results can be found in Results Review.         Assessment/Plan   Assessment & Plan  Closed fracture of neck of left femur, initial encounter    Mixed hyperlipidemia    Acute on chronic hypoxic respiratory failure (Multi)    History of  DVT (deep vein thrombosis)    History of CHF (congestive heart failure)    Acute pulmonary embolism (Multi)      L femur fracture  Acute PE  HFpEF  CAD s/p PCI to RCA  MVR s/p ARMANI  Acute on chronic respiratory failure on 2L NC  COPD  Hx DVT  Hx CVA  Dementia     1/10: As above. 83 yo F with left femur fracture with cardiac history of CAD, HFpEF, and MVR s/p ARMANI. We were consulted for cardiac clearance for hip repair. The patient denies any chest discomfort, palpitations, SOB, lightheadedness. She appears euvolemic upon exam. Labs today with sodium 141, potassium 4.5, bicarb 36, creatinine 1.11, hemoglobin 11.5. Blood pressures have been normotensive with most recent reading of 108/47. SpO2 95% on 3L NC. Telemetry with sinus rhythm without ectopy, rates in the 80s-90s BPM. No initial EKG or CXR has been done; will order both. Patient with known risks of CAD, CHF, and cerebrovascular disease. Using the RCRI risk calculator the patient is considered a class 3 risk, giving her a 15% risk of death, myocardial infarction or cardiac arrest 30 days following surgery. In the absence of acute coronary syndrome, significant fluid overload, or life threatening arrhythmias, I believe this patient can proceed with this needed surgery pending her EKG and CXR, understanding his baseline risks which at this time are not modifiable. We will continue to follow this patient with you. Will discuss with my collaborating physician Dr. Spaulding.     1/11: Early this morning a rapid response was called for desaturation. CXR showed some pulmonary vascular congestion. . D-dimer elevated at 7.42. A VQ scan has been ordered as well. As previously noted, she was on Xarelto for hx DVT but was taken off in November 2024. She was given 40 mg IV Lasix this AM. She is currently on HIFLOW NC SpO2 100%. Telemetry with sinus tachycardia in the 100's BPM. Labs today with sodium 143, potassium 4.3, creatinine 1.26, hemoglobin 11.4. Nephrology has  been consulted for AYSE. She has a preserved EF of 60-65% on her last TTE in 8/2024. Will recheck a limited TTE to assess for any changes. Await VQ scan. Will defer further diuretics to nephrology. Will continue to follow closely with you. Will discuss with my collaborating physician Dr. Spaulding.      1/12: VQ scan yesterday shows acute PE. TTE yesterday with EF 55-60%, grade II left ventricular diastolic filling with elevated L atrial pressure, left atrium severely dilated, right atrium moderately dilated, moderate to severe MVR. BLE US negative. Heparin gtt has been started. Pulmonology now following. Ortho surgery delayed for now. Nephrology now following for AYSE, diuretics on hold. Blood pressures are normotensive with most recent reading of 132/57. Continue Coreg. SpO2 96% on 3L NC. Telemetry with sinus rhythm without significant ectopy. Labs today with sodium 141, potassium 4.2, creatinine 1.21, hemoglobin 10.3. Will continue to follow closely with you. Will discuss with my collaborating physician Dr. Spaulding.      1/13: Stable overnight.  Denies complaints chest pain or pressure palpitations or feeling rapid heart rate.  Breathing comfortably nasal cannula oxygen.  Continues on heparin drip for incidental finding of pulmonary emboli.  Patient is going for surgery to repair left femur fracture today.  She is euvolemic on examination.  Chest pain-free and reports no syncope.  Her blood pressure stable 121/54.  Creatinine 1.25.  Obviously she is at some increased risk given her acute pulmonary emboli and poor inspiratory effort, however at this point she appears to be optimized as best as possible for the current situation.  Will follow with you postoperatively.    1/14: Improved over the past 24 hours.  Respiratory status is stable.  She continues on nasal cannula oxygen with no conversational dyspnea.  She is euvolemic on examination.  She did undergo a left femur fracture repair yesterday.  She tolerated this  procedure well.  Surgical services have deferred further anticoagulation to our service.  Patient does remain on a heparin drip for acute pulmonary emboli.  Given the proximity of her surgery would continue on heparin drip today with plan to convert to direct oral anticoagulant for  therapeutic treatment of PE as well as DVT prophylaxis tomorrow.  Otherwise her creatinine is stable at 1.03 with a hemoglobin of 10.3.  Blood pressure 124/65 with a pulse ox of 98.  Will follow with you.    I spent 35 minutes in the professional and overall care of this patient.      [place], APRN-CNP

## 2025-01-15 LAB
ANION GAP SERPL CALCULATED.3IONS-SCNC: 8 MMOL/L (ref 10–20)
APTT PPP: 48.5 SECONDS (ref 22–32.5)
APTT PPP: 81.5 SECONDS (ref 22–32.5)
BUN SERPL-MCNC: 33 MG/DL (ref 6–23)
CALCIUM SERPL-MCNC: 8.5 MG/DL (ref 8.6–10.3)
CHLORIDE SERPL-SCNC: 98 MMOL/L (ref 98–107)
CO2 SERPL-SCNC: 35 MMOL/L (ref 21–32)
CREAT SERPL-MCNC: 1.08 MG/DL (ref 0.5–1.05)
EGFRCR SERPLBLD CKD-EPI 2021: 50 ML/MIN/1.73M*2
ERYTHROCYTE [DISTWIDTH] IN BLOOD BY AUTOMATED COUNT: 17 % (ref 11.5–14.5)
GLUCOSE SERPL-MCNC: 126 MG/DL (ref 74–99)
HCT VFR BLD AUTO: 28.3 % (ref 36–46)
HGB BLD-MCNC: 8.9 G/DL (ref 12–16)
MCH RBC QN AUTO: 28.4 PG (ref 26–34)
MCHC RBC AUTO-ENTMCNC: 31.4 G/DL (ref 32–36)
MCV RBC AUTO: 90 FL (ref 80–100)
NRBC BLD-RTO: 0 /100 WBCS (ref 0–0)
PLATELET # BLD AUTO: 117 X10*3/UL (ref 150–450)
POTASSIUM SERPL-SCNC: 4.6 MMOL/L (ref 3.5–5.3)
RBC # BLD AUTO: 3.13 X10*6/UL (ref 4–5.2)
SODIUM SERPL-SCNC: 136 MMOL/L (ref 136–145)
WBC # BLD AUTO: 6.6 X10*3/UL (ref 4.4–11.3)

## 2025-01-15 PROCEDURE — 97162 PT EVAL MOD COMPLEX 30 MIN: CPT | Mod: GP

## 2025-01-15 PROCEDURE — 2500000005 HC RX 250 GENERAL PHARMACY W/O HCPCS: Performed by: ORTHOPAEDIC SURGERY

## 2025-01-15 PROCEDURE — 85730 THROMBOPLASTIN TIME PARTIAL: CPT | Performed by: ORTHOPAEDIC SURGERY

## 2025-01-15 PROCEDURE — 99232 SBSQ HOSP IP/OBS MODERATE 35: CPT | Performed by: PHYSICIAN ASSISTANT

## 2025-01-15 PROCEDURE — 80048 BASIC METABOLIC PNL TOTAL CA: CPT

## 2025-01-15 PROCEDURE — 1200000002 HC GENERAL ROOM WITH TELEMETRY DAILY

## 2025-01-15 PROCEDURE — 2500000004 HC RX 250 GENERAL PHARMACY W/ HCPCS (ALT 636 FOR OP/ED): Performed by: ORTHOPAEDIC SURGERY

## 2025-01-15 PROCEDURE — 2500000001 HC RX 250 WO HCPCS SELF ADMINISTERED DRUGS (ALT 637 FOR MEDICARE OP): Performed by: ORTHOPAEDIC SURGERY

## 2025-01-15 PROCEDURE — 97110 THERAPEUTIC EXERCISES: CPT | Mod: GP

## 2025-01-15 PROCEDURE — 2500000001 HC RX 250 WO HCPCS SELF ADMINISTERED DRUGS (ALT 637 FOR MEDICARE OP)

## 2025-01-15 PROCEDURE — 36415 COLL VENOUS BLD VENIPUNCTURE: CPT | Performed by: INTERNAL MEDICINE

## 2025-01-15 PROCEDURE — 94640 AIRWAY INHALATION TREATMENT: CPT

## 2025-01-15 PROCEDURE — 99232 SBSQ HOSP IP/OBS MODERATE 35: CPT

## 2025-01-15 PROCEDURE — 97166 OT EVAL MOD COMPLEX 45 MIN: CPT | Mod: GO

## 2025-01-15 PROCEDURE — 36415 COLL VENOUS BLD VENIPUNCTURE: CPT

## 2025-01-15 PROCEDURE — 85027 COMPLETE CBC AUTOMATED: CPT

## 2025-01-15 PROCEDURE — 97530 THERAPEUTIC ACTIVITIES: CPT | Mod: GP

## 2025-01-15 PROCEDURE — 85730 THROMBOPLASTIN TIME PARTIAL: CPT | Performed by: INTERNAL MEDICINE

## 2025-01-15 RX ADMIN — Medication 4 L/MIN: at 02:08

## 2025-01-15 RX ADMIN — POLYETHYLENE GLYCOL 3350 17 G: 17 POWDER, FOR SOLUTION ORAL at 09:30

## 2025-01-15 RX ADMIN — Medication 4 L/MIN: at 07:16

## 2025-01-15 RX ADMIN — CARVEDILOL 3.12 MG: 3.12 TABLET, FILM COATED ORAL at 09:30

## 2025-01-15 RX ADMIN — ATORVASTATIN CALCIUM 40 MG: 40 TABLET, FILM COATED ORAL at 20:41

## 2025-01-15 RX ADMIN — PANTOPRAZOLE SODIUM 40 MG: 40 TABLET, DELAYED RELEASE ORAL at 09:30

## 2025-01-15 RX ADMIN — PANTOPRAZOLE SODIUM 40 MG: 40 TABLET, DELAYED RELEASE ORAL at 20:41

## 2025-01-15 RX ADMIN — Medication 2 L/MIN: at 20:58

## 2025-01-15 RX ADMIN — LEVOTHYROXINE SODIUM 25 MCG: 0.03 TABLET ORAL at 05:54

## 2025-01-15 RX ADMIN — GABAPENTIN 100 MG: 100 CAPSULE ORAL at 20:41

## 2025-01-15 RX ADMIN — Medication 4 L/MIN: at 02:06

## 2025-01-15 RX ADMIN — CARVEDILOL 3.12 MG: 3.12 TABLET, FILM COATED ORAL at 20:41

## 2025-01-15 RX ADMIN — NASAL 1 SPRAY: 6.5 SPRAY NASAL at 20:39

## 2025-01-15 RX ADMIN — FLUTICASONE FUROATE AND VILANTEROL TRIFENATATE 1 PUFF: 100; 25 POWDER RESPIRATORY (INHALATION) at 07:15

## 2025-01-15 RX ADMIN — NASAL 1 SPRAY: 6.5 SPRAY NASAL at 16:09

## 2025-01-15 RX ADMIN — OXYCODONE 5 MG: 5 TABLET ORAL at 02:27

## 2025-01-15 RX ADMIN — MORPHINE SULFATE 1 MG: 2 INJECTION, SOLUTION INTRAMUSCULAR; INTRAVENOUS at 00:09

## 2025-01-15 RX ADMIN — HEPARIN SODIUM 1000 UNITS/HR: 10000 INJECTION, SOLUTION INTRAVENOUS at 16:13

## 2025-01-15 RX ADMIN — Medication 4 L/MIN: at 07:15

## 2025-01-15 RX ADMIN — TIOTROPIUM BROMIDE INHALATION SPRAY 2 PUFF: 3.12 SPRAY, METERED RESPIRATORY (INHALATION) at 07:15

## 2025-01-15 RX ADMIN — DULOXETINE HYDROCHLORIDE 20 MG: 20 CAPSULE, DELAYED RELEASE ORAL at 20:42

## 2025-01-15 ASSESSMENT — COGNITIVE AND FUNCTIONAL STATUS - GENERAL
DAILY ACTIVITIY SCORE: 9
MOVING TO AND FROM BED TO CHAIR: TOTAL
TOILETING: TOTAL
EATING MEALS: A LITTLE
TOILETING: TOTAL
WALKING IN HOSPITAL ROOM: TOTAL
HELP NEEDED FOR BATHING: TOTAL
MOBILITY SCORE: 7
STANDING UP FROM CHAIR USING ARMS: TOTAL
CLIMB 3 TO 5 STEPS WITH RAILING: TOTAL
MOVING FROM LYING ON BACK TO SITTING ON SIDE OF FLAT BED WITH BEDRAILS: A LOT
DRESSING REGULAR UPPER BODY CLOTHING: A LITTLE
TURNING FROM BACK TO SIDE WHILE IN FLAT BAD: TOTAL
DRESSING REGULAR LOWER BODY CLOTHING: TOTAL
TURNING FROM BACK TO SIDE WHILE IN FLAT BAD: A LOT
WALKING IN HOSPITAL ROOM: TOTAL
DRESSING REGULAR UPPER BODY CLOTHING: TOTAL
MOBILITY SCORE: 11
HELP NEEDED FOR BATHING: A LOT
DRESSING REGULAR LOWER BODY CLOTHING: TOTAL
CLIMB 3 TO 5 STEPS WITH RAILING: TOTAL
MOVING FROM LYING ON BACK TO SITTING ON SIDE OF FLAT BED WITH BEDRAILS: A LITTLE
PERSONAL GROOMING: A LITTLE
PERSONAL GROOMING: A LOT
DAILY ACTIVITIY SCORE: 14
STANDING UP FROM CHAIR USING ARMS: A LITTLE
MOVING TO AND FROM BED TO CHAIR: TOTAL

## 2025-01-15 ASSESSMENT — PAIN - FUNCTIONAL ASSESSMENT
PAIN_FUNCTIONAL_ASSESSMENT: 0-10
PAIN_FUNCTIONAL_ASSESSMENT: 0-10
PAIN_FUNCTIONAL_ASSESSMENT: FLACC (FACE, LEGS, ACTIVITY, CRY, CONSOLABILITY)
PAIN_FUNCTIONAL_ASSESSMENT: FLACC (FACE, LEGS, ACTIVITY, CRY, CONSOLABILITY)
PAIN_FUNCTIONAL_ASSESSMENT: 0-10

## 2025-01-15 ASSESSMENT — ACTIVITIES OF DAILY LIVING (ADL)
ADL_ASSISTANCE: INDEPENDENT
ADL_ASSISTANCE: INDEPENDENT
BATHING_ASSISTANCE: MODERATE

## 2025-01-15 ASSESSMENT — PAIN SCALES - GENERAL
PAINLEVEL_OUTOF10: 0 - NO PAIN
PAINLEVEL_OUTOF10: 5 - MODERATE PAIN
PAINLEVEL_OUTOF10: 0 - NO PAIN
PAINLEVEL_OUTOF10: 0 - NO PAIN
PAINLEVEL_OUTOF10: 10 - WORST POSSIBLE PAIN
PAINLEVEL_OUTOF10: 0 - NO PAIN

## 2025-01-15 ASSESSMENT — PAIN DESCRIPTION - DESCRIPTORS
DESCRIPTORS: ACHING;SHOOTING
DESCRIPTORS: ACHING;SHOOTING

## 2025-01-15 ASSESSMENT — PAIN SCALES - WONG BAKER: WONGBAKER_NUMERICALRESPONSE: NO HURT

## 2025-01-15 NOTE — ASSESSMENT & PLAN NOTE
Continue atorvastatin 40 mg    Thrombocytopenia  Mildly improved today 104--105--112--117 this AM  Will need to monitor close given currently on Heparin gtt    Disposition: Pending updated PT OT eval. Family open to SNF/rehab placement.     1/14/25: Evaluated s/p left hip hemiarthroplasty with Dr. Tony yesterday POD #1. Adult son and spouse bedside and updated on POC. Patient mildy confused but per family suspect underlying dementia. They report her mentation is near baseline. Patient denies chest pain. Reports post operative pain with exertion. Denies shortness of breath, no nausea/vomiting. Remains on heparin gtt for PE this hospitalization with plans to convert to DOAC prior to discharge. Appreciate ongoing recommendations from pulmonology and cardiology.     1/15/25: POD #2 left hip olena-arthroplasty. Denies pain or shortness of breath though she remains on her baseline 2l NC. States she slept well overnight. Remains on heparin gtt as stated above for PE. Appreciate ongoings reccs from pulm, cardiology. Pending placement as requested by family.

## 2025-01-15 NOTE — PROGRESS NOTES
Occupational Therapy    Evaluation/Treatment    Patient Name: Anitha Welch  MRN: [id number]  Department: [address]  Room: [address] [place] [address]-  Today's Date: 01/15/25  Time Calculation  Start Time: 1000  Stop Time: 1015  Time Calculation (min): 15 min       Assessment:  OT Assessment: Referral received, chart reviewed, and evaluation complete.  Presents with fall and s/p left JASON with precautions.  Would benefit from acute OT services.  Recommend moderate intenisty upon discharge  Prognosis: Good  Barriers to Discharge Home: Physical needs, Caregiver assistance  Caregiver Assistance: Caregiver assistance needed per identified barriers - however, level of patient's required assistance exceeds assistance available at home  Physical Needs: 24hr mobility assistance needed  Evaluation/Treatment Tolerance: Patient limited by pain  Medical Staff Made Aware: Yes  End of Session Communication: Bedside nurse  End of Session Patient Position: Up in chair, Alarm on  Prognosis: Good  Evaluation/Treatment Tolerance: Patient limited by pain  Medical Staff Made Aware: Yes  Plan:  Treatment Interventions: ADL retraining, Functional transfer training, Patient/family training, Equipment evaluation/education, Neuromuscular reeducation, Compensatory technique education  OT Frequency: 4 times per week  OT Discharge Recommendations: Moderate intensity level of continued care  Equipment Recommended upon Discharge: Wheeled walker  OT Recommended Transfer Status: Assist of 2, Maximum assist  OT - OK to Discharge: Yes  Treatment Interventions: ADL retraining, Functional transfer training, Patient/family training, Equipment evaluation/education, Neuromuscular reeducation, Compensatory technique education    Subjective   Current Problem:  1. Closed fracture of neck of left femur, initial encounter  Case Request Operating Room: Hip Hemiarthrotplasty    Case Request Operating Room: Hip Hemiarthrotplasty    Walker rolling    oxyCODONE (Roxicodone) 5 mg  immediate release tablet    CANCELED: Case Request Operating Room: ARTHROPLASTY, HIP, PARTIAL    CANCELED: Case Request Operating Room: ARTHROPLASTY, HIP, PARTIAL      2. Fall, initial encounter        3. History of CHF (congestive heart failure)        4. Dyspnea, unspecified type  CANCELED: Transthoracic Echo (TTE) Complete    CANCELED: Transthoracic Echo (TTE) Complete      5. CHF (congestive heart failure), NYHA class II, acute, diastolic  CANCELED: Transthoracic Echo (TTE) Complete    CANCELED: Transthoracic Echo (TTE) Complete      6. Chronic respiratory failure with hypoxia (Multi)  CANCELED: Transthoracic Echo (TTE) Complete    CANCELED: Transthoracic Echo (TTE) Complete      7. Severe mitral valve regurgitation  CANCELED: Transthoracic Echo (TTE) Complete    CANCELED: Transthoracic Echo (TTE) Complete      8. HFrEF (heart failure with reduced ejection fraction)  CANCELED: Transthoracic Echo (TTE) Complete    CANCELED: Transthoracic Echo (TTE) Complete      9. Hypertensive heart disease with heart failure  CANCELED: Transthoracic Echo (TTE) Complete    CANCELED: Transthoracic Echo (TTE) Complete      10. Acute on chronic diastolic HF (heart failure)  Transthoracic Echo (TTE) Limited    Transthoracic Echo (TTE) Limited      11. History of DVT (deep vein thrombosis)  Vascular US Lower Extremity Venous Duplex Bilateral    Vascular US Lower Extremity Venous Duplex Bilateral      12. Other pulmonary embolism without acute cor pulmonale, unspecified chronicity (Multi)  Vascular US Lower Extremity Venous Duplex Bilateral    Vascular US Lower Extremity Venous Duplex Bilateral      13. Cardiac murmur, unspecified  Transthoracic Echo (TTE) Limited        General:   OT Received On: 01/15/25  General  Reason for Referral: decreased functional status due to fall  Referred By: Jose Rodriguez MD  Past Medical History Relevant to Rehab: dementia, CHF, COPD, AAA, DVT, CVA, CKD, CAD, OA, OP, chronic respiratory failure on 2 L  nasal cannula  Family/Caregiver Present: No  Co-Treatment: PT  Co-Treatment Reason: partial cotreat to maximize patient safety, mobility and participation  Prior to Session Communication: Bedside nurse  Patient Position Received: Bed, 3 rail up, Alarm on  General Comment: 84 year old WF admitted from home s/p fall resulting in left femur fx. S/p surgical intervention JASON with posterior percautions   Precautions:  Hearing/Visual Limitations: [place], wears glasses  LE Weight Bearing Status: Weight Bearing as Tolerated  Medical Precautions: Fall precautions, Oxygen therapy device and L/min  Post-Surgical Precautions: Left hip precautions  Precautions Comment: has 4L 02 per nasal cannula    Vital Sign (Past 2hrs)                 Pain:  Pain Assessment  Pain Assessment: 0-10  0-10 (Numeric) Pain Score: 0 - No pain    Objective   Cognition:  Overall Cognitive Status: Impaired  Orientation Level: Disoriented to time  Safety Judgment: Decreased awareness of need for safety precautions  Memory: Exceptions to WFL  Safety/Judgement: Exceptions to WFL  Insight: Moderate  Impulsive: Moderately  Processing Speed: Delayed           Home Living:  Type of Home: House  Lives With: Spouse  Home Adaptive Equipment: Walker rolling or standard  Home Layout: Multi-level  Alternate Level Stairs-Rails: Both  Home Access: Stairs to enter with rails  Entrance Stairs-Rails: Right  Entrance Stairs-Number of Steps: 3  Bathroom Shower/Tub: Walk-in shower  Bathroom Equipment: Grab bars in shower  Prior Function:  Level of [place]: Independent with ADLs and functional transfers  Receives Help From: Family  ADL Assistance: Independent  Homemaking Assistance: Needs assistance  Ambulatory Assistance: Independent  Vocational: Retired  Hand Dominance: Right  IADL History:     ADL:  Eating Assistance: Independent  Grooming Assistance: Minimal  Bathing Assistance: Moderate  UE Dressing Assistance: Minimal  LE Dressing Assistance: Maximal  Toileting  Assistance with Device: Maximal  Functional Assistance: Maximal    Activity Tolerance:  Endurance: Decreased tolerance for upright activites       Bed Mobility/Transfers: Bed Mobility  Bed Mobility: Yes  Bed Mobility 1  Bed Mobility 1: Supine to sitting  Level of Assistance 1: Maximum assistance  Bed Mobility Comments 1: maximum assist x2    Transfers  Transfer: Yes  Transfer 1  Transfer From 1: Bed to  Transfer to 1: Stand  Technique 1: Sit to stand  Transfer Device 1: Walker  Transfer Level of Assistance 1: Maximum assistance  Trials/Comments 1: maximum assist x2  Transfers 2  Transfer From 2: Stand to  Transfer to 2: Chair with arms  Technique 2: Stand to sit  Transfer Device 2: Walker  Transfer Level of Assistance 2: Maximum assistance  Trials/Comments 2: maximum assist x2    Sitting Balance:  Static Sitting Balance  Static Sitting-Balance Support: Feet supported  Static Sitting-Level of Assistance: Close supervision  Standing Balance:  Static Standing Balance  Static Standing-Balance Support: Bilateral upper extremity supported  Static Standing-Level of Assistance: Maximum assistance  Sensation:  Light Touch: No apparent deficits  Strength:  Strength Comments: BUE 3+/5 grossly     Coordination:  Movements are Fluid and Coordinated: No   Hand Function:  Hand Function  Gross Grasp: Functional  Coordination: Impaired        Outcome Measures: [facility] Daily Activity  Putting on and taking off regular lower body clothing: Total  Bathing (including washing, rinsing, drying): A lot  Putting on and taking off regular upper body clothing: A little  Toileting, which includes using toilet, bedpan or urinal: Total  Taking care of personal grooming such as brushing teeth: A little  Eating Meals: None  Daily Activity - Total Score: 14        Education Documentation  Precautions, taught by Rafia Huang OT at 1/15/2025 11:01 AM.  Learner: Patient  Readiness: Acceptance  Method: Explanation, Demonstration  Response:  Needs Reinforcement  Comment: instructed on call light and fall precautions      Goals:      Problem: Bathing  Goal: STG - Patient will bathe lower body with minimal assist and long handled equipment  Outcome: Progressing     Problem: Dressings Lower Extremities  Goal: LTG - Patient will dress lower body with  minimal assist and AE   Outcome: Progressing     Problem: Functional Mobility  Goal: Perform functional mobility to and from the bathroom with minimal assist and 2ww  Outcome: Progressing     Problem: Toileting  Goal: STG - Patient will complete toileting tasks with minimal assist and 2ww  Outcome: Progressing     Problem: OT Transfers  Goal: LTG - Patient will perform all functional transfers with 2ww and minimal assist  Outcome: Progressing

## 2025-01-15 NOTE — PROGRESS NOTES
Anitha Welch is a 84 y.o. female on day 5 of admission presenting with Closed fracture of neck of left femur, initial encounter.      Subjective   POD #1 left hip hemiarthroplasty. Mildly confused but reportedly near baseline mentation. Denies chest pain. States her left hip feels ok when she is at rest. Stable on baseline 2L O2 via NC. Denies shortness of breath. Denies nausea/vomiting. Family bedside.        Objective     Last Recorded Vitals  /52 (BP Location: Left arm, Patient Position: Sitting)   Pulse 92   Temp 36.9 °C (98.4 °F) (Oral)   Resp 16   Wt 66 kg (145 lb 8.1 oz)   SpO2 98%   Intake/Output last 3 Shifts:    Intake/Output Summary (Last 24 hours) at 1/15/2025 1256  Last data filed at 1/15/2025 0900  Gross per 24 hour   Intake 800 ml   Output 900 ml   Net -100 ml       Admission Weight  Weight: 59 kg (130 lb) (01/09/25 2357)    Daily Weight  01/11/25 : 66 kg (145 lb 8.1 oz)    Image Results  XR pelvis 1-2 views  Narrative: Interpreted By:  Jose Rodriguez,   STUDY:  XR PELVIS 1-2 VIEWS; 1/13/2025 5:31 pm      INDICATION:  Signs/Symptoms:fracture. Postop hip assessment.      COMPARISON:  01/10/2025      ACCESSION NUMBER(S):  [id number]      ORDERING CLINICIAN:  JASON PATTON      TECHNIQUE:  AP pelvis, one view, portable      FINDINGS:  No fractures or destructive lesions are identified. There is a new  left total hip prosthesis present with soft tissue air adjacent to  the prosthesis. There is no change in the right hip prosthesis.  Patient is significantly rotated to the right. The upper half of the  pelvis is omitted from the field-of-view.      Impression: Status post left hip arthroplasty in this patient with a history of  left femoral neck fracture.      MACRO:  none      Signed by: Jose Rodriguez 1/14/2025 8:42 AM  Dictation workstation:   [id number]      Physical Exam  Vitals and nursing note reviewed.   Constitutional:       Appearance: Normal appearance.   HENT:      Head:  Normocephalic and atraumatic.      Right Ear: External ear normal.      Left Ear: External ear normal.      Nose: Nose normal.      Mouth/Throat:      Mouth: Mucous membranes are moist.   Eyes:      Extraocular Movements: Extraocular movements intact.   Cardiovascular:      Rate and Rhythm: Normal rate and regular rhythm.      Pulses: Normal pulses.      Heart sounds: Normal heart sounds.      Comments: NSR on my asessment  Pulmonary:      Effort: Pulmonary effort is normal.      Breath sounds: Normal breath sounds.   Abdominal:      General: Bowel sounds are normal.      Palpations: Abdomen is soft.   Musculoskeletal:         General: Tenderness present.      Cervical back: Normal range of motion and neck supple.      Comments: ROM LLE limited due to post operative discomfort   Skin:     General: Skin is warm and dry.      Capillary Refill: Capillary refill takes less than 2 seconds.      Comments: Left hip dressing clean dry intact   Neurological:      General: No focal deficit present.      Mental Status: She is alert. Mental status is at baseline.      Comments: Pleasantly confused  Hx of underlying dementia per family   Psychiatric:         Mood and Affect: Mood normal.         Relevant Results               Assessment/Plan     Assessment & Plan  Closed fracture of neck of left femur, initial encounter  X-ray left hip with acute basicervical left femoral neck fracture with varus alignment.  Pain management  Bowel regimen  Encourage incentive spirometer  Orthopedic surgery consulted  PT/OT  OR cancelled 1/11/2025 due to worsening respiratory failure, positive PE  Pulmonary, cardiology, orthopedic surgery following, appreciate recommendations  Anticipate possible surgery tomorrow.  N.p.o. after midnight.  Heparin drip will need to be held 4 hours prior to surgery and resume to soon as possible after surgery.  Discussed with pulmonary and orthopedic surgery yesterday.    Acute pulmonary embolism (Multi)  Had  increased oxygen demand, improving  Elevated D Dimer-VQ high probability PE  Has a history of DVT however Xarelto was stopped due to anemia/thrombocytopenia, has been off for some time  Started on Heparin gtt, will need oral anticoagulation on discharge  Pulmonary consulted, appreciate recs  Ultrasound of lower extremities as above  Acute on chronic hypoxic respiratory failure (Multi)  On 3 L oxygen via nasal cannula, 2 L is baseline  Monitor pulse ox, wean oxygen as tolerated  Resume home inhalers  As needed DuoNebs    History of DVT (deep vein thrombosis)  History of DVT previously on Xarelto which was held on last hospital admission due to anemia and thrombocytopenia.  Patient was to follow with PCP to resume but has not to date  -found to have high prob for PE on VQ-started on heparin gtt, resume after surgery will need to be discharged on anticoagulation    History of CHF (congestive heart failure)  Echo 8/9/2024 ejection fraction of 60-65%. There are no regional wall motion abnormalities. The left ventricular cavity size is normal. There is mild concentric left ventricular hypertrophy. Spectral Doppler shows a normal pattern of left ventricular diastolic filling.   Did have vascular congestion on CXR-given one time dose of Lasix  Continue to hold torsemide  Resume home Coreg  Cardiology consulted for cardiac clearance  Repeat echocardiogram showed an ejection fraction of 55 to 60%, moderate to severe mitral valve regurgitation    Mixed hyperlipidemia  Continue atorvastatin 40 mg    Thrombocytopenia  Mildly improved today 104--105--112--117 this AM  Will need to monitor close given currently on Heparin gtt    Disposition: Pending updated PT OT eval. Family open to SNF/rehab placement.     1/14/25: Evaluated s/p left hip hemiarthroplasty with Dr. Tony yesterday POD #1. Adult son and spouse bedside and updated on POC. Patient mildy confused but per family suspect underlying dementia. They report her mentation is  near baseline. Patient denies chest pain. Reports post operative pain with exertion. Denies shortness of breath, no nausea/vomiting. Remains on heparin gtt for PE this hospitalization with plans to convert to DOAC prior to discharge. Appreciate ongoing recommendations from pulmonology and cardiology.     1/15/25: POD #2 left hip olena-arthroplasty. Denies pain or shortness of breath though she remains on her baseline 2l NC. States she slept well overnight. Remains on heparin gtt as stated above for PE. Appreciate ongoings reccs from pulm, cardiology. Pending placement as requested by family.                       Yanet Mcclelland, APRN-CNP

## 2025-01-15 NOTE — NURSING NOTE
Pts PTT came back 48.5.   APTT 38 to 52 (THERAPEUTIC): DO NOT CHANGE Infusion Rate.   Pts infusion kept at 1000 units/hr. Will replace bag when depleted. Care ongoing.

## 2025-01-15 NOTE — NURSING NOTE
Assumed care of pt. Respirations even and unlabored on 3L NC. BSSR complete. Pt sleeping at this time. No needs. Call light within reach, hip sx pillow in place. Care ongoing.

## 2025-01-15 NOTE — PROGRESS NOTES
"Anitha Welch is a 84 y.o. female on day 5 of admission presenting with Closed fracture of neck of left femur, initial encounter.    Subjective   Alert and oriented. Patient reports pain has decreased post-op.  Patient denies any new chest heaviness, chest pain, tightness, palpitations, feelings of rapid heart rate, or shortness of breath.       Objective     Physical Exam  Appearance: Alert, oriented, cooperative, in no acute distress. Well nourished & well hydrated.    Eyes: Conjunctiva pink with no redness or exudates. Eyelids without lesions. No scleral icterus.     ENT: Hearing grossly intact. External inspection of ears without lesions or erythema. no nasal flaring. moist oral mucosa, no tripoding, no drooling, no difficulty swallowing oral secretions.    Pulmonary:  Good chest wall excursion. No accessory muscle use or stridor. Oxygen NC, no conversational dyspnea, decreased lung sounds in all lobes    Cardiac:  No JVD. Regular rate and normal rhythm     Musculoskeletal: mild edema of left ankle, no edema of right lower extremity, skin warm    Neurological: no focal findings identified.    Psychiatric: Appropriate mood and affect.        Last Recorded Vitals  Blood pressure 115/52, pulse 92, temperature 36.9 °C (98.4 °F), temperature source Oral, resp. rate 16, height 1.651 m (5' 5\"), weight 66 kg (145 lb 8.1 oz), SpO2 100%.  Intake/Output last 3 Shifts:  I/O last 3 completed shifts:  In: 1020 (15.5 mL/kg) [P.O.:920; IV Piggyback:100]  Out: 1150 (17.4 mL/kg) [Urine:1150 (0.5 mL/kg/hr)]  Weight: 66 kg     Relevant Results  Results for orders placed or performed during the hospital encounter of 01/09/25 (from the past 24 hours)   APTT   Result Value Ref Range    aPTT 43.7 (H) 22.0 - 32.5 seconds   CBC   Result Value Ref Range    WBC 6.6 4.4 - 11.3 x10*3/uL    nRBC 0.0 0.0 - 0.0 /100 WBCs    RBC 3.13 (L) 4.00 - 5.20 x10*6/uL    Hemoglobin 8.9 (L) 12.0 - 16.0 g/dL    Hematocrit 28.3 (L) 36.0 - 46.0 %    MCV 90 " 80 - 100 fL    MCH 28.4 26.0 - 34.0 pg    MCHC 31.4 (L) 32.0 - 36.0 g/dL    RDW 17.0 (H) 11.5 - 14.5 %    Platelets 117 (L) 150 - 450 x10*3/uL   Basic Metabolic Panel   Result Value Ref Range    Glucose 126 (H) 74 - 99 mg/dL    Sodium 136 136 - 145 mmol/L    Potassium 4.6 3.5 - 5.3 mmol/L    Chloride 98 98 - 107 mmol/L    Bicarbonate 35 (H) 21 - 32 mmol/L    Anion Gap 8 (L) 10 - 20 mmol/L    Urea Nitrogen 33 (H) 6 - 23 mg/dL    Creatinine 1.08 (H) 0.50 - 1.05 mg/dL    eGFR 50 (L) >60 mL/min/1.73m*2    Calcium 8.5 (L) 8.6 - 10.3 mg/dL   aPTT   Result Value Ref Range    aPTT 81.5 (H) 22.0 - 32.5 seconds     *Note: Due to a large number of results and/or encounters for the requested time period, some results have not been displayed. A complete set of results can be found in Results Review.         Assessment/Plan   Assessment & Plan  Closed fracture of neck of left femur, initial encounter    Mixed hyperlipidemia    Acute on chronic hypoxic respiratory failure (Multi)    History of DVT (deep vein thrombosis)    History of CHF (congestive heart failure)    Acute pulmonary embolism (Multi)      L femur fracture  Acute PE  HFpEF  CAD s/p PCI to RCA  MVR s/p ARMANI  Acute on chronic respiratory failure on 2L NC  COPD  Hx DVT  Hx CVA  Dementia     1/10: As above. 83 yo F with left femur fracture with cardiac history of CAD, HFpEF, and MVR s/p ARMANI. We were consulted for cardiac clearance for hip repair. The patient denies any chest discomfort, palpitations, SOB, lightheadedness. She appears euvolemic upon exam. Labs today with sodium 141, potassium 4.5, bicarb 36, creatinine 1.11, hemoglobin 11.5. Blood pressures have been normotensive with most recent reading of 108/47. SpO2 95% on 3L NC. Telemetry with sinus rhythm without ectopy, rates in the 80s-90s BPM. No initial EKG or CXR has been done; will order both. Patient with known risks of CAD, CHF, and cerebrovascular disease. Using the RCRI risk calculator the patient is  considered a class 3 risk, giving her a 15% risk of death, myocardial infarction or cardiac arrest 30 days following surgery. In the absence of acute coronary syndrome, significant fluid overload, or life threatening arrhythmias, I believe this patient can proceed with this needed surgery pending her EKG and CXR, understanding his baseline risks which at this time are not modifiable. We will continue to follow this patient with you. Will discuss with my collaborating physician Dr. Spaulding.     1/11: Early this morning a rapid response was called for desaturation. CXR showed some pulmonary vascular congestion. . D-dimer elevated at 7.42. A VQ scan has been ordered as well. As previously noted, she was on Xarelto for hx DVT but was taken off in November 2024. She was given 40 mg IV Lasix this AM. She is currently on HIFLOW NC SpO2 100%. Telemetry with sinus tachycardia in the 100's BPM. Labs today with sodium 143, potassium 4.3, creatinine 1.26, hemoglobin 11.4. Nephrology has been consulted for AYSE. She has a preserved EF of 60-65% on her last TTE in 8/2024. Will recheck a limited TTE to assess for any changes. Await VQ scan. Will defer further diuretics to nephrology. Will continue to follow closely with you. Will discuss with my collaborating physician Dr. Spaulding.      1/12: VQ scan yesterday shows acute PE. TTE yesterday with EF 55-60%, grade II left ventricular diastolic filling with elevated L atrial pressure, left atrium severely dilated, right atrium moderately dilated, moderate to severe MVR. BLE US negative. Heparin gtt has been started. Pulmonology now following. Ortho surgery delayed for now. Nephrology now following for AYSE, diuretics on hold. Blood pressures are normotensive with most recent reading of 132/57. Continue Coreg. SpO2 96% on 3L NC. Telemetry with sinus rhythm without significant ectopy. Labs today with sodium 141, potassium 4.2, creatinine 1.21, hemoglobin 10.3. Will continue to follow  closely with you. Will discuss with my collaborating physician Dr. Spaulding.      1/13: Stable overnight.  Denies complaints chest pain or pressure palpitations or feeling rapid heart rate.  Breathing comfortably nasal cannula oxygen.  Continues on heparin drip for incidental finding of pulmonary emboli.  Patient is going for surgery to repair left femur fracture today.  She is euvolemic on examination.  Chest pain-free and reports no syncope.  Her blood pressure stable 121/54.  Creatinine 1.25.  Obviously she is at some increased risk given her acute pulmonary emboli and poor inspiratory effort, however at this point she appears to be optimized as best as possible for the current situation.  Will follow with you postoperatively.     1/14: Improved over the past 24 hours.  Respiratory status is stable.  She continues on nasal cannula oxygen with no conversational dyspnea.  She is euvolemic on examination.  She did undergo a left femur fracture repair yesterday.  She tolerated this procedure well.  Surgical services have deferred further anticoagulation to our service.  Patient does remain on a heparin drip for acute pulmonary emboli.  Given the proximity of her surgery would continue on heparin drip today with plan to convert to direct oral anticoagulant for  therapeutic treatment of PE as well as DVT prophylaxis tomorrow.  Otherwise her creatinine is stable at 1.03 with a hemoglobin of 10.3.  Blood pressure 124/65 with a pulse ox of 98.  Will follow with you.    1/15: Stable overnight.  Patient denies any chest pain, palpitations, feelings of rapid heart rate.  Patient is breathing oxygen comfortable on nasal cannula.  Patient reports her pain has decreased postoperatively.  Patient is euvolemic on exam, no signs of fluid overload very mild edema of left ankle, no right lower extremity edema.  Blood pressure stable, 115/52, heart rate 92, sodium 136, potassium 4.6, creatinine mildly elevated from day prior, 1.08,  hemoglobin 8.9, GFR 50.  No significant events on telemetry overnight, occasional singular PVC.  Patient will continue on heparin, and anticipate transfer to direct oral anticoagulation for therapeutic treatment of PE, and DVT prophylaxis upon discharge.  We will sign off at this time. Follow up with Dr. Foss in 3-4 weeks.        I spent 35 minutes in the professional and overall care of this patient.      Priya Holcomb PA-C

## 2025-01-15 NOTE — NURSING NOTE
Pt sitting up in chair still at this time. This RN asked pt if hey need anything for pain at this time, pt states they are very comfortable where they are sitting and do not want to take anything unless they must. Care ongoing.

## 2025-01-15 NOTE — CARE PLAN
Problem: Bathing  Goal: STG - Patient will bathe lower body with minimal assist and long handled equipment  Outcome: Progressing     Problem: Dressings Lower Extremities  Goal: LTG - Patient will dress lower body with  minimal assist and AE   Outcome: Progressing     Problem: Functional Mobility  Goal: Perform functional mobility to and from the bathroom with minimal assist and 2ww  Outcome: Progressing     Problem: Toileting  Goal: STG - Patient will complete toileting tasks with minimal assist and 2ww  Outcome: Progressing     Problem: OT Transfers  Goal: LTG - Patient will perform all functional transfers with 2ww and minimal assist  Outcome: Progressing

## 2025-01-15 NOTE — PROGRESS NOTES
[facility] spoke with pts  who provided choice of [facility], [facility] encouraged him to look at SNF list given and pick out 1-2 other SNF choices in the event [facility] does have availability, referral sent to [facility], await response. No precert needed.     Update:[facility] stating they will do on site visit 1/16.     Accepting SNF pending.      01/15/25 1524   Discharge Planning   Expected Discharge Disposition SNF

## 2025-01-15 NOTE — CARE PLAN
The patient's goals for the shift include rest, manage pain     The clinical goals for the shift include safety, control pain, monitor oxygen saturation    Over the shift, the patient did not make progress toward the following goals. Barriers to progression include na. Recommendations to address these barriers include na.    Problem: Pain - Adult  Goal: Verbalizes/displays adequate comfort level or baseline comfort level  Outcome: Progressing     Problem: Safety - Adult  Goal: Free from fall injury  Outcome: Progressing     Problem: Discharge Planning  Goal: Discharge to home or other facility with appropriate resources  Outcome: Progressing     Problem: Chronic Conditions and Co-morbidities  Goal: Patient's chronic conditions and co-morbidity symptoms are monitored and maintained or improved  Outcome: Progressing     Problem: Pain  Goal: Takes deep breaths with improved pain control throughout the shift  Outcome: Progressing  Goal: Turns in bed with improved pain control throughout the shift  Outcome: Progressing  Goal: Walks with improved pain control throughout the shift  Outcome: Progressing  Goal: Performs ADL's with improved pain control throughout shift  Outcome: Progressing  Goal: Participates in PT with improved pain control throughout the shift  Outcome: Progressing  Goal: Free from opioid side effects throughout the shift  Outcome: Progressing  Goal: Free from acute confusion related to pain meds throughout the shift  Outcome: Progressing     Problem: Respiratory  Goal: Clear secretions with interventions this shift  Outcome: Progressing  Goal: Minimize anxiety/maximize coping throughout shift  Outcome: Progressing  Goal: Minimal/no exertional discomfort or dyspnea this shift  Outcome: Progressing  Goal: No signs of respiratory distress (eg. Use of accessory muscles. Peds grunting)  Outcome: Progressing  Goal: Patent airway maintained this shift  Outcome: Progressing  Goal: Tolerate mechanical ventilation  evidenced by VS/agitation level this shift  Outcome: Progressing  Goal: Tolerate pulmonary toileting this shift  Outcome: Progressing  Goal: Verbalize decreased shortness of breath this shift  Outcome: Progressing  Goal: Wean oxygen to maintain O2 saturation per order/standard this shift  Outcome: Progressing  Goal: Increase self care and/or family involvement in next 24 hours  Outcome: Progressing     Problem: Skin  Goal: Decreased wound size/increased tissue granulation at next dressing change  Outcome: Progressing  Goal: Participates in plan/prevention/treatment measures  Outcome: Progressing  Flowsheets (Taken 1/15/2025 1124)  Participates in plan/prevention/treatment measures:   Discuss with provider PT/OT consult   Elevate heels   Increase activity/out of bed for meals  Goal: Prevent/manage excess moisture  Outcome: Progressing  Goal: Prevent/minimize sheer/friction injuries  Outcome: Progressing  Goal: Promote/optimize nutrition  Outcome: Progressing  Goal: Promote skin healing  Outcome: Progressing     Problem: Bathing  Goal: STG - Patient will bathe lower body with minimal assist and long handled equipment  Outcome: Progressing     Problem: Dressings Lower Extremities  Goal: LTG - Patient will dress lower body with  minimal assist and AE   Outcome: Progressing     Problem: Toileting  Goal: STG - Patient will complete toileting tasks with minimal assist and 2ww  Outcome: Progressing

## 2025-01-15 NOTE — NURSING NOTE
Assume care of patient. BSSR complete. Pt sitting in bed. Pt oriented x 2 at this time. Pt reassured that she will be staying here for the night and that she could rest here. No needs at this time. Call light within reach. Bed alarm engaged. Plan of care ongoing.

## 2025-01-15 NOTE — PROGRESS NOTES
"Pulmonary Daily Progress Note   Subjective    Anitha Welch is a 84 y.o. 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 States she is congested     Meds    Scheduled medications  atorvastatin, 40 mg, oral, Nightly  carvedilol, 3.125 mg, oral, BID  DULoxetine, 20 mg, oral, Nightly  tiotropium, 2 puff, inhalation, Daily   And  fluticasone furoate-vilanteroL, 1 puff, inhalation, Daily  gabapentin, 100 mg, oral, Nightly  levothyroxine, 25 mcg, oral, Daily  oxygen, 2 L/min, inhalation, Continuous - Inhalation  oxygen, , inhalation, Continuous - Inhalation  pantoprazole, 40 mg, oral, BID  perflutren lipid microspheres, 0.5-10 mL of dilution, intravenous, Once in imaging  perflutren lipid microspheres, 0.5-10 mL of dilution, intravenous, Once in imaging  perflutren protein A microsphere, 0.5 mL, intravenous, Once in imaging  perflutren protein A microsphere, 0.5 mL, intravenous, Once in imaging  polyethylene glycol, 17 g, oral, Daily  sulfur hexafluoride microsphr, 2 mL, intravenous, Once in imaging  sulfur hexafluoride microsphr, 2 mL, intravenous, Once in imaging  Tc-99m-albumin, 4 millicurie, intravenous, Once in imaging  [Held by provider] torsemide, 20 mg, oral, Daily      Continuous medications  heparin, 0-4,500 Units/hr, Last Rate: 1,000 Units/hr (01/15/25 0736)      PRN medications  PRN medications: acetaminophen **OR** acetaminophen **OR** acetaminophen, albuterol, heparin (porcine), ipratropium-albuteroL, melatonin, morphine, nitroglycerin, ondansetron **OR** ondansetron, oxyCODONE     Objective    Blood pressure 115/52, pulse 92, temperature 36.9 °C (98.4 °F), temperature source Oral, resp. rate 16, height 1.651 m (5' 5\"), weight 66 " kg (145 lb 8.1 oz), SpO2 100%.   Physical Exam   GENERAL: normal appearance. well nourished. No respiratory distress  HEAD/SINUSES: 2L NC  LUNGS: Symmetric chest. Good excursion. Equal breath sounds. no wheezing.   CARDIAC: regular rate and rhythm  EXTREMITIES: No edema, no varicose veins  NEURO: alert and oriented to self and place,   SKIN: Skin turgor normal.   PSYCH: Normal affect    Intake/Output Summary (Last 24 hours) at 1/15/2025 1134  Last data filed at 1/15/2025 0426  Gross per 24 hour   Intake 560 ml   Output 900 ml   Net -340 ml     Labs:   Results from last 72 hours   Lab Units 01/15/25  0554 01/14/25  0426 01/13/25  0532   SODIUM mmol/L 136 141 139   POTASSIUM mmol/L 4.6 4.7 4.2   CHLORIDE mmol/L 98 101 99   CO2 mmol/L 35* 35* 36*   BUN mg/dL 33* 28* 32*   CREATININE mg/dL 1.08* 1.03 1.25*   GLUCOSE mg/dL 126* 141* 114*   CALCIUM mg/dL 8.5* 8.9 8.6   ANION GAP mmol/L 8* 10 8*   EGFR mL/min/1.73m*2 50* 53* 42*      Results from last 72 hours   Lab Units 01/15/25  0554 01/14/25  0426 01/13/25  1039   WBC AUTO x10*3/uL 6.6 6.7 5.3   HEMOGLOBIN g/dL 8.9* 10.3* 10.1*   HEMATOCRIT % 28.3* 33.2* 32.5*   PLATELETS AUTO x10*3/uL 117* 112* 105*               Micro/ID:   Lab Results   Component Value Date    URINECULTURE No growth 01/11/2025    BLOODCULT No growth at 4 days -  FINAL REPORT 10/15/2024    BLOODCULT No growth at 4 days -  FINAL REPORT 10/15/2024     Summary of key imaging results from the last 24 hours  SAMMY 1/11/25  1. The left ventricular systolic function is normal, with a visually estimated ejection fraction of 55-60%.   2. Spectral Doppler shows a Grade II (pseudonormal pattern) of left ventricular diastolic filling with an elevated left atrial pressure.   3. There is normal right ventricular global systolic function.   4. The left atrium is moderate to severely dilated.   5. The right atrium is mild to moderately dilated.   6. There is no evidence of cardiac tamponade.   7. Moderate to severe  mitral valve regurgitation.    Impression   Anitha Welch is a 84 y.o. year old female patient is being seen by the pulmonary service for   Acute on chronic hypoxic respiratory failure -hemodynamically medically stable likely secondary to acute pulmonary embolism on 2L (baseline)  Acute Pulmonary embolism -likely thrombotic, but fat embolism cannot be excluded in the setting of bone fracture  Volume overload- mild. torsemide on hold.   Thrombocytopenia -monitor  Femur fracture -orthopedics recommending surgery    Recommendations   As follows:  Continue heparin drip monitor CBC, transition to DOAC prior to discharge, patient is post op day 2  Cardiology managing DOAC  Bilateral lower extremity duplex negative for DVT  IVC filter not necessary at this time  maintain pulse oximetry >92% baseline 2L  Baseline is 2 L nasal cannula  Bronchopulmonary hygiene: coughing and deep breathing, incentive spirometry  Risk for atelectasis  Nasal spray for congestion  Continue Spiriva and Breo.  As needed nebulizers  Will continue to follow    FELA Estrada-CNP   01/15/25 at 11:34 AM     -Only the Medical problems listed under impression were addressed today.   -Please contact primary team for all other concerns and medical problem  -Thank you for your consult       Disclaimer: Documentation completed with the information available at the time of input. Parts of this note may have been scribed or generated using voice dictation software, Dragon.  Homophonic errors may exist.  Please contact me directly if clarification is needed. The times in the chart may not be reflective of actual patient care times, interventions, or procedures. Documentation occurs after the physical care of the patient.

## 2025-01-15 NOTE — NURSING NOTE
Pt worked with PT/OT and is sat up in chair at this time. No further needs. Chair alarm on and call light within reach. Care ongoing.

## 2025-01-15 NOTE — PROGRESS NOTES
Physical Therapy    Physical Therapy Evaluation & Treatment    Patient Name: Anitha Welch  MRN: [id number]  Department: [address]  Room: [address] [place] [address]  Today's Date: 1/15/2025   Time Calculation  Start Time: 0935  Stop Time: 1014  Time Calculation (min): 39 min    Assessment/Plan   PT Assessment  PT Assessment Results: Decreased strength, Decreased range of motion, Decreased endurance, Impaired balance, Decreased mobility, Decreased coordination, Decreased cognition, Impaired judgement, Decreased safety awareness, Orthopedic restrictions, Pain  Rehab Prognosis: Good  Barriers to Discharge Home: Cognition needs, Physical needs  Cognition Needs: 24hr supervision for safety awareness needed, Recollection or understanding of precautions/restrictions limited, Insight of patient limited regarding functional ability/needs, Cognition-related high falls risk  Physical Needs: Stair navigation into home limited by function/safety, Ambulating household distances limited by function/safety, High falls risk due to function or environment  Evaluation/Treatment Tolerance: Patient limited by pain  Medical Staff Made Aware: Yes  Strengths: Support of extended family/friends  Barriers to Participation: Ability to acquire knowledge, Comorbidities  End of Session Communication: Bedside nurse  Assessment Comment: pt demonstrates decline in functional mobility compared to baseline level. At baseline, pt amb independently with AD PRN and IND with ADLs. pt req max A x 2 for all mobility this date. pt with impaired strength, ROM, balance and overall limited activity tolerance secondary to pain and weakness; pt would benefit from continued skilled PT to address the above deficits prior to and upon discharge in order to maximize functional performance and return to PLOF.  End of Session Patient Position: Up in chair, Alarm on (call bell in reach, all needs met. RN aware)   IP OR SWING BED PT PLAN  Inpatient or Swing Bed: Inpatient  PT  Plan  Treatment/Interventions: Bed mobility, Transfer training, Gait training, Stair training, Balance training, Neuromuscular re-education, Strengthening, Endurance training, Range of motion, Therapeutic exercise, Therapeutic activity, Home exercise program, Positioning, Postural re-education  PT Plan: Ongoing PT  PT Frequency: Daily  PT Discharge Recommendations: Moderate intensity level of continued care  Equipment Recommended upon Discharge: Wheeled walker  PT Recommended Transfer Status: Assist x2, Assistive device (RW)  PT - OK to Discharge: Yes (PT POC initiated)      Subjective     General Visit Information:  General  Reason for Referral: impaired mobility; pt is an 83 y/o female admitted to [facility] on 1/9 s/p fall; XR L hip shows acute basicervical left femoral neck fracture with varus alignment; pt is s/p L hip bipolar hemiarthroplasty on 1/13 by Dr. Tony.  Referred By: FELA lAcocer-CNP  Past Medical History Relevant to Rehab: dementia, CHF, COPD, AAA, DVT, CVA, CKD, CAD, OA, OP, chronic respiratory failure on 2 L nasal cannula  Family/Caregiver Present: No  Co-Treatment: OT  Co-Treatment Reason: partial cotreat to maximize patient safety, mobility and participation  Prior to Session Communication: Bedside nurse  Patient Position Received: Bed, 3 rail up, Alarm on  Preferred Learning Style: verbal, visual  General Comment: pt cleared for therapy via RN, agreeable to participate, received supine in bed; (+) 4L O2 via NC, IV, urinary cath  Home Living:  Home Living  Type of Home: House  Lives With: Spouse, Adult children  Home Adaptive Equipment: Walker rolling or standard, Cane  Home Layout: Multi-level, Stairs to alternate level with rails  Alternate Level Stairs-Rails: Both  Alternate Level Stairs-Number of Steps: flight  Home Access: Stairs to enter with rails  Entrance Stairs-Rails: Right  Entrance Stairs-Number of Steps: 3  Bathroom Shower/Tub: Walk-in shower  Bathroom Equipment: Grab bars in  "shower, Shower chair with back  Home Living Comments: pt reports bedroom/bathroom is upstairs; per chart review, pt has first floor setup  Prior Level of Function:  Prior Function Per Pt/Caregiver Report  Level of [place]: Independent with ADLs and functional transfers, Needs assistance with homemaking  Receives Help From: Family  ADL Assistance: Independent  Homemaking Assistance: Needs assistance  Driving/Transportation:  (son/ drives)  Homemaking Assistance Comments: Per patient son and spouse do all the I ADL's and driving.  Ambulatory Assistance: Independent (uses cane and walker PRN)  Vocational: Retired  Prior Function Comments: pt reports hx of falls  Precautions:  Precautions  Hearing/Visual Limitations: wears glasses  Medical Precautions: Fall precautions, Oxygen therapy device and L/min (4L O2 via NC)  Post-Surgical Precautions: Left hip precautions  Precautions Comment: L posterior hip precations; verbally reviewed precautions with pt; pt would benefit from reinforcement        Objective   Pain:  Pain Assessment  Pain Assessment: 0-10  0-10 (Numeric) Pain Score: 0 - No pain (pt reports \"im always in pain\" at end of session)  Cognition:  Cognition  Overall Cognitive Status: Impaired at baseline  Orientation Level: Disoriented to place, Disoriented to time, Disoriented to situation (pt reports she is in her bed at home and the year is \"[birth year]\"; therapist re-oriented pt to time, place and situation throughout session)  Following Commands: Follows one step commands with increased time  Safety Judgment: Decreased awareness of need for safety precautions  Cognition Comments: pt confused throughout session; questionable historian  Safety/Judgement: Exceptions to WFL (poor safety awareness)  Impulsive: Moderately  Processing Speed: Delayed    General Assessments:  General Observation  General Observation: pt is very confused throughout session               Activity Tolerance  Endurance: Decreased " tolerance for upright activites  Activity Tolerance Comments: limited secondary to pain, weakness  Rate of Perceived Exertion (RPE): 7/10    Sensation  Sensation Comment: reports paresthesias B LEs    Strength  Strength Comments: generalized weakness B LEs; L LE s/p bipolar hemiarthroplasty; R LE grossly >/= 3/5  Strength  Strength Comments: generalized weakness B LEs; L LE s/p bipolar hemiarthroplasty; R LE grossly >/= 3/5           Coordination  Movements are Fluid and Coordinated: No  Coordination Comment: decreased rate and accuracy of movement    Postural Control  Postural Control: Impaired  Posture Comment: fwd head, rounded shoulders    Static Sitting Balance  Static Sitting-Balance Support: Bilateral upper extremity supported, Feet supported  Static Sitting-Level of Assistance: Contact guard  Static Sitting-Comment/Number of Minutes: fair+/good-    Static Standing Balance  Static Standing-Balance Support: Bilateral upper extremity supported (on RW)  Static Standing-Level of Assistance: Maximum assistance (x2)  Static Standing-Comment/Number of Minutes: max A x 2 for stability and safety; poor static standing balance; pt with B soft knees  Dynamic Standing Balance  Dynamic Standing-Balance Support: Bilateral upper extremity supported (on RW)  Dynamic Standing-Level of Assistance: Maximum assistance (x2)  Dynamic Standing-Balance: Turning  Dynamic Standing-Comments: poor dynamic standing balance with max A x 2; + turn  Functional Assessments:  Bed Mobility  Bed Mobility: Yes  Bed Mobility 1  Bed Mobility 1: Supine to sitting  Level of Assistance 1: Maximum assistance, +2  Bed Mobility Comments 1: max A x 2 for trunk up and B LEs off bed; VCs for safe hand placement; HOB elevated ~30*  Bed Mobility 2  Bed Mobility  2: Scooting  Level of Assistance 2: Maximum assistance  Bed Mobility Comments 2: max A x 1 to scoot fwd on EOB with use of drawsheet    Transfers  Transfer: Yes  Transfer 1  Transfer From 1: Bed  to  Transfer to 1: Stand  Technique 1: Sit to stand  Transfer Device 1: Walker  Transfer Level of Assistance 1: Maximum assistance, +2  Trials/Comments 1: sit > stand with max A x 2 for fwd weight shift and trunk elevation; pt unable to extend hips fully for upright standing despite VCs and TCs; pt with B soft knees  Transfers 2  Transfer From 2: Stand to  Transfer to 2: Chair with arms  Technique 2: Stand to sit  Transfer Device 2: Walker  Transfer Level of Assistance 2: Maximum assistance, +2  Trials/Comments 2: max A x 2 for controlled lowering into chair; VCs for safe B LE and UE placement prior to sit    Ambulation/Gait Training  Ambulation/Gait Training Performed: Yes  Ambulation/Gait Training 1  Surface 1: Level tile  Device 1: Rolling walker  Assistance 1: Maximum assistance (x2)  Quality of Gait 1: Narrow base of support, Diminished heel strike, Forward flexed posture, Antalgic, Soft knee(s), Knee(s) buckle  Comments/Distance (ft) 1: pt amb ~5 steps from bed > chair with max A x 2 for safety and stability and use of RW; quick, shuffle like steps with intermittent knee buckling    Stairs  Stairs: No  Extremity/Trunk Assessments:  RLE   RLE : Exceptions to WFL  Strength RLE  RLE Overall Strength: Greater than or equal to 3/5 as evidenced by functional mobility  LLE   LLE : Exceptions to WFL (s/p L hip bipolar hemiarthroplasty)  Treatments:  Therapeutic Exercise  Therapeutic Exercise Performed: Yes  Therapeutic Exercise Activity 1: provided pt with ther ex handout for posterior hip replacement; pt performed 10x richard APs, QS (x3 sec hold each rep), GS (x3 sec hold each rep), assisted hip abd richard; increased time/effort    Therapeutic Activity  Therapeutic Activity Performed: Yes (refer to bed mob, transfers, amb)    Bed Mobility  Bed Mobility: Yes  Bed Mobility 1  Bed Mobility 1: Supine to sitting  Level of Assistance 1: Maximum assistance, +2  Bed Mobility Comments 1: max A x 2 for trunk up and B LEs off bed;  VCs for safe hand placement; HOB elevated ~30*  Bed Mobility 2  Bed Mobility  2: Scooting  Level of Assistance 2: Maximum assistance  Bed Mobility Comments 2: max A x 1 to scoot fwd on EOB with use of drawsheet    Ambulation/Gait Training  Ambulation/Gait Training Performed: Yes  Ambulation/Gait Training 1  Surface 1: Level tile  Device 1: Rolling walker  Assistance 1: Maximum assistance (x2)  Quality of Gait 1: Narrow base of support, Diminished heel strike, Forward flexed posture, Antalgic, Soft knee(s), Knee(s) buckle  Comments/Distance (ft) 1: pt amb ~5 steps from bed > chair with max A x 2 for safety and stability and use of RW; quick, shuffle like steps with intermittent knee buckling  Transfers  Transfer: Yes  Transfer 1  Transfer From 1: Bed to  Transfer to 1: Stand  Technique 1: Sit to stand  Transfer Device 1: Walker  Transfer Level of Assistance 1: Maximum assistance, +2  Trials/Comments 1: sit > stand with max A x 2 for fwd weight shift and trunk elevation; pt unable to extend hips fully for upright standing despite VCs and TCs; pt with B soft knees  Transfers 2  Transfer From 2: Stand to  Transfer to 2: Chair with arms  Technique 2: Stand to sit  Transfer Device 2: Walker  Transfer Level of Assistance 2: Maximum assistance, +2  Trials/Comments 2: max A x 2 for controlled lowering into chair; VCs for safe B LE and UE placement prior to sit    Stairs  Stairs: No       Outcome Measures:  [facility] Basic Mobility  Turning from your back to your side while in a flat bed without using bedrails: A lot  Moving from lying on your back to sitting on the side of a flat bed without using bedrails: Total  Moving to and from bed to chair (including a wheelchair): Total  Standing up from a chair using your arms (e.g. wheelchair or bedside chair): Total  To walk in hospital room: Total  Climbing 3-5 steps with railing: Total  Basic Mobility - Total Score: 7    Encounter Problems       Encounter Problems (Active)        Balance       STG - pt will demonstrate ability to maintain static standing balance with upper extremity support on RW with mod A        Start:  01/15/25    Expected End:  01/29/25       INTERVENTIONS:  1. Practice standing with minimal support.  2. Educate patient about standing tolerance.  3. Educate patient about independence with gait, transfers, and ADL's.  4. Educate patient about use of assistive device.  5. Educate patient about self-directed care.            Mobility       STG - Patient will ambulate 2x50' with use of RW and mod A        Start:  01/15/25    Expected End:  01/29/25            STG - Patient will ascend and descend 3 steps with unilateral rail and cane in other hand, provided mod A        Start:  01/15/25    Expected End:  01/29/25               PT Transfers       STG - Patient will perform bed mobility       Start:  01/15/25    Expected End:  01/29/25            STG - Patient will transfer sit to and from stand       Start:  01/15/25    Expected End:  01/29/25               Pain - Adult          Safety       LTG - Patient will adhere to hip precautions during ADL's and transfers       Start:  01/15/25    Expected End:  01/29/25                   Education Documentation  Handouts, taught by Bryan Sloan PT at 1/15/2025 11:10 AM.  Learner: Patient  Readiness: Acceptance  Method: Explanation, Demonstration, Handout  Response: Needs Reinforcement  Comment: reviewed POC, reviewed therapist role, educated on importance of mobility and ther ex/anti embolics, reviewed precautions and HEP    Precautions, taught by Bryan Sloan PT at 1/15/2025 11:10 AM.  Learner: Patient  Readiness: Acceptance  Method: Explanation, Demonstration, Handout  Response: Needs Reinforcement  Comment: reviewed POC, reviewed therapist role, educated on importance of mobility and ther ex/anti embolics, reviewed precautions and HEP    Body Mechanics, taught by Bryan Sloan PT at 1/15/2025 11:10 AM.  Learner:  Patient  Readiness: Acceptance  Method: Explanation, Demonstration, Handout  Response: Needs Reinforcement  Comment: reviewed POC, reviewed therapist role, educated on importance of mobility and ther ex/anti embolics, reviewed precautions and HEP    Home Exercise Program, taught by Bryan Sloan, PT at 1/15/2025 11:10 AM.  Learner: Patient  Readiness: Acceptance  Method: Explanation, Demonstration, Handout  Response: Needs Reinforcement  Comment: reviewed POC, reviewed therapist role, educated on importance of mobility and ther ex/anti embolics, reviewed precautions and HEP    Mobility Training, taught by Bryan Sloan, PT at 1/15/2025 11:10 AM.  Learner: Patient  Readiness: Acceptance  Method: Explanation, Demonstration, Handout  Response: Needs Reinforcement  Comment: reviewed POC, reviewed therapist role, educated on importance of mobility and ther ex/anti embolics, reviewed precautions and HEP    Education Comments  No comments found.

## 2025-01-15 NOTE — NURSING NOTE
Heparin drip restarted after 1 hour pause. Restarted 200 units lower than previously ran. Heparin running 1000 units/hr, PTT repull will be done at 1330. Care ongoing.

## 2025-01-15 NOTE — PROGRESS NOTES
Spiritual Care Visit  Spiritual Care Request    Reason for Visit:  Routine Visit: Follow-up     Request Received From:       Focus of Care:  Visited With: Patient         Refer to :          Spiritual Care Assessment    Spiritual Assessment:                      Care Provided:       Sense of Community and or Buddhist Affiliation:  Pentecostal   Values/Beliefs  Spiritual Requests During Hospitalization: Joyasked for Communion today.     Addressed Needs/Concerns and/or [place] Through:     Sacramental Encounters  Communion: Patient wants communion  Communion Given Indicator: Yes    Outcome:        Advance Directives:         Spiritual Care Annotation    Annotation:  Anitha asked for Communion today.  Donnell Quintanilla

## 2025-01-16 LAB
ANION GAP SERPL CALCULATED.3IONS-SCNC: 9 MMOL/L (ref 10–20)
APTT PPP: 39 SECONDS (ref 22–32.5)
APTT PPP: 56 SECONDS (ref 22–32.5)
BLOOD EXPIRATION DATE: NORMAL
BLOOD EXPIRATION DATE: NORMAL
BUN SERPL-MCNC: 30 MG/DL (ref 6–23)
CALCIUM SERPL-MCNC: 8.1 MG/DL (ref 8.6–10.3)
CHLORIDE SERPL-SCNC: 97 MMOL/L (ref 98–107)
CO2 SERPL-SCNC: 34 MMOL/L (ref 21–32)
CREAT SERPL-MCNC: 0.99 MG/DL (ref 0.5–1.05)
DISPENSE STATUS: NORMAL
DISPENSE STATUS: NORMAL
EGFRCR SERPLBLD CKD-EPI 2021: 56 ML/MIN/1.73M*2
ERYTHROCYTE [DISTWIDTH] IN BLOOD BY AUTOMATED COUNT: 16.6 % (ref 11.5–14.5)
GLUCOSE SERPL-MCNC: 116 MG/DL (ref 74–99)
HCT VFR BLD AUTO: 26.7 % (ref 36–46)
HGB BLD-MCNC: 8.2 G/DL (ref 12–16)
MCH RBC QN AUTO: 27.6 PG (ref 26–34)
MCHC RBC AUTO-ENTMCNC: 30.7 G/DL (ref 32–36)
MCV RBC AUTO: 90 FL (ref 80–100)
NRBC BLD-RTO: 0 /100 WBCS (ref 0–0)
PLATELET # BLD AUTO: 125 X10*3/UL (ref 150–450)
POTASSIUM SERPL-SCNC: 4.2 MMOL/L (ref 3.5–5.3)
PRODUCT BLOOD TYPE: 5100
PRODUCT BLOOD TYPE: 5100
PRODUCT CODE: NORMAL
PRODUCT CODE: NORMAL
RBC # BLD AUTO: 2.97 X10*6/UL (ref 4–5.2)
SODIUM SERPL-SCNC: 136 MMOL/L (ref 136–145)
UNIT ABO: NORMAL
UNIT ABO: NORMAL
UNIT NUMBER: NORMAL
UNIT NUMBER: NORMAL
UNIT RH: NORMAL
UNIT RH: NORMAL
UNIT VOLUME: 350
UNIT VOLUME: 350
WBC # BLD AUTO: 6 X10*3/UL (ref 4.4–11.3)
XM INTEP: NORMAL
XM INTEP: NORMAL

## 2025-01-16 PROCEDURE — 51701 INSERT BLADDER CATHETER: CPT

## 2025-01-16 PROCEDURE — 99232 SBSQ HOSP IP/OBS MODERATE 35: CPT

## 2025-01-16 PROCEDURE — 97535 SELF CARE MNGMENT TRAINING: CPT | Mod: GO,CO

## 2025-01-16 PROCEDURE — 2500000004 HC RX 250 GENERAL PHARMACY W/ HCPCS (ALT 636 FOR OP/ED): Performed by: ORTHOPAEDIC SURGERY

## 2025-01-16 PROCEDURE — 97530 THERAPEUTIC ACTIVITIES: CPT | Mod: GO,CO

## 2025-01-16 PROCEDURE — 2500000001 HC RX 250 WO HCPCS SELF ADMINISTERED DRUGS (ALT 637 FOR MEDICARE OP)

## 2025-01-16 PROCEDURE — 85730 THROMBOPLASTIN TIME PARTIAL: CPT | Performed by: INTERNAL MEDICINE

## 2025-01-16 PROCEDURE — 80048 BASIC METABOLIC PNL TOTAL CA: CPT

## 2025-01-16 PROCEDURE — 2500000001 HC RX 250 WO HCPCS SELF ADMINISTERED DRUGS (ALT 637 FOR MEDICARE OP): Performed by: ORTHOPAEDIC SURGERY

## 2025-01-16 PROCEDURE — 2500000005 HC RX 250 GENERAL PHARMACY W/O HCPCS: Performed by: ORTHOPAEDIC SURGERY

## 2025-01-16 PROCEDURE — 36415 COLL VENOUS BLD VENIPUNCTURE: CPT | Performed by: INTERNAL MEDICINE

## 2025-01-16 PROCEDURE — 94640 AIRWAY INHALATION TREATMENT: CPT

## 2025-01-16 PROCEDURE — 85027 COMPLETE CBC AUTOMATED: CPT

## 2025-01-16 PROCEDURE — 97530 THERAPEUTIC ACTIVITIES: CPT | Mod: GP

## 2025-01-16 PROCEDURE — 1200000002 HC GENERAL ROOM WITH TELEMETRY DAILY

## 2025-01-16 PROCEDURE — 36415 COLL VENOUS BLD VENIPUNCTURE: CPT

## 2025-01-16 PROCEDURE — 97110 THERAPEUTIC EXERCISES: CPT | Mod: GP

## 2025-01-16 RX ORDER — FAMOTIDINE 20 MG/1
20 TABLET, FILM COATED ORAL NIGHTLY
Status: DISCONTINUED | OUTPATIENT
Start: 2025-01-16 | End: 2025-01-17 | Stop reason: HOSPADM

## 2025-01-16 RX ADMIN — CARVEDILOL 3.12 MG: 3.12 TABLET, FILM COATED ORAL at 09:47

## 2025-01-16 RX ADMIN — TIOTROPIUM BROMIDE INHALATION SPRAY 2 PUFF: 3.12 SPRAY, METERED RESPIRATORY (INHALATION) at 07:47

## 2025-01-16 RX ADMIN — ATORVASTATIN CALCIUM 40 MG: 40 TABLET, FILM COATED ORAL at 20:47

## 2025-01-16 RX ADMIN — Medication 5 MG: at 21:08

## 2025-01-16 RX ADMIN — Medication 2 L/MIN: at 22:41

## 2025-01-16 RX ADMIN — OXYCODONE 5 MG: 5 TABLET ORAL at 00:48

## 2025-01-16 RX ADMIN — Medication 2 L/MIN: at 22:42

## 2025-01-16 RX ADMIN — NASAL 1 SPRAY: 6.5 SPRAY NASAL at 09:44

## 2025-01-16 RX ADMIN — LEVOTHYROXINE SODIUM 25 MCG: 0.03 TABLET ORAL at 05:31

## 2025-01-16 RX ADMIN — OXYCODONE 5 MG: 5 TABLET ORAL at 11:12

## 2025-01-16 RX ADMIN — CARVEDILOL 3.12 MG: 3.12 TABLET, FILM COATED ORAL at 20:46

## 2025-01-16 RX ADMIN — Medication 2 L/MIN: at 07:49

## 2025-01-16 RX ADMIN — NASAL 1 SPRAY: 6.5 SPRAY NASAL at 21:05

## 2025-01-16 RX ADMIN — APIXABAN 10 MG: 5 TABLET, FILM COATED ORAL at 13:22

## 2025-01-16 RX ADMIN — POLYETHYLENE GLYCOL 3350 17 G: 17 POWDER, FOR SOLUTION ORAL at 09:47

## 2025-01-16 RX ADMIN — OXYCODONE 5 MG: 5 TABLET ORAL at 21:08

## 2025-01-16 RX ADMIN — APIXABAN 10 MG: 5 TABLET, FILM COATED ORAL at 20:46

## 2025-01-16 RX ADMIN — GABAPENTIN 100 MG: 100 CAPSULE ORAL at 20:47

## 2025-01-16 RX ADMIN — FLUTICASONE FUROATE AND VILANTEROL TRIFENATATE 1 PUFF: 100; 25 POWDER RESPIRATORY (INHALATION) at 07:47

## 2025-01-16 RX ADMIN — DULOXETINE HYDROCHLORIDE 20 MG: 20 CAPSULE, DELAYED RELEASE ORAL at 20:47

## 2025-01-16 RX ADMIN — PANTOPRAZOLE SODIUM 40 MG: 40 TABLET, DELAYED RELEASE ORAL at 09:47

## 2025-01-16 RX ADMIN — FAMOTIDINE 20 MG: 20 TABLET, FILM COATED ORAL at 20:47

## 2025-01-16 ASSESSMENT — PAIN SCALES - PAIN ASSESSMENT IN ADVANCED DEMENTIA (PAINAD)
FACIALEXPRESSION: SMILING OR INEXPRESSIVE
TOTALSCORE: 6
BODYLANGUAGE: TENSE, DISTRESSED PACING, FIDGETING
CONSOLABILITY: NO NEED TO CONSOLE
TOTALSCORE: 0
BREATHING: OCCASIONAL LABORED BREATHING, SHORT PERIOD OF HYPERVENTILATION
CONSOLABILITY: DISTRACTED OR REASSURED BY VOICE/TOUCH
NEGVOCALIZATION: OCCASIONAL MOAN/GROAN, LOW SPEECH, NEGATIVE/DISAPPROVING QUALITY
FACIALEXPRESSION: FACIAL GRIMACING
BODYLANGUAGE: RELAXED
BREATHING: NORMAL

## 2025-01-16 ASSESSMENT — PAIN DESCRIPTION - DESCRIPTORS
DESCRIPTORS: ACHING

## 2025-01-16 ASSESSMENT — COGNITIVE AND FUNCTIONAL STATUS - GENERAL
MOBILITY SCORE: 10
HELP NEEDED FOR BATHING: TOTAL
HELP NEEDED FOR BATHING: A LOT
WALKING IN HOSPITAL ROOM: TOTAL
DRESSING REGULAR UPPER BODY CLOTHING: A LOT
EATING MEALS: A LITTLE
DAILY ACTIVITIY SCORE: 9
STANDING UP FROM CHAIR USING ARMS: A LOT
DRESSING REGULAR UPPER BODY CLOTHING: TOTAL
TOILETING: TOTAL
CLIMB 3 TO 5 STEPS WITH RAILING: TOTAL
STANDING UP FROM CHAIR USING ARMS: TOTAL
STANDING UP FROM CHAIR USING ARMS: A LOT
TURNING FROM BACK TO SIDE WHILE IN FLAT BAD: A LOT
PERSONAL GROOMING: A LOT
EATING MEALS: A LITTLE
TURNING FROM BACK TO SIDE WHILE IN FLAT BAD: A LOT
MOVING FROM LYING ON BACK TO SITTING ON SIDE OF FLAT BED WITH BEDRAILS: A LITTLE
CLIMB 3 TO 5 STEPS WITH RAILING: TOTAL
TOILETING: TOTAL
DRESSING REGULAR LOWER BODY CLOTHING: TOTAL
PERSONAL GROOMING: A LITTLE
DAILY ACTIVITIY SCORE: 12
DRESSING REGULAR UPPER BODY CLOTHING: TOTAL
WALKING IN HOSPITAL ROOM: TOTAL
HELP NEEDED FOR BATHING: TOTAL
MOBILITY SCORE: 7
EATING MEALS: A LITTLE
MOVING TO AND FROM BED TO CHAIR: TOTAL
DRESSING REGULAR LOWER BODY CLOTHING: TOTAL
MOVING FROM LYING ON BACK TO SITTING ON SIDE OF FLAT BED WITH BEDRAILS: A LITTLE
PERSONAL GROOMING: A LOT
DRESSING REGULAR LOWER BODY CLOTHING: TOTAL
WALKING IN HOSPITAL ROOM: TOTAL
TURNING FROM BACK TO SIDE WHILE IN FLAT BAD: TOTAL
DAILY ACTIVITIY SCORE: 9
MOBILITY SCORE: 10
TOILETING: TOTAL
MOVING TO AND FROM BED TO CHAIR: TOTAL
MOVING FROM LYING ON BACK TO SITTING ON SIDE OF FLAT BED WITH BEDRAILS: A LOT
MOVING TO AND FROM BED TO CHAIR: TOTAL
CLIMB 3 TO 5 STEPS WITH RAILING: TOTAL

## 2025-01-16 ASSESSMENT — PAIN SCALES - GENERAL
PAINLEVEL_OUTOF10: 0 - NO PAIN
PAINLEVEL_OUTOF10: 10 - WORST POSSIBLE PAIN
PAINLEVEL_OUTOF10: 0 - NO PAIN
PAINLEVEL_OUTOF10: 6

## 2025-01-16 ASSESSMENT — PAIN DESCRIPTION - LOCATION: LOCATION: HIP

## 2025-01-16 ASSESSMENT — PAIN SCALES - WONG BAKER
WONGBAKER_NUMERICALRESPONSE: NO HURT
WONGBAKER_NUMERICALRESPONSE: NO HURT

## 2025-01-16 ASSESSMENT — ACTIVITIES OF DAILY LIVING (ADL): HOME_MANAGEMENT_TIME_ENTRY: 25

## 2025-01-16 ASSESSMENT — PAIN DESCRIPTION - ORIENTATION: ORIENTATION: LEFT

## 2025-01-16 NOTE — ASSESSMENT & PLAN NOTE
Echo 8/9/2024 ejection fraction of 60-65%. There are no regional wall motion abnormalities. The left ventricular cavity size is normal. There is mild concentric left ventricular hypertrophy. Spectral Doppler shows a normal pattern of left ventricular diastolic filling.   Did have vascular congestion on CXR-given one time dose of Lasix  Continue to hold torsemide, resume home Coreg  Cardiology consulted for cardiac clearance, have now signed off  Repeat echocardiogram showed an ejection fraction of 55 to 60%, moderate to severe mitral valve regurgitation

## 2025-01-16 NOTE — PROGRESS NOTES
Anitha Welch is a 84 y.o. female on day 6 of admission presenting with Closed fracture of neck of left femur, initial encounter.      Subjective   POD #3 left hip hemiarthroplasty. Improved alertness from yesterday's assessment. States she slept well. Denies pain or complaints currently. Stable on baseline 2L O2 via NC.        Objective     Last Recorded Vitals  /51 (BP Location: Left arm, Patient Position: Lying)   Pulse 87   Temp 37.3 °C (99.1 °F) (Oral)   Resp 16   Wt 66 kg (145 lb 8.1 oz)   SpO2 98%   Intake/Output last 3 Shifts:    Intake/Output Summary (Last 24 hours) at 1/16/2025 1209  Last data filed at 1/16/2025 0900  Gross per 24 hour   Intake 662 ml   Output 850 ml   Net -188 ml       Admission Weight  Weight: 59 kg (130 lb) (01/09/25 2357)    Daily Weight  01/11/25 : 66 kg (145 lb 8.1 oz)    Image Results  XR pelvis 1-2 views  Narrative: Interpreted By:  Jose Rodriguez,   STUDY:  XR PELVIS 1-2 VIEWS; 1/13/2025 5:31 pm      INDICATION:  Signs/Symptoms:fracture. Postop hip assessment.      COMPARISON:  01/10/2025      ACCESSION NUMBER(S):  [id number]      ORDERING CLINICIAN:  JASON PATTON      TECHNIQUE:  AP pelvis, one view, portable      FINDINGS:  No fractures or destructive lesions are identified. There is a new  left total hip prosthesis present with soft tissue air adjacent to  the prosthesis. There is no change in the right hip prosthesis.  Patient is significantly rotated to the right. The upper half of the  pelvis is omitted from the field-of-view.      Impression: Status post left hip arthroplasty in this patient with a history of  left femoral neck fracture.      MACRO:  none      Signed by: Jose Rodriguez 1/14/2025 8:42 AM  Dictation workstation:   [id number]      Physical Exam  Vitals and nursing note reviewed.   Constitutional:       Appearance: Normal appearance.   HENT:      Head: Normocephalic and atraumatic.      Right Ear: External ear normal.      Left Ear: External ear  normal.      Nose: Nose normal.      Mouth/Throat:      Mouth: Mucous membranes are moist.   Eyes:      Extraocular Movements: Extraocular movements intact.   Cardiovascular:      Rate and Rhythm: Normal rate and regular rhythm.      Pulses: Normal pulses.      Heart sounds: Normal heart sounds.      Comments: NSR on my asessment  Pulmonary:      Effort: Pulmonary effort is normal.      Breath sounds: Normal breath sounds.   Abdominal:      General: Bowel sounds are normal.      Palpations: Abdomen is soft.   Musculoskeletal:         General: Tenderness present.      Cervical back: Normal range of motion and neck supple.      Comments: ROM LLE limited due to post operative discomfort   Skin:     General: Skin is warm and dry.      Capillary Refill: Capillary refill takes less than 2 seconds.      Comments: Left hip dressing clean dry intact   Neurological:      General: No focal deficit present.      Mental Status: She is alert. Mental status is at baseline.      Comments: Pleasantly confused  Hx of underlying dementia per family   Psychiatric:         Mood and Affect: Mood normal.         Relevant Results               Assessment/Plan     Assessment & Plan  Closed fracture of neck of left femur, initial encounter  POD #3 left hip hemiarthroplasty with orthopedic surgery  Pain management, Bowel regimen, Encourage IS  PT/OT, Falls precautions   Acute pulmonary embolism (Multi)  Had increased oxygen demand, improving  Elevated D Dimer-VQ high probability PE  Has a history of DVT however Xarelto was stopped due to anemia/thrombocytopenia, has been off for some time  Started on Heparin gtt, will need DOAC on DC  Pulmonary, cardiology consulted, appreciate recs  Acute on chronic hypoxic respiratory failure (Multi)  Stable at baseline 2L via NC  Monitor respiratory status   Resume home inhalers, PRN duonebs    History of DVT (deep vein thrombosis)  History of DVT previously on Xarelto which was held on last hospital  admission due to anemia and thrombocytopenia.  Patient was to follow with PCP to resume but has not to date  -found to have high prob for PE on VQ-started on heparin gtt, resume after surgery will need to be discharged on DOAC as stated    History of CHF (congestive heart failure)  Echo 8/9/2024 ejection fraction of 60-65%. There are no regional wall motion abnormalities. The left ventricular cavity size is normal. There is mild concentric left ventricular hypertrophy. Spectral Doppler shows a normal pattern of left ventricular diastolic filling.   Did have vascular congestion on CXR-given one time dose of Lasix  Continue to hold torsemide, resume home Coreg  Cardiology consulted for cardiac clearance, have now signed off  Repeat echocardiogram showed an ejection fraction of 55 to 60%, moderate to severe mitral valve regurgitation    Mixed hyperlipidemia  Continue statin    Thrombocytopenia  Has been improving daily.  Will need to monitor close given currently on Heparin gtt    DVT/GI  Eliquis,SCDs/H2 Blocker    Disposition: PT OT recc MOD. Family open to SNF/rehab placement.         1/14/25: Evaluated s/p left hip hemiarthroplasty with Dr. Tony yesterday POD #1. Adult son and spouse bedside and updated on POC. Patient mildy confused but per family suspect underlying dementia. They report her mentation is near baseline. Patient denies chest pain. Reports post operative pain with exertion. Denies shortness of breath, no nausea/vomiting. Remains on heparin gtt for PE this hospitalization with plans to convert to DOAC prior to discharge. Appreciate ongoing recommendations from pulmonology and cardiology.     1/15/25: POD #2 left hip olena-arthroplasty. Denies pain or shortness of breath though she remains on her baseline 2l NC. States she slept well overnight. Remains on heparin gtt as stated above for PE. Appreciate ongoings reccs from pulm, cardiology. Pending placement as requested by family.     1/16/25: POD #3 left  hip olena-arthroplasty. Denies pain or shortness of breath. Appreciate final cardiology reccs as have since signed off. Will dc heparin gtt today and initiate eliquis--PE dosing. Patient appears at baseline respiratory status 2l breathing comfortably without any complaints.  Pending onsite visit from SNF today. Anticipate discharge SNF within next 24 hours.                     Yanet Mcclelland, FELA-CNP

## 2025-01-16 NOTE — NURSING NOTE
Pt bladder scanned at 1030 am. Pt only has 80 mL in their bladder at this time. This RN and Hospitalist agree to recheck pt at 1500. Motivating pt to have more intake. Care ongoing.

## 2025-01-16 NOTE — PROGRESS NOTES
Pulmonary Daily Progress Note   Subjective    Anitha Welch is a 84 y.o. year old female patient known with DVT/PE (not on AC), COPD with FEV1 41%, heart failure with preserved EF,  admitted on 1/9/2025 with a fall with acute closed left femoral neck fracture   Surgery on 1/13/25 for Closed fracture of left femur     Interval History:  Sitting up in chair on 2L NC. States she feels runned down. No c/o cough, SOB, chest pain    Meds    Scheduled medications  apixaban, 10 mg, oral, BID   Followed by  [START ON 1/23/2025] apixaban, 5 mg, oral, BID  atorvastatin, 40 mg, oral, Nightly  carvedilol, 3.125 mg, oral, BID  DULoxetine, 20 mg, oral, Nightly  famotidine, 20 mg, oral, Nightly  tiotropium, 2 puff, inhalation, Daily   And  fluticasone furoate-vilanteroL, 1 puff, inhalation, Daily  gabapentin, 100 mg, oral, Nightly  levothyroxine, 25 mcg, oral, Daily  oxygen, 2 L/min, inhalation, Continuous - Inhalation  oxygen, , inhalation, Continuous - Inhalation  perflutren lipid microspheres, 0.5-10 mL of dilution, intravenous, Once in imaging  perflutren lipid microspheres, 0.5-10 mL of dilution, intravenous, Once in imaging  perflutren protein A microsphere, 0.5 mL, intravenous, Once in imaging  perflutren protein A microsphere, 0.5 mL, intravenous, Once in imaging  polyethylene glycol, 17 g, oral, Daily  sodium chloride, 1 spray, Each Nostril, TID  sulfur hexafluoride microsphr, 2 mL, intravenous, Once in imaging  sulfur hexafluoride microsphr, 2 mL, intravenous, Once in imaging  Tc-99m-albumin, 4 millicurie, intravenous, Once in imaging  [Held by provider] torsemide, 20 mg, oral, Daily      Continuous medications     PRN medications  PRN medications: acetaminophen **OR** acetaminophen **OR** acetaminophen, albuterol, ipratropium-albuteroL, melatonin, nitroglycerin, ondansetron **OR** ondansetron, oxyCODONE     Objective    Blood pressure 110/51, pulse 87, temperature 37.3 °C (99.1 °F), temperature source Oral, resp. rate  "16, height 1.651 m (5' 5\"), weight 66 kg (145 lb 8.1 oz), SpO2 98%.   Physical Exam   GENERAL: normal appearance. well nourished. No respiratory distress  HEAD/SINUSES: 2L NC  LUNGS: Symmetric chest. Good excursion. Equal breath sounds. no wheezing.   CARDIAC: regular rate and rhythm  EXTREMITIES: No edema, no varicose veins  NEURO: alert and oriented to self and place,   SKIN: Skin turgor normal.   PSYCH: Normal affect    Intake/Output Summary (Last 24 hours) at 1/16/2025 1228  Last data filed at 1/16/2025 0900  Gross per 24 hour   Intake 662 ml   Output 850 ml   Net -188 ml     Labs:   Results from last 72 hours   Lab Units 01/16/25  0546 01/15/25  0554 01/14/25  0426   SODIUM mmol/L 136 136 141   POTASSIUM mmol/L 4.2 4.6 4.7   CHLORIDE mmol/L 97* 98 101   CO2 mmol/L 34* 35* 35*   BUN mg/dL 30* 33* 28*   CREATININE mg/dL 0.99 1.08* 1.03   GLUCOSE mg/dL 116* 126* 141*   CALCIUM mg/dL 8.1* 8.5* 8.9   ANION GAP mmol/L 9* 8* 10   EGFR mL/min/1.73m*2 56* 50* 53*      Results from last 72 hours   Lab Units 01/16/25  0546 01/15/25  0554 01/14/25  0426   WBC AUTO x10*3/uL 6.0 6.6 6.7   HEMOGLOBIN g/dL 8.2* 8.9* 10.3*   HEMATOCRIT % 26.7* 28.3* 33.2*   PLATELETS AUTO x10*3/uL 125* 117* 112*               Micro/ID:   Lab Results   Component Value Date    URINECULTURE No growth 01/11/2025    BLOODCULT No growth at 4 days -  FINAL REPORT 10/15/2024    BLOODCULT No growth at 4 days -  FINAL REPORT 10/15/2024     Summary of key imaging results from the last 24 hours  SAMMY 1/11/25  1. The left ventricular systolic function is normal, with a visually estimated ejection fraction of 55-60%.   2. Spectral Doppler shows a Grade II (pseudonormal pattern) of left ventricular diastolic filling with an elevated left atrial pressure.   3. There is normal right ventricular global systolic function.   4. The left atrium is moderate to severely dilated.   5. The right atrium is mild to moderately dilated.   6. There is no evidence of cardiac " tamponade.   7. Moderate to severe mitral valve regurgitation.    Impression   Anitha Welch is a 84 y.o. year old female patient is being seen by the pulmonary service for   Acute on chronic hypoxic respiratory failure -hemodynamically medically stable likely secondary to acute pulmonary embolism on 2L (baseline)  Acute Pulmonary embolism -likely thrombotic, but fat embolism cannot be excluded in the setting of bone fracture - now on eliquis  Volume overload- mild. torsemide on hold.   Thrombocytopenia -monitor  Femur fracture -orthopedics recommending surgery    Recommendations   As follows:  Heparin drip stopped patient on Eliquis  On baseline home O2 2 L NC  maintain pulse oximetry >92% baseline 2L  Baseline is 2 L nasal cannula  Bronchopulmonary hygiene: coughing and deep breathing, incentive spirometry  Risk for atelectasis  Nasal spray for congestion  Patient on home inhaler regimen and baseline o2  Pulmonary will sign off      FELA Estrada-CNP   01/16/25 at 12:28 PM     -Only the Medical problems listed under impression were addressed today.   -Please contact primary team for all other concerns and medical problem  -Thank you for your consult       Disclaimer: Documentation completed with the information available at the time of input. Parts of this note may have been scribed or generated using voice dictation software, Dragon.  Homophonic errors may exist.  Please contact me directly if clarification is needed. The times in the chart may not be reflective of actual patient care times, interventions, or procedures. Documentation occurs after the physical care of the patient.

## 2025-01-16 NOTE — ASSESSMENT & PLAN NOTE
Continue statin    Thrombocytopenia  Has been improving daily.  Will need to monitor close given currently on Heparin gtt    DVT/GI  Eliquis,SCDs/H2 Blocker    Disposition: PT OT recc MOD. Family open to SNF/rehab placement.         1/14/25: Evaluated s/p left hip hemiarthroplasty with Dr. Tony yesterday POD #1. Adult son and spouse bedside and updated on POC. Patient mildy confused but per family suspect underlying dementia. They report her mentation is near baseline. Patient denies chest pain. Reports post operative pain with exertion. Denies shortness of breath, no nausea/vomiting. Remains on heparin gtt for PE this hospitalization with plans to convert to DOAC prior to discharge. Appreciate ongoing recommendations from pulmonology and cardiology.     1/15/25: POD #2 left hip olena-arthroplasty. Denies pain or shortness of breath though she remains on her baseline 2l NC. States she slept well overnight. Remains on heparin gtt as stated above for PE. Appreciate ongoings reccs from pulm, cardiology. Pending placement as requested by family.     1/16/25: POD #3 left hip olena-arthroplasty. Denies pain or shortness of breath. Appreciate final cardiology reccs as have since signed off. Will dc heparin gtt today and initiate eliquis--PE dosing. Patient appears at baseline respiratory status 2l breathing comfortably without any complaints.  Pending onsite visit from SNF today. Anticipate discharge SNF within next 24 hours.

## 2025-01-16 NOTE — ASSESSMENT & PLAN NOTE
History of DVT previously on Xarelto which was held on last hospital admission due to anemia and thrombocytopenia.  Patient was to follow with PCP to resume but has not to date  -found to have high prob for PE on VQ-started on heparin gtt, resume after surgery will need to be discharged on DOAC as stated

## 2025-01-16 NOTE — NURSING NOTE
Assume care of patient. BSSR complete. Pt lying quietly in bed. Pt states that her hip is feeling much better today. Purewick checked and was repositioned. Pt denies any further needs at this time. Reminded patient to use call light for any needs, showing pt the call light and the call light on the bed, pt nods head in understanding. Call light within reach. Bed alarm engaged. Plan of care ongoing.

## 2025-01-16 NOTE — NURSING NOTE
Pt retaining urine. Bladder Scan at 1600 showed 306> mL in bladder. 16 fr. Heath placed with output of 320 of clear yellow urine. Yanet SEE put in orders to maintain heath. Care ongoing.

## 2025-01-16 NOTE — PROGRESS NOTES
Spiritual Care Visit  Spiritual Care Request    Reason for Visit:  Routine Visit: Follow-up     Request Received From:       Focus of Care:  Visited With: Patient and family together         Refer to :          Spiritual Care Assessment    Spiritual Assessment:                      Care Provided:  Intended Effects: Promote sense of peace    Sense of Community and or Temple Affiliation:  [employer]   Values/Beliefs  Spiritual Requests During Hospitalization: anitha asked for Communion today with her family in her room.     Addressed Needs/Concerns and/or [place] Through:     Sacramental Encounters  Communion: Patient wants communion  Communion Given Indicator: Yes    Outcome:        Advance Directives:         Spiritual Care Annotation    Annotation:  Anitha asked for Communion today with her family in her room.  Donnell Quintanilla

## 2025-01-16 NOTE — CARE PLAN
The patient's goals for the shift include rest    The clinical goals for the shift include safety/pain control    Over the shift, the patient did not make progress toward the following goals. Barriers to progression include fall/ s/p Lt femur nailing. Recommendations to address these barriers include administer medications/interventions as ordered.

## 2025-01-16 NOTE — NURSING NOTE
Pts PTT came back at 56  APTT 53 to 76: DECREASE rate by 100 unit/hour.   Decreased to 900 units/hr

## 2025-01-16 NOTE — PROGRESS NOTES
Physical Therapy    Physical Therapy Treatment    Patient Name: Anitha Welch  MRN: [id number]  Department: [address]  Room: [address] [place] [address]  Today's Date: 1/16/2025  Time Calculation  Start Time: 1130  Stop Time: 1155  Time Calculation (min): 25 min         Assessment/Plan   PT Assessment  Barriers to Discharge Home: Cognition needs, Physical needs  Cognition Needs: 24hr supervision for safety awareness needed, Recollection or understanding of precautions/restrictions limited, Insight of patient limited regarding functional ability/needs, Cognition-related high falls risk  Physical Needs: Stair navigation into home limited by function/safety, Ambulating household distances limited by function/safety, High falls risk due to function or environment  Evaluation/Treatment Tolerance: Patient limited by pain, Patient limited by fatigue  Medical Staff Made Aware: Yes  Strengths: Support of extended family/friends  Barriers to Participation: Comorbidities, Ability to acquire knowledge  End of Session Communication: Bedside nurse  Assessment Comment: pt demonstrated slightly improved transfer performance, however still requires max assist of 2 for safe during bed mobility and amb with FWW; Unable to recall any post-op left bipolar hip replacement precautions  End of Session Patient Position: Up in chair, Alarm on (CHAVARRIA in room; Nurse aware pt is in chair with TABS alarm on)  PT Plan  Inpatient/Swing Bed or Outpatient: Inpatient  PT Plan  Treatment/Interventions: Bed mobility, Transfer training, Gait training, Stair training, Balance training, Neuromuscular re-education, Strengthening, Endurance training, Range of motion, Therapeutic exercise, Therapeutic activity, Home exercise program, Positioning, Postural re-education  PT Plan: Ongoing PT  PT Frequency: Daily  PT Discharge Recommendations: Moderate intensity level of continued care  Equipment Recommended upon Discharge: Wheeled walker  PT Recommended Transfer Status: Assist x2,  Assistive device (FWW)  PT - OK to Discharge: Yes      General Visit Information:   PT  Visit  PT Received On: 01/16/25  General  Family/Caregiver Present: No  Co-Treatment: OT  Co-Treatment Reason: partial co-treatment to maximize pt safety, 2 oerson assist needed  Prior to Session Communication: Bedside nurse  Patient Position Received: Bed, 3 rail up, Alarm on  Preferred Learning Style: verbal, visual  General Comment: cleared by nurse for therapy; pt agreeable to therapy; + telemetry, purewick, hip abd pillow.    Subjective   Precautions:  Precautions  Hearing/Visual Limitations: [place], wears glasses  LE Weight Bearing Status: Weight Bearing as Tolerated (left LE)  Medical Precautions: Fall precautions, Oxygen therapy device and L/min (2 liters o2 via nc)  Post-Surgical Precautions: Left hip precautions  Precautions Comment: + hip abd pillow in place in supine; pt not able to verbalize or remenber any post-op left bipolar hip replacement precautions throughout PT session; + dementia    Vital Signs (Past 2hrs)        Date/Time Vitals Session Patient Position Pulse Resp SpO2 BP MAP (mmHg)    01/16/25 1130 During PT  --  --  --  --  --  --                   Vital Signs Comment: O2 sat 97% with HR 87 bpm in long sitting; O2 sat 96% with HR 90 bpm afte rtransfer to armchair     Objective   Pain:  Pain Assessment  Pain Assessment: 0-10  0-10 (Numeric) Pain Score: 0 - No pain (at rest, 10/10 during mobility---nurse provided pt with pain meds 25 min ago)  Pain Type: Surgical pain  Pain Location: Hip  Pain Orientation: Left  Pain Interventions:  (maintaining post-op left posterolateral hip precautions)  Response to Interventions: Decrease in pain  Cognition:  Cognition  Overall Cognitive Status: Impaired at baseline  Orientation Level: Disoriented to time  Following Commands: Follows one step commands with increased time  Safety Judgment: Decreased awareness of need for safety precautions  Cognition Comments: pleasantly  confused  Memory: Exceptions to WFL  Short-Term Memory: Impaired  Safety/Judgement:  (decreased safety insight and carryover during functional mobility)  Insight: Moderate  Impulsive: Mildly  Processing Speed: Delayed  Coordination:  Movements are Fluid and Coordinated: No  Lower Body Coordination: decreased timing and accuracy left > right LE; + left bipolar hip replacement  Postural Control:  Postural Control  Posture Comment: fwd head, rounded shoulders  Extremity/Trunk Assessments:    Activity Tolerance:  Activity Tolerance  Endurance: Decreased tolerance for upright activites  Activity Tolerance Comments: poor due to pain complaints, weakness  Treatments:  Therapeutic Exercise  Therapeutic Exercise Performed: Yes  Therapeutic Exercise Activity 1: supine richard AP x 30 reps  Therapeutic Exercise Activity 2: supine richard QS x 12 reps  Therapeutic Exercise Activity 3: supine right heel slides x 15 reps  Therapeutic Exercise Activity 4: seated right LAQ's x 20 reps; left x 10 reps  Therapeutic Exercise Activity 5: seated right hip flexion x 10 reps    Therapeutic Activity  Therapeutic Activity Performed: Yes (see bed mobility, transfers, amb with FWW)    Bed Mobility  Bed Mobility: Yes  Bed Mobility 1  Bed Mobility 1: Supine to sitting  Level of Assistance 1: Maximum assistance, +2, Moderate verbal cues, Minimal tactile cues  Bed Mobility Comments 1: head of bed elevated; max assist of 2 for trunk up, pelvis and left > right LE off of bed, verbal/tactile cues for active us eof richard UE's using bedrail    Ambulation/Gait Training  Ambulation/Gait Training Performed: Yes  Ambulation/Gait Training 1  Surface 1: Level tile  Device 1: Rolling walker  Assistance 1: Maximum assistance, Moderate verbal cues, Moderate tactile cues (x 2)  Quality of Gait 1: Narrow base of support, Diminished heel strike, Forward flexed posture, Antalgic, Soft knee(s)  Comments/Distance (ft) 1: pt amb bed to armchair, FWW, + turns, max assist to  advance left LE, verbal/tactile cues for erect posture, safe sequencing, increased active us eof richard UE's pushing down through FWW; Overall balance poor +  Transfers  Transfer: Yes  Transfer 1  Transfer From 1: Bed to  Transfer to 1: Stand  Technique 1: Sit to stand  Transfer Device 1: Walker  Transfer Level of Assistance 1: Moderate assistance, +2, Moderate verbal cues  Trials/Comments 1: mod assist of 2 for trunk up, pt placed both hands on stabilized FWW as she would not follow verbal commands for both hands on bed  Transfers 2  Transfer From 2: Stand to  Transfer to 2: Chair with arms  Technique 2: Stand to sit  Transfer Device 2: Walker  Transfer Level of Assistance 2: Moderate assistance, +2, Moderate verbal cues, Minimal tactile cues  Trials/Comments 2: mod assist of 2 for trunk down, verbal/tactile cues for reaching back with both hands for arms of chair    Stairs  Stairs: No    Outcome Measures:  [facility] Basic Mobility  Turning from your back to your side while in a flat bed without using bedrails: A lot  Moving from lying on your back to sitting on the side of a flat bed without using bedrails: Total  Moving to and from bed to chair (including a wheelchair): Total  Standing up from a chair using your arms (e.g. wheelchair or bedside chair): Total  To walk in hospital room: Total  Climbing 3-5 steps with railing: Total  Basic Mobility - Total Score: 7    Education Documentation  Precautions, taught by Roma Villarreal PT at 1/16/2025 12:21 PM.  Learner: Patient  Readiness: Acceptance  Method: Explanation, Demonstration  Response: Needs Reinforcement  Comment: PT educated pt in post-op left posterolateral hip precautions and safe transfer technique.    Mobility Training, taught by Roma Villarreal PT at 1/16/2025 12:21 PM.  Learner: Patient  Readiness: Acceptance  Method: Explanation, Demonstration  Response: Needs Reinforcement  Comment: PT educated pt in post-op left posterolateral hip precautions and safe  transfer technique.    Education Comments  No comments found.               Encounter Problems       Encounter Problems (Active)       Balance       STG - pt will demonstrate ability to maintain static standing balance with upper extremity support on RW with mod A  (Progressing)       Start:  01/15/25    Expected End:  01/29/25       INTERVENTIONS:  1. Practice standing with minimal support.  2. Educate patient about standing tolerance.  3. Educate patient about independence with gait, transfers, and ADL's.  4. Educate patient about use of assistive device.  5. Educate patient about self-directed care.            Mobility       STG - Patient will ambulate 2x50' with use of RW and mod A  (Progressing)       Start:  01/15/25    Expected End:  01/29/25            STG - Patient will ascend and descend 3 steps with unilateral rail and cane in other hand, provided mod A  (Progressing)       Start:  01/15/25    Expected End:  01/29/25               PT Transfers       STG - Patient will perform bed mobility (Progressing)       Start:  01/15/25    Expected End:  01/29/25            STG - Patient will transfer sit to and from stand (Progressing)       Start:  01/15/25    Expected End:  01/29/25               Pain - Adult          Safety       LTG - Patient will adhere to hip precautions during ADL's and transfers (Progressing)       Start:  01/15/25    Expected End:  01/29/25

## 2025-01-16 NOTE — NURSING NOTE
Assumed care of pt. Ppt resting quietly in bed on 2L NC, respirations even and unlabored. Pt on purewick, but has issues with retaining urine since heath removal day prior, will continue to wean pt  to regular voiding today. Bladder scan this morning. BSSR complete. Care ongoing.

## 2025-01-16 NOTE — PROGRESS NOTES
"Occupational Therapy    OT Treatment    Patient Name: Anitha Welch  MRN: [id number]  Department: [address]  Room: [address] [place] [address]-  Today's Date: 1/16/2025  Time Calculation  Start Time: 1141  Stop Time: 1206  Time Calculation (min): 25 min    Additional time provided to assist patient back to bed. See \"Other Activity\" Section for details.  Time Calculation  Start Time: 1540  Stop Time: 1550  Time Calculation (min): 10 min          Assessment:  OT Assessment: Gradual progress made towards OT goals. Continue with current OT POC to increase strength, balance and functional tolerance to maximize safety and independence during ADLs.  Barriers to Discharge Home: Physical needs, Caregiver assistance, Cognition needs  Caregiver Assistance: Caregiver assistance needed per identified barriers - however, level of patient's required assistance exceeds assistance available at home  Cognition Needs: 24hr supervision for safety awareness needed, Recollection or understanding of precautions/restrictions limited, Insight of patient limited regarding functional ability/needs, Cognition-related high falls risk  Physical Needs: 24hr mobility assistance needed, 24hr ADL assistance needed, High falls risk due to function or environment  Evaluation/Treatment Tolerance: Patient limited by pain  End of Session Communication: Bedside nurse  End of Session Patient Position: Up in chair, Alarm on (all needs in reach)  OT Assessment Results: Decreased ADL status, Decreased upper extremity strength, Decreased safe judgment during ADL, Decreased cognition, Decreased endurance, Decreased functional mobility, Decreased IADLs  Evaluation/Treatment Tolerance: Patient limited by pain  Plan:  Treatment Interventions: ADL retraining, Functional transfer training, Patient/family training, Equipment evaluation/education, Neuromuscular reeducation, Compensatory technique education  OT Frequency: 4 times per week  OT Discharge Recommendations: Moderate intensity " level of continued care  Equipment Recommended upon Discharge: Wheeled walker  OT Recommended Transfer Status: Assist of 2, Maximum assist  OT - OK to Discharge: Yes  Treatment Interventions: ADL retraining, Functional transfer training, Patient/family training, Equipment evaluation/education, Neuromuscular reeducation, Compensatory technique education    Subjective   Previous Visit Info:  OT Last Visit  OT Received On: 01/16/25  General:  General  Reason for Referral: decreased functional status due to fall  Past Medical History Relevant to Rehab: dementia, CHF, COPD, AAA, DVT, CVA, CKD, CAD, OA, OP, chronic respiratory failure on 2 L nasal cannula  Co-Treatment: PT  Co-Treatment Reason: partial co-treatment to maximize pt safety, 2 person assist needed  Prior to Session Communication: Bedside nurse  Patient Position Received: Bed, 3 rail up, Alarm on  General Comment: Pt cleared for therapy session per nursing, cooperative with encouragement.  Precautions:  Hearing/Visual Limitations: [place], wears glasses  LE Weight Bearing Status: Weight Bearing as Tolerated (LLE)  Medical Precautions: Fall precautions, Oxygen therapy device and L/min (2L O2 via NC)  Post-Surgical Precautions: Left hip precautions  Precautions Comment: Pt unable to recall Left posterior hip precautions- education provided however poor carry-over observed.    Vital Signs (Past 2hrs)        Date/Time Vitals Session Patient Position Pulse Resp SpO2 BP MAP (mmHg)    01/16/25 1606 --  --  87  16  98 %  116/90  65                         Pain:  Pain Assessment  Pain Assessment: 0-10  0-10 (Numeric) Pain Score:  (Pt reports 0/10 pain at rest and 10/10 pain with movement)  Pain Type: Surgical pain  Pain Location: Hip  Pain Orientation: Left  Clinical Progression:  (worsening with movement, improves with rest)  Pain Interventions: Repositioned, Ambulation/increased activity (RN provided pain medication prior to therapy session)  Response to Interventions:  No change in pain    Objective    Cognition:  Cognition  Overall Cognitive Status: Impaired at baseline  Orientation Level: Disoriented to time, Disoriented to situation  Following Commands: Follows one step commands with repetition  Safety Judgment: Decreased awareness of need for safety precautions  Memory: Exceptions to WFL  Short-Term Memory: Impaired  Safety/Judgement: Exceptions to WFL  Insight: Moderate  Impulsive: Mildly  Task Initiation: Initiates with cues  Processing Speed: Delayed  Coordination:  Movements are Fluid and Coordinated: No  Upper Body Coordination: WFL  Lower Body Coordination: decreased timing and accuracy left > right LE; + left bipolar hip replacement  Activities of Daily Living: Grooming  Grooming Comments: hair management and oral hygiene tasks completed seated in bedside chair with set-up and close supervision    UE Bathing  UE Bathing Comments: UB bathing tasks completed with Vaishali while seated in bedside chair, min verbal cues required for sequencing    LE Bathing  LE Bathing Comments: LB bathing tasks deferred this date d/t pt fatigue and poor carry-over of posterior hip precautions    UE Dressing  UE Dressing Comments: modA required to don/[facility] gown from seated position, increased time, verbal cues and assistance required for line management  Functional Standing Tolerance:     Bed Mobility/Transfers: Bed Mobility  Bed Mobility: Yes  Bed Mobility 1  Bed Mobility 1: Supine to sitting, Scooting  Level of Assistance 1: Maximum assistance, +2, Moderate verbal cues, Minimal tactile cues  Bed Mobility Comments 1: HOB moderately elevated, increased time and effort required fort ask completion    Transfers  Transfer: Yes  Transfer 1  Trials/Comments 1: sit<>stands completed from standard EOB height with FWW and modAx2- verbal/ tactile cues requried for proper hand placement to increase safety and decrease risk of falls. Bed>chair transfer completed with FWW and maxAx2- pt required  max verbal/ tactile cues for sequencing.    Other Activity:  Other Activity Performed: Yes  Other Activity 1: Returned [id number] to assist pt back to bed from bedside chair. Sit<>stand completed from standard chair height with FWW and maxAx1- verbal/ tactile cues required for proper hand placement to increase safety and decrease risk of falls. Chair>bed transfer completed with FWW and maxAx1+1 for line management. Max verbal/ tactile cues required for sequencing. Sit>supine and scooting tasks completed with maxAx2 and use of draw sheet. Pt remained supine in bed with 3 bed rails up and alarm on, all needs in reach and adductor pillow in place.    Outcome Measures:[facility] Daily Activity  Putting on and taking off regular lower body clothing: Total  Bathing (including washing, rinsing, drying): A lot  Putting on and taking off regular upper body clothing: A lot  Toileting, which includes using toilet, bedpan or urinal: Total  Taking care of personal grooming such as brushing teeth: A little  Eating Meals: A little  Daily Activity - Total Score: 12        Education Documentation  Precautions, taught by OLVIN Sun at 1/16/2025  6:02 PM.  Learner: Patient  Readiness: Acceptance  Method: Explanation, Demonstration  Response: Needs Reinforcement    Education Comments  Education provided on role of OT/POC, safety awareness throughout functional tasks/transfers, Left posterior hip precautions, importance of activity/ rest routine, EC/WS techniques, and use of call light for assistance. Questions, comments and concerns addressed regarding OT.      Goals:  Encounter Problems       Encounter Problems (Active)       Bathing       STG - Patient will bathe lower body with minimal assist and long handled equipment (Progressing)       Start:  01/15/25    Expected End:  02/15/25               Dressings Lower Extremities       LTG - Patient will dress lower body with  minimal assist and AE  (Progressing)       Start:   01/15/25    Expected End:  02/15/25               Functional Mobility       Perform functional mobility to and from the bathroom with minimal assist and 2ww (Progressing)       Start:  01/15/25    Expected End:  02/15/25               OT Transfers       LTG - Patient will perform all functional transfers with 2ww and minimal assist (Progressing)       Start:  01/15/25    Expected End:  02/15/25               Therapeutic Activity       Patient will implement surgical precautions during mobility and ADLs with minimal verbal cues (Progressing)       Start:  01/15/25    Expected End:  02/15/25               Toileting       STG - Patient will complete toileting tasks with minimal assist and 2ww (Progressing)       Start:  01/15/25    Expected End:  02/15/25

## 2025-01-16 NOTE — NURSING NOTE
Pt has not voided since heath catheter removed at approx.1745 on 1/15, even though pt had the urge to go around 0200. Bladder scan performed with 345ml  showing. Dr. TESSA Jessica notified at 0450 via secure chat and order received. Straight cath completed with 300ml clear yellow urine return. Pt tolerated well.

## 2025-01-16 NOTE — ASSESSMENT & PLAN NOTE
POD #3 left hip hemiarthroplasty with orthopedic surgery  Pain management, Bowel regimen, Encourage IS  PT/OT, Falls precautions

## 2025-01-16 NOTE — ASSESSMENT & PLAN NOTE
Had increased oxygen demand, improving  Elevated D Dimer-VQ high probability PE  Has a history of DVT however Xarelto was stopped due to anemia/thrombocytopenia, has been off for some time  Started on Heparin gtt, will need DOAC on DC  Pulmonary, cardiology consulted, appreciate recs

## 2025-01-16 NOTE — PROGRESS NOTES
Pt continues on heparin.     [facility] to do on site visit today and will let this worker know if they can accept. No precert is needed.      01/16/25 1112   Discharge Planning   Expected Discharge Disposition SNF

## 2025-01-17 VITALS
TEMPERATURE: 98.1 F | HEIGHT: 65 IN | OXYGEN SATURATION: 99 % | SYSTOLIC BLOOD PRESSURE: 105 MMHG | BODY MASS INDEX: 24.24 KG/M2 | RESPIRATION RATE: 16 BRPM | WEIGHT: 145.5 LBS | DIASTOLIC BLOOD PRESSURE: 49 MMHG | HEART RATE: 76 BPM

## 2025-01-17 PROBLEM — J96.21 ACUTE ON CHRONIC HYPOXIC RESPIRATORY FAILURE (MULTI): Status: RESOLVED | Noted: 2024-09-14 | Resolved: 2025-01-17

## 2025-01-17 LAB
ANION GAP SERPL CALCULATED.3IONS-SCNC: 7 MMOL/L (ref 10–20)
BASOPHILS # BLD AUTO: 0.02 X10*3/UL (ref 0–0.1)
BASOPHILS NFR BLD AUTO: 0.4 %
BUN SERPL-MCNC: 27 MG/DL (ref 6–23)
CALCIUM SERPL-MCNC: 8.5 MG/DL (ref 8.6–10.3)
CHLORIDE SERPL-SCNC: 100 MMOL/L (ref 98–107)
CO2 SERPL-SCNC: 34 MMOL/L (ref 21–32)
CREAT SERPL-MCNC: 0.78 MG/DL (ref 0.5–1.05)
EGFRCR SERPLBLD CKD-EPI 2021: 75 ML/MIN/1.73M*2
EOSINOPHIL # BLD AUTO: 0.13 X10*3/UL (ref 0–0.4)
EOSINOPHIL NFR BLD AUTO: 2.6 %
ERYTHROCYTE [DISTWIDTH] IN BLOOD BY AUTOMATED COUNT: 16.3 % (ref 11.5–14.5)
GLUCOSE SERPL-MCNC: 108 MG/DL (ref 74–99)
HCT VFR BLD AUTO: 25.4 % (ref 36–46)
HGB BLD-MCNC: 8 G/DL (ref 12–16)
HOLD SPECIMEN: NORMAL
IMM GRANULOCYTES # BLD AUTO: 0.14 X10*3/UL (ref 0–0.5)
IMM GRANULOCYTES NFR BLD AUTO: 2.8 % (ref 0–0.9)
LYMPHOCYTES # BLD AUTO: 0.81 X10*3/UL (ref 0.8–3)
LYMPHOCYTES NFR BLD AUTO: 16.2 %
MCH RBC QN AUTO: 28.6 PG (ref 26–34)
MCHC RBC AUTO-ENTMCNC: 31.5 G/DL (ref 32–36)
MCV RBC AUTO: 91 FL (ref 80–100)
MONOCYTES # BLD AUTO: 0.67 X10*3/UL (ref 0.05–0.8)
MONOCYTES NFR BLD AUTO: 13.4 %
NEUTROPHILS # BLD AUTO: 3.22 X10*3/UL (ref 1.6–5.5)
NEUTROPHILS NFR BLD AUTO: 64.6 %
NRBC BLD-RTO: 0 /100 WBCS (ref 0–0)
PLATELET # BLD AUTO: 127 X10*3/UL (ref 150–450)
POTASSIUM SERPL-SCNC: 4.3 MMOL/L (ref 3.5–5.3)
RBC # BLD AUTO: 2.8 X10*6/UL (ref 4–5.2)
SODIUM SERPL-SCNC: 137 MMOL/L (ref 136–145)
WBC # BLD AUTO: 5 X10*3/UL (ref 4.4–11.3)

## 2025-01-17 PROCEDURE — 94640 AIRWAY INHALATION TREATMENT: CPT

## 2025-01-17 PROCEDURE — 85025 COMPLETE CBC W/AUTO DIFF WBC: CPT

## 2025-01-17 PROCEDURE — 99239 HOSP IP/OBS DSCHRG MGMT >30: CPT

## 2025-01-17 PROCEDURE — 2500000001 HC RX 250 WO HCPCS SELF ADMINISTERED DRUGS (ALT 637 FOR MEDICARE OP): Performed by: ORTHOPAEDIC SURGERY

## 2025-01-17 PROCEDURE — 2500000001 HC RX 250 WO HCPCS SELF ADMINISTERED DRUGS (ALT 637 FOR MEDICARE OP)

## 2025-01-17 PROCEDURE — 36415 COLL VENOUS BLD VENIPUNCTURE: CPT

## 2025-01-17 PROCEDURE — 2500000005 HC RX 250 GENERAL PHARMACY W/O HCPCS: Performed by: ORTHOPAEDIC SURGERY

## 2025-01-17 PROCEDURE — 80048 BASIC METABOLIC PNL TOTAL CA: CPT

## 2025-01-17 PROCEDURE — 2500000004 HC RX 250 GENERAL PHARMACY W/ HCPCS (ALT 636 FOR OP/ED): Performed by: ORTHOPAEDIC SURGERY

## 2025-01-17 RX ORDER — FAMOTIDINE 20 MG/1
20 TABLET, FILM COATED ORAL NIGHTLY
Start: 2025-01-17

## 2025-01-17 RX ORDER — IPRATROPIUM BROMIDE AND ALBUTEROL SULFATE 2.5; .5 MG/3ML; MG/3ML
3 SOLUTION RESPIRATORY (INHALATION) 4 TIMES DAILY PRN
Start: 2025-01-17

## 2025-01-17 RX ADMIN — FLUTICASONE FUROATE AND VILANTEROL TRIFENATATE 1 PUFF: 100; 25 POWDER RESPIRATORY (INHALATION) at 07:32

## 2025-01-17 RX ADMIN — LEVOTHYROXINE SODIUM 25 MCG: 0.03 TABLET ORAL at 06:14

## 2025-01-17 RX ADMIN — Medication 2 L/MIN: at 07:36

## 2025-01-17 RX ADMIN — CARVEDILOL 3.12 MG: 3.12 TABLET, FILM COATED ORAL at 09:34

## 2025-01-17 RX ADMIN — APIXABAN 10 MG: 5 TABLET, FILM COATED ORAL at 09:34

## 2025-01-17 RX ADMIN — TIOTROPIUM BROMIDE INHALATION SPRAY 2 PUFF: 3.12 SPRAY, METERED RESPIRATORY (INHALATION) at 07:32

## 2025-01-17 RX ADMIN — POLYETHYLENE GLYCOL 3350 17 G: 17 POWDER, FOR SOLUTION ORAL at 09:35

## 2025-01-17 RX ADMIN — NASAL 1 SPRAY: 6.5 SPRAY NASAL at 09:35

## 2025-01-17 RX ADMIN — ACETAMINOPHEN 650 MG: 325 TABLET ORAL at 09:34

## 2025-01-17 ASSESSMENT — COGNITIVE AND FUNCTIONAL STATUS - GENERAL
DAILY ACTIVITIY SCORE: 13
CLIMB 3 TO 5 STEPS WITH RAILING: TOTAL
TURNING FROM BACK TO SIDE WHILE IN FLAT BAD: A LOT
PERSONAL GROOMING: A LOT
MOBILITY SCORE: 11
STANDING UP FROM CHAIR USING ARMS: A LOT
EATING MEALS: A LITTLE
TOILETING: A LOT
MOVING FROM LYING ON BACK TO SITTING ON SIDE OF FLAT BED WITH BEDRAILS: A LITTLE
WALKING IN HOSPITAL ROOM: TOTAL
DRESSING REGULAR UPPER BODY CLOTHING: A LITTLE
DRESSING REGULAR LOWER BODY CLOTHING: TOTAL
HELP NEEDED FOR BATHING: A LOT
MOVING TO AND FROM BED TO CHAIR: A LOT

## 2025-01-17 ASSESSMENT — PAIN SCALES - GENERAL: PAINLEVEL_OUTOF10: 0 - NO PAIN

## 2025-01-17 ASSESSMENT — PAIN - FUNCTIONAL ASSESSMENT: PAIN_FUNCTIONAL_ASSESSMENT: 0-10

## 2025-01-17 NOTE — CARE PLAN
The patient's goals for the shift include rest    The clinical goals for the shift include safety and pain mangement      Problem: Pain - Adult  Goal: Verbalizes/displays adequate comfort level or baseline comfort level  Outcome: Progressing     Problem: Safety - Adult  Goal: Free from fall injury  Outcome: Progressing     Problem: Discharge Planning  Goal: Discharge to home or other facility with appropriate resources  Outcome: Progressing     Problem: Chronic Conditions and Co-morbidities  Goal: Patient's chronic conditions and co-morbidity symptoms are monitored and maintained or improved  Outcome: Progressing     Problem: Pain  Goal: Takes deep breaths with improved pain control throughout the shift  Outcome: Progressing  Goal: Turns in bed with improved pain control throughout the shift  Outcome: Progressing  Goal: Walks with improved pain control throughout the shift  Outcome: Progressing  Goal: Performs ADL's with improved pain control throughout shift  Outcome: Progressing  Goal: Participates in PT with improved pain control throughout the shift  Outcome: Progressing  Goal: Free from opioid side effects throughout the shift  Outcome: Progressing  Goal: Free from acute confusion related to pain meds throughout the shift  Outcome: Progressing     Problem: Respiratory  Goal: Clear secretions with interventions this shift  Outcome: Progressing  Goal: Minimize anxiety/maximize coping throughout shift  Outcome: Progressing  Goal: Minimal/no exertional discomfort or dyspnea this shift  Outcome: Progressing  Goal: No signs of respiratory distress (eg. Use of accessory muscles. Peds grunting)  Outcome: Progressing  Goal: Patent airway maintained this shift  Outcome: Progressing  Goal: Tolerate mechanical ventilation evidenced by VS/agitation level this shift  Outcome: Progressing  Goal: Tolerate pulmonary toileting this shift  Outcome: Progressing  Goal: Verbalize decreased shortness of breath this shift  Outcome:  Progressing  Goal: Wean oxygen to maintain O2 saturation per order/standard this shift  Outcome: Progressing  Goal: Increase self care and/or family involvement in next 24 hours  Outcome: Progressing     Problem: Skin  Goal: Decreased wound size/increased tissue granulation at next dressing change  Outcome: Progressing  Flowsheets (Taken 1/17/2025 1420)  Decreased wound size/increased tissue granulation at next dressing change:   Promote sleep for wound healing   Protective dressings over bony prominences   Utilize specialty bed per algorithm  Goal: Participates in plan/prevention/treatment measures  Outcome: Progressing  Flowsheets (Taken 1/17/2025 1420)  Participates in plan/prevention/treatment measures:   Discuss with provider PT/OT consult   Elevate heels   Increase activity/out of bed for meals  Goal: Prevent/manage excess moisture  Outcome: Progressing  Flowsheets (Taken 1/17/2025 1420)  Prevent/manage excess moisture:   Cleanse incontinence/protect with barrier cream   Monitor for/manage infection if present   Follow provider orders for dressing changes   Use wicking fabric (obtain order)   Moisturize dry skin  Goal: Prevent/minimize sheer/friction injuries  Outcome: Progressing  Flowsheets (Taken 1/17/2025 1420)  Prevent/minimize sheer/friction injuries:   Complete micro-shifts as needed if patient unable. Adjust patient position to relieve pressure points, not a full turn   Increase activity/out of bed for meals   Use pull sheet   HOB 30 degrees or less   Turn/reposition every 2 hours/use positioning/transfer devices   Utilize specialty bed per algorithm  Goal: Promote/optimize nutrition  Outcome: Progressing  Flowsheets (Taken 1/17/2025 1420)  Promote/optimize nutrition:   Assist with feeding   Offer water/supplements/favorite foods   Consume > 50% meals/supplements   Monitor/record intake including meals  Goal: Promote skin healing  Outcome: Progressing  Flowsheets (Taken 1/17/2025 1420)  Promote skin  healing:   Assess skin/pad under line(s)/device(s)   Protective dressings over bony prominences   Turn/reposition every 2 hours/use positioning/transfer devices   Ensure correct size (line/device) and apply per  instructions   Rotate device position/do not position patient on device     Problem: Bathing  Goal: STG - Patient will bathe lower body with minimal assist and long handled equipment  Outcome: Progressing     Problem: Dressings Lower Extremities  Goal: LTG - Patient will dress lower body with  minimal assist and AE   Outcome: Progressing     Problem: Toileting  Goal: STG - Patient will complete toileting tasks with minimal assist and 2ww  Outcome: Progressing

## 2025-01-17 NOTE — PROGRESS NOTES
Pt with possible dc today [facility], facility aware, requesting 3 pm transport.     DC Order in, await cosign on [facility].     01/17/25 1039   Discharge Planning   Expected Discharge Disposition SNF  ([facility])

## 2025-01-17 NOTE — NURSING NOTE
Assumed care of patient. Patient is resting in bed with the bed alarm activated. She has no needs at this time. She was assisted to order some breakfast. Call light is within her reach will continue to monitor.

## 2025-01-17 NOTE — PROGRESS NOTES
Facility and spouse aware of transport time.      01/17/25 1304   Discharge Planning   Expected Discharge Disposition SNF  ([facility])   Does the patient need discharge transport arranged? Yes   RoundTrip coordination needed? Yes   Has discharge transport been arranged? Yes   What day is the transport expected? 01/17/25   What time is the transport expected? 1500

## 2025-01-17 NOTE — DISCHARGE SUMMARY
Discharge Diagnosis  Closed fracture of neck of left femur, initial encounter  Acute pulmonary embolism  Acute on chronic respiratory failure  AYSE  Dementia      Issues Requiring Follow-Up  Discharge to SNF for further rehabilitation  Follow up PCP, cardiology, pulmonology, nephrology 1-2 weeks   SNF to repeat CBC tomorrow 1/18/25    Discharge Meds     Medication List      START taking these medications     apixaban 5 mg tablet; Commonly known as: Eliquis; Take 2 tablets (10 mg)   by mouth 2 times a day for 6 days, THEN 1 tablet (5 mg) 2 times a day.;   Start taking on: January 17, 2025   famotidine 20 mg tablet; Commonly known as: Pepcid; Take 1 tablet (20   mg) by mouth once daily at bedtime.   ipratropium-albuteroL 0.5-2.5 mg/3 mL nebulizer solution; Commonly known   as: Duo-Neb; Take 3 mL by nebulization 4 times a day as needed for   wheezing.   oxyCODONE 5 mg immediate release tablet; Commonly known as: Roxicodone;   Take 1 tablet (5 mg) by mouth every 4 hours if needed for severe pain (7 -   10) for up to 7 days.     CONTINUE taking these medications     albuterol 90 mcg/actuation inhaler   atorvastatin 40 mg tablet; Commonly known as: Lipitor; TAKE 1 TABLET BY   MOUTH EVERY DAY AT BEDTIME   carvedilol 3.125 mg tablet; Commonly known as: Coreg; TAKE 1 TABLET   (3.125 MG) BY MOUTH 2 TIMES A DAY.   docusate sodium 100 mg capsule; Commonly known as: Colace; Take 1   capsule (100 mg) by mouth 2 times a day as needed for constipation.   DULoxetine 20 mg DR capsule; Commonly known as: Cymbalta; Take 1 capsule   (20 mg) by mouth once daily at bedtime. Do not crush or chew.   gabapentin 100 mg capsule; Commonly known as: Neurontin; Take 1 capsule   (100 mg) by mouth once daily at bedtime.   levothyroxine 25 mcg tablet; Commonly known as: Synthroid, Levoxyl; Take   1 tablet (25 mcg) by mouth early in the morning.. Take on an empty stomach   at the same time each day, either 30 to 60 minutes prior to breakfast   Multi  Complete with Iron  mg-mcg tablet; Generic drug:   multivitamin with minerals   nitroglycerin 0.4 mg SL tablet; Commonly known as: Nitrostat   nystatin 100,000 unit/gram powder; Commonly known as: Mycostatin; Apply   1 Application topically 2 times a day.   oxygen gas therapy; Commonly known as: O2   pantoprazole 40 mg EC tablet; Commonly known as: ProtoNix; TAKE 1 TABLET   BY MOUTH TWICE A DAY   polyethylene glycol 17 gram/dose powder; Commonly known as: Glycolax,   Miralax; mix 17 grams (1 capful) in 8 ounces of water and drink daily   Trelegy Ellipta 100-62.5-25 mcg blister with device; Generic drug:   fluticasone-umeclidin-vilanter; Inhale 1 puff once daily.     STOP taking these medications     ALPRAZolam 0.25 mg tablet; Commonly known as: Xanax   diclofenac sodium 1 % gel; Commonly known as: Voltaren   lidocaine 5 % cream cream; Commonly known as: Hemorrhoidal Relief   loratadine 10 mg tablet; Commonly known as: Claritin   tiZANidine 2 mg tablet; Commonly known as: Zanaflex   torsemide 20 mg tablet; Commonly known as: Demadex       Test Results Pending At Discharge  Pending Labs       No current pending labs.            Hospital Course  84 year old female admitted 1/9/25 for mechanical fall and shortness of breath. Pmhx DVT, chronic respiratory failure requiring 2l nc at baseline, dementia. Incidental finding of PE on admission, started on heparin gtt. Consults with pulmonology. S/p left femur frx repair 1/13/25.  Mild fluid overload with acute chf, consult with cardiology. Hospital course further complicated by mild AYSE. Received consult with nephrology. Patient now converted to DOAC eliquis tolerating well. Has been weaned down to her baseline 02 requirement of 2L.  Resolved AYSE. Euvolemic. At baseline mentation. Denies pain or complaints. Will discharge [facility] for further rehabilitation.       Pertinent Physical Exam At Time of Discharge  Physical Exam  Vitals and nursing note reviewed.    Constitutional:       Appearance: Normal appearance.   HENT:      Head: Normocephalic and atraumatic.      Right Ear: External ear normal.      Left Ear: External ear normal.      Nose: Nose normal.      Mouth/Throat:      Mouth: Mucous membranes are moist.   Eyes:      Extraocular Movements: Extraocular movements intact.   Cardiovascular:      Rate and Rhythm: Normal rate and regular rhythm.      Pulses: Normal pulses.      Heart sounds: Normal heart sounds.   Pulmonary:      Effort: Pulmonary effort is normal.      Breath sounds: Normal breath sounds.      Comments: On her Baseline 2l nc breathing comfortably   Abdominal:      General: Bowel sounds are normal.      Palpations: Abdomen is soft.   Genitourinary:     Comments: Indwelling heath catheter   Musculoskeletal:         General: Normal range of motion.      Cervical back: Normal range of motion and neck supple.   Skin:     General: Skin is warm and dry.      Capillary Refill: Capillary refill takes less than 2 seconds.   Neurological:      General: No focal deficit present.      Mental Status: She is alert. Mental status is at baseline.      Comments: Pleasantly confused which is her baseline   Psychiatric:         Mood and Affect: Mood normal.         Outpatient Follow-Up  Future Appointments   Date Time Provider Department Center   1/30/2025  1:00 PM STANISLAV Weiss [id number]   4/25/2025  8:00 AM  [facility] [id number] [id number] [facility]     Time spent on discharge 35 minutes.     STANISLAV Alcocer

## 2025-01-17 NOTE — CARE PLAN
Problem: Pain - Adult  Goal: Verbalizes/displays adequate comfort level or baseline comfort level  Outcome: Progressing     Problem: Safety - Adult  Goal: Free from fall injury  Outcome: Progressing     Problem: Discharge Planning  Goal: Discharge to home or other facility with appropriate resources  Outcome: Progressing     Problem: Chronic Conditions and Co-morbidities  Goal: Patient's chronic conditions and co-morbidity symptoms are monitored and maintained or improved  Outcome: Progressing     Problem: Pain  Goal: Takes deep breaths with improved pain control throughout the shift  Outcome: Progressing  Goal: Turns in bed with improved pain control throughout the shift  Outcome: Progressing  Goal: Walks with improved pain control throughout the shift  Outcome: Progressing  Goal: Performs ADL's with improved pain control throughout shift  Outcome: Progressing  Goal: Participates in PT with improved pain control throughout the shift  Outcome: Progressing  Goal: Free from opioid side effects throughout the shift  Outcome: Progressing  Goal: Free from acute confusion related to pain meds throughout the shift  Outcome: Progressing     Problem: Respiratory  Goal: Clear secretions with interventions this shift  Outcome: Progressing  Goal: Minimize anxiety/maximize coping throughout shift  Outcome: Progressing  Goal: Minimal/no exertional discomfort or dyspnea this shift  Outcome: Progressing  Goal: No signs of respiratory distress (eg. Use of accessory muscles. Peds grunting)  Outcome: Progressing  Goal: Patent airway maintained this shift  Outcome: Progressing  Goal: Tolerate mechanical ventilation evidenced by VS/agitation level this shift  Outcome: Progressing  Goal: Tolerate pulmonary toileting this shift  Outcome: Progressing  Goal: Verbalize decreased shortness of breath this shift  Outcome: Progressing  Goal: Wean oxygen to maintain O2 saturation per order/standard this shift  Outcome: Progressing  Goal:  Increase self care and/or family involvement in next 24 hours  Outcome: Progressing     Problem: Skin  Goal: Decreased wound size/increased tissue granulation at next dressing change  Outcome: Progressing  Goal: Participates in plan/prevention/treatment measures  Outcome: Progressing  Goal: Prevent/manage excess moisture  Outcome: Progressing  Goal: Prevent/minimize sheer/friction injuries  Outcome: Progressing  Goal: Promote/optimize nutrition  Outcome: Progressing  Goal: Promote skin healing  Outcome: Progressing     Problem: Bathing  Goal: STG - Patient will bathe lower body with minimal assist and long handled equipment  Outcome: Progressing     Problem: Dressings Lower Extremities  Goal: LTG - Patient will dress lower body with  minimal assist and AE   Outcome: Progressing     Problem: Toileting  Goal: STG - Patient will complete toileting tasks with minimal assist and 2ww  Outcome: Progressing   The patient's goals for the shift include rest    The clinical goals for the shift include safety, pain control, monitor SpO2

## 2025-01-22 NOTE — DOCUMENTATION CLARIFICATION NOTE
"    PATIENT:               RUDOLPH DÍAZ  ACCT #:                  [id number]  MRN:                       [id number]  :                       1940  ADMIT DATE:       2025 11:49 PM  DISCH DATE:        2025 4:15 PM  RESPONDING PROVIDER #:        [id number]          PROVIDER RESPONSE TEXT:    Acute on Chronic Diastolic Congestive Heart Failure    CDI QUERY TEXT:    Clarification      Instruction:    Based on your assessment of the patient and the clinical information, please provide the requested documentation by clicking on the appropriate radio button and enter any additional information if prompted.    Question: Please further clarify the acuity of congestive heart failure    When answering this query, please exercise your independent professional judgment. The fact that a question is being asked, does not imply that any particular answer is desired or expected.    The patient's clinical indicators include:  Clinical Information:  84 year old female presented with left femoral neck fracture.    Clinical Indicators:  - BNP- 615    - Internal Med- \"chest x-ray & ABG pointing towards CHF, 40 IV lasix x1\". :\"CHF and worsening AYSE. Acute CHF\".    - CXR0 \"Lungs are hyperinflated and hyperlucent, compatible with emphysema/COPD.Pulmonary vascular congestion suggests volume overload, with prominent interstitial markings and Kerley B-lines  suggesting acute pulmonary interstitial edema/CHF\".    :- Cardiology Note- \"Early this morning a rapid response was called for desaturation. CXR showed some pulmonary vascular congestion. . She was given 40 mg IV Lasix this AM. She is currently on HIFLOW NC SpO2 100%.\".    - Echo- \"ejection fraction of 55-60%.\".    Treatment:  - Lasix 40mg IV x 1  02 Therapy- up to 100% NRB  Echo    Risk Factors:  History CHF  Home medication- Demadex 20mg daily  Options provided:  -- Acute on Chronic Diastolic Congestive Heart Failure  -- Chronic Diastolic " Congestive Heart Failure  -- Other - I will add my own diagnosis  -- Refer to Clinical Documentation Reviewer    Query created by: Kathy Devi on 1/15/2025 6:14 PM      Electronically signed by:  JUSTINA COLOR[place] 1/22/2025 10:01 AM

## 2025-01-27 ENCOUNTER — APPOINTMENT (OUTPATIENT)
Dept: RADIOLOGY | Facility: HOSPITAL | Age: 85
End: 2025-01-27
Payer: MEDICARE

## 2025-01-27 ENCOUNTER — HOSPITAL ENCOUNTER (EMERGENCY)
Facility: HOSPITAL | Age: 85
Discharge: HOME | End: 2025-01-28
Payer: MEDICARE

## 2025-01-27 VITALS
DIASTOLIC BLOOD PRESSURE: 60 MMHG | OXYGEN SATURATION: 99 % | RESPIRATION RATE: 18 BRPM | HEART RATE: 94 BPM | SYSTOLIC BLOOD PRESSURE: 117 MMHG | TEMPERATURE: 97.7 F

## 2025-01-27 DIAGNOSIS — M54.50 LUMBAR BACK PAIN: ICD-10-CM
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
## 2025-01-27 PROCEDURE — 72131 CT LUMBAR SPINE W/O DYE: CPT | Performed by: RADIOLOGY

## 2025-01-27 PROCEDURE — 99284 EMERGENCY DEPT VISIT MOD MDM: CPT | Mod: 25

## 2025-01-27 PROCEDURE — 2500000001 HC RX 250 WO HCPCS SELF ADMINISTERED DRUGS (ALT 637 FOR MEDICARE OP)

## 2025-01-27 PROCEDURE — 72125 CT NECK SPINE W/O DYE: CPT

## 2025-01-27 PROCEDURE — 72131 CT LUMBAR SPINE W/O DYE: CPT

## 2025-01-27 PROCEDURE — 73560 X-RAY EXAM OF KNEE 1 OR 2: CPT | Mod: LEFT SIDE | Performed by: RADIOLOGY

## 2025-01-27 PROCEDURE — 70450 CT HEAD/BRAIN W/O DYE: CPT

## 2025-01-27 PROCEDURE — 72125 CT NECK SPINE W/O DYE: CPT | Performed by: RADIOLOGY

## 2025-01-27 PROCEDURE — 73560 X-RAY EXAM OF KNEE 1 OR 2: CPT | Mod: LT

## 2025-01-27 PROCEDURE — 70450 CT HEAD/BRAIN W/O DYE: CPT | Performed by: RADIOLOGY

## 2025-01-27 RX ORDER — ACETAMINOPHEN 325 MG/1
650 TABLET ORAL ONCE
Status: COMPLETED | OUTPATIENT
Start: 2025-01-27 | End: 2025-01-27

## 2025-01-27 RX ADMIN — ACETAMINOPHEN 650 MG: 325 TABLET ORAL at 21:19

## 2025-01-27 ASSESSMENT — PAIN SCALES - PAIN ASSESSMENT IN ADVANCED DEMENTIA (PAINAD)
BODYLANGUAGE: RELAXED
TOTALSCORE: 0
BREATHING: NORMAL
FACIALEXPRESSION: SMILING OR INEXPRESSIVE
CONSOLABILITY: NO NEED TO CONSOLE

## 2025-01-27 ASSESSMENT — PAIN - FUNCTIONAL ASSESSMENT: PAIN_FUNCTIONAL_ASSESSMENT: PAINAD (PAIN ASSESSMENT IN ADVANCED DEMENTIA SCALE)

## 2025-01-27 NOTE — ED TRIAGE NOTES
Pt had an unwitnessed fall from a seated wheelchair. On eliquis. Hx of dementia. Pt is having back pain. Pt is from [facility]. Pt was in her room when she slid out of her chair.

## 2025-01-28 NOTE — ED PROVIDER NOTES
HPI   Chief Complaint   Patient presents with    Fall       Patient is an 84-year-old female with past medical history of CHF on 2 L nasal cannula, CKD, COPD presenting from [facility] rehab facility after having fallen out of her wheelchair.  Reportedly she is in a rehab facility for a left femur fracture.  Patient reportedly slid out of her wheelchair earlier today and it was unwitnessed.  She is complaining of low back pain.  Staff does not believe that she hit her head as she was just sitting in front of her wheelchair.  Patient has dementia and ROS is limited due to patient's mental capacity.              Patient History   Past Medical History:   Diagnosis Date    AAA (abdominal aortic aneurysm) (CMS-[facility])     Acute urinary tract infection 04/20/2023    Anemia     Anxiety     Arthritis     CHF (congestive heart failure)     Chronic pain disorder     back pain    CKD (chronic kidney disease)     Cognitive decline     COPD (chronic obstructive pulmonary disease) (Multi)     oxygen dependent- wears 2L NC continuously    Coronary artery disease     s/p stent    CVA (cerebral vascular accident) (Multi)     weaker on the left side    Deep vein thrombosis (DVT) of calf (Multi)     left    Depression     Disease of thyroid gland     Diverticulosis     DVT (deep venous thrombosis) (Multi)     left leg, on xarelto    Easy bruising     Epistaxis     GERD (gastroesophageal reflux disease)     managed with protonix    History of blood transfusion 2023    History of degenerative disc disease     Hyperlipidemia     Hypertension     Hypothyroidism     Ischemic cardiomyopathy     Meralgia paresthetica     Nonrheumatic mitral valve regurgitation     Osteoarthritis     Osteopenia 2010    hip - DEXA    Plantar fasciitis     RLS (restless legs syndrome)     Sciatica     Spinal stenosis     ST elevation (STEMI) myocardial infarction (Multi)      Past Surgical History:   Procedure Laterality Date    ADENOIDECTOMY      ANOMALOUS  PULMONARY VENOUS RETURN REPAIR, TOTAL N/A 2024    Procedure: Mitral-ARMANI (Transcatheter Edge to Edge Repair);  Surgeon: Kyle Bernardo MD;  Location: [address] Cardiac Cath Lab;  Service: Cardiovascular;  Laterality: N/A;  SAMMY Elgendy.Labs with cPM,Maynard,Schedule at 7;30am    CARDIAC CATHETERIZATION  10/2019    CARDIAC CATHETERIZATION N/A 2024    Procedure: Left And Right Heart Cath, With LV;  Surgeon: Tuan Foss DO;  Location: [facility] Cardiac Cath Lab;  Service: Cardiovascular;  Laterality: N/A;  no pre auth needed    CATARACT EXTRACTION Bilateral 2012    CHOLECYSTECTOMY  1996    laparoscopic    COLONOSCOPY      Dr. Rodriguez    CORONARY STENT PLACEMENT      CYST REMOVAL Right 2013    Excision of Sebaceous cyst right axilla    DILATION AND CURETTAGE OF UTERUS      ESOPHAGOGASTRODUODENOSCOPY      KNEE ARTHROPLASTY Left     MR HEAD ANGIO WO IV CONTRAST  2017    MR HEAD ANGIO WO IV CONTRAST [facility]    MR HEAD ANGIO WO IV CONTRAST  2017    MR HEAD ANGIO WO IV CONTRAST [facility]    MR HEAD ANGIO WO IV CONTRAST  02/10/2019    MR HEAD ANGIO WO IV CONTRAST [facility]    OTHER SURGICAL HISTORY  2019    Stent synergy    OTHER SURGICAL HISTORY  2019    Stent synergy    NC ARTHRP ACETBLR/PROX FEM PROSTC AGRFT/ALGRFT      SURGICAL SAMMY MONITORING      THYROIDECTOMY, PARTIAL Bilateral 2013    subtotal thyroidectomy    TONSILLECTOMY      TOTAL HIP ARTHROPLASTY Right     UPPER GASTROINTESTINAL ENDOSCOPY  2019    Dr. Galan     Family History   Problem Relation Name Age of Onset    Hyperthyroidism Mother          Treated with radioactive iodine    Hypertension Mother      Heart disease Mother      Hyperthyroidism Sibling      Diabetes Father's Sister       Social History     Tobacco Use    Smoking status: Former     Current packs/day: 0.00     Types: Cigarettes     Quit date:      Years since quittin.0     Passive  exposure: Never    Smokeless tobacco: Never   Vaping Use    Vaping status: Never Used   Substance Use Topics    Alcohol use: Not Currently     Comment: glass wine at holiday    Drug use: Never       Physical Exam   ED Triage Vitals [01/27/25 1903]   Temperature Heart Rate Respirations BP   36.5 °C (97.7 °F) (!) 118 18 133/58      Pulse Ox Temp src Heart Rate Source Patient Position   (!) 88 % -- -- --      BP Location FiO2 (%)     -- --       Physical Exam  Vitals and nursing note reviewed.   Constitutional:       Appearance: She is well-developed.      Comments: Awake, laying in examination bed   HENT:      Head: Normocephalic and atraumatic.      Nose: Nose normal.      Mouth/Throat:      Mouth: Mucous membranes are moist.      Pharynx: Oropharynx is clear.   Eyes:      Extraocular Movements: Extraocular movements intact.      Conjunctiva/sclera: Conjunctivae normal.      Pupils: Pupils are equal, round, and reactive to light.   Cardiovascular:      Rate and Rhythm: Normal rate and regular rhythm.      Pulses: Normal pulses.      Heart sounds: Normal heart sounds. No murmur heard.  Pulmonary:      Effort: Pulmonary effort is normal. No respiratory distress.      Breath sounds: Normal breath sounds.   Abdominal:      General: Abdomen is flat.      Palpations: Abdomen is soft.      Tenderness: There is no abdominal tenderness.   Musculoskeletal:         General: No swelling.      Cervical back: Normal range of motion and neck supple.      Comments: Tenderness palpation of the left hip with known fracture.   Skin:     General: Skin is warm and dry.      Capillary Refill: Capillary refill takes less than 2 seconds.   Neurological:      General: No focal deficit present.      Mental Status: She is alert. Mental status is at baseline.   Psychiatric:         Mood and Affect: Mood normal.         Behavior: Behavior normal.           ED Course & MDM   Diagnoses as of 01/28/25 0031   Fall, initial encounter   Lumbar back  pain                 No data recorded     Darnell Coma Scale Score: 15 (01/27/25 2022 : Guillermo Monaco RN)                           Medical Decision Making  Patient is an 84-year-old female with past medical history of CHF on 2 L nasal cannula, CKD, COPD presenting from [facility] after having fallen from her wheelchair.  Imaging ordered.  Conditions considered include but are not limited to: Contusion, fracture, intracranial pathology.    Attending physician was available for consultation on this patient.  CT C-spine without acute fracture CT head without acute findings.  X-ray of left knee without acute findings.  CT lumbar spine without acute findings.    I believe this patient is at low risk for complication, and a disposition of discharge is acceptable.  Return to the Emergency Department if new or worsening symptoms including headache, fever, chills, chest pain, shortness of breath, syncope, near syncope, abdominal pain, nausea, vomiting,  diarrhea, or worsening pain.  Patient and patient's family updated on results.  They are agreeable to a disposition of discharge and to follow with respective fields in the next several days.    Portions of this note made with Dragon software, please be mindful of potential grammatical errors.      Medications   acetaminophen (Tylenol) tablet 650 mg (650 mg oral Given 1/27/25 2119)     XR knee left 1-2 views   Final Result   No acute bony abnormalities.   Signed by Bib Schaefer MD      CT head wo IV contrast   Final Result   No acute intracranial abnormality.        Chronic changes as noted above.        MACRO:   None        Signed by: Evangelina Rodriguez 1/27/2025 8:20 PM   Dictation workstation:   [id number]      CT cervical spine wo IV contrast   Final Result   No acute fracture or traumatic subluxation of the cervical spine.        Multilevel discogenic degenerative changes as noted above.        MACRO:   None        Signed by: Evangelina Rodriguez  1/27/2025 8:25 PM   Dictation workstation:   [id number]      CT lumbar spine wo IV contrast   Final Result   No acute fracture or traumatic subluxation of the lumbar spine.        Advanced multilevel discogenic degenerative changes as noted above.        MACRO:   None        Signed by: Evangelina Rodriguez 1/27/2025 9:26 PM   Dictation workstation:   [id number]            Procedure  Procedures     Bryan Sanon PA-C  01/28/25 0031

## 2025-01-28 NOTE — DISCHARGE INSTRUCTIONS
Please continue with your rehab.  Be sure to take all medications, over the counter medications or prescription medications only as directed.    Be sure to follow up as directed in 1-2 days. All of the details of your follow up instructions are detailed in the follow up section of this packet.    If you are being discharged with any pains medications or muscle relaxers (norco, Vicodin, hydrocodone products, Percocet, oxycodone products, flexeril, cyclobenzaprine, robaxin, norflex, brand or generic, or any other pain controlling medications with the exception of Ibuprofen and regular Tylenol, do not drive or operate machinery, climb ladders or participate in any activity that could potentially put yourself or others at risk should you get dizzy, or be/feel impaired at all.    Return to emergency room without delay for ANY new or worsening pains or for any other symptoms or concerns. Return with worsening pains, nausea, vomiting, trouble breathing, palpitations, shortness of breath, inability to pass stool or urine, loss of control of stool or urine, any numbness or tingling (that is not normal for you), uncontrolled fevers, the passing of blood or other material in stool or urine, rashes, pains or for any other symptoms or concerns you may have. You are always welcome to return to the ER at any time for any reason or for any other concerns you may have.

## 2025-01-30 ENCOUNTER — APPOINTMENT (OUTPATIENT)
Dept: PRIMARY CARE | Facility: CLINIC | Age: 85
End: 2025-01-30
Payer: MEDICARE

## 2025-02-03 ENCOUNTER — OFFICE VISIT (OUTPATIENT)
Dept: PRIMARY CARE | Facility: CLINIC | Age: 85
End: 2025-02-03
Payer: MEDICARE

## 2025-02-03 VITALS — HEART RATE: 97 BPM | OXYGEN SATURATION: 99 % | SYSTOLIC BLOOD PRESSURE: 122 MMHG | DIASTOLIC BLOOD PRESSURE: 76 MMHG

## 2025-02-03 DIAGNOSIS — I50.31 CHF (CONGESTIVE HEART FAILURE), NYHA CLASS II, ACUTE, DIASTOLIC: Primary | ICD-10-CM

## 2025-02-03 DIAGNOSIS — D50.0 ANEMIA DUE TO BLOOD LOSS: ICD-10-CM

## 2025-02-03 PROCEDURE — 99214 OFFICE O/P EST MOD 30 MIN: CPT | Performed by: FAMILY MEDICINE

## 2025-02-03 PROCEDURE — 1125F AMNT PAIN NOTED PAIN PRSNT: CPT | Performed by: FAMILY MEDICINE

## 2025-02-03 PROCEDURE — 1123F ACP DISCUSS/DSCN MKR DOCD: CPT | Performed by: FAMILY MEDICINE

## 2025-02-03 PROCEDURE — 1036F TOBACCO NON-USER: CPT | Performed by: FAMILY MEDICINE

## 2025-02-03 PROCEDURE — 1111F DSCHRG MED/CURRENT MED MERGE: CPT | Performed by: FAMILY MEDICINE

## 2025-02-03 PROCEDURE — 1159F MED LIST DOCD IN RCRD: CPT | Performed by: FAMILY MEDICINE

## 2025-02-03 PROCEDURE — 3078F DIAST BP <80 MM HG: CPT | Performed by: FAMILY MEDICINE

## 2025-02-03 PROCEDURE — 3074F SYST BP LT 130 MM HG: CPT | Performed by: FAMILY MEDICINE

## 2025-02-03 PROCEDURE — 1157F ADVNC CARE PLAN IN RCRD: CPT | Performed by: FAMILY MEDICINE

## 2025-02-03 RX ORDER — FUROSEMIDE 40 MG/1
40 TABLET ORAL DAILY
Qty: 30 TABLET | Refills: 11 | Status: SHIPPED | OUTPATIENT
Start: 2025-02-03 | End: 2025-02-04 | Stop reason: SDUPTHER

## 2025-02-03 ASSESSMENT — PAIN SCALES - GENERAL: PAINLEVEL_OUTOF10: 2

## 2025-02-03 ASSESSMENT — ENCOUNTER SYMPTOMS
LOSS OF SENSATION IN FEET: 1
SHORTNESS OF BREATH: 0
OCCASIONAL FEELINGS OF UNSTEADINESS: 1
DEPRESSION: 1
ARTHRALGIAS: 1
BACK PAIN: 1

## 2025-02-03 NOTE — PROGRESS NOTES
[facility]: [facility]  E/M EVALUATION    Anitha Welch is a 84 y.o. female who presents for Hospital Follow-up (2025 - 2025 (8 days)/Closed fracture of neck of left femur, initial encounter/[facility]//).    Subjective   Left femur fracture, still in SNF,  had a fall in rehab and evaluated in the ER. Imaging neg at ER.  She reports doing ok right now.  Some positions do cause some pain.  Back to PT/OT in facility.  Has ortho and cardiology scheduled.     Only concern with family is swelling in legs.      Anemia- on iron.     Copd- stable 2L      Review of Systems   Respiratory:  Negative for shortness of breath.    Cardiovascular:  Positive for leg swelling.   Musculoskeletal:  Positive for arthralgias, back pain and gait problem.       Objective   Vitals:    25 1422   BP: 122/76   Pulse: 97   SpO2: 99%     Physical Exam  Constitutional:       Appearance: Normal appearance.   Cardiovascular:      Rate and Rhythm: Normal rate and regular rhythm.      Heart sounds: No murmur heard.  Pulmonary:      Effort: Pulmonary effort is normal.      Breath sounds: Normal breath sounds.   Musculoskeletal:      Right lower le+ Edema present.      Left lower leg: 3+ Edema present.   Neurological:      Mental Status: She is alert.           Assessment/Plan      Patient Active Problem List   Diagnosis    Epistaxis    Abdominal aortic aneurysm (AAA) without rupture (CMS-HCC)    Acute low back pain    Acute on chronic systolic heart failure    Acute renal failure syndrome (CMS-HCC)    Acute ST segment elevation myocardial infarction (Multi)    Arteriosclerosis of coronary artery    Artificial lens present    Benign essential hypertension    Anemia due to blood loss    Stenosis of left carotid artery    Cerebrovascular accident (CVA) involving left cerebral hemisphere (Multi)    Cerebrovascular accident (CVA) due to embolism of cerebral artery (Multi)    Cerebrovascular accident  (Multi)    Transient ischemic attack    Angina pectoris    Backache    Chest pain    Tight chest    Chronic diastolic heart failure    Chronic heart failure with preserved ejection fraction    Stage 3 chronic kidney disease (Multi)    COPD exacerbation (Multi)    Chronic obstructive pulmonary disease (Multi)    Claudication (CMS-HCC)    Cystocele with prolapse    Vaginal prolapse    Dehydration    Dermatochalasis of left upper eyelid    Dermatochalasis of right upper eyelid    Vision disorder    Dysgeusia    Dyspnea    Electrocardiogram abnormal    Facial weakness    Fall    Gastroesophageal reflux disease    Gastrointestinal hemorrhage    Headache    History of coronary artery stent placement    History of myocardial infarction    History of stroke without residual deficits    History of cerebrovascular accident    Hypothyroidism (acquired)    Ischemic cardiomyopathy    Lacunar infarct, acute (Multi)    Lumbago-sciatica due to displacement of lumbar intervertebral disc    Lumbar degenerative disc disease    Mixed hyperlipidemia    Overactive bladder    Left leg pain    Pinguecula    Hypertension    Low blood pressure    Near syncope    Peripheral vascular disease (CMS-[facility])    Right homonymous hemianopsia    Seizure (Multi)    Stented coronary artery    Syncope and collapse    Tear film insufficiency    Urinary urgency    Varicose veins of both legs with edema    Floaters in visual field    Vitreous degeneration    Asthenia    Chronic fatigue syndrome    Weakness    Acute respiratory failure with hypoxia (Multi)    Acquired hypothyroidism    Bilateral carotid artery stenosis    Restless legs syndrome    HFrEF (heart failure with reduced ejection fraction)    Severe mitral valve regurgitation    Heart failure    Acute deep vein thrombosis (DVT) of proximal vein of left lower extremity (Multi)    Constipation    Venous thromboembolism (VTE)    Do not resuscitate    Amnesia    Hypertensive heart disease with heart  failure    Nonrheumatic mitral valve regurgitation    S/P mitral valve clip implantation    Gastrointestinal bleed    Anemia    Spinal stenosis of lumbar region with neurogenic claudication    CHF (congestive heart failure), NYHA class II, acute, diastolic    DVT (deep venous thrombosis) (Multi)    Dyspnea, unspecified type    Pneumonia of right lower lobe due to infectious organism    Closed fracture of neck of left femur, initial encounter    History of DVT (deep vein thrombosis)    History of CHF (congestive heart failure)    Acute pulmonary embolism (Multi)       Diagnoses and all orders for this visit:  CHF (congestive heart failure), NYHA class II, acute, diastolic  -     furosemide (Lasix) 40 mg tablet; Take 1 tablet (40 mg) by mouth once daily.  Anemia due to blood loss  Still in rehab.  Follow up 1 month for checkup after rehab.  Has cardiology tomorrow.  Ortho as scheduled.     The patient was encouraged to ensure that any/all documentation is accurate and up to date, and that our office be provided a copy in the event that anything changes.         Gee Dupree MD

## 2025-02-04 ENCOUNTER — OFFICE VISIT (OUTPATIENT)
Dept: CARDIOLOGY | Facility: CLINIC | Age: 85
End: 2025-02-04
Payer: MEDICARE

## 2025-02-04 VITALS — OXYGEN SATURATION: 99 % | HEART RATE: 84 BPM | SYSTOLIC BLOOD PRESSURE: 100 MMHG | DIASTOLIC BLOOD PRESSURE: 60 MMHG

## 2025-02-04 DIAGNOSIS — I34.0 NONRHEUMATIC MITRAL VALVE REGURGITATION: ICD-10-CM

## 2025-02-04 DIAGNOSIS — I50.23 ACUTE ON CHRONIC SYSTOLIC HEART FAILURE: ICD-10-CM

## 2025-02-04 DIAGNOSIS — I10 BENIGN ESSENTIAL HYPERTENSION: ICD-10-CM

## 2025-02-04 DIAGNOSIS — I26.93 SINGLE SUBSEGMENTAL PULMONARY EMBOLISM WITHOUT ACUTE COR PULMONALE: Primary | ICD-10-CM

## 2025-02-04 DIAGNOSIS — I25.10 ARTERIOSCLEROSIS OF CORONARY ARTERY: ICD-10-CM

## 2025-02-04 DIAGNOSIS — I50.31 CHF (CONGESTIVE HEART FAILURE), NYHA CLASS II, ACUTE, DIASTOLIC: ICD-10-CM

## 2025-02-04 PROCEDURE — 1111F DSCHRG MED/CURRENT MED MERGE: CPT | Performed by: INTERNAL MEDICINE

## 2025-02-04 PROCEDURE — 1036F TOBACCO NON-USER: CPT | Performed by: INTERNAL MEDICINE

## 2025-02-04 PROCEDURE — 3078F DIAST BP <80 MM HG: CPT | Performed by: INTERNAL MEDICINE

## 2025-02-04 PROCEDURE — 1157F ADVNC CARE PLAN IN RCRD: CPT | Performed by: INTERNAL MEDICINE

## 2025-02-04 PROCEDURE — 99214 OFFICE O/P EST MOD 30 MIN: CPT | Performed by: INTERNAL MEDICINE

## 2025-02-04 PROCEDURE — 1159F MED LIST DOCD IN RCRD: CPT | Performed by: INTERNAL MEDICINE

## 2025-02-04 PROCEDURE — 3074F SYST BP LT 130 MM HG: CPT | Performed by: INTERNAL MEDICINE

## 2025-02-04 PROCEDURE — 1126F AMNT PAIN NOTED NONE PRSNT: CPT | Performed by: INTERNAL MEDICINE

## 2025-02-04 PROCEDURE — 1123F ACP DISCUSS/DSCN MKR DOCD: CPT | Performed by: INTERNAL MEDICINE

## 2025-02-04 RX ORDER — FUROSEMIDE 40 MG/1
40 TABLET ORAL DAILY
Qty: 30 TABLET | Refills: 11 | Status: SHIPPED | OUTPATIENT
Start: 2025-02-04 | End: 2026-02-04

## 2025-02-04 RX ORDER — ACETAMINOPHEN 325 MG/1
325 TABLET ORAL EVERY 6 HOURS PRN
COMMUNITY

## 2025-02-04 RX ORDER — FERROUS SULFATE 325(65) MG
325 TABLET ORAL
COMMUNITY

## 2025-02-04 ASSESSMENT — ENCOUNTER SYMPTOMS
DEPRESSION: 0
LOSS OF SENSATION IN FEET: 0
OCCASIONAL FEELINGS OF UNSTEADINESS: 1

## 2025-02-04 ASSESSMENT — PAIN SCALES - GENERAL: PAINLEVEL_OUTOF10: 0-NO PAIN

## 2025-02-04 ASSESSMENT — LIFESTYLE VARIABLES: TOTAL SCORE: 0

## 2025-02-04 NOTE — ASSESSMENT & PLAN NOTE
Moderate to severe s/p Mitraclip ARMANI with suboptimal result. Decision has been made to medically manage.

## 2025-02-04 NOTE — PROGRESS NOTES
Subjective      Chief Complaint   Patient presents with    Hospital Follow-up        84-year-old female well-known to me. She has a history of atherosclerotic heart disease with remote inferior wall myocardial infarction and drug-eluting stent placement. She has an ischemic cardiomyopathy with an ejection fraction of about 40 to 45% and inferior wall motion abnormalities. She had an echocardiogram in October showing again an EF of 40 to 45% however her mitral valve regurgitation has progressed to severe. She was catheterized in February in preparation for Mitraclip to find stable non-obstructive CAD with a widely patent RCA stent. Right and left heart filling pressures were normal. She had of having a MitraClip procedure in April 2024 this was marginally successful.  She was hospitalized last month after a fall and underwent left hip hemiarthroplasty.  Subsequently she was diagnosed with a  PE on VQ scan.  She was started on anticoagulation.  Echocardiogram showed normal left and right ventricular size and function, moderate to severe mitral regurgitation, grade 2 diastolic dysfunction.           ROS     Past Medical History:   Diagnosis Date    AAA (abdominal aortic aneurysm) (CMS-[facility])     Acute urinary tract infection 04/20/2023    Anemia     Anxiety     Arthritis     CHF (congestive heart failure)     Chronic pain disorder     back pain    CKD (chronic kidney disease)     Cognitive decline     COPD (chronic obstructive pulmonary disease) (Multi)     oxygen dependent- wears 2L NC continuously    Coronary artery disease     s/p stent    CVA (cerebral vascular accident) (Multi)     weaker on the left side    Deep vein thrombosis (DVT) of calf (Multi)     left    Depression     Disease of thyroid gland     Diverticulosis     DVT (deep venous thrombosis) (Multi)     left leg, on xarelto    Easy bruising     Epistaxis     GERD (gastroesophageal reflux disease)     managed with protonix    History of blood transfusion  2023    History of degenerative disc disease     Hyperlipidemia     Hypertension     Hypothyroidism     Ischemic cardiomyopathy     Meralgia paresthetica     Nonrheumatic mitral valve regurgitation     Osteoarthritis     Osteopenia 2010    hip - DEXA    Plantar fasciitis     RLS (restless legs syndrome)     Sciatica     Spinal stenosis     ST elevation (STEMI) myocardial infarction (Multi)         Past Surgical History:   Procedure Laterality Date    ADENOIDECTOMY      ANOMALOUS PULMONARY VENOUS RETURN REPAIR, 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    Procedure: Mitral-ARMANI (Transcatheter Edge to Edge Repair);  Surgeon: Kyle Bernardo MD;  Location: [address] Cardiac Cath Lab;  Service: Cardiovascular;  Laterality: N/A;  SAMMY Elgendy.Labs with cPM,Maynard,Schedule at 7;30am    CARDIAC CATHETERIZATION  10/2019    CARDIAC CATHETERIZATION N/A 02/16/2024    Procedure: Left And Right Heart Cath, With LV;  Surgeon: Tuan Foss DO;  Location: [facility] Cardiac Cath Lab;  Service: Cardiovascular;  Laterality: N/A;  no pre auth needed    CATARACT EXTRACTION Bilateral 11/13/2012    CHOLECYSTECTOMY  1996    laparoscopic    COLONOSCOPY      Dr. Rodriguez    CORONARY STENT PLACEMENT      CYST REMOVAL Right 06/24/2013    Excision of Sebaceous cyst right axilla    DILATION AND CURETTAGE OF UTERUS      ESOPHAGOGASTRODUODENOSCOPY      KNEE ARTHROPLASTY Left     MR HEAD ANGIO WO IV CONTRAST  02/24/2017    MR HEAD ANGIO WO IV CONTRAST [facility]    MR HEAD ANGIO WO IV CONTRAST  08/11/2017    MR HEAD ANGIO WO IV CONTRAST [facility]    MR HEAD ANGIO WO IV CONTRAST  02/10/2019    MR HEAD ANGIO WO IV CONTRAST [facility]    OTHER SURGICAL HISTORY  01/09/2019    Stent synergy    OTHER SURGICAL HISTORY  01/09/2019    Stent synergy    ID ARTHRP ACETBLR/PROX FEM PROSTC AGRFT/ALGRFT      SURGICAL SAMMY MONITORING      THYROIDECTOMY, PARTIAL Bilateral 04/17/2013    subtotal thyroidectomy    TONSILLECTOMY      TOTAL HIP ARTHROPLASTY  Right     UPPER GASTROINTESTINAL ENDOSCOPY  2019    Dr. Galan        Social History     Socioeconomic History    Marital status:      Spouse name: Not on file    Number of children: Not on file    Years of education: Not on file    Highest education level: Not on file   Occupational History    Not on file   Tobacco Use    Smoking status: Former     Current packs/day: 0.00     Types: Cigarettes     Quit date:      Years since quittin.1     Passive exposure: Never    Smokeless tobacco: Never   Vaping Use    Vaping status: Never Used   Substance and Sexual Activity    Alcohol use: Not Currently     Comment: glass wine at holiday    Drug use: Never    Sexual activity: Defer   Other Topics Concern    Not on file   Social History Narrative    Not on file     Social Drivers of Health     Financial Resource Strain: Low Risk  (1/10/2025)    Overall Financial Resource Strain (CARDIA)     Difficulty of Paying Living Expenses: Not hard at all   Food Insecurity: No Food Insecurity (1/10/2025)    Hunger Vital Sign     Worried About Running Out of Food in the Last Year: Never true     Ran Out of Food in the Last Year: Never true   Transportation Needs: No Transportation Needs (1/10/2025)    PRAPARE - Transportation     Lack of Transportation (Medical): No     Lack of Transportation (Non-Medical): No   Physical Activity: Inactive (1/10/2025)    Exercise Vital Sign     Days of Exercise per Week: 0 days     Minutes of Exercise per Session: 0 min   Stress: No Stress Concern Present (1/10/2025)    [employer] - Occupational Stress Questionnaire     Feeling of Stress : Only a little   Social Connections: Moderately Integrated (1/10/2025)    Social Connection and Isolation Panel [NHANES]     Frequency of Communication with Friends and Family: More than three times a week     Frequency of Social Gatherings with Friends and Family: More than three times a week     Attends Bahai  Services: More than 4 times per year     Active Member of Clubs or Organizations: No     Attends Club or Organization Meetings: Never     Marital Status:    Intimate Partner Violence: Not At Risk (1/10/2025)    Humiliation, Afraid, Rape, and Kick questionnaire     Fear of Current or Ex-Partner: No     Emotionally Abused: No     Physically Abused: No     Sexually Abused: No   Housing Stability: Low Risk  (1/10/2025)    Housing Stability Vital Sign     Unable to Pay for Housing in the Last Year: No     Number of Times Moved in the Last Year: 0     Homeless in the Last Year: No        Family History   Problem Relation Name Age of Onset    Hyperthyroidism Mother          Treated with radioactive iodine    Hypertension Mother      Heart disease Mother      Hyperthyroidism Sibling      Diabetes Father's Sister          OBJECTIVE:    Vitals:    02/04/25 1107   BP: 100/60   Pulse: 84   SpO2: 99%        Vitals reviewed.   Constitutional:       Appearance: Normal and healthy appearance. Not in distress.   Pulmonary:      Effort: Pulmonary effort is normal.      Breath sounds: Normal breath sounds.   Cardiovascular:      Normal rate. Regular rhythm. Normal S1. Normal S2.       Murmurs: There is no murmur.      No gallop.  No click.   Pulses:     Intact distal pulses.   Edema:     Peripheral edema present.     Ankle: bilateral 1+ pitting edema of the ankle.     Feet: bilateral 1+ pitting edema of the feet.  Skin:     General: Skin is warm and dry.   Neurological:      General: No focal deficit present.          Lab Review:   Lab Results   Component Value Date     01/17/2025    K 4.3 01/17/2025     01/17/2025    CO2 34 (H) 01/17/2025    BUN 27 (H) 01/17/2025    CREATININE 0.78 01/17/2025    GLUCOSE 108 (H) 01/17/2025    CALCIUM 8.5 (L) 01/17/2025     Lab Results   Component Value Date    CHOL 190 02/21/2023    TRIG 64 02/21/2023    HDL 52 02/21/2023       Lab Results   Component Value Date    LDLCALC 125  02/21/2023        Single subsegmental pulmonary embolism without acute cor pulmonale  Recurrent and thus would consider indefinite anticoagulation. Echo was without evidence of RV strain or dysfunction.     Arteriosclerosis of coronary artery  Stable without angina. Continue statin, no need for asa while on Eliquis. Checking lipids    Acute on chronic systolic heart failure (Multi)  Heart failure with mid-range EF. BP will preclude further med titration. Continue carvedilol, lasix.     Nonrheumatic mitral valve regurgitation  Moderate to severe s/p Mitraclip ARMANI with suboptimal result. Decision has been made to medically manage.

## 2025-02-04 NOTE — ASSESSMENT & PLAN NOTE
Heart failure with mid-range EF. BP will preclude further med titration. Continue carvedilol, should be on furosemide.

## 2025-02-04 NOTE — ASSESSMENT & PLAN NOTE
Recurrent and thus would consider indefinite anticoagulation. Echo was without evidence of RV strain or dysfunction.

## 2025-02-18 DIAGNOSIS — I50.23 ACUTE ON CHRONIC SYSTOLIC HEART FAILURE: ICD-10-CM

## 2025-02-20 ENCOUNTER — TELEPHONE (OUTPATIENT)
Dept: PRIMARY CARE | Facility: CLINIC | Age: 85
End: 2025-02-20
Payer: MEDICARE

## 2025-02-20 NOTE — TELEPHONE ENCOUNTER
MERYL Garcia/ patient's son phoned to notify you that his mother will be discharging from [facility] tomorrow. He reported her Lopez catheter was removed today and she was having some bleeding. Advised to notify staff at [facility] as they are currently managing her care needs, he verbalized an understanding. House Calls visit scheduled with you 2/27/25 at 12:30 pm. Will request DC paperwork and current medication list from SW at facility.

## 2025-02-22 ENCOUNTER — HOME HEALTH ADMISSION (OUTPATIENT)
Dept: HOME HEALTH SERVICES | Facility: HOME HEALTH | Age: 85
End: 2025-02-22
Payer: MEDICARE

## 2025-02-22 ENCOUNTER — DOCUMENTATION (OUTPATIENT)
Dept: HOME HEALTH SERVICES | Facility: HOME HEALTH | Age: 85
End: 2025-02-22
Payer: MEDICARE

## 2025-02-22 NOTE — HH CARE COORDINATION
Home Care received a Referral for Nursing, Physical Therapy, Occupational Therapy, and Home Health Aide. We have processed the referral for a Start of Care on 24-48 hrs.     If you have any questions or concerns, please feel free to contact us at [phone]. Follow the prompts, enter your five digit zip code, and you will be directed to your care team on E[facility] 1.

## 2025-02-24 ENCOUNTER — TELEPHONE (OUTPATIENT)
Dept: PRIMARY CARE | Facility: CLINIC | Age: 85
End: 2025-02-24
Payer: MEDICARE

## 2025-02-24 DIAGNOSIS — I26.99 OTHER ACUTE PULMONARY EMBOLISM, UNSPECIFIED WHETHER ACUTE COR PULMONALE PRESENT (MULTI): ICD-10-CM

## 2025-02-26 ENCOUNTER — HOME CARE VISIT (OUTPATIENT)
Dept: HOME HEALTH SERVICES | Facility: HOME HEALTH | Age: 85
End: 2025-02-26
Payer: MEDICARE

## 2025-02-26 ENCOUNTER — TELEPHONE (OUTPATIENT)
Dept: PRIMARY CARE | Facility: CLINIC | Age: 85
End: 2025-02-26
Payer: MEDICARE

## 2025-02-26 VITALS
SYSTOLIC BLOOD PRESSURE: 122 MMHG | RESPIRATION RATE: 19 BRPM | TEMPERATURE: 97.2 F | DIASTOLIC BLOOD PRESSURE: 63 MMHG | OXYGEN SATURATION: 98 % | HEART RATE: 77 BPM

## 2025-02-26 PROCEDURE — 169592 NO-PAY CLAIM PROCEDURE

## 2025-02-26 PROCEDURE — G0299 HHS/HOSPICE OF RN EA 15 MIN: HCPCS | Mod: HHH

## 2025-02-26 SDOH — ECONOMIC STABILITY: HOUSING INSECURITY: EVIDENCE OF SMOKING MATERIAL: 0

## 2025-02-26 SDOH — HEALTH STABILITY: MENTAL HEALTH: SMOKING IN HOME: 0

## 2025-02-26 ASSESSMENT — ENCOUNTER SYMPTOMS
HIGHEST PAIN SEVERITY IN PAST 24 HOURS: 4/10
DYSPNEA ACTIVITY LEVEL: AFTER AMBULATING LESS THAN 10 FT
FORGETFULNESS: 1
PAIN: 1
LOWER EXTREMITY EDEMA: 1
SHORTNESS OF BREATH: 1
APPETITE LEVEL: GOOD
PAIN LOCATION: GENERALIZED

## 2025-02-26 ASSESSMENT — ACTIVITIES OF DAILY LIVING (ADL)
OASIS_M1830: 03
CURRENT_FUNCTION: STAND BY ASSIST
ENTERING_EXITING_HOME: STAND BY ASSIST
AMBULATION ASSISTANCE: STAND BY ASSIST

## 2025-02-27 ENCOUNTER — HOME CARE VISIT (OUTPATIENT)
Dept: HOME HEALTH SERVICES | Facility: HOME HEALTH | Age: 85
End: 2025-02-27
Payer: MEDICARE

## 2025-02-27 ENCOUNTER — OFFICE VISIT (OUTPATIENT)
Dept: PRIMARY CARE | Facility: CLINIC | Age: 85
End: 2025-02-27
Payer: MEDICARE

## 2025-02-27 VITALS
HEART RATE: 75 BPM | BODY MASS INDEX: 24.16 KG/M2 | TEMPERATURE: 98 F | SYSTOLIC BLOOD PRESSURE: 116 MMHG | HEIGHT: 65 IN | OXYGEN SATURATION: 97 % | WEIGHT: 145 LBS | RESPIRATION RATE: 18 BRPM | DIASTOLIC BLOOD PRESSURE: 60 MMHG

## 2025-02-27 DIAGNOSIS — K59.04 CHRONIC IDIOPATHIC CONSTIPATION: ICD-10-CM

## 2025-02-27 DIAGNOSIS — D50.0 ANEMIA DUE TO BLOOD LOSS: ICD-10-CM

## 2025-02-27 DIAGNOSIS — I11.0 HYPERTENSIVE HEART DISEASE WITH HEART FAILURE: ICD-10-CM

## 2025-02-27 DIAGNOSIS — S72.002D CLOSED FRACTURE OF NECK OF LEFT FEMUR WITH ROUTINE HEALING, SUBSEQUENT ENCOUNTER: Primary | ICD-10-CM

## 2025-02-27 DIAGNOSIS — I34.0 NONRHEUMATIC MITRAL VALVE REGURGITATION: ICD-10-CM

## 2025-02-27 DIAGNOSIS — I26.93 SINGLE SUBSEGMENTAL PULMONARY EMBOLISM WITHOUT ACUTE COR PULMONALE: ICD-10-CM

## 2025-02-27 DIAGNOSIS — R53.1 WEAKNESS: ICD-10-CM

## 2025-02-27 PROCEDURE — 1160F RVW MEDS BY RX/DR IN RCRD: CPT | Performed by: NURSE PRACTITIONER

## 2025-02-27 PROCEDURE — 1126F AMNT PAIN NOTED NONE PRSNT: CPT | Performed by: NURSE PRACTITIONER

## 2025-02-27 PROCEDURE — 1159F MED LIST DOCD IN RCRD: CPT | Performed by: NURSE PRACTITIONER

## 2025-02-27 PROCEDURE — 3074F SYST BP LT 130 MM HG: CPT | Performed by: NURSE PRACTITIONER

## 2025-02-27 PROCEDURE — 99349 HOME/RES VST EST MOD MDM 40: CPT | Performed by: NURSE PRACTITIONER

## 2025-02-27 PROCEDURE — 1157F ADVNC CARE PLAN IN RCRD: CPT | Performed by: NURSE PRACTITIONER

## 2025-02-27 PROCEDURE — 3078F DIAST BP <80 MM HG: CPT | Performed by: NURSE PRACTITIONER

## 2025-02-27 PROCEDURE — G0152 HHCP-SERV OF OT,EA 15 MIN: HCPCS | Mod: HHH

## 2025-02-27 PROCEDURE — 1123F ACP DISCUSS/DSCN MKR DOCD: CPT | Performed by: NURSE PRACTITIONER

## 2025-02-27 SDOH — ECONOMIC STABILITY: HOUSING INSECURITY: EVIDENCE OF SMOKING MATERIAL: 1

## 2025-02-27 SDOH — HEALTH STABILITY: MENTAL HEALTH: SMOKING IN HOME: 1

## 2025-02-27 ASSESSMENT — ACTIVITIES OF DAILY LIVING (ADL)
BATHING_CURRENT_FUNCTION: MODERATE ASSIST
TOILETING: 1
DRESSING_LB_CURRENT_FUNCTION: MODERATE ASSIST
DRESSING_UB_CURRENT_FUNCTION: MINIMUM ASSIST
AMBULATION ASSISTANCE: 1
AMBULATION ASSISTANCE: CONTACT GUARD ASSIST
GROOMING_CURRENT_FUNCTION: MINIMUM ASSIST
BATHING ASSESSED: 1
TOILETING: MINIMUM ASSIST
GROOMING ASSESSED: 1
CURRENT_FUNCTION: SUPERVISION
PHYSICAL TRANSFERS ASSESSED: 1

## 2025-02-27 ASSESSMENT — ENCOUNTER SYMPTOMS
PAIN LOCATION - PAIN SEVERITY: 5/10
PAIN SEVERITY GOAL: 0/10
PAIN: 1
PAIN LOCATION: RIGHT HIP
HIGHEST PAIN SEVERITY IN PAST 24 HOURS: 5/10
SUBJECTIVE PAIN PROGRESSION: UNCHANGED
LOWEST PAIN SEVERITY IN PAST 24 HOURS: 5/10

## 2025-02-27 ASSESSMENT — PAIN SCALES - GENERAL: PAINLEVEL_OUTOF10: 0-NO PAIN

## 2025-02-27 NOTE — Clinical Note
Pt was discharged home from hospital/SNF on Eliquis. Was previously on Xarelto but has anemia, acute PE while on medication. Spouse went to pharmacy, did not  the Eliquis due to the cost. Are you able to help see if they qualify for assistance with the medication? The son Jose manages her medications. His phone number is [phone].

## 2025-02-27 NOTE — PROGRESS NOTES
Subjective   Patient ID: Anitha Welch is a 84 y.o. female who presents for Follow-up (Post acute follow up).    Visit for 83 y/o female seen today in private home, accompanied by her spouse and Son Jose for post acute follow up. Pt is sitting on couch this afternoon.  Occupational therapy is also present on exam and is starting services today. Pt reports having a mechanical fall resulting in left femur fracture. She was hospitalized, underwent surgical repair and discharged to [facility] for rehabilitation services. Family reports that patient discharged back home on 2/21/25. Pt is alert, able to answer simple questions regarding her health. She does have noted cognitive impairment and family will supplement the health history as needed. Patients family manages her medications. Her spouse reports that she has not been taking her Eliquis due to the cost of the medication. He reports going to the local pharmacy and the prescription was $400 and he is unable to afford that. Pt was previously on Xarelto but switched to Eliquis due to chronic anemia, acute PE while on anticoagulation. Pt is able to do her own dressing and toileting but does require stand by assistance in the shower. She is oxygen dependent, 2L NC. Pt denies appetite changes, signs of weight loss, nausea, vomiting. Pt endorses chronic constipation, managed with stool softeners. She denies any urinary concerns. Pt is using a cane when walking to the bathroom. She will use a rolator when ambulating farther distances. OT recommends a bedside commode. Family believes they have one in the basement and if not they plan to reach out to [facility] to see if they have one available.     Home Visit:         Medically necessary due to: Illness or condition that results in activity lmitation or restriction that impacts the ability to leave home such as:, Illness or condition that results in activity lmitation or restriction that impacts the ability to leave  home such as:, unsteady gait/poor balance         Current Outpatient Medications:     acetaminophen (Tylenol) 325 mg tablet, Take 650 mg by mouth every 6 hours if needed for fever (temp greater than 38.0 C) or mild pain (1 - 3). Indications: fever, pain, Disp: , Rfl:     albuterol 90 mcg/actuation inhaler, Inhale 1 puff every 4 hours if needed for shortness of breath., Disp: , Rfl:     apixaban (Eliquis) 5 mg tablet, Take 1 tablet (5 mg) by mouth 2 times a day. (Patient not taking: Reported on 2/27/2025), Disp: 60 tablet, Rfl: 0    atorvastatin (Lipitor) 40 mg tablet, TAKE 1 TABLET BY MOUTH EVERY DAY AT BEDTIME, Disp: 90 tablet, Rfl: 3    carvedilol (Coreg) 3.125 mg tablet, TAKE 1 TABLET (3.125 MG) BY MOUTH 2 TIMES A DAY., Disp: 180 tablet, Rfl: 3    docusate sodium (Colace) 100 mg capsule, Take 1 capsule (100 mg) by mouth 2 times a day as needed for constipation., Disp: 60 capsule, Rfl: 11    DULoxetine (Cymbalta) 20 mg DR capsule, Take 1 capsule (20 mg) by mouth once daily at bedtime. Do not crush or chew., Disp: 90 capsule, Rfl: 3    famotidine (Pepcid) 20 mg tablet, Take 1 tablet (20 mg) by mouth once daily at bedtime., Disp: , Rfl:     ferrous sulfate, 325 mg ferrous sulfate, tablet, Take 1 tablet by mouth once daily with breakfast., Disp: , Rfl:     furosemide (Lasix) 40 mg tablet, Take 1 tablet (40 mg) by mouth once daily., Disp: 30 tablet, Rfl: 11    gabapentin (Neurontin) 100 mg capsule, Take 1 capsule (100 mg) by mouth once daily at bedtime., Disp: 30 capsule, Rfl: 2    gabapentin (Neurontin) 100 mg capsule, Take 100 mg by mouth once daily at bedtime. Indications: restless legs syndrome, an extreme discomfort in the calf muscles when sitting or lying down, Disp: , Rfl:     ipratropium-albuteroL (Duo-Neb) 0.5-2.5 mg/3 mL nebulizer solution, Take 3 mL by nebulization 4 times a day as needed for wheezing., Disp: , Rfl:     levothyroxine (Synthroid, Levoxyl) 25 mcg tablet, Take 1 tablet (25 mcg) by mouth early  in the morning.. Take on an empty stomach at the same time each day, either 30 to 60 minutes prior to breakfast, Disp: 90 tablet, Rfl: 3    magnesium hydroxide (Horner Milk of Magnesia) 400 mg/5 mL suspension, Take 30 mL by mouth if needed for constipation. Indications: constipation, emptying of the bowel, Disp: , Rfl:     multivitamin-iron-folic acid (Multi Complete with Iron)  mg-mcg tablet tablet, Take 1 tablet by mouth once daily., Disp: , Rfl:     nitroglycerin (Nitrostat) 0.4 mg SL tablet, Place 1 tablet (0.4 mg) under the tongue every 5 minutes if needed for chest pain., Disp: , Rfl:     nystatin (Mycostatin) 100,000 unit/gram powder, Apply 1 Application topically 2 times a day. (Patient not taking: Reported on 2/4/2025), Disp: 30 g, Rfl: 0    oxygen (O2) gas therapy, Inhale 2 L/min continuously. Indications: sob, Disp: , Rfl:     pantoprazole (ProtoNix) 40 mg EC tablet, TAKE 1 TABLET BY MOUTH TWICE A DAY, Disp: 180 tablet, Rfl: 3    polyethylene glycol (Glycolax, Miralax) 17 gram/dose powder, mix 17 grams (1 capful) in 8 ounces of water and drink daily (Patient taking differently: Take 17 g by mouth if needed for constipation. Indications: constipation), Disp: 510 g, Rfl: 0    Trelegy Ellipta 100-62.5-25 mcg blister with device, Inhale 1 puff once daily., Disp: 1 each, Rfl: 3     Review of Systems  Constitutional: Negative for appetite changes, fever, chills, unexplained weight loss.   HENT: Negative for trouble swallowing.    Respiratory: Positive for shortness of breath with exertion, oxygen dependent. Negative for wheezing.    Cardiovascular: Positive for edema. Negative for chest pain, palpitations.   Gastrointestinal: Positive for intermittent constipation. Negative for abdominal for pain, diarrhea, nausea and vomiting.   Endocrine: Positive for thyroid disease  Genitourinary: Negative for difficulty urinating.   Musculoskeletal:  Positive for arthralgias, unsteady gait  Neurological: Positive  "for prior stroke, left sided neuropathy, memory loss. Negative for dizziness and light-headedness.   Psychiatric/Behavioral: Positive for anxiety    Objective   /60 (BP Location: Left arm, Patient Position: Sitting, BP Cuff Size: Adult)   Pulse 75   Temp 36.7 °C (98 °F) (Temporal)   Resp 18   Ht 1.651 m (5' 5\")   Wt 65.8 kg (145 lb)   SpO2 97%   BMI 24.13 kg/m²     Physical Exam     General: No acute distress     Appearance: She is alert, sitting on couch, chronically ill. Nontoxic.   HENT:      Head: Normocephalic and atraumatic.      Nose: No congestion or rhinorrhea.      Mouth/Throat:      Mouth: Mucous membranes are moist.      Pharynx: Oropharynx is clear.   Eyes:      General: No scleral icterus.        Right eye: No discharge.         Left eye: No discharge.      Extraocular Movements: Extraocular movements intact.   Neck:      Vascular: No carotid bruit.      Comments: No obvious JVD  Cardiovascular:      Rate and Rhythm: Normal rate. Regular rhythm.      Pulses: Normal pulses.      No friction rub. No gallop.   Pulmonary:      Breath sounds: Lungs clear.     Comments: No wheezing, rales or rhonchi. Oxygen 2L NC  Abdominal:      General: There is no distension.      Tenderness: There is no abdominal tenderness.   Musculoskeletal:      Cervical back Neck supple. No rigidity.      Right lower le+ edema, wearing zippered compression stockings      Left lower le+ edema, wearing zippered compression stockings   Lymphadenopathy:      Cervical: No cervical adenopathy.   Skin:     Capillary Refill: Capillary refill takes less than 2 seconds.   Neurological:      General: No focal deficit present.      Mental Status: She is alert. Mental status is at baseline.     Cranial Nerves: No cranial nerve deficit.      Motor: generalized weakness  Psychiatric:         Behavior: Behavior normal.         Thought Content: Thought content normal.         Judgment: Judgment normal.      Assessment/Plan "   Diagnoses and all orders for this visit:  Closed fracture of neck of left femur with routine healing, subsequent encounter  -status post surgical repair/hospitalization/SNF stay  -active with [facility]  -continue PT/OT services for increased strength and conditioning   -continue to use cane/walker when ambulating     Single subsegmental pulmonary embolism without acute cor pulmonale  -     Referral to Clinical Pharmacy; Future  Patient unable to afford Eliquis. Will place referral to pharmacy to see if they can assist with getting more affordable pricing     Hypertensive heart disease with heart failure  -chronic, managed with Furosemide, carvedilol     Nonrheumatic mitral valve regurgitation  -chronic, had mitraclip ARMANI in 2024 with sub optimal result   -will continue to medically manage    Anemia due to blood loss  -chronic, managed with ferrous sulfate   -will monitor CBC routinely     Chronic idiopathic constipation  -chronic, intermittent, stable at this time  -continue colace as needed     Weakness   -chronic, recommend using cane/walker when ambulating        FELA Devine-CNP

## 2025-03-02 ENCOUNTER — HOME CARE VISIT (OUTPATIENT)
Dept: HOME HEALTH SERVICES | Facility: HOME HEALTH | Age: 85
End: 2025-03-02
Payer: MEDICARE

## 2025-03-02 VITALS
DIASTOLIC BLOOD PRESSURE: 60 MMHG | HEART RATE: 75 BPM | SYSTOLIC BLOOD PRESSURE: 116 MMHG | RESPIRATION RATE: 18 BRPM | OXYGEN SATURATION: 99 % | TEMPERATURE: 97.6 F

## 2025-03-02 PROCEDURE — G0151 HHCP-SERV OF PT,EA 15 MIN: HCPCS | Mod: HHH

## 2025-03-02 SDOH — HEALTH STABILITY: PHYSICAL HEALTH

## 2025-03-02 SDOH — HEALTH STABILITY: PHYSICAL HEALTH: PHYSICAL EXERCISE: 10

## 2025-03-02 SDOH — HEALTH STABILITY: PHYSICAL HEALTH: EXERCISE ACTIVITIES SETS: 1

## 2025-03-02 SDOH — HEALTH STABILITY: MENTAL HEALTH: SMOKING IN HOME: 0

## 2025-03-02 SDOH — HEALTH STABILITY: PHYSICAL HEALTH: PHYSICAL EXERCISE: SITTING

## 2025-03-02 SDOH — HEALTH STABILITY: PHYSICAL HEALTH: EXERCISE ACTIVITY: APS, MARCHING, LAQ, HIP ABD/ADD

## 2025-03-02 SDOH — ECONOMIC STABILITY: HOUSING INSECURITY: EVIDENCE OF SMOKING MATERIAL: 0

## 2025-03-02 SDOH — HEALTH STABILITY: PHYSICAL HEALTH: EXERCISE ACTIVITY: CALF RAISES, HIP FLEX/EXT/ABD, HS CURL, MARCHING, MINI SQUATS

## 2025-03-02 SDOH — HEALTH STABILITY: PHYSICAL HEALTH: PHYSICAL EXERCISE: STANDING

## 2025-03-02 ASSESSMENT — ENCOUNTER SYMPTOMS
OCCASIONAL FEELINGS OF UNSTEADINESS: 1
DENIES PAIN: 1

## 2025-03-02 ASSESSMENT — ACTIVITIES OF DAILY LIVING (ADL)
AMBULATION ASSISTANCE ON FLAT SURFACES: 1
AMBULATION_DISTANCE/DURATION_TOLERATED: 50 FT

## 2025-03-03 ENCOUNTER — HOME CARE VISIT (OUTPATIENT)
Dept: HOME HEALTH SERVICES | Facility: HOME HEALTH | Age: 85
End: 2025-03-03
Payer: MEDICARE

## 2025-03-03 VITALS
DIASTOLIC BLOOD PRESSURE: 60 MMHG | OXYGEN SATURATION: 99 % | HEART RATE: 87 BPM | SYSTOLIC BLOOD PRESSURE: 122 MMHG | TEMPERATURE: 97.5 F | RESPIRATION RATE: 18 BRPM

## 2025-03-03 DIAGNOSIS — I26.93 SINGLE SUBSEGMENTAL PULMONARY EMBOLISM WITHOUT ACUTE COR PULMONALE: Primary | ICD-10-CM

## 2025-03-03 PROCEDURE — G0299 HHS/HOSPICE OF RN EA 15 MIN: HCPCS | Mod: HHH

## 2025-03-03 SDOH — ECONOMIC STABILITY: HOUSING INSECURITY: EVIDENCE OF SMOKING MATERIAL: 0

## 2025-03-03 SDOH — HEALTH STABILITY: MENTAL HEALTH: SMOKING IN HOME: 0

## 2025-03-03 SDOH — ECONOMIC STABILITY: GENERAL

## 2025-03-03 ASSESSMENT — ACTIVITIES OF DAILY LIVING (ADL)
MONEY MANAGEMENT (EXPENSES/BILLS): NEEDS ASSISTANCE
AMBULATION ASSISTANCE: STAND BY ASSIST
CURRENT_FUNCTION: STAND BY ASSIST

## 2025-03-03 ASSESSMENT — ENCOUNTER SYMPTOMS
APPETITE LEVEL: GOOD
HIGHEST PAIN SEVERITY IN PAST 24 HOURS: 3/10
LOWER EXTREMITY EDEMA: 1
PAIN: 1
PAIN LOCATION: GENERALIZED

## 2025-03-05 ENCOUNTER — TELEPHONE (OUTPATIENT)
Dept: PRIMARY CARE | Facility: CLINIC | Age: 85
End: 2025-03-05

## 2025-03-05 ENCOUNTER — HOME CARE VISIT (OUTPATIENT)
Dept: HOME HEALTH SERVICES | Facility: HOME HEALTH | Age: 85
End: 2025-03-05
Payer: MEDICARE

## 2025-03-05 DIAGNOSIS — R26.2 DIFFICULTY IN WALKING: ICD-10-CM

## 2025-03-05 DIAGNOSIS — I50.31 CHF (CONGESTIVE HEART FAILURE), NYHA CLASS II, ACUTE, DIASTOLIC: Primary | ICD-10-CM

## 2025-03-05 PROCEDURE — G0158 HHC OT ASSISTANT EA 15: HCPCS | Mod: CO,HHH

## 2025-03-06 ENCOUNTER — HOME CARE VISIT (OUTPATIENT)
Dept: HOME HEALTH SERVICES | Facility: HOME HEALTH | Age: 85
End: 2025-03-06
Payer: MEDICARE

## 2025-03-06 VITALS
SYSTOLIC BLOOD PRESSURE: 125 MMHG | HEART RATE: 88 BPM | OXYGEN SATURATION: 94 % | TEMPERATURE: 98.2 F | DIASTOLIC BLOOD PRESSURE: 65 MMHG

## 2025-03-06 ASSESSMENT — ACTIVITIES OF DAILY LIVING (ADL)
AMBULATION ASSISTANCE: STAND BY ASSIST
AMBULATION ASSISTANCE: 1
AMBULATION ASSISTANCE: SUPERVISION
CURRENT_FUNCTION: SUPERVISION
PHYSICAL TRANSFERS ASSESSED: 1

## 2025-03-06 ASSESSMENT — ENCOUNTER SYMPTOMS: DENIES PAIN: 1

## 2025-03-06 NOTE — TELEPHONE ENCOUNTER
Received message from [facility] requesting an order for a bedside commode for patient. Order to be sent to [facility].

## 2025-03-07 DIAGNOSIS — I50.9 HEART FAILURE: ICD-10-CM

## 2025-03-07 DIAGNOSIS — I10 PRIMARY HYPERTENSION: ICD-10-CM

## 2025-03-07 DIAGNOSIS — I50.23 ACUTE ON CHRONIC SYSTOLIC HEART FAILURE: ICD-10-CM

## 2025-03-07 RX ORDER — TORSEMIDE 20 MG/1
20 TABLET ORAL DAILY
Qty: 90 TABLET | Refills: 3 | Status: SHIPPED | OUTPATIENT
Start: 2025-03-07 | End: 2025-03-07 | Stop reason: ALTCHOICE

## 2025-03-10 ENCOUNTER — HOME CARE VISIT (OUTPATIENT)
Dept: HOME HEALTH SERVICES | Facility: HOME HEALTH | Age: 85
End: 2025-03-10
Payer: MEDICARE

## 2025-03-10 ENCOUNTER — APPOINTMENT (OUTPATIENT)
Dept: HOME HEALTH SERVICES | Facility: HOME HEALTH | Age: 85
End: 2025-03-10
Payer: MEDICARE

## 2025-03-10 VITALS
TEMPERATURE: 99.3 F | OXYGEN SATURATION: 98 % | RESPIRATION RATE: 18 BRPM | DIASTOLIC BLOOD PRESSURE: 74 MMHG | SYSTOLIC BLOOD PRESSURE: 118 MMHG | HEART RATE: 80 BPM

## 2025-03-10 PROCEDURE — G0157 HHC PT ASSISTANT EA 15: HCPCS | Mod: CQ,HHH

## 2025-03-10 ASSESSMENT — ENCOUNTER SYMPTOMS
PERSON REPORTING PAIN: PATIENT
PAIN: 1

## 2025-03-11 ENCOUNTER — HOME CARE VISIT (OUTPATIENT)
Dept: HOME HEALTH SERVICES | Facility: HOME HEALTH | Age: 85
End: 2025-03-11
Payer: MEDICARE

## 2025-03-11 ENCOUNTER — APPOINTMENT (OUTPATIENT)
Dept: PHARMACY | Facility: HOSPITAL | Age: 85
End: 2025-03-11
Payer: MEDICARE

## 2025-03-11 DIAGNOSIS — I26.93 SINGLE SUBSEGMENTAL PULMONARY EMBOLISM WITHOUT ACUTE COR PULMONALE: ICD-10-CM

## 2025-03-11 DIAGNOSIS — I26.99 OTHER ACUTE PULMONARY EMBOLISM, UNSPECIFIED WHETHER ACUTE COR PULMONALE PRESENT (MULTI): ICD-10-CM

## 2025-03-11 PROCEDURE — G0152 HHCP-SERV OF OT,EA 15 MIN: HCPCS | Mod: HHH

## 2025-03-11 PROCEDURE — RXMED WILLOW AMBULATORY MEDICATION CHARGE

## 2025-03-11 ASSESSMENT — ENCOUNTER SYMPTOMS
DENIES PAIN: 1
PERSON REPORTING PAIN: PATIENT
PAIN SEVERITY GOAL: 0/10
LOWEST PAIN SEVERITY IN PAST 24 HOURS: 0/10
HIGHEST PAIN SEVERITY IN PAST 24 HOURS: 0/10

## 2025-03-11 ASSESSMENT — ACTIVITIES OF DAILY LIVING (ADL)
CURRENT_FUNCTION: SUPERVISION
PHYSICAL TRANSFERS ASSESSED: 1

## 2025-03-11 NOTE — PROGRESS NOTES
"MEDICATION MANAGEMENT    Anitha Welch is a 84 y.o. female who was referred by Milana CHAHAL to complete medication reconciliation and discuss anticoagulation with the clinical pharmacist.  A comprehensive medication review was completed with the patient via telephone today.  Patient reports financial barrier with current medication.      MEDICATION HISTORY  Allergies   Allergen Reactions    Amoxicillin Anaphylaxis and Shortness of breath    Iodinated Contrast Media Dizziness and Other     Increased BP    Increase BP, dizziness    Ciprofloxacin Rash and Swelling     Decreased BP    Influenza Virus Vaccines Other     \"sick\" for 24 hours afterwards    Diphenhydramine Rash    Flu Vac 2023 [id number](Pf) Unknown    Other Itching     Fluzone inj high dose     Current Outpatient Medications on File Prior to Visit   Medication Sig Dispense Refill    acetaminophen (Tylenol) 325 mg tablet Take 650 mg by mouth every 6 hours if needed for fever (temp greater than 38.0 C) or mild pain (1 - 3). Indications: fever, pain      albuterol 90 mcg/actuation inhaler Inhale 1 puff every 4 hours if needed for shortness of breath.      apixaban (Eliquis) 5 mg tablet Take 1 tablet (5 mg) by mouth 2 times a day. 60 tablet 0    atorvastatin (Lipitor) 40 mg tablet TAKE 1 TABLET BY MOUTH EVERY DAY AT BEDTIME 90 tablet 3    carvedilol (Coreg) 3.125 mg tablet TAKE 1 TABLET (3.125 MG) BY MOUTH 2 TIMES A DAY. 180 tablet 3    docusate sodium (Colace) 100 mg capsule Take 1 capsule (100 mg) by mouth 2 times a day as needed for constipation. 60 capsule 11    DULoxetine (Cymbalta) 20 mg DR capsule Take 1 capsule (20 mg) by mouth once daily at bedtime. Do not crush or chew. 90 capsule 3    famotidine (Pepcid) 20 mg tablet Take 1 tablet (20 mg) by mouth once daily at bedtime.      ferrous sulfate, 325 mg ferrous sulfate, tablet Take 1 tablet by mouth once daily with breakfast.      furosemide (Lasix) 40 mg tablet Take 1 tablet (40 mg) by mouth " once daily. 30 tablet 11    gabapentin (Neurontin) 100 mg capsule Take 100 mg by mouth once daily at bedtime. Indications: restless legs syndrome, an extreme discomfort in the calf muscles when sitting or lying down      ipratropium-albuteroL (Duo-Neb) 0.5-2.5 mg/3 mL nebulizer solution Take 3 mL by nebulization 4 times a day as needed for wheezing.      levothyroxine (Synthroid, Levoxyl) 25 mcg tablet Take 1 tablet (25 mcg) by mouth early in the morning.. Take on an empty stomach at the same time each day, either 30 to 60 minutes prior to breakfast 90 tablet 3    magnesium hydroxide (Horner Milk of Magnesia) 400 mg/5 mL suspension Take 30 mL by mouth if needed for constipation. Indications: constipation, emptying of the bowel      multivitamin-iron-folic acid (Multi Complete with Iron)  mg-mcg tablet tablet Take 1 tablet by mouth once daily.      nitroglycerin (Nitrostat) 0.4 mg SL tablet Place 1 tablet (0.4 mg) under the tongue every 5 minutes if needed for chest pain.      nystatin (Mycostatin) 100,000 unit/gram powder Apply 1 Application topically 2 times a day. 30 g 0    oxygen (O2) gas therapy Inhale 2 L/min continuously. Indications: sob      pantoprazole (ProtoNix) 40 mg EC tablet TAKE 1 TABLET BY MOUTH TWICE A  tablet 3    polyethylene glycol (Glycolax, Miralax) 17 gram/dose powder mix 17 grams (1 capful) in 8 ounces of water and drink daily 510 g 0    Trelegy Ellipta 100-62.5-25 mcg blister with device Inhale 1 puff once daily. 1 each 3    [DISCONTINUED] gabapentin (Neurontin) 100 mg capsule Take 1 capsule (100 mg) by mouth once daily at bedtime. 30 capsule 2    [DISCONTINUED] torsemide (Demadex) 20 mg tablet TAKE 1 TABLET BY MOUTH EVERY DAY 90 tablet 3     No current facility-administered medications on file prior to visit.        Patient Assistance Screening (VAF)  Patient verbally reports monthly or yearly income which is less than 400% federal poverty level.  Application for program  has been submitted for the following medications: Eliquis  Patient has been informed that program team will be reaching out to them to discuss necessary documentation, instructed to answer phone/return voicemail.   Patient aware this process may take up to 6 weeks.   If approved medication must [facility] and may be picked up or mailed to patient.     Patient was previously on Xarelto - discontinued 11/2024  Discussed differences and similarities between xarelto and eliquis    LABS  There were no vitals taken for this visit.   Lab Results   Component Value Date    HGBA1C 4.8 10/15/2024    HGBA1C 5.1 09/18/2024    HGBA1C 5.5 01/09/2019     Lab Results   Component Value Date    BILITOT 1.1 01/11/2025    CALCIUM 8.5 (L) 01/17/2025    CO2 34 (H) 01/17/2025     01/17/2025    CREATININE 0.78 01/17/2025    GLUCOSE 108 (H) 01/17/2025    ALKPHOS 85 01/11/2025    K 4.3 01/17/2025    PROT 6.4 01/11/2025     01/17/2025     (H) 01/11/2025     (H) 01/11/2025    BUN 27 (H) 01/17/2025    ANIONGAP 7 (L) 01/17/2025    MG 2.16 01/11/2025    PHOS 3.9 01/12/2025     (H) 02/05/2020    ALBUMIN 3.6 01/12/2025    AMYLASE 50 09/04/2019    LIPASE 36 04/28/2021     Lab Results   Component Value Date    TRIG 64 02/21/2023    CHOL 190 02/21/2023    LDLCALC 125 02/21/2023    HDL 52 02/21/2023         Assessment/Plan    Comments/Recommendations to PCP:    Continue Eliquis 5 mg twice daily for anticoagulation due to pulmonary embolism. Patient reports no adverse effects. Discussed to go to the hospital if there is any bleeding that won't stop after 15 minutes, any falls where she might have hit her head, or any extreme stomach pain. Will continue to monitor for adverse effects and effective anticoagulation for continued treatment.     Follow up with pharmacy as needed  Follow up with PCP 3/25/25    Rudi Nunes, PharmD, BCPS    Verbal consent to manage patient's drug therapy was  obtained from the patient and/or an individual authorized to act on behalf of a patient. They were informed they may decline to participate or withdraw from participation in pharmacy services at any time.

## 2025-03-13 ENCOUNTER — PHARMACY VISIT (OUTPATIENT)
Dept: PHARMACY | Facility: CLINIC | Age: 85
End: 2025-03-13
Payer: COMMERCIAL

## 2025-03-13 ENCOUNTER — HOME CARE VISIT (OUTPATIENT)
Dept: HOME HEALTH SERVICES | Facility: HOME HEALTH | Age: 85
End: 2025-03-13
Payer: MEDICARE

## 2025-03-13 VITALS
OXYGEN SATURATION: 94 % | DIASTOLIC BLOOD PRESSURE: 70 MMHG | SYSTOLIC BLOOD PRESSURE: 110 MMHG | TEMPERATURE: 96.4 F | RESPIRATION RATE: 18 BRPM | HEART RATE: 94 BPM

## 2025-03-13 PROCEDURE — G0299 HHS/HOSPICE OF RN EA 15 MIN: HCPCS | Mod: HHH

## 2025-03-13 SDOH — ECONOMIC STABILITY: HOUSING INSECURITY: EVIDENCE OF SMOKING MATERIAL: 0

## 2025-03-13 SDOH — HEALTH STABILITY: MENTAL HEALTH: SMOKING IN HOME: 0

## 2025-03-13 SDOH — ECONOMIC STABILITY: GENERAL

## 2025-03-13 ASSESSMENT — ACTIVITIES OF DAILY LIVING (ADL)
AMBULATION ASSISTANCE: STAND BY ASSIST
CURRENT_FUNCTION: STAND BY ASSIST
MONEY MANAGEMENT (EXPENSES/BILLS): NEEDS ASSISTANCE

## 2025-03-13 ASSESSMENT — ENCOUNTER SYMPTOMS
APPETITE LEVEL: GOOD
DENIES PAIN: 1

## 2025-03-18 ENCOUNTER — APPOINTMENT (OUTPATIENT)
Dept: HOME HEALTH SERVICES | Facility: HOME HEALTH | Age: 85
End: 2025-03-18
Payer: MEDICARE

## 2025-03-19 ENCOUNTER — HOME CARE VISIT (OUTPATIENT)
Dept: HOME HEALTH SERVICES | Facility: HOME HEALTH | Age: 85
End: 2025-03-19
Payer: MEDICARE

## 2025-03-19 VITALS
HEART RATE: 70 BPM | RESPIRATION RATE: 18 BRPM | TEMPERATURE: 97.3 F | DIASTOLIC BLOOD PRESSURE: 60 MMHG | OXYGEN SATURATION: 94 % | SYSTOLIC BLOOD PRESSURE: 105 MMHG

## 2025-03-19 PROCEDURE — G0299 HHS/HOSPICE OF RN EA 15 MIN: HCPCS | Mod: HHH

## 2025-03-19 SDOH — ECONOMIC STABILITY: HOUSING INSECURITY: EVIDENCE OF SMOKING MATERIAL: 0

## 2025-03-19 SDOH — HEALTH STABILITY: MENTAL HEALTH: SMOKING IN HOME: 0

## 2025-03-19 ASSESSMENT — ENCOUNTER SYMPTOMS
LOWER EXTREMITY EDEMA: 1
FATIGUE: 1
APPETITE LEVEL: GOOD
DENIES PAIN: 1

## 2025-03-19 ASSESSMENT — ACTIVITIES OF DAILY LIVING (ADL)
AMBULATION ASSISTANCE: STAND BY ASSIST
CURRENT_FUNCTION: STAND BY ASSIST

## 2025-03-21 ENCOUNTER — HOME CARE VISIT (OUTPATIENT)
Dept: HOME HEALTH SERVICES | Facility: HOME HEALTH | Age: 85
End: 2025-03-21
Payer: MEDICARE

## 2025-03-21 VITALS
OXYGEN SATURATION: 98 % | RESPIRATION RATE: 18 BRPM | DIASTOLIC BLOOD PRESSURE: 64 MMHG | SYSTOLIC BLOOD PRESSURE: 122 MMHG | TEMPERATURE: 97.2 F | HEART RATE: 82 BPM

## 2025-03-21 PROCEDURE — G0151 HHCP-SERV OF PT,EA 15 MIN: HCPCS | Mod: HHH

## 2025-03-21 SDOH — HEALTH STABILITY: PHYSICAL HEALTH

## 2025-03-21 SDOH — HEALTH STABILITY: PHYSICAL HEALTH: PHYSICAL EXERCISE: 10

## 2025-03-21 SDOH — HEALTH STABILITY: PHYSICAL HEALTH: EXERCISE ACTIVITY: APS, MARCHING, LAQ, HIP ABD/ADD

## 2025-03-21 SDOH — HEALTH STABILITY: PHYSICAL HEALTH: EXERCISE ACTIVITY: APS, HIP ABD/ADD/EXT, HS CURL, MARCHING, MINI SQUATS

## 2025-03-21 SDOH — HEALTH STABILITY: PHYSICAL HEALTH: PHYSICAL EXERCISE: SITTING

## 2025-03-21 SDOH — HEALTH STABILITY: PHYSICAL HEALTH: EXERCISE ACTIVITIES SETS: 1

## 2025-03-21 SDOH — HEALTH STABILITY: PHYSICAL HEALTH: PHYSICAL EXERCISE: STANDING

## 2025-03-21 ASSESSMENT — ENCOUNTER SYMPTOMS: DENIES PAIN: 1

## 2025-03-22 ENCOUNTER — APPOINTMENT (OUTPATIENT)
Dept: RADIOLOGY | Facility: HOSPITAL | Age: 85
End: 2025-03-22
Payer: MEDICARE

## 2025-03-22 ENCOUNTER — HOSPITAL ENCOUNTER (EMERGENCY)
Facility: HOSPITAL | Age: 85
Discharge: HOME | End: 2025-03-22
Attending: STUDENT IN AN ORGANIZED HEALTH CARE EDUCATION/TRAINING PROGRAM
Payer: MEDICARE

## 2025-03-22 VITALS
WEIGHT: 145.06 LBS | RESPIRATION RATE: 16 BRPM | OXYGEN SATURATION: 99 % | TEMPERATURE: 98.2 F | SYSTOLIC BLOOD PRESSURE: 180 MMHG | DIASTOLIC BLOOD PRESSURE: 84 MMHG | BODY MASS INDEX: 24.17 KG/M2 | HEART RATE: 93 BPM | HEIGHT: 65 IN

## 2025-03-22 DIAGNOSIS — S00.03XA SCALP HEMATOMA, INITIAL ENCOUNTER: ICD-10-CM

## 2025-03-22 DIAGNOSIS — S61.412A SKIN TEAR OF LEFT HAND WITHOUT COMPLICATION, INITIAL ENCOUNTER: ICD-10-CM
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
## 2025-03-22 PROCEDURE — 72125 CT NECK SPINE W/O DYE: CPT | Performed by: RADIOLOGY

## 2025-03-22 PROCEDURE — 73110 X-RAY EXAM OF WRIST: CPT | Mod: 50

## 2025-03-22 PROCEDURE — 73502 X-RAY EXAM HIP UNI 2-3 VIEWS: CPT | Mod: LEFT SIDE | Performed by: RADIOLOGY

## 2025-03-22 PROCEDURE — 99284 EMERGENCY DEPT VISIT MOD MDM: CPT | Mod: 25 | Performed by: STUDENT IN AN ORGANIZED HEALTH CARE EDUCATION/TRAINING PROGRAM

## 2025-03-22 PROCEDURE — 73110 X-RAY EXAM OF WRIST: CPT | Mod: BILATERAL PROCEDURE | Performed by: RADIOLOGY

## 2025-03-22 PROCEDURE — 72125 CT NECK SPINE W/O DYE: CPT

## 2025-03-22 PROCEDURE — 73502 X-RAY EXAM HIP UNI 2-3 VIEWS: CPT | Mod: LT

## 2025-03-22 PROCEDURE — 70450 CT HEAD/BRAIN W/O DYE: CPT | Performed by: RADIOLOGY

## 2025-03-22 PROCEDURE — 2500000001 HC RX 250 WO HCPCS SELF ADMINISTERED DRUGS (ALT 637 FOR MEDICARE OP): Performed by: STUDENT IN AN ORGANIZED HEALTH CARE EDUCATION/TRAINING PROGRAM

## 2025-03-22 PROCEDURE — 70450 CT HEAD/BRAIN W/O DYE: CPT

## 2025-03-22 RX ORDER — ACETAMINOPHEN 500 MG
1000 TABLET ORAL ONCE
Status: COMPLETED | OUTPATIENT
Start: 2025-03-22 | End: 2025-03-22

## 2025-03-22 RX ADMIN — ACETAMINOPHEN 1000 MG: 500 TABLET, FILM COATED ORAL at 10:28

## 2025-03-22 ASSESSMENT — PAIN SCALES - GENERAL
PAINLEVEL_OUTOF10: 0 - NO PAIN
PAINLEVEL_OUTOF10: 5 - MODERATE PAIN

## 2025-03-22 ASSESSMENT — PAIN - FUNCTIONAL ASSESSMENT
PAIN_FUNCTIONAL_ASSESSMENT: 0-10
PAIN_FUNCTIONAL_ASSESSMENT: 0-10

## 2025-03-22 NOTE — DISCHARGE INSTRUCTIONS
You were seen in the ED after a fall.  You do not need to stay in the hospital today.  Your steri-strips will fall off with time.

## 2025-03-24 ENCOUNTER — TELEPHONE (OUTPATIENT)
Dept: PRIMARY CARE | Facility: CLINIC | Age: 85
End: 2025-03-24
Payer: MEDICARE

## 2025-03-25 ENCOUNTER — OFFICE VISIT (OUTPATIENT)
Dept: PRIMARY CARE | Facility: CLINIC | Age: 85
End: 2025-03-25
Payer: MEDICARE

## 2025-03-25 VITALS
HEART RATE: 80 BPM | HEIGHT: 65 IN | SYSTOLIC BLOOD PRESSURE: 110 MMHG | DIASTOLIC BLOOD PRESSURE: 60 MMHG | OXYGEN SATURATION: 98 % | TEMPERATURE: 97.8 F | WEIGHT: 145 LBS | BODY MASS INDEX: 24.16 KG/M2 | RESPIRATION RATE: 16 BRPM

## 2025-03-25 DIAGNOSIS — E03.9 HYPOTHYROIDISM, UNSPECIFIED TYPE: ICD-10-CM

## 2025-03-25 DIAGNOSIS — S61.412D SKIN TEAR OF LEFT HAND WITHOUT COMPLICATION, SUBSEQUENT ENCOUNTER: ICD-10-CM

## 2025-03-25 DIAGNOSIS — K59.04 CHRONIC IDIOPATHIC CONSTIPATION: ICD-10-CM

## 2025-03-25 DIAGNOSIS — S72.002D CLOSED FRACTURE OF NECK OF LEFT FEMUR WITH ROUTINE HEALING, SUBSEQUENT ENCOUNTER: ICD-10-CM

## 2025-03-25 DIAGNOSIS — S00.03XD HEMATOMA OF SCALP, SUBSEQUENT ENCOUNTER: Primary | ICD-10-CM

## 2025-03-25 DIAGNOSIS — D50.0 ANEMIA DUE TO BLOOD LOSS: ICD-10-CM

## 2025-03-25 DIAGNOSIS — R53.1 WEAKNESS: ICD-10-CM

## 2025-03-25 DIAGNOSIS — I50.31 CHF (CONGESTIVE HEART FAILURE), NYHA CLASS II, ACUTE, DIASTOLIC: ICD-10-CM

## 2025-03-25 DIAGNOSIS — I26.93 SINGLE SUBSEGMENTAL PULMONARY EMBOLISM WITHOUT ACUTE COR PULMONALE: ICD-10-CM

## 2025-03-25 DIAGNOSIS — I25.10 ARTERIOSCLEROSIS OF CORONARY ARTERY: ICD-10-CM

## 2025-03-25 PROCEDURE — 1157F ADVNC CARE PLAN IN RCRD: CPT | Performed by: NURSE PRACTITIONER

## 2025-03-25 PROCEDURE — 1123F ACP DISCUSS/DSCN MKR DOCD: CPT | Performed by: NURSE PRACTITIONER

## 2025-03-25 PROCEDURE — 1036F TOBACCO NON-USER: CPT | Performed by: NURSE PRACTITIONER

## 2025-03-25 PROCEDURE — 36415 COLL VENOUS BLD VENIPUNCTURE: CPT | Performed by: NURSE PRACTITIONER

## 2025-03-25 PROCEDURE — 1160F RVW MEDS BY RX/DR IN RCRD: CPT | Performed by: NURSE PRACTITIONER

## 2025-03-25 PROCEDURE — 3074F SYST BP LT 130 MM HG: CPT | Performed by: NURSE PRACTITIONER

## 2025-03-25 PROCEDURE — 3078F DIAST BP <80 MM HG: CPT | Performed by: NURSE PRACTITIONER

## 2025-03-25 PROCEDURE — 99349 HOME/RES VST EST MOD MDM 40: CPT | Performed by: NURSE PRACTITIONER

## 2025-03-25 PROCEDURE — 1125F AMNT PAIN NOTED PAIN PRSNT: CPT | Performed by: NURSE PRACTITIONER

## 2025-03-25 PROCEDURE — 1159F MED LIST DOCD IN RCRD: CPT | Performed by: NURSE PRACTITIONER

## 2025-03-25 ASSESSMENT — PAIN SCALES - GENERAL: PAINLEVEL_OUTOF10: 5

## 2025-03-25 NOTE — PROGRESS NOTES
Subjective   Patient ID: Anitha Welch is a 84 y.o. female who presents for Follow-up (Routine follow up, chronic medical conditions, recent fall requiring evaluation in the emergency department ).    Visit for 83 y/o female seen today in private home, accompanied by her spouse and Son Jose for 1 month routine follow up of chronic medical conditions. Pt is lying on couch for exam. She is alert, able to answer simple questions regarding her health but does have noted cognitive impairment so most of the health history is supplemented by patients family and medical chart. Patient lives with her spouse and son in a 2 story home. She does not drive anymore and has limited mobility making it difficult to get out for medical appointments. Patients family manages her medications. They recently reported concern regarding the price of her Eliquis and inability to afford the medication. Pt did qualify for assistance with the medication and is receiving it now at no cost. Pt is active with [facility] but reports that she will be discharged from therapy services this Thursday. She remains ambulatory, short distances with her Rolator. Patients family reports that she went upstairs despite being told not to and ended up falling at the bottom of the stairs. She hit her head and was transported to [facility] ER for evaluation of fall on 3/22/25. Pt has a scalp hematoma. She reports pain and discomfort to her scalp but denies any headaches or dizziness.     3/22 CT Head: IMPRESSION: No acute intracranial hemorrhage or mass-effect. Right parietal scalp hematoma.  3/22 CT Cervical spine: IMPRESSION: No cervical vertebral displacement or acute displaced fracture. Multilevel degenerative changes and straightening of the mid cervical lordosis similar to 01/27/2025.   3/22 Xray of bilateral wrists: IMPRESSION: No evidence of acute fracture in either wrist. Degenerative changes bilaterally. Small possible radiopaque foreign body or  calcification in the volar soft tissues of the right wrist.  3/22 left hip xray: IMPRESSION: No evidence of acute displaced osseous pelvic or left hip fracture. Satisfactory alignment of left hip arthroplasty.    Pt is able to do her own dressing and toileting but does require stand by assistance in the shower. She is oxygen dependent, 2L NC. Pt denies appetite changes, signs of weight loss, nausea, vomiting. Family reports that patient is eating more snacks after her main meals. Weight has been stable. Pt endorses chronic constipation, managed with stool softeners. She is also eating prunes daily with lunch. A bedside commode was recently ordered and son Jose reports that the company called to verify the address and will be delivering the item soon. Pt denies any urinary concerns.      Home Visit:         Medically necessary due to: Illness or condition that results in activity lmitation or restriction that impacts the ability to leave home such as:, Illness or condition that results in activity lmitation or restriction that impacts the ability to leave home such as:, unsteady gait/poor balance         Current Outpatient Medications:     acetaminophen (Tylenol) 325 mg tablet, Take 650 mg by mouth every 6 hours if needed for fever (temp greater than 38.0 C) or mild pain (1 - 3). Indications: fever, pain, Disp: , Rfl:     albuterol 90 mcg/actuation inhaler, Inhale 1 puff every 4 hours if needed for shortness of breath., Disp: , Rfl:     apixaban (Eliquis) 5 mg tablet, Take 1 tablet (5 mg) by mouth 2 times a day., Disp: 60 tablet, Rfl: 2    atorvastatin (Lipitor) 40 mg tablet, TAKE 1 TABLET BY MOUTH EVERY DAY AT BEDTIME, Disp: 90 tablet, Rfl: 3    carvedilol (Coreg) 3.125 mg tablet, TAKE 1 TABLET (3.125 MG) BY MOUTH 2 TIMES A DAY., Disp: 180 tablet, Rfl: 3    docusate sodium (Colace) 100 mg capsule, Take 1 capsule (100 mg) by mouth 2 times a day as needed for constipation., Disp: 60 capsule, Rfl: 11    DULoxetine  (Cymbalta) 20 mg DR capsule, Take 1 capsule (20 mg) by mouth once daily at bedtime. Do not crush or chew., Disp: 90 capsule, Rfl: 3    famotidine (Pepcid) 20 mg tablet, Take 1 tablet (20 mg) by mouth once daily at bedtime., Disp: , Rfl:     ferrous sulfate, 325 mg ferrous sulfate, tablet, Take 1 tablet by mouth once daily with breakfast., Disp: , Rfl:     furosemide (Lasix) 40 mg tablet, Take 1 tablet (40 mg) by mouth once daily., Disp: 30 tablet, Rfl: 11    gabapentin (Neurontin) 100 mg capsule, Take 100 mg by mouth once daily at bedtime. Indications: restless legs syndrome, an extreme discomfort in the calf muscles when sitting or lying down, Disp: , Rfl:     ipratropium-albuteroL (Duo-Neb) 0.5-2.5 mg/3 mL nebulizer solution, Take 3 mL by nebulization 4 times a day as needed for wheezing., Disp: , Rfl:     levothyroxine (Synthroid, Levoxyl) 25 mcg tablet, Take 1 tablet (25 mcg) by mouth early in the morning.. Take on an empty stomach at the same time each day, either 30 to 60 minutes prior to breakfast, Disp: 90 tablet, Rfl: 3    magnesium hydroxide (Horner Milk of Magnesia) 400 mg/5 mL suspension, Take 30 mL by mouth if needed for constipation. Indications: constipation, emptying of the bowel, Disp: , Rfl:     multivitamin-iron-folic acid (Multi Complete with Iron)  mg-mcg tablet tablet, Take 1 tablet by mouth once daily., Disp: , Rfl:     nitroglycerin (Nitrostat) 0.4 mg SL tablet, Place 1 tablet (0.4 mg) under the tongue every 5 minutes if needed for chest pain., Disp: , Rfl:     nystatin (Mycostatin) 100,000 unit/gram powder, Apply 1 Application topically 2 times a day., Disp: 30 g, Rfl: 0    oxygen (O2) gas therapy, Inhale 2 L/min continuously. Indications: sob, Disp: , Rfl:     pantoprazole (ProtoNix) 40 mg EC tablet, TAKE 1 TABLET BY MOUTH TWICE A DAY, Disp: 180 tablet, Rfl: 3    polyethylene glycol (Glycolax, Miralax) 17 gram/dose powder, mix 17 grams (1 capful) in 8 ounces of water and drink  "daily, Disp: 510 g, Rfl: 0    Trelegy Ellipta 100-62.5-25 mcg blister with device, Inhale 1 puff once daily., Disp: 1 each, Rfl: 3     Review of Systems  Constitutional: Negative for appetite changes, fever, chills, unexplained weight loss.   HENT: Negative for trouble swallowing.    Respiratory: Positive for shortness of breath with exertion, oxygen dependent. Negative for wheezing or cough.   Cardiovascular: Positive for edema. Negative for chest pain, palpitations.   Gastrointestinal: Positive for constipation. Negative for abdominal for pain, diarrhea, nausea and vomiting.   Endocrine: Positive for thyroid disease  Genitourinary: Negative for difficulty urinating.   Musculoskeletal:  Positive for arthralgias, unsteady gait, recent fall with head injury   Neurological: Positive for prior stroke, left sided neuropathy, memory loss. Negative for dizziness and light-headedness.   Psychiatric/Behavioral: Positive for anxiety    Objective   /60 (BP Location: Right arm, Patient Position: Sitting, BP Cuff Size: Adult)   Pulse 80   Temp 36.6 °C (97.8 °F) (Temporal)   Resp 16   Ht 1.651 m (5' 5\")   Wt 65.8 kg (145 lb)   SpO2 98%   BMI 24.13 kg/m²     Physical Exam  Constitutional:    General: No acute distress     Appearance: She is alert, sitting on couch, chronically ill. Nontoxic.   HENT:      Head: Parietal scalp hematoma noted, tender to touch     Nose: No congestion or rhinorrhea.      Mouth/Throat:      Mouth: Mucous membranes are moist.      Pharynx: Oropharynx is clear.   Eyes:      General: No scleral icterus.        Right eye: No discharge.         Left eye: No discharge.      Extraocular Movements: Extraocular movements intact.   Neck:      Vascular: No carotid bruit.      Comments: No obvious JVD  Cardiovascular:      Rate and Rhythm: Normal rate. Regular rhythm.      Pulses: Normal pulses.      No friction rub. No gallop.   Pulmonary:      Breath sounds: Lungs clear.     Comments: No wheezing, " rales or rhonchi. Oxygen 2L NC  Abdominal:      General: There is no distension.      Tenderness: There is no abdominal tenderness.   Musculoskeletal:      Cervical back Neck supple. No rigidity.      Right lower leg: trace lower leg edema      Left lower le+ edema, wearing zippered compression stocking  Lymphadenopathy:      Cervical: No cervical adenopathy.   Skin:     Capillary Refill: Capillary refill takes less than 2 seconds. Ecchymosis to left hand, band-aid over skin tear   Neurological:      General: No focal deficit present.      Mental Status: She is alert. Mental status is at baseline.     Cranial Nerves: No cranial nerve deficit.      Motor: generalized weakness     Gait: unsteady   Psychiatric:         Behavior: Behavior normal.         Thought Content: Thought content normal.         Judgment: Judgment normal.     Assessment/Plan   Diagnoses and all orders for this visit:  Hematoma of scalp, subsequent encounter  -acute injury sustained from fall  -CT scans obtained in ED, reviewed- negative for acute findings   -recommend tylenol as needed for pain/discomfort     Skin tear of left hand without complication, subsequent encounter  -acute injury post fall, no further bleeding noted   -pt/family to monitor for s/sx of infection- redness, drainage, fever, chills- notify provider with any concerns     Closed fracture of neck of left femur with routine healing, subsequent encounter  -patient remains active with [facility]  -continue to use cane/walker when ambulating to minimize fall risk     Single subsegmental pulmonary embolism without acute cor pulmonale  -acute, managed with Eliquis  -receiving medication at no cost now    Arteriosclerosis of coronary artery  -chronic, lipid panel obtained in home- previously ordered by Dr. Foss   -continue atorvastatin     CHF (congestive heart failure), NYHA class II, acute, diastolic  -     Basic metabolic panel; Future  -chronic, managed with Furosemide,  Carvedilol     Anemia due to blood loss  -     CBC and Auto Differential; Future  -chronic, managed with ferrous sulfate     Chronic idiopathic constipation  -chronic, intermittent, managed with docusate sodium, miralax, prunes at lunch    Hypothyroidism, unspecified type  -     Tsh With Reflex To Free T4 If Abnormal; Future  -chronic, managed with levothyroxine     Weakness  -chronic, generalized weakness noted, high fall risk   -continue therapy services for increased strength and conditioning   -continue safety measures and supportive care     Routine labs obtained in home- CBC, BMP, TSH. Also obtained a lipid panel that was previously ordered by Dr. Foss- pt tolerated well.          Milana Hernadez, FELA-CNP

## 2025-03-26 LAB
ANION GAP SERPL CALCULATED.4IONS-SCNC: 7 MMOL/L (CALC) (ref 7–17)
BASOPHILS # BLD AUTO: 21 CELLS/UL (ref 0–200)
BASOPHILS NFR BLD AUTO: 0.5 %
BUN SERPL-MCNC: 23 MG/DL (ref 7–25)
BUN/CREAT SERPL: 23 (CALC) (ref 6–22)
CALCIUM SERPL-MCNC: 8.7 MG/DL (ref 8.6–10.4)
CHLORIDE SERPL-SCNC: 100 MMOL/L (ref 98–110)
CHOLEST SERPL-MCNC: 185 MG/DL
CHOLEST/HDLC SERPL: 3.9 (CALC)
CO2 SERPL-SCNC: 38 MMOL/L (ref 20–32)
CREAT SERPL-MCNC: 0.98 MG/DL (ref 0.6–0.95)
EGFRCR SERPLBLD CKD-EPI 2021: 57 ML/MIN/1.73M2
EOSINOPHIL # BLD AUTO: 41 CELLS/UL (ref 15–500)
EOSINOPHIL NFR BLD AUTO: 1 %
ERYTHROCYTE [DISTWIDTH] IN BLOOD BY AUTOMATED COUNT: 12.7 % (ref 11–15)
GLUCOSE SERPL-MCNC: 113 MG/DL (ref 65–99)
HCT VFR BLD AUTO: 39.5 % (ref 35–45)
HDLC SERPL-MCNC: 48 MG/DL
HGB BLD-MCNC: 12.4 G/DL (ref 11.7–15.5)
LDLC SERPL CALC-MCNC: 114 MG/DL (CALC)
LYMPHOCYTES # BLD AUTO: 877 CELLS/UL (ref 850–3900)
LYMPHOCYTES NFR BLD AUTO: 21.4 %
MCH RBC QN AUTO: 28.6 PG (ref 27–33)
MCHC RBC AUTO-ENTMCNC: 31.4 G/DL (ref 32–36)
MCV RBC AUTO: 91.2 FL (ref 80–100)
MONOCYTES # BLD AUTO: 324 CELLS/UL (ref 200–950)
MONOCYTES NFR BLD AUTO: 7.9 %
NEUTROPHILS # BLD AUTO: 2837 CELLS/UL (ref 1500–7800)
NEUTROPHILS NFR BLD AUTO: 69.2 %
NONHDLC SERPL-MCNC: 137 MG/DL (CALC)
PLATELET # BLD AUTO: 153 THOUSAND/UL (ref 140–400)
PMV BLD REES-ECKER: 12.5 FL (ref 7.5–12.5)
POTASSIUM SERPL-SCNC: 3.8 MMOL/L (ref 3.5–5.3)
RBC # BLD AUTO: 4.33 MILLION/UL (ref 3.8–5.1)
SODIUM SERPL-SCNC: 145 MMOL/L (ref 135–146)
T4 FREE SERPL-MCNC: 0.9 NG/DL (ref 0.8–1.8)
TRIGL SERPL-MCNC: 118 MG/DL
TSH SERPL-ACNC: 21.7 MIU/L (ref 0.4–4.5)
WBC # BLD AUTO: 4.1 THOUSAND/UL (ref 3.8–10.8)

## 2025-03-27 ENCOUNTER — HOME CARE VISIT (OUTPATIENT)
Dept: HOME HEALTH SERVICES | Facility: HOME HEALTH | Age: 85
End: 2025-03-27
Payer: MEDICARE

## 2025-03-27 VITALS
DIASTOLIC BLOOD PRESSURE: 64 MMHG | TEMPERATURE: 97.6 F | SYSTOLIC BLOOD PRESSURE: 122 MMHG | HEART RATE: 81 BPM | RESPIRATION RATE: 18 BRPM | OXYGEN SATURATION: 98 %

## 2025-03-27 DIAGNOSIS — E03.9 HYPOTHYROIDISM, UNSPECIFIED TYPE: Primary | ICD-10-CM

## 2025-03-27 PROCEDURE — G0151 HHCP-SERV OF PT,EA 15 MIN: HCPCS | Mod: HHH

## 2025-03-27 RX ORDER — LEVOTHYROXINE SODIUM 50 UG/1
50 TABLET ORAL DAILY
Qty: 90 TABLET | Refills: 1 | Status: SHIPPED | OUTPATIENT
Start: 2025-03-27

## 2025-03-27 SDOH — HEALTH STABILITY: MENTAL HEALTH: SMOKING IN HOME: 0

## 2025-03-27 SDOH — HEALTH STABILITY: PHYSICAL HEALTH: EXERCISE ACTIVITIES SETS: 1

## 2025-03-27 SDOH — HEALTH STABILITY: PHYSICAL HEALTH: PHYSICAL EXERCISE: STANDING

## 2025-03-27 SDOH — ECONOMIC STABILITY: HOUSING INSECURITY: EVIDENCE OF SMOKING MATERIAL: 0

## 2025-03-27 SDOH — HEALTH STABILITY: PHYSICAL HEALTH: EXERCISE ACTIVITY: CALF RAISES, HIP FLEX/EXT/ABD, HS CURL, MARCHING, MINI SQUATS

## 2025-03-27 SDOH — HEALTH STABILITY: PHYSICAL HEALTH: PHYSICAL EXERCISE: 10

## 2025-03-27 SDOH — HEALTH STABILITY: PHYSICAL HEALTH

## 2025-03-27 ASSESSMENT — ACTIVITIES OF DAILY LIVING (ADL)
OASIS_M1830: 01
AMBULATION_DISTANCE/DURATION_TOLERATED: 150 FT
AMBULATION ASSISTANCE: 1
CURRENT_FUNCTION: INDEPENDENT
HOME_HEALTH_OASIS: 00
AMBULATION ASSISTANCE ON FLAT SURFACES: 1
PHYSICAL TRANSFERS ASSESSED: 1
AMBULATION ASSISTANCE: INDEPENDENT

## 2025-03-27 ASSESSMENT — ENCOUNTER SYMPTOMS: DENIES PAIN: 1

## 2025-03-30 NOTE — ED PROVIDER NOTES
History of Present Illness     History provided by: Patient  Limitations to History: None  External Records Reviewed with Brief Summary:  Outpatient notes    HPI:  Anitha Welch is a 84 y.o. female who presents after a fall.  Was attempting to walk upstairs when she tripped and fell backwards.  She did strike her head, no LOC.  Is on blood thinners.  Notes right hip pain    Physical Exam   Triage vitals:  T 36.8 °C (98.2 °F)  HR 93  /84  RR 16  O2 (!) 91 % Supplemental oxygen    Physical Exam  Vitals and nursing note reviewed.   Constitutional:       General: She is not in acute distress.     Appearance: She is not ill-appearing.   HENT:      Head: Normocephalic.      Comments: Scalp hematoma, no lacerations     Mouth/Throat:      Mouth: Mucous membranes are moist.      Pharynx: Oropharynx is clear.   Eyes:      Extraocular Movements: Extraocular movements intact.      Conjunctiva/sclera: Conjunctivae normal.      Pupils: Pupils are equal, round, and reactive to light.   Cardiovascular:      Rate and Rhythm: Normal rate and regular rhythm.   Pulmonary:      Effort: Pulmonary effort is normal. No respiratory distress.      Breath sounds: Normal breath sounds.   Abdominal:      General: There is no distension.      Palpations: Abdomen is soft.      Tenderness: There is no abdominal tenderness.   Musculoskeletal:         General: No swelling or deformity. Normal range of motion.      Cervical back: Normal range of motion and neck supple.   Skin:     General: Skin is warm and dry.      Capillary Refill: Capillary refill takes less than 2 seconds.   Neurological:      General: No focal deficit present.      Mental Status: She is alert and oriented to person, place, and time. Mental status is at baseline.   Psychiatric:         Mood and Affect: Mood normal.         Behavior: Behavior normal.          Medical Decision Making & ED Course   Medical Decision Makin y.o. female who presents after fall.   Considered intracranial injury, cervical spine injury, extremity fracture.  X-ray imaging does not reveal any fractures.  Patient with recent hip replacement, x-ray obtained to ensure proper alignment, no periprosthetic fracture, no dislocation.  CT head also negative for intracranial bleed.  CT C-spine negative.  Patient pain control with oral Tylenol.  Evidence of scalp hematoma, no laceration that is repairable.  Bandage placed on skin tear.  Patient at this time is stable for discharge with return precautions given.  ----      Social Determinants of Health which Significantly Impact Care: None identified     EKG Independent Interpretation: EKG not obtained    Independent Result Review and Interpretation: Relevant laboratory and radiographic results were reviewed and independently interpreted by myself.  As necessary, they are commented on in the ED Course.    Chronic conditions affecting the patient's care: As documented above in [facility]    The patient was discussed with the following consultants/services: None    Care Considerations: As documented above in [facility]    ED Course:  Diagnoses as of 03/30/25 1916   Accident due to mechanical fall without injury, initial encounter   Scalp hematoma, initial encounter   Skin tear of left hand without complication, initial encounter       Procedures   Procedures    Eva Rizvi MD  Emergency Medicine     Eva Rizvi MD  03/31/25 [id number]

## 2025-04-14 PROCEDURE — RXMED WILLOW AMBULATORY MEDICATION CHARGE

## 2025-04-15 ENCOUNTER — PHARMACY VISIT (OUTPATIENT)
Dept: PHARMACY | Facility: CLINIC | Age: 85
End: 2025-04-15
Payer: MEDICARE

## 2025-04-16 ENCOUNTER — OFFICE VISIT (OUTPATIENT)
Dept: PRIMARY CARE | Facility: CLINIC | Age: 85
End: 2025-04-16
Payer: MEDICARE

## 2025-04-16 VITALS
OXYGEN SATURATION: 97 % | HEIGHT: 65 IN | DIASTOLIC BLOOD PRESSURE: 58 MMHG | HEART RATE: 90 BPM | SYSTOLIC BLOOD PRESSURE: 104 MMHG | WEIGHT: 149 LBS | BODY MASS INDEX: 24.83 KG/M2

## 2025-04-16 DIAGNOSIS — F03.94 ANXIETY DUE TO DEMENTIA (MULTI): Chronic | ICD-10-CM

## 2025-04-16 DIAGNOSIS — J96.21 ACUTE ON CHRONIC RESPIRATORY FAILURE WITH HYPOXIA: ICD-10-CM

## 2025-04-16 DIAGNOSIS — D68.59 HYPERCOAGULABLE STATE (MULTI): ICD-10-CM

## 2025-04-16 DIAGNOSIS — J44.9 CHRONIC OBSTRUCTIVE PULMONARY DISEASE, UNSPECIFIED COPD TYPE (MULTI): ICD-10-CM

## 2025-04-16 DIAGNOSIS — N18.30 STAGE 3 CHRONIC KIDNEY DISEASE, UNSPECIFIED WHETHER STAGE 3A OR 3B CKD (MULTI): ICD-10-CM

## 2025-04-16 DIAGNOSIS — E03.9 HYPOTHYROIDISM, UNSPECIFIED TYPE: ICD-10-CM

## 2025-04-16 DIAGNOSIS — I50.31 CHF (CONGESTIVE HEART FAILURE), NYHA CLASS II, ACUTE, DIASTOLIC: ICD-10-CM

## 2025-04-16 DIAGNOSIS — I50.32 CHRONIC HEART FAILURE WITH PRESERVED EJECTION FRACTION: ICD-10-CM

## 2025-04-16 DIAGNOSIS — Z00.00 MEDICARE ANNUAL WELLNESS VISIT, SUBSEQUENT: Primary | ICD-10-CM

## 2025-04-16 DIAGNOSIS — K21.9 GASTROESOPHAGEAL REFLUX DISEASE WITHOUT ESOPHAGITIS: ICD-10-CM

## 2025-04-16 DIAGNOSIS — M51.361 DEGENERATION OF INTERVERTEBRAL DISC OF LUMBAR REGION WITH LOWER EXTREMITY PAIN: ICD-10-CM

## 2025-04-16 DIAGNOSIS — I10 ESSENTIAL (PRIMARY) HYPERTENSION: ICD-10-CM

## 2025-04-16 DIAGNOSIS — I25.10 ARTERIOSCLEROSIS OF CORONARY ARTERY: ICD-10-CM

## 2025-04-16 PROCEDURE — 99214 OFFICE O/P EST MOD 30 MIN: CPT | Mod: 25 | Performed by: FAMILY MEDICINE

## 2025-04-16 PROCEDURE — 1123F ACP DISCUSS/DSCN MKR DOCD: CPT | Performed by: FAMILY MEDICINE

## 2025-04-16 PROCEDURE — G0439 PPPS, SUBSEQ VISIT: HCPCS | Performed by: FAMILY MEDICINE

## 2025-04-16 PROCEDURE — G0444 DEPRESSION SCREEN ANNUAL: HCPCS | Performed by: FAMILY MEDICINE

## 2025-04-16 PROCEDURE — 99215 OFFICE O/P EST HI 40 MIN: CPT | Performed by: FAMILY MEDICINE

## 2025-04-16 PROCEDURE — 3074F SYST BP LT 130 MM HG: CPT | Performed by: FAMILY MEDICINE

## 2025-04-16 PROCEDURE — 1170F FXNL STATUS ASSESSED: CPT | Performed by: FAMILY MEDICINE

## 2025-04-16 PROCEDURE — 1157F ADVNC CARE PLAN IN RCRD: CPT | Performed by: FAMILY MEDICINE

## 2025-04-16 PROCEDURE — 1125F AMNT PAIN NOTED PAIN PRSNT: CPT | Performed by: FAMILY MEDICINE

## 2025-04-16 PROCEDURE — 99214 OFFICE O/P EST MOD 30 MIN: CPT | Performed by: FAMILY MEDICINE

## 2025-04-16 PROCEDURE — 3078F DIAST BP <80 MM HG: CPT | Performed by: FAMILY MEDICINE

## 2025-04-16 RX ORDER — LEVOTHYROXINE SODIUM 50 UG/1
50 TABLET ORAL DAILY
Qty: 90 TABLET | Refills: 1 | Status: SHIPPED | OUTPATIENT
Start: 2025-04-16

## 2025-04-16 RX ORDER — GABAPENTIN 100 MG/1
100 CAPSULE ORAL NIGHTLY
Qty: 90 CAPSULE | Refills: 0 | Status: SHIPPED | OUTPATIENT
Start: 2025-04-16 | End: 2025-07-15

## 2025-04-16 RX ORDER — DULOXETIN HYDROCHLORIDE 20 MG/1
20 CAPSULE, DELAYED RELEASE ORAL NIGHTLY
Qty: 90 CAPSULE | Refills: 3 | Status: SHIPPED | OUTPATIENT
Start: 2025-04-16 | End: 2026-04-16

## 2025-04-16 RX ORDER — PANTOPRAZOLE SODIUM 40 MG/1
40 TABLET, DELAYED RELEASE ORAL 2 TIMES DAILY
Qty: 180 TABLET | Refills: 3 | Status: SHIPPED | OUTPATIENT
Start: 2025-04-16

## 2025-04-16 RX ORDER — FLUTICASONE FUROATE, UMECLIDINIUM BROMIDE AND VILANTEROL TRIFENATATE 100; 62.5; 25 UG/1; UG/1; UG/1
1 POWDER RESPIRATORY (INHALATION) DAILY
Qty: 1 EACH | Refills: 3 | Status: SHIPPED | OUTPATIENT
Start: 2025-04-16

## 2025-04-16 RX ORDER — ATORVASTATIN CALCIUM 40 MG/1
40 TABLET, FILM COATED ORAL NIGHTLY
Qty: 90 TABLET | Refills: 3 | Status: SHIPPED | OUTPATIENT
Start: 2025-04-16

## 2025-04-16 RX ORDER — CARVEDILOL 3.12 MG/1
3.25 TABLET ORAL 2 TIMES DAILY
Qty: 180 TABLET | Refills: 3 | Status: SHIPPED | OUTPATIENT
Start: 2025-04-16 | End: 2026-04-11

## 2025-04-16 RX ORDER — FUROSEMIDE 40 MG/1
40 TABLET ORAL DAILY
Qty: 30 TABLET | Refills: 11 | Status: SHIPPED | OUTPATIENT
Start: 2025-04-16 | End: 2026-04-16

## 2025-04-16 ASSESSMENT — PATIENT HEALTH QUESTIONNAIRE - PHQ9
2. FEELING DOWN, DEPRESSED OR HOPELESS: NOT AT ALL
SUM OF ALL RESPONSES TO PHQ9 QUESTIONS 1 AND 2: 0
1. LITTLE INTEREST OR PLEASURE IN DOING THINGS: NOT AT ALL
2. FEELING DOWN, DEPRESSED OR HOPELESS: NOT AT ALL
SUM OF ALL RESPONSES TO PHQ9 QUESTIONS 1 AND 2: 0
1. LITTLE INTEREST OR PLEASURE IN DOING THINGS: NOT AT ALL

## 2025-04-16 ASSESSMENT — PAIN SCALES - GENERAL: PAINLEVEL_OUTOF10: 6

## 2025-04-16 ASSESSMENT — ACTIVITIES OF DAILY LIVING (ADL)
BATHING: INDEPENDENT
TAKING_MEDICATION: INDEPENDENT
DRESSING: INDEPENDENT
GROCERY_SHOPPING: INDEPENDENT
DOING_HOUSEWORK: INDEPENDENT
MANAGING_FINANCES: INDEPENDENT

## 2025-04-16 ASSESSMENT — ENCOUNTER SYMPTOMS
LOSS OF SENSATION IN FEET: 0
OCCASIONAL FEELINGS OF UNSTEADINESS: 1
DEPRESSION: 0

## 2025-04-16 ASSESSMENT — COGNITIVE AND FUNCTIONAL STATUS - GENERAL: VERBAL FLUENCY - ANIMAL NAMES (0 TO 25): 1

## 2025-04-21 ENCOUNTER — TELEPHONE (OUTPATIENT)
Dept: PRIMARY CARE | Facility: CLINIC | Age: 85
End: 2025-04-21
Payer: MEDICARE

## 2025-04-28 ENCOUNTER — APPOINTMENT (OUTPATIENT)
Dept: CARDIOLOGY | Facility: HOSPITAL | Age: 85
End: 2025-04-28
Payer: MEDICARE

## 2025-04-28 ENCOUNTER — TELEPHONE (OUTPATIENT)
Dept: PRIMARY CARE | Facility: CLINIC | Age: 85
End: 2025-04-28
Payer: MEDICARE

## 2025-04-29 ENCOUNTER — OFFICE VISIT (OUTPATIENT)
Dept: PRIMARY CARE | Facility: CLINIC | Age: 85
End: 2025-04-29
Payer: MEDICARE

## 2025-04-29 VITALS
RESPIRATION RATE: 18 BRPM | DIASTOLIC BLOOD PRESSURE: 60 MMHG | SYSTOLIC BLOOD PRESSURE: 110 MMHG | OXYGEN SATURATION: 94 % | TEMPERATURE: 97.3 F | BODY MASS INDEX: 24.16 KG/M2 | HEART RATE: 82 BPM | HEIGHT: 65 IN | WEIGHT: 145 LBS

## 2025-04-29 DIAGNOSIS — D50.0 IRON DEFICIENCY ANEMIA DUE TO CHRONIC BLOOD LOSS: Primary | ICD-10-CM

## 2025-04-29 DIAGNOSIS — I10 BENIGN ESSENTIAL HYPERTENSION: ICD-10-CM

## 2025-04-29 DIAGNOSIS — E03.9 HYPOTHYROIDISM (ACQUIRED): ICD-10-CM

## 2025-04-29 DIAGNOSIS — I50.31 CHF (CONGESTIVE HEART FAILURE), NYHA CLASS II, ACUTE, DIASTOLIC: ICD-10-CM

## 2025-04-29 DIAGNOSIS — K59.00 CONSTIPATION, UNSPECIFIED CONSTIPATION TYPE: ICD-10-CM

## 2025-04-29 DIAGNOSIS — N18.31 STAGE 3A CHRONIC KIDNEY DISEASE (MULTI): ICD-10-CM

## 2025-04-29 DIAGNOSIS — R26.2 DIFFICULTY IN WALKING: ICD-10-CM

## 2025-04-29 PROCEDURE — 1126F AMNT PAIN NOTED NONE PRSNT: CPT | Performed by: NURSE PRACTITIONER

## 2025-04-29 PROCEDURE — 3078F DIAST BP <80 MM HG: CPT | Performed by: NURSE PRACTITIONER

## 2025-04-29 PROCEDURE — 99349 HOME/RES VST EST MOD MDM 40: CPT | Performed by: NURSE PRACTITIONER

## 2025-04-29 PROCEDURE — 1123F ACP DISCUSS/DSCN MKR DOCD: CPT | Performed by: NURSE PRACTITIONER

## 2025-04-29 PROCEDURE — 1160F RVW MEDS BY RX/DR IN RCRD: CPT | Performed by: NURSE PRACTITIONER

## 2025-04-29 PROCEDURE — 1159F MED LIST DOCD IN RCRD: CPT | Performed by: NURSE PRACTITIONER

## 2025-04-29 PROCEDURE — 3074F SYST BP LT 130 MM HG: CPT | Performed by: NURSE PRACTITIONER

## 2025-04-29 PROCEDURE — 1157F ADVNC CARE PLAN IN RCRD: CPT | Performed by: NURSE PRACTITIONER

## 2025-04-29 RX ORDER — DOCUSATE SODIUM 100 MG/1
100 CAPSULE, LIQUID FILLED ORAL 2 TIMES DAILY PRN
Qty: 60 CAPSULE | Refills: 11 | Status: SHIPPED | OUTPATIENT
Start: 2025-04-29

## 2025-04-29 ASSESSMENT — PAIN SCALES - GENERAL: PAINLEVEL_OUTOF10: 0-NO PAIN

## 2025-04-29 NOTE — PROGRESS NOTES
Subjective   Patient ID: Anitha Welch is a 85 y.o. female who presents for Follow-up (Routine follow up, chronic medical conditions).    Visit for 86 y/o female seen today in private home, accompanied by her spouse and son for routine follow up. Pt is sitting on couch this afternoon. She is alert to self and place, disoriented to time. Pt is able to answer simple questions regarding her health but she does have noted cognitive impairment, mostly short term memory loss and the health history is supplemented by patients family and medical chart. Pt does not drive anymore and rarely leaves the house. She lives with her spouse and son in a 2 story home. Pt has been sleeping downstairs on the couch. Her family does report that she went up the steps unassisted yesterday and was looking for medical ID cards upstairs. Patients spouse realized she was upstairs and stood at the bottom to make sure she didn't fall. Pt is using a quad cane when walking. Last fall was on 3/22 requiring evaluation in the emergency department. 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 She requires assistance with her medications. There are no reported issues with compliance. Pt is able to do her own dressing and toileting but does require stand by assistance in the shower. Pt reports that she took a shower today, spouse stands in the bathroom but she is able to do everything on her own. She established with PCP Jesús Champagne on 4/16 after Dr. Dupree left the practice. She reports the appointment went well but she has difficulty getting out of the house. Pt is oxygen dependent, 2L NC. She endorses shortness of breath with exertion. Denies chest pain, palpitations. Pt denies appetite changes, signs of weight loss, nausea, vomiting. Weight has been stable. Pt endorses chronic constipation, managed with stool softeners. She denies any urinary concerns. Pt had recent lab work. TSH was elevated, thyroid dosage adjusted. CBC with improved anemia.      Home Visit:         Medically necessary due to: Illness or condition that results in activity lmitation or restriction that impacts the ability to leave home such as:, Illness or condition that results in activity lmitation or restriction that impacts the ability to leave home such as:, unsteady gait/poor balance         Current Outpatient Medications:     albuterol 90 mcg/actuation inhaler, Inhale 1 puff every 4 hours if needed for shortness of breath., Disp: , Rfl:     apixaban (Eliquis) 5 mg tablet, Take 1 tablet (5 mg) by mouth 2 times a day., Disp: 60 tablet, Rfl: 2    atorvastatin (Lipitor) 40 mg tablet, Take 1 tablet (40 mg) by mouth once daily at bedtime., Disp: 90 tablet, Rfl: 3    carvedilol (Coreg) 3.125 mg tablet, Take 1 tablet (3.125 mg) by mouth 2 times a day., Disp: 180 tablet, Rfl: 3    docusate sodium (Colace) 100 mg capsule, Take 1 capsule (100 mg) by mouth 2 times a day as needed for constipation., Disp: 60 capsule, Rfl: 11    DULoxetine (Cymbalta) 20 mg DR capsule, Take 1 capsule (20 mg) by mouth once daily at bedtime. Do not crush or chew., Disp: 90 capsule, Rfl: 3    famotidine (Pepcid) 20 mg tablet, Take 1 tablet (20 mg) by mouth once daily at bedtime., Disp: , Rfl:     ferrous sulfate, 325 mg ferrous sulfate, tablet, Take 1 tablet (325 mg) by mouth once daily with breakfast., Disp: , Rfl:     furosemide (Lasix) 40 mg tablet, Take 1 tablet (40 mg) by mouth once daily., Disp: 30 tablet, Rfl: 11    gabapentin (Neurontin) 100 mg capsule, Take 1 capsule (100 mg) by mouth once daily at bedtime., Disp: 90 capsule, Rfl: 0    ipratropium-albuteroL (Duo-Neb) 0.5-2.5 mg/3 mL nebulizer solution, Take 3 mL by nebulization 4 times a day as needed for wheezing., Disp: , Rfl:     levothyroxine (Synthroid, Levoxyl) 50 mcg tablet, Take 1 tablet (50 mcg) by mouth early in the morning.. Take on an empty stomach at the same time each day, either 30 to 60 minutes prior to breakfast, Disp: 90 tablet, Rfl: 1     "magnesium hydroxide (Horner Milk of Magnesia) 400 mg/5 mL suspension, Take 30 mL by mouth if needed for constipation., Disp: , Rfl:     multivitamin-iron-folic acid (Multi Complete with Iron)  mg-mcg tablet tablet, Take 1 tablet by mouth once daily., Disp: , Rfl:     nitroglycerin (Nitrostat) 0.4 mg SL tablet, Place 1 tablet (0.4 mg) under the tongue every 5 minutes if needed for chest pain., Disp: , Rfl:     nystatin (Mycostatin) 100,000 unit/gram powder, Apply 1 Application topically 2 times a day., Disp: 30 g, Rfl: 0    oxygen (O2) gas therapy, Inhale 2 L/min continuously. Indications: sob, Disp: , Rfl:     pantoprazole (ProtoNix) 40 mg EC tablet, Take 1 tablet (40 mg) by mouth 2 times a day., Disp: 180 tablet, Rfl: 3    polyethylene glycol (Glycolax, Miralax) 17 gram/dose powder, mix 17 grams (1 capful) in 8 ounces of water and drink daily, Disp: 510 g, Rfl: 0    Trelegy Ellipta 100-62.5-25 mcg blister with device, Inhale 1 puff once daily., Disp: 1 each, Rfl: 3     Review of Systems  Constitutional: Negative for appetite changes, fever, chills, unexplained weight loss.   HENT: Negative for trouble swallowing.    Respiratory: Positive for shortness of breath with exertion, oxygen dependent. Negative for wheezing or cough.   Cardiovascular: Positive for edema. Negative for chest pain, palpitations.   Gastrointestinal: Positive for constipation. Negative for abdominal for pain, diarrhea, nausea and vomiting.   Endocrine: Positive for thyroid disease  Genitourinary: Negative for difficulty urinating.   Musculoskeletal:  Positive for arthralgias, unsteady gait  Neurological: Positive for prior stroke, left sided neuropathy, memory loss. Negative for dizziness and light-headedness.   Psychiatric/Behavioral: Positive for anxiety    Objective   /60 (BP Location: Left arm, Patient Position: Sitting, BP Cuff Size: Adult)   Pulse 82   Temp 36.3 °C (97.3 °F) (Temporal)   Resp 18   Ht 1.651 m (5' 5\")   Wt " 65.8 kg (145 lb)   SpO2 94%   BMI 24.13 kg/m²     Physical Exam  Constitutional:    General: No acute distress     Appearance: She is alert, sitting on couch, chronically ill. Nontoxic.   HENT:      Head: Very small hematoma to parietal scalp, improved from previous visit     Nose: No congestion or rhinorrhea.      Mouth/Throat:      Mouth: Mucous membranes are moist.      Pharynx: Oropharynx is clear.   Eyes:      General: No scleral icterus.        Right eye: No discharge.         Left eye: No discharge.      Extraocular Movements: Extraocular movements intact.   Neck:      Vascular: No carotid bruit.      Comments: No obvious JVD  Cardiovascular:      Rate and Rhythm: Normal rate. Regular rhythm.      Pulses: Normal pulses.      No friction rub. No gallop.      1+ edema to lower legs   Pulmonary:      Breath sounds: Lungs clear.     Comments: No wheezing, rales or rhonchi. Oxygen 2L NC  Abdominal:      General: There is no distension.      Tenderness: There is no abdominal tenderness.   Musculoskeletal:      Cervical back Neck supple. No rigidity.   Lymphadenopathy:      Cervical: No cervical adenopathy.   Skin:     Capillary Refill: Capillary refill takes less than 2 seconds.  Neurological:      General: No focal deficit present.      Mental Status: She is alert. Mental status is at baseline.     Cranial Nerves: No cranial nerve deficit.      Motor: generalized weakness     Gait: unsteady   Psychiatric:         Behavior: Behavior normal.         Thought Content: Thought content normal.         Judgment: Judgment normal.     Lab Results   Component Value Date    WBC 4.1 03/25/2025    HGB 12.4 03/25/2025    HCT 39.5 03/25/2025    MCV 91.2 03/25/2025     03/25/2025       Chemistry    Lab Results   Component Value Date/Time     03/25/2025 1431    K 3.8 03/25/2025 1431     03/25/2025 1431    CO2 38 (H) 03/25/2025 1431    BUN 23 03/25/2025 1431    CREATININE 0.98 (H) 03/25/2025 1431    Lab Results  "  Component Value Date/Time    CALCIUM 8.7 03/25/2025 1431    ALKPHOS 85 01/11/2025 0331     (H) 01/11/2025 0331     (H) 01/11/2025 0331    BILITOT 1.1 01/11/2025 0331        Lab Results   Component Value Date    CHOL 185 03/25/2025    CHOL 190 02/21/2023    CHOL 167 06/15/2022     Lab Results   Component Value Date    HDL 48 (L) 03/25/2025    HDL 52 02/21/2023    HDL 48 (L) 06/15/2022     Lab Results   Component Value Date    LDLCALC 114 (H) 03/25/2025    LDLCALC 125 02/21/2023    LDLCALC 92 06/15/2022     Lab Results   Component Value Date    TRIG 118 03/25/2025    TRIG 64 02/21/2023    TRIG 136 06/15/2022     No components found for: \"CHOLHDL\"     Lab Results   Component Value Date    TSH 21.70 (H) 03/25/2025      Assessment/Plan   Diagnoses and all orders for this visit:  Iron deficiency anemia due to chronic blood loss  -chronic, CBC reviewed with improvement, doing much better on Eliquis from the Xarelto   -continue ferrous sulfate     Stage 3a chronic kidney disease (Multi)  -chronic, renal function reviewed, stable, GFR 57     CHF (congestive heart failure), NYHA class II, acute, diastolic  -chronic, stable, edema at baseline  -continue Furosemide, Carvedilol, oxygen at 2L NC    Benign essential hypertension  -chronic, vitals stable    Hypothyroidism (acquired)  -chronic, last TSH elevated at 21.70, levothyroxine dosage adjusted  -continue Levothyroxine 50 mcg daily     Constipation, unspecified constipation type  -     docusate sodium (Colace) 100 mg capsule; Take 1 capsule (100 mg) by mouth 2 times a day as needed for constipation.  -continue miralax daily     Difficulty in walking  -chronic, recommend using walker/cane to minimize fall risk        FELA Devine-CNP   "

## 2025-05-12 DIAGNOSIS — D68.59 HYPERCOAGULABLE STATE (MULTI): ICD-10-CM

## 2025-05-13 DIAGNOSIS — D68.59 HYPERCOAGULABLE STATE (MULTI): ICD-10-CM

## 2025-05-13 NOTE — TELEPHONE ENCOUNTER
Pt gets eliquis from  assistance, rx today was sent to [facility]. Can you please send to .

## 2025-05-20 ENCOUNTER — PHARMACY VISIT (OUTPATIENT)
Dept: PHARMACY | Facility: CLINIC | Age: 85
End: 2025-05-20
Payer: MEDICARE

## 2025-05-20 PROCEDURE — RXMED WILLOW AMBULATORY MEDICATION CHARGE

## 2025-06-13 ENCOUNTER — TELEPHONE (OUTPATIENT)
Dept: PRIMARY CARE | Facility: CLINIC | Age: 85
End: 2025-06-13
Payer: MEDICARE

## 2025-06-16 ENCOUNTER — OFFICE VISIT (OUTPATIENT)
Dept: PRIMARY CARE | Facility: CLINIC | Age: 85
End: 2025-06-16
Payer: MEDICARE

## 2025-06-16 VITALS
RESPIRATION RATE: 16 BRPM | BODY MASS INDEX: 24.53 KG/M2 | WEIGHT: 147.2 LBS | HEART RATE: 94 BPM | TEMPERATURE: 97.7 F | DIASTOLIC BLOOD PRESSURE: 56 MMHG | HEIGHT: 65 IN | OXYGEN SATURATION: 97 % | SYSTOLIC BLOOD PRESSURE: 114 MMHG

## 2025-06-16 DIAGNOSIS — R26.81 UNSTEADY GAIT WHEN WALKING: ICD-10-CM

## 2025-06-16 DIAGNOSIS — I50.31 CHF (CONGESTIVE HEART FAILURE), NYHA CLASS II, ACUTE, DIASTOLIC: Primary | ICD-10-CM

## 2025-06-16 DIAGNOSIS — J44.9 CHRONIC OBSTRUCTIVE PULMONARY DISEASE, UNSPECIFIED COPD TYPE (MULTI): ICD-10-CM

## 2025-06-16 DIAGNOSIS — I10 BENIGN ESSENTIAL HYPERTENSION: ICD-10-CM

## 2025-06-16 DIAGNOSIS — F03.94 ANXIETY DUE TO DEMENTIA (MULTI): ICD-10-CM

## 2025-06-16 DIAGNOSIS — K59.04 CHRONIC IDIOPATHIC CONSTIPATION: ICD-10-CM

## 2025-06-16 PROCEDURE — 1159F MED LIST DOCD IN RCRD: CPT | Performed by: NURSE PRACTITIONER

## 2025-06-16 PROCEDURE — 3074F SYST BP LT 130 MM HG: CPT | Performed by: NURSE PRACTITIONER

## 2025-06-16 PROCEDURE — 1126F AMNT PAIN NOTED NONE PRSNT: CPT | Performed by: NURSE PRACTITIONER

## 2025-06-16 PROCEDURE — 99349 HOME/RES VST EST MOD MDM 40: CPT | Performed by: NURSE PRACTITIONER

## 2025-06-16 PROCEDURE — 1160F RVW MEDS BY RX/DR IN RCRD: CPT | Performed by: NURSE PRACTITIONER

## 2025-06-16 PROCEDURE — 3078F DIAST BP <80 MM HG: CPT | Performed by: NURSE PRACTITIONER

## 2025-06-16 ASSESSMENT — PAIN SCALES - GENERAL: PAINLEVEL_OUTOF10: 0-NO PAIN

## 2025-06-16 NOTE — PROGRESS NOTES
"Subjective   Patient ID: Anitha Welch is a 85 y.o. female who presents for Follow-up (Routine 6 week follow up, chronic medical conditions).    Visit for 86 y/o female seen today in private home, accompanied by her spouse and son for routine follow up of chronic medical conditions.     Pt is sitting on couch this afternoon. She is alert to self and place, disoriented to time. Pt is able to answer simple questions related to her health but does have noted cognitive impairment so her son will supplement the health history. Pt has short term memory loss. She will frequently ask the same questions repeatedly. Pt does not drive and rarely leaves the house. Her son does report that they have taken patient outside on the deck several times this summer but that does require much encouragement at times to get patient to go outside. Pt established care with PCP Jesús Champagne on 4/16/25. She has a 3 month follow up scheduled on 7/16 that family is planning to take patient to. Pt continues to reside with her spouse and son in a 2 story home. She has been sleeping on the couch downstairs. Declines a hospital bed. She is no longer going up and down the stairs due to history of falls. Pt is ambulatory, uses a quad cane when walking. Son Jose does report that there are times patient gets up and starts walking without it. She has not had any recent falls or injuries. Pt is able to do her own dressing and toileting but requires stand by assistance in the shower. She requires assistance with her medications. She is receiving her Eliquis and Trelegy from  pharmacy, and is working with the pharmacist to get reduced pricing on these medications. Pt is  oxygen dependent, 2L NC. She endorses shortness of breath with exertion. Denies shortness of breath at rest, chest pain, palpitations. Pt denies appetite changes, signs of weight loss, nausea, vomiting. Weight has been stable. Pt denies any recent constipation concerns. She states\" If " "I don't go one day, I go the next\", Denies urinary concerns. Pt is no longer complaining of left leg pain. Her leg swelling has overall improved. She does get swelling of her ankles and feet but then elevates her legs on the couch. Pt has occasional confusion. Son reports this is mostly in the afternoon/evenings, almost \"sundowning\". Pt tells me today that she has 3 sons who live with her. She does not remember their names. 2 of her sons have passed away. Son Jose is present today and reports that she will occasionally say things like this, mostly in the evenings and she will fixate on certain things and repeat the same thing over and over.     Home Visit:         Medically necessary due to: Illness or condition that results in activity lmitation or restriction that impacts the ability to leave home such as:, Illness or condition that results in activity lmitation or restriction that impacts the ability to leave home such as:, unsteady gait/poor balance       Current Outpatient Medications:     albuterol 90 mcg/actuation inhaler, Inhale 1 puff every 4 hours if needed for shortness of breath., Disp: , Rfl:     apixaban (Eliquis) 5 mg tablet, Take 1 tablet (5 mg) by mouth 2 times a day., Disp: 60 tablet, Rfl: 2    atorvastatin (Lipitor) 40 mg tablet, Take 1 tablet (40 mg) by mouth once daily at bedtime., Disp: 90 tablet, Rfl: 3    carvedilol (Coreg) 3.125 mg tablet, Take 1 tablet (3.125 mg) by mouth 2 times a day., Disp: 180 tablet, Rfl: 3    docusate sodium (Colace) 100 mg capsule, Take 1 capsule (100 mg) by mouth 2 times a day as needed for constipation., Disp: 60 capsule, Rfl: 11    DULoxetine (Cymbalta) 20 mg DR capsule, Take 1 capsule (20 mg) by mouth once daily at bedtime. Do not crush or chew., Disp: 90 capsule, Rfl: 3    famotidine (Pepcid) 20 mg tablet, Take 1 tablet (20 mg) by mouth once daily at bedtime., Disp: , Rfl:     ferrous sulfate, 325 mg ferrous sulfate, tablet, Take 1 tablet (325 mg) by mouth once " daily with breakfast., Disp: , Rfl:     furosemide (Lasix) 40 mg tablet, Take 1 tablet (40 mg) by mouth once daily., Disp: 30 tablet, Rfl: 11    gabapentin (Neurontin) 100 mg capsule, Take 1 capsule (100 mg) by mouth once daily at bedtime., Disp: 90 capsule, Rfl: 0    ipratropium-albuteroL (Duo-Neb) 0.5-2.5 mg/3 mL nebulizer solution, Take 3 mL by nebulization 4 times a day as needed for wheezing., Disp: , Rfl:     levothyroxine (Synthroid, Levoxyl) 50 mcg tablet, Take 1 tablet (50 mcg) by mouth early in the morning.. Take on an empty stomach at the same time each day, either 30 to 60 minutes prior to breakfast, Disp: 90 tablet, Rfl: 1    magnesium hydroxide (Horner Milk of Magnesia) 400 mg/5 mL suspension, Take 30 mL by mouth if needed for constipation., Disp: , Rfl:     multivitamin-iron-folic acid (Multi Complete with Iron)  mg-mcg tablet tablet, Take 1 tablet by mouth once daily., Disp: , Rfl:     nitroglycerin (Nitrostat) 0.4 mg SL tablet, Place 1 tablet (0.4 mg) under the tongue every 5 minutes if needed for chest pain., Disp: , Rfl:     nystatin (Mycostatin) 100,000 unit/gram powder, Apply 1 Application topically 2 times a day., Disp: 30 g, Rfl: 0    oxygen (O2) gas therapy, Inhale 2 L/min continuously. Indications: sob, Disp: , Rfl:     pantoprazole (ProtoNix) 40 mg EC tablet, Take 1 tablet (40 mg) by mouth 2 times a day., Disp: 180 tablet, Rfl: 3    polyethylene glycol (Glycolax, Miralax) 17 gram/dose powder, mix 17 grams (1 capful) in 8 ounces of water and drink daily, Disp: 510 g, Rfl: 0    Trelegy Ellipta 100-62.5-25 mcg blister with device, Inhale 1 puff once daily., Disp: 1 each, Rfl: 3     Review of Systems  Constitutional: Negative for appetite changes, fever, chills, unexplained weight loss.   HENT: Positive for hearing loss. Negative for trouble swallowing, sore throat, congestion.   Respiratory: Positive for shortness of breath with exertion, oxygen dependent. Negative for wheezing or  "cough.   Cardiovascular: Positive for edema. Negative for chest pain, palpitations.   Gastrointestinal: Positive for constipation. Negative for abdominal for pain, diarrhea, nausea and vomiting.   Endocrine: Positive for thyroid disease  Genitourinary: Negative for difficulty urinating, hematuria, back pain, flank pain.   Musculoskeletal:  Positive for arthralgias, unsteady gait. No recent falls.   Neurological: Positive for prior stroke, left sided neuropathy, memory loss. Negative for dizziness and light-headedness.   Psychiatric/Behavioral: Positive for anxiety    Objective   /56 (BP Location: Left arm, Patient Position: Sitting, BP Cuff Size: Adult)   Pulse 94   Temp 36.5 °C (97.7 °F) (Temporal)   Resp 16   Ht 1.651 m (5' 5\")   Wt 66.8 kg (147 lb 3.2 oz)   SpO2 97%   BMI 24.50 kg/m²     Physical Exam  Constitutional:    General: No acute distress     Appearance: She is alert, sitting on couch, chronically ill. Nontoxic.   HENT:      Head:      Nose: No congestion or rhinorrhea.      Mouth/Throat:      Mouth: Mucous membranes are moist.      Pharynx: Oropharynx is clear.   Eyes:      General: No scleral icterus.        Right eye: No discharge.         Left eye: No discharge.      Extraocular Movements: Extraocular movements intact.   Neck:      Vascular: No carotid bruit.      Comments: No obvious JVD  Cardiovascular:      Rate and Rhythm: Normal rate. Regular rhythm.      Pulses: Normal pulses.      No friction rub. No gallop.      Trace edema to BLE   Pulmonary:      Breath sounds: Lungs clear.     Comments: No wheezing, rales or rhonchi. Oxygen 2L NC  Abdominal:      General: There is no distension.      Tenderness: There is no abdominal tenderness.   Musculoskeletal:      Cervical back Neck supple. No rigidity.   Lymphadenopathy:      Cervical: No cervical adenopathy.   Skin:     Capillary Refill: Capillary refill takes less than 2 seconds.  Neurological:      General: No focal deficit present. "      Mental Status: She is alert. Mental status is at baseline.     Cranial Nerves: No cranial nerve deficit.      Motor: generalized weakness     Gait: unsteady, uses quad cane   Psychiatric:         Behavior: Behavior normal.         Thought Content: Thought content normal.         Judgment: Judgment normal.      Assessment/Plan   Diagnoses and all orders for this visit:  CHF (congestive heart failure), NYHA class II, acute, diastolic  -chronic, stable, LE edema with improvement   -continue Furosemide   -monitor daily weight, notify provider with any concerns of weight gain, worsening edema, worsening shortness of breath    Benign essential hypertension  -chronic, vitals stable     Chronic obstructive pulmonary disease, unspecified COPD type (Multi)  -chronic, oxygen dependent 2L via NC   -continue Trelegy Ellipta, Duonebs as needed, Albuterol inhaler as needed    Chronic idiopathic constipation  -chronic, stable, well managed with routine miralax     Anxiety due to dementia (Multi)  -chronic, memory impairment noted  -no behavioral concerns   -continue Duloxetine for anxiety/depression     Unsteady gait when walking  -chronic, ambulatory with quad cane   -no recent falls reported   -continue safety measures and supportive care     Patient stable. Seems to be doing well overall. Mobility has improved. Pt to follow up with PCP next month as scheduled.        Milana Hernadez, APRN-CNP

## 2025-07-17 DIAGNOSIS — M51.361 DEGENERATION OF INTERVERTEBRAL DISC OF LUMBAR REGION WITH LOWER EXTREMITY PAIN: ICD-10-CM

## 2025-07-17 RX ORDER — GABAPENTIN 100 MG/1
100 CAPSULE ORAL NIGHTLY
Qty: 90 CAPSULE | Refills: 0 | Status: SHIPPED | OUTPATIENT
Start: 2025-07-17 | End: 2025-10-15

## 2025-07-17 NOTE — TELEPHONE ENCOUNTER
Patient no showed for her 3-month follow-up appointment, needs a follow-up appointment for this.  Will give a 3-month supply but will need an appointment for further refills.

## 2025-08-11 DIAGNOSIS — D68.59 HYPERCOAGULABLE STATE (MULTI): ICD-10-CM

## 2025-08-11 PROCEDURE — RXMED WILLOW AMBULATORY MEDICATION CHARGE

## 2025-08-14 ENCOUNTER — PHARMACY VISIT (OUTPATIENT)
Dept: PHARMACY | Facility: CLINIC | Age: 85
End: 2025-08-14
Payer: MEDICARE

## 2025-08-15 ENCOUNTER — TELEPHONE (OUTPATIENT)
Dept: PRIMARY CARE | Facility: CLINIC | Age: 85
End: 2025-08-15
Payer: MEDICARE

## 2025-08-18 ENCOUNTER — APPOINTMENT (OUTPATIENT)
Dept: PRIMARY CARE | Facility: CLINIC | Age: 85
End: 2025-08-18
Payer: MEDICARE

## 2025-09-05 ENCOUNTER — TELEPHONE (OUTPATIENT)
Dept: PRIMARY CARE | Facility: CLINIC | Age: 85
End: 2025-09-05
Payer: MEDICARE

## (undated) DEVICE — CATHETER, ANGIO, IMPULSE, FR4, 6 FR X 100 CM

## (undated) DEVICE — SOLUTION, IRRIGATION, X RX SODIUM CHL 0.9%, 1000ML BTL

## (undated) DEVICE — STAPLER, SKIN PROXIMATE, 35 WIDE

## (undated) DEVICE — DRAPE, ORTHOMAX UP, 108 X 90IN, STERILE

## (undated) DEVICE — RETRIEVER, SUTURE, HEWSON

## (undated) DEVICE — PACK, ANGIO P2, CUSTOM, LAKE

## (undated) DEVICE — GUIDEWIRE, J TIP, 3 MM, 0.035 IN X 150 CM, PTFE

## (undated) DEVICE — Device

## (undated) DEVICE — GLOVE, SURGICAL, PROTEXIS PI BLUE W/NEUTHERA, 8.0, PF, LF

## (undated) DEVICE — BLADE, SAW,SAGGITAL, THICK, NO OFFSET

## (undated) DEVICE — INTRODUCER, VASCULAR, HEMOSTASIS, 14 FR, 12 CM

## (undated) DEVICE — CATHETER, EXPO, MODEL-D, 6FR FR4

## (undated) DEVICE — CATHETER TRAY, URETHRAL, FOLEY, 16 FR, SILICONE

## (undated) DEVICE — INTRODUCER KIT, MINI-STICK MAX, 4FR STIFFENED, 21G X 7CM ECHO NEEDLE NITINOL

## (undated) DEVICE — CATHETER, EXPO, MODEL-D, 6FR PIG 110CM

## (undated) DEVICE — SUTURE, ETHIBOND, P2, V-37, 30 IN, GREEN

## (undated) DEVICE — DRAPE, INCISE, ANTIMICROBIAL, IOBAN 2, LARGE, 17 X 23 IN, DISPOSABLE, STERILE

## (undated) DEVICE — WOUND SYSTEM, DEBRIDEMENT & CLEANING, O.R DUOPAK

## (undated) DEVICE — CATHETER, THERMODILUTION, SWAN GANZ, 7 FR, 110CM, STANDARD

## (undated) DEVICE — DRAPE, SHEET, LARGE, 70 X 85IN, STERILE

## (undated) DEVICE — GUIDEWIRE, J TIP, 3 MM, 0.035 IN X 260 CM, PTFE

## (undated) DEVICE — DRAPE, TIBURON, HIP W/POUCHES

## (undated) DEVICE — SUTURE, VICRYL, 0, 36 IN, CT-1, UNDYED

## (undated) DEVICE — SUTURE, FIBERWIRE 2, T-5 TAPER NEEDLE, 38"

## (undated) DEVICE — DRESSING, MEPILEX BORDER, POST-OP AG, 4 X 10 IN

## (undated) DEVICE — NEEDLE, TRANSSEPTAL, BROCKENBROUGH CURVE, ADULT, 18 G X 71 CM

## (undated) DEVICE — DRAPE, U-DRAPE, NON STERILE

## (undated) DEVICE — SUTURE, VICRYL, 3-0, 27 IN, CT-1, UNDYED

## (undated) DEVICE — GLOVE, SURGICAL, PROTEXIS PI , 7.5, PF, LF

## (undated) DEVICE — GUIDEWIRE, INQWIRE, 3MM J, .035, 150

## (undated) DEVICE — SUTURE, VICRYL, 2-0, 36 IN, CT-1, UNDYED

## (undated) DEVICE — KIT, NAMIC STANDARD LEFT HEART, CUSTOM, LWMC

## (undated) DEVICE — DRAPE, LARGE TOWEL, NON- FENESTRATED, 17X23, CLEAR, N/S

## (undated) DEVICE — SUTURE, MONOCRYL, 4-0, 27 IN, PS-2, UNDYED

## (undated) DEVICE — CLOSURE DEVICE, VASCULAR, MYNX, 6FR/7FR

## (undated) DEVICE — GUIDEWIRE, WHOLEY, 0.035 IN X 260 CM, MODIFIED J TIP

## (undated) DEVICE — CATHETER, EXPO, MODEL-D, 6FR FL4

## (undated) DEVICE — ADHESIVE, SKIN, DERMABOND ADVANCED, 15CM, PEN-STYLE

## (undated) DEVICE — SOLUTION, IRRIGATION, USP, SODIUM CHLORIDE 0.9%, 3000 ML, BAG

## (undated) DEVICE — SHEATH, PINNACLE, W/.038 GUIDEWIRE, 10 CM,  6FR INTRODUCER, 6FR DIA, 2.5 CM DIALATOR

## (undated) DEVICE — GUIDEWIRE, J-TIP, SUPER STIFF, AMPLATZ, 0.035 IN X 260 CM

## (undated) DEVICE — ACCESS KIT, S-MAK MINI, 4FR 10CM 0.018IN 40CM, NT/PT, ECHO ENHANCE NEEDLE

## (undated) DEVICE — DRAPE, ISOLATION, INCISE, W/POUCH, STERI DRAPE, 125 X 83 IN, DISPOSABLE, STERILE

## (undated) DEVICE — SHEATH, PINNACLE, W/.038 GUIDEWIRE, 10 CM,  7FR INTRODUCER, 7FR DIA, 2.5 CM DIALATOR

## (undated) DEVICE — SHEATH, PINNACLE, 10 CM,  7FR INTRODUCER, 7FR DIA, 2.5 CM DIALATOR